# Patient Record
Sex: MALE | Race: WHITE | NOT HISPANIC OR LATINO | Employment: FULL TIME | URBAN - METROPOLITAN AREA
[De-identification: names, ages, dates, MRNs, and addresses within clinical notes are randomized per-mention and may not be internally consistent; named-entity substitution may affect disease eponyms.]

---

## 2018-10-11 ENCOUNTER — HOSPITAL ENCOUNTER (INPATIENT)
Facility: HOSPITAL | Age: 58
LOS: 1 days | Discharge: HOME | DRG: 812 | End: 2018-10-12
Attending: EMERGENCY MEDICINE | Admitting: HOSPITALIST
Payer: COMMERCIAL

## 2018-10-11 DIAGNOSIS — I48.91 ATRIAL FIBRILLATION: ICD-10-CM

## 2018-10-11 DIAGNOSIS — D50.9 MICROCYTIC ANEMIA: ICD-10-CM

## 2018-10-11 DIAGNOSIS — I48.91 A-FIB: ICD-10-CM

## 2018-10-11 DIAGNOSIS — R06.02 SOB (SHORTNESS OF BREATH): ICD-10-CM

## 2018-10-11 DIAGNOSIS — R19.5 OCCULT BLOOD POSITIVE STOOL: ICD-10-CM

## 2018-10-11 DIAGNOSIS — I10 ESSENTIAL HYPERTENSION: ICD-10-CM

## 2018-10-11 DIAGNOSIS — D64.9 SYMPTOMATIC ANEMIA: Primary | ICD-10-CM

## 2018-10-11 PROBLEM — E87.6 HYPOKALEMIA: Status: ACTIVE | Noted: 2018-10-11

## 2018-10-11 PROBLEM — R79.89 ELEVATED LFTS: Status: ACTIVE | Noted: 2018-10-11

## 2018-10-11 LAB
ABO + RH BLD: NORMAL
ALBUMIN SERPL BCP-MCNC: 3.5 G/DL
ALP SERPL-CCNC: 52 U/L
ALT SERPL W/O P-5'-P-CCNC: 57 U/L
ANION GAP SERPL CALC-SCNC: 11 MMOL/L
ANISOCYTOSIS BLD QL SMEAR: SLIGHT
AST SERPL-CCNC: 43 U/L
BASOPHILS # BLD AUTO: 0.04 K/UL
BASOPHILS # BLD AUTO: 0.04 K/UL
BASOPHILS NFR BLD: 0.3 %
BASOPHILS NFR BLD: 0.4 %
BILIRUB SERPL-MCNC: 0.3 MG/DL
BLD GP AB SCN CELLS X3 SERPL QL: NORMAL
BLD PROD TYP BPU: NORMAL
BLD PROD TYP BPU: NORMAL
BLOOD UNIT EXPIRATION DATE: NORMAL
BLOOD UNIT EXPIRATION DATE: NORMAL
BLOOD UNIT TYPE CODE: 8400
BLOOD UNIT TYPE CODE: 8400
BLOOD UNIT TYPE: NORMAL
BLOOD UNIT TYPE: NORMAL
BNP SERPL-MCNC: 110 PG/ML
BUN SERPL-MCNC: 20 MG/DL
CALCIUM SERPL-MCNC: 9 MG/DL
CHLORIDE SERPL-SCNC: 106 MMOL/L
CO2 SERPL-SCNC: 22 MMOL/L
CODING SYSTEM: NORMAL
CODING SYSTEM: NORMAL
CREAT SERPL-MCNC: 0.9 MG/DL
DACRYOCYTES BLD QL SMEAR: ABNORMAL
DIFFERENTIAL METHOD: ABNORMAL
DIFFERENTIAL METHOD: ABNORMAL
DISPENSE STATUS: NORMAL
DISPENSE STATUS: NORMAL
EOSINOPHIL # BLD AUTO: 0.1 K/UL
EOSINOPHIL # BLD AUTO: 0.1 K/UL
EOSINOPHIL NFR BLD: 0.7 %
EOSINOPHIL NFR BLD: 0.8 %
ERYTHROCYTE [DISTWIDTH] IN BLOOD BY AUTOMATED COUNT: 18.4 %
ERYTHROCYTE [DISTWIDTH] IN BLOOD BY AUTOMATED COUNT: 18.6 %
EST. GFR  (AFRICAN AMERICAN): >60 ML/MIN/1.73 M^2
EST. GFR  (NON AFRICAN AMERICAN): >60 ML/MIN/1.73 M^2
FERRITIN SERPL-MCNC: 5 NG/ML
GLUCOSE SERPL-MCNC: 102 MG/DL
HCT VFR BLD AUTO: 16.5 %
HCT VFR BLD AUTO: 17.5 %
HGB BLD-MCNC: 4.7 G/DL
HGB BLD-MCNC: 4.9 G/DL
HYPOCHROMIA BLD QL SMEAR: ABNORMAL
HYPOCHROMIA BLD QL SMEAR: ABNORMAL
IRON SERPL-MCNC: 14 UG/DL
LYMPHOCYTES # BLD AUTO: 1.7 K/UL
LYMPHOCYTES # BLD AUTO: 2.3 K/UL
LYMPHOCYTES NFR BLD: 15.6 %
LYMPHOCYTES NFR BLD: 19.2 %
MCH RBC QN AUTO: 19.4 PG
MCH RBC QN AUTO: 19.7 PG
MCHC RBC AUTO-ENTMCNC: 28 G/DL
MCHC RBC AUTO-ENTMCNC: 28.5 G/DL
MCV RBC AUTO: 69 FL
MCV RBC AUTO: 69 FL
MONOCYTES # BLD AUTO: 1 K/UL
MONOCYTES # BLD AUTO: 1.2 K/UL
MONOCYTES NFR BLD: 9.6 %
MONOCYTES NFR BLD: 9.7 %
NEUTROPHILS # BLD AUTO: 7.9 K/UL
NEUTROPHILS # BLD AUTO: 8.4 K/UL
NEUTROPHILS NFR BLD: 69.9 %
NEUTROPHILS NFR BLD: 73.3 %
OB PNL STL: POSITIVE
OVALOCYTES BLD QL SMEAR: ABNORMAL
OVALOCYTES BLD QL SMEAR: ABNORMAL
PLATELET # BLD AUTO: 294 K/UL
PLATELET # BLD AUTO: 351 K/UL
PLATELET BLD QL SMEAR: ABNORMAL
PLATELET BLD QL SMEAR: ABNORMAL
PMV BLD AUTO: 8.6 FL
PMV BLD AUTO: 9.4 FL
POIKILOCYTOSIS BLD QL SMEAR: SLIGHT
POIKILOCYTOSIS BLD QL SMEAR: SLIGHT
POLYCHROMASIA BLD QL SMEAR: ABNORMAL
POTASSIUM SERPL-SCNC: 3.3 MMOL/L
PROT SERPL-MCNC: 6.5 G/DL
RBC # BLD AUTO: 2.38 M/UL
RBC # BLD AUTO: 2.52 M/UL
SATURATED IRON: 2 %
SODIUM SERPL-SCNC: 139 MMOL/L
STOMATOCYTES BLD QL SMEAR: PRESENT
TOTAL IRON BINDING CAPACITY: 657 UG/DL
TRANS ERYTHROCYTES VOL PATIENT: NORMAL ML
TRANS ERYTHROCYTES VOL PATIENT: NORMAL ML
TRANSFERRIN SERPL-MCNC: 444 MG/DL
TROPONIN I SERPL DL<=0.01 NG/ML-MCNC: <0.006 NG/ML
TROPONIN I SERPL DL<=0.01 NG/ML-MCNC: <0.006 NG/ML
WBC # BLD AUTO: 10.76 K/UL
WBC # BLD AUTO: 12.01 K/UL

## 2018-10-11 PROCEDURE — 93005 ELECTROCARDIOGRAM TRACING: CPT

## 2018-10-11 PROCEDURE — 36430 TRANSFUSION BLD/BLD COMPNT: CPT

## 2018-10-11 PROCEDURE — 83880 ASSAY OF NATRIURETIC PEPTIDE: CPT

## 2018-10-11 PROCEDURE — 99285 EMERGENCY DEPT VISIT HI MDM: CPT

## 2018-10-11 PROCEDURE — P9021 RED BLOOD CELLS UNIT: HCPCS

## 2018-10-11 PROCEDURE — 85025 COMPLETE CBC W/AUTO DIFF WBC: CPT | Mod: 91

## 2018-10-11 PROCEDURE — 99223 1ST HOSP IP/OBS HIGH 75: CPT | Mod: ,,, | Performed by: INTERNAL MEDICINE

## 2018-10-11 PROCEDURE — 93010 ELECTROCARDIOGRAM REPORT: CPT | Mod: 76,,, | Performed by: INTERNAL MEDICINE

## 2018-10-11 PROCEDURE — 80053 COMPREHEN METABOLIC PANEL: CPT

## 2018-10-11 PROCEDURE — 11000001 HC ACUTE MED/SURG PRIVATE ROOM

## 2018-10-11 PROCEDURE — 25000003 PHARM REV CODE 250: Performed by: HOSPITALIST

## 2018-10-11 PROCEDURE — 84484 ASSAY OF TROPONIN QUANT: CPT | Mod: 91

## 2018-10-11 PROCEDURE — 93306 TTE W/DOPPLER COMPLETE: CPT

## 2018-10-11 PROCEDURE — 82272 OCCULT BLD FECES 1-3 TESTS: CPT

## 2018-10-11 PROCEDURE — 25000003 PHARM REV CODE 250: Performed by: PHYSICIAN ASSISTANT

## 2018-10-11 PROCEDURE — 99223 PR INITIAL HOSPITAL CARE,LEVL III: ICD-10-PCS | Mod: ,,, | Performed by: INTERNAL MEDICINE

## 2018-10-11 PROCEDURE — 82728 ASSAY OF FERRITIN: CPT

## 2018-10-11 PROCEDURE — 93010 ELECTROCARDIOGRAM REPORT: CPT | Mod: ,,, | Performed by: INTERNAL MEDICINE

## 2018-10-11 PROCEDURE — 93010 EKG 12-LEAD: ICD-10-PCS | Mod: 76,,, | Performed by: INTERNAL MEDICINE

## 2018-10-11 PROCEDURE — 86850 RBC ANTIBODY SCREEN: CPT

## 2018-10-11 PROCEDURE — 25000003 PHARM REV CODE 250: Performed by: INTERNAL MEDICINE

## 2018-10-11 PROCEDURE — 86920 COMPATIBILITY TEST SPIN: CPT

## 2018-10-11 PROCEDURE — 83540 ASSAY OF IRON: CPT

## 2018-10-11 RX ORDER — AMLODIPINE AND BENAZEPRIL HYDROCHLORIDE 10; 20 MG/1; MG/1
1 CAPSULE ORAL DAILY
Status: ON HOLD | COMMUNITY
End: 2018-10-12 | Stop reason: HOSPADM

## 2018-10-11 RX ORDER — SODIUM CHLORIDE 0.9 % (FLUSH) 0.9 %
5 SYRINGE (ML) INJECTION
Status: DISCONTINUED | OUTPATIENT
Start: 2018-10-11 | End: 2018-10-12 | Stop reason: HOSPADM

## 2018-10-11 RX ORDER — LIDOCAINE 50 MG/G
1 PATCH TOPICAL
Status: DISCONTINUED | OUTPATIENT
Start: 2018-10-11 | End: 2018-10-12 | Stop reason: HOSPADM

## 2018-10-11 RX ORDER — HYDROCODONE BITARTRATE AND ACETAMINOPHEN 500; 5 MG/1; MG/1
TABLET ORAL
Status: DISCONTINUED | OUTPATIENT
Start: 2018-10-11 | End: 2018-10-12 | Stop reason: HOSPADM

## 2018-10-11 RX ORDER — DILTIAZEM HYDROCHLORIDE 5 MG/ML
15 INJECTION INTRAVENOUS
Status: COMPLETED | OUTPATIENT
Start: 2018-10-11 | End: 2018-10-11

## 2018-10-11 RX ORDER — IBUPROFEN 200 MG
16 TABLET ORAL
Status: DISCONTINUED | OUTPATIENT
Start: 2018-10-11 | End: 2018-10-12 | Stop reason: HOSPADM

## 2018-10-11 RX ORDER — HYDROCHLOROTHIAZIDE 25 MG/1
25 TABLET ORAL DAILY
COMMUNITY
End: 2022-08-24

## 2018-10-11 RX ORDER — IBUPROFEN 200 MG
24 TABLET ORAL
Status: DISCONTINUED | OUTPATIENT
Start: 2018-10-11 | End: 2018-10-12 | Stop reason: HOSPADM

## 2018-10-11 RX ORDER — GLUCAGON 1 MG
1 KIT INJECTION
Status: DISCONTINUED | OUTPATIENT
Start: 2018-10-11 | End: 2018-10-12 | Stop reason: HOSPADM

## 2018-10-11 RX ORDER — POLYETHYLENE GLYCOL 3350, SODIUM SULFATE ANHYDROUS, SODIUM BICARBONATE, SODIUM CHLORIDE, POTASSIUM CHLORIDE 236; 22.74; 6.74; 5.86; 2.97 G/4L; G/4L; G/4L; G/4L; G/4L
4000 POWDER, FOR SOLUTION ORAL ONCE
Status: COMPLETED | OUTPATIENT
Start: 2018-10-11 | End: 2018-10-11

## 2018-10-11 RX ORDER — ASPIRIN 325 MG
325 TABLET ORAL
Status: COMPLETED | OUTPATIENT
Start: 2018-10-11 | End: 2018-10-11

## 2018-10-11 RX ORDER — ACETAMINOPHEN 325 MG/1
650 TABLET ORAL EVERY 4 HOURS PRN
Status: DISCONTINUED | OUTPATIENT
Start: 2018-10-11 | End: 2018-10-12 | Stop reason: HOSPADM

## 2018-10-11 RX ORDER — ONDANSETRON 2 MG/ML
4 INJECTION INTRAMUSCULAR; INTRAVENOUS EVERY 8 HOURS PRN
Status: DISCONTINUED | OUTPATIENT
Start: 2018-10-11 | End: 2018-10-12 | Stop reason: HOSPADM

## 2018-10-11 RX ADMIN — DILTIAZEM HYDROCHLORIDE 15 MG: 5 INJECTION INTRAVENOUS at 01:10

## 2018-10-11 RX ADMIN — LIDOCAINE 1 PATCH: 50 PATCH TOPICAL at 09:10

## 2018-10-11 RX ADMIN — ASPIRIN 325 MG ORAL TABLET 325 MG: 325 PILL ORAL at 01:10

## 2018-10-11 RX ADMIN — POLYETHYLENE GLYCOL 3350, SODIUM SULFATE ANHYDROUS, SODIUM BICARBONATE, SODIUM CHLORIDE, POTASSIUM CHLORIDE 4000 ML: 236; 22.74; 6.74; 5.86; 2.97 POWDER, FOR SOLUTION ORAL at 09:10

## 2018-10-11 NOTE — SUBJECTIVE & OBJECTIVE
Past Medical History:   Diagnosis Date    Hypertension        History reviewed. No pertinent surgical history.    Review of patient's allergies indicates:  No Known Allergies    FH  Gastric cancer in father age 50    Tobacco Use    Smoking status: Never Smoker    Smokeless tobacco: Current User     Types: Chew   Substance and Sexual Activity    Alcohol use: Not on file    Drug use: Not on file    Sexual activity: Not on file     Review of Systems   Constitutional: Negative for appetite change and fever.   HENT: Negative.    Eyes: Negative.    Respiratory: Negative for cough and chest tightness.    Cardiovascular: Negative for palpitations and leg swelling.   Gastrointestinal: Negative for abdominal distention, nausea and vomiting.   Endocrine: Negative.    Genitourinary: Negative.    Musculoskeletal: Negative for arthralgias.   Skin: Negative.    Allergic/Immunologic: Negative.    Neurological: Negative.    Hematological: Negative.    Psychiatric/Behavioral: Negative.    All other systems reviewed and are negative.    Objective:     Vital Signs (Most Recent):  Temp: 98.9 °F (37.2 °C) (10/11/18 1304)  Pulse: 98 (10/11/18 1501)  Resp: 19 (10/11/18 1501)  BP: 119/67 (10/11/18 1501)  SpO2: 100 % (10/11/18 1501) Vital Signs (24h Range):  Temp:  [98.9 °F (37.2 °C)] 98.9 °F (37.2 °C)  Pulse:  [] 98  Resp:  [11-19] 19  SpO2:  [100 %] 100 %  BP: (119-123)/(67-77) 119/67     Weight: 89.8 kg (198 lb) (10/11/18 1304)  Body mass index is 28.41 kg/m².    No intake or output data in the 24 hours ending 10/11/18 1522    Lines/Drains/Airways     Peripheral Intravenous Line                 Peripheral IV - Single Lumen 10/11/18 1347 Left Hand less than 1 day                Physical Exam   Constitutional: He is oriented to person, place, and time. He appears well-developed and well-nourished.   HENT:   Head: Normocephalic and atraumatic.   +pale conjunctiva, no erythema   Eyes: EOM are normal. Pupils are equal, round, and  reactive to light.   Cardiovascular: Normal rate and regular rhythm.   Pulmonary/Chest: Effort normal and breath sounds normal.   Abdominal: Soft. Bowel sounds are normal.   Neurological: He is alert and oriented to person, place, and time.   Skin: Skin is warm and dry.   Pallor    Psychiatric: He has a normal mood and affect.   Nursing note and vitals reviewed.      Significant Labs:  CBC:   Recent Labs   Lab  10/11/18   1329  10/11/18   1414   WBC  12.01  10.76   HGB  4.9*  4.7*   HCT  17.5*  16.5*   PLT  351*  294       Significant Imaging:  Imaging results within the past 24 hours have been reviewed.

## 2018-10-11 NOTE — ASSESSMENT & PLAN NOTE
Patient with new onset microcytic anemia with Hb around 4. No known history of anemia in the past. No requirement for blood transfusion in the past. He has never had endoscopy prior. He does report one episode of dark tarry stools and associated weekly NSAID use. He also endorses a family history of gastric cancer. Differential includes angioectasia, underlying malignancy or peptic ulcer disease. Will plan endoscopy accordingly.  PLAN  Clear liquid diet  Golytely split prep starting this evening  Plan for EGD/Colonoscopy tomorrow  NPOafter completion of prep   Further recommendations to follow endoscopy

## 2018-10-11 NOTE — ASSESSMENT & PLAN NOTE
Hgb/Hct was 4.7/16.5 on presentation. Hemoccult positive.   -GI consult, plan for scope in the am 10/12  -Transfuse 2 units PRBC  -Recheck Hgb  -Check coags

## 2018-10-11 NOTE — ASSESSMENT & PLAN NOTE
Only minimally elevated at AST 43 and ALT 57. Denies abdominal pain. Drinks 2-3 beers daily.  -Monitor

## 2018-10-11 NOTE — CONSULTS
Cardiology    Consult Requested By:Rafael  Reason for Consult: atrial fibrillation    SUBJECTIVE:     History of Present Illness:  Patient is a 58 y.o. male presents with history of hypertension, no diabetes; does smoke; no history of any cardiac issues; no angina or failure symptoms in the past. He does have a significant alcohol history.He states that since Tuesday, he has noted weakness, dizziness and shortness of breath with any activity. He never had this prior to this episode. He thought this would resolve but did not. He did not go to work Tuesday, Wednesday or Thursday and since this did not resolve, he came to the ER. In the ER, he was noted to be in atrial fibrillation which he has never had. He denied palpitations but admits to feeling something which he equates to shortness of breath       Review of patient's allergies indicates:  No Known Allergies    Past Medical History:   Diagnosis Date    Hypertension      History reviewed. No pertinent surgical history.  History reviewed. No pertinent family history.  Social History     Tobacco Use    Smoking status: Never Smoker    Smokeless tobacco: Current User     Types: Chew   Substance Use Topics    Alcohol use: Not on file    Drug use: Not on file        Home meds:  No current facility-administered medications on file prior to encounter.      Current Outpatient Medications on File Prior to Encounter   Medication Sig Dispense Refill    amlodipine-benazepril 10-20mg (LOTREL) 10-20 mg per capsule Take 1 capsule by mouth once daily.      hydroCHLOROthiazide (HYDRODIURIL) 25 MG tablet Take 25 mg by mouth once daily.         Current meds:  Scheduled Meds:  Continuous Infusions:  PRN Meds:.sodium chloride      OBJECTIVE:     Vital Signs (Most Recent)  Temp: 98.9 °F (37.2 °C) (10/11/18 1304)  Pulse: 98 (10/11/18 1501)  Resp: 19 (10/11/18 1501)  BP: 119/67 (10/11/18 1501)  SpO2: 100 % (10/11/18 1501)    Vital Signs Range (Last 24H):  Temp:  [98.9 °F (37.2  °C)]   Pulse:  []   Resp:  [11-19]   BP: (119-123)/(67-77)   SpO2:  [100 %]     Physical Exam:  Neck: normal carotids, no bruits  Lungs : clear  Heart: irregular, normal S1,S2, no murmurs  Abd; not examined  Exts; no edema noted; normal PT pulses bilaterally     Laboratory:  LABS  CBC  Recent Labs   Lab  10/11/18   1329  10/11/18   1414   WBC  12.01  10.76   RBC  2.52*  2.38*   HGB  4.9*  4.7*   HCT  17.5*  16.5*   PLT  351*  294   MCV  69*  69*   MCH  19.4*  19.7*   MCHC  28.0*  28.5*     BMP  No results for input(s): NA, K, CO2, CL, BUN, CREATININE, GLU in the last 168 hours.    Invalid input(s): GFR    No results for input(s): CALCIUM, MG, PHOS in the last 168 hours.    LFT  No results for input(s): PROT, ALBUMIN, BILITOT, AST, ALKPHOS, ALT in the last 168 hours.    COAGS  No results for input(s): PT, INR, APTT in the last 168 hours.  CE  Recent Labs   Lab  10/11/18   1329   TROPONINI  <0.006     BNP  Recent Labs   Lab  10/11/18   1329   BNP  110*     Lipid panel:  No results found for: CHOL  No results found for: HDL  No results found for: LDLCALC  No results found for: TRIG  No results found for: CHOLHDL  Diagnostic Results:  EKG: atrial fibrillation  CXR:n/a  Echo: normal EF; full report to follow     Chart review:    none  ASSESSMENT/PLAN:   1. Atrial fibrillation in patient with known hypertension and alcohol history  2. Severe anemia which can explain the weakness and the shortness of breath     Plan: will see what the GI evaluation shows; for now would just watch since his heart rate is not fast.  Anabelle Colon MD

## 2018-10-11 NOTE — CONSULTS
[facility]  Gastroenterology  Consult Note    Patient Name: David Barrios  MRN: [id number]  Admission Date: 10/11/2018  Hospital Length of Stay: 0 days  Code Status: No Order   Attending Provider: Guy J. Lefort, MD   Consulting Provider: Hay Nayak MD  Primary Care Physician: Primary Doctor No  Principal Problem:<principal problem not specified>    Inpatient consult to Gastroenterology  Consult performed by: Hay Nayak MD  Consult ordered by: FRANCES Mistry  Reason for consult: microcytic anemia  Assessment/Recommendations: Plan EGD/Colonoscopy tomorrow        Subjective:     HPI:  59 y/o male with no significant history who presents with complaints of acute onset, severe microcytic anemia associated with dizziness, lightheadedness. Patient reports that the symptoms started on Tuesday and progressively worsened. He does state that he saw one dark tarry stool on Monday, but has not seen any blood since. He denies history of anemia in the past. He denies blood transfusions in the past. He does endorse a family history of colon cancer in his father age 50, but no family history of colon cancer. He denies history of endoscopy in the past and has never had colonoscopy. Denies weight loss or night sweats.     Past Medical History:   Diagnosis Date    Hypertension        History reviewed. No pertinent surgical history.    Review of patient's allergies indicates:  No Known Allergies    FH  Gastric cancer in father age 50    Tobacco Use    Smoking status: Never Smoker    Smokeless tobacco: Current User     Types: Chew   Substance and Sexual Activity    Alcohol use: Not on file    Drug use: Not on file    Sexual activity: Not on file     Review of Systems   Constitutional: Negative for appetite change and fever.   HENT: Negative.    Eyes: Negative.    Respiratory: Negative for cough and chest tightness.    Cardiovascular: Negative for palpitations and leg swelling.    Gastrointestinal: Negative for abdominal distention, nausea and vomiting.   Endocrine: Negative.    Genitourinary: Negative.    Musculoskeletal: Negative for arthralgias.   Skin: Negative.    Allergic/Immunologic: Negative.    Neurological: Negative.    Hematological: Negative.    Psychiatric/Behavioral: Negative.    All other systems reviewed and are negative.    Objective:     Vital Signs (Most Recent):  Temp: 98.9 °F (37.2 °C) (10/11/18 1304)  Pulse: 98 (10/11/18 1501)  Resp: 19 (10/11/18 1501)  BP: 119/67 (10/11/18 1501)  SpO2: 100 % (10/11/18 1501) Vital Signs (24h Range):  Temp:  [98.9 °F (37.2 °C)] 98.9 °F (37.2 °C)  Pulse:  [] 98  Resp:  [11-19] 19  SpO2:  [100 %] 100 %  BP: (119-123)/(67-77) 119/67     Weight: 89.8 kg (198 lb) (10/11/18 1304)  Body mass index is 28.41 kg/m².    No intake or output data in the 24 hours ending 10/11/18 1522    Lines/Drains/Airways     Peripheral Intravenous Line                 Peripheral IV - Single Lumen 10/11/18 1347 Left Hand less than 1 day                Physical Exam   Constitutional: He is oriented to person, place, and time. He appears well-developed and well-nourished.   HENT:   Head: Normocephalic and atraumatic.   +pale conjunctiva, no erythema   Eyes: EOM are normal. Pupils are equal, round, and reactive to light.   Cardiovascular: Normal rate and regular rhythm.   Pulmonary/Chest: Effort normal and breath sounds normal.   Abdominal: Soft. Bowel sounds are normal.   Neurological: He is alert and oriented to person, place, and time.   Skin: Skin is warm and dry.   Pallor    Psychiatric: He has a normal mood and affect.   Nursing note and vitals reviewed.      Significant Labs:  CBC:   Recent Labs   Lab  10/11/18   1329  10/11/18   1414   WBC  12.01  10.76   HGB  4.9*  4.7*   HCT  17.5*  16.5*   PLT  351*  294       Significant Imaging:  Imaging results within the past 24 hours have been reviewed.    Assessment/Plan:     Microcytic anemia    Patient with  new onset microcytic anemia with Hb around 4. No known history of anemia in the past. No requirement for blood transfusion in the past. He has never had endoscopy prior. He does report one episode of dark tarry stools and associated weekly NSAID use. He also endorses a family history of gastric cancer. Differential includes angioectasia, underlying malignancy or peptic ulcer disease. Will plan endoscopy accordingly.  PLAN  Clear liquid diet  Golytely split prep starting this evening  Plan for EGD/Colonoscopy tomorrow  NPOafter completion of prep   Further recommendations to follow endoscopy            Thank you for your consult. I will follow-up with patient. Please contact us if you have any additional questions.    Hay Nayak MD  Gastroenterology  [facility]

## 2018-10-11 NOTE — ASSESSMENT & PLAN NOTE
Initial EKG showing afib with RVR (rate 113). Responded well to IV cardizem with decrease in HR to maintain around 100.   -Echo pending  -Cardiology consult  -Telemetry monitoring

## 2018-10-11 NOTE — HPI
"Mr. Barrios is a 58 year old man with hypertension who presents today with severe anemia. He presented to his PCP with complaint of several days of shortness of breath and palpitations . He reports lightheadedness, SOB and racing heart upon standing. He reports that his stools were dark at the beginning of the week, but denies emerald blood or hematemesis. He was sent to the ED for new afib on his EKG. In the ED, cardiology was initially called for management. However, when CBC resulted Hgb was 4.7. Hemoccult was positive. CXR was negative. Troponin and BNP were negative. 2D echo was performed at the bedside and results are pending. Two units PRBC were transfused. GI and cardiology were consulted. He was admitted to hospital medicine.   Mr. Barrios reports his father  of "stomach" cancer, but is unsure if it was CRC or gastric. He has no significant cardiac history beyond HTN. He uses smokeless tobacco and drinks 2-3 beers per day.   "

## 2018-10-11 NOTE — ED PROVIDER NOTES
Encounter Date: 10/11/2018       History     Chief Complaint   Patient presents with    Shortness of Breath     with dizziness and irregular heartbeat x3 days     Afebrile 58-year-old male with past medical history of hypertension and and a daily to loose tobacco user presents to the ED for evaluation of shortness of breath.  He states since Tuesday he began with episodic shortness of breath. He states that these episodes occur with exertion.  He states they are resolve with rest.  He does report associated lightheadedness and palpitations with these episodes.  He presents from his doctor's office for further evaluation of atrial fibrillation on EKG.  He denies any previous history of atrial fibrillation.  He denies any chest pain, headache presently, nausea, vomiting or diaphoresis.  He has not tried any medications for the symptoms. He does report last week he had some bronchitis like symptoms for which she completed an antibiotic and steroid pack.  He does report on Sunday he had few alcoholic beverages but is only a social drinker.      The history is provided by the patient.     Review of patient's allergies indicates:  No Known Allergies  Past Medical History:   Diagnosis Date    Hypertension      History reviewed. No pertinent surgical history.  History reviewed. No pertinent family history.  Social History     Tobacco Use    Smoking status: Never Smoker    Smokeless tobacco: Current User     Types: Chew   Substance Use Topics    Alcohol use: Not on file    Drug use: Not on file     Review of Systems   Constitutional: Negative for chills, diaphoresis and fever.   HENT: Negative for sore throat.    Eyes: Negative for visual disturbance.   Respiratory: Positive for shortness of breath.    Cardiovascular: Positive for palpitations. Negative for chest pain.   Gastrointestinal: Negative for nausea and vomiting.   Genitourinary: Negative for dysuria and flank pain.   Musculoskeletal: Negative for arthralgias  and back pain.   Skin: Negative for rash.   Neurological: Positive for dizziness and light-headedness. Negative for weakness.   Hematological: Does not bruise/bleed easily.   Psychiatric/Behavioral: Negative for confusion.       Physical Exam     Initial Vitals [10/11/18 1304]   BP Pulse Resp Temp SpO2   123/77 (!) 128 17 98.9 °F (37.2 °C) 100 %      MAP       --         Physical Exam    Nursing note and vitals reviewed.  Constitutional: Vital signs are normal. He appears well-developed and well-nourished. He is cooperative.  Non-toxic appearance. He does not appear ill.   HENT:   Head: Normocephalic and atraumatic.   Eyes: Conjunctivae and lids are normal.   Neck: Trachea normal and normal range of motion. Neck supple. No stridor present. No tracheal deviation present.   Cardiovascular: An irregularly irregular rhythm present.  Tachycardia present.    Pulmonary/Chest: No tachypnea. No respiratory distress. He has no decreased breath sounds. He has no wheezes.   Abdominal: Soft. Normal appearance. There is no tenderness.   Genitourinary: Rectal exam shows guaiac positive stool. Guaiac positive stool. : Acceptable.  Neurological: He is alert and oriented to person, place, and time. GCS eye subscore is 4. GCS verbal subscore is 5. GCS motor subscore is 6.   Skin: Skin is warm, dry and intact. No rash noted.   Psychiatric: He has a normal mood and affect. His speech is normal and behavior is normal. Thought content normal.         ED Course   Procedures  Labs Reviewed   CBC W/ AUTO DIFFERENTIAL - Abnormal; Notable for the following components:       Result Value    RBC 2.52 (*)     Hemoglobin 4.9 (*)     Hematocrit 17.5 (*)     MCV 69 (*)     MCH 19.4 (*)     MCHC 28.0 (*)     RDW 18.6 (*)     Platelets 351 (*)     Gran # (ANC) 8.4 (*)     Mono # 1.2 (*)     All other components within normal limits    Narrative:       H&H critical result(s) called and verbal readback obtained from   LUISANA CONTRERAS  RN, 10/11/2018 13:59   B-TYPE NATRIURETIC PEPTIDE - Abnormal; Notable for the following components:     (*)     All other components within normal limits   OCCULT BLOOD X 1, STOOL - Abnormal; Notable for the following components:    Occult Blood Positive (*)     All other components within normal limits   CBC W/ AUTO DIFFERENTIAL - Abnormal; Notable for the following components:    RBC 2.38 (*)     Hemoglobin 4.7 (*)     Hematocrit 16.5 (*)     MCV 69 (*)     MCH 19.7 (*)     MCHC 28.5 (*)     RDW 18.4 (*)     MPV 8.6 (*)     Gran # (ANC) 7.9 (*)     Gran% 73.3 (*)     Lymph% 15.6 (*)     All other components within normal limits    Narrative:     H&H-critical result(s) called and verbal readback obtained from   Kaitlin James RN. , 10/11/2018 14:42   TROPONIN I   COMPREHENSIVE METABOLIC PANEL   TYPE & SCREEN   PREPARE RBC SOFT          Imaging Results          X-Ray Chest AP Portable (Final result)  Result time 10/11/18 14:32:13    Final result by Nolan Miranda MD (10/11/18 14:32:13)                 Impression:      As above.      Electronically signed by: Nolan Miranda MD  Date:    10/11/2018  Time:    14:32             Narrative:    EXAMINATION:  XR CHEST AP PORTABLE    CLINICAL HISTORY:  a fib;    TECHNIQUE:  Single frontal view of the chest was performed.    COMPARISON:  None    FINDINGS:  No consolidation, pleural effusion or pneumothorax.  Cardiomediastinal silhouette is unremarkable.                                 Medical Decision Making:   Initial Assessment:   58-year-old male presents the ED for evaluation of new onset atrial fibrillation.  He describes his symptoms as paroxysmal and that typically occurs with exertion.  He describes shortness of breath, dizziness and palpitations.  He states that this began on Tuesday.  The physical exam reveals well-appearing male in no obvious distress resting comfortably in the bed.  Lungs CTA; heart with tachycardia and irregularly irregular rhythm.  Abdomen is soft and nontender.  Occult stool is positive however I do not note any gross blood on my exam.  Fair range of motion of all extremities with strength equal bilaterally. Skin free of rash or diaphoresis.  Differential Diagnosis:   Differential Diagnosis includes, but is not limited to:  ACS/MI, new onset atrial fibrillation, pneumothorax, cardiac tamponade, pericarditis/myocarditis, pneumonia, infection/abscess, lung mass, dehydration, electrolyte abnormality, symptomatic anemia, GI loss, hypoglycemia, UTI  Clinical Tests:   Lab Tests: Ordered and Reviewed  Radiological Study: Ordered and Reviewed  Medical Tests: Ordered and Reviewed  ED Management:  EKG revealed atrial fibrillation with RVR; rate is 113 with no STEMI appreciated. Rate was controlled wound is IV Cardizem in the ED.  Labs ordered to assess for this new onset atrial fibrillation and Cardiology consulted.  Cardiology recommended echo and should workup the unremarkable patient can be discharged home however labs are significant for moderate anemia likely the etiology of patient's atrial fibrillation.  This was verified and 2 units of blood were ordered from the ED.  Patient did have echo with no significant abnormalities.  GI was consulted and they will follow the patient.  I believe patient would benefit from admission for blood replacement in further evaluation of the symptomatic anemia and new onset of atrial fibrillation.  Rate remained in good control throughout the rest of the ED course.  Patient remained stable throughout ED course.                      Clinical Impression:   The primary encounter diagnosis was Symptomatic anemia. Diagnoses of SOB (shortness of breath), A-fib, Atrial fibrillation, and Occult blood positive stool were also pertinent to this visit.                             FRANCES Mistry  10/11/18 [id number]

## 2018-10-11 NOTE — HPI
57 y/o male with no significant history who presents with complaints of acute onset, severe microcytic anemia associated with dizziness, lightheadedness. Patient reports that the symptoms started on Tuesday and progressively worsened. He does state that he saw one dark tarry stool on Monday, but has not seen any blood since. He denies history of anemia in the past. He denies blood transfusions in the past. He does endorse a family history of colon cancer in his father age 50, but no family history of colon cancer. He denies history of endoscopy in the past and has never had colonoscopy. Denies weight loss or night sweats.

## 2018-10-12 ENCOUNTER — ANESTHESIA (OUTPATIENT)
Dept: ENDOSCOPY | Facility: HOSPITAL | Age: 58
DRG: 812 | End: 2018-10-12
Payer: COMMERCIAL

## 2018-10-12 ENCOUNTER — ANESTHESIA EVENT (OUTPATIENT)
Dept: ENDOSCOPY | Facility: HOSPITAL | Age: 58
DRG: 812 | End: 2018-10-12
Payer: COMMERCIAL

## 2018-10-12 ENCOUNTER — TELEPHONE (OUTPATIENT)
Dept: ENDOSCOPY | Facility: HOSPITAL | Age: 58
End: 2018-10-12

## 2018-10-12 VITALS
OXYGEN SATURATION: 97 % | BODY MASS INDEX: 27.77 KG/M2 | WEIGHT: 194 LBS | SYSTOLIC BLOOD PRESSURE: 113 MMHG | HEIGHT: 70 IN | TEMPERATURE: 98 F | HEART RATE: 109 BPM | DIASTOLIC BLOOD PRESSURE: 82 MMHG | RESPIRATION RATE: 20 BRPM

## 2018-10-12 DIAGNOSIS — K22.11 ULCER OF ESOPHAGUS WITH BLEEDING: Primary | ICD-10-CM

## 2018-10-12 LAB
ANION GAP SERPL CALC-SCNC: 5 MMOL/L
APTT BLDCRRT: 27.5 SEC
BASOPHILS # BLD AUTO: 0.03 K/UL
BASOPHILS NFR BLD: 0.2 %
BUN SERPL-MCNC: 16 MG/DL
CALCIUM SERPL-MCNC: 8.6 MG/DL
CHLORIDE SERPL-SCNC: 104 MMOL/L
CO2 SERPL-SCNC: 29 MMOL/L
CREAT SERPL-MCNC: 0.9 MG/DL
DIASTOLIC DYSFUNCTION: NO
DIFFERENTIAL METHOD: ABNORMAL
EOSINOPHIL # BLD AUTO: 0.1 K/UL
EOSINOPHIL NFR BLD: 0.9 %
ERYTHROCYTE [DISTWIDTH] IN BLOOD BY AUTOMATED COUNT: 21.9 %
EST. GFR  (AFRICAN AMERICAN): >60 ML/MIN/1.73 M^2
EST. GFR  (NON AFRICAN AMERICAN): >60 ML/MIN/1.73 M^2
ESTIMATED AVG GLUCOSE: 97 MG/DL
ESTIMATED PA SYSTOLIC PRESSURE: 16.65
GLUCOSE SERPL-MCNC: 109 MG/DL
HBA1C MFR BLD HPLC: 5 %
HCT VFR BLD AUTO: 28.8 %
HGB BLD-MCNC: 8.6 G/DL
INR PPP: 1
LYMPHOCYTES # BLD AUTO: 2 K/UL
LYMPHOCYTES NFR BLD: 15.8 %
MCH RBC QN AUTO: 22.5 PG
MCHC RBC AUTO-ENTMCNC: 29.9 G/DL
MCV RBC AUTO: 75 FL
MONOCYTES # BLD AUTO: 1.1 K/UL
MONOCYTES NFR BLD: 8.7 %
NEUTROPHILS # BLD AUTO: 9.4 K/UL
NEUTROPHILS NFR BLD: 74.2 %
PLATELET # BLD AUTO: 324 K/UL
PMV BLD AUTO: 9.4 FL
POTASSIUM SERPL-SCNC: 3.2 MMOL/L
PROTHROMBIN TIME: 10.7 SEC
RBC # BLD AUTO: 3.82 M/UL
RETIRED EF AND QEF - SEE NOTES: 55 (ref 55–65)
SODIUM SERPL-SCNC: 138 MMOL/L
TRICUSPID VALVE REGURGITATION: NORMAL
TROPONIN I SERPL DL<=0.01 NG/ML-MCNC: <0.006 NG/ML
TROPONIN I SERPL DL<=0.01 NG/ML-MCNC: <0.006 NG/ML
WBC # BLD AUTO: 12.7 K/UL

## 2018-10-12 PROCEDURE — 85610 PROTHROMBIN TIME: CPT

## 2018-10-12 PROCEDURE — 63600175 PHARM REV CODE 636 W HCPCS: Performed by: NURSE ANESTHETIST, CERTIFIED REGISTERED

## 2018-10-12 PROCEDURE — 37000008 HC ANESTHESIA 1ST 15 MINUTES: Performed by: INTERNAL MEDICINE

## 2018-10-12 PROCEDURE — 80048 BASIC METABOLIC PNL TOTAL CA: CPT

## 2018-10-12 PROCEDURE — 63600175 PHARM REV CODE 636 W HCPCS: Performed by: HOSPITALIST

## 2018-10-12 PROCEDURE — 88342 TISSUE SPECIMEN TO PATHOLOGY - SURGERY: ICD-10-PCS | Mod: 26,,, | Performed by: PATHOLOGY

## 2018-10-12 PROCEDURE — 25000003 PHARM REV CODE 250: Performed by: HOSPITALIST

## 2018-10-12 PROCEDURE — 90472 IMMUNIZATION ADMIN EACH ADD: CPT | Performed by: HOSPITALIST

## 2018-10-12 PROCEDURE — 84484 ASSAY OF TROPONIN QUANT: CPT

## 2018-10-12 PROCEDURE — 94761 N-INVAS EAR/PLS OXIMETRY MLT: CPT

## 2018-10-12 PROCEDURE — 90686 IIV4 VACC NO PRSV 0.5 ML IM: CPT | Performed by: HOSPITALIST

## 2018-10-12 PROCEDURE — 43239 EGD BIOPSY SINGLE/MULTIPLE: CPT | Performed by: INTERNAL MEDICINE

## 2018-10-12 PROCEDURE — 90471 IMMUNIZATION ADMIN: CPT | Performed by: HOSPITALIST

## 2018-10-12 PROCEDURE — 27201012 HC FORCEPS, HOT/COLD, DISP: Performed by: INTERNAL MEDICINE

## 2018-10-12 PROCEDURE — 83036 HEMOGLOBIN GLYCOSYLATED A1C: CPT

## 2018-10-12 PROCEDURE — 88305 TISSUE EXAM BY PATHOLOGIST: CPT | Performed by: PATHOLOGY

## 2018-10-12 PROCEDURE — 25000003 PHARM REV CODE 250: Performed by: NURSE ANESTHETIST, CERTIFIED REGISTERED

## 2018-10-12 PROCEDURE — 37000009 HC ANESTHESIA EA ADD 15 MINS: Performed by: INTERNAL MEDICINE

## 2018-10-12 PROCEDURE — 88342 IMHCHEM/IMCYTCHM 1ST ANTB: CPT | Mod: 26,,, | Performed by: PATHOLOGY

## 2018-10-12 PROCEDURE — 88305 TISSUE SPECIMEN TO PATHOLOGY - SURGERY: ICD-10-PCS | Mod: 26,,, | Performed by: PATHOLOGY

## 2018-10-12 PROCEDURE — 43239 PR EGD, FLEX, W/BIOPSY, SGL/MULTI: ICD-10-PCS | Mod: ,,, | Performed by: INTERNAL MEDICINE

## 2018-10-12 PROCEDURE — 90670 PCV13 VACCINE IM: CPT | Performed by: HOSPITALIST

## 2018-10-12 PROCEDURE — 43239 EGD BIOPSY SINGLE/MULTIPLE: CPT | Mod: ,,, | Performed by: INTERNAL MEDICINE

## 2018-10-12 PROCEDURE — 36415 COLL VENOUS BLD VENIPUNCTURE: CPT

## 2018-10-12 PROCEDURE — 85025 COMPLETE CBC W/AUTO DIFF WBC: CPT

## 2018-10-12 PROCEDURE — 88305 TISSUE EXAM BY PATHOLOGIST: CPT | Mod: 26,,, | Performed by: PATHOLOGY

## 2018-10-12 PROCEDURE — 85730 THROMBOPLASTIN TIME PARTIAL: CPT

## 2018-10-12 RX ORDER — PANTOPRAZOLE SODIUM 40 MG/1
40 TABLET, DELAYED RELEASE ORAL 2 TIMES DAILY
Qty: 60 TABLET | Refills: 11 | Status: SHIPPED | OUTPATIENT
Start: 2018-10-12 | End: 2019-10-02

## 2018-10-12 RX ORDER — PROPOFOL 10 MG/ML
VIAL (ML) INTRAVENOUS
Status: DISCONTINUED | OUTPATIENT
Start: 2018-10-12 | End: 2018-10-12

## 2018-10-12 RX ORDER — GLYCOPYRROLATE 0.2 MG/ML
INJECTION INTRAMUSCULAR; INTRAVENOUS
Status: DISCONTINUED | OUTPATIENT
Start: 2018-10-12 | End: 2018-10-12

## 2018-10-12 RX ORDER — FERROUS SULFATE 325(65) MG
325 TABLET ORAL
Refills: 0 | COMMUNITY
Start: 2018-10-12 | End: 2023-01-12

## 2018-10-12 RX ORDER — LIDOCAINE HCL/PF 100 MG/5ML
SYRINGE (ML) INTRAVENOUS
Status: DISCONTINUED | OUTPATIENT
Start: 2018-10-12 | End: 2018-10-12

## 2018-10-12 RX ORDER — SODIUM CHLORIDE 9 MG/ML
INJECTION, SOLUTION INTRAVENOUS CONTINUOUS PRN
Status: DISCONTINUED | OUTPATIENT
Start: 2018-10-12 | End: 2018-10-12

## 2018-10-12 RX ORDER — CARVEDILOL 12.5 MG/1
12.5 TABLET ORAL 2 TIMES DAILY WITH MEALS
Qty: 60 TABLET | Refills: 11 | Status: SHIPPED | OUTPATIENT
Start: 2018-10-12 | End: 2022-08-24 | Stop reason: SDUPTHER

## 2018-10-12 RX ADMIN — PROPOFOL 20 MG: 10 INJECTION, EMULSION INTRAVENOUS at 10:10

## 2018-10-12 RX ADMIN — LIDOCAINE HYDROCHLORIDE 75 MG: 20 INJECTION, SOLUTION INTRAVENOUS at 10:10

## 2018-10-12 RX ADMIN — PNEUMOCOCCAL 13-VALENT CONJUGATE VACCINE 0.5 ML: 2.2; 2.2; 2.2; 2.2; 2.2; 4.4; 2.2; 2.2; 2.2; 2.2; 2.2; 2.2; 2.2 INJECTION, SUSPENSION INTRAMUSCULAR at 04:10

## 2018-10-12 RX ADMIN — TOPICAL ANESTHETIC 1 EACH: 200 SPRAY DENTAL; PERIODONTAL at 10:10

## 2018-10-12 RX ADMIN — SODIUM CHLORIDE: 0.9 INJECTION, SOLUTION INTRAVENOUS at 10:10

## 2018-10-12 RX ADMIN — GLYCOPYRROLATE 0.2 MG: 0.2 INJECTION, SOLUTION INTRAMUSCULAR; INTRAVENOUS at 10:10

## 2018-10-12 RX ADMIN — PROPOFOL 30 MG: 10 INJECTION, EMULSION INTRAVENOUS at 11:10

## 2018-10-12 RX ADMIN — INFLUENZA A VIRUS A/MICHIGAN/45/2015 X-275 (H1N1) ANTIGEN (FORMALDEHYDE INACTIVATED), INFLUENZA A VIRUS A/SINGAPORE/INFIMH-16-0019/2016 IVR-186 (H3N2) ANTIGEN (FORMALDEHYDE INACTIVATED), INFLUENZA B VIRUS B/PHUKET/3073/2013 ANTIGEN (FORMALDEHYDE INACTIVATED), AND INFLUENZA B VIRUS B/MARYLAND/15/2016 BX-69A ANTIGEN (FORMALDEHYDE INACTIVATED) 0.5 ML: 15; 15; 15; 15 INJECTION, SUSPENSION INTRAMUSCULAR at 04:10

## 2018-10-12 RX ADMIN — PROPOFOL 70 MG: 10 INJECTION, EMULSION INTRAVENOUS at 10:10

## 2018-10-12 RX ADMIN — IRON SUCROSE 500 MG: 20 INJECTION, SOLUTION INTRAVENOUS at 12:10

## 2018-10-12 RX ADMIN — PROPOFOL 60 MG: 10 INJECTION, EMULSION INTRAVENOUS at 10:10

## 2018-10-12 NOTE — PLAN OF CARE
Problem: Patient Care Overview  Goal: Plan of Care Review  Patient on RA, no respiratory distress noted. Will continue to monitor.

## 2018-10-12 NOTE — PLAN OF CARE
"Patient is a 58 year old man with hypertension who presents today with severe anemia. He presented to his PCP with complaint of several days of shortness of breath and palpitations . He reports lightheadedness, SOB and racing heart upon standing. He reports that his stools were dark at the beginning of the week, but denies emerald blood or hematemesis. He was sent to the ED for new afib on his EKG. In the ED, cardiology was initially called for management. However, when CBC resulted Hgb was 4.7. Hemoccult was positive. CXR was negative. Troponin and BNP were negative. 2D echo was performed at the bedside and results are pending. Two units PRBC were transfused. GI and cardiology were consulted. He was admitted to hospital medicine.   Mr. Barrios reports his father  of "stomach" cancer, but is unsure if it was CRC or gastric. He has no significant cardiac history beyond HTN. He uses smokeless tobacco and drinks 2-3 beers per day    This  put name on white board and explained blue discharge folder to patient. Discharge planning brochure given to patient.  Case management will continue to follow for discharge needs.       10/12/18 1027   Discharge Assessment   Assessment Type Discharge Planning Assessment   Confirmed/corrected address and phone number on facesheet? Yes   Assessment information obtained from? Patient   Prior to hospitilization cognitive status: Alert/Oriented   Prior to hospitalization functional status: Independent   Current cognitive status: Alert/Oriented   Current Functional Status: Independent   Facility Arrived From: (Home)   Lives With significant other  (Arianna Mccabe [phone])   Able to Return to Prior Arrangements yes   Is patient able to care for self after discharge? Yes   Who are your caregiver(s) and their phone number(s)? (Significant other, Arianna Mccabe [phone])   Patient's perception of discharge disposition home or selfcare   Readmission Within The Last 30 Days no " previous admission in last 30 days   Patient currently being followed by outpatient case management? No   Patient currently receives any other outside agency services? No   Equipment Currently Used at Home none   Do you have any problems affording any of your prescribed medications? No   Is the patient taking medications as prescribed? yes   Does the patient have transportation home? Yes   Transportation Available car;family or friend will provide   Does the patient receive services at the [facility]? No   Discharge Plan A Home with family   Discharge Plan B Home with family   Patient/Family In Agreement With Plan yes

## 2018-10-12 NOTE — PLAN OF CARE
Problem: Patient Care Overview  Goal: Plan of Care Review  Outcome: Ongoing (interventions implemented as appropriate)  AAOx3. Admitted from ER with weakness and anemia. Bed remains low, locked and call bell within reach. Patient verbalized understanding to call for any needs or assistance. Bed alarm set.  Patient placed on telemetry.1 unit of blood completed in ER and 1 unit remaining to be administered. Clear liquid diet and will be NPO after midnight for EGD in morning.

## 2018-10-12 NOTE — PLAN OF CARE
Report received from NATE Sanchez  Patient has not completed prep. Completed around 25% with semi formed brown stools. Spoke with Dr Randolph. Cancelled colonoscopy at this time. NPO since 0830. Earliest EGD can be done is 1030. Patent IV access.

## 2018-10-12 NOTE — [employer] PATIENT INSTRUCTIONS
Discharge Summary/Instructions after an Endoscopic Procedure  Patient Name: David Barrios  Patient MRN: [id number]  Patient YOB: 1960 Friday, October 12, 2018  Braden Herring MD  RESTRICTIONS:  During your procedure today, you received medications for sedation.  These   medications may affect your judgment, balance and coordination.  Therefore,   for 24 hours, you have the following restrictions:   - DO NOT drive a car, operate machinery, make legal/financial decisions,   sign important papers or drink alcohol.    ACTIVITY:  Today: no heavy lifting, straining or running due to procedural   sedation/anesthesia.  The following day: return to full activity including work.  DIET:  Eat and drink normally unless instructed otherwise.     TREATMENT FOR COMMON SIDE EFFECTS:  - Mild abdominal pain, nausea, belching, bloating or excessive gas:  rest,   eat lightly and use a heating pad.  - Sore Throat: treat with throat lozenges and/or gargle with warm salt   water.  - Because air was used during the procedure, expelling large amounts of air   from your rectum or belching is normal.  - If a bowel prep was taken, you may not have a bowel movement for 1-3 days.    This is normal.  SYMPTOMS TO WATCH FOR AND REPORT TO YOUR PHYSICIAN:  1. Abdominal pain or bloating, other than gas cramps.  2. Chest pain.  3. Back pain.  4. Signs of infection such as: chills or fever occurring within 24 hours   after the procedure.  5. Rectal bleeding, which would show as bright red, maroon, or black stools.   (A tablespoon of blood from the rectum is not serious, especially if   hemorrhoids are present.)  6. Vomiting.  7. Weakness or dizziness.  GO DIRECTLY TO THE NEAREST EMERGENCY ROOM IF YOU HAVE ANY OF THE FOLLOWING:      Difficulty breathing              Chills and/or fever over 101 F   Persistent vomiting and/or vomiting blood   Severe abdominal pain   Severe chest pain   Black, tarry stools   Bleeding- more than one  tablespoon   Any other symptom or condition that you feel may need urgent attention  Your doctor recommends these additional instructions:  If any biopsies were taken, your doctors clinic will contact you in 1 to 2   weeks with any results.  - Return patient to hospital [facility] for ongoing care.   - Resume previous diet.   - Await pathology results. Start PPI therapy BID  - Repeat upper endoscopy in 4 weeks to evaluate the response to therapy and   biopsy  - Perform a colonoscopy in 4 weeks at the same time as the upper endoscopy  For questions, problems or results please call your physician - Braden Herring MD at Work:  [phone].  EMERGENCY PHONE NUMBER: [phone],  LAB RESULTS: [phone]  IF A COMPLICATION OR EMERGENCY SITUATION ARISES AND YOU ARE UNABLE TO REACH   YOUR PHYSICIAN - GO DIRECTLY TO THE EMERGENCY ROOM.  Braden Herring MD  10/12/2018 11:18:55 AM  This report has been verified and signed electronically.  [employer]

## 2018-10-12 NOTE — TRANSFER OF CARE
"Anesthesia Transfer of Care Note    Patient: David Barrios    Procedure(s) Performed: Procedure(s) (LRB):  EGD (ESOPHAGOGASTRODUODENOSCOPY) (N/A)    Patient location: GI    Anesthesia Type: MAC    Transport from OR: Transported from OR on room air with adequate spontaneous ventilation    Post pain: adequate analgesia    Post assessment: no apparent anesthetic complications and tolerated procedure well    Post vital signs: stable    Level of consciousness: responds to stimulation and sedated    Nausea/Vomiting: no nausea/vomiting    Complications: none    Transfer of care protocol was followed      Last vitals:   Visit Vitals  BP (!) 146/76 (BP Location: Left arm, Patient Position: Lying)   Pulse 98   Temp 36.4 °C (97.6 °F) (Skin)   Resp 18   Ht 5' 10" (1.778 m)   Wt 88 kg (194 lb)   SpO2 96%   BMI 27.84 kg/m²     "

## 2018-10-12 NOTE — PROGRESS NOTES
Remains in atrial fibrillation; left atrium only mildly enlarged on the echo with normal EF; is this from acute illness?? In any case, would rate control at this time; his [id number] is only 1 and thus would hold off on any anticoagulation with the GI bleed naturally. As for cardioversion, would hold off on this due to the need for anticoagulation if pursued. Would change the lotrel to beta blockers later.

## 2018-10-12 NOTE — SUBJECTIVE & OBJECTIVE
Past Medical History:   Diagnosis Date    Hypertension        History reviewed. No pertinent surgical history.    Review of patient's allergies indicates:  No Known Allergies    No current facility-administered medications on file prior to encounter.      Current Outpatient Medications on File Prior to Encounter   Medication Sig    amlodipine-benazepril 10-20mg (LOTREL) 10-20 mg per capsule Take 1 capsule by mouth once daily.    hydroCHLOROthiazide (HYDRODIURIL) 25 MG tablet Take 25 mg by mouth once daily.     Family History     Mother: COPD  Father: cancer        Tobacco Use    Smoking status: Never Smoker    Smokeless tobacco: Current User     Types: Chew   Substance and Sexual Activity    Alcohol use: Not on file    Drug use: Not on file    Sexual activity: Not on file     Review of Systems   Constitutional: Positive for activity change and fatigue. Negative for appetite change.   HENT: Negative for congestion and rhinorrhea.    Eyes: Negative for visual disturbance.   Respiratory: Positive for shortness of breath.    Cardiovascular: Positive for palpitations. Negative for chest pain.   Gastrointestinal: Positive for blood in stool and nausea. Negative for abdominal distention and abdominal pain.   Endocrine: Negative for polydipsia and polyphagia.   Genitourinary: Negative for decreased urine volume and difficulty urinating.   Musculoskeletal: Negative for arthralgias and myalgias.   Skin: Positive for pallor.   Allergic/Immunologic: Negative for immunocompromised state.   Neurological: Positive for weakness and light-headedness.   Hematological: Does not bruise/bleed easily.   Psychiatric/Behavioral: Negative for agitation and confusion.     Objective:     Vital Signs (Most Recent):  Temp: 99.2 °F (37.3 °C) (10/11/18 1845)  Pulse: (!) 117 (10/11/18 1845)  Resp: 18 (10/11/18 1826)  BP: 130/70 (10/11/18 1845)  SpO2: 100 % (10/11/18 1845) Vital Signs (24h Range):  Temp:  [98.9 °F (37.2 °C)-99.6 °F (37.6  °C)] 99.2 °F (37.3 °C)  Pulse:  [] 117  Resp:  [11-25] 18  SpO2:  [100 %] 100 %  BP: (119-145)/(60-77) 130/70     Weight: (!) 194.4 kg (428 lb 9.2 oz)  Body mass index is 61.49 kg/m².    Physical Exam   Constitutional: He is oriented to person, place, and time. He appears well-developed and well-nourished. No distress.   HENT:   Head: Normocephalic and atraumatic.   Mouth/Throat: Mucous membranes are pale.   Eyes: EOM are normal. Pupils are equal, round, and reactive to light.   Cardiovascular: Normal rate. An irregularly irregular rhythm present.   Pulmonary/Chest: Effort normal. He has no wheezes. He has no rales.   Abdominal: Soft. Bowel sounds are normal. He exhibits no distension. There is no tenderness.   Musculoskeletal: He exhibits no edema.   Neurological: He is alert and oriented to person, place, and time.   Skin: He is not diaphoretic. There is pallor.   Psychiatric: He has a normal mood and affect. His behavior is normal.   Vitals reviewed.        CRANIAL NERVES     CN III, IV, VI   Pupils are equal, round, and reactive to light.  Extraocular motions are normal.        Significant Labs:   CBC:   Recent Labs   Lab  10/11/18   1329  10/11/18   1414   WBC  12.01  10.76   HGB  4.9*  4.7*   HCT  17.5*  16.5*   PLT  351*  294       Significant Imaging: I have reviewed all pertinent imaging results/findings within the past 24 hours.   Imaging Results          X-Ray Chest AP Portable (Final result)  Result time 10/11/18 14:32:13    Final result by Nolan Miranda MD (10/11/18 14:32:13)                 Impression:      As above.      Electronically signed by: Nolan Miranda MD  Date:    10/11/2018  Time:    14:32             Narrative:    EXAMINATION:  XR CHEST AP PORTABLE    CLINICAL HISTORY:  a fib;    TECHNIQUE:  Single frontal view of the chest was performed.    COMPARISON:  None    FINDINGS:  No consolidation, pleural effusion or pneumothorax.  Cardiomediastinal silhouette is unremarkable.

## 2018-10-12 NOTE — ANESTHESIA PREPROCEDURE EVALUATION
10/12/2018  David Barrios is a 58 y.o., male having upper / lower endo for eval symptomatic anemia.  PMH - HTN, onset a-fib    Anesthesia Evaluation    I have reviewed the Patient Summary Reports.    I have reviewed the Nursing Notes.   I have reviewed the Medications.     Review of Systems  Anesthesia Hx:  Denies Family Hx of Anesthesia complications.   Denies Personal Hx of Anesthesia complications.   Hematology/Oncology:         -- Anemia:   Cardiovascular:   Exercise tolerance: good Hypertension, well controlled Dysrhythmias atrial fibrillation ECG has been reviewed.    Pulmonary:  Pulmonary Normal    Renal/:  Renal/ Normal     Hepatic/GI:   Suspected GIB   Musculoskeletal:  Musculoskeletal Normal    Neurological:  Neurology Normal    Endocrine:  Endocrine Normal        Physical Exam  General:  Well nourished    Airway/Jaw/Neck:  Airway Findings: Mouth Opening: Normal Tongue: Normal  Mallampati: II  TM Distance: Normal, at least 6 cm  Jaw/Neck Findings:  Neck ROM: Normal ROM      Dental:  Dental Findings: In tact   Chest/Lungs:  Chest/Lungs Findings: Clear to auscultation, Normal Respiratory Rate     Heart/Vascular:  Heart Findings: Rate: Normal  Rhythm: Regular Rhythm  Sounds: Normal      Musculoskeletal:  Musculoskeletal Findings: Normal    Mental Status:  Mental Status Findings:  Cooperative, Alert and Oriented       TTE 10/11/18:   CONCLUSIONS     1 - Mild left atrial enlargement.     2 - Eccentric hypertrophy.     3 - No wall motion abnormalities.     4 - Normal left ventricular systolic function (EF 55-60%).     5 - Normal left ventricular diastolic function.     6 - Normal right ventricular systolic function .     7 - The estimated PA systolic pressure is greater than 17 mmHg.     8 - Trivial tricuspid regurgitation.     Anesthesia Plan  Type of Anesthesia, risks & benefits  discussed:  Anesthesia Type:  MAC, general  Patient's Preference:   Intra-op Monitoring Plan: standard ASA monitors  Intra-op Monitoring Plan Comments:   Post Op Pain Control Plan: multimodal analgesia  Post Op Pain Control Plan Comments:   Induction:   IV  Beta Blocker:  Patient is not currently on a Beta-Blocker (No further documentation required).       Informed Consent: Patient understands risks and agrees with Anesthesia plan.  Questions answered. Anesthesia consent signed with patient.  ASA Score: 3     Day of Surgery Review of History & Physical: I have interviewed and examined the patient. I have reviewed the patient's H&P dated:  There are no significant changes.  H&P update referred to the provider.         Ready For Surgery From Anesthesia Perspective.

## 2018-10-12 NOTE — ANESTHESIA POSTPROCEDURE EVALUATION
"Anesthesia Post Evaluation    Patient: David Barrios    Procedure(s) Performed: Procedure(s) (LRB):  EGD (ESOPHAGOGASTRODUODENOSCOPY) (N/A)    Final Anesthesia Type: MAC  Patient location during evaluation: GI PACU  Patient participation: Yes- Able to Participate  Level of consciousness: awake and alert  Post-procedure vital signs: reviewed and stable  Pain management: adequate  Airway patency: patent  PONV status at discharge: No PONV  Anesthetic complications: no      Cardiovascular status: hemodynamically stable  Respiratory status: unassisted, spontaneous ventilation and room air  Hydration status: euvolemic  Follow-up not needed.        Visit Vitals  BP (!) 146/76 (BP Location: Left arm, Patient Position: Lying)   Pulse 98   Temp 36.4 °C (97.6 °F) (Skin)   Resp 18   Ht 5' 10" (1.778 m)   Wt 88 kg (194 lb)   SpO2 96%   BMI 27.84 kg/m²       Pain/Elza Score: Pain Assessment Performed: Yes (10/12/2018  5:00 AM)  Presence of Pain: denies (10/12/2018  5:00 AM)        "

## 2018-10-12 NOTE — H&P
"[facility] Hospital Medicine  History & Physical    Patient Name: David Barrios  MRN: [id number]  Admission Date: 10/11/2018  Attending Physician: Satish Johansen MD   Primary Care Provider: Primary Doctor No         Patient information was obtained from patient, past medical records and ER records.     Subjective:     Principal Problem:Microcytic anemia    Chief Complaint:   Chief Complaint   Patient presents with    Shortness of Breath     with dizziness and irregular heartbeat x3 days        HPI: Mr. Barrios is a 58 year old man with hypertension who presents today with severe anemia. He presented to his PCP with complaint of several days of shortness of breath and palpitations . He reports lightheadedness, SOB and racing heart upon standing. He reports that his stools were dark at the beginning of the week, but denies emerald blood or hematemesis. He was sent to the ED for new afib on his EKG. In the ED, cardiology was initially called for management. However, when CBC resulted Hgb was 4.7. Hemoccult was positive. CXR was negative. Troponin and BNP were negative. 2D echo was performed at the bedside and results are pending. Two units PRBC were transfused. GI and cardiology were consulted. He was admitted to hospital medicine.   Mr. Barrios reports his father  of "stomach" cancer, but is unsure if it was CRC or gastric. He has no significant cardiac history beyond HTN. He uses smokeless tobacco and drinks 2-3 beers per day.     Past Medical History:   Diagnosis Date    Hypertension        History reviewed. No pertinent surgical history.    Review of patient's allergies indicates:  No Known Allergies    No current facility-administered medications on file prior to encounter.      Current Outpatient Medications on File Prior to Encounter   Medication Sig    amlodipine-benazepril 10-20mg (LOTREL) 10-20 mg per capsule Take 1 capsule by mouth once daily.    hydroCHLOROthiazide (HYDRODIURIL) " 25 MG tablet Take 25 mg by mouth once daily.     Family History     Mother: COPD  Father: cancer        Tobacco Use    Smoking status: Never Smoker    Smokeless tobacco: Current User     Types: Chew   Substance and Sexual Activity    Alcohol use: Not on file    Drug use: Not on file    Sexual activity: Not on file     Review of Systems   Constitutional: Positive for activity change and fatigue. Negative for appetite change.   HENT: Negative for congestion and rhinorrhea.    Eyes: Negative for visual disturbance.   Respiratory: Positive for shortness of breath.    Cardiovascular: Positive for palpitations. Negative for chest pain.   Gastrointestinal: Positive for blood in stool and nausea. Negative for abdominal distention and abdominal pain.   Endocrine: Negative for polydipsia and polyphagia.   Genitourinary: Negative for decreased urine volume and difficulty urinating.   Musculoskeletal: Negative for arthralgias and myalgias.   Skin: Positive for pallor.   Allergic/Immunologic: Negative for immunocompromised state.   Neurological: Positive for weakness and light-headedness.   Hematological: Does not bruise/bleed easily.   Psychiatric/Behavioral: Negative for agitation and confusion.     Objective:     Vital Signs (Most Recent):  Temp: 99.2 °F (37.3 °C) (10/11/18 1845)  Pulse: (!) 117 (10/11/18 1845)  Resp: 18 (10/11/18 1826)  BP: 130/70 (10/11/18 1845)  SpO2: 100 % (10/11/18 1845) Vital Signs (24h Range):  Temp:  [98.9 °F (37.2 °C)-99.6 °F (37.6 °C)] 99.2 °F (37.3 °C)  Pulse:  [] 117  Resp:  [11-25] 18  SpO2:  [100 %] 100 %  BP: (119-145)/(60-77) 130/70     Weight: (!) 194.4 kg (428 lb 9.2 oz)  Body mass index is 61.49 kg/m².    Physical Exam   Constitutional: He is oriented to person, place, and time. He appears well-developed and well-nourished. No distress.   HENT:   Head: Normocephalic and atraumatic.   Mouth/Throat: Mucous membranes are pale.   Eyes: EOM are normal. Pupils are equal, round, and  reactive to light.   Cardiovascular: Normal rate. An irregularly irregular rhythm present.   Pulmonary/Chest: Effort normal. He has no wheezes. He has no rales.   Abdominal: Soft. Bowel sounds are normal. He exhibits no distension. There is no tenderness.   Musculoskeletal: He exhibits no edema.   Neurological: He is alert and oriented to person, place, and time.   Skin: He is not diaphoretic. There is pallor.   Psychiatric: He has a normal mood and affect. His behavior is normal.   Vitals reviewed.        CRANIAL NERVES     CN III, IV, VI   Pupils are equal, round, and reactive to light.  Extraocular motions are normal.        Significant Labs:   CBC:   Recent Labs   Lab  10/11/18   1329  10/11/18   1414   WBC  12.01  10.76   HGB  4.9*  4.7*   HCT  17.5*  16.5*   PLT  351*  294       Significant Imaging: I have reviewed all pertinent imaging results/findings within the past 24 hours.   Imaging Results          X-Ray Chest AP Portable (Final result)  Result time 10/11/18 14:32:13    Final result by Nolan Miranda MD (10/11/18 14:32:13)                 Impression:      As above.      Electronically signed by: Nolan Miranda MD  Date:    10/11/2018  Time:    14:32             Narrative:    EXAMINATION:  XR CHEST AP PORTABLE    CLINICAL HISTORY:  a fib;    TECHNIQUE:  Single frontal view of the chest was performed.    COMPARISON:  None    FINDINGS:  No consolidation, pleural effusion or pneumothorax.  Cardiomediastinal silhouette is unremarkable.                                  Assessment/Plan:     * Microcytic anemia    Hgb/Hct was 4.7/16.5 on presentation. Hemoccult positive.   -GI consult, plan for scope in the am 10/12  -Transfuse 2 units PRBC  -Recheck Hgb  -Check coags        Essential hypertension    -145.   -Continue home [ ]   -Monitor          Elevated LFTs    Only minimally elevated at AST 43 and ALT 57. Denies abdominal pain. Drinks 2-3 beers daily.  -Monitor        Hypokalemia    K 3.3.  -Replace  PO        Atrial fibrillation    Initial EKG showing afib with RVR (rate 113). Responded well to IV cardizem with decrease in HR to maintain around 100.   -Echo pending  -Cardiology consult  -Telemetry monitoring          VTE Risk Mitigation (From admission, onward)    None             Valerie Parks PA-C  Department of Hospital Medicine   [facility]

## 2018-10-13 NOTE — ASSESSMENT & PLAN NOTE
Only minimally elevated at AST 43 and ALT 57. Denies abdominal pain. Drinks 2-3 beers daily.  -Encourage Etoh reduction

## 2018-10-13 NOTE — DISCHARGE SUMMARY
"[facility] Hospital Medicine  Discharge Summary      Patient Name: David Barrios  MRN: [id number]  Admission Date: 10/11/2018  Hospital Length of Stay: 1 days  Discharge Date and Time: 10/12/2018  4:40 PM  Attending Physician: No att. providers found   Discharging Provider: Valerie Parks PA-C  Primary Care Provider: Primary Doctor Mere      HPI:   Mr. Barrios is a 58 year old man with hypertension who presents today with severe anemia. He presented to his PCP with complaint of several days of shortness of breath and palpitations . He reports lightheadedness, SOB and racing heart upon standing. He reports that his stools were dark at the beginning of the week, but denies emerald blood or hematemesis. He was sent to the ED for new afib on his EKG. In the ED, cardiology was initially called for management. However, when CBC resulted Hgb was 4.7. Hemoccult was positive. CXR was negative. Troponin and BNP were negative. 2D echo was performed at the bedside and results are pending. Two units PRBC were transfused. GI and cardiology were consulted. He was admitted to hospital medicine.   Mr. Barrios reports his father  of "stomach" cancer, but is unsure if it was CRC or gastric. He has no significant cardiac history beyond HTN. He uses smokeless tobacco and drinks 2-3 beers per day.     Procedure(s) (LRB):  EGD (ESOPHAGOGASTRODUODENOSCOPY) (N/A)      Hospital Course:   Pt transfused two units. Did not complete bowel prep so did not have colonoscopy. EGD showing smith's esophagus and nonbleeding ulcers. Pt discharged home with PPI BID, iron tabs. Will follow up with cardiology for repeat EGD, colonoscopy in four weeks.      Consults:   Consults (From admission, onward)        Status Ordering Provider     Inpatient consult to Gastroenterology  Once     Provider:  Umair Randolph MD    Completed LUISANA CONTRERAS          * Microcytic anemia    Hgb/Hct was 4.7/16.5 on presentation. Hemoccult " positive. Transfused 2 units with increase to 8.6.  -GI consult: EGD showing Kim's esophagus and non-bleeding ulcers.   -Follow up GI in four weeks        Essential hypertension    -145.   -D/C lotrel for carvedilol  -Continue HCTZ   -Monitor          Elevated LFTs    Only minimally elevated at AST 43 and ALT 57. Denies abdominal pain. Drinks 2-3 beers daily.  -Encourage Etoh reduction        Hypokalemia    -Replace PO        Atrial fibrillation    Initial EKG showing afib with RVR (rate 113). Responded well to IV cardizem with decrease in HR to maintain around 100. D/C with carvedilol 12.5, d/c lotrel.   -Echo: only mild left atrial enlargement  -Cardiology follow up          Final Active Diagnoses:    Diagnosis Date Noted POA    PRINCIPAL PROBLEM:  Microcytic anemia [D50.9] 10/11/2018 Yes    Atrial fibrillation [I48.91] 10/11/2018 Yes    Hypokalemia [E87.6] 10/11/2018 Yes    Elevated LFTs [R94.5] 10/11/2018 Yes    Essential hypertension [I10] 10/11/2018 Yes      Problems Resolved During this Admission:       Discharged Condition: stable    Disposition: Home or Self Care    Follow Up:  Follow-up Information     Breann Tavera MD On 10/16/2018.    Specialty:  Internal Medicine  Why:  Follow-Up @ 2:00 pm  Contact information:  [address]  [address]  [facility] [place] [address]  [phone]             Anabelle Mkceon MD .    Specialty:  Cardiology  Contact information:  [address]  [address]  [facility] [address] [phone]                 Patient Instructions:      Ambulatory referral to Gastroenterology   Referral Priority: Routine Referral Type: Consultation   Referral Reason: Specialty Services Required   Requested Specialty: Gastroenterology   Number of Visits Requested: 1     Ambulatory referral to Cardiology   Referral Priority: Routine Referral Type: Consultation   Referral Reason: Specialty Services Required   Referred to Provider: ANABELLE MCKEON  Requested Specialty: Cardiology   Number of Visits Requested: 1     Diet Cardiac     Notify your health care provider if you experience any of the following:  persistent dizziness, light-headedness, or visual disturbances     Notify your health care provider if you experience any of the following:  increased confusion or weakness     Notify your health care provider if you experience any of the following:  difficulty breathing or increased cough     Activity as tolerated       Significant Diagnostic Studies: Labs:   CBC   Recent Labs   Lab  10/11/18   1329  10/11/18   1414  10/12/18   0425   WBC  12.01  10.76  12.70   HGB  4.9*  4.7*  8.6*   HCT  17.5*  16.5*  28.8*   PLT  351*  294  324       Pending Diagnostic Studies:     None         Medications:  Reconciled Home Medications:      Medication List      START taking these medications    carvedilol 12.5 MG tablet  Commonly known as:  COREG  Take 1 tablet (12.5 mg total) by mouth 2 (two) times daily with meals.     ferrous sulfate 325 mg (65 mg iron) Tab tablet  Commonly known as:  FEOSOL  Take 1 tablet (325 mg total) by mouth daily with breakfast.     pantoprazole 40 MG tablet  Commonly known as:  PROTONIX  Take 1 tablet (40 mg total) by mouth 2 (two) times daily.        CONTINUE taking these medications    hydroCHLOROthiazide 25 MG tablet  Commonly known as:  HYDRODIURIL  Take 25 mg by mouth once daily.        STOP taking these medications    amlodipine-benazepril 10-20mg 10-20 mg per capsule  Commonly known as:  LOTREL            Indwelling Lines/Drains at time of discharge:   Lines/Drains/Airways          None          Time spent on the discharge of patient: 45 minutes  Patient was seen and examined on the date of discharge and determined to be suitable for discharge.         Valerie Parks PA-C  Department of Hospital Medicine  [facility]

## 2018-10-13 NOTE — ASSESSMENT & PLAN NOTE
Hgb/Hct was 4.7/16.5 on presentation. Hemoccult positive. Transfused 2 units with increase to 8.6.  -GI consult: EGD showing Kim's esophagus and non-bleeding ulcers.   -Follow up GI in four weeks

## 2018-10-13 NOTE — ASSESSMENT & PLAN NOTE
Initial EKG showing afib with RVR (rate 113). Responded well to IV cardizem with decrease in HR to maintain around 100. D/C with carvedilol 12.5, d/c lotrel.   -Echo: only mild left atrial enlargement  -Cardiology follow up

## 2018-10-13 NOTE — HOSPITAL COURSE
Pt transfused two units. Did not complete bowel prep so did not have colonoscopy. EGD showing smith's esophagus and nonbleeding ulcers. Pt discharged home with PPI BID, iron tabs. Will follow up with cardiology for repeat EGD, colonoscopy in four weeks.

## 2019-02-04 ENCOUNTER — OFFICE VISIT (OUTPATIENT)
Dept: CARDIOLOGY | Facility: CLINIC | Age: 59
End: 2019-02-04
Payer: COMMERCIAL

## 2019-02-04 VITALS
WEIGHT: 199.69 LBS | HEIGHT: 70 IN | DIASTOLIC BLOOD PRESSURE: 92 MMHG | BODY MASS INDEX: 28.59 KG/M2 | SYSTOLIC BLOOD PRESSURE: 136 MMHG | HEART RATE: 82 BPM | OXYGEN SATURATION: 97 %

## 2019-02-04 DIAGNOSIS — I10 ESSENTIAL HYPERTENSION: ICD-10-CM

## 2019-02-04 DIAGNOSIS — K92.2 GASTROINTESTINAL HEMORRHAGE, UNSPECIFIED GASTROINTESTINAL HEMORRHAGE TYPE: ICD-10-CM

## 2019-02-04 DIAGNOSIS — I48.20 CHRONIC ATRIAL FIBRILLATION: Primary | ICD-10-CM

## 2019-02-04 PROCEDURE — 3008F BODY MASS INDEX DOCD: CPT | Mod: CPTII,S$GLB,, | Performed by: INTERNAL MEDICINE

## 2019-02-04 PROCEDURE — 3075F PR MOST RECENT SYSTOLIC BLOOD PRESS GE 130-139MM HG: ICD-10-PCS | Mod: CPTII,S$GLB,, | Performed by: INTERNAL MEDICINE

## 2019-02-04 PROCEDURE — 3080F DIAST BP >= 90 MM HG: CPT | Mod: CPTII,S$GLB,, | Performed by: INTERNAL MEDICINE

## 2019-02-04 PROCEDURE — 3080F PR MOST RECENT DIASTOLIC BLOOD PRESSURE >= 90 MM HG: ICD-10-PCS | Mod: CPTII,S$GLB,, | Performed by: INTERNAL MEDICINE

## 2019-02-04 PROCEDURE — 99204 OFFICE O/P NEW MOD 45 MIN: CPT | Mod: S$GLB,,, | Performed by: INTERNAL MEDICINE

## 2019-02-04 PROCEDURE — 3075F SYST BP GE 130 - 139MM HG: CPT | Mod: CPTII,S$GLB,, | Performed by: INTERNAL MEDICINE

## 2019-02-04 PROCEDURE — 99204 PR OFFICE/OUTPT VISIT, NEW, LEVL IV, 45-59 MIN: ICD-10-PCS | Mod: S$GLB,,, | Performed by: INTERNAL MEDICINE

## 2019-02-04 PROCEDURE — 99999 PR PBB SHADOW E&M-EST. PATIENT-LVL III: CPT | Mod: PBBFAC,,, | Performed by: INTERNAL MEDICINE

## 2019-02-04 PROCEDURE — 99999 PR PBB SHADOW E&M-EST. PATIENT-LVL III: ICD-10-PCS | Mod: PBBFAC,,, | Performed by: INTERNAL MEDICINE

## 2019-02-04 PROCEDURE — 3008F PR BODY MASS INDEX (BMI) DOCUMENTED: ICD-10-PCS | Mod: CPTII,S$GLB,, | Performed by: INTERNAL MEDICINE

## 2019-02-04 RX ORDER — HYDROCODONE BITARTRATE AND ACETAMINOPHEN 10; 325 MG/1; MG/1
TABLET ORAL
Refills: 0 | Status: ON HOLD | COMMUNITY
Start: 2019-01-23 | End: 2022-09-02 | Stop reason: HOSPADM

## 2019-02-04 RX ORDER — AMLODIPINE AND BENAZEPRIL HYDROCHLORIDE 10; 20 MG/1; MG/1
CAPSULE ORAL
Refills: 3 | COMMUNITY
Start: 2019-01-08 | End: 2022-08-24

## 2019-02-04 NOTE — PROGRESS NOTES
Subjective:   Patient ID:  David Barrios is a 58 y.o. male who presents for evaluation of Establish Care      HPI: 57 y/o male with PMH of HTN and atrial fib present to establish care. He was in the hospital last October for Atrial fib and GI bleed. Hgb was 4s requiring transfusion. EGD showed esophageal ulcer. Colonoscopy was planned as out patient but patient did not have it done. HR and BP is controlled today. He wanted to discuss getting out of atrial fib. No dizziness or syncope. He said he use to use excessive etoh but is no longer doing so. No history of MI or CVA. He is not Diabetic. Ef normal on echo.     Past Medical History:   Diagnosis Date    Atrial fibrillation     Hypertension        Past Surgical History:   Procedure Laterality Date    EGD (ESOPHAGOGASTRODUODENOSCOPY) N/A 10/12/2018    Performed by Braden Herring MD at [facility] ENDO    HERNIA REPAIR         Social History     Tobacco Use    Smoking status: Never Smoker    Smokeless tobacco: Current User     Types: Chew   Substance Use Topics    Alcohol use: Yes     Alcohol/week: 12.0 oz     Types: 20 Cans of beer per week    Drug use: No       Family History   Problem Relation Age of Onset    COPD Mother     Cancer Father        Patient's Medications   New Prescriptions    No medications on file   Previous Medications    AMLODIPINE-BENAZEPRIL 10-20MG (LOTREL) 10-20 MG PER CAPSULE    TK 1 C PO D    CARVEDILOL (COREG) 12.5 MG TABLET    Take 1 tablet (12.5 mg total) by mouth 2 (two) times daily with meals.    FERROUS SULFATE (FEOSOL) 325 MG (65 MG IRON) TAB TABLET    Take 1 tablet (325 mg total) by mouth daily with breakfast.    HYDROCHLOROTHIAZIDE (HYDRODIURIL) 25 MG TABLET    Take 25 mg by mouth once daily.    HYDROCODONE-ACETAMINOPHEN (NORCO)  MG PER TABLET    TK 1 T PO BID P    PANTOPRAZOLE (PROTONIX) 40 MG TABLET    Take 1 tablet (40 mg total) by mouth 2 (two) times daily.   Modified Medications    No medications on file   Discontinued  Medications    No medications on file        Review of patient's allergies indicates:  No Known Allergies    Review of Systems   Constitution: Negative.   HENT: Negative.    Eyes: Negative.    Cardiovascular: Negative.    Respiratory: Negative.    Endocrine: Negative.    Hematologic/Lymphatic: Negative.    Skin: Negative.    Musculoskeletal: Negative.    Gastrointestinal: Negative.    Neurological: Negative.    Psychiatric/Behavioral: Negative.    Allergic/Immunologic: Negative.      Objective:   Physical Exam   Constitutional: He is oriented to person, place, and time. He appears well-developed and well-nourished. No distress.   Examination of the digits showed no clubbing or cyanosis   HENT:   Head: Normocephalic and atraumatic.   Eyes: Conjunctivae are normal. Pupils are equal, round, and reactive to light. Right eye exhibits no discharge.   Neck: Normal range of motion. Neck supple. No JVD present. No thyromegaly present.   No carotid bruits   Cardiovascular: Normal rate, S1 normal, S2 normal, normal heart sounds, intact distal pulses and normal pulses. An irregularly irregular rhythm present. PMI is not displaced. Exam reveals no gallop, no friction rub and no opening snap.   No murmur heard.  Pulmonary/Chest: Effort normal and breath sounds normal. No respiratory distress. He has no wheezes. He has no rales. He exhibits no tenderness.   Abdominal: Soft. Bowel sounds are normal. He exhibits no distension and no mass. There is no tenderness. There is no guarding.   No hepatosplenomegaly   Musculoskeletal: Normal range of motion. He exhibits no edema or tenderness.   Lymphadenopathy:     He has no cervical adenopathy.   Neurological: He is alert and oriented to person, place, and time.   Skin: Skin is warm. No rash noted. He is not diaphoretic. No erythema.   Psychiatric: He has a normal mood and affect.   Nursing note and vitals reviewed.      Lab Results   Component Value Date    WBC 12.70 10/12/2018    HGB 8.6  (L) 10/12/2018    HCT 28.8 (L) 10/12/2018    MCV 75 (L) 10/12/2018     10/12/2018         Chemistry        Component Value Date/Time     10/12/2018 0425    K 3.2 (L) 10/12/2018 0425     10/12/2018 0425    CO2 29 10/12/2018 0425    BUN 16 10/12/2018 0425    CREATININE 0.9 10/12/2018 0425     10/12/2018 0425        Component Value Date/Time    CALCIUM 8.6 (L) 10/12/2018 0425    ALKPHOS 52 (L) 10/11/2018 1329    AST 43 (H) 10/11/2018 1329    ALT 57 (H) 10/11/2018 1329    BILITOT 0.3 10/11/2018 1329    ESTGFRAFRICA >60 10/12/2018 0425    EGFRNONAA >60 10/12/2018 0425              Lab Results   Component Value Date    HGBA1C 5.0 10/12/2018       ECGs reviewed- Atrial fib  LABS reviewed  Imaging including Echoes reviewed- normal ef     Assessment:     1. Chronic atrial fibrillation    2. Essential hypertension        Plan:     Patient is interested in DCCV but unable to be on Anticoagulation secondary to previous history of GI bleed and incomplete GI w/u. Will refer to GI doctor to complete work up and clear him to start blood thinners before attempting any procedure.   Continue current medications  Stay away from excessive etoh  F/u in 3 months.

## 2019-02-14 ENCOUNTER — OFFICE VISIT (OUTPATIENT)
Dept: GASTROENTEROLOGY | Facility: CLINIC | Age: 59
End: 2019-02-14
Payer: COMMERCIAL

## 2019-02-14 VITALS — BODY MASS INDEX: 27.2 KG/M2 | HEIGHT: 70 IN | WEIGHT: 190 LBS

## 2019-02-14 DIAGNOSIS — K29.70 GASTRITIS, PRESENCE OF BLEEDING UNSPECIFIED, UNSPECIFIED CHRONICITY, UNSPECIFIED GASTRITIS TYPE: ICD-10-CM

## 2019-02-14 DIAGNOSIS — Z87.19 HISTORY OF ESOPHAGEAL ULCER: Primary | ICD-10-CM

## 2019-02-14 DIAGNOSIS — Z12.11 COLON CANCER SCREENING: ICD-10-CM

## 2019-02-14 DIAGNOSIS — Z87.19 HISTORY OF GI BLEED: ICD-10-CM

## 2019-02-14 PROCEDURE — 3008F PR BODY MASS INDEX (BMI) DOCUMENTED: ICD-10-PCS | Mod: CPTII,S$GLB,, | Performed by: NURSE PRACTITIONER

## 2019-02-14 PROCEDURE — 99213 PR OFFICE/OUTPT VISIT, EST, LEVL III, 20-29 MIN: ICD-10-PCS | Mod: S$GLB,,, | Performed by: NURSE PRACTITIONER

## 2019-02-14 PROCEDURE — 99213 OFFICE O/P EST LOW 20 MIN: CPT | Mod: S$GLB,,, | Performed by: NURSE PRACTITIONER

## 2019-02-14 PROCEDURE — 3008F BODY MASS INDEX DOCD: CPT | Mod: CPTII,S$GLB,, | Performed by: NURSE PRACTITIONER

## 2019-02-14 PROCEDURE — 99999 PR PBB SHADOW E&M-EST. PATIENT-LVL III: CPT | Mod: PBBFAC,,, | Performed by: NURSE PRACTITIONER

## 2019-02-14 PROCEDURE — 99999 PR PBB SHADOW E&M-EST. PATIENT-LVL III: ICD-10-PCS | Mod: PBBFAC,,, | Performed by: NURSE PRACTITIONER

## 2019-02-14 NOTE — PROGRESS NOTES
Subjective:       Patient ID: David Barrios is a 58 y.o. male.    Chief Complaint: GI Problem    HPI  Admitted to hospital last October after presenting with SOB and workup revealing severe anemia.  Heme positive stool.  He was transfused.   EGD 10/12/2018:  - Non-bleeding esophageal ulcer. Biopsied.                        - [place]-colored mucosa suggestive of Kim's                         esophagus.                        - Acute gastritis. Biopsied.                        - Normal examined duodenum.    Pathology:  FINAL PATHOLOGIC DIAGNOSIS  [id number]  1. Stomach, biopsy:  -Focal intestinal metaplasia  -Background mucosa with focal erosion, mild chronic inflammation and reactive change  -H. pylori immunostain pending; results will be issued in an addendum  -Negative for dysplasia or malignancy  [id number]  2. Esophageal ulcer, biopsy:  -Glandular mucosa with chronic inflammation and detached fragments of ulcer debris  -Negative for intestinal metaplasia, dysplasia or malignancy    Negative for h pylori.      Colonoscopy was recommended ( he has never had one) but has not yet been completed.    He was noted to be in atrial fibrillation during that time.  He is not on anticoagulant.  He was recently seen by Dr. Camarena who has requested that he complete GI workup with clearance to start anticoagulants before proceeding with any additional therapy.  Denies any chest pain, sob, syncope  He denies any black stools, blood with bowel movements, abdominal pain, nausea, vomiting, dysphagia, reflux, diarrhea, constipation.    He is using pantoprazole daily.    Review of Systems   Constitutional: Negative.  Negative for activity change, appetite change, fatigue, fever and unexpected weight change.   HENT: Negative for trouble swallowing.    Respiratory: Negative for shortness of breath.    Cardiovascular: Negative for chest pain.   Gastrointestinal: Negative for abdominal pain, anal bleeding, blood in stool,  constipation, diarrhea, nausea and vomiting.   Skin: Negative.    Neurological: Negative.    Psychiatric/Behavioral: Negative.        Objective:      Physical Exam   Constitutional: He is oriented to person, place, and time. He appears well-developed and well-nourished. No distress.   Eyes: No scleral icterus.   Cardiovascular: Normal rate. An irregular rhythm present.   Pulmonary/Chest: Effort normal. No respiratory distress.   Abdominal: Soft.   Neurological: He is alert and oriented to person, place, and time.   Skin: Skin is warm and dry. He is not diaphoretic. No pallor.   Psychiatric: He has a normal mood and affect. His behavior is normal. Judgment and thought content normal.   Vitals reviewed.      Assessment:       1. History of esophageal ulcer    2. Gastritis, presence of bleeding unspecified, unspecified chronicity, unspecified gastritis type    3. Colon cancer screening    4. History of GI bleed        Plan:         David was seen today for gi problem.    Diagnoses and all orders for this visit:    History of esophageal ulcer    Gastritis, presence of bleeding unspecified, unspecified chronicity, unspecified gastritis type    Colon cancer screening    History of GI bleed    Other orders  -     Case request GI: ESOPHAGOGASTRODUODENOSCOPY (EGD), COLONOSCOPY Golytely  -     polyethylene glycol (GOLYTELY,NULYTELY) 236-22.74-6.74 -5.86 gram suspension; Take 4,000 mLs (4 L total) by mouth As instructed.         I have explained the planned procedures to the patient.The risks, benefits and alternatives of the procedure were also explained in detail. Patient verbalized understanding, all questions were answered. The patient agrees to proceed as planned

## 2019-02-14 NOTE — LETTER
February 15, 2019      Kate Camarena MD  [address]  [address]  [place] [address]           [facility]  [address] [place] [address]  [place] [address]  Phone: [phone]  Fax: [phone]          Patient: David Barrios   MR Number: [id number]   YOB: 1960   Date of Visit: 2/14/2019       Dear Dr. Kate Camarena:    Thank you for referring David Barrios to me for evaluation. Attached you will find relevant portions of my assessment and plan of care.    If you have questions, please do not hesitate to call me. I look forward to following David Barrios along with you.    Sincerely,    Kadi Barron, [facility]    Enclosure  CC:  No Recipients    If you would like to receive this communication electronically, please contact [email or web address] or [phone] to request more information on StatusPage Link access.    For providers and/or their staff who would like to refer a patient to [facility], please contact us through our one-stop-shop provider referral line, [facility], at [phone].    If you feel you have received this communication in error or would no longer like to receive these types of communications, please e-mail [email or web address]

## 2019-02-14 NOTE — LETTER
February 14, 2019      [facility]  [address] [place] [address]  [place] [address]  Phone: [phone]  Fax: [phone]       Patient: David Barrios   YOB: 1960  Date of Visit: 02/14/2019    To Whom It May Concern:    Babs Barrios  was at [facility] on 02/14/2019. He may return to work/school on 2/15/19 with no restrictions. If you have any questions or concerns, or if I can be of further assistance, please do not hesitate to contact me.    Sincerely,            Brandy Garsia MA

## 2019-02-14 NOTE — PATIENT INSTRUCTIONS
GOLYTELY/ COLYTE/ NULYTELY Instructions    You are scheduled for a colonoscopy with Dr. Randolph on 3/29/19 at [facility] located at [address] [place] [address].  Check in at the admit desk, first floor of the hospital (which is the building on the left).     You will receive a call 2-3 days before your colonoscopy to tell you the time to arrive.  If you have not received a call by the day before your procedure, call the Endoscopy Lab at [phone].    To ensure that your test is accurate and complete, you MUST follow these instructions listed below.  If you have any questions, please call our office at [phone].  Plan on being at the hospital for your procedure for 3-4 hours.    1.  Follow a CLEAR LIQUID DIET for the entire day before your scheduled colonoscopy.  This means no solid food the entire day starting when you wake.  You may have as much of the clear liquids as you want throughout the day.   CLEAR LIQUID DIET:   - Avoid Red, Orange, Purple, and/or Blue food coloring   - NO DAIRY   - You can have:  Coffee with sugar (no creamer), tea, water, soda, apple or white grape juice, chicken or beef broth/bouillon (no meat, noodles, or veggies), green/yellow popsicles, green/yellow Jell-O, lemonade.    2.  MIX GOLYTELY/COLYTE/NULYTELY (all names for same product) WITH ONE (1) GALLON OF WATER.  YOU MAY ADD A FLAVOR PACKET OR YELLOW/GREEN POWDER DRINK MIX TO THIS.  PUT IN REFRIGERATOR.  This is easier to drink if this solution is cold, so you can mix the solution one day ahead of time and place in the refrigerator prior to drinking.  You have to drink the solution within 24-36 hours of mixing it.  Do NOT put this solution over ice.  It IS ok to drink with a straw.    3. AT 5 PM THE DAY BEFORE YOUR COLONOSCOPY, DRINK ONE (1) 8 OUNCE GLASS OF MIXTURE EVERY 10 MINUTES UNTIL HALF OF THE GALLON IS CONSUMED.  Keep this mixture cold and in refrigerator as much as you can while drinking it.  Place the  remaining half of mixture in the refrigerator when you finish the first half.    4.  The endoscopy department will call you 2 days before your colonoscopy to tell you the exact time to arrive, AND to tell you the exact time to drink the 2nd portion of your prep (which will be FIVE HOURS BEFORE YOUR ARRIVAL TIME).  At this time given to you, DRINK ONE (1) 8 OUNCE GLASS OF MIXTURE EVERY 10 MINUTES UNTIL THE OTHER HALF IS CONSUMED. Keep the mixture cold while you are drinking it. Once this is complete, you may not have ANYTHING else by mouth!      5.  You must have someone with you to DRIVE YOU HOME since you will be receiving IV sedation for the colonoscopy.    6.  It is ok to take your heart, blood pressure, and seizure medications in the morning of your test with a SIP of water.  Hold other medications until after your procedure.  Do NOT have anything else to eat or drink the morning of your colonoscopy.  It is ok to brush your teeth.    7.  If you are on blood thinners THAT YOU HAVE BEEN INSTRUCTED TO HOLD BY YOUR DOCTOR FOR THIS PROCEDURE, then do NOT take this the morning of your colonoscopy.  Do NOT stop these medications on your own, they must be approved to be held by your doctor.  Your colonoscopy can NOT be done if you are on these medications.  Examples of blood thinners include: Coumadin, Aggrenox, Plavix, Pradaxa, Reapro, Pletal, Xarelto, Ticagrelor, Brilinta, Eliquis, and high dose aspirin (325 mg).  You do not have to stop baby aspirin 81 mg.    8.  IF YOU ARE DIABETIC:  NO INSULIN OR ORAL MEDICATIONS THE MORNING OF THE COLONOSCOPY.  TAKE ONLY HALF THE DOSE OF YOUR INSULIN THE DAY BEFORE THE COLONOSCOPY.  DO NOT TAKE ANY ORAL DIABETIC MEDICATIONS THE DAY BEFORE THE COLONOSCOPY.  IF YOU ARE AN INSULIN DEPENDENT DIABETIC WITH UNSTABLE BLOOD SUGARDS, NOTIFY YOUR PRIMARY CARE PHYSICIAN FOR INSTRUCTIONS.

## 2019-02-15 RX ORDER — POLYETHYLENE GLYCOL 3350, SODIUM SULFATE ANHYDROUS, SODIUM BICARBONATE, SODIUM CHLORIDE, POTASSIUM CHLORIDE 236; 22.74; 6.74; 5.86; 2.97 G/4L; G/4L; G/4L; G/4L; G/4L
4 POWDER, FOR SOLUTION ORAL SEE ADMIN INSTRUCTIONS
Qty: 4000 ML | Refills: 0 | Status: ON HOLD | OUTPATIENT
Start: 2019-02-15 | End: 2019-04-04 | Stop reason: HOSPADM

## 2019-03-04 ENCOUNTER — TELEPHONE (OUTPATIENT)
Dept: CARDIOLOGY | Facility: CLINIC | Age: 59
End: 2019-03-04

## 2019-03-04 NOTE — TELEPHONE ENCOUNTER
I called several times and got the message that the patient isn't accepting calls.  I will call back again

## 2019-03-04 NOTE — TELEPHONE ENCOUNTER
----- Message from Kadi Bazzi sent at 3/4/2019  3:02 PM CST -----  Contact: Self [phone]  Patient is calling to talk to nurse in regards to his follow up appointments.

## 2019-03-22 ENCOUNTER — TELEPHONE (OUTPATIENT)
Dept: GASTROENTEROLOGY | Facility: CLINIC | Age: 59
End: 2019-03-22

## 2019-03-22 NOTE — TELEPHONE ENCOUNTER
----- Message from Toshia Obrien sent at 3/22/2019  8:13 AM CDT -----  Contact: [phone]/self  Patient called in requesting to speak with you. Patient prefers to speak with a nurse. Please advise.

## 2019-04-02 ENCOUNTER — TELEPHONE (OUTPATIENT)
Dept: ENDOSCOPY | Facility: HOSPITAL | Age: 59
End: 2019-04-02

## 2019-04-03 ENCOUNTER — TELEPHONE (OUTPATIENT)
Dept: ENDOSCOPY | Facility: HOSPITAL | Age: 59
End: 2019-04-03

## 2019-04-03 NOTE — TELEPHONE ENCOUNTER
Patient has no voicemail. Left message with emergency contact ([facility]) listed to have patient call us re: procedure.

## 2019-04-03 NOTE — TELEPHONE ENCOUNTER
Spoke with patient about arrival time 0930. Prep instructions reviewed: the day before the procedure, follow a clear liquid diet all day, then start the first 1/2 of prep at 5pm and take 2nd 1/2 of prep @ 0300.  Pt must be completely NPO when prep completed @ 0400.              Medications: Do not take Insulin or oral diabetic medications the day of the procedure.  Take as prescribed: heart, seizure and blood pressure medication in the morning with a sip of water (less than an ounce).  Take any breathing medications and bring inhalers to hospital with you Leave all valuables and jewelry at home.     Wear comfortable clothes to procedure to change into hospital gown You cannot drive for 24 hours after your procedure because you will receive sedation for your procedure to make you comfortable.  A ride must be provided at discharge.      0930

## 2019-04-04 ENCOUNTER — ANESTHESIA (OUTPATIENT)
Dept: ENDOSCOPY | Facility: HOSPITAL | Age: 59
End: 2019-04-04
Payer: COMMERCIAL

## 2019-04-04 ENCOUNTER — ANESTHESIA EVENT (OUTPATIENT)
Dept: ENDOSCOPY | Facility: HOSPITAL | Age: 59
End: 2019-04-04
Payer: COMMERCIAL

## 2019-04-04 ENCOUNTER — HOSPITAL ENCOUNTER (OUTPATIENT)
Facility: HOSPITAL | Age: 59
Discharge: HOME | End: 2019-04-04
Attending: INTERNAL MEDICINE | Admitting: INTERNAL MEDICINE
Payer: COMMERCIAL

## 2019-04-04 VITALS
WEIGHT: 195 LBS | DIASTOLIC BLOOD PRESSURE: 81 MMHG | HEIGHT: 69 IN | HEART RATE: 84 BPM | SYSTOLIC BLOOD PRESSURE: 137 MMHG | TEMPERATURE: 99 F | BODY MASS INDEX: 28.88 KG/M2 | RESPIRATION RATE: 18 BRPM | OXYGEN SATURATION: 94 %

## 2019-04-04 DIAGNOSIS — Z12.11 SCREENING FOR MALIGNANT NEOPLASM OF COLON: ICD-10-CM

## 2019-04-04 PROCEDURE — 45380 COLONOSCOPY AND BIOPSY: CPT | Performed by: INTERNAL MEDICINE

## 2019-04-04 PROCEDURE — 45385 PR COLONOSCOPY,REMV LESN,SNARE: ICD-10-PCS | Mod: 33,,, | Performed by: INTERNAL MEDICINE

## 2019-04-04 PROCEDURE — 88305 TISSUE EXAM BY PATHOLOGIST: CPT | Performed by: PATHOLOGY

## 2019-04-04 PROCEDURE — 45380 COLONOSCOPY AND BIOPSY: CPT | Mod: 59,,, | Performed by: INTERNAL MEDICINE

## 2019-04-04 PROCEDURE — 45385 COLONOSCOPY W/LESION REMOVAL: CPT | Performed by: INTERNAL MEDICINE

## 2019-04-04 PROCEDURE — 45380 PR COLONOSCOPY,BIOPSY: ICD-10-PCS | Mod: 59,,, | Performed by: INTERNAL MEDICINE

## 2019-04-04 PROCEDURE — 37000008 HC ANESTHESIA 1ST 15 MINUTES: Performed by: INTERNAL MEDICINE

## 2019-04-04 PROCEDURE — 27201089 HC SNARE, DISP (ANY): Performed by: INTERNAL MEDICINE

## 2019-04-04 PROCEDURE — 88305 TISSUE SPECIMEN TO PATHOLOGY - SURGERY: ICD-10-PCS | Mod: 26,,, | Performed by: PATHOLOGY

## 2019-04-04 PROCEDURE — 45385 COLONOSCOPY W/LESION REMOVAL: CPT | Mod: 33,,, | Performed by: INTERNAL MEDICINE

## 2019-04-04 PROCEDURE — 88305 TISSUE EXAM BY PATHOLOGIST: CPT | Mod: 26,,, | Performed by: PATHOLOGY

## 2019-04-04 PROCEDURE — 43239 EGD BIOPSY SINGLE/MULTIPLE: CPT | Mod: 51,,, | Performed by: INTERNAL MEDICINE

## 2019-04-04 PROCEDURE — 37000009 HC ANESTHESIA EA ADD 15 MINS: Performed by: INTERNAL MEDICINE

## 2019-04-04 PROCEDURE — 43239 EGD BIOPSY SINGLE/MULTIPLE: CPT | Performed by: INTERNAL MEDICINE

## 2019-04-04 PROCEDURE — 25000003 PHARM REV CODE 250: Performed by: INTERNAL MEDICINE

## 2019-04-04 PROCEDURE — 43239 PR EGD, FLEX, W/BIOPSY, SGL/MULTI: ICD-10-PCS | Mod: 51,,, | Performed by: INTERNAL MEDICINE

## 2019-04-04 PROCEDURE — 63600175 PHARM REV CODE 636 W HCPCS: Performed by: NURSE ANESTHETIST, CERTIFIED REGISTERED

## 2019-04-04 PROCEDURE — 27201012 HC FORCEPS, HOT/COLD, DISP: Performed by: INTERNAL MEDICINE

## 2019-04-04 RX ORDER — DEXTROMETHORPHAN/PSEUDOEPHED 2.5-7.5/.8
DROPS ORAL
Status: COMPLETED | OUTPATIENT
Start: 2019-04-04 | End: 2019-04-04

## 2019-04-04 RX ORDER — LIDOCAINE HCL/PF 100 MG/5ML
SYRINGE (ML) INTRAVENOUS
Status: DISCONTINUED | OUTPATIENT
Start: 2019-04-04 | End: 2019-04-04

## 2019-04-04 RX ORDER — SODIUM CHLORIDE 0.9 % (FLUSH) 0.9 %
10 SYRINGE (ML) INJECTION
Status: DISCONTINUED | OUTPATIENT
Start: 2019-04-04 | End: 2019-04-04 | Stop reason: HOSPADM

## 2019-04-04 RX ORDER — SODIUM CHLORIDE 9 MG/ML
INJECTION, SOLUTION INTRAVENOUS CONTINUOUS
Status: DISCONTINUED | OUTPATIENT
Start: 2019-04-04 | End: 2019-04-04 | Stop reason: HOSPADM

## 2019-04-04 RX ORDER — PROPOFOL 10 MG/ML
VIAL (ML) INTRAVENOUS
Status: DISCONTINUED | OUTPATIENT
Start: 2019-04-04 | End: 2019-04-04

## 2019-04-04 RX ORDER — PROPOFOL 10 MG/ML
VIAL (ML) INTRAVENOUS CONTINUOUS PRN
Status: DISCONTINUED | OUTPATIENT
Start: 2019-04-04 | End: 2019-04-04

## 2019-04-04 RX ADMIN — SIMETHICONE 40 MG: 20 SUSPENSION/ DROPS ORAL at 10:04

## 2019-04-04 RX ADMIN — PROPOFOL 80 MG: 10 INJECTION, EMULSION INTRAVENOUS at 10:04

## 2019-04-04 RX ADMIN — SODIUM CHLORIDE: 0.9 INJECTION, SOLUTION INTRAVENOUS at 09:04

## 2019-04-04 RX ADMIN — PROPOFOL 150 MCG/KG/MIN: 10 INJECTION, EMULSION INTRAVENOUS at 10:04

## 2019-04-04 RX ADMIN — LIDOCAINE HYDROCHLORIDE 100 MG: 20 INJECTION, SOLUTION INTRAVENOUS at 10:04

## 2019-04-04 RX ADMIN — PROPOFOL 20 MG: 10 INJECTION, EMULSION INTRAVENOUS at 10:04

## 2019-04-04 NOTE — PROVATION PATIENT INSTRUCTIONS
Discharge Summary/Instructions after an Endoscopic Procedure  Patient Name: David Barrios  Patient MRN: [id number]  Patient YOB: 1960 Thursday, April 04, 2019  Umair Randolph MD  RESTRICTIONS:  During your procedure today, you received medications for sedation.  These   medications may affect your judgment, balance and coordination.  Therefore,   for 24 hours, you have the following restrictions:   - DO NOT drive a car, operate machinery, make legal/financial decisions,   sign important papers or drink alcohol.    ACTIVITY:  Today: no heavy lifting, straining or running due to procedural   sedation/anesthesia.  The following day: return to full activity including work.  DIET:  Eat and drink normally unless instructed otherwise.     TREATMENT FOR COMMON SIDE EFFECTS:  - Mild abdominal pain, nausea, belching, bloating or excessive gas:  rest,   eat lightly and use a heating pad.  - Sore Throat: treat with throat lozenges and/or gargle with warm salt   water.  - Because air was used during the procedure, expelling large amounts of air   from your rectum or belching is normal.  - If a bowel prep was taken, you may not have a bowel movement for 1-3 days.    This is normal.  SYMPTOMS TO WATCH FOR AND REPORT TO YOUR PHYSICIAN:  1. Abdominal pain or bloating, other than gas cramps.  2. Chest pain.  3. Back pain.  4. Signs of infection such as: chills or fever occurring within 24 hours   after the procedure.  5. Rectal bleeding, which would show as bright red, maroon, or black stools.   (A tablespoon of blood from the rectum is not serious, especially if   hemorrhoids are present.)  6. Vomiting.  7. Weakness or dizziness.  GO DIRECTLY TO THE NEAREST EMERGENCY ROOM IF YOU HAVE ANY OF THE FOLLOWING:      Difficulty breathing              Chills and/or fever over 101 F   Persistent vomiting and/or vomiting blood   Severe abdominal pain   Severe chest pain   Black, tarry stools   Bleeding- more than one  tablespoon   Any other symptom or condition that you feel may need urgent attention  Your doctor recommends these additional instructions:  If any biopsies were taken, your doctors clinic will contact you in 1 to 2   weeks with any results.  - Discharge patient to home (via wheelchair).   - Patient has a contact number available for emergencies.  The signs and   symptoms of potential delayed complications were discussed with the   patient.  Return to normal activities tomorrow.  Written discharge   instructions were provided to the patient.   - Resume previous diet.   - Continue present medications.   - Await pathology results.   - Repeat colonoscopy date to be determined after pending pathology results   are reviewed for surveillance.  For questions, problems or results please call your physician - Umair Randolph MD at Work:  [phone].  EMERGENCY PHONE NUMBER: [phone],  LAB RESULTS: [phone]  IF A COMPLICATION OR EMERGENCY SITUATION ARISES AND YOU ARE UNABLE TO REACH   YOUR PHYSICIAN - GO DIRECTLY TO THE EMERGENCY ROOM.  Umair Randolph MD  4/4/2019 10:50:53 AM  This report has been verified and signed electronically.  [employer]

## 2019-04-04 NOTE — PROVATION PATIENT INSTRUCTIONS
Discharge Summary/Instructions after an Endoscopic Procedure  Patient Name: David Barrios  Patient MRN: [id number]  Patient YOB: 1960 Thursday, April 04, 2019  Umair Randolph MD  RESTRICTIONS:  During your procedure today, you received medications for sedation.  These   medications may affect your judgment, balance and coordination.  Therefore,   for 24 hours, you have the following restrictions:   - DO NOT drive a car, operate machinery, make legal/financial decisions,   sign important papers or drink alcohol.    ACTIVITY:  Today: no heavy lifting, straining or running due to procedural   sedation/anesthesia.  The following day: return to full activity including work.  DIET:  Eat and drink normally unless instructed otherwise.     TREATMENT FOR COMMON SIDE EFFECTS:  - Mild abdominal pain, nausea, belching, bloating or excessive gas:  rest,   eat lightly and use a heating pad.  - Sore Throat: treat with throat lozenges and/or gargle with warm salt   water.  - Because air was used during the procedure, expelling large amounts of air   from your rectum or belching is normal.  - If a bowel prep was taken, you may not have a bowel movement for 1-3 days.    This is normal.  SYMPTOMS TO WATCH FOR AND REPORT TO YOUR PHYSICIAN:  1. Abdominal pain or bloating, other than gas cramps.  2. Chest pain.  3. Back pain.  4. Signs of infection such as: chills or fever occurring within 24 hours   after the procedure.  5. Rectal bleeding, which would show as bright red, maroon, or black stools.   (A tablespoon of blood from the rectum is not serious, especially if   hemorrhoids are present.)  6. Vomiting.  7. Weakness or dizziness.  GO DIRECTLY TO THE NEAREST EMERGENCY ROOM IF YOU HAVE ANY OF THE FOLLOWING:      Difficulty breathing              Chills and/or fever over 101 F   Persistent vomiting and/or vomiting blood   Severe abdominal pain   Severe chest pain   Black, tarry stools   Bleeding- more than one  tablespoon   Any other symptom or condition that you feel may need urgent attention  Your doctor recommends these additional instructions:  If any biopsies were taken, your doctors clinic will contact you in 1 to 2   weeks with any results.  - Discharge patient to home (via wheelchair).   - Patient has a contact number available for emergencies.  The signs and   symptoms of potential delayed complications were discussed with the   patient.  Return to normal activities tomorrow.  Written discharge   instructions were provided to the patient.   - Resume previous diet.   - Continue present medications.   - Await pathology results.  For questions, problems or results please call your physician - Umair Randolph MD at Work:  [phone].  EMERGENCY PHONE NUMBER: [phone],  LAB RESULTS: [phone]  IF A COMPLICATION OR EMERGENCY SITUATION ARISES AND YOU ARE UNABLE TO REACH   YOUR PHYSICIAN - GO DIRECTLY TO THE EMERGENCY ROOM.  Umair Randolph MD  4/4/2019 10:27:42 AM  This report has been verified and signed electronically.  [employer]

## 2019-04-04 NOTE — ANESTHESIA PREPROCEDURE EVALUATION
04/04/2019  David Barrios is a 58 y.o., male for EGD/ Colonoscopy    Review of patient's allergies indicates:  No Known Allergies    Past Medical History:   Diagnosis Date    Atrial fibrillation     Hypertension      Past Surgical History:   Procedure Laterality Date    EGD (ESOPHAGOGASTRODUODENOSCOPY) N/A 10/12/2018    Performed by Braden Herring MD at [facility]    HERNIA REPAIR       Patient Active Problem List   Diagnosis    Microcytic anemia    Atrial fibrillation    Hypokalemia    Elevated LFTs    Essential hypertension     Wt Readings from Last 3 Encounters:   02/14/19 86.2 kg (190 lb)   02/04/19 90.6 kg (199 lb 11.2 oz)   10/11/18 88 kg (194 lb)     Temp Readings from Last 3 Encounters:   10/12/18 36.4 °C (97.6 °F) (Skin)     BP Readings from Last 3 Encounters:   02/04/19 (!) 136/92   10/12/18 113/82     Pulse Readings from Last 3 Encounters:   02/04/19 82   10/12/18 109         Anesthesia Evaluation    I have reviewed the Patient Summary Reports.        Review of Systems      Physical Exam  General:  Well nourished, Obesity    Airway/Jaw/Neck:  Airway Findings: Mouth Opening: Normal Tongue: Normal  General Airway Assessment: Adult  Mallampati: II  Improves to II with phonation.  TM Distance: Normal, at least 6 cm       Chest/Lungs:  Chest/Lungs Findings: Clear to auscultation, Normal Respiratory Rate     Heart/Vascular:  Heart Findings: Rate: Normal  Rhythm: Regular Rhythm  Sounds: Normal        Mental Status:  Mental Status Findings:  Cooperative, Alert and Oriented       Lab Results   Component Value Date    WBC 12.70 10/12/2018    HGB 8.6 (L) 10/12/2018    HCT 28.8 (L) 10/12/2018    MCV 75 (L) 10/12/2018     10/12/2018       Chemistry        Component Value Date/Time     10/12/2018 0425    K 3.2 (L) 10/12/2018 0425     10/12/2018 0425    CO2 29 10/12/2018 0425    BUN 16  10/12/2018 0425    CREATININE 0.9 10/12/2018 0425     10/12/2018 0425        Component Value Date/Time    CALCIUM 8.6 (L) 10/12/2018 0425    ALKPHOS 52 (L) 10/11/2018 1329    AST 43 (H) 10/11/2018 1329    ALT 57 (H) 10/11/2018 1329    BILITOT 0.3 10/11/2018 1329    ESTGFRAFRICA >60 10/12/2018 0425    EGFRNONAA >60 10/12/2018 0425        CONCLUSIONS     1 - Mild left atrial enlargement.     2 - Eccentric hypertrophy.     3 - No wall motion abnormalities.     4 - Normal left ventricular systolic function (EF 55-60%).     5 - Normal left ventricular diastolic function.     6 - Normal right ventricular systolic function .     7 - The estimated PA systolic pressure is greater than 17 mmHg.     8 - Trivial tricuspid regurgitation.   This document has been electronically    SIGNED BY: Anabelle Colon MD On: 10/12/2018 07:27    Anesthesia Plan  Type of Anesthesia, risks & benefits discussed:  Anesthesia Type:  MAC  Patient's Preference:   Intra-op Monitoring Plan:   Intra-op Monitoring Plan Comments:   Post Op Pain Control Plan:   Post Op Pain Control Plan Comments:   Induction:   IV  Beta Blocker:         Informed Consent: Patient understands risks and agrees with Anesthesia plan.  Questions answered. Anesthesia consent signed with patient.  ASA Score: 2     Day of Surgery Review of History & Physical: I have interviewed and examined the patient. I have reviewed the patient's H&P dated:  There are no significant changes.   H&P completed by Anesthesiologist.       Ready For Surgery From Anesthesia Perspective.

## 2019-04-04 NOTE — ANESTHESIA POSTPROCEDURE EVALUATION
Anesthesia Post Evaluation    Patient: David Barrios    Procedure(s) Performed: Procedure(s) (LRB):  EGD (N/A)  COLONOSCOPY Golytely (N/A)    Final Anesthesia Type: MAC  Patient location during evaluation: GI PACU  Patient participation: Yes- Able to Participate  Level of consciousness: awake and alert and oriented  Post-procedure vital signs: reviewed and stable  Pain management: adequate  Airway patency: patent  PONV status at discharge: No PONV  Anesthetic complications: no      Cardiovascular status: blood pressure returned to baseline, hemodynamically stable and stable  Respiratory status: unassisted, spontaneous ventilation and room air  Hydration status: euvolemic  Follow-up not needed.          Vitals Value Taken Time   /72 4/4/2019 10:50 AM   Temp 37.1 °C (98.8 °F) 4/4/2019  9:29 AM   Pulse 98 4/4/2019 10:50 AM   Resp 18 4/4/2019 10:50 AM   SpO2 94 % 4/4/2019 10:50 AM         No case tracking events are documented in the log.      Pain/Elza Score: No data recorded

## 2019-04-04 NOTE — PLAN OF CARE
Past Medical History:   Diagnosis Date    Anemia     Atrial fibrillation     Encounter for blood transfusion     Esophageal ulcer     GI bleed     Hypertension      Admission process complete. Patient ready for procedure. Plan of care reviewed with patient. Preoperative fasting appropriate for procedure and sedation. Call light in reach and bed in lowest position.

## 2019-04-04 NOTE — TRANSFER OF CARE
"Anesthesia Transfer of Care Note    Patient: David Barrios    Procedure(s) Performed: Procedure(s) (LRB):  EGD (N/A)  COLONOSCOPY Golytely (N/A)    Patient location: GI    Anesthesia Type: MAC    Transport from OR: Transported from OR on room air with adequate spontaneous ventilation    Post pain: adequate analgesia    Post assessment: no apparent anesthetic complications and tolerated procedure well    Post vital signs: stable    Level of consciousness: awake, alert and oriented    Nausea/Vomiting: no nausea/vomiting    Complications: none    Transfer of care protocol was followed      Last vitals:   Visit Vitals  /84   Pulse 84   Temp 37.1 °C (98.8 °F) (Temporal)   Resp 20   Ht 5' 9" (1.753 m)   Wt 88.5 kg (195 lb)   SpO2 95%   BMI 28.80 kg/m²     "

## 2019-04-04 NOTE — PLAN OF CARE
Pt seen/re-evaluated. Doing well post-procedurally. Vitals stable, alert and oriented. Discussed procedure results in detail. Will continue to monitor prior to discharge as noted. Clinically back to baseline at this time.

## 2019-04-22 ENCOUNTER — TELEPHONE (OUTPATIENT)
Dept: GASTROENTEROLOGY | Facility: CLINIC | Age: 59
End: 2019-04-22

## 2019-04-22 NOTE — TELEPHONE ENCOUNTER
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 There was evidence of barretts esophagus as well, some biopsies not definitively without dysplasia. I think we should repeat both EGD and colonoscopy in 5-6 months. I think we need to ensure all adenomatous tissue is removed from the IC valve and re-biopsy the esophagus further to evaluate for any dysplasia. Continue PPI daily

## 2019-04-26 ENCOUNTER — TELEPHONE (OUTPATIENT)
Dept: GASTROENTEROLOGY | Facility: CLINIC | Age: 59
End: 2019-04-26

## 2019-04-30 ENCOUNTER — TELEPHONE (OUTPATIENT)
Dept: GASTROENTEROLOGY | Facility: CLINIC | Age: 59
End: 2019-04-30

## 2019-05-07 ENCOUNTER — TELEPHONE (OUTPATIENT)
Dept: GASTROENTEROLOGY | Facility: CLINIC | Age: 59
End: 2019-05-07

## 2019-05-20 ENCOUNTER — OFFICE VISIT (OUTPATIENT)
Dept: CARDIOLOGY | Facility: CLINIC | Age: 59
End: 2019-05-20
Payer: COMMERCIAL

## 2019-05-20 VITALS
HEART RATE: 80 BPM | HEIGHT: 70 IN | BODY MASS INDEX: 29.06 KG/M2 | OXYGEN SATURATION: 98 % | WEIGHT: 203 LBS | DIASTOLIC BLOOD PRESSURE: 89 MMHG | SYSTOLIC BLOOD PRESSURE: 127 MMHG

## 2019-05-20 DIAGNOSIS — I48.19 PERSISTENT ATRIAL FIBRILLATION: Primary | ICD-10-CM

## 2019-05-20 DIAGNOSIS — I10 ESSENTIAL HYPERTENSION: ICD-10-CM

## 2019-05-20 PROCEDURE — 3079F DIAST BP 80-89 MM HG: CPT | Mod: CPTII,S$GLB,, | Performed by: INTERNAL MEDICINE

## 2019-05-20 PROCEDURE — 3079F PR MOST RECENT DIASTOLIC BLOOD PRESSURE 80-89 MM HG: ICD-10-PCS | Mod: CPTII,S$GLB,, | Performed by: INTERNAL MEDICINE

## 2019-05-20 PROCEDURE — 3074F SYST BP LT 130 MM HG: CPT | Mod: CPTII,S$GLB,, | Performed by: INTERNAL MEDICINE

## 2019-05-20 PROCEDURE — 3008F PR BODY MASS INDEX (BMI) DOCUMENTED: ICD-10-PCS | Mod: CPTII,S$GLB,, | Performed by: INTERNAL MEDICINE

## 2019-05-20 PROCEDURE — 99999 PR PBB SHADOW E&M-EST. PATIENT-LVL III: ICD-10-PCS | Mod: PBBFAC,,, | Performed by: INTERNAL MEDICINE

## 2019-05-20 PROCEDURE — 99214 PR OFFICE/OUTPT VISIT, EST, LEVL IV, 30-39 MIN: ICD-10-PCS | Mod: S$GLB,,, | Performed by: INTERNAL MEDICINE

## 2019-05-20 PROCEDURE — 3074F PR MOST RECENT SYSTOLIC BLOOD PRESSURE < 130 MM HG: ICD-10-PCS | Mod: CPTII,S$GLB,, | Performed by: INTERNAL MEDICINE

## 2019-05-20 PROCEDURE — 99999 PR PBB SHADOW E&M-EST. PATIENT-LVL III: CPT | Mod: PBBFAC,,, | Performed by: INTERNAL MEDICINE

## 2019-05-20 PROCEDURE — 99214 OFFICE O/P EST MOD 30 MIN: CPT | Mod: S$GLB,,, | Performed by: INTERNAL MEDICINE

## 2019-05-20 PROCEDURE — 3008F BODY MASS INDEX DOCD: CPT | Mod: CPTII,S$GLB,, | Performed by: INTERNAL MEDICINE

## 2019-05-21 DIAGNOSIS — I48.19 ATRIAL FIBRILLATION, PERSISTENT: Primary | ICD-10-CM

## 2019-05-21 RX ORDER — SODIUM CHLORIDE 0.9 % (FLUSH) 0.9 %
5 SYRINGE (ML) INJECTION
Status: DISCONTINUED | OUTPATIENT
Start: 2019-05-21 | End: 2019-05-23 | Stop reason: HOSPADM

## 2019-05-21 NOTE — PROGRESS NOTES
Received message from Dr. Randolph. Will go ahead and place patient on Xarelto for 2 months so that NORBERT/DCCV can be done to help with patients symptoms.

## 2019-05-23 ENCOUNTER — ANESTHESIA EVENT (OUTPATIENT)
Dept: CARDIOLOGY | Facility: HOSPITAL | Age: 59
End: 2019-05-23
Payer: COMMERCIAL

## 2019-05-23 ENCOUNTER — TELEPHONE (OUTPATIENT)
Dept: CARDIOLOGY | Facility: CLINIC | Age: 59
End: 2019-05-23

## 2019-05-23 ENCOUNTER — HOSPITAL ENCOUNTER (OUTPATIENT)
Dept: CARDIOLOGY | Facility: HOSPITAL | Age: 59
Discharge: HOME | End: 2019-05-23
Attending: INTERNAL MEDICINE | Admitting: INTERNAL MEDICINE
Payer: COMMERCIAL

## 2019-05-23 ENCOUNTER — HOSPITAL ENCOUNTER (OUTPATIENT)
Facility: HOSPITAL | Age: 59
Discharge: HOME | End: 2019-05-23
Attending: INTERNAL MEDICINE | Admitting: INTERNAL MEDICINE
Payer: COMMERCIAL

## 2019-05-23 ENCOUNTER — ANESTHESIA (OUTPATIENT)
Dept: CARDIOLOGY | Facility: HOSPITAL | Age: 59
End: 2019-05-23
Payer: COMMERCIAL

## 2019-05-23 VITALS
HEIGHT: 70 IN | WEIGHT: 200 LBS | TEMPERATURE: 99 F | DIASTOLIC BLOOD PRESSURE: 99 MMHG | BODY MASS INDEX: 28.63 KG/M2 | RESPIRATION RATE: 19 BRPM | OXYGEN SATURATION: 96 % | SYSTOLIC BLOOD PRESSURE: 136 MMHG | HEART RATE: 64 BPM

## 2019-05-23 DIAGNOSIS — I48.19 ATRIAL FIBRILLATION, PERSISTENT: ICD-10-CM

## 2019-05-23 DIAGNOSIS — I48.19 ATRIAL FIBRILLATION, PERSISTENT: Primary | ICD-10-CM

## 2019-05-23 LAB
ANION GAP SERPL CALC-SCNC: 8 MMOL/L (ref 8–16)
BSA FOR ECHO PROCEDURE: 2.12 M2
BUN SERPL-MCNC: 13 MG/DL (ref 6–20)
CALCIUM SERPL-MCNC: 10.2 MG/DL (ref 8.7–10.5)
CHLORIDE SERPL-SCNC: 104 MMOL/L (ref 95–110)
CO2 SERPL-SCNC: 30 MMOL/L (ref 23–29)
CREAT SERPL-MCNC: 1 MG/DL (ref 0.5–1.4)
ERYTHROCYTE [DISTWIDTH] IN BLOOD BY AUTOMATED COUNT: 13.4 % (ref 11.5–14.5)
EST. GFR  (AFRICAN AMERICAN): >60 ML/MIN/1.73 M^2
EST. GFR  (NON AFRICAN AMERICAN): >60 ML/MIN/1.73 M^2
GLUCOSE SERPL-MCNC: 101 MG/DL (ref 70–110)
HCT VFR BLD AUTO: 48.3 % (ref 40–54)
HGB BLD-MCNC: 16.5 G/DL (ref 14–18)
INR PPP: 1.3 (ref 0.8–1.2)
MCH RBC QN AUTO: 32.9 PG (ref 27–31)
MCHC RBC AUTO-ENTMCNC: 34.2 G/DL (ref 32–36)
MCV RBC AUTO: 96 FL (ref 82–98)
PLATELET # BLD AUTO: 266 K/UL (ref 150–350)
PMV BLD AUTO: 11.6 FL (ref 9.2–12.9)
POTASSIUM SERPL-SCNC: 3.7 MMOL/L (ref 3.5–5.1)
PROTHROMBIN TIME: 13 SEC (ref 9–12.5)
RBC # BLD AUTO: 5.02 M/UL (ref 4.6–6.2)
SODIUM SERPL-SCNC: 142 MMOL/L (ref 136–145)
WBC # BLD AUTO: 8.93 K/UL (ref 3.9–12.7)

## 2019-05-23 PROCEDURE — 93325 DOPPLER ECHO COLOR FLOW MAPG: CPT

## 2019-05-23 PROCEDURE — 93320 DOPPLER ECHO COMPLETE: CPT | Mod: 26,,, | Performed by: INTERNAL MEDICINE

## 2019-05-23 PROCEDURE — 80048 BASIC METABOLIC PNL TOTAL CA: CPT

## 2019-05-23 PROCEDURE — 93010 EKG 12-LEAD: ICD-10-PCS | Mod: ,,, | Performed by: INTERNAL MEDICINE

## 2019-05-23 PROCEDURE — 93320 TRANSESOPHAGEAL ECHO (TEE) W/ POSSIBLE CARDIOVERSION: ICD-10-PCS | Mod: 26,,, | Performed by: INTERNAL MEDICINE

## 2019-05-23 PROCEDURE — 93312 TRANSESOPHAGEAL ECHO (TEE) W/ POSSIBLE CARDIOVERSION: ICD-10-PCS | Mod: 26,,, | Performed by: INTERNAL MEDICINE

## 2019-05-23 PROCEDURE — 93325 TRANSESOPHAGEAL ECHO (TEE) W/ POSSIBLE CARDIOVERSION: ICD-10-PCS | Mod: 26,,, | Performed by: INTERNAL MEDICINE

## 2019-05-23 PROCEDURE — 37000008 HC ANESTHESIA 1ST 15 MINUTES: Performed by: INTERNAL MEDICINE

## 2019-05-23 PROCEDURE — 25000003 PHARM REV CODE 250: Performed by: NURSE ANESTHETIST, CERTIFIED REGISTERED

## 2019-05-23 PROCEDURE — 93005 ELECTROCARDIOGRAM TRACING: CPT | Mod: 59

## 2019-05-23 PROCEDURE — 92960 CARDIOVERSION ELECTRIC EXT: CPT | Mod: ,,, | Performed by: INTERNAL MEDICINE

## 2019-05-23 PROCEDURE — 85027 COMPLETE CBC AUTOMATED: CPT

## 2019-05-23 PROCEDURE — 93010 ELECTROCARDIOGRAM REPORT: CPT | Mod: ,,, | Performed by: INTERNAL MEDICINE

## 2019-05-23 PROCEDURE — 85610 PROTHROMBIN TIME: CPT

## 2019-05-23 PROCEDURE — 93325 DOPPLER ECHO COLOR FLOW MAPG: CPT | Mod: 26,,, | Performed by: INTERNAL MEDICINE

## 2019-05-23 PROCEDURE — 93312 ECHO TRANSESOPHAGEAL: CPT | Mod: 26,,, | Performed by: INTERNAL MEDICINE

## 2019-05-23 PROCEDURE — 63600175 PHARM REV CODE 636 W HCPCS: Performed by: NURSE ANESTHETIST, CERTIFIED REGISTERED

## 2019-05-23 PROCEDURE — 92960 PR CARDIOVERSION, ELECTIVE;EXTERN: ICD-10-PCS | Mod: ,,, | Performed by: INTERNAL MEDICINE

## 2019-05-23 PROCEDURE — 36415 COLL VENOUS BLD VENIPUNCTURE: CPT

## 2019-05-23 RX ORDER — LIDOCAINE HCL/PF 100 MG/5ML
SYRINGE (ML) INTRAVENOUS
Status: DISCONTINUED | OUTPATIENT
Start: 2019-05-23 | End: 2019-05-23

## 2019-05-23 RX ORDER — SODIUM CHLORIDE 9 MG/ML
INJECTION, SOLUTION INTRAVENOUS CONTINUOUS PRN
Status: DISCONTINUED | OUTPATIENT
Start: 2019-05-23 | End: 2019-05-23

## 2019-05-23 RX ORDER — PROPOFOL 10 MG/ML
VIAL (ML) INTRAVENOUS
Status: DISCONTINUED | OUTPATIENT
Start: 2019-05-23 | End: 2019-05-23

## 2019-05-23 RX ADMIN — SODIUM CHLORIDE: 9 INJECTION, SOLUTION INTRAVENOUS at 01:05

## 2019-05-23 RX ADMIN — PROPOFOL 50 MG: 10 INJECTION, EMULSION INTRAVENOUS at 01:05

## 2019-05-23 RX ADMIN — LIDOCAINE HYDROCHLORIDE 50 MG: 20 INJECTION, SOLUTION INTRAVENOUS at 01:05

## 2019-05-23 NOTE — TRANSFER OF CARE
"Anesthesia Transfer of Care Note    Patient: David Barrios    Procedure(s) Performed: Procedure(s) (LRB):  TRANSESOPHAGEAL ECHOCARDIOGRAM WITH POSSIBLE CARDIOVERSION (NORBERT W/ POSS CARDIOVERSION) (N/A)    Patient location: Cath Lab    Anesthesia Type: MAC    Transport from OR: Transported from OR on room air with adequate spontaneous ventilation    Post pain: adequate analgesia    Post assessment: no apparent anesthetic complications    Post vital signs: stable    Level of consciousness: awake, alert and oriented    Nausea/Vomiting: no nausea/vomiting    Complications: none    Transfer of care protocol was followed      Last vitals:   Visit Vitals  /84   Pulse 85   Temp 37.3 °C (99.1 °F) (Oral)   Resp 18   Ht 5' 10" (1.778 m)   Wt 90.7 kg (200 lb)   SpO2 96%   BMI 28.70 kg/m²     "

## 2019-05-23 NOTE — ANESTHESIA PREPROCEDURE EVALUATION
05/23/2019  David Barrios is a 58 y.o., male forTEE    Review of patient's allergies indicates:  No Known Allergies    Past Medical History:   Diagnosis Date    Anemia     Atrial fibrillation     Encounter for blood transfusion     Esophageal ulcer     GI bleed     Hypertension      Past Surgical History:   Procedure Laterality Date    COLONOSCOPY Golytely N/A 4/4/2019    Performed by Umair Randolph MD at [facility] [place] [facility]    EGD N/A 4/4/2019    Performed by Umair Randolph MD at [facility] [place] [facility]    EGD (ESOPHAGOGASTRODUODENOSCOPY) N/A 10/12/2018    Performed by Braden Herring MD at [facility] [place] [facility]    HERNIA REPAIR       Patient Active Problem List   Diagnosis    Microcytic anemia    Atrial fibrillation    Hypokalemia    Elevated LFTs    Essential hypertension    Screening for malignant neoplasm of colon    Atrial fibrillation, persistent     Wt Readings from Last 3 Encounters:   05/23/19 90.7 kg (200 lb)   05/20/19 92.1 kg (203 lb)   04/04/19 88.5 kg (195 lb)     Temp Readings from Last 3 Encounters:   05/23/19 37.3 °C (99.1 °F) (Oral)   04/04/19 36.9 °C (98.5 °F)   10/12/18 36.4 °C (97.6 °F) (Skin)     BP Readings from Last 3 Encounters:   05/23/19 127/78   05/20/19 127/89   04/04/19 137/81     Pulse Readings from Last 3 Encounters:   05/23/19 84   05/20/19 80   04/04/19 84         Pre-op Assessment    I have reviewed the Patient Summary Reports.         Review of Systems      Physical Exam  General:  Well nourished, Obesity    Airway/Jaw/Neck:  Airway Findings: Mouth Opening: Normal Tongue: Normal  General Airway Assessment: Adult  Mallampati: II  Improves to II with phonation.  TM Distance: Normal, at least 6 cm       Chest/Lungs:  Chest/Lungs Findings: Clear to auscultation, Normal Respiratory Rate     Heart/Vascular:  Heart Findings: Rate: Normal  Rhythm: Regular Rhythm  Sounds: Normal        Mental  Status:  Mental Status Findings:  Cooperative, Alert and Oriented       Lab Results   Component Value Date    WBC 8.93 05/23/2019    HGB 16.5 05/23/2019    HCT 48.3 05/23/2019    MCV 96 05/23/2019     05/23/2019       Chemistry        Component Value Date/Time     05/23/2019 1152    K 3.7 05/23/2019 1152     05/23/2019 1152    CO2 30 (H) 05/23/2019 1152    BUN 13 05/23/2019 1152    CREATININE 1.0 05/23/2019 1152     05/23/2019 1152        Component Value Date/Time    CALCIUM 10.2 05/23/2019 1152    ALKPHOS 52 (L) 10/11/2018 1329    AST 43 (H) 10/11/2018 1329    ALT 57 (H) 10/11/2018 1329    BILITOT 0.3 10/11/2018 1329    ESTGFRAFRICA >60 05/23/2019 1152    EGFRNONAA >60 05/23/2019 1152        CONCLUSIONS     1 - Mild left atrial enlargement.     2 - Eccentric hypertrophy.     3 - No wall motion abnormalities.     4 - Normal left ventricular systolic function (EF 55-60%).     5 - Normal left ventricular diastolic function.     6 - Normal right ventricular systolic function .     7 - The estimated PA systolic pressure is greater than 17 mmHg.     8 - Trivial tricuspid regurgitation.   This document has been electronically    SIGNED BY: Anabelle Colon MD On: 10/12/2018 07:27    Anesthesia Plan  Type of Anesthesia, risks & benefits discussed:  Anesthesia Type:  MAC  Patient's Preference:   Intra-op Monitoring Plan:   Intra-op Monitoring Plan Comments:   Post Op Pain Control Plan:   Post Op Pain Control Plan Comments:   Induction:   IV  Beta Blocker:         Informed Consent: Patient understands risks and agrees with Anesthesia plan.  Questions answered. Anesthesia consent signed with patient.  ASA Score: 2     Day of Surgery Review of History & Physical: I have interviewed and examined the patient. I have reviewed the patient's H&P dated:  There are no significant changes.   H&P completed by Anesthesiologist.       Ready For Surgery From Anesthesia Perspective.

## 2019-05-23 NOTE — PROGRESS NOTES
Post procedure recovery completed as ordered. Pt aaox4, neuro intact. Denies pain NV, pt tolerated clear liquid diet, pt denies any discomfort. Pt ready for discharge as ordered per Md patterson. Discharge instructions/education provided. Pt verbalized full understanding and denies questions. Paper copy given. F/u appoint date and time given. Pt wheeled to front of building where he left with friend Bella on private vehicle. NAD.

## 2019-05-23 NOTE — PLAN OF CARE
Pt arrived to cath pre procedure, pt aaox4, denies pain or any other discomfort. Pt ambulated to stretcher, connected to monitor. Fall risk precautions initiated, fall risk precautions reviewed w pt at this time. Verbalized full understanding and denies questions. Will cont to monitor.

## 2019-05-23 NOTE — DISCHARGE SUMMARY
DC Summary    Admission Date: 5/23/2019  Discharge Date: 5/23/2019  Attending Physician: Kate Camarena    Condition on Discharge: Stable    Final Diagnosis:   PAF-symptomatic  Procedures:   NORBERT/DCCV  History of Present Illness : Refer to H&P admission note  Laboratory/Data :   Lab Results   Component Value Date     05/23/2019    K 3.7 05/23/2019     05/23/2019    CO2 30 (H) 05/23/2019    BUN 13 05/23/2019    CREATININE 1.0 05/23/2019     05/23/2019    HGBA1C 5.0 10/12/2018    AST 43 (H) 10/11/2018    ALT 57 (H) 10/11/2018    ALBUMIN 3.5 10/11/2018    PROT 6.5 10/11/2018    BILITOT 0.3 10/11/2018    WBC 8.93 05/23/2019    HGB 16.5 05/23/2019    HCT 48.3 05/23/2019    MCV 96 05/23/2019     05/23/2019    INR 1.3 (H) 05/23/2019       No results for input(s): CPK, CPKMB, TROPONINI, MB in the last 24 hours.    No results for input(s): CPK, CPKMB, TROPONINI, MB in the last 168 hours.      No results found for: TSH    Lab Results   Component Value Date    HGBA1C 5.0 10/12/2018         Hospital Course:  Patient is doing well post procedure. Now in NSR with no complication. Neuro exam normal. BP and HR stable. Post procedure ECG reviewed and showed NSR. Stable for discharge    Discharge Medications:    David Barrios   Home Medication Instructions CRISTIAN:[id number]    Printed on:05/23/19 1432   Medication Information                      amlodipine-benazepril 10-20mg (LOTREL) 10-20 mg per capsule  TK 1 C PO D             carvedilol (COREG) 12.5 MG tablet  Take 1 tablet (12.5 mg total) by mouth 2 (two) times daily with meals.             ferrous sulfate (FEOSOL) 325 mg (65 mg iron) Tab tablet  Take 1 tablet (325 mg total) by mouth daily with breakfast.             hydroCHLOROthiazide (HYDRODIURIL) 25 MG tablet  Take 25 mg by mouth once daily.             HYDROcodone-acetaminophen (NORCO)  mg per tablet  TK 1 T PO BID P             pantoprazole (PROTONIX) 40 MG tablet  Take 1 tablet (40 mg total)  by mouth 2 (two) times daily.             rivaroxaban (XARELTO) 20 mg Tab  Take 1 tablet (20 mg total) by mouth daily with dinner or evening meal.               Discharge Instructions: Heart healthy diet, compliance with medications  Follow up Appointments: f/u with Dr. Camarena in 6 weeks    Disposition: discharge HOME          The importance anticoagulation was explained to patient in details. Anticoagulation is to prevent CVA and PE and Sudden death. With anticoagulation there is risk of bleeding but benefits clearly out weight any risk. Patient advice to report any bleeding to cardiologist as soon as possible and do not discontinue medication without consent of cardiologist. All patient questions were answered regarding medication and patient verbalized understanding.

## 2019-05-23 NOTE — ANESTHESIA POSTPROCEDURE EVALUATION
Anesthesia Post Evaluation    Patient: David Barrios    Procedure(s) Performed: Procedure(s) (LRB):  TRANSESOPHAGEAL ECHOCARDIOGRAM WITH POSSIBLE CARDIOVERSION (NORBERT W/ POSS CARDIOVERSION) (N/A)    Final Anesthesia Type: MAC  Patient location during evaluation: Cath Lab  Patient participation: Yes- Able to Participate  Level of consciousness: awake and alert and oriented  Post-procedure vital signs: reviewed and stable  Pain management: adequate  Airway patency: patent  PONV status at discharge: No PONV  Anesthetic complications: no      Cardiovascular status: blood pressure returned to baseline and hemodynamically stable  Respiratory status: unassisted, spontaneous ventilation and room air  Hydration status: euvolemic  Follow-up not needed.          Vitals Value Taken Time   /84 5/23/2019  1:01 PM   Temp 37.3 °C (99.1 °F) 5/23/2019 12:06 PM   Pulse 92 5/23/2019  1:10 PM   Resp 28 5/23/2019  1:10 PM   SpO2 97 % 5/23/2019  1:10 PM   Vitals shown include unvalidated device data.      No case tracking events are documented in the log.      Pain/Elza Score: Elza Score: 10 (5/23/2019  1:23 PM)

## 2019-05-23 NOTE — PROGRESS NOTES
NORBERT w DCCV completed. Pt tolerated well. Pt in recovery cath, aaox4, vss, denies pain or discomfort. Will cont to monitor.

## 2019-05-23 NOTE — H&P
"Admit Diagnosis:    Procedure:  Procedure(s) and Anesthesia Type:     * TRANSESOPHAGEAL ECHOCARDIOGRAM WITH POSSIBLE CARDIOVERSION (NORBERT W/ POSS CARDIOVERSION) - Local MAC    Indication: Persistent Atrial fib      ASA classification:    The ASA score is a subjective assessment of a patients overall health that is based on five classes (I to V).  Patient has mild systemic disease-II      Malampatti Score:  Modified Mallampati classification    Class III: Soft palate, base of uvula visible     H&P reviewed and and patient seen and evaluated and there is no changes from patient's last visit.     Vitals:    05/23/19 1206   BP: 127/78   BP Location: Right arm   Patient Position: Lying   Pulse: 84   Resp: 20   Temp: 99.1 °F (37.3 °C)   TempSrc: Oral   SpO2: 97%   Weight: 90.7 kg (200 lb)   Height: 5' 10" (1.778 m)       Physical Examination:  General Appearance: No acute distress  Mental: Alert to person, time and place  HEENT: Perrl  Chest: No pain with palpitations, no chest deformities  Heart: irregular irregular, no murmurs, no gallops or rubs  Lung: CTAB  ABDOMEN: Soft, nontender, nondistended with good bowel sounds heard. No mass or hepatosplenomegaly  EXTREMITIES: Without cyanosis, clubbing or edema.     Procedural risk and benefits were explained to patient in details. Patient agree to consent to the procedure after verbalizing understanding of risks which include bleeding, infections, damage of certain organs or vessels and possibly death.    Pre-op Diagnosis: Atrial fibrillation, persistent [I48.1]      Procedure(s):  TRANSESOPHAGEAL ECHOCARDIOGRAM WITH POSSIBLE CARDIOVERSION (NORBERT W/ POSS CARDIOVERSION)         The importance anticoagulation was explained to patient in details. Anticoagulation is to prevent CVA and PE and Sudden death. With anticoagulation there is risk of bleeding but benefits clearly out weight any risk. Patient advice to report any bleeding to cardiologist as soon as possible and do not " discontinue medication without consent of cardiologist. All patient questions were answered regarding medication and patient verbalized understanding.

## 2019-05-23 NOTE — BRIEF OP NOTE
POST PROCEDURE NOTE    Patient is doing well post procedure. Now in NSR with no complication. Neuro exam normal. BP and HR stable. Post procedure ECG reviewed and showed NSR. Stable for discharge.

## 2019-05-23 NOTE — TELEPHONE ENCOUNTER
----- Message from Debbi Louie sent at 5/23/2019  8:59 AM CDT -----  Contact: self  needs call back to discuss times of tests he has today..[phone]

## 2019-05-23 NOTE — DISCHARGE INSTRUCTIONS
· Because cardioversion typically requires sedation, you won't be able to drive home. You will need a ride. Wait at least 24 hours before driving a car or operating heavy machinery after receiving sedating medicines.  · Dont be alarmed if the skin on your chest is irritated or feels like it is sunburned. Your healthcare provider may prescribe a soothing lotion to relieve this discomfort. These minor symptoms will go away in a few days.  · Ask your healthcare provider about medicines to keep your heart rhythm steady.  · Learn to take your own pulse. Keep a record of your results. Ask your healthcare provider when you should seek emergency medical attention. He or she will tell you which pulse rate reading is dangerous.   · Keep in mind this procedure may need to be repeated if the abnormal heart rhythm returns. After the procedure, your healthcare provider will tell you if the treatment worked or if you will need further treatments or medication.  · For the next 8 hours, you should be watched by a responsible adult. This person should make sure your condition is not getting worse.  · Don't drink any alcohol for the next 24 hours.  · Don't drive, operate dangerous machinery, or make important business or personal decisions during the next 24 hours.  · Tell your healthcare provider about any pain, or if you cough up or vomit blood, or have trouble swallowing.  · You can eat and drink again when your throat is no longer numb.

## 2019-05-28 ENCOUNTER — TELEPHONE (OUTPATIENT)
Dept: GASTROENTEROLOGY | Facility: CLINIC | Age: 59
End: 2019-05-28

## 2019-06-03 ENCOUNTER — LAB VISIT (OUTPATIENT)
Dept: LAB | Facility: HOSPITAL | Age: 59
End: 2019-06-03
Attending: INTERNAL MEDICINE
Payer: COMMERCIAL

## 2019-06-03 ENCOUNTER — OFFICE VISIT (OUTPATIENT)
Dept: GASTROENTEROLOGY | Facility: CLINIC | Age: 59
End: 2019-06-03
Payer: COMMERCIAL

## 2019-06-03 VITALS
BODY MASS INDEX: 29.42 KG/M2 | DIASTOLIC BLOOD PRESSURE: 86 MMHG | SYSTOLIC BLOOD PRESSURE: 146 MMHG | WEIGHT: 205 LBS | HEART RATE: 67 BPM

## 2019-06-03 DIAGNOSIS — K22.70 BARRETT'S ESOPHAGUS WITHOUT DYSPLASIA: ICD-10-CM

## 2019-06-03 DIAGNOSIS — R76.8 HEPATITIS C ANTIBODY POSITIVE IN BLOOD: ICD-10-CM

## 2019-06-03 DIAGNOSIS — Z86.010 HISTORY OF COLON POLYPS: ICD-10-CM

## 2019-06-03 DIAGNOSIS — R76.8 HEPATITIS C ANTIBODY POSITIVE IN BLOOD: Primary | ICD-10-CM

## 2019-06-03 PROCEDURE — 99214 OFFICE O/P EST MOD 30 MIN: CPT | Mod: S$GLB,,, | Performed by: INTERNAL MEDICINE

## 2019-06-03 PROCEDURE — 87522 HEPATITIS C REVRS TRNSCRPJ: CPT

## 2019-06-03 PROCEDURE — 3008F PR BODY MASS INDEX (BMI) DOCUMENTED: ICD-10-PCS | Mod: CPTII,S$GLB,, | Performed by: INTERNAL MEDICINE

## 2019-06-03 PROCEDURE — 81596 NFCT DS CHRNC HCV 6 ASSAYS: CPT

## 2019-06-03 PROCEDURE — 3077F PR MOST RECENT SYSTOLIC BLOOD PRESSURE >= 140 MM HG: ICD-10-PCS | Mod: CPTII,S$GLB,, | Performed by: INTERNAL MEDICINE

## 2019-06-03 PROCEDURE — 99999 PR PBB SHADOW E&M-EST. PATIENT-LVL III: CPT | Mod: PBBFAC,,, | Performed by: INTERNAL MEDICINE

## 2019-06-03 PROCEDURE — 3077F SYST BP >= 140 MM HG: CPT | Mod: CPTII,S$GLB,, | Performed by: INTERNAL MEDICINE

## 2019-06-03 PROCEDURE — 87902 NFCT AGT GNTYP ALYS HEP C: CPT

## 2019-06-03 PROCEDURE — 3079F DIAST BP 80-89 MM HG: CPT | Mod: CPTII,S$GLB,, | Performed by: INTERNAL MEDICINE

## 2019-06-03 PROCEDURE — 3008F BODY MASS INDEX DOCD: CPT | Mod: CPTII,S$GLB,, | Performed by: INTERNAL MEDICINE

## 2019-06-03 PROCEDURE — 3079F PR MOST RECENT DIASTOLIC BLOOD PRESSURE 80-89 MM HG: ICD-10-PCS | Mod: CPTII,S$GLB,, | Performed by: INTERNAL MEDICINE

## 2019-06-03 PROCEDURE — 99999 PR PBB SHADOW E&M-EST. PATIENT-LVL III: ICD-10-PCS | Mod: PBBFAC,,, | Performed by: INTERNAL MEDICINE

## 2019-06-03 PROCEDURE — 99214 PR OFFICE/OUTPT VISIT, EST, LEVL IV, 30-39 MIN: ICD-10-PCS | Mod: S$GLB,,, | Performed by: INTERNAL MEDICINE

## 2019-06-03 NOTE — PROGRESS NOTES
Subjective:       Patient ID: David Barrios is a 58 y.o. male.    Chief Complaint: Follow-up    This is a 59yo male noted to have hcv ab+. No known exposures. LFTs last year with mild elevation, hepatocellular pattern. Denies transfusions. No abdominal symptoms currently. EGD without s/s of chronic liver disease.     The following portions of the patient's history were reviewed and updated as appropriate: allergies, current medications, past family history, past medical history, past social history, past surgical history and problem list.      HPI  Review of Systems   Constitutional: Negative for appetite change and unexpected weight change.   Respiratory: Negative for apnea and chest tightness.    Cardiovascular: Negative for chest pain and palpitations.   Gastrointestinal: Negative for abdominal distention and abdominal pain.       Objective:      Physical Exam   Constitutional: He is oriented to person, place, and time. He appears well-developed and well-nourished. No distress.   HENT:   Head: Normocephalic.   Eyes: Conjunctivae are normal. No scleral icterus.   Neck: No tracheal deviation present. No thyromegaly present.   Cardiovascular: Normal rate and regular rhythm. Exam reveals no gallop and no friction rub.   Pulmonary/Chest: Effort normal and breath sounds normal. He has no wheezes. He has no rales.   Abdominal: Soft. Bowel sounds are normal. He exhibits no distension. There is no tenderness. There is no rebound and no guarding.   Musculoskeletal: Normal range of motion. He exhibits no edema or tenderness.   Neurological: He is alert and oriented to person, place, and time.   Skin: He is not diaphoretic.   Psychiatric: He has a normal mood and affect. His behavior is normal.   Nursing note and vitals reviewed.      Assessment:       1. Hepatitis C antibody positive in blood    2. Kim's esophagus without dysplasia    3. History of colon polyps        Plan:   1. Check labs  2. Endoscopic procedures as  indicated; discussed surveillance

## 2019-06-05 LAB
A2 MACROGLOB SERPL-MCNC: 304 MG/DL (ref 100–280)
ALT SERPL W P-5'-P-CCNC: 54 U/L (ref 7–55)
APO A-I SERPL-MCNC: 139 MG/DL
BILIRUB SERPL-MCNC: 0.4 MG/DL
BIOPREDICTIVE SERIAL NUMBER: ABNORMAL
FIBROSIS STAGE SERPL QL: ABNORMAL
FIBROTEST INTERPRETATION: ABNORMAL
FIBROTEST-ACTITEST COMMENT: ABNORMAL
GGT SERPL-CCNC: 110 U/L (ref 8–61)
HAPTOGLOB SERPL-MCNC: 85 MG/DL (ref 30–200)
HCV RNA SERPL NAA+PROBE-LOG IU: 6.43 LOG (10) IU/ML
HCV RNA SERPL QL NAA+PROBE: DETECTED IU/ML
HCV RNA SPEC NAA+PROBE-ACNC: ABNORMAL IU/ML
LIVER FIBR SCORE SERPL CALC.FIBROSURE: 0.64
NECROINFLAMMAT INTERP: ABNORMAL
NECROINFLAMMATORY ACT GRADE SERPL QL: ABNORMAL
NECROINFLAMMATORY ACT SCORE SERPL: 0.44

## 2019-06-11 LAB — HCV GENTYP SERPL NAA+PROBE: NORMAL

## 2019-07-15 ENCOUNTER — HOSPITAL ENCOUNTER (OUTPATIENT)
Dept: RADIOLOGY | Facility: HOSPITAL | Age: 59
Discharge: HOME | End: 2019-07-15
Attending: INTERNAL MEDICINE
Payer: COMMERCIAL

## 2019-07-15 ENCOUNTER — OFFICE VISIT (OUTPATIENT)
Dept: CARDIOLOGY | Facility: CLINIC | Age: 59
End: 2019-07-15
Payer: COMMERCIAL

## 2019-07-15 ENCOUNTER — OFFICE VISIT (OUTPATIENT)
Dept: GASTROENTEROLOGY | Facility: CLINIC | Age: 59
End: 2019-07-15
Payer: COMMERCIAL

## 2019-07-15 VITALS
SYSTOLIC BLOOD PRESSURE: 150 MMHG | DIASTOLIC BLOOD PRESSURE: 81 MMHG | HEART RATE: 75 BPM | WEIGHT: 200.19 LBS | BODY MASS INDEX: 28.72 KG/M2

## 2019-07-15 VITALS
BODY MASS INDEX: 30.61 KG/M2 | WEIGHT: 195 LBS | HEART RATE: 78 BPM | DIASTOLIC BLOOD PRESSURE: 80 MMHG | SYSTOLIC BLOOD PRESSURE: 140 MMHG | HEIGHT: 67 IN

## 2019-07-15 DIAGNOSIS — R76.8 HEPATITIS C ANTIBODY POSITIVE IN BLOOD: ICD-10-CM

## 2019-07-15 DIAGNOSIS — I48.0 PAROXYSMAL ATRIAL FIBRILLATION: Primary | ICD-10-CM

## 2019-07-15 DIAGNOSIS — R76.8 HEPATITIS C ANTIBODY POSITIVE IN BLOOD: Primary | ICD-10-CM

## 2019-07-15 DIAGNOSIS — I10 ESSENTIAL HYPERTENSION: ICD-10-CM

## 2019-07-15 PROCEDURE — 3079F DIAST BP 80-89 MM HG: CPT | Mod: CPTII,S$GLB,, | Performed by: INTERNAL MEDICINE

## 2019-07-15 PROCEDURE — 3077F PR MOST RECENT SYSTOLIC BLOOD PRESSURE >= 140 MM HG: ICD-10-PCS | Mod: CPTII,S$GLB,, | Performed by: INTERNAL MEDICINE

## 2019-07-15 PROCEDURE — 91200 US ELASTOGRAPHY LIVER: ICD-10-PCS | Mod: 26,,, | Performed by: RADIOLOGY

## 2019-07-15 PROCEDURE — 99999 PR PBB SHADOW E&M-EST. PATIENT-LVL III: CPT | Mod: PBBFAC,,, | Performed by: INTERNAL MEDICINE

## 2019-07-15 PROCEDURE — 99214 OFFICE O/P EST MOD 30 MIN: CPT | Mod: S$GLB,,, | Performed by: INTERNAL MEDICINE

## 2019-07-15 PROCEDURE — 3008F PR BODY MASS INDEX (BMI) DOCUMENTED: ICD-10-PCS | Mod: CPTII,S$GLB,, | Performed by: INTERNAL MEDICINE

## 2019-07-15 PROCEDURE — 99214 PR OFFICE/OUTPT VISIT, EST, LEVL IV, 30-39 MIN: ICD-10-PCS | Mod: S$GLB,,, | Performed by: INTERNAL MEDICINE

## 2019-07-15 PROCEDURE — 99213 OFFICE O/P EST LOW 20 MIN: CPT | Mod: S$GLB,,, | Performed by: INTERNAL MEDICINE

## 2019-07-15 PROCEDURE — 3008F BODY MASS INDEX DOCD: CPT | Mod: CPTII,S$GLB,, | Performed by: INTERNAL MEDICINE

## 2019-07-15 PROCEDURE — 3079F PR MOST RECENT DIASTOLIC BLOOD PRESSURE 80-89 MM HG: ICD-10-PCS | Mod: CPTII,S$GLB,, | Performed by: INTERNAL MEDICINE

## 2019-07-15 PROCEDURE — 99999 PR PBB SHADOW E&M-EST. PATIENT-LVL III: ICD-10-PCS | Mod: PBBFAC,,, | Performed by: INTERNAL MEDICINE

## 2019-07-15 PROCEDURE — 91200 LIVER ELASTOGRAPHY: CPT | Mod: 26,,, | Performed by: RADIOLOGY

## 2019-07-15 PROCEDURE — 3077F SYST BP >= 140 MM HG: CPT | Mod: CPTII,S$GLB,, | Performed by: INTERNAL MEDICINE

## 2019-07-15 PROCEDURE — 99213 PR OFFICE/OUTPT VISIT, EST, LEVL III, 20-29 MIN: ICD-10-PCS | Mod: S$GLB,,, | Performed by: INTERNAL MEDICINE

## 2019-07-15 PROCEDURE — 91200 LIVER ELASTOGRAPHY: CPT | Mod: TC

## 2019-07-15 NOTE — PROGRESS NOTES
Subjective:   Patient ID:  David Barrios is a 58 y.o. male who presents for evaluation of No chief complaint on file.      HPI: 57 y/o male with PMH of HTN and PAF is here for f/u after NORBERT/DCCV. He is doing well with no complaints. He denies chest pain or dyspnea. No palpitations.         Review of Systems   Constitution: Negative.   HENT: Negative.    Eyes: Negative.    Cardiovascular: Negative.    Respiratory: Negative.    Endocrine: Negative.    Hematologic/Lymphatic: Negative.    Skin: Negative.    Musculoskeletal: Negative.    Gastrointestinal: Negative.    Neurological: Negative.    Psychiatric/Behavioral: Negative.    Allergic/Immunologic: Negative.      Objective:   Physical Exam   Constitutional: He is oriented to person, place, and time. He appears well-developed and well-nourished. No distress.   Examination of the digits showed no clubbing or cyanosis   HENT:   Head: Normocephalic and atraumatic.   Eyes: Pupils are equal, round, and reactive to light. Conjunctivae are normal. Right eye exhibits no discharge.   Neck: Normal range of motion. Neck supple. No JVD present. No thyromegaly present.   No carotid bruits   Cardiovascular: Normal rate, regular rhythm, S1 normal, S2 normal, normal heart sounds, intact distal pulses and normal pulses. PMI is not displaced. Exam reveals no gallop, no friction rub and no opening snap.   No murmur heard.  Pulmonary/Chest: Effort normal and breath sounds normal. No respiratory distress. He has no wheezes. He has no rales. He exhibits no tenderness.   Abdominal: Soft. Bowel sounds are normal. He exhibits no distension and no mass. There is no tenderness. There is no guarding.   No hepatosplenomegaly   Musculoskeletal: Normal range of motion. He exhibits no edema or tenderness.   Lymphadenopathy:     He has no cervical adenopathy.   Neurological: He is alert and oriented to person, place, and time.   Skin: Skin is warm. No rash noted. He is not diaphoretic. No erythema.    Psychiatric: He has a normal mood and affect.   Nursing note and vitals reviewed.          Assessment:     1. Paroxysmal atrial fibrillation    2. Essential hypertension        Plan:     ChadsVAsc=1 (htn)  Stop Xarelto  Continue coreg and Lotrel  Abstain from excessive caffeine and etoh  F/u in 6 months

## 2019-07-15 NOTE — PROGRESS NOTES
Subjective:       Patient ID: David Barrios is a 58 y.o. male.    Chief Complaint: Follow-up    This is a 58-year-old male here for a follow-up visit.  He was noted to be hepatitis C antibody positive.  Hepatitis C PCR confirmed chronic hepatitis C with genotype 1A.  Fibro sure did not suggest cirrhosis.  No nausea, vomiting.  Currently he is feeling well. No other exacerbating leaving factors or other associated symptoms.  LFTs have noted a mild hepatocellular elevation.    The following portions of the patient's history were reviewed and updated as appropriate: allergies, current medications, past family history, past medical history, past social history, past surgical history and problem list.    HPI  Review of Systems   Constitutional: Negative for appetite change and unexpected weight change.   Respiratory: Negative for apnea and chest tightness.    Cardiovascular: Negative for chest pain and palpitations.   Gastrointestinal: Negative for abdominal distention and abdominal pain.       Objective:      Physical Exam   Constitutional: He is oriented to person, place, and time. He appears well-developed and well-nourished. No distress.   HENT:   Head: Normocephalic and atraumatic.   Eyes: Conjunctivae are normal. No scleral icterus.   Neck: No tracheal deviation present. No thyromegaly present.   Pulmonary/Chest: Effort normal. No respiratory distress.   Abdominal: Soft. Bowel sounds are normal. He exhibits no distension. There is no tenderness.   Neurological: He is alert and oriented to person, place, and time.   Psychiatric: He has a normal mood and affect. His behavior is normal.   Nursing note and vitals reviewed.      Assessment:       1. Hepatitis C antibody positive in blood        Plan:   1. Fibroscan  2. Plan HCV treatment

## 2019-08-06 ENCOUNTER — TELEPHONE (OUTPATIENT)
Dept: GASTROENTEROLOGY | Facility: CLINIC | Age: 59
End: 2019-08-06

## 2019-08-06 NOTE — TELEPHONE ENCOUNTER
----- Message from Giselle Kat sent at 8/6/2019  3:59 PM CDT -----  Contact: [phone]/text or email him/ [email or web address]  Patient is requesting a call back regarding his test results.  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 Text his phone or email please.   Thanks

## 2019-08-06 NOTE — TELEPHONE ENCOUNTER
----- Message from Skylar Ceballos sent at 8/6/2019  9:31 AM CDT -----  Contact: [phone]-self  Patient requesting test results sent to [email or web address]. Please advise.

## 2019-09-17 ENCOUNTER — TELEPHONE (OUTPATIENT)
Dept: GASTROENTEROLOGY | Facility: CLINIC | Age: 59
End: 2019-09-17

## 2019-09-17 NOTE — TELEPHONE ENCOUNTER
----- Message from Yamilka Ceballos sent at 9/16/2019  4:52 PM CDT -----  Contact: SELF/[phone]  He would like to be seen asap for fu appt and test results.

## 2019-09-18 ENCOUNTER — TELEPHONE (OUTPATIENT)
Dept: GASTROENTEROLOGY | Facility: HOSPITAL | Age: 59
End: 2019-09-18

## 2019-09-18 ENCOUNTER — TELEPHONE (OUTPATIENT)
Dept: PHARMACY | Facility: CLINIC | Age: 59
End: 2019-09-18

## 2019-09-18 RX ORDER — VELPATASVIR AND SOFOSBUVIR 100; 400 MG/1; MG/1
1 TABLET, FILM COATED ORAL DAILY
Qty: 84 TABLET | Refills: 0 | Status: SHIPPED | OUTPATIENT
Start: 2019-09-18 | End: 2019-10-02 | Stop reason: SDUPTHER

## 2019-09-18 NOTE — TELEPHONE ENCOUNTER
No  set up. Calling to notify the patient we received the prescription for Epclusa, and to see if he has any active prescription insurance. We will continue to follow up.

## 2019-09-20 ENCOUNTER — TELEPHONE (OUTPATIENT)
Dept: PHARMACY | Facility: CLINIC | Age: 59
End: 2019-09-20

## 2019-09-27 NOTE — TELEPHONE ENCOUNTER
Patient called and provided his insurance information. He said he is on COBRA. I let him know it needed prior authorization and we will reach out when we have a decision from his insurance.

## 2019-10-02 ENCOUNTER — TELEPHONE (OUTPATIENT)
Dept: GASTROENTEROLOGY | Facility: HOSPITAL | Age: 59
End: 2019-10-02

## 2019-10-02 RX ORDER — VELPATASVIR AND SOFOSBUVIR 100; 400 MG/1; MG/1
1 TABLET, FILM COATED ORAL DAILY
Qty: 84 TABLET | Refills: 0 | Status: SHIPPED | OUTPATIENT
Start: 2019-10-02 | End: 2019-12-25

## 2019-10-02 NOTE — TELEPHONE ENCOUNTER
----- Message from Junior Bhardwaj sent at 10/2/2019 10:54 AM CDT -----  Regarding: Epclusa - [facility]  CARINA Chavez: Epclusa (brand name [email or web address]) prior authorization has been approved through 12/24/2019 (for 12 weeks). Patients insurance requires the patient to fill through [facility]. Please send prescription to [facility] which has been added to patient EPIC profile. Patient has been notified and provided with the necessary information to call and schedule a delivery.       Thanks,  Junior Bhardwaj  [phone]

## 2019-10-02 NOTE — TELEPHONE ENCOUNTER
DOCUMENTATION ONLY:  Prior authorization for Epclusa approved from 10/2/2019 to 12/24/2019 x 12 weeks of treatment.   Case ID# [id number]    Epclusa co-pay coupon card information:   RxBIN: [id number]                 RxPCN: [id number]                 RxGRP: [id number]                 ISSUER: [id number]  ID#: [id number]    [facility]  [phone] (Phone)  [phone] (Fax)

## 2019-11-29 NOTE — TELEPHONE ENCOUNTER
Epclusa f/u - No answer. LVM for call back.   - Patient locked into [facility].   - After many failed attempts at patient contact, [facility] is still unable to determine if patient received Epclusa from [facility] and if/when patient started the medication. [facility] will close out patient profile at this time due to inability to contact patient. [facility] will notify provider.

## 2020-08-19 ENCOUNTER — TELEPHONE (OUTPATIENT)
Dept: GASTROENTEROLOGY | Facility: CLINIC | Age: 60
End: 2020-08-19

## 2020-08-19 NOTE — TELEPHONE ENCOUNTER
----- Message from Raquel Allen sent at 8/19/2020 11:34 AM CDT -----  Regarding: Please give a call back  Pt currently at [facility] for treatment and requesting orders blood work to be sent to [facility] if his treatment has been cleared.      Pt can be reached at  [phone]      Please give pt a call back to explain the treatment and request for blood work      Thank you!

## 2021-03-16 NOTE — PROGRESS NOTES
Subjective:   Patient ID:  David Barrios is a 58 y.o. male who presents for evaluation of Follow-up      HPI: 59 y/o male with PMH of HTN and persistent atrial fib present as f/u. He is doing well. He has completed GI w/u and would like to be considered for NORBERT/DCCV.   He denies chest pain or dyspnea. No palpitations. BP and HR is controlled    Past Medical History:   Diagnosis Date    Anemia     Atrial fibrillation     Encounter for blood transfusion     Esophageal ulcer     GI bleed     Hypertension            Patient's Medications   New Prescriptions    No medications on file   Previous Medications    AMLODIPINE-BENAZEPRIL 10-20MG (LOTREL) 10-20 MG PER CAPSULE    TK 1 C PO D    CARVEDILOL (COREG) 12.5 MG TABLET    Take 1 tablet (12.5 mg total) by mouth 2 (two) times daily with meals.    FERROUS SULFATE (FEOSOL) 325 MG (65 MG IRON) TAB TABLET    Take 1 tablet (325 mg total) by mouth daily with breakfast.    HYDROCHLOROTHIAZIDE (HYDRODIURIL) 25 MG TABLET    Take 25 mg by mouth once daily.    HYDROCODONE-ACETAMINOPHEN (NORCO)  MG PER TABLET    TK 1 T PO BID P    PANTOPRAZOLE (PROTONIX) 40 MG TABLET    Take 1 tablet (40 mg total) by mouth 2 (two) times daily.   Modified Medications    No medications on file   Discontinued Medications    No medications on file        Review of patient's allergies indicates:  No Known Allergies    Review of Systems   Constitution: Negative.   HENT: Negative.    Eyes: Negative.    Cardiovascular: Negative.    Respiratory: Negative.    Endocrine: Negative.    Hematologic/Lymphatic: Negative.    Skin: Negative.    Musculoskeletal: Negative.    Gastrointestinal: Negative.    Neurological: Negative.    Psychiatric/Behavioral: Negative.    Allergic/Immunologic: Negative.      Objective:   Physical Exam   Constitutional: He is oriented to person, place, and time. He appears well-developed and well-nourished. No distress.   Examination of the digits showed no clubbing or cyanosis    HENT:   Head: Normocephalic and atraumatic.   Eyes: Pupils are equal, round, and reactive to light. Conjunctivae are normal. Right eye exhibits no discharge.   Neck: Normal range of motion. Neck supple. No JVD present. No thyromegaly present.   No carotid bruits   Cardiovascular: Normal rate, S1 normal, S2 normal, normal heart sounds, intact distal pulses and normal pulses. An irregularly irregular rhythm present. PMI is not displaced. Exam reveals no gallop, no friction rub and no opening snap.   No murmur heard.  Pulmonary/Chest: Effort normal and breath sounds normal. No respiratory distress. He has no wheezes. He has no rales. He exhibits no tenderness.   Abdominal: Soft. Bowel sounds are normal. He exhibits no distension and no mass. There is no tenderness. There is no guarding.   No hepatosplenomegaly   Musculoskeletal: Normal range of motion. He exhibits no edema or tenderness.   Lymphadenopathy:     He has no cervical adenopathy.   Neurological: He is alert and oriented to person, place, and time.   Skin: Skin is warm. No rash noted. He is not diaphoretic. No erythema.   Psychiatric: He has a normal mood and affect.   Nursing note and vitals reviewed.      Lab Results   Component Value Date    WBC 12.70 10/12/2018    HGB 8.6 (L) 10/12/2018    HCT 28.8 (L) 10/12/2018    MCV 75 (L) 10/12/2018     10/12/2018         Chemistry        Component Value Date/Time     10/12/2018 0425    K 3.2 (L) 10/12/2018 0425     10/12/2018 0425    CO2 29 10/12/2018 0425    BUN 16 10/12/2018 0425    CREATININE 0.9 10/12/2018 0425     10/12/2018 0425        Component Value Date/Time    CALCIUM 8.6 (L) 10/12/2018 0425    ALKPHOS 52 (L) 10/11/2018 1329    AST 43 (H) 10/11/2018 1329    ALT 57 (H) 10/11/2018 1329    BILITOT 0.3 10/11/2018 1329    ESTGFRAFRICA >60 10/12/2018 0425    EGFRNONAA >60 10/12/2018 0425              Lab Results   Component Value Date    HGBA1C 5.0 10/12/2018       ECGs reviewed-  Atrial fib  LABS reviewed  Imaging including Echoes reviewed- normal ef     Assessment:     1. Persistent atrial fibrillation    2. Essential hypertension        Plan:     Patient is interested in DCCV but unable to be on Anticoagulation secondary to previous history of GI bleed and incomplete GI w/u. Will refer to GI doctor to complete work up and clear him to start blood thinners before attempting any procedure.   Continue current medications  Stay away from excessive etoh  F/u in 3 months.    md

## 2022-08-03 ENCOUNTER — HOSPITAL ENCOUNTER (OUTPATIENT)
Dept: RADIOLOGY | Facility: HOSPITAL | Age: 62
Discharge: HOME | End: 2022-08-03
Attending: NURSE PRACTITIONER
Payer: COMMERCIAL

## 2022-08-03 DIAGNOSIS — R06.02 SHORTNESS OF BREATH: ICD-10-CM

## 2022-08-03 PROCEDURE — 71046 X-RAY EXAM CHEST 2 VIEWS: CPT | Mod: TC,FY,PO

## 2022-08-24 ENCOUNTER — HOSPITAL ENCOUNTER (OUTPATIENT)
Dept: CARDIOLOGY | Facility: HOSPITAL | Age: 62
Discharge: HOME | End: 2022-08-24
Attending: INTERNAL MEDICINE
Payer: COMMERCIAL

## 2022-08-24 ENCOUNTER — OFFICE VISIT (OUTPATIENT)
Dept: CARDIOLOGY | Facility: CLINIC | Age: 62
End: 2022-08-24
Payer: COMMERCIAL

## 2022-08-24 VITALS
WEIGHT: 207.13 LBS | BODY MASS INDEX: 40.66 KG/M2 | HEIGHT: 60 IN | HEART RATE: 98 BPM | DIASTOLIC BLOOD PRESSURE: 78 MMHG | SYSTOLIC BLOOD PRESSURE: 108 MMHG | OXYGEN SATURATION: 98 %

## 2022-08-24 VITALS — HEIGHT: 60 IN | BODY MASS INDEX: 40.64 KG/M2 | WEIGHT: 207 LBS

## 2022-08-24 DIAGNOSIS — R94.31 ABNORMAL ELECTROCARDIOGRAM: ICD-10-CM

## 2022-08-24 DIAGNOSIS — I48.0 PAROXYSMAL ATRIAL FIBRILLATION: Primary | ICD-10-CM

## 2022-08-24 DIAGNOSIS — I48.91 ATRIAL FIBRILLATION WITH RAPID VENTRICULAR RESPONSE: ICD-10-CM

## 2022-08-24 DIAGNOSIS — R06.09 DYSPNEA ON EXERTION: ICD-10-CM

## 2022-08-24 DIAGNOSIS — I48.0 PAROXYSMAL ATRIAL FIBRILLATION: ICD-10-CM

## 2022-08-24 DIAGNOSIS — I10 ESSENTIAL HYPERTENSION: ICD-10-CM

## 2022-08-24 LAB
ASCENDING AORTA: 2.36 CM
AV INDEX (PROSTH): 0.51
AV MEAN GRADIENT: 4 MMHG
AV PEAK GRADIENT: 6 MMHG
AV VALVE AREA: 1.85 CM2
AV VELOCITY RATIO: 0.62
BSA FOR ECHO PROCEDURE: 1.99 M2
CV ECHO LV RWT: 0.38 CM
DOP CALC AO PEAK VEL: 1.25 M/S
DOP CALC AO VTI: 19.8 CM
DOP CALC LVOT AREA: 3.7 CM2
DOP CALC LVOT DIAMETER: 2.16 CM
DOP CALC LVOT PEAK VEL: 0.78 M/S
DOP CALC LVOT STROKE VOLUME: 36.66 CM3
DOP CALCLVOT PEAK VEL VTI: 10.01 CM
ECHO LV POSTERIOR WALL: 1.15 CM (ref 0.6–1.1)
EJECTION FRACTION: 35 %
FRACTIONAL SHORTENING: 22 % (ref 28–44)
INTERVENTRICULAR SEPTUM: 1.28 CM (ref 0.6–1.1)
LA MAJOR: 8.14 CM
LA MINOR: 7.82 CM
LA WIDTH: 6.15 CM
LEFT ATRIUM SIZE: 5.08 CM
LEFT ATRIUM VOLUME INDEX: 112.1 ML/M2
LEFT ATRIUM VOLUME: 211.83 CM3
LEFT INTERNAL DIMENSION IN SYSTOLE: 4.77 CM (ref 2.1–4)
LEFT VENTRICLE DIASTOLIC VOLUME INDEX: 99.01 ML/M2
LEFT VENTRICLE DIASTOLIC VOLUME: 187.13 ML
LEFT VENTRICLE MASS INDEX: 174 G/M2
LEFT VENTRICLE SYSTOLIC VOLUME INDEX: 56 ML/M2
LEFT VENTRICLE SYSTOLIC VOLUME: 105.82 ML
LEFT VENTRICULAR INTERNAL DIMENSION IN DIASTOLE: 6.1 CM (ref 3.5–6)
LEFT VENTRICULAR MASS: 328.13 G
PISA MRMAX VEL: 0.04 M/S
PISA TR MAX VEL: 3.04 M/S
RA MAJOR: 6.55 CM
RA PRESSURE: 8 MMHG
RIGHT VENTRICULAR END-DIASTOLIC DIMENSION: 3.28 CM
STJ: 2.39 CM
TR MAX PG: 37 MMHG
TV REST PULMONARY ARTERY PRESSURE: 45 MMHG

## 2022-08-24 PROCEDURE — 4010F PR ACE/ARB THEARPY RXD/TAKEN: ICD-10-PCS | Mod: CPTII,S$GLB,, | Performed by: INTERNAL MEDICINE

## 2022-08-24 PROCEDURE — 4010F ACE/ARB THERAPY RXD/TAKEN: CPT | Mod: CPTII,S$GLB,, | Performed by: INTERNAL MEDICINE

## 2022-08-24 PROCEDURE — 1160F PR REVIEW ALL MEDS BY PRESCRIBER/CLIN PHARMACIST DOCUMENTED: ICD-10-PCS | Mod: CPTII,S$GLB,, | Performed by: INTERNAL MEDICINE

## 2022-08-24 PROCEDURE — 3078F DIAST BP <80 MM HG: CPT | Mod: CPTII,S$GLB,, | Performed by: INTERNAL MEDICINE

## 2022-08-24 PROCEDURE — 3074F SYST BP LT 130 MM HG: CPT | Mod: CPTII,S$GLB,, | Performed by: INTERNAL MEDICINE

## 2022-08-24 PROCEDURE — 1159F PR MEDICATION LIST DOCUMENTED IN MEDICAL RECORD: ICD-10-PCS | Mod: CPTII,S$GLB,, | Performed by: INTERNAL MEDICINE

## 2022-08-24 PROCEDURE — 93306 TTE W/DOPPLER COMPLETE: CPT | Mod: 26,,, | Performed by: INTERNAL MEDICINE

## 2022-08-24 PROCEDURE — 99205 OFFICE O/P NEW HI 60 MIN: CPT | Mod: 25,S$GLB,, | Performed by: INTERNAL MEDICINE

## 2022-08-24 PROCEDURE — 3008F PR BODY MASS INDEX (BMI) DOCUMENTED: ICD-10-PCS | Mod: CPTII,S$GLB,, | Performed by: INTERNAL MEDICINE

## 2022-08-24 PROCEDURE — 99205 PR OFFICE/OUTPT VISIT, NEW, LEVL V, 60-74 MIN: ICD-10-PCS | Mod: 25,S$GLB,, | Performed by: INTERNAL MEDICINE

## 2022-08-24 PROCEDURE — 1159F MED LIST DOCD IN RCRD: CPT | Mod: CPTII,S$GLB,, | Performed by: INTERNAL MEDICINE

## 2022-08-24 PROCEDURE — 99999 PR PBB SHADOW E&M-EST. PATIENT-LVL III: CPT | Mod: PBBFAC,,, | Performed by: INTERNAL MEDICINE

## 2022-08-24 PROCEDURE — 3078F PR MOST RECENT DIASTOLIC BLOOD PRESSURE < 80 MM HG: ICD-10-PCS | Mod: CPTII,S$GLB,, | Performed by: INTERNAL MEDICINE

## 2022-08-24 PROCEDURE — 93000 EKG 12-LEAD: ICD-10-PCS | Mod: S$GLB,,, | Performed by: INTERNAL MEDICINE

## 2022-08-24 PROCEDURE — 3074F PR MOST RECENT SYSTOLIC BLOOD PRESSURE < 130 MM HG: ICD-10-PCS | Mod: CPTII,S$GLB,, | Performed by: INTERNAL MEDICINE

## 2022-08-24 PROCEDURE — 93306 ECHO (CUPID ONLY): ICD-10-PCS | Mod: 26,,, | Performed by: INTERNAL MEDICINE

## 2022-08-24 PROCEDURE — 99999 PR PBB SHADOW E&M-EST. PATIENT-LVL III: ICD-10-PCS | Mod: PBBFAC,,, | Performed by: INTERNAL MEDICINE

## 2022-08-24 PROCEDURE — 93000 ELECTROCARDIOGRAM COMPLETE: CPT | Mod: S$GLB,,, | Performed by: INTERNAL MEDICINE

## 2022-08-24 PROCEDURE — 3008F BODY MASS INDEX DOCD: CPT | Mod: CPTII,S$GLB,, | Performed by: INTERNAL MEDICINE

## 2022-08-24 PROCEDURE — 1160F RVW MEDS BY RX/DR IN RCRD: CPT | Mod: CPTII,S$GLB,, | Performed by: INTERNAL MEDICINE

## 2022-08-24 PROCEDURE — 93306 TTE W/DOPPLER COMPLETE: CPT | Mod: PO

## 2022-08-24 RX ORDER — CARVEDILOL 12.5 MG/1
12.5 TABLET ORAL 2 TIMES DAILY WITH MEALS
Qty: 180 TABLET | Refills: 11 | Status: ON HOLD
Start: 2022-08-24 | End: 2022-09-02 | Stop reason: SDUPTHER

## 2022-08-24 RX ORDER — FUROSEMIDE 20 MG/1
20 TABLET ORAL DAILY
Qty: 90 TABLET | Refills: 3 | Status: ON HOLD | OUTPATIENT
Start: 2022-08-24 | End: 2022-09-02 | Stop reason: SDUPTHER

## 2022-08-24 NOTE — PROGRESS NOTES
[facility] Cardiology     Subjective:    Patient ID:  David Barrios is a 62 y.o. male who presents for evaluation of Atrial Fibrillation, Hypertension, Shortness of Breath, and Chest Pain    Review of patient's allergies indicates:  No Known Allergies   He is here having made an appointment for shortness of breath for 3 weeks.  He is noted leg swelling for about a year, he does take Lotrel.  He has been working full time but has difficulties walking due to shortness of breath.  He reports orthopnea.  His history includes atrial fibrillation in 2019, he underwent cardioversion.  It was successful.  He originally presented with severe anemia and esophageal ulcer.  He required transfusion.  He subsequently was treated with Xarelto and had his cardioversion.  His last cardiology visit was 1 office return post cardioversion and since then he has not been seen.    He has been on treatment for hypertension.  He was cleared by Dr. Camarena to stop his Xarelto.  Converting off carvedilol occurred post 2019 cardiology visit for unclear reasons.  He is not a smoker.  He works construction driving a heavy machinery vehicle.  He does not have to do a lot of exercise but has trouble walking to the truck because of shortness of breath.  He has not been checking his heart rate and blood pressure at home.    The patient's electrocardiogram reviewed and independently interpreted by myself today confirms AFib heart rate 116 left axis deviation.  Specific ST changes present.      Review of Systems   Constitutional: Positive for malaise/fatigue. Negative for chills, decreased appetite, diaphoresis, fever, night sweats, weight gain and weight loss.   HENT: Negative for congestion, ear discharge, ear pain, hearing loss, hoarse voice, nosebleeds, odynophagia, sore throat, stridor and tinnitus.    Eyes: Negative for blurred vision, discharge, double vision, pain,  photophobia, redness, vision loss in left eye, vision loss in right eye, visual disturbance and visual halos.   Cardiovascular: Positive for dyspnea on exertion, leg swelling and orthopnea. Negative for chest pain, claudication, cyanosis, irregular heartbeat, near-syncope, palpitations, paroxysmal nocturnal dyspnea and syncope.   Respiratory: Positive for shortness of breath. Negative for cough, hemoptysis, sleep disturbances due to breathing, snoring, sputum production and wheezing.    Endocrine: Negative for cold intolerance, heat intolerance, polydipsia, polyphagia and polyuria.   Hematologic/Lymphatic: Negative for adenopathy and bleeding problem. Does not bruise/bleed easily.   Skin: Negative for color change, dry skin, flushing, itching, nail changes, poor wound healing, rash, skin cancer, suspicious lesions and unusual hair distribution.   Musculoskeletal: Negative for arthritis, back pain, falls, gout, joint pain, joint swelling, muscle cramps, muscle weakness, myalgias, neck pain and stiffness.   Gastrointestinal: Negative for bloating, abdominal pain, anorexia, change in bowel habit, bowel incontinence, constipation, diarrhea, dysphagia, excessive appetite, flatus, heartburn, hematemesis, hematochezia, hemorrhoids, jaundice, melena, nausea and vomiting.   Genitourinary: Negative for bladder incontinence, decreased libido, dysuria, flank pain, frequency, genital sores, hematuria, hesitancy, incomplete emptying, nocturia and urgency.   Neurological: Negative for aphonia, brief paralysis, difficulty with concentration, disturbances in coordination, excessive daytime sleepiness, dizziness, focal weakness, headaches, light-headedness, loss of balance, numbness, paresthesias, seizures, sensory change, tremors, vertigo and weakness.   Psychiatric/Behavioral: Negative for altered mental status, depression, hallucinations, memory loss, substance abuse, suicidal ideas and thoughts of violence. The patient does not  have insomnia and is not nervous/anxious.    Allergic/Immunologic: Negative for hives and persistent infections.        Objective:       Vitals:    08/24/22 1127   BP: 108/78   Pulse: 98   SpO2: 98%   Weight: 93.9 kg (207 lb 2 oz)   Height: 5' (1.524 m)    Physical Exam  Constitutional:       General: He is not in acute distress.     Appearance: He is well-developed. He is not diaphoretic.   HENT:      Head: Normocephalic and atraumatic.      Nose: Nose normal.   Eyes:      General: No scleral icterus.        Right eye: No discharge.      Conjunctiva/sclera: Conjunctivae normal.      Pupils: Pupils are equal, round, and reactive to light.   Neck:      Thyroid: No thyromegaly.      Vascular: No JVD.      Trachea: No tracheal deviation.   Cardiovascular:      Rate and Rhythm: Tachycardia present. Rhythm irregularly irregular.      Pulses:           Carotid pulses are 2+ on the right side and 2+ on the left side.       Radial pulses are 2+ on the right side and 2+ on the left side.        Dorsalis pedis pulses are 2+ on the right side and 2+ on the left side.        Posterior tibial pulses are 2+ on the right side and 2+ on the left side.      Heart sounds: Normal heart sounds. No murmur heard.    No friction rub. No gallop.   Pulmonary:      Effort: Pulmonary effort is normal. No respiratory distress.      Breath sounds: Normal breath sounds. No stridor. No wheezing or rales.   Chest:      Chest wall: No tenderness.   Abdominal:      General: Bowel sounds are normal. There is no distension.      Palpations: Abdomen is soft. There is no mass.      Tenderness: There is no abdominal tenderness. There is no guarding or rebound.   Musculoskeletal:         General: No tenderness. Normal range of motion.      Cervical back: Normal range of motion and neck supple.   Lymphadenopathy:      Cervical: No cervical adenopathy.   Skin:     General: Skin is warm and dry.      Coloration: Skin is not pale.      Findings: No erythema  or rash.   Neurological:      Mental Status: He is alert and oriented to person, place, and time.      Cranial Nerves: No cranial nerve deficit.      Coordination: Coordination normal.   Psychiatric:         Behavior: Behavior normal.         Thought Content: Thought content normal.         Judgment: Judgment normal.           Assessment:       1. Paroxysmal atrial fibrillation    2. Essential hypertension    3. Atrial fibrillation with rapid ventricular response    4. Dyspnea on exertion    5. Abnormal electrocardiogram      Results for orders placed or performed in visit on 06/03/19   HCV FIBROSURE   Result Value Ref Range    Fibrosis Score 0.64     Fibrosis Stage F3     FibroTest Interpretation advanced fibrosis     Necroinflammat Activity Score 0.44     Necroinflammat Activity Grade A1-A2     Necroinflammat Interp minimal activity     FibroTest-ActiTest Comment SEE BELOW     BioPredictive Serial Numver [id number]     Apolipoprotein A-1 139 >=120 mg/dL    Alpha 2-Macroglobulins, Qn 304 (H) 100 - 280 mg/dL    Haptoglobin 85 30 - 200 mg/dL    ALT (SGPT) 54 7 - 55 U/L     (H) 8 - 61 U/L    BILIRUBIN, TOTAL 0.4 <=1.2 mg/dL   Hepatitis C Viral RNA Genotype, LIPA   Result Value Ref Range    Hepatitis C Viral RNA Genotype, LIPA GENOTYPE 1a Not detected   HEPATITIS C RNA, QUANTITATIVE, PCR   Result Value Ref Range    HCV Log 6.43 (H) <1.08 Log (10) IU/mL    HCV, Qualitative DETECTED (A) Not detected IU/mL    HCV RNA Quant PCR 2,678,550 (H) <12 IU/mL         Current Outpatient Medications:     carvediloL (COREG) 12.5 MG tablet, Take 1 tablet (12.5 mg total) by mouth 2 (two) times daily with meals., Disp: 180 tablet, Rfl: 11    ferrous sulfate (FEOSOL) 325 mg (65 mg iron) Tab tablet, Take 1 tablet (325 mg total) by mouth daily with breakfast., Disp: , Rfl: 0    furosemide (LASIX) 20 MG tablet, Take 1 tablet (20 mg total) by mouth once daily., Disp: 90 tablet, Rfl: 3    HYDROcodone-acetaminophen (NORCO)  mg  per tablet, TK 1 T PO BID P, Disp: , Rfl: 0    rivaroxaban (XARELTO) 20 mg Tab, Take 1 tablet (20 mg total) by mouth daily with dinner or evening meal., Disp: 90 tablet, Rfl: 3     Lab Results   Component Value Date    WBC 8.93 05/23/2019    RBC 5.02 05/23/2019    HGB 16.5 05/23/2019    HCT 48.3 05/23/2019    MCV 96 05/23/2019    MCH 32.9 (H) 05/23/2019    MCHC 34.2 05/23/2019    RDW 13.4 05/23/2019     05/23/2019    MPV 11.6 05/23/2019    GRAN 9.4 (H) 10/12/2018    GRAN 74.2 (H) 10/12/2018    LYMPH 2.0 10/12/2018    LYMPH 15.8 (L) 10/12/2018    MONO 1.1 (H) 10/12/2018    MONO 8.7 10/12/2018    EOS 0.1 10/12/2018    BASO 0.03 10/12/2018    EOSINOPHIL 0.9 10/12/2018    BASOPHIL 0.2 10/12/2018        CMP  Lab Results   Component Value Date     05/23/2019    K 3.7 05/23/2019     05/23/2019    CO2 30 (H) 05/23/2019     05/23/2019    BUN 13 05/23/2019    CREATININE 1.0 05/23/2019    CALCIUM 10.2 05/23/2019    PROT 6.5 10/11/2018    ALBUMIN 3.5 10/11/2018    BILITOT 0.3 10/11/2018    ALKPHOS 52 (L) 10/11/2018    AST 43 (H) 10/11/2018    ALT 57 (H) 10/11/2018    ANIONGAP 8 05/23/2019    ESTGFRAFRICA >60 05/23/2019    EGFRNONAA >60 05/23/2019        No results found for: LABBLOO, LABURIN, RESPIRATORYC, GSRESP         Results for orders placed or performed in visit on 08/24/22   EKG 12-lead    Collection Time: 08/24/22 10:15 AM    Narrative    Test Reason : I48.0,    Vent. Rate : 116 BPM     Atrial Rate : 156 BPM     P-R Int : 000 ms          QRS Dur : 096 ms      QT Int : 318 ms       P-R-T Axes : 000 -48 -24 degrees     QTc Int : 442 ms    Atrial fibrillation with rapid ventricular response  Left axis deviation  Nonspecific ST abnormality  Abnormal ECG  When compared with ECG of 23-MAY-2019 14:22,  Atrial fibrillation has replaced Sinus rhythm  Vent. rate has increased BY  55 BPM  Septal infarct is now Present  T wave inversion now evident in Lateral leads  Confirmed by Jacob RODRIGUEZ, Julio WHELAN ([id number])  on 8/25/2022 1:00:26 PM    Referred By: ERIKA   SELF           Confirmed By:Julio James MD                  Plan:       Problem List Items Addressed This Visit        Cardiac/Vascular    Essential hypertension     A longstanding condition.  Given control blood pressure readings with Lotrel/HCTZ, I am stopping the above agents and initiating carvedilol 12.5 mg 2 times daily.    Close follow-up needed.           Atrial fibrillation with rapid ventricular response     Duration unclear.  There was an uncomplicated 1st episode of AFib 2019 with successful cardioversion.  He has not had follow-up since then.    Heart rate today 120 range, he has no awareness of palpitations on a regular basis.    I laid out a plan for management including Xarelto and planned cardioversion in the future.  Echocardiogram ordered.           Dyspnea on exertion     I am concerned that he has developed contractility issues related to AFib with tachycardia.  Echocardiogram ordered.  Carvedilol dosing initiated.    He has been on carvedilol in the past.  He states he stopped it months ago.           Abnormal electrocardiogram     He has findings of left axis deviation and ST T wave abnormalities in the lateral precordial leads.    The changes are more pronounced than 2019.           RESOLVED: Atrial fibrillation - Primary    Relevant Medications    furosemide (LASIX) 20 MG tablet    carvediloL (COREG) 12.5 MG tablet    rivaroxaban (XARELTO) 20 mg Tab    Other Relevant Orders    EKG 12-lead (Completed)    Echo (Completed)    Comprehensive Metabolic Panel    CBC Auto Differential    B-TYPE NATRIURETIC PEPTIDE             He had recent labs that will need to be reviewed.  I am drawing chemistries and BNP.  He will resume carvedilol 12.5 mg b.i.d..  Furosemide 20 mg ordered daily.  Hydrochlorothiazide and Lotrel have been stopped. Once his echocardiogram is reviewed I will give him a call and make further recommendations.    Repeat  cardioversion to be required in all likelihood.  The timing of the cardioversion will depend on his response to carvedilol and echo findings.      Thank you for allowing me to participate in your patient's care.        Julio James MD  08/25/2022   12:38 PM

## 2022-08-25 ENCOUNTER — LAB VISIT (OUTPATIENT)
Dept: LAB | Facility: HOSPITAL | Age: 62
End: 2022-08-25
Attending: INTERNAL MEDICINE
Payer: COMMERCIAL

## 2022-08-25 DIAGNOSIS — I48.0 PAROXYSMAL ATRIAL FIBRILLATION: ICD-10-CM

## 2022-08-25 PROBLEM — R94.31 ABNORMAL ELECTROCARDIOGRAM: Status: ACTIVE | Noted: 2022-08-25

## 2022-08-25 PROBLEM — I48.19 ATRIAL FIBRILLATION, PERSISTENT: Status: RESOLVED | Noted: 2019-05-23 | Resolved: 2022-08-25

## 2022-08-25 PROBLEM — R06.09 DYSPNEA ON EXERTION: Status: ACTIVE | Noted: 2022-08-25

## 2022-08-25 PROBLEM — I48.91 ATRIAL FIBRILLATION: Status: RESOLVED | Noted: 2018-10-11 | Resolved: 2022-08-25

## 2022-08-25 PROBLEM — I48.91 ATRIAL FIBRILLATION WITH RAPID VENTRICULAR RESPONSE: Status: ACTIVE | Noted: 2022-08-25

## 2022-08-25 LAB
ALBUMIN SERPL BCP-MCNC: 4.2 G/DL (ref 3.5–5.2)
ALP SERPL-CCNC: 56 U/L (ref 38–126)
ALT SERPL W/O P-5'-P-CCNC: 45 U/L (ref 10–44)
ANION GAP SERPL CALC-SCNC: 8 MMOL/L (ref 8–16)
AST SERPL-CCNC: 36 U/L (ref 15–46)
BASOPHILS # BLD AUTO: 0.05 K/UL (ref 0–0.2)
BASOPHILS NFR BLD: 0.7 % (ref 0–1.9)
BILIRUB SERPL-MCNC: 0.7 MG/DL (ref 0.1–1)
CALCIUM SERPL-MCNC: 9.1 MG/DL (ref 8.7–10.5)
CHLORIDE SERPL-SCNC: 103 MMOL/L (ref 95–110)
CO2 SERPL-SCNC: 30 MMOL/L (ref 23–29)
CREAT SERPL-MCNC: 1.05 MG/DL (ref 0.5–1.4)
DIFFERENTIAL METHOD: ABNORMAL
EOSINOPHIL # BLD AUTO: 0.4 K/UL (ref 0–0.5)
EOSINOPHIL NFR BLD: 6 % (ref 0–8)
ERYTHROCYTE [DISTWIDTH] IN BLOOD BY AUTOMATED COUNT: 13.7 % (ref 11.5–14.5)
EST. GFR  (NO RACE VARIABLE): >60 ML/MIN/1.73 M^2
GLUCOSE SERPL-MCNC: 108 MG/DL (ref 70–110)
HCT VFR BLD AUTO: 43.5 % (ref 40–54)
HGB BLD-MCNC: 14.2 G/DL (ref 14–18)
IMM GRANULOCYTES # BLD AUTO: 0.01 K/UL (ref 0–0.04)
IMM GRANULOCYTES NFR BLD AUTO: 0.1 % (ref 0–0.5)
LYMPHOCYTES # BLD AUTO: 1.7 K/UL (ref 1–4.8)
LYMPHOCYTES NFR BLD: 26.1 % (ref 18–48)
MCH RBC QN AUTO: 31.6 PG (ref 27–31)
MCHC RBC AUTO-ENTMCNC: 32.6 G/DL (ref 32–36)
MCV RBC AUTO: 97 FL (ref 82–98)
MONOCYTES # BLD AUTO: 0.7 K/UL (ref 0.3–1)
MONOCYTES NFR BLD: 11.1 % (ref 4–15)
NEUTROPHILS # BLD AUTO: 3.7 K/UL (ref 1.8–7.7)
NEUTROPHILS NFR BLD: 56 % (ref 38–73)
NRBC BLD-RTO: 0 /100 WBC
NT-PROBNP SERPL-MCNC: 3520 PG/ML (ref 5–900)
PLATELET # BLD AUTO: 213 K/UL (ref 150–450)
PMV BLD AUTO: 12.1 FL (ref 9.2–12.9)
POTASSIUM SERPL-SCNC: 4.7 MMOL/L (ref 3.5–5.1)
PROT SERPL-MCNC: 6.9 G/DL (ref 6–8.4)
RBC # BLD AUTO: 4.49 M/UL (ref 4.6–6.2)
SODIUM SERPL-SCNC: 141 MMOL/L (ref 136–145)
UUN UR-MCNC: 23 MG/DL (ref 2–20)
WBC # BLD AUTO: 6.67 K/UL (ref 3.9–12.7)

## 2022-08-25 PROCEDURE — 36415 COLL VENOUS BLD VENIPUNCTURE: CPT | Mod: PO | Performed by: INTERNAL MEDICINE

## 2022-08-25 PROCEDURE — 85025 COMPLETE CBC W/AUTO DIFF WBC: CPT | Mod: PO | Performed by: INTERNAL MEDICINE

## 2022-08-25 PROCEDURE — 83880 ASSAY OF NATRIURETIC PEPTIDE: CPT | Mod: PO | Performed by: INTERNAL MEDICINE

## 2022-08-25 PROCEDURE — 80053 COMPREHEN METABOLIC PANEL: CPT | Mod: PO | Performed by: INTERNAL MEDICINE

## 2022-08-25 NOTE — ASSESSMENT & PLAN NOTE
He has findings of left axis deviation and ST T wave abnormalities in the lateral precordial leads.    The changes are more pronounced than 2019.

## 2022-08-25 NOTE — ASSESSMENT & PLAN NOTE
A longstanding condition.  Given control blood pressure readings with Lotrel/HCTZ, I am stopping the above agents and initiating carvedilol 12.5 mg 2 times daily.    Close follow-up needed.

## 2022-08-25 NOTE — ASSESSMENT & PLAN NOTE
I am concerned that he has developed contractility issues related to AFib with tachycardia.  Echocardiogram ordered.  Carvedilol dosing initiated.    He has been on carvedilol in the past.  He states he stopped it months ago.

## 2022-08-29 ENCOUNTER — CLINICAL SUPPORT (OUTPATIENT)
Dept: CARDIOLOGY | Facility: CLINIC | Age: 62
End: 2022-08-29
Payer: COMMERCIAL

## 2022-08-29 ENCOUNTER — HOSPITAL ENCOUNTER (INPATIENT)
Facility: HOSPITAL | Age: 62
LOS: 4 days | Discharge: HOME HEALTH | DRG: 189 | End: 2022-09-02
Attending: EMERGENCY MEDICINE | Admitting: STUDENT IN AN ORGANIZED HEALTH CARE EDUCATION/TRAINING PROGRAM
Payer: COMMERCIAL

## 2022-08-29 VITALS — HEART RATE: 103 BPM | SYSTOLIC BLOOD PRESSURE: 90 MMHG | OXYGEN SATURATION: 93 % | DIASTOLIC BLOOD PRESSURE: 50 MMHG

## 2022-08-29 DIAGNOSIS — R06.02 SOB (SHORTNESS OF BREATH): ICD-10-CM

## 2022-08-29 DIAGNOSIS — I50.9 ACUTE CHF: ICD-10-CM

## 2022-08-29 DIAGNOSIS — I48.0 PAROXYSMAL ATRIAL FIBRILLATION: ICD-10-CM

## 2022-08-29 DIAGNOSIS — R07.9 CHEST PAIN: ICD-10-CM

## 2022-08-29 PROBLEM — E66.01 SEVERE OBESITY (BMI >= 40): Status: ACTIVE | Noted: 2022-08-29

## 2022-08-29 PROBLEM — J96.01 ACUTE HYPOXEMIC RESPIRATORY FAILURE: Status: ACTIVE | Noted: 2022-08-29

## 2022-08-29 LAB
ALBUMIN SERPL BCP-MCNC: 3.6 G/DL (ref 3.5–5.2)
ALLENS TEST: ABNORMAL
ALP SERPL-CCNC: 62 U/L (ref 55–135)
ALT SERPL W/O P-5'-P-CCNC: 35 U/L (ref 10–44)
ANION GAP SERPL CALC-SCNC: 11 MMOL/L (ref 8–16)
AST SERPL-CCNC: 27 U/L (ref 10–40)
BASOPHILS # BLD AUTO: 0.04 K/UL (ref 0–0.2)
BASOPHILS NFR BLD: 0.5 % (ref 0–1.9)
BILIRUB SERPL-MCNC: 0.9 MG/DL (ref 0.1–1)
BNP SERPL-MCNC: 836 PG/ML (ref 0–99)
BUN SERPL-MCNC: 23 MG/DL (ref 8–23)
CALCIUM SERPL-MCNC: 9.2 MG/DL (ref 8.7–10.5)
CHLORIDE SERPL-SCNC: 105 MMOL/L (ref 95–110)
CO2 SERPL-SCNC: 24 MMOL/L (ref 23–29)
CREAT SERPL-MCNC: 1 MG/DL (ref 0.5–1.4)
CTP QC/QA: YES
DELSYS: ABNORMAL
DIFFERENTIAL METHOD: ABNORMAL
EOSINOPHIL # BLD AUTO: 0.2 K/UL (ref 0–0.5)
EOSINOPHIL NFR BLD: 1.9 % (ref 0–8)
EP: 5
ERYTHROCYTE [DISTWIDTH] IN BLOOD BY AUTOMATED COUNT: 14.1 % (ref 11.5–14.5)
ERYTHROCYTE [SEDIMENTATION RATE] IN BLOOD BY WESTERGREN METHOD: 12 MM/H
EST. GFR  (NO RACE VARIABLE): >60 ML/MIN/1.73 M^2
FIO2: 28
GLUCOSE SERPL-MCNC: 152 MG/DL (ref 70–110)
HCO3 UR-SCNC: 22.8 MMOL/L (ref 24–28)
HCT VFR BLD AUTO: 40.5 % (ref 40–54)
HCT VFR BLD CALC: 46 %PCV (ref 36–54)
HGB BLD-MCNC: 13.5 G/DL (ref 14–18)
HGB BLD-MCNC: 16 G/DL
IMM GRANULOCYTES # BLD AUTO: 0.02 K/UL (ref 0–0.04)
IMM GRANULOCYTES NFR BLD AUTO: 0.2 % (ref 0–0.5)
IP: 10
LYMPHOCYTES # BLD AUTO: 1.3 K/UL (ref 1–4.8)
LYMPHOCYTES NFR BLD: 16.4 % (ref 18–48)
MAGNESIUM SERPL-MCNC: 2.1 MG/DL (ref 1.6–2.6)
MCH RBC QN AUTO: 32.3 PG (ref 27–31)
MCHC RBC AUTO-ENTMCNC: 33.3 G/DL (ref 32–36)
MCV RBC AUTO: 97 FL (ref 82–98)
MIN VOL: 15.9
MODE: ABNORMAL
MONOCYTES # BLD AUTO: 0.7 K/UL (ref 0.3–1)
MONOCYTES NFR BLD: 9 % (ref 4–15)
NEUTROPHILS # BLD AUTO: 5.8 K/UL (ref 1.8–7.7)
NEUTROPHILS NFR BLD: 72 % (ref 38–73)
NRBC BLD-RTO: 0 /100 WBC
PCO2 BLDA: 35.6 MMHG (ref 35–45)
PH SMN: 7.42 [PH] (ref 7.35–7.45)
PLATELET # BLD AUTO: 206 K/UL (ref 150–450)
PMV BLD AUTO: 12.5 FL (ref 9.2–12.9)
PO2 BLDA: 78 MMHG (ref 80–100)
POC BE: -2 MMOL/L
POC SATURATED O2: 96 % (ref 95–100)
POC TCO2: 24 MMOL/L (ref 23–27)
POTASSIUM BLD-SCNC: 4 MMOL/L (ref 3.5–5.1)
POTASSIUM SERPL-SCNC: 3.7 MMOL/L (ref 3.5–5.1)
PROT SERPL-MCNC: 6.6 G/DL (ref 6–8.4)
RBC # BLD AUTO: 4.18 M/UL (ref 4.6–6.2)
SAMPLE: ABNORMAL
SARS-COV-2 RDRP RESP QL NAA+PROBE: NEGATIVE
SITE: ABNORMAL
SODIUM BLD-SCNC: 142 MMOL/L (ref 136–145)
SODIUM SERPL-SCNC: 140 MMOL/L (ref 136–145)
SP02: 96
SPONT RATE: 24
TROPONIN I SERPL DL<=0.01 NG/ML-MCNC: 0.01 NG/ML (ref 0–0.03)
TROPONIN I SERPL DL<=0.01 NG/ML-MCNC: 0.02 NG/ML (ref 0–0.03)
TSH SERPL DL<=0.005 MIU/L-ACNC: 2.18 UIU/ML (ref 0.4–4)
WBC # BLD AUTO: 8.07 K/UL (ref 3.9–12.7)

## 2022-08-29 PROCEDURE — 84443 ASSAY THYROID STIM HORMONE: CPT | Performed by: EMERGENCY MEDICINE

## 2022-08-29 PROCEDURE — 63600175 PHARM REV CODE 636 W HCPCS

## 2022-08-29 PROCEDURE — 99999 PR PBB SHADOW E&M-EST. PATIENT-LVL II: CPT | Mod: PBBFAC,,, | Performed by: INTERNAL MEDICINE

## 2022-08-29 PROCEDURE — 85025 COMPLETE CBC W/AUTO DIFF WBC: CPT | Performed by: EMERGENCY MEDICINE

## 2022-08-29 PROCEDURE — 25500020 PHARM REV CODE 255: Performed by: EMERGENCY MEDICINE

## 2022-08-29 PROCEDURE — 99499 NO LOS: ICD-10-PCS | Mod: S$GLB,,, | Performed by: INTERNAL MEDICINE

## 2022-08-29 PROCEDURE — 84484 ASSAY OF TROPONIN QUANT: CPT | Mod: 91

## 2022-08-29 PROCEDURE — 82803 BLOOD GASES ANY COMBINATION: CPT

## 2022-08-29 PROCEDURE — 96374 THER/PROPH/DIAG INJ IV PUSH: CPT

## 2022-08-29 PROCEDURE — 99499 UNLISTED E&M SERVICE: CPT | Mod: S$GLB,,, | Performed by: INTERNAL MEDICINE

## 2022-08-29 PROCEDURE — 93005 ELECTROCARDIOGRAM TRACING: CPT

## 2022-08-29 PROCEDURE — 96376 TX/PRO/DX INJ SAME DRUG ADON: CPT

## 2022-08-29 PROCEDURE — 93010 ELECTROCARDIOGRAM REPORT: CPT | Mod: ,,, | Performed by: INTERNAL MEDICINE

## 2022-08-29 PROCEDURE — 94761 N-INVAS EAR/PLS OXIMETRY MLT: CPT

## 2022-08-29 PROCEDURE — 12000002 HC ACUTE/MED SURGE SEMI-PRIVATE ROOM

## 2022-08-29 PROCEDURE — U0002 COVID-19 LAB TEST NON-CDC: HCPCS | Performed by: EMERGENCY MEDICINE

## 2022-08-29 PROCEDURE — 96375 TX/PRO/DX INJ NEW DRUG ADDON: CPT

## 2022-08-29 PROCEDURE — 94660 CPAP INITIATION&MGMT: CPT

## 2022-08-29 PROCEDURE — 99999 PR PBB SHADOW E&M-EST. PATIENT-LVL II: ICD-10-PCS | Mod: PBBFAC,,, | Performed by: INTERNAL MEDICINE

## 2022-08-29 PROCEDURE — 84484 ASSAY OF TROPONIN QUANT: CPT | Performed by: EMERGENCY MEDICINE

## 2022-08-29 PROCEDURE — 80053 COMPREHEN METABOLIC PANEL: CPT | Performed by: EMERGENCY MEDICINE

## 2022-08-29 PROCEDURE — 99900035 HC TECH TIME PER 15 MIN (STAT)

## 2022-08-29 PROCEDURE — 27000190 HC CPAP FULL FACE MASK W/VALVE

## 2022-08-29 PROCEDURE — 93010 EKG 12-LEAD: ICD-10-PCS | Mod: ,,, | Performed by: INTERNAL MEDICINE

## 2022-08-29 PROCEDURE — 25000003 PHARM REV CODE 250: Performed by: EMERGENCY MEDICINE

## 2022-08-29 PROCEDURE — 83735 ASSAY OF MAGNESIUM: CPT | Performed by: EMERGENCY MEDICINE

## 2022-08-29 PROCEDURE — 83880 ASSAY OF NATRIURETIC PEPTIDE: CPT | Performed by: EMERGENCY MEDICINE

## 2022-08-29 PROCEDURE — 63600175 PHARM REV CODE 636 W HCPCS: Performed by: EMERGENCY MEDICINE

## 2022-08-29 PROCEDURE — 36600 WITHDRAWAL OF ARTERIAL BLOOD: CPT

## 2022-08-29 PROCEDURE — 99291 CRITICAL CARE FIRST HOUR: CPT | Mod: 25

## 2022-08-29 RX ORDER — METOPROLOL TARTRATE 1 MG/ML
5 INJECTION, SOLUTION INTRAVENOUS EVERY 5 MIN PRN
Status: COMPLETED | OUTPATIENT
Start: 2022-08-29 | End: 2022-08-29

## 2022-08-29 RX ORDER — GLUCAGON 1 MG
1 KIT INJECTION
Status: DISCONTINUED | OUTPATIENT
Start: 2022-08-29 | End: 2022-09-02 | Stop reason: HOSPADM

## 2022-08-29 RX ORDER — ONDANSETRON 2 MG/ML
4 INJECTION INTRAMUSCULAR; INTRAVENOUS EVERY 8 HOURS PRN
Status: DISCONTINUED | OUTPATIENT
Start: 2022-08-29 | End: 2022-09-02 | Stop reason: HOSPADM

## 2022-08-29 RX ORDER — MAG HYDROX/ALUMINUM HYD/SIMETH 200-200-20
30 SUSPENSION, ORAL (FINAL DOSE FORM) ORAL 4 TIMES DAILY PRN
Status: DISCONTINUED | OUTPATIENT
Start: 2022-08-29 | End: 2022-09-02 | Stop reason: HOSPADM

## 2022-08-29 RX ORDER — FUROSEMIDE 10 MG/ML
20 INJECTION INTRAMUSCULAR; INTRAVENOUS ONCE
Status: COMPLETED | OUTPATIENT
Start: 2022-08-29 | End: 2022-08-29

## 2022-08-29 RX ORDER — IBUPROFEN 200 MG
16 TABLET ORAL
Status: DISCONTINUED | OUTPATIENT
Start: 2022-08-29 | End: 2022-09-02 | Stop reason: HOSPADM

## 2022-08-29 RX ORDER — SIMETHICONE 80 MG
1 TABLET,CHEWABLE ORAL 4 TIMES DAILY PRN
Status: DISCONTINUED | OUTPATIENT
Start: 2022-08-29 | End: 2022-09-02 | Stop reason: HOSPADM

## 2022-08-29 RX ORDER — IPRATROPIUM BROMIDE AND ALBUTEROL SULFATE 2.5; .5 MG/3ML; MG/3ML
3 SOLUTION RESPIRATORY (INHALATION) EVERY 6 HOURS PRN
Status: DISCONTINUED | OUTPATIENT
Start: 2022-08-29 | End: 2022-08-31

## 2022-08-29 RX ORDER — HYDROXYZINE HYDROCHLORIDE 25 MG/1
50 TABLET, FILM COATED ORAL 2 TIMES DAILY PRN
Status: DISCONTINUED | OUTPATIENT
Start: 2022-08-30 | End: 2022-09-02 | Stop reason: HOSPADM

## 2022-08-29 RX ORDER — METOPROLOL TARTRATE 25 MG/1
25 TABLET, FILM COATED ORAL
Status: COMPLETED | OUTPATIENT
Start: 2022-08-29 | End: 2022-08-29

## 2022-08-29 RX ORDER — INSULIN ASPART 100 [IU]/ML
0-5 INJECTION, SOLUTION INTRAVENOUS; SUBCUTANEOUS EVERY 6 HOURS PRN
Status: DISCONTINUED | OUTPATIENT
Start: 2022-08-29 | End: 2022-09-02 | Stop reason: HOSPADM

## 2022-08-29 RX ORDER — ENOXAPARIN SODIUM 100 MG/ML
40 INJECTION SUBCUTANEOUS EVERY 24 HOURS
Status: DISCONTINUED | OUTPATIENT
Start: 2022-08-29 | End: 2022-08-29

## 2022-08-29 RX ORDER — IBUPROFEN 200 MG
24 TABLET ORAL
Status: DISCONTINUED | OUTPATIENT
Start: 2022-08-29 | End: 2022-09-02 | Stop reason: HOSPADM

## 2022-08-29 RX ORDER — FUROSEMIDE 10 MG/ML
20 INJECTION INTRAMUSCULAR; INTRAVENOUS
Status: COMPLETED | OUTPATIENT
Start: 2022-08-29 | End: 2022-08-29

## 2022-08-29 RX ORDER — SODIUM CHLORIDE 0.9 % (FLUSH) 0.9 %
10 SYRINGE (ML) INJECTION EVERY 8 HOURS PRN
Status: DISCONTINUED | OUTPATIENT
Start: 2022-08-29 | End: 2022-09-02 | Stop reason: HOSPADM

## 2022-08-29 RX ORDER — METOPROLOL TARTRATE 1 MG/ML
5 INJECTION, SOLUTION INTRAVENOUS EVERY 5 MIN PRN
Status: DISCONTINUED | OUTPATIENT
Start: 2022-08-30 | End: 2022-09-02 | Stop reason: HOSPADM

## 2022-08-29 RX ORDER — TALC
6 POWDER (GRAM) TOPICAL NIGHTLY PRN
Status: DISCONTINUED | OUTPATIENT
Start: 2022-08-29 | End: 2022-09-02 | Stop reason: HOSPADM

## 2022-08-29 RX ORDER — METOPROLOL TARTRATE 1 MG/ML
5 INJECTION, SOLUTION INTRAVENOUS
Status: COMPLETED | OUTPATIENT
Start: 2022-08-29 | End: 2022-08-29

## 2022-08-29 RX ORDER — CARVEDILOL 12.5 MG/1
12.5 TABLET ORAL 2 TIMES DAILY WITH MEALS
Status: DISCONTINUED | OUTPATIENT
Start: 2022-08-30 | End: 2022-08-30

## 2022-08-29 RX ORDER — FUROSEMIDE 10 MG/ML
40 INJECTION INTRAMUSCULAR; INTRAVENOUS
Status: DISCONTINUED | OUTPATIENT
Start: 2022-08-29 | End: 2022-08-30

## 2022-08-29 RX ORDER — ACETAMINOPHEN 325 MG/1
650 TABLET ORAL EVERY 4 HOURS PRN
Status: DISCONTINUED | OUTPATIENT
Start: 2022-08-29 | End: 2022-09-02 | Stop reason: HOSPADM

## 2022-08-29 RX ADMIN — FUROSEMIDE 20 MG: 10 INJECTION, SOLUTION INTRAMUSCULAR; INTRAVENOUS at 04:08

## 2022-08-29 RX ADMIN — METOROPROLOL TARTRATE 5 MG: 5 INJECTION, SOLUTION INTRAVENOUS at 08:08

## 2022-08-29 RX ADMIN — FUROSEMIDE 20 MG: 10 INJECTION, SOLUTION INTRAMUSCULAR; INTRAVENOUS at 05:08

## 2022-08-29 RX ADMIN — FUROSEMIDE 40 MG: 10 INJECTION, SOLUTION INTRAMUSCULAR; INTRAVENOUS at 11:08

## 2022-08-29 RX ADMIN — METOPROLOL TARTRATE 25 MG: 25 TABLET, FILM COATED ORAL at 09:08

## 2022-08-29 RX ADMIN — ENOXAPARIN SODIUM 40 MG: 100 INJECTION SUBCUTANEOUS at 11:08

## 2022-08-29 RX ADMIN — METOROPROLOL TARTRATE 5 MG: 5 INJECTION, SOLUTION INTRAVENOUS at 04:08

## 2022-08-29 RX ADMIN — METOROPROLOL TARTRATE 5 MG: 5 INJECTION, SOLUTION INTRAVENOUS at 03:08

## 2022-08-29 RX ADMIN — IOHEXOL 100 ML: 350 INJECTION, SOLUTION INTRAVENOUS at 07:08

## 2022-08-29 RX ADMIN — HYDROXYZINE HYDROCHLORIDE 50 MG: 25 TABLET ORAL at 11:08

## 2022-08-29 RX ADMIN — FUROSEMIDE 20 MG: 10 INJECTION, SOLUTION INTRAMUSCULAR; INTRAVENOUS at 03:08

## 2022-08-29 NOTE — PROGRESS NOTES
Pt was seen in clinic today for BP check    Pt CC is SOB  some lightheadedness, no sleep for the past three days    BP in right arm: 90/50   HR : 103 bpm  SpO2 : 93    Stated has not taken medication today     Informed Dr. James of vitals  Pt was advised to go to ED    No further questions at this time    Pt is whiling to seek medical attention    ND

## 2022-08-29 NOTE — ED PROVIDER NOTES
Encounter Date: 8/29/2022       History     Chief Complaint   Patient presents with    Shortness of Breath     Sob that he has been feeling for past few weeks but worsened last night. Hx of afib and not on medicaiton for. EKG performed afib RVR     62-year-old male with history of paroxysmal atrial fibrillation who sees Dr. Limon for Cardiology presents with shortness of breath he states he has had for few weeks however has exacerbated since last night.  He denies any chest pain/pressure/tightness.  He denies any increase in peripheral edema stating that he actually feels like the edema is going down.  He is on carvedilol and Lasix both of which he has not taken today.  He was sent here from cardiologist's office for evaluation.  He states that lying flat does exacerbate the symptoms.  He denies any fever/chills and denies any cough.    Review of patient's allergies indicates:  No Known Allergies  Past Medical History:   Diagnosis Date    Anemia     Atrial fibrillation     Encounter for blood transfusion     Esophageal ulcer     GI bleed     Hypertension      Past Surgical History:   Procedure Laterality Date    COLONOSCOPY N/A 4/4/2019    Procedure: COLONOSCOPY Golytely;  Surgeon: Umair Randolph MD;  Location: [facility];  Service: Endoscopy;  Laterality: N/A;    ESOPHAGOGASTRODUODENOSCOPY N/A 10/12/2018    Procedure: EGD (ESOPHAGOGASTRODUODENOSCOPY);  Surgeon: Braden Herring MD;  Location: [facility];  Service: Endoscopy;  Laterality: N/A;    ESOPHAGOGASTRODUODENOSCOPY N/A 4/4/2019    Procedure: EGD;  Surgeon: Umair Randolph MD;  Location: [facility];  Service: Endoscopy;  Laterality: N/A;    HERNIA REPAIR       Family History   Problem Relation Age of Onset    COPD Mother     Cancer Father      Social History     Tobacco Use    Smoking status: Never    Smokeless tobacco: Current     Types: Chew   Substance Use Topics    Alcohol use: Yes     Alcohol/week: 20.0 standard drinks     Types: 20 Cans of beer per  week    Drug use: No     Review of Systems   Respiratory:  Positive for shortness of breath.    All other systems reviewed and are negative.    Physical Exam     Initial Vitals [08/29/22 1502]   BP Pulse Resp Temp SpO2   125/84 (!) 111 (!) 26 98 °F (36.7 °C) (!) 94 %      MAP       --         Physical Exam    Constitutional: He appears well-developed and well-nourished.   HENT:   Head: Normocephalic and atraumatic.   Eyes: EOM are normal. Pupils are equal, round, and reactive to light.   Cardiovascular:            Tachycardic rate with abnormal rhythm   Pulmonary/Chest:   Tachypnea, no wheezes CTAB   Abdominal: Abdomen is soft. There is no abdominal tenderness.   Musculoskeletal:         General: Normal range of motion.     Neurological: He is alert and oriented to person, place, and time.   Skin: Capillary refill takes less than 2 seconds.   1+ nonpitting edema b/l,  no erythema, calor noted   Psychiatric: He has a normal mood and affect.       ED Course   Critical Care    Date/Time: 8/30/2022 2:41 PM  Performed by: Amadeo Ly DO  Authorized by: Amadeo Ly DO   Direct patient critical care time: 25 minutes  Consulting other physicians critical care time: 10 minutes  Total critical care time (exclusive of procedural time) : 35 minutes  Critical care time was exclusive of separately billable procedures and treating other patients.  Critical care was necessary to treat or prevent imminent or life-threatening deterioration of the following conditions: cardiac failure and respiratory failure.  Critical care was time spent personally by me on the following activities: development of treatment plan with patient or surrogate, discussions with consultants, interpretation of cardiac output measurements, evaluation of patient's response to treatment, examination of patient, obtaining history from patient or surrogate, ordering and performing treatments and interventions, ordering and review of laboratory  studies, pulse oximetry, re-evaluation of patient's condition and review of old charts.      Labs Reviewed   CBC W/ AUTO DIFFERENTIAL - Abnormal; Notable for the following components:       Result Value    RBC 4.18 (*)     Hemoglobin 13.5 (*)     MCH 32.3 (*)     Lymph % 16.4 (*)     All other components within normal limits   COMPREHENSIVE METABOLIC PANEL - Abnormal; Notable for the following components:    Glucose 152 (*)     All other components within normal limits   B-TYPE NATRIURETIC PEPTIDE - Abnormal; Notable for the following components:     (*)     All other components within normal limits   ISTAT PROCEDURE - Abnormal; Notable for the following components:    POC PO2 78 (*)     POC HCO3 22.8 (*)     All other components within normal limits   TROPONIN I   TSH   MAGNESIUM   B-TYPE NATRIURETIC PEPTIDE   TROPONIN I   SARS-COV-2 RDRP GENE   POCT GLUCOSE MONITORING CONTINUOUS     EKG Readings: (Independently Interpreted)   Atrial fibrillation rate of 107, right axis deviation, nonspecific ST T-wave abnormalities interpreted by me   ECG Results              EKG 12-lead (In process)  Result time 08/30/22 10:22:13      In process by Interface, Lab In [facility] (08/30/22 10:22:13)                   Narrative:    Test Reason : R06.02,    Vent. Rate : 106 BPM     Atrial Rate : 107 BPM     P-R Int : 000 ms          QRS Dur : 090 ms      QT Int : 332 ms       P-R-T Axes : 000 040 -43 degrees     QTc Int : 441 ms    Atrial fibrillation with rapid ventricular response  Nonspecific T wave abnormality  Abnormal ECG  When compared with ECG of 24-AUG-2022 10:15,  The axis Shifted right    Referred By: [email or web address]   SELF           Confirmed By:                                      EKG 12-lead (In process)  Result time 08/30/22 10:21:52      In process by Interface, Lab In [facility] (08/30/22 10:21:52)                   Narrative:    Test Reason : R06.02,    Vent. Rate : 107 BPM     Atrial Rate : 187 BPM     P-R Int : 000 ms           QRS Dur : 106 ms      QT Int : 340 ms       P-R-T Axes : 000 026 -08 degrees     QTc Int : 453 ms    Atrial fibrillation with rapid ventricular response  Incomplete left bundle branch block  Nonspecific ST and T wave abnormality , probably digitalis effect  Abnormal ECG  When compared with ECG of 24-AUG-2022 10:15,  Incomplete left bundle branch block is now Present    Referred By: [email or web address]   SELF           Confirmed By:                                   Imaging Results              CTA Chest Non-Coronary - PE Study (Final result)  Result time 08/29/22 20:16:22      Final result by Olivier Kat DO (08/29/22 20:16:22)                   Impression:      1. No pulmonary embolism to the proximal segmental level.  2. Interlobular septal thickening and ground-glass opacities, compatible with pulmonary edema.  An overlying infectious process is not entirely excluded.  3. Moderate right and small left pleural effusions.  4. Nonspecific mediastinal and bilateral hilar lymphadenopathy which may be reactive.  A neoplastic process is not excluded, follow-up is recommended the resolution of the patient's acute symptoms.  5.  Indeterminate 1.3 cm left adrenal nodule which can be further evaluated with MRI or adrenal protocol CT.  6. Moderate hiatal hernia.  7. Global cardiomegaly and findings concerning for right heart failure.      Electronically signed by: Olivier Kat  Date:    08/29/2022  Time:    20:16               Narrative:    EXAMINATION:  CTA CHEST NON CORONARY    CLINICAL HISTORY:  sob, non-complianance with Xarelto;    TECHNIQUE:  Low dose axial images, sagittal and coronal reformations were obtained from the thoracic inlet to the lung bases following the IV administration of 100 mL of Omnipaque 350.  Contrast timing was optimized to evaluate the pulmonary arteries.  Maximum intensity projection images were provided for review.    COMPARISON:  None    FINDINGS:  Pulmonary vasculature: Examination is  mildly limited by contrast bolus timing and motion artifact in the lung bases. There is no evidence of a pulmonary embolism to the proximal segmental level. Distal segmental and subsegmental pulmonary arteries are not fully evaluated.    Aorta: Mild calcified atherosclerosis.  No aneurysm.    Base of Neck: No significant abnormality.    Thoracic soft tissues: Normal.    Heart: The heart is globally enlarged.  There is reflux of contrast in the hepatic veins which can be seen with right heart failure.  No pericardial effusion.    Yessi/Mediastinum: There are multiple enlarged mediastinal lymph nodes.  Prevascular node measures 1.2 cm short axis (series 2, image 153).  Right paratracheal node measures 0.7 cm in short axis (series 2, image 137).  There is a right hilar node measuring 1.4 cm (series 2, image 223).  There is a left hilar node measuring 0.9 cm (series 2, image 224).    Airways: The large airways are patent. No foci of endobronchial filling.    Lungs/Pleura: Motion artifact limits evaluation the lung bases.  There is interlobular septal thickening, and heterogeneous ground-glass opacities, right greater than left with moderate right and small left pleural effusions.  Findings are compatible with pulmonary edema.    Esophagus: Normal.    Upper Abdomen: There is a 1.3 cm indeterminate left adrenal lesion.  There is a moderate hiatal hernia.    Bones: No acute fracture. No suspicious lytic or sclerotic lesions.  There are numerous remote healed left posterior rib fractures                                       X-Ray Chest 1 View (Final result)  Result time 08/29/22 16:25:04      Final result by Golden Méndez MD (08/29/22 16:25:04)                   Impression:      As above.      Electronically signed by: Golden Méndez  Date:    08/29/2022  Time:    16:25               Narrative:    EXAMINATION:  XR CHEST 1 VIEW    CLINICAL HISTORY:  Shortness of breath    TECHNIQUE:  Single frontal view of the chest was  performed.    COMPARISON:  08/03/2022    FINDINGS:  Mediastinal structures are midline.  Stable prominent cardiac silhouette.    Mildly increased diffuse bilateral interstitial attenuation which could represent mild pulmonary edema or interstitial infectious process.  Finding appears slightly more prominent compared to prior exam.  No focal alveolar consolidation.  Slight blunting of the bilateral costophrenic angles which could represent small effusions and/or atelectasis.  No pneumothorax.    Visualized osseous structures appear intact.                                       Medications   sodium chloride 0.9% flush 10 mL (has no administration in time range)   albuterol-ipratropium 2.5 mg-0.5 mg/3 mL nebulizer solution 3 mL (has no administration in time range)   melatonin tablet 6 mg (has no administration in time range)   ondansetron injection 4 mg (has no administration in time range)   simethicone chewable tablet 80 mg (has no administration in time range)   aluminum-magnesium hydroxide-simethicone 200-200-20 mg/5 mL suspension 30 mL (has no administration in time range)   acetaminophen tablet 650 mg (has no administration in time range)   glucose chewable tablet 16 g (has no administration in time range)   glucose chewable tablet 24 g (has no administration in time range)   glucagon (human recombinant) injection 1 mg (has no administration in time range)   furosemide injection 40 mg (40 mg Intravenous Given 8/30/22 1150)   insulin aspart U-100 pen 0-5 Units (has no administration in time range)   hydrOXYzine HCL tablet 50 mg (50 mg Oral Given 8/29/22 2319)   carvediloL tablet 12.5 mg (12.5 mg Oral Given 8/30/22 0829)   rivaroxaban tablet 20 mg (has no administration in time range)   metoprolol injection 5 mg (has no administration in time range)   metoprolol injection 5 mg (5 mg Intravenous Given 8/29/22 1638)   furosemide injection 20 mg (20 mg Intravenous Given 8/29/22 1555)   furosemide injection 20 mg (20  mg Intravenous Given 8/29/22 1638)   furosemide injection 20 mg (20 mg Intravenous Given 8/29/22 1732)   iohexoL (OMNIPAQUE 350) injection 100 mL (100 mLs Intravenous Given 8/29/22 1954)   metoprolol injection 5 mg (5 mg Intravenous Given 8/29/22 2034)   metoprolol tartrate (LOPRESSOR) tablet 25 mg (25 mg Oral Given 8/29/22 2138)     Medical Decision Making:   ED Management:  Patient most likely has an acute decompensation of congestive heart failure secondary to atrial fibrillation with rapid ventricular response.  Respiratory status was much better after patient was placed on BiPAP therapy.  Consulted with Hospital Medicine who will admit the patient to their service for further workup.           ED Course as of 08/30/22 1443   Mon Aug 29, 2022   1542 Informed by nursing that pt states he has also not been taking his Xarelto [CD]      ED Course User Index  [CD] Amadeo Ly DO               Clinical Impression:   Final diagnoses:  [R06.02] SOB (shortness of breath)  [I50.9] Acute CHF        ED Disposition Condition    Admit                 Amadeo Ly DO  08/30/22 8113

## 2022-08-29 NOTE — Clinical Note
Diagnosis: SOB (shortness of breath) [518769]   Admitting Provider:: RICHARD TAPIA [[id number]]   Future Attending Provider: RICHARD TAPIA [[id number]]   Reason for IP Medical Treatment  (Clinical interventions that can only be accomplished in the IP setting? ) :: continuous BIPAP, IV diuresis   Estimated Length of Stay:: 2 midnights   I certify that Inpatient services for greater than or equal to 2 midnights are medically necessary:: Yes   Plans for Post-Acute care--if anticipated (pick the single best option):: A. No post acute care anticipated at this time

## 2022-08-29 NOTE — ED NOTES
Patient states he did not take his blood thinner. Believes that it is causing his SOB.   MD notified.   Patient reports decreased appetite.

## 2022-08-29 NOTE — ED NOTES
Patient hot. Took gown off. SOB. 2 L NC placed on patient for comfort. MD notified. Orders received.

## 2022-08-29 NOTE — ED TRIAGE NOTES
Patient arrived to ED with complaints of increasing SOB. Hx of smoking. Hx of afib. Patient dizzy. Abdominal breathing. Gasping for breathes between phrases. Patient ambulatory with SOB. Steady gait. Awake, alert, oriented. No neurological deficits. Denies CP and pressure. Denies abdominal pain, NVD.

## 2022-08-29 NOTE — ED NOTES
Patient to be placed on bipap. Patient pale, diaphoretic, SOB, increased use of abdominal muscles, anxious.

## 2022-08-30 PROBLEM — I34.0 NONRHEUMATIC MITRAL VALVE REGURGITATION: Status: ACTIVE | Noted: 2022-08-30

## 2022-08-30 PROBLEM — I50.21 ACUTE SYSTOLIC HEART FAILURE: Status: ACTIVE | Noted: 2022-08-30

## 2022-08-30 LAB
ANION GAP SERPL CALC-SCNC: 14 MMOL/L (ref 8–16)
BASOPHILS # BLD AUTO: 0.03 K/UL (ref 0–0.2)
BASOPHILS NFR BLD: 0.3 % (ref 0–1.9)
BUN SERPL-MCNC: 25 MG/DL (ref 8–23)
CALCIUM SERPL-MCNC: 9.1 MG/DL (ref 8.7–10.5)
CHLORIDE SERPL-SCNC: 106 MMOL/L (ref 95–110)
CO2 SERPL-SCNC: 23 MMOL/L (ref 23–29)
CREAT SERPL-MCNC: 1.1 MG/DL (ref 0.5–1.4)
DIFFERENTIAL METHOD: ABNORMAL
EOSINOPHIL # BLD AUTO: 0.2 K/UL (ref 0–0.5)
EOSINOPHIL NFR BLD: 1.6 % (ref 0–8)
ERYTHROCYTE [DISTWIDTH] IN BLOOD BY AUTOMATED COUNT: 14.5 % (ref 11.5–14.5)
EST. GFR  (NO RACE VARIABLE): >60 ML/MIN/1.73 M^2
GLUCOSE SERPL-MCNC: 82 MG/DL (ref 70–110)
HCT VFR BLD AUTO: 43.5 % (ref 40–54)
HGB BLD-MCNC: 14.3 G/DL (ref 14–18)
IMM GRANULOCYTES # BLD AUTO: 0.02 K/UL (ref 0–0.04)
IMM GRANULOCYTES NFR BLD AUTO: 0.2 % (ref 0–0.5)
LYMPHOCYTES # BLD AUTO: 1.9 K/UL (ref 1–4.8)
LYMPHOCYTES NFR BLD: 19 % (ref 18–48)
MAGNESIUM SERPL-MCNC: 2.1 MG/DL (ref 1.6–2.6)
MCH RBC QN AUTO: 31.8 PG (ref 27–31)
MCHC RBC AUTO-ENTMCNC: 32.9 G/DL (ref 32–36)
MCV RBC AUTO: 97 FL (ref 82–98)
MONOCYTES # BLD AUTO: 1.2 K/UL (ref 0.3–1)
MONOCYTES NFR BLD: 11.3 % (ref 4–15)
NEUTROPHILS # BLD AUTO: 6.9 K/UL (ref 1.8–7.7)
NEUTROPHILS NFR BLD: 67.6 % (ref 38–73)
NRBC BLD-RTO: 0 /100 WBC
PLATELET # BLD AUTO: 211 K/UL (ref 150–450)
PMV BLD AUTO: 12.7 FL (ref 9.2–12.9)
POCT GLUCOSE: 102 MG/DL (ref 70–110)
POCT GLUCOSE: 137 MG/DL (ref 70–110)
POCT GLUCOSE: 81 MG/DL (ref 70–110)
POCT GLUCOSE: 98 MG/DL (ref 70–110)
POTASSIUM SERPL-SCNC: 4.1 MMOL/L (ref 3.5–5.1)
RBC # BLD AUTO: 4.5 M/UL (ref 4.6–6.2)
SODIUM SERPL-SCNC: 143 MMOL/L (ref 136–145)
TROPONIN I SERPL DL<=0.01 NG/ML-MCNC: 0.03 NG/ML (ref 0–0.03)
WBC # BLD AUTO: 10.21 K/UL (ref 3.9–12.7)

## 2022-08-30 PROCEDURE — 84484 ASSAY OF TROPONIN QUANT: CPT

## 2022-08-30 PROCEDURE — 11000001 HC ACUTE MED/SURG PRIVATE ROOM

## 2022-08-30 PROCEDURE — 80048 BASIC METABOLIC PNL TOTAL CA: CPT

## 2022-08-30 PROCEDURE — 27000221 HC OXYGEN, UP TO 24 HOURS

## 2022-08-30 PROCEDURE — 25000003 PHARM REV CODE 250

## 2022-08-30 PROCEDURE — 99223 1ST HOSP IP/OBS HIGH 75: CPT | Mod: ,,, | Performed by: INTERNAL MEDICINE

## 2022-08-30 PROCEDURE — A4216 STERILE WATER/SALINE, 10 ML: HCPCS

## 2022-08-30 PROCEDURE — 99223 1ST HOSP IP/OBS HIGH 75: CPT | Mod: ,,, | Performed by: NURSE PRACTITIONER

## 2022-08-30 PROCEDURE — 99223 PR INITIAL HOSPITAL CARE,LEVL III: ICD-10-PCS | Mod: ,,, | Performed by: INTERNAL MEDICINE

## 2022-08-30 PROCEDURE — 99900035 HC TECH TIME PER 15 MIN (STAT)

## 2022-08-30 PROCEDURE — 99223 PR INITIAL HOSPITAL CARE,LEVL III: ICD-10-PCS | Mod: ,,, | Performed by: NURSE PRACTITIONER

## 2022-08-30 PROCEDURE — 36415 COLL VENOUS BLD VENIPUNCTURE: CPT

## 2022-08-30 PROCEDURE — 83735 ASSAY OF MAGNESIUM: CPT

## 2022-08-30 PROCEDURE — 63600175 PHARM REV CODE 636 W HCPCS

## 2022-08-30 PROCEDURE — 85025 COMPLETE CBC W/AUTO DIFF WBC: CPT

## 2022-08-30 PROCEDURE — 94660 CPAP INITIATION&MGMT: CPT

## 2022-08-30 PROCEDURE — 94761 N-INVAS EAR/PLS OXIMETRY MLT: CPT

## 2022-08-30 RX ORDER — FUROSEMIDE 10 MG/ML
20 INJECTION INTRAMUSCULAR; INTRAVENOUS 2 TIMES DAILY
Status: DISCONTINUED | OUTPATIENT
Start: 2022-08-31 | End: 2022-09-01

## 2022-08-30 RX ORDER — ALBUTEROL SULFATE 90 UG/1
AEROSOL, METERED RESPIRATORY (INHALATION)
Status: ON HOLD | COMMUNITY
Start: 2022-08-06 | End: 2022-09-02 | Stop reason: SDUPTHER

## 2022-08-30 RX ORDER — CARVEDILOL 6.25 MG/1
6.25 TABLET ORAL 2 TIMES DAILY WITH MEALS
Status: DISCONTINUED | OUTPATIENT
Start: 2022-08-31 | End: 2022-09-02 | Stop reason: HOSPADM

## 2022-08-30 RX ORDER — FUROSEMIDE 10 MG/ML
20 INJECTION INTRAMUSCULAR; INTRAVENOUS
Status: DISCONTINUED | OUTPATIENT
Start: 2022-08-31 | End: 2022-08-30

## 2022-08-30 RX ADMIN — RIVAROXABAN 20 MG: 20 TABLET, FILM COATED ORAL at 04:08

## 2022-08-30 RX ADMIN — Medication 10 ML: at 10:08

## 2022-08-30 RX ADMIN — CARVEDILOL 12.5 MG: 12.5 TABLET, FILM COATED ORAL at 08:08

## 2022-08-30 RX ADMIN — FUROSEMIDE 40 MG: 10 INJECTION, SOLUTION INTRAMUSCULAR; INTRAVENOUS at 11:08

## 2022-08-30 NOTE — ASSESSMENT & PLAN NOTE
Echocardiogram (8/29/22):    The left ventricle is moderately enlarged with moderately decreased systolic function.   There is moderate left ventricular global hypokinesis.   The estimated ejection fraction is 35%.   A diastolic pattern consistent with atrial fibrillation observed.   Severe mitral regurgitation.   Mild to moderate tricuspid regurgitation.   The estimated PA systolic pressure is 45 mmHg.   Normal right ventricular size with normal right ventricular systolic function.   There is mild to moderate pulmonary hypertension.    Clinically volume overloaded on exam  , troponin mildly elevated  Daily weights, Strict I/Os  Monitor on telemetry  Monitor and trend BMP, Mg, and renal function; keep K >4, Mg >2  Sodium restriction (<2g/d), fluid restriction (<2L)   Cardiac diet  Cardiology consulted

## 2022-08-30 NOTE — SUBJECTIVE & OBJECTIVE
Past Medical History:   Diagnosis Date    Anemia     Atrial fibrillation     Encounter for blood transfusion     Esophageal ulcer     GI bleed     Hypertension        Past Surgical History:   Procedure Laterality Date    COLONOSCOPY N/A 2019    Procedure: COLONOSCOPY Golytely;  Surgeon: Umair Randolph MD;  Location: [facility];  Service: Endoscopy;  Laterality: N/A;    ESOPHAGOGASTRODUODENOSCOPY N/A 10/12/2018    Procedure: EGD (ESOPHAGOGASTRODUODENOSCOPY);  Surgeon: Braden Herring MD;  Location: [facility];  Service: Endoscopy;  Laterality: N/A;    ESOPHAGOGASTRODUODENOSCOPY N/A 2019    Procedure: EGD;  Surgeon: Umair Randolph MD;  Location: [facility];  Service: Endoscopy;  Laterality: N/A;    HERNIA REPAIR         Review of patient's allergies indicates:  No Known Allergies    No current facility-administered medications on file prior to encounter.     Current Outpatient Medications on File Prior to Encounter   Medication Sig    albuterol (PROVENTIL/VENTOLIN HFA) 90 mcg/actuation inhaler SMARTSI-2 Puff(s) By Mouth Every 4 Hours PRN    carvediloL (COREG) 12.5 MG tablet Take 1 tablet (12.5 mg total) by mouth 2 (two) times daily with meals.    ferrous sulfate (FEOSOL) 325 mg (65 mg iron) Tab tablet Take 1 tablet (325 mg total) by mouth daily with breakfast.    furosemide (LASIX) 20 MG tablet Take 1 tablet (20 mg total) by mouth once daily.    HYDROcodone-acetaminophen (NORCO)  mg per tablet TK 1 T PO BID P    rivaroxaban (XARELTO) 20 mg Tab Take 1 tablet (20 mg total) by mouth daily with dinner or evening meal.     Family History       Problem Relation (Age of Onset)    COPD Mother    Cancer Father          Tobacco Use    Smoking status: Never    Smokeless tobacco: Current     Types: Chew   Substance and Sexual Activity    Alcohol use: Yes     Alcohol/week: 20.0 standard drinks     Types: 20 Cans of beer per week    Drug use: No    Sexual activity: Not on file     Review of Systems    Constitutional: Negative for chills, decreased appetite, diaphoresis and fever.   Cardiovascular:  Positive for dyspnea on exertion. Negative for chest pain, claudication, cyanosis, irregular heartbeat, leg swelling, near-syncope, orthopnea, palpitations, paroxysmal nocturnal dyspnea and syncope.   Respiratory:  Negative for cough, hemoptysis, shortness of breath and wheezing.    Gastrointestinal:  Negative for bloating, abdominal pain, constipation, diarrhea, melena, nausea and vomiting.   Neurological:  Negative for dizziness and weakness.   Objective:     Vital Signs (Most Recent):  Temp: 97.4 °F (36.3 °C) (08/30/22 0816)  Pulse: 84 (08/30/22 0816)  Resp: 18 (08/30/22 0816)  BP: 112/82 (08/30/22 0816)  SpO2: 99 % (08/30/22 0816) Vital Signs (24h Range):  Temp:  [96.4 °F (35.8 °C)-98.5 °F (36.9 °C)] 97.4 °F (36.3 °C)  Pulse:  [] 84  Resp:  [18-31] 18  SpO2:  [92 %-99 %] 99 %  BP: ()/() 112/82     Weight: 91.5 kg (201 lb 11.5 oz)  Body mass index is 39.4 kg/m².    SpO2: 99 %  O2 Device (Oxygen Therapy): nasal cannula      Intake/Output Summary (Last 24 hours) at 8/30/2022 0932  Last data filed at 8/30/2022 0659  Gross per 24 hour   Intake 0 ml   Output 1200 ml   Net -1200 ml       Lines/Drains/Airways       Peripheral Intravenous Line  Duration                  Peripheral IV - Single Lumen 08/29/22 1538 20 G Right Antecubital <1 day                    Physical Exam  Constitutional:       General: He is not in acute distress.     Appearance: He is well-developed.   Cardiovascular:      Rate and Rhythm: Tachycardia present. Rhythm irregularly irregular.      Heart sounds: Murmur heard.     No gallop.   Pulmonary:      Effort: Pulmonary effort is normal. No respiratory distress.      Breath sounds: Normal breath sounds. No wheezing.   Abdominal:      General: Bowel sounds are normal. There is no distension.      Palpations: Abdomen is soft.      Tenderness: There is no abdominal tenderness.    Skin:     General: Skin is warm and dry.   Neurological:      Mental Status: He is alert and oriented to person, place, and time.       Significant Labs: BMP:   Recent Labs   Lab 08/29/22  1539 08/30/22  0308   * 82    143   K 3.7 4.1    106   CO2 24 23   BUN 23 25*   CREATININE 1.0 1.1   CALCIUM 9.2 9.1   MG 2.1 2.1   , CBC   Recent Labs   Lab 08/29/22  1539 08/29/22  1707 08/30/22  0309   WBC 8.07  --  10.21   HGB 13.5*  --  14.3   HCT 40.5   < > 43.5     --  211    < > = values in this interval not displayed.   , and Troponin   Recent Labs   Lab 08/29/22  1539 08/29/22  2142 08/30/22  0309   TROPONINI 0.015 0.022 0.034*       Significant Imaging: Echocardiogram: Transthoracic echo (TTE) complete (Cupid Only):   Results for orders placed or performed during the hospital encounter of 08/24/22   Echo   Result Value Ref Range    BSA 1.99 m2    LA WIDTH 6.15 cm    LVIDd 6.10 (A) 3.5 - 6.0 cm    IVS 1.28 (A) 0.6 - 1.1 cm    Posterior Wall 1.15 (A) 0.6 - 1.1 cm    LVIDs 4.77 (A) 2.1 - 4.0 cm    FS 22 28 - 44 %    LA volume 211.83 cm3    STJ 2.39 cm    Ascending aorta 2.36 cm    LV mass 328.13 g    LA size 5.08 cm    RVDD 3.28 cm    Left Ventricle Relative Wall Thickness 0.38 cm    AV mean gradient 4 mmHg    AV valve area 1.85 cm2    AV Velocity Ratio 0.62     AV index (prosthetic) 0.51     LVOT diameter 2.16 cm    LVOT area 3.7 cm2    LVOT peak nick 0.78 m/s    LVOT peak VTI 10.01 cm    Ao peak nick 1.25 m/s    Ao VTI 19.80 cm    Mr max nick 0.04 m/s    LVOT stroke volume 36.66 cm3    AV peak gradient 6 mmHg    TR Max Nick 3.04 m/s    LV Systolic Volume 105.82 mL    LV Systolic Volume Index 56.0 mL/m2    LV Diastolic Volume 187.13 mL    LV Diastolic Volume Index 99.01 mL/m2    LA Volume Index 112.1 mL/m2    LV Mass Index 174 g/m2    RA Major Axis 6.55 cm    Left Atrium Minor Axis 7.82 cm    Left Atrium Major Axis 8.14 cm    Triscuspid Valve Regurgitation Peak Gradient 37 mmHg    Right Atrial  Pressure (from IVC) 8 mmHg    EF 35 %    TV rest pulmonary artery pressure 45 mmHg    Narrative    · The left ventricle is moderately enlarged with moderately decreased   systolic function.  · There is moderate left ventricular global hypokinesis.  · The estimated ejection fraction is 35%.  · A diastolic pattern consistent with atrial fibrillation observed.  · Severe mitral regurgitation.  · Mild to moderate tricuspid regurgitation.  · The estimated PA systolic pressure is 45 mmHg.  · Normal right ventricular size with normal right ventricular systolic   function.  · There is mild to moderate pulmonary hypertension.

## 2022-08-30 NOTE — HOSPITAL COURSE
8/30/2022  per HPI   8/31/2022 Remains on IV Laisx BID with 1.6L out overnight. Negative 2.8L since admission Remains in afib with HR 80s per Epic occasional RVR overnight. BMP yesterday WNL. SOB improving   9/1/2022 HR 70s afib overnight with periods up to 120s BMP WNL. Remains on IV Lasix BID with 900cc out overnight negative 3.7L since admission. BP stable. Ambulated in hallway with no SOB noted and feeling well. Lying flat to sleep although did use BiPap   9/2/2022 BP stable overnight. HR 80s-90s afib. BMP WNL. 1.3L out overnight negative 5.1L since admission. SOB improving

## 2022-08-30 NOTE — ASSESSMENT & PLAN NOTE
- SBP 100s-140s overnight  - continue Coreg  - consider addition of ARB if BP tolerates; will avoid for now to allow for optimal diuresis

## 2022-08-30 NOTE — ASSESSMENT & PLAN NOTE
Chronic, Latest blood pressure and vitals reviewed-   Temp:  [96.4 °F (35.8 °C)-98.5 °F (36.9 °C)]   Pulse:  []   Resp:  [18-31]   BP: ()/()   SpO2:  [92 %-99 %] .     Borderline low BP. Received first dose of coreg 12.5 mg this morning. Evening dose held due to low BP.   Decrease dose to 6.25 mg BID  Continue IV diuresis, may need to decrease dose

## 2022-08-30 NOTE — CONSULTS
[facility] - Telemetry  Cardiology  Consult Note    Patient Name: David Barrios  MRN: [id number]  Admission Date: 8/29/2022  Hospital Length of Stay: 1 days  Code Status: Full Code   Attending Provider: Rosalind Crockett MD   Consulting Provider: HARI Matthew ANP  Primary Care Physician: Breann Tavera MD  Principal Problem:Acute hypoxemic respiratory failure    Patient information was obtained from patient, past medical records and ER records.     Inpatient consult to [facility]  Consult performed by: HARI Muniz, BRENDA  Consult ordered by: Lucy Segal NP  Reason for consult: afib with RVR         Subjective:     Chief Complaint:  SOB      HPI:   63yo male with HTN, afib with RVR, PAF s/p DCCV 2019, acute systolic heart failure, acute hypoxemic respiratory failure and obesity who presented to the ER with complaints of SOB. Mr. Barrios was seen by Dr Limon yesterday with complaints of SOB for the past few weeks with progressive worsening. He was felt to be in ADHF therefore he was sent to the ER for further treatment. At home, he takes Lasix 20mg daily, Coreg BID and Xarelto.  Labs: CBC and BMP WNL. Troponin .[phone]  CTA chest negative for PE, ground glass opacities and moderate right and small left pleural effusion. Started on IV Lasix BID and admitted to [facility] Medicine and Cardiology consulted for afib with RVR. He denies any SOB currently but does appear mildly tachypneic at rest and with talking. Echo from 8/24/2022 with EF 35%, severe MR, mild to moderate TR estimated PA pressure 45mmHg     Hospital Course: 8/30/2022 per HPI     Past Medical History:   Diagnosis Date    Anemia     Atrial fibrillation     Encounter for blood transfusion     Esophageal ulcer     GI bleed     Hypertension        Past Surgical History:   Procedure Laterality Date    COLONOSCOPY N/A 4/4/2019    Procedure: COLONOSCOPY Golytely;  Surgeon: Umair Randolph MD;  Location:  [facility] ENDO;  Service: Endoscopy;  Laterality: N/A;    ESOPHAGOGASTRODUODENOSCOPY N/A 10/12/2018    Procedure: EGD (ESOPHAGOGASTRODUODENOSCOPY);  Surgeon: Braden Herring MD;  Location: [facility];  Service: Endoscopy;  Laterality: N/A;    ESOPHAGOGASTRODUODENOSCOPY N/A 2019    Procedure: EGD;  Surgeon: Umair Randolph MD;  Location: [facility];  Service: Endoscopy;  Laterality: N/A;    HERNIA REPAIR         Review of patient's allergies indicates:  No Known Allergies    No current facility-administered medications on file prior to encounter.     Current Outpatient Medications on File Prior to Encounter   Medication Sig    albuterol (PROVENTIL/VENTOLIN HFA) 90 mcg/actuation inhaler SMARTSI-2 Puff(s) By Mouth Every 4 Hours PRN    carvediloL (COREG) 12.5 MG tablet Take 1 tablet (12.5 mg total) by mouth 2 (two) times daily with meals.    ferrous sulfate (FEOSOL) 325 mg (65 mg iron) Tab tablet Take 1 tablet (325 mg total) by mouth daily with breakfast.    furosemide (LASIX) 20 MG tablet Take 1 tablet (20 mg total) by mouth once daily.    HYDROcodone-acetaminophen (NORCO)  mg per tablet TK 1 T PO BID P    rivaroxaban (XARELTO) 20 mg Tab Take 1 tablet (20 mg total) by mouth daily with dinner or evening meal.     Family History       Problem Relation (Age of Onset)    COPD Mother    Cancer Father          Tobacco Use    Smoking status: Never    Smokeless tobacco: Current     Types: Chew   Substance and Sexual Activity    Alcohol use: Yes     Alcohol/week: 20.0 standard drinks     Types: 20 Cans of beer per week    Drug use: No    Sexual activity: Not on file     Review of Systems   Constitutional: Negative for chills, decreased appetite, diaphoresis and fever.   Cardiovascular:  Positive for dyspnea on exertion. Negative for chest pain, claudication, cyanosis, irregular heartbeat, leg swelling, near-syncope, orthopnea, palpitations, paroxysmal nocturnal dyspnea and syncope.   Respiratory:  Negative  for cough, hemoptysis, shortness of breath and wheezing.    Gastrointestinal:  Negative for bloating, abdominal pain, constipation, diarrhea, melena, nausea and vomiting.   Neurological:  Negative for dizziness and weakness.   Objective:     Vital Signs (Most Recent):  Temp: 97.4 °F (36.3 °C) (08/30/22 0816)  Pulse: 84 (08/30/22 0816)  Resp: 18 (08/30/22 0816)  BP: 112/82 (08/30/22 0816)  SpO2: 99 % (08/30/22 0816) Vital Signs (24h Range):  Temp:  [96.4 °F (35.8 °C)-98.5 °F (36.9 °C)] 97.4 °F (36.3 °C)  Pulse:  [] 84  Resp:  [18-31] 18  SpO2:  [92 %-99 %] 99 %  BP: ()/() 112/82     Weight: 91.5 kg (201 lb 11.5 oz)  Body mass index is 39.4 kg/m².    SpO2: 99 %  O2 Device (Oxygen Therapy): nasal cannula      Intake/Output Summary (Last 24 hours) at 8/30/2022 0932  Last data filed at 8/30/2022 0659  Gross per 24 hour   Intake 0 ml   Output 1200 ml   Net -1200 ml       Lines/Drains/Airways       Peripheral Intravenous Line  Duration                  Peripheral IV - Single Lumen 08/29/22 1538 20 G Right Antecubital <1 day                    Physical Exam  Constitutional:       General: He is not in acute distress.     Appearance: He is well-developed.   Cardiovascular:      Rate and Rhythm: Tachycardia present. Rhythm irregularly irregular.      Heart sounds: Murmur heard.     No gallop.   Pulmonary:      Effort: Pulmonary effort is normal. No respiratory distress.      Breath sounds: Normal breath sounds. No wheezing.   Abdominal:      General: Bowel sounds are normal. There is no distension.      Palpations: Abdomen is soft.      Tenderness: There is no abdominal tenderness.   Skin:     General: Skin is warm and dry.   Neurological:      Mental Status: He is alert and oriented to person, place, and time.       Significant Labs: BMP:   Recent Labs   Lab 08/29/22  1539 08/30/22  0308   * 82    143   K 3.7 4.1    106   CO2 24 23   BUN 23 25*   CREATININE 1.0 1.1   CALCIUM 9.2 9.1   MG  2.1 2.1   , CBC   Recent Labs   Lab 08/29/22  1539 08/29/22  1707 08/30/22  0309   WBC 8.07  --  10.21   HGB 13.5*  --  14.3   HCT 40.5   < > 43.5     --  211    < > = values in this interval not displayed.   , and Troponin   Recent Labs   Lab 08/29/22  1539 08/29/22  2142 08/30/22  0309   TROPONINI 0.015 0.022 0.034*       Significant Imaging: Echocardiogram: Transthoracic echo (TTE) complete ([facility] Only):   Results for orders placed or performed during the hospital encounter of 08/24/22   Echo   Result Value Ref Range    BSA 1.99 m2    LA WIDTH 6.15 cm    LVIDd 6.10 (A) 3.5 - 6.0 cm    IVS 1.28 (A) 0.6 - 1.1 cm    Posterior Wall 1.15 (A) 0.6 - 1.1 cm    LVIDs 4.77 (A) 2.1 - 4.0 cm    FS 22 28 - 44 %    LA volume 211.83 cm3    STJ 2.39 cm    Ascending aorta 2.36 cm    LV mass 328.13 g    LA size 5.08 cm    RVDD 3.28 cm    Left Ventricle Relative Wall Thickness 0.38 cm    AV mean gradient 4 mmHg    AV valve area 1.85 cm2    AV Velocity Ratio 0.62     AV index (prosthetic) 0.51     LVOT diameter 2.16 cm    LVOT area 3.7 cm2    LVOT peak nick 0.78 m/s    LVOT peak VTI 10.01 cm    Ao peak nick 1.25 m/s    Ao VTI 19.80 cm    Mr max nick 0.04 m/s    LVOT stroke volume 36.66 cm3    AV peak gradient 6 mmHg    TR Max Nick 3.04 m/s    LV Systolic Volume 105.82 mL    LV Systolic Volume Index 56.0 mL/m2    LV Diastolic Volume 187.13 mL    LV Diastolic Volume Index 99.01 mL/m2    LA Volume Index 112.1 mL/m2    LV Mass Index 174 g/m2    RA Major Axis 6.55 cm    Left Atrium Minor Axis 7.82 cm    Left Atrium Major Axis 8.14 cm    Triscuspid Valve Regurgitation Peak Gradient 37 mmHg    Right Atrial Pressure (from IVC) 8 mmHg    EF 35 %    TV rest pulmonary artery pressure 45 mmHg    Narrative    · The left ventricle is moderately enlarged with moderately decreased   systolic function.  · There is moderate left ventricular global hypokinesis.  · The estimated ejection fraction is 35%.  · A diastolic pattern consistent with atrial  fibrillation observed.  · Severe mitral regurgitation.  · Mild to moderate tricuspid regurgitation.  · The estimated PA systolic pressure is 45 mmHg.  · Normal right ventricular size with normal right ventricular systolic   function.  · There is mild to moderate pulmonary hypertension.        Assessment and Plan:     * Acute hypoxemic respiratory failure  - related to volume overload in setting of systolic HF and MR  - continue diuresis as detailed under CHF    Nonrheumatic mitral valve regurgitation  - echo with severe MR and EF 35%  - MR contributing to afib with RVR and volume overload  - continue IV diuresis as detailed under CHF; continue with rate control per afib management  - will need further workup for MR and low EF but will likely be deferred to outpatient setting     Acute systolic heart failure  - echo 8/24/2022 with EF 35%, LV global hypokinesis, severe MR and mild to moderate TR  - ; CXR with pulmonary vascular congestion; CTA with no evidence of PE and moderate right and small left pleural effusion  - denies SOB this AM but appears SOB on PE; on IV Lasix 40mg BID with 1.2L out overnight; continue volume removal with IV lasix  - continue Coreg; consider addition of ARB if BP tolerates  - will need workup for low EF and MR but will likely proceed as an outpatient: currently etiology CAD vs arrhythmogenic     Atrial fibrillation with rapid ventricular response  - history of afib with DCCV in 2019  - followed by Dr. James as an outpatient; recurrent afib with RVR with HR up 120s- started on Coreg and Xarelto  - afib with RVR upon presentation likely related to MR and volume overload  - continue Coreg at current dose; diuresis in process; monitor HR and hopeful as volume status improves HR will improve as well  - unlikely to have rhythm conversion given MR; considered IV Amiodarone but hesitant to start given recent starting of AC; may need cardioversion inpatient vs outpatient   - CHADSVAS  score 2  (HTN, CHF) continue DOAC     Essential hypertension  - SBP 100s-140s overnight  - continue Coreg  - consider addition of ARB if BP tolerates; will avoid for now to allow for optimal diuresis         VTE Risk Mitigation (From admission, onward)         Ordered     rivaroxaban tablet 20 mg  With dinner         08/29/22 2309     IP VTE HIGH RISK PATIENT  Once         08/29/22 2102     Place sequential compression device  Until discontinued         08/29/22 2102                Thank you for your consult. I will follow-up with patient. Please contact us if you have any additional questions.    HARI Matthew, ANP  Cardiology   [facility] - Telemetry

## 2022-08-30 NOTE — ASSESSMENT & PLAN NOTE
- echo with severe MR and EF 35%  - MR contributing to afib with RVR and volume overload  - continue IV diuresis as detailed under CHF; continue with rate control per afib management  - will need further workup for MR and low EF but will likely be deferred to outpatient setting

## 2022-08-30 NOTE — ASSESSMENT & PLAN NOTE
- related to volume overload in setting of systolic HF and MR  - continue diuresis as detailed under CHF

## 2022-08-30 NOTE — ASSESSMENT & PLAN NOTE
[id number] score is 2; continue Xarelto   -continue carvedilol at reduced dose 6.25 mg given hypotension; lopressor IV PRN for HR >120  -Supplemental oxygen PRN for SpO2 <90%  -Cardiology consulted

## 2022-08-30 NOTE — ASSESSMENT & PLAN NOTE
Echocardiogram (8/29/22):    The left ventricle is moderately enlarged with moderately decreased systolic function.   There is moderate left ventricular global hypokinesis.   The estimated ejection fraction is 35%.   A diastolic pattern consistent with atrial fibrillation observed.   Severe mitral regurgitation.   Mild to moderate tricuspid regurgitation.   The estimated PA systolic pressure is 45 mmHg.   Normal right ventricular size with normal right ventricular systolic function.   There is mild to moderate pulmonary hypertension.    Clinically volume overloaded on exam  , troponin mildly elevated  Daily weights, Strict I/Os  Monitor on telemetry  Monitor and trend BMP, Mg, and renal function; keep K >4, Mg >2  Sodium restriction (<2g/d), fluid restriction (<2L)   Cardiac diet  Cardiology consulted           1 pair

## 2022-08-30 NOTE — ASSESSMENT & PLAN NOTE
- echo 8/24/2022 with EF 35%, LV global hypokinesis, severe MR and mild to moderate TR  - ; CXR with pulmonary vascular congestion; CTA with no evidence of PE and moderate right and small left pleural effusion  - denies SOB this AM but appears SOB on PE; on IV Lasix 40mg BID with 1.2L out overnight; continue volume removal with IV lasix  - continue Coreg; consider addition of ARB if BP tolerates  - will need workup for low EF and MR but will likely proceed as an outpatient: currently etiology CAD vs arrhythmogenic

## 2022-08-30 NOTE — ASSESSMENT & PLAN NOTE
-Patient with Hypoxic respiratory failure which is Acute   -he is not on home oxygen.   -Supplemental oxygen was provided and noted- NIPPV- BiPAP  10/5 28% FiO2  -Signs/symptoms of respiratory failure include- tachypnea, increased work of breathing and use of accessory muscles.   -Contributing diagnoses includes - CHF and Pleural effusion  -Labs and images were reviewed. Patient Has recent ABG, which has been reviewed.  -Continue supplemental oxygen; titrate and wean to maintain SpO2 >90%  -Duo-nebs PRN for SOB/Wheezing  -Continue IV diuresis and BIPAP  -ABG PRN

## 2022-08-30 NOTE — ASSESSMENT & PLAN NOTE
-Patient with Hypoxic respiratory failure which is Acute   -he is not on home oxygen.   Initially on Bipap, now transitioned to 4L NC  -Signs/symptoms of respiratory failure include- tachypnea, increased work of breathing and use of accessory muscles.   -Contributing diagnoses includes - CHF and Pleural effusion  -Continue supplemental oxygen; titrate and wean to maintain SpO2 >90%  -Duo-nebs PRN for SOB/Wheezing  -Continue IV diuresis, dose reduced due to hypotension   -CTA chest non coronary-PE study shows interlobular septal thickening and ground glass opacities likely pulmonary edema but underlying infectious etiology not ruled out. Will get procalcitonin in AM. 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

## 2022-08-30 NOTE — SUBJECTIVE & OBJECTIVE
Interval History: Now on 4 L oxygen NC. SOB improved. Has intermittent dry cough. Sleeps with 2 pillows at home, almost at baseline while in the hospital. Denies h/o active or passive smoking. He is a long time .     Review of Systems   HENT:  Negative for congestion.    Respiratory:  Positive for cough and shortness of breath. Negative for wheezing.    Cardiovascular:  Negative for chest pain.   Gastrointestinal:  Negative for abdominal pain and diarrhea.   Neurological:  Negative for headaches.   Objective:     Vital Signs (Most Recent):  Temp: 96.7 °F (35.9 °C) (08/30/22 1640)  Pulse: 92 (08/30/22 1640)  Resp: 18 (08/30/22 1640)  BP: 92/69 (08/30/22 1640)  SpO2: 99 % (08/30/22 1640) Vital Signs (24h Range):  Temp:  [96.4 °F (35.8 °C)-98.5 °F (36.9 °C)] 96.7 °F (35.9 °C)  Pulse:  [] 92  Resp:  [18-31] 18  SpO2:  [92 %-99 %] 99 %  BP: ()/() 92/69     Weight: 91.5 kg (201 lb 11.5 oz)  Body mass index is 39.4 kg/m².    Intake/Output Summary (Last 24 hours) at 8/30/2022 1718  Last data filed at 8/30/2022 1200  Gross per 24 hour   Intake 0 ml   Output 2250 ml   Net -2250 ml      Physical Exam  Constitutional:       General: He is not in acute distress.  HENT:      Mouth/Throat:      Mouth: Mucous membranes are moist.   Neck:      Comments: JVP elevated  Cardiovascular:      Rate and Rhythm: Normal rate. Rhythm irregular.      Pulses: Normal pulses.   Pulmonary:      Breath sounds: Wheezing (occasional faint wheezing) present.   Abdominal:      Palpations: Abdomen is soft.      Tenderness: There is no abdominal tenderness.   Musculoskeletal:      Right lower leg: Edema present.      Left lower leg: Edema present.   Skin:     General: Skin is warm and dry.   Neurological:      General: No focal deficit present.      Mental Status: He is alert and oriented to person, place, and time.   Psychiatric:         Mood and Affect: Mood normal.       Significant Labs: All pertinent labs within  the past 24 hours have been reviewed.    Significant Imaging: I have reviewed all pertinent imaging results/findings within the past 24 hours.

## 2022-08-30 NOTE — H&P
[facility] Emergency Dept  [facility] Medicine  History & Physical    Patient Name: David Barrios  MRN: [id number]  Patient Class: IP- Inpatient  Admission Date: 8/29/2022  Attending Physician: Eulalia Blackwood*   Primary Care Provider: Breann Tavera MD         Patient information was obtained from patient, past medical records and ER records.     Subjective:     Principal Problem:Acute hypoxemic respiratory failure    Chief Complaint:   Chief Complaint   Patient presents with    Shortness of Breath     Sob that he has been feeling for past few weeks but worsened last night. Hx of afib and not on medicaiton for. EKG performed afib RVR        HPI: David Barrios is a 61 y/o male with PMHx of pAF (s/p cardioversion 2019), HTN, and anemia presents to [facility] ED with complaints of SOB. He states he has SOB over the past few weeks and has progressive worsened. He was recently seen in clinic by his cardiologist (Dr. Limon) on 8/4/22 for dyspnea and was noted to be in afib with RVR, rate 120's. He was switched to coreg BID and restarted back on xarelto. He reports cough, orthopnea and bilateral leg swelling. He also noticed weight gain but is unsure how many lbs. He denies chest pain/pressure.     In the ED: 12 lead ekg revealed afib with . . Troponin 0.015--0.022. ABG [id number]%. CTA chest negative for PE. He was placed on BIPAP 10/5 @ 28%. He received 60mg IV lasix in ED. Will admit to hospital medicine for further evaluation and treatment.       Past Medical History:   Diagnosis Date    Anemia     Atrial fibrillation     Encounter for blood transfusion     Esophageal ulcer     GI bleed     Hypertension        Past Surgical History:   Procedure Laterality Date    COLONOSCOPY N/A 4/4/2019    Procedure: COLONOSCOPY Golytely;  Surgeon: Umair Randolph MD;  Location: [facility];  Service: Endoscopy;  Laterality: N/A;    ESOPHAGOGASTRODUODENOSCOPY N/A 10/12/2018    Procedure: EGD  (ESOPHAGOGASTRODUODENOSCOPY);  Surgeon: Braden Herring MD;  Location: [facility] ENDO;  Service: Endoscopy;  Laterality: N/A;    ESOPHAGOGASTRODUODENOSCOPY N/A 4/4/2019    Procedure: EGD;  Surgeon: Umair Randolph MD;  Location: [facility] ENDO;  Service: Endoscopy;  Laterality: N/A;    HERNIA REPAIR         Review of patient's allergies indicates:  No Known Allergies    No current facility-administered medications on file prior to encounter.     Current Outpatient Medications on File Prior to Encounter   Medication Sig    carvediloL (COREG) 12.5 MG tablet Take 1 tablet (12.5 mg total) by mouth 2 (two) times daily with meals.    ferrous sulfate (FEOSOL) 325 mg (65 mg iron) Tab tablet Take 1 tablet (325 mg total) by mouth daily with breakfast.    furosemide (LASIX) 20 MG tablet Take 1 tablet (20 mg total) by mouth once daily.    HYDROcodone-acetaminophen (NORCO)  mg per tablet TK 1 T PO BID P    rivaroxaban (XARELTO) 20 mg Tab Take 1 tablet (20 mg total) by mouth daily with dinner or evening meal.     Family History       Problem Relation (Age of Onset)    COPD Mother    Cancer Father          Tobacco Use    Smoking status: Never    Smokeless tobacco: Current     Types: Chew   Substance and Sexual Activity    Alcohol use: Yes     Alcohol/week: 20.0 standard drinks     Types: 20 Cans of beer per week    Drug use: No    Sexual activity: Not on file     Review of Systems   Constitutional:  Positive for unexpected weight change. Negative for chills, diaphoresis and fever.   HENT:  Negative for hearing loss and sore throat.    Eyes:  Negative for visual disturbance.   Respiratory:  Positive for cough. Negative for shortness of breath.    Cardiovascular:  Positive for leg swelling. Negative for chest pain and palpitations.   Gastrointestinal:  Negative for abdominal pain, diarrhea, nausea and vomiting.   Genitourinary:  Negative for difficulty urinating and flank pain.   Musculoskeletal:  Negative for back pain.    Skin:  Negative for rash and wound.   Neurological:  Negative for dizziness, weakness and headaches.   Psychiatric/Behavioral:  Negative for agitation and confusion.    Objective:     Vital Signs (Most Recent):  Temp: 98 °F (36.7 °C) (08/29/22 1502)  Pulse: 106 (08/29/22 2152)  Resp: (!) 25 (08/29/22 2152)  BP: (!) 142/92 (08/29/22 2151)  SpO2: 95 % (08/29/22 2152)   Vital Signs (24h Range):  Temp:  [98 °F (36.7 °C)] 98 °F (36.7 °C)  Pulse:  [] 106  Resp:  [22-31] 25  SpO2:  [92 %-98 %] 95 %  BP: ()/(50-96) 142/92     Weight: 93.9 kg (207 lb)  Body mass index is 40.43 kg/m².    Physical Exam  Vitals and nursing note reviewed.   Constitutional:       General: He is not in acute distress.     Appearance: Normal appearance. He is not ill-appearing.      Comments: BIPAP   HENT:      Head: Normocephalic and atraumatic.      Mouth/Throat:      Mouth: Mucous membranes are moist.   Eyes:      Extraocular Movements: Extraocular movements intact.      Pupils: Pupils are equal, round, and reactive to light.   Cardiovascular:      Rate and Rhythm: Tachycardia present. Rhythm irregularly irregular.      Pulses: Normal pulses.      Heart sounds: Normal heart sounds. No murmur heard.    No friction rub. No gallop.   Pulmonary:      Effort: Tachypnea and accessory muscle usage present. No respiratory distress.      Breath sounds: Examination of the right-middle field reveals rales. Examination of the left-middle field reveals rales. Examination of the right-lower field reveals decreased breath sounds. Examination of the left-lower field reveals decreased breath sounds. Decreased breath sounds and rales present. No wheezing or rhonchi.   Abdominal:      General: Bowel sounds are normal. There is no distension.      Palpations: Abdomen is soft.      Tenderness: There is no abdominal tenderness. There is no guarding.   Musculoskeletal:         General: No swelling.      Cervical back: Normal range of motion and neck  supple.      Right lower le+ Edema present.      Left lower le+ Edema present.   Skin:     General: Skin is warm and dry.      Capillary Refill: Capillary refill takes less than 2 seconds.      Findings: No bruising, erythema or rash.   Neurological:      General: No focal deficit present.      Mental Status: He is alert and oriented to person, place, and time.      GCS: GCS eye subscore is 4. GCS verbal subscore is 5. GCS motor subscore is 6.   Psychiatric:         Mood and Affect: Mood normal.         Speech: Speech normal.         Behavior: Behavior normal. Behavior is cooperative.         CRANIAL NERVES     CN III, IV, VI   Pupils are equal, round, and reactive to light.     Significant Labs: All pertinent labs within the past 24 hours have been reviewed.  Recent Results (from the past 24 hour(s))   CBC auto differential    Collection Time: 22  3:39 PM   Result Value Ref Range    WBC 8.07 3.90 - 12.70 K/uL    RBC 4.18 (L) 4.60 - 6.20 M/uL    Hemoglobin 13.5 (L) 14.0 - 18.0 g/dL    Hematocrit 40.5 40.0 - 54.0 %    MCV 97 82 - 98 fL    MCH 32.3 (H) 27.0 - 31.0 pg    MCHC 33.3 32.0 - 36.0 g/dL    RDW 14.1 11.5 - 14.5 %    Platelets 206 150 - 450 K/uL    MPV 12.5 9.2 - 12.9 fL    Immature Granulocytes 0.2 0.0 - 0.5 %    Gran # (ANC) 5.8 1.8 - 7.7 K/uL    Immature Grans (Abs) 0.02 0.00 - 0.04 K/uL    Lymph # 1.3 1.0 - 4.8 K/uL    Mono # 0.7 0.3 - 1.0 K/uL    Eos # 0.2 0.0 - 0.5 K/uL    Baso # 0.04 0.00 - 0.20 K/uL    nRBC 0 0 /100 WBC    Gran % 72.0 38.0 - 73.0 %    Lymph % 16.4 (L) 18.0 - 48.0 %    Mono % 9.0 4.0 - 15.0 %    Eosinophil % 1.9 0.0 - 8.0 %    Basophil % 0.5 0.0 - 1.9 %    Differential Method Automated    Comprehensive metabolic panel    Collection Time: 22  3:39 PM   Result Value Ref Range    Sodium 140 136 - 145 mmol/L    Potassium 3.7 3.5 - 5.1 mmol/L    Chloride 105 95 - 110 mmol/L    CO2 24 23 - 29 mmol/L    Glucose 152 (H) 70 - 110 mg/dL    BUN 23 8 - 23 mg/dL    Creatinine 1.0  0.5 - 1.4 mg/dL    Calcium 9.2 8.7 - 10.5 mg/dL    Total Protein 6.6 6.0 - 8.4 g/dL    Albumin 3.6 3.5 - 5.2 g/dL    Total Bilirubin 0.9 0.1 - 1.0 mg/dL    Alkaline Phosphatase 62 55 - 135 U/L    AST 27 10 - 40 U/L    ALT 35 10 - 44 U/L    Anion Gap 11 8 - 16 mmol/L    eGFR >60 >60 mL/min/1.73 m^2   Brain natriuretic peptide    Collection Time: 08/29/22  3:39 PM   Result Value Ref Range     (H) 0 - 99 pg/mL   Troponin I    Collection Time: 08/29/22  3:39 PM   Result Value Ref Range    Troponin I 0.015 0.000 - 0.026 ng/mL   TSH    Collection Time: 08/29/22  3:39 PM   Result Value Ref Range    TSH 2.183 0.400 - 4.000 uIU/mL   Magnesium    Collection Time: 08/29/22  3:39 PM   Result Value Ref Range    Magnesium 2.1 1.6 - 2.6 mg/dL   ISTAT PROCEDURE    Collection Time: 08/29/22  5:07 PM   Result Value Ref Range    POC PH 7.415 7.35 - 7.45    POC PCO2 35.6 35 - 45 mmHg    POC PO2 78 (L) 80 - 100 mmHg    POC HCO3 22.8 (L) 24 - 28 mmol/L    POC BE -2 -2 to 2 mmol/L    POC SATURATED O2 96 95 - 100 %    POC Sodium 142 136 - 145 mmol/L    POC Potassium 4.0 3.5 - 5.1 mmol/L    POC TCO2 24 23 - 27 mmol/L    POC Hematocrit 46 36 - 54 %PCV    POC HEMOGLOBIN 16 g/dL    Rate 12     Sample ARTERIAL     Site LR     Allens Test Pass     DelSys CPAP/BiPAP     Mode BiPAP     FiO2 28     Spont Rate 24     Min Vol 15.9     Sp02 96     IP 10     EP 5    POCT COVID-19 Rapid Screening    Collection Time: 08/29/22  7:07 PM   Result Value Ref Range    POC Rapid COVID Negative Negative     Acceptable Yes    Troponin I    Collection Time: 08/29/22  9:42 PM   Result Value Ref Range    Troponin I 0.022 0.000 - 0.026 ng/mL         Significant Imaging: I have reviewed all pertinent imaging results/findings within the past 24 hours.    Assessment/Plan:     * Acute hypoxemic respiratory failure  -Patient with Hypoxic respiratory failure which is Acute   -he is not on home oxygen.   -Supplemental oxygen was provided and noted-  NIPPV- BiPAP  10/5 28% FiO2  -Signs/symptoms of respiratory failure include- tachypnea, increased work of breathing and use of accessory muscles.   -Contributing diagnoses includes - CHF and Pleural effusion  -Labs and images were reviewed. Patient Has recent ABG, which has been reviewed.  -Continue supplemental oxygen; titrate and wean to maintain SpO2 >90%  -Duo-nebs PRN for SOB/Wheezing  -Continue IV diuresis and BIPAP  -ABG PRN         CHF (congestive heart failure)  Echocardiogram (8/29/22):    The left ventricle is moderately enlarged with moderately decreased systolic function.   There is moderate left ventricular global hypokinesis.   The estimated ejection fraction is 35%.   A diastolic pattern consistent with atrial fibrillation observed.   Severe mitral regurgitation.   Mild to moderate tricuspid regurgitation.   The estimated PA systolic pressure is 45 mmHg.   Normal right ventricular size with normal right ventricular systolic function.   There is mild to moderate pulmonary hypertension.    -patient presents with shortness of breath and BAI. Bibasilar rales and peripheral edema on exam  - consistent clinical presentation;   -Troponin 0.015--0.022; will continue to monitor and trend  -CXR revealed pulmonary edema and bilateral blunting suspecting pleural effusions  -CTA revealed Interlobular septal thickening and ground-glass opacities compatible with pulmonary edema, moderate right and small left pleural effusions  -Placed on BIPAP 10/5 @ 28%, weaned to 4LNC, then placed back on BIPAP due to increase WOB/tachypnea  - initiated lasix 60mg IV in ED, will continue Lasix IV at 40mg IV q12hr  - continue home BB  - Daily weights  -Strict I/Os  - Monitor on telemetry  - Monitor and trend BMP, Mg, and renal function; keep K >4, Mg >2  -Sodium restriction (<2g/d), fluid restriction (<2L)   -Cardiac diet  -Cardiology consulted          Atrial fibrillation with rapid ventricular response  -Patient  presents with SOB; etiology likely acute CHF  -12 lead EKG revealed Afib with RVR rate 106  -[id number] score is 2; was started on Xarelto on 8/24/22--resume  -Lopressor 5mg x4  -resume home BB; will provide lopressor IV PRN for HR >120  -EKG PRN for chest pain  -Supplemental oxygen PRN for SpO2 <90%  -Cardiology consulted          Severe obesity (BMI >= 40)  Body mass index is 40.43 kg/m². Morbid obesity complicates all aspects of disease management from diagnostic modalities to treatment.   Weight loss encouraged and health benefits explained to patient.         Essential hypertension  Chronic, Latest blood pressure and vitals reviewed-   Temp:  [98 °F (36.7 °C)]   Pulse:  []   Resp:  [22-31]   BP: ()/(50-96)   SpO2:  [92 %-98 %] .   Home meds for hypertension were reviewed and noted below.   Hypertension Medications             carvediloL (COREG) 12.5 MG tablet Take 1 tablet (12.5 mg total) by mouth 2 (two) times daily with meals.            -Resume home BP medications  -Monitor and trend vital signs q4hr  -Will utilize p.r.n. blood pressure medication only if patient's blood pressure greater than  180/110 and he develops symptoms such as worsening chest pain or shortness of breath.          VTE Risk Mitigation (From admission, onward)         Ordered     rivaroxaban tablet 20 mg  With dinner         08/29/22 2309     IP VTE HIGH RISK PATIENT  Once         08/29/22 2102     Place sequential compression device  Until discontinued         08/29/22 2102                 Lucy Segal DNP, ACNPC-AG, CCRN  Hospitalist  Department of Hospital Medicine  [facility]   Pager: [phone]

## 2022-08-30 NOTE — ASSESSMENT & PLAN NOTE
Echocardiogram (8/29/22):    The left ventricle is moderately enlarged with moderately decreased systolic function.   There is moderate left ventricular global hypokinesis.   The estimated ejection fraction is 35%.   A diastolic pattern consistent with atrial fibrillation observed.   Severe mitral regurgitation.   Mild to moderate tricuspid regurgitation.   The estimated PA systolic pressure is 45 mmHg.   Normal right ventricular size with normal right ventricular systolic function.   There is mild to moderate pulmonary hypertension.    -patient presents with shortness of breath and BAI. Bibasilar rales and peripheral edema on exam  - consistent clinical presentation;   -Troponin 0.015--0.022; will continue to monitor and trend  -CXR revealed pulmonary edema and bilateral blunting suspecting pleural effusions  -CTA revealed Interlobular septal thickening and ground-glass opacities compatible with pulmonary edema, moderate right and small left pleural effusions  -Placed on BIPAP 10/5 @ 28%, weaned to 4LNC, then placed back on BIPAP due to increase WOB/tachypnea  - initiated lasix 60mg IV in ED, will continue Lasix IV at 40mg IV q12hr  - continue home BB  - Daily weights  -Strict I/Os  - Monitor on telemetry  - Monitor and trend BMP, Mg, and renal function; keep K >4, Mg >2  -Sodium restriction (<2g/d), fluid restriction (<2L)   -Cardiac diet  -Cardiology consulted

## 2022-08-30 NOTE — ED NOTES
Pt reports breathing a little easier w/ cannula, no acute distress noted at this time, O2 98% at 4L.

## 2022-08-30 NOTE — ASSESSMENT & PLAN NOTE
-Patient presents with SOB; etiology likely acute CHF  -12 lead EKG revealed Afib with RVR rate 106  -[id number] score is 2; was started on Xarelto on 8/24/22--resume  -Lopressor 5mg x4  -resume home BB; will provide lopressor IV PRN for HR >120  -EKG PRN for chest pain  -Supplemental oxygen PRN for SpO2 <90%  -Cardiology consulted

## 2022-08-30 NOTE — PLAN OF CARE
[facility] - Telemetry  Initial Discharge Assessment       Primary Care Provider: Breann Tavera MD    Admission Diagnosis: SOB (shortness of breath) [R06.02]  Acute CHF [I50.9]  Chest pain [R07.9]    Admission Date: 8/29/2022  Expected Discharge Date: 8/31/2022    Consult: az    Payor: BLUE CROSS BLUE SHIELD / Plan: BCBS ALL OUT OF STATE / Product Type: PPO /     Extended Emergency Contact Information  Primary Emergency Contact: Arianna Mccabe  Mobile Phone: [phone]  Relation: Friend   needed? No    Discharge Plan A: (P) Home  Discharge Plan B: (P) Home Health      [id number] - [place] - [address] AT [facility] [address]  [address]  [place] [address]  Phone: [phone] Fax: [phone]    [facility] - [place], [place] - [address] [address] [facility] [address]  [address] [facility] [address]  Phone: [phone] Fax: [phone]      Initial Assessment (most recent)       Adult Discharge Assessment - 08/30/22 0920          Discharge Assessment    Assessment Type Discharge Planning Assessment (P)      Confirmed/corrected address, phone number and insurance Yes (P)      Confirmed Demographics Correct on Facesheet (P)      Source of Information patient (P)      Communicated AMARILYS with patient/caregiver Date not available/Unable to determine (P)      Lives With alone (P)      Do you expect to return to your current living situation? Yes (P)      Do you have help at home or someone to help you manage your care at home? No (P)      Prior to hospitilization cognitive status: Alert/Oriented (P)      Current cognitive status: Alert/Oriented (P)      Walking or Climbing Stairs Difficulty none (P)      Dressing/Bathing Difficulty none (P)      Equipment Currently Used at Home other (see comments) (P)    BP machine    Readmission within 30 days? No (P)      Patient currently being followed by outpatient case management? No (P)      Do you currently  have service(s) that help you manage your care at home? No (P)      Do you take prescription medications? Yes (P)      Do you have prescription coverage? Yes (P)      Do you have any problems affording any of your prescribed medications? No (P)      Is the patient taking medications as prescribed? yes (P)      How do you get to doctors appointments? family or friend will provide;car, drives self (P)      Are you on dialysis? No (P)      Do you take coumadin? No (P)      Discharge Plan A Home (P)      Discharge Plan B Home Health (P)      DME Needed Upon Discharge  none (P)      Discharge Plan discussed with: Patient (P)                    0920  Patient resting quietly in bed when CM rounded. No family present. Patient was admitted with acute hypoxemic respiratory failure & is being followed by [facility]. Pox 93% on 4L this AM. Pt does not use home oxygen.     Patient lives alone, is independent of all ADLs, & denied the need for assistance with transportation at time of discharge.     CM updated patient's whiteboard with CM name & contact information.     0950  Message sent to the schedulers requesting a hospital follow up appointment with Dr Mccormick & Dr Julio James (cards). Awaiting response.    1250  CM was informed by delores Velazquez of an IB message sent to Dr. Julio James's  requesting a cardiology hospital follow up appointment. Awaiting response.       Will continue to follow.

## 2022-08-30 NOTE — PLAN OF CARE
Plan of care reviewed, pt verbalizes understanding.  Pt is alert and oriented.  Due medications given via pharmacist. Blood Glucose monitored, no insulin needed per sliding scale.  No complaints of pain or SOB noted throughout shift.  Will continue to monitor.

## 2022-08-30 NOTE — HPI
David Barrios is a 63 y/o male with PMHx of pAF (s/p cardioversion 2019), HTN, and anemia presents to [facility] ED with complaints of SOB. He states he has SOB over the past few weeks and has progressive worsened. He was recently seen in clinic by his cardiologist (Dr. Limon) on 8/4/22 for dyspnea and was noted to be in afib with RVR, rate 120's. He was switched to coreg BID and restarted back on xarelto. He reports cough, orthopnea and bilateral leg swelling. He also noticed weight gain but is unsure how many lbs. He denies chest pain/pressure.     In the ED: 12 lead ekg revealed afib with . . Troponin 0.015--0.022. ABG [id number]%. CTA chest negative for PE. He was placed on BIPAP 10/5 @ 28%. He received 60mg IV lasix in ED. Will admit to hospital medicine for further evaluation and treatment.

## 2022-08-30 NOTE — ASSESSMENT & PLAN NOTE
Body mass index is 40.43 kg/m². Morbid obesity complicates all aspects of disease management from diagnostic modalities to treatment.   Weight loss encouraged and health benefits explained to patient.

## 2022-08-30 NOTE — ASSESSMENT & PLAN NOTE
- history of afib with DCCV in 2019  - followed by Dr. James as an outpatient; recurrent afib with RVR with HR up 120s- started on Coreg and Xarelto  - afib with RVR upon presentation likely related to MR and volume overload  - continue Coreg at current dose; diuresis in process; monitor HR and hopeful as volume status improves HR will improve as well  - unlikely to have rhythm conversion given MR; considered IV Amiodarone but hesitant to start given recent starting of AC; may need cardioversion inpatient vs outpatient   - CHADSVAS score 2  (HTN, CHF) continue DOAC

## 2022-08-30 NOTE — HPI
63yo male with HTN, afib with RVR, PAF s/p DCCV 2019, acute systolic heart failure, acute hypoxemic respiratory failure and obesity who presented to the ER with complaints of SOB. Mr. Barrios was seen by Dr Limon yesterday with complaints of SOB for the past few weeks with progressive worsening. He was felt to be in ADHF therefore he was sent to the ER for further treatment. At home, he takes Lasix 20mg daily, Coreg BID and Xarelto.  Labs: CBC and BMP WNL. Troponin .[phone]  CTA chest negative for PE, ground glass opacities and moderate right and small left pleural effusion. Started on IV Lasix BID and admitted to [facility] Medicine and Cardiology consulted for afib with RVR. He denies any SOB currently but does appear mildly tachypneic at rest and with talking. Echo from 8/24/2022 with EF 35%, severe MR, mild to moderate TR estimated PA pressure 45mmHg

## 2022-08-30 NOTE — ED NOTES
Pt returned from CT, RT at bedside, pt tolerating BIPAP well at this time. Will trial nasal cannula at 4L and reassess.

## 2022-08-30 NOTE — ASSESSMENT & PLAN NOTE
Body mass index is 39.4 kg/m². Morbid obesity complicates all aspects of disease management from diagnostic modalities to treatment.   Weight loss encouraged and health benefits explained to patient.

## 2022-08-30 NOTE — PROGRESS NOTES
[facility] Medicine  Progress Note    Patient Name: David Barrios  MRN: [id number]  Patient Class: IP- Inpatient   Admission Date: 8/29/2022  Length of Stay: 1 days  Attending Physician: Rosalind Crockett MD  Primary Care Provider: Breann Tavera MD        Subjective:     Principal Problem:Acute hypoxemic respiratory failure    HPI:  David Barrios is a 61 y/o male with PMHx of pAF (s/p cardioversion 2019), HTN, and anemia presents to [facility] ED with complaints of SOB. He states he has SOB over the past few weeks and has progressive worsened. He was recently seen in clinic by his cardiologist (Dr. Limon) on 8/4/22 for dyspnea and was noted to be in afib with RVR, rate 120's. He was switched to coreg BID and restarted back on xarelto. He reports cough, orthopnea and bilateral leg swelling. He also noticed weight gain but is unsure how many lbs. He denies chest pain/pressure.     In the ED: 12 lead ekg revealed afib with . . Troponin 0.015--0.022. ABG [phone]%. CTA chest negative for PE. He was placed on BIPAP 10/5 @ 28%. He received 60mg IV lasix in ED. Will admit to hospital medicine for further evaluation and treatment.       Overview/Hospital Course:  No notes on file    Interval History: Now on 4 L oxygen NC. SOB improved. Has intermittent dry cough. Sleeps with 2 pillows at home, almost at baseline while in the hospital. Denies h/o active or passive smoking. He is a long time .     Review of Systems   HENT:  Negative for congestion.    Respiratory:  Positive for cough and shortness of breath. Negative for wheezing.    Cardiovascular:  Negative for chest pain.   Gastrointestinal:  Negative for abdominal pain and diarrhea.   Neurological:  Negative for headaches.   Objective:     Vital Signs (Most Recent):  Temp: 96.7 °F (35.9 °C) (08/30/22 1640)  Pulse: 92 (08/30/22 1640)  Resp: 18 (08/30/22 1640)  BP: 92/69 (08/30/22 1640)  SpO2: 99 % (08/30/22 1640)  Vital Signs (24h Range):  Temp:  [96.4 °F (35.8 °C)-98.5 °F (36.9 °C)] 96.7 °F (35.9 °C)  Pulse:  [] 92  Resp:  [18-31] 18  SpO2:  [92 %-99 %] 99 %  BP: ()/() 92/69     Weight: 91.5 kg (201 lb 11.5 oz)  Body mass index is 39.4 kg/m².    Intake/Output Summary (Last 24 hours) at 8/30/2022 1718  Last data filed at 8/30/2022 1200  Gross per 24 hour   Intake 0 ml   Output 2250 ml   Net -2250 ml      Physical Exam  Constitutional:       General: He is not in acute distress.  HENT:      Mouth/Throat:      Mouth: Mucous membranes are moist.   Neck:      Comments: JVP elevated  Cardiovascular:      Rate and Rhythm: Normal rate. Rhythm irregular.      Pulses: Normal pulses.   Pulmonary:      Breath sounds: Wheezing (occasional faint wheezing) present.   Abdominal:      Palpations: Abdomen is soft.      Tenderness: There is no abdominal tenderness.   Musculoskeletal:      Right lower leg: Edema present.      Left lower leg: Edema present.   Skin:     General: Skin is warm and dry.   Neurological:      General: No focal deficit present.      Mental Status: He is alert and oriented to person, place, and time.   Psychiatric:         Mood and Affect: Mood normal.       Significant Labs: All pertinent labs within the past 24 hours have been reviewed.    Significant Imaging: I have reviewed all pertinent imaging results/findings within the past 24 hours.      Assessment/Plan:      * Acute hypoxemic respiratory failure  -Patient with Hypoxic respiratory failure which is Acute   -he is not on home oxygen.   Initially on Bipap, now transitioned to 4L NC  -Signs/symptoms of respiratory failure include- tachypnea, increased work of breathing and use of accessory muscles.   -Contributing diagnoses includes - CHF and Pleural effusion  -Continue supplemental oxygen; titrate and wean to maintain SpO2 >90%  -Duo-nebs PRN for SOB/Wheezing  -Continue IV diuresis, dose reduced due to hypotension   -CTA chest non coronary-PE  study shows interlobular septal thickening and ground glass opacities likely pulmonary edema but underlying infectious etiology not ruled out. Will get procalcitonin in AM. 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           Nonrheumatic mitral valve regurgitation        Acute on chronic systolic heart failure  Echocardiogram (8/29/22):    The left ventricle is moderately enlarged with moderately decreased systolic function.   There is moderate left ventricular global hypokinesis.   The estimated ejection fraction is 35%.   A diastolic pattern consistent with atrial fibrillation observed.   Severe mitral regurgitation.   Mild to moderate tricuspid regurgitation.   The estimated PA systolic pressure is 45 mmHg.   Normal right ventricular size with normal right ventricular systolic function.   There is mild to moderate pulmonary hypertension.    Clinically volume overloaded on exam  , troponin mildly elevated  Daily weights, Strict I/Os  Monitor on telemetry  Monitor and trend BMP, Mg, and renal function; keep K >4, Mg >2  Sodium restriction (<2g/d), fluid restriction (<2L)   Cardiac diet  Cardiology consulted            Severe obesity (BMI >= 40)  Body mass index is 39.4 kg/m². Morbid obesity complicates all aspects of disease management from diagnostic modalities to treatment.   Weight loss encouraged and health benefits explained to patient.         CHF (congestive heart failure)  Echocardiogram (8/29/22):    The left ventricle is moderately enlarged with moderately decreased systolic function.   There is moderate left ventricular global hypokinesis.   The estimated ejection fraction is 35%.   A diastolic pattern consistent with atrial fibrillation observed.   Severe mitral regurgitation.   Mild to moderate tricuspid regurgitation.   The estimated PA systolic pressure is 45 mmHg.   Normal right ventricular size with normal right ventricular systolic  function.   There is mild to moderate pulmonary hypertension.    Clinically volume overloaded on exam  , troponin mildly elevated  Daily weights, Strict I/Os  Monitor on telemetry  Monitor and trend BMP, Mg, and renal function; keep K >4, Mg >2  Sodium restriction (<2g/d), fluid restriction (<2L)   Cardiac diet  Cardiology consulted          Atrial fibrillation with rapid ventricular response  [id number] score is 2; continue Xarelto   -continue carvedilol at reduced dose 6.25 mg given hypotension; lopressor IV PRN for HR >120  -Supplemental oxygen PRN for SpO2 <90%  -Cardiology consulted          Essential hypertension  Chronic, Latest blood pressure and vitals reviewed-   Temp:  [96.4 °F (35.8 °C)-98.5 °F (36.9 °C)]   Pulse:  []   Resp:  [18-31]   BP: ()/()   SpO2:  [92 %-99 %] .     Borderline low BP. Received first dose of coreg 12.5 mg this morning. Evening dose held due to low BP.   Decrease dose to 6.25 mg BID  Continue IV diuresis, may need to decrease dose           VTE Risk Mitigation (From admission, onward)         Ordered     rivaroxaban tablet 20 mg  With dinner         08/29/22 2309     IP VTE HIGH RISK PATIENT  Once         08/29/22 2102     Place sequential compression device  Until discontinued         08/29/22 2102                Discharge Planning   AMARILYS: 8/31/2022     Code Status: Full Code   Is the patient medically ready for discharge?:     Reason for patient still in hospital (select all that apply): new oxygen requirement, volume overload, hypotension  Discharge Plan A: Home            Rosalind Crockett MD  Department of Hospital Medicine   [place] [facility]

## 2022-08-30 NOTE — SUBJECTIVE & OBJECTIVE
Past Medical History:   Diagnosis Date    Anemia     Atrial fibrillation     Encounter for blood transfusion     Esophageal ulcer     GI bleed     Hypertension        Past Surgical History:   Procedure Laterality Date    COLONOSCOPY N/A 4/4/2019    Procedure: COLONOSCOPY Golytely;  Surgeon: Umair Randolph MD;  Location: [facility];  Service: Endoscopy;  Laterality: N/A;    ESOPHAGOGASTRODUODENOSCOPY N/A 10/12/2018    Procedure: EGD (ESOPHAGOGASTRODUODENOSCOPY);  Surgeon: Braden Herring MD;  Location: [facility] ENDO;  Service: Endoscopy;  Laterality: N/A;    ESOPHAGOGASTRODUODENOSCOPY N/A 4/4/2019    Procedure: EGD;  Surgeon: Umair Randolph MD;  Location: [facility] ENDO;  Service: Endoscopy;  Laterality: N/A;    HERNIA REPAIR         Review of patient's allergies indicates:  No Known Allergies    No current facility-administered medications on file prior to encounter.     Current Outpatient Medications on File Prior to Encounter   Medication Sig    carvediloL (COREG) 12.5 MG tablet Take 1 tablet (12.5 mg total) by mouth 2 (two) times daily with meals.    ferrous sulfate (FEOSOL) 325 mg (65 mg iron) Tab tablet Take 1 tablet (325 mg total) by mouth daily with breakfast.    furosemide (LASIX) 20 MG tablet Take 1 tablet (20 mg total) by mouth once daily.    HYDROcodone-acetaminophen (NORCO)  mg per tablet TK 1 T PO BID P    rivaroxaban (XARELTO) 20 mg Tab Take 1 tablet (20 mg total) by mouth daily with dinner or evening meal.     Family History       Problem Relation (Age of Onset)    COPD Mother    Cancer Father          Tobacco Use    Smoking status: Never    Smokeless tobacco: Current     Types: Chew   Substance and Sexual Activity    Alcohol use: Yes     Alcohol/week: 20.0 standard drinks     Types: 20 Cans of beer per week    Drug use: No    Sexual activity: Not on file     Review of Systems   Constitutional:  Positive for unexpected weight change. Negative for chills, diaphoresis and fever.   HENT:  Negative for  hearing loss and sore throat.    Eyes:  Negative for visual disturbance.   Respiratory:  Positive for cough. Negative for shortness of breath.    Cardiovascular:  Positive for leg swelling. Negative for chest pain and palpitations.   Gastrointestinal:  Negative for abdominal pain, diarrhea, nausea and vomiting.   Genitourinary:  Negative for difficulty urinating and flank pain.   Musculoskeletal:  Negative for back pain.   Skin:  Negative for rash and wound.   Neurological:  Negative for dizziness, weakness and headaches.   Psychiatric/Behavioral:  Negative for agitation and confusion.    Objective:     Vital Signs (Most Recent):  Temp: 98 °F (36.7 °C) (08/29/22 1502)  Pulse: 106 (08/29/22 2152)  Resp: (!) 25 (08/29/22 2152)  BP: (!) 142/92 (08/29/22 2151)  SpO2: 95 % (08/29/22 2152)   Vital Signs (24h Range):  Temp:  [98 °F (36.7 °C)] 98 °F (36.7 °C)  Pulse:  [] 106  Resp:  [22-31] 25  SpO2:  [92 %-98 %] 95 %  BP: ()/(50-96) 142/92     Weight: 93.9 kg (207 lb)  Body mass index is 40.43 kg/m².    Physical Exam  Vitals and nursing note reviewed.   Constitutional:       General: He is not in acute distress.     Appearance: Normal appearance. He is not ill-appearing.      Comments: BIPAP   HENT:      Head: Normocephalic and atraumatic.      Mouth/Throat:      Mouth: Mucous membranes are moist.   Eyes:      Extraocular Movements: Extraocular movements intact.      Pupils: Pupils are equal, round, and reactive to light.   Cardiovascular:      Rate and Rhythm: Tachycardia present. Rhythm irregularly irregular.      Pulses: Normal pulses.      Heart sounds: Normal heart sounds. No murmur heard.    No friction rub. No gallop.   Pulmonary:      Effort: Tachypnea and accessory muscle usage present. No respiratory distress.      Breath sounds: Examination of the right-middle field reveals rales. Examination of the left-middle field reveals rales. Examination of the right-lower field reveals decreased breath sounds.  Examination of the left-lower field reveals decreased breath sounds. Decreased breath sounds and rales present. No wheezing or rhonchi.   Abdominal:      General: Bowel sounds are normal. There is no distension.      Palpations: Abdomen is soft.      Tenderness: There is no abdominal tenderness. There is no guarding.   Musculoskeletal:         General: No swelling.      Cervical back: Normal range of motion and neck supple.      Right lower le+ Edema present.      Left lower le+ Edema present.   Skin:     General: Skin is warm and dry.      Capillary Refill: Capillary refill takes less than 2 seconds.      Findings: No bruising, erythema or rash.   Neurological:      General: No focal deficit present.      Mental Status: He is alert and oriented to person, place, and time.      GCS: GCS eye subscore is 4. GCS verbal subscore is 5. GCS motor subscore is 6.   Psychiatric:         Mood and Affect: Mood normal.         Speech: Speech normal.         Behavior: Behavior normal. Behavior is cooperative.         CRANIAL NERVES     CN III, IV, VI   Pupils are equal, round, and reactive to light.     Significant Labs: All pertinent labs within the past 24 hours have been reviewed.  Recent Results (from the past 24 hour(s))   CBC auto differential    Collection Time: 22  3:39 PM   Result Value Ref Range    WBC 8.07 3.90 - 12.70 K/uL    RBC 4.18 (L) 4.60 - 6.20 M/uL    Hemoglobin 13.5 (L) 14.0 - 18.0 g/dL    Hematocrit 40.5 40.0 - 54.0 %    MCV 97 82 - 98 fL    MCH 32.3 (H) 27.0 - 31.0 pg    MCHC 33.3 32.0 - 36.0 g/dL    RDW 14.1 11.5 - 14.5 %    Platelets 206 150 - 450 K/uL    MPV 12.5 9.2 - 12.9 fL    Immature Granulocytes 0.2 0.0 - 0.5 %    Gran # (ANC) 5.8 1.8 - 7.7 K/uL    Immature Grans (Abs) 0.02 0.00 - 0.04 K/uL    Lymph # 1.3 1.0 - 4.8 K/uL    Mono # 0.7 0.3 - 1.0 K/uL    Eos # 0.2 0.0 - 0.5 K/uL    Baso # 0.04 0.00 - 0.20 K/uL    nRBC 0 0 /100 WBC    Gran % 72.0 38.0 - 73.0 %    Lymph % 16.4 (L) 18.0 -  48.0 %    Mono % 9.0 4.0 - 15.0 %    Eosinophil % 1.9 0.0 - 8.0 %    Basophil % 0.5 0.0 - 1.9 %    Differential Method Automated    Comprehensive metabolic panel    Collection Time: 08/29/22  3:39 PM   Result Value Ref Range    Sodium 140 136 - 145 mmol/L    Potassium 3.7 3.5 - 5.1 mmol/L    Chloride 105 95 - 110 mmol/L    CO2 24 23 - 29 mmol/L    Glucose 152 (H) 70 - 110 mg/dL    BUN 23 8 - 23 mg/dL    Creatinine 1.0 0.5 - 1.4 mg/dL    Calcium 9.2 8.7 - 10.5 mg/dL    Total Protein 6.6 6.0 - 8.4 g/dL    Albumin 3.6 3.5 - 5.2 g/dL    Total Bilirubin 0.9 0.1 - 1.0 mg/dL    Alkaline Phosphatase 62 55 - 135 U/L    AST 27 10 - 40 U/L    ALT 35 10 - 44 U/L    Anion Gap 11 8 - 16 mmol/L    eGFR >60 >60 mL/min/1.73 m^2   Brain natriuretic peptide    Collection Time: 08/29/22  3:39 PM   Result Value Ref Range     (H) 0 - 99 pg/mL   Troponin I    Collection Time: 08/29/22  3:39 PM   Result Value Ref Range    Troponin I 0.015 0.000 - 0.026 ng/mL   TSH    Collection Time: 08/29/22  3:39 PM   Result Value Ref Range    TSH 2.183 0.400 - 4.000 uIU/mL   Magnesium    Collection Time: 08/29/22  3:39 PM   Result Value Ref Range    Magnesium 2.1 1.6 - 2.6 mg/dL   ISTAT PROCEDURE    Collection Time: 08/29/22  5:07 PM   Result Value Ref Range    POC PH 7.415 7.35 - 7.45    POC PCO2 35.6 35 - 45 mmHg    POC PO2 78 (L) 80 - 100 mmHg    POC HCO3 22.8 (L) 24 - 28 mmol/L    POC BE -2 -2 to 2 mmol/L    POC SATURATED O2 96 95 - 100 %    POC Sodium 142 136 - 145 mmol/L    POC Potassium 4.0 3.5 - 5.1 mmol/L    POC TCO2 24 23 - 27 mmol/L    POC Hematocrit 46 36 - 54 %PCV    POC HEMOGLOBIN 16 g/dL    Rate 12     Sample ARTERIAL     Site LR     Allens Test Pass     DelSys CPAP/BiPAP     Mode BiPAP     FiO2 28     Spont Rate 24     Min Vol 15.9     Sp02 96     IP 10     EP 5    POCT COVID-19 Rapid Screening    Collection Time: 08/29/22  7:07 PM   Result Value Ref Range    POC Rapid COVID Negative Negative     Acceptable Yes     Troponin I    Collection Time: 08/29/22  9:42 PM   Result Value Ref Range    Troponin I 0.022 0.000 - 0.026 ng/mL         Significant Imaging: I have reviewed all pertinent imaging results/findings within the past 24 hours.

## 2022-08-30 NOTE — ASSESSMENT & PLAN NOTE
Chronic, Latest blood pressure and vitals reviewed-   Temp:  [98 °F (36.7 °C)]   Pulse:  []   Resp:  [22-31]   BP: ()/(50-96)   SpO2:  [92 %-98 %] .   Home meds for hypertension were reviewed and noted below.   Hypertension Medications             carvediloL (COREG) 12.5 MG tablet Take 1 tablet (12.5 mg total) by mouth 2 (two) times daily with meals.            -Resume home BP medications  -Monitor and trend vital signs q4hr  -Will utilize p.r.n. blood pressure medication only if patient's blood pressure greater than  180/110 and he develops symptoms such as worsening chest pain or shortness of breath.

## 2022-08-31 PROBLEM — I50.9 CHF (CONGESTIVE HEART FAILURE): Status: RESOLVED | Noted: 2022-08-29 | Resolved: 2022-08-31

## 2022-08-31 LAB
ANION GAP SERPL CALC-SCNC: 10 MMOL/L (ref 8–16)
BUN SERPL-MCNC: 24 MG/DL (ref 8–23)
CALCIUM SERPL-MCNC: 9.2 MG/DL (ref 8.7–10.5)
CHLORIDE SERPL-SCNC: 103 MMOL/L (ref 95–110)
CO2 SERPL-SCNC: 29 MMOL/L (ref 23–29)
CREAT SERPL-MCNC: 1.1 MG/DL (ref 0.5–1.4)
EST. GFR  (NO RACE VARIABLE): >60 ML/MIN/1.73 M^2
GLUCOSE SERPL-MCNC: 90 MG/DL (ref 70–110)
MAGNESIUM SERPL-MCNC: 2.2 MG/DL (ref 1.6–2.6)
POCT GLUCOSE: 83 MG/DL (ref 70–110)
POCT GLUCOSE: 86 MG/DL (ref 70–110)
POCT GLUCOSE: 87 MG/DL (ref 70–110)
POTASSIUM SERPL-SCNC: 3.8 MMOL/L (ref 3.5–5.1)
PROCALCITONIN SERPL IA-MCNC: 0.02 NG/ML
SODIUM SERPL-SCNC: 142 MMOL/L (ref 136–145)

## 2022-08-31 PROCEDURE — 27000221 HC OXYGEN, UP TO 24 HOURS

## 2022-08-31 PROCEDURE — 25000242 PHARM REV CODE 250 ALT 637 W/ HCPCS: Performed by: STUDENT IN AN ORGANIZED HEALTH CARE EDUCATION/TRAINING PROGRAM

## 2022-08-31 PROCEDURE — 36415 COLL VENOUS BLD VENIPUNCTURE: CPT | Performed by: STUDENT IN AN ORGANIZED HEALTH CARE EDUCATION/TRAINING PROGRAM

## 2022-08-31 PROCEDURE — 99233 SBSQ HOSP IP/OBS HIGH 50: CPT | Mod: ,,, | Performed by: NURSE PRACTITIONER

## 2022-08-31 PROCEDURE — 94640 AIRWAY INHALATION TREATMENT: CPT

## 2022-08-31 PROCEDURE — 25000003 PHARM REV CODE 250: Performed by: NURSE PRACTITIONER

## 2022-08-31 PROCEDURE — 94760 N-INVAS EAR/PLS OXIMETRY 1: CPT

## 2022-08-31 PROCEDURE — 83735 ASSAY OF MAGNESIUM: CPT | Performed by: STUDENT IN AN ORGANIZED HEALTH CARE EDUCATION/TRAINING PROGRAM

## 2022-08-31 PROCEDURE — 25000003 PHARM REV CODE 250: Performed by: STUDENT IN AN ORGANIZED HEALTH CARE EDUCATION/TRAINING PROGRAM

## 2022-08-31 PROCEDURE — 97161 PT EVAL LOW COMPLEX 20 MIN: CPT

## 2022-08-31 PROCEDURE — 84145 PROCALCITONIN (PCT): CPT | Performed by: STUDENT IN AN ORGANIZED HEALTH CARE EDUCATION/TRAINING PROGRAM

## 2022-08-31 PROCEDURE — 97530 THERAPEUTIC ACTIVITIES: CPT

## 2022-08-31 PROCEDURE — 63600175 PHARM REV CODE 636 W HCPCS: Performed by: STUDENT IN AN ORGANIZED HEALTH CARE EDUCATION/TRAINING PROGRAM

## 2022-08-31 PROCEDURE — 80048 BASIC METABOLIC PNL TOTAL CA: CPT | Performed by: STUDENT IN AN ORGANIZED HEALTH CARE EDUCATION/TRAINING PROGRAM

## 2022-08-31 PROCEDURE — 94799 UNLISTED PULMONARY SVC/PX: CPT

## 2022-08-31 PROCEDURE — 99233 PR SUBSEQUENT HOSPITAL CARE,LEVL III: ICD-10-PCS | Mod: ,,, | Performed by: NURSE PRACTITIONER

## 2022-08-31 PROCEDURE — 99900035 HC TECH TIME PER 15 MIN (STAT)

## 2022-08-31 PROCEDURE — 11000001 HC ACUTE MED/SURG PRIVATE ROOM

## 2022-08-31 PROCEDURE — 25000003 PHARM REV CODE 250

## 2022-08-31 PROCEDURE — 94660 CPAP INITIATION&MGMT: CPT

## 2022-08-31 PROCEDURE — 94761 N-INVAS EAR/PLS OXIMETRY MLT: CPT

## 2022-08-31 RX ORDER — IPRATROPIUM BROMIDE AND ALBUTEROL SULFATE 2.5; .5 MG/3ML; MG/3ML
3 SOLUTION RESPIRATORY (INHALATION) EVERY 6 HOURS
Status: COMPLETED | OUTPATIENT
Start: 2022-08-31 | End: 2022-09-01

## 2022-08-31 RX ORDER — AMIODARONE HYDROCHLORIDE 200 MG/1
200 TABLET ORAL 2 TIMES DAILY
Status: DISCONTINUED | OUTPATIENT
Start: 2022-08-31 | End: 2022-09-02 | Stop reason: HOSPADM

## 2022-08-31 RX ORDER — LANOLIN ALCOHOL/MO/W.PET/CERES
1 CREAM (GRAM) TOPICAL DAILY
Refills: 0 | Status: DISCONTINUED | OUTPATIENT
Start: 2022-08-31 | End: 2022-09-02 | Stop reason: HOSPADM

## 2022-08-31 RX ADMIN — AMIODARONE HYDROCHLORIDE 200 MG: 200 TABLET ORAL at 03:08

## 2022-08-31 RX ADMIN — CARVEDILOL 6.25 MG: 6.25 TABLET, FILM COATED ORAL at 08:08

## 2022-08-31 RX ADMIN — IPRATROPIUM BROMIDE AND ALBUTEROL SULFATE 3 ML: .5; 3 SOLUTION RESPIRATORY (INHALATION) at 12:08

## 2022-08-31 RX ADMIN — IPRATROPIUM BROMIDE AND ALBUTEROL SULFATE 3 ML: .5; 3 SOLUTION RESPIRATORY (INHALATION) at 07:08

## 2022-08-31 RX ADMIN — CARVEDILOL 6.25 MG: 6.25 TABLET, FILM COATED ORAL at 06:08

## 2022-08-31 RX ADMIN — FERROUS SULFATE TAB 325 MG (65 MG ELEMENTAL FE) 1 EACH: 325 (65 FE) TAB at 03:08

## 2022-08-31 RX ADMIN — RIVAROXABAN 20 MG: 20 TABLET, FILM COATED ORAL at 06:08

## 2022-08-31 RX ADMIN — FUROSEMIDE 20 MG: 10 INJECTION, SOLUTION INTRAMUSCULAR; INTRAVENOUS at 09:08

## 2022-08-31 RX ADMIN — IPRATROPIUM BROMIDE AND ALBUTEROL SULFATE 3 ML: .5; 3 SOLUTION RESPIRATORY (INHALATION) at 11:08

## 2022-08-31 NOTE — SUBJECTIVE & OBJECTIVE
Interval History:  Urine output of 900 cc in 24 hrs. Weaned to 1L oxygen NC at rest. Reports SOB has improving but he does get dyspnea on minimal exertion. He wears a Bipap at night while in the hospital but does not have one at home. Lives in a trailer home and does not have adequate space for equipment.     Review of Systems   HENT:  Negative for congestion.    Respiratory:  Positive for shortness of breath (on minimal exertion, while laying back to bed from sitting position). Negative for cough and wheezing.    Cardiovascular:  Positive for leg swelling (improving). Negative for chest pain.   Gastrointestinal:  Negative for abdominal pain and diarrhea.   Neurological:  Negative for headaches.   Objective:     Vital Signs (Most Recent):  Temp: 96.6 °F (35.9 °C) (08/31/22 1653)  Pulse: (!) 127 (08/31/22 1653)  Resp: 18 (08/31/22 1653)  BP: 129/89 (08/31/22 1653)  SpO2: 99 % (08/31/22 1653)   Vital Signs (24h Range):  Temp:  [96 °F (35.6 °C)-97 °F (36.1 °C)] 96.6 °F (35.9 °C)  Pulse:  [] 127  Resp:  [18-21] 18  SpO2:  [95 %-100 %] 99 %  BP: (110-146)/(86-96) 129/89     Weight: 91.5 kg (201 lb 11.5 oz)  Body mass index is 39.4 kg/m².    Intake/Output Summary (Last 24 hours) at 8/31/2022 1819  Last data filed at 8/31/2022 0900  Gross per 24 hour   Intake --   Output 800 ml   Net -800 ml        Physical Exam  Constitutional:       General: He is not in acute distress.  HENT:      Mouth/Throat:      Mouth: Mucous membranes are moist.   Cardiovascular:      Rate and Rhythm: Normal rate. Rhythm irregular.      Pulses: Normal pulses.      Heart sounds: Murmur heard.   Pulmonary:      Breath sounds: Rales (occasional bibasilar rales on posterior auscultation) present. No wheezing.   Abdominal:      Palpations: Abdomen is soft.      Tenderness: There is no abdominal tenderness.   Musculoskeletal:      Right lower leg: Edema (improving) present.      Left lower leg: Edema present.   Skin:     General: Skin is warm and  dry.   Neurological:      General: No focal deficit present.      Mental Status: He is alert and oriented to person, place, and time.   Psychiatric:         Mood and Affect: Mood normal.       Significant Labs: All pertinent labs within the past 24 hours have been reviewed.    Significant Imaging: I have reviewed all pertinent imaging results/findings within the past 24 hours.

## 2022-08-31 NOTE — SUBJECTIVE & OBJECTIVE
Review of Systems   Constitutional: Negative for chills, decreased appetite, diaphoresis and fever.   Cardiovascular:  Positive for dyspnea on exertion (improving). Negative for chest pain, claudication, cyanosis, irregular heartbeat, leg swelling, near-syncope, orthopnea, palpitations, paroxysmal nocturnal dyspnea and syncope.   Respiratory:  Negative for cough, hemoptysis, shortness of breath and wheezing.    Gastrointestinal:  Negative for bloating, abdominal pain, constipation, diarrhea, melena, nausea and vomiting.   Neurological:  Negative for dizziness and weakness.   Objective:     Vital Signs (Most Recent):  Temp: 96.3 °F (35.7 °C) (08/31/22 1148)  Pulse: 85 (08/31/22 1257)  Resp: 18 (08/31/22 1257)  BP: (!) 143/92 (08/31/22 1148)  SpO2: 100 % (08/31/22 1257)   Vital Signs (24h Range):  Temp:  [96 °F (35.6 °C)-97 °F (36.1 °C)] 96.3 °F (35.7 °C)  Pulse:  [] 85  Resp:  [18-21] 18  SpO2:  [95 %-100 %] 100 %  BP: ()/(69-96) 143/92     Weight: 91.5 kg (201 lb 11.5 oz)  Body mass index is 39.4 kg/m².     SpO2: 100 %  O2 Device (Oxygen Therapy): nasal cannula w/ humidification      Intake/Output Summary (Last 24 hours) at 8/31/2022 1451  Last data filed at 8/31/2022 0900  Gross per 24 hour   Intake --   Output 800 ml   Net -800 ml       Lines/Drains/Airways       Peripheral Intravenous Line  Duration                  Peripheral IV - Single Lumen 08/29/22 1538 20 G Right Antecubital 1 day                    Physical Exam  Constitutional:       General: He is not in acute distress.     Appearance: He is well-developed.   Cardiovascular:      Rate and Rhythm: Normal rate. Rhythm irregularly irregular.      Heart sounds: Murmur heard.     No gallop.   Pulmonary:      Effort: Pulmonary effort is normal. No respiratory distress.      Breath sounds: Normal breath sounds. No wheezing.   Abdominal:      General: Bowel sounds are normal. There is no distension.      Palpations: Abdomen is soft.       Tenderness: There is no abdominal tenderness.   Skin:     General: Skin is warm and dry.   Neurological:      Mental Status: He is alert and oriented to person, place, and time.       Significant Labs: BMP:   Recent Labs   Lab 08/29/22  1539 08/30/22  0308   * 82    143   K 3.7 4.1    106   CO2 24 23   BUN 23 25*   CREATININE 1.0 1.1   CALCIUM 9.2 9.1   MG 2.1 2.1    and CBC   Recent Labs   Lab 08/29/22  1539 08/29/22  1707 08/30/22  0309   WBC 8.07  --  10.21   HGB 13.5*  --  14.3   HCT 40.5   < > 43.5     --  211    < > = values in this interval not displayed.       Significant Imaging: Echocardiogram: Transthoracic echo (TTE) complete (Cupid Only):   Results for orders placed or performed during the hospital encounter of 08/24/22   Echo   Result Value Ref Range    BSA 1.99 m2    LA WIDTH 6.15 cm    LVIDd 6.10 (A) 3.5 - 6.0 cm    IVS 1.28 (A) 0.6 - 1.1 cm    Posterior Wall 1.15 (A) 0.6 - 1.1 cm    LVIDs 4.77 (A) 2.1 - 4.0 cm    FS 22 28 - 44 %    LA volume 211.83 cm3    STJ 2.39 cm    Ascending aorta 2.36 cm    LV mass 328.13 g    LA size 5.08 cm    RVDD 3.28 cm    Left Ventricle Relative Wall Thickness 0.38 cm    AV mean gradient 4 mmHg    AV valve area 1.85 cm2    AV Velocity Ratio 0.62     AV index (prosthetic) 0.51     LVOT diameter 2.16 cm    LVOT area 3.7 cm2    LVOT peak nick 0.78 m/s    LVOT peak VTI 10.01 cm    Ao peak nick 1.25 m/s    Ao VTI 19.80 cm    Mr max nick 0.04 m/s    LVOT stroke volume 36.66 cm3    AV peak gradient 6 mmHg    TR Max Nick 3.04 m/s    LV Systolic Volume 105.82 mL    LV Systolic Volume Index 56.0 mL/m2    LV Diastolic Volume 187.13 mL    LV Diastolic Volume Index 99.01 mL/m2    LA Volume Index 112.1 mL/m2    LV Mass Index 174 g/m2    RA Major Axis 6.55 cm    Left Atrium Minor Axis 7.82 cm    Left Atrium Major Axis 8.14 cm    Triscuspid Valve Regurgitation Peak Gradient 37 mmHg    Right Atrial Pressure (from IVC) 8 mmHg    EF 35 %    TV rest pulmonary artery  pressure 45 mmHg    Narrative    · The left ventricle is moderately enlarged with moderately decreased   systolic function.  · There is moderate left ventricular global hypokinesis.  · The estimated ejection fraction is 35%.  · A diastolic pattern consistent with atrial fibrillation observed.  · Severe mitral regurgitation.  · Mild to moderate tricuspid regurgitation.  · The estimated PA systolic pressure is 45 mmHg.  · Normal right ventricular size with normal right ventricular systolic   function.  · There is mild to moderate pulmonary hypertension.

## 2022-08-31 NOTE — CARE UPDATE
08/30/22 2000   Patient Assessment/Suction   Level of Consciousness (AVPU) alert   Respiratory Effort Unlabored   Expansion/Accessory Muscles/Retractions expansion symmetric;no use of accessory muscles;no retractions   Rhythm/Pattern, Respiratory no shortness of breath reported;depth regular;pattern regular;unlabored   Cough Frequency no cough   Skin Integrity   $ Wound Care Tech Time 15 min   Area Observed Behind ear;Cheek   Skin Appearance without discoloration   PRE-TX-O2   O2 Device (Oxygen Therapy) nasal cannula w/ humidification   $ Is the patient on Low Flow Oxygen? Yes   Flow (L/min) 4   SpO2 98 %   Pulse Oximetry Type Intermittent   $ Pulse Oximetry - Multiple Charge Pulse Oximetry - Multiple   Pulse 94   Resp 18   Aerosol Therapy   $ Aerosol Therapy Charges PRN treatment not required   Preset CPAP/BiPAP Settings   Mode Of Delivery BiPAP;Standby

## 2022-08-31 NOTE — ASSESSMENT & PLAN NOTE
[id number] score is 2; continue Xarelto   -continue carvedilol at reduced dose 6.25 mg given hypotension; lopressor IV PRN for HR >120  -Supplemental oxygen PRN for SpO2 <90%  -Cardiology consulted

## 2022-08-31 NOTE — PROGRESS NOTES
[facility] Medicine  Progress Note    Patient Name: David Barrios  MRN: [id number]  Patient Class: IP- Inpatient   Admission Date: 8/29/2022  Length of Stay: 2 days  Attending Physician: Rosalind Crockett MD  Primary Care Provider: Breann Tavera MD      Subjective:     Principal Problem:Acute hypoxemic respiratory failure        HPI:  David Barrios is a 63 y/o male with PMHx of pAF (s/p cardioversion 2019), HTN, and anemia presents to [facility] ED with complaints of SOB. He states he has SOB over the past few weeks and has progressive worsened. He was recently seen in clinic by his cardiologist (Dr. Limon) on 8/4/22 for dyspnea and was noted to be in afib with RVR, rate 120's. He was switched to coreg BID and restarted back on xarelto. He reports cough, orthopnea and bilateral leg swelling. He also noticed weight gain but is unsure how many lbs. He denies chest pain/pressure.     In the ED: 12 lead ekg revealed afib with . . Troponin 0.015--0.022. ABG [id number]%. CTA chest negative for PE. He was placed on BIPAP 10/5 @ 28%. He received 60mg IV lasix in ED. Will admit to hospital medicine for further evaluation and treatment.       Overview/Hospital Course:  No notes on file    Interval History: Evening carvedilol and lasix doses were held due to hypotension. Doses of lasix and coreg therefore decreased to half. Urine output of 1650 cc in 24 hrs. Weaned to 2L oxygen NC. Reports SOB has improved. Sitting in the chair today.    Review of Systems   HENT:  Negative for congestion.    Respiratory:  Positive for shortness of breath (improving) and wheezing. Negative for cough.    Cardiovascular:  Negative for chest pain.   Gastrointestinal:  Negative for abdominal pain and diarrhea.   Neurological:  Negative for headaches.   Objective:     Vital Signs (Most Recent):  Temp: 96.3 °F (35.7 °C) (08/31/22 1148)  Pulse: 85 (08/31/22 1257)  Resp: 18 (08/31/22 1257)  BP: (!) 143/92 (08/31/22  1148)  SpO2: 100 % (08/31/22 1257)   Vital Signs (24h Range):  Temp:  [96 °F (35.6 °C)-97 °F (36.1 °C)] 96.3 °F (35.7 °C)  Pulse:  [] 85  Resp:  [18-21] 18  SpO2:  [95 %-100 %] 100 %  BP: ()/(69-96) 143/92     Weight: 91.5 kg (201 lb 11.5 oz)  Body mass index is 39.4 kg/m².    Intake/Output Summary (Last 24 hours) at 8/31/2022 1434  Last data filed at 8/31/2022 0900  Gross per 24 hour   Intake --   Output 800 ml   Net -800 ml      Physical Exam  Constitutional:       General: He is not in acute distress.  HENT:      Mouth/Throat:      Mouth: Mucous membranes are moist.   Neck:      Comments: JVP elevated  Cardiovascular:      Rate and Rhythm: Normal rate. Rhythm irregular.      Pulses: Normal pulses.   Pulmonary:      Breath sounds: Wheezing (diffuse inspiratory & expiratory wheezing present) present.   Abdominal:      Palpations: Abdomen is soft.      Tenderness: There is no abdominal tenderness.   Musculoskeletal:      Right lower leg: Edema (improving) present.      Left lower leg: Edema present.   Skin:     General: Skin is warm and dry.   Neurological:      General: No focal deficit present.      Mental Status: He is alert and oriented to person, place, and time.   Psychiatric:         Mood and Affect: Mood normal.       Significant Labs: All pertinent labs within the past 24 hours have been reviewed.    Significant Imaging: I have reviewed all pertinent imaging results/findings within the past 24 hours.      Assessment/Plan:      * Acute hypoxemic respiratory failure  Improving, weaned to 2 L oxygen via NC. On room air at baseline  Most likely in setting of volume overload. ?obstructive airway disease given extensive wheezing  - continue IV diuresis. Monitor I/O, renal function, electrolytes  - trial of duonebs scheduled today  - incentive spirometer  - procalcitonin negative- therefore less likely underlying bacterial infection              Nonrheumatic mitral valve regurgitation        Acute on  chronic systolic heart failure  Echocardiogram (8/29/22):    The left ventricle is moderately enlarged with moderately decreased systolic function.   There is moderate left ventricular global hypokinesis.   The estimated ejection fraction is 35%.   Severe mitral regurgitation.   Mild to moderate tricuspid regurgitation.   The estimated PA systolic pressure is 45 mmHg.   Normal RV   There is mild to moderate pulmonary hypertension.    Continue lasix 20 mg IV BID  Daily weights, Strict I/Os  Monitor on telemetry  Monitor and trend BMP, Mg, and renal function; keep K >4, Mg >2  Sodium restriction (<2g/d), fluid restriction (<2L)   Cardiac diet  Cardiology consulted      Severe obesity (BMI >= 40)  Body mass index is 39.4 kg/m². Morbid obesity complicates all aspects of disease management from diagnostic modalities to treatment.   Weight loss encouraged and health benefits explained to patient.         Atrial fibrillation with rapid ventricular response  [id number] score is 2; continue Xarelto   -continue carvedilol at reduced dose 6.25 mg given hypotension; lopressor IV PRN for HR >120  -Supplemental oxygen PRN for SpO2 <90%  -Cardiology consulted          Essential hypertension  Chronic, Latest blood pressure and vitals reviewed-   Temp:  [96 °F (35.6 °C)-97 °F (36.1 °C)]   Pulse:  []   Resp:  [18-21]   BP: ()/(69-96)   SpO2:  [95 %-100 %] .     BP normal today after dose of coreg and lasix decreased to half.   Continue coreg 6.25 BID           VTE Risk Mitigation (From admission, onward)         Ordered     rivaroxaban tablet 20 mg  With dinner         08/29/22 2309     IP VTE HIGH RISK PATIENT  Once         08/29/22 2102     Place sequential compression device  Until discontinued         08/29/22 2102                Discharge Planning   AMARILYS: 9/2/2022     Code Status: Full Code   Is the patient medically ready for discharge?:     Reason for patient still in hospital (select all that apply):  Patient trending condition  Discharge Plan A: Home          Rosalind Crockett MD  Department of Hospital Medicine   [facility]

## 2022-08-31 NOTE — ASSESSMENT & PLAN NOTE
- SBP 90s-110s overnight  - continue Coreg  - consider addition of ARB if BP tolerates; will avoid for now to allow for optimal diuresis

## 2022-08-31 NOTE — PLAN OF CARE
Problem: Adult Inpatient Plan of Care  Goal: Plan of Care Review  Outcome: Ongoing, Progressing   Chart review complete.

## 2022-08-31 NOTE — PHARMACY MED REC
"[facility]           Pharmacy  Admission Medication Reconciliation     The home medication history was taken by Mason Young PharmD.      Medication history obtained from Medications listed below were obtained from: Patient/family    Based on information gathered for medication list, you may go to "Admission" then "Reconcile Home Medications" tabs to review and/or act upon those items.     The home medication list has been updated by the Pharmacy department.   Please read ALL comments highlighted in yellow.   Please address this information as you see fit.    Feel free to contact us if you have any questions or require assistance.    The current inpatient medication list has been compared to the home medication list and the following discrepancies were noted:    Patient reports he/she IS TAKING the following which was not ordered upon admit  Ferrous sulfate 325mg daily      No current facility-administered medications on file prior to encounter.     Current Outpatient Medications on File Prior to Encounter   Medication Sig Dispense Refill    albuterol (PROVENTIL/VENTOLIN HFA) 90 mcg/actuation inhaler SMARTSI-2 Puff(s) By Mouth Every 4 Hours PRN      carvediloL (COREG) 12.5 MG tablet Take 1 tablet (12.5 mg total) by mouth 2 (two) times daily with meals. 180 tablet 11    ferrous sulfate (FEOSOL) 325 mg (65 mg iron) Tab tablet Take 1 tablet (325 mg total) by mouth daily with breakfast.  0    furosemide (LASIX) 20 MG tablet Take 1 tablet (20 mg total) by mouth once daily. 90 tablet 3    HYDROcodone-acetaminophen (NORCO)  mg per tablet TK 1 T PO BID P  0    rivaroxaban (XARELTO) 20 mg Tab Take 1 tablet (20 mg total) by mouth daily with dinner or evening meal. 90 tablet 3       Please address this information as you see fit.  Feel free to contact us if you have any questions or require assistance.    Mason Young, [place]  [phone]                  .        "

## 2022-08-31 NOTE — PLAN OF CARE
Problem: Physical Therapy  Goal: Physical Therapy Goal  Description: Goals to be met by: 2022    Patient will increase functional independence with mobility by performin) Pt to ambulate over 1,000 feet with independence and no c/o SOB  2) Pt to perform BLE X10 mod I with handout.   3) Pt will ascend 1 flight of stairs independently to demonstrate improved higher level endurance in work related activities.        Outcome: Ongoing, Progressing

## 2022-08-31 NOTE — ASSESSMENT & PLAN NOTE
- history of afib with DCCV in 2019  - followed by Dr. James as an outpatient; recurrent afib with RVR with HR up 120s- started on Coreg and Xarelto  - afib with RVR with better HR control; will continue BB and will start Amiodarone 200mg po BID   - anticipate need for cardioversion but will defer to outpatient setting   - unlikely to have rhythm conversion given MR  - CHADSVAS score 2  (HTN, CHF) continue DOAC

## 2022-08-31 NOTE — PLAN OF CARE
0900  CM was informed by  Caroline of a hospfu appt with Dr. Lenora Mccormick on 9/16/2022 at 1100. Awaiting response from IB message sent to Dr. Julio James's (cards) . Information added to the patient's discharge paperwork.     Pt's pox 98% on 3L O2 via NC this AM.    1450  Patient resting quietly in bed when CM rounded. No family present. Diuresis & weaning O2 continues. Patient verbalized understanding regarding hospfu appt with Dr. Mccormick & pending appt with Dr Julio James.      Will continue to follow.

## 2022-08-31 NOTE — ASSESSMENT & PLAN NOTE
Chronic, Latest blood pressure and vitals reviewed-   Temp:  [96 °F (35.6 °C)-97 °F (36.1 °C)]   Pulse:  []   Resp:  [18-21]   BP: ()/(69-96)   SpO2:  [95 %-100 %] .     BP normal today after dose of coreg and lasix decreased to half.   Continue coreg 6.25 BID

## 2022-08-31 NOTE — NURSING
Pt remained AAOx4 with VSS and in no acute respiratory stress. Oxygen therapy maintained and tolerated well by patient. He denied any pain or acute SOB. Safety precautions maintained, call light within reach, bed locked in lowest position with side rail raised x2. He is educated on POC and verbalized understanding. Will continue to monitor for any changes.

## 2022-08-31 NOTE — ASSESSMENT & PLAN NOTE
Improving, weaned to 2 L oxygen via NC. On room air at baseline  Most likely in setting of volume overload. ?obstructive airway disease given extensive wheezing  - continue IV diuresis. Monitor I/O, renal function, electrolytes  - trial of duonebs scheduled today  - incentive spirometer  - procalcitonin negative- therefore less likely underlying bacterial infection

## 2022-08-31 NOTE — ASSESSMENT & PLAN NOTE
- echo 8/24/2022 with EF 35%, LV global hypokinesis, severe MR and mild to moderate TR  - ; CXR with pulmonary vascular congestion; CTA with no evidence of PE and moderate right and small left pleural effusion  - remains  on IV Lasix  BID with 1.6L out overnight; negative 2.8L since admission; feel approaching euvolemia; anticipate transition to oral Lasix 40mg daily likely tomorrow  - continue Coreg; consider addition of ARB if BP tolerates  - will need workup for low EF and MR but will defer to  Outpatient setting: currently etiology CAD vs arrhythmogenic

## 2022-08-31 NOTE — SUBJECTIVE & OBJECTIVE
Interval History: Evening carvedilol and lasix doses were held due to hypotension. Doses of lasix and coreg therefore decreased to half. Urine output of 1650 cc in 24 hrs. Weaned to 2L oxygen NC. Reports SOB has improved. Sitting in the chair today.    Review of Systems   HENT:  Negative for congestion.    Respiratory:  Positive for shortness of breath (improving) and wheezing. Negative for cough.    Cardiovascular:  Negative for chest pain.   Gastrointestinal:  Negative for abdominal pain and diarrhea.   Neurological:  Negative for headaches.   Objective:     Vital Signs (Most Recent):  Temp: 96.3 °F (35.7 °C) (08/31/22 1148)  Pulse: 85 (08/31/22 1257)  Resp: 18 (08/31/22 1257)  BP: (!) 143/92 (08/31/22 1148)  SpO2: 100 % (08/31/22 1257)   Vital Signs (24h Range):  Temp:  [96 °F (35.6 °C)-97 °F (36.1 °C)] 96.3 °F (35.7 °C)  Pulse:  [] 85  Resp:  [18-21] 18  SpO2:  [95 %-100 %] 100 %  BP: ()/(69-96) 143/92     Weight: 91.5 kg (201 lb 11.5 oz)  Body mass index is 39.4 kg/m².    Intake/Output Summary (Last 24 hours) at 8/31/2022 1434  Last data filed at 8/31/2022 0900  Gross per 24 hour   Intake --   Output 800 ml   Net -800 ml      Physical Exam  Constitutional:       General: He is not in acute distress.  HENT:      Mouth/Throat:      Mouth: Mucous membranes are moist.   Neck:      Comments: JVP elevated  Cardiovascular:      Rate and Rhythm: Normal rate. Rhythm irregular.      Pulses: Normal pulses.   Pulmonary:      Breath sounds: Wheezing (diffuse inspiratory & expiratory wheezing present) present.   Abdominal:      Palpations: Abdomen is soft.      Tenderness: There is no abdominal tenderness.   Musculoskeletal:      Right lower leg: Edema (improving) present.      Left lower leg: Edema present.   Skin:     General: Skin is warm and dry.   Neurological:      General: No focal deficit present.      Mental Status: He is alert and oriented to person, place, and time.   Psychiatric:         Mood and  Affect: Mood normal.       Significant Labs: All pertinent labs within the past 24 hours have been reviewed.    Significant Imaging: I have reviewed all pertinent imaging results/findings within the past 24 hours.

## 2022-08-31 NOTE — ASSESSMENT & PLAN NOTE
- echo with severe MR and EF 35%  - MR contributing to afib with RVR and volume overload  - diuresis as detailed under CHF; continue with rate control per afib management  - will need further workup for MR and low EF but will likely be deferred to outpatient setting

## 2022-08-31 NOTE — PROGRESS NOTES
[facility] - Telemetry  Cardiology  Progress Note    Patient Name: David Barrios  MRN: [id number]  Admission Date: 8/29/2022  Hospital Length of Stay: 2 days  Code Status: Full Code   Attending Physician: Rosalind Crockett MD   Primary Care Physician: Breann Tavera MD  Expected Discharge Date: 9/2/2022  Principal Problem:Acute hypoxemic respiratory failure    Subjective:     Hospital Course:   8/30/2022  per HPI   8/31/2022 Remains on IV Laisx BID with 1.6L out overnight. Negative 2.8L since admission Remains in afib with HR 80s per Epic occasional RVR overnight. BMP yesterday WNL. SOB improving           Review of Systems   Constitutional: Negative for chills, decreased appetite, diaphoresis and fever.   Cardiovascular:  Positive for dyspnea on exertion (improving). Negative for chest pain, claudication, cyanosis, irregular heartbeat, leg swelling, near-syncope, orthopnea, palpitations, paroxysmal nocturnal dyspnea and syncope.   Respiratory:  Negative for cough, hemoptysis, shortness of breath and wheezing.    Gastrointestinal:  Negative for bloating, abdominal pain, constipation, diarrhea, melena, nausea and vomiting.   Neurological:  Negative for dizziness and weakness.   Objective:     Vital Signs (Most Recent):  Temp: 96.3 °F (35.7 °C) (08/31/22 1148)  Pulse: 85 (08/31/22 1257)  Resp: 18 (08/31/22 1257)  BP: (!) 143/92 (08/31/22 1148)  SpO2: 100 % (08/31/22 1257)   Vital Signs (24h Range):  Temp:  [96 °F (35.6 °C)-97 °F (36.1 °C)] 96.3 °F (35.7 °C)  Pulse:  [] 85  Resp:  [18-21] 18  SpO2:  [95 %-100 %] 100 %  BP: ()/(69-96) 143/92     Weight: 91.5 kg (201 lb 11.5 oz)  Body mass index is 39.4 kg/m².     SpO2: 100 %  O2 Device (Oxygen Therapy): nasal cannula w/ humidification      Intake/Output Summary (Last 24 hours) at 8/31/2022 1451  Last data filed at 8/31/2022 0900  Gross per 24 hour   Intake --   Output 800 ml   Net -800 ml       Lines/Drains/Airways       Peripheral Intravenous Line  Duration                   Peripheral IV - Single Lumen 08/29/22 1538 20 G Right Antecubital 1 day                    Physical Exam  Constitutional:       General: He is not in acute distress.     Appearance: He is well-developed.   Cardiovascular:      Rate and Rhythm: Normal rate. Rhythm irregularly irregular.      Heart sounds: Murmur heard.     No gallop.   Pulmonary:      Effort: Pulmonary effort is normal. No respiratory distress.      Breath sounds: Normal breath sounds. No wheezing.   Abdominal:      General: Bowel sounds are normal. There is no distension.      Palpations: Abdomen is soft.      Tenderness: There is no abdominal tenderness.   Skin:     General: Skin is warm and dry.   Neurological:      Mental Status: He is alert and oriented to person, place, and time.       Significant Labs: BMP:   Recent Labs   Lab 08/29/22  1539 08/30/22  0308   * 82    143   K 3.7 4.1    106   CO2 24 23   BUN 23 25*   CREATININE 1.0 1.1   CALCIUM 9.2 9.1   MG 2.1 2.1    and CBC   Recent Labs   Lab 08/29/22  1539 08/29/22  1707 08/30/22  0309   WBC 8.07  --  10.21   HGB 13.5*  --  14.3   HCT 40.5   < > 43.5     --  211    < > = values in this interval not displayed.       Significant Imaging: Echocardiogram: Transthoracic echo (TTE) complete (Cupid Only):   Results for orders placed or performed during the hospital encounter of 08/24/22   Echo   Result Value Ref Range    BSA 1.99 m2    LA WIDTH 6.15 cm    LVIDd 6.10 (A) 3.5 - 6.0 cm    IVS 1.28 (A) 0.6 - 1.1 cm    Posterior Wall 1.15 (A) 0.6 - 1.1 cm    LVIDs 4.77 (A) 2.1 - 4.0 cm    FS 22 28 - 44 %    LA volume 211.83 cm3    STJ 2.39 cm    Ascending aorta 2.36 cm    LV mass 328.13 g    LA size 5.08 cm    RVDD 3.28 cm    Left Ventricle Relative Wall Thickness 0.38 cm    AV mean gradient 4 mmHg    AV valve area 1.85 cm2    AV Velocity Ratio 0.62     AV index (prosthetic) 0.51     LVOT diameter 2.16 cm    LVOT area 3.7 cm2    LVOT peak eliezer 0.78 m/s    LVOT peak  VTI 10.01 cm    Ao peak nick 1.25 m/s    Ao VTI 19.80 cm    Mr max nick 0.04 m/s    LVOT stroke volume 36.66 cm3    AV peak gradient 6 mmHg    TR Max Nick 3.04 m/s    LV Systolic Volume 105.82 mL    LV Systolic Volume Index 56.0 mL/m2    LV Diastolic Volume 187.13 mL    LV Diastolic Volume Index 99.01 mL/m2    LA Volume Index 112.1 mL/m2    LV Mass Index 174 g/m2    RA Major Axis 6.55 cm    Left Atrium Minor Axis 7.82 cm    Left Atrium Major Axis 8.14 cm    Triscuspid Valve Regurgitation Peak Gradient 37 mmHg    Right Atrial Pressure (from IVC) 8 mmHg    EF 35 %    TV rest pulmonary artery pressure 45 mmHg    Narrative    · The left ventricle is moderately enlarged with moderately decreased   systolic function.  · There is moderate left ventricular global hypokinesis.  · The estimated ejection fraction is 35%.  · A diastolic pattern consistent with atrial fibrillation observed.  · Severe mitral regurgitation.  · Mild to moderate tricuspid regurgitation.  · The estimated PA systolic pressure is 45 mmHg.  · Normal right ventricular size with normal right ventricular systolic   function.  · There is mild to moderate pulmonary hypertension.        Assessment and Plan:     Brief HPI: Seen this afternoon on NP rounds while resting in bed. Denies SOB and appears better than yesterday. Discussed POC as detailed below-verbalized understanding and agrees with POC     * Acute hypoxemic respiratory failure  - related to volume overload in setting of systolic HF and MR  - continue diuresis as detailed under CHF    Nonrheumatic mitral valve regurgitation  - echo with severe MR and EF 35%  - MR contributing to afib with RVR and volume overload  - diuresis as detailed under CHF; continue with rate control per afib management  - will need further workup for MR and low EF but will likely be deferred to outpatient setting     Acute on chronic systolic heart failure  - echo 8/24/2022 with EF 35%, LV global hypokinesis, severe MR and mild  to moderate TR  - ; CXR with pulmonary vascular congestion; CTA with no evidence of PE and moderate right and small left pleural effusion  - remains  on IV Lasix  BID with 1.6L out overnight; negative 2.8L since admission; feel approaching euvolemia; anticipate transition to oral Lasix 40mg daily likely tomorrow  - continue Coreg; consider addition of ARB if BP tolerates  - will need workup for low EF and MR but will defer to  Outpatient setting: currently etiology CAD vs arrhythmogenic     Atrial fibrillation with rapid ventricular response  - history of afib with DCCV in 2019  - followed by Dr. James as an outpatient; recurrent afib with RVR with HR up 120s- started on Coreg and Xarelto  - afib with RVR with better HR control; will continue BB and will start Amiodarone 200mg po BID   - anticipate need for cardioversion but will defer to outpatient setting   - unlikely to have rhythm conversion given MR  - CHADSVAS score 2  (HTN, CHF) continue DOAC     Essential hypertension  - SBP 90s-110s overnight  - continue Coreg  - consider addition of ARB if BP tolerates; will avoid for now to allow for optimal diuresis         VTE Risk Mitigation (From admission, onward)         Ordered     rivaroxaban tablet 20 mg  With dinner         08/29/22 2309     IP VTE HIGH RISK PATIENT  Once         08/29/22 2102     Place sequential compression device  Until discontinued         08/29/22 2102                HARI Matthew, ANP  Cardiology  [place] - Telemetry

## 2022-08-31 NOTE — ASSESSMENT & PLAN NOTE
Echocardiogram (8/29/22):    The left ventricle is moderately enlarged with moderately decreased systolic function.   There is moderate left ventricular global hypokinesis.   The estimated ejection fraction is 35%.   Severe mitral regurgitation.   Mild to moderate tricuspid regurgitation.   The estimated PA systolic pressure is 45 mmHg.   Normal RV   There is mild to moderate pulmonary hypertension.    Continue lasix 20 mg IV BID  Daily weights, Strict I/Os  Monitor on telemetry  Monitor and trend BMP, Mg, and renal function; keep K >4, Mg >2  Sodium restriction (<2g/d), fluid restriction (<2L)   Cardiac diet  Cardiology consulted

## 2022-09-01 LAB
ANION GAP SERPL CALC-SCNC: 12 MMOL/L (ref 8–16)
BUN SERPL-MCNC: 27 MG/DL (ref 8–23)
CALCIUM SERPL-MCNC: 9.2 MG/DL (ref 8.7–10.5)
CHLORIDE SERPL-SCNC: 102 MMOL/L (ref 95–110)
CO2 SERPL-SCNC: 27 MMOL/L (ref 23–29)
CREAT SERPL-MCNC: 1.1 MG/DL (ref 0.5–1.4)
EST. GFR  (NO RACE VARIABLE): >60 ML/MIN/1.73 M^2
GLUCOSE SERPL-MCNC: 93 MG/DL (ref 70–110)
MAGNESIUM SERPL-MCNC: 2.1 MG/DL (ref 1.6–2.6)
POCT GLUCOSE: 92 MG/DL (ref 70–110)
POCT GLUCOSE: 94 MG/DL (ref 70–110)
POCT GLUCOSE: 98 MG/DL (ref 70–110)
POTASSIUM SERPL-SCNC: 3.7 MMOL/L (ref 3.5–5.1)
SODIUM SERPL-SCNC: 141 MMOL/L (ref 136–145)

## 2022-09-01 PROCEDURE — 94660 CPAP INITIATION&MGMT: CPT

## 2022-09-01 PROCEDURE — 94640 AIRWAY INHALATION TREATMENT: CPT

## 2022-09-01 PROCEDURE — 11000001 HC ACUTE MED/SURG PRIVATE ROOM

## 2022-09-01 PROCEDURE — 25000242 PHARM REV CODE 250 ALT 637 W/ HCPCS: Performed by: STUDENT IN AN ORGANIZED HEALTH CARE EDUCATION/TRAINING PROGRAM

## 2022-09-01 PROCEDURE — 94761 N-INVAS EAR/PLS OXIMETRY MLT: CPT

## 2022-09-01 PROCEDURE — 99233 PR SUBSEQUENT HOSPITAL CARE,LEVL III: ICD-10-PCS | Mod: ,,, | Performed by: NURSE PRACTITIONER

## 2022-09-01 PROCEDURE — 63600175 PHARM REV CODE 636 W HCPCS: Performed by: NURSE PRACTITIONER

## 2022-09-01 PROCEDURE — 27000221 HC OXYGEN, UP TO 24 HOURS

## 2022-09-01 PROCEDURE — 99900035 HC TECH TIME PER 15 MIN (STAT)

## 2022-09-01 PROCEDURE — 83735 ASSAY OF MAGNESIUM: CPT | Performed by: STUDENT IN AN ORGANIZED HEALTH CARE EDUCATION/TRAINING PROGRAM

## 2022-09-01 PROCEDURE — 94799 UNLISTED PULMONARY SVC/PX: CPT

## 2022-09-01 PROCEDURE — 25000003 PHARM REV CODE 250: Performed by: STUDENT IN AN ORGANIZED HEALTH CARE EDUCATION/TRAINING PROGRAM

## 2022-09-01 PROCEDURE — 36415 COLL VENOUS BLD VENIPUNCTURE: CPT | Performed by: STUDENT IN AN ORGANIZED HEALTH CARE EDUCATION/TRAINING PROGRAM

## 2022-09-01 PROCEDURE — 25000003 PHARM REV CODE 250: Performed by: NURSE PRACTITIONER

## 2022-09-01 PROCEDURE — 63600175 PHARM REV CODE 636 W HCPCS: Performed by: STUDENT IN AN ORGANIZED HEALTH CARE EDUCATION/TRAINING PROGRAM

## 2022-09-01 PROCEDURE — 25000003 PHARM REV CODE 250

## 2022-09-01 PROCEDURE — 99233 SBSQ HOSP IP/OBS HIGH 50: CPT | Mod: ,,, | Performed by: NURSE PRACTITIONER

## 2022-09-01 PROCEDURE — 80048 BASIC METABOLIC PNL TOTAL CA: CPT | Performed by: STUDENT IN AN ORGANIZED HEALTH CARE EDUCATION/TRAINING PROGRAM

## 2022-09-01 PROCEDURE — 94760 N-INVAS EAR/PLS OXIMETRY 1: CPT

## 2022-09-01 RX ORDER — IPRATROPIUM BROMIDE AND ALBUTEROL SULFATE 2.5; .5 MG/3ML; MG/3ML
3 SOLUTION RESPIRATORY (INHALATION) EVERY 6 HOURS PRN
Status: DISCONTINUED | OUTPATIENT
Start: 2022-09-01 | End: 2022-09-02 | Stop reason: HOSPADM

## 2022-09-01 RX ORDER — FUROSEMIDE 10 MG/ML
40 INJECTION INTRAMUSCULAR; INTRAVENOUS ONCE
Status: COMPLETED | OUTPATIENT
Start: 2022-09-01 | End: 2022-09-01

## 2022-09-01 RX ORDER — GUAIFENESIN 600 MG/1
600 TABLET, EXTENDED RELEASE ORAL 2 TIMES DAILY
Status: DISCONTINUED | OUTPATIENT
Start: 2022-09-01 | End: 2022-09-02 | Stop reason: HOSPADM

## 2022-09-01 RX ADMIN — CARVEDILOL 6.25 MG: 6.25 TABLET, FILM COATED ORAL at 05:09

## 2022-09-01 RX ADMIN — RIVAROXABAN 20 MG: 20 TABLET, FILM COATED ORAL at 05:09

## 2022-09-01 RX ADMIN — FUROSEMIDE 40 MG: 10 INJECTION, SOLUTION INTRAMUSCULAR; INTRAVENOUS at 03:09

## 2022-09-01 RX ADMIN — IPRATROPIUM BROMIDE AND ALBUTEROL SULFATE 3 ML: .5; 3 SOLUTION RESPIRATORY (INHALATION) at 07:09

## 2022-09-01 RX ADMIN — FUROSEMIDE 20 MG: 10 INJECTION, SOLUTION INTRAMUSCULAR; INTRAVENOUS at 08:09

## 2022-09-01 RX ADMIN — CARVEDILOL 6.25 MG: 6.25 TABLET, FILM COATED ORAL at 08:09

## 2022-09-01 RX ADMIN — FERROUS SULFATE TAB 325 MG (65 MG ELEMENTAL FE) 1 EACH: 325 (65 FE) TAB at 08:09

## 2022-09-01 RX ADMIN — AMIODARONE HYDROCHLORIDE 200 MG: 200 TABLET ORAL at 08:09

## 2022-09-01 RX ADMIN — GUAIFENESIN 600 MG: 600 TABLET, EXTENDED RELEASE ORAL at 02:09

## 2022-09-01 RX ADMIN — AMIODARONE HYDROCHLORIDE 200 MG: 200 TABLET ORAL at 12:09

## 2022-09-01 NOTE — PROGRESS NOTES
[facility] - Telemetry  Cardiology  Progress Note    Patient Name: David Barrios  MRN: [id number]  Admission Date: 8/29/2022  Hospital Length of Stay: 3 days  Code Status: Full Code   Attending Physician: Rosalind Crockett MD   Primary Care Physician: Breann Tavera MD  Expected Discharge Date: 9/2/2022  Principal Problem:Acute hypoxemic respiratory failure    Subjective:     Hospital Course:   8/30/2022  per HPI   8/31/2022 Remains on IV Laisx BID with 1.6L out overnight. Negative 2.8L since admission Remains in afib with HR 80s per Epic occasional RVR overnight. BMP yesterday WNL. SOB improving   9/1/2022 HR 70s afib overnight with periods up to 120s BMP WNL. Remains on IV Lasix BID with 900cc out overnight negative 3.7L since admission. BP stable. Ambulated in hallway with no SOB noted and feeling well. Lying flat to sleep although did use BiPap           Review of Systems   Constitutional: Negative for chills, decreased appetite, diaphoresis and fever.   Cardiovascular:  Positive for dyspnea on exertion (improving) and orthopnea (improving). Negative for chest pain, claudication, cyanosis, irregular heartbeat, leg swelling, near-syncope, palpitations, paroxysmal nocturnal dyspnea and syncope.   Respiratory:  Negative for cough, hemoptysis, shortness of breath and wheezing.    Gastrointestinal:  Negative for bloating, abdominal pain, constipation, diarrhea, melena, nausea and vomiting.   Neurological:  Negative for dizziness and weakness.   Objective:     Vital Signs (Most Recent):  Temp: 97.2 °F (36.2 °C) (09/01/22 0717)  Pulse: 98 (09/01/22 0950)  Resp: 18 (09/01/22 0726)  BP: 126/81 (09/01/22 0717)  SpO2: 97 % (09/01/22 0950) Vital Signs (24h Range):  Temp:  [96 °F (35.6 °C)-97.2 °F (36.2 °C)] 97.2 °F (36.2 °C)  Pulse:  [] 98  Resp:  [16-18] 18  SpO2:  [96 %-100 %] 97 %  BP: (105-143)/(68-92) 126/81     Weight: 91.7 kg (202 lb 2.6 oz)  Body mass index is 39.48 kg/m².     SpO2: 97 %  O2 Device (Oxygen Therapy):  room air      Intake/Output Summary (Last 24 hours) at 9/1/2022 1013  Last data filed at 9/1/2022 1004  Gross per 24 hour   Intake --   Output 1425 ml   Net -1425 ml       Lines/Drains/Airways       Peripheral Intravenous Line  Duration                  Peripheral IV - Single Lumen 08/29/22 1538 20 G Right Antecubital 2 days                    Physical Exam  Constitutional:       General: He is not in acute distress.     Appearance: He is well-developed.   Cardiovascular:      Rate and Rhythm: Normal rate and regular rhythm.      Heart sounds: No murmur heard.    No gallop.   Pulmonary:      Effort: Pulmonary effort is normal. No respiratory distress.      Breath sounds: Normal breath sounds. No wheezing.   Abdominal:      General: Bowel sounds are normal. There is no distension.      Palpations: Abdomen is soft.      Tenderness: There is no abdominal tenderness.   Skin:     General: Skin is warm and dry.   Neurological:      Mental Status: He is alert and oriented to person, place, and time.       Significant Labs: BMP:   Recent Labs   Lab 08/31/22  1454 09/01/22  0321   GLU 90 93    141   K 3.8 3.7    102   CO2 29 27   BUN 24* 27*   CREATININE 1.1 1.1   CALCIUM 9.2 9.2   MG 2.2 2.1    and CBC No results for input(s): WBC, HGB, HCT, PLT in the last 48 hours.    Significant Imaging: Echocardiogram: Transthoracic echo (TTE) complete (Cupid Only):   Results for orders placed or performed during the hospital encounter of 08/24/22   Echo   Result Value Ref Range    BSA 1.99 m2    LA WIDTH 6.15 cm    LVIDd 6.10 (A) 3.5 - 6.0 cm    IVS 1.28 (A) 0.6 - 1.1 cm    Posterior Wall 1.15 (A) 0.6 - 1.1 cm    LVIDs 4.77 (A) 2.1 - 4.0 cm    FS 22 28 - 44 %    LA volume 211.83 cm3    STJ 2.39 cm    Ascending aorta 2.36 cm    LV mass 328.13 g    LA size 5.08 cm    RVDD 3.28 cm    Left Ventricle Relative Wall Thickness 0.38 cm    AV mean gradient 4 mmHg    AV valve area 1.85 cm2    AV Velocity Ratio 0.62     AV index  (prosthetic) 0.51     LVOT diameter 2.16 cm    LVOT area 3.7 cm2    LVOT peak nick 0.78 m/s    LVOT peak VTI 10.01 cm    Ao peak nick 1.25 m/s    Ao VTI 19.80 cm    Mr max nick 0.04 m/s    LVOT stroke volume 36.66 cm3    AV peak gradient 6 mmHg    TR Max Nick 3.04 m/s    LV Systolic Volume 105.82 mL    LV Systolic Volume Index 56.0 mL/m2    LV Diastolic Volume 187.13 mL    LV Diastolic Volume Index 99.01 mL/m2    LA Volume Index 112.1 mL/m2    LV Mass Index 174 g/m2    RA Major Axis 6.55 cm    Left Atrium Minor Axis 7.82 cm    Left Atrium Major Axis 8.14 cm    Triscuspid Valve Regurgitation Peak Gradient 37 mmHg    Right Atrial Pressure (from IVC) 8 mmHg    EF 35 %    TV rest pulmonary artery pressure 45 mmHg    Narrative    · The left ventricle is moderately enlarged with moderately decreased   systolic function.  · There is moderate left ventricular global hypokinesis.  · The estimated ejection fraction is 35%.  · A diastolic pattern consistent with atrial fibrillation observed.  · Severe mitral regurgitation.  · Mild to moderate tricuspid regurgitation.  · The estimated PA systolic pressure is 45 mmHg.  · Normal right ventricular size with normal right ventricular systolic   function.  · There is mild to moderate pulmonary hypertension.        Assessment and Plan:     Brief HPI: Seen on AM NP rounds while sitting on edge of bed. Reports feeling better but did use CPAP/BiPAP last night to sleep. Ambulated in hallway with no O2 and no complaints of SOB. Discussed POC as detailed below-verbalized understanding and agrees with POC     * Acute hypoxemic respiratory failure  - related to volume overload in setting of systolic HF and MR  -  diuresis in process as detailed under CHF    Nonrheumatic mitral valve regurgitation  - echo with severe MR and EF 35%  - MR contributing to afib with RVR and volume overload  - diuresis as detailed under CHF; continue with rate control per afib management  - will need further workup for  MR and low EF but will likely be deferred to outpatient setting     Acute on chronic systolic heart failure  - echo 8/24/2022 with EF 35%, LV global hypokinesis, severe MR and mild to moderate TR  - ; CXR with pulmonary vascular congestion; CTA with no evidence of PE and moderate right and small left pleural effusion  - on IV Lasix  BID with 900cc out overnight (suspect more); negative 3.7L since admission; O2 weaned down to 1lpm from 3lpm; ambulated in hallway today with no O2 and no complaints of BAI; will re evalaute again this AM and hopeful to transition to oral Lasix 40mg daily likely later today vs  tomorrow  - continue Coreg; consider addition of ARB if BP tolerates  - will need workup for low EF and MR but will defer to  Outpatient setting: currently etiology CAD vs arrhythmogenic     Atrial fibrillation with rapid ventricular response  - history of afib with DCCV in 2019  - followed by Dr. James as an outpatient; recurrent afib with RVR with HR up 120s- started on Coreg and Xarelto  - afib with RVR with better HR control; will continue BB and  started Amiodarone 200mg po BID yesterday   - anticipate need for cardioversion but will defer to outpatient setting   - unlikely to have rhythm conversion given MR  - CHADSVAS score 2  (HTN, CHF) continue DOAC     Essential hypertension  - SBP 120s-140s overnight  - continue Coreg  - consider addition of ARB if BP tolerates        VTE Risk Mitigation (From admission, onward)         Ordered     rivaroxaban tablet 20 mg  With dinner         08/29/22 2309     IP VTE HIGH RISK PATIENT  Once         08/29/22 2102     Place sequential compression device  Until discontinued         08/29/22 2102                HARI Matthew, ANP  Cardiology  [place] - Telemetry

## 2022-09-01 NOTE — SUBJECTIVE & OBJECTIVE
Review of Systems   Constitutional: Negative for chills, decreased appetite, diaphoresis and fever.   Cardiovascular:  Positive for dyspnea on exertion (improving) and orthopnea (improving). Negative for chest pain, claudication, cyanosis, irregular heartbeat, leg swelling, near-syncope, palpitations, paroxysmal nocturnal dyspnea and syncope.   Respiratory:  Negative for cough, hemoptysis, shortness of breath and wheezing.    Gastrointestinal:  Negative for bloating, abdominal pain, constipation, diarrhea, melena, nausea and vomiting.   Neurological:  Negative for dizziness and weakness.   Objective:     Vital Signs (Most Recent):  Temp: 97.2 °F (36.2 °C) (09/01/22 0717)  Pulse: 98 (09/01/22 0950)  Resp: 18 (09/01/22 0726)  BP: 126/81 (09/01/22 0717)  SpO2: 97 % (09/01/22 0950) Vital Signs (24h Range):  Temp:  [96 °F (35.6 °C)-97.2 °F (36.2 °C)] 97.2 °F (36.2 °C)  Pulse:  [] 98  Resp:  [16-18] 18  SpO2:  [96 %-100 %] 97 %  BP: (105-143)/(68-92) 126/81     Weight: 91.7 kg (202 lb 2.6 oz)  Body mass index is 39.48 kg/m².     SpO2: 97 %  O2 Device (Oxygen Therapy): room air      Intake/Output Summary (Last 24 hours) at 9/1/2022 1013  Last data filed at 9/1/2022 1004  Gross per 24 hour   Intake --   Output 1425 ml   Net -1425 ml       Lines/Drains/Airways       Peripheral Intravenous Line  Duration                  Peripheral IV - Single Lumen 08/29/22 1538 20 G Right Antecubital 2 days                    Physical Exam  Constitutional:       General: He is not in acute distress.     Appearance: He is well-developed.   Cardiovascular:      Rate and Rhythm: Normal rate and regular rhythm.      Heart sounds: No murmur heard.    No gallop.   Pulmonary:      Effort: Pulmonary effort is normal. No respiratory distress.      Breath sounds: Normal breath sounds. No wheezing.   Abdominal:      General: Bowel sounds are normal. There is no distension.      Palpations: Abdomen is soft.      Tenderness: There is no  abdominal tenderness.   Skin:     General: Skin is warm and dry.   Neurological:      Mental Status: He is alert and oriented to person, place, and time.       Significant Labs: BMP:   Recent Labs   Lab 08/31/22  1454 09/01/22  0321   GLU 90 93    141   K 3.8 3.7    102   CO2 29 27   BUN 24* 27*   CREATININE 1.1 1.1   CALCIUM 9.2 9.2   MG 2.2 2.1    and CBC No results for input(s): WBC, HGB, HCT, PLT in the last 48 hours.    Significant Imaging: Echocardiogram: Transthoracic echo (TTE) complete (Cupid Only):   Results for orders placed or performed during the hospital encounter of 08/24/22   Echo   Result Value Ref Range    BSA 1.99 m2    LA WIDTH 6.15 cm    LVIDd 6.10 (A) 3.5 - 6.0 cm    IVS 1.28 (A) 0.6 - 1.1 cm    Posterior Wall 1.15 (A) 0.6 - 1.1 cm    LVIDs 4.77 (A) 2.1 - 4.0 cm    FS 22 28 - 44 %    LA volume 211.83 cm3    STJ 2.39 cm    Ascending aorta 2.36 cm    LV mass 328.13 g    LA size 5.08 cm    RVDD 3.28 cm    Left Ventricle Relative Wall Thickness 0.38 cm    AV mean gradient 4 mmHg    AV valve area 1.85 cm2    AV Velocity Ratio 0.62     AV index (prosthetic) 0.51     LVOT diameter 2.16 cm    LVOT area 3.7 cm2    LVOT peak nick 0.78 m/s    LVOT peak VTI 10.01 cm    Ao peak nick 1.25 m/s    Ao VTI 19.80 cm    Mr max nick 0.04 m/s    LVOT stroke volume 36.66 cm3    AV peak gradient 6 mmHg    TR Max Nick 3.04 m/s    LV Systolic Volume 105.82 mL    LV Systolic Volume Index 56.0 mL/m2    LV Diastolic Volume 187.13 mL    LV Diastolic Volume Index 99.01 mL/m2    LA Volume Index 112.1 mL/m2    LV Mass Index 174 g/m2    RA Major Axis 6.55 cm    Left Atrium Minor Axis 7.82 cm    Left Atrium Major Axis 8.14 cm    Triscuspid Valve Regurgitation Peak Gradient 37 mmHg    Right Atrial Pressure (from IVC) 8 mmHg    EF 35 %    TV rest pulmonary artery pressure 45 mmHg    Narrative    · The left ventricle is moderately enlarged with moderately decreased   systolic function.  · There is moderate left ventricular  global hypokinesis.  · The estimated ejection fraction is 35%.  · A diastolic pattern consistent with atrial fibrillation observed.  · Severe mitral regurgitation.  · Mild to moderate tricuspid regurgitation.  · The estimated PA systolic pressure is 45 mmHg.  · Normal right ventricular size with normal right ventricular systolic   function.  · There is mild to moderate pulmonary hypertension.

## 2022-09-01 NOTE — ASSESSMENT & PLAN NOTE
- echo 8/24/2022 with EF 35%, LV global hypokinesis, severe MR and mild to moderate TR  - ; CXR with pulmonary vascular congestion; CTA with no evidence of PE and moderate right and small left pleural effusion  - on IV Lasix  BID with 900cc out overnight (suspect more); negative 3.7L since admission; O2 weaned down to 1lpm from 3lpm; ambulated in hallway today with no O2 and no complaints of BAI; will re evalaute again this AM and hopeful to transition to oral Lasix 40mg daily likely later today vs  tomorrow  - continue Coreg; consider addition of ARB if BP tolerates  - will need workup for low EF and MR but will defer to  Outpatient setting: currently etiology CAD vs arrhythmogenic

## 2022-09-01 NOTE — ASSESSMENT & PLAN NOTE
- history of afib with DCCV in 2019  - followed by Dr. James as an outpatient; recurrent afib with RVR with HR up 120s- started on Coreg and Xarelto  - afib with RVR with better HR control; will continue BB and  started Amiodarone 200mg po BID yesterday   - anticipate need for cardioversion but will defer to outpatient setting   - unlikely to have rhythm conversion given MR  - CHADSVAS score 2  (HTN, CHF) continue DOAC

## 2022-09-01 NOTE — ASSESSMENT & PLAN NOTE
[id number] score is 2; continue Xarelto   -continue carvedilol at reduced dose 6.25 mg; lopressor IV PRN for HR >120  -Supplemental oxygen PRN for SpO2 <90%  -Cardiology consult  - started on amiodarone 200 mg BID PO in anticipation for outpatient intervention

## 2022-09-01 NOTE — PT/OT/SLP EVAL
Physical Therapy Evaluation/Treatment Session.     Patient Name:  David Barrios   MRN:  [id number]    Recommendations:     Discharge Recommendations:   (Pt may benefit from cardio/pulmonary rehab 2* to SOB with mild activity and to ensure return to baseline and ability to perform job demands)   Discharge Equipment Recommendations:  (Pt may require home O2, defer to RT)   Barriers to discharge: None    Assessment:     David Barrios is a 62 y.o. male admitted with a medical diagnosis of Acute hypoxemic respiratory failure.  He presents with the following impairments/functional limitations:  impaired endurance. Pt with good participation in PT evaluation and asking appropriate questions regarding care and disposition. At this time pt does not demonstrate any balance impairments; however, pt with a decline in endurance with SOB noted after ambulating ~150 feet. Pt would benefit from continued skilled acute care PT services at this time.     Rehab Prognosis: Good; patient would benefit from acute skilled PT services to address these deficits and reach maximum level of function.    Recent Surgery: * No surgery found *      Plan:     During this hospitalization, patient to be seen 2 x/week to address the identified rehab impairments via therapeutic activities, gait training, therapeutic exercises and progress toward the following goals:    Plan of Care Expires:  09/30/22    Subjective     Chief Complaint: SOB with mobility.   Patient/Family Comments/goals: To return to baseline, to be able to go back to work and not require home O2.   Pain/Comfort:  Pain Rating 1: 0/10  Pain Rating Post-Intervention 1: 0/10    Patients cultural, spiritual, Latter-day conflicts given the current situation: no    Living Environment:  Pt lives alone in a travel trailer. 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    Prior to admission, patients level of function was Independent with all ADLs and iADLs. .   Equipment used at home: none.  DME owned (not currently used): none.  Upon discharge, patient will have assistance from unknown at this time.    Objective:     Communicated with NATE Flores prior to session.  Patient found HOB elevated with oxygen  upon PT entry to room.    General Precautions: Standard, respiratory   Orthopedic Precautions:N/A   Braces: N/A  Respiratory Status: Nasal cannula, flow 2 L/min    Exams:  Cognitive Exam:  Patient is oriented to Person, Place, Time, and Situation  Fine Motor Coordination:    -       Intact  Gross Motor Coordination:  WFL  Sensation:  Pt denied any numbness or tingling.   RLE ROM: WFL  RLE Strength: WFL  LLE ROM: WFL  LLE Strength: WFL    Functional Mobility:  Bed Mobility:     Rolling Left:  independence  Rolling Right: independence  Scooting: independence  Supine to Sit: independence  Transfers:     Sit to Stand:  independence with no AD  Bed to Chair: independence with  no AD  using  ambulation  Gait: ~200 feet at decreased speed and increased SOB stated.   Balance: Good    Therapeutic Activities and Exercises:   Bed mobility and transfers as listed above.   Pt found and remained on 2L O2 during evaluation/treatment session.   Pt ambulated as stated above, pt with reports of SOB and fatigue after ambulating ~200 feet and deferred stair negation until next session.   Pt was educated on role of PT and POC.     Patient left seated in bedside chair with all lines intact and call button in reach.     AM-PAC 6 CLICK MOBILITY  Total Score:23     GOALS:   Multidisciplinary Problems       Physical Therapy Goals          Problem: Physical Therapy    Goal Priority Disciplines Outcome Goal Variances Interventions   Physical Therapy Goal     PT, PT/OT Ongoing, Progressing     Description: Goals to be met by: 2022    Patient will increase functional independence with mobility by performin) Pt to ambulate over 1,000 feet with independence and no c/o SOB  2) Pt to perform  BLE X10 mod I with handout.   3) Pt will ascend 1 flight of stairs independently to demonstrate improved higher level endurance in work related activities.                             History:     Past Medical History:   Diagnosis Date    Anemia     Atrial fibrillation     Encounter for blood transfusion     Esophageal ulcer     GI bleed     Hypertension        Past Surgical History:   Procedure Laterality Date    COLONOSCOPY N/A 4/4/2019    Procedure: COLONOSCOPY Golytely;  Surgeon: Umair Randolph MD;  Location: [facility];  Service: Endoscopy;  Laterality: N/A;    ESOPHAGOGASTRODUODENOSCOPY N/A 10/12/2018    Procedure: EGD (ESOPHAGOGASTRODUODENOSCOPY);  Surgeon: Braden Herring MD;  Location: [facility];  Service: Endoscopy;  Laterality: N/A;    ESOPHAGOGASTRODUODENOSCOPY N/A 4/4/2019    Procedure: EGD;  Surgeon: Umair Randolph MD;  Location: [facility];  Service: Endoscopy;  Laterality: N/A;    HERNIA REPAIR         Time Tracking:     PT Received On: 08/31/22  PT Start Time: 1751     PT Stop Time: 1816  PT Total Time (min): 25 min     Billable Minutes: Evaluation 15 and Therapeutic Activity 10      08/31/2022

## 2022-09-01 NOTE — ASSESSMENT & PLAN NOTE
Improving, weaned to 1 L oxygen via NC. On room air at baseline  Most likely in setting of volume overload. ?obstructive airway disease given extensive wheezing  - continue pulse ox monitoring  - continue IV diuresis. Monitor I/O, renal function, electrolytes  - PRN duonebs  - incentive spirometer  - discontinue Bipap qhs

## 2022-09-01 NOTE — PLAN OF CARE
Problem: Cardiac Output Decreased (Heart Failure)  Goal: Optimal Cardiac Output  Outcome: Ongoing, Progressing     Problem: Dysrhythmia (Heart Failure)  Goal: Stable Heart Rate and Rhythm  Outcome: Ongoing, Progressing     Problem: Fluid Imbalance (Heart Failure)  Goal: Fluid Balance  Outcome: Ongoing, Progressing     Problem: Respiratory Compromise (Heart Failure)  Goal: Effective Oxygenation and Ventilation  Outcome: Ongoing, Progressing

## 2022-09-01 NOTE — ASSESSMENT & PLAN NOTE
Echo showed LVEF 35%, severe MR, mild to moderate pulmonary hypertension  Diuresing well with IV diuretics. On 1 L oxygen via NC  Continue lasix 20 mg IV BID for today with tentative plan to transition to PO tomorrow  Daily weights, Strict I/Os  Monitor on telemetry  Monitor and trend BMP, Mg, and renal function; keep K >4, Mg >2  Sodium restriction (<2g/d), fluid restriction (<2L)   Cardiac diet  Cardiology consulted

## 2022-09-01 NOTE — NURSING
Pt remained AAOx4 with VSS and no acute distress noted. Purposeful rounding completed throughout shift. Pt educated on disease process multiple times throughout day. He denies any pain. Safety precautions maintained, oxygen therapy weaned down and patient is now using as needed when laying down. Will continue to monitor for any changes.

## 2022-09-01 NOTE — PLAN OF CARE
0815  CM was informed by  Caroline of a hospfu appt scheduled for the patient with Dr Julio James (az) on 9/21/2022 at 1420. Information added to the patient's discharge paperwork.       Will continue to follow.

## 2022-09-01 NOTE — PROGRESS NOTES
[facility] Medicine  Progress Note    Patient Name: David Barrios  MRN: [id number]  Patient Class: IP- Inpatient   Admission Date: 8/29/2022  Length of Stay: 3 days  Attending Physician: Rosalind Crockett MD  Primary Care Provider: Breann Tavera MD        Subjective:     Principal Problem:Acute hypoxemic respiratory failure      HPI:  David Barrios is a 61 y/o male with PMHx of pAF (s/p cardioversion 2019), HTN, and anemia presents to [facility] ED with complaints of SOB. He states he has SOB over the past few weeks and has progressive worsened. He was recently seen in clinic by his cardiologist (Dr. Limon) on 8/4/22 for dyspnea and was noted to be in afib with RVR, rate 120's. He was switched to coreg BID and restarted back on xarelto. He reports cough, orthopnea and bilateral leg swelling. He also noticed weight gain but is unsure how many lbs. He denies chest pain/pressure.     In the ED: 12 lead ekg revealed afib with . . Troponin 0.015--0.022. ABG [id number]%. CTA chest negative for PE. He was placed on BIPAP 10/5 @ 28%. He received 60mg IV lasix in ED. Will admit to hospital medicine for further evaluation and treatment.       Overview/Hospital Course:  No notes on file    Interval History:  Urine output of 900 cc in 24 hrs. Weaned to 1L oxygen NC at rest. Reports SOB has improving but he does get dyspnea on minimal exertion. He wears a Bipap at night while in the hospital but does not have one at home. Lives in a trailer home and does not have adequate space for equipment.     Review of Systems   HENT:  Negative for congestion.    Respiratory:  Positive for shortness of breath (on minimal exertion, while laying back to bed from sitting position). Negative for cough and wheezing.    Cardiovascular:  Positive for leg swelling (improving). Negative for chest pain.   Gastrointestinal:  Negative for abdominal pain and diarrhea.   Neurological:  Negative for headaches.    Objective:     Vital Signs (Most Recent):  Temp: 96.6 °F (35.9 °C) (08/31/22 1653)  Pulse: (!) 127 (08/31/22 1653)  Resp: 18 (08/31/22 1653)  BP: 129/89 (08/31/22 1653)  SpO2: 99 % (08/31/22 1653)   Vital Signs (24h Range):  Temp:  [96 °F (35.6 °C)-97 °F (36.1 °C)] 96.6 °F (35.9 °C)  Pulse:  [] 127  Resp:  [18-21] 18  SpO2:  [95 %-100 %] 99 %  BP: (110-146)/(86-96) 129/89     Weight: 91.5 kg (201 lb 11.5 oz)  Body mass index is 39.4 kg/m².    Intake/Output Summary (Last 24 hours) at 8/31/2022 1819  Last data filed at 8/31/2022 0900  Gross per 24 hour   Intake --   Output 800 ml   Net -800 ml        Physical Exam  Constitutional:       General: He is not in acute distress.  HENT:      Mouth/Throat:      Mouth: Mucous membranes are moist.   Cardiovascular:      Rate and Rhythm: Normal rate. Rhythm irregular.      Pulses: Normal pulses.      Heart sounds: Murmur heard.   Pulmonary:      Breath sounds: Rales (occasional bibasilar rales on posterior auscultation) present. No wheezing.   Abdominal:      Palpations: Abdomen is soft.      Tenderness: There is no abdominal tenderness.   Musculoskeletal:      Right lower leg: Edema (improving) present.      Left lower leg: Edema present.   Skin:     General: Skin is warm and dry.   Neurological:      General: No focal deficit present.      Mental Status: He is alert and oriented to person, place, and time.   Psychiatric:         Mood and Affect: Mood normal.       Significant Labs: All pertinent labs within the past 24 hours have been reviewed.    Significant Imaging: I have reviewed all pertinent imaging results/findings within the past 24 hours.      Assessment/Plan:      * Acute hypoxemic respiratory failure  Improving, weaned to 1 L oxygen via NC. On room air at baseline  Most likely in setting of volume overload. ?obstructive airway disease given extensive wheezing  - continue pulse ox monitoring  - continue IV diuresis. Monitor I/O, renal function,  electrolytes  - PRN duonebs  - incentive spirometer  - discontinue Bipap qhs                Acute on chronic systolic heart failure  Echo showed LVEF 35%, severe MR, mild to moderate pulmonary hypertension  Diuresing well with IV diuretics. On 1 L oxygen via NC  Continue lasix 20 mg IV BID for today with tentative plan to transition to PO tomorrow  Daily weights, Strict I/Os  Monitor on telemetry  Monitor and trend BMP, Mg, and renal function; keep K >4, Mg >2  Sodium restriction (<2g/d), fluid restriction (<2L)   Cardiac diet  Cardiology consulted      Atrial fibrillation with rapid ventricular response  [id number] score is 2; continue Xarelto   -continue carvedilol at reduced dose 6.25 mg; lopressor IV PRN for HR >120  -Supplemental oxygen PRN for SpO2 <90%  -Cardiology consult  - started on amiodarone 200 mg BID PO in anticipation for outpatient intervention          Nonrheumatic mitral valve regurgitation        Severe obesity (BMI >= 40)  Body mass index is 39.48 kg/m². Morbid obesity complicates all aspects of disease management from diagnostic modalities to treatment.   Weight loss encouraged and health benefits explained to patient.         Essential hypertension  Chronic, Latest blood pressure and vitals reviewed-   Temp:  [96 °F (35.6 °C)-97.2 °F (36.2 °C)]   Pulse:  []   Resp:  [16-18]   BP: (105-143)/(68-92)   SpO2:  [96 %-100 %] .     Controlled   Continue coreg          VTE Risk Mitigation (From admission, onward)         Ordered     rivaroxaban tablet 20 mg  With dinner         08/29/22 2309     IP VTE HIGH RISK PATIENT  Once         08/29/22 2102     Place sequential compression device  Until discontinued         08/29/22 2102                Discharge Planning   AMARILYS: 9/2/2022     Code Status: Full Code   Is the patient medically ready for discharge?:     Reason for patient still in hospital (select all that apply): Other (specify) Needs IV diuresis, weaning oxygen  Discharge Plan A: Home           Rosalind Crockett MD  Department of [facility] Medicine   [facility]

## 2022-09-01 NOTE — ASSESSMENT & PLAN NOTE
Body mass index is 39.48 kg/m². Morbid obesity complicates all aspects of disease management from diagnostic modalities to treatment.   Weight loss encouraged and health benefits explained to patient.

## 2022-09-01 NOTE — ASSESSMENT & PLAN NOTE
Chronic, Latest blood pressure and vitals reviewed-   Temp:  [96 °F (35.6 °C)-97.2 °F (36.2 °C)]   Pulse:  []   Resp:  [16-18]   BP: (105-143)/(68-92)   SpO2:  [96 %-100 %] .     Controlled   Continue coreg

## 2022-09-01 NOTE — ASSESSMENT & PLAN NOTE
- related to volume overload in setting of systolic HF and MR  -  diuresis in process as detailed under CHF

## 2022-09-02 VITALS
WEIGHT: 207.69 LBS | RESPIRATION RATE: 18 BRPM | DIASTOLIC BLOOD PRESSURE: 75 MMHG | HEART RATE: 83 BPM | HEIGHT: 60 IN | SYSTOLIC BLOOD PRESSURE: 107 MMHG | OXYGEN SATURATION: 97 % | TEMPERATURE: 97 F | BODY MASS INDEX: 40.78 KG/M2

## 2022-09-02 PROBLEM — I48.91 ATRIAL FIBRILLATION WITH RAPID VENTRICULAR RESPONSE: Status: RESOLVED | Noted: 2022-08-25 | Resolved: 2022-09-02

## 2022-09-02 PROBLEM — I50.21 ACUTE SYSTOLIC HEART FAILURE: Status: RESOLVED | Noted: 2022-08-30 | Resolved: 2022-09-02

## 2022-09-02 PROBLEM — J96.01 ACUTE HYPOXEMIC RESPIRATORY FAILURE: Status: RESOLVED | Noted: 2022-08-29 | Resolved: 2022-09-02

## 2022-09-02 LAB
ANION GAP SERPL CALC-SCNC: 15 MMOL/L (ref 8–16)
BUN SERPL-MCNC: 31 MG/DL (ref 8–23)
CALCIUM SERPL-MCNC: 8.9 MG/DL (ref 8.7–10.5)
CHLORIDE SERPL-SCNC: 104 MMOL/L (ref 95–110)
CO2 SERPL-SCNC: 19 MMOL/L (ref 23–29)
CREAT SERPL-MCNC: 1.2 MG/DL (ref 0.5–1.4)
EST. GFR  (NO RACE VARIABLE): >60 ML/MIN/1.73 M^2
ESTIMATED AVG GLUCOSE: 111 MG/DL (ref 68–131)
GLUCOSE SERPL-MCNC: 87 MG/DL (ref 70–110)
HBA1C MFR BLD: 5.5 % (ref 4–5.6)
HGB BLD-MCNC: 14.3 G/DL (ref 14–18)
MAGNESIUM SERPL-MCNC: 2.1 MG/DL (ref 1.6–2.6)
POCT GLUCOSE: 137 MG/DL (ref 70–110)
POCT GLUCOSE: 92 MG/DL (ref 70–110)
POTASSIUM SERPL-SCNC: 4.6 MMOL/L (ref 3.5–5.1)
SODIUM SERPL-SCNC: 138 MMOL/L (ref 136–145)

## 2022-09-02 PROCEDURE — 83036 HEMOGLOBIN GLYCOSYLATED A1C: CPT | Performed by: FAMILY MEDICINE

## 2022-09-02 PROCEDURE — 99900035 HC TECH TIME PER 15 MIN (STAT)

## 2022-09-02 PROCEDURE — 36415 COLL VENOUS BLD VENIPUNCTURE: CPT | Performed by: FAMILY MEDICINE

## 2022-09-02 PROCEDURE — 27000221 HC OXYGEN, UP TO 24 HOURS

## 2022-09-02 PROCEDURE — 80048 BASIC METABOLIC PNL TOTAL CA: CPT | Performed by: STUDENT IN AN ORGANIZED HEALTH CARE EDUCATION/TRAINING PROGRAM

## 2022-09-02 PROCEDURE — 99233 PR SUBSEQUENT HOSPITAL CARE,LEVL III: ICD-10-PCS | Mod: ,,, | Performed by: NURSE PRACTITIONER

## 2022-09-02 PROCEDURE — 97116 GAIT TRAINING THERAPY: CPT | Mod: CQ

## 2022-09-02 PROCEDURE — 94799 UNLISTED PULMONARY SVC/PX: CPT

## 2022-09-02 PROCEDURE — 85018 HEMOGLOBIN: CPT | Performed by: STUDENT IN AN ORGANIZED HEALTH CARE EDUCATION/TRAINING PROGRAM

## 2022-09-02 PROCEDURE — 97530 THERAPEUTIC ACTIVITIES: CPT | Mod: CQ

## 2022-09-02 PROCEDURE — 99233 SBSQ HOSP IP/OBS HIGH 50: CPT | Mod: ,,, | Performed by: NURSE PRACTITIONER

## 2022-09-02 PROCEDURE — 63600175 PHARM REV CODE 636 W HCPCS: Performed by: NURSE PRACTITIONER

## 2022-09-02 PROCEDURE — 25000003 PHARM REV CODE 250: Performed by: NURSE PRACTITIONER

## 2022-09-02 PROCEDURE — 25000003 PHARM REV CODE 250: Performed by: STUDENT IN AN ORGANIZED HEALTH CARE EDUCATION/TRAINING PROGRAM

## 2022-09-02 PROCEDURE — 94761 N-INVAS EAR/PLS OXIMETRY MLT: CPT

## 2022-09-02 PROCEDURE — 36415 COLL VENOUS BLD VENIPUNCTURE: CPT | Performed by: STUDENT IN AN ORGANIZED HEALTH CARE EDUCATION/TRAINING PROGRAM

## 2022-09-02 PROCEDURE — 83735 ASSAY OF MAGNESIUM: CPT | Performed by: STUDENT IN AN ORGANIZED HEALTH CARE EDUCATION/TRAINING PROGRAM

## 2022-09-02 RX ORDER — ALBUTEROL SULFATE 90 UG/1
AEROSOL, METERED RESPIRATORY (INHALATION)
Qty: 8.5 G | Refills: 0 | Status: ON HOLD | OUTPATIENT
Start: 2022-09-02 | End: 2023-01-13

## 2022-09-02 RX ORDER — CARVEDILOL 12.5 MG/1
6.25 TABLET ORAL 2 TIMES DAILY WITH MEALS
Qty: 180 TABLET | Refills: 2 | Status: SHIPPED | OUTPATIENT
Start: 2022-09-02 | End: 2022-09-07

## 2022-09-02 RX ORDER — FUROSEMIDE 10 MG/ML
40 INJECTION INTRAMUSCULAR; INTRAVENOUS ONCE
Status: COMPLETED | OUTPATIENT
Start: 2022-09-02 | End: 2022-09-02

## 2022-09-02 RX ORDER — FUROSEMIDE 40 MG/1
40 TABLET ORAL DAILY
Qty: 60 TABLET | Refills: 2 | Status: SHIPPED | OUTPATIENT
Start: 2022-09-03 | End: 2022-09-19 | Stop reason: SDUPTHER

## 2022-09-02 RX ORDER — AMIODARONE HYDROCHLORIDE 200 MG/1
200 TABLET ORAL 2 TIMES DAILY
Qty: 60 TABLET | Refills: 2 | Status: SHIPPED | OUTPATIENT
Start: 2022-09-02 | End: 2023-01-30 | Stop reason: SDUPTHER

## 2022-09-02 RX ORDER — FUROSEMIDE 40 MG/1
40 TABLET ORAL DAILY
Status: DISCONTINUED | OUTPATIENT
Start: 2022-09-02 | End: 2022-09-02 | Stop reason: HOSPADM

## 2022-09-02 RX ADMIN — CARVEDILOL 6.25 MG: 6.25 TABLET, FILM COATED ORAL at 08:09

## 2022-09-02 RX ADMIN — AMIODARONE HYDROCHLORIDE 200 MG: 200 TABLET ORAL at 09:09

## 2022-09-02 RX ADMIN — FERROUS SULFATE TAB 325 MG (65 MG ELEMENTAL FE) 1 EACH: 325 (65 FE) TAB at 09:09

## 2022-09-02 RX ADMIN — FUROSEMIDE 40 MG: 10 INJECTION, SOLUTION INTRAMUSCULAR; INTRAVENOUS at 11:09

## 2022-09-02 RX ADMIN — FUROSEMIDE 40 MG: 40 TABLET ORAL at 09:09

## 2022-09-02 NOTE — NURSING
Home Oxygen Evaluation    Date Performed: 2022    1) Patient's Home O2 Sat on room air, while at rest: 98%        If O2 sats on room air at rest are 88% or below, patient qualifies. No additional testing needed. Document N/A in steps 2 and 3. If 89% or above, complete steps 2.      2) Patient's O2 Sat on room air while exercisin%        If O2 sats on room air while exercising remain 89% or above patient does not qualify, no further testing needed Document N/A in step 3. If O2 sats on room air while exercising are 88% or below, continue to step 3.      3) Patient's O2 Sat while exercising on O2: N/A at N/A LPM         (Must show improvement from #2 for patients to qualify)    If O2 sats improve on oxygen, patient qualifies for portable oxygen. If not, the patient does not qualify.

## 2022-09-02 NOTE — PLAN OF CARE
[facility] - Telemetry      HOME HEALTH ORDERS  FACE TO FACE ENCOUNTER    Patient Name: David Barrios  YOB: 1960    PCP: Breann Tavera MD   PCP Address: [address] [facility] / [place] [address]  PCP Phone Number: [phone]  PCP Fax: [phone]    Encounter Date: 8/29/22    Admit to Home Health    Diagnoses:  Active Hospital Problems    Diagnosis  POA    Nonrheumatic mitral valve regurgitation [I34.0]  Yes    Severe obesity (BMI >= 40) [E66.01]  Yes    Essential hypertension [I10]  Yes      Resolved Hospital Problems    Diagnosis Date Resolved POA    *Acute hypoxemic respiratory failure [J96.01] 09/02/2022 Yes     Priority: 1 - High    Acute on chronic systolic heart failure [I50.21] 09/02/2022 Yes     Priority: 2     Atrial fibrillation with rapid ventricular response [I48.91] 09/02/2022 Yes     Priority: 3     CHF (congestive heart failure) [I50.9] 08/31/2022 Yes       Follow Up Appointments:  Future Appointments   Date Time Provider Department Center   9/7/2022  3:20 PM Julio James MD [facility] CARDIO [facility]   9/16/2022 11:00 AM Lenora Mccormick MD [facility] IMPRI [facility]       Allergies:Review of patient's allergies indicates:  No Known Allergies    Medications: Review discharge medications with patient and family and provide education.    Current Facility-Administered Medications   Medication Dose Route Frequency Provider Last Rate Last Admin    acetaminophen tablet 650 mg  650 mg Oral Q4H PRN Lucy Segal NP        albuterol-ipratropium 2.5 mg-0.5 mg/3 mL nebulizer solution 3 mL  3 mL Nebulization Q6H PRN Rosalind Crockett MD        aluminum-magnesium hydroxide-simethicone 200-200-20 mg/5 mL suspension 30 mL  30 mL Oral QID PRN Lucy Segal NP        amiodarone tablet 200 mg  200 mg Oral BID HARI Muniz, ANP   200 mg at 09/02/22 0929    carvediloL tablet 6.25 mg  6.25 mg Oral BID WM Rosalind Crockett MD   6.25 mg at 09/02/22 0845    ferrous sulfate tablet 1 each   1 tablet Oral Daily Rosalind Crockett MD   1 each at 09/02/22 0929    furosemide tablet 40 mg  40 mg Oral Daily HARI Muniz, ANP   40 mg at 09/02/22 0929    glucagon (human recombinant) injection 1 mg  1 mg Intramuscular PRN Lucy KIMBERLEE Segal, NP        glucose chewable tablet 16 g  16 g Oral PRN Lucy TBud Segal, NP        glucose chewable tablet 24 g  24 g Oral PRN Lucy TBud Segal, HAO        guaiFENesin 12 hr tablet 600 mg  600 mg Oral BID Rosalind Crockett MD   600 mg at 09/01/22 1430    hydrOXYzine HCL tablet 50 mg  50 mg Oral BID PRN Lucy TBud Segal NP   50 mg at 08/29/22 2319    insulin aspart U-100 pen 0-5 Units  0-5 Units Subcutaneous Q6H PRN Lucy TBud Segal NP        melatonin tablet 6 mg  6 mg Oral Nightly PRN Lucy KIMBERLEE Segal NP        metoprolol injection 5 mg  5 mg Intravenous Q5 Min PRN Lucy TBud Segal NP        ondansetron injection 4 mg  4 mg Intravenous Q8H PRN Lucy KIMBERLEE Segal NP        rivaroxaban tablet 20 mg  20 mg Oral Daily with dinner Lucy KIMBERLEE Segal NP   20 mg at 09/01/22 1736    simethicone chewable tablet 80 mg  1 tablet Oral QID PRN Lucy KIMBERLEE Segal NP        sodium chloride 0.9% flush 10 mL  10 mL Intravenous Q8H PRN Lucy KIMBERLEE Segal NP   10 mL at 08/30/22 2250     Current Discharge Medication List        START taking these medications    Details   amiodarone (PACERONE) 200 MG Tab Take 1 tablet (200 mg total) by mouth 2 (two) times daily.  Qty: 60 tablet, Refills: 2           CONTINUE these medications which have CHANGED    Details   albuterol (PROVENTIL/VENTOLIN HFA) 90 mcg/actuation inhaler inhale 1-2 Puff(s) By Mouth Every 4 Hours as needed  Qty: 18 g, Refills: 0      carvediloL (COREG) 12.5 MG tablet Take 0.5 tablets (6.25 mg total) by mouth 2 (two) times daily with meals.  Qty: 180 tablet, Refills: 2    Comments: .  Associated Diagnoses: Paroxysmal atrial fibrillation      furosemide (LASIX) 40 MG tablet Take 1 tablet  (40 mg total) by mouth once daily.  Qty: 60 tablet, Refills: 2    Associated Diagnoses: Paroxysmal atrial fibrillation           CONTINUE these medications which have NOT CHANGED    Details   ferrous sulfate (FEOSOL) 325 mg (65 mg iron) Tab tablet Take 1 tablet (325 mg total) by mouth daily with breakfast.  Refills: 0      rivaroxaban (XARELTO) 20 mg Tab Take 1 tablet (20 mg total) by mouth daily with dinner or evening meal.  Qty: 90 tablet, Refills: 3    Associated Diagnoses: Paroxysmal atrial fibrillation           STOP taking these medications       HYDROcodone-acetaminophen (NORCO)  mg per tablet Comments:   Reason for Stopping:                 I have seen and examined this patient within the last 30 days. My clinical findings that support the need for the home health skilled services and home bound status are the following:no   Patient with medication mismanagement issues requiring home bound status as evidenced by  severe MR, chronic systolic heart failure affecting mobility.     Diet:   cardiac diet and 2 gram sodium diet    Labs:  Report Lab results to PCP.    Referrals/ Consults  Aide to provide assistance with personal care, ADLs, and vital signs.    Activities:   activity as tolerated    Nursing:   Agency to admit patient within 24 hours of hospital discharge unless specified on physician order or at patient request    SN to complete comprehensive assessment including routine vital signs. Instruct on disease process and s/s of complications to report to MD. Review/verify medication list sent home with the patient at time of discharge  and instruct patient/caregiver as needed. Frequency may be adjusted depending on start of care date.     Skilled nurse to perform up to 3 visits PRN for symptoms related to diagnosis    Notify MD if SBP > 160 or < 90; DBP > 90 or < 50; HR > 120 or < 50; Temp > 101; O2 < 88%; Other:   Volume overloaded with significant weight gain    Ok to schedule additional visits  based on staff availability and patient request on consecutive days within the home health episode.      For all post-discharge communication and subsequent orders please contact patient's primary care physician. If unable to reach primary care physician or do not receive response within 30 minutes, please contact Dr. Limon office with cardiology for clinical staff order clarification    Miscellaneous   Heart Failure:      SN to instruct on the following:    Instruct on the definition of CHF.   Instruct on the signs/sympoms of CHF to be reported.   Instruct on and monitor daily weights.   Instruct on factors that cause exacerbation.   Instruct on action, dose, schedule, and side effects of medications.   Instruct on diet as prescribed.   Instruct on activity allowed.   Instruct on life-style modifications for life long management of CHF   SN to assess compliance with daily weights, diet, medications, fluid retention,    safety precautions, activities permitted and life-style modifications.   Additional 1-2 SN visits per week as needed for signs and symptoms     of CHF exacerbation.      For Weight Gain > 2-3 lbs in 1 day or 4-6 lbs over 1 week notify PCP:  CHF DIURETIC SLIDING SCALE     Skilled nurse visits daily to instruct and monitor medication adherence until target weight. Then resume prior order frequency.     If weight gain exceeds 5 lbs over target weight, call MD  If weight gain of 3-4 lbs over target weight, then:     Take 1 extra pill of lasix 40 mg PO.      In case of inadequate response, please call MD     Potassium supplementation   Scr > 1.5 mg/dl  Scr  1.5 mg/dl    K < 3.0 - NOTIFY MD and 40 mEq bid  40 mEq tid   K- 3.1-3.3  20 mEq bid  20 mEq tid    K 3.4-3.7  10 mEq bid  10 mEq tid      If target weight not reached after 5 days of increased oral diuretic, proceed to IV diuretic with daily patient contact to include face-to-face visit and telephone encounters.       Home Health Aide:  Nursing  Twice weekly    Wound Care Orders  no    I certify that this patient is confined to his home and needs intermittent skilled nursing care.

## 2022-09-02 NOTE — SUBJECTIVE & OBJECTIVE
Review of Systems   Constitutional: Negative for chills, decreased appetite, diaphoresis and fever.   Cardiovascular:  Positive for dyspnea on exertion. Negative for chest pain, claudication, cyanosis, irregular heartbeat, leg swelling, near-syncope, orthopnea, palpitations, paroxysmal nocturnal dyspnea and syncope.   Respiratory:  Negative for cough, hemoptysis, shortness of breath and wheezing.    Gastrointestinal:  Negative for bloating, abdominal pain, constipation, diarrhea, melena, nausea and vomiting.   Neurological:  Negative for dizziness and weakness.   Objective:     Vital Signs (Most Recent):  Temp: 97.3 °F (36.3 °C) (09/02/22 0745)  Pulse: 86 (09/02/22 0745)  Resp: 18 (09/02/22 0745)  BP: 107/86 (09/02/22 0745)  SpO2: 95 % (09/02/22 0900) Vital Signs (24h Range):  Temp:  [96 °F (35.6 °C)-97.7 °F (36.5 °C)] 97.3 °F (36.3 °C)  Pulse:  [] 86  Resp:  [17-20] 18  SpO2:  [95 %-99 %] 95 %  BP: (102-126)/(68-86) 107/86     Weight: 94.2 kg (207 lb 10.8 oz)  Body mass index is 40.56 kg/m².     SpO2: 95 %  O2 Device (Oxygen Therapy): nasal cannula w/ humidification      Intake/Output Summary (Last 24 hours) at 9/2/2022 1101  Last data filed at 9/1/2022 1935  Gross per 24 hour   Intake --   Output 625 ml   Net -625 ml       Lines/Drains/Airways       Peripheral Intravenous Line  Duration                  Peripheral IV - Single Lumen 08/29/22 1538 20 G Right Antecubital 3 days                    Physical Exam  Constitutional:       General: He is not in acute distress.     Appearance: He is well-developed.   Cardiovascular:      Rate and Rhythm: Normal rate and regular rhythm.      Heart sounds: No murmur heard.    No gallop.   Pulmonary:      Effort: Pulmonary effort is normal. No respiratory distress.      Breath sounds: Normal breath sounds. No wheezing.   Abdominal:      General: Bowel sounds are normal. There is no distension.      Palpations: Abdomen is soft.      Tenderness: There is no abdominal  tenderness.   Skin:     General: Skin is warm and dry.   Neurological:      Mental Status: He is alert and oriented to person, place, and time.       Significant Labs: BMP:   Recent Labs   Lab 08/31/22  1454 09/01/22  0321 09/02/22  0308   GLU 90 93 87    141 138   K 3.8 3.7 4.6    102 104   CO2 29 27 19*   BUN 24* 27* 31*   CREATININE 1.1 1.1 1.2   CALCIUM 9.2 9.2 8.9   MG 2.2 2.1 2.1   , CBC   Recent Labs   Lab 09/02/22  0308   HGB 14.3   , and Troponin No results for input(s): TROPONINI in the last 48 hours.    Significant Imaging: Echocardiogram: Transthoracic echo (TTE) complete ([facility] Only):   Results for orders placed or performed during the hospital encounter of 08/24/22   Echo   Result Value Ref Range    BSA 1.99 m2    LA WIDTH 6.15 cm    LVIDd 6.10 (A) 3.5 - 6.0 cm    IVS 1.28 (A) 0.6 - 1.1 cm    Posterior Wall 1.15 (A) 0.6 - 1.1 cm    LVIDs 4.77 (A) 2.1 - 4.0 cm    FS 22 28 - 44 %    LA volume 211.83 cm3    STJ 2.39 cm    Ascending aorta 2.36 cm    LV mass 328.13 g    LA size 5.08 cm    RVDD 3.28 cm    Left Ventricle Relative Wall Thickness 0.38 cm    AV mean gradient 4 mmHg    AV valve area 1.85 cm2    AV Velocity Ratio 0.62     AV index (prosthetic) 0.51     LVOT diameter 2.16 cm    LVOT area 3.7 cm2    LVOT peak nick 0.78 m/s    LVOT peak VTI 10.01 cm    Ao peak nick 1.25 m/s    Ao VTI 19.80 cm    Mr max nick 0.04 m/s    LVOT stroke volume 36.66 cm3    AV peak gradient 6 mmHg    TR Max Nick 3.04 m/s    LV Systolic Volume 105.82 mL    LV Systolic Volume Index 56.0 mL/m2    LV Diastolic Volume 187.13 mL    LV Diastolic Volume Index 99.01 mL/m2    LA Volume Index 112.1 mL/m2    LV Mass Index 174 g/m2    RA Major Axis 6.55 cm    Left Atrium Minor Axis 7.82 cm    Left Atrium Major Axis 8.14 cm    Triscuspid Valve Regurgitation Peak Gradient 37 mmHg    Right Atrial Pressure (from IVC) 8 mmHg    EF 35 %    TV rest pulmonary artery pressure 45 mmHg    Narrative    · The left ventricle is moderately  enlarged with moderately decreased   systolic function.  · There is moderate left ventricular global hypokinesis.  · The estimated ejection fraction is 35%.  · A diastolic pattern consistent with atrial fibrillation observed.  · Severe mitral regurgitation.  · Mild to moderate tricuspid regurgitation.  · The estimated PA systolic pressure is 45 mmHg.  · Normal right ventricular size with normal right ventricular systolic   function.  · There is mild to moderate pulmonary hypertension.

## 2022-09-02 NOTE — CONSULTS
Food & Nutrition  Education    Diet Education:  low salt diet education; new onset Systolic heart failure and severe MR  Time Spent: 30 minutes  Learners: Patient      Nutrition Education provided with handouts: Heart Healthy Consistent CHO Nutrition Therapy, Heart Failure Nutrition Therapy, Sodium Free Seasonings      Comments: Pt receiving diabetic cardiac diet with % intake of meals. Denies issues with BMs, reports mild nausea after taking medications. States he tries to watch his sodium intake. 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 Discussed a heart healthy consistent CHO diet consisting of lean proteins, heart healthy fats, whole grain, non-starchy vegetables, and limited sodium and refined CHO intake. Reviewed sugar sweetened beverages and alternatives. Provided examples of sodium free seasonings, and heart healthy low Na snacks/convenience food items. Reviewed sodium free seasonings and pt's typical meal intake to help make low Na choices. Enforced the importance of weight monitoring, diet and medication compliance. Answered pt's questions thoroughly. Pt expressed understanding of all topics discussed.       All questions and concerns answered. Dietitian's contact information provided.       Follow-Up: 1 x/week     Please Re-consult as needed        Thanks!

## 2022-09-02 NOTE — DISCHARGE SUMMARY
[facility] Medicine  Discharge Summary      Patient Name: David Barrios  MRN: [id number]  Patient Class: IP- Inpatient  Admission Date: 8/29/2022  Hospital Length of Stay: 4 days  Discharge Date and Time:  09/02/2022 4:32 PM  Attending Physician: Rosalind Crockett MD   Discharging Provider: Rosalind Crockett MD  Primary Care Provider: Breann Tavera MD      HPI:   David Barrios is a 63 y/o male with PMHx of pAF (s/p cardioversion 2019), HTN, and anemia presents to [facility] ED with complaints of SOB. He states he has SOB over the past few weeks and has progressive worsened. He was recently seen in clinic by his cardiologist (Dr. Limon) on 8/4/22 for dyspnea and was noted to be in afib with RVR, rate 120's. He was switched to coreg BID and restarted back on xarelto. He reports cough, orthopnea and bilateral leg swelling. He also noticed weight gain but is unsure how many lbs. He denies chest pain/pressure.     In the ED: 12 lead ekg revealed afib with . . Troponin 0.015--0.022. ABG [id number]%. CTA chest negative for PE. He was placed on BIPAP 10/5 @ 28%. He received 60mg IV lasix in ED. Will admit to hospital medicine for further evaluation and treatment.       * No surgery found *      Hospital Course:   63 y/o M with PMH of HTN, severe MR, chronic systolic heart failure, pAfib (s/p cardioversion in 2019 with recurrence noted in early 8/2022 when he was restarted on anticoagulation) presented with SOB X progressively worsening over weeks accompanied by weight gain, bilateral LE edema, orthopnea. CTA chest ruled out PE. Clinical presentation was consistent with volume overload secondary to decompensated acute on chronic systolic heart failure. Initially placed on Bipap, subsequently transitioned to 4 L NC and weaned to room air. Underwent aggressive diuresis with IV lasix with good urine output. Scheduled duonebs improved his wheezing. He was weaned off oxygen at rest and did not  "require oxygen on ambulation, therefore did not qualify for home oxygen via insurance. He requested to get home oxygen by paying out of pocket since he felt like he may need it PRN but was unable to afford it.   Cardiology was consulted while inpatient. Repeat echo showed severe MR, EF 35%. Medical treatment recommended with outpatient cardiology follow up for further intervention.     Medication changes:   - Lasix dose increased to 40 mg from 20 mg PO QD  - Amiodarone 200 mg PO BID added  - Coreg dose decreased to 6.25 mg from 12.5 mg BID due to hypotension  - xarelto continued    Home health nursing for CHF and follow up with Dr. Marquez, his primary cardiologist was arranged.     Recommendations for follow up:     - CTA chest showed - "Nonspecific mediastinal and bilateral hilar lymphadenopathy which may be reactive.  A neoplastic process is not excluded, follow-up is recommended the resolution of the patient's acute symptoms."    - Ensure cardiology follow up and medication compliance. Patient is also worried about his current employment.     - Patient does not have family in [place]. His siblings live in [place]. Recommend advance directives discussion.        Goals of Care Treatment Preferences:  Code Status: Full Code      Consults:   Consults (From admission, onward)        Status Ordering Provider     Inpatient consult to Registered Dietitian/Nutritionist  Once        Provider:  (Not yet assigned)    Completed ROSE LORA     Inpatient consult to Cardiology-[facility]  Once        Provider:  (Not yet assigned)    Completed CARLOS PACHECO          Final Active Diagnoses:    Diagnosis Date Noted POA    Nonrheumatic mitral valve regurgitation [I34.0] 08/30/2022 Yes    Severe obesity (BMI >= 40) [E66.01] 08/29/2022 Yes    Essential hypertension [I10] 10/11/2018 Yes      Problems Resolved During this Admission:    Diagnosis Date Noted Date Resolved POA    PRINCIPAL PROBLEM:  Acute hypoxemic " respiratory failure [J96.01] 08/29/2022 09/02/2022 Yes    Acute on chronic systolic heart failure [I50.21] 08/30/2022 09/02/2022 Yes    Atrial fibrillation with rapid ventricular response [I48.91] 08/25/2022 09/02/2022 Yes    CHF (congestive heart failure) [I50.9] 08/29/2022 08/31/2022 Yes       Discharged Condition: fair    Disposition: home with home health nursing     Follow Up:   Follow-up Information     Lenora Mccormick MD Follow up on 9/16/2022.    Specialty: Internal Medicine  Why: at 11:00 AM; hospital follow up appointment in the [facility]  Contact information:  [address]  [address]  [facility] [address]  [phone]             Julio James MD Follow up on 9/7/2022.    Specialties: Cardiology, Interventional Cardiology  Why: at 10:00 AM; cardiology hospital follow up appointment  Contact information:  [address]  [address]  [facility] [address]  [phone]             [facility] Follow up on 9/6/2022.    Why: will provide home health services  Contact information:  [address] [facility] [address] [phone]                     Patient Instructions:   No discharge procedures on file.    Significant Diagnostic Studies: Labs:   BMP:   Recent Labs   Lab 09/01/22  0321 09/02/22  0308   GLU 93 87    138   K 3.7 4.6    104   CO2 27 19*   BUN 27* 31*   CREATININE 1.1 1.2   CALCIUM 9.2 8.9   MG 2.1 2.1    and CMP   Recent Labs   Lab 09/01/22  0321 09/02/22  0308    138   K 3.7 4.6    104   CO2 27 19*   GLU 93 87   BUN 27* 31*   CREATININE 1.1 1.2   CALCIUM 9.2 8.9   ANIONGAP 12 15       Pending Diagnostic Studies:     None         Medications:  Reconciled Home Medications:      Medication List      START taking these medications    amiodarone 200 MG Tab  Commonly known as: PACERONE  Take 1 tablet (200 mg total) by mouth 2 (two) times daily.        CHANGE how you take these medications    albuterol 90  mcg/actuation inhaler  Commonly known as: PROVENTIL/VENTOLIN HFA  inhale 1-2 Puff(s) By Mouth Every 4 Hours as needed  What changed: See the new instructions.     carvediloL 12.5 MG tablet  Commonly known as: COREG  Take 0.5 tablets (6.25 mg total) by mouth 2 (two) times daily with meals.  What changed: how much to take     furosemide 40 MG tablet  Commonly known as: LASIX  Take 1 tablet (40 mg total) by mouth once daily.  Start taking on: September 3, 2022  What changed:   · medication strength  · how much to take        CONTINUE taking these medications    ferrous sulfate 325 mg (65 mg iron) Tab tablet  Commonly known as: FEOSOL  Take 1 tablet (325 mg total) by mouth daily with breakfast.     rivaroxaban 20 mg Tab  Commonly known as: XARELTO  Take 1 tablet (20 mg total) by mouth daily with dinner or evening meal.        STOP taking these medications    HYDROcodone-acetaminophen  mg per tablet  Commonly known as: NORCO            Indwelling Lines/Drains at time of discharge:   Lines/Drains/Airways     None                 Time spent on the discharge of patient: 35 minutes      Rosalind Crockett MD  Department of Hospital Medicine  [facility]

## 2022-09-02 NOTE — PT/OT/SLP PROGRESS
Physical Therapy Treatment    Patient Name:  David Barrios   MRN:  [id number]    Recommendations:     Discharge Recommendations:   (Pt may benefit from cardio/pulmonary rehab 2* to SOB with mild activity and to ensure return to baseline and ability to perform job demands)   Discharge Equipment Recommendations:  (Pt may require home O2, defer to RT)   Barriers to discharge:  none    Assessment:     David Barrios is a 62 y.o. male admitted with a medical diagnosis of Acute hypoxemic respiratory failure.  He presents with the following impairments/functional limitations:  impaired endurance, gait instability, impaired balance, impaired cardiopulmonary response to activity.Pt would continue to benefit from P.T. To address impairments listed above.  .    Rehab Prognosis: Fair; patient would benefit from acute skilled PT services to address these deficits and reach maximum level of function.    Recent Surgery: * No surgery found *      Plan:     During this hospitalization, patient to be seen 2 x/week to address the identified rehab impairments via therapeutic activities, gait training, therapeutic exercises and progress toward the following goals:    Plan of Care Expires:  09/30/22    Subjective       Patient/Family Comments/goals: Pt agreed to tx.  Pain/Comfort:  Pain Rating 1: 0/10  Pain Rating Post-Intervention 1: 0/10      Objective:     Communicated with RN prior to session.  Patient found  sitting EOB  with oxygen upon PT entry to room.     General Precautions: Standard, respiratory   Orthopedic Precautions:N/A   Braces:    Respiratory Status: Nasal cannula, flow 2 L/min     Functional Mobility:  Transfers:     Sit to Stand:  independence with no AD  Gait: 150ft and 300ft with I and 2 standing rest breaks secondary to decreased endurance and mild SOB.  Pt was instructed in PLB which helped.  .  Pt had one bout of instability that was self corrected.    Balance: sitting good+, standing good+, gait good      AM-PAC 6  CLICK MOBILITY  Turning over in bed (including adjusting bedclothes, sheets and blankets)?: 4  Sitting down on and standing up from a chair with arms (e.g., wheelchair, bedside commode, etc.): 4  Moving from lying on back to sitting on the side of the bed?: 4  Moving to and from a bed to a chair (including a wheelchair)?: 4  Need to walk in hospital room?: 3  Climbing 3-5 steps with a railing?: 3  Basic Mobility Total Score: 22       Therapeutic Activities and Exercises:   Pt was sitting EOB upon entering the room on wall unit Ow @ 2lpm.  VN came on screen to speak with pt about discharge.  Pt expressed concerns about not qualifying for home O2 secondary to he feels he is SOB sometimes in the evenings.  RN informed the pt that RN did an ambulatory test earlier for home O2 and pt's O2 sats were 93% not qualifying him for O2.  RN asked pt if he would want to ambulate with PTA and could be retested.  Pt agreed.  O2 doffed and pt ambulated as above with continuous pulse ox donned.  O2 sats throughout gait 95%-97%.  PTA instructed pt on energy conservation techniques as well as how to perform bouts of PLB when feeling SOB.  See gait as above.  Pt left sitting EOB with O2 doffed.  RN notified of above and agreed to leave O2 doffed and would check on pt.  Pt scheduled to be discharged this afternoon.    Patient left  sitting EOB  with all lines intact, call button in reach, and RN notified..    GOALS:   Multidisciplinary Problems       Physical Therapy Goals          Problem: Physical Therapy    Goal Priority Disciplines Outcome Goal Variances Interventions   Physical Therapy Goal     PT, PT/OT Ongoing, Progressing     Description: Goals to be met by: 2022    Patient will increase functional independence with mobility by performin) Pt to ambulate over 1,000 feet with independence and no c/o SOB  2) Pt to perform BLE X10 mod I with handout.   3) Pt will ascend 1 flight of stairs independently to demonstrate  improved higher level endurance in work related activities.                             Time Tracking:     PT Received On: 09/02/22  PT Start Time: 1511     PT Stop Time: 1537  PT Total Time (min): 26 min     Billable Minutes: Gait Training 15 and Therapeutic Activity 11    Treatment Type: Treatment  PT/PTA: PTA     PTA Visit Number: 1     09/02/2022

## 2022-09-02 NOTE — PLAN OF CARE
VN note: VN completed AVS and attachments and notified bedside nurse, Sandra. Will cont to be available and intervene prn.

## 2022-09-02 NOTE — PLAN OF CARE
"1055  DAGOBERTO was informed by Dr. Crockett that the patient will possibly discharge home today but might need home O2. Order for "walk test" noted. Message sent to nurse aSndra informing of above & requesting that she complete the "walk test" & document results. Awaiting response.     1130  Patient resting quietly in bed when CM rounded. No family present. Patient in agreement with plan to discharge home today, denied the need for assistance with transportation at time of discharge, & verbalized understanding regarding the hospital follow up appointments. Awaiting "walk test" results.    1300  "Walk test" result noted. Patient does not need/qualify for home oxygen. HH orders noted. CM informed Sue ([phone]) w/[facility] of referral sent to via [facility]. Awaiting response.     1315  DAGOBERTO was informed by Sue that the patient has been accepted & that HH services will start on Tuesday 9/6/2022. Information added to the patient's discharge paperwork.     1350  Message sent to nurse Vandana, virtual  nurse Mony, & pharmacist Silas informing that the patient is cleared to discharge.      Will continue to follow.   "

## 2022-09-02 NOTE — PROGRESS NOTES
[facility] - Telemetry  Cardiology  Progress Note    Patient Name: David Barrios  MRN: [id number]  Admission Date: 8/29/2022  Hospital Length of Stay: 4 days  Code Status: Full Code   Attending Physician: Rosalind Crockett MD   Primary Care Physician: Breann Tavera MD  Expected Discharge Date: 9/2/2022  Principal Problem:Acute hypoxemic respiratory failure    Subjective:     Hospital Course:   8/30/2022  per HPI   8/31/2022 Remains on IV Laisx BID with 1.6L out overnight. Negative 2.8L since admission Remains in afib with HR 80s per Epic occasional RVR overnight. BMP yesterday WNL. SOB improving   9/1/2022 HR 70s afib overnight with periods up to 120s BMP WNL. Remains on IV Lasix BID with 900cc out overnight negative 3.7L since admission. BP stable. Ambulated in hallway with no SOB noted and feeling well. Lying flat to sleep although did use BiPap   9/2/2022 BP stable overnight. HR 80s-90s afib. BMP WNL. 1.3L out overnight negative 5.1L since admission. SOB improving           Review of Systems   Constitutional: Negative for chills, decreased appetite, diaphoresis and fever.   Cardiovascular:  Positive for dyspnea on exertion. Negative for chest pain, claudication, cyanosis, irregular heartbeat, leg swelling, near-syncope, orthopnea, palpitations, paroxysmal nocturnal dyspnea and syncope.   Respiratory:  Negative for cough, hemoptysis, shortness of breath and wheezing.    Gastrointestinal:  Negative for bloating, abdominal pain, constipation, diarrhea, melena, nausea and vomiting.   Neurological:  Negative for dizziness and weakness.   Objective:     Vital Signs (Most Recent):  Temp: 97.3 °F (36.3 °C) (09/02/22 0745)  Pulse: 86 (09/02/22 0745)  Resp: 18 (09/02/22 0745)  BP: 107/86 (09/02/22 0745)  SpO2: 95 % (09/02/22 0900) Vital Signs (24h Range):  Temp:  [96 °F (35.6 °C)-97.7 °F (36.5 °C)] 97.3 °F (36.3 °C)  Pulse:  [] 86  Resp:  [17-20] 18  SpO2:  [95 %-99 %] 95 %  BP: (102-126)/(68-86) 107/86     Weight: 94.2  kg (207 lb 10.8 oz)  Body mass index is 40.56 kg/m².     SpO2: 95 %  O2 Device (Oxygen Therapy): nasal cannula w/ humidification      Intake/Output Summary (Last 24 hours) at 9/2/2022 1101  Last data filed at 9/1/2022 1935  Gross per 24 hour   Intake --   Output 625 ml   Net -625 ml       Lines/Drains/Airways       Peripheral Intravenous Line  Duration                  Peripheral IV - Single Lumen 08/29/22 1538 20 G Right Antecubital 3 days                    Physical Exam  Constitutional:       General: He is not in acute distress.     Appearance: He is well-developed.   Cardiovascular:      Rate and Rhythm: Normal rate and regular rhythm.      Heart sounds: No murmur heard.    No gallop.   Pulmonary:      Effort: Pulmonary effort is normal. No respiratory distress.      Breath sounds: Normal breath sounds. No wheezing.   Abdominal:      General: Bowel sounds are normal. There is no distension.      Palpations: Abdomen is soft.      Tenderness: There is no abdominal tenderness.   Skin:     General: Skin is warm and dry.   Neurological:      Mental Status: He is alert and oriented to person, place, and time.       Significant Labs: BMP:   Recent Labs   Lab 08/31/22  1454 09/01/22  0321 09/02/22  0308   GLU 90 93 87    141 138   K 3.8 3.7 4.6    102 104   CO2 29 27 19*   BUN 24* 27* 31*   CREATININE 1.1 1.1 1.2   CALCIUM 9.2 9.2 8.9   MG 2.2 2.1 2.1   , CBC   Recent Labs   Lab 09/02/22  0308   HGB 14.3   , and Troponin No results for input(s): TROPONINI in the last 48 hours.    Significant Imaging: Echocardiogram: Transthoracic echo (TTE) complete (Cupid Only):   Results for orders placed or performed during the hospital encounter of 08/24/22   Echo   Result Value Ref Range    BSA 1.99 m2    LA WIDTH 6.15 cm    LVIDd 6.10 (A) 3.5 - 6.0 cm    IVS 1.28 (A) 0.6 - 1.1 cm    Posterior Wall 1.15 (A) 0.6 - 1.1 cm    LVIDs 4.77 (A) 2.1 - 4.0 cm    FS 22 28 - 44 %    LA volume 211.83 cm3    STJ 2.39 cm     Ascending aorta 2.36 cm    LV mass 328.13 g    LA size 5.08 cm    RVDD 3.28 cm    Left Ventricle Relative Wall Thickness 0.38 cm    AV mean gradient 4 mmHg    AV valve area 1.85 cm2    AV Velocity Ratio 0.62     AV index (prosthetic) 0.51     LVOT diameter 2.16 cm    LVOT area 3.7 cm2    LVOT peak nick 0.78 m/s    LVOT peak VTI 10.01 cm    Ao peak nick 1.25 m/s    Ao VTI 19.80 cm    Mr max nick 0.04 m/s    LVOT stroke volume 36.66 cm3    AV peak gradient 6 mmHg    TR Max Nick 3.04 m/s    LV Systolic Volume 105.82 mL    LV Systolic Volume Index 56.0 mL/m2    LV Diastolic Volume 187.13 mL    LV Diastolic Volume Index 99.01 mL/m2    LA Volume Index 112.1 mL/m2    LV Mass Index 174 g/m2    RA Major Axis 6.55 cm    Left Atrium Minor Axis 7.82 cm    Left Atrium Major Axis 8.14 cm    Triscuspid Valve Regurgitation Peak Gradient 37 mmHg    Right Atrial Pressure (from IVC) 8 mmHg    EF 35 %    TV rest pulmonary artery pressure 45 mmHg    Narrative    · The left ventricle is moderately enlarged with moderately decreased   systolic function.  · There is moderate left ventricular global hypokinesis.  · The estimated ejection fraction is 35%.  · A diastolic pattern consistent with atrial fibrillation observed.  · Severe mitral regurgitation.  · Mild to moderate tricuspid regurgitation.  · The estimated PA systolic pressure is 45 mmHg.  · Normal right ventricular size with normal right ventricular systolic   function.  · There is mild to moderate pulmonary hypertension.        Assessment and Plan:     Brief HPI: Seen this morning on AM NP rounds while sitting on edge of bed. Reports SOB improved and was able to lie flat to sleep last night. Discussed POC as detailed below-verbalized understanding and agrees with POC     * Acute hypoxemic respiratory failure  - related to volume overload in setting of systolic HF and MR  -  diuresis in process as detailed under CHF    Nonrheumatic mitral valve regurgitation  - echo with severe MR and  EF 35%  - MR contributing to afib with RVR and volume overload  - aggressive diuresis as detailed under CHF; continue with rate control per afib management  - will need further workup for MR and low EF but will likely be deferred to outpatient setting     Acute on chronic systolic heart failure  - echo 8/24/2022 with EF 35%, LV global hypokinesis, severe MR and mild to moderate TR  - ; CXR with pulmonary vascular congestion; CTA with no evidence of PE and moderate right and small left pleural effusion  - diuresed with IV Lasix with good response ; 1.3L out overnight negative 5.1L since admission; dose of IV Lasix given this AM; will transition to oral Lasix this afternoon; off O2 this AM, ambulating and lying flat with no SOB  - continue Coreg; no  ARB due to previously stated reasons   - will need workup for low EF and MR but will defer to  Outpatient setting: currently etiology CAD vs arrhythmogenic   - long discussion of potential etiology of CHF throughout hospitalization; focused paid to s/s of CHF as well as importance of dietary restrictions and medication compliance; instructed on daily weights with instructions to take extra dose of Lasix for weight gain of 2-3lb in 24 hours or 5lbs in one week     Atrial fibrillation with rapid ventricular response  - history of afib with DCCV in 2019  - followed by Dr. James as an outpatient; recurrent afib with RVR with HR up 120s- started on Coreg and Xarelto  - HR better controlled; continue BB, Xarelto  andAmiodarone   - anticipate need for cardioversion but will defer to outpatient setting   - unlikely to have rhythm conversion given MR  - CHADSVAS score 2  (HTN, CHF) continue DOAC     Essential hypertension  - SBP 100s-110s overnight  - continue Coreg  - will hold ARB given marginal BP         VTE Risk Mitigation (From admission, onward)         Ordered     rivaroxaban tablet 20 mg  With dinner         08/29/22 9509     IP VTE HIGH RISK PATIENT  Once          08/29/22 2102     Place sequential compression device  Until discontinued         08/29/22 2102                HARI Matthew, ANP  Cardiology  [facility] - Telemetry

## 2022-09-02 NOTE — PLAN OF CARE
Problem: Physical Therapy  Goal: Physical Therapy Goal  Description: Goals to be met by: 2022    Patient will increase functional independence with mobility by performin) Pt to ambulate over 1,000 feet with independence and no c/o SOB  2) Pt to perform BLE X10 mod I with handout.   3) Pt will ascend 1 flight of stairs independently to demonstrate improved higher level endurance in work related activities.        2022 by Janis Clements PTA  Outcome: Ongoing, Progressing  2022 by Janis Clements PTA  Reactivated   Continue working toward goals.

## 2022-09-02 NOTE — ASSESSMENT & PLAN NOTE
- echo 8/24/2022 with EF 35%, LV global hypokinesis, severe MR and mild to moderate TR  - ; CXR with pulmonary vascular congestion; CTA with no evidence of PE and moderate right and small left pleural effusion  - diuresed with IV Lasix with good response ; 1.3L out overnight negative 5.1L since admission; dose of IV Lasix given this AM; will transition to oral Lasix this afternoon; off O2 this AM, ambulating and lying flat with no SOB  - continue Coreg; no  ARB due to previously stated reasons   - will need workup for low EF and MR but will defer to  Outpatient setting: currently etiology CAD vs arrhythmogenic   - long discussion of potential etiology of CHF throughout hospitalization; focused paid to s/s of CHF as well as importance of dietary restrictions and medication compliance; instructed on daily weights with instructions to take extra dose of Lasix for weight gain of 2-3lb in 24 hours or 5lbs in one week

## 2022-09-02 NOTE — PLAN OF CARE
AVS and educational attachments shared with patient via [employer]. Discussed thoroughly. Patient verbalized clear understanding using teach back method. Notified bedside nurse of completion of education. At present no distress noted. Patient to be discharged via w/c with escort service and family with all of patient's belonings. Will cont to be available to patient and family and intervene prn.

## 2022-09-02 NOTE — ASSESSMENT & PLAN NOTE
- history of afib with DCCV in 2019  - followed by Dr. James as an outpatient; recurrent afib with RVR with HR up 120s- started on Coreg and Xarelto  - HR better controlled; continue BB, Xarelto  andAmiodarone   - anticipate need for cardioversion but will defer to outpatient setting   - unlikely to have rhythm conversion given MR  - CHADSVAS score 2  (HTN, CHF) continue DOAC

## 2022-09-02 NOTE — HOSPITAL COURSE
"63 y/o M with PMH of HTN, severe MR, chronic systolic heart failure, pAfib (s/p cardioversion in 2019 with recurrence noted in early 8/2022 when he was restarted on anticoagulation) presented with SOB X progressively worsening over weeks accompanied by weight gain, bilateral LE edema, orthopnea. CTA chest ruled out PE. Clinical presentation was consistent with volume overload secondary to decompensated acute on chronic systolic heart failure. Initially placed on Bipap, subsequently transitioned to 4 L NC and weaned to room air. Underwent aggressive diuresis with IV lasix with good urine output. Scheduled duonebs improved his wheezing. He was weaned off oxygen at rest and did not require oxygen on ambulation, therefore did not qualify for home oxygen via insurance. He requested to get home oxygen by paying out of pocket since he felt like he may need it PRN but was unable to afford it.   Cardiology was consulted while inpatient. Repeat echo showed severe MR, EF 35%. Medical treatment recommended with outpatient cardiology follow up for further intervention.     Medication changes:   - Lasix dose increased to 40 mg from 20 mg PO QD  - Amiodarone 200 mg PO BID added  - Coreg dose decreased to 6.25 mg from 12.5 mg BID due to hypotension  - xarelto continued    Home health nursing for CHF and follow up with Dr. Marquez, his primary cardiologist was arranged.     Recommendations for follow up:     - CTA chest showed - "Nonspecific mediastinal and bilateral hilar lymphadenopathy which may be reactive.  A neoplastic process is not excluded, follow-up is recommended the resolution of the patient's acute symptoms."    - Ensure cardiology follow up and medication compliance. Patient is also worried about his current employment.     - Patient does not have family in [place]. His siblings live in [place]. Recommend advance directives discussion.   "

## 2022-09-03 NOTE — PLAN OF CARE
[facility] - Telemetry  Discharge Final Note    Primary Care Provider: Breann Tavera MD    Expected Discharge Date: 9/2/2022    Final Discharge Note (most recent)       Final Note - 09/03/22 0744          Final Note    Assessment Type Final Discharge Note (P)      Anticipated Discharge Disposition Home-Health Care Svc (P)    [facility] Resources/Appts/Education Provided Appointments scheduled and added to AVS (P)         Post-Acute Status    Post-Acute Authorization Home Health (P)      Home Health Status Set-up Complete/Auth obtained (P)                      Important Message from Medicare             Contact Info       Lenora Mccormick MD   Specialty: Internal Medicine    [address]  [address]  [facility] [address]   Phone: [phone]       Next Steps: Follow up on 9/16/2022    Instructions: at 11:00 AM; hospital follow up appointment in the [facility]    Julio James MD   Specialty: Cardiology, Interventional Cardiology    [address]  [address]  [facility] [address]   Phone: [phone]       Next Steps: Follow up on 9/7/2022    Instructions: at 10:00 AM; cardiology hospital follow up appointment    [facility]    [address] [place] [address]   Phone: [phone]       Next Steps: Follow up on 9/6/2022    Instructions: will provide home health services          Discharge summary faxed to Dr Breann Tavera (f [phone]).

## 2022-09-06 ENCOUNTER — PATIENT OUTREACH (OUTPATIENT)
Dept: ADMINISTRATIVE | Facility: CLINIC | Age: 62
End: 2022-09-06
Payer: COMMERCIAL

## 2022-09-06 NOTE — PROGRESS NOTES
C3 nurse attempted to contact David Barrios for a TCC post hospital discharge follow up call. The patient is unable to conduct the call @ this time. The patient requested a callback.    The patient does not have a scheduled HOSFU appointment within 5-7 days post hospital discharge date 9/2/22. Message sent to Physician staff to assist with HOSFU appointment scheduling.

## 2022-09-06 NOTE — PROGRESS NOTES
C3 nurse spoke with David Barrios for a TCC post hospital discharge follow up call. The patient has a scheduled HOSFU appointment with simone rushing on 9/16/22 @ 1100.

## 2022-09-07 ENCOUNTER — OFFICE VISIT (OUTPATIENT)
Dept: CARDIOLOGY | Facility: CLINIC | Age: 62
End: 2022-09-07
Payer: COMMERCIAL

## 2022-09-07 VITALS
DIASTOLIC BLOOD PRESSURE: 77 MMHG | BODY MASS INDEX: 29.69 KG/M2 | OXYGEN SATURATION: 96 % | HEART RATE: 64 BPM | SYSTOLIC BLOOD PRESSURE: 112 MMHG | WEIGHT: 200.44 LBS | HEIGHT: 69 IN

## 2022-09-07 DIAGNOSIS — R06.02 SOB (SHORTNESS OF BREATH): Primary | ICD-10-CM

## 2022-09-07 DIAGNOSIS — R06.09 DYSPNEA ON EXERTION: ICD-10-CM

## 2022-09-07 DIAGNOSIS — I51.9 LEFT VENTRICULAR SYSTOLIC DYSFUNCTION: ICD-10-CM

## 2022-09-07 DIAGNOSIS — I48.0 PAROXYSMAL ATRIAL FIBRILLATION: ICD-10-CM

## 2022-09-07 DIAGNOSIS — I48.19 PERSISTENT ATRIAL FIBRILLATION: ICD-10-CM

## 2022-09-07 DIAGNOSIS — I10 ESSENTIAL HYPERTENSION: ICD-10-CM

## 2022-09-07 DIAGNOSIS — I34.0 NONRHEUMATIC MITRAL VALVE REGURGITATION: ICD-10-CM

## 2022-09-07 PROCEDURE — 1160F PR REVIEW ALL MEDS BY PRESCRIBER/CLIN PHARMACIST DOCUMENTED: ICD-10-PCS | Mod: CPTII,S$GLB,, | Performed by: INTERNAL MEDICINE

## 2022-09-07 PROCEDURE — 3078F PR MOST RECENT DIASTOLIC BLOOD PRESSURE < 80 MM HG: ICD-10-PCS | Mod: CPTII,S$GLB,, | Performed by: INTERNAL MEDICINE

## 2022-09-07 PROCEDURE — 99999 PR PBB SHADOW E&M-EST. PATIENT-LVL III: ICD-10-PCS | Mod: PBBFAC,,, | Performed by: INTERNAL MEDICINE

## 2022-09-07 PROCEDURE — 4010F PR ACE/ARB THEARPY RXD/TAKEN: ICD-10-PCS | Mod: CPTII,S$GLB,, | Performed by: INTERNAL MEDICINE

## 2022-09-07 PROCEDURE — 1159F MED LIST DOCD IN RCRD: CPT | Mod: CPTII,S$GLB,, | Performed by: INTERNAL MEDICINE

## 2022-09-07 PROCEDURE — 99214 OFFICE O/P EST MOD 30 MIN: CPT | Mod: S$GLB,,, | Performed by: INTERNAL MEDICINE

## 2022-09-07 PROCEDURE — 1159F PR MEDICATION LIST DOCUMENTED IN MEDICAL RECORD: ICD-10-PCS | Mod: CPTII,S$GLB,, | Performed by: INTERNAL MEDICINE

## 2022-09-07 PROCEDURE — 3074F PR MOST RECENT SYSTOLIC BLOOD PRESSURE < 130 MM HG: ICD-10-PCS | Mod: CPTII,S$GLB,, | Performed by: INTERNAL MEDICINE

## 2022-09-07 PROCEDURE — 4010F ACE/ARB THERAPY RXD/TAKEN: CPT | Mod: CPTII,S$GLB,, | Performed by: INTERNAL MEDICINE

## 2022-09-07 PROCEDURE — 1111F PR DISCHARGE MEDS RECONCILED W/ CURRENT OUTPATIENT MED LIST: ICD-10-PCS | Mod: CPTII,S$GLB,, | Performed by: INTERNAL MEDICINE

## 2022-09-07 PROCEDURE — 3008F BODY MASS INDEX DOCD: CPT | Mod: CPTII,S$GLB,, | Performed by: INTERNAL MEDICINE

## 2022-09-07 PROCEDURE — 1160F RVW MEDS BY RX/DR IN RCRD: CPT | Mod: CPTII,S$GLB,, | Performed by: INTERNAL MEDICINE

## 2022-09-07 PROCEDURE — 3074F SYST BP LT 130 MM HG: CPT | Mod: CPTII,S$GLB,, | Performed by: INTERNAL MEDICINE

## 2022-09-07 PROCEDURE — 3078F DIAST BP <80 MM HG: CPT | Mod: CPTII,S$GLB,, | Performed by: INTERNAL MEDICINE

## 2022-09-07 PROCEDURE — 99999 PR PBB SHADOW E&M-EST. PATIENT-LVL III: CPT | Mod: PBBFAC,,, | Performed by: INTERNAL MEDICINE

## 2022-09-07 PROCEDURE — 1111F DSCHRG MED/CURRENT MED MERGE: CPT | Mod: CPTII,S$GLB,, | Performed by: INTERNAL MEDICINE

## 2022-09-07 PROCEDURE — 3044F PR MOST RECENT HEMOGLOBIN A1C LEVEL <7.0%: ICD-10-PCS | Mod: CPTII,S$GLB,, | Performed by: INTERNAL MEDICINE

## 2022-09-07 PROCEDURE — 3044F HG A1C LEVEL LT 7.0%: CPT | Mod: CPTII,S$GLB,, | Performed by: INTERNAL MEDICINE

## 2022-09-07 PROCEDURE — 99214 PR OFFICE/OUTPT VISIT, EST, LEVL IV, 30-39 MIN: ICD-10-PCS | Mod: S$GLB,,, | Performed by: INTERNAL MEDICINE

## 2022-09-07 PROCEDURE — 3008F PR BODY MASS INDEX (BMI) DOCUMENTED: ICD-10-PCS | Mod: CPTII,S$GLB,, | Performed by: INTERNAL MEDICINE

## 2022-09-07 RX ORDER — SODIUM CHLORIDE 9 MG/ML
INJECTION, SOLUTION INTRAVENOUS CONTINUOUS
Status: CANCELLED | OUTPATIENT
Start: 2022-09-07 | End: 2022-09-07

## 2022-09-07 RX ORDER — CARVEDILOL 12.5 MG/1
12.5 TABLET ORAL 2 TIMES DAILY WITH MEALS
Qty: 180 TABLET | Refills: 3 | Status: ON HOLD | OUTPATIENT
Start: 2022-09-07 | End: 2022-11-30 | Stop reason: HOSPADM

## 2022-09-07 NOTE — H&P (VIEW-ONLY)
[facility] Cardiology     Subjective:    Patient ID:  David Barrios is a 62 y.o. male who presents for follow-up of Valvular Heart Disease, Atrial Fibrillation, Congestive Heart Failure, and Hypertension    Review of patient's allergies indicates:  No Known Allergies   He is still reporting difficulties with shortness of breath.  He is on furosemide 20 mg daily.  He was admitted for heart failure and diuresed well.  He was discharged with increasing doses of Coreg as well as amiodarone 200 mg 2 times daily.  He is on anticoagulation.  His heart rates are better on exam today but he is still in AFib.  He has had problems with nausea.  If he raises the pillows at night to help him sleep he gets more nausea which is why he has been laying prone.  He does not feel palpitations on a regular basis.      Review of Systems   Constitutional: Negative for chills, decreased appetite, diaphoresis, fever, malaise/fatigue, night sweats, weight gain and weight loss.   HENT:  Negative for congestion, ear discharge, ear pain, hearing loss, hoarse voice, nosebleeds, odynophagia, sore throat, stridor and tinnitus.    Eyes:  Negative for blurred vision, discharge, double vision, pain, photophobia, redness, vision loss in left eye, vision loss in right eye, visual disturbance and visual halos.   Cardiovascular:  Positive for dyspnea on exertion and orthopnea. Negative for chest pain, claudication, cyanosis, irregular heartbeat, leg swelling, near-syncope, palpitations, paroxysmal nocturnal dyspnea and syncope.   Respiratory:  Positive for shortness of breath and sleep disturbances due to breathing. Negative for cough, hemoptysis, snoring, sputum production and wheezing.    Endocrine: Negative for cold intolerance, heat intolerance, polydipsia, polyphagia and polyuria.   Hematologic/Lymphatic: Negative for adenopathy and bleeding problem. Does not bruise/bleed  "easily.   Skin:  Negative for color change, dry skin, flushing, itching, nail changes, poor wound healing, rash, skin cancer, suspicious lesions and unusual hair distribution.   Musculoskeletal:  Negative for arthritis, back pain, falls, gout, joint pain, joint swelling, muscle cramps, muscle weakness, myalgias, neck pain and stiffness.   Gastrointestinal:  Positive for nausea. Negative for bloating, abdominal pain, anorexia, change in bowel habit, bowel incontinence, constipation, diarrhea, dysphagia, excessive appetite, flatus, heartburn, hematemesis, hematochezia, hemorrhoids, jaundice, melena and vomiting.   Genitourinary:  Negative for bladder incontinence, decreased libido, dysuria, flank pain, frequency, genital sores, hematuria, hesitancy, incomplete emptying, nocturia and urgency.   Neurological:  Negative for aphonia, brief paralysis, difficulty with concentration, disturbances in coordination, excessive daytime sleepiness, dizziness, focal weakness, headaches, light-headedness, loss of balance, numbness, paresthesias, seizures, sensory change, tremors, vertigo and weakness.   Psychiatric/Behavioral:  Negative for altered mental status, depression, hallucinations, memory loss, substance abuse, suicidal ideas and thoughts of violence. The patient does not have insomnia and is not nervous/anxious.    Allergic/Immunologic: Negative for hives and persistent infections.      Objective:       Vitals:    09/07/22 1150   BP: 112/77   Pulse: 64   SpO2: 96%   Weight: 90.9 kg (200 lb 7.4 oz)   Height: 5' 9" (1.753 m)    Physical Exam  Constitutional:       General: He is not in acute distress.     Appearance: He is well-developed. He is not diaphoretic.   HENT:      Head: Normocephalic and atraumatic.      Nose: Nose normal.   Eyes:      General: No scleral icterus.        Right eye: No discharge.      Conjunctiva/sclera: Conjunctivae normal.      Pupils: Pupils are equal, round, and reactive to light.   Neck:      " Thyroid: No thyromegaly.      Vascular: No JVD.      Trachea: No tracheal deviation.   Cardiovascular:      Rate and Rhythm: Tachycardia present. Rhythm irregularly irregular.      Pulses:           Carotid pulses are 2+ on the right side and 2+ on the left side.       Radial pulses are 2+ on the right side and 2+ on the left side.        Dorsalis pedis pulses are 2+ on the right side and 2+ on the left side.        Posterior tibial pulses are 2+ on the right side and 2+ on the left side.      Heart sounds: Murmur heard.   High-pitched blowing holosystolic murmur is present with a grade of 2/6 at the apex.     No friction rub. No gallop.   Pulmonary:      Effort: Pulmonary effort is normal. No respiratory distress.      Breath sounds: Normal breath sounds. No stridor. No wheezing or rales.   Chest:      Chest wall: No tenderness.   Abdominal:      General: Bowel sounds are normal. There is no distension.      Palpations: Abdomen is soft. There is no mass.      Tenderness: There is no abdominal tenderness. There is no guarding or rebound.   Musculoskeletal:         General: No tenderness. Normal range of motion.      Cervical back: Normal range of motion and neck supple.   Lymphadenopathy:      Cervical: No cervical adenopathy.   Skin:     General: Skin is warm and dry.      Coloration: Skin is not pale.      Findings: No erythema or rash.   Neurological:      Mental Status: He is alert and oriented to person, place, and time.      Cranial Nerves: No cranial nerve deficit.      Coordination: Coordination normal.   Psychiatric:         Behavior: Behavior normal.         Thought Content: Thought content normal.         Judgment: Judgment normal.         Assessment:       1. SOB (shortness of breath)    2. Paroxysmal atrial fibrillation    3. Nonrheumatic mitral valve regurgitation    4. Essential hypertension    5. Persistent atrial fibrillation    6. Dyspnea on exertion    7. Left ventricular systolic dysfunction       Results for orders placed or performed during the hospital encounter of 08/29/22   CBC auto differential   Result Value Ref Range    WBC 8.07 3.90 - 12.70 K/uL    RBC 4.18 (L) 4.60 - 6.20 M/uL    Hemoglobin 13.5 (L) 14.0 - 18.0 g/dL    Hematocrit 40.5 40.0 - 54.0 %    MCV 97 82 - 98 fL    MCH 32.3 (H) 27.0 - 31.0 pg    MCHC 33.3 32.0 - 36.0 g/dL    RDW 14.1 11.5 - 14.5 %    Platelets 206 150 - 450 K/uL    MPV 12.5 9.2 - 12.9 fL    Immature Granulocytes 0.2 0.0 - 0.5 %    Gran # (ANC) 5.8 1.8 - 7.7 K/uL    Immature Grans (Abs) 0.02 0.00 - 0.04 K/uL    Lymph # 1.3 1.0 - 4.8 K/uL    Mono # 0.7 0.3 - 1.0 K/uL    Eos # 0.2 0.0 - 0.5 K/uL    Baso # 0.04 0.00 - 0.20 K/uL    nRBC 0 0 /100 WBC    Gran % 72.0 38.0 - 73.0 %    Lymph % 16.4 (L) 18.0 - 48.0 %    Mono % 9.0 4.0 - 15.0 %    Eosinophil % 1.9 0.0 - 8.0 %    Basophil % 0.5 0.0 - 1.9 %    Differential Method Automated    Comprehensive metabolic panel   Result Value Ref Range    Sodium 140 136 - 145 mmol/L    Potassium 3.7 3.5 - 5.1 mmol/L    Chloride 105 95 - 110 mmol/L    CO2 24 23 - 29 mmol/L    Glucose 152 (H) 70 - 110 mg/dL    BUN 23 8 - 23 mg/dL    Creatinine 1.0 0.5 - 1.4 mg/dL    Calcium 9.2 8.7 - 10.5 mg/dL    Total Protein 6.6 6.0 - 8.4 g/dL    Albumin 3.6 3.5 - 5.2 g/dL    Total Bilirubin 0.9 0.1 - 1.0 mg/dL    Alkaline Phosphatase 62 55 - 135 U/L    AST 27 10 - 40 U/L    ALT 35 10 - 44 U/L    Anion Gap 11 8 - 16 mmol/L    eGFR >60 >60 mL/min/1.73 m^2   Brain natriuretic peptide   Result Value Ref Range     (H) 0 - 99 pg/mL   Troponin I   Result Value Ref Range    Troponin I 0.015 0.000 - 0.026 ng/mL   TSH   Result Value Ref Range    TSH 2.183 0.400 - 4.000 uIU/mL   Magnesium   Result Value Ref Range    Magnesium 2.1 1.6 - 2.6 mg/dL   Troponin I   Result Value Ref Range    Troponin I 0.022 0.000 - 0.026 ng/mL   Basic Metabolic Panel (BMP)   Result Value Ref Range    Sodium 143 136 - 145 mmol/L    Potassium 4.1 3.5 - 5.1 mmol/L    Chloride 106 95 -  110 mmol/L    CO2 23 23 - 29 mmol/L    Glucose 82 70 - 110 mg/dL    BUN 25 (H) 8 - 23 mg/dL    Creatinine 1.1 0.5 - 1.4 mg/dL    Calcium 9.1 8.7 - 10.5 mg/dL    Anion Gap 14 8 - 16 mmol/L    eGFR >60 >60 mL/min/1.73 m^2   Magnesium   Result Value Ref Range    Magnesium 2.1 1.6 - 2.6 mg/dL   Troponin I   Result Value Ref Range    Troponin I 0.034 (H) 0.000 - 0.026 ng/mL   CBC with Automated Differential   Result Value Ref Range    WBC 10.21 3.90 - 12.70 K/uL    RBC 4.50 (L) 4.60 - 6.20 M/uL    Hemoglobin 14.3 14.0 - 18.0 g/dL    Hematocrit 43.5 40.0 - 54.0 %    MCV 97 82 - 98 fL    MCH 31.8 (H) 27.0 - 31.0 pg    MCHC 32.9 32.0 - 36.0 g/dL    RDW 14.5 11.5 - 14.5 %    Platelets 211 150 - 450 K/uL    MPV 12.7 9.2 - 12.9 fL    Immature Granulocytes 0.2 0.0 - 0.5 %    Gran # (ANC) 6.9 1.8 - 7.7 K/uL    Immature Grans (Abs) 0.02 0.00 - 0.04 K/uL    Lymph # 1.9 1.0 - 4.8 K/uL    Mono # 1.2 (H) 0.3 - 1.0 K/uL    Eos # 0.2 0.0 - 0.5 K/uL    Baso # 0.03 0.00 - 0.20 K/uL    nRBC 0 0 /100 WBC    Gran % 67.6 38.0 - 73.0 %    Lymph % 19.0 18.0 - 48.0 %    Mono % 11.3 4.0 - 15.0 %    Eosinophil % 1.6 0.0 - 8.0 %    Basophil % 0.3 0.0 - 1.9 %    Differential Method Automated    Procalcitonin   Result Value Ref Range    Procalcitonin 0.02 <0.25 ng/mL   Basic metabolic panel   Result Value Ref Range    Sodium 142 136 - 145 mmol/L    Potassium 3.8 3.5 - 5.1 mmol/L    Chloride 103 95 - 110 mmol/L    CO2 29 23 - 29 mmol/L    Glucose 90 70 - 110 mg/dL    BUN 24 (H) 8 - 23 mg/dL    Creatinine 1.1 0.5 - 1.4 mg/dL    Calcium 9.2 8.7 - 10.5 mg/dL    Anion Gap 10 8 - 16 mmol/L    eGFR >60 >60 mL/min/1.73 m^2   Magnesium   Result Value Ref Range    Magnesium 2.2 1.6 - 2.6 mg/dL   Basic Metabolic Panel   Result Value Ref Range    Sodium 141 136 - 145 mmol/L    Potassium 3.7 3.5 - 5.1 mmol/L    Chloride 102 95 - 110 mmol/L    CO2 27 23 - 29 mmol/L    Glucose 93 70 - 110 mg/dL    BUN 27 (H) 8 - 23 mg/dL    Creatinine 1.1 0.5 - 1.4 mg/dL     Calcium 9.2 8.7 - 10.5 mg/dL    Anion Gap 12 8 - 16 mmol/L    eGFR >60 >60 mL/min/1.73 m^2   Magnesium   Result Value Ref Range    Magnesium 2.1 1.6 - 2.6 mg/dL   Hemoglobin   Result Value Ref Range    Hemoglobin 14.3 14.0 - 18.0 g/dL   Basic metabolic panel   Result Value Ref Range    Sodium 138 136 - 145 mmol/L    Potassium 4.6 3.5 - 5.1 mmol/L    Chloride 104 95 - 110 mmol/L    CO2 19 (L) 23 - 29 mmol/L    Glucose 87 70 - 110 mg/dL    BUN 31 (H) 8 - 23 mg/dL    Creatinine 1.2 0.5 - 1.4 mg/dL    Calcium 8.9 8.7 - 10.5 mg/dL    Anion Gap 15 8 - 16 mmol/L    eGFR >60 >60 mL/min/1.73 m^2   Magnesium   Result Value Ref Range    Magnesium 2.1 1.6 - 2.6 mg/dL   Hemoglobin A1c   Result Value Ref Range    Hemoglobin A1C 5.5 4.0 - 5.6 %    Estimated Avg Glucose 111 68 - 131 mg/dL   POCT COVID-19 Rapid Screening   Result Value Ref Range    POC Rapid COVID Negative Negative     Acceptable Yes    ISTAT PROCEDURE   Result Value Ref Range    POC PH 7.415 7.35 - 7.45    POC PCO2 35.6 35 - 45 mmHg    POC PO2 78 (L) 80 - 100 mmHg    POC HCO3 22.8 (L) 24 - 28 mmol/L    POC BE -2 -2 to 2 mmol/L    POC SATURATED O2 96 95 - 100 %    POC Sodium 142 136 - 145 mmol/L    POC Potassium 4.0 3.5 - 5.1 mmol/L    POC TCO2 24 23 - 27 mmol/L    POC Hematocrit 46 36 - 54 %PCV    POC HEMOGLOBIN 16 g/dL    Rate 12     Sample ARTERIAL     Site LR     Allens Test Pass     DelSys CPAP/BiPAP     Mode BiPAP     FiO2 28     Spont Rate 24     Min Vol 15.9     Sp02 96     IP 10     EP 5    POCT glucose   Result Value Ref Range    POCT Glucose 81 70 - 110 mg/dL   POCT glucose   Result Value Ref Range    POCT Glucose 137 (H) 70 - 110 mg/dL   POCT glucose   Result Value Ref Range    POCT Glucose 102 70 - 110 mg/dL   POCT glucose   Result Value Ref Range    POCT Glucose 98 70 - 110 mg/dL   POCT glucose   Result Value Ref Range    POCT Glucose 86 70 - 110 mg/dL   POCT glucose   Result Value Ref Range    POCT Glucose 83 70 - 110 mg/dL   POCT  glucose   Result Value Ref Range    POCT Glucose 87 70 - 110 mg/dL   POCT glucose   Result Value Ref Range    POCT Glucose 98 70 - 110 mg/dL   POCT glucose   Result Value Ref Range    POCT Glucose 94 70 - 110 mg/dL   POCT glucose   Result Value Ref Range    POCT Glucose 92 70 - 110 mg/dL   POCT glucose   Result Value Ref Range    POCT Glucose 92 70 - 110 mg/dL   POCT glucose   Result Value Ref Range    POCT Glucose 137 (H) 70 - 110 mg/dL         Current Outpatient Medications:     albuterol (PROVENTIL/VENTOLIN HFA) 90 mcg/actuation inhaler, inhale 1-2 Puff(s) By Mouth Every 4 Hours as needed, Disp: 8.5 g, Rfl: 0    amiodarone (PACERONE) 200 MG Tab, Take 1 tablet (200 mg total) by mouth 2 (two) times daily., Disp: 60 tablet, Rfl: 2    carvediloL (COREG) 12.5 MG tablet, Take 1 tablet (12.5 mg total) by mouth 2 (two) times daily with meals., Disp: 180 tablet, Rfl: 3    ferrous sulfate (FEOSOL) 325 mg (65 mg iron) Tab tablet, Take 1 tablet (325 mg total) by mouth daily with breakfast., Disp: , Rfl: 0    furosemide (LASIX) 40 MG tablet, Take 1 tablet (40 mg total) by mouth once daily., Disp: 60 tablet, Rfl: 2    rivaroxaban (XARELTO) 20 mg Tab, Take 1 tablet (20 mg total) by mouth daily with dinner or evening meal., Disp: 90 tablet, Rfl: 3     Lab Results   Component Value Date    WBC 10.21 08/30/2022    RBC 4.50 (L) 08/30/2022    HGB 14.3 09/02/2022    HCT 43.5 08/30/2022    MCV 97 08/30/2022    MCH 31.8 (H) 08/30/2022    MCHC 32.9 08/30/2022    RDW 14.5 08/30/2022     08/30/2022    MPV 12.7 08/30/2022    GRAN 6.9 08/30/2022    GRAN 67.6 08/30/2022    LYMPH 1.9 08/30/2022    LYMPH 19.0 08/30/2022    MONO 1.2 (H) 08/30/2022    MONO 11.3 08/30/2022    EOS 0.2 08/30/2022    BASO 0.03 08/30/2022    EOSINOPHIL 1.6 08/30/2022    BASOPHIL 0.3 08/30/2022    MG 2.1 09/02/2022        CMP  Lab Results   Component Value Date     09/02/2022    K 4.6 09/02/2022     09/02/2022    CO2 19 (L) 09/02/2022    GLU 87  09/02/2022    BUN 31 (H) 09/02/2022    CREATININE 1.2 09/02/2022    CALCIUM 8.9 09/02/2022    PROT 6.6 08/29/2022    ALBUMIN 3.6 08/29/2022    BILITOT 0.9 08/29/2022    ALKPHOS 62 08/29/2022    AST 27 08/29/2022    ALT 35 08/29/2022    ANIONGAP 15 09/02/2022    ESTGFRAFRICA >60 05/23/2019    EGFRNONAA >60 05/23/2019        No results found for: LABBLOO, LABURIN, RESPIRATORYC, GSRESP         Results for orders placed or performed during the hospital encounter of 08/29/22   EKG 12-lead    Collection Time: 08/29/22  4:52 PM    Narrative    Test Reason : R06.02,    Vent. Rate : 106 BPM     Atrial Rate : 107 BPM     P-R Int : 000 ms          QRS Dur : 090 ms      QT Int : 332 ms       P-R-T Axes : 000 040 -43 degrees     QTc Int : 441 ms    Atrial fibrillation with rapid ventricular response  Nonspecific T wave abnormality  Voltage criteria for left ventricular hypertrophy  Abnormal ECG  When compared with ECG of 24-AUG-2022 10:15,  The axis Shifted right  Confirmed by Anabelle Bradley MD ([id number]) on 9/6/2022 11:16:52 AM    Referred By: ERIKA   SELF           Confirmed By:Anabelle Bradley MD                  Plan:       Problem List Items Addressed This Visit          Cardiac/Vascular    Essential hypertension     Blood pressure today, I will maintain his current medications.         Persistent atrial fibrillation     He remains in atrial fibrillation, amiodarone initiated on hospital discharge.  He is on Xarelto.  Cardioversion planned.  It will need to be NORBERT guided.  He remains symptomatic related to AFib.         Dyspnea on exertion     He is [employer] functional capacity 4 by symptoms.  His ejection fraction is 35% by echo.  There is LV enlargement.  He will continue furosemide.    Ace inhibitors not ordered today because of normal blood pressure and my goal is to control his heart rates from AFib at this time.         Nonrheumatic mitral valve regurgitation     His murmur is less audible  today than previous.  Transesophageal echocardiogram planned at time of cardioversion next week to confirm mitral valve anatomy and mitral regurgitation severity.         Left ventricular systolic dysfunction     The etiology could be AFib or mitral regurgitation or both in combination.  Current EF 35%.  Once back in sinus rhythm if AFib related due to tachycardia, it could take weeks for the EF to improve.    We have discussed mitral valve disease and possible mitral valve surgery.  Upcoming NORBERT planned.          Other Visit Diagnoses       SOB (shortness of breath)    -  Primary    Relevant Orders    Case Request-Cath Lab: Cardioversion (Completed)    Paroxysmal atrial fibrillation        Relevant Medications    carvediloL (COREG) 12.5 MG tablet    Other Relevant Orders    Case Request-Cath Lab: Cardioversion (Completed)                 A NORBERT/cardioversion is scheduled for next week.  Carvedilol dosing increased to 12.5 mg b.i.d..  He has had cardioversion in the past.  I explained the reasons why his shortness of breath is not improving.    His mitral regurgitation murmur is not as prominent today on exam.  It was severe MR on his most recent transthoracic echo performed August 24, 2022.  He is only been on Xarelto for about 2 weeks.  If there is thrombus no cardioversion will be possible.  His persistent heart failure symptoms warrant NORBERT next week.         Julio James MD  09/09/2022   12:34 PM

## 2022-09-07 NOTE — H&P (VIEW-ONLY)
[facility] Cardiology     Subjective:    Patient ID:  David Barrios is a 62 y.o. male who presents for follow-up of Valvular Heart Disease, Atrial Fibrillation, Congestive Heart Failure, and Hypertension    Review of patient's allergies indicates:  No Known Allergies   He is still reporting difficulties with shortness of breath.  He is on furosemide 20 mg daily.  He was admitted for heart failure and diuresed well.  He was discharged with increasing doses of Coreg as well as amiodarone 200 mg 2 times daily.  He is on anticoagulation.  His heart rates are better on exam today but he is still in AFib.  He has had problems with nausea.  If he raises the pillows at night to help him sleep he gets more nausea which is why he has been laying prone.  He does not feel palpitations on a regular basis.      Review of Systems   Constitutional: Negative for chills, decreased appetite, diaphoresis, fever, malaise/fatigue, night sweats, weight gain and weight loss.   HENT:  Negative for congestion, ear discharge, ear pain, hearing loss, hoarse voice, nosebleeds, odynophagia, sore throat, stridor and tinnitus.    Eyes:  Negative for blurred vision, discharge, double vision, pain, photophobia, redness, vision loss in left eye, vision loss in right eye, visual disturbance and visual halos.   Cardiovascular:  Positive for dyspnea on exertion and orthopnea. Negative for chest pain, claudication, cyanosis, irregular heartbeat, leg swelling, near-syncope, palpitations, paroxysmal nocturnal dyspnea and syncope.   Respiratory:  Positive for shortness of breath and sleep disturbances due to breathing. Negative for cough, hemoptysis, snoring, sputum production and wheezing.    Endocrine: Negative for cold intolerance, heat intolerance, polydipsia, polyphagia and polyuria.   Hematologic/Lymphatic: Negative for adenopathy and bleeding problem. Does not bruise/bleed  "easily.   Skin:  Negative for color change, dry skin, flushing, itching, nail changes, poor wound healing, rash, skin cancer, suspicious lesions and unusual hair distribution.   Musculoskeletal:  Negative for arthritis, back pain, falls, gout, joint pain, joint swelling, muscle cramps, muscle weakness, myalgias, neck pain and stiffness.   Gastrointestinal:  Positive for nausea. Negative for bloating, abdominal pain, anorexia, change in bowel habit, bowel incontinence, constipation, diarrhea, dysphagia, excessive appetite, flatus, heartburn, hematemesis, hematochezia, hemorrhoids, jaundice, melena and vomiting.   Genitourinary:  Negative for bladder incontinence, decreased libido, dysuria, flank pain, frequency, genital sores, hematuria, hesitancy, incomplete emptying, nocturia and urgency.   Neurological:  Negative for aphonia, brief paralysis, difficulty with concentration, disturbances in coordination, excessive daytime sleepiness, dizziness, focal weakness, headaches, light-headedness, loss of balance, numbness, paresthesias, seizures, sensory change, tremors, vertigo and weakness.   Psychiatric/Behavioral:  Negative for altered mental status, depression, hallucinations, memory loss, substance abuse, suicidal ideas and thoughts of violence. The patient does not have insomnia and is not nervous/anxious.    Allergic/Immunologic: Negative for hives and persistent infections.      Objective:       Vitals:    09/07/22 1150   BP: 112/77   Pulse: 64   SpO2: 96%   Weight: 90.9 kg (200 lb 7.4 oz)   Height: 5' 9" (1.753 m)    Physical Exam  Constitutional:       General: He is not in acute distress.     Appearance: He is well-developed. He is not diaphoretic.   HENT:      Head: Normocephalic and atraumatic.      Nose: Nose normal.   Eyes:      General: No scleral icterus.        Right eye: No discharge.      Conjunctiva/sclera: Conjunctivae normal.      Pupils: Pupils are equal, round, and reactive to light.   Neck:      " Thyroid: No thyromegaly.      Vascular: No JVD.      Trachea: No tracheal deviation.   Cardiovascular:      Rate and Rhythm: Tachycardia present. Rhythm irregularly irregular.      Pulses:           Carotid pulses are 2+ on the right side and 2+ on the left side.       Radial pulses are 2+ on the right side and 2+ on the left side.        Dorsalis pedis pulses are 2+ on the right side and 2+ on the left side.        Posterior tibial pulses are 2+ on the right side and 2+ on the left side.      Heart sounds: Murmur heard.   High-pitched blowing holosystolic murmur is present with a grade of 2/6 at the apex.     No friction rub. No gallop.   Pulmonary:      Effort: Pulmonary effort is normal. No respiratory distress.      Breath sounds: Normal breath sounds. No stridor. No wheezing or rales.   Chest:      Chest wall: No tenderness.   Abdominal:      General: Bowel sounds are normal. There is no distension.      Palpations: Abdomen is soft. There is no mass.      Tenderness: There is no abdominal tenderness. There is no guarding or rebound.   Musculoskeletal:         General: No tenderness. Normal range of motion.      Cervical back: Normal range of motion and neck supple.   Lymphadenopathy:      Cervical: No cervical adenopathy.   Skin:     General: Skin is warm and dry.      Coloration: Skin is not pale.      Findings: No erythema or rash.   Neurological:      Mental Status: He is alert and oriented to person, place, and time.      Cranial Nerves: No cranial nerve deficit.      Coordination: Coordination normal.   Psychiatric:         Behavior: Behavior normal.         Thought Content: Thought content normal.         Judgment: Judgment normal.         Assessment:       1. SOB (shortness of breath)    2. Paroxysmal atrial fibrillation    3. Nonrheumatic mitral valve regurgitation    4. Essential hypertension    5. Persistent atrial fibrillation    6. Dyspnea on exertion    7. Left ventricular systolic dysfunction       Results for orders placed or performed during the hospital encounter of 08/29/22   CBC auto differential   Result Value Ref Range    WBC 8.07 3.90 - 12.70 K/uL    RBC 4.18 (L) 4.60 - 6.20 M/uL    Hemoglobin 13.5 (L) 14.0 - 18.0 g/dL    Hematocrit 40.5 40.0 - 54.0 %    MCV 97 82 - 98 fL    MCH 32.3 (H) 27.0 - 31.0 pg    MCHC 33.3 32.0 - 36.0 g/dL    RDW 14.1 11.5 - 14.5 %    Platelets 206 150 - 450 K/uL    MPV 12.5 9.2 - 12.9 fL    Immature Granulocytes 0.2 0.0 - 0.5 %    Gran # (ANC) 5.8 1.8 - 7.7 K/uL    Immature Grans (Abs) 0.02 0.00 - 0.04 K/uL    Lymph # 1.3 1.0 - 4.8 K/uL    Mono # 0.7 0.3 - 1.0 K/uL    Eos # 0.2 0.0 - 0.5 K/uL    Baso # 0.04 0.00 - 0.20 K/uL    nRBC 0 0 /100 WBC    Gran % 72.0 38.0 - 73.0 %    Lymph % 16.4 (L) 18.0 - 48.0 %    Mono % 9.0 4.0 - 15.0 %    Eosinophil % 1.9 0.0 - 8.0 %    Basophil % 0.5 0.0 - 1.9 %    Differential Method Automated    Comprehensive metabolic panel   Result Value Ref Range    Sodium 140 136 - 145 mmol/L    Potassium 3.7 3.5 - 5.1 mmol/L    Chloride 105 95 - 110 mmol/L    CO2 24 23 - 29 mmol/L    Glucose 152 (H) 70 - 110 mg/dL    BUN 23 8 - 23 mg/dL    Creatinine 1.0 0.5 - 1.4 mg/dL    Calcium 9.2 8.7 - 10.5 mg/dL    Total Protein 6.6 6.0 - 8.4 g/dL    Albumin 3.6 3.5 - 5.2 g/dL    Total Bilirubin 0.9 0.1 - 1.0 mg/dL    Alkaline Phosphatase 62 55 - 135 U/L    AST 27 10 - 40 U/L    ALT 35 10 - 44 U/L    Anion Gap 11 8 - 16 mmol/L    eGFR >60 >60 mL/min/1.73 m^2   Brain natriuretic peptide   Result Value Ref Range     (H) 0 - 99 pg/mL   Troponin I   Result Value Ref Range    Troponin I 0.015 0.000 - 0.026 ng/mL   TSH   Result Value Ref Range    TSH 2.183 0.400 - 4.000 uIU/mL   Magnesium   Result Value Ref Range    Magnesium 2.1 1.6 - 2.6 mg/dL   Troponin I   Result Value Ref Range    Troponin I 0.022 0.000 - 0.026 ng/mL   Basic Metabolic Panel (BMP)   Result Value Ref Range    Sodium 143 136 - 145 mmol/L    Potassium 4.1 3.5 - 5.1 mmol/L    Chloride 106 95 -  110 mmol/L    CO2 23 23 - 29 mmol/L    Glucose 82 70 - 110 mg/dL    BUN 25 (H) 8 - 23 mg/dL    Creatinine 1.1 0.5 - 1.4 mg/dL    Calcium 9.1 8.7 - 10.5 mg/dL    Anion Gap 14 8 - 16 mmol/L    eGFR >60 >60 mL/min/1.73 m^2   Magnesium   Result Value Ref Range    Magnesium 2.1 1.6 - 2.6 mg/dL   Troponin I   Result Value Ref Range    Troponin I 0.034 (H) 0.000 - 0.026 ng/mL   CBC with Automated Differential   Result Value Ref Range    WBC 10.21 3.90 - 12.70 K/uL    RBC 4.50 (L) 4.60 - 6.20 M/uL    Hemoglobin 14.3 14.0 - 18.0 g/dL    Hematocrit 43.5 40.0 - 54.0 %    MCV 97 82 - 98 fL    MCH 31.8 (H) 27.0 - 31.0 pg    MCHC 32.9 32.0 - 36.0 g/dL    RDW 14.5 11.5 - 14.5 %    Platelets 211 150 - 450 K/uL    MPV 12.7 9.2 - 12.9 fL    Immature Granulocytes 0.2 0.0 - 0.5 %    Gran # (ANC) 6.9 1.8 - 7.7 K/uL    Immature Grans (Abs) 0.02 0.00 - 0.04 K/uL    Lymph # 1.9 1.0 - 4.8 K/uL    Mono # 1.2 (H) 0.3 - 1.0 K/uL    Eos # 0.2 0.0 - 0.5 K/uL    Baso # 0.03 0.00 - 0.20 K/uL    nRBC 0 0 /100 WBC    Gran % 67.6 38.0 - 73.0 %    Lymph % 19.0 18.0 - 48.0 %    Mono % 11.3 4.0 - 15.0 %    Eosinophil % 1.6 0.0 - 8.0 %    Basophil % 0.3 0.0 - 1.9 %    Differential Method Automated    Procalcitonin   Result Value Ref Range    Procalcitonin 0.02 <0.25 ng/mL   Basic metabolic panel   Result Value Ref Range    Sodium 142 136 - 145 mmol/L    Potassium 3.8 3.5 - 5.1 mmol/L    Chloride 103 95 - 110 mmol/L    CO2 29 23 - 29 mmol/L    Glucose 90 70 - 110 mg/dL    BUN 24 (H) 8 - 23 mg/dL    Creatinine 1.1 0.5 - 1.4 mg/dL    Calcium 9.2 8.7 - 10.5 mg/dL    Anion Gap 10 8 - 16 mmol/L    eGFR >60 >60 mL/min/1.73 m^2   Magnesium   Result Value Ref Range    Magnesium 2.2 1.6 - 2.6 mg/dL   Basic Metabolic Panel   Result Value Ref Range    Sodium 141 136 - 145 mmol/L    Potassium 3.7 3.5 - 5.1 mmol/L    Chloride 102 95 - 110 mmol/L    CO2 27 23 - 29 mmol/L    Glucose 93 70 - 110 mg/dL    BUN 27 (H) 8 - 23 mg/dL    Creatinine 1.1 0.5 - 1.4 mg/dL     Calcium 9.2 8.7 - 10.5 mg/dL    Anion Gap 12 8 - 16 mmol/L    eGFR >60 >60 mL/min/1.73 m^2   Magnesium   Result Value Ref Range    Magnesium 2.1 1.6 - 2.6 mg/dL   Hemoglobin   Result Value Ref Range    Hemoglobin 14.3 14.0 - 18.0 g/dL   Basic metabolic panel   Result Value Ref Range    Sodium 138 136 - 145 mmol/L    Potassium 4.6 3.5 - 5.1 mmol/L    Chloride 104 95 - 110 mmol/L    CO2 19 (L) 23 - 29 mmol/L    Glucose 87 70 - 110 mg/dL    BUN 31 (H) 8 - 23 mg/dL    Creatinine 1.2 0.5 - 1.4 mg/dL    Calcium 8.9 8.7 - 10.5 mg/dL    Anion Gap 15 8 - 16 mmol/L    eGFR >60 >60 mL/min/1.73 m^2   Magnesium   Result Value Ref Range    Magnesium 2.1 1.6 - 2.6 mg/dL   Hemoglobin A1c   Result Value Ref Range    Hemoglobin A1C 5.5 4.0 - 5.6 %    Estimated Avg Glucose 111 68 - 131 mg/dL   POCT COVID-19 Rapid Screening   Result Value Ref Range    POC Rapid COVID Negative Negative     Acceptable Yes    ISTAT PROCEDURE   Result Value Ref Range    POC PH 7.415 7.35 - 7.45    POC PCO2 35.6 35 - 45 mmHg    POC PO2 78 (L) 80 - 100 mmHg    POC HCO3 22.8 (L) 24 - 28 mmol/L    POC BE -2 -2 to 2 mmol/L    POC SATURATED O2 96 95 - 100 %    POC Sodium 142 136 - 145 mmol/L    POC Potassium 4.0 3.5 - 5.1 mmol/L    POC TCO2 24 23 - 27 mmol/L    POC Hematocrit 46 36 - 54 %PCV    POC HEMOGLOBIN 16 g/dL    Rate 12     Sample ARTERIAL     Site LR     Allens Test Pass     DelSys CPAP/BiPAP     Mode BiPAP     FiO2 28     Spont Rate 24     Min Vol 15.9     Sp02 96     IP 10     EP 5    POCT glucose   Result Value Ref Range    POCT Glucose 81 70 - 110 mg/dL   POCT glucose   Result Value Ref Range    POCT Glucose 137 (H) 70 - 110 mg/dL   POCT glucose   Result Value Ref Range    POCT Glucose 102 70 - 110 mg/dL   POCT glucose   Result Value Ref Range    POCT Glucose 98 70 - 110 mg/dL   POCT glucose   Result Value Ref Range    POCT Glucose 86 70 - 110 mg/dL   POCT glucose   Result Value Ref Range    POCT Glucose 83 70 - 110 mg/dL   POCT  glucose   Result Value Ref Range    POCT Glucose 87 70 - 110 mg/dL   POCT glucose   Result Value Ref Range    POCT Glucose 98 70 - 110 mg/dL   POCT glucose   Result Value Ref Range    POCT Glucose 94 70 - 110 mg/dL   POCT glucose   Result Value Ref Range    POCT Glucose 92 70 - 110 mg/dL   POCT glucose   Result Value Ref Range    POCT Glucose 92 70 - 110 mg/dL   POCT glucose   Result Value Ref Range    POCT Glucose 137 (H) 70 - 110 mg/dL         Current Outpatient Medications:     albuterol (PROVENTIL/VENTOLIN HFA) 90 mcg/actuation inhaler, inhale 1-2 Puff(s) By Mouth Every 4 Hours as needed, Disp: 8.5 g, Rfl: 0    amiodarone (PACERONE) 200 MG Tab, Take 1 tablet (200 mg total) by mouth 2 (two) times daily., Disp: 60 tablet, Rfl: 2    carvediloL (COREG) 12.5 MG tablet, Take 1 tablet (12.5 mg total) by mouth 2 (two) times daily with meals., Disp: 180 tablet, Rfl: 3    ferrous sulfate (FEOSOL) 325 mg (65 mg iron) Tab tablet, Take 1 tablet (325 mg total) by mouth daily with breakfast., Disp: , Rfl: 0    furosemide (LASIX) 40 MG tablet, Take 1 tablet (40 mg total) by mouth once daily., Disp: 60 tablet, Rfl: 2    rivaroxaban (XARELTO) 20 mg Tab, Take 1 tablet (20 mg total) by mouth daily with dinner or evening meal., Disp: 90 tablet, Rfl: 3     Lab Results   Component Value Date    WBC 10.21 08/30/2022    RBC 4.50 (L) 08/30/2022    HGB 14.3 09/02/2022    HCT 43.5 08/30/2022    MCV 97 08/30/2022    MCH 31.8 (H) 08/30/2022    MCHC 32.9 08/30/2022    RDW 14.5 08/30/2022     08/30/2022    MPV 12.7 08/30/2022    GRAN 6.9 08/30/2022    GRAN 67.6 08/30/2022    LYMPH 1.9 08/30/2022    LYMPH 19.0 08/30/2022    MONO 1.2 (H) 08/30/2022    MONO 11.3 08/30/2022    EOS 0.2 08/30/2022    BASO 0.03 08/30/2022    EOSINOPHIL 1.6 08/30/2022    BASOPHIL 0.3 08/30/2022    MG 2.1 09/02/2022        CMP  Lab Results   Component Value Date     09/02/2022    K 4.6 09/02/2022     09/02/2022    CO2 19 (L) 09/02/2022    GLU 87  09/02/2022    BUN 31 (H) 09/02/2022    CREATININE 1.2 09/02/2022    CALCIUM 8.9 09/02/2022    PROT 6.6 08/29/2022    ALBUMIN 3.6 08/29/2022    BILITOT 0.9 08/29/2022    ALKPHOS 62 08/29/2022    AST 27 08/29/2022    ALT 35 08/29/2022    ANIONGAP 15 09/02/2022    ESTGFRAFRICA >60 05/23/2019    EGFRNONAA >60 05/23/2019        No results found for: LABBLOO, LABURIN, RESPIRATORYC, GSRESP         Results for orders placed or performed during the hospital encounter of 08/29/22   EKG 12-lead    Collection Time: 08/29/22  4:52 PM    Narrative    Test Reason : R06.02,    Vent. Rate : 106 BPM     Atrial Rate : 107 BPM     P-R Int : 000 ms          QRS Dur : 090 ms      QT Int : 332 ms       P-R-T Axes : 000 040 -43 degrees     QTc Int : 441 ms    Atrial fibrillation with rapid ventricular response  Nonspecific T wave abnormality  Voltage criteria for left ventricular hypertrophy  Abnormal ECG  When compared with ECG of 24-AUG-2022 10:15,  The axis Shifted right  Confirmed by Anabelle Bradley MD ([id number]) on 9/6/2022 11:16:52 AM    Referred By: ERIKA   SELF           Confirmed By:Anabelle Bradley MD                  Plan:       Problem List Items Addressed This Visit          Cardiac/Vascular    Essential hypertension     Blood pressure today, I will maintain his current medications.         Persistent atrial fibrillation     He remains in atrial fibrillation, amiodarone initiated on hospital discharge.  He is on Xarelto.  Cardioversion planned.  It will need to be NORBERT guided.  He remains symptomatic related to AFib.         Dyspnea on exertion     He is [employer] [place] [employer] functional capacity 4 by symptoms.  His ejection fraction is 35% by echo.  There is LV enlargement.  He will continue furosemide.    Ace inhibitors not ordered today because of normal blood pressure and my goal is to control his heart rates from AFib at this time.         Nonrheumatic mitral valve regurgitation     His murmur is less audible  today than previous.  Transesophageal echocardiogram planned at time of cardioversion next week to confirm mitral valve anatomy and mitral regurgitation severity.         Left ventricular systolic dysfunction     The etiology could be AFib or mitral regurgitation or both in combination.  Current EF 35%.  Once back in sinus rhythm if AFib related due to tachycardia, it could take weeks for the EF to improve.    We have discussed mitral valve disease and possible mitral valve surgery.  Upcoming NORBERT planned.          Other Visit Diagnoses       SOB (shortness of breath)    -  Primary    Relevant Orders    Case Request-Cath Lab: Cardioversion (Completed)    Paroxysmal atrial fibrillation        Relevant Medications    carvediloL (COREG) 12.5 MG tablet    Other Relevant Orders    Case Request-Cath Lab: Cardioversion (Completed)                 A NORBERT/cardioversion is scheduled for next week.  Carvedilol dosing increased to 12.5 mg b.i.d..  He has had cardioversion in the past.  I explained the reasons why his shortness of breath is not improving.    His mitral regurgitation murmur is not as prominent today on exam.  It was severe MR on his most recent transthoracic echo performed August 24, 2022.  He is only been on Xarelto for about 2 weeks.  If there is thrombus no cardioversion will be possible.  His persistent heart failure symptoms warrant NORBERT next week.         Julio James MD  09/09/2022   12:34 PM

## 2022-09-07 NOTE — PROGRESS NOTES
[facility] Cardiology     Subjective:    Patient ID:  David Barrios is a 62 y.o. male who presents for follow-up of Valvular Heart Disease, Atrial Fibrillation, Congestive Heart Failure, and Hypertension    Review of patient's allergies indicates:  No Known Allergies   He is still reporting difficulties with shortness of breath.  He is on furosemide 20 mg daily.  He was admitted for heart failure and diuresed well.  He was discharged with increasing doses of Coreg as well as amiodarone 200 mg 2 times daily.  He is on anticoagulation.  His heart rates are better on exam today but he is still in AFib.  He has had problems with nausea.  If he raises the pillows at night to help him sleep he gets more nausea which is why he has been laying prone.  He does not feel palpitations on a regular basis.      Review of Systems   Constitutional: Negative for chills, decreased appetite, diaphoresis, fever, malaise/fatigue, night sweats, weight gain and weight loss.   HENT:  Negative for congestion, ear discharge, ear pain, hearing loss, hoarse voice, nosebleeds, odynophagia, sore throat, stridor and tinnitus.    Eyes:  Negative for blurred vision, discharge, double vision, pain, photophobia, redness, vision loss in left eye, vision loss in right eye, visual disturbance and visual halos.   Cardiovascular:  Positive for dyspnea on exertion and orthopnea. Negative for chest pain, claudication, cyanosis, irregular heartbeat, leg swelling, near-syncope, palpitations, paroxysmal nocturnal dyspnea and syncope.   Respiratory:  Positive for shortness of breath and sleep disturbances due to breathing. Negative for cough, hemoptysis, snoring, sputum production and wheezing.    Endocrine: Negative for cold intolerance, heat intolerance, polydipsia, polyphagia and polyuria.   Hematologic/Lymphatic: Negative for adenopathy and bleeding problem. Does not bruise/bleed  "easily.   Skin:  Negative for color change, dry skin, flushing, itching, nail changes, poor wound healing, rash, skin cancer, suspicious lesions and unusual hair distribution.   Musculoskeletal:  Negative for arthritis, back pain, falls, gout, joint pain, joint swelling, muscle cramps, muscle weakness, myalgias, neck pain and stiffness.   Gastrointestinal:  Positive for nausea. Negative for bloating, abdominal pain, anorexia, change in bowel habit, bowel incontinence, constipation, diarrhea, dysphagia, excessive appetite, flatus, heartburn, hematemesis, hematochezia, hemorrhoids, jaundice, melena and vomiting.   Genitourinary:  Negative for bladder incontinence, decreased libido, dysuria, flank pain, frequency, genital sores, hematuria, hesitancy, incomplete emptying, nocturia and urgency.   Neurological:  Negative for aphonia, brief paralysis, difficulty with concentration, disturbances in coordination, excessive daytime sleepiness, dizziness, focal weakness, headaches, light-headedness, loss of balance, numbness, paresthesias, seizures, sensory change, tremors, vertigo and weakness.   Psychiatric/Behavioral:  Negative for altered mental status, depression, hallucinations, memory loss, substance abuse, suicidal ideas and thoughts of violence. The patient does not have insomnia and is not nervous/anxious.    Allergic/Immunologic: Negative for hives and persistent infections.      Objective:       Vitals:    09/07/22 1150   BP: 112/77   Pulse: 64   SpO2: 96%   Weight: 90.9 kg (200 lb 7.4 oz)   Height: 5' 9" (1.753 m)    Physical Exam  Constitutional:       General: He is not in acute distress.     Appearance: He is well-developed. He is not diaphoretic.   HENT:      Head: Normocephalic and atraumatic.      Nose: Nose normal.   Eyes:      General: No scleral icterus.        Right eye: No discharge.      Conjunctiva/sclera: Conjunctivae normal.      Pupils: Pupils are equal, round, and reactive to light.   Neck:      " Thyroid: No thyromegaly.      Vascular: No JVD.      Trachea: No tracheal deviation.   Cardiovascular:      Rate and Rhythm: Tachycardia present. Rhythm irregularly irregular.      Pulses:           Carotid pulses are 2+ on the right side and 2+ on the left side.       Radial pulses are 2+ on the right side and 2+ on the left side.        Dorsalis pedis pulses are 2+ on the right side and 2+ on the left side.        Posterior tibial pulses are 2+ on the right side and 2+ on the left side.      Heart sounds: Murmur heard.   High-pitched blowing holosystolic murmur is present with a grade of 2/6 at the apex.     No friction rub. No gallop.   Pulmonary:      Effort: Pulmonary effort is normal. No respiratory distress.      Breath sounds: Normal breath sounds. No stridor. No wheezing or rales.   Chest:      Chest wall: No tenderness.   Abdominal:      General: Bowel sounds are normal. There is no distension.      Palpations: Abdomen is soft. There is no mass.      Tenderness: There is no abdominal tenderness. There is no guarding or rebound.   Musculoskeletal:         General: No tenderness. Normal range of motion.      Cervical back: Normal range of motion and neck supple.   Lymphadenopathy:      Cervical: No cervical adenopathy.   Skin:     General: Skin is warm and dry.      Coloration: Skin is not pale.      Findings: No erythema or rash.   Neurological:      Mental Status: He is alert and oriented to person, place, and time.      Cranial Nerves: No cranial nerve deficit.      Coordination: Coordination normal.   Psychiatric:         Behavior: Behavior normal.         Thought Content: Thought content normal.         Judgment: Judgment normal.         Assessment:       1. SOB (shortness of breath)    2. Paroxysmal atrial fibrillation    3. Nonrheumatic mitral valve regurgitation    4. Essential hypertension    5. Persistent atrial fibrillation    6. Dyspnea on exertion    7. Left ventricular systolic dysfunction       Results for orders placed or performed during the hospital encounter of 08/29/22   CBC auto differential   Result Value Ref Range    WBC 8.07 3.90 - 12.70 K/uL    RBC 4.18 (L) 4.60 - 6.20 M/uL    Hemoglobin 13.5 (L) 14.0 - 18.0 g/dL    Hematocrit 40.5 40.0 - 54.0 %    MCV 97 82 - 98 fL    MCH 32.3 (H) 27.0 - 31.0 pg    MCHC 33.3 32.0 - 36.0 g/dL    RDW 14.1 11.5 - 14.5 %    Platelets 206 150 - 450 K/uL    MPV 12.5 9.2 - 12.9 fL    Immature Granulocytes 0.2 0.0 - 0.5 %    Gran # (ANC) 5.8 1.8 - 7.7 K/uL    Immature Grans (Abs) 0.02 0.00 - 0.04 K/uL    Lymph # 1.3 1.0 - 4.8 K/uL    Mono # 0.7 0.3 - 1.0 K/uL    Eos # 0.2 0.0 - 0.5 K/uL    Baso # 0.04 0.00 - 0.20 K/uL    nRBC 0 0 /100 WBC    Gran % 72.0 38.0 - 73.0 %    Lymph % 16.4 (L) 18.0 - 48.0 %    Mono % 9.0 4.0 - 15.0 %    Eosinophil % 1.9 0.0 - 8.0 %    Basophil % 0.5 0.0 - 1.9 %    Differential Method Automated    Comprehensive metabolic panel   Result Value Ref Range    Sodium 140 136 - 145 mmol/L    Potassium 3.7 3.5 - 5.1 mmol/L    Chloride 105 95 - 110 mmol/L    CO2 24 23 - 29 mmol/L    Glucose 152 (H) 70 - 110 mg/dL    BUN 23 8 - 23 mg/dL    Creatinine 1.0 0.5 - 1.4 mg/dL    Calcium 9.2 8.7 - 10.5 mg/dL    Total Protein 6.6 6.0 - 8.4 g/dL    Albumin 3.6 3.5 - 5.2 g/dL    Total Bilirubin 0.9 0.1 - 1.0 mg/dL    Alkaline Phosphatase 62 55 - 135 U/L    AST 27 10 - 40 U/L    ALT 35 10 - 44 U/L    Anion Gap 11 8 - 16 mmol/L    eGFR >60 >60 mL/min/1.73 m^2   Brain natriuretic peptide   Result Value Ref Range     (H) 0 - 99 pg/mL   Troponin I   Result Value Ref Range    Troponin I 0.015 0.000 - 0.026 ng/mL   TSH   Result Value Ref Range    TSH 2.183 0.400 - 4.000 uIU/mL   Magnesium   Result Value Ref Range    Magnesium 2.1 1.6 - 2.6 mg/dL   Troponin I   Result Value Ref Range    Troponin I 0.022 0.000 - 0.026 ng/mL   Basic Metabolic Panel (BMP)   Result Value Ref Range    Sodium 143 136 - 145 mmol/L    Potassium 4.1 3.5 - 5.1 mmol/L    Chloride 106 95 -  110 mmol/L    CO2 23 23 - 29 mmol/L    Glucose 82 70 - 110 mg/dL    BUN 25 (H) 8 - 23 mg/dL    Creatinine 1.1 0.5 - 1.4 mg/dL    Calcium 9.1 8.7 - 10.5 mg/dL    Anion Gap 14 8 - 16 mmol/L    eGFR >60 >60 mL/min/1.73 m^2   Magnesium   Result Value Ref Range    Magnesium 2.1 1.6 - 2.6 mg/dL   Troponin I   Result Value Ref Range    Troponin I 0.034 (H) 0.000 - 0.026 ng/mL   CBC with Automated Differential   Result Value Ref Range    WBC 10.21 3.90 - 12.70 K/uL    RBC 4.50 (L) 4.60 - 6.20 M/uL    Hemoglobin 14.3 14.0 - 18.0 g/dL    Hematocrit 43.5 40.0 - 54.0 %    MCV 97 82 - 98 fL    MCH 31.8 (H) 27.0 - 31.0 pg    MCHC 32.9 32.0 - 36.0 g/dL    RDW 14.5 11.5 - 14.5 %    Platelets 211 150 - 450 K/uL    MPV 12.7 9.2 - 12.9 fL    Immature Granulocytes 0.2 0.0 - 0.5 %    Gran # (ANC) 6.9 1.8 - 7.7 K/uL    Immature Grans (Abs) 0.02 0.00 - 0.04 K/uL    Lymph # 1.9 1.0 - 4.8 K/uL    Mono # 1.2 (H) 0.3 - 1.0 K/uL    Eos # 0.2 0.0 - 0.5 K/uL    Baso # 0.03 0.00 - 0.20 K/uL    nRBC 0 0 /100 WBC    Gran % 67.6 38.0 - 73.0 %    Lymph % 19.0 18.0 - 48.0 %    Mono % 11.3 4.0 - 15.0 %    Eosinophil % 1.6 0.0 - 8.0 %    Basophil % 0.3 0.0 - 1.9 %    Differential Method Automated    Procalcitonin   Result Value Ref Range    Procalcitonin 0.02 <0.25 ng/mL   Basic metabolic panel   Result Value Ref Range    Sodium 142 136 - 145 mmol/L    Potassium 3.8 3.5 - 5.1 mmol/L    Chloride 103 95 - 110 mmol/L    CO2 29 23 - 29 mmol/L    Glucose 90 70 - 110 mg/dL    BUN 24 (H) 8 - 23 mg/dL    Creatinine 1.1 0.5 - 1.4 mg/dL    Calcium 9.2 8.7 - 10.5 mg/dL    Anion Gap 10 8 - 16 mmol/L    eGFR >60 >60 mL/min/1.73 m^2   Magnesium   Result Value Ref Range    Magnesium 2.2 1.6 - 2.6 mg/dL   Basic Metabolic Panel   Result Value Ref Range    Sodium 141 136 - 145 mmol/L    Potassium 3.7 3.5 - 5.1 mmol/L    Chloride 102 95 - 110 mmol/L    CO2 27 23 - 29 mmol/L    Glucose 93 70 - 110 mg/dL    BUN 27 (H) 8 - 23 mg/dL    Creatinine 1.1 0.5 - 1.4 mg/dL     Calcium 9.2 8.7 - 10.5 mg/dL    Anion Gap 12 8 - 16 mmol/L    eGFR >60 >60 mL/min/1.73 m^2   Magnesium   Result Value Ref Range    Magnesium 2.1 1.6 - 2.6 mg/dL   Hemoglobin   Result Value Ref Range    Hemoglobin 14.3 14.0 - 18.0 g/dL   Basic metabolic panel   Result Value Ref Range    Sodium 138 136 - 145 mmol/L    Potassium 4.6 3.5 - 5.1 mmol/L    Chloride 104 95 - 110 mmol/L    CO2 19 (L) 23 - 29 mmol/L    Glucose 87 70 - 110 mg/dL    BUN 31 (H) 8 - 23 mg/dL    Creatinine 1.2 0.5 - 1.4 mg/dL    Calcium 8.9 8.7 - 10.5 mg/dL    Anion Gap 15 8 - 16 mmol/L    eGFR >60 >60 mL/min/1.73 m^2   Magnesium   Result Value Ref Range    Magnesium 2.1 1.6 - 2.6 mg/dL   Hemoglobin A1c   Result Value Ref Range    Hemoglobin A1C 5.5 4.0 - 5.6 %    Estimated Avg Glucose 111 68 - 131 mg/dL   POCT COVID-19 Rapid Screening   Result Value Ref Range    POC Rapid COVID Negative Negative     Acceptable Yes    ISTAT PROCEDURE   Result Value Ref Range    POC PH 7.415 7.35 - 7.45    POC PCO2 35.6 35 - 45 mmHg    POC PO2 78 (L) 80 - 100 mmHg    POC HCO3 22.8 (L) 24 - 28 mmol/L    POC BE -2 -2 to 2 mmol/L    POC SATURATED O2 96 95 - 100 %    POC Sodium 142 136 - 145 mmol/L    POC Potassium 4.0 3.5 - 5.1 mmol/L    POC TCO2 24 23 - 27 mmol/L    POC Hematocrit 46 36 - 54 %PCV    POC HEMOGLOBIN 16 g/dL    Rate 12     Sample ARTERIAL     Site LR     Allens Test Pass     DelSys CPAP/BiPAP     Mode BiPAP     FiO2 28     Spont Rate 24     Min Vol 15.9     Sp02 96     IP 10     EP 5    POCT glucose   Result Value Ref Range    POCT Glucose 81 70 - 110 mg/dL   POCT glucose   Result Value Ref Range    POCT Glucose 137 (H) 70 - 110 mg/dL   POCT glucose   Result Value Ref Range    POCT Glucose 102 70 - 110 mg/dL   POCT glucose   Result Value Ref Range    POCT Glucose 98 70 - 110 mg/dL   POCT glucose   Result Value Ref Range    POCT Glucose 86 70 - 110 mg/dL   POCT glucose   Result Value Ref Range    POCT Glucose 83 70 - 110 mg/dL   POCT  glucose   Result Value Ref Range    POCT Glucose 87 70 - 110 mg/dL   POCT glucose   Result Value Ref Range    POCT Glucose 98 70 - 110 mg/dL   POCT glucose   Result Value Ref Range    POCT Glucose 94 70 - 110 mg/dL   POCT glucose   Result Value Ref Range    POCT Glucose 92 70 - 110 mg/dL   POCT glucose   Result Value Ref Range    POCT Glucose 92 70 - 110 mg/dL   POCT glucose   Result Value Ref Range    POCT Glucose 137 (H) 70 - 110 mg/dL         Current Outpatient Medications:     albuterol (PROVENTIL/VENTOLIN HFA) 90 mcg/actuation inhaler, inhale 1-2 Puff(s) By Mouth Every 4 Hours as needed, Disp: 8.5 g, Rfl: 0    amiodarone (PACERONE) 200 MG Tab, Take 1 tablet (200 mg total) by mouth 2 (two) times daily., Disp: 60 tablet, Rfl: 2    carvediloL (COREG) 12.5 MG tablet, Take 1 tablet (12.5 mg total) by mouth 2 (two) times daily with meals., Disp: 180 tablet, Rfl: 3    ferrous sulfate (FEOSOL) 325 mg (65 mg iron) Tab tablet, Take 1 tablet (325 mg total) by mouth daily with breakfast., Disp: , Rfl: 0    furosemide (LASIX) 40 MG tablet, Take 1 tablet (40 mg total) by mouth once daily., Disp: 60 tablet, Rfl: 2    rivaroxaban (XARELTO) 20 mg Tab, Take 1 tablet (20 mg total) by mouth daily with dinner or evening meal., Disp: 90 tablet, Rfl: 3     Lab Results   Component Value Date    WBC 10.21 08/30/2022    RBC 4.50 (L) 08/30/2022    HGB 14.3 09/02/2022    HCT 43.5 08/30/2022    MCV 97 08/30/2022    MCH 31.8 (H) 08/30/2022    MCHC 32.9 08/30/2022    RDW 14.5 08/30/2022     08/30/2022    MPV 12.7 08/30/2022    GRAN 6.9 08/30/2022    GRAN 67.6 08/30/2022    LYMPH 1.9 08/30/2022    LYMPH 19.0 08/30/2022    MONO 1.2 (H) 08/30/2022    MONO 11.3 08/30/2022    EOS 0.2 08/30/2022    BASO 0.03 08/30/2022    EOSINOPHIL 1.6 08/30/2022    BASOPHIL 0.3 08/30/2022    MG 2.1 09/02/2022        CMP  Lab Results   Component Value Date     09/02/2022    K 4.6 09/02/2022     09/02/2022    CO2 19 (L) 09/02/2022    GLU 87  09/02/2022    BUN 31 (H) 09/02/2022    CREATININE 1.2 09/02/2022    CALCIUM 8.9 09/02/2022    PROT 6.6 08/29/2022    ALBUMIN 3.6 08/29/2022    BILITOT 0.9 08/29/2022    ALKPHOS 62 08/29/2022    AST 27 08/29/2022    ALT 35 08/29/2022    ANIONGAP 15 09/02/2022    ESTGFRAFRICA >60 05/23/2019    EGFRNONAA >60 05/23/2019        No results found for: LABBLOO, LABURIN, RESPIRATORYC, GSRESP         Results for orders placed or performed during the hospital encounter of 08/29/22   EKG 12-lead    Collection Time: 08/29/22  4:52 PM    Narrative    Test Reason : R06.02,    Vent. Rate : 106 BPM     Atrial Rate : 107 BPM     P-R Int : 000 ms          QRS Dur : 090 ms      QT Int : 332 ms       P-R-T Axes : 000 040 -43 degrees     QTc Int : 441 ms    Atrial fibrillation with rapid ventricular response  Nonspecific T wave abnormality  Voltage criteria for left ventricular hypertrophy  Abnormal ECG  When compared with ECG of 24-AUG-2022 10:15,  The axis Shifted right  Confirmed by Anabelle Bradley MD ([id number]) on 9/6/2022 11:16:52 AM    Referred By: ERIKA   SELF           Confirmed By:Anabelle Bradley MD                  Plan:       Problem List Items Addressed This Visit          Cardiac/Vascular    Essential hypertension     Blood pressure today, I will maintain his current medications.         Persistent atrial fibrillation     He remains in atrial fibrillation, amiodarone initiated on hospital discharge.  He is on Xarelto.  Cardioversion planned.  It will need to be NORBERT guided.  He remains symptomatic related to AFib.         Dyspnea on exertion     He is [employer] functional capacity 4 by symptoms.  His ejection fraction is 35% by echo.  There is LV enlargement.  He will continue furosemide.    Ace inhibitors not ordered today because of normal blood pressure and my goal is to control his heart rates from AFib at this time.         Nonrheumatic mitral valve regurgitation     His murmur is less audible  today than previous.  Transesophageal echocardiogram planned at time of cardioversion next week to confirm mitral valve anatomy and mitral regurgitation severity.         Left ventricular systolic dysfunction     The etiology could be AFib or mitral regurgitation or both in combination.  Current EF 35%.  Once back in sinus rhythm if AFib related due to tachycardia, it could take weeks for the EF to improve.    We have discussed mitral valve disease and possible mitral valve surgery.  Upcoming NORBERT planned.          Other Visit Diagnoses       SOB (shortness of breath)    -  Primary    Relevant Orders    Case Request-Cath Lab: Cardioversion (Completed)    Paroxysmal atrial fibrillation        Relevant Medications    carvediloL (COREG) 12.5 MG tablet    Other Relevant Orders    Case Request-Cath Lab: Cardioversion (Completed)                 A NORBERT/cardioversion is scheduled for next week.  Carvedilol dosing increased to 12.5 mg b.i.d..  He has had cardioversion in the past.  I explained the reasons why his shortness of breath is not improving.    His mitral regurgitation murmur is not as prominent today on exam.  It was severe MR on his most recent transthoracic echo performed August 24, 2022.  He is only been on Xarelto for about 2 weeks.  If there is thrombus no cardioversion will be possible.  His persistent heart failure symptoms warrant NORBERT next week.         Julio James MD  09/09/2022   12:34 PM

## 2022-09-08 DIAGNOSIS — I48.19 PERSISTENT ATRIAL FIBRILLATION: Primary | ICD-10-CM

## 2022-09-08 RX ORDER — SODIUM CHLORIDE 9 MG/ML
INJECTION, SOLUTION INTRAVENOUS CONTINUOUS
Status: CANCELLED | OUTPATIENT
Start: 2022-09-08 | End: 2022-09-08

## 2022-09-09 PROBLEM — I51.9 LEFT VENTRICULAR SYSTOLIC DYSFUNCTION: Status: ACTIVE | Noted: 2022-09-09

## 2022-09-09 PROBLEM — I51.89 LEFT VENTRICULAR SYSTOLIC DYSFUNCTION: Status: ACTIVE | Noted: 2022-09-09

## 2022-09-09 PROBLEM — E66.01 SEVERE OBESITY (BMI >= 40): Status: RESOLVED | Noted: 2022-08-29 | Resolved: 2022-09-09

## 2022-09-09 NOTE — ASSESSMENT & PLAN NOTE
He remains in atrial fibrillation, amiodarone initiated on hospital discharge.  He is on Xarelto.  Cardioversion planned.  It will need to be NORBERT guided.  He remains symptomatic related to AFib.

## 2022-09-09 NOTE — ASSESSMENT & PLAN NOTE
He is [employer] functional capacity 4 by symptoms.  His ejection fraction is 35% by echo.  There is LV enlargement.  He will continue furosemide.    Ace inhibitors not ordered today because of normal blood pressure and my goal is to control his heart rates from AFib at this time.

## 2022-09-09 NOTE — ASSESSMENT & PLAN NOTE
The etiology could be AFib or mitral regurgitation or both in combination.  Current EF 35%.  Once back in sinus rhythm if AFib related due to tachycardia, it could take weeks for the EF to improve.    We have discussed mitral valve disease and possible mitral valve surgery.  Upcoming ONRBERT planned.

## 2022-09-13 ENCOUNTER — LAB VISIT (OUTPATIENT)
Dept: LAB | Facility: HOSPITAL | Age: 62
End: 2022-09-13
Attending: INTERNAL MEDICINE
Payer: COMMERCIAL

## 2022-09-13 ENCOUNTER — LAB VISIT (OUTPATIENT)
Dept: FAMILY MEDICINE | Facility: CLINIC | Age: 62
End: 2022-09-13
Payer: COMMERCIAL

## 2022-09-13 DIAGNOSIS — Z01.810 PRE-OPERATIVE CARDIOVASCULAR EXAMINATION: ICD-10-CM

## 2022-09-13 LAB
ANION GAP SERPL CALC-SCNC: 13 MMOL/L (ref 8–16)
BASOPHILS # BLD AUTO: 0.06 K/UL (ref 0–0.2)
BASOPHILS NFR BLD: 0.8 % (ref 0–1.9)
BUN SERPL-MCNC: 29 MG/DL (ref 8–23)
CALCIUM SERPL-MCNC: 9.5 MG/DL (ref 8.7–10.5)
CHLORIDE SERPL-SCNC: 102 MMOL/L (ref 95–110)
CO2 SERPL-SCNC: 27 MMOL/L (ref 23–29)
CREAT SERPL-MCNC: 1.5 MG/DL (ref 0.5–1.4)
DIFFERENTIAL METHOD: ABNORMAL
EOSINOPHIL # BLD AUTO: 0.2 K/UL (ref 0–0.5)
EOSINOPHIL NFR BLD: 2.8 % (ref 0–8)
ERYTHROCYTE [DISTWIDTH] IN BLOOD BY AUTOMATED COUNT: 15 % (ref 11.5–14.5)
EST. GFR  (NO RACE VARIABLE): 52 ML/MIN/1.73 M^2
GLUCOSE SERPL-MCNC: 80 MG/DL (ref 70–110)
HCT VFR BLD AUTO: 43.9 % (ref 40–54)
HGB BLD-MCNC: 14.2 G/DL (ref 14–18)
IMM GRANULOCYTES # BLD AUTO: 0.01 K/UL (ref 0–0.04)
IMM GRANULOCYTES NFR BLD AUTO: 0.1 % (ref 0–0.5)
LYMPHOCYTES # BLD AUTO: 1.2 K/UL (ref 1–4.8)
LYMPHOCYTES NFR BLD: 17.3 % (ref 18–48)
MCH RBC QN AUTO: 31.6 PG (ref 27–31)
MCHC RBC AUTO-ENTMCNC: 32.3 G/DL (ref 32–36)
MCV RBC AUTO: 98 FL (ref 82–98)
MONOCYTES # BLD AUTO: 1 K/UL (ref 0.3–1)
MONOCYTES NFR BLD: 13.9 % (ref 4–15)
NEUTROPHILS # BLD AUTO: 4.7 K/UL (ref 1.8–7.7)
NEUTROPHILS NFR BLD: 65.1 % (ref 38–73)
NRBC BLD-RTO: 0 /100 WBC
PLATELET # BLD AUTO: 191 K/UL (ref 150–450)
PMV BLD AUTO: 12.9 FL (ref 9.2–12.9)
POTASSIUM SERPL-SCNC: 3.7 MMOL/L (ref 3.5–5.1)
RBC # BLD AUTO: 4.49 M/UL (ref 4.6–6.2)
SARS-COV-2 RNA RESP QL NAA+PROBE: NOT DETECTED
SARS-COV-2- CYCLE NUMBER: NORMAL
SODIUM SERPL-SCNC: 142 MMOL/L (ref 136–145)
WBC # BLD AUTO: 7.18 K/UL (ref 3.9–12.7)

## 2022-09-13 PROCEDURE — 85025 COMPLETE CBC W/AUTO DIFF WBC: CPT | Performed by: INTERNAL MEDICINE

## 2022-09-13 PROCEDURE — 80048 BASIC METABOLIC PNL TOTAL CA: CPT | Performed by: INTERNAL MEDICINE

## 2022-09-13 PROCEDURE — 36415 COLL VENOUS BLD VENIPUNCTURE: CPT | Performed by: INTERNAL MEDICINE

## 2022-09-13 PROCEDURE — U0005 INFEC AGEN DETEC AMPLI PROBE: HCPCS | Performed by: INTERNAL MEDICINE

## 2022-09-13 PROCEDURE — U0003 INFECTIOUS AGENT DETECTION BY NUCLEIC ACID (DNA OR RNA); SEVERE ACUTE RESPIRATORY SYNDROME CORONAVIRUS 2 (SARS-COV-2) (CORONAVIRUS DISEASE [COVID-19]), AMPLIFIED PROBE TECHNIQUE, MAKING USE OF HIGH THROUGHPUT TECHNOLOGIES AS DESCRIBED BY CMS-2020-01-R: HCPCS | Performed by: INTERNAL MEDICINE

## 2022-09-14 ENCOUNTER — EXTERNAL HOME HEALTH (OUTPATIENT)
Dept: HOME HEALTH SERVICES | Facility: HOSPITAL | Age: 62
End: 2022-09-14
Payer: COMMERCIAL

## 2022-09-15 ENCOUNTER — ANESTHESIA EVENT (OUTPATIENT)
Dept: CARDIOLOGY | Facility: HOSPITAL | Age: 62
End: 2022-09-15
Payer: COMMERCIAL

## 2022-09-15 ENCOUNTER — HOSPITAL ENCOUNTER (OUTPATIENT)
Facility: HOSPITAL | Age: 62
Discharge: HOME | End: 2022-09-15
Attending: INTERNAL MEDICINE | Admitting: INTERNAL MEDICINE
Payer: COMMERCIAL

## 2022-09-15 ENCOUNTER — ANESTHESIA (OUTPATIENT)
Dept: CARDIOLOGY | Facility: HOSPITAL | Age: 62
End: 2022-09-15
Payer: COMMERCIAL

## 2022-09-15 VITALS
OXYGEN SATURATION: 99 % | SYSTOLIC BLOOD PRESSURE: 124 MMHG | TEMPERATURE: 97 F | WEIGHT: 200 LBS | BODY MASS INDEX: 29.62 KG/M2 | HEIGHT: 69 IN | RESPIRATION RATE: 22 BRPM | DIASTOLIC BLOOD PRESSURE: 90 MMHG | HEART RATE: 67 BPM

## 2022-09-15 DIAGNOSIS — I48.91 ATRIAL FIBRILLATION STATUS POST CARDIOVERSION: ICD-10-CM

## 2022-09-15 DIAGNOSIS — I34.0 NONRHEUMATIC MITRAL VALVE REGURGITATION: ICD-10-CM

## 2022-09-15 DIAGNOSIS — R06.02 SOB (SHORTNESS OF BREATH): ICD-10-CM

## 2022-09-15 DIAGNOSIS — I42.9 CARDIOMYOPATHY, UNSPECIFIED TYPE: ICD-10-CM

## 2022-09-15 DIAGNOSIS — I48.0 PAROXYSMAL ATRIAL FIBRILLATION: ICD-10-CM

## 2022-09-15 DIAGNOSIS — Z01.810 PRE-OPERATIVE CARDIOVASCULAR EXAMINATION: Primary | ICD-10-CM

## 2022-09-15 PROCEDURE — 25000003 PHARM REV CODE 250: Performed by: NURSE ANESTHETIST, CERTIFIED REGISTERED

## 2022-09-15 PROCEDURE — 92960 CARDIOVERSION ELECTRIC EXT: CPT | Performed by: INTERNAL MEDICINE

## 2022-09-15 PROCEDURE — 92960 CARDIOVERSION ELECTRIC EXT: CPT | Mod: ,,, | Performed by: INTERNAL MEDICINE

## 2022-09-15 PROCEDURE — 93005 ELECTROCARDIOGRAM TRACING: CPT

## 2022-09-15 PROCEDURE — 37000009 HC ANESTHESIA EA ADD 15 MINS: Performed by: INTERNAL MEDICINE

## 2022-09-15 PROCEDURE — 93010 ELECTROCARDIOGRAM REPORT: CPT | Mod: 76,,, | Performed by: INTERNAL MEDICINE

## 2022-09-15 PROCEDURE — 92960 PR CARDIOVERSION, ELECTIVE;EXTERN: ICD-10-PCS | Mod: ,,, | Performed by: INTERNAL MEDICINE

## 2022-09-15 PROCEDURE — 37000008 HC ANESTHESIA 1ST 15 MINUTES: Performed by: INTERNAL MEDICINE

## 2022-09-15 PROCEDURE — 93010 EKG 12-LEAD: ICD-10-PCS | Mod: 76,,, | Performed by: INTERNAL MEDICINE

## 2022-09-15 PROCEDURE — 63600175 PHARM REV CODE 636 W HCPCS: Performed by: NURSE ANESTHETIST, CERTIFIED REGISTERED

## 2022-09-15 RX ORDER — PROPOFOL 10 MG/ML
VIAL (ML) INTRAVENOUS
Status: DISCONTINUED | OUTPATIENT
Start: 2022-09-15 | End: 2022-09-15

## 2022-09-15 RX ORDER — LIDOCAINE HYDROCHLORIDE 20 MG/ML
INJECTION INTRAVENOUS
Status: DISCONTINUED | OUTPATIENT
Start: 2022-09-15 | End: 2022-09-15

## 2022-09-15 RX ORDER — PHENYLEPHRINE HYDROCHLORIDE 10 MG/ML
INJECTION INTRAVENOUS
Status: DISCONTINUED | OUTPATIENT
Start: 2022-09-15 | End: 2022-09-15

## 2022-09-15 RX ORDER — ETOMIDATE 2 MG/ML
INJECTION INTRAVENOUS
Status: DISCONTINUED | OUTPATIENT
Start: 2022-09-15 | End: 2022-09-15

## 2022-09-15 RX ORDER — SODIUM CHLORIDE 9 MG/ML
INJECTION, SOLUTION INTRAVENOUS CONTINUOUS
Status: CANCELLED | OUTPATIENT
Start: 2022-09-15 | End: 2022-09-15

## 2022-09-15 RX ORDER — SODIUM CHLORIDE 9 MG/ML
INJECTION, SOLUTION INTRAVENOUS CONTINUOUS
Status: ACTIVE | OUTPATIENT
Start: 2022-09-15 | End: 2022-09-15

## 2022-09-15 RX ADMIN — PROPOFOL 20 MG: 10 INJECTION, EMULSION INTRAVENOUS at 09:09

## 2022-09-15 RX ADMIN — LIDOCAINE HYDROCHLORIDE 40 MG: 20 INJECTION, SOLUTION INTRAVENOUS at 09:09

## 2022-09-15 RX ADMIN — SODIUM CHLORIDE: 0.9 INJECTION, SOLUTION INTRAVENOUS at 09:09

## 2022-09-15 RX ADMIN — PHENYLEPHRINE HYDROCHLORIDE 100 MCG: 10 INJECTION INTRAVENOUS at 09:09

## 2022-09-15 RX ADMIN — PROPOFOL 50 MG: 10 INJECTION, EMULSION INTRAVENOUS at 09:09

## 2022-09-15 RX ADMIN — ETOMIDATE 10 MG: 2 INJECTION, SOLUTION INTRAVENOUS at 09:09

## 2022-09-15 NOTE — DISCHARGE INSTRUCTIONS
Discharge Instructions:      Wear surgical mask.    You may have a sore throat for next couple of days. Use chloraseptic spray as directed on bottle or ice chips.   You may have bruising to chest or back.  Take tylenol as needed for soreness.  Any chest pain, shortness of breath call 911 go to ER.      Do not drive a car, operate heavy equipment, care for a young child, etc for the next 12-24 hours.   Avoid drinking alcohol for 24 hours.  Do not make any important decisions for 24 hours.    Drink fluids to keep hydrated. Resume your usual diet as tolerated.     Rest for today then activity as tolerated.   Do not lift anything over 5 pounds for the first 3 days after procedure.        Call MD for any unrelieved pain, excessive nausea or vomiting, redness around site, bleeding, or pus or foul smelling drainage, or any other questions or concerns.  Go to the ER for any difficulty breathing or chest pain.      Follow any additional instructions given to you by MD.   Call MD to schedule a follow up appointment, or follow up as instructed.

## 2022-09-15 NOTE — BRIEF OP NOTE
Successful Cardioversion, single shock 200J  NORBERT: Severe MR, EF 30-35%, global hypokinesis  Plan: Cath next week

## 2022-09-15 NOTE — PLAN OF CARE
Pt arrived independently from home, ambulates with/out assistance . Patient lying in bed with no complaints of pain or distress noted. Monitors applied. VSS, AAOx4.  Brown  non-skid socks placed on patient. Fall risk reviewed with patient. Procedure and recovery process explained to patient and all questions answered.  Patient verbalized understanding, denies questions. Will continue to monitor for safety and needs.

## 2022-09-15 NOTE — TRANSFER OF CARE
"Anesthesia Transfer of Care Note    Patient: David Barrios    Procedure(s) Performed: Procedure(s) (LRB):  Cardioversion (Left)    Patient location: Cath Lab    Anesthesia Type: MAC    Transport from OR: Transported from OR on 6-10 L/min O2 by face mask with adequate spontaneous ventilation    Post pain: adequate analgesia    Post assessment: no apparent anesthetic complications    Post vital signs: stable    Level of consciousness: awake    Nausea/Vomiting: no nausea/vomiting    Complications: none    Transfer of care protocol was followed      Last vitals:   Visit Vitals  BP (!) 141/102   Pulse 97   Temp 36.1 °C (97 °F) (Tympanic)   Resp 18   Ht 5' 9" (1.753 m)   Wt 90.7 kg (200 lb)   SpO2 96%   BMI 29.53 kg/m²     "

## 2022-09-15 NOTE — ANESTHESIA PREPROCEDURE EVALUATION
09/15/2022  David Barrios is a 62 y.o., male for kristen/CARDIOVERSION FOR A FIB. Ef 35%, severe MR    Review of patient's allergies indicates:  No Known Allergies    Past Medical History:   Diagnosis Date    Anemia     Atrial fibrillation     Encounter for blood transfusion     Esophageal ulcer     GI bleed     Hypertension      Past Surgical History:   Procedure Laterality Date    COLONOSCOPY N/A 4/4/2019    Procedure: COLONOSCOPY Golytely;  Surgeon: Umair Randolph MD;  Location: [facility];  Service: Endoscopy;  Laterality: N/A;    ESOPHAGOGASTRODUODENOSCOPY N/A 10/12/2018    Procedure: EGD (ESOPHAGOGASTRODUODENOSCOPY);  Surgeon: Braden Herring MD;  Location: [facility] ENDO;  Service: Endoscopy;  Laterality: N/A;    ESOPHAGOGASTRODUODENOSCOPY N/A 4/4/2019    Procedure: EGD;  Surgeon: Umair Randolph MD;  Location: [facility];  Service: Endoscopy;  Laterality: N/A;    HERNIA REPAIR       Patient Active Problem List   Diagnosis    Microcytic anemia    Hypokalemia    Elevated LFTs    Essential hypertension    Screening for malignant neoplasm of colon    Persistent atrial fibrillation    Dyspnea on exertion    Abnormal electrocardiogram    Nonrheumatic mitral valve regurgitation    Left ventricular systolic dysfunction     Wt Readings from Last 3 Encounters:   09/15/22 90.7 kg (200 lb)   09/07/22 90.9 kg (200 lb 7.4 oz)   09/02/22 94.2 kg (207 lb 10.8 oz)     Temp Readings from Last 3 Encounters:   09/15/22 36.1 °C (97 °F) (Tympanic)   09/02/22 36.1 °C (97 °F) (Oral)   05/23/19 37.3 °C (99.1 °F) (Oral)     BP Readings from Last 3 Encounters:   09/15/22 (!) 141/102   09/07/22 112/77   09/02/22 107/75     Pulse Readings from Last 3 Encounters:   09/15/22 97   09/07/22 64   09/02/22 83         Pre-op Assessment    I have reviewed the Patient Summary Reports.    I have reviewed the Nursing Notes. I  have reviewed the NPO Status.      Review of Systems  Cardiovascular:   Hypertension Valvular problems/Murmurs, MR    Pulmonary:   Shortness of breath    Hepatic/GI:   PUD,        Physical Exam  General:  Well nourished and Obesity      Airway/Jaw/Neck:  Airway Findings: Mouth Opening: Normal   Tongue: Normal   General Airway Assessment: Adult Mallampati: II  Improves to II with phonation.  TM Distance: Normal, at least 6 cm        Chest/Lungs:  Chest/Lungs Findings: Clear to auscultation, Normal Respiratory Rate      Heart/Vascular:  Heart Findings: Rate: Normal  Rhythm: Regular Rhythm  Sounds: Normal        Mental Status:  Mental Status Findings:  Cooperative, Alert and Oriented       Lab Results   Component Value Date    WBC 7.18 09/13/2022    HGB 14.2 09/13/2022    HCT 43.9 09/13/2022    MCV 98 09/13/2022     09/13/2022       Chemistry        Component Value Date/Time     09/13/2022 0950    K 3.7 09/13/2022 0950     09/13/2022 0950    CO2 27 09/13/2022 0950    BUN 29 (H) 09/13/2022 0950    CREATININE 1.5 (H) 09/13/2022 0950    GLU 80 09/13/2022 0950        Component Value Date/Time    CALCIUM 9.5 09/13/2022 0950    ALKPHOS 62 08/29/2022 1539    AST 27 08/29/2022 1539    ALT 35 08/29/2022 1539    BILITOT 0.9 08/29/2022 1539    ESTGFRAFRICA >60 05/23/2019 1152    EGFRNONAA >60 05/23/2019 1152        CONCLUSIONS     1 - Mild left atrial enlargement.     2 - Eccentric hypertrophy.     3 - No wall motion abnormalities.     4 - Normal left ventricular systolic function (EF 55-60%).     5 - Normal left ventricular diastolic function.     6 - Normal right ventricular systolic function .     7 - The estimated PA systolic pressure is greater than 17 mmHg.     8 - Trivial tricuspid regurgitation.   This document has been electronically    SIGNED BY: Anabelle Colon MD On: 10/12/2018 07:27    Anesthesia Plan  Type of Anesthesia, risks & benefits discussed:  Anesthesia Type:  MAC    Patient's  Preference:   Plan Factors:          Intra-op Monitoring Plan: Standard ASA Monitors  Intra-op Monitoring Plan Comments:   Post Op Pain Control Plan: multimodal analgesia  Post Op Pain Control Plan Comments:     Induction:   IV  Beta Blocker:         Informed Consent: Informed consent signed with the Patient and all parties understand the risks and agree with anesthesia plan.  All questions answered.  Anesthesia consent signed with patient.  ASA Score: 4     Day of Surgery Review of History & Physical:  There are no significant changes.            Ready For Surgery From Anesthesia Perspective.           Physical Exam  General: Well nourished and Obesity    Airway:  Mallampati: II / II  Mouth Opening: Normal  TM Distance: Normal, at least 6 cm  Tongue: Normal    Chest/Lungs:  Clear to auscultation, Normal Respiratory Rate    Heart:  Rate: Normal  Rhythm: Regular Rhythm  Sounds: Normal          Anesthesia Plan  Type of Anesthesia, risks & benefits discussed:    Anesthesia Type: MAC  Intra-op Monitoring Plan: Standard ASA Monitors  Post Op Pain Control Plan: multimodal analgesia  Induction:  IV  Informed Consent: Informed consent signed with the Patient and all parties understand the risks and agree with anesthesia plan.  All questions answered.   ASA Score: 4    Ready For Surgery From Anesthesia Perspective.       .

## 2022-09-15 NOTE — PLAN OF CARE
Patient remains cath lab bay #1 on stretcher with side rails up x2. Pt drowsy but / and able to follow commands. Pt is stable when connecting to cardiac monitors. VSS.   Skin normal in color and warm to touch, <3 sec cap refill.  Fall risk precautions given and patient acknowledges.  AIDET completed to pt. Will continue to monitor patient.

## 2022-09-15 NOTE — DISCHARGE SUMMARY
[facility] - Lab (Hospital)  Discharge Note  Short Stay    Procedure(s) (LRB):  Cardioversion (Left)    OUTCOME: Patient tolerated treatment/procedure well without complication and is now ready for discharge.    DISPOSITION: Home or Self Care    FINAL DIAGNOSIS:  <principal problem not specified>    FOLLOWUP: In clinic    DISCHARGE INSTRUCTIONS:    Discharge Procedure Orders   COVID-19 Routine Screening   Standing Status: Future Number of Occurrences: 1 Standing Exp. Date: 11/06/23     Order Specific Question Answer Comments   Is the patient symptomatic? No    Is this needed for pre-procedure or pre-op testing? Yes    Diagnosis: Pre-operative cardiovascular examination [V72.81.ICD-9-CM]      BASIC METABOLIC PANEL   Standing Status: Future Number of Occurrences: 1 Standing Exp. Date: 11/06/23     CBC W/ AUTO DIFFERENTIAL   Standing Status: Future Number of Occurrences: 1 Standing Exp. Date: 11/06/23         Clinical Reference Documents Added to Patient Instructions         Document    CARDIOVERSION (ENGLISH)    TRANSESOPHAGEAL ECHOCARDIOGRAM (ENGLISH)            TIME SPENT ON DISCHARGE: 30 minutes

## 2022-09-16 ENCOUNTER — TELEPHONE (OUTPATIENT)
Dept: CARDIOLOGY | Facility: CLINIC | Age: 62
End: 2022-09-16
Payer: COMMERCIAL

## 2022-09-16 DIAGNOSIS — I34.0 MITRAL REGURGITATION: ICD-10-CM

## 2022-09-16 DIAGNOSIS — I34.0 NONRHEUMATIC MITRAL VALVE REGURGITATION: Primary | ICD-10-CM

## 2022-09-16 DIAGNOSIS — I42.8 OTHER CARDIOMYOPATHY: ICD-10-CM

## 2022-09-16 DIAGNOSIS — I48.0 PAROXYSMAL ATRIAL FIBRILLATION: ICD-10-CM

## 2022-09-16 RX ORDER — SODIUM CHLORIDE 9 MG/ML
INJECTION, SOLUTION INTRAVENOUS CONTINUOUS
Status: CANCELLED | OUTPATIENT
Start: 2022-09-16 | End: 2022-09-16

## 2022-09-16 NOTE — TELEPHONE ENCOUNTER
Home health nurse luis called office back and states pt was instructed to take Lasix 2x times daily and pt only has enough for 6 days left     Luis states she needs Dr. James to send in new order over to [facility] for Lasix 2x times daily    Advised her I will send message over to Dr. James for further assist and will call back if needed          Verbalized understanding  JA

## 2022-09-16 NOTE — TELEPHONE ENCOUNTER
Pt states he wants his f/u appt laurap w Dr. James so pt was scheduled for 9/21/22 at 10:40am at [facility]       Verbalized understanding  JA

## 2022-09-16 NOTE — ANESTHESIA POSTPROCEDURE EVALUATION
Anesthesia Post Evaluation    Patient: David Barrios    Procedure(s) Performed: Procedure(s) (LRB):  Cardioversion (Left)    Final Anesthesia Type: MAC      Patient location during evaluation: PACU  Patient participation: Yes- Able to Participate  Level of consciousness: awake and alert  Post-procedure vital signs: reviewed and stable  Pain management: adequate  Airway patency: patent    PONV status at discharge: No PONV  Anesthetic complications: no      Cardiovascular status: blood pressure returned to baseline  Respiratory status: unassisted  Hydration status: euvolemic  Follow-up not needed.          Vitals Value Taken Time   /90 09/15/22 1047   Temp  09/16/22 0957   Pulse 69 09/15/22 1052   Resp 39 09/15/22 1051   SpO2 99 % 09/15/22 1052   Vitals shown include unvalidated device data.      Event Time   Out of Recovery 11:55:00         Pain/Elza Score: Elza Score: 10 (9/15/2022 10:45 AM)

## 2022-09-16 NOTE — TELEPHONE ENCOUNTER
----- Message from Perry Church RN sent at 9/16/2022 10:01 AM CDT -----  Regarding: f/u appointment  Good morning,    Please contact this patient with proper follow up appointment with  after his procedure.     Thank you    Perry

## 2022-09-16 NOTE — TELEPHONE ENCOUNTER
I spoke with patient. He is being followed by dr Limon. He is not sure if he wants a second opinion or not. Apparently overwhelmed by recent results of cardiac testing. Having a heart cath on 9/22 (quickly explained what it is). He stated that he will call back after the heart cath if still wants a second opinion.

## 2022-09-16 NOTE — TELEPHONE ENCOUNTER
----- Message from Celeste Griffin sent at 9/16/2022 12:55 PM CDT -----  Contact: Porsche  Type:  Needs Medical Advice    Who Called: [facility]  Symptoms (please be specific): they are requesting to have a new script sent to pharmacy with the dosage stating that he needs to take this 2 times daily for  furosemide (LASIX) 40 MG tablet  How long has patient had these symptoms:    Pharmacy name and phone #:  [facility] [id number] [place] [address] [facility]   Phone: [phone]  Fax:  [phone]        Would the patient rather a call back or a response via [facility]? call  Best Call Back Number: [phone]  Additional Information:

## 2022-09-16 NOTE — TELEPHONE ENCOUNTER
Youngm for Porsche to call office to confirm the pt's Rx refill order     Pt is currently taking Lasix 1 time daily not 2 needed to confirm         Provided call back #          JA

## 2022-09-16 NOTE — TELEPHONE ENCOUNTER
----- Message from Yusra Bowman sent at 9/16/2022 11:23 AM CDT -----  Regarding: Advice  Contact: [phone]  Patient is calling to speak with someone about who he needs to see due to he wants a second opinion on his heart problems. Please contact pt

## 2022-09-19 LAB
BSA FOR ECHO PROCEDURE: 2.1 M2
EJECTION FRACTION: 30 %

## 2022-09-19 RX ORDER — FUROSEMIDE 40 MG/1
40 TABLET ORAL 2 TIMES DAILY
Qty: 60 TABLET | Refills: 0 | Status: SHIPPED | OUTPATIENT
Start: 2022-09-19 | End: 2022-09-19

## 2022-09-20 ENCOUNTER — LAB VISIT (OUTPATIENT)
Dept: LAB | Facility: HOSPITAL | Age: 62
End: 2022-09-20
Attending: INTERNAL MEDICINE
Payer: COMMERCIAL

## 2022-09-20 DIAGNOSIS — Z01.810 PREOP CARDIOVASCULAR EXAM: ICD-10-CM

## 2022-09-20 LAB
ANION GAP SERPL CALC-SCNC: 9 MMOL/L (ref 8–16)
BASOPHILS # BLD AUTO: 0.05 K/UL (ref 0–0.2)
BASOPHILS NFR BLD: 0.7 % (ref 0–1.9)
CALCIUM SERPL-MCNC: 8.9 MG/DL (ref 8.7–10.5)
CHLORIDE SERPL-SCNC: 99 MMOL/L (ref 95–110)
CO2 SERPL-SCNC: 33 MMOL/L (ref 23–29)
CREAT SERPL-MCNC: 1.32 MG/DL (ref 0.5–1.4)
DIFFERENTIAL METHOD: ABNORMAL
EOSINOPHIL # BLD AUTO: 0.2 K/UL (ref 0–0.5)
EOSINOPHIL NFR BLD: 2.5 % (ref 0–8)
ERYTHROCYTE [DISTWIDTH] IN BLOOD BY AUTOMATED COUNT: 14.6 % (ref 11.5–14.5)
EST. GFR  (NO RACE VARIABLE): >60 ML/MIN/1.73 M^2
GLUCOSE SERPL-MCNC: 115 MG/DL (ref 70–110)
HCT VFR BLD AUTO: 41.6 % (ref 40–54)
HGB BLD-MCNC: 13.4 G/DL (ref 14–18)
IMM GRANULOCYTES # BLD AUTO: 0.01 K/UL (ref 0–0.04)
IMM GRANULOCYTES NFR BLD AUTO: 0.1 % (ref 0–0.5)
LYMPHOCYTES # BLD AUTO: 1.1 K/UL (ref 1–4.8)
LYMPHOCYTES NFR BLD: 14.2 % (ref 18–48)
MCH RBC QN AUTO: 31.6 PG (ref 27–31)
MCHC RBC AUTO-ENTMCNC: 32.2 G/DL (ref 32–36)
MCV RBC AUTO: 98 FL (ref 82–98)
MONOCYTES # BLD AUTO: 0.9 K/UL (ref 0.3–1)
MONOCYTES NFR BLD: 11.3 % (ref 4–15)
NEUTROPHILS # BLD AUTO: 5.4 K/UL (ref 1.8–7.7)
NEUTROPHILS NFR BLD: 71.2 % (ref 38–73)
NRBC BLD-RTO: 0 /100 WBC
PLATELET # BLD AUTO: 161 K/UL (ref 150–450)
PMV BLD AUTO: 13 FL (ref 9.2–12.9)
POTASSIUM SERPL-SCNC: 3.5 MMOL/L (ref 3.5–5.1)
RBC # BLD AUTO: 4.24 M/UL (ref 4.6–6.2)
SODIUM SERPL-SCNC: 141 MMOL/L (ref 136–145)
UUN UR-MCNC: 24 MG/DL (ref 2–20)
WBC # BLD AUTO: 7.61 K/UL (ref 3.9–12.7)

## 2022-09-20 PROCEDURE — 85025 COMPLETE CBC W/AUTO DIFF WBC: CPT | Mod: PO | Performed by: INTERNAL MEDICINE

## 2022-09-20 PROCEDURE — 36415 COLL VENOUS BLD VENIPUNCTURE: CPT | Mod: PO | Performed by: INTERNAL MEDICINE

## 2022-09-20 PROCEDURE — 80048 BASIC METABOLIC PNL TOTAL CA: CPT | Mod: PO | Performed by: INTERNAL MEDICINE

## 2022-09-22 ENCOUNTER — HOSPITAL ENCOUNTER (OUTPATIENT)
Facility: HOSPITAL | Age: 62
Discharge: HOME | End: 2022-09-22
Attending: INTERNAL MEDICINE | Admitting: INTERNAL MEDICINE
Payer: COMMERCIAL

## 2022-09-22 VITALS
OXYGEN SATURATION: 95 % | RESPIRATION RATE: 16 BRPM | TEMPERATURE: 98 F | HEIGHT: 69 IN | SYSTOLIC BLOOD PRESSURE: 145 MMHG | WEIGHT: 195 LBS | HEART RATE: 53 BPM | BODY MASS INDEX: 28.88 KG/M2 | DIASTOLIC BLOOD PRESSURE: 91 MMHG

## 2022-09-22 DIAGNOSIS — I42.8 OTHER CARDIOMYOPATHY: ICD-10-CM

## 2022-09-22 DIAGNOSIS — Z01.810 PREOP CARDIOVASCULAR EXAM: Primary | ICD-10-CM

## 2022-09-22 DIAGNOSIS — I34.0 NONRHEUMATIC MITRAL VALVE REGURGITATION: ICD-10-CM

## 2022-09-22 DIAGNOSIS — I34.0 MITRAL REGURGITATION: ICD-10-CM

## 2022-09-22 DIAGNOSIS — Z01.810 PRE-OPERATIVE CARDIOVASCULAR EXAMINATION: ICD-10-CM

## 2022-09-22 LAB
ALLENS TEST: ABNORMAL
CTP QC/QA: YES
DELSYS: ABNORMAL
HCO3 UR-SCNC: 29.6 MMOL/L (ref 24–28)
PCO2 BLDA: 50.4 MMHG (ref 35–45)
PH SMN: 7.38 [PH] (ref 7.35–7.45)
PO2 BLDA: 79 MMHG (ref 80–100)
POC BE: 4 MMOL/L
POC SATURATED O2: 95 % (ref 95–100)
POC TCO2: 31 MMOL/L (ref 23–27)
SAMPLE: ABNORMAL
SARS-COV-2 AG RESP QL IA.RAPID: NEGATIVE
SITE: ABNORMAL

## 2022-09-22 PROCEDURE — C1751 CATH, INF, PER/CENT/MIDLINE: HCPCS | Performed by: INTERNAL MEDICINE

## 2022-09-22 PROCEDURE — 93460 R&L HRT ART/VENTRICLE ANGIO: CPT | Mod: 26,,, | Performed by: INTERNAL MEDICINE

## 2022-09-22 PROCEDURE — 99152 PR MOD CONSCIOUS SEDATION, SAME PHYS, 5+ YRS, FIRST 15 MIN: ICD-10-PCS | Mod: ,,, | Performed by: INTERNAL MEDICINE

## 2022-09-22 PROCEDURE — 93460 R&L HRT ART/VENTRICLE ANGIO: CPT | Performed by: INTERNAL MEDICINE

## 2022-09-22 PROCEDURE — 63600175 PHARM REV CODE 636 W HCPCS: Performed by: INTERNAL MEDICINE

## 2022-09-22 PROCEDURE — 99900035 HC TECH TIME PER 15 MIN (STAT)

## 2022-09-22 PROCEDURE — C1769 GUIDE WIRE: HCPCS | Performed by: INTERNAL MEDICINE

## 2022-09-22 PROCEDURE — 82803 BLOOD GASES ANY COMBINATION: CPT

## 2022-09-22 PROCEDURE — 93010 EKG 12-LEAD: ICD-10-PCS | Mod: ,,, | Performed by: INTERNAL MEDICINE

## 2022-09-22 PROCEDURE — 93005 ELECTROCARDIOGRAM TRACING: CPT

## 2022-09-22 PROCEDURE — 25500020 PHARM REV CODE 255: Performed by: INTERNAL MEDICINE

## 2022-09-22 PROCEDURE — 99152 MOD SED SAME PHYS/QHP 5/>YRS: CPT | Mod: ,,, | Performed by: INTERNAL MEDICINE

## 2022-09-22 PROCEDURE — 99153 MOD SED SAME PHYS/QHP EA: CPT | Performed by: INTERNAL MEDICINE

## 2022-09-22 PROCEDURE — C1894 INTRO/SHEATH, NON-LASER: HCPCS | Performed by: INTERNAL MEDICINE

## 2022-09-22 PROCEDURE — 93460 PR CATH PLACE/CORON ANGIO, IMG SUPER/INTERP,R&L HRT CATH, L HRT VENTRIC: ICD-10-PCS | Mod: 26,,, | Performed by: INTERNAL MEDICINE

## 2022-09-22 PROCEDURE — 25000003 PHARM REV CODE 250: Performed by: INTERNAL MEDICINE

## 2022-09-22 PROCEDURE — 93010 ELECTROCARDIOGRAM REPORT: CPT | Mod: ,,, | Performed by: INTERNAL MEDICINE

## 2022-09-22 PROCEDURE — C1887 CATHETER, GUIDING: HCPCS | Performed by: INTERNAL MEDICINE

## 2022-09-22 PROCEDURE — 99152 MOD SED SAME PHYS/QHP 5/>YRS: CPT | Performed by: INTERNAL MEDICINE

## 2022-09-22 RX ORDER — FENTANYL CITRATE 50 UG/ML
INJECTION, SOLUTION INTRAMUSCULAR; INTRAVENOUS
Status: DISCONTINUED | OUTPATIENT
Start: 2022-09-22 | End: 2022-09-23 | Stop reason: HOSPADM

## 2022-09-22 RX ORDER — IODIXANOL 320 MG/ML
INJECTION, SOLUTION INTRAVASCULAR
Status: DISCONTINUED | OUTPATIENT
Start: 2022-09-22 | End: 2022-09-23 | Stop reason: HOSPADM

## 2022-09-22 RX ORDER — ONDANSETRON 4 MG/1
8 TABLET, ORALLY DISINTEGRATING ORAL EVERY 8 HOURS PRN
Status: DISCONTINUED | OUTPATIENT
Start: 2022-09-22 | End: 2022-09-23 | Stop reason: HOSPADM

## 2022-09-22 RX ORDER — HEPARIN SODIUM 200 [USP'U]/100ML
INJECTION, SOLUTION INTRAVENOUS
Status: DISCONTINUED | OUTPATIENT
Start: 2022-09-22 | End: 2022-09-23 | Stop reason: HOSPADM

## 2022-09-22 RX ORDER — SODIUM CHLORIDE 9 MG/ML
INJECTION, SOLUTION INTRAVENOUS CONTINUOUS
Status: ACTIVE | OUTPATIENT
Start: 2022-09-22 | End: 2022-09-22

## 2022-09-22 RX ORDER — VERAPAMIL HYDROCHLORIDE 2.5 MG/ML
INJECTION, SOLUTION INTRAVENOUS
Status: DISCONTINUED | OUTPATIENT
Start: 2022-09-22 | End: 2022-09-23 | Stop reason: HOSPADM

## 2022-09-22 RX ORDER — LIDOCAINE HYDROCHLORIDE 10 MG/ML
INJECTION, SOLUTION EPIDURAL; INFILTRATION; INTRACAUDAL; PERINEURAL
Status: DISCONTINUED | OUTPATIENT
Start: 2022-09-22 | End: 2022-09-23 | Stop reason: HOSPADM

## 2022-09-22 RX ORDER — HEPARIN SODIUM 1000 [USP'U]/ML
INJECTION, SOLUTION INTRAVENOUS; SUBCUTANEOUS
Status: DISCONTINUED | OUTPATIENT
Start: 2022-09-22 | End: 2022-09-23 | Stop reason: HOSPADM

## 2022-09-22 RX ORDER — ACETAMINOPHEN 325 MG/1
650 TABLET ORAL EVERY 4 HOURS PRN
Status: DISCONTINUED | OUTPATIENT
Start: 2022-09-22 | End: 2022-09-23 | Stop reason: HOSPADM

## 2022-09-22 RX ORDER — MIDAZOLAM HYDROCHLORIDE 1 MG/ML
INJECTION, SOLUTION INTRAMUSCULAR; INTRAVENOUS
Status: DISCONTINUED | OUTPATIENT
Start: 2022-09-22 | End: 2022-09-23 | Stop reason: HOSPADM

## 2022-09-22 RX ADMIN — SODIUM CHLORIDE: 0.9 INJECTION, SOLUTION INTRAVENOUS at 07:09

## 2022-09-22 NOTE — DISCHARGE INSTRUCTIONS
Discharge Instructions:    Do not drive a car, operate heavy equipment, care for a young child, etc for the next 12-24 hours.   Avoid drinking alcohol for 24 hours.  Do not make any important decisions for 24 hours.  Drink fluids to keep hydrated. Resume your usual diet as tolerated.   Rest for today then activity as tolerated.   Do not lift anything over 5 pounds for the first 3 days after procedure.    Remove dressings x2 tomorrow Friday 9/23/2022 at 11:30AM then may shower with warm soapy water.  Do not scrub site. Pat dry. May apply bandaid for 2 days. No tub baths.  Do not submerge wound in water for 3 days.     Call MD for any unrelieved pain, excessive nausea or vomiting, redness around site, bleeding, or pus or foul smelling drainage, or any other questions or concerns. [phone]. CALL 911 and Go to the ER for any difficulty breathing or chest pain.    If site swells or bleeds, hold direct pressure to area for 10 full minutes.   If site continues to bleed, continue to hold pressure to site and have someone bring you to the ER.      Follow any additional instructions given to you by MD. Call MD to schedule a follow up appointment, or follow up as instructed.    Understanding Transradial Cardiac Catheterization      Cardiac catheterization (cardiac cath) is a common, non-surgical procedure. During the procedure, your doctor will insert a long, thin tube (catheter) into an artery and move it up into your heart. Transradial means the catheter is inserted into an artery in the wrist (the radial artery). This procedure can be used to diagnose and treat certain heart problems.  Why do I need a transradial cardiac cath?  You may need a cardiac cath if signs indicate a problem with your heart. These may include:  Symptoms of chest pain, tightness, or heaviness (known as angina). This is a common symptom of blocked heart arteries, known as coronary artery disease.  Symptoms of weakness, dizziness, trouble  breathing, or swollen legs or feet. These may be symptoms of a problem with a heart valve or the heart muscle.  Other test results show heart problems. Tests may include stress tests, heart scans, and echocardiography.  During a cardiac cath, your doctor can see the condition of the coronary arteries and heart valves. He or she can also check how well the heart pumps and the flow of blood through the heart. Your doctor can also measure pressures and take blood samples. And, if needed, he or she can open blocked arteries. This can help reduce symptoms of angina.  Cardiac cath is often done using a catheter inserted into an artery in the groin. During transradial cardiac cath, the catheter is inserted into an artery in the wrist. This can mean less bleeding and a faster recovery. Some people may have blockages in the groin arteries as well as in the heart arteries, making it difficult to reach the heart. The transradial approach can be used to get around this problem.   What happens during a transradial cardiac cath?  The procedure is done in the hospital or a surgery center. First, an IV line is put in your arm or hand to deliver fluids and medicines. You will likely be given medicine to relax you and make you drowsy. When the procedure begins:  You lie on an X-ray table.  The skin over the insertion site in your wrist is numbed.  The doctor makes a tiny puncture or incision into the artery in the wrist. He or she then inserts a catheter and threads it through the blood vessel into your heart.    The doctor may inject a contrast fluid through the catheter into the arteries. This fluid makes the arteries show up better on X-rays.  Tests may be done to check the condition of your heart and arteries. If needed, the doctor can clear blockages in the arteries or do other repairs.  When the doctor is finished, he or she will remove the catheter and put direct pressure on the site to prevent bleeding.  You will stay for a  time to recover, and then go home.  What are the risks of transradial cardiac cath?  These include:  Bleeding, bruising, infection, or blood clots  Damage to the radial artery that may cause injury to the hand  Allergic reaction to the contrast fluid  Abnormal heartbeat (arrhythmia)  Damage to blood vessels or tissues  Kidney damage or failure  The need for emergency heart surgery  Heart attack, stroke, or death  Date Last Reviewed: 5/1/2016 © 2000-2017 [employer]. [address] [place] [address]. All rights reserved. This information is not intended as a substitute for professional medical care. Always follow your healthcare professional's instructions.    Bleeding or Hematoma After Cardiac Catheterization  You recently had cardiac catheterization. A catheter was put into your body through a puncture site in your groin or arm. You now have bleeding from this site. When bleeding occurs, it may drip or spurt from the site. Or it may collect in a lump (hematoma) under the skin. In the emergency department, pressure will be put on the site to stop bleeding. You will be sent home when the bleeding stops. To prevent repeat bleeding, take some precautions at home. If the bleeding starts again, follow the advice below.      Home care  For the next 48 hours:  Don't do any strenuous activity.  Don't climb stairs.  Don't lift anything heavy.  If the puncture site is in your arm or wrist, don't lie on that arm.  If your puncture site is in the groin, don't strain at bowel movements.  Don't scrub the site when bathing. It is fine to get it wet after your healthcare provider says it is OK, but don't scrub it or massage it.  If bleeding happens again, call 911. Take the following steps to stop the bleeding until help arrives:  Lie on your back.  Place a clean cloth or gauze pad over the puncture site. Then hold firm pressure right on the site. Or have someone else apply firm pressure using the gauze pad  "or washcloth.  Keep your arm straight and raised above the level of your heart if the site is in your arm or wrist. If the site is in your groin, have someone else hold pressure on the site. If you dont have someone to do this, put a 5-pound bag of rice or flour on the site and apply pressure with your hand over the bag.  Don't press too hard! If you press too hard, your leg and foot (or arm and hand) will not get blood flow and the skin under your toenails or fingernails may turn white. The skin should look pink, like the toenails on your other foot. When you (or someone) presses on the toenail it will turn white, but when you stop pressing on the nail it will turn pink again. This is called "capillary refill." If there is someone with you, he or she can check it.  If your toes, foot, or leg start feeling numb, tingly, or cold, ease up on the pressure, because you are probably pressing too hard.  As soon as emergency care arrives, they will take over your care.  Follow-up care  Follow up with your healthcare provider, or as advised. Ask your healthcare provider for a contact number to call.  Call 911  Call 911 if any of these occur:  Chest pain or pressure  Any bleeding from the site  Feeling weak or faint  Trouble breathing  Lump (hematoma) is quickly getting larger  Coolness, numbness, tingling, or skin color changes in the leg or arm with the puncture site  When to seek medical advice  Call your healthcare provider right away if any of these occur:  Increased pain, redness, swelling, or drainage from the puncture site  Nausea or vomiting  Date Last Reviewed: 1/11/2016  © 5557-6433 [employer]. [address] [place] [address], [place] [address]. All rights reserved. This information is not intended as a substitute for professional medical care. Always follow your healthcare professional's instructions.    Discharge Instructions for Cardiac Catheterization  Cardiac catheterization is a procedure to look for " blocked areas in the blood vessels that send blood to the heart. A thin, flexible tube (catheter) is put in a blood vessel in your groin or arm. The healthcare provider injects contrast fluid into your blood, which then flows to your heart. X-rays pictures are taken of your heart. Your provider will review the results with you. Be sure to ask any questions you have before you leave. This sheet will help you take care of yourself at home.  Home care  Only do light and easy activities for the next 2 to 3 days. Ask for help with chores and errands while you recover. Have someone drive you to your appointments.  Don't lift anything heavy for a while. Your healthcare team will tell you when it's safe to lift again.  Ask your healthcare team when you can expect to return to work. Unless your job involves lifting, you may be able to return to your normal activities within a couple of days.  Take your medicines as directed. Don't skip doses.  Drink 6 to 8 glasses of water a day. This is to help flush the contrast dye out of your body. Call your healthcare team if your urine has any change in color.  Take your temperature each day for 7 days. If you feel cold and clammy or start sweating, take your temperature right away and call your healthcare team.  Check your incisions every day for signs of infection. These include redness, swelling, and drainage. It is normal to have a small bruise or bump where the catheter was inserted. A bruise that is getting larger is not normal and should be reported to your healthcare team. If you see blood forming in the incision, call your healthcare team. Go to the emergency department if you have uncontrolled bleeding from the artery site. This is especially true if you take medicines that make it difficult for your blood to clot. Examples are aspirin, clopidogrel, and warfarin.  Eat a healthy diet. Make sure it is low in fat, salt, and cholesterol. Ask your healthcare team for diet  information.  Stop smoking. Enroll in a stop-smoking program or ask your healthcare team for help. Stop-smoking programs can be life saving.  Exercise as your healthcare team tells you to. Your healthcare team may recommend you start a cardiac rehabilitation program. Cardiac rehab is an exercise program in which trained healthcare staff watch your progress and stress on your heart while you exercise. Ask your team how to enroll.  Don't swim or take baths until your healthcare team says its OK. You can shower the day after the procedure. Keep the site clean and dry. This keeps the incision from getting wet and infected until the skin and artery can heal.  Follow-up care  Make a follow-up appointment as advised by our staff. It's common to have a follow-up appointment 2 to 4 weeks after an angioplasty or coronary stent procedure.  Make a yearly appointment, too. This is to make sure you are still doing well and not having any new symptoms.  Don't wait for a follow-up appointment if your medicines aren't working or you are having heart-related symptoms.  When to seek medical care  Call your healthcare provider right away if you have any of the following:  Chest pain  Constant or increasing pain or numbness in your leg  Fever of 100.4°F (38.0°C) or higher, or as directed by your healthcare provider  Symptoms of infection. These include redness, swelling, drainage, or warmth at the incision site.  Shortness of breath  A leg that feels cold or appears blue  Bleeding, bruising, or a lot of swelling where the catheter was inserted  Blood in your urine  Black or tarry stools  Any unusual bleeding   Date Last Reviewed: 10/1/2016  © 5245-1334 [employer]. [address] [place] [address], [place] [address]. All rights reserved. This information is not intended as a substitute for professional medical care. Always follow your healthcare professional's instructions.

## 2022-09-22 NOTE — Clinical Note
The catheter was repositioned into the ostium   right coronary artery. An angiography was performed of the right coronary arteries. Multiple views were taken. The angiography was performed via hand injection with .

## 2022-09-22 NOTE — Clinical Note
The catheter was repositioned into the ostium   left main. An angiography was performed of the left coronary arteries. Multiple views were taken. The angiography was performed via hand injection with .

## 2022-09-22 NOTE — PLAN OF CARE
2 cc of air removed from R radial vasc band. No hematoma or bleeding noted. +2 evon radial pulses palpated. Skin is normal in color, warm to touch, < 3 sec cap refill. VSS. Will continue to monitor pt for safety and needs.

## 2022-09-22 NOTE — Clinical Note
The site was marked. The groin and right radial was prepped. The site was prepped with ChloraPrep. The site was clipped. The patient was draped. The patient was positioned supine.

## 2022-09-22 NOTE — Clinical Note
A percutaneous stick to the right brachial vein was performed. Ultrasound guidance was used to obtain access.

## 2022-09-22 NOTE — Clinical Note
The PA catheter was repositioned to the main pulmonary artery. Hemodynamics were performed. O2 saturation was measured.

## 2022-09-22 NOTE — DISCHARGE SUMMARY
Discharge Summary  Interventional Cardiology      Admit Date: 9/22/2022    Discharge Date:  9/22/2022    Attending Physician: Julio James MD    Discharge Physician: Jeanette Su MD    Principal Diagnoses: <principal problem not specified>  Indication for Admission: CATHETERIZATION, HEART, BOTH LEFT AND RIGHT (N/A), ANGIOGRAM, CORONARY ARTERY (N/A)    Discharged Condition: Good    Hospital Course:   Patient presented for outpatient coronary angiogram which went without complication. Coronary angiogram was nonobstructive. Had RHC and LV gram for MR evaluation. See full cath report in Epic for details. Hemostasis of patient's R Radial access site was achieved with VascBand. Patient was monitored per post-cath protocol, and his R radial access site was c/d/i with no hematoma. Patient was able to ambulate without difficulty. He was feeling well and anticipating discharge home today.     Outpatient Plan:  - There were no medication changes    Diet: Cardiac diet    Activity: Ad jakob, wound care instructions provided    Disposition: Home or Self Care    Discharge Medications:      Medication List        CONTINUE taking these medications      albuterol 90 mcg/actuation inhaler  Commonly known as: PROVENTIL/VENTOLIN HFA  inhale 1-2 Puff(s) By Mouth Every 4 Hours as needed     amiodarone 200 MG Tab  Commonly known as: PACERONE  Take 1 tablet (200 mg total) by mouth 2 (two) times daily.     carvediloL 12.5 MG tablet  Commonly known as: COREG  Take 1 tablet (12.5 mg total) by mouth 2 (two) times daily with meals.     ferrous sulfate 325 mg (65 mg iron) Tab tablet  Commonly known as: FEOSOL  Take 1 tablet (325 mg total) by mouth daily with breakfast.     furosemide 40 MG tablet  Commonly known as: LASIX  TAKE 1 TABLET(40 MG) BY MOUTH TWICE DAILY     rivaroxaban 20 mg Tab  Commonly known as: XARELTO  Take 1 tablet (20 mg total) by mouth daily with dinner or evening meal.            Follow Up: Dr. James and LENNOX.

## 2022-09-22 NOTE — Clinical Note
The catheter was inserted into the ostium   right coronary artery. An angiography was performed of the right coronary arteries. Multiple views were taken. The angiography was performed via hand injection with .

## 2022-09-22 NOTE — PLAN OF CARE
Pt arrived independently but with a friend from home, ambulates without assistance. Patient lying in bed with no complaints of pain or distress noted. Monitors applied. VSS, AAOx4. Yellow non-skid socks placed on patient. Fall risk reviewed with patient.     Dr. Su at bedside for consent. This RN to witness. Procedure and recovery process explained to patient and all questions answered.  Patient verbalized understanding, denies questions. Will continue to monitor for safety and needs.

## 2022-09-22 NOTE — PLAN OF CARE
Patient discharged to home as ordered after 2 hour recovery per Dr. James. Pt is AAOx4, VSS, denies any pain. All discharge instructions and printed materials reviewed and given to patient.   Patient instructed on follow-up appointment as ordered. Patient verbalized understanding and agreement with all discharge instructions given.     R radial gauze and tegaderm dressing c.d.i. No bleeding or hematoma noted around site. Site and surrounding area is soft.    Palpated +2 evon radial pulses. Dopplered evon pedal pulses.     Pt voided without difficulty via urinal. Pt ate ice chips. No n/v.   Pt got dressed and moving around without difficulty and no distress noted. Pt in w/c. Left and Right AC 20 gauge ivs dc'd as ordered with tips intact. Gauze and koban dressing applied c.d.i.  Pt discharged home via wheelchair to private vehicle by pt's friend.

## 2022-09-22 NOTE — BRIEF OP NOTE
Brief Operative Note:    : Julio James MD     Referring Physician: Julio James     All Operators: Surgeon(s):  MD Jeanette Chicas MD     Preoperative Diagnosis: Nonrheumatic mitral valve regurgitation [I34.0]  Other cardiomyopathy [I42.8]     Postop Diagnosis: Nonrheumatic mitral valve regurgitation [I34.0]  Other cardiomyopathy [I42.8]    Treatments/Procedures: Procedure(s) (LRB):  CATHETERIZATION, HEART, BOTH LEFT AND RIGHT (N/A)  ANGIOGRAM, CORONARY ARTERY (N/A)    Access: Rt radial.    Findings:  No coronary disease is present. LV gram performed for MR.   RHC performed.   PA sat 59%.  Ao sat 95%.  RA- 11.  RV: 65/6 (12).  PA : 72/23 (39).  PW- 28.    See catheterization report for full details.    Intervention:     See catheterization report for full details.    Closure device:        Plan:  - Post cath protocol   - Continue aspirin 81 mg daily indefinitely  - OP CTS referral for MR evaluation.  - Follow up with outpatient cardiologist    Estimated Blood loss: 20 cc    Specimens removed: Mere Su MD

## 2022-09-22 NOTE — PLAN OF CARE
Patient transfered to cath lab bay 2 via stretcher with side rails up x2. Pt AAOx4 and able to follow commands. Pt is stable when connecting to cardiac monitors. VSS. NADN.     Right radial vasc band in place with 12cc of air in band. Site is CDI. No redness, bruising, or hematoma noted around site. +2 evon radial pulses palpated. Skin normal in color and warm to touch, <3 sec cap refill. Fall risk precautions given and patient acknowledges. AIDET completed to pt. Updated pt's friend on return to recovery. Will continue to monitor patient until discharge.   unknown

## 2022-09-22 NOTE — Clinical Note
The left ventricle was injected. The injected rate was 12 mL/sec. The total injected volume was 36 mL.

## 2022-09-22 NOTE — PLAN OF CARE
Remaining air removed from R radial vasc band. Gauze and tegaderm dressing applied. Site is CDI. No bleeding or hematoma noted around site. Site and surrounding areas soft. +2 evon radial pulses palpated. Skin normal in color, warm to touch, < 3 sec cap refill. Will continue to monitor pt.

## 2022-09-23 ENCOUNTER — OFFICE VISIT (OUTPATIENT)
Dept: CARDIOTHORACIC SURGERY | Facility: CLINIC | Age: 62
End: 2022-09-23
Payer: COMMERCIAL

## 2022-09-23 VITALS
OXYGEN SATURATION: 97 % | TEMPERATURE: 98 F | DIASTOLIC BLOOD PRESSURE: 86 MMHG | WEIGHT: 195.19 LBS | HEART RATE: 63 BPM | SYSTOLIC BLOOD PRESSURE: 132 MMHG | BODY MASS INDEX: 28.83 KG/M2 | RESPIRATION RATE: 16 BRPM

## 2022-09-23 DIAGNOSIS — I48.19 PERSISTENT ATRIAL FIBRILLATION: ICD-10-CM

## 2022-09-23 DIAGNOSIS — I34.0 NONRHEUMATIC MITRAL VALVE REGURGITATION: Primary | ICD-10-CM

## 2022-09-23 DIAGNOSIS — I51.9 LEFT VENTRICULAR SYSTOLIC DYSFUNCTION: ICD-10-CM

## 2022-09-23 LAB
ALLENS TEST: ABNORMAL
CATH EF QUANTITATIVE: 30 %
HCO3 UR-SCNC: 30 MMOL/L (ref 24–28)
PCO2 BLDA: 46 MMHG (ref 35–45)
PH SMN: 7.42 [PH] (ref 7.35–7.45)
PO2 BLDA: 31 MMHG (ref 80–100)
POC BE: 6 MMOL/L
POC SATURATED O2: 59 % (ref 95–100)
POC TCO2: 31 MMOL/L (ref 23–27)
SAMPLE: ABNORMAL
SITE: ABNORMAL

## 2022-09-23 PROCEDURE — 3008F BODY MASS INDEX DOCD: CPT | Mod: CPTII,S$GLB,, | Performed by: THORACIC SURGERY (CARDIOTHORACIC VASCULAR SURGERY)

## 2022-09-23 PROCEDURE — 99205 OFFICE O/P NEW HI 60 MIN: CPT | Mod: S$GLB,,, | Performed by: THORACIC SURGERY (CARDIOTHORACIC VASCULAR SURGERY)

## 2022-09-23 PROCEDURE — 1111F PR DISCHARGE MEDS RECONCILED W/ CURRENT OUTPATIENT MED LIST: ICD-10-PCS | Mod: CPTII,S$GLB,, | Performed by: THORACIC SURGERY (CARDIOTHORACIC VASCULAR SURGERY)

## 2022-09-23 PROCEDURE — 1159F PR MEDICATION LIST DOCUMENTED IN MEDICAL RECORD: ICD-10-PCS | Mod: CPTII,S$GLB,, | Performed by: THORACIC SURGERY (CARDIOTHORACIC VASCULAR SURGERY)

## 2022-09-23 PROCEDURE — 99205 PR OFFICE/OUTPT VISIT, NEW, LEVL V, 60-74 MIN: ICD-10-PCS | Mod: S$GLB,,, | Performed by: THORACIC SURGERY (CARDIOTHORACIC VASCULAR SURGERY)

## 2022-09-23 PROCEDURE — 1159F MED LIST DOCD IN RCRD: CPT | Mod: CPTII,S$GLB,, | Performed by: THORACIC SURGERY (CARDIOTHORACIC VASCULAR SURGERY)

## 2022-09-23 PROCEDURE — 99999 PR PBB SHADOW E&M-EST. PATIENT-LVL III: ICD-10-PCS | Mod: PBBFAC,,, | Performed by: THORACIC SURGERY (CARDIOTHORACIC VASCULAR SURGERY)

## 2022-09-23 PROCEDURE — 4010F PR ACE/ARB THEARPY RXD/TAKEN: ICD-10-PCS | Mod: CPTII,S$GLB,, | Performed by: THORACIC SURGERY (CARDIOTHORACIC VASCULAR SURGERY)

## 2022-09-23 PROCEDURE — 3044F HG A1C LEVEL LT 7.0%: CPT | Mod: CPTII,S$GLB,, | Performed by: THORACIC SURGERY (CARDIOTHORACIC VASCULAR SURGERY)

## 2022-09-23 PROCEDURE — 4010F ACE/ARB THERAPY RXD/TAKEN: CPT | Mod: CPTII,S$GLB,, | Performed by: THORACIC SURGERY (CARDIOTHORACIC VASCULAR SURGERY)

## 2022-09-23 PROCEDURE — 99999 PR PBB SHADOW E&M-EST. PATIENT-LVL III: CPT | Mod: PBBFAC,,, | Performed by: THORACIC SURGERY (CARDIOTHORACIC VASCULAR SURGERY)

## 2022-09-23 PROCEDURE — 3079F PR MOST RECENT DIASTOLIC BLOOD PRESSURE 80-89 MM HG: ICD-10-PCS | Mod: CPTII,S$GLB,, | Performed by: THORACIC SURGERY (CARDIOTHORACIC VASCULAR SURGERY)

## 2022-09-23 PROCEDURE — 3079F DIAST BP 80-89 MM HG: CPT | Mod: CPTII,S$GLB,, | Performed by: THORACIC SURGERY (CARDIOTHORACIC VASCULAR SURGERY)

## 2022-09-23 PROCEDURE — 3044F PR MOST RECENT HEMOGLOBIN A1C LEVEL <7.0%: ICD-10-PCS | Mod: CPTII,S$GLB,, | Performed by: THORACIC SURGERY (CARDIOTHORACIC VASCULAR SURGERY)

## 2022-09-23 PROCEDURE — 3075F PR MOST RECENT SYSTOLIC BLOOD PRESS GE 130-139MM HG: ICD-10-PCS | Mod: CPTII,S$GLB,, | Performed by: THORACIC SURGERY (CARDIOTHORACIC VASCULAR SURGERY)

## 2022-09-23 PROCEDURE — 3008F PR BODY MASS INDEX (BMI) DOCUMENTED: ICD-10-PCS | Mod: CPTII,S$GLB,, | Performed by: THORACIC SURGERY (CARDIOTHORACIC VASCULAR SURGERY)

## 2022-09-23 PROCEDURE — 3075F SYST BP GE 130 - 139MM HG: CPT | Mod: CPTII,S$GLB,, | Performed by: THORACIC SURGERY (CARDIOTHORACIC VASCULAR SURGERY)

## 2022-09-23 PROCEDURE — 1111F DSCHRG MED/CURRENT MED MERGE: CPT | Mod: CPTII,S$GLB,, | Performed by: THORACIC SURGERY (CARDIOTHORACIC VASCULAR SURGERY)

## 2022-09-23 NOTE — LETTER
September 23, 2022        Julio James MD  [address]  [address]  [facility] [address]             [facility] 2nd Fl  [address]  [facility] [address]  Phone: [phone]   Patient: David Barrios   MR Number: [id number]   YOB: 1960   Date of Visit: 9/23/2022     Dear Dr. James,     It has been a privilege for us to see your patient, Mr. Barrios, at our [facility].  We had a chance to meet with him and went over the history, echocardiographic, as well as angiographic findings in detail.  As you know, he is a 62 year-old gentleman with a history of heart failure reduced ejection fraction (35% ejection fraction) secondary to valvular cardiomyopathy and atrial fibrillation, pulmonary hypertension, hypertension, and BMI of 29.  Recently, he has been symptomatic with dyspnea on exertion symptoms interfering with his quality of life.  His most recent echocardiogram showed 35% ejection fraction with LVEDD of 6.1cm, severe mitral regurgitation with central jet, mild to moderate tricuspid regurgitation, and estimated SPAP of 45mmHg.  Left and right heart catheterization showed nonsignificant coronary disease and PAP of 70/30.    CTA chest (PE study) shows minimal ascending aortic and moderate aortic arch calcifications.    We had an extensive discussion with him regarding the ACC/AHA guidelines and we agreed that he has class I indications for surgery involving mitral valve repair vs replacement (bioprosthesis), tricuspid valve repair, Benz Maze procedure, left atrial appendage resection, possible impella placement.  We went through the risks and benefits as well as the perioperative expectations and we agreed he is an appropriate surgical candidate.  We have tentatively scheduled his surgery for Friday, October 7, 2022, and we will admit him on Sunday, October 2, 2022, for preoperative optimization with milrinone and IV Lasix.  I will be  delighted to contact you around the time of his surgery.    Thank you for referring Mr. Barrios and for your trust and confidence in our [facility].    Sincerely,     Mike Hedrick MD  Cardiothoracic Surgery  [facility]

## 2022-09-23 NOTE — PROGRESS NOTES
Subjective:      Patient ID: David Barrios is a 62 y.o. male.    Chief Complaint: No chief complaint on file.      HPI:  David Barrios is a 62 y.o. male who presents as an initial consult for surgical evaluation secondary to severe mitral regurgitation.  He has a medical history significant for atrial fibrillation, hypertension, HFrEF (EF 35%), and severe mitral regurgitation.  He states he has SOB over the past few weeks and has progressive worsened. He was recently seen in clinic by his cardiologist (Dr. Limon) on 8/4/22 for dyspnea and was noted to be in afib with RVR, rate 120's. He was switched to coreg BID and restarted back on xarelto. He reports cough, orthopnea and bilateral leg swelling. He also noticed weight gain but is unsure how many lbs. He denies chest pain/pressure.       Family and social history reviewed    Review of patient's allergies indicates:  No Known Allergies  Past Medical History:   Diagnosis Date    Anemia     Atrial fibrillation     Encounter for blood transfusion     Esophageal ulcer     GI bleed     Hypertension      Past Surgical History:   Procedure Laterality Date    CARDIOVERSION Left 9/15/2022    Procedure: Cardioversion;  Surgeon: Julio James MD;  Location: [facility] CATH LAB/EP;  Service: Cardiology;  Laterality: Left;  With NORBERT    COLONOSCOPY N/A 4/4/2019    Procedure: COLONOSCOPY Golytely;  Surgeon: Umair Randolph MD;  Location: [facility];  Service: Endoscopy;  Laterality: N/A;    ESOPHAGOGASTRODUODENOSCOPY N/A 10/12/2018    Procedure: EGD (ESOPHAGOGASTRODUODENOSCOPY);  Surgeon: Braden Herring MD;  Location: [facility];  Service: Endoscopy;  Laterality: N/A;    ESOPHAGOGASTRODUODENOSCOPY N/A 4/4/2019    Procedure: EGD;  Surgeon: Umair Randolph MD;  Location: [facility];  Service: Endoscopy;  Laterality: N/A;    HERNIA REPAIR       Family History       Problem Relation (Age of Onset)    COPD Mother    Cancer Father          Social History     Socioeconomic History     Marital status: Single   Tobacco Use    Smoking status: Never    Smokeless tobacco: Current     Types: Chew   Substance and Sexual Activity    Alcohol use: Yes     Alcohol/week: 20.0 standard drinks     Types: 20 Cans of beer per week    Drug use: No     No current facility-administered medications for this visit.  No current outpatient medications on file.    Facility-Administered Medications Ordered in Other Visits:     acetaminophen tablet 650 mg, 650 mg, Oral, Q4H PRN, Jeanette Su MD    fentaNYL 50 mcg/mL injection, , , PRN, Julio James MD, 25 mcg at 09/22/22 0945    heparin (porcine) injection, , , PRN, Julio James MD, 3,000 Units at 09/22/22 0943    heparin infusion 1,000 units/500 ml in 0.9% NaCl (pressure line flush), , , Continuous PRN, Julio James MD, Last Rate: 750 mL/hr at 09/22/22 0915, 1,500 Units/hr at 09/22/22 0915    iodixanoL (VISIPAQUE 320) injection, , , PRN, Julio James MD, 100 mL at 09/22/22 1005    LIDOcaine (PF) 10 mg/ml (1%) injection, , , PRN, Julio James MD, 10 mL at 09/22/22 0915    midazolam injection, , , PRN, Julio James MD, 1 mg at 09/22/22 0932    nitroglycerin 200 mcg/mL syringe, , , PRN, Julio James MD, 6.25 mL at 09/22/22 0916    ondansetron disintegrating tablet 8 mg, 8 mg, Oral, Q8H PRN, Jeanette Su MD    verapamiL injection, , , PRN, Julio James MD, 1.25 mg at 09/22/22 0915  Current medications Reviewed    Review of Systems   Constitutional:  Positive for activity change. Negative for appetite change, fatigue and fever.   HENT:  Negative for nosebleeds.    Respiratory:  Positive for shortness of breath. Negative for cough.    Cardiovascular:  Positive for leg swelling. Negative for chest pain and palpitations.   Gastrointestinal:  Negative for abdominal distention, abdominal pain and nausea.   Genitourinary:  Negative for frequency.   Musculoskeletal:  Negative for arthralgias and myalgias.   Skin:  Negative for rash.    Neurological:  Negative for dizziness and numbness.   Hematological:  Does not bruise/bleed easily.   Objective:   Physical Exam  HENT:      Head: Normocephalic and atraumatic.   Eyes:      Extraocular Movements: Extraocular movements intact.   Cardiovascular:      Rate and Rhythm: Normal rate and regular rhythm.   Pulmonary:      Effort: Pulmonary effort is normal.   Abdominal:      General: Abdomen is flat.      Palpations: Abdomen is soft.   Musculoskeletal:         General: Normal range of motion.      Cervical back: Normal range of motion.   Skin:     General: Skin is warm and dry.      Capillary Refill: Capillary refill takes less than 2 seconds.   Neurological:      General: No focal deficit present.       Diagnostic Results: Reviewed   ECHO:  The left ventricle is moderately enlarged with moderately decreased systolic function.  There is moderate left ventricular global hypokinesis.  The estimated ejection fraction is 35%.  A diastolic pattern consistent with atrial fibrillation observed.  Severe mitral regurgitation.  Mild to moderate tricuspid regurgitation.  The estimated PA systolic pressure is 45 mmHg.  Normal right ventricular size with normal right ventricular systolic function.  There is mild to moderate pulmonary hypertension.    CATH:    The ejection fraction was calculated to be 30%.    There was moderate to severe left ventricular systolic dysfunction.    There was moderate (3+) mitral regurgitation.    PA 70/30 mmHG    No significant CAD confirmed.    The pre-procedure left ventricular end diastolic pressure was 30.    The estimated blood loss was <50 mL.    CT SCAN:  Impression:  1. No pulmonary embolism to the proximal segmental level.  2. Interlobular septal thickening and ground-glass opacities, compatible with pulmonary edema.  An overlying infectious process is not entirely excluded.  3. Moderate right and small left pleural effusions.  4. Nonspecific mediastinal and bilateral hilar  lymphadenopathy which may be reactive.  A neoplastic process is not excluded, follow-up is recommended the resolution of the patient's acute symptoms.  5.  Indeterminate 1.3 cm left adrenal nodule which can be further evaluated with MRI or adrenal protocol CT.  6. Moderate hiatal hernia.  7. Global cardiomegaly and findings concerning for right heart failure.  Assessment:   Severe Mitral Regurgitation  Plan:     I have seen the patient and reviewed the nurse practitioner's note above. I have personally interviewed and examined the patient at bedside and agree with the findings.     Mr. Barrios is a 62 year-old gentleman with a history of heart failure reduced ejection fraction (35% ejection fraction) secondary to valvular cardiomyopathy and paroxysmal atrial fibrillation, pulmonary hypertension, hypertension, and BMI of 29.  Recently, he has been symptomatic with dyspnea on exertion symptoms interfering with his quality of life.  His most recent echocardiogram showed 35% ejection fraction with LVEDD of 6.1cm, severe mitral regurgitation with central jet, mild to moderate tricuspid regurgitation, and estimated SPAP of 45mmHg.  Left and right heart catheterization showed nonsignificant coronary disease and PAP of 70/30.    CTA chest (PE study) shows minimal ascending aortic and moderate aortic arch calcifications.    We had an extensive discussion with him regarding the ACC/AHA guidelines and we agreed that he has class I indications for surgery involving mitral valve repair vs replacement (bioprosthesis), tricuspid valve repair, Benz Maze procedure, left atrial appendage resection, possible impella placement.  We went through the risks and benefits as well as the perioperative expectations and we agreed he is an appropriate surgical candidate.  We have tentatively scheduled his surgery for Friday, October 7, 2022, and we will admit him on Sunday, October 2, 2022, for preoperative optimization with milrinone and IV  Lasix, as well as therapeutic lovenox due to atrial fibrillation.      Mike Hedrick MD  Cardiothoracic Surgery  [facility]

## 2022-09-26 ENCOUNTER — TELEPHONE (OUTPATIENT)
Dept: CARDIOLOGY | Facility: CLINIC | Age: 62
End: 2022-09-26
Payer: COMMERCIAL

## 2022-09-26 ENCOUNTER — EXTERNAL HOME HEALTH (OUTPATIENT)
Dept: HOME HEALTH SERVICES | Facility: HOSPITAL | Age: 62
End: 2022-09-26
Payer: COMMERCIAL

## 2022-09-26 NOTE — TELEPHONE ENCOUNTER
----- Message from Reece Phipps sent at 9/26/2022  3:47 PM CDT -----  Contact: pt.  .Type:  Needs Medical Advice    Who Called: pt    Would the patient rather a call back or a response via [facility]? Call back  Best Call Back Number: [phone]  Additional Information: Pt. Is requesting a call back from the nurse regarding some dates of recent visits.

## 2022-10-02 ENCOUNTER — NURSE TRIAGE (OUTPATIENT)
Dept: ADMINISTRATIVE | Facility: CLINIC | Age: 62
End: 2022-10-02
Payer: COMMERCIAL

## 2022-10-02 ENCOUNTER — ANESTHESIA EVENT (OUTPATIENT)
Dept: SURGERY | Facility: HOSPITAL | Age: 62
DRG: 215 | End: 2022-10-02
Payer: COMMERCIAL

## 2022-10-02 ENCOUNTER — HOSPITAL ENCOUNTER (INPATIENT)
Facility: HOSPITAL | Age: 62
LOS: 23 days | Discharge: NURSING, REHAB OR LONG-TERM CARE | DRG: 215 | End: 2022-10-25
Attending: THORACIC SURGERY (CARDIOTHORACIC VASCULAR SURGERY) | Admitting: THORACIC SURGERY (CARDIOTHORACIC VASCULAR SURGERY)
Payer: COMMERCIAL

## 2022-10-02 DIAGNOSIS — I47.20 VENTRICULAR TACHYCARDIA: ICD-10-CM

## 2022-10-02 DIAGNOSIS — J90 PLEURAL EFFUSION: ICD-10-CM

## 2022-10-02 DIAGNOSIS — Z98.890 STATUS POST MITRAL VALVE REPAIR: ICD-10-CM

## 2022-10-02 DIAGNOSIS — R00.1 BRADYCARDIA: ICD-10-CM

## 2022-10-02 DIAGNOSIS — I97.618 POSTOPERATIVE HEMORRHAGE INVOLVING CIRCULATORY SYSTEM FOLLOWING OTHER CIRCULATORY SYSTEM PROCEDURE: ICD-10-CM

## 2022-10-02 DIAGNOSIS — I49.9 ARRHYTHMIA: ICD-10-CM

## 2022-10-02 DIAGNOSIS — R57.9 SHOCK, UNSPECIFIED: ICD-10-CM

## 2022-10-02 DIAGNOSIS — I34.0 NONRHEUMATIC MITRAL VALVE REGURGITATION: Primary | ICD-10-CM

## 2022-10-02 DIAGNOSIS — I44.0 PROLONGED P-R INTERVAL: ICD-10-CM

## 2022-10-02 DIAGNOSIS — I50.9 ACUTE ON CHRONIC HEART FAILURE: ICD-10-CM

## 2022-10-02 DIAGNOSIS — I34.0 MITRAL REGURGITATION: ICD-10-CM

## 2022-10-02 LAB
ANION GAP SERPL CALC-SCNC: 10 MMOL/L (ref 8–16)
BASOPHILS # BLD AUTO: 0.03 K/UL (ref 0–0.2)
BASOPHILS NFR BLD: 0.4 % (ref 0–1.9)
BUN SERPL-MCNC: 18 MG/DL (ref 8–23)
CALCIUM SERPL-MCNC: 9.2 MG/DL (ref 8.7–10.5)
CHLORIDE SERPL-SCNC: 102 MMOL/L (ref 95–110)
CO2 SERPL-SCNC: 30 MMOL/L (ref 23–29)
CREAT SERPL-MCNC: 1.3 MG/DL (ref 0.5–1.4)
DIFFERENTIAL METHOD: ABNORMAL
EOSINOPHIL # BLD AUTO: 0.2 K/UL (ref 0–0.5)
EOSINOPHIL NFR BLD: 2.3 % (ref 0–8)
ERYTHROCYTE [DISTWIDTH] IN BLOOD BY AUTOMATED COUNT: 14.6 % (ref 11.5–14.5)
EST. GFR  (NO RACE VARIABLE): >60 ML/MIN/1.73 M^2
GLUCOSE SERPL-MCNC: 100 MG/DL (ref 70–110)
HCT VFR BLD AUTO: 41.8 % (ref 40–54)
HGB BLD-MCNC: 13.7 G/DL (ref 14–18)
IMM GRANULOCYTES # BLD AUTO: 0.02 K/UL (ref 0–0.04)
IMM GRANULOCYTES NFR BLD AUTO: 0.2 % (ref 0–0.5)
LYMPHOCYTES # BLD AUTO: 1.6 K/UL (ref 1–4.8)
LYMPHOCYTES NFR BLD: 19.9 % (ref 18–48)
MCH RBC QN AUTO: 31.9 PG (ref 27–31)
MCHC RBC AUTO-ENTMCNC: 32.8 G/DL (ref 32–36)
MCV RBC AUTO: 97 FL (ref 82–98)
MONOCYTES # BLD AUTO: 1 K/UL (ref 0.3–1)
MONOCYTES NFR BLD: 12.2 % (ref 4–15)
NEUTROPHILS # BLD AUTO: 5.3 K/UL (ref 1.8–7.7)
NEUTROPHILS NFR BLD: 65 % (ref 38–73)
NRBC BLD-RTO: 0 /100 WBC
PLATELET # BLD AUTO: 180 K/UL (ref 150–450)
PMV BLD AUTO: 12.3 FL (ref 9.2–12.9)
POTASSIUM SERPL-SCNC: 3.3 MMOL/L (ref 3.5–5.1)
RBC # BLD AUTO: 4.29 M/UL (ref 4.6–6.2)
SODIUM SERPL-SCNC: 142 MMOL/L (ref 136–145)
WBC # BLD AUTO: 8.13 K/UL (ref 3.9–12.7)

## 2022-10-02 PROCEDURE — 25000003 PHARM REV CODE 250: Performed by: STUDENT IN AN ORGANIZED HEALTH CARE EDUCATION/TRAINING PROGRAM

## 2022-10-02 PROCEDURE — 85025 COMPLETE CBC W/AUTO DIFF WBC: CPT | Performed by: STUDENT IN AN ORGANIZED HEALTH CARE EDUCATION/TRAINING PROGRAM

## 2022-10-02 PROCEDURE — 36415 COLL VENOUS BLD VENIPUNCTURE: CPT | Performed by: STUDENT IN AN ORGANIZED HEALTH CARE EDUCATION/TRAINING PROGRAM

## 2022-10-02 PROCEDURE — 63600175 PHARM REV CODE 636 W HCPCS: Performed by: STUDENT IN AN ORGANIZED HEALTH CARE EDUCATION/TRAINING PROGRAM

## 2022-10-02 PROCEDURE — 20600001 HC STEP DOWN PRIVATE ROOM

## 2022-10-02 PROCEDURE — 80048 BASIC METABOLIC PNL TOTAL CA: CPT | Performed by: STUDENT IN AN ORGANIZED HEALTH CARE EDUCATION/TRAINING PROGRAM

## 2022-10-02 RX ORDER — SODIUM CHLORIDE 0.9 % (FLUSH) 0.9 %
10 SYRINGE (ML) INJECTION
Status: DISCONTINUED | OUTPATIENT
Start: 2022-10-02 | End: 2022-10-07

## 2022-10-02 RX ORDER — FUROSEMIDE 10 MG/ML
40 INJECTION INTRAMUSCULAR; INTRAVENOUS
Status: DISCONTINUED | OUTPATIENT
Start: 2022-10-02 | End: 2022-10-05

## 2022-10-02 RX ORDER — ENOXAPARIN SODIUM 100 MG/ML
40 INJECTION SUBCUTANEOUS EVERY 24 HOURS
Status: DISCONTINUED | OUTPATIENT
Start: 2022-10-02 | End: 2022-10-03

## 2022-10-02 RX ORDER — AMIODARONE HYDROCHLORIDE 200 MG/1
200 TABLET ORAL 2 TIMES DAILY
Status: DISCONTINUED | OUTPATIENT
Start: 2022-10-02 | End: 2022-10-07

## 2022-10-02 RX ORDER — CARVEDILOL 12.5 MG/1
12.5 TABLET ORAL 2 TIMES DAILY WITH MEALS
Status: DISCONTINUED | OUTPATIENT
Start: 2022-10-02 | End: 2022-10-03

## 2022-10-02 RX ORDER — MILRINONE LACTATE 0.2 MG/ML
0.38 INJECTION, SOLUTION INTRAVENOUS CONTINUOUS
Status: DISCONTINUED | OUTPATIENT
Start: 2022-10-02 | End: 2022-10-07

## 2022-10-02 RX ADMIN — FUROSEMIDE 40 MG: 10 INJECTION, SOLUTION INTRAMUSCULAR; INTRAVENOUS at 01:10

## 2022-10-02 RX ADMIN — MILRINONE LACTATE IN DEXTROSE 0.25 MCG/KG/MIN: 200 INJECTION, SOLUTION INTRAVENOUS at 02:10

## 2022-10-02 RX ADMIN — CARVEDILOL 12.5 MG: 12.5 TABLET, FILM COATED ORAL at 04:10

## 2022-10-02 RX ADMIN — ENOXAPARIN SODIUM 40 MG: 100 INJECTION SUBCUTANEOUS at 04:10

## 2022-10-02 RX ADMIN — AMIODARONE HYDROCHLORIDE 200 MG: 200 TABLET ORAL at 09:10

## 2022-10-02 RX ADMIN — FUROSEMIDE 40 MG: 10 INJECTION, SOLUTION INTRAMUSCULAR; INTRAVENOUS at 09:10

## 2022-10-02 NOTE — PLAN OF CARE
New admit.  Lasix 40mg IVP given and Milrinone initiated.  Pt tolerated well  Diuresed 1200 cc this shift

## 2022-10-02 NOTE — TELEPHONE ENCOUNTER
Reason for Disposition   Notification of hospital admission     Patient called to verify need for admission today.  Said needs to know from MD.  Called Dr Hedrick, CT surgery to confirm need for admission today.    Protocols used: PCP Call - No Triage-A-SUMMER      David called from his home in [place], [place].  Calling to ask if he is still to have surgery today, 10/02/2022 with Dr Hedrick for valve repair.  He said he has not heard anything, but has paperwork for admission today.  He called the hospital yesterday and no one could give him any information.  I have called Dr Hedrick this morning, who confirms that he wants patient admitted this morning.  He instructs him to go [place] [facility], and then report to CC[facility] Stepdown unit on 3rd floor [facility].  I explained this to David, who states he will get his belongings together and will go to [facility] this [place].  All questions answered. No triage call.  Message to Dr Hedrick, CT Surgery, and Dr Julio James, cardiology. Please contact caller directly with any additional care advice.

## 2022-10-02 NOTE — H&P
[facility]  [facility]  History & Physical    Patient Name: David Barrios  MRN: [id number]  Admission Date: 10/2/2022  Attending Physician: Mike Hedrick MD   Primary Care Provider: Breann Tavera MD      Subjective:     History of Present Illness:  David Barrios is a 62 y.o. male who presents as an initial consult for surgical evaluation secondary to severe mitral regurgitation.  He has a medical history significant for atrial fibrillation, hypertension, HFrEF (EF 35%), and severe mitral regurgitation.  He states he has SOB over the past few weeks and has progressive worsened. He was recently seen in clinic by his cardiologist (Dr. Limon) on 8/4/22 for dyspnea and was noted to be in afib with RVR, rate 120's. He was switched to coreg BID and restarted back on xarelto. He reports cough, orthopnea and bilateral leg swelling. He also noticed weight gain but is unsure how many lbs. He denies chest pain/pressure.     No current facility-administered medications on file prior to encounter.     Current Outpatient Medications on File Prior to Encounter   Medication Sig    albuterol (PROVENTIL/VENTOLIN HFA) 90 mcg/actuation inhaler inhale 1-2 Puff(s) By Mouth Every 4 Hours as needed    amiodarone (PACERONE) 200 MG Tab Take 1 tablet (200 mg total) by mouth 2 (two) times daily.    carvediloL (COREG) 12.5 MG tablet Take 1 tablet (12.5 mg total) by mouth 2 (two) times daily with meals.    ferrous sulfate (FEOSOL) 325 mg (65 mg iron) Tab tablet Take 1 tablet (325 mg total) by mouth daily with breakfast.    furosemide (LASIX) 40 MG tablet TAKE 1 TABLET(40 MG) BY MOUTH TWICE DAILY    rivaroxaban (XARELTO) 20 mg Tab Take 1 tablet (20 mg total) by mouth daily with dinner or evening meal.       Review of patient's allergies indicates:  No Known Allergies    Past Medical History:   Diagnosis Date    Anemia     Atrial fibrillation     Encounter for blood transfusion     Esophageal ulcer     GI bleed      Hypertension      Past Surgical History:   Procedure Laterality Date    CARDIOVERSION Left 9/15/2022    Procedure: Cardioversion;  Surgeon: Julio James MD;  Location: [facility] CATH LAB/EP;  Service: Cardiology;  Laterality: Left;  With NORBERT    CATHETERIZATION OF BOTH LEFT AND RIGHT HEART N/A 9/22/2022    Procedure: CATHETERIZATION, HEART, BOTH LEFT AND RIGHT;  Surgeon: Julio James MD;  Location: [facility] CATH LAB/EP;  Service: Cardiology;  Laterality: N/A;    COLONOSCOPY N/A 4/4/2019    Procedure: COLONOSCOPY Golytely;  Surgeon: Umair Randolph MD;  Location: [facility] ENDO;  Service: Endoscopy;  Laterality: N/A;    CORONARY ANGIOGRAPHY N/A 9/22/2022    Procedure: ANGIOGRAM, CORONARY ARTERY;  Surgeon: Julio James MD;  Location: [facility] CATH LAB/EP;  Service: Cardiology;  Laterality: N/A;    ESOPHAGOGASTRODUODENOSCOPY N/A 10/12/2018    Procedure: EGD (ESOPHAGOGASTRODUODENOSCOPY);  Surgeon: Braden Herring MD;  Location: [facility];  Service: Endoscopy;  Laterality: N/A;    ESOPHAGOGASTRODUODENOSCOPY N/A 4/4/2019    Procedure: EGD;  Surgeon: Umair Randolph MD;  Location: [facility];  Service: Endoscopy;  Laterality: N/A;    HERNIA REPAIR       Family History       Problem Relation (Age of Onset)    COPD Mother    Cancer Father          Tobacco Use    Smoking status: Never    Smokeless tobacco: Current     Types: Chew   Substance and Sexual Activity    Alcohol use: Yes     Alcohol/week: 20.0 standard drinks     Types: 20 Cans of beer per week    Drug use: No    Sexual activity: Not on file     Review of Systems   Constitutional:  Negative for activity change, chills and fever.   Respiratory:  Negative for cough and shortness of breath.    Cardiovascular:  Negative for chest pain and palpitations.   Gastrointestinal:  Negative for abdominal distention and abdominal pain.   Musculoskeletal:  Negative for arthralgias and myalgias.   Neurological:  Negative for dizziness and headaches.   Psychiatric/Behavioral:  Negative  for agitation and confusion.    Objective:     Vital Signs (Most Recent):  Temp: 97.7 °F (36.5 °C) (10/02/22 1300)  Pulse: 60 (10/02/22 1300)  Resp: 16 (10/02/22 1300)  BP: (!) 161/103 (10/02/22 1300)  SpO2: 95 % (10/02/22 1300)   Vital Signs (24h Range):  Temp:  [97.7 °F (36.5 °C)] 97.7 °F (36.5 °C)  Pulse:  [60] 60  Resp:  [16] 16  SpO2:  [95 %] 95 %  BP: (161)/(103) 161/103     Weight: 83.2 kg (183 lb 6.8 oz)  Body mass index is 27.09 kg/m².    Physical Exam  Constitutional:       General: He is not in acute distress.     Appearance: He is well-developed.   Cardiovascular:      Rate and Rhythm: Normal rate and regular rhythm.   Pulmonary:      Effort: Pulmonary effort is normal. No respiratory distress.   Abdominal:      General: There is no distension.      Palpations: Abdomen is soft.   Skin:     General: Skin is warm and dry.   Neurological:      Mental Status: He is alert and oriented to person, place, and time.   Psychiatric:         Behavior: Behavior normal.       Assessment/Plan:     Pre op optimization prior to MV repair    Milrinone 0.25  Lasix 40 BID  Home coreg, amiodarone  Cardiac diet  Daily labs  Holding xarelto, currently in NSR    Gin Saul MD, PGY-7  Cardiothoracic Surgery   [phone]    I have seen the patient and reviewed the fellow's note above. I have personally interviewed and examined the patient at bedside and agree with the findings.     Mr. Barrios is a 62 year-old gentleman with a history of heart failure reduced ejection fraction (35% ejection fraction) secondary to valvular cardiomyopathy and paroxysmal atrial fibrillation, pulmonary hypertension, hypertension, and BMI of 29.  Recently, he has been symptomatic with dyspnea on exertion symptoms interfering with his quality of life.  His most recent echocardiogram showed 35% ejection fraction with LVEDD of 6.1cm, severe mitral regurgitation with central jet, mild to moderate tricuspid regurgitation, and estimated SPAP of 45mmHg.   Left and right heart catheterization showed nonsignificant coronary disease and PAP of 70/30.     CTA chest (PE study) shows minimal ascending aortic and moderate aortic arch calcifications.     We had an extensive discussion with him regarding the ACC/AHA guidelines and we agreed that he has class I indications for surgery involving mitral valve repair vs replacement (bioprosthesis), tricuspid valve repair, Benz Maze procedure, left atrial appendage resection, possible impella placement.  We went through the risks and benefits as well as the perioperative expectations and we agreed he is an appropriate surgical candidate.  We have tentatively scheduled his surgery for Friday, October 7, 2022.      Mike Hedrick MD  Cardiothoracic Surgery  [facility]

## 2022-10-03 LAB
ANION GAP SERPL CALC-SCNC: 10 MMOL/L (ref 8–16)
BASOPHILS # BLD AUTO: 0.04 K/UL (ref 0–0.2)
BASOPHILS NFR BLD: 0.6 % (ref 0–1.9)
BUN SERPL-MCNC: 18 MG/DL (ref 8–23)
CALCIUM SERPL-MCNC: 8.8 MG/DL (ref 8.7–10.5)
CHLORIDE SERPL-SCNC: 102 MMOL/L (ref 95–110)
CO2 SERPL-SCNC: 28 MMOL/L (ref 23–29)
CREAT SERPL-MCNC: 1.2 MG/DL (ref 0.5–1.4)
DIFFERENTIAL METHOD: ABNORMAL
EOSINOPHIL # BLD AUTO: 0.2 K/UL (ref 0–0.5)
EOSINOPHIL NFR BLD: 3.5 % (ref 0–8)
ERYTHROCYTE [DISTWIDTH] IN BLOOD BY AUTOMATED COUNT: 14.5 % (ref 11.5–14.5)
EST. GFR  (NO RACE VARIABLE): >60 ML/MIN/1.73 M^2
GLUCOSE SERPL-MCNC: 87 MG/DL (ref 70–110)
HCT VFR BLD AUTO: 41 % (ref 40–54)
HGB BLD-MCNC: 13.5 G/DL (ref 14–18)
IMM GRANULOCYTES # BLD AUTO: 0 K/UL (ref 0–0.04)
IMM GRANULOCYTES NFR BLD AUTO: 0 % (ref 0–0.5)
LYMPHOCYTES # BLD AUTO: 1.6 K/UL (ref 1–4.8)
LYMPHOCYTES NFR BLD: 25.6 % (ref 18–48)
MCH RBC QN AUTO: 31.8 PG (ref 27–31)
MCHC RBC AUTO-ENTMCNC: 32.9 G/DL (ref 32–36)
MCV RBC AUTO: 97 FL (ref 82–98)
MONOCYTES # BLD AUTO: 0.9 K/UL (ref 0.3–1)
MONOCYTES NFR BLD: 14.7 % (ref 4–15)
NEUTROPHILS # BLD AUTO: 3.5 K/UL (ref 1.8–7.7)
NEUTROPHILS NFR BLD: 55.6 % (ref 38–73)
NRBC BLD-RTO: 0 /100 WBC
PLATELET # BLD AUTO: 174 K/UL (ref 150–450)
PMV BLD AUTO: 11.9 FL (ref 9.2–12.9)
POTASSIUM SERPL-SCNC: 2.8 MMOL/L (ref 3.5–5.1)
RBC # BLD AUTO: 4.25 M/UL (ref 4.6–6.2)
SODIUM SERPL-SCNC: 140 MMOL/L (ref 136–145)
WBC # BLD AUTO: 6.34 K/UL (ref 3.9–12.7)

## 2022-10-03 PROCEDURE — 63600175 PHARM REV CODE 636 W HCPCS: Performed by: NURSE PRACTITIONER

## 2022-10-03 PROCEDURE — 85025 COMPLETE CBC W/AUTO DIFF WBC: CPT | Performed by: STUDENT IN AN ORGANIZED HEALTH CARE EDUCATION/TRAINING PROGRAM

## 2022-10-03 PROCEDURE — 36415 COLL VENOUS BLD VENIPUNCTURE: CPT | Performed by: STUDENT IN AN ORGANIZED HEALTH CARE EDUCATION/TRAINING PROGRAM

## 2022-10-03 PROCEDURE — 63600175 PHARM REV CODE 636 W HCPCS: Performed by: STUDENT IN AN ORGANIZED HEALTH CARE EDUCATION/TRAINING PROGRAM

## 2022-10-03 PROCEDURE — 20600001 HC STEP DOWN PRIVATE ROOM

## 2022-10-03 PROCEDURE — 80048 BASIC METABOLIC PNL TOTAL CA: CPT | Performed by: STUDENT IN AN ORGANIZED HEALTH CARE EDUCATION/TRAINING PROGRAM

## 2022-10-03 PROCEDURE — 25000003 PHARM REV CODE 250: Performed by: STUDENT IN AN ORGANIZED HEALTH CARE EDUCATION/TRAINING PROGRAM

## 2022-10-03 PROCEDURE — 25000003 PHARM REV CODE 250: Performed by: NURSE PRACTITIONER

## 2022-10-03 PROCEDURE — 25000003 PHARM REV CODE 250: Performed by: THORACIC SURGERY (CARDIOTHORACIC VASCULAR SURGERY)

## 2022-10-03 RX ORDER — ACETAMINOPHEN 325 MG/1
650 TABLET ORAL EVERY 6 HOURS PRN
Status: DISCONTINUED | OUTPATIENT
Start: 2022-10-03 | End: 2022-10-07

## 2022-10-03 RX ORDER — POTASSIUM CHLORIDE 20 MEQ/1
20 TABLET, EXTENDED RELEASE ORAL 2 TIMES DAILY
Status: DISCONTINUED | OUTPATIENT
Start: 2022-10-03 | End: 2022-10-04

## 2022-10-03 RX ORDER — FAMOTIDINE 20 MG/1
20 TABLET, FILM COATED ORAL 2 TIMES DAILY
Status: DISCONTINUED | OUTPATIENT
Start: 2022-10-03 | End: 2022-10-07

## 2022-10-03 RX ORDER — ACETAMINOPHEN 325 MG/1
650 TABLET ORAL EVERY 6 HOURS PRN
Status: DISCONTINUED | OUTPATIENT
Start: 2022-10-03 | End: 2022-10-03

## 2022-10-03 RX ORDER — POLYETHYLENE GLYCOL 3350 17 G/17G
17 POWDER, FOR SOLUTION ORAL DAILY
Status: DISCONTINUED | OUTPATIENT
Start: 2022-10-03 | End: 2022-10-07

## 2022-10-03 RX ORDER — POTASSIUM CHLORIDE 20 MEQ/1
40 TABLET, EXTENDED RELEASE ORAL ONCE
Status: COMPLETED | OUTPATIENT
Start: 2022-10-03 | End: 2022-10-03

## 2022-10-03 RX ORDER — ENOXAPARIN SODIUM 100 MG/ML
1 INJECTION SUBCUTANEOUS
Status: DISCONTINUED | OUTPATIENT
Start: 2022-10-03 | End: 2022-10-06

## 2022-10-03 RX ADMIN — AMIODARONE HYDROCHLORIDE 200 MG: 200 TABLET ORAL at 08:10

## 2022-10-03 RX ADMIN — ENOXAPARIN SODIUM 80 MG: 100 INJECTION SUBCUTANEOUS at 08:10

## 2022-10-03 RX ADMIN — MILRINONE LACTATE IN DEXTROSE 0.25 MCG/KG/MIN: 200 INJECTION, SOLUTION INTRAVENOUS at 04:10

## 2022-10-03 RX ADMIN — MILRINONE LACTATE IN DEXTROSE 0.25 MCG/KG/MIN: 200 INJECTION, SOLUTION INTRAVENOUS at 08:10

## 2022-10-03 RX ADMIN — FAMOTIDINE 20 MG: 20 TABLET ORAL at 08:10

## 2022-10-03 RX ADMIN — POTASSIUM CHLORIDE 20 MEQ: 1500 TABLET, EXTENDED RELEASE ORAL at 08:10

## 2022-10-03 RX ADMIN — FUROSEMIDE 40 MG: 10 INJECTION, SOLUTION INTRAMUSCULAR; INTRAVENOUS at 11:10

## 2022-10-03 RX ADMIN — POTASSIUM CHLORIDE 40 MEQ: 1500 TABLET, EXTENDED RELEASE ORAL at 06:10

## 2022-10-03 RX ADMIN — ACETAMINOPHEN 650 MG: 325 TABLET ORAL at 09:10

## 2022-10-03 RX ADMIN — ACETAMINOPHEN 650 MG: 325 TABLET ORAL at 08:10

## 2022-10-03 NOTE — PROGRESS NOTES
[facility]  Cardiothoracic Surgery  Progress Note    Patient Name: David Barrios  MRN: [id number]  Admission Date: 10/2/2022  Hospital Length of Stay: 1 days  Code Status: Full Code   Attending Physician: Mike Hedrick MD   Referring Provider: Mike Hedrick MD  Principal Problem:Nonrheumatic mitral valve regurgitation      Subjective:     Post-Op Info:  * No surgery found *         Interval History: Medical optimization prior to planned surgical intervention. No beta blockers, remains on milrinone    Review of Systems   Constitutional: Negative for malaise/fatigue.   Cardiovascular:  Negative for chest pain, claudication, dyspnea on exertion, irregular heartbeat, leg swelling and palpitations.   Respiratory:  Negative for cough and shortness of breath.    Hematologic/Lymphatic: Negative for bleeding problem.   Gastrointestinal:  Negative for abdominal pain.   Genitourinary:  Negative for dysuria.   Neurological:  Negative for headaches and weakness.   Medications:  Continuous Infusions:   milrinone 20mg/100ml D5W (200mcg/ml) 0.25 mcg/kg/min (10/03/22 0438)     Scheduled Meds:   amiodarone  200 mg Oral BID    docusate sodium  50 mg Oral Daily    enoxaparin  1 mg/kg Subcutaneous Q12H    famotidine  20 mg Oral BID    furosemide (LASIX) injection  40 mg Intravenous Q12H    polyethylene glycol  17 g Oral Daily    potassium chloride  20 mEq Oral BID     PRN Meds:acetaminophen, sodium chloride 0.9%     Objective:     Vital Signs (Most Recent):  Temp: 97 °F (36.1 °C) (10/03/22 1054)  Pulse: (!) 54 (10/03/22 1207)  Resp: 17 (10/03/22 1054)  BP: (!) 144/86 (10/03/22 1054)  SpO2: (!) 94 % (10/03/22 1054)   Vital Signs (24h Range):  Temp:  [97 °F (36.1 °C)-98.3 °F (36.8 °C)] 97 °F (36.1 °C)  Pulse:  [33-60] 54  Resp:  [16-20] 17  SpO2:  [92 %-95 %] 94 %  BP: (126-169)/() 144/86     Weight: 83.2 kg (183 lb 6.8 oz)  Body mass index is 27.09 kg/m².    SpO2: (!) 94 %       Intake/Output  - Last 3 Shifts         10/01 0700  10/02 0659 10/02 0700  10/03 0659 10/03 0700  10/04 0659    P.O.  200 600    Total Intake(mL/kg)  200 (2.4) 600 (7.2)    Urine (mL/kg/hr)  3550 400 (0.9)    Total Output  3550 400    Net  -3350 +200                   Lines/Drains/Airways       None                   Physical Exam  HENT:      Head: Normocephalic and atraumatic.   Eyes:      Extraocular Movements: Extraocular movements intact.   Cardiovascular:      Rate and Rhythm: Normal rate and regular rhythm.   Pulmonary:      Effort: Pulmonary effort is normal.   Abdominal:      General: Abdomen is flat.      Palpations: Abdomen is soft.   Musculoskeletal:         General: Normal range of motion.      Cervical back: Normal range of motion.   Skin:     General: Skin is warm and dry.      Capillary Refill: Capillary refill takes less than 2 seconds.   Neurological:      General: No focal deficit present.       Significant Labs:  BMP:   Recent Labs   Lab 10/03/22  0254   GLU 87      K 2.8*      CO2 28   BUN 18   CREATININE 1.2   CALCIUM 8.8     CBC:   Recent Labs   Lab 10/03/22  0254   WBC 6.34   RBC 4.25*   HGB 13.5*   HCT 41.0      MCV 97   MCH 31.8*   MCHC 32.9     CMP:   Recent Labs   Lab 10/03/22  0254   GLU 87   CALCIUM 8.8      K 2.8*   CO2 28      BUN 18   CREATININE 1.2     Coagulation: No results for input(s): PT, INR, APTT in the last 48 hours.    Significant Diagnostics:  I have reviewed and interpreted all pertinent imaging results/findings within the past 24 hours.    Assessment/Plan:     * Nonrheumatic mitral valve regurgitation  - Maintain sternal precautions   - Lasix  with scheduled potassium ordered   - Encourage ambulation  - Cardiac diet with 1500 cc fluid restriction   - Bowel regimen in place   - Famotidine for ulcer prevention   - OOBTO   - Encourage IS use  - Milrinone      Persistent atrial fibrillation  - Amiodarone   - Tele monitor    Microcytic anemia  - CBC  daily        Rita Garcia NP  Cardiothoracic Surgery  [facility]

## 2022-10-03 NOTE — SUBJECTIVE & OBJECTIVE
Interval History: Medical optimization prior to planned surgical intervention. No beta blockers, remains on milrinone    Review of Systems   Constitutional: Negative for malaise/fatigue.   Cardiovascular:  Negative for chest pain, claudication, dyspnea on exertion, irregular heartbeat, leg swelling and palpitations.   Respiratory:  Negative for cough and shortness of breath.    Hematologic/Lymphatic: Negative for bleeding problem.   Gastrointestinal:  Negative for abdominal pain.   Genitourinary:  Negative for dysuria.   Neurological:  Negative for headaches and weakness.   Medications:  Continuous Infusions:   milrinone 20mg/100ml D5W (200mcg/ml) 0.25 mcg/kg/min (10/03/22 0438)     Scheduled Meds:   amiodarone  200 mg Oral BID    docusate sodium  50 mg Oral Daily    enoxaparin  1 mg/kg Subcutaneous Q12H    famotidine  20 mg Oral BID    furosemide (LASIX) injection  40 mg Intravenous Q12H    polyethylene glycol  17 g Oral Daily    potassium chloride  20 mEq Oral BID     PRN Meds:acetaminophen, sodium chloride 0.9%     Objective:     Vital Signs (Most Recent):  Temp: 97 °F (36.1 °C) (10/03/22 1054)  Pulse: (!) 54 (10/03/22 1207)  Resp: 17 (10/03/22 1054)  BP: (!) 144/86 (10/03/22 1054)  SpO2: (!) 94 % (10/03/22 1054)   Vital Signs (24h Range):  Temp:  [97 °F (36.1 °C)-98.3 °F (36.8 °C)] 97 °F (36.1 °C)  Pulse:  [33-60] 54  Resp:  [16-20] 17  SpO2:  [92 %-95 %] 94 %  BP: (126-169)/() 144/86     Weight: 83.2 kg (183 lb 6.8 oz)  Body mass index is 27.09 kg/m².    SpO2: (!) 94 %       Intake/Output - Last 3 Shifts         10/01 0700  10/02 0659 10/02 0700  10/03 0659 10/03 0700  10/04 0659    P.O.  200 600    Total Intake(mL/kg)  200 (2.4) 600 (7.2)    Urine (mL/kg/hr)  3550 400 (0.9)    Total Output  3550 400    Net  -3350 +200                   Lines/Drains/Airways       None                   Physical Exam  HENT:      Head: Normocephalic and atraumatic.   Eyes:      Extraocular Movements: Extraocular movements  intact.   Cardiovascular:      Rate and Rhythm: Normal rate and regular rhythm.   Pulmonary:      Effort: Pulmonary effort is normal.   Abdominal:      General: Abdomen is flat.      Palpations: Abdomen is soft.   Musculoskeletal:         General: Normal range of motion.      Cervical back: Normal range of motion.   Skin:     General: Skin is warm and dry.      Capillary Refill: Capillary refill takes less than 2 seconds.   Neurological:      General: No focal deficit present.       Significant Labs:  BMP:   Recent Labs   Lab 10/03/22  0254   GLU 87      K 2.8*      CO2 28   BUN 18   CREATININE 1.2   CALCIUM 8.8     CBC:   Recent Labs   Lab 10/03/22  0254   WBC 6.34   RBC 4.25*   HGB 13.5*   HCT 41.0      MCV 97   MCH 31.8*   MCHC 32.9     CMP:   Recent Labs   Lab 10/03/22  0254   GLU 87   CALCIUM 8.8      K 2.8*   CO2 28      BUN 18   CREATININE 1.2     Coagulation: No results for input(s): PT, INR, APTT in the last 48 hours.    Significant Diagnostics:  I have reviewed and interpreted all pertinent imaging results/findings within the past 24 hours.

## 2022-10-03 NOTE — PLAN OF CARE
Problem: Adult Inpatient Plan of Care  Goal: Plan of Care Review  Outcome: Ongoing, Progressing   Patient Aox4. Primacor running continuous. BP stable, HR philip 40-50s. MD contacted for HR of 37. Patient asymptomatic, /65, says he feels fine. No new orders at this time will continue to monitor.

## 2022-10-03 NOTE — NURSING
Patient HR dropped to 33. /79. MD notified. Nurse told to call if it happens again, patient remains asymptomatic, will continue to monitor.

## 2022-10-03 NOTE — NURSING
Patient HR dropped to 31. /60. Asymptomatic. MD notified. 0745 Coreg to be held until rounds are made for further evaluation. Will notify day shift nurse. Charge nurse updated. Will continue to monitor.

## 2022-10-03 NOTE — PLAN OF CARE
[facility]  Initial Discharge Assessment       Primary Care Provider: Breann Tavera MD    Admission Diagnosis: Mitral regurgitation [I34.0]    Admission Date: 10/2/2022  Expected Discharge Date:     Discharge Barriers Identified: None    Payor: [employer] [facility] / Plan: BCBS ALL OUT OF STATE / Product Type: PPO /     Extended Emergency Contact Information  Primary Emergency Contact: Arianna Mccabe  Mobile Phone: [phone]  Relation: Friend   needed? No    Discharge Plan A: Home Health  Discharge Plan B: Home      [id number] - [place] - [address] AT [facility] [address]  [address]  [place] [address]  Phone: [phone] Fax: [phone]    [facility] - [place], [place] - [address]  [address] [facility] [address]  Phone: [phone] Fax: [phone]      Initial Assessment (most recent)       Adult Discharge Assessment - 10/03/22 0951          Discharge Assessment    Assessment Type Discharge Planning Assessment     Confirmed/corrected address, phone number and insurance Yes     Confirmed Demographics Correct on Facesheet     Source of Information patient     Communicated AMARILYS with patient/caregiver Yes     Lives With alone     Do you expect to return to your current living situation? Yes     Do you have help at home or someone to help you manage your care at home? Yes     Prior to hospitilization cognitive status: Alert/Oriented     Current cognitive status: Alert/Oriented     Walking or Climbing Stairs Difficulty none     Dressing/Bathing Difficulty none     Equipment Currently Used at Home none     Readmission within 30 days? No     Patient currently being followed by outpatient case management? No     Do you currently have service(s) that help you manage your care at home? No     Do you take prescription medications? Yes     Do you have prescription coverage? Yes     Coverage BCBS      Do you have any problems affording any of your prescribed medications? No     Is the patient taking medications as prescribed? yes     Who is going to help you get home at discharge? Friend Bella     How do you get to doctors appointments? family or friend will provide     Are you on dialysis? No     Do you take coumadin? No     Discharge Plan A Home Health     Discharge Plan B Home     DME Needed Upon Discharge  none     Discharge Plan discussed with: Patient     Discharge Barriers Identified None                      Pt admitted 10/2/2022 12:59 PM    For Mitral regurgitation [I34.0]       DPEA completed with pt who lives alone at the address listed on the facesheet. Pts friend Bella will assist in caring for pt after discharged and she will also drive pt home. Pt is current with [facility] and he would like to continue services with them post dc. Pts brother Ge Barrios [phone] is pts emergency contact.    Discharge packet provided to pt, all questions answered prior to leaving room and contact number provided to reach .      Pt is followed by cardiologist Dr Julio James.     PCP is Breann Tavera MD  [phone] (p)  [phone] (f)    No future appointments.      MORIS KaurN, RN   Case Management [phone]

## 2022-10-03 NOTE — ASSESSMENT & PLAN NOTE
- Maintain sternal precautions   - Lasix  with scheduled potassium ordered   - Encourage ambulation  - Cardiac diet with 1500 cc fluid restriction   - Bowel regimen in place   - Famotidine for ulcer prevention   - OOBTO   - Encourage IS use  - Milrinone

## 2022-10-03 NOTE — NURSING
Patient HR continuing to drop to 37-39. MD Angel Luis Segovia (CTS) contacted again. Per MD, Further evaluation needed if HR drops below 35, patient becomes symptomatic or theres a drop in BP. Charge nurse notified, will continue to monitor.

## 2022-10-04 LAB
ALBUMIN SERPL BCP-MCNC: 3.4 G/DL (ref 3.5–5.2)
ALP SERPL-CCNC: 88 U/L (ref 55–135)
ALT SERPL W/O P-5'-P-CCNC: 28 U/L (ref 10–44)
ANION GAP SERPL CALC-SCNC: 11 MMOL/L (ref 8–16)
AST SERPL-CCNC: 17 U/L (ref 10–40)
BASOPHILS # BLD AUTO: 0.05 K/UL (ref 0–0.2)
BASOPHILS NFR BLD: 0.9 % (ref 0–1.9)
BILIRUB SERPL-MCNC: 0.7 MG/DL (ref 0.1–1)
BUN SERPL-MCNC: 15 MG/DL (ref 8–23)
CALCIUM SERPL-MCNC: 8.8 MG/DL (ref 8.7–10.5)
CHLORIDE SERPL-SCNC: 103 MMOL/L (ref 95–110)
CO2 SERPL-SCNC: 26 MMOL/L (ref 23–29)
CREAT SERPL-MCNC: 1 MG/DL (ref 0.5–1.4)
DIFFERENTIAL METHOD: ABNORMAL
EOSINOPHIL # BLD AUTO: 0.2 K/UL (ref 0–0.5)
EOSINOPHIL NFR BLD: 3.9 % (ref 0–8)
ERYTHROCYTE [DISTWIDTH] IN BLOOD BY AUTOMATED COUNT: 14.3 % (ref 11.5–14.5)
EST. GFR  (NO RACE VARIABLE): >60 ML/MIN/1.73 M^2
GLUCOSE SERPL-MCNC: 91 MG/DL (ref 70–110)
HCT VFR BLD AUTO: 43.1 % (ref 40–54)
HGB BLD-MCNC: 14.3 G/DL (ref 14–18)
IMM GRANULOCYTES # BLD AUTO: 0.01 K/UL (ref 0–0.04)
IMM GRANULOCYTES NFR BLD AUTO: 0.2 % (ref 0–0.5)
LYMPHOCYTES # BLD AUTO: 1.4 K/UL (ref 1–4.8)
LYMPHOCYTES NFR BLD: 24.2 % (ref 18–48)
MCH RBC QN AUTO: 31.8 PG (ref 27–31)
MCHC RBC AUTO-ENTMCNC: 33.2 G/DL (ref 32–36)
MCV RBC AUTO: 96 FL (ref 82–98)
MONOCYTES # BLD AUTO: 0.7 K/UL (ref 0.3–1)
MONOCYTES NFR BLD: 13.3 % (ref 4–15)
NEUTROPHILS # BLD AUTO: 3.2 K/UL (ref 1.8–7.7)
NEUTROPHILS NFR BLD: 57.5 % (ref 38–73)
NRBC BLD-RTO: 0 /100 WBC
PLATELET # BLD AUTO: 212 K/UL (ref 150–450)
PMV BLD AUTO: 12.5 FL (ref 9.2–12.9)
POTASSIUM SERPL-SCNC: 3.1 MMOL/L (ref 3.5–5.1)
PROT SERPL-MCNC: 6.4 G/DL (ref 6–8.4)
RBC # BLD AUTO: 4.49 M/UL (ref 4.6–6.2)
SODIUM SERPL-SCNC: 140 MMOL/L (ref 136–145)
WBC # BLD AUTO: 5.58 K/UL (ref 3.9–12.7)

## 2022-10-04 PROCEDURE — 63600175 PHARM REV CODE 636 W HCPCS: Performed by: NURSE PRACTITIONER

## 2022-10-04 PROCEDURE — 25000003 PHARM REV CODE 250: Performed by: STUDENT IN AN ORGANIZED HEALTH CARE EDUCATION/TRAINING PROGRAM

## 2022-10-04 PROCEDURE — 94761 N-INVAS EAR/PLS OXIMETRY MLT: CPT

## 2022-10-04 PROCEDURE — 85025 COMPLETE CBC W/AUTO DIFF WBC: CPT | Performed by: STUDENT IN AN ORGANIZED HEALTH CARE EDUCATION/TRAINING PROGRAM

## 2022-10-04 PROCEDURE — 25000003 PHARM REV CODE 250: Performed by: THORACIC SURGERY (CARDIOTHORACIC VASCULAR SURGERY)

## 2022-10-04 PROCEDURE — 63600175 PHARM REV CODE 636 W HCPCS: Performed by: STUDENT IN AN ORGANIZED HEALTH CARE EDUCATION/TRAINING PROGRAM

## 2022-10-04 PROCEDURE — 20600001 HC STEP DOWN PRIVATE ROOM

## 2022-10-04 PROCEDURE — 80053 COMPREHEN METABOLIC PANEL: CPT | Performed by: THORACIC SURGERY (CARDIOTHORACIC VASCULAR SURGERY)

## 2022-10-04 PROCEDURE — 25000003 PHARM REV CODE 250: Performed by: NURSE PRACTITIONER

## 2022-10-04 PROCEDURE — 36415 COLL VENOUS BLD VENIPUNCTURE: CPT | Performed by: THORACIC SURGERY (CARDIOTHORACIC VASCULAR SURGERY)

## 2022-10-04 RX ORDER — POTASSIUM CHLORIDE 20 MEQ/1
40 TABLET, EXTENDED RELEASE ORAL 2 TIMES DAILY
Status: CANCELLED | OUTPATIENT
Start: 2022-10-04

## 2022-10-04 RX ORDER — MILRINONE LACTATE 0.2 MG/ML
0.38 INJECTION, SOLUTION INTRAVENOUS CONTINUOUS
Status: CANCELLED | OUTPATIENT
Start: 2022-10-04

## 2022-10-04 RX ORDER — POTASSIUM CHLORIDE 20 MEQ/1
40 TABLET, EXTENDED RELEASE ORAL 2 TIMES DAILY
Status: DISCONTINUED | OUTPATIENT
Start: 2022-10-04 | End: 2022-10-07

## 2022-10-04 RX ADMIN — POTASSIUM CHLORIDE 40 MEQ: 1500 TABLET, EXTENDED RELEASE ORAL at 09:10

## 2022-10-04 RX ADMIN — ACETAMINOPHEN 650 MG: 325 TABLET ORAL at 05:10

## 2022-10-04 RX ADMIN — FAMOTIDINE 20 MG: 20 TABLET ORAL at 08:10

## 2022-10-04 RX ADMIN — MILRINONE LACTATE IN DEXTROSE 0.38 MCG/KG/MIN: 200 INJECTION, SOLUTION INTRAVENOUS at 09:10

## 2022-10-04 RX ADMIN — FUROSEMIDE 40 MG: 10 INJECTION, SOLUTION INTRAMUSCULAR; INTRAVENOUS at 10:10

## 2022-10-04 RX ADMIN — MILRINONE LACTATE IN DEXTROSE 0.38 MCG/KG/MIN: 200 INJECTION, SOLUTION INTRAVENOUS at 11:10

## 2022-10-04 RX ADMIN — POTASSIUM CHLORIDE 40 MEQ: 1500 TABLET, EXTENDED RELEASE ORAL at 08:10

## 2022-10-04 RX ADMIN — FAMOTIDINE 20 MG: 20 TABLET ORAL at 09:10

## 2022-10-04 RX ADMIN — ENOXAPARIN SODIUM 80 MG: 100 INJECTION SUBCUTANEOUS at 09:10

## 2022-10-04 RX ADMIN — ENOXAPARIN SODIUM 80 MG: 100 INJECTION SUBCUTANEOUS at 08:10

## 2022-10-04 RX ADMIN — DOCUSATE SODIUM 50 MG: 50 CAPSULE, LIQUID FILLED ORAL at 08:10

## 2022-10-04 RX ADMIN — AMIODARONE HYDROCHLORIDE 200 MG: 200 TABLET ORAL at 08:10

## 2022-10-04 RX ADMIN — FUROSEMIDE 40 MG: 10 INJECTION, SOLUTION INTRAMUSCULAR; INTRAVENOUS at 09:10

## 2022-10-04 RX ADMIN — ACETAMINOPHEN 650 MG: 325 TABLET ORAL at 11:10

## 2022-10-04 RX ADMIN — AMIODARONE HYDROCHLORIDE 200 MG: 200 TABLET ORAL at 09:10

## 2022-10-04 NOTE — PROGRESS NOTES
10/04/22 0002 10/04/22 0005   Vital Signs   Pulse (!) 36 60     Pt philip down in the 30s. Checked on pt at bedside. Denied chest pain, SOB or dizziness. HR later came back to 60s. Bedside nurse Meeta contacted and she said pt baseline bradycardic. Will continue to monitor.

## 2022-10-04 NOTE — PROGRESS NOTES
Pt HR dropped to 30s. Pt is asymptomatic.Denies SOB or chest pain. Notified MD Elias Reid and was informed to notify provider if pt HR sustains in the 30s and blood pressure is affected. Pt HR currently 48 and MAP 89. Will continue to monitor.

## 2022-10-04 NOTE — PROGRESS NOTES
[facility]  Cardiothoracic Surgery  Progress Note    Patient Name: David Barrios  MRN: [id number]  Admission Date: 10/2/2022  Hospital Length of Stay: 2 days  Code Status: Full Code   Attending Physician: Mike Hedrick MD   Referring Provider: Mike Hedrick MD  Principal Problem:Nonrheumatic mitral valve regurgitation    Subjective:     Post-Op Info:  * No surgery found *         Interval History: Remains on Milrinone    Review of Systems   Constitutional: Negative for malaise/fatigue.   Cardiovascular:  Negative for chest pain, claudication, dyspnea on exertion, irregular heartbeat, leg swelling and palpitations.   Respiratory:  Negative for cough and shortness of breath.    Hematologic/Lymphatic: Negative for bleeding problem.   Gastrointestinal:  Negative for abdominal pain.   Genitourinary:  Negative for dysuria.   Neurological:  Negative for headaches and weakness.   Medications:  Continuous Infusions:   milrinone 20mg/100ml D5W (200mcg/ml) 0.375 mcg/kg/min (10/04/22 0720)     Scheduled Meds:   amiodarone  200 mg Oral BID    docusate sodium  50 mg Oral Daily    enoxaparin  1 mg/kg Subcutaneous Q12H    famotidine  20 mg Oral BID    furosemide (LASIX) injection  40 mg Intravenous Q12H    polyethylene glycol  17 g Oral Daily    potassium chloride  40 mEq Oral BID     PRN Meds:acetaminophen, sodium chloride 0.9%     Objective:     Vital Signs (Most Recent):  Temp: 97.5 °F (36.4 °C) (10/04/22 1046)  Pulse: (!) 56 (10/04/22 1046)  Resp: 18 (10/04/22 1046)  BP: (!) 158/88 (10/04/22 1046)  SpO2: (!) 93 % (10/04/22 1046)   Vital Signs (24h Range):  Temp:  [97 °F (36.1 °C)-98 °F (36.7 °C)] 97.5 °F (36.4 °C)  Pulse:  [36-60] 56  Resp:  [17-19] 18  SpO2:  [91 %-97 %] 93 %  BP: (133-169)/(63-98) 158/88     Weight: 83.2 kg (183 lb 6.8 oz)  Body mass index is 27.09 kg/m².    SpO2: (!) 93 %  O2 Device (Oxygen Therapy): room air    Intake/Output - Last 3 Shifts         10/02 0700  10/03  0659 10/03 0700  10/04 0659 10/04 0700  10/05 0659    P.O. 200 1044     Total Intake(mL/kg) 200 (2.4) 1044 (12.5)     Urine (mL/kg/hr) 3550 3025 (1.5)     Total Output 3550 3025     Net -3350 -1981                    Lines/Drains/Airways       Peripheral Intravenous Line  Duration                  Peripheral IV - Single Lumen Anterior;Proximal;Right Forearm -- days                    Physical Exam  HENT:      Head: Normocephalic and atraumatic.   Eyes:      Extraocular Movements: Extraocular movements intact.   Cardiovascular:      Rate and Rhythm: Normal rate and regular rhythm.   Pulmonary:      Effort: Pulmonary effort is normal.   Abdominal:      General: Abdomen is flat.      Palpations: Abdomen is soft.   Musculoskeletal:         General: Normal range of motion.      Cervical back: Normal range of motion.   Skin:     General: Skin is warm and dry.      Capillary Refill: Capillary refill takes less than 2 seconds.   Neurological:      General: No focal deficit present.       Significant Labs:  BMP:   Recent Labs   Lab 10/04/22  0413   GLU 91      K 3.1*      CO2 26   BUN 15   CREATININE 1.0   CALCIUM 8.8     CBC:   Recent Labs   Lab 10/04/22  0413   WBC 5.58   RBC 4.49*   HGB 14.3   HCT 43.1      MCV 96   MCH 31.8*   MCHC 33.2     CMP:   Recent Labs   Lab 10/04/22  0413   GLU 91   CALCIUM 8.8   ALBUMIN 3.4*   PROT 6.4      K 3.1*   CO2 26      BUN 15   CREATININE 1.0   ALKPHOS 88   ALT 28   AST 17   BILITOT 0.7     Coagulation: No results for input(s): PT, INR, APTT in the last 48 hours.    Significant Diagnostics:  I have reviewed and interpreted all pertinent imaging results/findings within the past 24 hours.    Assessment/Plan:     * Nonrheumatic mitral valve regurgitation  - Maintain sternal precautions   - Lasix  with scheduled potassium ordered   - Encourage ambulation  - Cardiac diet with 1500 cc fluid restriction   - Bowel regimen in place   - Famotidine for ulcer  prevention   - OOBTO   - Encourage IS use  - Milrinone increased       Persistent atrial fibrillation  - Amiodarone   - Tele monitor    Hypokalemia  - Expected with lasix administration   - Scheduled potassium  - Potassium lab daily to monitor trends     Microcytic anemia  - CBC daily        Rita Garcia NP  Cardiothoracic Surgery  [facility]

## 2022-10-04 NOTE — SUBJECTIVE & OBJECTIVE
Interval History: Remains on Milrinone    Review of Systems   Constitutional: Negative for malaise/fatigue.   Cardiovascular:  Negative for chest pain, claudication, dyspnea on exertion, irregular heartbeat, leg swelling and palpitations.   Respiratory:  Negative for cough and shortness of breath.    Hematologic/Lymphatic: Negative for bleeding problem.   Gastrointestinal:  Negative for abdominal pain.   Genitourinary:  Negative for dysuria.   Neurological:  Negative for headaches and weakness.   Medications:  Continuous Infusions:   milrinone 20mg/100ml D5W (200mcg/ml) 0.375 mcg/kg/min (10/04/22 0720)     Scheduled Meds:   amiodarone  200 mg Oral BID    docusate sodium  50 mg Oral Daily    enoxaparin  1 mg/kg Subcutaneous Q12H    famotidine  20 mg Oral BID    furosemide (LASIX) injection  40 mg Intravenous Q12H    polyethylene glycol  17 g Oral Daily    potassium chloride  40 mEq Oral BID     PRN Meds:acetaminophen, sodium chloride 0.9%     Objective:     Vital Signs (Most Recent):  Temp: 97.5 °F (36.4 °C) (10/04/22 1046)  Pulse: (!) 56 (10/04/22 1046)  Resp: 18 (10/04/22 1046)  BP: (!) 158/88 (10/04/22 1046)  SpO2: (!) 93 % (10/04/22 1046)   Vital Signs (24h Range):  Temp:  [97 °F (36.1 °C)-98 °F (36.7 °C)] 97.5 °F (36.4 °C)  Pulse:  [36-60] 56  Resp:  [17-19] 18  SpO2:  [91 %-97 %] 93 %  BP: (133-169)/(63-98) 158/88     Weight: 83.2 kg (183 lb 6.8 oz)  Body mass index is 27.09 kg/m².    SpO2: (!) 93 %  O2 Device (Oxygen Therapy): room air    Intake/Output - Last 3 Shifts         10/02 0700  10/03 0659 10/03 0700  10/04 0659 10/04 0700  10/05 0659    P.O. 200 1044     Total Intake(mL/kg) 200 (2.4) 1044 (12.5)     Urine (mL/kg/hr) 3550 3025 (1.5)     Total Output 3550 3025     Net -3350 -1981                    Lines/Drains/Airways       Peripheral Intravenous Line  Duration                  Peripheral IV - Single Lumen Anterior;Proximal;Right Forearm -- days                    Physical Exam  HENT:      Head:  Normocephalic and atraumatic.   Eyes:      Extraocular Movements: Extraocular movements intact.   Cardiovascular:      Rate and Rhythm: Normal rate and regular rhythm.   Pulmonary:      Effort: Pulmonary effort is normal.   Abdominal:      General: Abdomen is flat.      Palpations: Abdomen is soft.   Musculoskeletal:         General: Normal range of motion.      Cervical back: Normal range of motion.   Skin:     General: Skin is warm and dry.      Capillary Refill: Capillary refill takes less than 2 seconds.   Neurological:      General: No focal deficit present.       Significant Labs:  BMP:   Recent Labs   Lab 10/04/22  0413   GLU 91      K 3.1*      CO2 26   BUN 15   CREATININE 1.0   CALCIUM 8.8     CBC:   Recent Labs   Lab 10/04/22  0413   WBC 5.58   RBC 4.49*   HGB 14.3   HCT 43.1      MCV 96   MCH 31.8*   MCHC 33.2     CMP:   Recent Labs   Lab 10/04/22  0413   GLU 91   CALCIUM 8.8   ALBUMIN 3.4*   PROT 6.4      K 3.1*   CO2 26      BUN 15   CREATININE 1.0   ALKPHOS 88   ALT 28   AST 17   BILITOT 0.7     Coagulation: No results for input(s): PT, INR, APTT in the last 48 hours.    Significant Diagnostics:  I have reviewed and interpreted all pertinent imaging results/findings within the past 24 hours.

## 2022-10-04 NOTE — ASSESSMENT & PLAN NOTE
- Maintain sternal precautions   - Lasix  with scheduled potassium ordered   - Encourage ambulation  - Cardiac diet with 1500 cc fluid restriction   - Bowel regimen in place   - Famotidine for ulcer prevention   - OOBTO   - Encourage IS use  - Milrinone increased

## 2022-10-04 NOTE — ASSESSMENT & PLAN NOTE
- Expected with lasix administration   - Scheduled potassium  - Potassium lab daily to monitor trends

## 2022-10-05 LAB
ALBUMIN SERPL BCP-MCNC: 3.7 G/DL (ref 3.5–5.2)
ALP SERPL-CCNC: 93 U/L (ref 55–135)
ALT SERPL W/O P-5'-P-CCNC: 31 U/L (ref 10–44)
ANION GAP SERPL CALC-SCNC: 11 MMOL/L (ref 8–16)
AST SERPL-CCNC: 20 U/L (ref 10–40)
BASOPHILS # BLD AUTO: 0.07 K/UL (ref 0–0.2)
BASOPHILS NFR BLD: 1.2 % (ref 0–1.9)
BILIRUB SERPL-MCNC: 0.7 MG/DL (ref 0.1–1)
BUN SERPL-MCNC: 12 MG/DL (ref 8–23)
CALCIUM SERPL-MCNC: 9.8 MG/DL (ref 8.7–10.5)
CHLORIDE SERPL-SCNC: 103 MMOL/L (ref 95–110)
CO2 SERPL-SCNC: 27 MMOL/L (ref 23–29)
CREAT SERPL-MCNC: 1.1 MG/DL (ref 0.5–1.4)
DIFFERENTIAL METHOD: ABNORMAL
EOSINOPHIL # BLD AUTO: 0.3 K/UL (ref 0–0.5)
EOSINOPHIL NFR BLD: 4.5 % (ref 0–8)
ERYTHROCYTE [DISTWIDTH] IN BLOOD BY AUTOMATED COUNT: 14.3 % (ref 11.5–14.5)
EST. GFR  (NO RACE VARIABLE): >60 ML/MIN/1.73 M^2
GLUCOSE SERPL-MCNC: 96 MG/DL (ref 70–110)
HCT VFR BLD AUTO: 49.5 % (ref 40–54)
HGB BLD-MCNC: 16.4 G/DL (ref 14–18)
IMM GRANULOCYTES # BLD AUTO: 0.01 K/UL (ref 0–0.04)
IMM GRANULOCYTES NFR BLD AUTO: 0.2 % (ref 0–0.5)
LYMPHOCYTES # BLD AUTO: 1.6 K/UL (ref 1–4.8)
LYMPHOCYTES NFR BLD: 26.7 % (ref 18–48)
MCH RBC QN AUTO: 31.8 PG (ref 27–31)
MCHC RBC AUTO-ENTMCNC: 33.1 G/DL (ref 32–36)
MCV RBC AUTO: 96 FL (ref 82–98)
MONOCYTES # BLD AUTO: 0.7 K/UL (ref 0.3–1)
MONOCYTES NFR BLD: 11.6 % (ref 4–15)
NEUTROPHILS # BLD AUTO: 3.4 K/UL (ref 1.8–7.7)
NEUTROPHILS NFR BLD: 55.8 % (ref 38–73)
NRBC BLD-RTO: 0 /100 WBC
PLATELET # BLD AUTO: 255 K/UL (ref 150–450)
PMV BLD AUTO: 12.1 FL (ref 9.2–12.9)
POTASSIUM SERPL-SCNC: 3.5 MMOL/L (ref 3.5–5.1)
PROT SERPL-MCNC: 7.3 G/DL (ref 6–8.4)
RBC # BLD AUTO: 5.16 M/UL (ref 4.6–6.2)
SODIUM SERPL-SCNC: 141 MMOL/L (ref 136–145)
WBC # BLD AUTO: 6.04 K/UL (ref 3.9–12.7)

## 2022-10-05 PROCEDURE — 80053 COMPREHEN METABOLIC PANEL: CPT | Performed by: THORACIC SURGERY (CARDIOTHORACIC VASCULAR SURGERY)

## 2022-10-05 PROCEDURE — 25000003 PHARM REV CODE 250: Performed by: NURSE PRACTITIONER

## 2022-10-05 PROCEDURE — 63600175 PHARM REV CODE 636 W HCPCS: Performed by: THORACIC SURGERY (CARDIOTHORACIC VASCULAR SURGERY)

## 2022-10-05 PROCEDURE — 25000003 PHARM REV CODE 250: Performed by: THORACIC SURGERY (CARDIOTHORACIC VASCULAR SURGERY)

## 2022-10-05 PROCEDURE — 36415 COLL VENOUS BLD VENIPUNCTURE: CPT | Performed by: THORACIC SURGERY (CARDIOTHORACIC VASCULAR SURGERY)

## 2022-10-05 PROCEDURE — 20600001 HC STEP DOWN PRIVATE ROOM

## 2022-10-05 PROCEDURE — 85025 COMPLETE CBC W/AUTO DIFF WBC: CPT | Performed by: STUDENT IN AN ORGANIZED HEALTH CARE EDUCATION/TRAINING PROGRAM

## 2022-10-05 PROCEDURE — 94761 N-INVAS EAR/PLS OXIMETRY MLT: CPT

## 2022-10-05 PROCEDURE — 25000003 PHARM REV CODE 250: Performed by: STUDENT IN AN ORGANIZED HEALTH CARE EDUCATION/TRAINING PROGRAM

## 2022-10-05 PROCEDURE — 63600175 PHARM REV CODE 636 W HCPCS: Performed by: NURSE PRACTITIONER

## 2022-10-05 RX ORDER — TRAMADOL HYDROCHLORIDE 50 MG/1
50 TABLET ORAL EVERY 6 HOURS PRN
Status: DISCONTINUED | OUTPATIENT
Start: 2022-10-05 | End: 2022-10-07

## 2022-10-05 RX ORDER — FUROSEMIDE 10 MG/ML
40 INJECTION INTRAMUSCULAR; INTRAVENOUS 2 TIMES DAILY
Status: DISCONTINUED | OUTPATIENT
Start: 2022-10-05 | End: 2022-10-06

## 2022-10-05 RX ADMIN — ENOXAPARIN SODIUM 80 MG: 100 INJECTION SUBCUTANEOUS at 08:10

## 2022-10-05 RX ADMIN — DOCUSATE SODIUM 50 MG: 50 CAPSULE, LIQUID FILLED ORAL at 08:10

## 2022-10-05 RX ADMIN — AMIODARONE HYDROCHLORIDE 200 MG: 200 TABLET ORAL at 08:10

## 2022-10-05 RX ADMIN — FAMOTIDINE 20 MG: 20 TABLET ORAL at 08:10

## 2022-10-05 RX ADMIN — POTASSIUM CHLORIDE 40 MEQ: 1500 TABLET, EXTENDED RELEASE ORAL at 08:10

## 2022-10-05 RX ADMIN — FUROSEMIDE 40 MG: 10 INJECTION, SOLUTION INTRAMUSCULAR; INTRAVENOUS at 06:10

## 2022-10-05 RX ADMIN — FUROSEMIDE 40 MG: 10 INJECTION, SOLUTION INTRAMUSCULAR; INTRAVENOUS at 05:10

## 2022-10-05 RX ADMIN — TRAMADOL HYDROCHLORIDE 50 MG: 50 TABLET, COATED ORAL at 04:10

## 2022-10-05 RX ADMIN — MILRINONE LACTATE IN DEXTROSE 0.38 MCG/KG/MIN: 200 INJECTION, SOLUTION INTRAVENOUS at 08:10

## 2022-10-05 RX ADMIN — MILRINONE LACTATE IN DEXTROSE 0.38 MCG/KG/MIN: 200 INJECTION, SOLUTION INTRAVENOUS at 07:10

## 2022-10-05 NOTE — PROGRESS NOTES
[facility] - Cardiology Stepdown  Cardiothoracic Surgery  Progress Note    Patient Name: David Barrios  MRN: [id number]  Admission Date: 10/2/2022  Hospital Length of Stay: 3 days  Code Status: Full Code   Attending Physician: Mike Hedrick MD   Referring Provider: Mike Hedrick MD  Principal Problem:Nonrheumatic mitral valve regurgitation  Subjective:     Post-Op Info:  * No surgery found *         Interval History: NAEON.  Patient continues to medical optimize    Review of Systems   Constitutional: Negative for malaise/fatigue.   Cardiovascular:  Negative for chest pain, claudication, dyspnea on exertion, irregular heartbeat, leg swelling and palpitations.   Respiratory:  Negative for cough and shortness of breath.    Hematologic/Lymphatic: Negative for bleeding problem.   Gastrointestinal:  Negative for abdominal pain.   Genitourinary:  Negative for dysuria.   Neurological:  Negative for headaches and weakness.   Medications:  Continuous Infusions:   milrinone 20mg/100ml D5W (200mcg/ml) 0.375 mcg/kg/min (10/05/22 0858)     Scheduled Meds:   amiodarone  200 mg Oral BID    docusate sodium  50 mg Oral Daily    enoxaparin  1 mg/kg Subcutaneous Q12H    famotidine  20 mg Oral BID    furosemide (LASIX) injection  40 mg Intravenous BID    polyethylene glycol  17 g Oral Daily    potassium chloride  40 mEq Oral BID     PRN Meds:acetaminophen, sodium chloride 0.9%, traMADoL     Objective:     Vital Signs (Most Recent):  Temp: 97.4 °F (36.3 °C) (10/05/22 1151)  Pulse: 62 (10/05/22 1151)  Resp: 18 (10/05/22 1151)  BP: (!) 157/94 (10/05/22 1151)  SpO2: (!) 94 % (10/05/22 1151)   Vital Signs (24h Range):  Temp:  [97.1 °F (36.2 °C)-98.2 °F (36.8 °C)] 97.4 °F (36.3 °C)  Pulse:  [46-88] 62  Resp:  [17-20] 18  SpO2:  [93 %-96 %] 94 %  BP: (132-166)/(80-94) 157/94     Weight: 83.2 kg (183 lb 6.8 oz)  Body mass index is 27.09 kg/m².    SpO2: (!) 94 %  O2 Device (Oxygen Therapy): room air    Intake/Output - Last 3  Shifts         10/03 0700  10/04 0659 10/04 0700  10/05 0659 10/05 0700  10/06 0659    P.O. 1044 804     Total Intake(mL/kg) 1044 (12.5) 804 (9.7)     Urine (mL/kg/hr) 3025 (1.5) 2400 (1.2) 600 (1.4)    Total Output 3025 2400 600    Net -1981 -1596 -600                   Lines/Drains/Airways       Peripheral Intravenous Line  Duration                  Peripheral IV - Single Lumen Anterior;Proximal;Right Forearm -- days                    Physical Exam  Constitutional:       Appearance: Normal appearance.   HENT:      Head: Normocephalic.   Eyes:      Pupils: Pupils are equal, round, and reactive to light.   Cardiovascular:      Rate and Rhythm: Normal rate and regular rhythm.      Pulses: Normal pulses.   Pulmonary:      Effort: Pulmonary effort is normal.   Abdominal:      General: Abdomen is flat. Bowel sounds are normal.      Palpations: Abdomen is soft.   Musculoskeletal:         General: Normal range of motion.   Skin:     General: Skin is warm and dry.   Neurological:      General: No focal deficit present.      Mental Status: He is alert.       Significant Labs:  BMP:   Recent Labs   Lab 10/05/22  0500   GLU 96      K 3.5      CO2 27   BUN 12   CREATININE 1.1   CALCIUM 9.8     CBC:   Recent Labs   Lab 10/05/22  0500   WBC 6.04   RBC 5.16   HGB 16.4   HCT 49.5      MCV 96   MCH 31.8*   MCHC 33.1     CMP:   Recent Labs   Lab 10/05/22  0500   GLU 96   CALCIUM 9.8   ALBUMIN 3.7   PROT 7.3      K 3.5   CO2 27      BUN 12   CREATININE 1.1   ALKPHOS 93   ALT 31   AST 20   BILITOT 0.7     Coagulation: No results for input(s): PT, INR, APTT in the last 48 hours.    Significant Diagnostics:  I have reviewed and interpreted all pertinent imaging results/findings within the past 24 hours.    Assessment/Plan:     * Nonrheumatic mitral valve regurgitation  - Maintain sternal precautions   - Lasix  with scheduled potassium ordered   - Encourage ambulation  - Cardiac diet with 1500 cc fluid  restriction   - Bowel regimen in place   - Famotidine for ulcer prevention   - OOBTO   - Encourage IS use  - Milrinone       Persistent atrial fibrillation  - Amiodarone   - Tele monitor    Hypokalemia  - Expected with lasix administration   - Scheduled potassium  - Potassium lab daily to monitor trends   - Resolved    Microcytic anemia  - CBC daily        Rita Garcia, HAO  Cardiothoracic Surgery  [facility] - Cardiology Stepdown

## 2022-10-05 NOTE — SUBJECTIVE & OBJECTIVE
Interval History: NAEON.  Patient continues to medical optimize    Review of Systems   Constitutional: Negative for malaise/fatigue.   Cardiovascular:  Negative for chest pain, claudication, dyspnea on exertion, irregular heartbeat, leg swelling and palpitations.   Respiratory:  Negative for cough and shortness of breath.    Hematologic/Lymphatic: Negative for bleeding problem.   Gastrointestinal:  Negative for abdominal pain.   Genitourinary:  Negative for dysuria.   Neurological:  Negative for headaches and weakness.   Medications:  Continuous Infusions:   milrinone 20mg/100ml D5W (200mcg/ml) 0.375 mcg/kg/min (10/05/22 0858)     Scheduled Meds:   amiodarone  200 mg Oral BID    docusate sodium  50 mg Oral Daily    enoxaparin  1 mg/kg Subcutaneous Q12H    famotidine  20 mg Oral BID    furosemide (LASIX) injection  40 mg Intravenous BID    polyethylene glycol  17 g Oral Daily    potassium chloride  40 mEq Oral BID     PRN Meds:acetaminophen, sodium chloride 0.9%, traMADoL     Objective:     Vital Signs (Most Recent):  Temp: 97.4 °F (36.3 °C) (10/05/22 1151)  Pulse: 62 (10/05/22 1151)  Resp: 18 (10/05/22 1151)  BP: (!) 157/94 (10/05/22 1151)  SpO2: (!) 94 % (10/05/22 1151)   Vital Signs (24h Range):  Temp:  [97.1 °F (36.2 °C)-98.2 °F (36.8 °C)] 97.4 °F (36.3 °C)  Pulse:  [46-88] 62  Resp:  [17-20] 18  SpO2:  [93 %-96 %] 94 %  BP: (132-166)/(80-94) 157/94     Weight: 83.2 kg (183 lb 6.8 oz)  Body mass index is 27.09 kg/m².    SpO2: (!) 94 %  O2 Device (Oxygen Therapy): room air    Intake/Output - Last 3 Shifts         10/03 0700  10/04 0659 10/04 0700  10/05 0659 10/05 0700  10/06 0659    P.O. 1044 804     Total Intake(mL/kg) 1044 (12.5) 804 (9.7)     Urine (mL/kg/hr) 3025 (1.5) 2400 (1.2) 600 (1.4)    Total Output 3025 2400 600    Net -1981 -1596 -600                   Lines/Drains/Airways       Peripheral Intravenous Line  Duration                  Peripheral IV - Single Lumen Anterior;Proximal;Right Forearm -- days                     Physical Exam  Constitutional:       Appearance: Normal appearance.   HENT:      Head: Normocephalic.   Eyes:      Pupils: Pupils are equal, round, and reactive to light.   Cardiovascular:      Rate and Rhythm: Normal rate and regular rhythm.      Pulses: Normal pulses.   Pulmonary:      Effort: Pulmonary effort is normal.   Abdominal:      General: Abdomen is flat. Bowel sounds are normal.      Palpations: Abdomen is soft.   Musculoskeletal:         General: Normal range of motion.   Skin:     General: Skin is warm and dry.   Neurological:      General: No focal deficit present.      Mental Status: He is alert.       Significant Labs:  BMP:   Recent Labs   Lab 10/05/22  0500   GLU 96      K 3.5      CO2 27   BUN 12   CREATININE 1.1   CALCIUM 9.8     CBC:   Recent Labs   Lab 10/05/22  0500   WBC 6.04   RBC 5.16   HGB 16.4   HCT 49.5      MCV 96   MCH 31.8*   MCHC 33.1     CMP:   Recent Labs   Lab 10/05/22  0500   GLU 96   CALCIUM 9.8   ALBUMIN 3.7   PROT 7.3      K 3.5   CO2 27      BUN 12   CREATININE 1.1   ALKPHOS 93   ALT 31   AST 20   BILITOT 0.7     Coagulation: No results for input(s): PT, INR, APTT in the last 48 hours.    Significant Diagnostics:  I have reviewed and interpreted all pertinent imaging results/findings within the past 24 hours.

## 2022-10-05 NOTE — PLAN OF CARE
Problem: Adult Inpatient Plan of Care  Goal: Plan of Care Review  Outcome: Ongoing, Progressing  Goal: Patient-Specific Goal (Individualized)  Outcome: Ongoing, Progressing  Goal: Absence of Hospital-Acquired Illness or Injury  Outcome: Ongoing, Progressing  Goal: Optimal Comfort and Wellbeing  Outcome: Ongoing, Progressing  Goal: Readiness for Transition of Care  Outcome: Ongoing, Progressing     Problem: Adjustment to Illness (Heart Failure)  Goal: Optimal Coping  Outcome: Ongoing, Progressing     Problem: Cardiac Output Decreased (Heart Failure)  Goal: Optimal Cardiac Output  Outcome: Ongoing, Progressing     Problem: Dysrhythmia (Heart Failure)  Goal: Stable Heart Rate and Rhythm  Outcome: Ongoing, Progressing     Problem: Fluid Imbalance (Heart Failure)  Goal: Fluid Balance  Outcome: Ongoing, Progressing     Problem: Functional Ability Impaired (Heart Failure)  Goal: Optimal Functional Ability  Outcome: Ongoing, Progressing     Problem: Oral Intake Inadequate (Heart Failure)  Goal: Optimal Nutrition Intake  Outcome: Ongoing, Progressing     Problem: Respiratory Compromise (Heart Failure)  Goal: Effective Oxygenation and Ventilation  Outcome: Ongoing, Progressing     Problem: Sleep Disordered Breathing (Heart Failure)  Goal: Effective Breathing Pattern During Sleep  Outcome: Ongoing, Progressing

## 2022-10-05 NOTE — ASSESSMENT & PLAN NOTE
- Expected with lasix administration   - Scheduled potassium  - Potassium lab daily to monitor trends   - Resolved

## 2022-10-05 NOTE — PLAN OF CARE
Problem: Adult Inpatient Plan of Care  Goal: Plan of Care Review  Outcome: Ongoing, Progressing   Patient Aox4, vital signs stable. On milrinone continuous. Eliot on tele. Complaints of a constant headache unrelieved by tylenol. Free of falls and injury. Call light within reach, will continue to monitor.

## 2022-10-06 ENCOUNTER — TELEPHONE (OUTPATIENT)
Dept: CARDIOTHORACIC SURGERY | Facility: CLINIC | Age: 62
End: 2022-10-06
Payer: COMMERCIAL

## 2022-10-06 LAB
ABO + RH BLD: NORMAL
ALBUMIN SERPL BCP-MCNC: 3.4 G/DL (ref 3.5–5.2)
ALP SERPL-CCNC: 83 U/L (ref 55–135)
ALT SERPL W/O P-5'-P-CCNC: 30 U/L (ref 10–44)
ANION GAP SERPL CALC-SCNC: 9 MMOL/L (ref 8–16)
AST SERPL-CCNC: 24 U/L (ref 10–40)
BASOPHILS # BLD AUTO: 0.08 K/UL (ref 0–0.2)
BASOPHILS NFR BLD: 1.1 % (ref 0–1.9)
BILIRUB SERPL-MCNC: 0.6 MG/DL (ref 0.1–1)
BLD GP AB SCN CELLS X3 SERPL QL: NORMAL
BUN SERPL-MCNC: 13 MG/DL (ref 8–23)
CALCIUM SERPL-MCNC: 9.5 MG/DL (ref 8.7–10.5)
CHLORIDE SERPL-SCNC: 105 MMOL/L (ref 95–110)
CO2 SERPL-SCNC: 28 MMOL/L (ref 23–29)
CREAT SERPL-MCNC: 1 MG/DL (ref 0.5–1.4)
DIFFERENTIAL METHOD: ABNORMAL
EOSINOPHIL # BLD AUTO: 0.3 K/UL (ref 0–0.5)
EOSINOPHIL NFR BLD: 3.9 % (ref 0–8)
ERYTHROCYTE [DISTWIDTH] IN BLOOD BY AUTOMATED COUNT: 14.5 % (ref 11.5–14.5)
EST. GFR  (NO RACE VARIABLE): >60 ML/MIN/1.73 M^2
GLUCOSE SERPL-MCNC: 99 MG/DL (ref 70–110)
HCT VFR BLD AUTO: 47.7 % (ref 40–54)
HGB BLD-MCNC: 15.6 G/DL (ref 14–18)
IMM GRANULOCYTES # BLD AUTO: 0.01 K/UL (ref 0–0.04)
IMM GRANULOCYTES NFR BLD AUTO: 0.1 % (ref 0–0.5)
LYMPHOCYTES # BLD AUTO: 1.8 K/UL (ref 1–4.8)
LYMPHOCYTES NFR BLD: 25.9 % (ref 18–48)
MAGNESIUM SERPL-MCNC: 2 MG/DL (ref 1.6–2.6)
MCH RBC QN AUTO: 31.8 PG (ref 27–31)
MCHC RBC AUTO-ENTMCNC: 32.7 G/DL (ref 32–36)
MCV RBC AUTO: 97 FL (ref 82–98)
MONOCYTES # BLD AUTO: 0.9 K/UL (ref 0.3–1)
MONOCYTES NFR BLD: 12.1 % (ref 4–15)
NEUTROPHILS # BLD AUTO: 4 K/UL (ref 1.8–7.7)
NEUTROPHILS NFR BLD: 56.9 % (ref 38–73)
NRBC BLD-RTO: 0 /100 WBC
PHOSPHATE SERPL-MCNC: 3.5 MG/DL (ref 2.7–4.5)
PLATELET # BLD AUTO: 265 K/UL (ref 150–450)
PMV BLD AUTO: 11.9 FL (ref 9.2–12.9)
POTASSIUM SERPL-SCNC: 3.8 MMOL/L (ref 3.5–5.1)
PROT SERPL-MCNC: 6.7 G/DL (ref 6–8.4)
RBC # BLD AUTO: 4.9 M/UL (ref 4.6–6.2)
SARS-COV-2 RNA RESP QL NAA+PROBE: NOT DETECTED
SODIUM SERPL-SCNC: 142 MMOL/L (ref 136–145)
WBC # BLD AUTO: 7 K/UL (ref 3.9–12.7)

## 2022-10-06 PROCEDURE — 25000003 PHARM REV CODE 250: Performed by: STUDENT IN AN ORGANIZED HEALTH CARE EDUCATION/TRAINING PROGRAM

## 2022-10-06 PROCEDURE — 93010 EKG 12-LEAD: ICD-10-PCS | Mod: ,,, | Performed by: INTERNAL MEDICINE

## 2022-10-06 PROCEDURE — 83735 ASSAY OF MAGNESIUM: CPT | Performed by: THORACIC SURGERY (CARDIOTHORACIC VASCULAR SURGERY)

## 2022-10-06 PROCEDURE — 86901 BLOOD TYPING SEROLOGIC RH(D): CPT | Performed by: NURSE PRACTITIONER

## 2022-10-06 PROCEDURE — U0003 INFECTIOUS AGENT DETECTION BY NUCLEIC ACID (DNA OR RNA); SEVERE ACUTE RESPIRATORY SYNDROME CORONAVIRUS 2 (SARS-COV-2) (CORONAVIRUS DISEASE [COVID-19]), AMPLIFIED PROBE TECHNIQUE, MAKING USE OF HIGH THROUGHPUT TECHNOLOGIES AS DESCRIBED BY CMS-2020-01-R: HCPCS | Performed by: NURSE PRACTITIONER

## 2022-10-06 PROCEDURE — 25000003 PHARM REV CODE 250: Performed by: NURSE PRACTITIONER

## 2022-10-06 PROCEDURE — 63600175 PHARM REV CODE 636 W HCPCS: Performed by: NURSE PRACTITIONER

## 2022-10-06 PROCEDURE — 86920 COMPATIBILITY TEST SPIN: CPT | Performed by: STUDENT IN AN ORGANIZED HEALTH CARE EDUCATION/TRAINING PROGRAM

## 2022-10-06 PROCEDURE — 20600001 HC STEP DOWN PRIVATE ROOM

## 2022-10-06 PROCEDURE — 63600175 PHARM REV CODE 636 W HCPCS: Performed by: THORACIC SURGERY (CARDIOTHORACIC VASCULAR SURGERY)

## 2022-10-06 PROCEDURE — 93010 ELECTROCARDIOGRAM REPORT: CPT | Mod: ,,, | Performed by: INTERNAL MEDICINE

## 2022-10-06 PROCEDURE — 85025 COMPLETE CBC W/AUTO DIFF WBC: CPT | Performed by: STUDENT IN AN ORGANIZED HEALTH CARE EDUCATION/TRAINING PROGRAM

## 2022-10-06 PROCEDURE — 84100 ASSAY OF PHOSPHORUS: CPT | Performed by: THORACIC SURGERY (CARDIOTHORACIC VASCULAR SURGERY)

## 2022-10-06 PROCEDURE — 80053 COMPREHEN METABOLIC PANEL: CPT | Performed by: THORACIC SURGERY (CARDIOTHORACIC VASCULAR SURGERY)

## 2022-10-06 PROCEDURE — 25000003 PHARM REV CODE 250: Performed by: THORACIC SURGERY (CARDIOTHORACIC VASCULAR SURGERY)

## 2022-10-06 PROCEDURE — U0005 INFEC AGEN DETEC AMPLI PROBE: HCPCS | Performed by: NURSE PRACTITIONER

## 2022-10-06 PROCEDURE — 86920 COMPATIBILITY TEST SPIN: CPT | Performed by: NURSE PRACTITIONER

## 2022-10-06 PROCEDURE — 36415 COLL VENOUS BLD VENIPUNCTURE: CPT | Performed by: THORACIC SURGERY (CARDIOTHORACIC VASCULAR SURGERY)

## 2022-10-06 PROCEDURE — 86920 COMPATIBILITY TEST SPIN: CPT

## 2022-10-06 PROCEDURE — 93005 ELECTROCARDIOGRAM TRACING: CPT

## 2022-10-06 RX ORDER — FUROSEMIDE 10 MG/ML
80 INJECTION INTRAMUSCULAR; INTRAVENOUS 2 TIMES DAILY
Status: DISCONTINUED | OUTPATIENT
Start: 2022-10-06 | End: 2022-10-07

## 2022-10-06 RX ADMIN — FUROSEMIDE 80 MG: 10 INJECTION, SOLUTION INTRAMUSCULAR; INTRAVENOUS at 06:10

## 2022-10-06 RX ADMIN — FAMOTIDINE 20 MG: 20 TABLET ORAL at 09:10

## 2022-10-06 RX ADMIN — AMIODARONE HYDROCHLORIDE 200 MG: 200 TABLET ORAL at 09:10

## 2022-10-06 RX ADMIN — FUROSEMIDE 80 MG: 10 INJECTION, SOLUTION INTRAMUSCULAR; INTRAVENOUS at 09:10

## 2022-10-06 RX ADMIN — MILRINONE LACTATE IN DEXTROSE 0.38 MCG/KG/MIN: 200 INJECTION, SOLUTION INTRAVENOUS at 04:10

## 2022-10-06 RX ADMIN — POTASSIUM CHLORIDE 40 MEQ: 1500 TABLET, EXTENDED RELEASE ORAL at 09:10

## 2022-10-06 RX ADMIN — ENOXAPARIN SODIUM 80 MG: 100 INJECTION SUBCUTANEOUS at 09:10

## 2022-10-06 RX ADMIN — POLYETHYLENE GLYCOL 3350 17 G: 17 POWDER, FOR SOLUTION ORAL at 09:10

## 2022-10-06 RX ADMIN — AMIODARONE HYDROCHLORIDE 200 MG: 200 TABLET ORAL at 08:10

## 2022-10-06 RX ADMIN — FAMOTIDINE 20 MG: 20 TABLET ORAL at 08:10

## 2022-10-06 RX ADMIN — ACETAMINOPHEN 650 MG: 325 TABLET ORAL at 07:10

## 2022-10-06 RX ADMIN — DOCUSATE SODIUM 50 MG: 50 CAPSULE, LIQUID FILLED ORAL at 09:10

## 2022-10-06 RX ADMIN — POTASSIUM CHLORIDE 40 MEQ: 1500 TABLET, EXTENDED RELEASE ORAL at 08:10

## 2022-10-06 NOTE — ASSESSMENT & PLAN NOTE
- Maintain sternal precautions   - Lasix  with scheduled potassium ordered   - Encourage ambulation  - Cardiac diet with 1500 cc fluid restriction   - Bowel regimen in place   - Famotidine for ulcer prevention   - OOBTO   - Encourage IS use  - Milrinone   - NPO at midnight  - Last lovenox dose this morning   - Consents  - Pre-op

## 2022-10-06 NOTE — PHYSICIAN QUERY
PT Name: David Barrios  MR #: [id number]     DOCUMENTATION CLARIFICATION     CDS/: Venus Joaquin  RN, CCDS             Contact information: [email or web address]  This form is a permanent document in the medical record.     Query Date: October 6, 2022    By submitting this query, we are merely seeking further clarification of documentation.  Please utilize your independent clinical judgment when addressing the question(s) below.    The Medical Record contains the following   Indicators Supporting Clinical Findings Location in Medical Record   X Heart Failure documented heart failure reduced ejection fraction (35% ejection fraction) secondary to valvular cardiomyopathy  10/2 h/p    BNP     X EF/Echo HFrEF (EF 35%), 10/2 h/p    Radiology findings     X Subjective/Objective Respiratory Conditions  He states he has SOB over the past few weeks and has progressive worsened. 10/2 h/p    Recent/Current MI      Heart Transplant, LVAD      Edema, JVD . He reports cough, orthopnea and bilateral leg swelling.  He also noticed weight gain but is unsure how many lbs 10/2 h/p    Ascites     X Diuretics/Meds Pre op optimization prior to MV repair  Milrinone 0.25  Lasix 40 BID  Home coreg, amiodarone    Lasix 40 mg IV bid  Lasix 80 mg IV  q 12 hrs     Lasix 40mg IVP given and Milrinone initiated  Diuresed 1200 cc this shift 10/2 h/p          Mar 10/2-10/5  Mar start 106    10/2 nurse poc    Other Treatment      Other       Heart failure is a clinical diagnosis which includes symptomatic fluid retention, elevated intracardiac pressures, and/or the inability of the heart to deliver adequate blood flow.    Heart Failure with reduced Ejection Fraction (HFrEF) or Systolic Heart Failure (loses ability to contract normally, EF is <40%)    Heart Failure with preserved Ejection Fraction (HFpEF) or Diastolic Heart Failure (stiff ventricles, does not relax properly, EF is >50%)     Heart Failure with Combined Systolic and Diastolic  Failure (stiff ventricles, does not relax properly and EF is <50%)    Mid-range or mildly reduced ejection fraction (HFmrEF) is classified as systolic heart failure.  Congestive heart failure with a recovered EF is classified as Diastolic Heart Failure.  Common clues to acute exacerbation:  Rapidly progressive symptoms (w/in 2 weeks of presentation), using IV diuretics, using supplemental O2, pulmonary edema on Xray, new or worsening pleural effusion, +JVD or other signs of volume overload, MI w/in 4 weeks, and/or BNP >500  The clinical guidelines noted are only system guidelines, and do not replace the providers clinical judgment.    Provider, please specify the acuity for HFrEF.    [  x ]  Acute on Chronic Systolic Heart Failure (HFrEF or HFmrEF) - worsening of CHF signs/symptoms in preexisting CHF   [   ]  Chronic Systolic Heart Failure (HFrEF or HFmrEF) - preexisting and stable   [   ]  Other (please specify): ___________________________________   [  ]  Clinically Undetermined     Please document in your progress notes daily for the duration of treatment until resolved and include in your discharge summary.    References:  American Heart Association editorial staff. (2017, May). Ejection Fraction Heart Failure Measurement. American Heart Association. https://www.heart.org/en/health-topics/heart-failure/diagnosing-heart-failure/ejection-fraction-heart-failure-measurement#:~:text=Ejection%20fraction%20(EF)%20is%20a,pushed%20out%20with%20each%20heartbeat  CHARLIE Adame (2020, December 15). Heart failure with preserved ejection fraction: Clinical manifestations and diagnosis. [employer]utumTo[employer]. https://www.happn.com/contents/heart-failure-with-preserved-ejection-fraction-clinical-manifestations-and-diagnosis.  ICD-10-CM/PCS Coding Clinic Third Quarter ICD-10, Effective with discharges: September 8, 2020 [facility] [employer] § Heart failure with mid-range or mildly reduced ejection fraction (2020).  ICD-10-CM/PCS  Coding Clinic Third Quarter ICD-10, Effective with discharges: September 8, 2020 [facility] § Heart failure with recovered ejection fraction (2020).  Form No. 57875

## 2022-10-06 NOTE — CARE UPDATE
"RAPID RESPONSE NURSE CHART REVIEW        Chart Reviewed: 10/06/2022, 12:47 AM    MRN: [id number]  Bed: 355/355 A    Dx: Nonrheumatic mitral valve regurgitation    David Barrios has a past medical history of Anemia, Atrial fibrillation, Encounter for blood transfusion, Esophageal ulcer, GI bleed, and Hypertension.    Last VS: BP (!) 141/73 (BP Location: Right arm, Patient Position: Lying)   Pulse 62   Temp 98.1 °F (36.7 °C) (Oral)   Resp 18   Ht 5' 9" (1.753 m)   Wt 83.2 kg (183 lb 6.8 oz)   SpO2 (!) 94%   BMI 27.09 kg/m²     24H Vital Sign Range:  Temp:  [97.4 °F (36.3 °C)-98.1 °F (36.7 °C)]   Pulse:  [57-80]   Resp:  [18]   BP: (132-175)/(73-94)   SpO2:  [93 %-96 %]     Level of Consciousness (AVPU): alert    Recent Labs     10/03/22  0254 10/04/22  0413 10/05/22  0500   WBC 6.34 5.58 6.04   HGB 13.5* 14.3 16.4   HCT 41.0 43.1 49.5    212 255       Recent Labs     10/03/22  0254 10/04/22  0413 10/05/22  0500    140 141   K 2.8* 3.1* 3.5    103 103   CO2 28 26 27   CREATININE 1.2 1.0 1.1   GLU 87 91 96        No results for input(s): PH, PCO2, PO2, HCO3, POCSATURATED, BE in the last 72 hours.     OXYGEN:        O2 Device (Oxygen Therapy): room air    MEWS score: 1    Charge Marii SULLIVAN  contacted for asymptomatic bradycardia. Bradycardic in 30-40s while asleep, 50-60s when awake. EKG ordered, morning labs pending, verified pt is on con't telemetry, and plans for valve repair on 10/7. Primary team aware. No additional concerns verbalized at this time. Instructed to call [phone] for further concerns or assistance.    Dorcas Antony RN       "

## 2022-10-06 NOTE — ANESTHESIA PREPROCEDURE EVALUATION
[facility]  Anesthesia Pre-Operative Evaluation         Patient Name: David Barrios  YOB: 1960  MRN: [id number]    SUBJECTIVE:     Pre-operative evaluation for Procedure(s) (LRB):  Repair, Cardiac Valve (N/A)  REPLACEMENT, MITRAL VALVE (N/A)  REPAIR, TRICUSPID VALVE (N/A)  INSERTION, IMPELLA (N/A)  MEYER MAZE PROCEDURE (N/A)     10/06/2022    David Barrios is a 62 y.o. male w/ a significant PMHx of atrial fibrillation, hypertension, HFrEF (EF 35%), and severe mitral regurgitation that presented with increasing SOB, dyspnea, cough, and bl leg swelling.     Patient now presents for the above procedure(s).    Left and Right Heart Cath: 9/22/22    The ejection fraction was calculated to be 30%.    There was moderate to severe left ventricular systolic dysfunction.    There was moderate (3+) mitral regurgitation.    PA 70/30 mmHG    No significant CAD confirmed.    The pre-procedure left ventricular end diastolic pressure was 30.    The estimated blood loss was <50 mL.      TTE: 9/15/22   The left ventricle is normal in size with moderately decreased systolic function.   There is moderate left ventricular global hypokinesis.   The estimated ejection fraction is 30%.   Severe mitral regurgitation.   Mild tricuspid regurgitation.   Normal right ventricular size with normal right ventricular systolic function.   There is no pulmonary hypertension.   A diastolic pattern consistent with atrial fibrillation observed.   Normal appearing left atrial appendage. No thrombus is present in the appendage.   A 200 J synchronized cardioversion was successfully performed with restoration of normal sinus rhythm.      LDA:        Peripheral IV - Single Lumen Anterior;Proximal;Right Forearm (Active)   Site Assessment Clean;Dry;Intact;No redness;No swelling;No drainage;No warmth 10/06/22 1200   Extremity Assessment Distal to IV No warmth;No swelling;No redness 10/04/22 2000   Line Status  Infusing 10/06/22 1200   Dressing Status Clean;Dry;Intact 10/06/22 1200   Dressing Intervention Integrity maintained 10/06/22 1200   Number of days:        Prev airway: None documented.    Drips: pt currently on milrinone infusion         Patient Active Problem List   Diagnosis    Microcytic anemia    Hypokalemia    Elevated LFTs    Essential hypertension    Screening for malignant neoplasm of colon    Persistent atrial fibrillation    Dyspnea on exertion    Abnormal electrocardiogram    Nonrheumatic mitral valve regurgitation    Left ventricular systolic dysfunction       Review of patient's allergies indicates:  No Known Allergies    Current Outpatient Medications:  No current facility-administered medications for this encounter.  No current outpatient medications on file.    Facility-Administered Medications Ordered in Other Encounters:     acetaminophen tablet 650 mg, 650 mg, Oral, Q6H PRN, Rita Garcia NP, 650 mg at 10/04/22 1729    amiodarone tablet 200 mg, 200 mg, Oral, BID, Gin Saul MD, 200 mg at 10/06/22 0925    docusate sodium capsule 50 mg, 50 mg, Oral, Daily, Rita Garcia NP, 50 mg at 10/06/22 0925    famotidine tablet 20 mg, 20 mg, Oral, BID, Mike Hedrick MD, 20 mg at 10/06/22 0925    furosemide injection 80 mg, 80 mg, Intravenous, BID, Mike Hedrick MD, 80 mg at 10/06/22 0925    milrinone 20mg in D5W 100 mL infusion, 0.375 mcg/kg/min, Intravenous, Continuous, Rita Garcia NP, Last Rate: 9.4 mL/hr at 10/06/22 0444, 0.375 mcg/kg/min at 10/06/22 0444    polyethylene glycol packet 17 g, 17 g, Oral, Daily, Rita Garcia NP, 17 g at 10/06/22 0925    potassium chloride SA CR tablet 40 mEq, 40 mEq, Oral, BID, Rita Garcia NP, 40 mEq at 10/06/22 0925    sodium chloride 0.9% flush 10 mL, 10 mL, Intravenous, PRN, Gin Saul MD    traMADoL tablet 50 mg, 50 mg, Oral, Q6H PRN, Rita Garcia NP, 50 mg at 10/05/22 1614    Past  Surgical History:   Procedure Laterality Date    CARDIOVERSION Left 9/15/2022    Procedure: Cardioversion;  Surgeon: Julio James MD;  Location: [facility] CATH LAB/EP;  Service: Cardiology;  Laterality: Left;  With NORBERT    CATHETERIZATION OF BOTH LEFT AND RIGHT HEART N/A 9/22/2022    Procedure: CATHETERIZATION, HEART, BOTH LEFT AND RIGHT;  Surgeon: Julio James MD;  Location: [facility] CATH LAB/EP;  Service: Cardiology;  Laterality: N/A;    COLONOSCOPY N/A 4/4/2019    Procedure: COLONOSCOPY Golytely;  Surgeon: Umair Randolph MD;  Location: [facility] ENDO;  Service: Endoscopy;  Laterality: N/A;    CORONARY ANGIOGRAPHY N/A 9/22/2022    Procedure: ANGIOGRAM, CORONARY ARTERY;  Surgeon: Julio James MD;  Location: [facility] CATH LAB/EP;  Service: Cardiology;  Laterality: N/A;    ESOPHAGOGASTRODUODENOSCOPY N/A 10/12/2018    Procedure: EGD (ESOPHAGOGASTRODUODENOSCOPY);  Surgeon: Braden Herring MD;  Location: [facility] ENDO;  Service: Endoscopy;  Laterality: N/A;    ESOPHAGOGASTRODUODENOSCOPY N/A 4/4/2019    Procedure: EGD;  Surgeon: Umair Randolph MD;  Location: [facility];  Service: Endoscopy;  Laterality: N/A;    HERNIA REPAIR         Social History     Socioeconomic History    Marital status: Single   Tobacco Use    Smoking status: Never    Smokeless tobacco: Current     Types: Chew   Substance and Sexual Activity    Alcohol use: Yes     Alcohol/week: 20.0 standard drinks     Types: 20 Cans of beer per week    Drug use: No       OBJECTIVE:     Vital Signs Range (Last 24H):  Temp:  [36.5 °C (97.7 °F)-36.7 °C (98.1 °F)]   Pulse:  [48-86]   Resp:  [18]   BP: (141-177)/(73-99)   SpO2:  [93 %-98 %]       Significant Labs:  Lab Results   Component Value Date    WBC 7.00 10/06/2022    HGB 15.6 10/06/2022    HCT 47.7 10/06/2022     10/06/2022    ALT 30 10/06/2022    AST 24 10/06/2022     10/06/2022    K 3.8 10/06/2022     10/06/2022    CREATININE 1.0 10/06/2022    BUN 13 10/06/2022    CO2 28 10/06/2022     TSH 2.183 08/29/2022    INR 1.3 (H) 05/23/2019    HGBA1C 5.5 09/02/2022       Diagnostic Studies: No relevant studies.    EKG:   Results for orders placed or performed during the hospital encounter of 10/02/22   EKG 12-lead    Collection Time: 10/06/22  1:02 AM    Narrative    Test Reason : I49.9,    Vent. Rate : 063 BPM     Atrial Rate : 063 BPM     P-R Int : 180 ms          QRS Dur : 108 ms      QT Int : 538 ms       P-R-T Axes : 037 -48 -52 degrees     QTc Int : 550 ms    Normal sinus rhythm  Possible Left atrial enlargement  Left axis deviation  Incomplete left bundle branch block  LVH ( R in aVL , Rell product )  ST and T wave abnormality, consider inferior ischemia  ST and T wave abnormality, consider anterolateral ischemia  Prolonged QT  Abnormal ECG  When compared with ECG of 22-SEP-2022 08:24,  No significant change was found  Confirmed by Guy WILLETT MD ([id number]) on 10/6/2022 9:40:02 AM    Referred By: LISE GUZMÁN           Confirmed By:Guy WILLETT MD       2D ECHO:  TTE:  Results for orders placed or performed during the hospital encounter of 08/24/22   Echo   Result Value Ref Range    BSA 1.99 m2    LA WIDTH 6.15 cm    LVIDd 6.10 (A) 3.5 - 6.0 cm    IVS 1.28 (A) 0.6 - 1.1 cm    Posterior Wall 1.15 (A) 0.6 - 1.1 cm    LVIDs 4.77 (A) 2.1 - 4.0 cm    FS 22 28 - 44 %    LA volume 211.83 cm3    STJ 2.39 cm    Ascending aorta 2.36 cm    LV mass 328.13 g    LA size 5.08 cm    RVDD 3.28 cm    Left Ventricle Relative Wall Thickness 0.38 cm    AV mean gradient 4 mmHg    AV valve area 1.85 cm2    AV Velocity Ratio 0.62     AV index (prosthetic) 0.51     LVOT diameter 2.16 cm    LVOT area 3.7 cm2    LVOT peak nick 0.78 m/s    LVOT peak VTI 10.01 cm    Ao peak nick 1.25 m/s    Ao VTI 19.80 cm    Mr max nick 0.04 m/s    LVOT stroke volume 36.66 cm3    AV peak gradient 6 mmHg    TR Max Nick 3.04 m/s    LV Systolic Volume 105.82 mL    LV Systolic Volume Index 56.0 mL/m2    LV Diastolic Volume 187.13 mL    LV Diastolic  Volume Index 99.01 mL/m2    LA Volume Index 112.1 mL/m2    LV Mass Index 174 g/m2    RA Major Axis 6.55 cm    Left Atrium Minor Axis 7.82 cm    Left Atrium Major Axis 8.14 cm    Triscuspid Valve Regurgitation Peak Gradient 37 mmHg    Right Atrial Pressure (from IVC) 8 mmHg    EF 35 %    TV rest pulmonary artery pressure 45 mmHg    Narrative    · The left ventricle is moderately enlarged with moderately decreased   systolic function.  · There is moderate left ventricular global hypokinesis.  · The estimated ejection fraction is 35%.  · A diastolic pattern consistent with atrial fibrillation observed.  · Severe mitral regurgitation.  · Mild to moderate tricuspid regurgitation.  · The estimated PA systolic pressure is 45 mmHg.  · Normal right ventricular size with normal right ventricular systolic   function.  · There is mild to moderate pulmonary hypertension.          NORBERT:  No results found for this or any previous visit.    ASSESSMENT/PLAN:                                                                                                                  10/06/2022  David Barrios is a 62 y.o., male.      Pre-op Assessment    I have reviewed the Patient Summary Reports.     I have reviewed the Nursing Notes. I have reviewed the NPO Status.   I have reviewed the Medications.     Review of Systems  Anesthesia Hx:  History of prior surgery of interest to airway management or planning: Denies Family Hx of Anesthesia complications.   Denies Personal Hx of Anesthesia complications.   Cardiovascular:   Hypertension Valvular problems/Murmurs, MR CHF hyperlipidemia BAI    Pulmonary:   Shortness of breath    Hepatic/GI:   PUD,    Neurological:  Neurology Normal    Endocrine:  Endocrine Normal    Psych:  Psychiatric Normal           Physical Exam  General: Well nourished, Cooperative, Alert and Oriented    Airway:  Mallampati: II / II  Mouth Opening: Normal  TM Distance: Normal  Tongue: Normal  Neck ROM: Normal  ROM    Dental:  Intact        Anesthesia Plan  Type of Anesthesia, risks & benefits discussed:    Anesthesia Type: Gen ETT  Intra-op Monitoring Plan: Standard ASA Monitors, Art Line, NORBERT and Central Line  Post Op Pain Control Plan: multimodal analgesia and IV/PO Opioids PRN  Induction:  IV  Airway Plan: Direct, Post-Induction  Informed Consent: Informed consent signed with the Patient and all parties understand the risks and agree with anesthesia plan.  All questions answered. Patient consented to blood products? Yes  ASA Score: 4  Day of Surgery Review of History & Physical: H&P Update referred to the surgeon/provider.    Ready For Surgery From Anesthesia Perspective.     .

## 2022-10-06 NOTE — NURSING
PT has had stable VS this shift. No complaints of pain at this time. PT a&ox 4, ambulatory and on RA. PT has urinals at the bedside. PT ambulated in hallway. Will continue to monitor and assess as needed.

## 2022-10-06 NOTE — CARE UPDATE
"RAPID RESPONSE NURSE CHART REVIEW       Chart Reviewed: 10/06/2022, 12:13 PM    MRN: [id number]  Bed: 355/355 A    Dx: Nonrheumatic mitral valve regurgitation    David Barrios has a past medical history of Anemia, Atrial fibrillation, Encounter for blood transfusion, Esophageal ulcer, GI bleed, and Hypertension.    Last VS: BP (!) 177/92 (Patient Position: Lying)   Pulse 61   Temp 98.1 °F (36.7 °C) (Oral)   Resp 18   Ht 5' 9" (1.753 m)   Wt 83.2 kg (183 lb 6.8 oz)   SpO2 98%   BMI 27.09 kg/m²     24H Vital Sign Range:  Temp:  [97.7 °F (36.5 °C)-98.1 °F (36.7 °C)]   Pulse:  [48-86]   Resp:  [18]   BP: (141-177)/(73-99)   SpO2:  [93 %-98 %]     Level of Consciousness (AVPU): alert    Recent Labs     10/04/22  0413 10/05/22  0500 10/06/22  0541   WBC 5.58 6.04 7.00   HGB 14.3 16.4 15.6   HCT 43.1 49.5 47.7    255 265       Recent Labs     10/04/22  0413 10/05/22  0500 10/06/22  0541    141 142   K 3.1* 3.5 3.8    103 105   CO2 26 27 28   CREATININE 1.0 1.1 1.0   GLU 91 96 99   PHOS  --   --  3.5   MG  --   --  2.0        No results for input(s): PH, PCO2, PO2, HCO3, POCSATURATED, BE in the last 72 hours.     OXYGEN:        O2 Device (Oxygen Therapy): room air    MEWS score: 1    charge Chun SULLIVAN  contacted. No concerns verbalized at this time. Instructed to call [phone] for further concerns or assistance.    David Rodríguez RN        "

## 2022-10-06 NOTE — PROGRESS NOTES
[facility] - Cardiology Stepdown  Cardiothoracic Surgery  Progress Note    Patient Name: David Barrios  MRN: [id number]  Admission Date: 10/2/2022  Hospital Length of Stay: 4 days  Code Status: Full Code   Attending Physician: Mike Hedrick MD   Referring Provider: Mike Hedrick MD  Principal Problem:Nonrheumatic mitral valve regurgitation    Subjective:     Post-Op Info:  * No surgery found *         Interval History: Pre-op for OR in the AM    Review of Systems   Constitutional: Negative for malaise/fatigue.   Cardiovascular:  Negative for chest pain, claudication, dyspnea on exertion, irregular heartbeat, leg swelling and palpitations.   Respiratory:  Negative for cough and shortness of breath.    Hematologic/Lymphatic: Negative for bleeding problem.   Gastrointestinal:  Negative for abdominal pain.   Genitourinary:  Negative for dysuria.   Neurological:  Negative for headaches and weakness.   Medications:  Continuous Infusions:   milrinone 20mg/100ml D5W (200mcg/ml) 0.375 mcg/kg/min (10/06/22 0444)     Scheduled Meds:   amiodarone  200 mg Oral BID    docusate sodium  50 mg Oral Daily    famotidine  20 mg Oral BID    furosemide (LASIX) injection  80 mg Intravenous BID    polyethylene glycol  17 g Oral Daily    potassium chloride  40 mEq Oral BID     PRN Meds:acetaminophen, sodium chloride 0.9%, traMADoL     Objective:     Vital Signs (Most Recent):  Temp: 98.1 °F (36.7 °C) (10/06/22 1130)  Pulse: 61 (10/06/22 1157)  Resp: 18 (10/06/22 1130)  BP: (!) 177/92 (10/06/22 1130)  SpO2: 98 % (10/06/22 1130)   Vital Signs (24h Range):  Temp:  [97.7 °F (36.5 °C)-98.1 °F (36.7 °C)] 98.1 °F (36.7 °C)  Pulse:  [48-86] 61  Resp:  [18] 18  SpO2:  [93 %-98 %] 98 %  BP: (141-177)/(73-99) 177/92     Weight: 83.2 kg (183 lb 6.8 oz)  Body mass index is 27.09 kg/m².    SpO2: 98 %  O2 Device (Oxygen Therapy): room air    Intake/Output - Last 3 Shifts         10/04 0700  10/05 0659 10/05 0700  10/06 0659 10/06  0700  10/07 0659    P.O. 804 290 237    I.V. (mL/kg)   10 (0.1)    Total Intake(mL/kg) 804 (9.7) 290 (3.5) 247 (3)    Urine (mL/kg/hr) 2400 (1.2) 1800 (0.9) 600 (1.2)    Total Output 2400 1800 600    Net -1596 -1510 -353                   Lines/Drains/Airways       Peripheral Intravenous Line  Duration                  Peripheral IV - Single Lumen Anterior;Proximal;Right Forearm -- days                    Physical Exam  Constitutional:       Appearance: Normal appearance.   HENT:      Head: Normocephalic.   Eyes:      Pupils: Pupils are equal, round, and reactive to light.   Cardiovascular:      Rate and Rhythm: Normal rate and regular rhythm.      Pulses: Normal pulses.   Pulmonary:      Effort: Pulmonary effort is normal.   Abdominal:      General: Abdomen is flat. Bowel sounds are normal.      Palpations: Abdomen is soft.   Musculoskeletal:         General: Normal range of motion.   Skin:     General: Skin is warm and dry.   Neurological:      General: No focal deficit present.      Mental Status: He is alert.       Significant Labs:  BMP:   Recent Labs   Lab 10/06/22  0541   GLU 99      K 3.8      CO2 28   BUN 13   CREATININE 1.0   CALCIUM 9.5   MG 2.0     CBC:   Recent Labs   Lab 10/06/22  0541   WBC 7.00   RBC 4.90   HGB 15.6   HCT 47.7      MCV 97   MCH 31.8*   MCHC 32.7     CMP:   Recent Labs   Lab 10/06/22  0541   GLU 99   CALCIUM 9.5   ALBUMIN 3.4*   PROT 6.7      K 3.8   CO2 28      BUN 13   CREATININE 1.0   ALKPHOS 83   ALT 30   AST 24   BILITOT 0.6     Coagulation: No results for input(s): PT, INR, APTT in the last 48 hours.    Significant Diagnostics:  I have reviewed and interpreted all pertinent imaging results/findings within the past 24 hours.    Assessment/Plan:     * Nonrheumatic mitral valve regurgitation  - Maintain sternal precautions   - Lasix  with scheduled potassium ordered   - Encourage ambulation  - Cardiac diet with 1500 cc fluid restriction   - Bowel  regimen in place   - Famotidine for ulcer prevention   - OOBTO   - Encourage IS use  - Milrinone   - NPO at midnight  - Last lovenox dose this morning   - Consents  - Pre-op       Persistent atrial fibrillation  - Amiodarone   - Tele monitor    Hypokalemia  - Expected with lasix administration   - Scheduled potassium  - Potassium lab daily to monitor trends   - Resolved    Microcytic anemia  - CBC daily        Rita Garcia, NP  Cardiothoracic Surgery  [facility] - Cardiology Stepdown

## 2022-10-06 NOTE — NURSING
Patient HR 37-50. Spoke with on call CTS. Provider said to monitor and inform CTS if patient stays bradycardic in the 30s. This writer asked provider to adjust orders to reflect this.

## 2022-10-06 NOTE — SUBJECTIVE & OBJECTIVE
Interval History: Pre-op for OR in the AM    Review of Systems   Constitutional: Negative for malaise/fatigue.   Cardiovascular:  Negative for chest pain, claudication, dyspnea on exertion, irregular heartbeat, leg swelling and palpitations.   Respiratory:  Negative for cough and shortness of breath.    Hematologic/Lymphatic: Negative for bleeding problem.   Gastrointestinal:  Negative for abdominal pain.   Genitourinary:  Negative for dysuria.   Neurological:  Negative for headaches and weakness.   Medications:  Continuous Infusions:   milrinone 20mg/100ml D5W (200mcg/ml) 0.375 mcg/kg/min (10/06/22 0444)     Scheduled Meds:   amiodarone  200 mg Oral BID    docusate sodium  50 mg Oral Daily    famotidine  20 mg Oral BID    furosemide (LASIX) injection  80 mg Intravenous BID    polyethylene glycol  17 g Oral Daily    potassium chloride  40 mEq Oral BID     PRN Meds:acetaminophen, sodium chloride 0.9%, traMADoL     Objective:     Vital Signs (Most Recent):  Temp: 98.1 °F (36.7 °C) (10/06/22 1130)  Pulse: 61 (10/06/22 1157)  Resp: 18 (10/06/22 1130)  BP: (!) 177/92 (10/06/22 1130)  SpO2: 98 % (10/06/22 1130)   Vital Signs (24h Range):  Temp:  [97.7 °F (36.5 °C)-98.1 °F (36.7 °C)] 98.1 °F (36.7 °C)  Pulse:  [48-86] 61  Resp:  [18] 18  SpO2:  [93 %-98 %] 98 %  BP: (141-177)/(73-99) 177/92     Weight: 83.2 kg (183 lb 6.8 oz)  Body mass index is 27.09 kg/m².    SpO2: 98 %  O2 Device (Oxygen Therapy): room air    Intake/Output - Last 3 Shifts         10/04 0700  10/05 0659 10/05 0700  10/06 0659 10/06 0700  10/07 0659    P.O. 804 290 237    I.V. (mL/kg)   10 (0.1)    Total Intake(mL/kg) 804 (9.7) 290 (3.5) 247 (3)    Urine (mL/kg/hr) 2400 (1.2) 1800 (0.9) 600 (1.2)    Total Output 2400 1800 600    Net -1106 -1391 -353                   Lines/Drains/Airways       Peripheral Intravenous Line  Duration                  Peripheral IV - Single Lumen Anterior;Proximal;Right Forearm -- days                    Physical  Exam  Constitutional:       Appearance: Normal appearance.   HENT:      Head: Normocephalic.   Eyes:      Pupils: Pupils are equal, round, and reactive to light.   Cardiovascular:      Rate and Rhythm: Normal rate and regular rhythm.      Pulses: Normal pulses.   Pulmonary:      Effort: Pulmonary effort is normal.   Abdominal:      General: Abdomen is flat. Bowel sounds are normal.      Palpations: Abdomen is soft.   Musculoskeletal:         General: Normal range of motion.   Skin:     General: Skin is warm and dry.   Neurological:      General: No focal deficit present.      Mental Status: He is alert.       Significant Labs:  BMP:   Recent Labs   Lab 10/06/22  0541   GLU 99      K 3.8      CO2 28   BUN 13   CREATININE 1.0   CALCIUM 9.5   MG 2.0     CBC:   Recent Labs   Lab 10/06/22  0541   WBC 7.00   RBC 4.90   HGB 15.6   HCT 47.7      MCV 97   MCH 31.8*   MCHC 32.7     CMP:   Recent Labs   Lab 10/06/22  0541   GLU 99   CALCIUM 9.5   ALBUMIN 3.4*   PROT 6.7      K 3.8   CO2 28      BUN 13   CREATININE 1.0   ALKPHOS 83   ALT 30   AST 24   BILITOT 0.6     Coagulation: No results for input(s): PT, INR, APTT in the last 48 hours.    Significant Diagnostics:  I have reviewed and interpreted all pertinent imaging results/findings within the past 24 hours.

## 2022-10-07 ENCOUNTER — TELEPHONE (OUTPATIENT)
Dept: CARDIOLOGY | Facility: CLINIC | Age: 62
End: 2022-10-07
Payer: COMMERCIAL

## 2022-10-07 ENCOUNTER — TELEPHONE (OUTPATIENT)
Dept: CARDIOTHORACIC SURGERY | Facility: CLINIC | Age: 62
End: 2022-10-07
Payer: COMMERCIAL

## 2022-10-07 ENCOUNTER — ANESTHESIA (OUTPATIENT)
Dept: SURGERY | Facility: HOSPITAL | Age: 62
DRG: 215 | End: 2022-10-07
Payer: COMMERCIAL

## 2022-10-07 PROBLEM — I47.29 NSVT (NONSUSTAINED VENTRICULAR TACHYCARDIA): Status: ACTIVE | Noted: 2022-10-07

## 2022-10-07 LAB
ALBUMIN SERPL BCP-MCNC: 2.7 G/DL (ref 3.5–5.2)
ALBUMIN SERPL BCP-MCNC: 3.3 G/DL (ref 3.5–5.2)
ALBUMIN SERPL BCP-MCNC: 3.6 G/DL (ref 3.5–5.2)
ALLENS TEST: ABNORMAL
ALP SERPL-CCNC: 32 U/L (ref 55–135)
ALP SERPL-CCNC: 32 U/L (ref 55–135)
ALP SERPL-CCNC: 81 U/L (ref 55–135)
ALT SERPL W/O P-5'-P-CCNC: 20 U/L (ref 10–44)
ALT SERPL W/O P-5'-P-CCNC: 23 U/L (ref 10–44)
ALT SERPL W/O P-5'-P-CCNC: 39 U/L (ref 10–44)
ANION GAP SERPL CALC-SCNC: 11 MMOL/L (ref 8–16)
ANION GAP SERPL CALC-SCNC: 16 MMOL/L (ref 8–16)
ANION GAP SERPL CALC-SCNC: 16 MMOL/L (ref 8–16)
ANION GAP SERPL CALC-SCNC: 18 MMOL/L (ref 8–16)
ANION GAP SERPL CALC-SCNC: 20 MMOL/L (ref 8–16)
ANION GAP SERPL CALC-SCNC: 23 MMOL/L (ref 8–16)
ANISOCYTOSIS BLD QL SMEAR: SLIGHT
APTT BLDCRRT: 31.2 SEC (ref 21–32)
APTT BLDCRRT: 31.2 SEC (ref 21–32)
APTT BLDCRRT: 43.5 SEC (ref 21–32)
APTT BLDCRRT: 44.5 SEC (ref 21–32)
APTT BLDCRRT: 50.6 SEC (ref 21–32)
AST SERPL-CCNC: 35 U/L (ref 10–40)
AST SERPL-CCNC: 66 U/L (ref 10–40)
AST SERPL-CCNC: 78 U/L (ref 10–40)
BASOPHILS # BLD AUTO: 0.01 K/UL (ref 0–0.2)
BASOPHILS # BLD AUTO: 0.03 K/UL (ref 0–0.2)
BASOPHILS # BLD AUTO: 0.03 K/UL (ref 0–0.2)
BASOPHILS # BLD AUTO: 0.05 K/UL (ref 0–0.2)
BASOPHILS NFR BLD: 0.1 % (ref 0–1.9)
BASOPHILS NFR BLD: 0.1 % (ref 0–1.9)
BASOPHILS NFR BLD: 0.2 % (ref 0–1.9)
BASOPHILS NFR BLD: 0.7 % (ref 0–1.9)
BILIRUB SERPL-MCNC: 0.5 MG/DL (ref 0.1–1)
BILIRUB SERPL-MCNC: 1.5 MG/DL (ref 0.1–1)
BILIRUB SERPL-MCNC: 1.8 MG/DL (ref 0.1–1)
BLD PROD TYP BPU: NORMAL
BLOOD UNIT EXPIRATION DATE: NORMAL
BLOOD UNIT TYPE CODE: 1700
BLOOD UNIT TYPE CODE: 6200
BLOOD UNIT TYPE CODE: 8400
BLOOD UNIT TYPE: NORMAL
BUN SERPL-MCNC: 14 MG/DL (ref 8–23)
BUN SERPL-MCNC: 15 MG/DL (ref 8–23)
BUN SERPL-MCNC: 16 MG/DL (ref 8–23)
BUN SERPL-MCNC: 16 MG/DL (ref 8–23)
CALCIUM SERPL-MCNC: 7.7 MG/DL (ref 8.7–10.5)
CALCIUM SERPL-MCNC: 7.7 MG/DL (ref 8.7–10.5)
CALCIUM SERPL-MCNC: 7.9 MG/DL (ref 8.7–10.5)
CALCIUM SERPL-MCNC: 8.3 MG/DL (ref 8.7–10.5)
CALCIUM SERPL-MCNC: 8.4 MG/DL (ref 8.7–10.5)
CALCIUM SERPL-MCNC: 9.6 MG/DL (ref 8.7–10.5)
CHLORIDE SERPL-SCNC: 103 MMOL/L (ref 95–110)
CHLORIDE SERPL-SCNC: 106 MMOL/L (ref 95–110)
CHLORIDE SERPL-SCNC: 107 MMOL/L (ref 95–110)
CHLORIDE SERPL-SCNC: 108 MMOL/L (ref 95–110)
CHLORIDE SERPL-SCNC: 108 MMOL/L (ref 95–110)
CHLORIDE SERPL-SCNC: 109 MMOL/L (ref 95–110)
CO2 SERPL-SCNC: 14 MMOL/L (ref 23–29)
CO2 SERPL-SCNC: 14 MMOL/L (ref 23–29)
CO2 SERPL-SCNC: 18 MMOL/L (ref 23–29)
CO2 SERPL-SCNC: 25 MMOL/L (ref 23–29)
CODING SYSTEM: NORMAL
CREAT SERPL-MCNC: 1 MG/DL (ref 0.5–1.4)
CREAT SERPL-MCNC: 1.3 MG/DL (ref 0.5–1.4)
CREAT SERPL-MCNC: 1.6 MG/DL (ref 0.5–1.4)
CREAT SERPL-MCNC: 1.7 MG/DL (ref 0.5–1.4)
DELSYS: ABNORMAL
DIFFERENTIAL METHOD: ABNORMAL
DISPENSE STATUS: NORMAL
DOHLE BOD BLD QL SMEAR: ABNORMAL
DOHLE BOD BLD QL SMEAR: ABNORMAL
DOHLE BOD BLD QL SMEAR: PRESENT
DOHLE BOD BLD QL SMEAR: PRESENT
EOSINOPHIL # BLD AUTO: 0 K/UL (ref 0–0.5)
EOSINOPHIL # BLD AUTO: 0.3 K/UL (ref 0–0.5)
EOSINOPHIL NFR BLD: 0 % (ref 0–8)
EOSINOPHIL NFR BLD: 3.9 % (ref 0–8)
ERYTHROCYTE [DISTWIDTH] IN BLOOD BY AUTOMATED COUNT: 14.3 % (ref 11.5–14.5)
ERYTHROCYTE [DISTWIDTH] IN BLOOD BY AUTOMATED COUNT: 14.5 % (ref 11.5–14.5)
ERYTHROCYTE [DISTWIDTH] IN BLOOD BY AUTOMATED COUNT: 14.6 % (ref 11.5–14.5)
ERYTHROCYTE [SEDIMENTATION RATE] IN BLOOD BY WESTERGREN METHOD: 22 MM/H
ERYTHROCYTE [SEDIMENTATION RATE] IN BLOOD BY WESTERGREN METHOD: 24 MM/H
ERYTHROCYTE [SEDIMENTATION RATE] IN BLOOD BY WESTERGREN METHOD: 24 MM/H
EST. GFR  (NO RACE VARIABLE): 44.7 ML/MIN/1.73 M^2
EST. GFR  (NO RACE VARIABLE): 48.1 ML/MIN/1.73 M^2
EST. GFR  (NO RACE VARIABLE): >60 ML/MIN/1.73 M^2
FIBRINOGEN PPP-MCNC: 139 MG/DL (ref 182–400)
FIBRINOGEN PPP-MCNC: 160 MG/DL (ref 182–400)
FIBRINOGEN PPP-MCNC: 167 MG/DL (ref 182–400)
FIBRINOGEN PPP-MCNC: 199 MG/DL (ref 182–400)
FIO2: 10
FIO2: 100
FIO2: 50
GIANT PLATELETS BLD QL SMEAR: PRESENT
GLUCOSE SERPL-MCNC: 149 MG/DL (ref 70–110)
GLUCOSE SERPL-MCNC: 167 MG/DL (ref 70–110)
GLUCOSE SERPL-MCNC: 178 MG/DL (ref 70–110)
GLUCOSE SERPL-MCNC: 180 MG/DL (ref 70–110)
GLUCOSE SERPL-MCNC: 186 MG/DL (ref 70–110)
GLUCOSE SERPL-MCNC: 189 MG/DL (ref 70–110)
GLUCOSE SERPL-MCNC: 209 MG/DL (ref 70–110)
GLUCOSE SERPL-MCNC: 216 MG/DL (ref 70–110)
GLUCOSE SERPL-MCNC: 288 MG/DL (ref 70–110)
GLUCOSE SERPL-MCNC: 349 MG/DL (ref 70–110)
GLUCOSE SERPL-MCNC: 420 MG/DL (ref 70–110)
GLUCOSE SERPL-MCNC: 97 MG/DL (ref 70–110)
HCO3 UR-SCNC: 14.1 MMOL/L (ref 24–28)
HCO3 UR-SCNC: 14.2 MMOL/L (ref 24–28)
HCO3 UR-SCNC: 15.8 MMOL/L (ref 24–28)
HCO3 UR-SCNC: 15.9 MMOL/L (ref 24–28)
HCO3 UR-SCNC: 16.9 MMOL/L (ref 24–28)
HCO3 UR-SCNC: 17.5 MMOL/L (ref 24–28)
HCO3 UR-SCNC: 18 MMOL/L (ref 24–28)
HCO3 UR-SCNC: 19.9 MMOL/L (ref 24–28)
HCO3 UR-SCNC: 22.2 MMOL/L (ref 24–28)
HCO3 UR-SCNC: 22.8 MMOL/L (ref 24–28)
HCO3 UR-SCNC: 23.2 MMOL/L (ref 24–28)
HCO3 UR-SCNC: 23.4 MMOL/L (ref 24–28)
HCO3 UR-SCNC: 24.1 MMOL/L (ref 24–28)
HCO3 UR-SCNC: 25.9 MMOL/L (ref 24–28)
HCO3 UR-SCNC: 26 MMOL/L (ref 24–28)
HCO3 UR-SCNC: 26.2 MMOL/L (ref 24–28)
HCO3 UR-SCNC: 26.2 MMOL/L (ref 24–28)
HCO3 UR-SCNC: 27.5 MMOL/L (ref 24–28)
HCO3 UR-SCNC: 30 MMOL/L (ref 24–28)
HCT VFR BLD AUTO: 19.5 % (ref 40–54)
HCT VFR BLD AUTO: 24.5 % (ref 40–54)
HCT VFR BLD AUTO: 26.8 % (ref 40–54)
HCT VFR BLD AUTO: 28.6 % (ref 40–54)
HCT VFR BLD AUTO: 28.6 % (ref 40–54)
HCT VFR BLD AUTO: 47.3 % (ref 40–54)
HCT VFR BLD CALC: 15 %PCV (ref 36–54)
HCT VFR BLD CALC: 16 %PCV (ref 36–54)
HCT VFR BLD CALC: 20 %PCV (ref 36–54)
HCT VFR BLD CALC: 24 %PCV (ref 36–54)
HCT VFR BLD CALC: 25 %PCV (ref 36–54)
HCT VFR BLD CALC: 27 %PCV (ref 36–54)
HCT VFR BLD CALC: 28 %PCV (ref 36–54)
HCT VFR BLD CALC: 28 %PCV (ref 36–54)
HCT VFR BLD CALC: 30 %PCV (ref 36–54)
HCT VFR BLD CALC: 33 %PCV (ref 36–54)
HCT VFR BLD CALC: 36 %PCV (ref 36–54)
HCT VFR BLD CALC: 37 %PCV (ref 36–54)
HCT VFR BLD CALC: 37 %PCV (ref 36–54)
HCT VFR BLD CALC: 47 %PCV (ref 36–54)
HGB BLD-MCNC: 14.9 G/DL (ref 14–18)
HGB BLD-MCNC: 6.4 G/DL (ref 14–18)
HGB BLD-MCNC: 8 G/DL (ref 14–18)
HGB BLD-MCNC: 8.8 G/DL (ref 14–18)
HGB BLD-MCNC: 9.6 G/DL (ref 14–18)
HGB BLD-MCNC: 9.6 G/DL (ref 14–18)
HYPOCHROMIA BLD QL SMEAR: ABNORMAL
IMM GRANULOCYTES # BLD AUTO: 0.01 K/UL (ref 0–0.04)
IMM GRANULOCYTES # BLD AUTO: 0.07 K/UL (ref 0–0.04)
IMM GRANULOCYTES # BLD AUTO: 0.08 K/UL (ref 0–0.04)
IMM GRANULOCYTES # BLD AUTO: 0.14 K/UL (ref 0–0.04)
IMM GRANULOCYTES # BLD AUTO: 0.15 K/UL (ref 0–0.04)
IMM GRANULOCYTES # BLD AUTO: 0.15 K/UL (ref 0–0.04)
IMM GRANULOCYTES NFR BLD AUTO: 0.1 % (ref 0–0.5)
IMM GRANULOCYTES NFR BLD AUTO: 0.4 % (ref 0–0.5)
IMM GRANULOCYTES NFR BLD AUTO: 0.6 % (ref 0–0.5)
INR PPP: 1.3 (ref 0.8–1.2)
INR PPP: 1.4 (ref 0.8–1.2)
INR PPP: 1.4 (ref 0.8–1.2)
INR PPP: 1.5 (ref 0.8–1.2)
INR PPP: 1.5 (ref 0.8–1.2)
LDH SERPL L TO P-CCNC: 1.18 MMOL/L (ref 0.5–2.2)
LDH SERPL L TO P-CCNC: 1.91 MMOL/L (ref 0.36–1.25)
LDH SERPL L TO P-CCNC: 11.41 MMOL/L (ref 0.36–1.25)
LDH SERPL L TO P-CCNC: 12.06 MMOL/L (ref 0.5–2.2)
LDH SERPL L TO P-CCNC: 12.09 MMOL/L (ref 0.5–2.2)
LDH SERPL L TO P-CCNC: 12.31 MMOL/L (ref 0.36–1.25)
LDH SERPL L TO P-CCNC: 3.48 MMOL/L (ref 0.36–1.25)
LDH SERPL L TO P-CCNC: 5.52 MMOL/L (ref 0.36–1.25)
LDH SERPL L TO P-CCNC: 7.6 MMOL/L (ref 0.36–1.25)
LDH SERPL L TO P-CCNC: 9.19 MMOL/L (ref 0.36–1.25)
LDH SERPL L TO P-CCNC: 9.33 MMOL/L (ref 0.36–1.25)
LYMPHOCYTES # BLD AUTO: 0.5 K/UL (ref 1–4.8)
LYMPHOCYTES # BLD AUTO: 0.6 K/UL (ref 1–4.8)
LYMPHOCYTES # BLD AUTO: 0.6 K/UL (ref 1–4.8)
LYMPHOCYTES # BLD AUTO: 1.1 K/UL (ref 1–4.8)
LYMPHOCYTES # BLD AUTO: 1.1 K/UL (ref 1–4.8)
LYMPHOCYTES # BLD AUTO: 2 K/UL (ref 1–4.8)
LYMPHOCYTES NFR BLD: 2.3 % (ref 18–48)
LYMPHOCYTES NFR BLD: 26.7 % (ref 18–48)
LYMPHOCYTES NFR BLD: 3.5 % (ref 18–48)
LYMPHOCYTES NFR BLD: 4.1 % (ref 18–48)
LYMPHOCYTES NFR BLD: 4.5 % (ref 18–48)
LYMPHOCYTES NFR BLD: 4.5 % (ref 18–48)
MAGNESIUM SERPL-MCNC: 2.3 MG/DL (ref 1.6–2.6)
MAGNESIUM SERPL-MCNC: 2.3 MG/DL (ref 1.6–2.6)
MAGNESIUM SERPL-MCNC: 2.4 MG/DL (ref 1.6–2.6)
MAGNESIUM SERPL-MCNC: 2.8 MG/DL (ref 1.6–2.6)
MAGNESIUM SERPL-MCNC: 2.9 MG/DL (ref 1.6–2.6)
MCH RBC QN AUTO: 30.9 PG (ref 27–31)
MCH RBC QN AUTO: 31.2 PG (ref 27–31)
MCH RBC QN AUTO: 32.1 PG (ref 27–31)
MCH RBC QN AUTO: 32.7 PG (ref 27–31)
MCH RBC QN AUTO: 33 PG (ref 27–31)
MCH RBC QN AUTO: 33 PG (ref 27–31)
MCHC RBC AUTO-ENTMCNC: 31.5 G/DL (ref 32–36)
MCHC RBC AUTO-ENTMCNC: 32.7 G/DL (ref 32–36)
MCHC RBC AUTO-ENTMCNC: 32.8 G/DL (ref 32–36)
MCHC RBC AUTO-ENTMCNC: 32.8 G/DL (ref 32–36)
MCHC RBC AUTO-ENTMCNC: 33.6 G/DL (ref 32–36)
MCHC RBC AUTO-ENTMCNC: 33.6 G/DL (ref 32–36)
MCV RBC AUTO: 100 FL (ref 82–98)
MCV RBC AUTO: 95 FL (ref 82–98)
MCV RBC AUTO: 98 FL (ref 82–98)
MCV RBC AUTO: 99 FL (ref 82–98)
MIN VOL: 10
MIN VOL: 10.9
MIN VOL: 10.9
MODE: ABNORMAL
MONOCYTES # BLD AUTO: 0.9 K/UL (ref 0.3–1)
MONOCYTES # BLD AUTO: 1.2 K/UL (ref 0.3–1)
MONOCYTES # BLD AUTO: 1.5 K/UL (ref 0.3–1)
MONOCYTES # BLD AUTO: 1.8 K/UL (ref 0.3–1)
MONOCYTES # BLD AUTO: 2.9 K/UL (ref 0.3–1)
MONOCYTES # BLD AUTO: 2.9 K/UL (ref 0.3–1)
MONOCYTES NFR BLD: 10.5 % (ref 4–15)
MONOCYTES NFR BLD: 11.3 % (ref 4–15)
MONOCYTES NFR BLD: 11.3 % (ref 4–15)
MONOCYTES NFR BLD: 12.7 % (ref 4–15)
MONOCYTES NFR BLD: 6.8 % (ref 4–15)
MONOCYTES NFR BLD: 7.7 % (ref 4–15)
NEUTROPHILS # BLD AUTO: 12.3 K/UL (ref 1.8–7.7)
NEUTROPHILS # BLD AUTO: 16.1 K/UL (ref 1.8–7.7)
NEUTROPHILS # BLD AUTO: 20.7 K/UL (ref 1.8–7.7)
NEUTROPHILS # BLD AUTO: 21.1 K/UL (ref 1.8–7.7)
NEUTROPHILS # BLD AUTO: 21.1 K/UL (ref 1.8–7.7)
NEUTROPHILS # BLD AUTO: 4.1 K/UL (ref 1.8–7.7)
NEUTROPHILS NFR BLD: 55.9 % (ref 38–73)
NEUTROPHILS NFR BLD: 83.4 % (ref 38–73)
NEUTROPHILS NFR BLD: 83.4 % (ref 38–73)
NEUTROPHILS NFR BLD: 84.6 % (ref 38–73)
NEUTROPHILS NFR BLD: 89.2 % (ref 38–73)
NEUTROPHILS NFR BLD: 89.3 % (ref 38–73)
NRBC BLD-RTO: 0 /100 WBC
NUM UNITS TRANS FFP: NORMAL
NUM UNITS TRANS FFP: NORMAL
NUM UNITS TRANS PACKED RBC: NORMAL
NUM UNITS TRANS PACKED RBC: NORMAL
OVALOCYTES BLD QL SMEAR: ABNORMAL
PCO2 BLDA: 29.4 MMHG (ref 35–45)
PCO2 BLDA: 32.9 MMHG (ref 35–45)
PCO2 BLDA: 34.7 MMHG (ref 35–45)
PCO2 BLDA: 35 MMHG (ref 35–45)
PCO2 BLDA: 36.5 MMHG (ref 35–45)
PCO2 BLDA: 36.5 MMHG (ref 35–45)
PCO2 BLDA: 37.1 MMHG (ref 35–45)
PCO2 BLDA: 43 MMHG (ref 35–45)
PCO2 BLDA: 43.2 MMHG (ref 35–45)
PCO2 BLDA: 43.5 MMHG (ref 35–45)
PCO2 BLDA: 44.1 MMHG (ref 35–45)
PCO2 BLDA: 44.1 MMHG (ref 35–45)
PCO2 BLDA: 44.3 MMHG (ref 35–45)
PCO2 BLDA: 45.1 MMHG (ref 35–45)
PCO2 BLDA: 45.4 MMHG (ref 35–45)
PCO2 BLDA: 46.1 MMHG (ref 35–45)
PCO2 BLDA: 46.6 MMHG (ref 35–45)
PCO2 BLDA: 51.3 MMHG (ref 35–45)
PCO2 BLDA: 52.3 MMHG (ref 35–45)
PEEP: 5
PH SMN: 7.15 [PH] (ref 7.35–7.45)
PH SMN: 7.23 [PH] (ref 7.35–7.45)
PH SMN: 7.24 [PH] (ref 7.35–7.45)
PH SMN: 7.25 [PH] (ref 7.35–7.45)
PH SMN: 7.26 [PH] (ref 7.35–7.45)
PH SMN: 7.27 [PH] (ref 7.35–7.45)
PH SMN: 7.29 [PH] (ref 7.35–7.45)
PH SMN: 7.29 [PH] (ref 7.35–7.45)
PH SMN: 7.31 [PH] (ref 7.35–7.45)
PH SMN: 7.32 [PH] (ref 7.35–7.45)
PH SMN: 7.33 [PH] (ref 7.35–7.45)
PH SMN: 7.34 [PH] (ref 7.35–7.45)
PH SMN: 7.34 [PH] (ref 7.35–7.45)
PH SMN: 7.38 [PH] (ref 7.35–7.45)
PH SMN: 7.39 [PH] (ref 7.35–7.45)
PH SMN: 7.49 [PH] (ref 7.35–7.45)
PHOSPHATE SERPL-MCNC: 1.9 MG/DL (ref 2.7–4.5)
PHOSPHATE SERPL-MCNC: 2.1 MG/DL (ref 2.7–4.5)
PHOSPHATE SERPL-MCNC: 3.2 MG/DL (ref 2.7–4.5)
PHOSPHATE SERPL-MCNC: 3.2 MG/DL (ref 2.7–4.5)
PHOSPHATE SERPL-MCNC: 4.1 MG/DL (ref 2.7–4.5)
PIP: 23
PIP: 24
PIP: 24
PLATELET # BLD AUTO: 109 K/UL (ref 150–450)
PLATELET # BLD AUTO: 149 K/UL (ref 150–450)
PLATELET # BLD AUTO: 149 K/UL (ref 150–450)
PLATELET # BLD AUTO: 156 K/UL (ref 150–450)
PLATELET # BLD AUTO: 253 K/UL (ref 150–450)
PLATELET # BLD AUTO: 84 K/UL (ref 150–450)
PLATELET BLD QL SMEAR: ABNORMAL
PMV BLD AUTO: 11.6 FL (ref 9.2–12.9)
PMV BLD AUTO: 11.6 FL (ref 9.2–12.9)
PMV BLD AUTO: 11.9 FL (ref 9.2–12.9)
PMV BLD AUTO: 11.9 FL (ref 9.2–12.9)
PMV BLD AUTO: 12.3 FL (ref 9.2–12.9)
PMV BLD AUTO: 12.5 FL (ref 9.2–12.9)
PO2 BLDA: 131 MMHG (ref 80–100)
PO2 BLDA: 140 MMHG (ref 80–100)
PO2 BLDA: 152 MMHG (ref 80–100)
PO2 BLDA: 152 MMHG (ref 80–100)
PO2 BLDA: 162 MMHG (ref 40–60)
PO2 BLDA: 23 MMHG (ref 40–60)
PO2 BLDA: 235 MMHG (ref 80–100)
PO2 BLDA: 238 MMHG (ref 40–60)
PO2 BLDA: 268 MMHG (ref 80–100)
PO2 BLDA: 282 MMHG (ref 80–100)
PO2 BLDA: 293 MMHG (ref 80–100)
PO2 BLDA: 296 MMHG (ref 80–100)
PO2 BLDA: 39 MMHG (ref 40–60)
PO2 BLDA: 444 MMHG (ref 80–100)
PO2 BLDA: 51 MMHG (ref 40–60)
PO2 BLDA: 51 MMHG (ref 40–60)
PO2 BLDA: 523 MMHG (ref 80–100)
PO2 BLDA: 91 MMHG (ref 80–100)
PO2 BLDA: 92 MMHG (ref 80–100)
POC BE: -10 MMOL/L
POC BE: -10 MMOL/L
POC BE: -11 MMOL/L
POC BE: -12 MMOL/L
POC BE: -13 MMOL/L
POC BE: -13 MMOL/L
POC BE: -2 MMOL/L
POC BE: -2 MMOL/L
POC BE: -3 MMOL/L
POC BE: -4 MMOL/L
POC BE: -4 MMOL/L
POC BE: -6 MMOL/L
POC BE: -9 MMOL/L
POC BE: 1 MMOL/L
POC BE: 4 MMOL/L
POC BE: 5 MMOL/L
POC IONIZED CALCIUM: 0.97 MMOL/L (ref 1.06–1.42)
POC IONIZED CALCIUM: 1 MMOL/L (ref 1.06–1.42)
POC IONIZED CALCIUM: 1.05 MMOL/L (ref 1.06–1.42)
POC IONIZED CALCIUM: 1.05 MMOL/L (ref 1.06–1.42)
POC IONIZED CALCIUM: 1.07 MMOL/L (ref 1.06–1.42)
POC IONIZED CALCIUM: 1.09 MMOL/L (ref 1.06–1.42)
POC IONIZED CALCIUM: 1.09 MMOL/L (ref 1.06–1.42)
POC IONIZED CALCIUM: 1.1 MMOL/L (ref 1.06–1.42)
POC IONIZED CALCIUM: 1.1 MMOL/L (ref 1.06–1.42)
POC IONIZED CALCIUM: 1.11 MMOL/L (ref 1.06–1.42)
POC IONIZED CALCIUM: 1.12 MMOL/L (ref 1.06–1.42)
POC IONIZED CALCIUM: 1.14 MMOL/L (ref 1.06–1.42)
POC IONIZED CALCIUM: 1.18 MMOL/L (ref 1.06–1.42)
POC IONIZED CALCIUM: 1.2 MMOL/L (ref 1.06–1.42)
POC IONIZED CALCIUM: 1.2 MMOL/L (ref 1.06–1.42)
POC IONIZED CALCIUM: 1.51 MMOL/L (ref 1.06–1.42)
POC SATURATED O2: 100 % (ref 95–100)
POC SATURATED O2: 26 % (ref 95–100)
POC SATURATED O2: 64 % (ref 95–100)
POC SATURATED O2: 85 % (ref 95–100)
POC SATURATED O2: 85 % (ref 95–100)
POC SATURATED O2: 95 % (ref 95–100)
POC SATURATED O2: 96 % (ref 95–100)
POC SATURATED O2: 98 % (ref 95–100)
POC SATURATED O2: 99 % (ref 95–100)
POC TCO2: 15 MMOL/L (ref 23–27)
POC TCO2: 15 MMOL/L (ref 23–27)
POC TCO2: 17 MMOL/L (ref 23–27)
POC TCO2: 17 MMOL/L (ref 24–29)
POC TCO2: 18 MMOL/L (ref 23–27)
POC TCO2: 19 MMOL/L (ref 23–27)
POC TCO2: 19 MMOL/L (ref 23–27)
POC TCO2: 21 MMOL/L (ref 24–29)
POC TCO2: 24 MMOL/L (ref 23–27)
POC TCO2: 24 MMOL/L (ref 24–29)
POC TCO2: 25 MMOL/L (ref 23–27)
POC TCO2: 25 MMOL/L (ref 23–27)
POC TCO2: 26 MMOL/L (ref 23–27)
POC TCO2: 27 MMOL/L (ref 23–27)
POC TCO2: 27 MMOL/L (ref 24–29)
POC TCO2: 27 MMOL/L (ref 24–29)
POC TCO2: 28 MMOL/L (ref 24–29)
POC TCO2: 29 MMOL/L (ref 23–27)
POC TCO2: 32 MMOL/L (ref 23–27)
POCT GLUCOSE: 195 MG/DL (ref 70–110)
POCT GLUCOSE: 211 MG/DL (ref 70–110)
POCT GLUCOSE: 213 MG/DL (ref 70–110)
POCT GLUCOSE: 281 MG/DL (ref 70–110)
POCT GLUCOSE: 292 MG/DL (ref 70–110)
POCT GLUCOSE: 299 MG/DL (ref 70–110)
POCT GLUCOSE: 369 MG/DL (ref 70–110)
POCT GLUCOSE: 378 MG/DL (ref 70–110)
POCT GLUCOSE: 380 MG/DL (ref 70–110)
POCT GLUCOSE: 413 MG/DL (ref 70–110)
POTASSIUM BLD-SCNC: 3 MMOL/L (ref 3.5–5.1)
POTASSIUM BLD-SCNC: 3.3 MMOL/L (ref 3.5–5.1)
POTASSIUM BLD-SCNC: 3.4 MMOL/L (ref 3.5–5.1)
POTASSIUM BLD-SCNC: 4 MMOL/L (ref 3.5–5.1)
POTASSIUM BLD-SCNC: 4.2 MMOL/L (ref 3.5–5.1)
POTASSIUM BLD-SCNC: 4.5 MMOL/L (ref 3.5–5.1)
POTASSIUM BLD-SCNC: 4.9 MMOL/L (ref 3.5–5.1)
POTASSIUM BLD-SCNC: 5.1 MMOL/L (ref 3.5–5.1)
POTASSIUM SERPL-SCNC: 3.4 MMOL/L (ref 3.5–5.1)
POTASSIUM SERPL-SCNC: 3.5 MMOL/L (ref 3.5–5.1)
POTASSIUM SERPL-SCNC: 3.6 MMOL/L (ref 3.5–5.1)
POTASSIUM SERPL-SCNC: 3.7 MMOL/L (ref 3.5–5.1)
POTASSIUM SERPL-SCNC: 4 MMOL/L (ref 3.5–5.1)
PROT SERPL-MCNC: 4 G/DL (ref 6–8.4)
PROT SERPL-MCNC: 4.2 G/DL (ref 6–8.4)
PROT SERPL-MCNC: 6.7 G/DL (ref 6–8.4)
PROTHROMBIN TIME: 13.7 SEC (ref 9–12.5)
PROTHROMBIN TIME: 14 SEC (ref 9–12.5)
PROTHROMBIN TIME: 14.2 SEC (ref 9–12.5)
PROTHROMBIN TIME: 15.1 SEC (ref 9–12.5)
PROTHROMBIN TIME: 15.1 SEC (ref 9–12.5)
RBC # BLD AUTO: 1.96 M/UL (ref 4.6–6.2)
RBC # BLD AUTO: 2.59 M/UL (ref 4.6–6.2)
RBC # BLD AUTO: 2.74 M/UL (ref 4.6–6.2)
RBC # BLD AUTO: 2.91 M/UL (ref 4.6–6.2)
RBC # BLD AUTO: 2.91 M/UL (ref 4.6–6.2)
RBC # BLD AUTO: 4.78 M/UL (ref 4.6–6.2)
SAMPLE: ABNORMAL
SAMPLE: NORMAL
SITE: ABNORMAL
SODIUM BLD-SCNC: 138 MMOL/L (ref 136–145)
SODIUM BLD-SCNC: 139 MMOL/L (ref 136–145)
SODIUM BLD-SCNC: 140 MMOL/L (ref 136–145)
SODIUM BLD-SCNC: 141 MMOL/L (ref 136–145)
SODIUM BLD-SCNC: 142 MMOL/L (ref 136–145)
SODIUM BLD-SCNC: 143 MMOL/L (ref 136–145)
SODIUM BLD-SCNC: 144 MMOL/L (ref 136–145)
SODIUM BLD-SCNC: 144 MMOL/L (ref 136–145)
SODIUM SERPL-SCNC: 139 MMOL/L (ref 136–145)
SODIUM SERPL-SCNC: 142 MMOL/L (ref 136–145)
SODIUM SERPL-SCNC: 142 MMOL/L (ref 136–145)
SODIUM SERPL-SCNC: 143 MMOL/L (ref 136–145)
SP02: 97
SP02: 97
SP02: 98
SP02: 99
SP02: 99
TOXIC GRANULES BLD QL SMEAR: PRESENT
UNIT NUMBER: NORMAL
VT: 430
WBC # BLD AUTO: 14.52 K/UL (ref 3.9–12.7)
WBC # BLD AUTO: 18.04 K/UL (ref 3.9–12.7)
WBC # BLD AUTO: 23.19 K/UL (ref 3.9–12.7)
WBC # BLD AUTO: 25.29 K/UL (ref 3.9–12.7)
WBC # BLD AUTO: 25.29 K/UL (ref 3.9–12.7)
WBC # BLD AUTO: 7.41 K/UL (ref 3.9–12.7)

## 2022-10-07 PROCEDURE — 27200667 HC PACEMAKER, TEMPORARY MONITORING, PER SHIFT

## 2022-10-07 PROCEDURE — D9220A PRA ANESTHESIA: ICD-10-PCS | Mod: ,,, | Performed by: ANESTHESIOLOGY

## 2022-10-07 PROCEDURE — 27801859 OPTIME CATHETER, IMPELLA: Performed by: THORACIC SURGERY (CARDIOTHORACIC VASCULAR SURGERY)

## 2022-10-07 PROCEDURE — 80048 BASIC METABOLIC PNL TOTAL CA: CPT | Mod: XB

## 2022-10-07 PROCEDURE — 33427 REPAIR OF MITRAL VALVE: CPT | Mod: ,,, | Performed by: THORACIC SURGERY (CARDIOTHORACIC VASCULAR SURGERY)

## 2022-10-07 PROCEDURE — 36000712 HC OR TIME LEV V 1ST 15 MIN: Performed by: THORACIC SURGERY (CARDIOTHORACIC VASCULAR SURGERY)

## 2022-10-07 PROCEDURE — 88305 TISSUE EXAM BY PATHOLOGIST: ICD-10-PCS | Mod: 26,,, | Performed by: PATHOLOGY

## 2022-10-07 PROCEDURE — 82803 BLOOD GASES ANY COMBINATION: CPT

## 2022-10-07 PROCEDURE — 85610 PROTHROMBIN TIME: CPT

## 2022-10-07 PROCEDURE — 27800595 HC HEART VALVES

## 2022-10-07 PROCEDURE — 85730 THROMBOPLASTIN TIME PARTIAL: CPT | Mod: 91 | Performed by: THORACIC SURGERY (CARDIOTHORACIC VASCULAR SURGERY)

## 2022-10-07 PROCEDURE — 84100 ASSAY OF PHOSPHORUS: CPT

## 2022-10-07 PROCEDURE — 33979 INSERT INTRACORPOREAL DEVICE: CPT | Mod: 82,51,, | Performed by: STUDENT IN AN ORGANIZED HEALTH CARE EDUCATION/TRAINING PROGRAM

## 2022-10-07 PROCEDURE — 93320 PR DOPPLER ECHO HEART,COMPLETE: ICD-10-PCS | Mod: 26,,, | Performed by: ANESTHESIOLOGY

## 2022-10-07 PROCEDURE — 36415 COLL VENOUS BLD VENIPUNCTURE: CPT | Performed by: NURSE PRACTITIONER

## 2022-10-07 PROCEDURE — 63600175 PHARM REV CODE 636 W HCPCS: Performed by: STUDENT IN AN ORGANIZED HEALTH CARE EDUCATION/TRAINING PROGRAM

## 2022-10-07 PROCEDURE — 33464 PR VALVULOPLAS TRICUS W RING INSERT: ICD-10-PCS | Mod: 82,51,, | Performed by: STUDENT IN AN ORGANIZED HEALTH CARE EDUCATION/TRAINING PROGRAM

## 2022-10-07 PROCEDURE — 99292 PR CRITICAL CARE, ADDL 30 MIN: ICD-10-PCS | Mod: ,,, | Performed by: ANESTHESIOLOGY

## 2022-10-07 PROCEDURE — 76942 ECHO GUIDE FOR BIOPSY: CPT | Performed by: STUDENT IN AN ORGANIZED HEALTH CARE EDUCATION/TRAINING PROGRAM

## 2022-10-07 PROCEDURE — 33464 VALVULOPLASTY TRICUSPID: CPT | Mod: 51,,, | Performed by: THORACIC SURGERY (CARDIOTHORACIC VASCULAR SURGERY)

## 2022-10-07 PROCEDURE — P9016 RBC LEUKOCYTES REDUCED: HCPCS | Performed by: NURSE PRACTITIONER

## 2022-10-07 PROCEDURE — 93325 DOPPLER ECHO COLOR FLOW MAPG: CPT | Mod: 26,,, | Performed by: ANESTHESIOLOGY

## 2022-10-07 PROCEDURE — 88305 TISSUE EXAM BY PATHOLOGIST: CPT | Performed by: PATHOLOGY

## 2022-10-07 PROCEDURE — 33259 ABLATE ATRIA W/BYPASS ADD-ON: CPT | Mod: 82,,, | Performed by: STUDENT IN AN ORGANIZED HEALTH CARE EDUCATION/TRAINING PROGRAM

## 2022-10-07 PROCEDURE — 93312 PR ECHO HEART,TRANSESOPHAGEAL: ICD-10-PCS | Mod: 26,59,, | Performed by: ANESTHESIOLOGY

## 2022-10-07 PROCEDURE — 27000191 HC C-V MONITORING

## 2022-10-07 PROCEDURE — 25000003 PHARM REV CODE 250

## 2022-10-07 PROCEDURE — P9016 RBC LEUKOCYTES REDUCED: HCPCS

## 2022-10-07 PROCEDURE — 94761 N-INVAS EAR/PLS OXIMETRY MLT: CPT

## 2022-10-07 PROCEDURE — 80048 BASIC METABOLIC PNL TOTAL CA: CPT | Mod: 91,XB | Performed by: THORACIC SURGERY (CARDIOTHORACIC VASCULAR SURGERY)

## 2022-10-07 PROCEDURE — 27000426 HC IMPELLA SET UP

## 2022-10-07 PROCEDURE — 36592 COLLECT BLOOD FROM PICC: CPT

## 2022-10-07 PROCEDURE — 35820 EXPLORE CHEST VESSELS: CPT | Mod: 78,XE,, | Performed by: THORACIC SURGERY (CARDIOTHORACIC VASCULAR SURGERY)

## 2022-10-07 PROCEDURE — 93312 ECHO TRANSESOPHAGEAL: CPT | Mod: 26,59,, | Performed by: ANESTHESIOLOGY

## 2022-10-07 PROCEDURE — 63600175 PHARM REV CODE 636 W HCPCS

## 2022-10-07 PROCEDURE — 36556 PR INSERT NON-TUNNEL CV CATH 5+ YRS OLD: ICD-10-PCS | Mod: 59,,, | Performed by: ANESTHESIOLOGY

## 2022-10-07 PROCEDURE — 85025 COMPLETE CBC W/AUTO DIFF WBC: CPT | Performed by: STUDENT IN AN ORGANIZED HEALTH CARE EDUCATION/TRAINING PROGRAM

## 2022-10-07 PROCEDURE — 33427 PR MITRALPLASTY W RADICAL RECONSTR: ICD-10-PCS | Mod: 82,,, | Performed by: STUDENT IN AN ORGANIZED HEALTH CARE EDUCATION/TRAINING PROGRAM

## 2022-10-07 PROCEDURE — P9035 PLATELET PHERES LEUKOREDUCED: HCPCS | Performed by: NURSE PRACTITIONER

## 2022-10-07 PROCEDURE — P9045 ALBUMIN (HUMAN), 5%, 250 ML: HCPCS | Mod: JG

## 2022-10-07 PROCEDURE — 37799 UNLISTED PX VASCULAR SURGERY: CPT

## 2022-10-07 PROCEDURE — 85730 THROMBOPLASTIN TIME PARTIAL: CPT

## 2022-10-07 PROCEDURE — C2618 PROBE/NEEDLE, CRYO: HCPCS | Performed by: THORACIC SURGERY (CARDIOTHORACIC VASCULAR SURGERY)

## 2022-10-07 PROCEDURE — 64999 UNLISTED PX NERVOUS SYSTEM: CPT | Mod: ,,, | Performed by: ANESTHESIOLOGY

## 2022-10-07 PROCEDURE — 33464 VALVULOPLASTY TRICUSPID: CPT | Mod: 82,51,, | Performed by: STUDENT IN AN ORGANIZED HEALTH CARE EDUCATION/TRAINING PROGRAM

## 2022-10-07 PROCEDURE — 88305 TISSUE EXAM BY PATHOLOGIST: CPT | Mod: 26,,, | Performed by: PATHOLOGY

## 2022-10-07 PROCEDURE — 93325 PR DOPPLER COLOR FLOW VELOCITY MAP: ICD-10-PCS | Mod: 26,,, | Performed by: ANESTHESIOLOGY

## 2022-10-07 PROCEDURE — 36000713 HC OR TIME LEV V EA ADD 15 MIN: Performed by: THORACIC SURGERY (CARDIOTHORACIC VASCULAR SURGERY)

## 2022-10-07 PROCEDURE — 83605 ASSAY OF LACTIC ACID: CPT

## 2022-10-07 PROCEDURE — 99223 1ST HOSP IP/OBS HIGH 75: CPT | Mod: ,,, | Performed by: INTERNAL MEDICINE

## 2022-10-07 PROCEDURE — 82330 ASSAY OF CALCIUM: CPT

## 2022-10-07 PROCEDURE — 82565 ASSAY OF CREATININE: CPT

## 2022-10-07 PROCEDURE — P9017 PLASMA 1 DONOR FRZ W/IN 8 HR: HCPCS | Performed by: NURSE PRACTITIONER

## 2022-10-07 PROCEDURE — 33979 INSERT INTRACORPOREAL DEVICE: CPT | Mod: 51,,, | Performed by: THORACIC SURGERY (CARDIOTHORACIC VASCULAR SURGERY)

## 2022-10-07 PROCEDURE — 84100 ASSAY OF PHOSPHORUS: CPT | Mod: 91 | Performed by: THORACIC SURGERY (CARDIOTHORACIC VASCULAR SURGERY)

## 2022-10-07 PROCEDURE — 37000009 HC ANESTHESIA EA ADD 15 MINS: Performed by: THORACIC SURGERY (CARDIOTHORACIC VASCULAR SURGERY)

## 2022-10-07 PROCEDURE — 99291 PR CRITICAL CARE, E/M 30-74 MINUTES: ICD-10-PCS | Mod: ,,, | Performed by: ANESTHESIOLOGY

## 2022-10-07 PROCEDURE — 27000221 HC OXYGEN, UP TO 24 HOURS

## 2022-10-07 PROCEDURE — 25000003 PHARM REV CODE 250: Performed by: STUDENT IN AN ORGANIZED HEALTH CARE EDUCATION/TRAINING PROGRAM

## 2022-10-07 PROCEDURE — 25000003 PHARM REV CODE 250: Performed by: THORACIC SURGERY (CARDIOTHORACIC VASCULAR SURGERY)

## 2022-10-07 PROCEDURE — P9012 CRYOPRECIPITATE EACH UNIT: HCPCS

## 2022-10-07 PROCEDURE — 33464 PR VALVULOPLAS TRICUS W RING INSERT: ICD-10-PCS | Mod: 51,,, | Performed by: THORACIC SURGERY (CARDIOTHORACIC VASCULAR SURGERY)

## 2022-10-07 PROCEDURE — 27000445 HC TEMPORARY PACEMAKER LEADS

## 2022-10-07 PROCEDURE — 64999 PR BLOCK, ERECTOR SPINAE PLANE, SINGLE SHOT: ICD-10-PCS | Mod: ,,, | Performed by: ANESTHESIOLOGY

## 2022-10-07 PROCEDURE — 99291 CRITICAL CARE FIRST HOUR: CPT | Mod: ,,, | Performed by: ANESTHESIOLOGY

## 2022-10-07 PROCEDURE — 36620 INSERTION CATHETER ARTERY: CPT | Mod: 59,,, | Performed by: ANESTHESIOLOGY

## 2022-10-07 PROCEDURE — 33259 PR ABLATE/ RECONSTUCT ATRIA, W OTHER PROCED EXTENS W/ BYPASS: ICD-10-PCS | Mod: ,,, | Performed by: THORACIC SURGERY (CARDIOTHORACIC VASCULAR SURGERY)

## 2022-10-07 PROCEDURE — 83735 ASSAY OF MAGNESIUM: CPT

## 2022-10-07 PROCEDURE — 99223 PR INITIAL HOSPITAL CARE,LEVL III: ICD-10-PCS | Mod: ,,, | Performed by: INTERNAL MEDICINE

## 2022-10-07 PROCEDURE — 85520 HEPARIN ASSAY: CPT

## 2022-10-07 PROCEDURE — 86965 POOLING BLOOD PLATELETS: CPT

## 2022-10-07 PROCEDURE — 83735 ASSAY OF MAGNESIUM: CPT | Mod: 91 | Performed by: THORACIC SURGERY (CARDIOTHORACIC VASCULAR SURGERY)

## 2022-10-07 PROCEDURE — 63600367 HC NITRIC OXIDE PER HOUR

## 2022-10-07 PROCEDURE — 25000003 PHARM REV CODE 250: Performed by: ANESTHESIOLOGY

## 2022-10-07 PROCEDURE — 85384 FIBRINOGEN ACTIVITY: CPT

## 2022-10-07 PROCEDURE — 85025 COMPLETE CBC W/AUTO DIFF WBC: CPT | Mod: 91 | Performed by: THORACIC SURGERY (CARDIOTHORACIC VASCULAR SURGERY)

## 2022-10-07 PROCEDURE — 35820 PR EXPLOR POSTOP BLEED,INFEC,CLOT-CHST: ICD-10-PCS | Mod: 78,XE,, | Performed by: THORACIC SURGERY (CARDIOTHORACIC VASCULAR SURGERY)

## 2022-10-07 PROCEDURE — 85014 HEMATOCRIT: CPT

## 2022-10-07 PROCEDURE — 63600175 PHARM REV CODE 636 W HCPCS: Performed by: NURSE PRACTITIONER

## 2022-10-07 PROCEDURE — 84295 ASSAY OF SERUM SODIUM: CPT

## 2022-10-07 PROCEDURE — L8670 VASCULAR GRAFT, SYNTHETIC: HCPCS | Performed by: THORACIC SURGERY (CARDIOTHORACIC VASCULAR SURGERY)

## 2022-10-07 PROCEDURE — 27202094 HC TUBING, IMPELLA

## 2022-10-07 PROCEDURE — 33427 REPAIR OF MITRAL VALVE: CPT | Mod: 82,,, | Performed by: STUDENT IN AN ORGANIZED HEALTH CARE EDUCATION/TRAINING PROGRAM

## 2022-10-07 PROCEDURE — 25000003 PHARM REV CODE 250: Performed by: NURSE PRACTITIONER

## 2022-10-07 PROCEDURE — 63600175 PHARM REV CODE 636 W HCPCS: Performed by: THORACIC SURGERY (CARDIOTHORACIC VASCULAR SURGERY)

## 2022-10-07 PROCEDURE — 99900035 HC TECH TIME PER 15 MIN (STAT)

## 2022-10-07 PROCEDURE — 36620 PR INSERT CATH,ART,PERCUT,SHORTTERM: ICD-10-PCS | Mod: 59,,, | Performed by: ANESTHESIOLOGY

## 2022-10-07 PROCEDURE — 27100088 HC CELL SAVER

## 2022-10-07 PROCEDURE — 94002 VENT MGMT INPAT INIT DAY: CPT

## 2022-10-07 PROCEDURE — 63600175 PHARM REV CODE 636 W HCPCS: Performed by: ANESTHESIOLOGY

## 2022-10-07 PROCEDURE — 20000000 HC ICU ROOM

## 2022-10-07 PROCEDURE — 33259 ABLATE ATRIA W/BYPASS ADD-ON: CPT | Mod: ,,, | Performed by: THORACIC SURGERY (CARDIOTHORACIC VASCULAR SURGERY)

## 2022-10-07 PROCEDURE — 33427 PR MITRALPLASTY W RADICAL RECONSTR: ICD-10-PCS | Mod: ,,, | Performed by: THORACIC SURGERY (CARDIOTHORACIC VASCULAR SURGERY)

## 2022-10-07 PROCEDURE — C1729 CATH, DRAINAGE: HCPCS | Performed by: THORACIC SURGERY (CARDIOTHORACIC VASCULAR SURGERY)

## 2022-10-07 PROCEDURE — 27000175 HC ADULT BYPASS PUMP

## 2022-10-07 PROCEDURE — 33259 PR ABLATE/ RECONSTUCT ATRIA, W OTHER PROCED EXTENS W/ BYPASS: ICD-10-PCS | Mod: 82,,, | Performed by: STUDENT IN AN ORGANIZED HEALTH CARE EDUCATION/TRAINING PROGRAM

## 2022-10-07 PROCEDURE — 84132 ASSAY OF SERUM POTASSIUM: CPT

## 2022-10-07 PROCEDURE — 27800011 HC ABIOMED IMPELLA CATHETER

## 2022-10-07 PROCEDURE — 93320 DOPPLER ECHO COMPLETE: CPT | Mod: 26,,, | Performed by: ANESTHESIOLOGY

## 2022-10-07 PROCEDURE — 33979 PR INSERT VENT ASST DEV,IMPLANT,SINGLE VENT: ICD-10-PCS | Mod: 82,51,, | Performed by: STUDENT IN AN ORGANIZED HEALTH CARE EDUCATION/TRAINING PROGRAM

## 2022-10-07 PROCEDURE — 63600175 PHARM REV CODE 636 W HCPCS: Mod: JG

## 2022-10-07 PROCEDURE — 87070 CULTURE OTHR SPECIMN AEROBIC: CPT | Performed by: NURSE PRACTITIONER

## 2022-10-07 PROCEDURE — D9220A PRA ANESTHESIA: Mod: ,,, | Performed by: ANESTHESIOLOGY

## 2022-10-07 PROCEDURE — 27200953 HC CARDIOPLEGIA SYSTEM

## 2022-10-07 PROCEDURE — 99900026 HC AIRWAY MAINTENANCE (STAT)

## 2022-10-07 PROCEDURE — 37000008 HC ANESTHESIA 1ST 15 MINUTES: Performed by: THORACIC SURGERY (CARDIOTHORACIC VASCULAR SURGERY)

## 2022-10-07 PROCEDURE — 85610 PROTHROMBIN TIME: CPT | Mod: 91 | Performed by: THORACIC SURGERY (CARDIOTHORACIC VASCULAR SURGERY)

## 2022-10-07 PROCEDURE — 27201037 HC PRESSURE MONITORING SET UP

## 2022-10-07 PROCEDURE — 85025 COMPLETE CBC W/AUTO DIFF WBC: CPT | Mod: 91

## 2022-10-07 PROCEDURE — 27202608 HC CANNULA, MISC

## 2022-10-07 PROCEDURE — 27100026 HC SHUNT SENSOR, TERUMO

## 2022-10-07 PROCEDURE — 99292 CRITICAL CARE ADDL 30 MIN: CPT | Mod: ,,, | Performed by: ANESTHESIOLOGY

## 2022-10-07 PROCEDURE — 80053 COMPREHEN METABOLIC PANEL: CPT | Performed by: THORACIC SURGERY (CARDIOTHORACIC VASCULAR SURGERY)

## 2022-10-07 PROCEDURE — 36556 INSERT NON-TUNNEL CV CATH: CPT | Mod: 59,,, | Performed by: ANESTHESIOLOGY

## 2022-10-07 PROCEDURE — 85384 FIBRINOGEN ACTIVITY: CPT | Mod: 91 | Performed by: THORACIC SURGERY (CARDIOTHORACIC VASCULAR SURGERY)

## 2022-10-07 PROCEDURE — 27201423 OPTIME MED/SURG SUP & DEVICES STERILE SUPPLY: Performed by: THORACIC SURGERY (CARDIOTHORACIC VASCULAR SURGERY)

## 2022-10-07 PROCEDURE — 33979 PR INSERT VENT ASST DEV,IMPLANT,SINGLE VENT: ICD-10-PCS | Mod: 51,,, | Performed by: THORACIC SURGERY (CARDIOTHORACIC VASCULAR SURGERY)

## 2022-10-07 PROCEDURE — 36415 COLL VENOUS BLD VENIPUNCTURE: CPT | Performed by: THORACIC SURGERY (CARDIOTHORACIC VASCULAR SURGERY)

## 2022-10-07 DEVICE — IMPLANTABLE DEVICE: Type: IMPLANTABLE DEVICE | Site: HEART | Status: FUNCTIONAL

## 2022-10-07 DEVICE — GRAFT GELWEAVE WOVEN 10MM 30CM: Type: IMPLANTABLE DEVICE | Site: HEART | Status: FUNCTIONAL

## 2022-10-07 RX ORDER — PROPOFOL 10 MG/ML
0-50 INJECTION, EMULSION INTRAVENOUS CONTINUOUS
Status: DISCONTINUED | OUTPATIENT
Start: 2022-10-07 | End: 2022-10-07

## 2022-10-07 RX ORDER — HYDROMORPHONE HYDROCHLORIDE 1 MG/ML
1 INJECTION, SOLUTION INTRAMUSCULAR; INTRAVENOUS; SUBCUTANEOUS
Status: CANCELLED | OUTPATIENT
Start: 2022-10-07

## 2022-10-07 RX ORDER — HYDROCODONE BITARTRATE AND ACETAMINOPHEN 500; 5 MG/1; MG/1
TABLET ORAL
Status: DISCONTINUED | OUTPATIENT
Start: 2022-10-07 | End: 2022-10-11

## 2022-10-07 RX ORDER — MUPIROCIN 20 MG/G
OINTMENT TOPICAL 2 TIMES DAILY
Status: COMPLETED | OUTPATIENT
Start: 2022-10-07 | End: 2022-10-12

## 2022-10-07 RX ORDER — POTASSIUM CHLORIDE 29.8 MG/ML
40 INJECTION INTRAVENOUS
Status: DISCONTINUED | OUTPATIENT
Start: 2022-10-07 | End: 2022-10-24

## 2022-10-07 RX ORDER — FENTANYL CITRATE 50 UG/ML
100 INJECTION, SOLUTION INTRAMUSCULAR; INTRAVENOUS ONCE
Status: COMPLETED | OUTPATIENT
Start: 2022-10-07 | End: 2022-10-07

## 2022-10-07 RX ORDER — ONDANSETRON 2 MG/ML
INJECTION INTRAMUSCULAR; INTRAVENOUS
Status: DISCONTINUED | OUTPATIENT
Start: 2022-10-07 | End: 2022-10-07

## 2022-10-07 RX ORDER — INDOMETHACIN 25 MG/1
CAPSULE ORAL
Status: COMPLETED
Start: 2022-10-07 | End: 2022-10-07

## 2022-10-07 RX ORDER — NOREPINEPHRINE BITARTRATE/D5W 4MG/250ML
0-3 PLASTIC BAG, INJECTION (ML) INTRAVENOUS CONTINUOUS
Status: CANCELLED | OUTPATIENT
Start: 2022-10-07

## 2022-10-07 RX ORDER — PROPOFOL 10 MG/ML
0-50 INJECTION, EMULSION INTRAVENOUS CONTINUOUS
Status: DISCONTINUED | OUTPATIENT
Start: 2022-10-07 | End: 2022-10-10

## 2022-10-07 RX ORDER — MAGNESIUM SULFATE HEPTAHYDRATE 40 MG/ML
2 INJECTION, SOLUTION INTRAVENOUS
Status: DISCONTINUED | OUTPATIENT
Start: 2022-10-07 | End: 2022-10-24

## 2022-10-07 RX ORDER — CALCIUM CHLORIDE INJECTION 100 MG/ML
1 INJECTION, SOLUTION INTRAVENOUS ONCE
Status: COMPLETED | OUTPATIENT
Start: 2022-10-07 | End: 2022-10-07

## 2022-10-07 RX ORDER — LIDOCAINE HYDROCHLORIDE 20 MG/ML
INJECTION, SOLUTION EPIDURAL; INFILTRATION; INTRACAUDAL; PERINEURAL
Status: DISCONTINUED | OUTPATIENT
Start: 2022-10-07 | End: 2022-10-07

## 2022-10-07 RX ORDER — ACETAMINOPHEN 500 MG
1000 TABLET ORAL EVERY 8 HOURS
Status: DISCONTINUED | OUTPATIENT
Start: 2022-10-07 | End: 2022-10-10

## 2022-10-07 RX ORDER — INDOMETHACIN 25 MG/1
50 CAPSULE ORAL ONCE
Status: COMPLETED | OUTPATIENT
Start: 2022-10-07 | End: 2022-10-07

## 2022-10-07 RX ORDER — CALCIUM GLUCONATE 20 MG/ML
1 INJECTION, SOLUTION INTRAVENOUS
Status: DISCONTINUED | OUTPATIENT
Start: 2022-10-07 | End: 2022-10-24

## 2022-10-07 RX ORDER — ACETAMINOPHEN 500 MG
1000 TABLET ORAL
Status: COMPLETED | OUTPATIENT
Start: 2022-10-07 | End: 2022-10-07

## 2022-10-07 RX ORDER — CEFAZOLIN SODIUM/D5W 2 G/100 ML
2 PLASTIC BAG, INJECTION (ML) INTRAVENOUS
Status: COMPLETED | OUTPATIENT
Start: 2022-10-07 | End: 2022-10-08

## 2022-10-07 RX ORDER — ONDANSETRON 2 MG/ML
4 INJECTION INTRAMUSCULAR; INTRAVENOUS EVERY 12 HOURS PRN
Status: DISCONTINUED | OUTPATIENT
Start: 2022-10-07 | End: 2022-10-25 | Stop reason: HOSPADM

## 2022-10-07 RX ORDER — FENTANYL CITRATE 50 UG/ML
INJECTION, SOLUTION INTRAMUSCULAR; INTRAVENOUS
Status: DISPENSED
Start: 2022-10-07 | End: 2022-10-08

## 2022-10-07 RX ORDER — ALBUMIN HUMAN 50 G/1000ML
SOLUTION INTRAVENOUS CONTINUOUS PRN
Status: DISCONTINUED | OUTPATIENT
Start: 2022-10-07 | End: 2022-10-07

## 2022-10-07 RX ORDER — DOCUSATE SODIUM 100 MG/1
100 CAPSULE, LIQUID FILLED ORAL 2 TIMES DAILY
Status: DISCONTINUED | OUTPATIENT
Start: 2022-10-07 | End: 2022-10-10

## 2022-10-07 RX ORDER — NITROGLYCERIN 5 MG/ML
INJECTION, SOLUTION INTRAVENOUS
Status: DISCONTINUED | OUTPATIENT
Start: 2022-10-07 | End: 2022-10-07

## 2022-10-07 RX ORDER — OXYCODONE HYDROCHLORIDE 10 MG/1
10 TABLET ORAL EVERY 4 HOURS PRN
Status: CANCELLED | OUTPATIENT
Start: 2022-10-07

## 2022-10-07 RX ORDER — PROTAMINE SULFATE 10 MG/ML
50 INJECTION, SOLUTION INTRAVENOUS ONCE
Status: DISCONTINUED | OUTPATIENT
Start: 2022-10-07 | End: 2022-10-07

## 2022-10-07 RX ORDER — CALCIUM GLUCONATE 20 MG/ML
2 INJECTION, SOLUTION INTRAVENOUS
Status: DISCONTINUED | OUTPATIENT
Start: 2022-10-07 | End: 2022-10-24

## 2022-10-07 RX ORDER — SODIUM CHLORIDE 9 MG/ML
INJECTION, SOLUTION INTRAVENOUS CONTINUOUS PRN
Status: DISCONTINUED | OUTPATIENT
Start: 2022-10-07 | End: 2022-10-07

## 2022-10-07 RX ORDER — LIDOCAINE HYDROCHLORIDE ANHYDROUS AND DEXTROSE MONOHYDRATE .8; 5 G/100ML; G/100ML
1 INJECTION, SOLUTION INTRAVENOUS CONTINUOUS
Status: DISCONTINUED | OUTPATIENT
Start: 2022-10-07 | End: 2022-10-08

## 2022-10-07 RX ORDER — POTASSIUM CHLORIDE 14.9 MG/ML
20 INJECTION INTRAVENOUS
Status: DISCONTINUED | OUTPATIENT
Start: 2022-10-07 | End: 2022-10-24

## 2022-10-07 RX ORDER — CEFAZOLIN SODIUM 1 G/3ML
INJECTION, POWDER, FOR SOLUTION INTRAMUSCULAR; INTRAVENOUS
Status: DISCONTINUED | OUTPATIENT
Start: 2022-10-07 | End: 2022-10-07

## 2022-10-07 RX ORDER — MIDAZOLAM HYDROCHLORIDE 1 MG/ML
INJECTION INTRAMUSCULAR; INTRAVENOUS
Status: DISCONTINUED | OUTPATIENT
Start: 2022-10-07 | End: 2022-10-07

## 2022-10-07 RX ORDER — TRANEXAMIC ACID 100 MG/ML
INJECTION, SOLUTION INTRAVENOUS
Status: DISCONTINUED | OUTPATIENT
Start: 2022-10-07 | End: 2022-10-07

## 2022-10-07 RX ORDER — OXYCODONE HYDROCHLORIDE 5 MG/1
5 TABLET ORAL EVERY 4 HOURS PRN
Status: DISCONTINUED | OUTPATIENT
Start: 2022-10-07 | End: 2022-10-11

## 2022-10-07 RX ORDER — KETAMINE HCL IN 0.9 % NACL 50 MG/5 ML
SYRINGE (ML) INTRAVENOUS
Status: DISCONTINUED | OUTPATIENT
Start: 2022-10-07 | End: 2022-10-07

## 2022-10-07 RX ORDER — TRANEXAMIC ACID 100 MG/ML
INJECTION, SOLUTION INTRAVENOUS CONTINUOUS PRN
Status: DISCONTINUED | OUTPATIENT
Start: 2022-10-07 | End: 2022-10-07

## 2022-10-07 RX ORDER — HEPARIN SOD,PORCINE/0.9 % NACL 1000/500ML
INTRAVENOUS SOLUTION INTRAVENOUS
Status: DISCONTINUED | OUTPATIENT
Start: 2022-10-07 | End: 2022-10-07 | Stop reason: HOSPADM

## 2022-10-07 RX ORDER — NOREPINEPHRINE BITARTRATE 1 MG/ML
INJECTION, SOLUTION INTRAVENOUS
Status: DISCONTINUED | OUTPATIENT
Start: 2022-10-07 | End: 2022-10-07

## 2022-10-07 RX ORDER — CALCIUM GLUCONATE 20 MG/ML
3 INJECTION, SOLUTION INTRAVENOUS
Status: DISCONTINUED | OUTPATIENT
Start: 2022-10-07 | End: 2022-10-24

## 2022-10-07 RX ORDER — ALBUMIN HUMAN 50 G/1000ML
25 SOLUTION INTRAVENOUS ONCE
Status: COMPLETED | OUTPATIENT
Start: 2022-10-07 | End: 2022-10-07

## 2022-10-07 RX ORDER — FENTANYL CITRATE 50 UG/ML
50 INJECTION, SOLUTION INTRAMUSCULAR; INTRAVENOUS
Status: COMPLETED | OUTPATIENT
Start: 2022-10-07 | End: 2022-10-08

## 2022-10-07 RX ORDER — FENTANYL CITRATE 50 UG/ML
50 INJECTION, SOLUTION INTRAMUSCULAR; INTRAVENOUS ONCE
Status: COMPLETED | OUTPATIENT
Start: 2022-10-07 | End: 2022-10-07

## 2022-10-07 RX ORDER — POLYETHYLENE GLYCOL 3350 17 G/17G
17 POWDER, FOR SOLUTION ORAL DAILY
Status: DISCONTINUED | OUTPATIENT
Start: 2022-10-07 | End: 2022-10-10

## 2022-10-07 RX ORDER — PROTAMINE SULFATE 10 MG/ML
INJECTION, SOLUTION INTRAVENOUS
Status: DISCONTINUED | OUTPATIENT
Start: 2022-10-07 | End: 2022-10-07

## 2022-10-07 RX ORDER — MAGNESIUM SULFATE HEPTAHYDRATE 500 MG/ML
2 INJECTION, SOLUTION INTRAMUSCULAR; INTRAVENOUS ONCE
Status: COMPLETED | OUTPATIENT
Start: 2022-10-07 | End: 2022-10-07

## 2022-10-07 RX ORDER — HEPARIN SODIUM 1000 [USP'U]/ML
INJECTION, SOLUTION INTRAVENOUS; SUBCUTANEOUS
Status: DISCONTINUED | OUTPATIENT
Start: 2022-10-07 | End: 2022-10-07

## 2022-10-07 RX ORDER — BUPIVACAINE HYDROCHLORIDE 5 MG/ML
INJECTION, SOLUTION EPIDURAL; INTRACAUDAL
Status: COMPLETED | OUTPATIENT
Start: 2022-10-07 | End: 2022-10-07

## 2022-10-07 RX ORDER — FENTANYL CITRATE 50 UG/ML
INJECTION, SOLUTION INTRAMUSCULAR; INTRAVENOUS
Status: DISCONTINUED | OUTPATIENT
Start: 2022-10-07 | End: 2022-10-07

## 2022-10-07 RX ORDER — POTASSIUM CHLORIDE 14.9 MG/ML
60 INJECTION INTRAVENOUS
Status: DISCONTINUED | OUTPATIENT
Start: 2022-10-07 | End: 2022-10-24

## 2022-10-07 RX ORDER — CEFAZOLIN SODIUM/WATER 2 G/20 ML
2 SYRINGE (ML) INTRAVENOUS
Status: DISCONTINUED | OUTPATIENT
Start: 2022-10-07 | End: 2022-10-07 | Stop reason: HOSPADM

## 2022-10-07 RX ORDER — FAMOTIDINE 10 MG/ML
20 INJECTION INTRAVENOUS 2 TIMES DAILY
Status: DISCONTINUED | OUTPATIENT
Start: 2022-10-07 | End: 2022-10-08

## 2022-10-07 RX ORDER — AMIODARONE HYDROCHLORIDE 150 MG/3ML
150 INJECTION, SOLUTION INTRAVENOUS ONCE
Status: COMPLETED | OUTPATIENT
Start: 2022-10-07 | End: 2022-10-07

## 2022-10-07 RX ORDER — MILRINONE LACTATE 0.2 MG/ML
0.25 INJECTION, SOLUTION INTRAVENOUS CONTINUOUS
Status: DISCONTINUED | OUTPATIENT
Start: 2022-10-07 | End: 2022-10-08

## 2022-10-07 RX ORDER — MAGNESIUM SULFATE HEPTAHYDRATE 40 MG/ML
4 INJECTION, SOLUTION INTRAVENOUS
Status: DISCONTINUED | OUTPATIENT
Start: 2022-10-07 | End: 2022-10-24

## 2022-10-07 RX ORDER — DEXMEDETOMIDINE HYDROCHLORIDE 4 UG/ML
0-1.4 INJECTION, SOLUTION INTRAVENOUS CONTINUOUS
Status: DISCONTINUED | OUTPATIENT
Start: 2022-10-07 | End: 2022-10-10

## 2022-10-07 RX ORDER — MUPIROCIN 20 MG/G
OINTMENT TOPICAL
Status: DISCONTINUED | OUTPATIENT
Start: 2022-10-07 | End: 2022-10-07

## 2022-10-07 RX ORDER — CEFAZOLIN SODIUM 1 G/50ML
1 SOLUTION INTRAVENOUS
Status: DISCONTINUED | OUTPATIENT
Start: 2022-10-07 | End: 2022-10-07

## 2022-10-07 RX ORDER — EPINEPHRINE 0.1 MG/ML
INJECTION INTRAVENOUS
Status: DISCONTINUED | OUTPATIENT
Start: 2022-10-07 | End: 2022-10-07

## 2022-10-07 RX ORDER — NOREPINEPHRINE BITARTRATE/D5W 4MG/250ML
0-3 PLASTIC BAG, INJECTION (ML) INTRAVENOUS CONTINUOUS
Status: DISCONTINUED | OUTPATIENT
Start: 2022-10-07 | End: 2022-10-08

## 2022-10-07 RX ORDER — ALBUMIN HUMAN 50 G/1000ML
SOLUTION INTRAVENOUS
Status: COMPLETED
Start: 2022-10-07 | End: 2022-10-07

## 2022-10-07 RX ORDER — ROCURONIUM BROMIDE 10 MG/ML
INJECTION, SOLUTION INTRAVENOUS
Status: DISCONTINUED | OUTPATIENT
Start: 2022-10-07 | End: 2022-10-07

## 2022-10-07 RX ORDER — PROPOFOL 10 MG/ML
VIAL (ML) INTRAVENOUS
Status: DISCONTINUED | OUTPATIENT
Start: 2022-10-07 | End: 2022-10-07

## 2022-10-07 RX ADMIN — AMIODARONE HYDROCHLORIDE 1 MG/MIN: 1.8 INJECTION, SOLUTION INTRAVENOUS at 08:10

## 2022-10-07 RX ADMIN — FENTANYL CITRATE 50 MCG: 0.05 INJECTION, SOLUTION INTRAMUSCULAR; INTRAVENOUS at 08:10

## 2022-10-07 RX ADMIN — ROCURONIUM BROMIDE 20 MG: 10 INJECTION INTRAVENOUS at 09:10

## 2022-10-07 RX ADMIN — FENTANYL CITRATE 50 MCG: 50 INJECTION INTRAMUSCULAR; INTRAVENOUS at 09:10

## 2022-10-07 RX ADMIN — ROCURONIUM BROMIDE 30 MG: 10 INJECTION INTRAVENOUS at 08:10

## 2022-10-07 RX ADMIN — SODIUM BICARBONATE 50 MEQ: 84 INJECTION, SOLUTION INTRAVENOUS at 05:10

## 2022-10-07 RX ADMIN — SODIUM PHOSPHATE, MONOBASIC, MONOHYDRATE 15 MMOL: 276; 142 INJECTION, SOLUTION INTRAVENOUS at 06:10

## 2022-10-07 RX ADMIN — CEFAZOLIN 2 G: 330 INJECTION, POWDER, FOR SOLUTION INTRAMUSCULAR; INTRAVENOUS at 07:10

## 2022-10-07 RX ADMIN — NITROGLYCERIN 50 MCG: 5 INJECTION, SOLUTION INTRAVENOUS at 08:10

## 2022-10-07 RX ADMIN — PROPOFOL 30 MG: 10 INJECTION, EMULSION INTRAVENOUS at 07:10

## 2022-10-07 RX ADMIN — Medication 0.16 MCG/KG/MIN: at 11:10

## 2022-10-07 RX ADMIN — PROPOFOL 50 MG: 10 INJECTION, EMULSION INTRAVENOUS at 07:10

## 2022-10-07 RX ADMIN — TRANEXAMIC ACID 1 MG/KG/HR: 100 INJECTION, SOLUTION INTRAVENOUS at 07:10

## 2022-10-07 RX ADMIN — ALBUMIN HUMAN 25 G: 50 SOLUTION INTRAVENOUS at 03:10

## 2022-10-07 RX ADMIN — VASOPRESSIN 0.04 UNITS/MIN: 20 INJECTION INTRAVENOUS at 05:10

## 2022-10-07 RX ADMIN — NOREPINEPHRINE BITARTRATE 0.02 MCG/KG/MIN: 1 INJECTION, SOLUTION INTRAVENOUS at 08:10

## 2022-10-07 RX ADMIN — POTASSIUM CHLORIDE 40 MEQ: 29.8 INJECTION, SOLUTION INTRAVENOUS at 11:10

## 2022-10-07 RX ADMIN — AMIODARONE HYDROCHLORIDE 150 MG: 50 INJECTION, SOLUTION INTRAVENOUS at 01:10

## 2022-10-07 RX ADMIN — NOREPINEPHRINE BITARTRATE 5 MCG: 1 INJECTION, SOLUTION INTRAVENOUS at 07:10

## 2022-10-07 RX ADMIN — PROTAMINE SULFATE 335 MG: 10 INJECTION, SOLUTION INTRAVENOUS at 12:10

## 2022-10-07 RX ADMIN — MIDAZOLAM HYDROCHLORIDE 2 MG: 1 INJECTION, SOLUTION INTRAMUSCULAR; INTRAVENOUS at 07:10

## 2022-10-07 RX ADMIN — SODIUM BICARBONATE 50 MEQ: 84 INJECTION, SOLUTION INTRAVENOUS at 08:10

## 2022-10-07 RX ADMIN — ANGIOTENSIN II 20 NG/KG/MIN: 2.5 INJECTION INTRAVENOUS at 05:10

## 2022-10-07 RX ADMIN — FENTANYL CITRATE 200 MCG: 50 INJECTION, SOLUTION INTRAMUSCULAR; INTRAVENOUS at 07:10

## 2022-10-07 RX ADMIN — CEFAZOLIN 2 G: 330 INJECTION, POWDER, FOR SOLUTION INTRAMUSCULAR; INTRAVENOUS at 11:10

## 2022-10-07 RX ADMIN — INDOMETHACIN 50 MEQ: 25 CAPSULE ORAL at 08:10

## 2022-10-07 RX ADMIN — Medication 2 G: at 10:10

## 2022-10-07 RX ADMIN — FENTANYL CITRATE 100 MCG: 50 INJECTION INTRAMUSCULAR; INTRAVENOUS at 03:10

## 2022-10-07 RX ADMIN — AMIODARONE HYDROCHLORIDE 150 MG: 50 INJECTION, SOLUTION INTRAVENOUS at 07:10

## 2022-10-07 RX ADMIN — Medication 10 MG: at 08:10

## 2022-10-07 RX ADMIN — MILRINONE LACTATE IN DEXTROSE 0.38 MCG/KG/MIN: 200 INJECTION, SOLUTION INTRAVENOUS at 03:10

## 2022-10-07 RX ADMIN — Medication 0.2 MCG/KG/MIN: at 05:10

## 2022-10-07 RX ADMIN — LIDOCAINE HYDROCHLORIDE 100 MG: 20 INJECTION, SOLUTION EPIDURAL; INFILTRATION; INTRACAUDAL at 01:10

## 2022-10-07 RX ADMIN — PROPOFOL 50 MCG/KG/MIN: 10 INJECTION, EMULSION INTRAVENOUS at 10:10

## 2022-10-07 RX ADMIN — INSULIN HUMAN 1.4 UNITS/HR: 1 INJECTION, SOLUTION INTRAVENOUS at 03:10

## 2022-10-07 RX ADMIN — BUPIVACAINE HYDROCHLORIDE 30 ML: 5 INJECTION, SOLUTION EPIDURAL; INTRACAUDAL; PERINEURAL at 07:10

## 2022-10-07 RX ADMIN — VASOPRESSIN 2 UNITS: 20 INJECTION INTRAVENOUS at 12:10

## 2022-10-07 RX ADMIN — HEPARIN SODIUM 38000 UNITS: 1000 INJECTION, SOLUTION INTRAVENOUS; SUBCUTANEOUS at 08:10

## 2022-10-07 RX ADMIN — ROCURONIUM BROMIDE 50 MG: 10 INJECTION INTRAVENOUS at 11:10

## 2022-10-07 RX ADMIN — NOREPINEPHRINE BITARTRATE 0.02 MCG/KG/MIN: 1 INJECTION, SOLUTION, CONCENTRATE INTRAVENOUS at 07:10

## 2022-10-07 RX ADMIN — FAMOTIDINE 20 MG: 10 INJECTION, SOLUTION INTRAVENOUS at 10:10

## 2022-10-07 RX ADMIN — EPINEPHRINE 0.05 MG: 0.1 INJECTION INTRACARDIAC; INTRAVENOUS at 07:10

## 2022-10-07 RX ADMIN — ROCURONIUM BROMIDE 30 MG: 10 INJECTION INTRAVENOUS at 10:10

## 2022-10-07 RX ADMIN — AMIODARONE HYDROCHLORIDE 150 MG: 50 INJECTION, SOLUTION INTRAVENOUS at 03:10

## 2022-10-07 RX ADMIN — ALBUMIN HUMAN 25 G: 50 SOLUTION INTRAVENOUS at 05:10

## 2022-10-07 RX ADMIN — LIDOCAINE HYDROCHLORIDE 1 MG/MIN: 8 INJECTION, SOLUTION INTRAVENOUS at 04:10

## 2022-10-07 RX ADMIN — LIDOCAINE HYDROCHLORIDE 100 MG: 20 INJECTION, SOLUTION EPIDURAL; INFILTRATION; INTRACAUDAL at 07:10

## 2022-10-07 RX ADMIN — ROCURONIUM BROMIDE 70 MG: 10 INJECTION INTRAVENOUS at 07:10

## 2022-10-07 RX ADMIN — NOREPINEPHRINE BITARTRATE 0.16 MCG/KG/MIN: 4 INJECTION, SOLUTION INTRAVENOUS at 09:10

## 2022-10-07 RX ADMIN — PROPOFOL 75 MCG/KG/MIN: 10 INJECTION, EMULSION INTRAVENOUS at 06:10

## 2022-10-07 RX ADMIN — SODIUM CHLORIDE: 9 INJECTION, SOLUTION INTRAVENOUS at 06:10

## 2022-10-07 RX ADMIN — CALCIUM CHLORIDE INJECTION 1 G: 100 INJECTION, SOLUTION INTRAVENOUS at 03:10

## 2022-10-07 RX ADMIN — NOREPINEPHRINE BITARTRATE 32 MCG: 1 INJECTION, SOLUTION INTRAVENOUS at 12:10

## 2022-10-07 RX ADMIN — TRANEXAMIC ACID 8 ML: 100 INJECTION, SOLUTION INTRAVENOUS at 07:10

## 2022-10-07 RX ADMIN — POTASSIUM CHLORIDE 20 MEQ: 200 INJECTION, SOLUTION INTRAVENOUS at 05:10

## 2022-10-07 RX ADMIN — ACETAMINOPHEN 1000 MG: 500 TABLET ORAL at 06:10

## 2022-10-07 RX ADMIN — Medication 20 MG: at 08:10

## 2022-10-07 RX ADMIN — MUPIROCIN: 20 OINTMENT TOPICAL at 06:10

## 2022-10-07 RX ADMIN — ALBUMIN HUMAN: 50 SOLUTION INTRAVENOUS at 12:10

## 2022-10-07 RX ADMIN — POTASSIUM CHLORIDE 20 MEQ: 200 INJECTION, SOLUTION INTRAVENOUS at 03:10

## 2022-10-07 RX ADMIN — PROPOFOL 100 MCG/KG/MIN: 10 INJECTION, EMULSION INTRAVENOUS at 01:10

## 2022-10-07 RX ADMIN — CALCIUM CHLORIDE 1 MG: 100 INJECTION, SOLUTION INTRAVENOUS at 01:10

## 2022-10-07 RX ADMIN — AMIODARONE HYDROCHLORIDE 1 MG/MIN: 1.8 INJECTION, SOLUTION INTRAVENOUS at 06:10

## 2022-10-07 RX ADMIN — VASOPRESSIN 0.04 UNITS/KG/HR: 20 INJECTION INTRAVENOUS at 12:10

## 2022-10-07 RX ADMIN — Medication 20 MG: at 07:10

## 2022-10-07 RX ADMIN — SODIUM BICARBONATE: 84 INJECTION, SOLUTION INTRAVENOUS at 03:10

## 2022-10-07 RX ADMIN — FENTANYL CITRATE 100 MCG: 50 INJECTION INTRAMUSCULAR; INTRAVENOUS at 09:10

## 2022-10-07 RX ADMIN — EPINEPHRINE 0.04 MCG/KG/MIN: 1 INJECTION INTRAMUSCULAR; INTRAVENOUS; SUBCUTANEOUS at 07:10

## 2022-10-07 RX ADMIN — ALBUMIN (HUMAN) 25 G: 12.5 SOLUTION INTRAVENOUS at 05:10

## 2022-10-07 RX ADMIN — ACETAMINOPHEN 1000 MG: 500 TABLET ORAL at 11:10

## 2022-10-07 RX ADMIN — ALBUMIN (HUMAN) 25 G: 12.5 SOLUTION INTRAVENOUS at 03:10

## 2022-10-07 RX ADMIN — MUPIROCIN: 20 OINTMENT TOPICAL at 11:10

## 2022-10-07 RX ADMIN — LIDOCAINE HYDROCHLORIDE 20 MG: 20 INJECTION, SOLUTION EPIDURAL; INFILTRATION; INTRACAUDAL at 08:10

## 2022-10-07 RX ADMIN — MAGNESIUM SULFATE HEPTAHYDRATE 2 G: 500 INJECTION, SOLUTION INTRAMUSCULAR; INTRAVENOUS at 03:10

## 2022-10-07 RX ADMIN — SODIUM CHLORIDE, SODIUM GLUCONATE, SODIUM ACETATE, POTASSIUM CHLORIDE, MAGNESIUM CHLORIDE, SODIUM PHOSPHATE, DIBASIC, AND POTASSIUM PHOSPHATE: .53; .5; .37; .037; .03; .012; .00082 INJECTION, SOLUTION INTRAVENOUS at 07:10

## 2022-10-07 NOTE — ASSESSMENT & PLAN NOTE
Pt is a 62 y.o male with h/o severe mitarl regurgitation, atrial fibrillation, htn, HFrEF post operative day 0 from MV repair, TV repair, Maze and L atrial appendage resection. Post op pt required signficant amt of hemodynamic support including Impella 5.5, Levo, vaso, epi, primacor. Intubated and mechanically ventillated. Preop qtc 550. Per CTS staff, post operatively and as patient was transitioning to the room he had multiple episodes of VT for which he was cardioverted ( no ecg strips or 12 leads available for review of those events).  Review of tele with multiple episodes of NSVT in addition to ventricularly paced rhythm.    Pt with multiple reasons for NSVT post op: high inotropic support, placement of Impella, in addition to electrolyte abnormalities, and high qtc prior to the surgery in light of adminsteration of qtc prolonging agents intraoperatively.    Recommendations:  -- s/p amiodarone bolus, continue with amiodarone gtt  -- aggressive electrolyte repletion with goal K>4<5 and mag>2,<3  -- Wean down inotropic support as tolerated  -- pt with no indication for device, however reasonable to use life vest upon discharge  -- Follow up with EP 6-8 weeks post discharge

## 2022-10-07 NOTE — HPI
Patient is a 62 y.o. male for him EP has been consulted for NSVT. He has a history of severe mitral regurgitation,  atrial fibrillation, htn, HFrEF (EF 35%).   He underwent Complex mitral valve repair, Tricuspid valve repair with 30mm Medtronic Tri-Ad Sullivan annuloplasty band, Maze, Left atrial appendage resection and chest insertion of Impella 5.5 via aortic graft (10mm Vascutek Gelweave) into the left ventricle ,which took place on 10/07/2022  immediate post oeprative course has been complicated by VT ( no strips or EKGs at the time) which per surgical team, required multiple cardioversions. Multiple pressors/inotropes, including Levo, vaso, epi, primacor prior, which has been weaned down significantly.  EP was consulted for VT on 10/07/2022 and was started on amiodarone. Patient was noted to have rare NSVT over the next few days. Unfortunately, patient had an episode of VT this morning requiring 2 shocks. EP was re-consulted for assessment and management recommendations.

## 2022-10-07 NOTE — SUBJECTIVE & OBJECTIVE
Follow-up For: Procedure(s) (LRB):  VALVULOPLASTY,MITRAL VALVE (N/A)  REPAIR, TRICUSPID VALVE (N/A)  INSERTION, IMPELLA (N/A)  MEYER MAZE PROCEDURE (N/A)  EXCLUSION, LEFT ATRIAL APPENDAGE (N/A)    Post-Operative Day: Day of Surgery     Past Medical History:   Diagnosis Date    Anemia     Atrial fibrillation     Encounter for blood transfusion     Esophageal ulcer     GI bleed     Hypertension        Past Surgical History:   Procedure Laterality Date    CARDIOVERSION Left 9/15/2022    Procedure: Cardioversion;  Surgeon: Julio James MD;  Location: [facility] CATH LAB/EP;  Service: Cardiology;  Laterality: Left;  With NORBERT    CATHETERIZATION OF BOTH LEFT AND RIGHT HEART N/A 9/22/2022    Procedure: CATHETERIZATION, HEART, BOTH LEFT AND RIGHT;  Surgeon: Julio James MD;  Location: [facility] CATH LAB/EP;  Service: Cardiology;  Laterality: N/A;    COLONOSCOPY N/A 4/4/2019    Procedure: COLONOSCOPY Golytely;  Surgeon: Umair Randolph MD;  Location: [facility];  Service: Endoscopy;  Laterality: N/A;    CORONARY ANGIOGRAPHY N/A 9/22/2022    Procedure: ANGIOGRAM, CORONARY ARTERY;  Surgeon: Julio James MD;  Location: [facility] CATH LAB/EP;  Service: Cardiology;  Laterality: N/A;    ESOPHAGOGASTRODUODENOSCOPY N/A 10/12/2018    Procedure: EGD (ESOPHAGOGASTRODUODENOSCOPY);  Surgeon: Braden Herring MD;  Location: [facility];  Service: Endoscopy;  Laterality: N/A;    ESOPHAGOGASTRODUODENOSCOPY N/A 4/4/2019    Procedure: EGD;  Surgeon: Umair Randolph MD;  Location: [facility];  Service: Endoscopy;  Laterality: N/A;    HERNIA REPAIR         Review of patient's allergies indicates:  No Known Allergies    Family History       Problem Relation (Age of Onset)    COPD Mother    Cancer Father          Tobacco Use    Smoking status: Never    Smokeless tobacco: Current     Types: Chew   Substance and Sexual Activity    Alcohol use: Yes     Alcohol/week: 20.0 standard drinks     Types: 20 Cans of beer per week    Drug use: No    Sexual activity:  Not on file      Review of Systems   Unable to perform ROS: Intubated   Objective:     Vital Signs (Most Recent):  Temp: 98.1 °F (36.7 °C) (10/07/22 0315)  Pulse: 78 (10/07/22 1530)  Resp: (!) 22 (10/07/22 1538)  BP: (!) 109/57 (10/07/22 1500)  SpO2: 97 % (10/07/22 1530)   Vital Signs (24h Range):  Temp:  [97.7 °F (36.5 °C)-98.2 °F (36.8 °C)] 98.1 °F (36.7 °C)  Pulse:  [] 78  Resp:  [4-25] 22  SpO2:  [93 %-99 %] 97 %  BP: (109-162)/() 109/57     Weight: 83.2 kg (183 lb 6.8 oz)  Body mass index is 27.09 kg/m².      Intake/Output Summary (Last 24 hours) at 10/7/2022 1559  Last data filed at 10/7/2022 1500  Gross per 24 hour   Intake 2716.7 ml   Output 3115 ml   Net -398.3 ml       Physical Exam  Vitals and nursing note reviewed.   Constitutional:       Appearance: He is ill-appearing.      Comments: Intubated and sedated   HENT:      Head: Normocephalic and atraumatic.   Neck:      Comments: RIJ CVC and [place]  Cardiovascular:      Rate and Rhythm: Normal rate and regular rhythm.      Comments: Impella in place  V wires in place  Midline incision cdi  Intrinsic rhythm in 80s NS with intermittent Vtach  Pulmonary:      Comments: Mechanically ventillated  Abdominal:      General: Abdomen is flat.      Palpations: Abdomen is soft.   Skin:     General: Skin is warm and dry.      Comments: Left radial a line   Neurological:      Comments: sedated       Vents:  Vent Mode: A/C (10/07/22 1402)  Ventilator Initiated: Yes (10/07/22 1402)  Set Rate: 18 BPM (10/07/22 1402)  Vt Set: 430 mL (10/07/22 1402)  PEEP/CPAP: 5 cmH20 (10/07/22 1402)  Oxygen Concentration (%): 50 (10/07/22 1530)  Peak Airway Pressure: 31 cmH2O (10/07/22 1402)  Plateau Pressure: 0 cmH20 (10/07/22 1402)  Total Ve: 8.33 mL (10/07/22 1402)  Negative Inspiratory Force (cm H2O): 0 (10/07/22 1402)    Lines/Drains/Airways       Central Venous Catheter Line  Duration              Introducer with Double Lumen 10/07/22 0906 <1 day    Introducer 10/07/22 0906  <1 day              Drain  Duration                  Chest Tube 10/07/22 1227 3 Left Pleural 19 Fr. <1 day         Urethral Catheter 10/07/22 0728 Non-latex;Straight-tip;Temperature probe 14 Fr. <1 day         Y Chest Tube 1 and 2 10/07/22 1225 1 Anterior Mediastinal 19 Fr. 2 Anterior Mediastinal 19 Fr. <1 day              Airway  Duration                  Airway - Non-Surgical 10/07/22 0718 <1 day              Arterial Line  Duration             Arterial Line 10/07/22 0706 Left Radial <1 day              Line  Duration                  Pacer Wires 10/07/22 1050 <1 day         VAD 10/07/22 1129 Impella <1 day              Peripheral Intravenous Line  Duration                  Peripheral IV - Single Lumen Anterior;Proximal;Right Forearm -- days         Peripheral IV - Single Lumen 10/07/22 0644 20 G Left Hand <1 day                    Significant Labs:    CBC/Anemia Profile:  Recent Labs   Lab 10/06/22  0541 10/07/22  0222 10/07/22  0911 10/07/22  1414 10/07/22  1418 10/07/22  1509   WBC 7.00 7.41  --   --  25.29*  25.29*  --    HGB 15.6 14.9  --   --  9.6*  9.6*  --    HCT 47.7 47.3   < > 28* 28.6*  28.6* 27*    253  --   --  149*  149*  --    MCV 97 99*  --   --  98  98  --    RDW 14.5 14.3  --   --  14.5  14.5  --     < > = values in this interval not displayed.        Chemistries:  Recent Labs   Lab 10/06/22  0541 10/07/22  0222 10/07/22  1418    139 142  142   K 3.8 4.0 3.5  3.5  3.5    103 108  108   CO2 28 25 18*  18*   BUN 13 14 14  14   CREATININE 1.0 1.0 1.0  1.0   CALCIUM 9.5 9.6 7.7*  7.7*   ALBUMIN 3.4* 3.6  --    PROT 6.7 6.7  --    BILITOT 0.6 0.5  --    ALKPHOS 83 81  --    ALT 30 39  --    AST 24 35  --    MG 2.0  --  2.3  2.3   PHOS 3.5  --  3.2  3.2       All pertinent labs within the past 24 hours have been reviewed.    Significant Imaging: I have reviewed all pertinent imaging results/findings within the past 24 hours.

## 2022-10-07 NOTE — TRANSFER OF CARE
"Anesthesia Transfer of Care Note    Patient: David Barrios    Procedure(s) Performed: Procedure(s) (LRB):  VALVULOPLASTY,MITRAL VALVE (N/A)  REPAIR, TRICUSPID VALVE (N/A)  INSERTION, IMPELLA (N/A)  MEYER MAZE PROCEDURE (N/A)  EXCLUSION, LEFT ATRIAL APPENDAGE (N/A)    Patient location: ICU    Anesthesia Type: general    Transport from OR: Transported from OR intubated on 100% O2 by AMBU with adequate controlled ventilation    Post pain: adequate analgesia    Post assessment: no apparent anesthetic complications and tolerated procedure well    Post vital signs: stable    Level of consciousness: sedated    Nausea/Vomiting: no nausea/vomiting    Complications: none, cardiovascular complications, V tach requiring defibrillator during transport. See q note.    Transfer of care protocol was followed      Last vitals:   Visit Vitals  BP (!) 140/101   Pulse 100   Temp 36.7 °C (98.1 °F) (Oral)   Resp 16   Ht 5' 9" (1.753 m)   Wt 83.2 kg (183 lb 6.8 oz)   SpO2 97%   BMI 27.09 kg/m²     "

## 2022-10-07 NOTE — PROGRESS NOTES
Surgery checklist complete; MRSA culture done and mupirocin placed after swab sent to lab. Family updated. Milrinone infusing. 20 G IV placed to left hand. Surgery at the bedside to take pt to OR.

## 2022-10-07 NOTE — TELEPHONE ENCOUNTER
Patient and sister notified that the form was sent to the doctor and Im waiting to get it back and fax it.  They expressed understanding.

## 2022-10-07 NOTE — CONSULTS
[facility] - Surgical Intensive Care  Cardiac Electrophysiology  Consult Note    Admission Date: 10/2/2022  Code Status: Prior   Attending Provider: Mike Hedrick MD  Consulting Provider: Jacob Gonzalez MD  Principal Problem:Nonrheumatic mitral valve regurgitation    Inpatient consult to Electrophysiology  Consult performed by: Jacob Gonzalez MD  Consult ordered by: Brian Ricardo MD        Subjective:     Chief Complaint:    HPI:   62 y.o. male for him EP has been consulted for NSVT. He has a history of severe mitral regurgitation,  atrial fibrillation, htn, HFrEF (EF 35%).   He presented to [facility] this AM for elective mitral valve repair, TV repair, MAZE proedure, in addition to L atrial appendage resection. s/p Impella 5.5 placement during procedure for hemodynamic support.   immediate post oeprative course has been complicated by VT ( no strips or EKGs at the time) which per surgical team, required multiple cardioversion. At the time of my examinatin pt was inubated and sedated. on low vent setting. Muyltiple pressors/inotropes, including Levo, vaso, epi, primacor.         Past Medical History:   Diagnosis Date    Anemia     Atrial fibrillation     Encounter for blood transfusion     Esophageal ulcer     GI bleed     Hypertension        Past Surgical History:   Procedure Laterality Date    CARDIOVERSION Left 9/15/2022    Procedure: Cardioversion;  Surgeon: Julio James MD;  Location: [facility] CATH LAB/EP;  Service: Cardiology;  Laterality: Left;  With NORBERT    CATHETERIZATION OF BOTH LEFT AND RIGHT HEART N/A 9/22/2022    Procedure: CATHETERIZATION, HEART, BOTH LEFT AND RIGHT;  Surgeon: Julio James MD;  Location: [facility] CATH LAB/EP;  Service: Cardiology;  Laterality: N/A;    COLONOSCOPY N/A 4/4/2019    Procedure: COLONOSCOPY Golytely;  Surgeon: Umair Randolph MD;  Location: [facility] ENDO;  Service: Endoscopy;  Laterality: N/A;    CORONARY ANGIOGRAPHY N/A 9/22/2022    Procedure: ANGIOGRAM,  CORONARY ARTERY;  Surgeon: Julio James MD;  Location: [facility] CATH LAB/EP;  Service: Cardiology;  Laterality: N/A;    ESOPHAGOGASTRODUODENOSCOPY N/A 10/12/2018    Procedure: EGD (ESOPHAGOGASTRODUODENOSCOPY);  Surgeon: Braden Herring MD;  Location: [facility] ENDO;  Service: Endoscopy;  Laterality: N/A;    ESOPHAGOGASTRODUODENOSCOPY N/A 4/4/2019    Procedure: EGD;  Surgeon: Umair Randolph MD;  Location: [facility] ENDO;  Service: Endoscopy;  Laterality: N/A;    HERNIA REPAIR         Review of patient's allergies indicates:  No Known Allergies    No current facility-administered medications on file prior to encounter.     Current Outpatient Medications on File Prior to Encounter   Medication Sig    albuterol (PROVENTIL/VENTOLIN HFA) 90 mcg/actuation inhaler inhale 1-2 Puff(s) By Mouth Every 4 Hours as needed    amiodarone (PACERONE) 200 MG Tab Take 1 tablet (200 mg total) by mouth 2 (two) times daily.    carvediloL (COREG) 12.5 MG tablet Take 1 tablet (12.5 mg total) by mouth 2 (two) times daily with meals.    ferrous sulfate (FEOSOL) 325 mg (65 mg iron) Tab tablet Take 1 tablet (325 mg total) by mouth daily with breakfast.    furosemide (LASIX) 40 MG tablet TAKE 1 TABLET(40 MG) BY MOUTH TWICE DAILY    rivaroxaban (XARELTO) 20 mg Tab Take 1 tablet (20 mg total) by mouth daily with dinner or evening meal.     Family History       Problem Relation (Age of Onset)    COPD Mother    Cancer Father          Tobacco Use    Smoking status: Never    Smokeless tobacco: Current     Types: Chew   Substance and Sexual Activity    Alcohol use: Yes     Alcohol/week: 20.0 standard drinks     Types: 20 Cans of beer per week    Drug use: No    Sexual activity: Not on file     Review of Systems   Unable to perform ROS: Intubated   Objective:     Vital Signs (Most Recent):  Temp: 98.1 °F (36.7 °C) (10/07/22 0315)  Pulse: 81 (10/07/22 1600)  Resp: (!) 24 (10/07/22 1600)  BP: 110/67 (10/07/22 1600)  SpO2: 96 % (10/07/22 1600)   Vital  Signs (24h Range):  Temp:  [97.7 °F (36.5 °C)-98.2 °F (36.8 °C)] 98.1 °F (36.7 °C)  Pulse:  [] 81  Resp:  [4-25] 24  SpO2:  [93 %-99 %] 96 %  BP: (109-162)/() 110/67  Arterial Line BP: (82-85)/(63-64) 82/64       Weight: 83.2 kg (183 lb 6.8 oz)  Body mass index is 27.09 kg/m².    SpO2: 96 %  O2 Device (Oxygen Therapy): ventilator    Physical Exam  Vitals reviewed.   Cardiovascular:      Rate and Rhythm: Tachycardia present.      Comments: Epicardial leads  Sternotomy incision covered with clean dressing  Impella 5.5 in place  Pulmonary:      Comments: Intubated and mechanically ventilated  Musculoskeletal:         General: No swelling.   Skin:     General: Skin is warm.       Significant Labs: All pertinent lab results from the last 24 hours have been reviewed.    Significant Imaging: Echocardiogram: Transthoracic echo (TTE) complete (Cupid Only):   Results for orders placed or performed during the hospital encounter of 08/24/22   Echo   Result Value Ref Range    BSA 1.99 m2    LA WIDTH 6.15 cm    LVIDd 6.10 (A) 3.5 - 6.0 cm    IVS 1.28 (A) 0.6 - 1.1 cm    Posterior Wall 1.15 (A) 0.6 - 1.1 cm    LVIDs 4.77 (A) 2.1 - 4.0 cm    FS 22 28 - 44 %    LA volume 211.83 cm3    STJ 2.39 cm    Ascending aorta 2.36 cm    LV mass 328.13 g    LA size 5.08 cm    RVDD 3.28 cm    Left Ventricle Relative Wall Thickness 0.38 cm    AV mean gradient 4 mmHg    AV valve area 1.85 cm2    AV Velocity Ratio 0.62     AV index (prosthetic) 0.51     LVOT diameter 2.16 cm    LVOT area 3.7 cm2    LVOT peak nick 0.78 m/s    LVOT peak VTI 10.01 cm    Ao peak nick 1.25 m/s    Ao VTI 19.80 cm    Mr max nick 0.04 m/s    LVOT stroke volume 36.66 cm3    AV peak gradient 6 mmHg    TR Max Nick 3.04 m/s    LV Systolic Volume 105.82 mL    LV Systolic Volume Index 56.0 mL/m2    LV Diastolic Volume 187.13 mL    LV Diastolic Volume Index 99.01 mL/m2    LA Volume Index 112.1 mL/m2    LV Mass Index 174 g/m2    RA Major Axis 6.55 cm    Left Atrium Minor  Axis 7.82 cm    Left Atrium Major Axis 8.14 cm    Triscuspid Valve Regurgitation Peak Gradient 37 mmHg    Right Atrial Pressure (from IVC) 8 mmHg    EF 35 %    TV rest pulmonary artery pressure 45 mmHg    Narrative    · The left ventricle is moderately enlarged with moderately decreased   systolic function.  · There is moderate left ventricular global hypokinesis.  · The estimated ejection fraction is 35%.  · A diastolic pattern consistent with atrial fibrillation observed.  · Severe mitral regurgitation.  · Mild to moderate tricuspid regurgitation.  · The estimated PA systolic pressure is 45 mmHg.  · Normal right ventricular size with normal right ventricular systolic   function.  · There is mild to moderate pulmonary hypertension.                  Assessment and Plan:     NSVT (nonsustained ventricular tachycardia)  Pt is a 62 y.o male with h/o severe mitarl regurgitation, atrial fibrillation, htn, HFrEF post operative day 0 from MV repair, TV repair, Maze and L atrial appendage resection. Post op pt required signficant amt of hemodynamic support including Impella 5.5, Levo, vaso, epi, primacor. Intubated and mechanically ventillated. Preop qtc 550. Per CTS staff, post operatively and as patient was transitioning to the room he had multiple episodes of VT for which he was cardioverted ( no ecg strips or 12 leads available for review of those events).  Review of tele with multiple episodes of NSVT in addition to ventricularly paced rhythm.    Pt with multiple reasons for NSVT post op: high inotropic support, placement of Impella, in addition to electrolyte abnormalities, and high qtc prior to the surgery in light of adminsteration of qtc prolonging agents intraoperatively.    Recommendations:  -- s/p amiodarone bolus, continue with amiodarone gtt  -- aggressive electrolyte repletion with goal K>4<5 and mag>2,<3  -- Wean down inotropic support as tolerated  -- pt with no indication for device, however reasonable to  use life vest upon discharge  -- Follow up with EP 6-8 weeks post discharge        Thank you for your consult. I will sign off. Please contact us if you have any additional questions.    Jacob Gonzalez MD  Cardiac Electrophysiology  [facility] - Surgical Intensive Care

## 2022-10-07 NOTE — OPERATIVE NOTE ADDENDUM
Certification of Assistant at Surgery       Surgery Date: 10/7/2022     Participating Surgeons:  Surgeon(s) and Role:     * Mike Hedrick MD - Primary     * Sudhir Hammonds MD - Assisting     * Otoniel Maxwell MD - Resident - Assisting    Procedures:  Procedure(s) (LRB):  VALVULOPLASTY,MITRAL VALVE (N/A)  REPAIR, TRICUSPID VALVE (N/A)  INSERTION, IMPELLA (N/A)  MEYER MAZE PROCEDURE (N/A)  EXCLUSION, LEFT ATRIAL APPENDAGE (N/A)    Assistant Surgeon's Certification of Necessity:  I understand that section 1842 (b) (6) (d) of the Social Security Act generally prohibits Medicare Part B reasonable charge payment for the services of assistants at surgery in teaching hospitals when qualified residents are available to furnish such services. I certify that the services for which payment is claimed were medically necessary, and that no qualified resident was available to perform the services. I further understand that these services are subject to post-payment review by the Medicare carrier.      Sudhir Hammonds MD    10/07/2022  1:23 PM

## 2022-10-07 NOTE — ANESTHESIA PROCEDURE NOTES
Bilateral MARYJANE Single Shot    Patient location during procedure: pre-op   Block not for primary anesthetic.  Reason for block: at surgeon's request and post-op pain management   Post-op Pain Location: Chest pain   Start time: 10/7/2022 7:31 AM  Timeout: 10/7/2022 7:30 AM   End time: 10/7/2022 7:34 AM    Staffing  Authorizing Provider: Amadeo Levy MD  Performing Provider: Eda Keller MD    Preanesthetic Checklist  Completed: patient identified, IV checked, site marked, risks and benefits discussed, surgical consent, monitors and equipment checked, pre-op evaluation and timeout performed  Peripheral Block  Patient position: sitting  Prep: ChloraPrep  Patient monitoring: heart rate, cardiac monitor, continuous pulse ox, continuous capnometry and frequent blood pressure checks  Block type: erector spinae plane  Laterality: bilateral  Injection technique: single shot  Interspace: T4-5    Needle  Needle type: Echogenic   Needle gauge: 20 G  Needle length: 4 in  Needle localization: anatomical landmarks and ultrasound guidance   -ultrasound image captured on disc.  Assessment  Injection assessment: negative aspiration, negative parasthesia and local visualized surrounding nerve  Paresthesia pain: none  Heart rate change: no  Slow fractionated injection: yes  Pain Tolerance: comfortable throughout block and no complaints  Medications:    Medications: bupivacaine (pf) (MARCAINE) injection 0.5% - Perineural   30 mL - 10/7/2022 7:03:00 AM    Additional Notes  Procedure done in OR. 15cc 0.5% Bupivacaine injected at each site bilaterally.

## 2022-10-07 NOTE — INTERVAL H&P NOTE
The patient has been examined and the H&P has been reviewed:    I concur with the findings and no changes have occurred since H&P was written.    Surgery risks, benefits and alternative options discussed and understood by patient/family.          Active Hospital Problems    Diagnosis  POA    *Nonrheumatic mitral valve regurgitation [I34.0]  Yes    Persistent atrial fibrillation [I48.19]  Yes    Microcytic anemia [D50.9]  Yes    Hypokalemia [E87.6]  Yes      Resolved Hospital Problems   No resolved problems to display.

## 2022-10-07 NOTE — ASSESSMENT & PLAN NOTE
  Neuro/Psych:   -- Sedation: Propofol and precedex. Remain extubated  -- Pain: PRN Oxy + Dilaudid, Fentanyl pushes prn  -- Scheduled Tylenol             Cards:   -- S/P TV replacement, MV replacement, MAZE, Lt Atrial Appendage ligation and Impella placement on 10/7/22  -- Impella: P5, average flow 2.5  -- HDS on 0.18 epi, 0.125 milrinone, vaso 0.08, levo 0.18, wean as tolerated  -- Ventricular pacing wires. back up at 40 BPM  -- MAP > 65, Syst < 140  -- Aspirin 325mg tonight pending postoperative coags and chest tube output  -- Amiodarone gtt at 1      Pulm:   -- Goal O2 sat > 90%  -- ABG Q1hr x6 then Q3hrs   -- Wean vent settings as tolerated  -- Likely remain intubated for now  -- Mediastinal chest tubes x2 and pleural chest tube x1 to suction      Renal:  -- Keep lambert for strict I/O  -- Trend BUN/Cr      Recent Labs     10/06/22  0541 10/07/22  0222 10/07/22  1418   BUN 13 14 14  14   CREATININE 1.0 1.0 1.0  1.0        FEN / GI:   -- Replace lytes as needed  -- Nutrition: NPO  -- GI ppx: famotidine  -- Bowel reg: miralax  -- 1500mL 5% Albumin in first 12 hours post-op      ID:   -- Tm: afebrile; WBC stable  -- Viry-op ancef    Recent Labs     10/06/22  0541 10/07/22  0222 10/07/22  1418   WBC 7.00 7.41 25.29*  25.29*        Heme/Onc:   -- H/H stable   -- Daily CBC  -- 1000 mL Cell saver given back  -- Post-op coags pending  -- Aspirin 325mg QD  -- Impella: Systemic anticoagulation tomorrow    Recent Labs     10/07/22  1418 10/07/22  1509   HGB 9.6*  9.6*  --    HCT 28.6*  28.6* 27*   APTT 31.2  31.2  --    INR 1.5*  1.5*  --         Endo:   -- BG goal 140-180  -- Insulin gtt  -- Hgb A1c 5.5      PPx:   Feeding: NPO  Analgesia/Sedation: Propofol / PRN Oxy + Dilaudid  Thromboembolic prevention: SCDs  HOB >30: Yes  Stress Ulcer ppx: famotidine  Glucose control: Critical care goal 140-180 g/dl, ISS     Lines/Drains/Airway: CVC RIJ, [place], Chest tubes x 3, ventricular pacing wires, L radial A-line,  DANIEL Fitzpatrick      Dispo/Code Status/Palliative:   -- SICU / Full Code.

## 2022-10-07 NOTE — ANESTHESIA PROCEDURE NOTES
NORBERT    Diagnosis: mr  Patient location during procedure: OR  Exam type: Baseline    Staffing  Performed: fellow and anesthesiologist     Anesthesiologist: Amadeo Levy MD        Anesthesiologist Present  Yes      Setup & Induction  Patient preparation: bite block inserted  Probe Insertion: easy  Exam: completeDoppler Echo: 2D, 3D, color flow mapping, pulse wave Doppler and continuous wave Doppler.  Exam     Left Heart  Left Atruim: dilated and severly enlarged (men >5.2; women >4.7)  Left appendage velocity:70    Left Ventricle: cm, mildy abnormal (men 6.0-6.3; women 5.3-5.6)  LV Wall Thickness (posterior wall):cm, normal (men 0.6-1.0 cm; women 0.6-0.9 cm)    LVAD:no  Estimated Ejection Fraction: 35-44% moderate  Regional Wall Abnormalities: RWMA present    Regional Wall Abnormalities    Four Chamber ME   Basal inferoseptal: 2   Mid inferoseptal: 2  Two Chamber ME   Longitudinal ME  TG Transversal MP  TG Transversal Basal  Inferoseptal: 1        Right Heart  Right Ventricle: normal  Right Ventricle Function: normal  Right Atria:  normal    Intra Atrial Septum  PFO: no shunt by color flow doppler  no IAS aneurysm  no lipomatous hypertrophy  no Atrial Septal Defect (Asd)    Right Ventricle  Size: normal, Free Wall Thickness: normal    Aortic Valve:  Stenosis: none.  Morphology: trileaflet    Regurgitation: no aortic valve regurgitation      Mitral Valve:   Morphology:normal  Jet Description: mild    Tricuspid Valve:  Morphology: normal  Regurgitation: none  Pulmonary Artery Acceleration time: 83 ms    Pulmonic Valve:  Morphology:normal  Regurgitation(color flow): mild    Great Vessels  Ascending Aorta Atherosclerosis: 1=nl-min dz  Aortic Arch Atherosclerosis: 2=mild dz (<3mm)  IABP: no  Descending Aorta Atherosclerosis: 2=mild dz (<3mm)  Aorta    Descending aorta IABP: no    Effusions none    SummaryFindings discussed with surgeon.    Other Findings   NORBERT intraop for  MV repair TV repair  LVEF approx  35%. Normal RV function. Severe LAE. LVEDD 5.8cm.   Infero and inferoseptal RWMA at LV base.   Av trileafelt, no AI or AS. Aorta with mild atherosclerotic disases of desc thoracic aorta. Minimal disease of ascening and arch.   MV structurally normal. Mild central MR. No MS.   TV structurally normal. Annulus 3cm. Trace TR central.   Mild PI. Structurally normal PV.   Normal diastolic function.   No PFO, plerual or pericardial effusions, no aortic dissections visualized.       Post bypass:    LVEF 30%  Mild RV systolic dysfunction  RV normal size  Annulus band and Scotty stitch now present on mitral valve  No residual MR  MV mean gradient 2 mmHg, No evidence of KALIE  Annulus band now present on tricuspid valve  No residual TR  Impella 5.5 appropriately positioned across the aortic valve and centered in LV associated with expected minimal AI.

## 2022-10-07 NOTE — TELEPHONE ENCOUNTER
----- Message from Hill Mcnally sent at 10/7/2022  1:37 PM CDT -----  Contact: @[phone]  Caller sister Lulu is calling in to see if they received the paperwork in regards to his disability form. Please call to discuss further.

## 2022-10-07 NOTE — ANESTHESIA PROCEDURE NOTES
Central Line    Diagnosis: Mitral Regurgitation  Patient location during procedure: done in OR  Timeout: 10/7/2022 7:35 AM  Procedure end time: 10/7/2022 7:46 AM    Staffing  Authorizing Provider: Amadeo Levy MD  Performing Provider: Eda Keller MD    Anesthesiologist was present at the time of the procedure.  Preanesthetic Checklist  Completed: patient identified, IV checked, site marked, risks and benefits discussed, surgical consent, monitors and equipment checked, pre-op evaluation, timeout performed and anesthesia consent given  Indication   Indication: hemodynamic monitoring, vascular access, med administration     Anesthesia   general anesthesia    Central Line   Skin Prep: skin prepped with ChloraPrep, skin prep agent completely dried prior to procedure  Sterile Barriers Followed: Yes    All five maximal barriers used- gloves, gown, cap, mask, and large sterile sheet    hand hygiene performed prior to central venous catheter insertion  Location: right internal jugular.   Catheter type: introducer  Catheter Size: 9 Fr  Ultrasound: vascular probe with ultrasound   Vessel Caliber: large, patent, compressibility normal  Needle advanced into vessel with real time Ultrasound guidance.  sterile gel and probe cover used in ultrasound-guided central venous catheter insertion   Manometry: Venous cannualation confirmed by visual estimation of blood vessel pressure using manometry.  Insertion Attempts: 1   Securement:line sutured    Post-Procedure    Adverse Events:none      Guidewire Guidewire removed intact.

## 2022-10-07 NOTE — TELEPHONE ENCOUNTER
Returned call to pt's sister. Provided pt's sister with phone number for [place]/Disability Department to check on status of disability paperwork. Pt's sister verbalized understanding of contact information.       ----- Message from Hill Mcnally sent at 10/7/2022  1:37 PM CDT -----  Contact: @[phone]  Caller sister Lulu is calling in to see if they received the paperwork in regards to his disability form. Please call to discuss further.

## 2022-10-07 NOTE — ANESTHESIA PROCEDURE NOTES
Arterial    Diagnosis: Mitral Regurgitation    Patient location during procedure: done in OR  Procedure start time: 10/7/2022 7:06 AM  Timeout: 10/7/2022 7:05 AM  Procedure end time: 10/7/2022 7:10 AM    Staffing  Authorizing Provider: Amadeo Levy MD  Performing Provider: Eda Keller MD    Anesthesiologist was present at the time of the procedure.    Preanesthetic Checklist  Completed: patient identified, IV checked, site marked, risks and benefits discussed, surgical consent, monitors and equipment checked, pre-op evaluation, timeout performed and anesthesia consent givenArterial  Skin Prep: chlorhexidine gluconate  Local Infiltration: lidocaine  Orientation: left  Location: radial    Catheter Size: 20 G  Catheter placement by Anatomical landmarks. Heme positive aspiration all ports. Insertion Attempts: 1  Assessment  Dressing: secured with tape and tegaderm  Patient: Tolerated well

## 2022-10-07 NOTE — HPI
David Barrios is a 62 y.o. male who presented as an initial consult for surgical evaluation secondary to severe mitral regurgitation.  He has a medical history significant for atrial fibrillation, hypertension, HFrEF (EF 35%), and severe mitral regurgitation.  He states he has SOB over the past few weeks and has progressive worsened. He was recently seen in clinic by his cardiologist (Dr. Limon) on 8/4/22 for dyspnea and was noted to be in afib with RVR, rate 120's. He was switched to coreg BID and restarted back on xarelto. He reports cough, orthopnea and bilateral leg swelling. He also noticed weight gain but is unsure how many lbs. He denies chest pain/pressure.     He presents to the SICU now s/p MV repair, TV repair, MAZE procedure, Left Atrial Appendage Resection and insertion of Impella 5.5 via aortic graft. On admission he is intubated, sedated, and in unstable condition. He required electrical cardioversion multiple times during transfer from the OR to the ICU for unstable Vtach. Did not require cardioversion after arrival to the ICU. He was given 2mg Magnesium, 1mg Calcium and a bolus of 150mg of Amiodarone (received additional 150mg in OR along with 100 mg Lidocaine). Pressor requirements upon admission are 0.1 of Epi,  0.04 of Vaso, milrinone 0.375, Sharee 20. Blood pressures are liable with MAP goal greater than 65. He has a RIJ CVC, PIVx2, and Chest tubes x3 with appropriate output.    Intraoperatively he received 2 L of crystalloid, 1 units of  autologous RBC, 1L of Cell Saver. Urine output intraoperatively recorded as 300 mL. His pre-operative echocardiogram showed The left ventricle is normal in size with moderately decreased systolic function, moderate left ventricular global hypokinesis, an estimated ejection fraction is 30%, Severe mitral regurgitation, Mild tricuspid regurgitation and normal right ventricular size with normal right ventricular systolic function. Post-operative echo was  relatively the same except for improved mitral funciton, tricuspid function with an estimated EF around 30%.    Immediate post-operative plans include hemodynamic stabilization and weaning of cardiac and respiratory support. Plans to wean vasopressors as tolerated, wean vent support with goals of extubation, and closely monitor fluid status with strict I's+O's and continued fluid resuscitation as needed.

## 2022-10-07 NOTE — OP NOTE
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), pulmonary hypertension, ventricular tachycardia    Postoperative Diagnosis:  Same    Date of Operation:  10/07/2022    Operation:  Complex mitral valve repair with A2-P2 [id number] repair (Scotty) and 30mm Medtronic Simulus annuloplasty band  Tricuspid valve repair with 30mm Medtronic Tri-Ad Sullivan annuloplasty band  Left atrial Benz Maze cryoablation  Left atrial appendage resection  Direct open chest insertion of Impella 5.5 via aortic graft (10mm Vascutek Gelweave) into the left ventricle    Surgeon:  Mike Hedrick MD    Assistant Surgeon:  Sudhir Hammonds MD    Fellow:  Otoniel Maxwell MD    Anesthesiologist:  Claudy Rcih MD      A second surgeon was involved in the case due to no qualified resident being available.  ---------------------------------------------------------------------------------------------------------------------      Indications for surgery:  Mr. Barrios is a 62 year-old gentleman with a history of heart failure reduced ejection fraction (35% ejection fraction) secondary to valvular cardiomyopathy and paroxysmal atrial fibrillation, pulmonary hypertension, hypertension, and BMI of 29.  Recently, he has been symptomatic with dyspnea on exertion symptoms interfering with his quality of life.  His most recent echocardiogram showed 35% ejection fraction with LVEDD of 6.1cm, severe mitral regurgitation with central jet, mild to moderate tricuspid regurgitation, and estimated SPAP of 45mmHg.  Left and right heart catheterization showed nonsignificant coronary disease and PAP of 70/30.     CTA chest (PE study) shows minimal ascending aortic and moderate aortic arch  calcifications.     We had an extensive discussion with him regarding the ACC/AHA guidelines and we agreed that he has class I indications for surgery involving mitral valve repair vs replacement (bioprosthesis), tricuspid valve repair, Benz Maze procedure, left atrial appendage resection, possible impella placement. The risks and benefits were explained and informed consent was obtained.     Gross findings: No pericardial adhesions. The mitral valve was noted to have a very dilated annulus as well as increased height and width of P2.  Superior portion of pericardium closed at finish.  Severe left ventricular dysfunction pre and post bypass requiring impella support.      Procedure:  The patient was brought to the holding area and the indications for surgery were reviewed.  Afterwards, the patient was placed supine on the operating room table, induced, and intubated.  A little time after intubation, he went into ventricular tachycardia requiring two shocks.  He was then prepped and draped in the usual sterile fashion. A surgical time out was performed.    A midline incision was made and deepened with electrocautery to expose the sternum.  The sternum was divided and hemostasis was obtained. A chest retractor was placed and the pericardium was divided.  ACT guided heparinization was administered and the ascending aorta, superior vena cava, and inferior vena cava were cannulated.  Cardiopulmonary bypass was commenced.  The ascending aorta was cannulated for administration of cardioplegia and a retrograde cardioplegia catheter was placed in the coronary sinus.      While on cardiopulmonary bypass, the left atrial appendage was identified.  An Susan Moore endoscopic stapling device grasped the base of the appendage, staples were fired, and the appendage was removed.  Using a 4-0 SH prolene suture, the base of the left atrial appendage under the staple line was reinforced with a running horizontal mattress followed by a  baseball stitch.  No bleeding was seen afterwards.    Now, the cross-clamp was applied and 1000cc of antegrade cold blood cardioplegia was administered followed by 500cc retrograde.  Subsequent doses of 500cc of retrograde cardioplegia were administered every 20 minutes.    Attention was turned to the Benz Maze procedure.  The left atrium was entered via Sondergaard's groove.  The AtriCure cryo probe was placed on the following lesions in a step wise manner: right sided pulmonary veins, left sided pulmonary veins, left atrial roof, left atrial floor, left atrial appendage lesion, and coronary sinus lesion.  Each time, the cryoablation probe was activated, reached the target temperature, and was performed for three minutes.  All lesions sets were performed satisfactorily without any gaps between lesions.    Attention was turned to the mitral valve. The retractor was placed in the left atrium and the mitral valve was exposed.  2-0 braided non-absorbable annuloplasty sutures were placed along the posterior annulus from trigone to trigone. The sutures were retracted to allow excellent exposure of the valve. The valve was tested and the leak was identified. The mitral valve was noted to have a very dilated annulus as well as increased height and width of P2.  A complex mitral valve repair was undertaken via A2-P2 [id number] repair (Scotty). The annulus was sized to 34mm and to decrease the dilated annulus it was downsized to a 30mm Medtronic Simulus Annuloplasty Ring. The annuloplasty sutures were placed through the ring which was then tied in place. Saline testing revealed perfect coaptation of the valve. The left atrium was closed with continuous 3-0 prolene suture.    Attention was now turned to the tricuspid valve. The right atrium was opened and retraction sutures allowed for excellent exposure of the valve.  The valve leaflets were of normal anatomy yet the tricuspid annulus was severely dilated.  A Uber  "Tri-Ad Sullivan Tricuspid ring sizer was placed over the anterior and septal leaflets to determine the best prosthesis size.  2-0 braided non-absorbable annuloplasty sutures were placed, sparing the septal leaflet. A 30 mm Medtronic Tri-Ad Sullivan Annuloplasty Ring was selected and the sutures were passed through the ring, which was then tied in place.  The valve was tested with saline and showed excellent coaptation without any residual regurgitation.  The right atrium was closed with two layers of continuous 4-0 prolene suture.     A dose of warm "hot shot" cardioplegia was given retrograde.  Air was evacuated and the cross-clamp was removed.     Attention was turned to direct open chest insertion of Impella 5.5 via a 10mm Vascutek Gelweave graft on the aorta.  The aorta was measured with a ruler starting from the base (near the annulus) of the noncoronary sinus.  At 7cm, the aorta was marked.  A partial occluding clamp was placed on the anterior surface of the aorta.  A 1cm aortotomy was created using an 11 blade scalpel followed by Ying scissors.  The 10mm Vascutek Gelweave graft was opened and slightly bevelled.  The graft was set up so the black line correlated with the inferior aorta which would help prevent twisting later.  The graft was sewn end-to-side of the aorta with continuous 4-0 RB prolene suture.  A clamp was placed on the graft and the partial occluding clamp slowly removed at the same time as deairing the graft.  A 1cm counter incision was now made supraclavicularly about 2cm superior to the right sternoclavicular joint.  The graft was now tunneled through this counter incision.  The Impella insertion device with diaphragm was inserted into the graft.      Next, a multipurpose catheter with wire (altered to have a pigtail curl at the end) was inserted into the diaphragm then into the graft.  The base of the aorta was manipulated to allow for incompetence of the aortic valve and easier passage of the " catheter and wire into the left ventricle.  The wire was advanced and the catheter removed.  The location was analyzed using transesophageal guidance.  Next, a clamp was placed on the graft and the impella was advanced through the impella insertion device's diaphragm.  The impella was then advanced through the graft, into the aorta, but it was noted the wire was kinked.  The decision was made to remove the wire. The impella was then advanced through the graft and into the aorta.  The root of the aorta was manipulated to allow incompetence of the aortic valve and the impella easily passed into the left ventricle under transesophageal echocardiogram guidance. The optimal position of the impella was confirmed with the echocardiogram intraoperatively.  The impella was placed on P-5 and noted to function well.    Later on, just prior to chest closure, the impella exit site was addressed.  The graft was clamped proximally (inside the chest), the insertion device with diaphragm was peeled back and removed, and the graft was cut about 3cm distal to the skin exit site.  The impella tubing was now secured and sutured to the patient.    All anastomoses were checked for hemostasis and the patient was weaned from bypass on a moderate to high amount of inotropic support.  The total cardiopulmonary bypass time was 184 minutes and cross clamp time was 107 minutes.  The cannulae were removed and heparin was reversed.  Temporary ventricular pacing wires were placed on the heart.  Drainage tubes were placed behind the sternum and in the right pleural space. The chest was closed with steel wires and the soft tissue was closed with absorbable suture.  A sterile dressing was applied and the patient was brought to the ICU in stable condition.      I was present in the operating room for the entire operation and immediately available thereafter.

## 2022-10-07 NOTE — H&P
[facility]  Critical Care - Surgery  History & Physical    Patient Name: David Barrios  MRN: [id number]  Admission Date: 10/2/2022  Code Status: Prior  Attending Physician: Mike Hedrick MD   Primary Care Provider: Breann Tavera MD   Principal Problem: Nonrheumatic mitral valve regurgitation    Subjective:     HPI:  David Barrios is a 62 y.o. male who presented as an initial consult for surgical evaluation secondary to severe mitral regurgitation.  He has a medical history significant for atrial fibrillation, hypertension, HFrEF (EF 35%), and severe mitral regurgitation.  He states he has SOB over the past few weeks and has progressive worsened. He was recently seen in clinic by his cardiologist (Dr. Limon) on 8/4/22 for dyspnea and was noted to be in afib with RVR, rate 120's. He was switched to coreg BID and restarted back on xarelto. He reports cough, orthopnea and bilateral leg swelling. He also noticed weight gain but is unsure how many lbs. He denies chest pain/pressure.     He presents to the SICU now s/p MV repair, TV repair, MAZE procedure, Left Atrial Appendage Resection and insertion of Impella 5.5 via aortic graft. On admission he is intubated, sedated, and in unstable condition. He required electrical cardioversion multiple times during transfer from the OR to the ICU for unstable Vtach. Did not require cardioversion after arrival to the ICU. He was given 2mg Magnesium, 1mg Calcium and a bolus of 150mg of Amiodarone (received additional 150mg in OR along with 100 mg Lidocaine). Pressor requirements upon admission are 0.1 of Epi,  0.04 of Vaso, milrinone 0.375, Sharee 20. Blood pressures are liable with MAP goal greater than 65. He has a RIJ CVC, PIVx2, and Chest tubes x3 with appropriate output.    Intraoperatively he received 2 L of crystalloid, 1 units of  autologous RBC, 1L of Cell Saver. Urine output intraoperatively recorded as 300 mL. His pre-operative  echocardiogram showed The left ventricle is normal in size with moderately decreased systolic function, moderate left ventricular global hypokinesis, an estimated ejection fraction is 30%, Severe mitral regurgitation, Mild tricuspid regurgitation and normal right ventricular size with normal right ventricular systolic function. Post-operative echo was relatively the same except for improved mitral funciton, tricuspid function with an estimated EF around 30%.    Immediate post-operative plans include hemodynamic stabilization and weaning of cardiac and respiratory support. Plans to wean vasopressors as tolerated, wean vent support with goals of extubation, and closely monitor fluid status with strict I's+O's and continued fluid resuscitation as needed.        Hospital/ICU Course:  No notes on file    Follow-up For: Procedure(s) (LRB):  VALVULOPLASTY,MITRAL VALVE (N/A)  REPAIR, TRICUSPID VALVE (N/A)  INSERTION, IMPELLA (N/A)  MEYER MAZE PROCEDURE (N/A)  EXCLUSION, LEFT ATRIAL APPENDAGE (N/A)    Post-Operative Day: Day of Surgery     Past Medical History:   Diagnosis Date    Anemia     Atrial fibrillation     Encounter for blood transfusion     Esophageal ulcer     GI bleed     Hypertension        Past Surgical History:   Procedure Laterality Date    CARDIOVERSION Left 9/15/2022    Procedure: Cardioversion;  Surgeon: Julio James MD;  Location: [facility] CATH LAB/EP;  Service: Cardiology;  Laterality: Left;  With NORBERT    CATHETERIZATION OF BOTH LEFT AND RIGHT HEART N/A 9/22/2022    Procedure: CATHETERIZATION, HEART, BOTH LEFT AND RIGHT;  Surgeon: Julio James MD;  Location: [facility] CATH LAB/EP;  Service: Cardiology;  Laterality: N/A;    COLONOSCOPY N/A 4/4/2019    Procedure: COLONOSCOPY Golytely;  Surgeon: Umair Randolph MD;  Location: [facility] ENDO;  Service: Endoscopy;  Laterality: N/A;    CORONARY ANGIOGRAPHY N/A 9/22/2022    Procedure: ANGIOGRAM, CORONARY ARTERY;  Surgeon: Julio James MD;  Location: [facility]  CATH LAB/EP;  Service: Cardiology;  Laterality: N/A;    ESOPHAGOGASTRODUODENOSCOPY N/A 10/12/2018    Procedure: EGD (ESOPHAGOGASTRODUODENOSCOPY);  Surgeon: Braden Herring MD;  Location: [facility];  Service: Endoscopy;  Laterality: N/A;    ESOPHAGOGASTRODUODENOSCOPY N/A 4/4/2019    Procedure: EGD;  Surgeon: Umair Randolph MD;  Location: [facility] ENDO;  Service: Endoscopy;  Laterality: N/A;    HERNIA REPAIR         Review of patient's allergies indicates:  No Known Allergies    Family History       Problem Relation (Age of Onset)    COPD Mother    Cancer Father          Tobacco Use    Smoking status: Never    Smokeless tobacco: Current     Types: Chew   Substance and Sexual Activity    Alcohol use: Yes     Alcohol/week: 20.0 standard drinks     Types: 20 Cans of beer per week    Drug use: No    Sexual activity: Not on file      Review of Systems   Unable to perform ROS: Intubated   Objective:     Vital Signs (Most Recent):  Temp: 98.1 °F (36.7 °C) (10/07/22 0315)  Pulse: 78 (10/07/22 1530)  Resp: (!) 22 (10/07/22 1538)  BP: (!) 109/57 (10/07/22 1500)  SpO2: 97 % (10/07/22 1530)   Vital Signs (24h Range):  Temp:  [97.7 °F (36.5 °C)-98.2 °F (36.8 °C)] 98.1 °F (36.7 °C)  Pulse:  [] 78  Resp:  [4-25] 22  SpO2:  [93 %-99 %] 97 %  BP: (109-162)/() 109/57     Weight: 83.2 kg (183 lb 6.8 oz)  Body mass index is 27.09 kg/m².      Intake/Output Summary (Last 24 hours) at 10/7/2022 1559  Last data filed at 10/7/2022 1500  Gross per 24 hour   Intake 2716.7 ml   Output 3115 ml   Net -398.3 ml       Physical Exam  Vitals and nursing note reviewed.   Constitutional:       Appearance: He is ill-appearing.      Comments: Intubated and sedated   HENT:      Head: Normocephalic and atraumatic.   Neck:      Comments: CHUCHO CVC and Narendra  Cardiovascular:      Rate and Rhythm: Normal rate and regular rhythm.      Comments: Impella in place  V wires in place  Midline incision cdi  Intrinsic rhythm in 80s NS with intermittent  Vtach  Pulmonary:      Comments: Mechanically ventillated  Abdominal:      General: Abdomen is flat.      Palpations: Abdomen is soft.   Skin:     General: Skin is warm and dry.      Comments: Left radial a line   Neurological:      Comments: sedated       Vents:  Vent Mode: A/C (10/07/22 1402)  Ventilator Initiated: Yes (10/07/22 1402)  Set Rate: 18 BPM (10/07/22 1402)  Vt Set: 430 mL (10/07/22 1402)  PEEP/CPAP: 5 cmH20 (10/07/22 1402)  Oxygen Concentration (%): 50 (10/07/22 1530)  Peak Airway Pressure: 31 cmH2O (10/07/22 1402)  Plateau Pressure: 0 cmH20 (10/07/22 1402)  Total Ve: 8.33 mL (10/07/22 1402)  Negative Inspiratory Force (cm H2O): 0 (10/07/22 1402)    Lines/Drains/Airways       Central Venous Catheter Line  Duration              Introducer with Double Lumen 10/07/22 0906 <1 day    Introducer 10/07/22 0906 <1 day              Drain  Duration                  Chest Tube 10/07/22 1227 3 Left Pleural 19 Fr. <1 day         Urethral Catheter 10/07/22 0728 Non-latex;Straight-tip;Temperature probe 14 Fr. <1 day         Y Chest Tube 1 and 2 10/07/22 1225 1 Anterior Mediastinal 19 Fr. 2 Anterior Mediastinal 19 Fr. <1 day              Airway  Duration                  Airway - Non-Surgical 10/07/22 0718 <1 day              Arterial Line  Duration             Arterial Line 10/07/22 0706 Left Radial <1 day              Line  Duration                  Pacer Wires 10/07/22 1050 <1 day         VAD 10/07/22 1129 Impella <1 day              Peripheral Intravenous Line  Duration                  Peripheral IV - Single Lumen Anterior;Proximal;Right Forearm -- days         Peripheral IV - Single Lumen 10/07/22 0644 20 G Left Hand <1 day                    Significant Labs:    CBC/Anemia Profile:  Recent Labs   Lab 10/06/22  0541 10/07/22  0222 10/07/22  0911 10/07/22  1414 10/07/22  1418 10/07/22  1509   WBC 7.00 7.41  --   --  25.29*  25.29*  --    HGB 15.6 14.9  --   --  9.6*  9.6*  --    HCT 47.7 47.3   < > 28* 28.6*   28.6* 27*    253  --   --  149*  149*  --    MCV 97 99*  --   --  98  98  --    RDW 14.5 14.3  --   --  14.5  14.5  --     < > = values in this interval not displayed.        Chemistries:  Recent Labs   Lab 10/06/22  0541 10/07/22  0222 10/07/22  1418    139 142  142   K 3.8 4.0 3.5  3.5  3.5    103 108  108   CO2 28 25 18*  18*   BUN 13 14 14  14   CREATININE 1.0 1.0 1.0  1.0   CALCIUM 9.5 9.6 7.7*  7.7*   ALBUMIN 3.4* 3.6  --    PROT 6.7 6.7  --    BILITOT 0.6 0.5  --    ALKPHOS 83 81  --    ALT 30 39  --    AST 24 35  --    MG 2.0  --  2.3  2.3   PHOS 3.5  --  3.2  3.2       All pertinent labs within the past 24 hours have been reviewed.    Significant Imaging: I have reviewed all pertinent imaging results/findings within the past 24 hours.    Assessment/Plan:     * Nonrheumatic mitral valve regurgitation    Neuro/Psych:   -- Sedation: Propofol and precedex. Remain extubated  -- Pain: PRN Oxy + Dilaudid, Fentanyl pushes prn  -- Scheduled Tylenol             Cards:   -- S/P TV replacement, MV replacement, MAZE, Lt Atrial Appendage ligation and Impella placement on 10/7/22  -- Impella: P5, average flow 2.5  -- HDS on 0.18 epi, 0.125 milrinone, vaso 0.08, levo 0.18, wean as tolerated  -- Ventricular pacing wires. back up at 40 BPM  -- MAP > 65, Syst < 140  -- Aspirin 325mg tonight pending postoperative coags and chest tube output  -- Amiodarone gtt at 1      Pulm:   -- Goal O2 sat > 90%  -- ABG Q1hr x6 then Q3hrs   -- Wean vent settings as tolerated  -- Likely remain intubated for now  -- Mediastinal chest tubes x2 and pleural chest tube x1 to suction      Renal:  -- Keep lambert for strict I/O  -- Trend BUN/Cr      Recent Labs     10/06/22  0541 10/07/22  0222 10/07/22  1418   BUN 13 14 14  14   CREATININE 1.0 1.0 1.0  1.0        FEN / GI:   -- Replace lytes as needed  -- Nutrition: NPO  -- GI ppx: famotidine  -- Bowel reg: miralax  -- 1500mL 5% Albumin in first 12 hours  post-op      ID:   -- Tm: afebrile; WBC stable  -- Viry-op ancef    Recent Labs     10/06/22  0541 10/07/22  0222 10/07/22  1418   WBC 7.00 7.41 25.29*  25.29*        Heme/Onc:   -- H/H stable   -- Daily CBC  -- 1000 mL Cell saver given back  -- Post-op coags pending  -- Aspirin 325mg QD  -- Impella: Systemic anticoagulation tomorrow    Recent Labs     10/07/22  1418 10/07/22  1509   HGB 9.6*  9.6*  --    HCT 28.6*  28.6* 27*   APTT 31.2  31.2  --    INR 1.5*  1.5*  --         Endo:   -- BG goal 140-180  -- Insulin gtt  -- Hgb A1c 5.5      PPx:   Feeding: NPO  Analgesia/Sedation: Propofol / PRN Oxy + Dilaudid  Thromboembolic prevention: SCDs  HOB >30: Yes  Stress Ulcer ppx: famotidine  Glucose control: Critical care goal 140-180 g/dl, ISS     Lines/Drains/Airway: CVC RIJ, Fierro, Chest tubes x 3, ventricular pacing wires, L radial A-line, Impella, ETT      Dispo/Code Status/Palliative:   -- SICU / Full Code.             Brian Ricardo MD  Critical Care - Surgery  [facility]

## 2022-10-07 NOTE — ANESTHESIA PROCEDURE NOTES
Intubation    Date/Time: 10/7/2022 7:18 AM  Performed by: Martin Gimenez MD  Authorized by: Amadeo Levy MD     Intubation:     Induction:  Intravenous    Intubated:  Postinduction    Mask Ventilation:  Easy mask    Attempts:  1    Attempted By:  Resident anesthesiologist    Method of Intubation:  Direct    Blade:  Олег 4    Laryngeal View Grade: Grade I - full view of cords      Difficult Airway Encountered?: No      Complications:  None    Airway Device:  Oral endotracheal tube    Airway Device Size:  7.5    Style/Cuff Inflation:  Cuffed    Inflation Amount (mL):  5    Tube secured:  22    Secured at:  The teeth    Placement Verified By:  Capnometry    Complicating Factors:  None    Findings Post-Intubation:  BS equal bilateral and atraumatic/condition of teeth unchanged

## 2022-10-07 NOTE — SUBJECTIVE & OBJECTIVE
Past Medical History:   Diagnosis Date    Anemia     Atrial fibrillation     Encounter for blood transfusion     Esophageal ulcer     GI bleed     Hypertension        Past Surgical History:   Procedure Laterality Date    CARDIOVERSION Left 9/15/2022    Procedure: Cardioversion;  Surgeon: Julio James MD;  Location: [facility] CATH LAB/EP;  Service: Cardiology;  Laterality: Left;  With NORBERT    CATHETERIZATION OF BOTH LEFT AND RIGHT HEART N/A 9/22/2022    Procedure: CATHETERIZATION, HEART, BOTH LEFT AND RIGHT;  Surgeon: Julio James MD;  Location: [facility] CATH LAB/EP;  Service: Cardiology;  Laterality: N/A;    COLONOSCOPY N/A 4/4/2019    Procedure: COLONOSCOPY Golytely;  Surgeon: Umair Randolph MD;  Location: [facility] ENDO;  Service: Endoscopy;  Laterality: N/A;    CORONARY ANGIOGRAPHY N/A 9/22/2022    Procedure: ANGIOGRAM, CORONARY ARTERY;  Surgeon: Julio James MD;  Location: [facility] CATH LAB/EP;  Service: Cardiology;  Laterality: N/A;    ESOPHAGOGASTRODUODENOSCOPY N/A 10/12/2018    Procedure: EGD (ESOPHAGOGASTRODUODENOSCOPY);  Surgeon: Braden Herring MD;  Location: [facility] ENDO;  Service: Endoscopy;  Laterality: N/A;    ESOPHAGOGASTRODUODENOSCOPY N/A 4/4/2019    Procedure: EGD;  Surgeon: Umair Randolph MD;  Location: [facility];  Service: Endoscopy;  Laterality: N/A;    HERNIA REPAIR         Review of patient's allergies indicates:  No Known Allergies    No current facility-administered medications on file prior to encounter.     Current Outpatient Medications on File Prior to Encounter   Medication Sig    albuterol (PROVENTIL/VENTOLIN HFA) 90 mcg/actuation inhaler inhale 1-2 Puff(s) By Mouth Every 4 Hours as needed    amiodarone (PACERONE) 200 MG Tab Take 1 tablet (200 mg total) by mouth 2 (two) times daily.    carvediloL (COREG) 12.5 MG tablet Take 1 tablet (12.5 mg total) by mouth 2 (two) times daily with meals.    ferrous sulfate (FEOSOL) 325 mg (65 mg iron) Tab tablet Take 1 tablet (325 mg total) by mouth daily  with breakfast.    furosemide (LASIX) 40 MG tablet TAKE 1 TABLET(40 MG) BY MOUTH TWICE DAILY    rivaroxaban (XARELTO) 20 mg Tab Take 1 tablet (20 mg total) by mouth daily with dinner or evening meal.     Family History       Problem Relation (Age of Onset)    COPD Mother    Cancer Father          Tobacco Use    Smoking status: Never    Smokeless tobacco: Current     Types: Chew   Substance and Sexual Activity    Alcohol use: Yes     Alcohol/week: 20.0 standard drinks     Types: 20 Cans of beer per week    Drug use: No    Sexual activity: Not on file     Review of Systems   Unable to perform ROS: Intubated   Objective:     Vital Signs (Most Recent):  Temp: 98.1 °F (36.7 °C) (10/07/22 0315)  Pulse: 81 (10/07/22 1600)  Resp: (!) 24 (10/07/22 1600)  BP: 110/67 (10/07/22 1600)  SpO2: 96 % (10/07/22 1600)   Vital Signs (24h Range):  Temp:  [97.7 °F (36.5 °C)-98.2 °F (36.8 °C)] 98.1 °F (36.7 °C)  Pulse:  [] 81  Resp:  [4-25] 24  SpO2:  [93 %-99 %] 96 %  BP: (109-162)/() 110/67  Arterial Line BP: (82-85)/(63-64) 82/64       Weight: 83.2 kg (183 lb 6.8 oz)  Body mass index is 27.09 kg/m².    SpO2: 96 %  O2 Device (Oxygen Therapy): ventilator    Physical Exam  Vitals reviewed.   Cardiovascular:      Rate and Rhythm: Tachycardia present.      Comments: Epicardial leads  Sternotomy incision covered with clean dressing  Impella 5.5 in place  Pulmonary:      Comments: Intubated and mechanically ventilated  Musculoskeletal:         General: No swelling.   Skin:     General: Skin is warm.       Significant Labs: All pertinent lab results from the last 24 hours have been reviewed.    Significant Imaging: Echocardiogram: Transthoracic echo (TTE) complete (Cupid Only):   Results for orders placed or performed during the hospital encounter of 08/24/22   Echo   Result Value Ref Range    BSA 1.99 m2    LA WIDTH 6.15 cm    LVIDd 6.10 (A) 3.5 - 6.0 cm    IVS 1.28 (A) 0.6 - 1.1 cm    Posterior Wall 1.15 (A) 0.6 - 1.1 cm    LVIDs  4.77 (A) 2.1 - 4.0 cm    FS 22 28 - 44 %    LA volume 211.83 cm3    STJ 2.39 cm    Ascending aorta 2.36 cm    LV mass 328.13 g    LA size 5.08 cm    RVDD 3.28 cm    Left Ventricle Relative Wall Thickness 0.38 cm    AV mean gradient 4 mmHg    AV valve area 1.85 cm2    AV Velocity Ratio 0.62     AV index (prosthetic) 0.51     LVOT diameter 2.16 cm    LVOT area 3.7 cm2    LVOT peak nick 0.78 m/s    LVOT peak VTI 10.01 cm    Ao peak nick 1.25 m/s    Ao VTI 19.80 cm    Mr max nick 0.04 m/s    LVOT stroke volume 36.66 cm3    AV peak gradient 6 mmHg    TR Max Nick 3.04 m/s    LV Systolic Volume 105.82 mL    LV Systolic Volume Index 56.0 mL/m2    LV Diastolic Volume 187.13 mL    LV Diastolic Volume Index 99.01 mL/m2    LA Volume Index 112.1 mL/m2    LV Mass Index 174 g/m2    RA Major Axis 6.55 cm    Left Atrium Minor Axis 7.82 cm    Left Atrium Major Axis 8.14 cm    Triscuspid Valve Regurgitation Peak Gradient 37 mmHg    Right Atrial Pressure (from IVC) 8 mmHg    EF 35 %    TV rest pulmonary artery pressure 45 mmHg    Narrative    · The left ventricle is moderately enlarged with moderately decreased   systolic function.  · There is moderate left ventricular global hypokinesis.  · The estimated ejection fraction is 35%.  · A diastolic pattern consistent with atrial fibrillation observed.  · Severe mitral regurgitation.  · Mild to moderate tricuspid regurgitation.  · The estimated PA systolic pressure is 45 mmHg.  · Normal right ventricular size with normal right ventricular systolic   function.  · There is mild to moderate pulmonary hypertension.

## 2022-10-08 ENCOUNTER — NURSE TRIAGE (OUTPATIENT)
Dept: ADMINISTRATIVE | Facility: CLINIC | Age: 62
End: 2022-10-08
Payer: COMMERCIAL

## 2022-10-08 PROBLEM — T14.8XXA SURGICAL WOUND PRESENT: Status: ACTIVE | Noted: 2022-10-08

## 2022-10-08 PROBLEM — R73.9 TRANSIENT HYPERGLYCEMIA POST PROCEDURE: Status: ACTIVE | Noted: 2022-10-08

## 2022-10-08 LAB
ALBUMIN SERPL BCP-MCNC: 2.5 G/DL (ref 3.5–5.2)
ALBUMIN SERPL BCP-MCNC: 2.7 G/DL (ref 3.5–5.2)
ALBUMIN SERPL BCP-MCNC: 2.9 G/DL (ref 3.5–5.2)
ALLENS TEST: ABNORMAL
ALP SERPL-CCNC: 33 U/L (ref 55–135)
ALP SERPL-CCNC: 34 U/L (ref 55–135)
ALP SERPL-CCNC: 35 U/L (ref 55–135)
ALT SERPL W/O P-5'-P-CCNC: 21 U/L (ref 10–44)
ALT SERPL W/O P-5'-P-CCNC: 32 U/L (ref 10–44)
ALT SERPL W/O P-5'-P-CCNC: 39 U/L (ref 10–44)
ANION GAP SERPL CALC-SCNC: 10 MMOL/L (ref 8–16)
ANION GAP SERPL CALC-SCNC: 11 MMOL/L (ref 8–16)
ANION GAP SERPL CALC-SCNC: 15 MMOL/L (ref 8–16)
ANISOCYTOSIS BLD QL SMEAR: SLIGHT
APTT BLDCRRT: 31 SEC (ref 21–32)
APTT BLDCRRT: 34 SEC (ref 21–32)
APTT BLDCRRT: 38.8 SEC (ref 21–32)
AST SERPL-CCNC: 70 U/L (ref 10–40)
AST SERPL-CCNC: 84 U/L (ref 10–40)
AST SERPL-CCNC: 86 U/L (ref 10–40)
BASO STIPL BLD QL SMEAR: ABNORMAL
BASOPHILS # BLD AUTO: 0.01 K/UL (ref 0–0.2)
BASOPHILS # BLD AUTO: 0.01 K/UL (ref 0–0.2)
BASOPHILS # BLD AUTO: 0.02 K/UL (ref 0–0.2)
BASOPHILS # BLD AUTO: 0.03 K/UL (ref 0–0.2)
BASOPHILS # BLD AUTO: ABNORMAL K/UL (ref 0–0.2)
BASOPHILS NFR BLD: 0 % (ref 0–1.9)
BASOPHILS NFR BLD: 0.1 % (ref 0–1.9)
BASOPHILS NFR BLD: 0.2 % (ref 0–1.9)
BASOPHILS NFR BLD: 1 % (ref 0–1.9)
BILIRUB SERPL-MCNC: 1 MG/DL (ref 0.1–1)
BILIRUB SERPL-MCNC: 1.2 MG/DL (ref 0.1–1)
BILIRUB SERPL-MCNC: 1.6 MG/DL (ref 0.1–1)
BLD PROD TYP BPU: NORMAL
BLOOD UNIT EXPIRATION DATE: NORMAL
BLOOD UNIT TYPE CODE: 6200
BLOOD UNIT TYPE CODE: 8400
BLOOD UNIT TYPE CODE: 8400
BLOOD UNIT TYPE: NORMAL
BUN SERPL-MCNC: 20 MG/DL (ref 8–23)
BUN SERPL-MCNC: 21 MG/DL (ref 8–23)
BUN SERPL-MCNC: 23 MG/DL (ref 8–23)
CALCIUM SERPL-MCNC: 8.6 MG/DL (ref 8.7–10.5)
CALCIUM SERPL-MCNC: 8.7 MG/DL (ref 8.7–10.5)
CALCIUM SERPL-MCNC: 9.1 MG/DL (ref 8.7–10.5)
CHLORIDE SERPL-SCNC: 107 MMOL/L (ref 95–110)
CHLORIDE SERPL-SCNC: 107 MMOL/L (ref 95–110)
CHLORIDE SERPL-SCNC: 108 MMOL/L (ref 95–110)
CO2 SERPL-SCNC: 18 MMOL/L (ref 23–29)
CO2 SERPL-SCNC: 18 MMOL/L (ref 23–29)
CO2 SERPL-SCNC: 20 MMOL/L (ref 23–29)
CODING SYSTEM: NORMAL
CREAT SERPL-MCNC: 1.7 MG/DL (ref 0.5–1.4)
CREAT SERPL-MCNC: 1.8 MG/DL (ref 0.5–1.4)
CREAT SERPL-MCNC: 1.9 MG/DL (ref 0.5–1.4)
DELSYS: ABNORMAL
DIFFERENTIAL METHOD: ABNORMAL
DISPENSE STATUS: NORMAL
EOSINOPHIL # BLD AUTO: 0 K/UL (ref 0–0.5)
EOSINOPHIL # BLD AUTO: ABNORMAL K/UL (ref 0–0.5)
EOSINOPHIL NFR BLD: 0 % (ref 0–8)
EOSINOPHIL NFR BLD: 11 % (ref 0–8)
ERYTHROCYTE [DISTWIDTH] IN BLOOD BY AUTOMATED COUNT: 14.9 % (ref 11.5–14.5)
ERYTHROCYTE [DISTWIDTH] IN BLOOD BY AUTOMATED COUNT: 14.9 % (ref 11.5–14.5)
ERYTHROCYTE [DISTWIDTH] IN BLOOD BY AUTOMATED COUNT: 15.2 % (ref 11.5–14.5)
ERYTHROCYTE [DISTWIDTH] IN BLOOD BY AUTOMATED COUNT: 15.2 % (ref 11.5–14.5)
ERYTHROCYTE [DISTWIDTH] IN BLOOD BY AUTOMATED COUNT: 15.4 % (ref 11.5–14.5)
ERYTHROCYTE [DISTWIDTH] IN BLOOD BY AUTOMATED COUNT: 16.8 % (ref 11.5–14.5)
ERYTHROCYTE [SEDIMENTATION RATE] IN BLOOD BY WESTERGREN METHOD: 18 MM/H
ERYTHROCYTE [SEDIMENTATION RATE] IN BLOOD BY WESTERGREN METHOD: 22 MM/H
EST. GFR  (NO RACE VARIABLE): 39.1 ML/MIN/1.73 M^2
EST. GFR  (NO RACE VARIABLE): 41.8 ML/MIN/1.73 M^2
EST. GFR  (NO RACE VARIABLE): 44.7 ML/MIN/1.73 M^2
FIBRINOGEN PPP-MCNC: 199 MG/DL (ref 182–400)
FIO2: 40
GIANT PLATELETS BLD QL SMEAR: PRESENT
GLUCOSE SERPL-MCNC: 159 MG/DL (ref 70–110)
GLUCOSE SERPL-MCNC: 277 MG/DL (ref 70–110)
GLUCOSE SERPL-MCNC: 81 MG/DL (ref 70–110)
HCO3 UR-SCNC: 15.8 MMOL/L (ref 24–28)
HCO3 UR-SCNC: 17.7 MMOL/L (ref 24–28)
HCO3 UR-SCNC: 18.7 MMOL/L (ref 24–28)
HCO3 UR-SCNC: 20.6 MMOL/L (ref 24–28)
HCO3 UR-SCNC: 20.7 MMOL/L (ref 24–28)
HCO3 UR-SCNC: 21 MMOL/L (ref 24–28)
HCO3 UR-SCNC: 22.1 MMOL/L (ref 24–28)
HCO3 UR-SCNC: 23.7 MMOL/L (ref 24–28)
HCO3 UR-SCNC: 23.8 MMOL/L (ref 24–28)
HCO3 UR-SCNC: 23.9 MMOL/L (ref 24–28)
HCO3 UR-SCNC: 25.3 MMOL/L (ref 24–28)
HCT VFR BLD AUTO: 19.3 % (ref 40–54)
HCT VFR BLD AUTO: 23.4 % (ref 40–54)
HCT VFR BLD AUTO: 24.5 % (ref 40–54)
HCT VFR BLD AUTO: 25.1 % (ref 40–54)
HCT VFR BLD AUTO: 25.7 % (ref 40–54)
HCT VFR BLD AUTO: 27.8 % (ref 40–54)
HCT VFR BLD CALC: 17 %PCV (ref 36–54)
HCT VFR BLD CALC: 20 %PCV (ref 36–54)
HCT VFR BLD CALC: 20 %PCV (ref 36–54)
HCT VFR BLD CALC: 21 %PCV (ref 36–54)
HCT VFR BLD CALC: 23 %PCV (ref 36–54)
HCT VFR BLD CALC: 23 %PCV (ref 36–54)
HCT VFR BLD CALC: 24 %PCV (ref 36–54)
HCT VFR BLD CALC: 24 %PCV (ref 36–54)
HCT VFR BLD CALC: 26 %PCV (ref 36–54)
HGB BLD-MCNC: 6.8 G/DL (ref 14–18)
HGB BLD-MCNC: 8.3 G/DL (ref 14–18)
HGB BLD-MCNC: 8.4 G/DL (ref 14–18)
HGB BLD-MCNC: 8.6 G/DL (ref 14–18)
HGB BLD-MCNC: 8.9 G/DL (ref 14–18)
HGB BLD-MCNC: 9.9 G/DL (ref 14–18)
HYPOCHROMIA BLD QL SMEAR: ABNORMAL
IMM GRANULOCYTES # BLD AUTO: 0.04 K/UL (ref 0–0.04)
IMM GRANULOCYTES # BLD AUTO: 0.06 K/UL (ref 0–0.04)
IMM GRANULOCYTES # BLD AUTO: 0.07 K/UL (ref 0–0.04)
IMM GRANULOCYTES # BLD AUTO: 0.09 K/UL (ref 0–0.04)
IMM GRANULOCYTES # BLD AUTO: ABNORMAL K/UL (ref 0–0.04)
IMM GRANULOCYTES # BLD AUTO: ABNORMAL K/UL (ref 0–0.04)
IMM GRANULOCYTES NFR BLD AUTO: 0.3 % (ref 0–0.5)
IMM GRANULOCYTES NFR BLD AUTO: 0.4 % (ref 0–0.5)
IMM GRANULOCYTES NFR BLD AUTO: 0.5 % (ref 0–0.5)
IMM GRANULOCYTES NFR BLD AUTO: 0.5 % (ref 0–0.5)
IMM GRANULOCYTES NFR BLD AUTO: ABNORMAL % (ref 0–0.5)
IMM GRANULOCYTES NFR BLD AUTO: ABNORMAL % (ref 0–0.5)
INR PPP: 1.3 (ref 0.8–1.2)
LDH SERPL L TO P-CCNC: 1.96 MMOL/L (ref 0.36–1.25)
LDH SERPL L TO P-CCNC: 2.07 MMOL/L (ref 0.36–1.25)
LDH SERPL L TO P-CCNC: 2.36 MMOL/L (ref 0.36–1.25)
LDH SERPL L TO P-CCNC: 5.01 MMOL/L (ref 0.36–1.25)
LDH SERPL L TO P-CCNC: 5.27 MMOL/L (ref 0.36–1.25)
LDH SERPL L TO P-CCNC: 6.08 MMOL/L (ref 0.36–1.25)
LDH SERPL L TO P-CCNC: 7.02 MMOL/L (ref 0.36–1.25)
LDH SERPL L TO P-CCNC: 8.18 MMOL/L (ref 0.36–1.25)
LDH SERPL L TO P-CCNC: 8.8 MMOL/L (ref 0.36–1.25)
LYMPHOCYTES # BLD AUTO: 1 K/UL (ref 1–4.8)
LYMPHOCYTES # BLD AUTO: 1.1 K/UL (ref 1–4.8)
LYMPHOCYTES # BLD AUTO: 1.8 K/UL (ref 1–4.8)
LYMPHOCYTES # BLD AUTO: 2.1 K/UL (ref 1–4.8)
LYMPHOCYTES # BLD AUTO: ABNORMAL K/UL (ref 1–4.8)
LYMPHOCYTES NFR BLD: 1 % (ref 18–48)
LYMPHOCYTES NFR BLD: 10.6 % (ref 18–48)
LYMPHOCYTES NFR BLD: 13 % (ref 18–48)
LYMPHOCYTES NFR BLD: 8.4 % (ref 18–48)
LYMPHOCYTES NFR BLD: 9.5 % (ref 18–48)
LYMPHOCYTES NFR BLD: 9.7 % (ref 18–48)
MAGNESIUM SERPL-MCNC: 2.1 MG/DL (ref 1.6–2.6)
MAGNESIUM SERPL-MCNC: 2.2 MG/DL (ref 1.6–2.6)
MAGNESIUM SERPL-MCNC: 2.2 MG/DL (ref 1.6–2.6)
MCH RBC QN AUTO: 30.1 PG (ref 27–31)
MCH RBC QN AUTO: 30.3 PG (ref 27–31)
MCH RBC QN AUTO: 30.8 PG (ref 27–31)
MCH RBC QN AUTO: 30.8 PG (ref 27–31)
MCH RBC QN AUTO: 30.9 PG (ref 27–31)
MCH RBC QN AUTO: 31 PG (ref 27–31)
MCHC RBC AUTO-ENTMCNC: 33.5 G/DL (ref 32–36)
MCHC RBC AUTO-ENTMCNC: 34.6 G/DL (ref 32–36)
MCHC RBC AUTO-ENTMCNC: 35.1 G/DL (ref 32–36)
MCHC RBC AUTO-ENTMCNC: 35.2 G/DL (ref 32–36)
MCHC RBC AUTO-ENTMCNC: 35.5 G/DL (ref 32–36)
MCHC RBC AUTO-ENTMCNC: 35.6 G/DL (ref 32–36)
MCV RBC AUTO: 85 FL (ref 82–98)
MCV RBC AUTO: 87 FL (ref 82–98)
MCV RBC AUTO: 87 FL (ref 82–98)
MCV RBC AUTO: 88 FL (ref 82–98)
MCV RBC AUTO: 90 FL (ref 82–98)
MCV RBC AUTO: 91 FL (ref 82–98)
METHEMOGLOBIN: 1 % (ref 0–3)
MIN VOL: 10
MIN VOL: 9
MIN VOL: 9.2
MIN VOL: 9.2
MODE: ABNORMAL
MONOCYTES # BLD AUTO: 0.9 K/UL (ref 0.3–1)
MONOCYTES # BLD AUTO: 1 K/UL (ref 0.3–1)
MONOCYTES # BLD AUTO: 2.8 K/UL (ref 0.3–1)
MONOCYTES # BLD AUTO: 3.1 K/UL (ref 0.3–1)
MONOCYTES # BLD AUTO: ABNORMAL K/UL (ref 0.3–1)
MONOCYTES NFR BLD: 0 % (ref 4–15)
MONOCYTES NFR BLD: 12 % (ref 4–15)
MONOCYTES NFR BLD: 13.9 % (ref 4–15)
MONOCYTES NFR BLD: 16.1 % (ref 4–15)
MONOCYTES NFR BLD: 7.9 % (ref 4–15)
MONOCYTES NFR BLD: 8.7 % (ref 4–15)
NEUTROPHILS # BLD AUTO: 14.2 K/UL (ref 1.8–7.7)
NEUTROPHILS # BLD AUTO: 14.9 K/UL (ref 1.8–7.7)
NEUTROPHILS # BLD AUTO: 9.5 K/UL (ref 1.8–7.7)
NEUTROPHILS # BLD AUTO: 9.6 K/UL (ref 1.8–7.7)
NEUTROPHILS NFR BLD: 24 % (ref 38–73)
NEUTROPHILS NFR BLD: 73.7 % (ref 38–73)
NEUTROPHILS NFR BLD: 74 % (ref 38–73)
NEUTROPHILS NFR BLD: 75 % (ref 38–73)
NEUTROPHILS NFR BLD: 81.8 % (ref 38–73)
NEUTROPHILS NFR BLD: 82.5 % (ref 38–73)
NEUTS BAND NFR BLD MANUAL: 64 %
NRBC BLD-RTO: 0 /100 WBC
NUM UNITS TRANS PACKED RBC: NORMAL
OVALOCYTES BLD QL SMEAR: ABNORMAL
PCO2 BLDA: 28.6 MMHG (ref 35–45)
PCO2 BLDA: 34.7 MMHG (ref 35–45)
PCO2 BLDA: 35.1 MMHG (ref 35–45)
PCO2 BLDA: 35.3 MMHG (ref 35–45)
PCO2 BLDA: 36.3 MMHG (ref 35–45)
PCO2 BLDA: 36.7 MMHG (ref 35–45)
PCO2 BLDA: 38.4 MMHG (ref 35–45)
PCO2 BLDA: 41.3 MMHG (ref 35–45)
PCO2 BLDA: 43.7 MMHG (ref 35–45)
PCO2 BLDA: 43.9 MMHG (ref 35–45)
PCO2 BLDA: 43.9 MMHG (ref 35–45)
PEEP: 5
PH SMN: 7.32 [PH] (ref 7.35–7.45)
PH SMN: 7.32 [PH] (ref 7.35–7.45)
PH SMN: 7.34 [PH] (ref 7.35–7.45)
PH SMN: 7.35 [PH] (ref 7.35–7.45)
PH SMN: 7.36 [PH] (ref 7.35–7.45)
PH SMN: 7.37 [PH] (ref 7.35–7.45)
PH SMN: 7.41 [PH] (ref 7.35–7.45)
PHOSPHATE SERPL-MCNC: 3.1 MG/DL (ref 2.7–4.5)
PHOSPHATE SERPL-MCNC: 3.3 MG/DL (ref 2.7–4.5)
PHOSPHATE SERPL-MCNC: 5.2 MG/DL (ref 2.7–4.5)
PIP: 14
PIP: 15
PIP: 15
PIP: 16
PIP: 16
PIP: 17
PIP: 17
PIP: 22
PIP: 23
PIP: 9
PIP: 9
PLATELET # BLD AUTO: 102 K/UL (ref 150–450)
PLATELET # BLD AUTO: 106 K/UL (ref 150–450)
PLATELET # BLD AUTO: 135 K/UL (ref 150–450)
PLATELET # BLD AUTO: 140 K/UL (ref 150–450)
PLATELET # BLD AUTO: 70 K/UL (ref 150–450)
PLATELET # BLD AUTO: 75 K/UL (ref 150–450)
PLATELET BLD QL SMEAR: ABNORMAL
PMV BLD AUTO: 11.4 FL (ref 9.2–12.9)
PMV BLD AUTO: 11.6 FL (ref 9.2–12.9)
PMV BLD AUTO: 12.3 FL (ref 9.2–12.9)
PMV BLD AUTO: 12.7 FL (ref 9.2–12.9)
PMV BLD AUTO: 12.8 FL (ref 9.2–12.9)
PMV BLD AUTO: 13 FL (ref 9.2–12.9)
PO2 BLDA: 101 MMHG (ref 80–100)
PO2 BLDA: 101 MMHG (ref 80–100)
PO2 BLDA: 103 MMHG (ref 80–100)
PO2 BLDA: 104 MMHG (ref 80–100)
PO2 BLDA: 111 MMHG (ref 80–100)
PO2 BLDA: 113 MMHG (ref 80–100)
PO2 BLDA: 19 MMHG (ref 40–60)
PO2 BLDA: 25 MMHG (ref 40–60)
PO2 BLDA: 90 MMHG (ref 80–100)
PO2 BLDA: 94 MMHG (ref 80–100)
PO2 BLDA: 94 MMHG (ref 80–100)
POC BE: -1 MMOL/L
POC BE: -10 MMOL/L
POC BE: -2 MMOL/L
POC BE: -2 MMOL/L
POC BE: -3 MMOL/L
POC BE: -5 MMOL/L
POC BE: -7 MMOL/L
POC BE: -8 MMOL/L
POC BE: 0 MMOL/L
POC IONIZED CALCIUM: 1.09 MMOL/L (ref 1.06–1.42)
POC IONIZED CALCIUM: 1.13 MMOL/L (ref 1.06–1.42)
POC IONIZED CALCIUM: 1.25 MMOL/L (ref 1.06–1.42)
POC IONIZED CALCIUM: 1.26 MMOL/L (ref 1.06–1.42)
POC IONIZED CALCIUM: 1.27 MMOL/L (ref 1.06–1.42)
POC IONIZED CALCIUM: 1.29 MMOL/L (ref 1.06–1.42)
POC IONIZED CALCIUM: 1.31 MMOL/L (ref 1.06–1.42)
POC IONIZED CALCIUM: 2 MMOL/L (ref 1.06–1.42)
POC IONIZED CALCIUM: 2.08 MMOL/L (ref 1.06–1.42)
POC SATURATED O2: 25 % (ref 95–100)
POC SATURATED O2: 44 % (ref 95–100)
POC SATURATED O2: 97 % (ref 95–100)
POC SATURATED O2: 98 % (ref 95–100)
POC TCO2: 17 MMOL/L (ref 23–27)
POC TCO2: 19 MMOL/L (ref 23–27)
POC TCO2: 20 MMOL/L (ref 23–27)
POC TCO2: 22 MMOL/L (ref 23–27)
POC TCO2: 23 MMOL/L (ref 23–27)
POC TCO2: 25 MMOL/L (ref 23–27)
POC TCO2: 25 MMOL/L (ref 23–27)
POC TCO2: 25 MMOL/L (ref 24–29)
POC TCO2: 27 MMOL/L (ref 24–29)
POCT GLUCOSE: 107 MG/DL (ref 70–110)
POCT GLUCOSE: 108 MG/DL (ref 70–110)
POCT GLUCOSE: 114 MG/DL (ref 70–110)
POCT GLUCOSE: 122 MG/DL (ref 70–110)
POCT GLUCOSE: 131 MG/DL (ref 70–110)
POCT GLUCOSE: 132 MG/DL (ref 70–110)
POCT GLUCOSE: 134 MG/DL (ref 70–110)
POCT GLUCOSE: 134 MG/DL (ref 70–110)
POCT GLUCOSE: 135 MG/DL (ref 70–110)
POCT GLUCOSE: 144 MG/DL (ref 70–110)
POCT GLUCOSE: 146 MG/DL (ref 70–110)
POCT GLUCOSE: 156 MG/DL (ref 70–110)
POCT GLUCOSE: 159 MG/DL (ref 70–110)
POCT GLUCOSE: 169 MG/DL (ref 70–110)
POCT GLUCOSE: 172 MG/DL (ref 70–110)
POCT GLUCOSE: 236 MG/DL (ref 70–110)
POCT GLUCOSE: 290 MG/DL (ref 70–110)
POCT GLUCOSE: 329 MG/DL (ref 70–110)
POCT GLUCOSE: 356 MG/DL (ref 70–110)
POCT GLUCOSE: 378 MG/DL (ref 70–110)
POCT GLUCOSE: 65 MG/DL (ref 70–110)
POCT GLUCOSE: 68 MG/DL (ref 70–110)
POCT GLUCOSE: 78 MG/DL (ref 70–110)
POIKILOCYTOSIS BLD QL SMEAR: SLIGHT
POLYCHROMASIA BLD QL SMEAR: ABNORMAL
POTASSIUM BLD-SCNC: 3.7 MMOL/L (ref 3.5–5.1)
POTASSIUM BLD-SCNC: 3.9 MMOL/L (ref 3.5–5.1)
POTASSIUM BLD-SCNC: 3.9 MMOL/L (ref 3.5–5.1)
POTASSIUM BLD-SCNC: 4 MMOL/L (ref 3.5–5.1)
POTASSIUM BLD-SCNC: 4.1 MMOL/L (ref 3.5–5.1)
POTASSIUM BLD-SCNC: 4.1 MMOL/L (ref 3.5–5.1)
POTASSIUM BLD-SCNC: 4.7 MMOL/L (ref 3.5–5.1)
POTASSIUM SERPL-SCNC: 3.7 MMOL/L (ref 3.5–5.1)
POTASSIUM SERPL-SCNC: 4.1 MMOL/L (ref 3.5–5.1)
POTASSIUM SERPL-SCNC: 4.8 MMOL/L (ref 3.5–5.1)
POTASSIUM SERPL-SCNC: 4.9 MMOL/L (ref 3.5–5.1)
PROT SERPL-MCNC: 3.9 G/DL (ref 6–8.4)
PROT SERPL-MCNC: 4.1 G/DL (ref 6–8.4)
PROT SERPL-MCNC: 4.4 G/DL (ref 6–8.4)
PROTHROMBIN TIME: 13 SEC (ref 9–12.5)
RBC # BLD AUTO: 2.21 M/UL (ref 4.6–6.2)
RBC # BLD AUTO: 2.69 M/UL (ref 4.6–6.2)
RBC # BLD AUTO: 2.77 M/UL (ref 4.6–6.2)
RBC # BLD AUTO: 2.79 M/UL (ref 4.6–6.2)
RBC # BLD AUTO: 2.87 M/UL (ref 4.6–6.2)
RBC # BLD AUTO: 3.29 M/UL (ref 4.6–6.2)
SAMPLE: ABNORMAL
SITE: ABNORMAL
SODIUM BLD-SCNC: 138 MMOL/L (ref 136–145)
SODIUM BLD-SCNC: 139 MMOL/L (ref 136–145)
SODIUM BLD-SCNC: 140 MMOL/L (ref 136–145)
SODIUM BLD-SCNC: 141 MMOL/L (ref 136–145)
SODIUM SERPL-SCNC: 137 MMOL/L (ref 136–145)
SODIUM SERPL-SCNC: 137 MMOL/L (ref 136–145)
SODIUM SERPL-SCNC: 140 MMOL/L (ref 136–145)
SP02: 100
SP02: 100
SP02: 95
SP02: 97
SP02: 97
SP02: 98
SPHEROCYTES BLD QL SMEAR: ABNORMAL
TRANS ERYTHROCYTES VOL PATIENT: NORMAL ML
UNIT NUMBER: NORMAL
VT: 430
WBC # BLD AUTO: 11.49 K/UL (ref 3.9–12.7)
WBC # BLD AUTO: 11.73 K/UL (ref 3.9–12.7)
WBC # BLD AUTO: 15.49 K/UL (ref 3.9–12.7)
WBC # BLD AUTO: 19.24 K/UL (ref 3.9–12.7)
WBC # BLD AUTO: 19.9 K/UL (ref 3.9–12.7)
WBC # BLD AUTO: 20.24 K/UL (ref 3.9–12.7)
WBC TOXIC VACUOLES BLD QL SMEAR: PRESENT

## 2022-10-08 PROCEDURE — 27000221 HC OXYGEN, UP TO 24 HOURS

## 2022-10-08 PROCEDURE — 84132 ASSAY OF SERUM POTASSIUM: CPT

## 2022-10-08 PROCEDURE — 84295 ASSAY OF SERUM SODIUM: CPT

## 2022-10-08 PROCEDURE — 63600175 PHARM REV CODE 636 W HCPCS

## 2022-10-08 PROCEDURE — 80053 COMPREHEN METABOLIC PANEL: CPT | Mod: 91 | Performed by: THORACIC SURGERY (CARDIOTHORACIC VASCULAR SURGERY)

## 2022-10-08 PROCEDURE — 25000003 PHARM REV CODE 250

## 2022-10-08 PROCEDURE — 99900035 HC TECH TIME PER 15 MIN (STAT)

## 2022-10-08 PROCEDURE — P9016 RBC LEUKOCYTES REDUCED: HCPCS | Performed by: STUDENT IN AN ORGANIZED HEALTH CARE EDUCATION/TRAINING PROGRAM

## 2022-10-08 PROCEDURE — 27200966 HC CLOSED SUCTION SYSTEM

## 2022-10-08 PROCEDURE — 85007 BL SMEAR W/DIFF WBC COUNT: CPT

## 2022-10-08 PROCEDURE — 85730 THROMBOPLASTIN TIME PARTIAL: CPT | Mod: 91 | Performed by: THORACIC SURGERY (CARDIOTHORACIC VASCULAR SURGERY)

## 2022-10-08 PROCEDURE — 80053 COMPREHEN METABOLIC PANEL: CPT

## 2022-10-08 PROCEDURE — P9021 RED BLOOD CELLS UNIT: HCPCS | Performed by: NURSE PRACTITIONER

## 2022-10-08 PROCEDURE — 83735 ASSAY OF MAGNESIUM: CPT

## 2022-10-08 PROCEDURE — 37799 UNLISTED PX VASCULAR SURGERY: CPT

## 2022-10-08 PROCEDURE — 84100 ASSAY OF PHOSPHORUS: CPT | Mod: 91 | Performed by: THORACIC SURGERY (CARDIOTHORACIC VASCULAR SURGERY)

## 2022-10-08 PROCEDURE — P9016 RBC LEUKOCYTES REDUCED: HCPCS

## 2022-10-08 PROCEDURE — 25000003 PHARM REV CODE 250: Performed by: STUDENT IN AN ORGANIZED HEALTH CARE EDUCATION/TRAINING PROGRAM

## 2022-10-08 PROCEDURE — 99291 CRITICAL CARE FIRST HOUR: CPT | Mod: ,,, | Performed by: ANESTHESIOLOGY

## 2022-10-08 PROCEDURE — 27000203 HC IMPELLA ADD'L DAY (CL)

## 2022-10-08 PROCEDURE — 63600175 PHARM REV CODE 636 W HCPCS: Performed by: STUDENT IN AN ORGANIZED HEALTH CARE EDUCATION/TRAINING PROGRAM

## 2022-10-08 PROCEDURE — 99900026 HC AIRWAY MAINTENANCE (STAT)

## 2022-10-08 PROCEDURE — 20000000 HC ICU ROOM

## 2022-10-08 PROCEDURE — 63600175 PHARM REV CODE 636 W HCPCS: Performed by: THORACIC SURGERY (CARDIOTHORACIC VASCULAR SURGERY)

## 2022-10-08 PROCEDURE — 85384 FIBRINOGEN ACTIVITY: CPT | Performed by: THORACIC SURGERY (CARDIOTHORACIC VASCULAR SURGERY)

## 2022-10-08 PROCEDURE — 85014 HEMATOCRIT: CPT

## 2022-10-08 PROCEDURE — 85027 COMPLETE CBC AUTOMATED: CPT

## 2022-10-08 PROCEDURE — 82803 BLOOD GASES ANY COMBINATION: CPT

## 2022-10-08 PROCEDURE — 94761 N-INVAS EAR/PLS OXIMETRY MLT: CPT

## 2022-10-08 PROCEDURE — 85610 PROTHROMBIN TIME: CPT

## 2022-10-08 PROCEDURE — 83050 HGB METHEMOGLOBIN QUAN: CPT

## 2022-10-08 PROCEDURE — 82800 BLOOD PH: CPT

## 2022-10-08 PROCEDURE — 83605 ASSAY OF LACTIC ACID: CPT

## 2022-10-08 PROCEDURE — 84100 ASSAY OF PHOSPHORUS: CPT

## 2022-10-08 PROCEDURE — 99223 PR INITIAL HOSPITAL CARE,LEVL III: ICD-10-PCS | Mod: ,,, | Performed by: NURSE PRACTITIONER

## 2022-10-08 PROCEDURE — 36430 TRANSFUSION BLD/BLD COMPNT: CPT

## 2022-10-08 PROCEDURE — 25000003 PHARM REV CODE 250: Performed by: THORACIC SURGERY (CARDIOTHORACIC VASCULAR SURGERY)

## 2022-10-08 PROCEDURE — 82330 ASSAY OF CALCIUM: CPT

## 2022-10-08 PROCEDURE — P9035 PLATELET PHERES LEUKOREDUCED: HCPCS

## 2022-10-08 PROCEDURE — 85730 THROMBOPLASTIN TIME PARTIAL: CPT | Mod: 91 | Performed by: STUDENT IN AN ORGANIZED HEALTH CARE EDUCATION/TRAINING PROGRAM

## 2022-10-08 PROCEDURE — 94003 VENT MGMT INPAT SUBQ DAY: CPT

## 2022-10-08 PROCEDURE — 63600367 HC NITRIC OXIDE PER HOUR

## 2022-10-08 PROCEDURE — 99223 1ST HOSP IP/OBS HIGH 75: CPT | Mod: ,,, | Performed by: NURSE PRACTITIONER

## 2022-10-08 PROCEDURE — 63600175 PHARM REV CODE 636 W HCPCS: Performed by: ANESTHESIOLOGY

## 2022-10-08 PROCEDURE — 83735 ASSAY OF MAGNESIUM: CPT | Mod: 91 | Performed by: THORACIC SURGERY (CARDIOTHORACIC VASCULAR SURGERY)

## 2022-10-08 PROCEDURE — 85730 THROMBOPLASTIN TIME PARTIAL: CPT

## 2022-10-08 PROCEDURE — P9045 ALBUMIN (HUMAN), 5%, 250 ML: HCPCS | Mod: JG | Performed by: THORACIC SURGERY (CARDIOTHORACIC VASCULAR SURGERY)

## 2022-10-08 PROCEDURE — 85025 COMPLETE CBC W/AUTO DIFF WBC: CPT | Mod: 91 | Performed by: THORACIC SURGERY (CARDIOTHORACIC VASCULAR SURGERY)

## 2022-10-08 PROCEDURE — 99291 PR CRITICAL CARE, E/M 30-74 MINUTES: ICD-10-PCS | Mod: ,,, | Performed by: ANESTHESIOLOGY

## 2022-10-08 RX ORDER — ALBUMIN HUMAN 50 G/1000ML
25 SOLUTION INTRAVENOUS ONCE
Status: COMPLETED | OUTPATIENT
Start: 2022-10-08 | End: 2022-10-08

## 2022-10-08 RX ORDER — EPINEPHRINE HCL IN 0.9 % NACL 5 MG/250ML
0-2 PLASTIC BAG, INJECTION (ML) INTRAVENOUS CONTINUOUS
Status: DISCONTINUED | OUTPATIENT
Start: 2022-10-08 | End: 2022-10-08

## 2022-10-08 RX ORDER — CALCIUM CHLORIDE INJECTION 100 MG/ML
1 INJECTION, SOLUTION INTRAVENOUS ONCE
Status: COMPLETED | OUTPATIENT
Start: 2022-10-08 | End: 2022-10-08

## 2022-10-08 RX ORDER — OXYCODONE HYDROCHLORIDE 10 MG/1
10 TABLET ORAL EVERY 4 HOURS PRN
Status: DISCONTINUED | OUTPATIENT
Start: 2022-10-08 | End: 2022-10-11

## 2022-10-08 RX ORDER — HYDROMORPHONE HYDROCHLORIDE 1 MG/ML
0.5 INJECTION, SOLUTION INTRAMUSCULAR; INTRAVENOUS; SUBCUTANEOUS
Status: DISCONTINUED | OUTPATIENT
Start: 2022-10-08 | End: 2022-10-13

## 2022-10-08 RX ORDER — HEPARIN SODIUM 10000 [USP'U]/100ML
400 INJECTION, SOLUTION INTRAVENOUS CONTINUOUS
Status: DISCONTINUED | OUTPATIENT
Start: 2022-10-08 | End: 2022-10-11

## 2022-10-08 RX ORDER — OXYCODONE HYDROCHLORIDE 5 MG/1
5 TABLET ORAL EVERY 4 HOURS PRN
Status: DISCONTINUED | OUTPATIENT
Start: 2022-10-08 | End: 2022-10-08

## 2022-10-08 RX ORDER — FAMOTIDINE 10 MG/ML
20 INJECTION INTRAVENOUS DAILY
Status: DISCONTINUED | OUTPATIENT
Start: 2022-10-09 | End: 2022-10-10

## 2022-10-08 RX ADMIN — ACETAMINOPHEN 1000 MG: 500 TABLET ORAL at 10:10

## 2022-10-08 RX ADMIN — PROPOFOL 50 MCG/KG/MIN: 10 INJECTION, EMULSION INTRAVENOUS at 08:10

## 2022-10-08 RX ADMIN — PROPOFOL 35 MCG/KG/MIN: 10 INJECTION, EMULSION INTRAVENOUS at 09:10

## 2022-10-08 RX ADMIN — PROPOFOL 50 MCG/KG/MIN: 10 INJECTION, EMULSION INTRAVENOUS at 12:10

## 2022-10-08 RX ADMIN — NOREPINEPHRINE BITARTRATE 0.1 MCG/KG/MIN: 4 INJECTION, SOLUTION INTRAVENOUS at 09:10

## 2022-10-08 RX ADMIN — PROPOFOL 25 MCG/KG/MIN: 10 INJECTION, EMULSION INTRAVENOUS at 03:10

## 2022-10-08 RX ADMIN — AMIODARONE HYDROCHLORIDE 1 MG/MIN: 1.8 INJECTION, SOLUTION INTRAVENOUS at 08:10

## 2022-10-08 RX ADMIN — AMIODARONE HYDROCHLORIDE 1 MG/MIN: 1.8 INJECTION, SOLUTION INTRAVENOUS at 02:10

## 2022-10-08 RX ADMIN — FENTANYL CITRATE 50 MCG: 0.05 INJECTION, SOLUTION INTRAMUSCULAR; INTRAVENOUS at 04:10

## 2022-10-08 RX ADMIN — VASOPRESSIN 0.02 UNITS/MIN: 20 INJECTION INTRAVENOUS at 09:10

## 2022-10-08 RX ADMIN — ACETAMINOPHEN 1000 MG: 500 TABLET ORAL at 05:10

## 2022-10-08 RX ADMIN — DEXMEDETOMIDINE HYDROCHLORIDE 0.2 MCG/KG/HR: 4 INJECTION INTRAVENOUS at 04:10

## 2022-10-08 RX ADMIN — POTASSIUM CHLORIDE 20 MEQ: 200 INJECTION, SOLUTION INTRAVENOUS at 09:10

## 2022-10-08 RX ADMIN — POTASSIUM CHLORIDE 40 MEQ: 29.8 INJECTION, SOLUTION INTRAVENOUS at 01:10

## 2022-10-08 RX ADMIN — HYDROMORPHONE HYDROCHLORIDE 0.5 MG: 1 INJECTION, SOLUTION INTRAMUSCULAR; INTRAVENOUS; SUBCUTANEOUS at 03:10

## 2022-10-08 RX ADMIN — FAMOTIDINE 20 MG: 10 INJECTION, SOLUTION INTRAVENOUS at 10:10

## 2022-10-08 RX ADMIN — CALCIUM CHLORIDE INJECTION 1 G: 100 INJECTION, SOLUTION INTRAVENOUS at 05:10

## 2022-10-08 RX ADMIN — ALBUMIN (HUMAN) 25 G: 12.5 SOLUTION INTRAVENOUS at 08:10

## 2022-10-08 RX ADMIN — INSULIN HUMAN 36.4 UNITS/HR: 1 INJECTION, SOLUTION INTRAVENOUS at 03:10

## 2022-10-08 RX ADMIN — HEPARIN SODIUM: 5000 INJECTION INTRAVENOUS; SUBCUTANEOUS at 10:10

## 2022-10-08 RX ADMIN — FENTANYL CITRATE 50 MCG: 0.05 INJECTION, SOLUTION INTRAMUSCULAR; INTRAVENOUS at 06:10

## 2022-10-08 RX ADMIN — VASOPRESSIN 0.04 UNITS/MIN: 20 INJECTION INTRAVENOUS at 01:10

## 2022-10-08 RX ADMIN — AMIODARONE HYDROCHLORIDE 1 MG/MIN: 1.8 INJECTION, SOLUTION INTRAVENOUS at 01:10

## 2022-10-08 RX ADMIN — MUPIROCIN: 20 OINTMENT TOPICAL at 10:10

## 2022-10-08 RX ADMIN — MUPIROCIN: 20 OINTMENT TOPICAL at 09:10

## 2022-10-08 RX ADMIN — ACETAMINOPHEN 1000 MG: 500 TABLET ORAL at 02:10

## 2022-10-08 RX ADMIN — Medication 2 G: at 05:10

## 2022-10-08 RX ADMIN — VASOPRESSIN 0.04 UNITS/MIN: 20 INJECTION INTRAVENOUS at 11:10

## 2022-10-08 RX ADMIN — HEPARIN SODIUM 400 UNITS/HR: 10000 INJECTION, SOLUTION INTRAVENOUS at 10:10

## 2022-10-08 RX ADMIN — FENTANYL CITRATE 50 MCG: 0.05 INJECTION, SOLUTION INTRAMUSCULAR; INTRAVENOUS at 01:10

## 2022-10-08 RX ADMIN — Medication 0.16 MCG/KG/MIN: at 06:10

## 2022-10-08 RX ADMIN — NOREPINEPHRINE BITARTRATE 0.16 MCG/KG/MIN: 4 INJECTION, SOLUTION INTRAVENOUS at 03:10

## 2022-10-08 RX ADMIN — Medication 2 G: at 12:10

## 2022-10-08 RX ADMIN — CALCIUM CHLORIDE INJECTION 1 G: 100 INJECTION, SOLUTION INTRAVENOUS at 01:10

## 2022-10-08 RX ADMIN — ANGIOTENSIN II 20 NG/KG/MIN: 2.5 INJECTION INTRAVENOUS at 04:10

## 2022-10-08 RX ADMIN — DEXTROSE 125 ML: 10 SOLUTION INTRAVENOUS at 08:10

## 2022-10-08 RX ADMIN — Medication 0.08 MCG/KG/MIN: at 03:10

## 2022-10-08 RX ADMIN — PROPOFOL 50 MCG/KG/MIN: 10 INJECTION, EMULSION INTRAVENOUS at 05:10

## 2022-10-08 RX ADMIN — HYDROMORPHONE HYDROCHLORIDE 0.5 MG: 1 INJECTION, SOLUTION INTRAMUSCULAR; INTRAVENOUS; SUBCUTANEOUS at 09:10

## 2022-10-08 NOTE — SUBJECTIVE & OBJECTIVE
Interval History/Significant Events:   Patient arrived to the surgical ICU in unstable condition, required multiple shocks in transit from the operating room to the ICU.  This was for unstable V-tach.  Patient with multiple episodes of nonsustained ventricular tachycardia.  Electrophysiology was consulted, noted to have a prolonged QTC on EKG.  Patient was started on amiodarone drip at 1.   Patient remained stable until Around 8:00 p.m. patient had overtly high output from chest tube.  Noted to have substantial increase in pressor requirements.  patient was emergently opened at bedside and found to have bleeding from the left atriotomy site and the IVC.  This was repaired and patient was closed in the ICU.    In total patient received 5 units of packed red blood cells, 2 of FFP, 2 platelets, 2 of cryoprecipitate.  After closure, vasopressors started to wean off.  Lactate trended down.    Follow-up For: Procedure(s) (LRB):  VALVULOPLASTY,MITRAL VALVE (N/A)  REPAIR, TRICUSPID VALVE (N/A)  INSERTION, IMPELLA (N/A)  MEYER MAZE PROCEDURE (N/A)  EXCLUSION, LEFT ATRIAL APPENDAGE (N/A)    Post-Operative Day: 1 Day Post-Op    Objective:     Vital Signs (Most Recent):  Temp: (!) 101.1 °F (38.4 °C) (10/08/22 0415)  Pulse: 67 (10/08/22 0515)  Resp: (!) 22 (10/08/22 0500)  BP: 100/65 (10/08/22 0500)  SpO2: (!) 94 % (10/08/22 0515)   Vital Signs (24h Range):  Temp:  [97.6 °F (36.4 °C)-101.1 °F (38.4 °C)] 101.1 °F (38.4 °C)  Pulse:  [50-94] 67  Resp:  [0-42] 22  SpO2:  [93 %-100 %] 94 %  BP: ()/(47-76) 100/65  Arterial Line BP: ()/(52-79) 114/71     Weight: 94.5 kg (208 lb 5.4 oz)  Body mass index is 30.77 kg/m².      Intake/Output Summary (Last 24 hours) at 10/8/2022 0610  Last data filed at 10/8/2022 0609  Gross per 24 hour   Intake 8092 ml   Output 7155 ml   Net 937 ml       Physical Exam  Vitals and nursing note reviewed.   Constitutional:       Appearance: He is ill-appearing.      Comments: Intubated and sedated    HENT:      Head: Normocephalic and atraumatic.   Neck:      Comments: RIJ CVC and [place]  Cardiovascular:      Rate and Rhythm: Normal rate and regular rhythm.      Comments: Impella in place  V wires in place  Midline incision cdi  Intrinsic rhythm in 80s NS with intermittent Vtach  Pulmonary:      Comments: Mechanically ventillated  Abdominal:      General: Abdomen is flat.      Palpations: Abdomen is soft.   Skin:     General: Skin is warm and dry.      Comments: Left radial a line   Neurological:      Comments: sedated       Vents:  Vent Mode: A/C (10/08/22 0320)  Ventilator Initiated: Yes (10/07/22 1402)  Set Rate: 18 BPM (10/08/22 0320)  Vt Set: 430 mL (10/08/22 0320)  PEEP/CPAP: 5 cmH20 (10/08/22 0320)  Oxygen Concentration (%): 40 (10/08/22 0515)  Peak Airway Pressure: 15 cmH2O (10/08/22 0320)  Plateau Pressure: 0 cmH20 (10/08/22 0320)  Total Ve: 9.73 mL (10/08/22 0320)  Negative Inspiratory Force (cm H2O): 0 (10/08/22 0320)  F/VT Ratio<105 (RSBI): (!) 0 (10/07/22 1909)    Lines/Drains/Airways       Central Venous Catheter Line  Duration              Introducer with Double Lumen 10/07/22 0906 <1 day    Pulmonary Artery Catheter Assessment  10/07/22 1500 right internal jugular <1 day              Drain  Duration                  NG/OG Tube 10/07/22 1600 Right nostril <1 day         Urethral Catheter 10/07/22 0728 Non-latex;Straight-tip;Temperature probe 14 Fr. <1 day         Y Chest Tube 1 and 2 10/07/22 1225 1 Anterior Mediastinal 19 Fr. 2 Anterior Mediastinal 19 Fr. <1 day              Airway  Duration                  Airway - Non-Surgical 10/07/22 0718 <1 day              Arterial Line  Duration             Arterial Line 10/07/22 0706 Left Radial <1 day              Line  Duration                  Pacer Wires 10/07/22 1050 <1 day         VAD 10/07/22 1129 Impella <1 day              Peripheral Intravenous Line  Duration                  Peripheral IV - Single Lumen 10/07/22 0644 20 G Left Hand <1 day          Peripheral IV - Single Lumen 10/07/22 1100 20 G Anterior;Proximal;Right Forearm <1 day         Peripheral IV - Single Lumen 10/07/22 1600 18 G Right Antecubital <1 day                    Significant Labs:    CBC/Anemia Profile:  Recent Labs   Lab 10/08/22  0015 10/08/22  0019 10/08/22  0126 10/08/22  0215 10/08/22  0409 10/08/22  0418 10/08/22  0510   WBC 11.49  --  11.73  --   --  15.49*  --    HGB 8.9*  --  8.4*  --   --  8.6*  --    HCT 25.7*   < > 25.1*   < > 23* 24.5* 24*   PLT 75*  --  70*  --   --  140*  --    MCV 90  --  91  --   --  88  --    RDW 14.9*  --  15.2*  --   --  14.9*  --     < > = values in this interval not displayed.        Chemistries:  Recent Labs   Lab 10/07/22  1921 10/07/22  2130 10/08/22  0324    143 140   K 3.7 3.6 4.1    106 107   CO2 14* 14* 18*   BUN 16 16 20   CREATININE 1.6* 1.7* 1.9*   CALCIUM 8.4* 7.9* 8.6*   ALBUMIN 3.3* 2.7* 2.7*   PROT 4.2* 4.0* 4.1*   BILITOT 1.8* 1.5* 1.6*   ALKPHOS 32* 32* 35*   ALT 20 23 39   AST 78* 66* 86*   MG 2.8* 2.4 2.2   PHOS 2.1* 4.1 3.3       All pertinent labs within the past 24 hours have been reviewed.    Significant Imaging:  I have reviewed all pertinent imaging results/findings within the past 24 hours.

## 2022-10-08 NOTE — PROGRESS NOTES
Chest opened at the bedside and then closed.  5 PRBC, 2 FFP, 1 Plt, 1 Cryo administered. Bicarb, Calcium, and Potassium replaced as needed.   Giapreza weaned to 10, Levo 0.14, Epi 0.16, Vaso 0.04, Amio at 1, Propofol, Precedex, and Insulin.   Mechanically ventilated, FiO2 40%, Peep 5, Rate 18. Nitric at 20 ppm.   HR: NSR. Backup paced at 60 bpm.   Pupils equal and reactive. Patient moved all 4 four extremities spontaneously this AM.   SVO2 26-25% this shift.  Impella P5, no issues/alarms overnight. Site CDI.     See flowsheets and documentation for details.     Skin: No skin breakdown noted. Back and sacrum assessed. Sacral foam, heel boots, and pillows in use.     Family, Carlos A and Lulu updated on patient's status overnight. All questions answered.

## 2022-10-08 NOTE — PROGRESS NOTES
[facility] - [facility] Intensive Care  Critical Care - Surgery  Progress Note    Patient Name: David Barrios  MRN: [id number]  Admission Date: 10/2/2022  Hospital Length of Stay: 6 days  Code Status: Prior  Attending Provider: Mike Hedrick MD  Primary Care Provider: Breann Tavera MD   Principal Problem: Nonrheumatic mitral valve regurgitation    Subjective:     Hospital/ICU Course:  No notes on file    Interval History/Significant Events:   Patient arrived to the surgical ICU in unstable condition, required multiple shocks in transit from the operating room to the ICU.  This was for unstable V-tach.  Patient with multiple episodes of nonsustained ventricular tachycardia.  Electrophysiology was consulted, noted to have a prolonged QTC on EKG.  Patient was started on amiodarone drip at 1.   Patient remained stable until Around 8:00 p.m. patient had overtly high output from chest tube.  Noted to have substantial increase in pressor requirements.  patient was emergently opened at bedside and found to have bleeding from the left atriotomy site and the IVC.  This was repaired and patient was closed in the ICU.    In total patient received 5 units of packed red blood cells, 2 of FFP, 2 platelets, 2 of cryoprecipitate.  After closure, vasopressors started to wean off.  Lactate trended down.    Follow-up For: Procedure(s) (LRB):  VALVULOPLASTY,MITRAL VALVE (N/A)  REPAIR, TRICUSPID VALVE (N/A)  INSERTION, IMPELLA (N/A)  MEYER MAZE PROCEDURE (N/A)  EXCLUSION, LEFT ATRIAL APPENDAGE (N/A)    Post-Operative Day: 1 Day Post-Op    Objective:     Vital Signs (Most Recent):  Temp: (!) 101.1 °F (38.4 °C) (10/08/22 0415)  Pulse: 67 (10/08/22 0515)  Resp: (!) 22 (10/08/22 0500)  BP: 100/65 (10/08/22 0500)  SpO2: (!) 94 % (10/08/22 0515)   Vital Signs (24h Range):  Temp:  [97.6 °F (36.4 °C)-101.1 °F (38.4 °C)] 101.1 °F (38.4 °C)  Pulse:  [50-94] 67  Resp:  [0-42] 22  SpO2:  [93 %-100 %] 94 %  BP: ()/(47-76) 100/65  Arterial Line  BP: ()/(52-79) 114/71     Weight: 94.5 kg (208 lb 5.4 oz)  Body mass index is 30.77 kg/m².      Intake/Output Summary (Last 24 hours) at 10/8/2022 0610  Last data filed at 10/8/2022 0609  Gross per 24 hour   Intake 8092 ml   Output 7155 ml   Net 937 ml       Physical Exam  Vitals and nursing note reviewed.   Constitutional:       Appearance: He is ill-appearing.      Comments: Intubated and sedated   HENT:      Head: Normocephalic and atraumatic.   Neck:      Comments: RIJ CVC and Ryan  Cardiovascular:      Rate and Rhythm: Normal rate and regular rhythm.      Comments: Impella in place  V wires in place  Midline incision cdi  Intrinsic rhythm in 80s NS with intermittent Vtach  Pulmonary:      Comments: Mechanically ventillated  Abdominal:      General: Abdomen is flat.      Palpations: Abdomen is soft.   Skin:     General: Skin is warm and dry.      Comments: Left radial a line   Neurological:      Comments: sedated       Vents:  Vent Mode: A/C (10/08/22 0320)  Ventilator Initiated: Yes (10/07/22 1402)  Set Rate: 18 BPM (10/08/22 0320)  Vt Set: 430 mL (10/08/22 0320)  PEEP/CPAP: 5 cmH20 (10/08/22 0320)  Oxygen Concentration (%): 40 (10/08/22 0515)  Peak Airway Pressure: 15 cmH2O (10/08/22 0320)  Plateau Pressure: 0 cmH20 (10/08/22 0320)  Total Ve: 9.73 mL (10/08/22 0320)  Negative Inspiratory Force (cm H2O): 0 (10/08/22 0320)  F/VT Ratio<105 (RSBI): (!) 0 (10/07/22 1909)    Lines/Drains/Airways       Central Venous Catheter Line  Duration              Introducer with Double Lumen 10/07/22 0906 <1 day    Pulmonary Artery Catheter Assessment  10/07/22 1500 right internal jugular <1 day              Drain  Duration                  NG/OG Tube 10/07/22 1600 Right nostril <1 day         Urethral Catheter 10/07/22 0728 Non-latex;Straight-tip;Temperature probe 14 Fr. <1 day         Y Chest Tube 1 and 2 10/07/22 1225 1 Anterior Mediastinal 19 Fr. 2 Anterior Mediastinal 19 Fr. <1 day              Airway  Duration                   Airway - Non-Surgical 10/07/22 0718 <1 day              Arterial Line  Duration             Arterial Line 10/07/22 0706 Left Radial <1 day              Line  Duration                  Pacer Wires 10/07/22 1050 <1 day         VAD 10/07/22 1129 Impella <1 day              Peripheral Intravenous Line  Duration                  Peripheral IV - Single Lumen 10/07/22 0644 20 G Left Hand <1 day         Peripheral IV - Single Lumen 10/07/22 1100 20 G Anterior;Proximal;Right Forearm <1 day         Peripheral IV - Single Lumen 10/07/22 1600 18 G Right Antecubital <1 day                    Significant Labs:    CBC/Anemia Profile:  Recent Labs   Lab 10/08/22  0015 10/08/22  0019 10/08/22  0126 10/08/22  0215 10/08/22  0409 10/08/22  0418 10/08/22  0510   WBC 11.49  --  11.73  --   --  15.49*  --    HGB 8.9*  --  8.4*  --   --  8.6*  --    HCT 25.7*   < > 25.1*   < > 23* 24.5* 24*   PLT 75*  --  70*  --   --  140*  --    MCV 90  --  91  --   --  88  --    RDW 14.9*  --  15.2*  --   --  14.9*  --     < > = values in this interval not displayed.        Chemistries:  Recent Labs   Lab 10/07/22  1921 10/07/22  2130 10/08/22  0324    143 140   K 3.7 3.6 4.1    106 107   CO2 14* 14* 18*   BUN 16 16 20   CREATININE 1.6* 1.7* 1.9*   CALCIUM 8.4* 7.9* 8.6*   ALBUMIN 3.3* 2.7* 2.7*   PROT 4.2* 4.0* 4.1*   BILITOT 1.8* 1.5* 1.6*   ALKPHOS 32* 32* 35*   ALT 20 23 39   AST 78* 66* 86*   MG 2.8* 2.4 2.2   PHOS 2.1* 4.1 3.3       All pertinent labs within the past 24 hours have been reviewed.    Significant Imaging:  I have reviewed all pertinent imaging results/findings within the past 24 hours.    Assessment/Plan:     * Nonrheumatic mitral valve regurgitation    Neuro/Psych:   -- Sedation: Propofol and precedex. Remain intubated  -- Pain: PRN Oxy, Fentanyl pushes prn  -- Scheduled Tylenol             Cards:   -- S/P TV replacement, MV replacement, MAZE, Lt Atrial Appendage ligation and Impella placement on  10/7/22  -- Impella: P5, average flow 2.5  -- Reopened POD#0 for high chest tube output - found to have slow persistent bleeding from the IVC and left atriotomy sites  -- HDS on 0.16 epi, 20 giapreza, vaso 0.04, levo 0.14, wean off ana and levo preferentially  -- Impella at P5  -- Ventricular pacing wires. back up at 40 BPM  -- MAP > 65, Syst < 140  -- Aspirin 325mg holding for now  -- Amiodarone gtt at 1      Pulm:   -- Goal O2 sat > 90%  -- ABG PRN  -- Wean vent settings as tolerated  -- Remain intubated for now  -- Mediastinal chest tubes x2 and pleural chest tube x1 to suction (total 2.1 L in 24 hours), decreased in output since reopened chest      Renal:  -- Keep lambert for strict I/O  -- Trend BUN/Cr   -- UOP 1.135 L ; +739cc in last 24 hours     Recent Labs     10/07/22  1921 10/07/22  2130 10/08/22  0324   BUN 16 16 20   CREATININE 1.6* 1.7* 1.9*        FEN / GI:   -- Replace lytes as needed  -- Nutrition: NPO  -- GI ppx: famotidine  -- Bowel reg: miralax      ID:   -- Tm: afebrile; WBC stable  -- Viry-op ancef    Recent Labs     10/08/22  0015 10/08/22  0126 10/08/22  0418   WBC 11.49 11.73 15.49*        Heme/Onc:   -- H/H stable   -- Daily CBC  -- 1000 mL Cell saver given back  -- Aspirin 325mg QD; hold for now until okayed by staff  -- Impella: Systemic anticoagulation tomorrow  -- Blood Products: 5 units pRBC, 2 FFP, 2 Platelets, 2 Cryoprecipitate (10/8)    Recent Labs     10/07/22  1921 10/07/22  2022 10/07/22  2130 10/07/22  2304 10/08/22  0324 10/08/22  0409 10/08/22  0418 10/08/22  0510   HGB 6.4*  --  8.0*   < >  --   --  8.6*  --    HCT 19.5*   < > 24.5*   < >  --    < > 24.5* 24*   APTT 50.6*  --  43.5*  --  31.0  --   --   --    INR 1.4*  --  1.3*  --  1.3*  --   --   --     < > = values in this interval not displayed.        Endo:   -- BG goal 140-180  -- Insulin gtt  -- Hgb A1c 5.5  -- Endocrinology consult      PPx:   Feeding: NPO  Analgesia/Sedation: Propofol, precedex, fentanyl / PRN  Oxy + Dilaudid  Thromboembolic prevention: SCDs  HOB >30: Yes  Stress Ulcer ppx: famotidine  Glucose control: Critical care goal 140-180 g/dl, ISS     Lines/Drains/Airway: CVC RIJ, Fierro, Chest tubes x 3, ventricular pacing wires, L radial A-line, Impella, ETT      Dispo/Code Status/Palliative:   -- SICU / Full Code.          Brian Ricardo MD  Critical Care - Surgery  [facility] - Surgical Intensive Care

## 2022-10-08 NOTE — PROGRESS NOTES
10/08/2022  Janis Carballo    Current provider:  Mike Hedrick MD    Device interrogation:  TXP LVAD INTERROGATIONS 10/8/2022 10/8/2022 10/8/2022 10/8/2022 10/8/2022 10/8/2022 10/8/2022   Type Impella Impella Impella Impella Impella Impella Impella   Pulsatility - Intermittent pulse Intermittent pulse Intermittent pulse Intermittent pulse Intermittent pulse Intermittent pulse          Rounded on David Barrios to ensure all mechanical assist device settings (IABP or VAD) were appropriate and all parameters were within limits.  I was able to ensure all back up equipment was present, the staff had no issues, and the Perfusion Department daily rounding was complete.      For implantable VADs: Interrogation of Ventricular assist device was performed with analysis of device parameters and review of device function. I have personally reviewed the interrogation findings and agree with findings as stated.     In emergency, the nursing units have been notified to contact the perfusion department either by:  Calling [phone] from 630am to 4pm Mon thru Fri, utilizing the On-Call Finder functionality of Epic and searching for Perfusion, or by contacting the hospital  from 4pm to 630am and on weekends and asking to speak with the perfusionist on call.    7:53 AM

## 2022-10-08 NOTE — ASSESSMENT & PLAN NOTE
Bg goal 110-140    Continue IV insulin infusion protocol  Requires intensive BG monitoring while on protocol

## 2022-10-08 NOTE — CONSULTS
[facility] - [facility] Int[facility] Care  Adult Nutrition  Consult Note    SUMMARY     Recommendations    1. Rec'd TF regimen of Peptamen VHP @ goal rate of 55 ml/hr to provide 1320 kcals, 121 g pro, and 1109 ml free water + 660 kcals from propofol- this meets 102% kcals needs and 85% protein needs.     2. If propofol discontinued, rec'd TF regimen of Impact Peptide 1.5 @ goal rate of 55 ml/hr to provide 1980 kcals, 124 kcals, and 1016 ml free water- this meets 102% kcal needs and 87% protein needs.     3. If able to tolerate PO intake, ADAT per SLP.    4 .RD following.    Goals: Will meet % EEN/EPN by next RD f/u.  Nutrition Goal Status: new  Communication of RD Recs:  (POC)    Assessment and Plan    Nutrition Problem  Inadequate oral intake    Related to (etiology):   Inability to consume sufficient needs    Signs and Symptoms (as evidenced by):   NPO status    Interventions/Recommendations (treatment strategy):  Collaboration of nutrition care with other providers  EN    Nutrition Diagnosis Status:   New     Reason for Assessment    Reason For Assessment: consult  Diagnosis:  (Nonrheumatic mitral valve regurgitation)  Relevant Medical History: Afib, NSVT, HTN, HFrEF  Interdisciplinary Rounds: did not attend  General Information Comments: RD consulted for s/p cardiac sx. 10/7/22- s/p MV repair, TV repair, MAZE procedure, Left Atrial Appendage Resection and insertion of Impella 5.5 via aortic graft. Unable to speak with pt or assess nutrition status PTA 2/2 being intubated/on vent/sedated. UBW fluctuates per chart review. NFPE not warranted d/t appearing well-nourished. Not appropriate for cardiac diet education at this time. LBM 10/4.  Nutrition Discharge Planning: Pending hospital course    Nutrition Risk Screen    Nutrition Risk Screen: other (see comments) (vent/intubated/sedated)    Nutrition/Diet History    Spiritual, Cultural Beliefs, Hindu Practices, Values that Affect Care: no  Food Allergies:  "NKFA  Factors Affecting Nutritional Intake: NPO, on mechanical ventilation, difficulty/impaired swallowing    Anthropometrics    Temp: 99.2 °F (37.3 °C)  Height: 5' 9" (175.3 cm)  Height (inches): 69 in  Weight Method: Bed Scale  Weight: 94.5 kg (208 lb 5.4 oz)  Weight (lb): 208.34 lb  Ideal Body Weight (IBW), Male: 160 lb  % Ideal Body Weight, Male (lb): 130.21 %  BMI (Calculated): 30.8  BMI Grade: 30 - 34.9- obesity - grade I     Lab/Procedures/Meds    Pertinent Labs Reviewed: reviewed  Pertinent Labs Comments: Glucose 277, Albumin 2.7, Ca 8.6, AST 86, Creatinine 1.9, GFR 39.1, Al Phos 35, T Bili 1.6  Pertinent Medications Reviewed: reviewed  Pertinent Medications Comments: famotidine, propofol, vassopressin, norepinephrine, insulin, heparin    Estimated/Assessed Needs    Weight Used For Calorie Calculations: 94.5 kg (208 lb 5.4 oz)  Energy Calorie Requirements (kcal): 1950 kcals  Energy Need Method: [place] (modified)  Protein Requirements: 142-189 g (1.5-2.0 g/kg IBW)  Weight Used For Protein Calculations: 94.5 kg (208 lb 5.4 oz)  Fluid Requirements (mL): 1 ml or fluid per MD  Estimated Fluid Requirement Method: RDA Method  RDA Method (mL): 1950     Nutrition Prescription Ordered    Current Diet Order: NPO    Evaluation of Received Nutrient/Fluid Intake    Other Calories (kcal): 660 (propofol @ 25 ml/hr)  % Kcal Needs: 34%  I/O: -8.2 L since admit  Energy Calories Required: not meeting needs  % Intake of Estimated Energy Needs: 34%  % Meal Intake: NPO    Nutrition Risk    Level of Risk/Frequency of Follow-up:  (1 time/week)     Monitor and Evaluation    Food and Nutrient Intake: enteral nutrition intake  Food and Nutrient Adminstration: enteral and parenteral nutrition administration  Knowledge/Beliefs/Attitudes: food and nutrition knowledge/skill, beliefs and attitudes  Physical Activity and Function: nutrition-related ADLs and IADLs  Anthropometric Measurements: height/length, weight, weight change, body " mass index  Biochemical Data, Medical Tests and Procedures: electrolyte and renal panel, glucose/endocrine profile, gastrointestinal profile, inflammatory profile, lipid profile  Nutrition-Focused Physical Findings: overall appearance     Nutrition Follow-Up    RD Follow-up?: Yes    Rhonda SOTO

## 2022-10-08 NOTE — HPI
Reason for Consult: Management of Hyperglycemia     Surgical Procedure and Date:  10/7/22 MV repair, TV repair, MAZE procedure, Left Atrial Appendage Resection and insertion of Impella 5.5 via aortic graft    HPI:   Patient is a 62 y.o. male with a diagnosis of atrial fibrillation, hypertension, HFrEF (EF 35%), and severe mitral regurgitation. Patient admitted for surgery and is now  s/p MV repair, TV repair, MAZE procedure, Left Atrial Appendage Resection and insertion of Impella 5.5 via aortic graft. No personal history of DM. Endocrinology consulted for BG management.     Lab Results   Component Value Date    HGBA1C 5.5 09/02/2022

## 2022-10-08 NOTE — PROGRESS NOTES
Pharmacist Renal Dose Adjustment Note    David Barrios is a 62 y.o. male being treated with the medication famotidine    Patient Data:    Vital Signs (Most Recent):  Temp: 99.2 °F (37.3 °C) (10/08/22 1102)  Pulse: (!) 56 (10/08/22 1102)  Resp: 18 (10/08/22 0615)  BP: 132/76 (10/08/22 1102)  SpO2: 96 % (10/08/22 1102)   Vital Signs (72h Range):  Temp:  [97.4 °F (36.3 °C)-101.5 °F (38.6 °C)]   Pulse:  []   Resp:  [0-42]   BP: ()/()   SpO2:  [92 %-100 %]   Arterial Line BP: ()/(45-79)      Recent Labs   Lab 10/07/22  2130 10/08/22  0324 10/08/22  0806   CREATININE 1.7* 1.9* 1.8*     Serum creatinine: 1.8 mg/dL (H) 10/08/22 0806  Estimated creatinine clearance: 47.6 mL/min (A)    Famotidine 20 mg IV IV 12h will be changed to Famotidine 20 mg IV q24h (for CrCl < 50 mL/min)    Pharmacist's Name: Elsa Casas  Pharmacist's Extension: [phone]

## 2022-10-08 NOTE — SUBJECTIVE & OBJECTIVE
Interval HPI:   Overnight events: Received in ICU. Remains intubated and sedated. Impella. Pressor support. BG labile; high insulin requirements overnight. Off insulin this AM.   Eating:  Diet NPO Except for: Medication  Nausea: No  Hypoglycemia and intervention: Yes d10   Fever: No  TPN and/or TF: No    PMH, PSH, FH, SH updated and reviewed     Review of Systems  Unable to obtain due to: Sedation,Intubation,Altered mental status,Critical illness,Reviewed ROS from note dated 10/7/22 per Dr. Rolle    Current Medications and/or Treatments Impacting Glycemic Control  Immunotherapy:    Immunosuppressants       None          Steroids:   Hormones (From admission, onward)      Start     Stop Route Frequency Ordered    10/07/22 1830  angiotensin II (GIAPREZA) 2,500,000 ng in sodium chloride 0.9% 250 mL infusion        Question Answer Comment   Begin at (in ng/kg/min) 20    Titrate by: (in ng/kg/min) 10 ng/kg/min    Titrate interval: (in minutes) 5 minutes    Titrate to maintain: (MAP or SBP) MAP    Greater than: (in mmHg) 65    Maximum dose: (in ng/kg/min) 80    Name of Attending Provider giving approval: Mike Hedrick MD    Is the patient currently receiving norepinephrine >0.2 mcg/kg/min (or equivalent) AND vasopressin 0.04 units/min? Yes    Angiotensin II Infusion Adjustment (DO NOT MODIFY ANSWER) \\[email or web address]\Angiotensin II Titration [email or web address]        -- IV Continuous 10/07/22 1725    10/07/22 1515  vasopressin (PITRESSIN) 0.2 Units/mL in dextrose 5 % 100 mL infusion         -- IV Continuous 10/07/22 1442          Pressors:    Autonomic Drugs (From admission, onward)      Start     Stop Route Frequency Ordered    10/08/22 0315  EPINEPHrine 5 mg in dextrose 5% 250 mL infusion (premix)        Question Answer Comment   Begin at (in mcg/kg/min): 0.01    Titrate by: (in mcg/kg/min) 0.01    Titrate interval: (in minutes) 5    Titrate to maintain: (SBP or MAP or Cardiac Index) MAP    Greater than: (in mmHg)  65    Maximum dose: (in mcg/kg/min) 2        -- IV Continuous 10/08/22 0208    10/07/22 1545  NORepinephrine 4 mg in dextrose 5% 250 mL infusion (premix) (titrating)        Question Answer Comment   Begin at (in mcg/kg/min): 0.01    Titrate by: (in mcg/kg/min) 0.01    Titrate interval: (in minutes) 5    Titrate to maintain: (MAP or SBP) MAP    Greater than: (in mmHg) 65    Maximum dose: (in mcg/kg/min) 2        -- IV Continuous 10/07/22 1442          Hyperglycemia/Diabetes Medications:   Antihyperglycemics (From admission, onward)      Start     Stop Route Frequency Ordered    10/07/22 1500  insulin regular in 0.9 % NaCl 100 unit/100 mL (1 unit/mL) infusion        Question Answer Comment   Insulin rate changes (DO NOT MODIFY ANSWER) \\[email or web address]\epic\Images\Pharmacy\InsulinInfusions\CTS INSULIN [id number].pdf    Enter initial dose (Units/hr): 1        -- IV Continuous 10/07/22 1353             PHYSICAL EXAMINATION:  Vitals:    10/08/22 0900   BP: 121/75   Pulse: 62   Resp:    Temp:      Body mass index is 30.77 kg/m².    Physical Exam  Constitutional: Well developed, well nourished.  ENT: External ears no masses with nose patent.  Neck: Supple; trachea midline.  Cardiovascular: Normal heart sounds, no LE edema. DP +2 bilaterally.  Lungs: Intubated on ventilator; lungs anterior bilaterally clear to auscultation.  Abdomen: Soft, no masses, no hernias. Hypoactive bowel sounds.   MS: No clubbing or cyanosis of nails noted; unable to assess gait.  Skin: No rashes, lesions, or ulcers; no nodules. Mid-sternal incision with dressing; chest tubes.   Psychiatric: ELIZABETH  Neurological: ELIZABETH  Foot: nails in good condition, no amputations noted.

## 2022-10-08 NOTE — OP NOTE
[facility]  Cardiothoracic Surgery Operative Report    Patient Name:  David Barrios; [id number]    Preoperative Diagnosis: High chest tube output s/p cardiac surgery    Postoperative Diagnosis:  Same    Date of Operation:  10/07/2022     Operation:  Bedside mediastinal re-exploration with evacuation of right hemothorax    Surgeon:  Mike Hedrick MD    Fellow:  Javier Rolle MD; Brian Ricardo MD      ---------------------------------------------------------------------------------------------------------------------    Indications for surgery: Mr. Barrios is a 62 year old male who underwent mitral valve repair, tricuspid repair, maze, left atrial appendage resection, and impella placement earlier today.  Postoperatively, chest tube output was noted to be initially low to moderate (60-120cc/hr) then at 8pm 300cc dumped in 30 minutes and hemodynamics had acutely worsend.  The decision was made to perform an emergent bedside re-exploration of the chest. The risks and benefits were explained and informed consent was obtained.     Gross findings: Bleeding was noted at the IVC cannulation site and the left atrial closure site.  About 1.5L of blood was evacuated from the right chest      Procedure:  The patient was emergently prepped and draped in the usual sterile fashion in the ICU due to the emergent situation. A surgical time out was performed.      The chest was re-entered from the previous midline incision.  The sternal wires were carefully removed.  The wire sites were inspected and found good hemostasis.  The chest retractor was placed.  The pericardial well was noted to have a small amount of clotted blood.  The right pleural space was noted to have a large (1.5L) amount of clotted blood.  This clot was carefully removed.  The mediastinal fat was inspected and no bleeding noted.   Attention was turned back to the cardiac structures.  The right and left atriotomy sites, the aortic and superior and  inferior vena cavae cannulation sites, and the epicardial pacing wire sites were all inspected and found that there was slow persistent bleeding from the IVC and left atriotomy sites.  These were repaired with prolene sutures.  The chest was copiously irrigated with warm saline and no bleeding was seen.  The pleural space was now irrigated with warm saline and no bleeding was seen.  The chest tubes were placed again in their original locations. The superior portion of the pericardium was closed with interruptted sutures.     The chest was closed with steel wires and the soft tissue was closed with absorbable suture.  A sterile dressing was applied.       I was present for the entire operation and immediately available thereafter.

## 2022-10-08 NOTE — PLAN OF CARE
Recommendations     1. Rec'd TF regimen of Peptamen VHP @ goal rate of 55 ml/hr to provide 1320 kcals, 121 g pro, and 1109 ml free water + 660 kcals from propofol- this meets 102% kcals needs and 85% protein needs.      2. If propofol discontinued, rec'd TF regimen of Impact Peptide 1.5 @ goal rate of 55 ml/hr to provide 1980 kcals, 124 kcals, and 1016 ml free water- this meets 102% kcal needs and 87% protein needs.      3. If able to tolerate PO intake, ADAT per SLP.     4 .RD following.     Goals: Will meet % EEN/EPN by next RD f/u.  Nutrition Goal Status: new  Communication of RD Recs:  (POC)    Rhonda Page PL-LDN

## 2022-10-08 NOTE — PLAN OF CARE
Pt vss, afebrile, sats >94% on AC 40/5, 10ppm NO. Epi/Vaso/Amio/Prop gtts infusing. MAP >65. SBP <140. Impella, P5, no acute events throughout shift. Labs monitored, electrolytes replaced. 2u PRBC given,  H/H now 9.9/27.8. No skin breakdown noted to sacrum, buttock, and heels, turn q2, protective skin measures in place. POC reviewed with pt sister Lulu, no questions/concerns at this time.  Will continue to monitor.

## 2022-10-08 NOTE — TELEPHONE ENCOUNTER
Pt HH RN called to check on pt. Pt is currently hospitalized. RN advised to call  and ask for pt's nurse's  station. RN verbalized understanding.  Reason for Disposition   [1] Caller is not with the adult (patient) AND [2] probable NON-URGENT symptoms    Additional Information   Negative: [1] Caller is not with the adult (patient) AND [2] reporting urgent symptoms   Negative: Lab result questions   Negative: Medication questions   Negative: Caller can't be reached by phone   Negative: Caller has already spoken to PCP or another triager   Negative: RN needs further essential information from caller in order to complete triage   Negative: Requesting regular office appointment   Negative: [1] Caller requesting NON-URGENT health information AND [2] PCP's office is the best resource   Negative: Health Information question, no triage required and triager able to answer question   Negative: General information question, no triage required and triager able to answer question   Negative: Question about upcoming scheduled test, no triage required and triager able to answer question    Protocols used: Information Only Call-A-

## 2022-10-08 NOTE — PROGRESS NOTES
Dr. Ricardo at bedside, kept updated on patient's VSS, lab values, pressor requirements, and infusion requirements.     Since start of shift:  Linda increased from 20 to 60  Levo increased from 0.1 to 0.4  1 amp of bicarb   2 PRBCs, 2 FFP, 1 Cryo ordered and released.    20:30 CT output ~300 cc  Dr. Hedrick notified. Per MD orders patient will be opened at the bedside.

## 2022-10-08 NOTE — CONSULTS
[facility]  Endocrinology  Diabetes Consult Note    Consult Requested by: Mike Hedrick MD   Reason for admit: Nonrheumatic mitral valve regurgitation    HISTORY OF PRESENT ILLNESS:  Reason for Consult: Management of Hyperglycemia     Surgical Procedure and Date:  10/7/22 MV repair, TV repair, MAZE procedure, Left Atrial Appendage Resection and insertion of Impella 5.5 via aortic graft    HPI:   Patient is a 62 y.o. male with a diagnosis of atrial fibrillation, hypertension, HFrEF (EF 35%), and severe mitral regurgitation. Patient admitted for surgery and is now  s/p MV repair, TV repair, MAZE procedure, Left Atrial Appendage Resection and insertion of Impella 5.5 via aortic graft. No personal history of DM. Endocrinology consulted for BG management.     Lab Results   Component Value Date    HGBA1C 5.5 09/02/2022           Interval HPI:   Overnight events: Received in ICU. Remains intubated and sedated. Impella. Pressor support. BG labile; high insulin requirements overnight. Off insulin this AM.   Eating:  Diet NPO Except for: Medication  Nausea: No  Hypoglycemia and intervention: Yes d10   Fever: No  TPN and/or TF: No    PMH, PSH, FH, SH updated and reviewed     Review of Systems  Unable to obtain due to: Sedation,Intubation,Altered mental status,Critical illness,Reviewed ROS from note dated 10/7/22 per Dr. Rolle    Current Medications and/or Treatments Impacting Glycemic Control  Immunotherapy:    Immunosuppressants       None          Steroids:   Hormones (From admission, onward)      Start     Stop Route Frequency Ordered    10/07/22 1830  angiotensin II (GIAPREZA) 2,500,000 ng in sodium chloride 0.9% 250 mL infusion        Question Answer Comment   Begin at (in ng/kg/min) 20    Titrate by: (in ng/kg/min) 10 ng/kg/min    Titrate interval: (in minutes) 5 minutes    Titrate to maintain: (MAP or SBP) MAP    Greater than: (in mmHg) 65    Maximum dose: (in ng/kg/min) 80    Name of  Attending Provider giving approval: Mike Hedrick MD    Is the patient currently receiving norepinephrine >0.2 mcg/kg/min (or equivalent) AND vasopressin 0.04 units/min? Yes    Angiotensin II Infusion Adjustment (DO NOT MODIFY ANSWER) \\[email or web address]\epic\Images\Pharmacy\Angiotensin II Titration [email or web address].pdf        -- IV Continuous 10/07/22 1725    10/07/22 1515  vasopressin (PITRESSIN) 0.2 Units/mL in dextrose 5 % 100 mL infusion         -- IV Continuous 10/07/22 1442          Pressors:    Autonomic Drugs (From admission, onward)      Start     Stop Route Frequency Ordered    10/08/22 0315  EPINEPHrine 5 mg in dextrose 5% 250 mL infusion (premix)        Question Answer Comment   Begin at (in mcg/kg/min): 0.01    Titrate by: (in mcg/kg/min) 0.01    Titrate interval: (in minutes) 5    Titrate to maintain: (SBP or MAP or Cardiac Index) MAP    Greater than: (in mmHg) 65    Maximum dose: (in mcg/kg/min) 2        -- IV Continuous 10/08/22 0208    10/07/22 1545  NORepinephrine 4 mg in dextrose 5% 250 mL infusion (premix) (titrating)        Question Answer Comment   Begin at (in mcg/kg/min): 0.01    Titrate by: (in mcg/kg/min) 0.01    Titrate interval: (in minutes) 5    Titrate to maintain: (MAP or SBP) MAP    Greater than: (in mmHg) 65    Maximum dose: (in mcg/kg/min) 2        -- IV Continuous 10/07/22 1442          Hyperglycemia/Diabetes Medications:   Antihyperglycemics (From admission, onward)      Start     Stop Route Frequency Ordered    10/07/22 1500  insulin regular in 0.9 % NaCl 100 unit/100 mL (1 unit/mL) infusion        Question Answer Comment   Insulin rate changes (DO NOT MODIFY ANSWER) \\[email or web address]\[employer]\Images\Pharmacy\InsulinInfusions\CTS INSULIN [id number].pdf    Enter initial dose (Units/hr): 1        -- IV Continuous 10/07/22 1353             PHYSICAL EXAMINATION:  Vitals:    10/08/22 0900   BP: 121/75   Pulse: 62   Resp:    Temp:      Body mass index is 30.77 kg/m².    Physical Exam  Constitutional: Well  developed, well nourished.  ENT: External ears no masses with nose patent.  Neck: Supple; trachea midline.  Cardiovascular: Normal heart sounds, no LE edema. DP +2 bilaterally.  Lungs: Intubated on ventilator; lungs anterior bilaterally clear to auscultation.  Abdomen: Soft, no masses, no hernias. Hypoactive bowel sounds.   MS: No clubbing or cyanosis of nails noted; unable to assess gait.  Skin: No rashes, lesions, or ulcers; no nodules. Mid-sternal incision with dressing; chest tubes.   Psychiatric: ELIZABETH  Neurological: ELIZABETH  Foot: nails in good condition, no amputations noted.        Labs Reviewed and Include   Recent Labs   Lab 10/08/22  0806   GLU 81   CALCIUM 9.1   ALBUMIN 2.5*   PROT 3.9*      K 3.7   CO2 20*      BUN 21   CREATININE 1.8*   ALKPHOS 34*   ALT 32   AST 84*   BILITOT 1.0     Lab Results   Component Value Date    WBC 19.24 (H) 10/08/2022    HGB 8.3 (L) 10/08/2022    HCT 20 (LL) 10/08/2022    MCV 87 10/08/2022     (L) 10/08/2022     No results for input(s): TSH, FREET4 in the last 168 hours.  Lab Results   Component Value Date    HGBA1C 5.5 09/02/2022       Nutritional status:   Body mass index is 30.77 kg/m².  Lab Results   Component Value Date    ALBUMIN 2.5 (L) 10/08/2022    ALBUMIN 2.7 (L) 10/08/2022    ALBUMIN 2.7 (L) 10/07/2022     No results found for: PREALBUMIN    Estimated Creatinine Clearance: 47.6 mL/min (A) (based on SCr of 1.8 mg/dL (H)).    Accu-Checks  Recent Labs     10/08/22  0318 10/08/22  0406 10/08/22  0501 10/08/22  0607 10/08/22  0712 10/08/22  0806 10/08/22  0822 10/08/22  0834 10/08/22  0908 10/08/22  1009   POCTGLUCOSE 290* 236* 169* 134* 114* 78 68* 65* 108 107        ASSESSMENT and PLAN    * Nonrheumatic mitral valve regurgitation  Managed per primary.       Transient hyperglycemia post procedure  Bg goal 110-140    Continue IV insulin infusion protocol  Requires intensive BG monitoring while on protocol         Surgical wound present  Optimize BG for  surgical wound healing.         Persistent atrial fibrillation  May increase insulin resistance if uncontrolled.           Plan discussed with patient, family, and RN at bedside.     Esther Rodriguez NP  Endocrinology  [facility]

## 2022-10-08 NOTE — ASSESSMENT & PLAN NOTE
  Neuro/Psych:   -- Sedation: Propofol and precedex. Remain intubated  -- Pain: PRN Oxy, Fentanyl pushes prn  -- Scheduled Tylenol             Cards:   -- S/P TV replacement, MV replacement, MAZE, Lt Atrial Appendage ligation and Impella placement on 10/7/22  -- Impella: P5, average flow 2.5  -- Reopened POD#0 for high chest tube output - found to have slow persistent bleeding from the IVC and left atriotomy sites  -- HDS on 0.16 epi, 20 giapreza, vaso 0.04, levo 0.14, wean off ana and levo preferentially  -- Impella at P5  -- Ventricular pacing wires. back up at 40 BPM  -- MAP > 65, Syst < 140  -- Aspirin 325mg holding for now  -- Amiodarone gtt at 1      Pulm:   -- Goal O2 sat > 90%  -- ABG PRN  -- Wean vent settings as tolerated  -- Remain intubated for now  -- Mediastinal chest tubes x2 and pleural chest tube x1 to suction (total 2.1 L in 24 hours), decreased in output since reopened chest      Renal:  -- Keep lambert for strict I/O  -- Trend BUN/Cr   -- UOP 1.135 L ; +739cc in last 24 hours     Recent Labs     10/07/22  1921 10/07/22  2130 10/08/22  0324   BUN 16 16 20   CREATININE 1.6* 1.7* 1.9*        FEN / GI:   -- Replace lytes as needed  -- Nutrition: NPO  -- GI ppx: famotidine  -- Bowel reg: miralax      ID:   -- Tm: afebrile; WBC stable  -- Viry-op ancef    Recent Labs     10/08/22  0015 10/08/22  0126 10/08/22  0418   WBC 11.49 11.73 15.49*        Heme/Onc:   -- H/H stable   -- Daily CBC  -- 1000 mL Cell saver given back  -- Aspirin 325mg QD; hold for now until okayed by staff  -- Impella: Systemic anticoagulation tomorrow  -- Blood Products: 5 units pRBC, 2 FFP, 2 Platelets, 2 Cryoprecipitate (10/8)    Recent Labs     10/07/22  1921 10/07/22  2022 10/07/22  2130 10/07/22  2304 10/08/22  0324 10/08/22  0409 10/08/22  0418 10/08/22  0510   HGB 6.4*  --  8.0*   < >  --   --  8.6*  --    HCT 19.5*   < > 24.5*   < >  --    < > 24.5* 24*   APTT 50.6*  --  43.5*  --  31.0  --   --   --    INR 1.4*  --   1.3*  --  1.3*  --   --   --     < > = values in this interval not displayed.        Endo:   -- BG goal 140-180  -- Insulin gtt  -- Hgb A1c 5.5  -- Endocrinology consult      PPx:   Feeding: NPO  Analgesia/Sedation: Propofol, precedex, fentanyl / PRN Oxy + Dilaudid  Thromboembolic prevention: SCDs  HOB >30: Yes  Stress Ulcer ppx: famotidine  Glucose control: Critical care goal 140-180 g/dl, ISS     Lines/Drains/Airway: CVC RIJ, Fierro, Chest tubes x 3, ventricular pacing wires, L radial A-line, Impella, ETT      Dispo/Code Status/Palliative:   -- SICU / Full Code.

## 2022-10-09 LAB
ALBUMIN SERPL BCP-MCNC: 2.8 G/DL (ref 3.5–5.2)
ALBUMIN SERPL BCP-MCNC: 2.9 G/DL (ref 3.5–5.2)
ALLENS TEST: ABNORMAL
ALP SERPL-CCNC: 46 U/L (ref 55–135)
ALP SERPL-CCNC: 52 U/L (ref 55–135)
ALT SERPL W/O P-5'-P-CCNC: 11 U/L (ref 10–44)
ALT SERPL W/O P-5'-P-CCNC: 13 U/L (ref 10–44)
ANION GAP SERPL CALC-SCNC: 10 MMOL/L (ref 8–16)
ANION GAP SERPL CALC-SCNC: 11 MMOL/L (ref 8–16)
ANISOCYTOSIS BLD QL SMEAR: SLIGHT
ANISOCYTOSIS BLD QL SMEAR: SLIGHT
APTT BLDCRRT: 45.5 SEC (ref 21–32)
APTT BLDCRRT: 45.7 SEC (ref 21–32)
AST SERPL-CCNC: 38 U/L (ref 10–40)
AST SERPL-CCNC: 51 U/L (ref 10–40)
BASO STIPL BLD QL SMEAR: ABNORMAL
BASOPHILS # BLD AUTO: 0.04 K/UL (ref 0–0.2)
BASOPHILS # BLD AUTO: ABNORMAL K/UL (ref 0–0.2)
BASOPHILS NFR BLD: 0 % (ref 0–1.9)
BASOPHILS NFR BLD: 0.2 % (ref 0–1.9)
BILIRUB SERPL-MCNC: 0.7 MG/DL (ref 0.1–1)
BILIRUB SERPL-MCNC: 0.9 MG/DL (ref 0.1–1)
BUN SERPL-MCNC: 28 MG/DL (ref 8–23)
BUN SERPL-MCNC: 31 MG/DL (ref 8–23)
CALCIUM SERPL-MCNC: 8.5 MG/DL (ref 8.7–10.5)
CALCIUM SERPL-MCNC: 8.5 MG/DL (ref 8.7–10.5)
CHLORIDE SERPL-SCNC: 103 MMOL/L (ref 95–110)
CHLORIDE SERPL-SCNC: 105 MMOL/L (ref 95–110)
CO2 SERPL-SCNC: 19 MMOL/L (ref 23–29)
CO2 SERPL-SCNC: 20 MMOL/L (ref 23–29)
CREAT SERPL-MCNC: 1.5 MG/DL (ref 0.5–1.4)
CREAT SERPL-MCNC: 1.6 MG/DL (ref 0.5–1.4)
DELSYS: ABNORMAL
DIFFERENTIAL METHOD: ABNORMAL
DIFFERENTIAL METHOD: ABNORMAL
EOSINOPHIL # BLD AUTO: 0.1 K/UL (ref 0–0.5)
EOSINOPHIL # BLD AUTO: ABNORMAL K/UL (ref 0–0.5)
EOSINOPHIL NFR BLD: 0 % (ref 0–8)
EOSINOPHIL NFR BLD: 0.2 % (ref 0–8)
ERYTHROCYTE [DISTWIDTH] IN BLOOD BY AUTOMATED COUNT: 17.7 % (ref 11.5–14.5)
ERYTHROCYTE [DISTWIDTH] IN BLOOD BY AUTOMATED COUNT: 17.8 % (ref 11.5–14.5)
EST. GFR  (NO RACE VARIABLE): 48.1 ML/MIN/1.73 M^2
EST. GFR  (NO RACE VARIABLE): 52 ML/MIN/1.73 M^2
FIO2: 40
FIO2: 40
GLUCOSE SERPL-MCNC: 136 MG/DL (ref 70–110)
GLUCOSE SERPL-MCNC: 136 MG/DL (ref 70–110)
HCO3 UR-SCNC: 22.2 MMOL/L (ref 24–28)
HCO3 UR-SCNC: 22.5 MMOL/L (ref 24–28)
HCO3 UR-SCNC: 24.8 MMOL/L (ref 24–28)
HCO3 UR-SCNC: 25.7 MMOL/L (ref 24–28)
HCT VFR BLD AUTO: 27.1 % (ref 40–54)
HCT VFR BLD AUTO: 27.8 % (ref 40–54)
HCT VFR BLD CALC: 25 %PCV (ref 36–54)
HCT VFR BLD CALC: 26 %PCV (ref 36–54)
HGB BLD-MCNC: 9.4 G/DL (ref 14–18)
HGB BLD-MCNC: 9.5 G/DL (ref 14–18)
HYPOCHROMIA BLD QL SMEAR: ABNORMAL
HYPOCHROMIA BLD QL SMEAR: ABNORMAL
IMM GRANULOCYTES # BLD AUTO: 0.15 K/UL (ref 0–0.04)
IMM GRANULOCYTES # BLD AUTO: ABNORMAL K/UL (ref 0–0.04)
IMM GRANULOCYTES NFR BLD AUTO: 0.6 % (ref 0–0.5)
IMM GRANULOCYTES NFR BLD AUTO: ABNORMAL % (ref 0–0.5)
LACTATE SERPL-SCNC: 2.2 MMOL/L (ref 0.5–2.2)
LDH SERPL L TO P-CCNC: 2.53 MMOL/L (ref 0.36–1.25)
LYMPHOCYTES # BLD AUTO: 1.5 K/UL (ref 1–4.8)
LYMPHOCYTES # BLD AUTO: ABNORMAL K/UL (ref 1–4.8)
LYMPHOCYTES NFR BLD: 6 % (ref 18–48)
LYMPHOCYTES NFR BLD: 6.3 % (ref 18–48)
MAGNESIUM SERPL-MCNC: 2.1 MG/DL (ref 1.6–2.6)
MAGNESIUM SERPL-MCNC: 2.1 MG/DL (ref 1.6–2.6)
MCH RBC QN AUTO: 29.3 PG (ref 27–31)
MCH RBC QN AUTO: 29.7 PG (ref 27–31)
MCHC RBC AUTO-ENTMCNC: 34.2 G/DL (ref 32–36)
MCHC RBC AUTO-ENTMCNC: 34.7 G/DL (ref 32–36)
MCV RBC AUTO: 86 FL (ref 82–98)
MCV RBC AUTO: 86 FL (ref 82–98)
MODE: ABNORMAL
MONOCYTES # BLD AUTO: 2.5 K/UL (ref 0.3–1)
MONOCYTES # BLD AUTO: ABNORMAL K/UL (ref 0.3–1)
MONOCYTES NFR BLD: 0 % (ref 4–15)
MONOCYTES NFR BLD: 10.5 % (ref 4–15)
MYELOCYTES NFR BLD MANUAL: 1 %
NEUTROPHILS # BLD AUTO: 19.9 K/UL (ref 1.8–7.7)
NEUTROPHILS NFR BLD: 81 % (ref 38–73)
NEUTROPHILS NFR BLD: 82.2 % (ref 38–73)
NEUTS BAND NFR BLD MANUAL: 12 %
NRBC BLD-RTO: 0 /100 WBC
NRBC BLD-RTO: 0 /100 WBC
OVALOCYTES BLD QL SMEAR: ABNORMAL
OVALOCYTES BLD QL SMEAR: ABNORMAL
PCO2 BLDA: 30 MMHG (ref 35–45)
PCO2 BLDA: 33.5 MMHG (ref 35–45)
PCO2 BLDA: 34 MMHG (ref 35–45)
PCO2 BLDA: 36.4 MMHG (ref 35–45)
PEEP: 5
PH SMN: 7.42 [PH] (ref 7.35–7.45)
PH SMN: 7.44 [PH] (ref 7.35–7.45)
PH SMN: 7.48 [PH] (ref 7.35–7.45)
PH SMN: 7.49 [PH] (ref 7.35–7.45)
PHOSPHATE SERPL-MCNC: 4.4 MG/DL (ref 2.7–4.5)
PHOSPHATE SERPL-MCNC: 4.6 MG/DL (ref 2.7–4.5)
PLATELET # BLD AUTO: 74 K/UL (ref 150–450)
PLATELET # BLD AUTO: 95 K/UL (ref 150–450)
PMV BLD AUTO: 12.6 FL (ref 9.2–12.9)
PMV BLD AUTO: 12.8 FL (ref 9.2–12.9)
PO2 BLDA: 27 MMHG (ref 40–60)
PO2 BLDA: 63 MMHG (ref 80–100)
PO2 BLDA: 75 MMHG (ref 80–100)
PO2 BLDA: 91 MMHG (ref 80–100)
POC BE: -1 MMOL/L
POC BE: -2 MMOL/L
POC BE: 1 MMOL/L
POC BE: 2 MMOL/L
POC IONIZED CALCIUM: 1.18 MMOL/L (ref 1.06–1.42)
POC IONIZED CALCIUM: 1.25 MMOL/L (ref 1.06–1.42)
POC SATURATED O2: 53 % (ref 95–100)
POC SATURATED O2: 94 % (ref 95–100)
POC SATURATED O2: 96 % (ref 95–100)
POC SATURATED O2: 97 % (ref 95–100)
POC SVO2: 53 %
POC TCO2: 23 MMOL/L (ref 23–27)
POC TCO2: 23 MMOL/L (ref 23–27)
POC TCO2: 26 MMOL/L (ref 24–29)
POC TCO2: 27 MMOL/L (ref 23–27)
POCT GLUCOSE: 121 MG/DL (ref 70–110)
POCT GLUCOSE: 129 MG/DL (ref 70–110)
POCT GLUCOSE: 133 MG/DL (ref 70–110)
POCT GLUCOSE: 134 MG/DL (ref 70–110)
POCT GLUCOSE: 135 MG/DL (ref 70–110)
POCT GLUCOSE: 137 MG/DL (ref 70–110)
POIKILOCYTOSIS BLD QL SMEAR: SLIGHT
POIKILOCYTOSIS BLD QL SMEAR: SLIGHT
POLYCHROMASIA BLD QL SMEAR: ABNORMAL
POLYCHROMASIA BLD QL SMEAR: ABNORMAL
POTASSIUM BLD-SCNC: 4.7 MMOL/L (ref 3.5–5.1)
POTASSIUM BLD-SCNC: 5.1 MMOL/L (ref 3.5–5.1)
POTASSIUM SERPL-SCNC: 4.8 MMOL/L (ref 3.5–5.1)
POTASSIUM SERPL-SCNC: 5.2 MMOL/L (ref 3.5–5.1)
PROT SERPL-MCNC: 4.4 G/DL (ref 6–8.4)
PROT SERPL-MCNC: 5 G/DL (ref 6–8.4)
PS: 10
RBC # BLD AUTO: 3.16 M/UL (ref 4.6–6.2)
RBC # BLD AUTO: 3.24 M/UL (ref 4.6–6.2)
SAMPLE: ABNORMAL
SITE: ABNORMAL
SODIUM BLD-SCNC: 136 MMOL/L (ref 136–145)
SODIUM BLD-SCNC: 138 MMOL/L (ref 136–145)
SODIUM SERPL-SCNC: 132 MMOL/L (ref 136–145)
SODIUM SERPL-SCNC: 136 MMOL/L (ref 136–145)
SPHEROCYTES BLD QL SMEAR: ABNORMAL
WBC # BLD AUTO: 20.89 K/UL (ref 3.9–12.7)
WBC # BLD AUTO: 24.26 K/UL (ref 3.9–12.7)

## 2022-10-09 PROCEDURE — 85007 BL SMEAR W/DIFF WBC COUNT: CPT | Performed by: THORACIC SURGERY (CARDIOTHORACIC VASCULAR SURGERY)

## 2022-10-09 PROCEDURE — 82803 BLOOD GASES ANY COMBINATION: CPT

## 2022-10-09 PROCEDURE — 85025 COMPLETE CBC W/AUTO DIFF WBC: CPT | Performed by: THORACIC SURGERY (CARDIOTHORACIC VASCULAR SURGERY)

## 2022-10-09 PROCEDURE — 84100 ASSAY OF PHOSPHORUS: CPT | Performed by: STUDENT IN AN ORGANIZED HEALTH CARE EDUCATION/TRAINING PROGRAM

## 2022-10-09 PROCEDURE — 99291 PR CRITICAL CARE, E/M 30-74 MINUTES: ICD-10-PCS | Mod: ,,, | Performed by: ANESTHESIOLOGY

## 2022-10-09 PROCEDURE — 99900035 HC TECH TIME PER 15 MIN (STAT)

## 2022-10-09 PROCEDURE — 99900026 HC AIRWAY MAINTENANCE (STAT)

## 2022-10-09 PROCEDURE — 83605 ASSAY OF LACTIC ACID: CPT

## 2022-10-09 PROCEDURE — 25000003 PHARM REV CODE 250: Performed by: STUDENT IN AN ORGANIZED HEALTH CARE EDUCATION/TRAINING PROGRAM

## 2022-10-09 PROCEDURE — 63600175 PHARM REV CODE 636 W HCPCS: Performed by: STUDENT IN AN ORGANIZED HEALTH CARE EDUCATION/TRAINING PROGRAM

## 2022-10-09 PROCEDURE — 85027 COMPLETE CBC AUTOMATED: CPT | Performed by: THORACIC SURGERY (CARDIOTHORACIC VASCULAR SURGERY)

## 2022-10-09 PROCEDURE — 63600175 PHARM REV CODE 636 W HCPCS

## 2022-10-09 PROCEDURE — 27200966 HC CLOSED SUCTION SYSTEM

## 2022-10-09 PROCEDURE — 83735 ASSAY OF MAGNESIUM: CPT | Performed by: STUDENT IN AN ORGANIZED HEALTH CARE EDUCATION/TRAINING PROGRAM

## 2022-10-09 PROCEDURE — 82330 ASSAY OF CALCIUM: CPT

## 2022-10-09 PROCEDURE — 94003 VENT MGMT INPAT SUBQ DAY: CPT

## 2022-10-09 PROCEDURE — 99291 CRITICAL CARE FIRST HOUR: CPT | Mod: ,,, | Performed by: ANESTHESIOLOGY

## 2022-10-09 PROCEDURE — 84100 ASSAY OF PHOSPHORUS: CPT | Mod: 91 | Performed by: THORACIC SURGERY (CARDIOTHORACIC VASCULAR SURGERY)

## 2022-10-09 PROCEDURE — 80053 COMPREHEN METABOLIC PANEL: CPT | Mod: 91 | Performed by: THORACIC SURGERY (CARDIOTHORACIC VASCULAR SURGERY)

## 2022-10-09 PROCEDURE — 63600367 HC NITRIC OXIDE PER HOUR

## 2022-10-09 PROCEDURE — 27000203 HC IMPELLA ADD'L DAY (CL)

## 2022-10-09 PROCEDURE — 85730 THROMBOPLASTIN TIME PARTIAL: CPT | Performed by: STUDENT IN AN ORGANIZED HEALTH CARE EDUCATION/TRAINING PROGRAM

## 2022-10-09 PROCEDURE — 25000003 PHARM REV CODE 250

## 2022-10-09 PROCEDURE — 83605 ASSAY OF LACTIC ACID: CPT | Performed by: THORACIC SURGERY (CARDIOTHORACIC VASCULAR SURGERY)

## 2022-10-09 PROCEDURE — 80053 COMPREHEN METABOLIC PANEL: CPT

## 2022-10-09 PROCEDURE — 94761 N-INVAS EAR/PLS OXIMETRY MLT: CPT

## 2022-10-09 PROCEDURE — 20000000 HC ICU ROOM

## 2022-10-09 PROCEDURE — 25000003 PHARM REV CODE 250: Performed by: THORACIC SURGERY (CARDIOTHORACIC VASCULAR SURGERY)

## 2022-10-09 PROCEDURE — 37799 UNLISTED PX VASCULAR SURGERY: CPT

## 2022-10-09 PROCEDURE — 84132 ASSAY OF SERUM POTASSIUM: CPT

## 2022-10-09 PROCEDURE — 83735 ASSAY OF MAGNESIUM: CPT | Mod: 91 | Performed by: THORACIC SURGERY (CARDIOTHORACIC VASCULAR SURGERY)

## 2022-10-09 PROCEDURE — 84295 ASSAY OF SERUM SODIUM: CPT

## 2022-10-09 PROCEDURE — 85730 THROMBOPLASTIN TIME PARTIAL: CPT | Mod: 91 | Performed by: THORACIC SURGERY (CARDIOTHORACIC VASCULAR SURGERY)

## 2022-10-09 PROCEDURE — 27000221 HC OXYGEN, UP TO 24 HOURS

## 2022-10-09 PROCEDURE — 85014 HEMATOCRIT: CPT

## 2022-10-09 RX ORDER — INSULIN ASPART 100 [IU]/ML
0-5 INJECTION, SOLUTION INTRAVENOUS; SUBCUTANEOUS EVERY 6 HOURS PRN
Status: DISCONTINUED | OUTPATIENT
Start: 2022-10-09 | End: 2022-10-10

## 2022-10-09 RX ORDER — GLUCAGON 1 MG
1 KIT INJECTION
Status: DISCONTINUED | OUTPATIENT
Start: 2022-10-09 | End: 2022-10-10

## 2022-10-09 RX ADMIN — MUPIROCIN: 20 OINTMENT TOPICAL at 08:10

## 2022-10-09 RX ADMIN — OXYCODONE HYDROCHLORIDE 10 MG: 10 TABLET ORAL at 03:10

## 2022-10-09 RX ADMIN — DEXMEDETOMIDINE HYDROCHLORIDE 1.4 MCG/KG/HR: 4 INJECTION INTRAVENOUS at 02:10

## 2022-10-09 RX ADMIN — PROPOFOL 10 MCG/KG/MIN: 10 INJECTION, EMULSION INTRAVENOUS at 04:10

## 2022-10-09 RX ADMIN — HYDROMORPHONE HYDROCHLORIDE 0.5 MG: 1 INJECTION, SOLUTION INTRAMUSCULAR; INTRAVENOUS; SUBCUTANEOUS at 11:10

## 2022-10-09 RX ADMIN — ACETAMINOPHEN 1000 MG: 500 TABLET ORAL at 03:10

## 2022-10-09 RX ADMIN — HYDROMORPHONE HYDROCHLORIDE 0.5 MG: 1 INJECTION, SOLUTION INTRAMUSCULAR; INTRAVENOUS; SUBCUTANEOUS at 10:10

## 2022-10-09 RX ADMIN — HYDROMORPHONE HYDROCHLORIDE 0.5 MG: 1 INJECTION, SOLUTION INTRAMUSCULAR; INTRAVENOUS; SUBCUTANEOUS at 05:10

## 2022-10-09 RX ADMIN — FAMOTIDINE 20 MG: 10 INJECTION, SOLUTION INTRAVENOUS at 09:10

## 2022-10-09 RX ADMIN — HYDROMORPHONE HYDROCHLORIDE 0.5 MG: 1 INJECTION, SOLUTION INTRAMUSCULAR; INTRAVENOUS; SUBCUTANEOUS at 02:10

## 2022-10-09 RX ADMIN — AMIODARONE HYDROCHLORIDE 0.5 MG/MIN: 1.8 INJECTION, SOLUTION INTRAVENOUS at 07:10

## 2022-10-09 RX ADMIN — ACETAMINOPHEN 1000 MG: 500 TABLET ORAL at 05:10

## 2022-10-09 RX ADMIN — AMIODARONE HYDROCHLORIDE 0.5 MG/MIN: 1.8 INJECTION, SOLUTION INTRAVENOUS at 09:10

## 2022-10-09 RX ADMIN — ACETAMINOPHEN 1000 MG: 500 TABLET ORAL at 10:10

## 2022-10-09 RX ADMIN — HYDROMORPHONE HYDROCHLORIDE 0.5 MG: 1 INJECTION, SOLUTION INTRAMUSCULAR; INTRAVENOUS; SUBCUTANEOUS at 07:10

## 2022-10-09 RX ADMIN — DEXMEDETOMIDINE HYDROCHLORIDE 1.4 MCG/KG/HR: 4 INJECTION INTRAVENOUS at 06:10

## 2022-10-09 RX ADMIN — HYDROMORPHONE HYDROCHLORIDE 0.5 MG: 1 INJECTION, SOLUTION INTRAMUSCULAR; INTRAVENOUS; SUBCUTANEOUS at 08:10

## 2022-10-09 RX ADMIN — DEXMEDETOMIDINE HYDROCHLORIDE 1.4 MCG/KG/HR: 4 INJECTION INTRAVENOUS at 04:10

## 2022-10-09 RX ADMIN — MUPIROCIN: 20 OINTMENT TOPICAL at 09:10

## 2022-10-09 RX ADMIN — DEXMEDETOMIDINE HYDROCHLORIDE 1.4 MCG/KG/HR: 4 INJECTION INTRAVENOUS at 07:10

## 2022-10-09 RX ADMIN — HYDROMORPHONE HYDROCHLORIDE 0.5 MG: 1 INJECTION, SOLUTION INTRAMUSCULAR; INTRAVENOUS; SUBCUTANEOUS at 12:10

## 2022-10-09 RX ADMIN — Medication 0.08 MCG/KG/MIN: at 04:10

## 2022-10-09 RX ADMIN — DEXMEDETOMIDINE HYDROCHLORIDE 0.2 MCG/KG/HR: 4 INJECTION INTRAVENOUS at 02:10

## 2022-10-09 RX ADMIN — FUROSEMIDE 10 MG/HR: 10 INJECTION, SOLUTION INTRAMUSCULAR; INTRAVENOUS at 09:10

## 2022-10-09 RX ADMIN — DEXMEDETOMIDINE HYDROCHLORIDE 1.4 MCG/KG/HR: 4 INJECTION INTRAVENOUS at 11:10

## 2022-10-09 RX ADMIN — DEXMEDETOMIDINE HYDROCHLORIDE 1.4 MCG/KG/HR: 4 INJECTION INTRAVENOUS at 09:10

## 2022-10-09 NOTE — PROGRESS NOTES
Propofol and nitric weaned off this AM per MD orders and patient placed on spontaneous, 40%, 8/5.   Patient O2 sat 89%, PA pressures 75/15, CV{ 18. Patient agitated, but not focused to voice despite Precedex at 1.4 and PRN pain medication.   Dr. Griffin notified, per MD orders Nitric placed on 1 ppm and low dose Propofol restarted.

## 2022-10-09 NOTE — ASSESSMENT & PLAN NOTE
  Neuro/Psych:   -- Sedation: Propofol and precedex. Remain intubated  -- Pain: PRN Oxy, Fentanyl pushes prn  -- Scheduled Tylenol             Cards:   -- S/P TV replacement, MV replacement, MAZE, Lt Atrial Appendage ligation and Impella placement on 10/7/22  -- Impella: P5  -- Reopened POD#0 for high chest tube output - found to have slow persistent bleeding from the IVC and left atriotomy sites  -- HDS on 0.08 epi, vaso 0.02  -- Impella at P5  -- Ventricular pacing wires. back up at 45 BPM  -- MAP > 65, Syst < 140  -- Aspirin 325mg holding for now, will discuss with staff for ASA   -- Amiodarone gtt at 1      Pulm:   -- Goal O2 sat > 90%  -- ABG PRN  -- Sharee - wean as tolerated, currently at 0.7, monitor CVP for response  -- Wean vent settings as tolerated  -- Remain intubated for now, over to Spontaneous this morning  -- Mediastinal chest tubes x2 and pleural chest tube x1 to suction (total 530 cc in 24 hours), decreased in output since reopened chest      Renal:  -- Keep lambert for strict I/O  -- Trend BUN/Cr   -- UOP 825cc ; +2.2 L in last 24 hours     Recent Labs     10/08/22  0806 10/08/22  1601 10/09/22  0348   BUN 21 23 28*   CREATININE 1.8* 1.7* 1.6*        FEN / GI:   -- Replace lytes as needed  -- Nutrition: NPO  -- GI ppx: famotidine  -- Bowel reg: miralax      ID:   -- Tm: febrile to 101.6; WBC uptrending (15.49 yesterday morning)  -- Viry-op ancef completed, discuss with staff on restarting antibiotics    Recent Labs     10/08/22  1215 10/08/22  1601 10/09/22  0348   WBC 19.90* 20.24* 24.26*        Heme/Onc:   -- H/H stable   -- Daily CBC  -- 1000 mL Cell saver given back  -- Aspirin 325mg QD; hold for now until okayed by staff  -- Impella: Systemic Heparin gtt with goal aPTT 40 - 60  -- Blood Products: 5 units pRBC, 2 FFP, 2 Platelets, 2 Cryoprecipitate (10/8)    Recent Labs     10/07/22  1921 10/07/22  2022 10/07/22  2130 10/07/22  2304 10/08/22  0324 10/08/22  0409 10/09/22  4678  10/09/22  0543   HGB 6.4*  --  8.0*   < >  --    < > 9.5*  --    HCT 19.5*   < > 24.5*   < >  --    < > 27.8* 26*   APTT 50.6*  --  43.5*  --  31.0   < > 45.7*  --    INR 1.4*  --  1.3*  --  1.3*  --   --   --     < > = values in this interval not displayed.        Endo:   -- BG goal 140-180  -- Insulin gtt  -- Hgb A1c 5.5  -- Endocrinology managing      PPx:   Feeding: NPO  Analgesia/Sedation: Propofol, precedex / PRN Oxy + Dilaudid  Thromboembolic prevention: SCDs, Heparin gtt  HOB >30: Yes  Stress Ulcer ppx: famotidine  Glucose control: Critical care goal 140-180 g/dl, ISS     Lines/Drains/Airway: CVC RIJ, Fierro, Chest tubes x 3, ventricular pacing wires, L radial A-line, Impella, ETT      Dispo/Code Status/Palliative:   -- SICU / Full Code.

## 2022-10-09 NOTE — PROGRESS NOTES
[facility]  Critical Care - Surgery  Progress Note    Patient Name: David Barrios  MRN: [id number]  Admission Date: 10/2/2022  Hospital Length of Stay: 7 days  Code Status: Prior  Attending Provider: Mike Hedrick MD  Primary Care Provider: Breann Tavera MD   Principal Problem: Nonrheumatic mitral valve regurgitation    Subjective:     Hospital/ICU Course:  No notes on file    Interval History/Significant Events:   Weaned pressors overnight. Levo off, epi to 0.08 with goal of 0.04, vaso 0.02, ana off, Sharee to 0.7.  As nitric weaned to off, CVP climbing to 18, turned back on  Given 500 albumin and 2 units pRBCs yesterday with excellent response    Follow-up For: Procedure(s) (LRB):  VALVULOPLASTY,MITRAL VALVE (N/A)  REPAIR, TRICUSPID VALVE (N/A)  INSERTION, IMPELLA (N/A)  MEYER MAZE PROCEDURE (N/A)  EXCLUSION, LEFT ATRIAL APPENDAGE (N/A)    Post-Operative Day: 2 Days Post-Op    Objective:     Vital Signs (Most Recent):  Temp: 100 °F (37.8 °C) (10/09/22 0500)  Pulse: (!) 56 (10/09/22 0515)  Resp: (!) 27 (10/09/22 0338)  BP: (!) 112/59 (10/09/22 0500)  SpO2: 96 % (10/09/22 0515)   Vital Signs (24h Range):  Temp:  [98.3 °F (36.8 °C)-101.6 °F (38.7 °C)] 100 °F (37.8 °C)  Pulse:  [51-74] 56  Resp:  [18-29] 27  SpO2:  [85 %-100 %] 96 %  BP: ()/(59-84) 112/59  Arterial Line BP: ()/(45-75) 112/60     Weight: 97.5 kg (214 lb 15.2 oz)  Body mass index is 31.74 kg/m².      Intake/Output Summary (Last 24 hours) at 10/9/2022 0558  Last data filed at 10/9/2022 0500  Gross per 24 hour   Intake 4256.51 ml   Output 1720 ml   Net 2536.51 ml       Physical Exam  Vitals and nursing note reviewed.   Constitutional:       Appearance: He is ill-appearing.      Comments: Intubated and sedated   HENT:      Head: Normocephalic and atraumatic.   Neck:      Comments: RIJ CVC and [place]  Cardiovascular:      Rate and Rhythm: Normal rate and regular rhythm.      Comments: Impella in place  V wires in  place  Midline incision cdi  Intrinsic rhythm in 80s NS with intermittent Vtach  Pulmonary:      Comments: Mechanically ventillated  Abdominal:      General: Abdomen is flat.      Palpations: Abdomen is soft.   Skin:     General: Skin is warm and dry.      Comments: Left radial a line   Neurological:      Comments: sedated       Vents:  Vent Mode: Spont (10/09/22 0425)  Ventilator Initiated: Yes (10/07/22 1402)  Set Rate: 18 BPM (10/08/22 2305)  Vt Set: 430 mL (10/08/22 2305)  Pressure Support: 8 cmH20 (10/09/22 0425)  PEEP/CPAP: 5 cmH20 (10/09/22 0425)  Oxygen Concentration (%): 40 (10/09/22 0515)  Peak Airway Pressure: 14 cmH2O (10/09/22 0425)  Plateau Pressure: 0 cmH20 (10/09/22 0425)  Total Ve: 10.7 mL (10/09/22 0425)  Negative Inspiratory Force (cm H2O): 0 (10/09/22 0425)  F/VT Ratio<105 (RSBI): (!) 0 (10/07/22 1909)    Lines/Drains/Airways       Central Venous Catheter Line  Duration              Introducer with Double Lumen 10/07/22 0906 1 day    Pulmonary Artery Catheter Assessment  10/07/22 1500 right internal jugular 1 day              Drain  Duration                  Chest Tube 10/07/22  Left Pleural 2 days         NG/OG Tube 10/07/22 1600 Right nostril 1 day         Urethral Catheter 10/07/22 0728 Non-latex;Straight-tip;Temperature probe 14 Fr. 1 day         Y Chest Tube 1 and 2 10/07/22 1225 1 Anterior Mediastinal 19 Fr. 2 Anterior Mediastinal 19 Fr. 1 day              Airway  Duration                  Airway - Non-Surgical 10/07/22 0718 1 day              Arterial Line  Duration             Arterial Line 10/07/22 0706 Left Radial 1 day              Line  Duration                  Pacer Wires 10/07/22 1050 1 day         VAD 10/07/22 1129 Impella 1 day              Peripheral Intravenous Line  Duration                  Peripheral IV - Single Lumen 10/07/22 1100 20 G Anterior;Proximal;Right Forearm 1 day         Peripheral IV - Single Lumen 10/07/22 1600 18 G Right Antecubital 1 day                     Significant Labs:    CBC/Anemia Profile:  Recent Labs   Lab 10/08/22  1215 10/08/22  1516 10/08/22  1601 10/09/22  0348 10/09/22  0543   WBC 19.90*  --  20.24* 24.26*  --    HGB 6.8*  --  9.9* 9.5*  --    HCT 19.3*   < > 27.8* 27.8* 26*   *  --  106* 95*  --    MCV 87  --  85 86  --    RDW 15.4*  --  16.8* 17.7*  --     < > = values in this interval not displayed.        Chemistries:  Recent Labs   Lab 10/08/22  0806 10/08/22  1601 10/08/22  2202 10/09/22  0348    137  --  132*   K 3.7 4.9 4.8 5.2*    108  --  103   CO2 20* 18*  --  19*   BUN 21 23  --  28*   CREATININE 1.8* 1.7*  --  1.6*   CALCIUM 9.1 8.7  --  8.5*   ALBUMIN 2.5* 2.9*  --  2.9*   PROT 3.9* 4.4*  --  4.4*   BILITOT 1.0 1.2*  --  0.9   ALKPHOS 34* 33*  --  46*   ALT 32 21  --  13   AST 84* 70*  --  51*   MG 2.2 2.1  --  2.1   PHOS 3.1 5.2*  --  4.6*       All pertinent labs within the past 24 hours have been reviewed.    Significant Imaging:  I have reviewed all pertinent imaging results/findings within the past 24 hours.    Assessment/Plan:     * Nonrheumatic mitral valve regurgitation    Neuro/Psych:   -- Sedation: Propofol and precedex. Remain intubated  -- Pain: PRN Oxy, Fentanyl pushes prn  -- Scheduled Tylenol             Cards:   -- S/P TV replacement, MV replacement, MAZE, Lt Atrial Appendage ligation and Impella placement on 10/7/22  -- Impella: P5  -- Reopened POD#0 for high chest tube output - found to have slow persistent bleeding from the IVC and left atriotomy sites  -- HDS on 0.08 epi, vaso 0.02  -- Impella at P5  -- Ventricular pacing wires. back up at 45 BPM  -- MAP > 65, Syst < 140  -- Aspirin 325mg holding for now, will discuss with staff for ASA   -- Amiodarone gtt at 1      Pulm:   -- Goal O2 sat > 90%  -- ABG PRN  -- Sharee - wean as tolerated, currently at 0.7, monitor CVP for response  -- Wean vent settings as tolerated  -- Remain intubated for now, over to Spontaneous this morning  -- Mediastinal  chest tubes x2 and pleural chest tube x1 to suction (total 530 cc in 24 hours), decreased in output since reopened chest      Renal:  -- Keep lambert for strict I/O  -- Trend BUN/Cr   -- UOP 825cc ; +2.2 L in last 24 hours     Recent Labs     10/08/22  0806 10/08/22  1601 10/09/22  0348   BUN 21 23 28*   CREATININE 1.8* 1.7* 1.6*        FEN / GI:   -- Replace lytes as needed  -- Nutrition: NPO  -- GI ppx: famotidine  -- Bowel reg: miralax      ID:   -- Tm: febrile to 101.6; WBC uptrending (15.49 yesterday morning)  -- Viry-op ancef completed, discuss with staff on restarting antibiotics    Recent Labs     10/08/22  1215 10/08/22  1601 10/09/22  0348   WBC 19.90* 20.24* 24.26*        Heme/Onc:   -- H/H stable   -- Daily CBC  -- 1000 mL Cell saver given back  -- Aspirin 325mg QD; hold for now until okayed by staff  -- Impella: Systemic Heparin gtt with goal aPTT 40 - 60  -- Blood Products: 5 units pRBC, 2 FFP, 2 Platelets, 2 Cryoprecipitate (10/8)    Recent Labs     10/07/22  1921 10/07/22  2022 10/07/22  2130 10/07/22  2304 10/08/22  0324 10/08/22  0409 10/09/22  0348 10/09/22  0543   HGB 6.4*  --  8.0*   < >  --    < > 9.5*  --    HCT 19.5*   < > 24.5*   < >  --    < > 27.8* 26*   APTT 50.6*  --  43.5*  --  31.0   < > 45.7*  --    INR 1.4*  --  1.3*  --  1.3*  --   --   --     < > = values in this interval not displayed.        Endo:   -- BG goal 140-180  -- Insulin gtt  -- Hgb A1c 5.5  -- Endocrinology managing      PPx:   Feeding: NPO  Analgesia/Sedation: Propofol, precedex / PRN Oxy + Dilaudid  Thromboembolic prevention: SCDs, Heparin gtt  HOB >30: Yes  Stress Ulcer ppx: famotidine  Glucose control: Critical care goal 140-180 g/dl, ISS     Lines/Drains/Airway: CVC RIJ, Lambert, Chest tubes x 3, ventricular pacing wires, L radial A-line, Impella, ETT      Dispo/Code Status/Palliative:   -- SICU / Full Code.            Brian Ricardo MD  Critical Care - Surgery  [facility]

## 2022-10-09 NOTE — PT/OT/SLP PROGRESS
Physical Therapy      Patient Name:  David Barrios   MRN:  [id number]    Patient not seen today secondary to  (pt not seen due to intubated and sedated.. pt failed SBT.). Will follow-up at a later date.    10/9/2022  .

## 2022-10-09 NOTE — PROGRESS NOTES
Significant events: Heparin started at 400 u/hr. Sedation weaned for SBT. Patient did not tolerate having Nitric off, see previous note. Patient on spontaneous for 2 hrs, switched to rate for Vt 190-200.   Vitals/Respiratory: Mechanically ventilated, FiO2 40%, Peep 5, Rate 18.  O2: >89%.    HR: 50's, sinus bradycardia.   MAP: >65.  SBP: <140.  CVP: 13-14-18.  Temperature max:  101.6 F.   Gtts:  Epi at 0.06, Vaso at 0.02, Amio at 0.5, Heparin, Propofol, and Precedex.   UOP: 35-45 ml/hr.        Last Bowel Movement: 10/6/22.  Chest Tubes: 150 cc/shift, serosanguinous.   Impella 5.5: P5. No alarms/issues overnight.   Neuro: Pupils equal and reactive. Moves all extremities spontaneously, withdraws from pain. Not focused to voice with propofol off, restarted for RASS >2.      Diet:  NPO.   Labs/Accuchecks: Daily labs. Accucheck Q1 hr.         Plan:  Continue ICU care. Wean pressors and ventilator support as tolerated.      Skin:  Turned Q2 hrs. No new skin breakdown noted.  Heel boots and sacral foam in use.

## 2022-10-09 NOTE — CARE UPDATE
BG goal 110-140     -A1C   Lab Results   Component Value Date    HGBA1C 5.5 09/02/2022         -HOME REGIMEN: none    -INPATIENT REGIMEN: off insulin infusion overnight    -GLUCOSE TREND FOR THE PAST 24HRS: 120-130s    -NO HYPOGYCEMIAS NOTED     -TOLERATING % OF PO DIET     -Diet: Diet NPO Except for: Medication    Remains in ICU, intubated and sedated. NAEON. BG reasonably well controlled without insulin. 2 Days Post-Op        Plan:  BG monitoring every 6 hours and low dose correction scale 150/50.     Discharge planning:tbd     Endocrine to continue to follow    ** Please call Endocrine for any BG related issues **

## 2022-10-09 NOTE — SUBJECTIVE & OBJECTIVE
Interval History/Significant Events:   Weaned pressors overnight. Levo off, epi to 0.08 with goal of 0.04, vaso 0.02, ana off, Sharee to 0.7.  As nitric weaned to off, CVP climbing to 18, turned back on  Given 500 albumin and 2 units pRBCs yesterday with excellent response    Follow-up For: Procedure(s) (LRB):  VALVULOPLASTY,MITRAL VALVE (N/A)  REPAIR, TRICUSPID VALVE (N/A)  INSERTION, IMPELLA (N/A)  MEYER MAZE PROCEDURE (N/A)  EXCLUSION, LEFT ATRIAL APPENDAGE (N/A)    Post-Operative Day: 2 Days Post-Op    Objective:     Vital Signs (Most Recent):  Temp: 100 °F (37.8 °C) (10/09/22 0500)  Pulse: (!) 56 (10/09/22 0515)  Resp: (!) 27 (10/09/22 0338)  BP: (!) 112/59 (10/09/22 0500)  SpO2: 96 % (10/09/22 0515)   Vital Signs (24h Range):  Temp:  [98.3 °F (36.8 °C)-101.6 °F (38.7 °C)] 100 °F (37.8 °C)  Pulse:  [51-74] 56  Resp:  [18-29] 27  SpO2:  [85 %-100 %] 96 %  BP: ()/(59-84) 112/59  Arterial Line BP: ()/(45-75) 112/60     Weight: 97.5 kg (214 lb 15.2 oz)  Body mass index is 31.74 kg/m².      Intake/Output Summary (Last 24 hours) at 10/9/2022 0558  Last data filed at 10/9/2022 0500  Gross per 24 hour   Intake 4256.51 ml   Output 1720 ml   Net 2536.51 ml       Physical Exam  Vitals and nursing note reviewed.   Constitutional:       Appearance: He is ill-appearing.      Comments: Intubated and sedated   HENT:      Head: Normocephalic and atraumatic.   Neck:      Comments: RIJ CVC and [place]  Cardiovascular:      Rate and Rhythm: Normal rate and regular rhythm.      Comments: Impella in place  V wires in place  Midline incision cdi  Intrinsic rhythm in 80s NS with intermittent Vtach  Pulmonary:      Comments: Mechanically ventillated  Abdominal:      General: Abdomen is flat.      Palpations: Abdomen is soft.   Skin:     General: Skin is warm and dry.      Comments: Left radial a line   Neurological:      Comments: sedated       Vents:  Vent Mode: Spont (10/09/22 8965)  Ventilator Initiated: Yes (10/07/22 6222)  Set  Rate: 18 BPM (10/08/22 2305)  Vt Set: 430 mL (10/08/22 2305)  Pressure Support: 8 cmH20 (10/09/22 0425)  PEEP/CPAP: 5 cmH20 (10/09/22 0425)  Oxygen Concentration (%): 40 (10/09/22 0515)  Peak Airway Pressure: 14 cmH2O (10/09/22 0425)  Plateau Pressure: 0 cmH20 (10/09/22 0425)  Total Ve: 10.7 mL (10/09/22 0425)  Negative Inspiratory Force (cm H2O): 0 (10/09/22 0425)  F/VT Ratio<105 (RSBI): (!) 0 (10/07/22 1909)    Lines/Drains/Airways       Central Venous Catheter Line  Duration              Introducer with Double Lumen 10/07/22 0906 1 day    Pulmonary Artery Catheter Assessment  10/07/22 1500 right internal jugular 1 day              Drain  Duration                  Chest Tube 10/07/22  Left Pleural 2 days         NG/OG Tube 10/07/22 1600 Right nostril 1 day         Urethral Catheter 10/07/22 0728 Non-latex;Straight-tip;Temperature probe 14 Fr. 1 day         Y Chest Tube 1 and 2 10/07/22 1225 1 Anterior Mediastinal 19 Fr. 2 Anterior Mediastinal 19 Fr. 1 day              Airway  Duration                  Airway - Non-Surgical 10/07/22 0718 1 day              Arterial Line  Duration             Arterial Line 10/07/22 0706 Left Radial 1 day              Line  Duration                  Pacer Wires 10/07/22 1050 1 day         VAD 10/07/22 1129 Impella 1 day              Peripheral Intravenous Line  Duration                  Peripheral IV - Single Lumen 10/07/22 1100 20 G Anterior;Proximal;Right Forearm 1 day         Peripheral IV - Single Lumen 10/07/22 1600 18 G Right Antecubital 1 day                    Significant Labs:    CBC/Anemia Profile:  Recent Labs   Lab 10/08/22  1215 10/08/22  1516 10/08/22  1601 10/09/22  0348 10/09/22  0543   WBC 19.90*  --  20.24* 24.26*  --    HGB 6.8*  --  9.9* 9.5*  --    HCT 19.3*   < > 27.8* 27.8* 26*   *  --  106* 95*  --    MCV 87  --  85 86  --    RDW 15.4*  --  16.8* 17.7*  --     < > = values in this interval not displayed.        Chemistries:  Recent Labs   Lab  10/08/22  0806 10/08/22  1601 10/08/22  2202 10/09/22  0348    137  --  132*   K 3.7 4.9 4.8 5.2*    108  --  103   CO2 20* 18*  --  19*   BUN 21 23  --  28*   CREATININE 1.8* 1.7*  --  1.6*   CALCIUM 9.1 8.7  --  8.5*   ALBUMIN 2.5* 2.9*  --  2.9*   PROT 3.9* 4.4*  --  4.4*   BILITOT 1.0 1.2*  --  0.9   ALKPHOS 34* 33*  --  46*   ALT 32 21  --  13   AST 84* 70*  --  51*   MG 2.2 2.1  --  2.1   PHOS 3.1 5.2*  --  4.6*       All pertinent labs within the past 24 hours have been reviewed.    Significant Imaging:  I have reviewed all pertinent imaging results/findings within the past 24 hours.

## 2022-10-09 NOTE — ANESTHESIA POSTPROCEDURE EVALUATION
Anesthesia Post Evaluation    Patient: David Barrios    Procedure(s) Performed: Procedure(s) (LRB):  VALVULOPLASTY,MITRAL VALVE (N/A)  REPAIR, TRICUSPID VALVE (N/A)  INSERTION, IMPELLA (N/A)  MEYER MAZE PROCEDURE (N/A)  EXCLUSION, LEFT ATRIAL APPENDAGE (N/A)    Final Anesthesia Type: general      Patient location during evaluation: ICU  Patient participation: No - Unable to Participate, Intubation  Level of consciousness: sedated  Post-procedure vital signs: reviewed and stable  Pain management: adequate  Airway patency: patent  SALAS mitigation strategies: Intraoperative administration of CPAP, nasopharyngeal airway, or oral appliance during sedation and Multimodal analgesia  PONV status at discharge: No PONV  Anesthetic complications: no      Cardiovascular status: hemodynamically unstable (requiring inotropic and mechanical support as well as ongoing cardioversions for VTach )  Respiratory status: ETT and ventilator  Hydration status: euvolemic  Follow-up not needed.          Vitals Value Taken Time   /67 10/08/22 1901   Temp 36.9 °C (98.4 °F) 10/08/22 1502   Pulse 51 10/08/22 1902   Resp 20 10/08/22 1549   SpO2 93 % 10/08/22 1902   Vitals shown include unvalidated device data.      No case tracking events are documented in the log.      Pain/Elza Score: Pain Rating Prior to Med Admin: -- (kiara, intubated/sedated) (10/8/2022  3:49 PM)  Pain Rating Post Med Admin: 0 (10/8/2022  5:05 AM)

## 2022-10-09 NOTE — PROGRESS NOTES
10/09/2022  Janis Carballo    Current provider:  Mike Hedrick MD    Device interrogation:  TXP LVAD INTERROGATIONS 10/9/2022 10/9/2022 10/9/2022 10/9/2022 10/9/2022 10/9/2022 10/9/2022   Type Impella Impella Impella Impella Impella Impella Impella   Pulsatility - - - - Pulse - -          Rounded on David Barrios to ensure all mechanical assist device settings (IABP or VAD) were appropriate and all parameters were within limits.  I was able to ensure all back up equipment was present, the staff had no issues, and the Perfusion Department daily rounding was complete.      For implantable VADs: Interrogation of Ventricular assist device was performed with analysis of device parameters and review of device function. I have personally reviewed the interrogation findings and agree with findings as stated.     In emergency, the nursing units have been notified to contact the perfusion department either by:  Calling [phone] from 630am to 4pm Mon thru Fri, utilizing the On-Call Finder functionality of Epic and searching for Perfusion, or by contacting the hospital  from 4pm to 630am and on weekends and asking to speak with the perfusionist on call.    8:07 AM

## 2022-10-10 PROBLEM — I47.20 VT (VENTRICULAR TACHYCARDIA): Status: ACTIVE | Noted: 2022-10-07

## 2022-10-10 LAB
ALBUMIN SERPL BCP-MCNC: 2.5 G/DL (ref 3.5–5.2)
ALBUMIN SERPL BCP-MCNC: 2.5 G/DL (ref 3.5–5.2)
ALBUMIN SERPL BCP-MCNC: 2.8 G/DL (ref 3.5–5.2)
ALLENS TEST: ABNORMAL
ALLENS TEST: NORMAL
ALP SERPL-CCNC: 51 U/L (ref 55–135)
ALP SERPL-CCNC: 59 U/L (ref 55–135)
ALP SERPL-CCNC: 94 U/L (ref 55–135)
ALT SERPL W/O P-5'-P-CCNC: 12 U/L (ref 10–44)
ALT SERPL W/O P-5'-P-CCNC: 16 U/L (ref 10–44)
ALT SERPL W/O P-5'-P-CCNC: 19 U/L (ref 10–44)
ANION GAP SERPL CALC-SCNC: 10 MMOL/L (ref 8–16)
ANION GAP SERPL CALC-SCNC: 7 MMOL/L (ref 8–16)
ANION GAP SERPL CALC-SCNC: 9 MMOL/L (ref 8–16)
ANISOCYTOSIS BLD QL SMEAR: SLIGHT
ANISOCYTOSIS BLD QL SMEAR: SLIGHT
APTT BLDCRRT: 46.6 SEC (ref 21–32)
AST SERPL-CCNC: 33 U/L (ref 10–40)
AST SERPL-CCNC: 37 U/L (ref 10–40)
AST SERPL-CCNC: 44 U/L (ref 10–40)
BACTERIA NPH AEROBE CULT: NORMAL
BASO STIPL BLD QL SMEAR: ABNORMAL
BASOPHILS # BLD AUTO: 0.03 K/UL (ref 0–0.2)
BASOPHILS # BLD AUTO: 0.04 K/UL (ref 0–0.2)
BASOPHILS # BLD AUTO: ABNORMAL K/UL (ref 0–0.2)
BASOPHILS NFR BLD: 0 % (ref 0–1.9)
BASOPHILS NFR BLD: 0.2 % (ref 0–1.9)
BASOPHILS NFR BLD: 0.2 % (ref 0–1.9)
BILIRUB SERPL-MCNC: 0.7 MG/DL (ref 0.1–1)
BILIRUB SERPL-MCNC: 0.9 MG/DL (ref 0.1–1)
BILIRUB SERPL-MCNC: 1.1 MG/DL (ref 0.1–1)
BLD PROD TYP BPU: NORMAL
BLD PROD TYP BPU: NORMAL
BLOOD UNIT EXPIRATION DATE: NORMAL
BLOOD UNIT EXPIRATION DATE: NORMAL
BLOOD UNIT TYPE CODE: 600
BLOOD UNIT TYPE CODE: 600
BLOOD UNIT TYPE: NORMAL
BLOOD UNIT TYPE: NORMAL
BSA FOR ECHO PROCEDURE: 2.16 M2
BUN SERPL-MCNC: 32 MG/DL (ref 8–23)
BUN SERPL-MCNC: 33 MG/DL (ref 8–23)
BUN SERPL-MCNC: 37 MG/DL (ref 8–23)
BURR CELLS BLD QL SMEAR: ABNORMAL
BURR CELLS BLD QL SMEAR: ABNORMAL
CA-I BLDV-SCNC: 1.03 MMOL/L (ref 1.06–1.42)
CA-I BLDV-SCNC: 1.22 MMOL/L (ref 1.06–1.42)
CALCIUM SERPL-MCNC: 8.4 MG/DL (ref 8.7–10.5)
CALCIUM SERPL-MCNC: 9 MG/DL (ref 8.7–10.5)
CALCIUM SERPL-MCNC: 9.7 MG/DL (ref 8.7–10.5)
CHLORIDE SERPL-SCNC: 106 MMOL/L (ref 95–110)
CHLORIDE SERPL-SCNC: 106 MMOL/L (ref 95–110)
CHLORIDE SERPL-SCNC: 107 MMOL/L (ref 95–110)
CO2 SERPL-SCNC: 23 MMOL/L (ref 23–29)
CO2 SERPL-SCNC: 23 MMOL/L (ref 23–29)
CO2 SERPL-SCNC: 25 MMOL/L (ref 23–29)
CODING SYSTEM: NORMAL
CODING SYSTEM: NORMAL
CREAT SERPL-MCNC: 1.2 MG/DL (ref 0.5–1.4)
DELSYS: ABNORMAL
DELSYS: NORMAL
DIFFERENTIAL METHOD: ABNORMAL
DISPENSE STATUS: NORMAL
DISPENSE STATUS: NORMAL
EOSINOPHIL # BLD AUTO: 0 K/UL (ref 0–0.5)
EOSINOPHIL # BLD AUTO: 0.1 K/UL (ref 0–0.5)
EOSINOPHIL # BLD AUTO: ABNORMAL K/UL (ref 0–0.5)
EOSINOPHIL NFR BLD: 0 % (ref 0–8)
EOSINOPHIL NFR BLD: 0.2 % (ref 0–8)
EOSINOPHIL NFR BLD: 0.3 % (ref 0–8)
ERYTHROCYTE [DISTWIDTH] IN BLOOD BY AUTOMATED COUNT: 18 % (ref 11.5–14.5)
ERYTHROCYTE [DISTWIDTH] IN BLOOD BY AUTOMATED COUNT: 18.2 % (ref 11.5–14.5)
ERYTHROCYTE [DISTWIDTH] IN BLOOD BY AUTOMATED COUNT: 18.2 % (ref 11.5–14.5)
ERYTHROCYTE [SEDIMENTATION RATE] IN BLOOD BY WESTERGREN METHOD: 18 MM/H
EST. GFR  (NO RACE VARIABLE): >60 ML/MIN/1.73 M^2
FIO2: 36
FIO2: 40
FIO2: 40
FIO2: 50
FLOW: 13
FLOW: 4
GIANT PLATELETS BLD QL SMEAR: PRESENT
GLUCOSE SERPL-MCNC: 122 MG/DL (ref 70–110)
GLUCOSE SERPL-MCNC: 125 MG/DL (ref 70–110)
GLUCOSE SERPL-MCNC: 128 MG/DL (ref 70–110)
HCO3 UR-SCNC: 26.2 MMOL/L (ref 24–28)
HCO3 UR-SCNC: 27.4 MMOL/L (ref 24–28)
HCO3 UR-SCNC: 27.6 MMOL/L (ref 24–28)
HCO3 UR-SCNC: 27.9 MMOL/L (ref 24–28)
HCT VFR BLD AUTO: 26.5 % (ref 40–54)
HCT VFR BLD AUTO: 26.8 % (ref 40–54)
HCT VFR BLD AUTO: 29.7 % (ref 40–54)
HCT VFR BLD CALC: 27 %PCV (ref 36–54)
HGB BLD-MCNC: 10 G/DL (ref 14–18)
HGB BLD-MCNC: 8.8 G/DL (ref 14–18)
HGB BLD-MCNC: 9.1 G/DL (ref 14–18)
HYPOCHROMIA BLD QL SMEAR: ABNORMAL
IMM GRANULOCYTES # BLD AUTO: 0.1 K/UL (ref 0–0.04)
IMM GRANULOCYTES # BLD AUTO: 0.18 K/UL (ref 0–0.04)
IMM GRANULOCYTES # BLD AUTO: ABNORMAL K/UL (ref 0–0.04)
IMM GRANULOCYTES NFR BLD AUTO: 0.5 % (ref 0–0.5)
IMM GRANULOCYTES NFR BLD AUTO: 1 % (ref 0–0.5)
IMM GRANULOCYTES NFR BLD AUTO: ABNORMAL % (ref 0–0.5)
LDH SERPL L TO P-CCNC: 1.07 MMOL/L (ref 0.36–1.25)
LYMPHOCYTES # BLD AUTO: 0.6 K/UL (ref 1–4.8)
LYMPHOCYTES # BLD AUTO: 1 K/UL (ref 1–4.8)
LYMPHOCYTES # BLD AUTO: ABNORMAL K/UL (ref 1–4.8)
LYMPHOCYTES NFR BLD: 3.1 % (ref 18–48)
LYMPHOCYTES NFR BLD: 4.6 % (ref 18–48)
LYMPHOCYTES NFR BLD: 5.8 % (ref 18–48)
MAGNESIUM SERPL-MCNC: 2.1 MG/DL (ref 1.6–2.6)
MAGNESIUM SERPL-MCNC: 2.1 MG/DL (ref 1.6–2.6)
MAGNESIUM SERPL-MCNC: 2.2 MG/DL (ref 1.6–2.6)
MCH RBC QN AUTO: 29 PG (ref 27–31)
MCH RBC QN AUTO: 29.9 PG (ref 27–31)
MCH RBC QN AUTO: 30.3 PG (ref 27–31)
MCHC RBC AUTO-ENTMCNC: 32.8 G/DL (ref 32–36)
MCHC RBC AUTO-ENTMCNC: 33.7 G/DL (ref 32–36)
MCHC RBC AUTO-ENTMCNC: 34.3 G/DL (ref 32–36)
MCV RBC AUTO: 88 FL (ref 82–98)
MCV RBC AUTO: 88 FL (ref 82–98)
MCV RBC AUTO: 89 FL (ref 82–98)
METAMYELOCYTES NFR BLD MANUAL: 0.7 %
MODE: ABNORMAL
MODE: NORMAL
MONOCYTES # BLD AUTO: 1 K/UL (ref 0.3–1)
MONOCYTES # BLD AUTO: 1.3 K/UL (ref 0.3–1)
MONOCYTES # BLD AUTO: ABNORMAL K/UL (ref 0.3–1)
MONOCYTES NFR BLD: 5.8 % (ref 4–15)
MONOCYTES NFR BLD: 6 % (ref 4–15)
MONOCYTES NFR BLD: 6.8 % (ref 4–15)
MYELOCYTES NFR BLD MANUAL: 0.7 %
NEUTROPHILS # BLD AUTO: 15.1 K/UL (ref 1.8–7.7)
NEUTROPHILS # BLD AUTO: 17.1 K/UL (ref 1.8–7.7)
NEUTROPHILS NFR BLD: 84 % (ref 38–73)
NEUTROPHILS NFR BLD: 87 % (ref 38–73)
NEUTROPHILS NFR BLD: 89.1 % (ref 38–73)
NEUTS BAND NFR BLD MANUAL: 4 %
NRBC BLD-RTO: 0 /100 WBC
NRBC BLD-RTO: 0 /100 WBC
NRBC BLD-RTO: 1 /100 WBC
NUM UNITS TRANS PACKED RBC: NORMAL
NUM UNITS TRANS PACKED RBC: NORMAL
OVALOCYTES BLD QL SMEAR: ABNORMAL
PCO2 BLDA: 36.2 MMHG (ref 35–45)
PCO2 BLDA: 40.5 MMHG (ref 35–45)
PCO2 BLDA: 42.1 MMHG (ref 35–45)
PCO2 BLDA: 44.3 MMHG (ref 35–45)
PEEP: 5
PH SMN: 7.4 [PH] (ref 7.35–7.45)
PH SMN: 7.4 [PH] (ref 7.35–7.45)
PH SMN: 7.44 [PH] (ref 7.35–7.45)
PH SMN: 7.5 [PH] (ref 7.35–7.45)
PHOSPHATE SERPL-MCNC: 4.1 MG/DL (ref 2.7–4.5)
PHOSPHATE SERPL-MCNC: 4.3 MG/DL (ref 2.7–4.5)
PLATELET # BLD AUTO: 103 K/UL (ref 150–450)
PLATELET # BLD AUTO: 65 K/UL (ref 150–450)
PLATELET # BLD AUTO: 71 K/UL (ref 150–450)
PLATELET BLD QL SMEAR: ABNORMAL
PMV BLD AUTO: 12.3 FL (ref 9.2–12.9)
PMV BLD AUTO: 12.5 FL (ref 9.2–12.9)
PMV BLD AUTO: 13.2 FL (ref 9.2–12.9)
PO2 BLDA: 26 MMHG (ref 40–60)
PO2 BLDA: 52 MMHG (ref 80–100)
PO2 BLDA: 89 MMHG (ref 80–100)
PO2 BLDA: 90 MMHG (ref 80–100)
POC BE: 1 MMOL/L
POC BE: 3 MMOL/L
POC BE: 3 MMOL/L
POC BE: 5 MMOL/L
POC IONIZED CALCIUM: 1.23 MMOL/L (ref 1.06–1.42)
POC SATURATED O2: 51 % (ref 95–100)
POC SATURATED O2: 86 % (ref 95–100)
POC SATURATED O2: 97 % (ref 95–100)
POC SATURATED O2: 98 % (ref 95–100)
POC TCO2: 27 MMOL/L (ref 23–27)
POC TCO2: 29 MMOL/L (ref 23–27)
POC TCO2: 29 MMOL/L (ref 23–27)
POC TCO2: 29 MMOL/L (ref 24–29)
POCT GLUCOSE: 122 MG/DL (ref 70–110)
POCT GLUCOSE: 124 MG/DL (ref 70–110)
POCT GLUCOSE: 135 MG/DL (ref 70–110)
POIKILOCYTOSIS BLD QL SMEAR: ABNORMAL
POIKILOCYTOSIS BLD QL SMEAR: SLIGHT
POLYCHROMASIA BLD QL SMEAR: ABNORMAL
POLYCHROMASIA BLD QL SMEAR: ABNORMAL
POTASSIUM BLD-SCNC: 4.1 MMOL/L (ref 3.5–5.1)
POTASSIUM SERPL-SCNC: 3.4 MMOL/L (ref 3.5–5.1)
POTASSIUM SERPL-SCNC: 4 MMOL/L (ref 3.5–5.1)
POTASSIUM SERPL-SCNC: 4.1 MMOL/L (ref 3.5–5.1)
POTASSIUM SERPL-SCNC: 4.3 MMOL/L (ref 3.5–5.1)
PROT SERPL-MCNC: 4.8 G/DL (ref 6–8.4)
PROT SERPL-MCNC: 5.2 G/DL (ref 6–8.4)
PROT SERPL-MCNC: 5.8 G/DL (ref 6–8.4)
RBC # BLD AUTO: 3 M/UL (ref 4.6–6.2)
RBC # BLD AUTO: 3.03 M/UL (ref 4.6–6.2)
RBC # BLD AUTO: 3.34 M/UL (ref 4.6–6.2)
SAMPLE: ABNORMAL
SAMPLE: NORMAL
SITE: ABNORMAL
SITE: NORMAL
SODIUM BLD-SCNC: 139 MMOL/L (ref 136–145)
SODIUM SERPL-SCNC: 138 MMOL/L (ref 136–145)
SODIUM SERPL-SCNC: 138 MMOL/L (ref 136–145)
SODIUM SERPL-SCNC: 140 MMOL/L (ref 136–145)
SPHEROCYTES BLD QL SMEAR: ABNORMAL
VT: 430
WBC # BLD AUTO: 17.36 K/UL (ref 3.9–12.7)
WBC # BLD AUTO: 18.31 K/UL (ref 3.9–12.7)
WBC # BLD AUTO: 19.23 K/UL (ref 3.9–12.7)

## 2022-10-10 PROCEDURE — 99291 PR CRITICAL CARE, E/M 30-74 MINUTES: ICD-10-PCS | Mod: ,,, | Performed by: ANESTHESIOLOGY

## 2022-10-10 PROCEDURE — 63600175 PHARM REV CODE 636 W HCPCS: Performed by: STUDENT IN AN ORGANIZED HEALTH CARE EDUCATION/TRAINING PROGRAM

## 2022-10-10 PROCEDURE — 99900035 HC TECH TIME PER 15 MIN (STAT)

## 2022-10-10 PROCEDURE — 25000003 PHARM REV CODE 250

## 2022-10-10 PROCEDURE — 85730 THROMBOPLASTIN TIME PARTIAL: CPT | Performed by: STUDENT IN AN ORGANIZED HEALTH CARE EDUCATION/TRAINING PROGRAM

## 2022-10-10 PROCEDURE — 94761 N-INVAS EAR/PLS OXIMETRY MLT: CPT

## 2022-10-10 PROCEDURE — 99291 CRITICAL CARE FIRST HOUR: CPT | Mod: ,,, | Performed by: ANESTHESIOLOGY

## 2022-10-10 PROCEDURE — 63600175 PHARM REV CODE 636 W HCPCS: Mod: JG

## 2022-10-10 PROCEDURE — 84295 ASSAY OF SERUM SODIUM: CPT

## 2022-10-10 PROCEDURE — 80053 COMPREHEN METABOLIC PANEL: CPT

## 2022-10-10 PROCEDURE — 82330 ASSAY OF CALCIUM: CPT

## 2022-10-10 PROCEDURE — 94003 VENT MGMT INPAT SUBQ DAY: CPT

## 2022-10-10 PROCEDURE — 85007 BL SMEAR W/DIFF WBC COUNT: CPT | Performed by: STUDENT IN AN ORGANIZED HEALTH CARE EDUCATION/TRAINING PROGRAM

## 2022-10-10 PROCEDURE — 84100 ASSAY OF PHOSPHORUS: CPT | Mod: 91 | Performed by: THORACIC SURGERY (CARDIOTHORACIC VASCULAR SURGERY)

## 2022-10-10 PROCEDURE — 83735 ASSAY OF MAGNESIUM: CPT | Mod: 91 | Performed by: THORACIC SURGERY (CARDIOTHORACIC VASCULAR SURGERY)

## 2022-10-10 PROCEDURE — 94010 BREATHING CAPACITY TEST: CPT

## 2022-10-10 PROCEDURE — 99233 PR SUBSEQUENT HOSPITAL CARE,LEVL III: ICD-10-PCS | Mod: ,,, | Performed by: INTERNAL MEDICINE

## 2022-10-10 PROCEDURE — 80053 COMPREHEN METABOLIC PANEL: CPT | Mod: 91 | Performed by: THORACIC SURGERY (CARDIOTHORACIC VASCULAR SURGERY)

## 2022-10-10 PROCEDURE — 99900017 HC EXTUBATION W/PARAMETERS (STAT)

## 2022-10-10 PROCEDURE — 84132 ASSAY OF SERUM POTASSIUM: CPT | Mod: 91 | Performed by: THORACIC SURGERY (CARDIOTHORACIC VASCULAR SURGERY)

## 2022-10-10 PROCEDURE — 99233 SBSQ HOSP IP/OBS HIGH 50: CPT | Mod: ,,, | Performed by: INTERNAL MEDICINE

## 2022-10-10 PROCEDURE — 25000003 PHARM REV CODE 250: Performed by: THORACIC SURGERY (CARDIOTHORACIC VASCULAR SURGERY)

## 2022-10-10 PROCEDURE — 83605 ASSAY OF LACTIC ACID: CPT

## 2022-10-10 PROCEDURE — 63600175 PHARM REV CODE 636 W HCPCS

## 2022-10-10 PROCEDURE — 85025 COMPLETE CBC W/AUTO DIFF WBC: CPT | Performed by: THORACIC SURGERY (CARDIOTHORACIC VASCULAR SURGERY)

## 2022-10-10 PROCEDURE — 25000003 PHARM REV CODE 250: Performed by: STUDENT IN AN ORGANIZED HEALTH CARE EDUCATION/TRAINING PROGRAM

## 2022-10-10 PROCEDURE — 27000203 HC IMPELLA ADD'L DAY (CL)

## 2022-10-10 PROCEDURE — 84132 ASSAY OF SERUM POTASSIUM: CPT

## 2022-10-10 PROCEDURE — 99900026 HC AIRWAY MAINTENANCE (STAT)

## 2022-10-10 PROCEDURE — 85027 COMPLETE CBC AUTOMATED: CPT | Performed by: STUDENT IN AN ORGANIZED HEALTH CARE EDUCATION/TRAINING PROGRAM

## 2022-10-10 PROCEDURE — 82330 ASSAY OF CALCIUM: CPT | Mod: 91 | Performed by: THORACIC SURGERY (CARDIOTHORACIC VASCULAR SURGERY)

## 2022-10-10 PROCEDURE — 20000000 HC ICU ROOM

## 2022-10-10 PROCEDURE — 27200667 HC PACEMAKER, TEMPORARY MONITORING, PER SHIFT

## 2022-10-10 PROCEDURE — C9248 INJ, CLEVIDIPINE BUTYRATE: HCPCS | Mod: JG

## 2022-10-10 PROCEDURE — 83735 ASSAY OF MAGNESIUM: CPT | Performed by: STUDENT IN AN ORGANIZED HEALTH CARE EDUCATION/TRAINING PROGRAM

## 2022-10-10 PROCEDURE — 94150 VITAL CAPACITY TEST: CPT

## 2022-10-10 PROCEDURE — 85014 HEMATOCRIT: CPT

## 2022-10-10 PROCEDURE — 37799 UNLISTED PX VASCULAR SURGERY: CPT

## 2022-10-10 PROCEDURE — 27000221 HC OXYGEN, UP TO 24 HOURS

## 2022-10-10 PROCEDURE — 84100 ASSAY OF PHOSPHORUS: CPT | Performed by: STUDENT IN AN ORGANIZED HEALTH CARE EDUCATION/TRAINING PROGRAM

## 2022-10-10 PROCEDURE — 82803 BLOOD GASES ANY COMBINATION: CPT

## 2022-10-10 RX ORDER — AMIODARONE HYDROCHLORIDE 200 MG/1
400 TABLET ORAL DAILY
Status: DISCONTINUED | OUTPATIENT
Start: 2022-10-17 | End: 2022-10-11

## 2022-10-10 RX ORDER — AMIODARONE HYDROCHLORIDE 200 MG/1
400 TABLET ORAL DAILY
Status: DISCONTINUED | OUTPATIENT
Start: 2022-10-17 | End: 2022-10-10

## 2022-10-10 RX ORDER — POLYETHYLENE GLYCOL 3350 17 G/17G
17 POWDER, FOR SOLUTION ORAL DAILY
Status: DISCONTINUED | OUTPATIENT
Start: 2022-10-11 | End: 2022-10-11

## 2022-10-10 RX ORDER — FAMOTIDINE 20 MG/1
20 TABLET, FILM COATED ORAL 2 TIMES DAILY
Status: DISCONTINUED | OUTPATIENT
Start: 2022-10-10 | End: 2022-10-11

## 2022-10-10 RX ORDER — ACETAMINOPHEN 500 MG
1000 TABLET ORAL EVERY 8 HOURS
Status: DISCONTINUED | OUTPATIENT
Start: 2022-10-10 | End: 2022-10-10

## 2022-10-10 RX ORDER — AMOXICILLIN 250 MG
1 CAPSULE ORAL 2 TIMES DAILY
Status: DISCONTINUED | OUTPATIENT
Start: 2022-10-10 | End: 2022-10-10

## 2022-10-10 RX ORDER — QUETIAPINE FUMARATE 25 MG/1
25 TABLET, FILM COATED ORAL NIGHTLY
Status: DISCONTINUED | OUTPATIENT
Start: 2022-10-10 | End: 2022-10-11

## 2022-10-10 RX ORDER — FAMOTIDINE 20 MG/1
20 TABLET, FILM COATED ORAL 2 TIMES DAILY
Status: DISCONTINUED | OUTPATIENT
Start: 2022-10-10 | End: 2022-10-10

## 2022-10-10 RX ORDER — AMIODARONE HYDROCHLORIDE 200 MG/1
400 TABLET ORAL 2 TIMES DAILY
Status: DISCONTINUED | OUTPATIENT
Start: 2022-10-10 | End: 2022-10-10

## 2022-10-10 RX ORDER — LIDOCAINE HYDROCHLORIDE 20 MG/ML
100 INJECTION INTRAVENOUS ONCE
Status: COMPLETED | OUTPATIENT
Start: 2022-10-10 | End: 2022-10-10

## 2022-10-10 RX ORDER — AMOXICILLIN 250 MG
1 CAPSULE ORAL 2 TIMES DAILY
Status: DISCONTINUED | OUTPATIENT
Start: 2022-10-10 | End: 2022-10-11

## 2022-10-10 RX ORDER — ACETAMINOPHEN 500 MG
1000 TABLET ORAL EVERY 8 HOURS
Status: DISCONTINUED | OUTPATIENT
Start: 2022-10-10 | End: 2022-10-11

## 2022-10-10 RX ORDER — AMIODARONE HYDROCHLORIDE 200 MG/1
400 TABLET ORAL 2 TIMES DAILY
Status: DISCONTINUED | OUTPATIENT
Start: 2022-10-10 | End: 2022-10-11

## 2022-10-10 RX ORDER — OXYCODONE HYDROCHLORIDE 5 MG/1
5 TABLET ORAL ONCE
Status: COMPLETED | OUTPATIENT
Start: 2022-10-10 | End: 2022-10-10

## 2022-10-10 RX ORDER — POLYETHYLENE GLYCOL 3350 17 G/17G
17 POWDER, FOR SOLUTION ORAL DAILY
Status: DISCONTINUED | OUTPATIENT
Start: 2022-10-11 | End: 2022-10-10

## 2022-10-10 RX ADMIN — HYDROMORPHONE HYDROCHLORIDE 0.5 MG: 1 INJECTION, SOLUTION INTRAMUSCULAR; INTRAVENOUS; SUBCUTANEOUS at 05:10

## 2022-10-10 RX ADMIN — HEPARIN SODIUM: 5000 INJECTION INTRAVENOUS; SUBCUTANEOUS at 05:10

## 2022-10-10 RX ADMIN — LIDOCAINE HYDROCHLORIDE 100 MG: 20 INJECTION INTRAVENOUS at 11:10

## 2022-10-10 RX ADMIN — QUETIAPINE FUMARATE 25 MG: 25 TABLET ORAL at 10:10

## 2022-10-10 RX ADMIN — ACETAMINOPHEN 1000 MG: 500 TABLET ORAL at 05:10

## 2022-10-10 RX ADMIN — SENNOSIDES AND DOCUSATE SODIUM 1 TABLET: 50; 8.6 TABLET ORAL at 10:10

## 2022-10-10 RX ADMIN — ACETAMINOPHEN 1000 MG: 500 TABLET ORAL at 01:10

## 2022-10-10 RX ADMIN — MUPIROCIN: 20 OINTMENT TOPICAL at 10:10

## 2022-10-10 RX ADMIN — HYDROMORPHONE HYDROCHLORIDE 0.5 MG: 1 INJECTION, SOLUTION INTRAMUSCULAR; INTRAVENOUS; SUBCUTANEOUS at 03:10

## 2022-10-10 RX ADMIN — CLEVIPIDINE 2 MG/HR: 0.5 EMULSION INTRAVENOUS at 12:10

## 2022-10-10 RX ADMIN — AMIODARONE HYDROCHLORIDE 400 MG: 200 TABLET ORAL at 11:10

## 2022-10-10 RX ADMIN — HYDROMORPHONE HYDROCHLORIDE 0.5 MG: 1 INJECTION, SOLUTION INTRAMUSCULAR; INTRAVENOUS; SUBCUTANEOUS at 01:10

## 2022-10-10 RX ADMIN — HYDROMORPHONE HYDROCHLORIDE 0.5 MG: 1 INJECTION, SOLUTION INTRAMUSCULAR; INTRAVENOUS; SUBCUTANEOUS at 10:10

## 2022-10-10 RX ADMIN — FAMOTIDINE 20 MG: 20 TABLET ORAL at 08:10

## 2022-10-10 RX ADMIN — HYDROMORPHONE HYDROCHLORIDE 0.5 MG: 1 INJECTION, SOLUTION INTRAMUSCULAR; INTRAVENOUS; SUBCUTANEOUS at 06:10

## 2022-10-10 RX ADMIN — MAGNESIUM SULFATE 2 G: 2 INJECTION INTRAVENOUS at 08:10

## 2022-10-10 RX ADMIN — AMIODARONE HYDROCHLORIDE 1 MG/MIN: 1.8 INJECTION, SOLUTION INTRAVENOUS at 11:10

## 2022-10-10 RX ADMIN — FUROSEMIDE 30 MG/HR: 10 INJECTION, SOLUTION INTRAMUSCULAR; INTRAVENOUS at 11:10

## 2022-10-10 RX ADMIN — OXYCODONE HYDROCHLORIDE 10 MG: 10 TABLET ORAL at 08:10

## 2022-10-10 RX ADMIN — AMIODARONE HYDROCHLORIDE 400 MG: 200 TABLET ORAL at 08:10

## 2022-10-10 RX ADMIN — DEXMEDETOMIDINE HYDROCHLORIDE 1.4 MCG/KG/HR: 4 INJECTION INTRAVENOUS at 04:10

## 2022-10-10 RX ADMIN — OXYCODONE HYDROCHLORIDE 10 MG: 10 TABLET ORAL at 04:10

## 2022-10-10 RX ADMIN — FUROSEMIDE 10 MG/HR: 10 INJECTION, SOLUTION INTRAMUSCULAR; INTRAVENOUS at 06:10

## 2022-10-10 RX ADMIN — CALCIUM GLUCONATE 1 G: 20 INJECTION, SOLUTION INTRAVENOUS at 12:10

## 2022-10-10 RX ADMIN — DEXMEDETOMIDINE HYDROCHLORIDE 1.4 MCG/KG/HR: 4 INJECTION INTRAVENOUS at 01:10

## 2022-10-10 RX ADMIN — OXYCODONE 5 MG: 5 TABLET ORAL at 01:10

## 2022-10-10 RX ADMIN — MUPIROCIN: 20 OINTMENT TOPICAL at 08:10

## 2022-10-10 RX ADMIN — OXYCODONE 5 MG: 5 TABLET ORAL at 06:10

## 2022-10-10 RX ADMIN — AMIODARONE HYDROCHLORIDE 1 MG/MIN: 1.8 INJECTION, SOLUTION INTRAVENOUS at 03:10

## 2022-10-10 RX ADMIN — SENNOSIDES AND DOCUSATE SODIUM 1 TABLET: 50; 8.6 TABLET ORAL at 08:10

## 2022-10-10 RX ADMIN — POTASSIUM CHLORIDE 40 MEQ: 29.8 INJECTION, SOLUTION INTRAVENOUS at 12:10

## 2022-10-10 RX ADMIN — OXYCODONE 5 MG: 5 TABLET ORAL at 04:10

## 2022-10-10 RX ADMIN — HYDROMORPHONE HYDROCHLORIDE 0.5 MG: 1 INJECTION, SOLUTION INTRAMUSCULAR; INTRAVENOUS; SUBCUTANEOUS at 09:10

## 2022-10-10 RX ADMIN — AMIODARONE HYDROCHLORIDE 0.5 MG/MIN: 1.8 INJECTION, SOLUTION INTRAVENOUS at 07:10

## 2022-10-10 NOTE — SUBJECTIVE & OBJECTIVE
Interval History/Significant Events: NAEON. Remains on 0.02 of epi, unable to wean off at this time. Patient spontaneous on the vent for the majority of the night. No arrhythmias overnight.    Follow-up For: Procedure(s) (LRB):  VALVULOPLASTY,MITRAL VALVE (N/A)  REPAIR, TRICUSPID VALVE (N/A)  INSERTION, IMPELLA (N/A)  MEYER MAZE PROCEDURE (N/A)  EXCLUSION, LEFT ATRIAL APPENDAGE (N/A)    Post-Operative Day: 3 Days Post-Op    Objective:     Vital Signs (Most Recent):  Temp: (!) 101.7 °F (38.7 °C) (10/10/22 0600)  Pulse: (!) 53 (10/10/22 0630)  Resp: 18 (10/10/22 0508)  BP: 94/62 (10/10/22 0600)  SpO2: 100 % (10/10/22 0630)   Vital Signs (24h Range):  Temp:  [99.9 °F (37.7 °C)-101.8 °F (38.8 °C)] 101.7 °F (38.7 °C)  Pulse:  [49-62] 53  Resp:  [18-37] 18  SpO2:  [95 %-100 %] 100 %  BP: ()/(57-67) 94/62  Arterial Line BP: ()/(55-65) 97/58     Weight: 96.5 kg (212 lb 11.9 oz)  Body mass index is 31.42 kg/m².      Intake/Output Summary (Last 24 hours) at 10/10/2022 0643  Last data filed at 10/10/2022 0600  Gross per 24 hour   Intake 1777.9 ml   Output 3325 ml   Net -1547.1 ml       Physical Exam  Vitals and nursing note reviewed.   Constitutional:       Appearance: He is ill-appearing.      Comments: Intubated and sedated   HENT:      Head: Normocephalic and atraumatic.   Neck:      Comments: RIJ CVC and [place]  Cardiovascular:      Rate and Rhythm: Normal rate and regular rhythm.      Comments: Impella in place  V wires in place; not paced  Midline incision cdi  Pulmonary:      Comments: Mechanically ventillated  Abdominal:      General: Abdomen is flat.      Palpations: Abdomen is soft.   Skin:     General: Skin is warm and dry.      Comments: Left radial a line   Neurological:      Comments: sedated       Vents:  Vent Mode: Spont (Placed on Spont) (10/10/22 0336)  Ventilator Initiated: Yes (10/07/22 1402)  Set Rate: 18 BPM (10/10/22 0336)  Vt Set: 430 mL (10/10/22 0336)  Pressure Support: 10 cmH20 (10/10/22  0336)  PEEP/CPAP: 5 cmH20 (10/10/22 0336)  Oxygen Concentration (%): 40 (10/10/22 0630)  Peak Airway Pressure: 16 cmH2O (10/10/22 0336)  Plateau Pressure: 16 cmH20 (10/10/22 0336)  Total Ve: 8.05 mL (10/10/22 0336)  Negative Inspiratory Force (cm H2O): 0 (10/10/22 0336)  F/VT Ratio<105 (RSBI): (!) 0 (10/07/22 1909)    Lines/Drains/Airways       Central Venous Catheter Line  Duration              Introducer with Double Lumen 10/07/22 0906 2 days    Pulmonary Artery Catheter Assessment  10/07/22 1500 right internal jugular 2 days              Drain  Duration                  Chest Tube 10/07/22  Left Pleural 3 days         NG/OG Tube 10/07/22 1600 Right nostril 2 days         Urethral Catheter 10/07/22 0728 Non-latex;Straight-tip;Temperature probe 14 Fr. 2 days         Y Chest Tube 1 and 2 10/07/22 1225 1 Anterior Mediastinal 19 Fr. 2 Anterior Mediastinal 19 Fr. 2 days              Airway  Duration                  Airway - Non-Surgical 10/07/22 0718 2 days              Arterial Line  Duration             Arterial Line 10/07/22 0706 Left Radial 2 days              Line  Duration                  Pacer Wires 10/07/22 1050 2 days         VAD 10/07/22 1129 Impella 2 days              Peripheral Intravenous Line  Duration                  Peripheral IV - Single Lumen 10/07/22 1100 20 G Anterior;Proximal;Right Forearm 2 days         Peripheral IV - Single Lumen 10/07/22 1600 18 G Right Antecubital 2 days                    Significant Labs:    CBC/Anemia Profile:  Recent Labs   Lab 10/09/22  1342 10/10/22  0004 10/10/22  0321 10/10/22  0323   WBC 20.89* 18.31*  --  17.36*   HGB 9.4* 9.1*  --  8.8*   HCT 27.1* 26.5* 27* 26.8*   PLT 74* 65*  --  71*   MCV 86 88  --  88   RDW 17.8* 18.2*  --  18.0*        Chemistries:  Recent Labs   Lab 10/09/22  0348 10/09/22  1342 10/10/22  0004 10/10/22  0323   * 136  --  138   K 5.2* 4.8 4.3 4.1    105  --  106   CO2 19* 20*  --  23   BUN 28* 31*  --  32*   CREATININE 1.6*  1.5*  --  1.2   CALCIUM 8.5* 8.5*  --  8.4*   ALBUMIN 2.9* 2.8*  --  2.5*   PROT 4.4* 5.0*  --  4.8*   BILITOT 0.9 0.7  --  0.7   ALKPHOS 46* 52*  --  51*   ALT 13 11  --  12   AST 51* 38  --  33   MG 2.1 2.1  --  2.1   PHOS 4.6* 4.4  --  4.3       All pertinent labs within the past 24 hours have been reviewed.    Significant Imaging:  I have reviewed and interpreted all pertinent imaging results/findings within the past 24 hours.

## 2022-10-10 NOTE — PHYSICIAN QUERY
PT Name: David Barrios  MR #: [id number]    DOCUMENTATION CLARIFICATION      CDS/: Venus Joaquin  RN, CCDS             Contact information: [email or web address]    This form is a permanent document in the medical record.      Query Date: October 10, 2022    By submitting this query, we are merely seeking further clarification of documentation. Please utilize your independent clinical judgment when addressing the question(s) below.    The Medical Record contains the following:   Indicators  Supporting Clinical Findings Location in Medical Record    Anemia documented     X H&H 14.9/47.3-->6.4/19.5-->8.3/23.4-->6.8/19.3 10/7-10/8 lab    BP                    HR      GI bleeding documented     X Acute bleeding (Non GI site)  The right pleural space was noted to have a large (1.5L) amount of clotted blood. 10/7 op note   X Transfusion(s) Intraoperatively he received 2 L of crystalloid, 1 units of  autologous RBC, 1L of Cell Saver     2u PRBC given,     Chest opened at the bedside and then closed.  5 PRBC, 2 FFP, 1 Plt, 1 Cryo administered   10/7 cc h/p      10/8 nurse poc    10/8 nurse note   X Acute/Chronic illness Complex mitral valve repair with A2-P2 [id number] repair , Tricuspid valve repair,   Left atrial Benz Maze cryoablation  Left atrial appendage resection  Direct open chest insertion of Impella 5.5 via aortic graft     Bedside mediastinal re-exploration with evacuation of right hemothorax 10/7 op note              10/7 op note    Treatments      Other       Provider, please specify diagnosis or diagnoses associated with above clinical findings.   [   ] Acute blood loss anemia    [  x ] Acute blood loss anemia expected post-operatively    [   ] Other Hematological Diagnosis (please specify): _________________   [   ] Clinically Undetermined           Please document in your progress notes daily for the duration of treatment, until resolved, and include in your discharge summary.    Form No. [id number]

## 2022-10-10 NOTE — PLAN OF CARE
Interventional cardiology consulted for impella placement review in the setting of NSVT. TTE reviewed with interventional cardiology staff. Impella placement appears adequate and we don't suspect this to be the culprit of his arrhythmias.  If desired, can consider pulling impella back by 1cm.     Discussed with primary team.    Marilu Flores MD  Cardiovascular Disease PGY4  [facility]

## 2022-10-10 NOTE — PROGRESS NOTES
Shortly after discontinuing amiodarone infusion patient went into monomorphic then polymorphic V-tach on continuous monitor. Pt's BP dropped to SBP <50 per arterial line. Pt never lost pulse. Pt received 200J synchronized cardioversion and returned to Sinus Eliot in the 50's. Dr. Reid and Luca at bedside. Orders received to give 100mg of lidocaine, turn amiodarone infusion back to 1mg/min, and draw stat CMP, Mg, Phos, and Ionized Calcium. Will continue to monitor closely.

## 2022-10-10 NOTE — PLAN OF CARE
Problem: Adult Inpatient Plan of Care  Goal: Plan of Care Review  Outcome: Ongoing, Progressing  Pt extubated this shift to nasal cannula with adequate sats. See previous noted for VT event. Gtts: Epi weaned off, Amiodarone at 0.5mg/min, Lasix at 5mg/hr to achieve urine output goal 100-200cc/hr, and heparin at 400u/hr for aPTT goal of 40-60. Impella at P5 with flows 2.4-2.9. Transthoracic Echo performed today. Pacemaker back up at 45. See flowhsheets for assessments intake and out. Urine output excellent throughout shift. Chest tube output moderate and serosanguinous in color. Plan to work with PT/OT tomorrow and possible wean impella. Plan of care reviewed with patient per primary team. All questions and concerns addressed.

## 2022-10-10 NOTE — PROGRESS NOTES
Pt extubated to 2L nasal cannula per MD order via RRT. Pt tolerated extubation well. Will continue to monitor patients respiratory status.

## 2022-10-10 NOTE — ASSESSMENT & PLAN NOTE
  Neuro/Psych:   -- Sedation: Propofol and precedex. Remain intubated  -- Pain: PRN Oxy + Dilaudid, Fentanyl pushes prn  -- Scheduled Tylenol             Cards:   -- S/P TV replacement, MV replacement, MAZE, Lt Atrial Appendage ligation and Impella placement on 10/7/22  -- Impella: P5  -- Hemodynamically unstable post-op requiring high support and antiarrhythmics for frequent VTach runs, multiple cardioversions in transport from OR  -- Reopened POD#0 for high chest tube output - found to have slow persistent bleeding from the IVC and left atriotomy sites  -- HDS on 0.02 epi  -- Impella at P5  -- Ventricular pacing wires. Back up at 45 BPM.  -- MAP > 65, Syst < 140  -- Aspirin 325mg holding for now, will continue to hold in the setting of down-trending platelets  -- Amiodarone gtt at 0.5 mg/hr, transition to PO      Pulm:   -- Goal O2 sat > 90%  -- ABG PRN  -- Sharee - wean as tolerated, currently at 0.7, monitor CVP for response  -- Wean vent settings as tolerated  -- Extubate as mental status improves  -- Mediastinal chest tubes x2 and pleural chest tube x1 to suction (total 490 cc in 24 hours), decreased in output since reopened chest      Renal:  -- Keep lambert for strict I/O  -- Trend BUN/Cr   -- UOP ~3L; -1.5 L in last 24 hours     Recent Labs     10/09/22  0348 10/09/22  1342 10/10/22  0323   BUN 28* 31* 32*   CREATININE 1.6* 1.5* 1.2        FEN / GI:   -- Replace lytes as needed  -- Nutrition: NPO  -- GI ppx: famotidine  -- Bowel reg: miralax      ID:   -- Tm: remains febrile at 101.7; WBC stabilizing  -- Viry-op ancef completed, discuss with staff on restarting antibiotics    Recent Labs     10/09/22  1342 10/10/22  0004 10/10/22  0323   WBC 20.89* 18.31* 17.36*        Heme/Onc:   -- H/H stable  -- Daily CBC  -- 1000 mL Cell saver given back  -- Aspirin 325mg QD; hold for now in the setting of down-trending platelets  -- Impella: Systemic Heparin gtt with goal aPTT 40 - 60  -- Blood Products: 5 units  pRBC, 2 FFP, 2 Platelets, 2 Cryoprecipitate (10/8)    Recent Labs     10/07/22  1921 10/07/22  2022 10/07/22  2130 10/07/22  2304 10/08/22  0324 10/08/22  0409 10/10/22  0323   HGB 6.4*  --  8.0*   < >  --    < > 8.8*   HCT 19.5*   < > 24.5*   < >  --    < > 26.8*   APTT 50.6*  --  43.5*  --  31.0   < > 46.6*   INR 1.4*  --  1.3*  --  1.3*  --   --     < > = values in this interval not displayed.        Endo:   -- BG goal 140-180  -- Insulin gtt  -- Hgb A1c 5.5  -- Endocrinology managing      PPx:   Feeding: NPO  Analgesia/Sedation: Propofol, precedex / PRN Oxy + Dilaudid  Thromboembolic prevention: SCDs, Heparin gtt  HOB >30: Yes  Stress Ulcer ppx: famotidine  Glucose control: Critical care goal 140-180 g/dl, ISS     Lines/Drains/Airway: CVC RIJ, [place], Chest tubes x 3, ventricular pacing wires, L radial A-line, Impella, ETT      Dispo/Code Status/Palliative:   -- SICU / Full Code.

## 2022-10-10 NOTE — PROGRESS NOTES
10/10/2022  Trey Dickerson Jr    Current provider:  Mike Hedrick MD    Device interrogation:  TXP LVAD INTERROGATIONS 10/10/2022 10/10/2022 10/10/2022 10/10/2022 10/10/2022 10/10/2022 10/10/2022   Type Impella Impella Impella Impella Impella Impella Impella   Pulsatility Pulse Pulse Pulse Pulse Pulse Pulse Pulse          Rounded on David Barrios to ensure all mechanical assist device settings (IABP or VAD) were appropriate and all parameters were within limits.  I was able to ensure all back up equipment was present, the staff had no issues, and the Perfusion Department daily rounding was complete.      For implantable VADs: Interrogation of Ventricular assist device was performed with analysis of device parameters and review of device function. I have personally reviewed the interrogation findings and agree with findings as stated.     In emergency, the nursing units have been notified to contact the perfusion department either by:  Calling [phone] from 630am to 4pm Mon thru Fri, utilizing the On-Call Finder functionality of Epic and searching for Perfusion, or by contacting the hospital  from 4pm to 630am and on weekends and asking to speak with the perfusionist on call.    9:25 AM

## 2022-10-10 NOTE — CARE UPDATE
SIGN OFF  Diet NPO Except for: Medication  3 Days Post-Op    Discontinue BG monitoring. Will sign off. Please re-consult Endo as needed.    Patient has no Dx of DM,     Thank you for the consult.     ** Please call Endocrine for any BG related issues **

## 2022-10-10 NOTE — RESPIRATORY THERAPY
Extubated patient to 2L NC per MD order. RT and RN at bedside. NARN. Ambu and mask at bedside. Will continue to monitor.

## 2022-10-10 NOTE — PROGRESS NOTES
[facility]  Critical Care - Surgery  Progress Note    Patient Name: David Barrios  MRN: [id number]  Admission Date: 10/2/2022  Hospital Length of Stay: 8 days  Code Status: Full Code  Attending Provider: Mike Hedrick MD  Primary Care Provider: Breann Tavera MD   Principal Problem: Nonrheumatic mitral valve regurgitation    Subjective:     Hospital/ICU Course:  No notes on file    Interval History/Significant Events: NAEON. Remains on 0.02 of epi, unable to wean off at this time. Patient spontaneous on the vent for the majority of the night. No arrhythmias overnight.    Follow-up For: Procedure(s) (LRB):  VALVULOPLASTY,MITRAL VALVE (N/A)  REPAIR, TRICUSPID VALVE (N/A)  INSERTION, IMPELLA (N/A)  MEYER MAZE PROCEDURE (N/A)  EXCLUSION, LEFT ATRIAL APPENDAGE (N/A)    Post-Operative Day: 3 Days Post-Op    Objective:     Vital Signs (Most Recent):  Temp: (!) 101.7 °F (38.7 °C) (10/10/22 0600)  Pulse: (!) 53 (10/10/22 0630)  Resp: 18 (10/10/22 0508)  BP: 94/62 (10/10/22 0600)  SpO2: 100 % (10/10/22 0630)   Vital Signs (24h Range):  Temp:  [99.9 °F (37.7 °C)-101.8 °F (38.8 °C)] 101.7 °F (38.7 °C)  Pulse:  [49-62] 53  Resp:  [18-37] 18  SpO2:  [95 %-100 %] 100 %  BP: ()/(57-67) 94/62  Arterial Line BP: ()/(55-65) 97/58     Weight: 96.5 kg (212 lb 11.9 oz)  Body mass index is 31.42 kg/m².      Intake/Output Summary (Last 24 hours) at 10/10/2022 0643  Last data filed at 10/10/2022 0600  Gross per 24 hour   Intake 1777.9 ml   Output 3325 ml   Net -1547.1 ml       Physical Exam  Vitals and nursing note reviewed.   Constitutional:       Appearance: He is ill-appearing.      Comments: Intubated and sedated   HENT:      Head: Normocephalic and atraumatic.   Neck:      Comments: RIJ CVC and [place]  Cardiovascular:      Rate and Rhythm: Normal rate and regular rhythm.      Comments: Impella in place  V wires in place; not paced  Midline incision cdi  Pulmonary:      Comments: Mechanically  ventillated  Abdominal:      General: Abdomen is flat.      Palpations: Abdomen is soft.   Skin:     General: Skin is warm and dry.      Comments: Left radial a line   Neurological:      Comments: sedated       Vents:  Vent Mode: Spont (Placed on Spont) (10/10/22 0336)  Ventilator Initiated: Yes (10/07/22 1402)  Set Rate: 18 BPM (10/10/22 0336)  Vt Set: 430 mL (10/10/22 0336)  Pressure Support: 10 cmH20 (10/10/22 0336)  PEEP/CPAP: 5 cmH20 (10/10/22 0336)  Oxygen Concentration (%): 40 (10/10/22 0630)  Peak Airway Pressure: 16 cmH2O (10/10/22 0336)  Plateau Pressure: 16 cmH20 (10/10/22 0336)  Total Ve: 8.05 mL (10/10/22 0336)  Negative Inspiratory Force (cm H2O): 0 (10/10/22 0336)  F/VT Ratio<105 (RSBI): (!) 0 (10/07/22 1909)    Lines/Drains/Airways       Central Venous Catheter Line  Duration              Introducer with Double Lumen 10/07/22 0906 2 days    Pulmonary Artery Catheter Assessment  10/07/22 1500 right internal jugular 2 days              Drain  Duration                  Chest Tube 10/07/22  Left Pleural 3 days         NG/OG Tube 10/07/22 1600 Right nostril 2 days         Urethral Catheter 10/07/22 0728 Non-latex;Straight-tip;Temperature probe 14 Fr. 2 days         Y Chest Tube 1 and 2 10/07/22 1225 1 Anterior Mediastinal 19 Fr. 2 Anterior Mediastinal 19 Fr. 2 days              Airway  Duration                  Airway - Non-Surgical 10/07/22 0718 2 days              Arterial Line  Duration             Arterial Line 10/07/22 0706 Left Radial 2 days              Line  Duration                  Pacer Wires 10/07/22 1050 2 days         VAD 10/07/22 1129 Impella 2 days              Peripheral Intravenous Line  Duration                  Peripheral IV - Single Lumen 10/07/22 1100 20 G Anterior;Proximal;Right Forearm 2 days         Peripheral IV - Single Lumen 10/07/22 1600 18 G Right Antecubital 2 days                    Significant Labs:    CBC/Anemia Profile:  Recent Labs   Lab 10/09/22  1342 10/10/22  0004  10/10/22  0321 10/10/22  0323   WBC 20.89* 18.31*  --  17.36*   HGB 9.4* 9.1*  --  8.8*   HCT 27.1* 26.5* 27* 26.8*   PLT 74* 65*  --  71*   MCV 86 88  --  88   RDW 17.8* 18.2*  --  18.0*        Chemistries:  Recent Labs   Lab 10/09/22  0348 10/09/22  1342 10/10/22  0004 10/10/22  0323   * 136  --  138   K 5.2* 4.8 4.3 4.1    105  --  106   CO2 19* 20*  --  23   BUN 28* 31*  --  32*   CREATININE 1.6* 1.5*  --  1.2   CALCIUM 8.5* 8.5*  --  8.4*   ALBUMIN 2.9* 2.8*  --  2.5*   PROT 4.4* 5.0*  --  4.8*   BILITOT 0.9 0.7  --  0.7   ALKPHOS 46* 52*  --  51*   ALT 13 11  --  12   AST 51* 38  --  33   MG 2.1 2.1  --  2.1   PHOS 4.6* 4.4  --  4.3       All pertinent labs within the past 24 hours have been reviewed.    Significant Imaging:  I have reviewed and interpreted all pertinent imaging results/findings within the past 24 hours.  Assessment/Plan:     * Nonrheumatic mitral valve regurgitation    Neuro/Psych:   -- Sedation: Propofol and precedex. Remain intubated  -- Pain: PRN Oxy + Dilaudid, Fentanyl pushes prn  -- Scheduled Tylenol             Cards:   -- S/P TV replacement, MV replacement, MAZE, Lt Atrial Appendage ligation and Impella placement on 10/7/22  -- Impella: P5  -- Hemodynamically unstable post-op requiring high support and antiarrhythmics for frequent VTach runs, multiple cardioversions in transport from OR  -- Reopened POD#0 for high chest tube output - found to have slow persistent bleeding from the IVC and left atriotomy sites  -- HDS on 0.02 epi  -- Impella at P5  -- Ventricular pacing wires. Back up at 45 BPM.  -- MAP > 65, Syst < 140  -- Aspirin 325mg holding for now, will continue to hold in the setting of down-trending platelets  -- Amiodarone gtt at 0.5 mg/hr, transition to PO      Pulm:   -- Goal O2 sat > 90%  -- ABG PRN  -- Sharee - wean as tolerated, currently at 0.7, monitor CVP for response  -- Wean vent settings as tolerated  -- Extubate as mental status improves  -- Mediastinal  chest tubes x2 and pleural chest tube x1 to suction (total 490 cc in 24 hours), decreased in output since reopened chest      Renal:  -- Keep lambert for strict I/O  -- Trend BUN/Cr   -- UOP ~3L; -1.5 L in last 24 hours     Recent Labs     10/09/22  0348 10/09/22  1342 10/10/22  0323   BUN 28* 31* 32*   CREATININE 1.6* 1.5* 1.2        FEN / GI:   -- Replace lytes as needed  -- Nutrition: NPO  -- GI ppx: famotidine  -- Bowel reg: miralax      ID:   -- Tm: remains febrile at 101.7; WBC stabilizing  -- Viry-op ancef completed, discuss with staff on restarting antibiotics    Recent Labs     10/09/22  1342 10/10/22  0004 10/10/22  0323   WBC 20.89* 18.31* 17.36*        Heme/Onc:   -- H/H stable  -- Daily CBC  -- 1000 mL Cell saver given back  -- Aspirin 325mg QD; hold for now in the setting of down-trending platelets  -- Impella: Systemic Heparin gtt with goal aPTT 40 - 60  -- Blood Products: 5 units pRBC, 2 FFP, 2 Platelets, 2 Cryoprecipitate (10/8)    Recent Labs     10/07/22  1921 10/07/22  2022 10/07/22  2130 10/07/22  2304 10/08/22  0324 10/08/22  0409 10/10/22  0323   HGB 6.4*  --  8.0*   < >  --    < > 8.8*   HCT 19.5*   < > 24.5*   < >  --    < > 26.8*   APTT 50.6*  --  43.5*  --  31.0   < > 46.6*   INR 1.4*  --  1.3*  --  1.3*  --   --     < > = values in this interval not displayed.        Endo:   -- BG goal 140-180  -- Insulin gtt  -- Hgb A1c 5.5  -- Endocrinology managing      PPx:   Feeding: NPO  Analgesia/Sedation: Propofol, precedex / PRN Oxy + Dilaudid  Thromboembolic prevention: SCDs, Heparin gtt  HOB >30: Yes  Stress Ulcer ppx: famotidine  Glucose control: Critical care goal 140-180 g/dl, ISS     Lines/Drains/Airway: CVC RIJ, Lambert, Chest tubes x 3, ventricular pacing wires, L radial A-line, Impella, ETT      Dispo/Code Status/Palliative:   -- SICU / Full Code.         Critical secondary to Patient has a condition that poses threat to life and bodily function.     Critical care was time spent personally  by me on the following activities: development of treatment plan with patient or surrogate and bedside caregivers, discussions with consultants, evaluation of patient's response to treatment, examination of patient, ordering and performing treatments and interventions, ordering and review of laboratory studies, ordering and review of radiographic studies, pulse oximetry, re-evaluation of patient's condition.  This critical care time did not overlap with that of any other provider or involve time for any procedures.     Angel Luis Segovia MD  Critical Care - Surgery  [facility]

## 2022-10-10 NOTE — PT/OT/SLP PROGRESS
Physical Therapy  Not Seen  Patient Name:  David Barrios   MRN:  [id number]  Admitting Diagnosis:  Nonrheumatic mitral valve regurgitation   Recent Surgery: Procedure(s) (LRB):  VALVULOPLASTY,MITRAL VALVE (N/A)  REPAIR, TRICUSPID VALVE (N/A)  INSERTION, IMPELLA (N/A)  MEYER MAZE PROCEDURE (N/A)  EXCLUSION, LEFT ATRIAL APPENDAGE (N/A) 3 Days Post-Op  Admit Date: 10/2/2022  Length of Stay: 8 days    Patient not seen on this date for PT evaluation - patient extubated this morning. He then had a run of v-tach requiring cardioversion, as well as hypotension. PT will check back tomorrow to complete evaluation as he is able to safely participate.    Azalia Monge, PT, DPT  10/10/2022

## 2022-10-11 ENCOUNTER — ANESTHESIA (OUTPATIENT)
Dept: INTENSIVE CARE | Facility: HOSPITAL | Age: 62
DRG: 215 | End: 2022-10-11
Payer: COMMERCIAL

## 2022-10-11 ENCOUNTER — ANESTHESIA EVENT (OUTPATIENT)
Dept: INTENSIVE CARE | Facility: HOSPITAL | Age: 62
DRG: 215 | End: 2022-10-11
Payer: COMMERCIAL

## 2022-10-11 PROBLEM — R57.9 SHOCK: Status: ACTIVE | Noted: 2022-10-11

## 2022-10-11 LAB
ABO + RH BLD: NORMAL
ALBUMIN SERPL BCP-MCNC: 2.1 G/DL (ref 3.5–5.2)
ALBUMIN SERPL BCP-MCNC: 2.3 G/DL (ref 3.5–5.2)
ALBUMIN SERPL BCP-MCNC: 2.6 G/DL (ref 3.5–5.2)
ALBUMIN SERPL BCP-MCNC: 2.6 G/DL (ref 3.5–5.2)
ALLENS TEST: ABNORMAL
ALP SERPL-CCNC: 139 U/L (ref 55–135)
ALP SERPL-CCNC: 204 U/L (ref 55–135)
ALP SERPL-CCNC: 80 U/L (ref 55–135)
ALP SERPL-CCNC: 87 U/L (ref 55–135)
ALT SERPL W/O P-5'-P-CCNC: 17 U/L (ref 10–44)
ALT SERPL W/O P-5'-P-CCNC: 18 U/L (ref 10–44)
ALT SERPL W/O P-5'-P-CCNC: 18 U/L (ref 10–44)
ALT SERPL W/O P-5'-P-CCNC: 20 U/L (ref 10–44)
AMORPH CRY UR QL COMP ASSIST: ABNORMAL
ANION GAP SERPL CALC-SCNC: 11 MMOL/L (ref 8–16)
ANION GAP SERPL CALC-SCNC: 12 MMOL/L (ref 8–16)
ANION GAP SERPL CALC-SCNC: 12 MMOL/L (ref 8–16)
ANION GAP SERPL CALC-SCNC: 9 MMOL/L (ref 8–16)
ANISOCYTOSIS BLD QL SMEAR: SLIGHT
APTT BLDCRRT: 37.3 SEC (ref 21–32)
APTT BLDCRRT: 37.6 SEC (ref 21–32)
APTT BLDCRRT: 45.8 SEC (ref 21–32)
AST SERPL-CCNC: 35 U/L (ref 10–40)
AST SERPL-CCNC: 36 U/L (ref 10–40)
AST SERPL-CCNC: 40 U/L (ref 10–40)
AST SERPL-CCNC: 42 U/L (ref 10–40)
BACTERIA #/AREA URNS AUTO: ABNORMAL /HPF
BASOPHILS # BLD AUTO: 0.07 K/UL (ref 0–0.2)
BASOPHILS NFR BLD: 0 % (ref 0–1.9)
BASOPHILS NFR BLD: 0 % (ref 0–1.9)
BASOPHILS NFR BLD: 0.5 % (ref 0–1.9)
BILIRUB SERPL-MCNC: 1.1 MG/DL (ref 0.1–1)
BILIRUB SERPL-MCNC: 1.2 MG/DL (ref 0.1–1)
BILIRUB SERPL-MCNC: 1.7 MG/DL (ref 0.1–1)
BILIRUB SERPL-MCNC: 1.9 MG/DL (ref 0.1–1)
BILIRUB UR QL STRIP: NEGATIVE
BLD GP AB SCN CELLS X3 SERPL QL: NORMAL
BUN SERPL-MCNC: 46 MG/DL (ref 8–23)
BUN SERPL-MCNC: 46 MG/DL (ref 8–23)
BUN SERPL-MCNC: 48 MG/DL (ref 8–23)
BUN SERPL-MCNC: 49 MG/DL (ref 8–23)
CA-I BLDV-SCNC: 1.07 MMOL/L (ref 1.06–1.42)
CA-I BLDV-SCNC: 1.16 MMOL/L (ref 1.06–1.42)
CALCIUM SERPL-MCNC: 8.2 MG/DL (ref 8.7–10.5)
CALCIUM SERPL-MCNC: 8.5 MG/DL (ref 8.7–10.5)
CALCIUM SERPL-MCNC: 8.7 MG/DL (ref 8.7–10.5)
CALCIUM SERPL-MCNC: 9 MG/DL (ref 8.7–10.5)
CHLORIDE SERPL-SCNC: 102 MMOL/L (ref 95–110)
CHLORIDE SERPL-SCNC: 104 MMOL/L (ref 95–110)
CLARITY UR REFRACT.AUTO: ABNORMAL
CO2 SERPL-SCNC: 21 MMOL/L (ref 23–29)
CO2 SERPL-SCNC: 21 MMOL/L (ref 23–29)
CO2 SERPL-SCNC: 23 MMOL/L (ref 23–29)
CO2 SERPL-SCNC: 23 MMOL/L (ref 23–29)
COLOR UR AUTO: YELLOW
CREAT SERPL-MCNC: 1.4 MG/DL (ref 0.5–1.4)
CREAT SERPL-MCNC: 1.6 MG/DL (ref 0.5–1.4)
CREAT SERPL-MCNC: 1.7 MG/DL (ref 0.5–1.4)
CREAT SERPL-MCNC: 1.8 MG/DL (ref 0.5–1.4)
DELSYS: ABNORMAL
DIFFERENTIAL METHOD: ABNORMAL
DOHLE BOD BLD QL SMEAR: PRESENT
EOSINOPHIL # BLD AUTO: 0 K/UL (ref 0–0.5)
EOSINOPHIL NFR BLD: 0 % (ref 0–8)
ERYTHROCYTE [DISTWIDTH] IN BLOOD BY AUTOMATED COUNT: 18.1 % (ref 11.5–14.5)
ERYTHROCYTE [DISTWIDTH] IN BLOOD BY AUTOMATED COUNT: 18.3 % (ref 11.5–14.5)
ERYTHROCYTE [DISTWIDTH] IN BLOOD BY AUTOMATED COUNT: 18.4 % (ref 11.5–14.5)
ERYTHROCYTE [SEDIMENTATION RATE] IN BLOOD BY WESTERGREN METHOD: 24 MM/H
EST. GFR  (NO RACE VARIABLE): 41.8 ML/MIN/1.73 M^2
EST. GFR  (NO RACE VARIABLE): 44.7 ML/MIN/1.73 M^2
EST. GFR  (NO RACE VARIABLE): 48.1 ML/MIN/1.73 M^2
EST. GFR  (NO RACE VARIABLE): 56.5 ML/MIN/1.73 M^2
FIO2: 100
FIO2: 100
FIO2: 36
FIO2: 40
FIO2: 50
FLOW: 4
FLOW: 5
FLOW: 5
GLUCOSE SERPL-MCNC: 108 MG/DL (ref 70–110)
GLUCOSE SERPL-MCNC: 123 MG/DL (ref 70–110)
GLUCOSE SERPL-MCNC: 148 MG/DL (ref 70–110)
GLUCOSE SERPL-MCNC: 150 MG/DL (ref 70–110)
GLUCOSE UR QL STRIP: NEGATIVE
HCO3 UR-SCNC: 21.6 MMOL/L (ref 24–28)
HCO3 UR-SCNC: 21.7 MMOL/L (ref 24–28)
HCO3 UR-SCNC: 22.2 MMOL/L (ref 24–28)
HCO3 UR-SCNC: 23.1 MMOL/L (ref 24–28)
HCO3 UR-SCNC: 23.4 MMOL/L (ref 24–28)
HCO3 UR-SCNC: 26.6 MMOL/L (ref 24–28)
HCO3 UR-SCNC: 27 MMOL/L (ref 24–28)
HCT VFR BLD AUTO: 21.2 % (ref 40–54)
HCT VFR BLD AUTO: 23.6 % (ref 40–54)
HCT VFR BLD AUTO: 29 % (ref 40–54)
HCT VFR BLD CALC: 24 %PCV (ref 36–54)
HCT VFR BLD CALC: 25 %PCV (ref 36–54)
HCT VFR BLD CALC: 26 %PCV (ref 36–54)
HCT VFR BLD CALC: 26 %PCV (ref 36–54)
HGB BLD-MCNC: 7.3 G/DL (ref 14–18)
HGB BLD-MCNC: 7.9 G/DL (ref 14–18)
HGB BLD-MCNC: 9.4 G/DL (ref 14–18)
HGB UR QL STRIP: ABNORMAL
HYALINE CASTS UR QL AUTO: 15 /LPF
HYPOCHROMIA BLD QL SMEAR: ABNORMAL
HYPOCHROMIA BLD QL SMEAR: ABNORMAL
IMM GRANULOCYTES # BLD AUTO: 0.16 K/UL (ref 0–0.04)
IMM GRANULOCYTES # BLD AUTO: ABNORMAL K/UL (ref 0–0.04)
IMM GRANULOCYTES # BLD AUTO: ABNORMAL K/UL (ref 0–0.04)
IMM GRANULOCYTES NFR BLD AUTO: 1.1 % (ref 0–0.5)
IMM GRANULOCYTES NFR BLD AUTO: ABNORMAL % (ref 0–0.5)
IMM GRANULOCYTES NFR BLD AUTO: ABNORMAL % (ref 0–0.5)
KETONES UR QL STRIP: NEGATIVE
LDH SERPL L TO P-CCNC: 1.63 MMOL/L (ref 0.36–1.25)
LDH SERPL L TO P-CCNC: 2.1 MMOL/L (ref 0.36–1.25)
LDH SERPL L TO P-CCNC: 2.75 MMOL/L (ref 0.36–1.25)
LDH SERPL L TO P-CCNC: 3.19 MMOL/L (ref 0.36–1.25)
LDH SERPL L TO P-CCNC: 3.25 MMOL/L (ref 0.36–1.25)
LDH SERPL L TO P-CCNC: 3.54 MMOL/L (ref 0.36–1.25)
LEUKOCYTE ESTERASE UR QL STRIP: ABNORMAL
LYMPHOCYTES # BLD AUTO: 0.3 K/UL (ref 1–4.8)
LYMPHOCYTES NFR BLD: 1.8 % (ref 18–48)
LYMPHOCYTES NFR BLD: 2 % (ref 18–48)
LYMPHOCYTES NFR BLD: 4 % (ref 18–48)
MAGNESIUM SERPL-MCNC: 2.2 MG/DL (ref 1.6–2.6)
MAGNESIUM SERPL-MCNC: 2.3 MG/DL (ref 1.6–2.6)
MAGNESIUM SERPL-MCNC: 2.5 MG/DL (ref 1.6–2.6)
MAGNESIUM SERPL-MCNC: 2.5 MG/DL (ref 1.6–2.6)
MCH RBC QN AUTO: 29.3 PG (ref 27–31)
MCH RBC QN AUTO: 29.5 PG (ref 27–31)
MCH RBC QN AUTO: 29.8 PG (ref 27–31)
MCHC RBC AUTO-ENTMCNC: 32.4 G/DL (ref 32–36)
MCHC RBC AUTO-ENTMCNC: 33.5 G/DL (ref 32–36)
MCHC RBC AUTO-ENTMCNC: 34.4 G/DL (ref 32–36)
MCV RBC AUTO: 87 FL (ref 82–98)
MCV RBC AUTO: 88 FL (ref 82–98)
MCV RBC AUTO: 90 FL (ref 82–98)
MICROSCOPIC COMMENT: ABNORMAL
MODE: ABNORMAL
MONOCYTES # BLD AUTO: 0.7 K/UL (ref 0.3–1)
MONOCYTES NFR BLD: 3 % (ref 4–15)
MONOCYTES NFR BLD: 4.6 % (ref 4–15)
MONOCYTES NFR BLD: 5 % (ref 4–15)
MYELOCYTES NFR BLD MANUAL: 1 %
NEUTROPHILS # BLD AUTO: 13.9 K/UL (ref 1.8–7.7)
NEUTROPHILS NFR BLD: 85 % (ref 38–73)
NEUTROPHILS NFR BLD: 88 % (ref 38–73)
NEUTROPHILS NFR BLD: 92 % (ref 38–73)
NEUTS BAND NFR BLD MANUAL: 10 %
NEUTS BAND NFR BLD MANUAL: 2 %
NITRITE UR QL STRIP: NEGATIVE
NRBC BLD-RTO: 1 /100 WBC
NRBC BLD-RTO: 1 /100 WBC
NRBC BLD-RTO: 2 /100 WBC
OVALOCYTES BLD QL SMEAR: ABNORMAL
PCO2 BLDA: 30.9 MMHG (ref 35–45)
PCO2 BLDA: 32.6 MMHG (ref 35–45)
PCO2 BLDA: 32.9 MMHG (ref 35–45)
PCO2 BLDA: 33.1 MMHG (ref 35–45)
PCO2 BLDA: 34.9 MMHG (ref 35–45)
PCO2 BLDA: 40.9 MMHG (ref 35–45)
PCO2 BLDA: 46.4 MMHG (ref 35–45)
PEEP: 5
PH SMN: 7.37 [PH] (ref 7.35–7.45)
PH SMN: 7.42 [PH] (ref 7.35–7.45)
PH SMN: 7.43 [PH] (ref 7.35–7.45)
PH SMN: 7.43 [PH] (ref 7.35–7.45)
PH SMN: 7.44 [PH] (ref 7.35–7.45)
PH SMN: 7.45 [PH] (ref 7.35–7.45)
PH SMN: 7.45 [PH] (ref 7.35–7.45)
PH UR STRIP: 5 [PH] (ref 5–8)
PHOSPHATE SERPL-MCNC: 3.7 MG/DL (ref 2.7–4.5)
PHOSPHATE SERPL-MCNC: 4.2 MG/DL (ref 2.7–4.5)
PHOSPHATE SERPL-MCNC: 4.3 MG/DL (ref 2.7–4.5)
PHOSPHATE SERPL-MCNC: 5.5 MG/DL (ref 2.7–4.5)
PLATELET # BLD AUTO: 75 K/UL (ref 150–450)
PLATELET # BLD AUTO: 88 K/UL (ref 150–450)
PLATELET # BLD AUTO: 89 K/UL (ref 150–450)
PLATELET BLD QL SMEAR: ABNORMAL
PLATELET BLD QL SMEAR: ABNORMAL
PMV BLD AUTO: 12.8 FL (ref 9.2–12.9)
PMV BLD AUTO: 13.3 FL (ref 9.2–12.9)
PMV BLD AUTO: ABNORMAL FL (ref 9.2–12.9)
PO2 BLDA: 105 MMHG (ref 80–100)
PO2 BLDA: 121 MMHG (ref 80–100)
PO2 BLDA: 167 MMHG (ref 80–100)
PO2 BLDA: 258 MMHG (ref 80–100)
PO2 BLDA: 30 MMHG (ref 40–60)
PO2 BLDA: 50 MMHG (ref 80–100)
PO2 BLDA: 65 MMHG (ref 80–100)
POC BE: -1 MMOL/L
POC BE: -1 MMOL/L
POC BE: -2 MMOL/L
POC BE: -2 MMOL/L
POC BE: -3 MMOL/L
POC BE: 2 MMOL/L
POC BE: 2 MMOL/L
POC IONIZED CALCIUM: 1.1 MMOL/L (ref 1.06–1.42)
POC IONIZED CALCIUM: 1.13 MMOL/L (ref 1.06–1.42)
POC IONIZED CALCIUM: 1.16 MMOL/L (ref 1.06–1.42)
POC IONIZED CALCIUM: 1.22 MMOL/L (ref 1.06–1.42)
POC SATURATED O2: 100 % (ref 95–100)
POC SATURATED O2: 100 % (ref 95–100)
POC SATURATED O2: 54 % (ref 95–100)
POC SATURATED O2: 88 % (ref 95–100)
POC SATURATED O2: 93 % (ref 95–100)
POC SATURATED O2: 98 % (ref 95–100)
POC SATURATED O2: 99 % (ref 95–100)
POC SVO2: 54 %
POC TCO2: 22 MMOL/L (ref 23–27)
POC TCO2: 23 MMOL/L (ref 23–27)
POC TCO2: 23 MMOL/L (ref 23–27)
POC TCO2: 24 MMOL/L (ref 23–27)
POC TCO2: 24 MMOL/L (ref 23–27)
POC TCO2: 28 MMOL/L (ref 23–27)
POC TCO2: 28 MMOL/L (ref 24–29)
POIKILOCYTOSIS BLD QL SMEAR: SLIGHT
POIKILOCYTOSIS BLD QL SMEAR: SLIGHT
POLYCHROMASIA BLD QL SMEAR: ABNORMAL
POLYCHROMASIA BLD QL SMEAR: ABNORMAL
POTASSIUM BLD-SCNC: 3.1 MMOL/L (ref 3.5–5.1)
POTASSIUM BLD-SCNC: 3.2 MMOL/L (ref 3.5–5.1)
POTASSIUM BLD-SCNC: 3.3 MMOL/L (ref 3.5–5.1)
POTASSIUM BLD-SCNC: 3.7 MMOL/L (ref 3.5–5.1)
POTASSIUM SERPL-SCNC: 3.2 MMOL/L (ref 3.5–5.1)
POTASSIUM SERPL-SCNC: 3.2 MMOL/L (ref 3.5–5.1)
POTASSIUM SERPL-SCNC: 3.4 MMOL/L (ref 3.5–5.1)
POTASSIUM SERPL-SCNC: 4.1 MMOL/L (ref 3.5–5.1)
POTASSIUM SERPL-SCNC: 4.4 MMOL/L (ref 3.5–5.1)
PROCALCITONIN SERPL IA-MCNC: 5.37 NG/ML
PROT SERPL-MCNC: 4.7 G/DL (ref 6–8.4)
PROT SERPL-MCNC: 4.7 G/DL (ref 6–8.4)
PROT SERPL-MCNC: 5.1 G/DL (ref 6–8.4)
PROT SERPL-MCNC: 5.6 G/DL (ref 6–8.4)
PROT UR QL STRIP: ABNORMAL
RBC # BLD AUTO: 2.45 M/UL (ref 4.6–6.2)
RBC # BLD AUTO: 2.68 M/UL (ref 4.6–6.2)
RBC # BLD AUTO: 3.21 M/UL (ref 4.6–6.2)
RBC #/AREA URNS AUTO: >100 /HPF (ref 0–4)
SAMPLE: ABNORMAL
SCHISTOCYTES BLD QL SMEAR: ABNORMAL
SITE: ABNORMAL
SMUDGE CELLS BLD QL SMEAR: PRESENT
SODIUM BLD-SCNC: 137 MMOL/L (ref 136–145)
SODIUM BLD-SCNC: 138 MMOL/L (ref 136–145)
SODIUM BLD-SCNC: 138 MMOL/L (ref 136–145)
SODIUM BLD-SCNC: 140 MMOL/L (ref 136–145)
SODIUM SERPL-SCNC: 134 MMOL/L (ref 136–145)
SODIUM SERPL-SCNC: 136 MMOL/L (ref 136–145)
SODIUM SERPL-SCNC: 137 MMOL/L (ref 136–145)
SODIUM SERPL-SCNC: 139 MMOL/L (ref 136–145)
SP GR UR STRIP: 1.01 (ref 1–1.03)
SPHEROCYTES BLD QL SMEAR: ABNORMAL
TOXIC GRANULES BLD QL SMEAR: PRESENT
URN SPEC COLLECT METH UR: ABNORMAL
VT: 470
WBC # BLD AUTO: 15.14 K/UL (ref 3.9–12.7)
WBC # BLD AUTO: 18.48 K/UL (ref 3.9–12.7)
WBC # BLD AUTO: 20.14 K/UL (ref 3.9–12.7)
WBC #/AREA URNS AUTO: 6 /HPF (ref 0–5)

## 2022-10-11 PROCEDURE — 83735 ASSAY OF MAGNESIUM: CPT | Mod: 91 | Performed by: THORACIC SURGERY (CARDIOTHORACIC VASCULAR SURGERY)

## 2022-10-11 PROCEDURE — 25000003 PHARM REV CODE 250: Performed by: THORACIC SURGERY (CARDIOTHORACIC VASCULAR SURGERY)

## 2022-10-11 PROCEDURE — 87205 SMEAR GRAM STAIN: CPT

## 2022-10-11 PROCEDURE — 93010 ELECTROCARDIOGRAM REPORT: CPT | Mod: ,,, | Performed by: INTERNAL MEDICINE

## 2022-10-11 PROCEDURE — 85025 COMPLETE CBC W/AUTO DIFF WBC: CPT | Performed by: THORACIC SURGERY (CARDIOTHORACIC VASCULAR SURGERY)

## 2022-10-11 PROCEDURE — 99233 SBSQ HOSP IP/OBS HIGH 50: CPT | Mod: ,,, | Performed by: INTERNAL MEDICINE

## 2022-10-11 PROCEDURE — 63600175 PHARM REV CODE 636 W HCPCS: Performed by: STUDENT IN AN ORGANIZED HEALTH CARE EDUCATION/TRAINING PROGRAM

## 2022-10-11 PROCEDURE — 80053 COMPREHEN METABOLIC PANEL: CPT | Mod: 91

## 2022-10-11 PROCEDURE — 25000003 PHARM REV CODE 250

## 2022-10-11 PROCEDURE — 87071 CULTURE AEROBIC QUANT OTHER: CPT

## 2022-10-11 PROCEDURE — 84132 ASSAY OF SERUM POTASSIUM: CPT

## 2022-10-11 PROCEDURE — 84145 PROCALCITONIN (PCT): CPT

## 2022-10-11 PROCEDURE — 99900025 HC BRONCHOSCOPY-ASST (STAT)

## 2022-10-11 PROCEDURE — 37799 UNLISTED PX VASCULAR SURGERY: CPT

## 2022-10-11 PROCEDURE — 87077 CULTURE AEROBIC IDENTIFY: CPT | Performed by: STUDENT IN AN ORGANIZED HEALTH CARE EDUCATION/TRAINING PROGRAM

## 2022-10-11 PROCEDURE — 85027 COMPLETE CBC AUTOMATED: CPT | Mod: 91

## 2022-10-11 PROCEDURE — 85014 HEMATOCRIT: CPT

## 2022-10-11 PROCEDURE — 85007 BL SMEAR W/DIFF WBC COUNT: CPT | Mod: 91

## 2022-10-11 PROCEDURE — 82803 BLOOD GASES ANY COMBINATION: CPT

## 2022-10-11 PROCEDURE — 82330 ASSAY OF CALCIUM: CPT | Performed by: THORACIC SURGERY (CARDIOTHORACIC VASCULAR SURGERY)

## 2022-10-11 PROCEDURE — 86901 BLOOD TYPING SEROLOGIC RH(D): CPT

## 2022-10-11 PROCEDURE — 92960 CARDIOVERSION ELECTRIC EXT: CPT | Mod: 59,,, | Performed by: ANESTHESIOLOGY

## 2022-10-11 PROCEDURE — 84295 ASSAY OF SERUM SODIUM: CPT

## 2022-10-11 PROCEDURE — 85730 THROMBOPLASTIN TIME PARTIAL: CPT | Performed by: STUDENT IN AN ORGANIZED HEALTH CARE EDUCATION/TRAINING PROGRAM

## 2022-10-11 PROCEDURE — 27000203 HC IMPELLA ADD'L DAY (CL)

## 2022-10-11 PROCEDURE — 94761 N-INVAS EAR/PLS OXIMETRY MLT: CPT

## 2022-10-11 PROCEDURE — 83735 ASSAY OF MAGNESIUM: CPT | Performed by: STUDENT IN AN ORGANIZED HEALTH CARE EDUCATION/TRAINING PROGRAM

## 2022-10-11 PROCEDURE — 99291 CRITICAL CARE FIRST HOUR: CPT | Mod: 25,,, | Performed by: ANESTHESIOLOGY

## 2022-10-11 PROCEDURE — 82330 ASSAY OF CALCIUM: CPT

## 2022-10-11 PROCEDURE — 81001 URINALYSIS AUTO W/SCOPE: CPT | Performed by: THORACIC SURGERY (CARDIOTHORACIC VASCULAR SURGERY)

## 2022-10-11 PROCEDURE — 63600175 PHARM REV CODE 636 W HCPCS

## 2022-10-11 PROCEDURE — 99292 CRITICAL CARE ADDL 30 MIN: CPT | Mod: 25,,, | Performed by: ANESTHESIOLOGY

## 2022-10-11 PROCEDURE — 99291 PR CRITICAL CARE, E/M 30-74 MINUTES: ICD-10-PCS | Mod: 25,,, | Performed by: ANESTHESIOLOGY

## 2022-10-11 PROCEDURE — 93005 ELECTROCARDIOGRAM TRACING: CPT

## 2022-10-11 PROCEDURE — 99292 PR CRITICAL CARE, ADDL 30 MIN: ICD-10-PCS | Mod: 25,,, | Performed by: ANESTHESIOLOGY

## 2022-10-11 PROCEDURE — 85007 BL SMEAR W/DIFF WBC COUNT: CPT

## 2022-10-11 PROCEDURE — 99900035 HC TECH TIME PER 15 MIN (STAT)

## 2022-10-11 PROCEDURE — 31500 INTUBATION: ICD-10-PCS | Mod: 59,,, | Performed by: ANESTHESIOLOGY

## 2022-10-11 PROCEDURE — 25000003 PHARM REV CODE 250: Performed by: STUDENT IN AN ORGANIZED HEALTH CARE EDUCATION/TRAINING PROGRAM

## 2022-10-11 PROCEDURE — 99233 PR SUBSEQUENT HOSPITAL CARE,LEVL III: ICD-10-PCS | Mod: ,,, | Performed by: INTERNAL MEDICINE

## 2022-10-11 PROCEDURE — 83605 ASSAY OF LACTIC ACID: CPT

## 2022-10-11 PROCEDURE — 87186 SC STD MICRODIL/AGAR DIL: CPT | Performed by: STUDENT IN AN ORGANIZED HEALTH CARE EDUCATION/TRAINING PROGRAM

## 2022-10-11 PROCEDURE — 63600175 PHARM REV CODE 636 W HCPCS: Performed by: THORACIC SURGERY (CARDIOTHORACIC VASCULAR SURGERY)

## 2022-10-11 PROCEDURE — 84100 ASSAY OF PHOSPHORUS: CPT | Mod: 91 | Performed by: THORACIC SURGERY (CARDIOTHORACIC VASCULAR SURGERY)

## 2022-10-11 PROCEDURE — 82800 BLOOD PH: CPT

## 2022-10-11 PROCEDURE — 27202094 HC TUBING, IMPELLA

## 2022-10-11 PROCEDURE — 20000000 HC ICU ROOM

## 2022-10-11 PROCEDURE — 84100 ASSAY OF PHOSPHORUS: CPT | Mod: 91

## 2022-10-11 PROCEDURE — 94002 VENT MGMT INPAT INIT DAY: CPT

## 2022-10-11 PROCEDURE — 86920 COMPATIBILITY TEST SPIN: CPT

## 2022-10-11 PROCEDURE — 31500 INSERT EMERGENCY AIRWAY: CPT | Mod: 59,,, | Performed by: ANESTHESIOLOGY

## 2022-10-11 PROCEDURE — 27202055 HC BRONCHOSCOPE, DISP

## 2022-10-11 PROCEDURE — 31622 DX BRONCHOSCOPE/WASH: CPT

## 2022-10-11 PROCEDURE — P9021 RED BLOOD CELLS UNIT: HCPCS

## 2022-10-11 PROCEDURE — 87040 BLOOD CULTURE FOR BACTERIA: CPT | Mod: 59 | Performed by: STUDENT IN AN ORGANIZED HEALTH CARE EDUCATION/TRAINING PROGRAM

## 2022-10-11 PROCEDURE — 92960 ELECTRICAL CARDIOVERSION: ICD-10-PCS | Mod: 59,,, | Performed by: ANESTHESIOLOGY

## 2022-10-11 PROCEDURE — 27000221 HC OXYGEN, UP TO 24 HOURS

## 2022-10-11 PROCEDURE — 63600175 PHARM REV CODE 636 W HCPCS: Performed by: ANESTHESIOLOGY

## 2022-10-11 PROCEDURE — P9045 ALBUMIN (HUMAN), 5%, 250 ML: HCPCS | Mod: JG

## 2022-10-11 PROCEDURE — 84100 ASSAY OF PHOSPHORUS: CPT | Performed by: STUDENT IN AN ORGANIZED HEALTH CARE EDUCATION/TRAINING PROGRAM

## 2022-10-11 PROCEDURE — 86920 COMPATIBILITY TEST SPIN: CPT | Performed by: STUDENT IN AN ORGANIZED HEALTH CARE EDUCATION/TRAINING PROGRAM

## 2022-10-11 PROCEDURE — 27200966 HC CLOSED SUCTION SYSTEM

## 2022-10-11 PROCEDURE — 80053 COMPREHEN METABOLIC PANEL: CPT

## 2022-10-11 PROCEDURE — 85027 COMPLETE CBC AUTOMATED: CPT

## 2022-10-11 PROCEDURE — 99900026 HC AIRWAY MAINTENANCE (STAT)

## 2022-10-11 PROCEDURE — 93010 EKG 12-LEAD: ICD-10-PCS | Mod: ,,, | Performed by: INTERNAL MEDICINE

## 2022-10-11 PROCEDURE — 85730 THROMBOPLASTIN TIME PARTIAL: CPT | Mod: 91 | Performed by: THORACIC SURGERY (CARDIOTHORACIC VASCULAR SURGERY)

## 2022-10-11 PROCEDURE — 83735 ASSAY OF MAGNESIUM: CPT | Mod: 91

## 2022-10-11 RX ORDER — HEPARIN SODIUM 10000 [USP'U]/100ML
500 INJECTION, SOLUTION INTRAVENOUS CONTINUOUS
Status: DISCONTINUED | OUTPATIENT
Start: 2022-10-11 | End: 2022-10-11

## 2022-10-11 RX ORDER — FENTANYL CITRATE 50 UG/ML
INJECTION, SOLUTION INTRAMUSCULAR; INTRAVENOUS
Status: COMPLETED
Start: 2022-10-11 | End: 2022-10-11

## 2022-10-11 RX ORDER — ETOMIDATE 2 MG/ML
INJECTION INTRAVENOUS
Status: COMPLETED
Start: 2022-10-11 | End: 2022-10-11

## 2022-10-11 RX ORDER — AMOXICILLIN 250 MG
1 CAPSULE ORAL 2 TIMES DAILY
Status: DISCONTINUED | OUTPATIENT
Start: 2022-10-11 | End: 2022-10-12

## 2022-10-11 RX ORDER — FLUDROCORTISONE ACETATE 0.1 MG/1
100 TABLET ORAL DAILY
Status: DISCONTINUED | OUTPATIENT
Start: 2022-10-11 | End: 2022-10-11

## 2022-10-11 RX ORDER — ETOMIDATE 2 MG/ML
10 INJECTION INTRAVENOUS ONCE
Status: COMPLETED | OUTPATIENT
Start: 2022-10-11 | End: 2022-10-11

## 2022-10-11 RX ORDER — MIDAZOLAM HYDROCHLORIDE 1 MG/ML
2 INJECTION INTRAMUSCULAR; INTRAVENOUS ONCE
Status: COMPLETED | OUTPATIENT
Start: 2022-10-11 | End: 2022-10-11

## 2022-10-11 RX ORDER — MIDAZOLAM HYDROCHLORIDE 1 MG/ML
INJECTION INTRAMUSCULAR; INTRAVENOUS
Status: COMPLETED
Start: 2022-10-11 | End: 2022-10-11

## 2022-10-11 RX ORDER — ROCURONIUM BROMIDE 10 MG/ML
50 INJECTION, SOLUTION INTRAVENOUS ONCE
Status: COMPLETED | OUTPATIENT
Start: 2022-10-11 | End: 2022-10-11

## 2022-10-11 RX ORDER — PROPOFOL 10 MG/ML
0-50 INJECTION, EMULSION INTRAVENOUS CONTINUOUS
Status: DISCONTINUED | OUTPATIENT
Start: 2022-10-11 | End: 2022-10-12

## 2022-10-11 RX ORDER — HEPARIN SODIUM 10000 [USP'U]/100ML
600 INJECTION, SOLUTION INTRAVENOUS CONTINUOUS
Status: DISCONTINUED | OUTPATIENT
Start: 2022-10-11 | End: 2022-10-13

## 2022-10-11 RX ORDER — ALBUMIN HUMAN 50 G/1000ML
SOLUTION INTRAVENOUS
Status: COMPLETED
Start: 2022-10-11 | End: 2022-10-11

## 2022-10-11 RX ORDER — NOREPINEPHRINE BITARTRATE/D5W 4MG/250ML
0-3 PLASTIC BAG, INJECTION (ML) INTRAVENOUS CONTINUOUS
Status: DISCONTINUED | OUTPATIENT
Start: 2022-10-11 | End: 2022-10-12

## 2022-10-11 RX ORDER — FAMOTIDINE 20 MG/1
20 TABLET, FILM COATED ORAL 2 TIMES DAILY
Status: DISCONTINUED | OUTPATIENT
Start: 2022-10-11 | End: 2022-10-12

## 2022-10-11 RX ORDER — SUCCINYLCHOLINE CHLORIDE 20 MG/ML
INJECTION INTRAMUSCULAR; INTRAVENOUS
Status: COMPLETED
Start: 2022-10-11 | End: 2022-10-11

## 2022-10-11 RX ORDER — ROCURONIUM BROMIDE 10 MG/ML
100 INJECTION, SOLUTION INTRAVENOUS ONCE
Status: COMPLETED | OUTPATIENT
Start: 2022-10-11 | End: 2022-10-11

## 2022-10-11 RX ORDER — ROCURONIUM BROMIDE 10 MG/ML
INJECTION, SOLUTION INTRAVENOUS
Status: COMPLETED
Start: 2022-10-11 | End: 2022-10-11

## 2022-10-11 RX ORDER — PROPOFOL 10 MG/ML
INJECTION, EMULSION INTRAVENOUS
Status: COMPLETED
Start: 2022-10-11 | End: 2022-10-11

## 2022-10-11 RX ORDER — FENTANYL CITRATE 50 UG/ML
50 INJECTION, SOLUTION INTRAMUSCULAR; INTRAVENOUS ONCE
Status: COMPLETED | OUTPATIENT
Start: 2022-10-11 | End: 2022-10-11

## 2022-10-11 RX ORDER — PHENYLEPHRINE HCL IN 0.9% NACL 1 MG/10 ML
SYRINGE (ML) INTRAVENOUS
Status: COMPLETED
Start: 2022-10-11 | End: 2022-10-11

## 2022-10-11 RX ORDER — POLYETHYLENE GLYCOL 3350 17 G/17G
17 POWDER, FOR SOLUTION ORAL DAILY
Status: DISCONTINUED | OUTPATIENT
Start: 2022-10-12 | End: 2022-10-25 | Stop reason: HOSPADM

## 2022-10-11 RX ORDER — HYDROCODONE BITARTRATE AND ACETAMINOPHEN 500; 5 MG/1; MG/1
TABLET ORAL
Status: DISCONTINUED | OUTPATIENT
Start: 2022-10-11 | End: 2022-10-15

## 2022-10-11 RX ORDER — VASOPRESSIN 20 [USP'U]/ML
INJECTION, SOLUTION INTRAMUSCULAR; SUBCUTANEOUS
Status: COMPLETED
Start: 2022-10-11 | End: 2022-10-11

## 2022-10-11 RX ORDER — NOREPINEPHRINE BITARTRATE/D5W 4MG/250ML
PLASTIC BAG, INJECTION (ML) INTRAVENOUS
Status: COMPLETED
Start: 2022-10-11 | End: 2022-10-11

## 2022-10-11 RX ORDER — PROPOFOL 10 MG/ML
VIAL (ML) INTRAVENOUS
Status: COMPLETED
Start: 2022-10-11 | End: 2022-10-11

## 2022-10-11 RX ORDER — ACETAMINOPHEN 500 MG
1000 TABLET ORAL EVERY 8 HOURS
Status: DISCONTINUED | OUTPATIENT
Start: 2022-10-11 | End: 2022-10-12

## 2022-10-11 RX ADMIN — MIDAZOLAM HYDROCHLORIDE 2 MG: 1 INJECTION INTRAMUSCULAR; INTRAVENOUS at 02:10

## 2022-10-11 RX ADMIN — PIPERACILLIN SODIUM AND TAZOBACTAM SODIUM 4.5 G: 4; .5 INJECTION, POWDER, LYOPHILIZED, FOR SOLUTION INTRAVENOUS at 08:10

## 2022-10-11 RX ADMIN — PROPOFOL 50 MCG/KG/MIN: 10 INJECTION, EMULSION INTRAVENOUS at 03:10

## 2022-10-11 RX ADMIN — OXYCODONE HYDROCHLORIDE 10 MG: 10 TABLET ORAL at 12:10

## 2022-10-11 RX ADMIN — ROCURONIUM BROMIDE 100 MG: 10 INJECTION, SOLUTION INTRAVENOUS at 08:10

## 2022-10-11 RX ADMIN — FUROSEMIDE 30 MG/HR: 10 INJECTION, SOLUTION INTRAMUSCULAR; INTRAVENOUS at 07:10

## 2022-10-11 RX ADMIN — MUPIROCIN: 20 OINTMENT TOPICAL at 09:10

## 2022-10-11 RX ADMIN — FAMOTIDINE 20 MG: 20 TABLET ORAL at 09:10

## 2022-10-11 RX ADMIN — Medication 0.14 MCG/KG/MIN: at 11:10

## 2022-10-11 RX ADMIN — ROCURONIUM BROMIDE 50 MG: 10 INJECTION, SOLUTION INTRAVENOUS at 02:10

## 2022-10-11 RX ADMIN — POTASSIUM CHLORIDE 40 MEQ: 29.8 INJECTION, SOLUTION INTRAVENOUS at 07:10

## 2022-10-11 RX ADMIN — POLYETHYLENE GLYCOL 3350 17 G: 17 POWDER, FOR SOLUTION ORAL at 09:10

## 2022-10-11 RX ADMIN — SENNOSIDES AND DOCUSATE SODIUM 1 TABLET: 50; 8.6 TABLET ORAL at 09:10

## 2022-10-11 RX ADMIN — POTASSIUM CHLORIDE 20 MEQ: 200 INJECTION, SOLUTION INTRAVENOUS at 12:10

## 2022-10-11 RX ADMIN — HYDROCORTISONE SODIUM SUCCINATE 100 MG: 100 INJECTION, POWDER, FOR SOLUTION INTRAMUSCULAR; INTRAVENOUS at 02:10

## 2022-10-11 RX ADMIN — AMIODARONE HYDROCHLORIDE 0.5 MG/MIN: 1.8 INJECTION, SOLUTION INTRAVENOUS at 09:10

## 2022-10-11 RX ADMIN — ETOMIDATE 10 MG: 2 INJECTION INTRAVENOUS at 08:10

## 2022-10-11 RX ADMIN — Medication 0.02 MCG/KG/MIN: at 05:10

## 2022-10-11 RX ADMIN — VANCOMYCIN HYDROCHLORIDE 1750 MG: 500 INJECTION, POWDER, LYOPHILIZED, FOR SOLUTION INTRAVENOUS at 04:10

## 2022-10-11 RX ADMIN — FENTANYL CITRATE 50 MCG: 50 INJECTION INTRAMUSCULAR; INTRAVENOUS at 02:10

## 2022-10-11 RX ADMIN — POTASSIUM CHLORIDE 20 MEQ: 200 INJECTION, SOLUTION INTRAVENOUS at 03:10

## 2022-10-11 RX ADMIN — FENTANYL CITRATE 50 MCG: 50 INJECTION, SOLUTION INTRAMUSCULAR; INTRAVENOUS at 02:10

## 2022-10-11 RX ADMIN — MIDAZOLAM HYDROCHLORIDE 2 MG: 1 INJECTION INTRAMUSCULAR; INTRAVENOUS at 08:10

## 2022-10-11 RX ADMIN — PIPERACILLIN SODIUM AND TAZOBACTAM SODIUM 4.5 G: 4; .5 INJECTION, POWDER, LYOPHILIZED, FOR SOLUTION INTRAVENOUS at 11:10

## 2022-10-11 RX ADMIN — HEPARIN SODIUM: 5000 INJECTION INTRAVENOUS; SUBCUTANEOUS at 05:10

## 2022-10-11 RX ADMIN — Medication 0.06 MCG/KG/MIN: at 10:10

## 2022-10-11 RX ADMIN — ACETAMINOPHEN 1000 MG: 500 TABLET ORAL at 09:10

## 2022-10-11 RX ADMIN — ROCURONIUM BROMIDE 50 MG: 50 INJECTION INTRAVENOUS at 02:10

## 2022-10-11 RX ADMIN — MIDAZOLAM 2 MG: 1 INJECTION INTRAMUSCULAR; INTRAVENOUS at 08:10

## 2022-10-11 RX ADMIN — AMIODARONE HYDROCHLORIDE 150 MG: 1.5 INJECTION, SOLUTION INTRAVENOUS at 08:10

## 2022-10-11 RX ADMIN — VASOPRESSIN 20 UNITS: 20 INJECTION INTRAVENOUS at 08:10

## 2022-10-11 RX ADMIN — PROPOFOL 50 MCG/KG/MIN: 10 INJECTION, EMULSION INTRAVENOUS at 09:10

## 2022-10-11 RX ADMIN — MIDAZOLAM 2 MG: 1 INJECTION INTRAMUSCULAR; INTRAVENOUS at 02:10

## 2022-10-11 RX ADMIN — HYDROCORTISONE SODIUM SUCCINATE 100 MG: 100 INJECTION, POWDER, FOR SOLUTION INTRAMUSCULAR; INTRAVENOUS at 09:10

## 2022-10-11 RX ADMIN — HEPARIN SODIUM 500 UNITS/HR: 10000 INJECTION, SOLUTION INTRAVENOUS at 09:10

## 2022-10-11 RX ADMIN — HYDROMORPHONE HYDROCHLORIDE 0.5 MG: 1 INJECTION, SOLUTION INTRAMUSCULAR; INTRAVENOUS; SUBCUTANEOUS at 12:10

## 2022-10-11 RX ADMIN — NOREPINEPHRINE BITARTRATE 0.14 MCG/KG/MIN: 4 INJECTION, SOLUTION INTRAVENOUS at 11:10

## 2022-10-11 RX ADMIN — ACETAMINOPHEN 1000 MG: 500 TABLET ORAL at 08:10

## 2022-10-11 RX ADMIN — PIPERACILLIN SODIUM AND TAZOBACTAM SODIUM 4.5 G: 4; .5 INJECTION, POWDER, LYOPHILIZED, FOR SOLUTION INTRAVENOUS at 06:10

## 2022-10-11 RX ADMIN — AMIODARONE HYDROCHLORIDE 0.5 MG/MIN: 1.8 INJECTION, SOLUTION INTRAVENOUS at 03:10

## 2022-10-11 RX ADMIN — ALBUMIN (HUMAN) 12.5 G: 12.5 SOLUTION INTRAVENOUS at 11:10

## 2022-10-11 RX ADMIN — ROCURONIUM BROMIDE 100 MG: 50 INJECTION INTRAVENOUS at 08:10

## 2022-10-11 RX ADMIN — VASOPRESSIN 0.04 UNITS/MIN: 20 INJECTION INTRAVENOUS at 08:10

## 2022-10-11 NOTE — SUBJECTIVE & OBJECTIVE
Past Medical History:   Diagnosis Date    Anemia     Atrial fibrillation     Encounter for blood transfusion     Esophageal ulcer     GI bleed     Hypertension        Past Surgical History:   Procedure Laterality Date    CARDIOVERSION Left 9/15/2022    Procedure: Cardioversion;  Surgeon: Julio James MD;  Location: [facility] CATH LAB/EP;  Service: Cardiology;  Laterality: Left;  With NORBERT    CATHETERIZATION OF BOTH LEFT AND RIGHT HEART N/A 9/22/2022    Procedure: CATHETERIZATION, HEART, BOTH LEFT AND RIGHT;  Surgeon: Julio James MD;  Location: [facility] CATH LAB/EP;  Service: Cardiology;  Laterality: N/A;    COLONOSCOPY N/A 4/4/2019    Procedure: COLONOSCOPY [place];  Surgeon: Umair Randolph MD;  Location: [facility] ENDO;  Service: Endoscopy;  Laterality: N/A;    CORONARY ANGIOGRAPHY N/A 9/22/2022    Procedure: ANGIOGRAM, CORONARY ARTERY;  Surgeon: Julio James MD;  Location: [facility] CATH LAB/EP;  Service: Cardiology;  Laterality: N/A;    MEYER MAZE PROCEDURE N/A 10/7/2022    Procedure: MEYER MAZE PROCEDURE;  Surgeon: Mike Hedrick MD;  Location: [address];  Service: Cardiovascular;  Laterality: N/A;    ESOPHAGOGASTRODUODENOSCOPY N/A 10/12/2018    Procedure: EGD (ESOPHAGOGASTRODUODENOSCOPY);  Surgeon: Braden Herring MD;  Location: [facility];  Service: Endoscopy;  Laterality: N/A;    ESOPHAGOGASTRODUODENOSCOPY N/A 4/4/2019    Procedure: EGD;  Surgeon: Umair Randolph MD;  Location: [facility];  Service: Endoscopy;  Laterality: N/A;    EXCLUSION OF LEFT ATRIAL APPENDAGE N/A 10/7/2022    Procedure: EXCLUSION, LEFT ATRIAL APPENDAGE;  Surgeon: Mike Hedrick MD;  Location: [facility] OR [address] [place] [address];  Service: Cardiovascular;  Laterality: N/A;    HERNIA REPAIR      INSERTION OF INTRAVASCULAR MICROAXIAL BLOOD PUMP N/A 10/7/2022    Procedure: INSERTION, IMPELLA;  Surgeon: Mike Hedrick MD;  [facility];  Service: Cardiovascular;  Laterality: N/A;  direct aortic insertion    IRRIGATION OF MEDIASTINUM N/A  10/7/2022    Procedure: IRRIGATION, MEDIASTINUM;  Surgeon: Mike Hedrick MD;  [facility];  Service: Cardiovascular;  Laterality: N/A;    TRICUSPID VALVULOPLASTY N/A 10/7/2022    Procedure: REPAIR, TRICUSPID VALVE;  Surgeon: Mike Hedrick MD;  [facility];  Service: Cardiovascular;  Laterality: N/A;       Review of patient's allergies indicates:  No Known Allergies    No current facility-administered medications on file prior to encounter.     Current Outpatient Medications on File Prior to Encounter   Medication Sig    albuterol (PROVENTIL/VENTOLIN HFA) 90 mcg/actuation inhaler inhale 1-2 Puff(s) By Mouth Every 4 Hours as needed    amiodarone (PACERONE) 200 MG Tab Take 1 tablet (200 mg total) by mouth 2 (two) times daily.    carvediloL (COREG) 12.5 MG tablet Take 1 tablet (12.5 mg total) by mouth 2 (two) times daily with meals.    ferrous sulfate (FEOSOL) 325 mg (65 mg iron) Tab tablet Take 1 tablet (325 mg total) by mouth daily with breakfast.    furosemide (LASIX) 40 MG tablet TAKE 1 TABLET(40 MG) BY MOUTH TWICE DAILY    rivaroxaban (XARELTO) 20 mg Tab Take 1 tablet (20 mg total) by mouth daily with dinner or evening meal.     Family History       Problem Relation (Age of Onset)    COPD Mother    Cancer Father          Tobacco Use    Smoking status: Never    Smokeless tobacco: Current     Types: Chew   Substance and Sexual Activity    Alcohol use: Yes     Alcohol/week: 20.0 standard drinks     Types: 20 Cans of beer per week    Drug use: No    Sexual activity: Not on file     Review of Systems   Constitutional: Negative for chills and fever.   Cardiovascular:  Negative for chest pain, orthopnea and palpitations.   Respiratory:  Negative for cough and shortness of breath.    Gastrointestinal:  Negative for abdominal pain.   Neurological:  Negative for dizziness, headaches and light-headedness.   Psychiatric/Behavioral:  Negative for altered mental status.    Objective:      Vital Signs (Most Recent):  Temp: (!) 100.7 °F (38.2 °C) (10/10/22 1800)  Pulse: 61 (10/10/22 1830)  Resp: (!) 21 (10/10/22 1848)  BP: (!) 149/72 (10/10/22 1800)  SpO2: 97 % (10/10/22 1830)   Vital Signs (24h Range):  Temp:  [100.2 °F (37.9 °C)-102.2 °F (39 °C)] 100.7 °F (38.2 °C)  Pulse:  [49-66] 61  Resp:  [18-21] 21  SpO2:  [93 %-100 %] 97 %  BP: ()/(61-76) 149/72  Arterial Line BP: ()/(51-71) 150/63       Weight: 96.2 kg (212 lb)  Body mass index is 31.31 kg/m².    SpO2: 97 %  O2 Device (Oxygen Therapy): nasal cannula    Physical Exam  Vitals reviewed.   Constitutional:       General: He is not in acute distress.     Appearance: Normal appearance. He is obese. He is ill-appearing. He is not toxic-appearing.   HENT:      Head: Normocephalic and atraumatic.      Mouth/Throat:      Mouth: Mucous membranes are moist.   Cardiovascular:      Rate and Rhythm: Normal rate and regular rhythm.      Heart sounds: No murmur heard.    No friction rub. No gallop.      Comments: Impella chest insertion  Chest tube  Epicardial pacing wires  Pulmonary:      Effort: Pulmonary effort is normal. No respiratory distress.      Breath sounds: Normal breath sounds.   Abdominal:      Palpations: Abdomen is soft.   Genitourinary:     Comments: [place]  Musculoskeletal:      Right lower leg: No edema.      Left lower leg: No edema.   Skin:     General: Skin is warm.   Neurological:      Mental Status: He is alert. Mental status is at baseline. He is disoriented.       Significant Labs: EP:   Recent Labs   Lab 10/09/22  1342 10/10/22  0004 10/10/22  0321 10/10/22  0323 10/10/22  1118     --   --  138 138   K 4.8 4.3  --  4.1 3.4*     --   --  106 106   CO2 20*  --   --  23 25   *  --   --  128* 125*   BUN 31*  --   --  32* 33*   CREATININE 1.5*  --   --  1.2 1.2   CALCIUM 8.5*  --   --  8.4* 9.0   PROT 5.0*  --   --  4.8* 5.2*   ALBUMIN 2.8*  --   --  2.5* 2.5*   BILITOT 0.7  --   --  0.7 0.9   ALKPHOS 52*   --   --  51* 59   AST 38  --   --  33 37   ALT 11  --   --  12 16   ANIONGAP 11  --   --  9 7*   WBC 20.89* 18.31*  --  17.36*  --    HGB 9.4* 9.1*  --  8.8*  --    HCT 27.1* 26.5*   < > 26.8*  --    PLT 74* 65*  --  71*  --     < > = values in this interval not displayed.       Significant Imaging: Ejection fraction: No results for input(s): EF in the last 168 hours.

## 2022-10-11 NOTE — NURSING
Osbaldo RODRIGUEZ aware of MAPs 50's while pt in cardiac chair. Pt moved back to bed and epi started @ 0.02 mcg/kg/min per MD order. Will continue to monitor.

## 2022-10-11 NOTE — PROGRESS NOTES
Unable to  O2 sat on monitor. Pts SvO2 reading in the 20s. Orders received for ABG and CBC. See results. Pt placed on high flow nasal cannula per RRT.

## 2022-10-11 NOTE — PROGRESS NOTES
Pt acutely hypotensive SBP 50's despite being on vasopressin at 0.12 u/min and epinephrine at 0.06 mcg/kg/min. Orders received to to start norepi. Infusion started and titrated up to 0.14mcg/kg/min. Dr. Segovia, Dr. Reid, and Rosanne NP at bedside. Orders received for stat ABG with lytes and lactate and stat labs.

## 2022-10-11 NOTE — PROGRESS NOTES
10/11/2022  Trey Dickerson Jr    Current provider:  Mike Hedrick MD    Device interrogation:  TXP LVAD INTERROGATIONS 10/11/2022 10/11/2022 10/11/2022 10/11/2022 10/11/2022 10/11/2022 10/11/2022   Type Impella Impella Impella Impella Impella Impella Impella   Pulsatility Pulse Pulse Pulse Pulse Pulse Pulse Pulse          Rounded on David Barrios to ensure all mechanical assist device settings (IABP or VAD) were appropriate and all parameters were within limits.  I was able to ensure all back up equipment was present, the staff had no issues, and the Perfusion Department daily rounding was complete.  Pt re-intubated this a.m.  Will continue to follow.    For implantable VADs: Interrogation of Ventricular assist device was performed with analysis of device parameters and review of device function. I have personally reviewed the interrogation findings and agree with findings as stated.     In emergency, the nursing units have been notified to contact the perfusion department either by:  Calling [phone] from 630am to 4pm Mon thru Fri, utilizing the On-Call Finder functionality of Epic and searching for Perfusion, or by contacting the hospital  from 4pm to 630am and on weekends and asking to speak with the perfusionist on call.    9:56 AM

## 2022-10-11 NOTE — SUBJECTIVE & OBJECTIVE
Interval History/Significant Events: febrile overnight, infectious workup and broad spectrum abx initiated. Required increase in vasopressor support. Converted to afib RVR.    This AM - mental status deteriorated with increased work of breathing and patient hemodynamics unstable. Discussion with Dr Hedrick - decision was made to intubate for airway protection and DCCV.    Follow-up For: Procedure(s) (LRB):  VALVULOPLASTY,MITRAL VALVE (N/A)  REPAIR, TRICUSPID VALVE (N/A)  INSERTION, IMPELLA (N/A)  MEYER MAZE PROCEDURE (N/A)  EXCLUSION, LEFT ATRIAL APPENDAGE (N/A)    Post-Operative Day: 4 Days Post-Op    Objective:     Vital Signs (Most Recent):  Temp: (!) 104.2 °F (40.1 °C) (10/11/22 0845)  Pulse: 71 (10/11/22 0900)  Resp: (!) 24 (10/11/22 0600)  BP: 118/62 (10/11/22 0600)  SpO2: 100 % (10/11/22 0900)   Vital Signs (24h Range):  Temp:  [98.6 °F (37 °C)-104.2 °F (40.1 °C)] 104.2 °F (40.1 °C)  Pulse:  [] 71  Resp:  [17-25] 24  SpO2:  [86 %-100 %] 100 %  BP: (110-151)/(62-77) 118/62  Arterial Line BP: ()/(49-82) 100/56     Weight: 96.2 kg (212 lb)  Body mass index is 31.31 kg/m².      Intake/Output Summary (Last 24 hours) at 10/11/2022 0955  Last data filed at 10/11/2022 0701  Gross per 24 hour   Intake 1762.43 ml   Output 1880 ml   Net -117.57 ml       Physical Exam  Vitals and nursing note reviewed.   Constitutional:       General: He is not in acute distress.     Appearance: He is ill-appearing.   Eyes:      Extraocular Movements: Extraocular movements intact.      Pupils: Pupils are equal, round, and reactive to light.   Cardiovascular:      Rate and Rhythm: Tachycardia present. Rhythm irregular.      Pulses: Normal pulses.      Heart sounds: Normal heart sounds.      Comments: Axillary impella 5.5 at P5  Pulmonary:      Effort: Accessory muscle usage and respiratory distress present.      Breath sounds: Decreased breath sounds and wheezing present.   Abdominal:      General: Abdomen is flat. Bowel sounds  are normal. There is no distension.      Palpations: Abdomen is soft.      Tenderness: There is no abdominal tenderness.   Musculoskeletal:      Right lower leg: No edema.      Left lower leg: No edema.   Skin:     General: Skin is warm and dry.      Capillary Refill: Capillary refill takes less than 2 seconds.      Findings: Bruising present. No lesion or rash.   Neurological:      General: No focal deficit present.      Mental Status: He is lethargic.   Psychiatric:         Behavior: Behavior is cooperative.       Vents:  Vent Mode: A/C (10/11/22 0900)  Ventilator Initiated: Yes (10/11/22 0830)  Set Rate: 24 BPM (10/11/22 0900)  Vt Set: 470 mL (10/11/22 0900)  Pressure Support: 10 cmH20 (10/10/22 0839)  PEEP/CPAP: 5 cmH20 (10/11/22 0900)  Oxygen Concentration (%): 100 (10/11/22 0900)  Peak Airway Pressure: 28 cmH2O (10/11/22 0900)  Plateau Pressure: 0 cmH20 (10/11/22 0900)  Total Ve: 4.38 mL (10/11/22 0900)  Negative Inspiratory Force (cm H2O): 0 (10/11/22 0900)  F/VT Ratio<105 (RSBI): (!) 0 (10/07/22 1909)    Lines/Drains/Airways       Central Venous Catheter Line  Duration              Introducer with Double Lumen 10/07/22 0906 4 days    Pulmonary Artery Catheter Assessment  10/07/22 1500 right internal jugular 3 days              Drain  Duration                  Chest Tube 10/07/22  Left Pleural 4 days         Urethral Catheter 10/07/22 0728 Non-latex;Straight-tip;Temperature probe 14 Fr. 4 days         Y Chest Tube 1 and 2 10/07/22 1225 1 Anterior Mediastinal 19 Fr. 2 Anterior Mediastinal 19 Fr. 3 days         NG/OG Tube 10/11/22 0918 Right nostril <1 day              Airway  Duration                  Airway - Non-Surgical 10/11/22 0829 Endotracheal Tube <1 day              Arterial Line  Duration             Arterial Line 10/07/22 0706 Left Radial 4 days              Line  Duration                  Pacer Wires 10/07/22 1050 3 days         VAD 10/07/22 1129 Impella 3 days              Peripheral Intravenous  Line  Duration                  Peripheral IV - Single Lumen 10/07/22 1600 18 G Right Antecubital 3 days                    Significant Labs:    CBC/Anemia Profile:  Recent Labs   Lab 10/10/22  0323 10/10/22  1924 10/11/22  0304 10/11/22  0549   WBC 17.36* 19.23* 15.14*  --    HGB 8.8* 10.0* 9.4*  --    HCT 26.8* 29.7* 29.0* 26*   PLT 71* 103* 89*  --    MCV 88 89 90  --    RDW 18.0* 18.2* 18.3*  --         Chemistries:  Recent Labs   Lab 10/10/22  1118 10/10/22  1826 10/10/22  2353 10/11/22  0304    140  --  139   K 3.4* 4.0 4.1 4.4    107  --  104   CO2 25 23  --  23   BUN 33* 37*  --  46*   CREATININE 1.2 1.2  --  1.4   CALCIUM 9.0 9.7  --  9.0   ALBUMIN 2.5* 2.8*  --  2.6*   PROT 5.2* 5.8*  --  5.6*   BILITOT 0.9 1.1*  --  1.1*   ALKPHOS 59 94  --  80   ALT 16 19  --  20   AST 37 44*  --  42*   MG 2.1 2.2 2.5 2.5   PHOS 4.1  --  4.3 5.5*       All pertinent labs within the past 24 hours have been reviewed.    Significant Imaging:  I have reviewed all pertinent imaging results/findings within the past 24 hours.

## 2022-10-11 NOTE — PROGRESS NOTES
Pharmacokinetic Initial Assessment: IV Vancomycin    Assessment/Plan:    Patient renal function improving, Scr 1.7 > 1.5 >1.2 > 1.4 (baseline ~1.0). Will schedule regimen for now.     Initiate intravenous vancomycin with loading dose of 1750 mg once followed by a maintenance dose of vancomycin 1250mg IV every 24 hours  Desired empiric serum trough concentration is 15 to 20 mcg/mL  Draw vancomycin trough level 60 min prior to third dose on 10/13 at approximately 0400  Pharmacy will continue to follow and monitor vancomycin.      Please contact pharmacy at extension [phone] with any questions regarding this assessment.     Thank you for the consult,   Shey Cooper       Patient brief summary:  David Barrios is a 62 y.o. male initiated on antimicrobial therapy with IV Vancomycin for treatment of suspected lower respiratory infection    Drug Allergies:   Review of patient's allergies indicates:  No Known Allergies    Actual Body Weight:   96.2 kg    Renal Function:   Estimated Creatinine Clearance: 61.8 mL/min (based on SCr of 1.4 mg/dL).,     Dialysis Method (if applicable):  N/A    CBC (last 72 hours):  Recent Labs   Lab Result Units 10/08/22  0418 10/08/22  0806 10/08/22  1215 10/08/22  1601 10/09/22  0348 10/09/22  1342 10/10/22  0004 10/10/22  0323 10/10/22  1924 10/11/22  0304   WBC K/uL 15.49* 19.24* 19.90* 20.24* 24.26* 20.89* 18.31* 17.36* 19.23* 15.14*   Hemoglobin g/dL 8.6* 8.3* 6.8* 9.9* 9.5* 9.4* 9.1* 8.8* 10.0* 9.4*   Hematocrit % 24.5* 23.4* 19.3* 27.8* 27.8* 27.1* 26.5* 26.8* 29.7* 29.0*   Platelets K/uL 140* 135* 102* 106* 95* 74* 65* 71* 103* 89*   Gran % % 24.0* 73.7* 75.0* 74.0* 82.2* 81.0* 84.0* 87.0* 89.1*  --    Lymph % % 1.0* 9.5* 10.6* 13.0* 6.3* 6.0* 4.6* 5.8* 3.1*  --    Mono % % 0.0* 16.1* 13.9 12.0 10.5 0.0* 6.0 5.8 6.8  --    Eosinophil % % 11.0* 0.0 0.0 0.0 0.2 0.0 0.0 0.2 0.3  --    Basophil % % 0.0 0.2 0.1 1.0 0.2 0.0 0.0 0.2 0.2  --    Differential Method  Manual Automated Automated  Automated Automated Manual Manual Automated Automated  --        Metabolic Panel (last 72 hours):  Recent Labs   Lab Result Units 10/08/22  0806 10/08/22  1601 10/08/22  2202 10/09/22  0348 10/09/22  1342 10/10/22  0004 10/10/22  0323 10/10/22  1118 10/10/22  1826 10/10/22  2353 10/11/22  0304   Sodium mmol/L 137 137  --  132* 136  --  138 138 140  --  139   Potassium mmol/L 3.7 4.9 4.8 5.2* 4.8 4.3 4.1 3.4* 4.0 4.1 4.4   Chloride mmol/L 107 108  --  103 105  --  106 106 107  --  104   CO2 mmol/L 20* 18*  --  19* 20*  --  23 25 23  --  23   Glucose mg/dL 81 159*  --  136* 136*  --  128* 125* 122*  --  108   BUN mg/dL 21 23  --  28* 31*  --  32* 33* 37*  --  46*   Creatinine mg/dL 1.8* 1.7*  --  1.6* 1.5*  --  1.2 1.2 1.2  --  1.4   Albumin g/dL 2.5* 2.9*  --  2.9* 2.8*  --  2.5* 2.5* 2.8*  --  2.6*   Total Bilirubin mg/dL 1.0 1.2*  --  0.9 0.7  --  0.7 0.9 1.1*  --  1.1*   Alkaline Phosphatase U/L 34* 33*  --  46* 52*  --  51* 59 94  --  80   AST U/L 84* 70*  --  51* 38  --  33 37 44*  --  42*   ALT U/L 32 21  --  13 11  --  12 16 19  --  20   Magnesium mg/dL 2.2 2.1  --  2.1 2.1  --  2.1 2.1 2.2 2.5 2.5   Phosphorus mg/dL 3.1 5.2*  --  4.6* 4.4  --  4.3 4.1  --  4.3 5.5*       Drug levels (last 3 results):  No results for input(s): VANCOMYCINRA, VANCORANDOM, VANCOMYCINPE, VANCOPEAK, VANCOMYCINTR, VANCOTROUGH in the last 72 hours.    Microbiologic Results:  Microbiology Results (last 7 days)       Procedure Component Value Units Date/Time    Blood culture [[id number]] Collected: 10/11/22 0406    Order Status: Sent Specimen: Blood from Peripheral, Wrist, Right Updated: 10/11/22 0407    Blood culture [[id number]] Collected: 10/11/22 0346    Order Status: Sent Specimen: Blood from Peripheral, Antecubital, Left Updated: 10/11/22 0347    Nasal culture [[id number]] Collected: 10/07/22 0647    Order Status: Completed Specimen: Nasopharyngeal from Nose Updated: 10/10/22 0922     RESPIRATORY CULTURE - NASAL Normal respiratory  janna    Narrative:      MRSA

## 2022-10-11 NOTE — ANESTHESIA PROCEDURE NOTES
Monitor NORBERT    Diagnosis: ICM  I25. 5  Patient location during procedure: ICU  Procedure start time: 10/11/2022 2:00 PM  Procedure end time: 10/11/2022 2:15 PM  Surgery related to: impella placement  Exam type: Monitor Only    Staffing  Authorizing Provider: Amadeo Levy MD  Performing Provider: Amadeo Levy MD    Staffing  Anesthesiologist Present  Yes  Setup & Induction  Patient preparation: bite block inserted  Probe Insertion: easy      Findings    Moderately depressed LV systolic function (LVEF 35%)  Moderately depressed RV systolic function   Impella 5.5 in proper position across aortic valve (inflow port 4.6 cm deep)  No residual MR  Small pericardial effusion, no tamponade   Overall unchanged from intraoperative exam on 10/7.      Impression    Other Findings    Probe Removal     NORBERT probe removed without event.No blood on removal of probe.

## 2022-10-11 NOTE — ASSESSMENT & PLAN NOTE
Patient is a62 y.o male with h/o severe mitral regurgitation, atrial fibrillation, htn, HFrEF that is POD4 from MV repair, TV repair, Maze and L atrial appendage resection. Post op pt required signficant amt of hemodynamic support including Impella 5.5, Levo, vaso, epi, primacor were weaned. Was intubated previously but extubated.   Review of tele with multiple episodes of NSVT in addition to ventricularly paced rhythm.  On 10/07/2022, telemetry showed R on T phenomenon leading to episode of polymorphic VT with cycle length ~250 ms. Patient was shocked back into normal sinus rhythm. Patient has prolonged OK before and after. Likely an undersensing of the R which led to inappropriate pacing.   - Continue amiodarone gtt until all drips weaned off  - keep sensitivity at 0.8 mV with back up rate of 45 bpm and output of 10 mA~ not requiring epicardial pacing  - Goal K 4-5 and Mg 2-3 mEq/L  - Wean down inotropic support as tolerated  - Re-discuss device placement prior to dc considering reduced LVEF  - Follow up with EP 6-8 weeks post discharge  - EP will cont to follow from a distant

## 2022-10-11 NOTE — PT/OT/SLP PROGRESS
Physical Therapy      Patient Name:  David Barrios   MRN:  [id number]    Patient not seen today secondary to patient reintubated this AM, not appropriate for mobility. Will follow-up as appropriate.

## 2022-10-11 NOTE — PROCEDURES
"David Barrios is a 62 y.o. male patient.    Temp: (!) 104.2 °F (40.1 °C) (10/11/22 0845)  Pulse: 71 (10/11/22 0900)  Resp: (!) 24 (10/11/22 0600)  BP: 118/62 (10/11/22 0600)  SpO2: 100 % (10/11/22 0900)  Weight: 96.2 kg (212 lb) (10/10/22 1518)  Height: 5' 9" (175.3 cm) (10/11/22 0830)       Intubation    Date/Time: 10/11/2022 8:30 AM  Location procedure was performed: [facility] CRITICAL CARE SURGERY  Performed by: Angel Luis Segovia MD  Authorized by: Jeremy Shook MD   Pre-operative diagnosis: Shock  Post-operative diagnosis: Shock  Consent Done: Emergent Situation  Indications: airway protection  Intubation method: video-assisted  Patient status: paralyzed (RSI)  Preoxygenation: BVM  Pretreatment medications: midazolam  Sedatives: etomidate  Paralytic: rocuronium  Laryngoscope size: Glide 3  Tube size: 8.0 mm  Tube type: cuffed  Number of attempts: 1  Cricoid pressure: no  Cords visualized: yes  Post-procedure assessment: chest rise and ETCO2 monitor  Breath sounds: clear and equal and absent over the epigastrium  Cuff inflated: yes  ETT to lip: 24 cm  Tube secured with: ETT butler  Chest x-ray interpreted by me and radiologist.  Chest x-ray findings: endotracheal tube in appropriate position  Patient tolerance: Patient tolerated the procedure well with no immediate complications  Complications: No        10/11/2022    "

## 2022-10-11 NOTE — PLAN OF CARE
[facility]  Discharge Reassessment    Primary Care Provider: Breann Tavera MD    Expected Discharge Date: 10/17/2022    Reassessment (most recent)       Discharge Reassessment - 10/11/22 1246          Discharge Reassessment    Assessment Type Discharge Planning Reassessment     Did the patient's condition or plan change since previous assessment? Yes     Discharge Plan discussed with: Patient;Spouse/sig other     Discharge Barriers Identified None     Why the patient remains in the hospital Requires continued medical care        Post-Acute Status    Discharge Delays None known at this time                 Patient requires continued medical care

## 2022-10-11 NOTE — PROGRESS NOTES
[facility]  Critical Care - Surgery  Progress Note    Patient Name: David Barrios  MRN: [id number]  Admission Date: 10/2/2022  Hospital Length of Stay: 9 days  Code Status: Full Code  Attending Provider: Mike Hedrick MD  Primary Care Provider: Breann Tavera MD   Principal Problem: Nonrheumatic mitral valve regurgitation    Subjective:     Hospital/ICU Course:  No notes on file    Interval History/Significant Events: febrile overnight, infectious workup and broad spectrum abx initiated. Required increase in vasopressor support. Converted to afib RVR.    This AM - mental status deteriorated with increased work of breathing and patient hemodynamics unstable. Discussion with Dr Hedrick - decision was made to intubate for airway protection and DCCV.    Follow-up For: Procedure(s) (LRB):  VALVULOPLASTY,MITRAL VALVE (N/A)  REPAIR, TRICUSPID VALVE (N/A)  INSERTION, IMPELLA (N/A)  MEYER MAZE PROCEDURE (N/A)  EXCLUSION, LEFT ATRIAL APPENDAGE (N/A)    Post-Operative Day: 4 Days Post-Op    Objective:     Vital Signs (Most Recent):  Temp: (!) 104.2 °F (40.1 °C) (10/11/22 0845)  Pulse: 71 (10/11/22 0900)  Resp: (!) 24 (10/11/22 0600)  BP: 118/62 (10/11/22 0600)  SpO2: 100 % (10/11/22 0900)   Vital Signs (24h Range):  Temp:  [98.6 °F (37 °C)-104.2 °F (40.1 °C)] 104.2 °F (40.1 °C)  Pulse:  [] 71  Resp:  [17-25] 24  SpO2:  [86 %-100 %] 100 %  BP: (110-151)/(62-77) 118/62  Arterial Line BP: ()/(49-82) 100/56     Weight: 96.2 kg (212 lb)  Body mass index is 31.31 kg/m².      Intake/Output Summary (Last 24 hours) at 10/11/2022 0955  Last data filed at 10/11/2022 0701  Gross per 24 hour   Intake 1762.43 ml   Output 1880 ml   Net -117.57 ml       Physical Exam  Vitals and nursing note reviewed.   Constitutional:       General: He is not in acute distress.     Appearance: He is ill-appearing.   Eyes:      Extraocular Movements: Extraocular movements intact.      Pupils: Pupils are equal, round,  and reactive to light.   Cardiovascular:      Rate and Rhythm: Tachycardia present. Rhythm irregular.      Pulses: Normal pulses.      Heart sounds: Normal heart sounds.      Comments: Axillary impella 5.5 at P5  Pulmonary:      Effort: Accessory muscle usage and respiratory distress present.      Breath sounds: Decreased breath sounds and wheezing present.   Abdominal:      General: Abdomen is flat. Bowel sounds are normal. There is no distension.      Palpations: Abdomen is soft.      Tenderness: There is no abdominal tenderness.   Musculoskeletal:      Right lower leg: No edema.      Left lower leg: No edema.   Skin:     General: Skin is warm and dry.      Capillary Refill: Capillary refill takes less than 2 seconds.      Findings: Bruising present. No lesion or rash.   Neurological:      General: No focal deficit present.      Mental Status: He is lethargic.   Psychiatric:         Behavior: Behavior is cooperative.       Vents:  Vent Mode: A/C (10/11/22 0900)  Ventilator Initiated: Yes (10/11/22 0830)  Set Rate: 24 BPM (10/11/22 0900)  Vt Set: 470 mL (10/11/22 0900)  Pressure Support: 10 cmH20 (10/10/22 0839)  PEEP/CPAP: 5 cmH20 (10/11/22 0900)  Oxygen Concentration (%): 100 (10/11/22 0900)  Peak Airway Pressure: 28 cmH2O (10/11/22 0900)  Plateau Pressure: 0 cmH20 (10/11/22 0900)  Total Ve: 4.38 mL (10/11/22 0900)  Negative Inspiratory Force (cm H2O): 0 (10/11/22 0900)  F/VT Ratio<105 (RSBI): (!) 0 (10/07/22 1909)    Lines/Drains/Airways       Central Venous Catheter Line  Duration              Introducer with Double Lumen 10/07/22 0906 4 days    Pulmonary Artery Catheter Assessment  10/07/22 1500 right internal jugular 3 days              Drain  Duration                  Chest Tube 10/07/22  Left Pleural 4 days         Urethral Catheter 10/07/22 0728 Non-latex;Straight-tip;Temperature probe 14 Fr. 4 days         Y Chest Tube 1 and 2 10/07/22 1225 1 Anterior Mediastinal 19 Fr. 2 Anterior Mediastinal 19 Fr. 3  days         NG/OG Tube 10/11/22 0918 Right nostril <1 day              Airway  Duration                  Airway - Non-Surgical 10/11/22 0829 Endotracheal Tube <1 day              Arterial Line  Duration             Arterial Line 10/07/22 0706 Left Radial 4 days              Line  Duration                  Pacer Wires 10/07/22 1050 3 days         VAD 10/07/22 1129 Impella 3 days              Peripheral Intravenous Line  Duration                  Peripheral IV - Single Lumen 10/07/22 1600 18 G Right Antecubital 3 days                    Significant Labs:    CBC/Anemia Profile:  Recent Labs   Lab 10/10/22  0323 10/10/22  1924 10/11/22  0304 10/11/22  0549   WBC 17.36* 19.23* 15.14*  --    HGB 8.8* 10.0* 9.4*  --    HCT 26.8* 29.7* 29.0* 26*   PLT 71* 103* 89*  --    MCV 88 89 90  --    RDW 18.0* 18.2* 18.3*  --         Chemistries:  Recent Labs   Lab 10/10/22  1118 10/10/22  1826 10/10/22  2353 10/11/22  0304    140  --  139   K 3.4* 4.0 4.1 4.4    107  --  104   CO2 25 23  --  23   BUN 33* 37*  --  46*   CREATININE 1.2 1.2  --  1.4   CALCIUM 9.0 9.7  --  9.0   ALBUMIN 2.5* 2.8*  --  2.6*   PROT 5.2* 5.8*  --  5.6*   BILITOT 0.9 1.1*  --  1.1*   ALKPHOS 59 94  --  80   ALT 16 19  --  20   AST 37 44*  --  42*   MG 2.1 2.2 2.5 2.5   PHOS 4.1  --  4.3 5.5*       All pertinent labs within the past 24 hours have been reviewed.    Significant Imaging:  I have reviewed all pertinent imaging results/findings within the past 24 hours.    Assessment/Plan:     * Nonrheumatic mitral valve regurgitation    Neuro/Psych:   -- Sedation: Propofol while intubated  -- Pain: PRN Oxy + Dilaudid, Fentanyl pushes prn  -- Scheduled Tylenol             Cards:   -- S/P TV replacement, MV replacement, MAZE, Lt Atrial Appendage ligation and Impella placement on 10/7/22  -- Impella: P5  -- Hemodynamically unstable post-op requiring high support and antiarrhythmics for frequent VTach runs, multiple cardioversions in transport from  OR  -- Reopened POD#0 for high chest tube output - found to have slow persistent bleeding from the IVC and left atriotomy sites  -- HDS on 0.02 epi  -- Impella at P5  -- Ventricular pacing wires. Back up at 40 BPM.  -- vasopressors to maintain MAP > 65, Syst < 140  -- Aspirin 325mg holding for now, will continue to hold in the setting of down-trending platelets  -- Amiodarone gtt at 0.5 mg/hr - converted back into afib RVR today requiring cardioversion, now sinus rhythm.   --mixed shock picture, sepsis vs cardiogenic. Stress dose steroids initiated with ongoing cardiac support      Pulm:   -- Goal O2 sat > 90%  -- ABG PRN  -- intubated for airway protection  -- CXR without consolidation or infiltrates  -- bedside bronch for BAL   -- Wean vent settings as tolerated, plan to extubate when overall clinical picture improves  -- Mediastinal chest tubes x2 and pleural chest tube x1 to suction (total 625 cc in 24 hours), decreased in output since reopened chest      Renal:  -- Diuresis with lasix gtt  -- Keep lambert for strict I/O  -- Trend BUN/Cr   -- UOP ~1.7L; -738mL in last 24 hours     Recent Labs     10/10/22  1118 10/10/22  1826 10/11/22  0304   BUN 33* 37* 46*   CREATININE 1.2 1.2 1.4        FEN / GI:   -- Replace lytes as needed  -- Nutrition: NPO, will initiate trickleTF when appropriate   -- GI ppx: famotidine  -- Bowel reg: miralax      ID:   -- Tm: remains febrile at 102; WBC stabilizing; spiked fever overnight, infectious workup sent.  -- Viry-op ancef completed, restarting broad spectrum antibiotics    Recent Labs     10/10/22  0323 10/10/22  1924 10/11/22  0304   WBC 17.36* 19.23* 15.14*        Heme/Onc:   -- H/H stable  -- Daily CBC  -- 1000 mL Cell saver given back  -- Aspirin 325mg QD; hold for now in the setting of down-trending platelets  -- Impella: Systemic Heparin gtt with goal aPTT 40 - 60  -- Blood Products: 5 units pRBC, 2 FFP, 2 Platelets, 2 Cryoprecipitate (10/8)    Recent Labs      10/11/22  0304 10/11/22  0549 10/11/22  1000   HGB 9.4*  --   --    HCT 29.0*   < > 25*   APTT 37.6*  --   --     < > = values in this interval not displayed.        Endo:   -- BG goal 140-180  -- Insulin gtt  -- Hgb A1c 5.5  -- Endocrinology managing      PPx:   Feeding: NPO  Analgesia/Sedation: Propofol/ PRN Oxy + Dilaudid  Thromboembolic prevention: SCDs, Heparin gtt  HOB >30: Yes  Stress Ulcer ppx: famotidine  Glucose control: Critical care goal 140-180 g/dl, ISS     Lines/Drains/Airway: CVC RIJ, Fierro, Chest tubes x 3, ventricular pacing wires, L radial A-line, Impella, ETT      Dispo/Code Status/Palliative:   -- SICU / Full Code.              Leah Lay NP  Critical Care - Surgery  [facility]

## 2022-10-11 NOTE — RESPIRATORY THERAPY
Patient reintubated at bedside. Placed on ventilator with documented settings. Will continue to monitor.

## 2022-10-11 NOTE — ASSESSMENT & PLAN NOTE
Patient is a62 y.o male with h/o severe mitral regurgitation, atrial fibrillation, htn, HFrEF that is POD4 from MV repair, TV repair, Maze and L atrial appendage resection. Post op pt required signficant amt of hemodynamic support including Impella 5.5, Levo, vaso, epi, primacor were weaned. Was intubated previously but extubated.   Review of tele with multiple episodes of NSVT in addition to ventricularly paced rhythm.  On 10/07/2022, telemetry showed R on T phenomenon leading to episode of polymorphic VT with cycle length ~250 ms. Patient was shocked back into normal sinus rhythm. Patient has prolonged GA before and after. Likely an undersensing of the R which led to inappropriate pacing.   - Continue amiodarone gtt until all drips weaned off  - Decreased sensitivity to 0.8 mV and changed to VVI mode with back up of 45 bpm and output of 10 mA, not requiring epicardial pacing  - Goal K 4-5 and Mg 2-3 mEq/L  - Wean down inotropic support as tolerated  - pt with no indication for device, however reasonable to use life vest upon discharge  - Follow up with EP 6-8 weeks post discharge

## 2022-10-11 NOTE — PROCEDURES
Template for Bronchoscopy with Basic Procedures    DATE OF PROCEDURE: 10/7/2022     PREOPERATIVE DIAGNOSES:   Left ventricular systolic dysfunction [I51.9]  Persistent atrial fibrillation [I48.19]    POSTOPERATIVE DIAGNOSES:   Left ventricular systolic dysfunction [I51.9]  Persistent atrial fibrillation [I48.19]    PROCEDURES PERFORMED:   Fiberoptic Bronchoscopy    Surgeon(s) and Role:     * Angel Luis Segovia MD - Resident - Primary     * Jeremy Shook MD - Supervising/Assisting    Informed consent:  The nature of the surgery and possible benefits explained to and appreciated by patient. The multiple complications and adverse outcomes including:surgical infection, pneumonia, air leak / fluid drainage from the lung requiring prolonged chest tube drainage, intra-thoracic abscess, bleeding, lung tumor or infection/ bleeding recurrence, cardiac arrhythmias, myocardial infarction, renal failure, prolonged gastrointestinal infection or dysfunction, respiratory failure, need for tracheostomy, pulmonary embolus, thrombophlebitis, multiple organ failure, stroke, and prolonged hospital / extended care facility stay, acute and chronic pain, the need for new chronic medical care, along with other unsuspected problems,also understood and appreciated by patient who voluntarily and in an informed state of mind agreed to this surgical intervention.    Operation Detail:  After surgical time out and general understanding of the nature and laterality of the operative side was established, the patient underwent adequate anesthesia utilizing intravenous sedation and endotracheal intubation.     Fiberoptic Bronchoscopy was the performed introducing the bronchoscopy through the endotracheal tube. The airway was assessed with bronchioalveolar lavage. The tissue samples were obtained from the right upper lobe.      INTRAOPERATIVE COMPLICATIONS: none    ESTIMATED BLOOD LOSS: 0 mL    Specimens:   Bronchioalveolar lavage right upper lobe

## 2022-10-11 NOTE — PROGRESS NOTES
Dr. Hedrikc and Dr. Shook at bedside. MD's updated on current mental status, VS, gtts, and labs. Decision was made to intubate patient and cardiovert due to patients afib and hemodynamic instability.      08:30 Once intubated, patient was cardioverted at 200j in to NSR and then went into a junctional tachycardia with subsequent drop in BP. 150 mg Amiodarone bolus given, vasopressin and epi pushed given per MD to maintain adequate BP.     0845: Bronchoscopy performed, BAL sent for culture and gram stain.

## 2022-10-11 NOTE — SUBJECTIVE & OBJECTIVE
Interval History: overnight patient with an episode of worsening neuro function. Was without pressor requirements however early AM was back on 0.02 Levophed.  Review of tele reveals afib      Review of Systems   Constitutional: Negative for chills and fever.   Cardiovascular:  Negative for chest pain, orthopnea and palpitations.   Respiratory:  Negative for cough and shortness of breath.    Gastrointestinal:  Negative for abdominal pain.   Neurological:  Negative for dizziness, headaches and light-headedness.   Psychiatric/Behavioral:  Negative for altered mental status.    Objective:     Vital Signs (Most Recent):  Temp: (!) 104.2 °F (40.1 °C) (10/11/22 0845)  Pulse: 71 (10/11/22 0900)  Resp: (!) 24 (10/11/22 0600)  BP: 118/62 (10/11/22 0600)  SpO2: 100 % (10/11/22 0900)   Vital Signs (24h Range):  Temp:  [98.6 °F (37 °C)-104.2 °F (40.1 °C)] 104.2 °F (40.1 °C)  Pulse:  [] 71  Resp:  [17-25] 24  SpO2:  [86 %-100 %] 100 %  BP: (110-151)/(62-77) 118/62  Arterial Line BP: ()/(49-82) 100/56       Weight: 96.2 kg (212 lb)  Body mass index is 31.31 kg/m².    SpO2: 100 %  O2 Device (Oxygen Therapy): ventilator    Physical Exam  Vitals reviewed.   Constitutional:       General: He is not in acute distress.     Appearance: Normal appearance. He is obese. He is ill-appearing. He is not toxic-appearing.   HENT:      Head: Normocephalic and atraumatic.      Mouth/Throat:      Mouth: Mucous membranes are moist.   Cardiovascular:      Rate and Rhythm: Normal rate and regular rhythm.      Heart sounds: No murmur heard.    No friction rub. No gallop.      Comments: Impella chest insertion  Chest tube  Epicardial pacing wires  Pulmonary:      Effort: Pulmonary effort is normal. No respiratory distress.      Breath sounds: Normal breath sounds.   Abdominal:      Palpations: Abdomen is soft.   Genitourinary:     Comments: Sri  Musculoskeletal:      Right lower leg: No edema.      Left lower leg: No edema.   Skin:      General: Skin is warm.   Neurological:      Mental Status: He is alert. Mental status is at baseline. He is disoriented.       Significant Labs: EP:   Recent Labs   Lab 10/10/22  0323 10/10/22  1118 10/10/22  1826 10/10/22  1924 10/10/22  2353 10/11/22  0304 10/11/22  0549 10/11/22  1000    138 140  --   --  139  --   --    K 4.1 3.4* 4.0  --  4.1 4.4  --   --     106 107  --   --  104  --   --    CO2 23 25 23  --   --  23  --   --    * 125* 122*  --   --  108  --   --    BUN 32* 33* 37*  --   --  46*  --   --    CREATININE 1.2 1.2 1.2  --   --  1.4  --   --    CALCIUM 8.4* 9.0 9.7  --   --  9.0  --   --    PROT 4.8* 5.2* 5.8*  --   --  5.6*  --   --    ALBUMIN 2.5* 2.5* 2.8*  --   --  2.6*  --   --    BILITOT 0.7 0.9 1.1*  --   --  1.1*  --   --    ALKPHOS 51* 59 94  --   --  80  --   --    AST 33 37 44*  --   --  42*  --   --    ALT 12 16 19  --   --  20  --   --    ANIONGAP 9 7* 10  --   --  12  --   --    WBC 17.36*  --   --  19.23*  --  15.14*  --   --    HGB 8.8*  --   --  10.0*  --  9.4*  --   --    HCT 26.8*  --   --  29.7*  --  29.0*   < > 25*   PLT 71*  --   --  103*  --  89*  --   --     < > = values in this interval not displayed.         Significant Imaging: Ejection fraction: No results for input(s): EF in the last 168 hours.

## 2022-10-11 NOTE — ASSESSMENT & PLAN NOTE
Patient with HFrEF 35%, likely will need a ICD prior to discharge as MR likely falsely improved LVEF

## 2022-10-11 NOTE — PROGRESS NOTES
[facility]  Cardiac Electrophysiology  Progress Note    Admission Date: 10/2/2022  Code Status: Full Code   Attending Physician: Mike Hedrick MD   Expected Discharge Date: 10/17/2022  Principal Problem:Nonrheumatic mitral valve regurgitation    Subjective:     Interval History: overnight patient with an episode of worsening neuro function. Was without pressor requirements however early AM was back on 0.02 Levophed.  Review of tele reveals afib      Review of Systems   Constitutional: Negative for chills and fever.   Cardiovascular:  Negative for chest pain, orthopnea and palpitations.   Respiratory:  Negative for cough and shortness of breath.    Gastrointestinal:  Negative for abdominal pain.   Neurological:  Negative for dizziness, headaches and light-headedness.   Psychiatric/Behavioral:  Negative for altered mental status.    Objective:     Vital Signs (Most Recent):  Temp: (!) 104.2 °F (40.1 °C) (10/11/22 0845)  Pulse: 71 (10/11/22 0900)  Resp: (!) 24 (10/11/22 0600)  BP: 118/62 (10/11/22 0600)  SpO2: 100 % (10/11/22 0900)   Vital Signs (24h Range):  Temp:  [98.6 °F (37 °C)-104.2 °F (40.1 °C)] 104.2 °F (40.1 °C)  Pulse:  [] 71  Resp:  [17-25] 24  SpO2:  [86 %-100 %] 100 %  BP: (110-151)/(62-77) 118/62  Arterial Line BP: ()/(49-82) 100/56       Weight: 96.2 kg (212 lb)  Body mass index is 31.31 kg/m².    SpO2: 100 %  O2 Device (Oxygen Therapy): ventilator    Physical Exam  Vitals reviewed.   Constitutional:       General: He is not in acute distress.     Appearance: Normal appearance. He is obese. He is ill-appearing. He is not toxic-appearing.   HENT:      Head: Normocephalic and atraumatic.      Mouth/Throat:      Mouth: Mucous membranes are moist.   Cardiovascular:      Rate and Rhythm: Normal rate and regular rhythm.      Heart sounds: No murmur heard.    No friction rub. No gallop.      Comments: Impella chest insertion  Chest tube  Epicardial pacing  wires  Pulmonary:      Effort: Pulmonary effort is normal. No respiratory distress.      Breath sounds: Normal breath sounds.   Abdominal:      Palpations: Abdomen is soft.   Genitourinary:     Comments: Sri  Musculoskeletal:      Right lower leg: No edema.      Left lower leg: No edema.   Skin:     General: Skin is warm.   Neurological:      Mental Status: He is alert. Mental status is at baseline. He is disoriented.       Significant Labs: EP:   Recent Labs   Lab 10/10/22  0323 10/10/22  1118 10/10/22  1826 10/10/22  1924 10/10/22  2353 10/11/22  0304 10/11/22  0549 10/11/22  1000    138 140  --   --  139  --   --    K 4.1 3.4* 4.0  --  4.1 4.4  --   --     106 107  --   --  104  --   --    CO2 23 25 23  --   --  23  --   --    * 125* 122*  --   --  108  --   --    BUN 32* 33* 37*  --   --  46*  --   --    CREATININE 1.2 1.2 1.2  --   --  1.4  --   --    CALCIUM 8.4* 9.0 9.7  --   --  9.0  --   --    PROT 4.8* 5.2* 5.8*  --   --  5.6*  --   --    ALBUMIN 2.5* 2.5* 2.8*  --   --  2.6*  --   --    BILITOT 0.7 0.9 1.1*  --   --  1.1*  --   --    ALKPHOS 51* 59 94  --   --  80  --   --    AST 33 37 44*  --   --  42*  --   --    ALT 12 16 19  --   --  20  --   --    ANIONGAP 9 7* 10  --   --  12  --   --    WBC 17.36*  --   --  19.23*  --  15.14*  --   --    HGB 8.8*  --   --  10.0*  --  9.4*  --   --    HCT 26.8*  --   --  29.7*  --  29.0*   < > 25*   PLT 71*  --   --  103*  --  89*  --   --     < > = values in this interval not displayed.         Significant Imaging: Ejection fraction: No results for input(s): EF in the last 168 hours.    Assessment and Plan:     VT (ventricular tachycardia)  Patient is a62 y.o male with h/o severe mitral regurgitation, atrial fibrillation, htn, HFrEF that is POD4 from MV repair, TV repair, Maze and L atrial appendage resection. Post op pt required signficant amt of hemodynamic support including Impella 5.5, Levo, vaso, epi, primacor were weaned. Was intubated  previously but extubated.   Review of tele with multiple episodes of NSVT in addition to ventricularly paced rhythm.  On 10/07/2022, telemetry showed R on T phenomenon leading to episode of polymorphic VT with cycle length ~250 ms. Patient was shocked back into normal sinus rhythm. Patient has prolonged ND before and after. Likely an undersensing of the R which led to inappropriate pacing.   - Continue amiodarone gtt until all drips weaned off  - keep sensitivity at 0.8 mV with back up rate of 45 bpm and output of 10 mA~ not requiring epicardial pacing  - Goal K 4-5 and Mg 2-3 mEq/L  - Wean down inotropic support as tolerated  - Re-discuss device placement prior to dc considering reduced LVEF  - Follow up with EP 6-8 weeks post discharge  - EP will cont to follow from a distant    Left ventricular systolic dysfunction  Patient with HFrEF 35%, likely will need a ICD prior to discharge as MR likely falsely improved LVEF    please do not hesitate to call with any questions or concerns    Jacob Gonzalez MD  Cardiac Electrophysiology  [facility]

## 2022-10-11 NOTE — PROCEDURES
"David Barrios is a 62 y.o. male patient.    Temp: (!) 104.2 °F (40.1 °C) (10/11/22 0845)  Pulse: 71 (10/11/22 0900)  Resp: (!) 24 (10/11/22 0600)  BP: 118/62 (10/11/22 0600)  SpO2: 100 % (10/11/22 0900)  Weight: 96.2 kg (212 lb) (10/10/22 1518)  Height: 5' 9" (175.3 cm) (10/11/22 0830)       Electrical Cardioversion    Date/Time: 10/11/2022 8:32 AM  Location procedure was performed: [facility] CRITICAL CARE SURGERY  Performed by: Jeremy Shook MD  Authorized by: Jeremy Shook MD   Pre-operative diagnosis: Atrial Fibrillation  Post-operative diagnosis: Atrial Fibrillation  Consent Done: Emergent Situation    Patient sedated: yes  Sedation type: deep sedation    Sedatives: see MAR for details  Analgesia: see MAR for details  Vitals: Vital signs were monitored during sedation.  Cardioversion basis: elective  Indications: hemodynamic instability  Pre-procedure rhythm: atrial fibrillation  Patient position: patient was placed in a supine position  Chest area: chest area exposed  Electrodes: pads  Electrodes placed: anterior-posterior  Number of attempts: 1  Attempt 1 mode: synchronous  Attempt 1 shock (in Joules): 200  Attempt 1 outcome: conversion to normal sinus rhythm  Post-procedure rhythm: sinus tachycardia  Complications: no complications  Patient tolerance: Patient tolerated the procedure well with no immediate complications  Complications: No        10/11/2022    "

## 2022-10-11 NOTE — CONSULTS
[facility]  Cardiac Electrophysiology  Consult Note    Admission Date: 10/2/2022  Code Status: Full Code   Attending Provider: Mike Hedrick MD  Consulting Provider: Adam Fox MD  Principal Problem:Nonrheumatic mitral valve regurgitation    Inpatient consult to Electrophysiology  Consult performed by: Adam Fox MD  Consult ordered by: Angel Luis Segovia MD  Reason for consult: VT  Assessment/Recommendations: Please see A/P        Subjective:     Reason for consult:  VT    HPI:   Patient is a 62 y.o. male for him EP has been consulted for NSVT. He has a history of severe mitral regurgitation,  atrial fibrillation, htn, HFrEF (EF 35%).   He underwent Complex mitral valve repair, Tricuspid valve repair with 30mm Medtronic Tri-Ad Sullivan annuloplasty band, Maze, Left atrial appendage resection and chest insertion of Impella 5.5 via aortic graft (10mm Vascutek Gelweave) into the left ventricle ,which took place on 10/07/2022  immediate post oeprative course has been complicated by VT ( no strips or EKGs at the time) which per surgical team, required multiple cardioversions. Multiple pressors/inotropes, including Levo, vaso, epi, primacor prior, which has been weaned down significantly.  EP was consulted for VT on 10/07/2022 and was started on amiodarone. Patient was noted to have rare NSVT over the next few days. Unfortunately, patient had an episode of VT this morning requiring 2 shocks. EP was re-consulted for assessment and management recommendations.       Past Medical History:   Diagnosis Date    Anemia     Atrial fibrillation     Encounter for blood transfusion     Esophageal ulcer     GI bleed     Hypertension        Past Surgical History:   Procedure Laterality Date    CARDIOVERSION Left 9/15/2022    Procedure: Cardioversion;  Surgeon: Julio James MD;  Location: [facility] CATH LAB/EP;  Service: Cardiology;  Laterality: Left;  With NORBERT    CATHETERIZATION OF BOTH LEFT AND RIGHT HEART  N/A 9/22/2022    Procedure: CATHETERIZATION, HEART, BOTH LEFT AND RIGHT;  Surgeon: Julio James MD;  Location: [facility] CATH LAB/EP;  Service: Cardiology;  Laterality: N/A;    COLONOSCOPY N/A 4/4/2019    Procedure: COLONOSCOPY Golytely;  Surgeon: Umair Randolph MD;  Location: [facility] ENDO;  Service: Endoscopy;  Laterality: N/A;    CORONARY ANGIOGRAPHY N/A 9/22/2022    Procedure: ANGIOGRAM, CORONARY ARTERY;  Surgeon: Julio James MD;  Location: [facility] CATH LAB/EP;  Service: Cardiology;  Laterality: N/A;    MEYER MAZE PROCEDURE N/A 10/7/2022    Procedure: MEYER MAZE PROCEDURE;  Surgeon: Mike Hedrick MD;  Location: [facility] OR [address] [place] [address];  Service: Cardiovascular;  Laterality: N/A;    ESOPHAGOGASTRODUODENOSCOPY N/A 10/12/2018    Procedure: EGD (ESOPHAGOGASTRODUODENOSCOPY);  Surgeon: Braden Herring MD;  Location: [facility];  Service: Endoscopy;  Laterality: N/A;    ESOPHAGOGASTRODUODENOSCOPY N/A 4/4/2019    Procedure: EGD;  Surgeon: Umair Randolph MD;  Location: [facility] ENDO;  Service: Endoscopy;  Laterality: N/A;    EXCLUSION OF LEFT ATRIAL APPENDAGE N/A 10/7/2022    Procedure: EXCLUSION, LEFT ATRIAL APPENDAGE;  Surgeon: Mike Hedrick MD;  Location: [facility] OR [address] [place] [address];  Service: Cardiovascular;  Laterality: N/A;    HERNIA REPAIR      INSERTION OF INTRAVASCULAR MICROAXIAL BLOOD PUMP N/A 10/7/2022    Procedure: INSERTION, IMPELLA;  Surgeon: Mike Hedrick MD;  [facility];  Service: Cardiovascular;  Laterality: N/A;  direct aortic insertion    IRRIGATION OF MEDIASTINUM N/A 10/7/2022    Procedure: IRRIGATION, MEDIASTINUM;  Surgeon: Mike Hedrick MD;  [facility];  Service: Cardiovascular;  Laterality: N/A;    TRICUSPID VALVULOPLASTY N/A 10/7/2022    Procedure: REPAIR, TRICUSPID VALVE;  Surgeon: Mike Hedrick MD;  [facility];  Service: Cardiovascular;  Laterality: N/A;       Review of patient's allergies indicates:  No Known Allergies    No current  facility-administered medications on file prior to encounter.     Current Outpatient Medications on File Prior to Encounter   Medication Sig    albuterol (PROVENTIL/VENTOLIN HFA) 90 mcg/actuation inhaler inhale 1-2 Puff(s) By Mouth Every 4 Hours as needed    amiodarone (PACERONE) 200 MG Tab Take 1 tablet (200 mg total) by mouth 2 (two) times daily.    carvediloL (COREG) 12.5 MG tablet Take 1 tablet (12.5 mg total) by mouth 2 (two) times daily with meals.    ferrous sulfate (FEOSOL) 325 mg (65 mg iron) Tab tablet Take 1 tablet (325 mg total) by mouth daily with breakfast.    furosemide (LASIX) 40 MG tablet TAKE 1 TABLET(40 MG) BY MOUTH TWICE DAILY    rivaroxaban (XARELTO) 20 mg Tab Take 1 tablet (20 mg total) by mouth daily with dinner or evening meal.     Family History       Problem Relation (Age of Onset)    COPD Mother    Cancer Father          Tobacco Use    Smoking status: Never    Smokeless tobacco: Current     Types: Chew   Substance and Sexual Activity    Alcohol use: Yes     Alcohol/week: 20.0 standard drinks     Types: 20 Cans of beer per week    Drug use: No    Sexual activity: Not on file     Review of Systems   Constitutional: Negative for chills and fever.   Cardiovascular:  Negative for chest pain, orthopnea and palpitations.   Respiratory:  Negative for cough and shortness of breath.    Gastrointestinal:  Negative for abdominal pain.   Neurological:  Negative for dizziness, headaches and light-headedness.   Psychiatric/Behavioral:  Negative for altered mental status.    Objective:     Vital Signs (Most Recent):  Temp: (!) 100.7 °F (38.2 °C) (10/10/22 1800)  Pulse: 61 (10/10/22 1830)  Resp: (!) 21 (10/10/22 1848)  BP: (!) 149/72 (10/10/22 1800)  SpO2: 97 % (10/10/22 1830)   Vital Signs (24h Range):  Temp:  [100.2 °F (37.9 °C)-102.2 °F (39 °C)] 100.7 °F (38.2 °C)  Pulse:  [49-66] 61  Resp:  [18-21] 21  SpO2:  [93 %-100 %] 97 %  BP: ()/(61-76) 149/72  Arterial Line BP: ()/(51-71) 150/63        Weight: 96.2 kg (212 lb)  Body mass index is 31.31 kg/m².    SpO2: 97 %  O2 Device (Oxygen Therapy): nasal cannula    Physical Exam  Vitals reviewed.   Constitutional:       General: He is not in acute distress.     Appearance: Normal appearance. He is obese. He is ill-appearing. He is not toxic-appearing.   HENT:      Head: Normocephalic and atraumatic.      Mouth/Throat:      Mouth: Mucous membranes are moist.   Cardiovascular:      Rate and Rhythm: Normal rate and regular rhythm.      Heart sounds: No murmur heard.    No friction rub. No gallop.      Comments: Impella chest insertion  Chest tube  Epicardial pacing wires  Pulmonary:      Effort: Pulmonary effort is normal. No respiratory distress.      Breath sounds: Normal breath sounds.   Abdominal:      Palpations: Abdomen is soft.   Genitourinary:     Comments: Sri  Musculoskeletal:      Right lower leg: No edema.      Left lower leg: No edema.   Skin:     General: Skin is warm.   Neurological:      Mental Status: He is alert. Mental status is at baseline. He is disoriented.       Significant Labs: EP:   Recent Labs   Lab 10/09/22  1342 10/10/22  0004 10/10/22  0321 10/10/22  0323 10/10/22  1118     --   --  138 138   K 4.8 4.3  --  4.1 3.4*     --   --  106 106   CO2 20*  --   --  23 25   *  --   --  128* 125*   BUN 31*  --   --  32* 33*   CREATININE 1.5*  --   --  1.2 1.2   CALCIUM 8.5*  --   --  8.4* 9.0   PROT 5.0*  --   --  4.8* 5.2*   ALBUMIN 2.8*  --   --  2.5* 2.5*   BILITOT 0.7  --   --  0.7 0.9   ALKPHOS 52*  --   --  51* 59   AST 38  --   --  33 37   ALT 11  --   --  12 16   ANIONGAP 11  --   --  9 7*   WBC 20.89* 18.31*  --  17.36*  --    HGB 9.4* 9.1*  --  8.8*  --    HCT 27.1* 26.5*   < > 26.8*  --    PLT 74* 65*  --  71*  --     < > = values in this interval not displayed.       Significant Imaging: Ejection fraction: No results for input(s): EF in the last 168 hours.              Assessment and Plan:     VT  (ventricular tachycardia)  Patient is a62 y.o male with h/o severe mitral regurgitation, atrial fibrillation, htn, HFrEF that is POD4 from MV repair, TV repair, Maze and L atrial appendage resection. Post op pt required signficant amt of hemodynamic support including Impella 5.5, Levo, vaso, epi, primacor were weaned. Was intubated previously but extubated.   Review of tele with multiple episodes of NSVT in addition to ventricularly paced rhythm.  On 10/07/2022, telemetry showed R on T phenomenon leading to episode of polymorphic VT with cycle length ~250 ms. Patient was shocked back into normal sinus rhythm. Patient has prolonged VT before and after. Likely an undersensing of the R which led to inappropriate pacing.   - Continue amiodarone gtt until all drips weaned off  - Decreased sensitivity to 0.8 mV and changed to VVI mode with back up of 45 bpm and output of 10 mA, not requiring epicardial pacing  - Goal K 4-5 and Mg 2-3 mEq/L  - Wean down inotropic support as tolerated  - Would need to re-discuss device placement prior to discharge considering reduced LVEF    Left ventricular systolic dysfunction  Patient with HFrEF 35%, likely will need a ICD prior to discharge as MR likely falsely improved LVEF    Discussed with staff, Dr Garrison.    Thank you for your consult. We will follow-up with patient. Please contact us if you have any additional questions.    Adam Fox MD  Cardiac Electrophysiology PGY5  [facility]

## 2022-10-11 NOTE — EICU
Rounding (Video Assessment):  Yes    Intervention Initiated From:  Bedside    Kristen Communicated with Bedside Nurse regarding:  Time-Out    eLerted for urgent re-intubation and cardioversion. Time-out done. 8.0ETT placed by Dr Luca Hein (Anesthesia resident) supervised/assisted by Dr Jeremy Shook.Secured at 24cm (lip). NSVT, BP low-urgently cardioverted with 200j - now NSR with improved BP. OGT placed. CXR ordered. LDAs added.    0845-Bronchoscopy. Time-out done.  Pt tolerated well.  0900-Scope out

## 2022-10-11 NOTE — NURSING
Pt not following commands at 3am assessment. Upon assessment, pt unable to hold conversation and staring off into distance (change from prior assessments). Krishna RODRIGUEZ called to bedside and aware of situation. Will continue to monitor.

## 2022-10-12 LAB
ALBUMIN SERPL BCP-MCNC: 2.6 G/DL (ref 3.5–5.2)
ALBUMIN SERPL BCP-MCNC: 2.6 G/DL (ref 3.5–5.2)
ALLENS TEST: ABNORMAL
ALLENS TEST: NORMAL
ALP SERPL-CCNC: 67 U/L (ref 55–135)
ALP SERPL-CCNC: 68 U/L (ref 55–135)
ALT SERPL W/O P-5'-P-CCNC: 17 U/L (ref 10–44)
ALT SERPL W/O P-5'-P-CCNC: 17 U/L (ref 10–44)
ANION GAP SERPL CALC-SCNC: 12 MMOL/L (ref 8–16)
ANION GAP SERPL CALC-SCNC: 9 MMOL/L (ref 8–16)
ANISOCYTOSIS BLD QL SMEAR: SLIGHT
APTT BLDCRRT: 47.4 SEC (ref 21–32)
ASCENDING AORTA: 3.59 CM
AST SERPL-CCNC: 33 U/L (ref 10–40)
AST SERPL-CCNC: 35 U/L (ref 10–40)
BASO STIPL BLD QL SMEAR: ABNORMAL
BASOPHILS NFR BLD: 0 % (ref 0–1.9)
BILIRUB SERPL-MCNC: 1.8 MG/DL (ref 0.1–1)
BILIRUB SERPL-MCNC: 2 MG/DL (ref 0.1–1)
BLD PROD TYP BPU: NORMAL
BLD PROD TYP BPU: NORMAL
BLOOD UNIT EXPIRATION DATE: NORMAL
BLOOD UNIT EXPIRATION DATE: NORMAL
BLOOD UNIT TYPE CODE: 8400
BLOOD UNIT TYPE CODE: 8400
BLOOD UNIT TYPE: NORMAL
BLOOD UNIT TYPE: NORMAL
BSA FOR ECHO PROCEDURE: 2.16 M2
BUN SERPL-MCNC: 47 MG/DL (ref 8–23)
BUN SERPL-MCNC: 50 MG/DL (ref 8–23)
BURR CELLS BLD QL SMEAR: ABNORMAL
CALCIUM SERPL-MCNC: 8.6 MG/DL (ref 8.7–10.5)
CALCIUM SERPL-MCNC: 9 MG/DL (ref 8.7–10.5)
CHLORIDE SERPL-SCNC: 105 MMOL/L (ref 95–110)
CHLORIDE SERPL-SCNC: 107 MMOL/L (ref 95–110)
CO2 SERPL-SCNC: 22 MMOL/L (ref 23–29)
CO2 SERPL-SCNC: 22 MMOL/L (ref 23–29)
CODING SYSTEM: NORMAL
CODING SYSTEM: NORMAL
CREAT SERPL-MCNC: 1.7 MG/DL (ref 0.5–1.4)
CREAT SERPL-MCNC: 1.7 MG/DL (ref 0.5–1.4)
CV ECHO LV RWT: 0.28 CM
DELSYS: ABNORMAL
DELSYS: NORMAL
DIFFERENTIAL METHOD: ABNORMAL
DISPENSE STATUS: NORMAL
DISPENSE STATUS: NORMAL
DOHLE BOD BLD QL SMEAR: PRESENT
DOHLE BOD BLD QL SMEAR: PRESENT
DOP CALC LVOT AREA: 6.2 CM2
DOP CALC LVOT DIAMETER: 2.82 CM
ECHO LV POSTERIOR WALL: 0.74 CM (ref 0.6–1.1)
EJECTION FRACTION: 15 %
EOSINOPHIL NFR BLD: 0 % (ref 0–8)
ERYTHROCYTE [DISTWIDTH] IN BLOOD BY AUTOMATED COUNT: 17.2 % (ref 11.5–14.5)
ERYTHROCYTE [DISTWIDTH] IN BLOOD BY AUTOMATED COUNT: 17.2 % (ref 11.5–14.5)
ERYTHROCYTE [DISTWIDTH] IN BLOOD BY AUTOMATED COUNT: 17.7 % (ref 11.5–14.5)
ERYTHROCYTE [SEDIMENTATION RATE] IN BLOOD BY WESTERGREN METHOD: 24 MM/H
ERYTHROCYTE [SEDIMENTATION RATE] IN BLOOD BY WESTERGREN METHOD: 24 MM/H
EST. GFR  (NO RACE VARIABLE): 44.7 ML/MIN/1.73 M^2
EST. GFR  (NO RACE VARIABLE): 44.7 ML/MIN/1.73 M^2
FIO2: 60
FIO2: 60
FRACTIONAL SHORTENING: 8 % (ref 28–44)
GIANT PLATELETS BLD QL SMEAR: PRESENT
GIANT PLATELETS BLD QL SMEAR: PRESENT
GLUCOSE SERPL-MCNC: 140 MG/DL (ref 70–110)
GLUCOSE SERPL-MCNC: 146 MG/DL (ref 70–110)
HCO3 UR-SCNC: 24.2 MMOL/L (ref 24–28)
HCO3 UR-SCNC: 25.7 MMOL/L (ref 24–28)
HCO3 UR-SCNC: 25.8 MMOL/L (ref 24–28)
HCT VFR BLD AUTO: 19.2 % (ref 40–54)
HCT VFR BLD AUTO: 24.6 % (ref 40–54)
HCT VFR BLD AUTO: 25.3 % (ref 40–54)
HCT VFR BLD CALC: 25 %PCV (ref 36–54)
HCT VFR BLD CALC: 27 %PCV (ref 36–54)
HGB BLD-MCNC: 6.6 G/DL (ref 14–18)
HGB BLD-MCNC: 8.5 G/DL (ref 14–18)
HGB BLD-MCNC: 8.7 G/DL (ref 14–18)
HYPOCHROMIA BLD QL SMEAR: ABNORMAL
IMM GRANULOCYTES # BLD AUTO: ABNORMAL K/UL (ref 0–0.04)
IMM GRANULOCYTES NFR BLD AUTO: ABNORMAL % (ref 0–0.5)
INTERVENTRICULAR SEPTUM: 0.72 CM (ref 0.6–1.1)
LA MAJOR: 6.17 CM
LA MINOR: 6.31 CM
LA WIDTH: 4.17 CM
LDH SERPL L TO P-CCNC: 1.05 MMOL/L (ref 0.36–1.25)
LEFT ATRIUM SIZE: 4.34 CM
LEFT ATRIUM VOLUME INDEX: 45.3 ML/M2
LEFT ATRIUM VOLUME: 95.98 CM3
LEFT INTERNAL DIMENSION IN SYSTOLE: 4.92 CM (ref 2.1–4)
LEFT VENTRICLE DIASTOLIC VOLUME INDEX: 65.39 ML/M2
LEFT VENTRICLE DIASTOLIC VOLUME: 138.62 ML
LEFT VENTRICLE MASS INDEX: 64 G/M2
LEFT VENTRICLE SYSTOLIC VOLUME INDEX: 53.8 ML/M2
LEFT VENTRICLE SYSTOLIC VOLUME: 114.15 ML
LEFT VENTRICULAR INTERNAL DIMENSION IN DIASTOLE: 5.36 CM (ref 3.5–6)
LEFT VENTRICULAR MASS: 136.4 G
LYMPHOCYTES NFR BLD: 2 % (ref 18–48)
LYMPHOCYTES NFR BLD: 3 % (ref 18–48)
LYMPHOCYTES NFR BLD: 3 % (ref 18–48)
MAGNESIUM SERPL-MCNC: 2.3 MG/DL (ref 1.6–2.6)
MAGNESIUM SERPL-MCNC: 2.3 MG/DL (ref 1.6–2.6)
MCH RBC QN AUTO: 29.7 PG (ref 27–31)
MCH RBC QN AUTO: 29.9 PG (ref 27–31)
MCH RBC QN AUTO: 30 PG (ref 27–31)
MCHC RBC AUTO-ENTMCNC: 34.4 G/DL (ref 32–36)
MCHC RBC AUTO-ENTMCNC: 34.4 G/DL (ref 32–36)
MCHC RBC AUTO-ENTMCNC: 34.6 G/DL (ref 32–36)
MCV RBC AUTO: 86 FL (ref 82–98)
MCV RBC AUTO: 87 FL (ref 82–98)
MCV RBC AUTO: 87 FL (ref 82–98)
METAMYELOCYTES NFR BLD MANUAL: 0.5 %
METAMYELOCYTES NFR BLD MANUAL: 2 %
MODE: ABNORMAL
MODE: ABNORMAL
MONOCYTES NFR BLD: 13 % (ref 4–15)
MONOCYTES NFR BLD: 5 % (ref 4–15)
MONOCYTES NFR BLD: 9.5 % (ref 4–15)
MYELOCYTES NFR BLD MANUAL: 2 %
NEUTROPHILS NFR BLD: 82 % (ref 38–73)
NEUTROPHILS NFR BLD: 86 % (ref 38–73)
NEUTROPHILS NFR BLD: 86 % (ref 38–73)
NEUTS BAND NFR BLD MANUAL: 2 %
NRBC BLD-RTO: 1 /100 WBC
OVALOCYTES BLD QL SMEAR: ABNORMAL
OVALOCYTES BLD QL SMEAR: ABNORMAL
PCO2 BLDA: 33 MMHG (ref 35–45)
PCO2 BLDA: 33.5 MMHG (ref 35–45)
PCO2 BLDA: 37.3 MMHG (ref 35–45)
PEEP: 5
PEEP: 5
PH SMN: 7.45 [PH] (ref 7.35–7.45)
PH SMN: 7.47 [PH] (ref 7.35–7.45)
PH SMN: 7.49 [PH] (ref 7.35–7.45)
PHOSPHATE SERPL-MCNC: 3.1 MG/DL (ref 2.7–4.5)
PHOSPHATE SERPL-MCNC: 3.3 MG/DL (ref 2.7–4.5)
PLATELET # BLD AUTO: 77 K/UL (ref 150–450)
PLATELET # BLD AUTO: 86 K/UL (ref 150–450)
PLATELET # BLD AUTO: 91 K/UL (ref 150–450)
PLATELET BLD QL SMEAR: ABNORMAL
PMV BLD AUTO: 12.5 FL (ref 9.2–12.9)
PMV BLD AUTO: 13.5 FL (ref 9.2–12.9)
PMV BLD AUTO: 13.5 FL (ref 9.2–12.9)
PO2 BLDA: 231 MMHG (ref 80–100)
PO2 BLDA: 31 MMHG (ref 40–60)
PO2 BLDA: 66 MMHG (ref 80–100)
POC BE: 1 MMOL/L
POC BE: 2 MMOL/L
POC BE: 2 MMOL/L
POC IONIZED CALCIUM: 1.18 MMOL/L (ref 1.06–1.42)
POC IONIZED CALCIUM: 1.19 MMOL/L (ref 1.06–1.42)
POC SATURATED O2: 100 % (ref 95–100)
POC SATURATED O2: 62 % (ref 95–100)
POC SATURATED O2: 94 % (ref 95–100)
POC SVO2: 62 %
POC TCO2: 25 MMOL/L (ref 23–27)
POC TCO2: 27 MMOL/L (ref 23–27)
POC TCO2: 27 MMOL/L (ref 24–29)
POIKILOCYTOSIS BLD QL SMEAR: ABNORMAL
POIKILOCYTOSIS BLD QL SMEAR: SLIGHT
POIKILOCYTOSIS BLD QL SMEAR: SLIGHT
POLYCHROMASIA BLD QL SMEAR: ABNORMAL
POTASSIUM BLD-SCNC: 3.5 MMOL/L (ref 3.5–5.1)
POTASSIUM BLD-SCNC: 3.6 MMOL/L (ref 3.5–5.1)
POTASSIUM SERPL-SCNC: 3.4 MMOL/L (ref 3.5–5.1)
POTASSIUM SERPL-SCNC: 3.6 MMOL/L (ref 3.5–5.1)
PROT SERPL-MCNC: 4.9 G/DL (ref 6–8.4)
PROT SERPL-MCNC: 5 G/DL (ref 6–8.4)
RA MAJOR: 5.06 CM
RA PRESSURE: 15 MMHG
RA WIDTH: 3.69 CM
RBC # BLD AUTO: 2.21 M/UL (ref 4.6–6.2)
RBC # BLD AUTO: 2.83 M/UL (ref 4.6–6.2)
RBC # BLD AUTO: 2.93 M/UL (ref 4.6–6.2)
RIGHT VENTRICULAR END-DIASTOLIC DIMENSION: 4 CM
SAMPLE: ABNORMAL
SAMPLE: NORMAL
SINUS: 3.59 CM
SITE: ABNORMAL
SITE: NORMAL
SMUDGE CELLS BLD QL SMEAR: PRESENT
SMUDGE CELLS BLD QL SMEAR: PRESENT
SODIUM BLD-SCNC: 140 MMOL/L (ref 136–145)
SODIUM BLD-SCNC: 143 MMOL/L (ref 136–145)
SODIUM SERPL-SCNC: 138 MMOL/L (ref 136–145)
SODIUM SERPL-SCNC: 139 MMOL/L (ref 136–145)
SPHEROCYTES BLD QL SMEAR: ABNORMAL
STJ: 3.52 CM
TDI LATERAL: 0.08 M/S
TDI SEPTAL: 0.05 M/S
TDI: 0.07 M/S
TOXIC GRANULES BLD QL SMEAR: PRESENT
TRANS ERYTHROCYTES VOL PATIENT: NORMAL ML
TRANS ERYTHROCYTES VOL PATIENT: NORMAL ML
VANCOMYCIN SERPL-MCNC: 11.5 UG/ML
VT: 470
VT: 470
WBC # BLD AUTO: 17.07 K/UL (ref 3.9–12.7)
WBC # BLD AUTO: 17.3 K/UL (ref 3.9–12.7)
WBC # BLD AUTO: 17.44 K/UL (ref 3.9–12.7)
WBC TOXIC VACUOLES BLD QL SMEAR: PRESENT

## 2022-10-12 PROCEDURE — 37799 UNLISTED PX VASCULAR SURGERY: CPT

## 2022-10-12 PROCEDURE — 63600175 PHARM REV CODE 636 W HCPCS: Performed by: STUDENT IN AN ORGANIZED HEALTH CARE EDUCATION/TRAINING PROGRAM

## 2022-10-12 PROCEDURE — 84100 ASSAY OF PHOSPHORUS: CPT | Mod: 91 | Performed by: STUDENT IN AN ORGANIZED HEALTH CARE EDUCATION/TRAINING PROGRAM

## 2022-10-12 PROCEDURE — 84132 ASSAY OF SERUM POTASSIUM: CPT

## 2022-10-12 PROCEDURE — 85007 BL SMEAR W/DIFF WBC COUNT: CPT | Mod: 91 | Performed by: THORACIC SURGERY (CARDIOTHORACIC VASCULAR SURGERY)

## 2022-10-12 PROCEDURE — 25000003 PHARM REV CODE 250: Performed by: STUDENT IN AN ORGANIZED HEALTH CARE EDUCATION/TRAINING PROGRAM

## 2022-10-12 PROCEDURE — 25000003 PHARM REV CODE 250

## 2022-10-12 PROCEDURE — C9248 INJ, CLEVIDIPINE BUTYRATE: HCPCS | Mod: JG

## 2022-10-12 PROCEDURE — 63600175 PHARM REV CODE 636 W HCPCS

## 2022-10-12 PROCEDURE — 84100 ASSAY OF PHOSPHORUS: CPT | Performed by: THORACIC SURGERY (CARDIOTHORACIC VASCULAR SURGERY)

## 2022-10-12 PROCEDURE — 80202 ASSAY OF VANCOMYCIN: CPT | Performed by: THORACIC SURGERY (CARDIOTHORACIC VASCULAR SURGERY)

## 2022-10-12 PROCEDURE — 83735 ASSAY OF MAGNESIUM: CPT | Mod: 91 | Performed by: STUDENT IN AN ORGANIZED HEALTH CARE EDUCATION/TRAINING PROGRAM

## 2022-10-12 PROCEDURE — 99291 PR CRITICAL CARE, E/M 30-74 MINUTES: ICD-10-PCS | Mod: ,,, | Performed by: ANESTHESIOLOGY

## 2022-10-12 PROCEDURE — 83605 ASSAY OF LACTIC ACID: CPT

## 2022-10-12 PROCEDURE — 63600175 PHARM REV CODE 636 W HCPCS: Performed by: ANESTHESIOLOGY

## 2022-10-12 PROCEDURE — 94761 N-INVAS EAR/PLS OXIMETRY MLT: CPT

## 2022-10-12 PROCEDURE — 99900017 HC EXTUBATION W/PARAMETERS (STAT)

## 2022-10-12 PROCEDURE — 87077 CULTURE AEROBIC IDENTIFY: CPT | Mod: 59

## 2022-10-12 PROCEDURE — 20000000 HC ICU ROOM

## 2022-10-12 PROCEDURE — 85730 THROMBOPLASTIN TIME PARTIAL: CPT | Performed by: THORACIC SURGERY (CARDIOTHORACIC VASCULAR SURGERY)

## 2022-10-12 PROCEDURE — 99900035 HC TECH TIME PER 15 MIN (STAT)

## 2022-10-12 PROCEDURE — 25000003 PHARM REV CODE 250: Performed by: THORACIC SURGERY (CARDIOTHORACIC VASCULAR SURGERY)

## 2022-10-12 PROCEDURE — 94003 VENT MGMT INPAT SUBQ DAY: CPT

## 2022-10-12 PROCEDURE — 99291 CRITICAL CARE FIRST HOUR: CPT | Mod: ,,, | Performed by: ANESTHESIOLOGY

## 2022-10-12 PROCEDURE — 94150 VITAL CAPACITY TEST: CPT

## 2022-10-12 PROCEDURE — 85027 COMPLETE CBC AUTOMATED: CPT | Mod: 91 | Performed by: THORACIC SURGERY (CARDIOTHORACIC VASCULAR SURGERY)

## 2022-10-12 PROCEDURE — 80053 COMPREHEN METABOLIC PANEL: CPT | Mod: 91

## 2022-10-12 PROCEDURE — 85014 HEMATOCRIT: CPT

## 2022-10-12 PROCEDURE — 80053 COMPREHEN METABOLIC PANEL: CPT | Performed by: THORACIC SURGERY (CARDIOTHORACIC VASCULAR SURGERY)

## 2022-10-12 PROCEDURE — 87040 BLOOD CULTURE FOR BACTERIA: CPT | Mod: 59

## 2022-10-12 PROCEDURE — P9021 RED BLOOD CELLS UNIT: HCPCS

## 2022-10-12 PROCEDURE — 94010 BREATHING CAPACITY TEST: CPT

## 2022-10-12 PROCEDURE — 99900026 HC AIRWAY MAINTENANCE (STAT)

## 2022-10-12 PROCEDURE — 27000221 HC OXYGEN, UP TO 24 HOURS

## 2022-10-12 PROCEDURE — 83735 ASSAY OF MAGNESIUM: CPT | Performed by: THORACIC SURGERY (CARDIOTHORACIC VASCULAR SURGERY)

## 2022-10-12 PROCEDURE — 27000203 HC IMPELLA ADD'L DAY (CL)

## 2022-10-12 PROCEDURE — 84295 ASSAY OF SERUM SODIUM: CPT

## 2022-10-12 PROCEDURE — 82803 BLOOD GASES ANY COMBINATION: CPT

## 2022-10-12 PROCEDURE — 63600175 PHARM REV CODE 636 W HCPCS: Performed by: THORACIC SURGERY (CARDIOTHORACIC VASCULAR SURGERY)

## 2022-10-12 PROCEDURE — 82330 ASSAY OF CALCIUM: CPT

## 2022-10-12 RX ORDER — DEXMEDETOMIDINE HYDROCHLORIDE 4 UG/ML
INJECTION, SOLUTION INTRAVENOUS
Status: DISPENSED
Start: 2022-10-12 | End: 2022-10-13

## 2022-10-12 RX ORDER — FAMOTIDINE 20 MG/1
20 TABLET, FILM COATED ORAL 2 TIMES DAILY
Status: DISCONTINUED | OUTPATIENT
Start: 2022-10-12 | End: 2022-10-17

## 2022-10-12 RX ORDER — FENTANYL CITRATE 50 UG/ML
50 INJECTION, SOLUTION INTRAMUSCULAR; INTRAVENOUS ONCE
Status: COMPLETED | OUTPATIENT
Start: 2022-10-12 | End: 2022-10-12

## 2022-10-12 RX ORDER — FENTANYL CITRATE 50 UG/ML
INJECTION, SOLUTION INTRAMUSCULAR; INTRAVENOUS
Status: COMPLETED
Start: 2022-10-12 | End: 2022-10-12

## 2022-10-12 RX ORDER — AMOXICILLIN 250 MG
1 CAPSULE ORAL 2 TIMES DAILY
Status: DISCONTINUED | OUTPATIENT
Start: 2022-10-12 | End: 2022-10-25 | Stop reason: HOSPADM

## 2022-10-12 RX ORDER — DEXMEDETOMIDINE HYDROCHLORIDE 4 UG/ML
0-1.4 INJECTION, SOLUTION INTRAVENOUS CONTINUOUS
Status: DISCONTINUED | OUTPATIENT
Start: 2022-10-13 | End: 2022-10-14

## 2022-10-12 RX ORDER — HYDROMORPHONE HYDROCHLORIDE 1 MG/ML
0.5 INJECTION, SOLUTION INTRAMUSCULAR; INTRAVENOUS; SUBCUTANEOUS ONCE
Status: DISCONTINUED | OUTPATIENT
Start: 2022-10-12 | End: 2022-10-13

## 2022-10-12 RX ORDER — MILRINONE LACTATE 0.2 MG/ML
0.38 INJECTION, SOLUTION INTRAVENOUS CONTINUOUS
Status: DISCONTINUED | OUTPATIENT
Start: 2022-10-13 | End: 2022-10-15

## 2022-10-12 RX ORDER — ACETAMINOPHEN 500 MG
1000 TABLET ORAL EVERY 8 HOURS
Status: DISCONTINUED | OUTPATIENT
Start: 2022-10-12 | End: 2022-10-21

## 2022-10-12 RX ADMIN — FUROSEMIDE 5 MG/HR: 10 INJECTION, SOLUTION INTRAMUSCULAR; INTRAVENOUS at 04:10

## 2022-10-12 RX ADMIN — ACETAMINOPHEN 1000 MG: 500 TABLET ORAL at 05:10

## 2022-10-12 RX ADMIN — FAMOTIDINE 20 MG: 20 TABLET ORAL at 08:10

## 2022-10-12 RX ADMIN — MUPIROCIN: 20 OINTMENT TOPICAL at 08:10

## 2022-10-12 RX ADMIN — FAMOTIDINE 20 MG: 20 TABLET ORAL at 09:10

## 2022-10-12 RX ADMIN — AMIODARONE HYDROCHLORIDE 0.5 MG/MIN: 1.8 INJECTION, SOLUTION INTRAVENOUS at 09:10

## 2022-10-12 RX ADMIN — PIPERACILLIN SODIUM AND TAZOBACTAM SODIUM 4.5 G: 4; .5 INJECTION, POWDER, LYOPHILIZED, FOR SOLUTION INTRAVENOUS at 05:10

## 2022-10-12 RX ADMIN — Medication 0.05 MCG/KG/MIN: at 11:10

## 2022-10-12 RX ADMIN — HEPARIN SODIUM: 5000 INJECTION INTRAVENOUS; SUBCUTANEOUS at 03:10

## 2022-10-12 RX ADMIN — ACETAMINOPHEN 1000 MG: 500 TABLET ORAL at 02:10

## 2022-10-12 RX ADMIN — ACETAMINOPHEN 1000 MG: 500 TABLET ORAL at 09:10

## 2022-10-12 RX ADMIN — FENTANYL CITRATE 50 MCG: 0.05 INJECTION, SOLUTION INTRAMUSCULAR; INTRAVENOUS at 09:10

## 2022-10-12 RX ADMIN — AMIODARONE HYDROCHLORIDE 0.5 MG/MIN: 1.8 INJECTION, SOLUTION INTRAVENOUS at 11:10

## 2022-10-12 RX ADMIN — HYDROMORPHONE HYDROCHLORIDE 0.5 MG: 1 INJECTION, SOLUTION INTRAMUSCULAR; INTRAVENOUS; SUBCUTANEOUS at 10:10

## 2022-10-12 RX ADMIN — POLYETHYLENE GLYCOL 3350 17 G: 17 POWDER, FOR SOLUTION ORAL at 08:10

## 2022-10-12 RX ADMIN — CLEVIPIDINE 4 MG/HR: 0.5 EMULSION INTRAVENOUS at 11:10

## 2022-10-12 RX ADMIN — PROPOFOL 45 MCG/KG/MIN: 10 INJECTION, EMULSION INTRAVENOUS at 02:10

## 2022-10-12 RX ADMIN — AMIODARONE HYDROCHLORIDE 0.5 MG/MIN: 1.8 INJECTION, SOLUTION INTRAVENOUS at 05:10

## 2022-10-12 RX ADMIN — SENNOSIDES AND DOCUSATE SODIUM 1 TABLET: 50; 8.6 TABLET ORAL at 08:10

## 2022-10-12 RX ADMIN — VASOPRESSIN 0.02 UNITS/MIN: 20 INJECTION INTRAVENOUS at 11:10

## 2022-10-12 RX ADMIN — FENTANYL CITRATE 50 MCG: 50 INJECTION, SOLUTION INTRAMUSCULAR; INTRAVENOUS at 09:10

## 2022-10-12 RX ADMIN — VANCOMYCIN HYDROCHLORIDE 1250 MG: 1.25 INJECTION, POWDER, LYOPHILIZED, FOR SOLUTION INTRAVENOUS at 08:10

## 2022-10-12 RX ADMIN — POTASSIUM CHLORIDE 40 MEQ: 29.8 INJECTION, SOLUTION INTRAVENOUS at 01:10

## 2022-10-12 RX ADMIN — POTASSIUM CHLORIDE 40 MEQ: 29.8 INJECTION, SOLUTION INTRAVENOUS at 11:10

## 2022-10-12 RX ADMIN — HYDROCORTISONE SODIUM SUCCINATE 100 MG: 100 INJECTION, POWDER, FOR SOLUTION INTRAMUSCULAR; INTRAVENOUS at 05:10

## 2022-10-12 RX ADMIN — HYDROCORTISONE SODIUM SUCCINATE 100 MG: 100 INJECTION, POWDER, FOR SOLUTION INTRAMUSCULAR; INTRAVENOUS at 09:10

## 2022-10-12 RX ADMIN — PIPERACILLIN SODIUM AND TAZOBACTAM SODIUM 4.5 G: 4; .5 INJECTION, POWDER, LYOPHILIZED, FOR SOLUTION INTRAVENOUS at 12:10

## 2022-10-12 RX ADMIN — HYDROCORTISONE SODIUM SUCCINATE 100 MG: 100 INJECTION, POWDER, FOR SOLUTION INTRAMUSCULAR; INTRAVENOUS at 02:10

## 2022-10-12 RX ADMIN — PIPERACILLIN SODIUM AND TAZOBACTAM SODIUM 4.5 G: 4; .5 INJECTION, POWDER, LYOPHILIZED, FOR SOLUTION INTRAVENOUS at 07:10

## 2022-10-12 RX ADMIN — HYDROMORPHONE HYDROCHLORIDE 0.5 MG: 1 INJECTION, SOLUTION INTRAMUSCULAR; INTRAVENOUS; SUBCUTANEOUS at 04:10

## 2022-10-12 RX ADMIN — SENNOSIDES AND DOCUSATE SODIUM 1 TABLET: 50; 8.6 TABLET ORAL at 09:10

## 2022-10-12 NOTE — ASSESSMENT & PLAN NOTE
  Neuro/Psych:   -- Sedation: precedex prn for agitation   -- Pain: PRN Oxy + Dilaudid, Fentanyl pushes prn  -- Scheduled Tylenol             Cards:   -- S/P TV replacement, MV replacement, MAZE, Lt Atrial Appendage ligation and Impella placement on 10/7/22  -- Impella: P5  -- Hemodynamically unstable post-op requiring high support and antiarrhythmics for frequent VTach runs, multiple cardioversions in transport from OR  -- Reopened POD#0 for high chest tube output - found to have slow persistent bleeding from the IVC and left atriotomy sites  -- HDS on 0.02 epi - plan to wean off today  -- Impella at P5 - plan to wean support with goal of removal tomorrow   -- Ventricular pacing wires. Back up at 40 BPM.  -- MAP > 65, Syst < 140  -- Aspirin 325mg holding for now, will continue to hold in the setting of down-trending platelets  -- Amiodarone gtt at 0.5 mg/hr - converted back into afib RVR today requiring cardioversion, now sinus rhythm.   -- likely previously in septic shock with GNR in blood, resolved. Now off vasopressors.  -- SDS x3 days  -- In rate-controlled Afib as of 10/12/22, plan to convert to PO amio  -- Due to multiple runs VT in setting of low EF, will need lifevest at discharge.       Pulm:   -- Goal O2 sat > 90%  -- ABG PRN  -- extubated to NC 10/12  -- CXR with improving R middle lobe atelectasis infiltrate  -- bedside bronch for BAL - NGTD  -- Mediastinal chest tubes x2 and pleural chest tube x1 to suction (total 625 cc in 24 hours), decreased in output since reopened chest        Renal:  -- Diuresis with lasix gtt (goal 100-150ml/hr)  -- Keep lambert for strict I/O  -- Trend BUN/Cr      Recent Labs     10/11/22  1907 10/12/22  0001 10/12/22  0428   BUN 46* 50* 47*   CREATININE 1.6* 1.7* 1.7*        FEN / GI:   -- Replace lytes as needed  -- Nutrition: NPO - SLP to see patient today  -- GI ppx: famotidine  -- Bowel reg: miralax      ID:   -- Afebrile  -- WBC trending down  -- Viry-op ancef  completed, restarting broad spectrum antibiotics  -- serratia macescens bacteremia 10/12  - awaiting sensitivities   -- Replaced central line  -- Repeat blood cultures currently NDGT  -- will evaluate for impella removal, concern for seeding     Recent Labs     10/12/22  0001 10/12/22  0153 10/12/22  0428   WBC 17.44* 17.07* 17.30*        Heme/Onc:   -- s/p 2 units PRBC yesterday (hgb 7.5 today)  -- Daily CBC  -- 1000 mL Cell saver given back  -- Aspirin 325mg QD; hold for now in the setting of down-trending platelets  -- Impella: Systemic Heparin gtt with goal aPTT 40 - 60  -- Blood Products: 5 units pRBC, 2 FFP, 2 Platelets, 2 Cryoprecipitate (10/8)    Recent Labs     10/12/22  0428   HGB 8.7*   HCT 25.3*   APTT 47.4*        Endo:   -- BG goal 140-180  -- Insulin gtt  -- Hgb A1c 5.5  -- Endocrinology managing      PPx:   Feeding: NPO  Analgesia/Sedation: precedex/PRN Oxy + Dilaudid  Thromboembolic prevention: SCDs, Heparin gtt  HOB >30: Yes  Stress Ulcer ppx: famotidine  Glucose control: Critical care goal 140-180 g/dl, ISS     Lines/Drains/Airway: CVC left subbclavian, Fierro, Chest tubes x 3, ventricular pacing wires, L radial A-line, Impella      Dispo/Code Status/Palliative:   -- SICU / Full Code.

## 2022-10-12 NOTE — PLAN OF CARE
SICU PLAN OF CARE NOTE    Dx: Nonrheumatic mitral valve regurgitation    Shift Events: Pt with oliguria after impella and epi wean this AM, therefore impella remains at P-5 and epi at 0.05 mcg/kg/min. R IJ introducer and swan discontinued, new L subclavian CVC placed, and repeat blood cultures sent. Pt extubated to 4 L NC, tolerating well. Lasix gtt started with UOP goal of 100 mL/hr.    Gtts:     Amio   Epi  Lasix  Heparin    Neuro: AAO x4, Follows Commands, and Moves All Extremities    Cardiac: Rate controlled a-fib, HR 110s. MAP maintained > 65. CVP 10, 7, 7.    Respiratory: 4 L NC    GI: NPO, SLP consult placed    : Urinary Catheter 25-85 cc/hr    Drains: Chest tubes, 260 mL/shift, serosanguinous     Labs/Accuchecks: Daily labs reviewed. Accuchecks Q6h.     Skin: No skin breakdown noted. Midsternal incision with island/boarder CDI. Chest tube site dressing CDI. Bed plugged in with mattress inflated. Heels elevated off of bed with green boots. Sacral foam in place. Weight shift assistance provided throughout shift.

## 2022-10-12 NOTE — PLAN OF CARE
Problem: Adult Inpatient Plan of Care  Goal: Plan of Care Review  Outcome: Unable to Meet, Plan Revised  Pt reintubated in the beginning of shift. Pt awakens and follows commands and is able to move all extremities equally. Gtts: epi at 0.05mcg/kg/min propofol at 50mcg/kg/min, heparin at 600u/hr, and amiodarone at 0.5mg/min. Impella P5 with adequate flows. See flwosheets for assessments and intake and output. Total urine ouput for shift 815cc. Urine cx obtained. Sputum cx obtained through. Blood cultures with gram negative rods. Vancomycin and pip/tazo for abx regimen currently. Tmax 104.5 today, was placed on cooling blanket and packed in ice with subsequent returned to normothermia. Labs and ABG trended. Potassium and calcium repleted. Plans to possibly wean impella tomorrow. Per MD if BP goals not being met, restart vasopressin infusion. Plan of care reviewed with patients sister per team. All questions and concerns addressed.

## 2022-10-12 NOTE — PT/OT/SLP PROGRESS
Occupational Therapy      Patient Name:  David Barrios   MRN:  [id number]    Patient not seen today secondary to still intubated. Will follow-up on 10/13.    10/12/2022

## 2022-10-12 NOTE — PROCEDURES
"David Barrios is a 62 y.o. male patient.    Temp: 100 °F (37.8 °C) (10/12/22 1100)  Pulse: 103 (10/12/22 1215)  Resp: (!) 24 (10/12/22 0938)  BP: 120/80 (10/12/22 1200)  SpO2: 99 % (10/12/22 1215)  Weight: 96.2 kg (212 lb) (10/10/22 1518)  Height: 5' 9" (175.3 cm) (10/12/22 1131)       Central Line    Date/Time: 10/12/2022 12:52 PM  Performed by: Angel Luis Segovia MD  Authorized by: Jeremy Shook MD     Location procedure was performed:  [facility] CRITICAL C[facility] SURGERY  Assisting Provider:  Jeremy Shook MD  Pre-operative diagnosis:  Septic Shock  Post-operative diagnosis:  Septic Shock  Consent Done ?:  Yes  Time out complete?: Verified correct patient, procedure, equipment, staff, and site/side    Indications:  Med administration and vascular access  Anesthesia:  See MAR for details  Preparation:  Skin prepped with ChloraPrep  Skin prep agent dried: Skin prep agent completely dried prior to procedure    Sterile barriers: All five maximal sterile barriers used - gloves, gown, cap, mask and large sterile sheet    Hand hygiene: Hand hygiene performed immediately prior to central venous catheter insertion    Location:  Left subclavian  Catheter type:  Triple lumen  Catheter size:  7 Fr  Inserted Catheter Length (cm):  20  Ultrasound guidance: Yes    Vessel Caliber:  Medium   patent  Comprressibility:  Normal  Needle advanced into vessel with real time ultrasound guidance.    Guidewire confirmed in vessel.    Steril sheath on probe.    Sterile gel used.  Manometry: Yes    Number of attempts:  2  Securement:  Line sutured, chlorhexidine patch, sterile dressing applied and blood return through all ports  Complications: No    Estimated blood loss (mL):  2  Specimens: No    Implants: No    XRay:  Placement verified by x-ray, no pneumothorax on x-ray, successful placement and tip termination  Adverse Events:  NoneTermination Site: superior vena cava    10/12/2022    "

## 2022-10-12 NOTE — SUBJECTIVE & OBJECTIVE
Interval History/Significant Events: NSVT overnight with HD instability requiring increased pressors. Subsequent wean back to 0.05 of epi. Remained afebrile.    Follow-up For: Procedure(s) (LRB):  VALVULOPLASTY,MITRAL VALVE (N/A)  REPAIR, TRICUSPID VALVE (N/A)  INSERTION, IMPELLA (N/A)  MEYER MAZE PROCEDURE (N/A)  EXCLUSION, LEFT ATRIAL APPENDAGE (N/A)    Post-Operative Day: 5 Days Post-Op    Objective:     Vital Signs (Most Recent):  Temp: 100 °F (37.8 °C) (10/12/22 1100)  Pulse: 95 (10/12/22 1330)  Resp: (!) 24 (10/12/22 0938)  BP: (!) 147/76 (10/12/22 1300)  SpO2: 99 % (10/12/22 1330)   Vital Signs (24h Range):  Temp:  [96.2 °F (35.7 °C)-100 °F (37.8 °C)] 100 °F (37.8 °C)  Pulse:  [] 95  Resp:  [21-24] 24  SpO2:  [80 %-100 %] 99 %  BP: (107-165)/() 147/76  Arterial Line BP: ()/(50-75) 130/73     Weight: 96.2 kg (212 lb)  Body mass index is 31.31 kg/m².      Intake/Output Summary (Last 24 hours) at 10/12/2022 1332  Last data filed at 10/12/2022 1300  Gross per 24 hour   Intake 2722.81 ml   Output 3250 ml   Net -527.19 ml       Physical Exam  Vitals and nursing note reviewed.   Constitutional:       General: He is not in acute distress.  Eyes:      Extraocular Movements: Extraocular movements intact.      Pupils: Pupils are equal, round, and reactive to light.   Cardiovascular:      Rate and Rhythm: Normal rate. Rhythm irregular.      Pulses: Normal pulses.      Heart sounds: Normal heart sounds.      Comments: Axillary impella 5.5 at P5  Left subclavian CVC  Right radial arterial line  Pulmonary:      Effort: Accessory muscle usage present.      Breath sounds: Decreased breath sounds present.   Abdominal:      General: Abdomen is flat. Bowel sounds are normal. There is no distension.      Palpations: Abdomen is soft.      Tenderness: There is no abdominal tenderness.   Musculoskeletal:      Right lower leg: No edema.      Left lower leg: No edema.   Skin:     General: Skin is warm and dry.       Capillary Refill: Capillary refill takes less than 2 seconds.      Findings: Bruising present. No lesion or rash.   Neurological:      General: No focal deficit present.      Mental Status: He is alert.   Psychiatric:         Behavior: Behavior is cooperative.       Vents:  Vent Mode: Spont (10/12/22 1252)  Ventilator Initiated: Yes (10/11/22 0830)  Set Rate: 24 BPM (10/12/22 0916)  Vt Set: 470 mL (10/12/22 0916)  Pressure Support: 5 cmH20 (10/12/22 1252)  PEEP/CPAP: 5 cmH20 (10/12/22 1252)  Oxygen Concentration (%): 40 (10/12/22 1300)  Peak Airway Pressure: 11 cmH2O (10/12/22 1252)  Plateau Pressure: 20 cmH20 (10/12/22 1252)  Total Ve: 9.34 mL (10/12/22 1252)  Negative Inspiratory Force (cm H2O): -34 (10/12/22 1310)  F/VT Ratio<105 (RSBI): (!) 49.59 (10/12/22 0916)    Lines/Drains/Airways       Drain  Duration                  Chest Tube 10/07/22  Left Pleural 5 days         Y Chest Tube 1 and 2 10/07/22 1225 1 Anterior Mediastinal 19 Fr. 2 Anterior Mediastinal 19 Fr. 5 days         NG/OG Tube 10/11/22 0918 Left nostril 1 day         Urethral Catheter 10/11/22 1600 Silicone <1 day              Arterial Line  Duration             Arterial Line 10/11/22 2300 Right Radial <1 day              Line  Duration                  Pacer Wires 10/07/22 1050 5 days         VAD 10/07/22 1129 Impella 5 days              Peripheral Intravenous Line  Duration                  Peripheral IV - Single Lumen 10/11/22 1205 18 G Anterior;Left Upper Arm 1 day         Peripheral IV - Single Lumen 10/11/22 2245 20 G Left Hand <1 day                    Significant Labs:    CBC/Anemia Profile:  Recent Labs   Lab 10/12/22  0001 10/12/22  0153 10/12/22  0322 10/12/22  0428   WBC 17.44* 17.07*  --  17.30*   HGB 6.6* 8.5*  --  8.7*   HCT 19.2* 24.6* 25* 25.3*   PLT 91* 86*  --  77*   MCV 87 87  --  86   RDW 17.7* 17.2*  --  17.2*        Chemistries:  Recent Labs   Lab 10/11/22  1907 10/12/22  0001 10/12/22  0428   * 139 138   K 3.4* 3.4*  3.6    105 107   CO2 23 22* 22*   BUN 46* 50* 47*   CREATININE 1.6* 1.7* 1.7*   CALCIUM 8.2* 8.6* 9.0   ALBUMIN 2.3* 2.6* 2.6*   PROT 4.7* 4.9* 5.0*   BILITOT 1.7* 2.0* 1.8*   ALKPHOS 87 68 67   ALT 18 17 17   AST 40 35 33   MG 2.2 2.3 2.3   PHOS 3.7 3.3 3.1       All pertinent labs within the past 24 hours have been reviewed.    Significant Imaging:  I have reviewed and interpreted all pertinent imaging results/findings within the past 24 hours.

## 2022-10-12 NOTE — PT/OT/SLP PROGRESS
Physical Therapy      Patient Name:  David Barrios   MRN:  [id number]    Patient not seen today secondary to  (patient remained intubated in AM, unable to attempt in PM). Will follow-up as appropriate.

## 2022-10-12 NOTE — PROGRESS NOTES
Pharmacokinetic Assessment Follow Up: IV Vancomycin    Vancomycin serum concentration assessment(s):  - Given 1750 mg load yesterday, 24 hour random level resulted at 11.5 mcg/mL  - Patient in KIMANI, with Cr up to 1.7 this morning (baseline ~1.1-1.2)  - Will continue to pulse dose with KIMANI  - Goal range of 10 to 20 mcg/mL.    Vancomycin Regimen Plan:  - Give 1250 mg IV once this AM  - Re-dose when the random level is less than 20 mcg/mL, next level to be drawn at 0800 on 10/13    Drug levels (last 3 results):  Recent Labs   Lab Result Units 10/12/22  0428   Vancomycin, Random ug/mL 11.5       Pharmacy will continue to follow and monitor vancomycin.    Please contact pharmacy at extension [phone] for questions regarding this assessment.    Thank you for the consult,   Meagan Boyd       Patient brief summary:  David Barrios is a 62 y.o. male initiated on antimicrobial therapy with IV Vancomycin for treatment of bacteremia    The patient's current regimen is pulse dose based on level    Drug Allergies:   Review of patient's allergies indicates:  No Known Allergies    Actual Body Weight:   96.2 kg    Renal Function:   Estimated Creatinine Clearance: 50.9 mL/min (A) (based on SCr of 1.7 mg/dL (H)).,     Dialysis Method (if applicable):  N/A    CBC (last 72 hours):  Recent Labs   Lab Result Units 10/09/22  1342 10/10/22  0004 10/10/22  0323 10/10/22  1924 10/11/22  0304 10/11/22  1405 10/11/22  1907 10/12/22  0001 10/12/22  0153 10/12/22  0428   WBC K/uL 20.89* 18.31* 17.36* 19.23* 15.14* 18.48* 20.14* 17.44* 17.07* 17.30*   Hemoglobin g/dL 9.4* 9.1* 8.8* 10.0* 9.4* 7.9* 7.3* 6.6* 8.5* 8.7*   Hematocrit % 27.1* 26.5* 26.8* 29.7* 29.0* 23.6* 21.2* 19.2* 24.6* 25.3*   Platelets K/uL 74* 65* 71* 103* 89* 75* 88* 91* 86* 77*   Gran % % 81.0* 84.0* 87.0* 89.1* 92.0* 88.0* 85.0* 86.0* 82.0* 86.0*   Lymph % % 6.0* 4.6* 5.8* 3.1* 1.8* 4.0* 2.0* 2.0* 3.0* 3.0*   Mono % % 0.0* 6.0 5.8 6.8 4.6 5.0 3.0* 9.5 13.0 5.0   Eosinophil % % 0.0  0.0 0.2 0.3 0.0 0.0 0.0 0.0 0.0 0.0   Basophil % % 0.0 0.0 0.2 0.2 0.5 0.0 0.0 0.0 0.0 0.0   Differential Method  Manual Manual Automated Automated Automated Manual Manual Manual Manual Manual       Metabolic Panel (last 72 hours):  Recent Labs   Lab Result Units 10/09/22  1342 10/10/22  0004 10/10/22  0323 10/10/22  1118 10/10/22  1826 10/10/22  2353 10/11/22  0304 10/11/22  1053 10/11/22  1405 10/11/22  1803 10/11/22  1907 10/12/22  0001 10/12/22  0428   Sodium mmol/L 136  --  138 138 140  --  139 136 137  --  134* 139 138   Potassium mmol/L 4.8 4.3 4.1 3.4* 4.0 4.1 4.4 3.2* 3.2*  --  3.4* 3.4* 3.6   Chloride mmol/L 105  --  106 106 107  --  104 104 104  --  102 105 107   CO2 mmol/L 20*  --  23 25 23  --  23 21* 21*  --  23 22* 22*   Glucose mg/dL 136*  --  128* 125* 122*  --  108 123* 148*  --  150* 146* 140*   Glucose, UA   --   --   --   --   --   --   --   --   --  Negative  --   --   --    BUN mg/dL 31*  --  32* 33* 37*  --  46* 48* 49*  --  46* 50* 47*   Creatinine mg/dL 1.5*  --  1.2 1.2 1.2  --  1.4 1.7* 1.8*  --  1.6* 1.7* 1.7*   Albumin g/dL 2.8*  --  2.5* 2.5* 2.8*  --  2.6* 2.1* 2.6*  --  2.3* 2.6* 2.6*   Total Bilirubin mg/dL 0.7  --  0.7 0.9 1.1*  --  1.1* 1.2* 1.9*  --  1.7* 2.0* 1.8*   Alkaline Phosphatase U/L 52*  --  51* 59 94  --  80 204* 139*  --  87 68 67   AST U/L 38  --  33 37 44*  --  42* 35 36  --  40 35 33   ALT U/L 11  --  12 16 19  --  20 18 17  --  18 17 17   Magnesium mg/dL 2.1  --  2.1 2.1 2.2 2.5 2.5  --  2.3  --  2.2 2.3 2.3   Phosphorus mg/dL 4.4  --  4.3 4.1  --  4.3 5.5*  --  4.2  --  3.7 3.3 3.1       Vancomycin Administrations:  vancomycin given in the last 96 hours                     vancomycin 1.25 g in dextrose 5% 250 mL IVPB (ready to mix) (mg) 1,250 mg New Bag 10/12/22 0855    vancomycin 1.75 g in 5 % dextrose 500 mL IVPB (mg) 1,750 mg New Bag 10/11/22 0450                    Microbiologic Results:  Microbiology Results (last 7 days)       Procedure Component Value Units  Date/Time    Blood culture [[id number]]  (Abnormal) Collected: 10/11/22 0406    Order Status: Completed Specimen: Blood from Peripheral, Wrist, Right Updated: 10/12/22 0948     Blood Culture, Routine Gram stain aer bottle: Gram negative rods      Results called to and read back by:Corie Jensen RN 10/11/2022  17:16      GRAM NEGATIVE YARON  Identification pending  For susceptibility see order #[id number]      Blood culture [[id number]]  (Abnormal) Collected: 10/11/22 0346    Order Status: Completed Specimen: Blood from Peripheral, Antecubital, Left Updated: 10/12/22 0944     Blood Culture, Routine Gram stain aer bottle: Gram negative rods      Gram stain estrada bottle: Gram negative rods      Results called to and read back by:Corie Jensen RN 10/11/2022  17:15      GRAM NEGATIVE YARON  Identification and susceptibility pending      Culture, VAP (BAL) [[id number]] Collected: 10/11/22 0919    Order Status: Completed Specimen: Bronchial Alveolar Lavage from BAL, L Updated: 10/12/22 0914     VAP BAL CULTURE Normal respiratory janna     Gram Stain (Respiratory) No WBC's     Gram Stain (Respiratory) No organisms seen    Blood culture [[id number]]     Order Status: Sent Specimen: Blood     Blood culture [[id number]]     Order Status: Sent Specimen: Blood     Culture, Respiratory with Gram Stain [[id number]] Collected: 10/11/22 0919    Order Status: Canceled Specimen: Respiratory from BAL, L     Nasal culture [[id number]] Collected: 10/07/22 0647    Order Status: Completed Specimen: Nasopharyngeal from Nose Updated: 10/10/22 0922     RESPIRATORY CULTURE - NASAL Normal respiratory janna    Narrative:      MRSA

## 2022-10-12 NOTE — PROGRESS NOTES
10/12/2022  Bouchra Jaramillo    Current provider:  Mike Hedrick MD    Device interrogation:  TXP LVAD INTERROGATIONS 10/11/2022 10/11/2022 10/11/2022 10/11/2022 10/11/2022 10/11/2022 10/11/2022   Type Impella Impella Impella Impella Impella Impella Impella   Pulsatility Pulse Pulse Pulse Pulse Pulse Pulse Pulse          Rounded on David Barrios to ensure all mechanical assist device settings (IABP or VAD) were appropriate and all parameters were within limits.  I was able to ensure all back up equipment was present, the staff had no issues, and the Perfusion Department daily rounding was complete.      For implantable VADs: Interrogation of Ventricular assist device was performed with analysis of device parameters and review of device function. I have personally reviewed the interrogation findings and agree with findings as stated.     In emergency, the nursing units have been notified to contact the perfusion department either by:  Calling [phone] from 630am to 4pm Mon thru Fri, utilizing the On-Call Finder functionality of Epic and searching for Perfusion, or by contacting the hospital  from 4pm to 630am and on weekends and asking to speak with the perfusionist on call.    9:10 AM

## 2022-10-12 NOTE — ASSESSMENT & PLAN NOTE
  Neuro/Psych:   -- Sedation: Propofol while intubated  -- Pain: PRN Oxy + Dilaudid, Fentanyl pushes prn  -- Scheduled Tylenol             Cards:   -- S/P TV replacement, MV replacement, MAZE, Lt Atrial Appendage ligation and Impella placement on 10/7/22  -- Impella: P5  -- Hemodynamically unstable post-op requiring high support and antiarrhythmics for frequent VTach runs, multiple cardioversions in transport from OR  -- Reopened POD#0 for high chest tube output - found to have slow persistent bleeding from the IVC and left atriotomy sites  -- HDS on 0.02 epi  -- Impella at P5  -- Ventricular pacing wires. Back up at 40 BPM.  -- vasopressors to maintain MAP > 65, Syst < 140  -- Aspirin 325mg holding for now, will continue to hold in the setting of down-trending platelets  -- Amiodarone gtt at 0.5 mg/hr - converted back into afib RVR today requiring cardioversion, now sinus rhythm.   -- Mixed shock picture, sepsis vs cardiogenic  -- SDS x3 days  -- Cardiac output has remained most stable on roughly 0.05 of epi  -- In rate-controlled Afib as of 10/12/22      Pulm:   -- Goal O2 sat > 90%  -- ABG PRN  -- intubated for airway protection  -- CXR without consolidation or infiltrates  -- bedside bronch for BAL   -- Wean vent settings as tolerated, plan to extubate when overall clinical picture improves  -- Mediastinal chest tubes x2 and pleural chest tube x1 to suction (total 625 cc in 24 hours), decreased in output since reopened chest  -- Extubated today 10/12/22      Renal:  -- Diuresis with lasix gtt  -- Keep lambert for strict I/O  -- Trend BUN/Cr      Recent Labs     10/11/22  1907 10/12/22  0001 10/12/22  0428   BUN 46* 50* 47*   CREATININE 1.6* 1.7* 1.7*        FEN / GI:   -- Replace lytes as needed  -- Nutrition: NPO while intubated  -- GI ppx: famotidine  -- Bowel reg: miralax      ID:   -- Afebrile  -- WBC trending down  -- Viry-op ancef completed, restarting broad spectrum antibiotics  -- GNR bacteremia  10/11/22, awaiting speciation  -- Replaced central line  -- Repeat blood cultures until clear    Recent Labs     10/12/22  0001 10/12/22  0153 10/12/22  0428   WBC 17.44* 17.07* 17.30*        Heme/Onc:   -- H/H stable  -- Daily CBC  -- 1000 mL Cell saver given back  -- Aspirin 325mg QD; hold for now in the setting of down-trending platelets  -- Impella: Systemic Heparin gtt with goal aPTT 40 - 60  -- Blood Products: 5 units pRBC, 2 FFP, 2 Platelets, 2 Cryoprecipitate (10/8)    Recent Labs     10/12/22  0428   HGB 8.7*   HCT 25.3*   APTT 47.4*        Endo:   -- BG goal 140-180  -- Insulin gtt  -- Hgb A1c 5.5  -- Endocrinology managing      PPx:   Feeding: NPO  Analgesia/Sedation: Propofol/ PRN Oxy + Dilaudid  Thromboembolic prevention: SCDs, Heparin gtt  HOB >30: Yes  Stress Ulcer ppx: famotidine  Glucose control: Critical care goal 140-180 g/dl, ISS     Lines/Drains/Airway: CVC RIJ, Fierro, Chest tubes x 3, ventricular pacing wires, L radial A-line, Impella, ETT      Dispo/Code Status/Palliative:   -- SICU / Full Code.

## 2022-10-12 NOTE — PROGRESS NOTES
[facility]  Critical Care - Surgery  Progress Note    Patient Name: David Barrios  MRN: [id number]  Admission Date: 10/2/2022  Hospital Length of Stay: 10 days  Code Status: Full Code  Attending Provider: Mike Hedrick MD  Primary Care Provider: Breann Tavera MD   Principal Problem: Nonrheumatic mitral valve regurgitation    Subjective:     Hospital/ICU Course:  No notes on file    Interval History/Significant Events: NSVT overnight with HD instability requiring increased pressors. Subsequent wean back to 0.05 of epi. Remained afebrile.    Follow-up For: Procedure(s) (LRB):  VALVULOPLASTY,MITRAL VALVE (N/A)  REPAIR, TRICUSPID VALVE (N/A)  INSERTION, IMPELLA (N/A)  MEYRE MAZE PROCEDURE (N/A)  EXCLUSION, LEFT ATRIAL APPENDAGE (N/A)    Post-Operative Day: 5 Days Post-Op    Objective:     Vital Signs (Most Recent):  Temp: 100 °F (37.8 °C) (10/12/22 1100)  Pulse: 95 (10/12/22 1330)  Resp: (!) 24 (10/12/22 0938)  BP: (!) 147/76 (10/12/22 1300)  SpO2: 99 % (10/12/22 1330)   Vital Signs (24h Range):  Temp:  [96.2 °F (35.7 °C)-100 °F (37.8 °C)] 100 °F (37.8 °C)  Pulse:  [] 95  Resp:  [21-24] 24  SpO2:  [80 %-100 %] 99 %  BP: (107-165)/() 147/76  Arterial Line BP: ()/(50-75) 130/73     Weight: 96.2 kg (212 lb)  Body mass index is 31.31 kg/m².      Intake/Output Summary (Last 24 hours) at 10/12/2022 1332  Last data filed at 10/12/2022 1300  Gross per 24 hour   Intake 2722.81 ml   Output 3250 ml   Net -527.19 ml       Physical Exam  Vitals and nursing note reviewed.   Constitutional:       General: He is not in acute distress.  Eyes:      Extraocular Movements: Extraocular movements intact.      Pupils: Pupils are equal, round, and reactive to light.   Cardiovascular:      Rate and Rhythm: Normal rate. Rhythm irregular.      Pulses: Normal pulses.      Heart sounds: Normal heart sounds.      Comments: Axillary impella 5.5 at P5  Left subclavian CVC  Right radial arterial  line  Pulmonary:      Effort: Accessory muscle usage present.      Breath sounds: Decreased breath sounds present.   Abdominal:      General: Abdomen is flat. Bowel sounds are normal. There is no distension.      Palpations: Abdomen is soft.      Tenderness: There is no abdominal tenderness.   Musculoskeletal:      Right lower leg: No edema.      Left lower leg: No edema.   Skin:     General: Skin is warm and dry.      Capillary Refill: Capillary refill takes less than 2 seconds.      Findings: Bruising present. No lesion or rash.   Neurological:      General: No focal deficit present.      Mental Status: He is alert.   Psychiatric:         Behavior: Behavior is cooperative.       Vents:  Vent Mode: Spont (10/12/22 1252)  Ventilator Initiated: Yes (10/11/22 0830)  Set Rate: 24 BPM (10/12/22 0916)  Vt Set: 470 mL (10/12/22 0916)  Pressure Support: 5 cmH20 (10/12/22 1252)  PEEP/CPAP: 5 cmH20 (10/12/22 1252)  Oxygen Concentration (%): 40 (10/12/22 1300)  Peak Airway Pressure: 11 cmH2O (10/12/22 1252)  Plateau Pressure: 20 cmH20 (10/12/22 1252)  Total Ve: 9.34 mL (10/12/22 1252)  Negative Inspiratory Force (cm H2O): -34 (10/12/22 1310)  F/VT Ratio<105 (RSBI): (!) 49.59 (10/12/22 0916)    Lines/Drains/Airways       Drain  Duration                  Chest Tube 10/07/22  Left Pleural 5 days         Y Chest Tube 1 and 2 10/07/22 1225 1 Anterior Mediastinal 19 Fr. 2 Anterior Mediastinal 19 Fr. 5 days         NG/OG Tube 10/11/22 0918 Left nostril 1 day         Urethral Catheter 10/11/22 1600 Silicone <1 day              Arterial Line  Duration             Arterial Line 10/11/22 2300 Right Radial <1 day              Line  Duration                  Pacer Wires 10/07/22 1050 5 days         VAD 10/07/22 1129 Impella 5 days              Peripheral Intravenous Line  Duration                  Peripheral IV - Single Lumen 10/11/22 1205 18 G Anterior;Left Upper Arm 1 day         Peripheral IV - Single Lumen 10/11/22 2245 20 G Left Hand  <1 day                    Significant Labs:    CBC/Anemia Profile:  Recent Labs   Lab 10/12/22  0001 10/12/22  0153 10/12/22  0322 10/12/22  0428   WBC 17.44* 17.07*  --  17.30*   HGB 6.6* 8.5*  --  8.7*   HCT 19.2* 24.6* 25* 25.3*   PLT 91* 86*  --  77*   MCV 87 87  --  86   RDW 17.7* 17.2*  --  17.2*        Chemistries:  Recent Labs   Lab 10/11/22  1907 10/12/22  0001 10/12/22  0428   * 139 138   K 3.4* 3.4* 3.6    105 107   CO2 23 22* 22*   BUN 46* 50* 47*   CREATININE 1.6* 1.7* 1.7*   CALCIUM 8.2* 8.6* 9.0   ALBUMIN 2.3* 2.6* 2.6*   PROT 4.7* 4.9* 5.0*   BILITOT 1.7* 2.0* 1.8*   ALKPHOS 87 68 67   ALT 18 17 17   AST 40 35 33   MG 2.2 2.3 2.3   PHOS 3.7 3.3 3.1       All pertinent labs within the past 24 hours have been reviewed.    Significant Imaging:  I have reviewed and interpreted all pertinent imaging results/findings within the past 24 hours.    Assessment/Plan:     * Nonrheumatic mitral valve regurgitation    Neuro/Psych:   -- Sedation: Propofol while intubated  -- Pain: PRN Oxy + Dilaudid, Fentanyl pushes prn  -- Scheduled Tylenol             Cards:   -- S/P TV replacement, MV replacement, MAZE, Lt Atrial Appendage ligation and Impella placement on 10/7/22  -- Impella: P5  -- Hemodynamically unstable post-op requiring high support and antiarrhythmics for frequent VTach runs, multiple cardioversions in transport from OR  -- Reopened POD#0 for high chest tube output - found to have slow persistent bleeding from the IVC and left atriotomy sites  -- HDS on 0.02 epi  -- Impella at P5  -- Ventricular pacing wires. Back up at 40 BPM.  -- vasopressors to maintain MAP > 65, Syst < 140  -- Aspirin 325mg holding for now, will continue to hold in the setting of down-trending platelets  -- Amiodarone gtt at 0.5 mg/hr - converted back into afib RVR today requiring cardioversion, now sinus rhythm.   -- Mixed shock picture, sepsis vs cardiogenic  -- SDS x3 days  -- Cardiac output has remained most stable  on roughly 0.05 of epi  -- In rate-controlled Afib as of 10/12/22      Pulm:   -- Goal O2 sat > 90%  -- ABG PRN  -- intubated for airway protection  -- CXR without consolidation or infiltrates  -- bedside bronch for BAL   -- Wean vent settings as tolerated, plan to extubate when overall clinical picture improves  -- Mediastinal chest tubes x2 and pleural chest tube x1 to suction (total 625 cc in 24 hours), decreased in output since reopened chest  -- Extubated today 10/12/22      Renal:  -- Diuresis with lasix gtt  -- Keep lambert for strict I/O  -- Trend BUN/Cr      Recent Labs     10/11/22  1907 10/12/22  0001 10/12/22  0428   BUN 46* 50* 47*   CREATININE 1.6* 1.7* 1.7*        FEN / GI:   -- Replace lytes as needed  -- Nutrition: NPO while intubated  -- GI ppx: famotidine  -- Bowel reg: miralax      ID:   -- Afebrile  -- WBC trending down  -- Viry-op ancef completed, restarting broad spectrum antibiotics  -- GNR bacteremia 10/11/22, awaiting speciation  -- Replaced central line  -- Repeat blood cultures until clear    Recent Labs     10/12/22  0001 10/12/22  0153 10/12/22  0428   WBC 17.44* 17.07* 17.30*        Heme/Onc:   -- H/H stable  -- Daily CBC  -- 1000 mL Cell saver given back  -- Aspirin 325mg QD; hold for now in the setting of down-trending platelets  -- Impella: Systemic Heparin gtt with goal aPTT 40 - 60  -- Blood Products: 5 units pRBC, 2 FFP, 2 Platelets, 2 Cryoprecipitate (10/8)    Recent Labs     10/12/22  0428   HGB 8.7*   HCT 25.3*   APTT 47.4*        Endo:   -- BG goal 140-180  -- Insulin gtt  -- Hgb A1c 5.5  -- Endocrinology managing      PPx:   Feeding: NPO  Analgesia/Sedation: Propofol/ PRN Oxy + Dilaudid  Thromboembolic prevention: SCDs, Heparin gtt  HOB >30: Yes  Stress Ulcer ppx: famotidine  Glucose control: Critical care goal 140-180 g/dl, ISS     Lines/Drains/Airway: CVC RIJ, Lambert, Chest tubes x 3, ventricular pacing wires, L radial A-line, Impella, ETT      Dispo/Code  Status/Palliative:   -- SICU / Full Code.       Critical secondary to Patient has a condition that poses threat to life and bodily function: Severe Respiratory Distress     Critical care was time spent personally by me on the following activities: development of treatment plan with patient or surrogate and bedside caregivers, discussions with consultants, evaluation of patient's response to treatment, examination of patient, ordering and performing treatments and interventions, ordering and review of laboratory studies, ordering and review of radiographic studies, pulse oximetry, re-evaluation of patient's condition.  This critical care time did not overlap with that of any other provider or involve time for any procedures.     Angel Luis Segovia MD  Critical Care - Surgery  [facility]

## 2022-10-12 NOTE — NURSING
Pt hypotensive with MAPs in 40s following a <10 beat run of Vtach. Dr. Storey at the bedside; verbal orders to restart levo and vaso. CMP and CBC sent. ABG with lytes and lactic obtained.  notified of acute event by . Following CBC result; verbal orders by telephone from  to give 2 uPRBC. Levo and vaso now off. All orders received and implemented.

## 2022-10-12 NOTE — PROCEDURES
"David Barrios is a 62 y.o. male patient.    Temp: 97.5 °F (36.4 °C) (10/11/22 2212)  Pulse: 62 (10/11/22 2300)  Resp: (!) 24 (10/11/22 1909)  BP: 118/62 (10/11/22 0600)  SpO2: 100 % (10/11/22 2300)  Weight: 96.2 kg (212 lb) (10/10/22 1518)  Height: 5' 9" (175.3 cm) (10/11/22 0830)       Arterial Line    Date/Time: 10/11/2022 11:22 PM  Location procedure was performed: [facility] SURGICAL ICU (SICU)  Performed by: Rigoberto Storey MD  Authorized by: Rigoberto Storey MD   Consent Done: Yes  Consent: Verbal consent obtained. Written consent obtained.  Risks and benefits: risks, benefits and alternatives were discussed  Consent given by: sibling  Patient identity confirmed: name and   Time out: Immediately prior to procedure a "time out" was called to verify the correct patient, procedure, equipment, support staff and site/side marked as required.  Preparation: Patient was prepped and draped in the usual sterile fashion.  Indications: multiple ABGs and hemodynamic monitoring  Location: right radial    Patient sedated: yes  Needle gauge: 20  Seldinger technique: Seldinger technique used  Number of attempts: 1  Complications: No  Estimated blood loss (mL): 3  Post-procedure: line sutured and dressing applied  Patient tolerance: Patient tolerated the procedure well with no immediate complications        10/11/2022    "

## 2022-10-13 LAB
ALBUMIN SERPL BCP-MCNC: 2.4 G/DL (ref 3.5–5.2)
ALLENS TEST: ABNORMAL
ALP SERPL-CCNC: 73 U/L (ref 55–135)
ALT SERPL W/O P-5'-P-CCNC: 17 U/L (ref 10–44)
ANION GAP SERPL CALC-SCNC: 8 MMOL/L (ref 8–16)
ANISOCYTOSIS BLD QL SMEAR: SLIGHT
APTT BLDCRRT: 33.9 SEC (ref 21–32)
APTT BLDCRRT: 34.9 SEC (ref 21–32)
APTT BLDCRRT: 36.7 SEC (ref 21–32)
AST SERPL-CCNC: 23 U/L (ref 10–40)
BACTERIA BAL AEROBE CULT: NO GROWTH
BACTERIA BLD CULT: ABNORMAL
BASO STIPL BLD QL SMEAR: ABNORMAL
BASOPHILS # BLD AUTO: ABNORMAL K/UL (ref 0–0.2)
BASOPHILS NFR BLD: 0 % (ref 0–1.9)
BILIRUB SERPL-MCNC: 1.1 MG/DL (ref 0.1–1)
BUN SERPL-MCNC: 51 MG/DL (ref 8–23)
BURR CELLS BLD QL SMEAR: ABNORMAL
CALCIUM SERPL-MCNC: 8.4 MG/DL (ref 8.7–10.5)
CHLORIDE SERPL-SCNC: 111 MMOL/L (ref 95–110)
CO2 SERPL-SCNC: 25 MMOL/L (ref 23–29)
CREAT SERPL-MCNC: 1.4 MG/DL (ref 0.5–1.4)
DELSYS: ABNORMAL
DIFFERENTIAL METHOD: ABNORMAL
DOHLE BOD BLD QL SMEAR: PRESENT
EOSINOPHIL # BLD AUTO: ABNORMAL K/UL (ref 0–0.5)
EOSINOPHIL NFR BLD: 0 % (ref 0–8)
ERYTHROCYTE [DISTWIDTH] IN BLOOD BY AUTOMATED COUNT: 17.8 % (ref 11.5–14.5)
EST. GFR  (NO RACE VARIABLE): 56.5 ML/MIN/1.73 M^2
FIO2: 36
GIANT PLATELETS BLD QL SMEAR: PRESENT
GLUCOSE SERPL-MCNC: 145 MG/DL (ref 70–110)
GRAM STN SPEC: NORMAL
GRAM STN SPEC: NORMAL
HCO3 UR-SCNC: 26.3 MMOL/L (ref 24–28)
HCO3 UR-SCNC: 29.2 MMOL/L (ref 24–28)
HCO3 UR-SCNC: 29.3 MMOL/L (ref 24–28)
HCT VFR BLD AUTO: 22.8 % (ref 40–54)
HGB BLD-MCNC: 7.5 G/DL (ref 14–18)
HYPOCHROMIA BLD QL SMEAR: ABNORMAL
IMM GRANULOCYTES # BLD AUTO: ABNORMAL K/UL (ref 0–0.04)
IMM GRANULOCYTES NFR BLD AUTO: ABNORMAL % (ref 0–0.5)
LYMPHOCYTES # BLD AUTO: ABNORMAL K/UL (ref 1–4.8)
LYMPHOCYTES NFR BLD: 2 % (ref 18–48)
MAGNESIUM SERPL-MCNC: 2.4 MG/DL (ref 1.6–2.6)
MCH RBC QN AUTO: 29.2 PG (ref 27–31)
MCHC RBC AUTO-ENTMCNC: 32.9 G/DL (ref 32–36)
MCV RBC AUTO: 89 FL (ref 82–98)
METAMYELOCYTES NFR BLD MANUAL: 2 %
MODE: ABNORMAL
MODE: ABNORMAL
MONOCYTES # BLD AUTO: ABNORMAL K/UL (ref 0.3–1)
MONOCYTES NFR BLD: 7 % (ref 4–15)
NEUTROPHILS NFR BLD: 85 % (ref 38–73)
NEUTS BAND NFR BLD MANUAL: 4 %
NRBC BLD-RTO: 1 /100 WBC
OVALOCYTES BLD QL SMEAR: ABNORMAL
PCO2 BLDA: 44 MMHG (ref 35–45)
PCO2 BLDA: 46.1 MMHG (ref 35–45)
PCO2 BLDA: 46.8 MMHG (ref 35–45)
PH SMN: 7.38 [PH] (ref 7.35–7.45)
PH SMN: 7.4 [PH] (ref 7.35–7.45)
PH SMN: 7.41 [PH] (ref 7.35–7.45)
PHOSPHATE SERPL-MCNC: 3.5 MG/DL (ref 2.7–4.5)
PLATELET # BLD AUTO: 110 K/UL (ref 150–450)
PLATELET BLD QL SMEAR: ABNORMAL
PMV BLD AUTO: 13 FL (ref 9.2–12.9)
PO2 BLDA: 32 MMHG (ref 40–60)
PO2 BLDA: 34 MMHG (ref 40–60)
PO2 BLDA: 35 MMHG (ref 40–60)
POC BE: 1 MMOL/L
POC BE: 4 MMOL/L
POC BE: 5 MMOL/L
POC SATURATED O2: 61 % (ref 95–100)
POC SATURATED O2: 65 % (ref 95–100)
POC SATURATED O2: 67 % (ref 95–100)
POC TCO2: 28 MMOL/L (ref 24–29)
POC TCO2: 31 MMOL/L (ref 24–29)
POC TCO2: 31 MMOL/L (ref 24–29)
POIKILOCYTOSIS BLD QL SMEAR: SLIGHT
POLYCHROMASIA BLD QL SMEAR: ABNORMAL
POTASSIUM SERPL-SCNC: 4 MMOL/L (ref 3.5–5.1)
PROT SERPL-MCNC: 4.9 G/DL (ref 6–8.4)
RBC # BLD AUTO: 2.57 M/UL (ref 4.6–6.2)
SAMPLE: ABNORMAL
SCHISTOCYTES BLD QL SMEAR: ABNORMAL
SCHISTOCYTES BLD QL SMEAR: PRESENT
SITE: ABNORMAL
SODIUM SERPL-SCNC: 144 MMOL/L (ref 136–145)
SP02: 99
SPHEROCYTES BLD QL SMEAR: ABNORMAL
TARGETS BLD QL SMEAR: ABNORMAL
TOXIC GRANULES BLD QL SMEAR: PRESENT
WBC # BLD AUTO: 14.33 K/UL (ref 3.9–12.7)

## 2022-10-13 PROCEDURE — 63600175 PHARM REV CODE 636 W HCPCS

## 2022-10-13 PROCEDURE — 25000003 PHARM REV CODE 250

## 2022-10-13 PROCEDURE — 97112 NEUROMUSCULAR REEDUCATION: CPT

## 2022-10-13 PROCEDURE — 25000003 PHARM REV CODE 250: Performed by: STUDENT IN AN ORGANIZED HEALTH CARE EDUCATION/TRAINING PROGRAM

## 2022-10-13 PROCEDURE — 63600175 PHARM REV CODE 636 W HCPCS: Performed by: STUDENT IN AN ORGANIZED HEALTH CARE EDUCATION/TRAINING PROGRAM

## 2022-10-13 PROCEDURE — 97530 THERAPEUTIC ACTIVITIES: CPT

## 2022-10-13 PROCEDURE — 85027 COMPLETE CBC AUTOMATED: CPT | Performed by: THORACIC SURGERY (CARDIOTHORACIC VASCULAR SURGERY)

## 2022-10-13 PROCEDURE — 27000203 HC IMPELLA ADD'L DAY (CL)

## 2022-10-13 PROCEDURE — 82803 BLOOD GASES ANY COMBINATION: CPT

## 2022-10-13 PROCEDURE — 99291 PR CRITICAL CARE, E/M 30-74 MINUTES: ICD-10-PCS | Mod: ,,, | Performed by: ANESTHESIOLOGY

## 2022-10-13 PROCEDURE — 27000221 HC OXYGEN, UP TO 24 HOURS

## 2022-10-13 PROCEDURE — 20000000 HC ICU ROOM

## 2022-10-13 PROCEDURE — 85730 THROMBOPLASTIN TIME PARTIAL: CPT | Performed by: THORACIC SURGERY (CARDIOTHORACIC VASCULAR SURGERY)

## 2022-10-13 PROCEDURE — 99291 CRITICAL CARE FIRST HOUR: CPT | Mod: ,,, | Performed by: ANESTHESIOLOGY

## 2022-10-13 PROCEDURE — 99900035 HC TECH TIME PER 15 MIN (STAT)

## 2022-10-13 PROCEDURE — 97535 SELF CARE MNGMENT TRAINING: CPT

## 2022-10-13 PROCEDURE — 85730 THROMBOPLASTIN TIME PARTIAL: CPT | Mod: 91 | Performed by: STUDENT IN AN ORGANIZED HEALTH CARE EDUCATION/TRAINING PROGRAM

## 2022-10-13 PROCEDURE — 85007 BL SMEAR W/DIFF WBC COUNT: CPT | Performed by: THORACIC SURGERY (CARDIOTHORACIC VASCULAR SURGERY)

## 2022-10-13 PROCEDURE — 94761 N-INVAS EAR/PLS OXIMETRY MLT: CPT

## 2022-10-13 PROCEDURE — 25000003 PHARM REV CODE 250: Performed by: THORACIC SURGERY (CARDIOTHORACIC VASCULAR SURGERY)

## 2022-10-13 PROCEDURE — 83735 ASSAY OF MAGNESIUM: CPT | Performed by: STUDENT IN AN ORGANIZED HEALTH CARE EDUCATION/TRAINING PROGRAM

## 2022-10-13 PROCEDURE — 97165 OT EVAL LOW COMPLEX 30 MIN: CPT

## 2022-10-13 PROCEDURE — 92610 EVALUATE SWALLOWING FUNCTION: CPT

## 2022-10-13 PROCEDURE — 97162 PT EVAL MOD COMPLEX 30 MIN: CPT

## 2022-10-13 PROCEDURE — 63600175 PHARM REV CODE 636 W HCPCS: Performed by: ANESTHESIOLOGY

## 2022-10-13 PROCEDURE — 84100 ASSAY OF PHOSPHORUS: CPT | Performed by: STUDENT IN AN ORGANIZED HEALTH CARE EDUCATION/TRAINING PROGRAM

## 2022-10-13 PROCEDURE — 85730 THROMBOPLASTIN TIME PARTIAL: CPT | Mod: 91

## 2022-10-13 PROCEDURE — 80053 COMPREHEN METABOLIC PANEL: CPT

## 2022-10-13 RX ORDER — OXYCODONE HYDROCHLORIDE 10 MG/1
10 TABLET ORAL EVERY 6 HOURS PRN
Status: DISCONTINUED | OUTPATIENT
Start: 2022-10-13 | End: 2022-10-25 | Stop reason: HOSPADM

## 2022-10-13 RX ORDER — ASPIRIN 325 MG
325 TABLET ORAL DAILY
Status: DISCONTINUED | OUTPATIENT
Start: 2022-10-13 | End: 2022-10-25 | Stop reason: HOSPADM

## 2022-10-13 RX ORDER — HEPARIN SODIUM 10000 [USP'U]/100ML
800 INJECTION, SOLUTION INTRAVENOUS CONTINUOUS
Status: DISCONTINUED | OUTPATIENT
Start: 2022-10-13 | End: 2022-10-13

## 2022-10-13 RX ORDER — OXYCODONE HYDROCHLORIDE 10 MG/1
10 TABLET ORAL EVERY 6 HOURS PRN
Status: DISCONTINUED | OUTPATIENT
Start: 2022-10-13 | End: 2022-10-13

## 2022-10-13 RX ORDER — ASPIRIN 325 MG
325 TABLET ORAL DAILY
Status: DISCONTINUED | OUTPATIENT
Start: 2022-10-13 | End: 2022-10-13

## 2022-10-13 RX ORDER — OXYCODONE HYDROCHLORIDE 5 MG/1
5 TABLET ORAL EVERY 6 HOURS PRN
Status: DISCONTINUED | OUTPATIENT
Start: 2022-10-13 | End: 2022-10-25 | Stop reason: HOSPADM

## 2022-10-13 RX ORDER — HYDROMORPHONE HYDROCHLORIDE 1 MG/ML
0.5 INJECTION, SOLUTION INTRAMUSCULAR; INTRAVENOUS; SUBCUTANEOUS EVERY 6 HOURS PRN
Status: DISCONTINUED | OUTPATIENT
Start: 2022-10-13 | End: 2022-10-21

## 2022-10-13 RX ORDER — HEPARIN SODIUM 10000 [USP'U]/100ML
1000 INJECTION, SOLUTION INTRAVENOUS CONTINUOUS
Status: DISCONTINUED | OUTPATIENT
Start: 2022-10-13 | End: 2022-10-13

## 2022-10-13 RX ORDER — HEPARIN SODIUM 10000 [USP'U]/100ML
1200 INJECTION, SOLUTION INTRAVENOUS CONTINUOUS
Status: DISCONTINUED | OUTPATIENT
Start: 2022-10-13 | End: 2022-10-14

## 2022-10-13 RX ADMIN — MILRINONE LACTATE IN DEXTROSE 0.25 MCG/KG/MIN: 200 INJECTION, SOLUTION INTRAVENOUS at 11:10

## 2022-10-13 RX ADMIN — ACETAMINOPHEN 1000 MG: 500 TABLET ORAL at 10:10

## 2022-10-13 RX ADMIN — HYDROCORTISONE SODIUM SUCCINATE 50 MG: 100 INJECTION, POWDER, FOR SOLUTION INTRAMUSCULAR; INTRAVENOUS at 01:10

## 2022-10-13 RX ADMIN — PIPERACILLIN SODIUM AND TAZOBACTAM SODIUM 4.5 G: 4; .5 INJECTION, POWDER, LYOPHILIZED, FOR SOLUTION INTRAVENOUS at 12:10

## 2022-10-13 RX ADMIN — PIPERACILLIN SODIUM AND TAZOBACTAM SODIUM 4.5 G: 4; .5 INJECTION, POWDER, LYOPHILIZED, FOR SOLUTION INTRAVENOUS at 05:10

## 2022-10-13 RX ADMIN — HYDROCORTISONE SODIUM SUCCINATE 50 MG: 100 INJECTION, POWDER, FOR SOLUTION INTRAMUSCULAR; INTRAVENOUS at 10:10

## 2022-10-13 RX ADMIN — FUROSEMIDE 15 MG/HR: 10 INJECTION, SOLUTION INTRAMUSCULAR; INTRAVENOUS at 04:10

## 2022-10-13 RX ADMIN — SENNOSIDES AND DOCUSATE SODIUM 1 TABLET: 50; 8.6 TABLET ORAL at 09:10

## 2022-10-13 RX ADMIN — OXYCODONE HYDROCHLORIDE 10 MG: 10 TABLET ORAL at 10:10

## 2022-10-13 RX ADMIN — AMIODARONE HYDROCHLORIDE 0.5 MG/MIN: 1.8 INJECTION, SOLUTION INTRAVENOUS at 08:10

## 2022-10-13 RX ADMIN — POLYETHYLENE GLYCOL 3350 17 G: 17 POWDER, FOR SOLUTION ORAL at 08:10

## 2022-10-13 RX ADMIN — CEFTRIAXONE 2 G: 2 INJECTION, SOLUTION INTRAVENOUS at 07:10

## 2022-10-13 RX ADMIN — SENNOSIDES AND DOCUSATE SODIUM 1 TABLET: 50; 8.6 TABLET ORAL at 08:10

## 2022-10-13 RX ADMIN — ASPIRIN 325 MG ORAL TABLET 325 MG: 325 PILL ORAL at 09:10

## 2022-10-13 RX ADMIN — FAMOTIDINE 20 MG: 20 TABLET ORAL at 08:10

## 2022-10-13 RX ADMIN — MILRINONE LACTATE IN DEXTROSE 0.25 MCG/KG/MIN: 200 INJECTION, SOLUTION INTRAVENOUS at 02:10

## 2022-10-13 RX ADMIN — HEPARIN SODIUM 1000 UNITS/HR: 10000 INJECTION, SOLUTION INTRAVENOUS at 02:10

## 2022-10-13 RX ADMIN — ACETAMINOPHEN 1000 MG: 500 TABLET ORAL at 05:10

## 2022-10-13 RX ADMIN — OXYCODONE HYDROCHLORIDE 10 MG: 10 TABLET ORAL at 04:10

## 2022-10-13 RX ADMIN — HYDROCORTISONE SODIUM SUCCINATE 100 MG: 100 INJECTION, POWDER, FOR SOLUTION INTRAMUSCULAR; INTRAVENOUS at 05:10

## 2022-10-13 RX ADMIN — HEPARIN SODIUM: 5000 INJECTION INTRAVENOUS; SUBCUTANEOUS at 05:10

## 2022-10-13 RX ADMIN — FUROSEMIDE 10 MG/HR: 10 INJECTION, SOLUTION INTRAMUSCULAR; INTRAVENOUS at 08:10

## 2022-10-13 RX ADMIN — FAMOTIDINE 20 MG: 20 TABLET ORAL at 09:10

## 2022-10-13 NOTE — PROGRESS NOTES
10/13/2022  Bouchra Jaramillo    Current provider:  Mike Hedrick MD    Device interrogation:  TXP LVAD INTERROGATIONS 10/11/2022 10/11/2022 10/11/2022 10/11/2022 10/11/2022 10/11/2022 10/11/2022   Type Impella Impella Impella Impella Impella Impella Impella   Pulsatility Pulse Pulse Pulse Pulse Pulse Pulse Pulse          Rounded on David Barrios to ensure all mechanical assist device settings (IABP or VAD) were appropriate and all parameters were within limits.  I was able to ensure all back up equipment was present, the staff had no issues, and the Perfusion Department daily rounding was complete.      For implantable VADs: Interrogation of Ventricular assist device was performed with analysis of device parameters and review of device function. I have personally reviewed the interrogation findings and agree with findings as stated.     In emergency, the nursing units have been notified to contact the perfusion department either by:  Calling [phone] from 630am to 4pm Mon thru Fri, utilizing the On-Call Finder functionality of Epic and searching for Perfusion, or by contacting the hospital  from 4pm to 630am and on weekends and asking to speak with the perfusionist on call.    1:26 PM

## 2022-10-13 NOTE — SUBJECTIVE & OBJECTIVE
Interval History/Significant Events: Hypertensive overnight briefly requiring cleviprex, milrinone initiated. Conversion to afib. Epi weaned to 0.02, impella at P5. Some overnight agitation, improved with precedex.   This morning, alert and oriented x4. No complaints.     Follow-up For: Procedure(s) (LRB):  VALVULOPLASTY,MITRAL VALVE (N/A)  REPAIR, TRICUSPID VALVE (N/A)  INSERTION, IMPELLA (N/A)  MEYER MAZE PROCEDURE (N/A)  EXCLUSION, LEFT ATRIAL APPENDAGE (N/A)    Post-Operative Day: 6 Days Post-Op    Objective:     Vital Signs (Most Recent):  Temp: 98.4 °F (36.9 °C) (10/13/22 0300)  Pulse: 108 (10/13/22 0600)  Resp: 20 (10/12/22 2209)  BP: (!) 139/94 (10/13/22 0600)  SpO2: 100 % (10/13/22 0600)   Vital Signs (24h Range):  Temp:  [98.2 °F (36.8 °C)-100 °F (37.8 °C)] 98.4 °F (36.9 °C)  Pulse:  [] 108  Resp:  [20-24] 20  SpO2:  [94 %-100 %] 100 %  BP: ()/() 139/94  Arterial Line BP: ()/(56-92) 146/82     Weight: 96.2 kg (212 lb)  Body mass index is 31.31 kg/m².      Intake/Output Summary (Last 24 hours) at 10/13/2022 0645  Last data filed at 10/13/2022 0600  Gross per 24 hour   Intake 1751.12 ml   Output 2190 ml   Net -438.88 ml       Physical Exam  Vitals and nursing note reviewed.   Constitutional:       General: He is not in acute distress.     Appearance: He is ill-appearing.   Eyes:      Extraocular Movements: Extraocular movements intact.      Pupils: Pupils are equal, round, and reactive to light.   Cardiovascular:      Rate and Rhythm: Tachycardia present. Rhythm irregular.      Pulses: Normal pulses.      Comments: Axillary impella 5.5 at P5  Pulmonary:      Effort: Pulmonary effort is normal. No respiratory distress.      Breath sounds: Decreased breath sounds present. No wheezing.   Chest:      Comments: Chest tubes in place, clean ,dry and intact  Abdominal:      General: Abdomen is flat. Bowel sounds are normal. There is no distension.      Palpations: Abdomen is soft.       Tenderness: There is no abdominal tenderness.   Genitourinary:     Comments: Fierro in place  Musculoskeletal:      Right lower leg: No edema.      Left lower leg: No edema.   Skin:     General: Skin is warm and dry.      Capillary Refill: Capillary refill takes less than 2 seconds.      Findings: Bruising present. No lesion or rash.   Neurological:      General: No focal deficit present.      Mental Status: He is lethargic.   Psychiatric:         Behavior: Behavior is cooperative.       Vents:  Vent Mode: Spont (10/12/22 1252)  Ventilator Initiated: Yes (10/11/22 0830)  Set Rate: 24 BPM (10/12/22 0916)  Vt Set: 470 mL (10/12/22 0916)  Pressure Support: 5 cmH20 (10/12/22 1252)  PEEP/CPAP: 5 cmH20 (10/12/22 1252)  Oxygen Concentration (%): 40 (10/12/22 1300)  Peak Airway Pressure: 11 cmH2O (10/12/22 1252)  Plateau Pressure: 20 cmH20 (10/12/22 1252)  Total Ve: 9.34 mL (10/12/22 1252)  Negative Inspiratory Force (cm H2O): -34 (10/12/22 1310)  F/VT Ratio<105 (RSBI): (!) 49.59 (10/12/22 0916)    Lines/Drains/Airways       Drain  Duration                  Chest Tube 10/07/22  Left Pleural 6 days         Y Chest Tube 1 and 2 10/07/22 1225 1 Anterior Mediastinal 19 Fr. 2 Anterior Mediastinal 19 Fr. 5 days         NG/OG Tube 10/11/22 0918 Left nostril 1 day         Urethral Catheter 10/11/22 1600 Silicone 1 day              Arterial Line  Duration             Arterial Line 10/11/22 2300 Right Radial 1 day              Line  Duration                  Pacer Wires 10/07/22 1050 5 days         VAD 10/07/22 1129 Impella 5 days              Peripheral Intravenous Line  Duration                  Peripheral IV - Single Lumen 10/11/22 1205 18 G Anterior;Left Upper Arm 1 day         Peripheral IV - Single Lumen 10/11/22 2245 20 G Left Hand 1 day                    Significant Labs:    CBC/Anemia Profile:  Recent Labs   Lab 10/12/22  0153 10/12/22  0322 10/12/22  0428 10/12/22  2348 10/13/22  0323   WBC 17.07*  --  17.30*  --  14.33*    HGB 8.5*  --  8.7*  --  7.5*   HCT 24.6*   < > 25.3* 27* 22.8*   PLT 86*  --  77*  --  110*   MCV 87  --  86  --  89   RDW 17.2*  --  17.2*  --  17.8*    < > = values in this interval not displayed.        Chemistries:  Recent Labs   Lab 10/12/22  0001 10/12/22  0428 10/13/22  0323    138 144   K 3.4* 3.6 4.0    107 111*   CO2 22* 22* 25   BUN 50* 47* 51*   CREATININE 1.7* 1.7* 1.4   CALCIUM 8.6* 9.0 8.4*   ALBUMIN 2.6* 2.6* 2.4*   PROT 4.9* 5.0* 4.9*   BILITOT 2.0* 1.8* 1.1*   ALKPHOS 68 67 73   ALT 17 17 17   AST 35 33 23   MG 2.3 2.3 2.4   PHOS 3.3 3.1 3.5       All pertinent labs within the past 24 hours have been reviewed.    Significant Imaging:  I have reviewed all pertinent imaging results/findings within the past 24 hours.

## 2022-10-13 NOTE — PLAN OF CARE
2:27 PM    SW met with Pt at bedside. Discussed therapy recs for rehab and provided list. Addressed questions and reported need to send referrals to obtain accepting options. The patient agreeable and will review the list for preferences asap.     The SW will continue to follow.    Saturnino Reyes LMSW  Case Management [facility]  Ext: [phone]

## 2022-10-13 NOTE — PLAN OF CARE
SICU PLAN OF CARE NOTE    Dx: Nonrheumatic mitral valve regurgitation    Shift Events: Impella weaned to P3, epi weaned to off. Plan to remove impella tomorrow. Cleared by SLP, diet advanced to clear liquids, however pt to be NPO at midnight for procedure. Lasix gtt titrated to maintain UOP goal > 150 cc/hr. OOBTC.    Gtts:     Amio  Milrinone  Heparin  Lasix    Neuro: AAOx4 with intermittent episodes of confusion, Q4h CIWA    Cardiac: a-fib, HR 90-110s. CVP 8,10.    Respiratory: 2 L NC    GI: Clear Liquid, 1500 mL FR    : Urinary Catheter 1700 cc/shift    Drains: Chest tubes, 130 mL/shift, serosanguinous     Labs/Accuchecks: Daily labs reviewed. Q6h aPTT.     Skin: No skin breakdown noted. Midsternal incision with island/boarder CDI. Chest tube site dressing CDI. Placed on immerse mattress, bed plugged in with mattress appropriately inflated. Heels elevated off of bed with green boots. Sacral foam in place. Weight shift assistance provided throughout shift.

## 2022-10-13 NOTE — PROGRESS NOTES
VANCOMYCIN DOSING BY PHARMACY DISCONTINUATION NOTE    David Barrios is a 62 y.o. male who had been consulted for vancomycin dosing.    The pharmacy consult for vancomycin dosing has been discontinued.     Vancomycin Dosing by Pharmacy Consult will sign-off. Please reconsult if necessary. Thank you for allowing us to participate in this patient's care.     Thank you for the consult,   Haris Rivera, PharmD.  Inpatient Pharmacist  EXT [phone]

## 2022-10-13 NOTE — PT/OT/SLP EVAL
"Physical Therapy Co-Evaluation and Co-Treatment    Patient Name:  David Barrios   MRN:  [id number]    Recommendations:     Discharge Recommendations:  rehabilitation facility   Discharge Equipment Recommendations:  (TBD)   Barriers to discharge: Increased level of assist, Inaccessible home, and Decreased caregiver support    Assessment:     David Barrios is a 62 y.o. male admitted with a medical diagnosis of Nonrheumatic mitral valve regurgitation.  He presents with the following impairments/functional limitations:  weakness, impaired endurance, impaired self care skills, impaired functional mobility, impaired cognition, decreased safety awareness, pain, decreased coordination, decreased upper extremity function, decreased lower extremity function, impaired coordination, impaired cardiopulmonary response to activity, impaired fine motor, gait instability, impaired balance. Patient tolerated session well. Demonstrates good motivation and participation. Patient demonstrates gait instability that make them a high fall risk and unsafe to d/c home at this time.    Rehab Prognosis: Good; patient would benefit from acute skilled PT services 5 x/week to address these deficits and reach maximum level of function.  Recent Surgery: Procedure(s) (LRB):  VALVULOPLASTY,MITRAL VALVE (N/A)  REPAIR, TRICUSPID VALVE (N/A)  INSERTION, IMPELLA (N/A)  MEYER MAZE PROCEDURE (N/A)  EXCLUSION, LEFT ATRIAL APPENDAGE (N/A) 6 Days Post-Op    Plan:     During this hospitalization, patient to be seen 5 x/week to address the identified rehab impairments via gait training, therapeutic activities, therapeutic exercises, neuromuscular re-education and progress toward the following goals:    Plan of Care Expires:  11/13/22    Subjective     Chief Complaint: Nausea and abdominal pain  Patient/Family Comments/Goals: "Can we take this pinky thing off?" Referring to pulse ox  Pain/Comfort:  Pain Rating 1: 6/10  Location - Orientation 1: " generalized  Location 1: abdomen  Pain Addressed 1: Distraction, Reposition  Pain Rating Post-Intervention 1:  (not rated)    Patients cultural, spiritual, Adventist conflicts given the current situation: no    Living Environment:  Patient lives alone in a travel trailer, number of outside stairs: 3 with L HR, walk-in shower.  Prior to admission, patient was independent with ADLs, driving, working as a . However also reports he was recently receiving therapy HH services, unclear if PT or OT. Patient uses DME as follows: none. DME owned (not currently used): none. Upon discharge, patient will have assistance from family and friend. Reports his sister is in town for 1-2 weeks but lives in [place].     Objective:     Communicated with nursing prior to session.  Patient found HOB elevated with Impella, chest tube, NG tube, arterial line, central line, peripheral IV, telemetry, pulse ox (continuous), blood pressure cuff, lambert catheter upon PT entry to room.    General Precautions: Standard, fall, sternal   Orthopedic Precautions:N/A   Braces: N/A    Exams:  Cognitive Exam:  Patient is oriented to Person, Place, Time, Situation, follows commands 100% of the time  RLE ROM: WFL  RLE Strength: WFL  LLE ROM: WFL  LLE Strength: WFL  Sensation: Intact light touch to BLEs    Functional Mobility:  Bed Mobility:     Rolling Right: minimum assistance  Scooting: minimum assistance  Supine to Sit: minimum assistance  Transfers:     Sit to Stand: moderate assistance of 2 persons with no AD with cues for hand placement  Bed to Chair: maximal assistance of 2 persons with no AD with cues for hand placement using Stand Pivot  Gait: N/A, t/f via stand pivot  Balance:   Static Sitting: Fair, able to maintain for 8 minute(s) with stand by -  minimum assistance, demonstrates intermittent LOB requiring cuing to correct  Dynamic Sitting: Fair: Patient accepts minimal challenge, contact guard -  minimum assistance  Static  Standing: Poor, able to maintain for 8 seconds with moderate assistance of 2 persons  Dynamic Standing: Poor: Patient unable to accept challenge or move without loss of balance, maximal assistance of 2 persons    Therapeutic Activities and Exercises:  Patient educated on role of acute care PT and PT POC, safety while in hospital including calling nurse for mobility, and call light usage  Patient is clear to stand pivot transfer with RN/PCT, assist x2  Patient educated on Post-op sternal precautions, including no lifting > 5 lbs, pulling or pushing with BUEs  Educated about importance of OOB mobility and remaining UIC most of the day    AM-PAC 6 CLICK MOBILITY  Total Score:12     Patient left up in chair with all lines intact, call button in reach, RN notified, and family present.    GOALS:   Multidisciplinary Problems       Physical Therapy Goals          Problem: Physical Therapy    Goal Priority Disciplines Outcome Goal Variances Interventions   Physical Therapy Goal     PT, PT/OT Ongoing, Progressing     Description: Goals to be met by: 10/27/2022     Patient will increase functional independence with mobility by performin. Supine to sit with independence  2. Sit to supine with independence  3. Sit to stand transfer with stand by assistance  4. Bed to chair transfer with contact guard assistance using PRW as needed  5. Gait  x 100 feet with minimum assistance using PRW as needed  6. Ascend/descend 3 stair with no handrails minimum assistance using LRAD as needed  7. Lower extremity exercise program x10 reps per handout, with independence   8. Recall 3/3 sternal precautions                       History:     Past Medical History:   Diagnosis Date    Anemia     Atrial fibrillation     Encounter for blood transfusion     Esophageal ulcer     GI bleed     Hypertension        Past Surgical History:   Procedure Laterality Date    CARDIOVERSION Left 9/15/2022    Procedure: Cardioversion;  Surgeon: Julio WHELAN  MD Jacob;  Location: [facility] CATH LAB/EP;  Service: Cardiology;  Laterality: Left;  With NORBERT    CATHETERIZATION OF BOTH LEFT AND RIGHT HEART N/A 9/22/2022    Procedure: CATHETERIZATION, HEART, BOTH LEFT AND RIGHT;  Surgeon: Julio James MD;  Location: [facility] CATH LAB/EP;  Service: Cardiology;  Laterality: N/A;    COLONOSCOPY N/A 4/4/2019    Procedure: COLONOSCOPY Golytely;  Surgeon: Umair Randolph MD;  Location: [facility] ENDO;  Service: Endoscopy;  Laterality: N/A;    CORONARY ANGIOGRAPHY N/A 9/22/2022    Procedure: ANGIOGRAM, CORONARY ARTERY;  Surgeon: Julio James MD;  Location: [facility] CATH LAB/EP;  Service: Cardiology;  Laterality: N/A;    MEYER MAZE PROCEDURE N/A 10/7/2022    Procedure: MEYER MAZE PROCEDURE;  Surgeon: Mike Hedrick MD;  [facility];  Service: Cardiovascular;  Laterality: N/A;    ESOPHAGOGASTRODUODENOSCOPY N/A 10/12/2018    Procedure: EGD (ESOPHAGOGASTRODUODENOSCOPY);  Surgeon: Braden Herring MD;  Location: [facility];  Service: Endoscopy;  Laterality: N/A;    ESOPHAGOGASTRODUODENOSCOPY N/A 4/4/2019    Procedure: EGD;  Surgeon: Umair Randolph MD;  Location: [facility];  Service: Endoscopy;  Laterality: N/A;    EXCLUSION OF LEFT ATRIAL APPENDAGE N/A 10/7/2022    Procedure: EXCLUSION, LEFT ATRIAL APPENDAGE;  Surgeon: Mike Hedrick MD;  [facility];  Service: Cardiovascular;  Laterality: N/A;    HERNIA REPAIR      INSERTION OF INTRAVASCULAR MICROAXIAL BLOOD PUMP N/A 10/7/2022    Procedure: INSERTION, IMPELLA;  Surgeon: Mike Hedrick MD;  Location: [facility] OR 2ND FLR;  Service: Cardiovascular;  Laterality: N/A;  direct aortic insertion    IRRIGATION OF MEDIASTINUM N/A 10/7/2022    Procedure: IRRIGATION, MEDIASTINUM;  Surgeon: Mike Hedrick MD;  Location: [facility] OR 2ND FLR;  Service: Cardiovascular;  Laterality: N/A;    TRICUSPID VALVULOPLASTY N/A 10/7/2022    Procedure: REPAIR, TRICUSPID VALVE;  Surgeon: Mike Hedrick MD;  Location: [facility] OR [address] [place] [address];   Service: Cardiovascular;  Laterality: N/A;       Time Tracking:     PT Received On: 10/13/22  PT Start Time: 0925     PT Stop Time: 1003  PT Total Time (min): 38 min     Billable Minutes: Evaluation 10 min Therapeutic Activity 20 min and Neuromuscular Re-education 8 min      10/13/2022    Co-evaluation and co-treatment performed for this visit due to suspected patient need for two skilled therapists to ensure patient and staff safety and to accommodate for patient activity tolerance/pain management

## 2022-10-13 NOTE — PT/OT/SLP EVAL
Occupational Therapy   Evaluation    Name: David Barrios  MRN: [id number]  Admitting Diagnosis:  Nonrheumatic mitral valve regurgitation  Recent Surgery: Procedure(s) (LRB):  VALVULOPLASTY,MITRAL VALVE (N/A)  REPAIR, TRICUSPID VALVE (N/A)  INSERTION, IMPELLA (N/A)  MEYER MAZE PROCEDURE (N/A)  EXCLUSION, LEFT ATRIAL APPENDAGE (N/A) 6 Days Post-Op    Recommendations:     Discharge Recommendations: rehabilitation facility  Discharge Equipment Recommendations:   (TBD)  Barriers to discharge:  Inaccessible home environment, Decreased caregiver support    Assessment:     David Barrios is a 62 y.o. male with a medical diagnosis of Nonrheumatic mitral valve regurgitation.  Today pt. Went from bed to chair with max a x 2, however pt. Tolerated therapy well. Performance deficits affecting function: weakness, impaired endurance, impaired self care skills, impaired functional mobility, impaired cognition, decreased safety awareness, pain, decreased coordination, decreased upper extremity function, decreased lower extremity function, impaired coordination, impaired cardiopulmonary response to activity, impaired fine motor, gait instability, impaired balance.      Rehab Prognosis: Good; patient would benefit from acute skilled OT services to address these deficits and reach maximum level of function.       Plan:     Patient to be seen 5 x/week to address the above listed problems via self-care/home management, neuromuscular re-education, therapeutic activities, therapeutic exercises  Plan of Care Expires: 11/12/22  Plan of Care Reviewed with: sibling, patient    Subjective     Chief Complaint: Pt. Reports some nausea and stomach pain  Patient/Family Comments/goals: Get better     Occupational Profile:  Living Environment: Lives alone in a travel trailer, 3 MARIVEL and a handrail on L side. Pt reports having a walk-in shower with a threshold step to enter   Previous level of function: I in adls and drives self   Roles and Routines:  Bother, works as    Equipment Used at Home:  No DME owned   Assistance upon Discharge: sister(sister lives in [place]), a friend     Pain/Comfort:  Pain Rating 1: 6/10  Location 1: abdomen    Patients cultural, spiritual, Episcopal conflicts given the current situation: no    Objective:     Communicated with: RN prior to session.  Patient found HOB elevated with Impella, chest tube, NG tube, arterial line, central line, peripheral IV, lambert catheter, telemetry, pulse ox (continuous), oxygen, blood pressure cuff upon OT entry to room.    General Precautions: Standard, sternal, fall Impella NWB L UE, < 90 degrees shoulder flexion    Orthopedic Precautions:    Braces:    Respiratory Status: Nasal cannula, flow 4 L/min    Occupational Performance:    Bed Mobility:    Patient completed Rolling/Turning to Right with minimum assistance  Patient completed Scooting/Bridging with minimum assistance  Patient completed Supine to Sit with minimum assistance    Functional Mobility/Transfers:  Patient completed Sit <> Stand Transfer with moderate assistance and of 2 persons  with  no assistive device   Patient completed Bed <> Chair Transfer using Stand Pivot technique with maximal assistance and of 2 persons with no assistive device    Activities of Daily Living:  Grooming: minimum assistance seated upright in chair to complete oral hygiene     Cognitive/Visual Perceptual:  Cognitive/Psychosocial Skills:     -       Oriented to: Person, Place, Time, and Situation   -       Follows Commands/attention:Follows two-step commands  -       Communication: clear/fluent  -       Memory: Impaired STM  -       Safety awareness/insight to disability: impaired   -       Mood/Affect/Coping skills/emotional control: Appropriate to situation    Physical Exam:  Balance:    -       Static sitting : SBA   -       Dynamic sitting : NT   -       Static Standing : Mod A   -       Dynamic Standing : Mod A    Sensation:    -        Intact  Dominant hand:    -       Right   Upper Extremity Range of Motion:     -       Right Upper Extremity: WFL following impella precautions  -       Left Upper Extremity:  WFL  following impella precautions  Upper Extremity Strength:    -       Right Upper Extremity: NT  following impella precautions   -       Left Upper Extremity: WFL   Strength:    -       Right Upper Extremity: WFL 5/5  -       Left Upper Extremity: WFL 5/5    AMPAC 6 Click ADL:  AMPAC Total Score: 9    Treatment & Education:  Pt. Educated on the role of OT and OT goals   Pt. Educated on the importance of OOB    POC reviewed with pt and sister     Patient left up in chair with all lines intact, call button in reach, RN notified, and sister present    GOALS:   Multidisciplinary Problems       Occupational Therapy Goals          Problem: Occupational Therapy    Goal Priority Disciplines Outcome Interventions   Occupational Therapy Goal     OT, PT/OT     Description: Goals to be met by: 10/20/22     Patient will increase functional independence with ADLs by performing:    Grooming while seated with Supervision.  Toileting from bedside commode with Moderate Assistance for hygiene and clothing management.   Supine to sit with Supervision.  Toilet transfer to bedside commode with Moderate Assistance.                         History:     Past Medical History:   Diagnosis Date    Anemia     Atrial fibrillation     Encounter for blood transfusion     Esophageal ulcer     GI bleed     Hypertension          Past Surgical History:   Procedure Laterality Date    CARDIOVERSION Left 9/15/2022    Procedure: Cardioversion;  Surgeon: Julio James MD;  Location: [facility] CATH LAB/EP;  Service: Cardiology;  Laterality: Left;  With NORBERT    CATHETERIZATION OF BOTH LEFT AND RIGHT HEART N/A 9/22/2022    Procedure: CATHETERIZATION, HEART, BOTH LEFT AND RIGHT;  Surgeon: Julio James MD;  Location: [facility] CATH LAB/EP;  Service: Cardiology;  Laterality: N/A;     COLONOSCOPY N/A 4/4/2019    Procedure: COLONOSCOPY Golytely;  Surgeon: Umair Randolph MD;  Location: [facility] ENDO;  Service: Endoscopy;  Laterality: N/A;    CORONARY ANGIOGRAPHY N/A 9/22/2022    Procedure: ANGIOGRAM, CORONARY ARTERY;  Surgeon: Julio James MD;  Location: [facility] CATH LAB/EP;  Service: Cardiology;  Laterality: N/A;    MEYER MAZE PROCEDURE N/A 10/7/2022    Procedure: MEYER MAZE PROCEDURE;  Surgeon: Mike Hedrick MD;  [facility];  Service: Cardiovascular;  Laterality: N/A;    ESOPHAGOGASTRODUODENOSCOPY N/A 10/12/2018    Procedure: EGD (ESOPHAGOGASTRODUODENOSCOPY);  Surgeon: Braden Herring MD;  Location: [facility] ENDO;  Service: Endoscopy;  Laterality: N/A;    ESOPHAGOGASTRODUODENOSCOPY N/A 4/4/2019    Procedure: EGD;  Surgeon: Umair Randolph MD;  Location: [facility] ENDO;  Service: Endoscopy;  Laterality: N/A;    EXCLUSION OF LEFT ATRIAL APPENDAGE N/A 10/7/2022    Procedure: EXCLUSION, LEFT ATRIAL APPENDAGE;  Surgeon: Mike Hedrick MD;  [facility];  Service: Cardiovascular;  Laterality: N/A;    HERNIA REPAIR      INSERTION OF INTRAVASCULAR MICROAXIAL BLOOD PUMP N/A 10/7/2022    Procedure: INSERTION, IMPELLA;  Surgeon: Mike Hedrick MD;  [facility];  Service: Cardiovascular;  Laterality: N/A;  direct aortic insertion    IRRIGATION OF MEDIASTINUM N/A 10/7/2022    Procedure: IRRIGATION, MEDIASTINUM;  Surgeon: Mike Hedrick MD;  [facility];  Service: Cardiovascular;  Laterality: N/A;    TRICUSPID VALVULOPLASTY N/A 10/7/2022    Procedure: REPAIR, TRICUSPID VALVE;  Surgeon: Mike Hedrick MD;  [facility];  Service: Cardiovascular;  Laterality: N/A;       Time Tracking:     OT Date of Treatment: 10/13/22  OT Start Time: 0925  OT Stop Time: 1005  OT Total Time (min): 40 min    Billable Minutes:Evaluation 12  Self Care/Home Management 18  Therapeutic Activity 10    10/13/2022

## 2022-10-13 NOTE — PROGRESS NOTES
[facility] - [facility] Intensive Care  Critical Care - Surgery  Progress Note    Patient Name: David Barrios  MRN: [id number]  Admission Date: 10/2/2022  Hospital Length of Stay: 11 days  Code Status: Full Code  Attending Provider: Mike Hedrick MD  Primary Care Provider: Breann Tavera MD   Principal Problem: Nonrheumatic mitral valve regurgitation    Subjective:     Hospital/ICU Course:  No notes on file    Interval History/Significant Events: Hypertensive overnight briefly requiring cleviprex, milrinone initiated. Conversion to afib. Epi weaned to 0.02, impella at P5. Some overnight agitation, improved with precedex.   This morning, alert and oriented x4. No complaints.     Follow-up For: Procedure(s) (LRB):  VALVULOPLASTY,MITRAL VALVE (N/A)  REPAIR, TRICUSPID VALVE (N/A)  INSERTION, IMPELLA (N/A)  MEYER MAZE PROCEDURE (N/A)  EXCLUSION, LEFT ATRIAL APPENDAGE (N/A)    Post-Operative Day: 6 Days Post-Op    Objective:     Vital Signs (Most Recent):  Temp: 98.4 °F (36.9 °C) (10/13/22 0300)  Pulse: 108 (10/13/22 0600)  Resp: 20 (10/12/22 2209)  BP: (!) 139/94 (10/13/22 0600)  SpO2: 100 % (10/13/22 0600)   Vital Signs (24h Range):  Temp:  [98.2 °F (36.8 °C)-100 °F (37.8 °C)] 98.4 °F (36.9 °C)  Pulse:  [] 108  Resp:  [20-24] 20  SpO2:  [94 %-100 %] 100 %  BP: ()/() 139/94  Arterial Line BP: ()/(56-92) 146/82     Weight: 96.2 kg (212 lb)  Body mass index is 31.31 kg/m².      Intake/Output Summary (Last 24 hours) at 10/13/2022 0645  Last data filed at 10/13/2022 0600  Gross per 24 hour   Intake 1751.12 ml   Output 2190 ml   Net -438.88 ml       Physical Exam  Vitals and nursing note reviewed.   Constitutional:       General: He is not in acute distress.     Appearance: He is ill-appearing.   Eyes:      Extraocular Movements: Extraocular movements intact.      Pupils: Pupils are equal, round, and reactive to light.   Cardiovascular:      Rate and Rhythm: Tachycardia present. Rhythm irregular.       Pulses: Normal pulses.      Comments: Axillary impella 5.5 at P5  Pulmonary:      Effort: Pulmonary effort is normal. No respiratory distress.      Breath sounds: Decreased breath sounds present. No wheezing.   Chest:      Comments: Chest tubes in place, clean ,dry and intact  Abdominal:      General: Abdomen is flat. Bowel sounds are normal. There is no distension.      Palpations: Abdomen is soft.      Tenderness: There is no abdominal tenderness.   Genitourinary:     Comments: Fierro in place  Musculoskeletal:      Right lower leg: No edema.      Left lower leg: No edema.   Skin:     General: Skin is warm and dry.      Capillary Refill: Capillary refill takes less than 2 seconds.      Findings: Bruising present. No lesion or rash.   Neurological:      General: No focal deficit present.      Mental Status: He is lethargic.   Psychiatric:         Behavior: Behavior is cooperative.       Vents:  Vent Mode: Spont (10/12/22 1252)  Ventilator Initiated: Yes (10/11/22 0830)  Set Rate: 24 BPM (10/12/22 0916)  Vt Set: 470 mL (10/12/22 0916)  Pressure Support: 5 cmH20 (10/12/22 1252)  PEEP/CPAP: 5 cmH20 (10/12/22 1252)  Oxygen Concentration (%): 40 (10/12/22 1300)  Peak Airway Pressure: 11 cmH2O (10/12/22 1252)  Plateau Pressure: 20 cmH20 (10/12/22 1252)  Total Ve: 9.34 mL (10/12/22 1252)  Negative Inspiratory Force (cm H2O): -34 (10/12/22 1310)  F/VT Ratio<105 (RSBI): (!) 49.59 (10/12/22 0916)    Lines/Drains/Airways       Drain  Duration                  Chest Tube 10/07/22  Left Pleural 6 days         Y Chest Tube 1 and 2 10/07/22 1225 1 Anterior Mediastinal 19 Fr. 2 Anterior Mediastinal 19 Fr. 5 days         NG/OG Tube 10/11/22 0918 Left nostril 1 day         Urethral Catheter 10/11/22 1600 Silicone 1 day              Arterial Line  Duration             Arterial Line 10/11/22 2300 Right Radial 1 day              Line  Duration                  Pacer Wires 10/07/22 1050 5 days         VAD 10/07/22 1129 Impella 5 days               Peripheral Intravenous Line  Duration                  Peripheral IV - Single Lumen 10/11/22 1205 18 G Anterior;Left Upper Arm 1 day         Peripheral IV - Single Lumen 10/11/22 2245 20 G Left Hand 1 day                    Significant Labs:    CBC/Anemia Profile:  Recent Labs   Lab 10/12/22  0153 10/12/22  0322 10/12/22  0428 10/12/22  2348 10/13/22  0323   WBC 17.07*  --  17.30*  --  14.33*   HGB 8.5*  --  8.7*  --  7.5*   HCT 24.6*   < > 25.3* 27* 22.8*   PLT 86*  --  77*  --  110*   MCV 87  --  86  --  89   RDW 17.2*  --  17.2*  --  17.8*    < > = values in this interval not displayed.        Chemistries:  Recent Labs   Lab 10/12/22  0001 10/12/22  0428 10/13/22  0323    138 144   K 3.4* 3.6 4.0    107 111*   CO2 22* 22* 25   BUN 50* 47* 51*   CREATININE 1.7* 1.7* 1.4   CALCIUM 8.6* 9.0 8.4*   ALBUMIN 2.6* 2.6* 2.4*   PROT 4.9* 5.0* 4.9*   BILITOT 2.0* 1.8* 1.1*   ALKPHOS 68 67 73   ALT 17 17 17   AST 35 33 23   MG 2.3 2.3 2.4   PHOS 3.3 3.1 3.5       All pertinent labs within the past 24 hours have been reviewed.    Significant Imaging:  I have reviewed all pertinent imaging results/findings within the past 24 hours.    Assessment/Plan:     * Nonrheumatic mitral valve regurgitation    Neuro/Psych:   -- Sedation: precedex prn for agitation   -- Pain: PRN Oxy + Dilaudid, Fentanyl pushes prn  -- Scheduled Tylenol             Cards:   -- S/P TV replacement, MV replacement, MAZE, Lt Atrial Appendage ligation and Impella placement on 10/7/22  -- Impella: P5  -- Hemodynamically unstable post-op requiring high support and antiarrhythmics for frequent VTach runs, multiple cardioversions in transport from OR  -- Reopened POD#0 for high chest tube output - found to have slow persistent bleeding from the IVC and left atriotomy sites  -- HDS on 0.02 epi - plan to wean off today  -- Impella at P5 - plan to wean support with goal of removal tomorrow   -- Ventricular pacing wires. Back up at 40  BPM.  -- MAP > 65, Syst < 140  -- Aspirin 325mg holding for now, will continue to hold in the setting of down-trending platelets  -- Amiodarone gtt at 0.5 mg/hr - converted back into afib RVR today requiring cardioversion, now sinus rhythm.   -- likely previously in septic shock with GNR in blood, resolved. Now off vasopressors.  -- SDS x3 days  -- In rate-controlled Afib as of 10/12/22, plan to convert to PO amio  -- Due to multiple runs VT in setting of low EF, will need lifevest at discharge.       Pulm:   -- Goal O2 sat > 90%  -- ABG PRN  -- extubated to NC 10/12  -- CXR with improving R middle lobe atelectasis infiltrate  -- bedside bronch for BAL - NGTD  -- Mediastinal chest tubes x2 and pleural chest tube x1 to suction (total 625 cc in 24 hours), decreased in output since reopened chest        Renal:  -- Diuresis with lasix gtt (goal 100-150ml/hr)  -- Keep lambert for strict I/O  -- Trend BUN/Cr      Recent Labs     10/11/22  1907 10/12/22  0001 10/12/22  0428   BUN 46* 50* 47*   CREATININE 1.6* 1.7* 1.7*        FEN / GI:   -- Replace lytes as needed  -- Nutrition: NPO - SLP to see patient today  -- GI ppx: famotidine  -- Bowel reg: miralax      ID:   -- Afebrile  -- WBC trending down  -- Viry-op ancef completed, restarting broad spectrum antibiotics  -- serratia macescens bacteremia 10/12  - awaiting sensitivities   -- Replaced central line  -- Repeat blood cultures currently NDGT  -- will evaluate for impella removal, concern for seeding     Recent Labs     10/12/22  0001 10/12/22  0153 10/12/22  0428   WBC 17.44* 17.07* 17.30*        Heme/Onc:   -- s/p 2 units PRBC yesterday (hgb 7.5 today)  -- Daily CBC  -- 1000 mL Cell saver given back  -- Aspirin 325mg QD; hold for now in the setting of down-trending platelets  -- Impella: Systemic Heparin gtt with goal aPTT 40 - 60  -- Blood Products: 5 units pRBC, 2 FFP, 2 Platelets, 2 Cryoprecipitate (10/8)    Recent Labs     10/12/22  0428   HGB 8.7*   HCT 25.3*    APTT 47.4*        Endo:   -- BG goal 140-180  -- Insulin gtt  -- Hgb A1c 5.5  -- Endocrinology managing      PPx:   Feeding: NPO  Analgesia/Sedation: precedex/PRN Oxy + Dilaudid  Thromboembolic prevention: SCDs, Heparin gtt  HOB >30: Yes  Stress Ulcer ppx: famotidine  Glucose control: Critical care goal 140-180 g/dl, ISS     Lines/Drains/Airway: CVC left subbclavian, Fierro, Chest tubes x 3, ventricular pacing wires, L radial A-line, Impella      Dispo/Code Status/Palliative:   -- SICU / Full Code.       Critical Care - Surgery  [facility]

## 2022-10-13 NOTE — NURSING
0000: Pt hypertensive with SBP in 170s and HR in 130s in afib. Impella retrograde flow alarm noted. Dr. العلي and  at bedside;  on telephone.  decreased impella to P4. Verbal orders from Floridalma RODRIGUEZ to start cleviprex and Milrinone at 0.25 mcg/kg/min and to wean Epi to 0.02 mcg/kg/min as tolerated. Pt becoming increasingly aggressive and disoriented; multiple Ivs pulled out by pt. Verbal orders from  to start precedex gtt.     0100:  Remains in afib in 110s. BP WDL. Epi at 0.02 mcg/kg/min, primacor at 0.25 mcg/kg/min, precedex at 0.4mcg/kg/min. Impella back at P-5 per . Pt now resting quietly. Will continue to monitor

## 2022-10-13 NOTE — PHYSICIAN QUERY
PT Name: David Barrios  MR #: [id number]     DOCUMENTATION CLARIFICATION     CDS/: Venus Joaquin  RN, CCDS             Contact information: [email or web address]  This form is a permanent document in the medical record.     Query Date: October 13, 2022    By submitting this query, we are merely seeking further clarification of documentation.  Please utilize your independent clinical judgment when addressing the question(s) below.  The Medical Record contains the following:  Indicators Supporting Clinical Findings Location in Medical Record   X HR         RR          BP        Temp T- 104.2, hr-71, rr-24, b/p 118/62    Pt acutely hypotensive SBP 50's despite being on vasopressin    10/11 cc prog note    10/11 nurse note   X Lactic Acid          Procalcitonin Lactate 2.2,  Procal-5.37   10/9, 10/11 lab   X WBC           Bands          CRP Wbc-19--> 20-->17-->14-->   10/10-10/13 lab   X Culture(s) Blood cultures- Serratia Marcescens  BAL, resp culture- NG   10/11 lab   X AMS, Confusion, LOC, etc. Pt not following commands at 3am assessment. 10/11 nurse note    Organ Dysfunction/Failure     X Bacteremia or Sepsis / Septic Mixed shock picture, sepsis vs cardiogenic  He is also quite febrile, with a modestly elevated lactate, elevated WBC and elevated procalcitonin suggesting sepsis as a contributing factor    Septic Shock    GNR in blood provides an explanation for acute decompensation yesterday morning    serratia macescens bacteremia 10/12 10/11 cc note          10/12  procedure note    10/12 cc note      10/13 cc note    Known or Suspected Source of Infection documented      (Failed) Outpatient Treatment     X Medication treating with broad spectrum antibiotics   . Orders received to to start norepi    GNR resulting as serratia species; D/C vancomycin and continue zosyn 10/11 cc note  10/11 nurse note    10/13 cc prog note   X Treatment . Tmax 104.5 today, was placed on cooling blanket and packed in ice with  subsequent returned to normothermia 10/11 nurse poc    Other          Provider, please further specify Sepsis/Septic Shock.  [  x ] Sepsis with Septic Shock 2/2 Serratia Marcescens Bacteremia   [   ] Other Infectious Disease (please specify): __________   [  ] Clinically Undetermined         Please document in your progress notes daily for the duration of treatment until resolved and include in your discharge summary.

## 2022-10-13 NOTE — PT/OT/SLP EVAL
Speech Language Pathology Evaluation  Bedside Swallow  Discharge summary    Patient Name:  David Barrios   MRN:  [id number]  Admitting Diagnosis: Nonrheumatic mitral valve regurgitation    Recommendations:                 General Recommendations:  Follow-up not indicated  Diet recommendations:   ,     Aspiration Precautions: Standard aspiration precautions   General Precautions: Standard, fall, sternal  Communication strategies:  none    History:     Past Medical History:   Diagnosis Date    Anemia     Atrial fibrillation     Encounter for blood transfusion     Esophageal ulcer     GI bleed     Hypertension        Past Surgical History:   Procedure Laterality Date    CARDIOVERSION Left 9/15/2022    Procedure: Cardioversion;  Surgeon: Julio James MD;  Location: [facility] CATH LAB/EP;  Service: Cardiology;  Laterality: Left;  With NORBERT    CATHETERIZATION OF BOTH LEFT AND RIGHT HEART N/A 9/22/2022    Procedure: CATHETERIZATION, HEART, BOTH LEFT AND RIGHT;  Surgeon: Julio James MD;  Location: [facility] CATH LAB/EP;  Service: Cardiology;  Laterality: N/A;    COLONOSCOPY N/A 4/4/2019    Procedure: COLONOSCOPY Golytely;  Surgeon: Umair Randolph MD;  Location: [facility];  Service: Endoscopy;  Laterality: N/A;    CORONARY ANGIOGRAPHY N/A 9/22/2022    Procedure: ANGIOGRAM, CORONARY ARTERY;  Surgeon: Julio James MD;  Location: [facility] CATH LAB/EP;  Service: Cardiology;  Laterality: N/A;    MEYER MAZE PROCEDURE N/A 10/7/2022    Procedure: MEYER MAZE PROCEDURE;  Surgeon: Mike Hedrick MD;  Location: [facility] OR [address] [place] [address];  Service: Cardiovascular;  Laterality: N/A;    ESOPHAGOGASTRODUODENOSCOPY N/A 10/12/2018    Procedure: EGD (ESOPHAGOGASTRODUODENOSCOPY);  Surgeon: Braden Herring MD;  Location: [facility];  Service: Endoscopy;  Laterality: N/A;    ESOPHAGOGASTRODUODENOSCOPY N/A 4/4/2019    Procedure: EGD;  Surgeon: Umair Randolph MD;  Location: [facility];  Service: Endoscopy;  Laterality: N/A;    EXCLUSION OF LEFT ATRIAL  APPENDAGE N/A 10/7/2022    Procedure: EXCLUSION, LEFT ATRIAL APPENDAGE;  Surgeon: Mike Hedrick MD;  Location: [facility] OR [facility] FLR;  Service: Cardiovascular;  Laterality: N/A;    HERNIA REPAIR      INSERTION OF INTRAVASCULAR MICROAXIAL BLOOD PUMP N/A 10/7/2022    Procedure: INSERTION, IMPELLA;  Surgeon: Mike Hedrick MD;  Location: [facility] OR 2ND FLR;  Service: Cardiovascular;  Laterality: N/A;  direct aortic insertion    IRRIGATION OF MEDIASTINUM N/A 10/7/2022    Procedure: IRRIGATION, MEDIASTINUM;  Surgeon: Mike Hedrick MD;  Location: [facility] OR [facility] FLR;  Service: Cardiovascular;  Laterality: N/A;    TRICUSPID VALVULOPLASTY N/A 10/7/2022    Procedure: REPAIR, TRICUSPID VALVE;  Surgeon: Mike Hedrick MD;  Location: [facility] OR [facility] FLR;  Service: Cardiovascular;  Laterality: N/A;       Prior Intubation HX:  intubated 10/7 - 10/10: re intubated 10/11    Subjective     Patient awake and alert. Ox4.     Pain/Comfort:   No c/o pain    Respiratory Status: Room air    Objective:     Oral Musculature Evaluation       Bedside Swallow Eval:   Consistencies Assessed:  Thin liquids via tsp cup and straw over 4oz  Puree x2  Solids x2      Oral Phase:   WFL    Pharyngeal Phase:   no overt clinical signs/symptoms of aspiration  no overt clinical signs/symptoms of pharyngeal dysphagia    Compensatory Strategies  None    Treatment: Skilled education was provided to patient and family members re: diet recs, standard aspiration precautions of which to follow, and discharge  ST plan of care.      Assessment:     David Barrios is a 62 y.o. male with an SLP diagnosis of  no oropharyngeal dysphagia .  He presents with no further acute speech therapy needs.      Plan:     Patient to be seen:      Plan of Care expires:     Plan of Care reviewed with:  patient   SLP Follow-Up:  No       Discharge recommendations:  rehabilitation facility   Barriers to Discharge:  None    Time Tracking:     SLP Treatment Date:   10/13/22  Speech  Start Time:  0900  Speech Stop Time:  0913     Speech Total Time (min):  13 min    Billable Minutes: Eval 5  and Self Care/Home Management Training 8    10/13/2022

## 2022-10-13 NOTE — PLAN OF CARE
PT eval complete, plan of care established    10/13/2022    Problem: Physical Therapy  Goal: Physical Therapy Goal  Description: Goals to be met by: 10/27/2022     Patient will increase functional independence with mobility by performin. Supine to sit with independence  2. Sit to supine with independence  3. Sit to stand transfer with stand by assistance  4. Bed to chair transfer with contact guard assistance using PRW as needed  5. Gait  x 100 feet with minimum assistance using PRW as needed  6. Ascend/descend 3 stair with no handrails minimum assistance using LRAD as needed  7. Lower extremity exercise program x10 reps per handout, with independence   8. Recall 3/3 sternal precautions  Outcome: Ongoing, Progressing

## 2022-10-13 NOTE — PLAN OF CARE
10/13/22 1427   Post-Acute Status   Post-Acute Authorization Placement   Post-Acute Placement Status Patient List Provided       Saturnino Reyes LMSW  Case Management [facility]  Ext: [phone]

## 2022-10-13 NOTE — NURSING
"      SICU PLAN OF CARE NOTE    Dx: Nonrheumatic mitral valve regurgitation    Vital Signs: BP (!) 139/94   Pulse 108   Temp 98.4 °F (36.9 °C) (Oral)   Resp 20   Ht 5' 9" (1.753 m)   Wt 96.2 kg (212 lb)   SpO2 100%   BMI 31.31 kg/m²     SHIFT EVENTS:  See previous notes for shift events.     Neuro: AAO x4, Follows Commands, and Moves All Extremities -- intermittently confused     Respiratory: Nasal Cannula 4L     Cardiac: Afib    Diet: NPO    Gtts: Epinephrine, Lasix, Milrinone, Heparin, and Clevidipine     Urine Output: Urinary Catheter 1751 ml/shift    Drains:     Chest Tube, total output 30 cc /  shift     Labs/Accuchecks: q6h accucheck; daily labs.    SKIN NOTE:  Skin: No breakdown noted    "

## 2022-10-14 LAB
ALBUMIN SERPL BCP-MCNC: 2.5 G/DL (ref 3.5–5.2)
ALLENS TEST: ABNORMAL
ALLENS TEST: NORMAL
ALP SERPL-CCNC: 80 U/L (ref 55–135)
ALT SERPL W/O P-5'-P-CCNC: 23 U/L (ref 10–44)
ANION GAP SERPL CALC-SCNC: 12 MMOL/L (ref 8–16)
ANISOCYTOSIS BLD QL SMEAR: SLIGHT
APTT BLDCRRT: 35.3 SEC (ref 21–32)
APTT BLDCRRT: 35.5 SEC (ref 21–32)
ASCENDING AORTA: 3.22 CM
AST SERPL-CCNC: 31 U/L (ref 10–40)
AV INDEX (PROSTH): 0.68
AV MEAN GRADIENT: 3 MMHG
AV PEAK GRADIENT: 5 MMHG
AV VALVE AREA: 2.84 CM2
AV VELOCITY RATIO: 0.79
BASO STIPL BLD QL SMEAR: ABNORMAL
BASOPHILS # BLD AUTO: ABNORMAL K/UL (ref 0–0.2)
BASOPHILS NFR BLD: 1 % (ref 0–1.9)
BILIRUB SERPL-MCNC: 0.8 MG/DL (ref 0.1–1)
BLD PROD TYP BPU: NORMAL
BLOOD UNIT EXPIRATION DATE: NORMAL
BLOOD UNIT TYPE CODE: 2800
BLOOD UNIT TYPE: NORMAL
BSA FOR ECHO PROCEDURE: 2.05 M2
BSA FOR ECHO PROCEDURE: 2.05 M2
BUN SERPL-MCNC: 44 MG/DL (ref 8–23)
BURR CELLS BLD QL SMEAR: ABNORMAL
CALCIUM SERPL-MCNC: 8.3 MG/DL (ref 8.7–10.5)
CHLORIDE SERPL-SCNC: 106 MMOL/L (ref 95–110)
CO2 SERPL-SCNC: 27 MMOL/L (ref 23–29)
CODING SYSTEM: NORMAL
CREAT SERPL-MCNC: 1.3 MG/DL (ref 0.5–1.4)
CV ECHO LV RWT: 0.32 CM
CV ECHO LV RWT: 0.53 CM
DELSYS: ABNORMAL
DELSYS: NORMAL
DIFFERENTIAL METHOD: ABNORMAL
DISPENSE STATUS: NORMAL
DOP CALC AO PEAK VEL: 1.09 M/S
DOP CALC AO VTI: 14.96 CM
DOP CALC LVOT AREA: 3.2 CM2
DOP CALC LVOT AREA: 4.2 CM2
DOP CALC LVOT DIAMETER: 2.03 CM
DOP CALC LVOT DIAMETER: 2.3 CM
DOP CALC LVOT PEAK VEL: 0.86 M/S
DOP CALC LVOT PEAK VEL: 1.13 M/S
DOP CALC LVOT STROKE VOLUME: 42.52 CM3
DOP CALC LVOT STROKE VOLUME: 56.06 CM3
DOP CALCLVOT PEAK VEL VTI: 10.24 CM
DOP CALCLVOT PEAK VEL VTI: 17.33 CM
E WAVE DECELERATION TIME: 165.34 MSEC
E/A RATIO: 2.18
E/E' RATIO: 18.77 M/S
ECHO LV POSTERIOR WALL: 0.92 CM (ref 0.6–1.1)
ECHO LV POSTERIOR WALL: 1.26 CM (ref 0.6–1.1)
EJECTION FRACTION: 23 %
EJECTION FRACTION: 30 %
EOSINOPHIL # BLD AUTO: ABNORMAL K/UL (ref 0–0.5)
EOSINOPHIL NFR BLD: 1 % (ref 0–8)
ERYTHROCYTE [DISTWIDTH] IN BLOOD BY AUTOMATED COUNT: 17.9 % (ref 11.5–14.5)
EST. GFR  (NO RACE VARIABLE): >60 ML/MIN/1.73 M^2
FACT X PPP CHRO-ACNC: 0.28 IU/ML (ref 0.3–0.7)
FRACTIONAL SHORTENING: 8 % (ref 28–44)
FRACTIONAL SHORTENING: 9 % (ref 28–44)
GIANT PLATELETS BLD QL SMEAR: PRESENT
GLUCOSE SERPL-MCNC: 122 MG/DL (ref 70–110)
HCO3 UR-SCNC: 31.6 MMOL/L (ref 24–28)
HCO3 UR-SCNC: 31.8 MMOL/L (ref 24–28)
HCO3 UR-SCNC: 32.1 MMOL/L (ref 24–28)
HCO3 UR-SCNC: 33.7 MMOL/L (ref 24–28)
HCT VFR BLD AUTO: 23.3 % (ref 40–54)
HCT VFR BLD CALC: 22 %PCV (ref 36–54)
HGB BLD-MCNC: 7.7 G/DL (ref 14–18)
HYPOCHROMIA BLD QL SMEAR: ABNORMAL
IMM GRANULOCYTES # BLD AUTO: ABNORMAL K/UL (ref 0–0.04)
IMM GRANULOCYTES NFR BLD AUTO: ABNORMAL % (ref 0–0.5)
INTERVENTRICULAR SEPTUM: 0.85 CM (ref 0.6–1.1)
INTERVENTRICULAR SEPTUM: 1.05 CM (ref 0.6–1.1)
LA MAJOR: 6.67 CM
LA MAJOR: 7.23 CM
LA MINOR: 7.3 CM
LA MINOR: 7.52 CM
LA WIDTH: 4.21 CM
LA WIDTH: 4.87 CM
LDH SERPL L TO P-CCNC: 1.11 MMOL/L (ref 0.5–2.2)
LEFT ATRIUM SIZE: 4.47 CM
LEFT ATRIUM SIZE: 4.64 CM
LEFT ATRIUM VOLUME INDEX MOD: 43.4 ML/M2
LEFT ATRIUM VOLUME INDEX: 57.2 ML/M2
LEFT ATRIUM VOLUME INDEX: 66.9 ML/M2
LEFT ATRIUM VOLUME MOD: 88.12 CM3
LEFT ATRIUM VOLUME: 116.21 CM3
LEFT ATRIUM VOLUME: 135.79 CM3
LEFT INTERNAL DIMENSION IN SYSTOLE: 4.31 CM (ref 2.1–4)
LEFT INTERNAL DIMENSION IN SYSTOLE: 5.3 CM (ref 2.1–4)
LEFT VENTRICLE DIASTOLIC VOLUME INDEX: 51.28 ML/M2
LEFT VENTRICLE DIASTOLIC VOLUME INDEX: 79.66 ML/M2
LEFT VENTRICLE DIASTOLIC VOLUME: 104.09 ML
LEFT VENTRICLE DIASTOLIC VOLUME: 161.7 ML
LEFT VENTRICLE MASS INDEX: 100 G/M2
LEFT VENTRICLE MASS INDEX: 96 G/M2
LEFT VENTRICLE SYSTOLIC VOLUME INDEX: 41.2 ML/M2
LEFT VENTRICLE SYSTOLIC VOLUME INDEX: 66.7 ML/M2
LEFT VENTRICLE SYSTOLIC VOLUME: 135.37 ML
LEFT VENTRICLE SYSTOLIC VOLUME: 83.54 ML
LEFT VENTRICULAR INTERNAL DIMENSION IN DIASTOLE: 4.73 CM (ref 3.5–6)
LEFT VENTRICULAR INTERNAL DIMENSION IN DIASTOLE: 5.73 CM (ref 3.5–6)
LEFT VENTRICULAR MASS: 195.07 G
LEFT VENTRICULAR MASS: 202.85 G
LV LATERAL E/E' RATIO: 17.43 M/S
LV SEPTAL E/E' RATIO: 20.33 M/S
LYMPHOCYTES # BLD AUTO: ABNORMAL K/UL (ref 1–4.8)
LYMPHOCYTES NFR BLD: 5 % (ref 18–48)
MAGNESIUM SERPL-MCNC: 2.2 MG/DL (ref 1.6–2.6)
MCH RBC QN AUTO: 29.8 PG (ref 27–31)
MCHC RBC AUTO-ENTMCNC: 33 G/DL (ref 32–36)
MCV RBC AUTO: 90 FL (ref 82–98)
METAMYELOCYTES NFR BLD MANUAL: 1 %
MODE: ABNORMAL
MODE: NORMAL
MONOCYTES # BLD AUTO: ABNORMAL K/UL (ref 0.3–1)
MONOCYTES NFR BLD: 6 % (ref 4–15)
MV PEAK A VEL: 0.56 M/S
MV PEAK E VEL: 1.22 M/S
MV STENOSIS PRESSURE HALF TIME: 47.95 MS
MV VALVE AREA P 1/2 METHOD: 4.59 CM2
MYELOCYTES NFR BLD MANUAL: 1 %
NEUTROPHILS NFR BLD: 82 % (ref 38–73)
NEUTS BAND NFR BLD MANUAL: 3 %
NRBC BLD-RTO: 1 /100 WBC
OVALOCYTES BLD QL SMEAR: ABNORMAL
PCO2 BLDA: 46.5 MMHG (ref 35–45)
PCO2 BLDA: 51.8 MMHG (ref 35–45)
PCO2 BLDA: 54.7 MMHG (ref 35–45)
PCO2 BLDA: 56.9 MMHG (ref 35–45)
PH SMN: 7.38 [PH] (ref 7.35–7.45)
PH SMN: 7.38 [PH] (ref 7.35–7.45)
PH SMN: 7.39 [PH] (ref 7.35–7.45)
PH SMN: 7.44 [PH] (ref 7.35–7.45)
PHOSPHATE SERPL-MCNC: 2.9 MG/DL (ref 2.7–4.5)
PISA TR MAX VEL: 3.26 M/S
PLATELET # BLD AUTO: 170 K/UL (ref 150–450)
PLATELET BLD QL SMEAR: ABNORMAL
PMV BLD AUTO: 12.8 FL (ref 9.2–12.9)
PO2 BLDA: 26 MMHG (ref 40–60)
PO2 BLDA: 28 MMHG (ref 40–60)
PO2 BLDA: 29 MMHG (ref 40–60)
PO2 BLDA: 65 MMHG (ref 80–100)
POC BE: 7 MMOL/L
POC BE: 7 MMOL/L
POC BE: 8 MMOL/L
POC BE: 9 MMOL/L
POC IONIZED CALCIUM: 1.16 MMOL/L (ref 1.06–1.42)
POC SATURATED O2: 44 % (ref 95–100)
POC SATURATED O2: 49 % (ref 95–100)
POC SATURATED O2: 52 % (ref 95–100)
POC SATURATED O2: 93 % (ref 95–100)
POC TCO2: 33 MMOL/L (ref 23–27)
POC TCO2: 33 MMOL/L (ref 24–29)
POC TCO2: 34 MMOL/L (ref 24–29)
POC TCO2: 35 MMOL/L (ref 24–29)
POIKILOCYTOSIS BLD QL SMEAR: SLIGHT
POLYCHROMASIA BLD QL SMEAR: ABNORMAL
POTASSIUM BLD-SCNC: 2.7 MMOL/L (ref 3.5–5.1)
POTASSIUM SERPL-SCNC: 3.1 MMOL/L (ref 3.5–5.1)
PROT SERPL-MCNC: 5.3 G/DL (ref 6–8.4)
RA MAJOR: 5.42 CM
RA MAJOR: 5.54 CM
RA WIDTH: 4.39 CM
RA WIDTH: 4.48 CM
RBC # BLD AUTO: 2.58 M/UL (ref 4.6–6.2)
RIGHT VENTRICULAR END-DIASTOLIC DIMENSION: 3.96 CM
RIGHT VENTRICULAR END-DIASTOLIC DIMENSION: 4.73 CM
SAMPLE: ABNORMAL
SAMPLE: NORMAL
SCHISTOCYTES BLD QL SMEAR: ABNORMAL
SINUS: 2.86 CM
SINUS: 3.59 CM
SITE: ABNORMAL
SITE: NORMAL
SODIUM BLD-SCNC: 145 MMOL/L (ref 136–145)
SODIUM SERPL-SCNC: 145 MMOL/L (ref 136–145)
STJ: 3.33 CM
TARGETS BLD QL SMEAR: ABNORMAL
TDI LATERAL: 0.07 M/S
TDI LATERAL: 0.08 M/S
TDI SEPTAL: 0.06 M/S
TDI SEPTAL: 0.09 M/S
TDI: 0.07 M/S
TDI: 0.09 M/S
TOXIC GRANULES BLD QL SMEAR: PRESENT
TR MAX PG: 43 MMHG
TRANS ERYTHROCYTES VOL PATIENT: NORMAL ML
TRICUSPID ANNULAR PLANE SYSTOLIC EXCURSION: 1.37 CM
WBC # BLD AUTO: 16.5 K/UL (ref 3.9–12.7)

## 2022-10-14 PROCEDURE — 87077 CULTURE AEROBIC IDENTIFY: CPT

## 2022-10-14 PROCEDURE — 87186 SC STD MICRODIL/AGAR DIL: CPT

## 2022-10-14 PROCEDURE — 25000003 PHARM REV CODE 250

## 2022-10-14 PROCEDURE — 63600175 PHARM REV CODE 636 W HCPCS: Performed by: STUDENT IN AN ORGANIZED HEALTH CARE EDUCATION/TRAINING PROGRAM

## 2022-10-14 PROCEDURE — 83735 ASSAY OF MAGNESIUM: CPT | Performed by: STUDENT IN AN ORGANIZED HEALTH CARE EDUCATION/TRAINING PROGRAM

## 2022-10-14 PROCEDURE — 63600175 PHARM REV CODE 636 W HCPCS

## 2022-10-14 PROCEDURE — 99291 PR CRITICAL CARE, E/M 30-74 MINUTES: ICD-10-PCS | Mod: ,,, | Performed by: ANESTHESIOLOGY

## 2022-10-14 PROCEDURE — 37799 UNLISTED PX VASCULAR SURGERY: CPT

## 2022-10-14 PROCEDURE — 84100 ASSAY OF PHOSPHORUS: CPT | Performed by: STUDENT IN AN ORGANIZED HEALTH CARE EDUCATION/TRAINING PROGRAM

## 2022-10-14 PROCEDURE — 20000000 HC ICU ROOM

## 2022-10-14 PROCEDURE — 85014 HEMATOCRIT: CPT

## 2022-10-14 PROCEDURE — 80053 COMPREHEN METABOLIC PANEL: CPT

## 2022-10-14 PROCEDURE — 94761 N-INVAS EAR/PLS OXIMETRY MLT: CPT

## 2022-10-14 PROCEDURE — 97116 GAIT TRAINING THERAPY: CPT

## 2022-10-14 PROCEDURE — 99900035 HC TECH TIME PER 15 MIN (STAT)

## 2022-10-14 PROCEDURE — P9021 RED BLOOD CELLS UNIT: HCPCS | Performed by: STUDENT IN AN ORGANIZED HEALTH CARE EDUCATION/TRAINING PROGRAM

## 2022-10-14 PROCEDURE — 27000203 HC IMPELLA ADD'L DAY (CL)

## 2022-10-14 PROCEDURE — 99292 PR CRITICAL CARE, ADDL 30 MIN: ICD-10-PCS | Mod: ,,, | Performed by: ANESTHESIOLOGY

## 2022-10-14 PROCEDURE — 84132 ASSAY OF SERUM POTASSIUM: CPT

## 2022-10-14 PROCEDURE — 85520 HEPARIN ASSAY: CPT

## 2022-10-14 PROCEDURE — 85027 COMPLETE CBC AUTOMATED: CPT | Performed by: THORACIC SURGERY (CARDIOTHORACIC VASCULAR SURGERY)

## 2022-10-14 PROCEDURE — 85007 BL SMEAR W/DIFF WBC COUNT: CPT | Performed by: THORACIC SURGERY (CARDIOTHORACIC VASCULAR SURGERY)

## 2022-10-14 PROCEDURE — 99291 CRITICAL CARE FIRST HOUR: CPT | Mod: ,,, | Performed by: ANESTHESIOLOGY

## 2022-10-14 PROCEDURE — 97530 THERAPEUTIC ACTIVITIES: CPT

## 2022-10-14 PROCEDURE — 25000003 PHARM REV CODE 250: Performed by: THORACIC SURGERY (CARDIOTHORACIC VASCULAR SURGERY)

## 2022-10-14 PROCEDURE — 83605 ASSAY OF LACTIC ACID: CPT

## 2022-10-14 PROCEDURE — 25500020 PHARM REV CODE 255: Performed by: INTERNAL MEDICINE

## 2022-10-14 PROCEDURE — 85730 THROMBOPLASTIN TIME PARTIAL: CPT | Performed by: THORACIC SURGERY (CARDIOTHORACIC VASCULAR SURGERY)

## 2022-10-14 PROCEDURE — 27000221 HC OXYGEN, UP TO 24 HOURS

## 2022-10-14 PROCEDURE — 63600175 PHARM REV CODE 636 W HCPCS: Performed by: ANESTHESIOLOGY

## 2022-10-14 PROCEDURE — 82330 ASSAY OF CALCIUM: CPT

## 2022-10-14 PROCEDURE — 84295 ASSAY OF SERUM SODIUM: CPT

## 2022-10-14 PROCEDURE — 25000003 PHARM REV CODE 250: Performed by: STUDENT IN AN ORGANIZED HEALTH CARE EDUCATION/TRAINING PROGRAM

## 2022-10-14 PROCEDURE — 87070 CULTURE OTHR SPECIMN AEROBIC: CPT

## 2022-10-14 PROCEDURE — 97110 THERAPEUTIC EXERCISES: CPT

## 2022-10-14 PROCEDURE — 82803 BLOOD GASES ANY COMBINATION: CPT

## 2022-10-14 PROCEDURE — 85730 THROMBOPLASTIN TIME PARTIAL: CPT | Mod: 91

## 2022-10-14 PROCEDURE — 99292 CRITICAL CARE ADDL 30 MIN: CPT | Mod: ,,, | Performed by: ANESTHESIOLOGY

## 2022-10-14 PROCEDURE — 87040 BLOOD CULTURE FOR BACTERIA: CPT | Mod: 59

## 2022-10-14 PROCEDURE — 25500020 PHARM REV CODE 255: Performed by: THORACIC SURGERY (CARDIOTHORACIC VASCULAR SURGERY)

## 2022-10-14 RX ORDER — SODIUM CHLORIDE 9 MG/ML
INJECTION, SOLUTION INTRAVENOUS
Status: DISCONTINUED | OUTPATIENT
Start: 2022-10-14 | End: 2022-10-25 | Stop reason: HOSPADM

## 2022-10-14 RX ORDER — FENTANYL CITRATE 50 UG/ML
INJECTION, SOLUTION INTRAMUSCULAR; INTRAVENOUS
Status: DISCONTINUED
Start: 2022-10-14 | End: 2022-10-14 | Stop reason: WASHOUT

## 2022-10-14 RX ORDER — HEPARIN SODIUM 10000 [USP'U]/100ML
1400 INJECTION, SOLUTION INTRAVENOUS CONTINUOUS
Status: DISCONTINUED | OUTPATIENT
Start: 2022-10-14 | End: 2022-10-14

## 2022-10-14 RX ORDER — HEPARIN SODIUM 10000 [USP'U]/100ML
1600 INJECTION, SOLUTION INTRAVENOUS CONTINUOUS
Status: DISCONTINUED | OUTPATIENT
Start: 2022-10-14 | End: 2022-10-15

## 2022-10-14 RX ORDER — FENTANYL CITRATE 50 UG/ML
200 INJECTION, SOLUTION INTRAMUSCULAR; INTRAVENOUS ONCE
Status: CANCELLED | OUTPATIENT
Start: 2022-10-14

## 2022-10-14 RX ORDER — LIDOCAINE HYDROCHLORIDE 10 MG/ML
10 INJECTION, SOLUTION EPIDURAL; INFILTRATION; INTRACAUDAL; PERINEURAL ONCE
Status: COMPLETED | OUTPATIENT
Start: 2022-10-14 | End: 2022-10-14

## 2022-10-14 RX ORDER — HYDROCODONE BITARTRATE AND ACETAMINOPHEN 500; 5 MG/1; MG/1
TABLET ORAL
Status: DISCONTINUED | OUTPATIENT
Start: 2022-10-14 | End: 2022-10-15

## 2022-10-14 RX ADMIN — FUROSEMIDE 15 MG/HR: 10 INJECTION, SOLUTION INTRAMUSCULAR; INTRAVENOUS at 06:10

## 2022-10-14 RX ADMIN — HUMAN ALBUMIN MICROSPHERES AND PERFLUTREN 0.66 MG: 10; .22 INJECTION, SOLUTION INTRAVENOUS at 02:10

## 2022-10-14 RX ADMIN — SODIUM CHLORIDE: 0.9 INJECTION, SOLUTION INTRAVENOUS at 08:10

## 2022-10-14 RX ADMIN — LIDOCAINE HYDROCHLORIDE 100 MG: 10 INJECTION, SOLUTION EPIDURAL; INFILTRATION; INTRACAUDAL at 01:10

## 2022-10-14 RX ADMIN — FAMOTIDINE 20 MG: 20 TABLET ORAL at 09:10

## 2022-10-14 RX ADMIN — LIDOCAINE HYDROCHLORIDE 50 MG: 10 INJECTION, SOLUTION EPIDURAL; INFILTRATION; INTRACAUDAL at 11:10

## 2022-10-14 RX ADMIN — HEPARIN SODIUM 1600 UNITS/HR: 10000 INJECTION, SOLUTION INTRAVENOUS at 09:10

## 2022-10-14 RX ADMIN — CEFTRIAXONE 2 G: 2 INJECTION, SOLUTION INTRAVENOUS at 08:10

## 2022-10-14 RX ADMIN — HYDROCORTISONE SODIUM SUCCINATE 50 MG: 100 INJECTION, POWDER, FOR SOLUTION INTRAMUSCULAR; INTRAVENOUS at 05:10

## 2022-10-14 RX ADMIN — HUMAN ALBUMIN MICROSPHERES AND PERFLUTREN 0.66 MG: 10; .22 INJECTION, SOLUTION INTRAVENOUS at 09:10

## 2022-10-14 RX ADMIN — POTASSIUM CHLORIDE 40 MEQ: 29.8 INJECTION, SOLUTION INTRAVENOUS at 03:10

## 2022-10-14 RX ADMIN — ACETAMINOPHEN 1000 MG: 500 TABLET ORAL at 09:10

## 2022-10-14 RX ADMIN — ACETAMINOPHEN 1000 MG: 500 TABLET ORAL at 01:10

## 2022-10-14 RX ADMIN — HYDROCORTISONE SODIUM SUCCINATE 50 MG: 100 INJECTION, POWDER, FOR SOLUTION INTRAMUSCULAR; INTRAVENOUS at 01:10

## 2022-10-14 RX ADMIN — AMIODARONE HYDROCHLORIDE 0.5 MG/MIN: 1.8 INJECTION, SOLUTION INTRAVENOUS at 08:10

## 2022-10-14 RX ADMIN — HYDROCORTISONE SODIUM SUCCINATE 50 MG: 100 INJECTION, POWDER, FOR SOLUTION INTRAMUSCULAR; INTRAVENOUS at 09:10

## 2022-10-14 RX ADMIN — OXYCODONE HYDROCHLORIDE 10 MG: 10 TABLET ORAL at 01:10

## 2022-10-14 RX ADMIN — FAMOTIDINE 20 MG: 20 TABLET ORAL at 08:10

## 2022-10-14 RX ADMIN — ASPIRIN 325 MG ORAL TABLET 325 MG: 325 PILL ORAL at 08:10

## 2022-10-14 RX ADMIN — AMIODARONE HYDROCHLORIDE 0.5 MG/MIN: 1.8 INJECTION, SOLUTION INTRAVENOUS at 07:10

## 2022-10-14 RX ADMIN — HYDROMORPHONE HYDROCHLORIDE 0.5 MG: 1 INJECTION, SOLUTION INTRAMUSCULAR; INTRAVENOUS; SUBCUTANEOUS at 01:10

## 2022-10-14 RX ADMIN — MILRINONE LACTATE IN DEXTROSE 0.25 MCG/KG/MIN: 200 INJECTION, SOLUTION INTRAVENOUS at 03:10

## 2022-10-14 NOTE — PLAN OF CARE
Recommendations    1. Continue Cardiac diet    2. If TF regimen needed: Impact Peptide 1.5 @ goal rate of 55 ml/hr to provide 1980 kcals, 124 kcals, and 1016 ml free water- this meets 102% kcal needs and 87% protein needs.     3.RD following.    Goals: Will meet % EEN/EPN by next RD f/u.  Nutrition Goal Status: progressing towards goal  Communication of RD Recs:  (POC)

## 2022-10-14 NOTE — PT/OT/SLP PROGRESS
"Physical Therapy Co-Treatment    Patient Name:  David Barrios   MRN:  [id number]    Recommendations:     Discharge Recommendations:  rehabilitation facility   Discharge Equipment Recommendations:  (TBD)   Barriers to discharge: Increased level of assist, Inaccessible home, and Decreased caregiver support    Assessment:     David Barrios is a 62 y.o. male admitted with a medical diagnosis of Nonrheumatic mitral valve regurgitation. Patient requires increased level of assist to perform bed mobility this date but improved with ability to perform sit to stand and gait. Demonstrates impaired insight into deficits.      He presents with the following impairments/functional limitations: weakness, impaired endurance, impaired self care skills, impaired functional mobility, impaired cognition, decreased safety awareness, pain, decreased coordination, decreased upper extremity function, decreased lower extremity function, impaired coordination, impaired cardiopulmonary response to activity, impaired fine motor, gait instability, impaired balance. Once medically stable, recommending pt discharge to rehabilitation facility.    Rehab Prognosis: Good; patient continues to benefit from acute skilled PT services to address these deficits and reach maximum level of function.  Recent Surgery: Procedure(s) (LRB):  VALVULOPLASTY,MITRAL VALVE (N/A)  REPAIR, TRICUSPID VALVE (N/A)  INSERTION, IMPELLA (N/A)  MEYER MAZE PROCEDURE (N/A)  EXCLUSION, LEFT ATRIAL APPENDAGE (N/A) 7 Days Post-Op    Plan:     During this hospitalization, patient to be seen 5 x/week to address the identified rehab impairments via gait training, therapeutic activities, therapeutic exercises, neuromuscular re-education and progress toward the following goals:    Plan of Care Expires:  11/13/22    Subjective     Chief Complaint: None verbalized   Patient/Family Comments/Goals: "I want to walk to the bathroom"  Pain/Comfort:  Pain Rating 1: 0/10    Objective: "     Communicated with RN prior to session. Patient found HOB elevated with telemetry, blood pressure cuff, pulse ox (continuous), peripheral IV, chest tube, lambert catheter, wound vac, arterial line, oxygen upon PT entry to room.     General Precautions: Standard, fall, sternal   Orthopedic Precautions:N/A   Braces: N/A    Functional Mobility:  Bed Mobility:     Supine to Sit: moderate assistance  Sit to Supine: moderate assistance  Transfers:     Sit to Stand: minimum assistance with no AD with cues for hand placement  Gait: Patient ambulated 3 steps to the left with moderate assist of 2 persons, after visual demonstration ambulate 4 steps to the right with minimum assistance of 2 persons with hand-held assist. Patient demonstrates unsteady gait, decreased step length, narrow base of support, decreased weight shift, decreased foot clearance, and decreased goldy. Cuing for increased step size and sequencing. All lines remained intact throughout ambulation trial.  Balance:   Static Sitting: Good, able to maintain for 4 minute(s) with stand by assistance  Dynamic Sitting: Fair: Patient accepts minimal challenge, stand by assistance  Static Standing: Poor, able to maintain for 20 seconds with moderate assistance, demonstrates posterior lean requiring cuing to correct  Dynamic Standing: Poor: Patient unable to accept challenge or move without loss of balance, minimum -  moderate assistance of 2 persons    AM-PAC 6 CLICK MOBILITY  Turning over in bed (including adjusting bedclothes, sheets and blankets)?: 2  Sitting down on and standing up from a chair with arms (e.g., wheelchair, bedside commode, etc.): 3  Moving from lying on back to sitting on the side of the bed?: 2  Moving to and from a bed to a chair (including a wheelchair)?: 2  Need to walk in hospital room?: 2  Climbing 3-5 steps with a railing?: 1  Basic Mobility Total Score: 12     Therapeutic Activities and Exercises:  Patient educated on role of acute care  PT and PT POC, safety while in hospital including calling nurse for mobility, and call light usage  Patient is clear to stand pivot transfer with RN/PCT, assist x1  Educated about safety with mobility and current level of activity  Patient educated on Post-op sternal precautions, including no lifting > 5 lbs, pulling or pushing with BUEs. Patient able to voice 2/3 precautions without assistance.    Patient left HOB elevated with all lines intact, call button in reach, RN notified, and friend present.    GOALS:   Multidisciplinary Problems       Physical Therapy Goals          Problem: Physical Therapy    Goal Priority Disciplines Outcome Goal Variances Interventions   Physical Therapy Goal     PT, PT/OT Ongoing, Progressing     Description: Goals to be met by: 10/27/2022     Patient will increase functional independence with mobility by performin. Supine to sit with independence  2. Sit to supine with independence  3. Sit to stand transfer with stand by assistance  4. Bed to chair transfer with contact guard assistance using PRW as needed  5. Gait  x 100 feet with minimum assistance using PRW as needed  6. Ascend/descend 3 stair with no handrails minimum assistance using LRAD as needed  7. Lower extremity exercise program x10 reps per handout, with independence   8. Recall 3/3 sternal precautions                       Time Tracking:     PT Received On: 10/14/22  PT Start Time: 1340     PT Stop Time: 1404  PT Total Time (min): 24 min     Billable Minutes: Gait Training 15 min and Therapeutic Activity 8 min         PT/PTA: PT     PTA Visit Number: 0     10/14/2022    Co-treatment performed for this visit due to patient need for two skilled therapists to ensure patient and staff safety and to accommodate for patient activity tolerance/pain management

## 2022-10-14 NOTE — PROGRESS NOTES
[facility]  Critical Care - Surgery  Progress Note    Patient Name: David Barrios  MRN: [id number]  Admission Date: 10/2/2022  Hospital Length of Stay: 12 days  Code Status: Full Code  Attending Provider: Mike Hedrick MD  Primary Care Provider: Breann Tavera MD   Principal Problem: Nonrheumatic mitral valve regurgitation    Subjective:     Hospital/ICU Course:  No notes on file    Interval History/Significant Events: weaned off epi and to P2 on impella. Plans to pull impella today. SVO2 dropped overnight, will monitor for need of more support.   Plan to pull chest tubes and wires today - minimal output, no pacing needs.     Follow-up For: Procedure(s) (LRB):  VALVULOPLASTY,MITRAL VALVE (N/A)  REPAIR, TRICUSPID VALVE (N/A)  INSERTION, IMPELLA (N/A)  MEYER MAZE PROCEDURE (N/A)  EXCLUSION, LEFT ATRIAL APPENDAGE (N/A)    Post-Operative Day: 6 Days Post-Op    Objective:     Vital Signs (Most Recent):  Temp: 98 °F (36.7 °C) (10/14/22 0300)  Pulse: 96 (10/14/22 0500)  Resp: 20 (10/14/22 0141)  BP: 125/76 (10/14/22 0500)  SpO2: (!) 94 % (10/14/22 0500)   Vital Signs (24h Range):  Temp:  [97.8 °F (36.6 °C)-98 °F (36.7 °C)] 98 °F (36.7 °C)  Pulse:  [] 96  Resp:  [20-26] 20  SpO2:  [91 %-100 %] 94 %  BP: (112-151)/(65-97) 125/76  Arterial Line BP: ()/(59-95) 116/83     Weight: 87 kg (191 lb 12.8 oz)  Body mass index is 28.32 kg/m².      Intake/Output Summary (Last 24 hours) at 10/14/2022 0700  Last data filed at 10/14/2022 0600  Gross per 24 hour   Intake 1869.94 ml   Output 3995 ml   Net -2125.06 ml       Physical Exam  Vitals and nursing note reviewed.   Constitutional:       General: He is not in acute distress.     Appearance: He is ill-appearing.   Eyes:      Extraocular Movements: Extraocular movements intact.      Pupils: Pupils are equal, round, and reactive to light.   Cardiovascular:      Rate and Rhythm: Tachycardia present. Rhythm irregular.      Pulses: Normal pulses.       Comments: Axillary impella 5.5 at P5  Pulmonary:      Effort: Pulmonary effort is normal. No respiratory distress.      Breath sounds: Decreased breath sounds present. No wheezing.   Chest:      Comments: Chest tubes in place, clean ,dry and intact  Abdominal:      General: Abdomen is flat. Bowel sounds are normal. There is no distension.      Palpations: Abdomen is soft.      Tenderness: There is no abdominal tenderness.   Genitourinary:     Comments: Fierro in place  Musculoskeletal:      Right lower leg: No edema.      Left lower leg: No edema.   Skin:     General: Skin is warm and dry.      Capillary Refill: Capillary refill takes less than 2 seconds.      Findings: Bruising present. No lesion or rash.   Neurological:      General: No focal deficit present.      Mental Status: He is lethargic.   Psychiatric:         Behavior: Behavior is cooperative.       Vents:  Vent Mode: Spont (10/12/22 1252)  Ventilator Initiated: Yes (10/11/22 0830)  Set Rate: 24 BPM (10/12/22 0916)  Vt Set: 470 mL (10/12/22 0916)  Pressure Support: 5 cmH20 (10/12/22 1252)  PEEP/CPAP: 5 cmH20 (10/12/22 1252)  Oxygen Concentration (%): 40 (10/12/22 1300)  Peak Airway Pressure: 11 cmH2O (10/12/22 1252)  Plateau Pressure: 20 cmH20 (10/12/22 1252)  Total Ve: 9.34 mL (10/12/22 1252)  Negative Inspiratory Force (cm H2O): -34 (10/12/22 1310)  F/VT Ratio<105 (RSBI): (!) 49.59 (10/12/22 0916)    Lines/Drains/Airways       Drain  Duration                  Chest Tube 10/07/22  Left Pleural 7 days         Y Chest Tube 1 and 2 10/07/22 1225 1 Anterior Mediastinal 19 Fr. 2 Anterior Mediastinal 19 Fr. 6 days         Urethral Catheter 10/11/22 1600 Silicone 2 days              Arterial Line  Duration             Arterial Line 10/11/22 2300 Right Radial 2 days              Line  Duration                  Pacer Wires 10/07/22 1050 6 days         VAD 10/07/22 1129 Impella 6 days              Peripheral Intravenous Line  Duration                  Peripheral IV  - Single Lumen 10/13/22 0000 20 G Left Forearm 1 day                    Significant Labs:    CBC/Anemia Profile:  Recent Labs   Lab 10/13/22  0323 10/14/22  0211 10/14/22  0352   WBC 14.33* 16.50*  --    HGB 7.5* 7.7*  --    HCT 22.8* 23.3* 22*   * 170  --    MCV 89 90  --    RDW 17.8* 17.9*  --         Chemistries:  Recent Labs   Lab 10/13/22  0323 10/14/22  0211    145   K 4.0 3.1*   * 106   CO2 25 27   BUN 51* 44*   CREATININE 1.4 1.3   CALCIUM 8.4* 8.3*   ALBUMIN 2.4* 2.5*   PROT 4.9* 5.3*   BILITOT 1.1* 0.8   ALKPHOS 73 80   ALT 17 23   AST 23 31   MG 2.4 2.2   PHOS 3.5 2.9       All pertinent labs within the past 24 hours have been reviewed.    Significant Imaging:  I have reviewed all pertinent imaging results/findings within the past 24 hours.    Assessment/Plan:     * Nonrheumatic mitral valve regurgitation    Neuro/Psych:   -- Sedation: precedex prn for agitation   -- Pain: PRN Oxy + Dilaudid, Fentanyl pushes prn  -- Scheduled Tylenol             Cards:   -- S/P TV replacement, MV replacement, MAZE, Lt Atrial Appendage ligation and Impella placement on 10/7/22  -- Impella: P2 - plan  -- Hemodynamically unstable post-op requiring high support and antiarrhythmics for frequent VTach runs, multiple cardioversions in transport from OR  -- Reopened POD#0 for high chest tube output - found to have slow persistent bleeding from the IVC and left atriotomy sites  -- weaned off epi and impella at P2 - plan to remove today.   -- Ventricular pacing wires. Back up at 40 BPM. - no pacing needs, will remove today  -- MAP > 65, Syst < 140  -- Aspirin 325mg holding for now, will continue to hold in the setting of down-trending platelets  -- Amiodarone gtt at 0.5 mg/hr - converted back into afib RVR today requiring cardioversion, now sinus rhythm.   -- likely previously in septic shock with GNR in blood, resolved. Now off vasopressors.  -- SDS x3 days  -- In rate-controlled Afib as of 10/12/22, plan to  convert to PO amio  -- Due to multiple runs VT in setting of low EF, will need lifevest at discharge.       Pulm:   -- Goal O2 sat > 90%  -- ABG PRN  -- extubated to NC 10/12  -- CXR with improving R middle lobe atelectasis infiltrate  -- bedside bronch for BAL - NGTD  -- plan to remove chest tubes today         Renal:  -- Diuresis with lasix gtt (goal 100ml/hr)  -- Keep lambert for strict I/O  -- Trend BUN/Cr      Recent Labs     10/12/22  0428 10/13/22  0323 10/14/22  0211   BUN 47* 51* 44*   CREATININE 1.7* 1.4 1.3        FEN / GI:   -- Replace lytes as needed  -- Nutrition: NPO - SLP to see patient today  -- GI ppx: famotidine  -- Bowel reg: miralax      ID:   -- Afebrile  -- WBC trending down  -- Viry-op ancef completed  -- serratia macescens bacteremia 10/12  - pan sensitive. Narrowed abx to ceftriaxone  -- Replaced central line  -- Repeat blood cultures positive - likely impella seeded. Removing today     Recent Labs     10/12/22  0428 10/13/22  0323 10/14/22  0211   WBC 17.30* 14.33* 16.50*        Heme/Onc:   -- s/p 2 units PRBC yesterday (hgb 7.5 today)  -- Daily CBC  -- 1000 mL Cell saver given back  -- Aspirin 325mg QD; hold for now in the setting of down-trending platelets  -- Impella: Systemic Heparin gtt with goal aPTT 40 - 60  -- Blood Products: 5 units pRBC, 2 FFP, 2 Platelets, 2 Cryoprecipitate (10/8)    Recent Labs     10/14/22  0211 10/14/22  0352 10/14/22  0553   HGB 7.7*  --   --    HCT 23.3* 22*  --    APTT 35.5*  --  35.3*        Endo:   -- BG goal 140-180  -- Insulin gtt  -- Hgb A1c 5.5  -- Endocrinology managing      PPx:   Feeding: NPO  Analgesia/Sedation: precedex/PRN Oxy + Dilaudid  Thromboembolic prevention: SCDs, Heparin gtt  HOB >30: Yes  Stress Ulcer ppx: famotidine  Glucose control: Critical care goal 140-180 g/dl, ISS     Lines/Drains/Airway: CVC left subbclavian, Lambert, Chest tubes x 3, ventricular pacing wires, L radial A-line, Impella      Dispo/Code Status/Palliative:   --  SICU / Full Code.              Leah Lay NP  Critical Care - Surgery  [facility]

## 2022-10-14 NOTE — PROGRESS NOTES
"[facility]  Adult Nutrition  Progress Note    SUMMARY       Recommendations    1. Continue Cardiac diet    2. If TF regimen needed: Impact Peptide 1.5 @ goal rate of 55 ml/hr to provide 1980 kcals, 124 kcals, and 1016 ml free water- this meets 102% kcal needs and 87% protein needs.     3.RD following.    Goals: Will meet % EEN/EPN by next RD f/u.  Nutrition Goal Status: progressing towards goal  Communication of RD Recs:  (POC)    Assessment and Plan    Nutrition Problem  Inadequate oral intake     Related to (etiology):   Inability to consume sufficient needs     Signs and Symptoms (as evidenced by):   NPO status     Interventions/Recommendations (treatment strategy):  Collaboration of nutrition care with other providers  EN     Nutrition Diagnosis Status:   Continues       Reason for Assessment    Reason For Assessment: RD follow-up  Diagnosis:  (Nonrheumatic mitral valve regurgitation)  Relevant Medical History: Afib, NSVT, HTN, HFrEF  Interdisciplinary Rounds: did not attend  General Information Comments: Pt seen for f/u. NPO for procedure during assessment. Diet advanced to cardiac. No N/V/D/C noted. NFPE not warranted d/t appearing well-nourished. Not appropriate for cardiac diet education at this time. RD following  Nutrition Discharge Planning: Pending hospital course    Nutrition Risk Screen    Nutrition Risk Screen: other (see comments) (vent/intubated/sedated)    Nutrition/Diet History    Spiritual, Cultural Beliefs, Islam Practices, Values that Affect Care: no  Food Allergies: NKFA  Factors Affecting Nutritional Intake: NPO, on mechanical ventilation, difficulty/impaired swallowing    Anthropometrics    Temp: 98.2 °F (36.8 °C)  Height: 5' 9" (175.3 cm)  Height (inches): 69 in  Weight Method: Bed Scale  Weight: 86.6 kg (191 lb)  Weight (lb): 191 lb  Ideal Body Weight (IBW), Male: 160 lb  % Ideal Body Weight, Male (lb): 119.38 %  BMI (Calculated): 28.2  BMI Grade: 30 - " 34.9- obesity - grade I       Lab/Procedures/Meds    Pertinent Labs Reviewed: reviewed  Pertinent Labs Comments: H/H 7.7/23.3, Potassium 3.1, BUN:44, Glucose:122, julien:8.3  Pertinent Medications Reviewed: reviewed  Pertinent Medications Comments: rocephin, famotidine, senna-docusate, lasix, epinephrine, heparin      Estimated/Assessed Needs    Weight Used For Calorie Calculations: 94.5 kg (208 lb 5.4 oz)  Energy Calorie Requirements (kcal): 1950 kcals  Energy Need Method: [place] (modified)  Protein Requirements: 142-189 g (1.5-2.0 g/kg IBW)  Weight Used For Protein Calculations: 94.5 kg (208 lb 5.4 oz)  Fluid Requirements (mL): 1 ml or fluid per MD  Estimated Fluid Requirement Method: RDA Method  RDA Method (mL): 1950         Nutrition Prescription Ordered    Current Diet Order: cardiac    Evaluation of Received Nutrient/Fluid Intake    Other Calories (kcal): 660 (propofol @ 25 ml/hr)  % Kcal Needs: 34%  I/O: - 472.8  Energy Calories Required: not meeting needs  Comments: LBM 10/6  % Intake of Estimated Energy Needs: 0 - 25 %  % Meal Intake: 0 - 25 %    Nutrition Risk    Level of Risk/Frequency of Follow-up:  (1 time/week)     Monitor and Evaluation    Food and Nutrient Intake: enteral nutrition intake  Food and Nutrient Adminstration: enteral and parenteral nutrition administration  Knowledge/Beliefs/Attitudes: food and nutrition knowledge/skill, beliefs and attitudes  Physical Activity and Function: nutrition-related ADLs and IADLs  Anthropometric Measurements: height/length, weight, weight change, body mass index  Biochemical Data, Medical Tests and Procedures: electrolyte and renal panel, glucose/endocrine profile, gastrointestinal profile, inflammatory profile, lipid profile  Nutrition-Focused Physical Findings: overall appearance     Nutrition Follow-Up    RD Follow-up?: Yes

## 2022-10-14 NOTE — PLAN OF CARE
SICU PLAN OF CARE NOTE    Dx: Nonrheumatic mitral valve regurgitation    Shift Events: Impella removed. Decrease in UOP post removal, CVP 6. 1 PRBC, milrinone increased, lasix gtt off. ECHO. Repeat blood cx sent. Wound vac placed to midsternal incision, wound cx sent.     Gtts:     Amio  Milrinone  Heparin    Neuro: AAO x4, Follows Commands, and Moves All Extremities    Cardiac: A-fib, HR 90-110s. CVP 5, 6.    Respiratory: 4 L NC    GI: Cardiac Diet, 1500 mL FR    : Urinary Catheter 965 cc/shift     Labs/Accuchecks: Daily labs reviewed.     Skin: No skin breakdown noted. Midsternal incision with wound vac CDI. Chest tube site dressing CDI. Placed on immerse mattress, bed plugged in with mattress appropriately inflated. Heels elevated off of bed with green boots. Sacral foam in place. Weight shift assistance provided throughout shift.

## 2022-10-14 NOTE — ASSESSMENT & PLAN NOTE
  Neuro/Psych:   -- Sedation: precedex prn for agitation   -- Pain: PRN Oxy + Dilaudid, Fentanyl pushes prn  -- Scheduled Tylenol             Cards:   -- S/P TV replacement, MV replacement, MAZE, Lt Atrial Appendage ligation and Impella placement on 10/7/22  -- Impella: P2 - plan  -- Hemodynamically unstable post-op requiring high support and antiarrhythmics for frequent VTach runs, multiple cardioversions in transport from OR  -- Reopened POD#0 for high chest tube output - found to have slow persistent bleeding from the IVC and left atriotomy sites  -- weaned off epi and impella at P2 - plan to remove today.   -- Ventricular pacing wires. Back up at 40 BPM. - no pacing needs, will remove today  -- MAP > 65, Syst < 140  -- Aspirin 325mg holding for now, will continue to hold in the setting of down-trending platelets  -- Amiodarone gtt at 0.5 mg/hr - converted back into afib RVR today requiring cardioversion, now sinus rhythm.   -- likely previously in septic shock with GNR in blood, resolved. Now off vasopressors.  -- SDS x3 days  -- In rate-controlled Afib as of 10/12/22, plan to convert to PO amio  -- Due to multiple runs VT in setting of low EF, will need lifevest at discharge.       Pulm:   -- Goal O2 sat > 90%  -- ABG PRN  -- extubated to NC 10/12  -- CXR with improving R middle lobe atelectasis infiltrate  -- bedside bronch for BAL - NGTD  -- plan to remove chest tubes today         Renal:  -- Diuresis with lasix gtt (goal 100ml/hr)  -- Keep lambert for strict I/O  -- Trend BUN/Cr      Recent Labs     10/12/22  0428 10/13/22  0323 10/14/22  0211   BUN 47* 51* 44*   CREATININE 1.7* 1.4 1.3        FEN / GI:   -- Replace lytes as needed  -- Nutrition: NPO - SLP to see patient today  -- GI ppx: famotidine  -- Bowel reg: miralax      ID:   -- Afebrile  -- WBC trending down  -- Viry-op ancef completed  -- serratia macescens bacteremia 10/12  - pan sensitive. Narrowed abx to ceftriaxone  -- Replaced central  line  -- Repeat blood cultures positive - likely impella seeded. Removing today     Recent Labs     10/12/22  0428 10/13/22  0323 10/14/22  0211   WBC 17.30* 14.33* 16.50*        Heme/Onc:   -- s/p 2 units PRBC yesterday (hgb 7.5 today)  -- Daily CBC  -- 1000 mL Cell saver given back  -- Aspirin 325mg QD; hold for now in the setting of down-trending platelets  -- Impella: Systemic Heparin gtt with goal aPTT 40 - 60  -- Blood Products: 5 units pRBC, 2 FFP, 2 Platelets, 2 Cryoprecipitate (10/8)    Recent Labs     10/14/22  0211 10/14/22  0352 10/14/22  0553   HGB 7.7*  --   --    HCT 23.3* 22*  --    APTT 35.5*  --  35.3*        Endo:   -- BG goal 140-180  -- Insulin gtt  -- Hgb A1c 5.5  -- Endocrinology managing      PPx:   Feeding: NPO  Analgesia/Sedation: precedex/PRN Oxy + Dilaudid  Thromboembolic prevention: SCDs, Heparin gtt  HOB >30: Yes  Stress Ulcer ppx: famotidine  Glucose control: Critical care goal 140-180 g/dl, ISS     Lines/Drains/Airway: CVC left subbclavian, Fierro, Chest tubes x 3, ventricular pacing wires, L radial A-line, Impella      Dispo/Code Status/Palliative:   -- SICU / Full Code.

## 2022-10-14 NOTE — SUBJECTIVE & OBJECTIVE
Interval History/Significant Events: weaned off epi and to P2 on impella. Plans to pull impella today. SVO2 dropped overnight, might need more inotropic support.   Plan to pull chest tubes and wires today - minimal output, no pacing needs.     Follow-up For: Procedure(s) (LRB):  VALVULOPLASTY,MITRAL VALVE (N/A)  REPAIR, TRICUSPID VALVE (N/A)  INSERTION, IMPELLA (N/A)  MEYER MAZE PROCEDURE (N/A)  EXCLUSION, LEFT ATRIAL APPENDAGE (N/A)    Post-Operative Day: 6 Days Post-Op    Objective:     Vital Signs (Most Recent):  Temp: 98 °F (36.7 °C) (10/14/22 0300)  Pulse: 96 (10/14/22 0500)  Resp: 20 (10/14/22 0141)  BP: 125/76 (10/14/22 0500)  SpO2: (!) 94 % (10/14/22 0500)   Vital Signs (24h Range):  Temp:  [97.8 °F (36.6 °C)-98 °F (36.7 °C)] 98 °F (36.7 °C)  Pulse:  [] 96  Resp:  [20-26] 20  SpO2:  [91 %-100 %] 94 %  BP: (112-151)/(65-97) 125/76  Arterial Line BP: ()/(59-95) 116/83     Weight: 87 kg (191 lb 12.8 oz)  Body mass index is 28.32 kg/m².      Intake/Output Summary (Last 24 hours) at 10/14/2022 0700  Last data filed at 10/14/2022 0600  Gross per 24 hour   Intake 1869.94 ml   Output 3995 ml   Net -2125.06 ml       Physical Exam  Vitals and nursing note reviewed.   Constitutional:       General: He is not in acute distress.     Appearance: He is ill-appearing.   Eyes:      Extraocular Movements: Extraocular movements intact.      Pupils: Pupils are equal, round, and reactive to light.   Cardiovascular:      Rate and Rhythm: Tachycardia present. Rhythm irregular.      Pulses: Normal pulses.      Comments: Axillary impella 5.5 at P5  Pulmonary:      Effort: Pulmonary effort is normal. No respiratory distress.      Breath sounds: Decreased breath sounds present. No wheezing.   Chest:      Comments: Chest tubes in place, clean ,dry and intact  Abdominal:      General: Abdomen is flat. Bowel sounds are normal. There is no distension.      Palpations: Abdomen is soft.      Tenderness: There is no abdominal  tenderness.   Genitourinary:     Comments: Fierro in place  Musculoskeletal:      Right lower leg: No edema.      Left lower leg: No edema.   Skin:     General: Skin is warm and dry.      Capillary Refill: Capillary refill takes less than 2 seconds.      Findings: Bruising present. No lesion or rash.   Neurological:      General: No focal deficit present.      Mental Status: He is lethargic.   Psychiatric:         Behavior: Behavior is cooperative.       Vents:  Vent Mode: Spont (10/12/22 1252)  Ventilator Initiated: Yes (10/11/22 0830)  Set Rate: 24 BPM (10/12/22 0916)  Vt Set: 470 mL (10/12/22 0916)  Pressure Support: 5 cmH20 (10/12/22 1252)  PEEP/CPAP: 5 cmH20 (10/12/22 1252)  Oxygen Concentration (%): 40 (10/12/22 1300)  Peak Airway Pressure: 11 cmH2O (10/12/22 1252)  Plateau Pressure: 20 cmH20 (10/12/22 1252)  Total Ve: 9.34 mL (10/12/22 1252)  Negative Inspiratory Force (cm H2O): -34 (10/12/22 1310)  F/VT Ratio<105 (RSBI): (!) 49.59 (10/12/22 0916)    Lines/Drains/Airways       Drain  Duration                  Chest Tube 10/07/22  Left Pleural 7 days         Y Chest Tube 1 and 2 10/07/22 1225 1 Anterior Mediastinal 19 Fr. 2 Anterior Mediastinal 19 Fr. 6 days         Urethral Catheter 10/11/22 1600 Silicone 2 days              Arterial Line  Duration             Arterial Line 10/11/22 2300 Right Radial 2 days              Line  Duration                  Pacer Wires 10/07/22 1050 6 days         VAD 10/07/22 1129 Impella 6 days              Peripheral Intravenous Line  Duration                  Peripheral IV - Single Lumen 10/13/22 0000 20 G Left Forearm 1 day                    Significant Labs:    CBC/Anemia Profile:  Recent Labs   Lab 10/13/22  0323 10/14/22  0211 10/14/22  0352   WBC 14.33* 16.50*  --    HGB 7.5* 7.7*  --    HCT 22.8* 23.3* 22*   * 170  --    MCV 89 90  --    RDW 17.8* 17.9*  --         Chemistries:  Recent Labs   Lab 10/13/22  0323 10/14/22  0211    145   K 4.0 3.1*   * 106    CO2 25 27   BUN 51* 44*   CREATININE 1.4 1.3   CALCIUM 8.4* 8.3*   ALBUMIN 2.4* 2.5*   PROT 4.9* 5.3*   BILITOT 1.1* 0.8   ALKPHOS 73 80   ALT 17 23   AST 23 31   MG 2.4 2.2   PHOS 3.5 2.9       All pertinent labs within the past 24 hours have been reviewed.    Significant Imaging:  I have reviewed all pertinent imaging results/findings within the past 24 hours.

## 2022-10-14 NOTE — PT/OT/SLP PROGRESS
Occupational Therapy   Treatment    Name: David Barrios  MRN: [id number]  Admitting Diagnosis:  Nonrheumatic mitral valve regurgitation  7 Days Post-Op    Recommendations:     Discharge Recommendations: rehabilitation facility  Discharge Equipment Recommendations:   (TBD)  Barriers to discharge:       Assessment:     David Barrios is a 62 y.o. male with a medical diagnosis of Nonrheumatic mitral valve regurgitation. Pt sat EOB with Mod A and took uncoordinated sidesteps with Min x 2 - Mod x 2. Performance deficits affecting function are weakness, impaired endurance, impaired self care skills, impaired functional mobility, gait instability, impaired balance, decreased coordination, decreased safety awareness.     Rehab Prognosis:  Good; patient would benefit from acute skilled OT services to address these deficits and reach maximum level of function.       Plan:     Patient to be seen 5 x/week to address the above listed problems via self-care/home management, therapeutic activities, therapeutic exercises, neuromuscular re-education  Plan of Care Expires: 11/12/22  Plan of Care Reviewed with: patient, caregiver    Subjective     Pain/Comfort:  Pain Rating 1: 0/10    Objective:     Communicated with: rn prior to session.  Patient found supine with chest tube, wound vac, arterial line, pulse ox (continuous), peripheral IV, telemetry upon OT entry to room.    General Precautions: Standard, fall, sternal   Orthopedic Precautions:    Braces:    Respiratory Status: Room air     Occupational Performance:     Bed Mobility:    Patient completed Supine to Sit with moderate assistance  Patient completed Sit to Supine with moderate assistance     Functional Mobility/Transfers:  Patient completed Sit <> Stand Transfer with minimum assistance  with  hand-held assist   Functional Mobility: Took multiple sidesteps with assist ranging from Min x 2 - Mod x 2.    Activities of Daily Living:  Lower Body Dressing: total  assistance    [facility] 6 Click ADL: 16    Treatment & Education:  Reviewed sternal precautions  Encouraged activity / OOB with staff over the weekend.  Demonstrated UE/LE ROM to do with return-demonstration given.  Discussed OT POC and progress.    Patient left supine with all lines intact and call button in reach    GOALS:   Multidisciplinary Problems       Occupational Therapy Goals          Problem: Occupational Therapy    Goal Priority Disciplines Outcome Interventions   Occupational Therapy Goal     OT, PT/OT     Description: Goals to be met by: 10/20/22     Patient will increase functional independence with ADLs by performing:    Grooming while seated with Supervision.  Toileting from bedside commode with Moderate Assistance for hygiene and clothing management.   Supine to sit with Supervision.  Toilet transfer to bedside commode with Moderate Assistance.                         Time Tracking:     OT Date of Treatment: 10/14/22  OT Start Time: 1340  OT Stop Time: 1403  OT Total Time (min): 23 min    Billable Minutes:Therapeutic Activity 13  Therapeutic Exercise 10    OT/DUONG: OT          10/14/2022

## 2022-10-15 LAB
ALBUMIN SERPL BCP-MCNC: 2.7 G/DL (ref 3.5–5.2)
ALLENS TEST: ABNORMAL
ALP SERPL-CCNC: 92 U/L (ref 55–135)
ALT SERPL W/O P-5'-P-CCNC: 23 U/L (ref 10–44)
ANION GAP SERPL CALC-SCNC: 6 MMOL/L (ref 8–16)
ANISOCYTOSIS BLD QL SMEAR: SLIGHT
APTT BLDCRRT: 37.6 SEC (ref 21–32)
APTT BLDCRRT: 47.8 SEC (ref 21–32)
APTT BLDCRRT: 90.8 SEC (ref 21–32)
AST SERPL-CCNC: 21 U/L (ref 10–40)
BACTERIA BLD CULT: ABNORMAL
BASOPHILS # BLD AUTO: 0.03 K/UL (ref 0–0.2)
BASOPHILS NFR BLD: 0.1 % (ref 0–1.9)
BILIRUB SERPL-MCNC: 0.8 MG/DL (ref 0.1–1)
BUN SERPL-MCNC: 41 MG/DL (ref 8–23)
CALCIUM SERPL-MCNC: 8.8 MG/DL (ref 8.7–10.5)
CHLORIDE SERPL-SCNC: 103 MMOL/L (ref 95–110)
CO2 SERPL-SCNC: 31 MMOL/L (ref 23–29)
CREAT SERPL-MCNC: 1.1 MG/DL (ref 0.5–1.4)
DELSYS: ABNORMAL
DIFFERENTIAL METHOD: ABNORMAL
EOSINOPHIL # BLD AUTO: 0 K/UL (ref 0–0.5)
EOSINOPHIL NFR BLD: 0 % (ref 0–8)
ERYTHROCYTE [DISTWIDTH] IN BLOOD BY AUTOMATED COUNT: 17.4 % (ref 11.5–14.5)
EST. GFR  (NO RACE VARIABLE): >60 ML/MIN/1.73 M^2
GLUCOSE SERPL-MCNC: 144 MG/DL (ref 70–110)
GRAM STN SPEC: NORMAL
HCO3 UR-SCNC: 33.1 MMOL/L (ref 24–28)
HCT VFR BLD AUTO: 25.9 % (ref 40–54)
HGB BLD-MCNC: 8.4 G/DL (ref 14–18)
HYPOCHROMIA BLD QL SMEAR: ABNORMAL
IMM GRANULOCYTES # BLD AUTO: 0.72 K/UL (ref 0–0.04)
IMM GRANULOCYTES NFR BLD AUTO: 3.1 % (ref 0–0.5)
LYMPHOCYTES # BLD AUTO: 1 K/UL (ref 1–4.8)
LYMPHOCYTES NFR BLD: 4.2 % (ref 18–48)
MAGNESIUM SERPL-MCNC: 2.2 MG/DL (ref 1.6–2.6)
MCH RBC QN AUTO: 29.1 PG (ref 27–31)
MCHC RBC AUTO-ENTMCNC: 32.4 G/DL (ref 32–36)
MCV RBC AUTO: 90 FL (ref 82–98)
MODE: ABNORMAL
MONOCYTES # BLD AUTO: 0.8 K/UL (ref 0.3–1)
MONOCYTES NFR BLD: 3.4 % (ref 4–15)
NEUTROPHILS # BLD AUTO: 20.9 K/UL (ref 1.8–7.7)
NEUTROPHILS NFR BLD: 89.2 % (ref 38–73)
NRBC BLD-RTO: 2 /100 WBC
PCO2 BLDA: 54.6 MMHG (ref 35–45)
PH SMN: 7.39 [PH] (ref 7.35–7.45)
PHOSPHATE SERPL-MCNC: 3.2 MG/DL (ref 2.7–4.5)
PLATELET # BLD AUTO: 249 K/UL (ref 150–450)
PLATELET BLD QL SMEAR: ABNORMAL
PMV BLD AUTO: 11.8 FL (ref 9.2–12.9)
PO2 BLDA: 31 MMHG (ref 40–60)
POC BE: 8 MMOL/L
POC SATURATED O2: 58 % (ref 95–100)
POC TCO2: 35 MMOL/L (ref 24–29)
POLYCHROMASIA BLD QL SMEAR: ABNORMAL
POTASSIUM SERPL-SCNC: 3.1 MMOL/L (ref 3.5–5.1)
POTASSIUM SERPL-SCNC: 3.4 MMOL/L (ref 3.5–5.1)
PROT SERPL-MCNC: 5.6 G/DL (ref 6–8.4)
RBC # BLD AUTO: 2.89 M/UL (ref 4.6–6.2)
SAMPLE: ABNORMAL
SITE: ABNORMAL
SODIUM SERPL-SCNC: 140 MMOL/L (ref 136–145)
TARGETS BLD QL SMEAR: ABNORMAL
WBC # BLD AUTO: 23.48 K/UL (ref 3.9–12.7)

## 2022-10-15 PROCEDURE — 63600175 PHARM REV CODE 636 W HCPCS

## 2022-10-15 PROCEDURE — 25000003 PHARM REV CODE 250: Performed by: THORACIC SURGERY (CARDIOTHORACIC VASCULAR SURGERY)

## 2022-10-15 PROCEDURE — 20000000 HC ICU ROOM

## 2022-10-15 PROCEDURE — 87075 CULTR BACTERIA EXCEPT BLOOD: CPT | Mod: 59 | Performed by: STUDENT IN AN ORGANIZED HEALTH CARE EDUCATION/TRAINING PROGRAM

## 2022-10-15 PROCEDURE — 80053 COMPREHEN METABOLIC PANEL: CPT

## 2022-10-15 PROCEDURE — 84100 ASSAY OF PHOSPHORUS: CPT | Performed by: STUDENT IN AN ORGANIZED HEALTH CARE EDUCATION/TRAINING PROGRAM

## 2022-10-15 PROCEDURE — 63600175 PHARM REV CODE 636 W HCPCS: Performed by: ANESTHESIOLOGY

## 2022-10-15 PROCEDURE — 27000221 HC OXYGEN, UP TO 24 HOURS

## 2022-10-15 PROCEDURE — 87070 CULTURE OTHR SPECIMN AEROBIC: CPT | Performed by: STUDENT IN AN ORGANIZED HEALTH CARE EDUCATION/TRAINING PROGRAM

## 2022-10-15 PROCEDURE — 94799 UNLISTED PULMONARY SVC/PX: CPT

## 2022-10-15 PROCEDURE — 87077 CULTURE AEROBIC IDENTIFY: CPT | Mod: 59 | Performed by: STUDENT IN AN ORGANIZED HEALTH CARE EDUCATION/TRAINING PROGRAM

## 2022-10-15 PROCEDURE — 84132 ASSAY OF SERUM POTASSIUM: CPT

## 2022-10-15 PROCEDURE — 63600175 PHARM REV CODE 636 W HCPCS: Performed by: THORACIC SURGERY (CARDIOTHORACIC VASCULAR SURGERY)

## 2022-10-15 PROCEDURE — 85025 COMPLETE CBC W/AUTO DIFF WBC: CPT | Performed by: THORACIC SURGERY (CARDIOTHORACIC VASCULAR SURGERY)

## 2022-10-15 PROCEDURE — 83735 ASSAY OF MAGNESIUM: CPT | Performed by: STUDENT IN AN ORGANIZED HEALTH CARE EDUCATION/TRAINING PROGRAM

## 2022-10-15 PROCEDURE — 87205 SMEAR GRAM STAIN: CPT | Performed by: STUDENT IN AN ORGANIZED HEALTH CARE EDUCATION/TRAINING PROGRAM

## 2022-10-15 PROCEDURE — 85730 THROMBOPLASTIN TIME PARTIAL: CPT | Mod: 91 | Performed by: THORACIC SURGERY (CARDIOTHORACIC VASCULAR SURGERY)

## 2022-10-15 PROCEDURE — 94664 DEMO&/EVAL PT USE INHALER: CPT

## 2022-10-15 PROCEDURE — 27000646 HC AEROBIKA DEVICE

## 2022-10-15 PROCEDURE — 85730 THROMBOPLASTIN TIME PARTIAL: CPT

## 2022-10-15 PROCEDURE — 63600175 PHARM REV CODE 636 W HCPCS: Performed by: STUDENT IN AN ORGANIZED HEALTH CARE EDUCATION/TRAINING PROGRAM

## 2022-10-15 PROCEDURE — 25000003 PHARM REV CODE 250: Performed by: STUDENT IN AN ORGANIZED HEALTH CARE EDUCATION/TRAINING PROGRAM

## 2022-10-15 PROCEDURE — 99900035 HC TECH TIME PER 15 MIN (STAT)

## 2022-10-15 PROCEDURE — 94761 N-INVAS EAR/PLS OXIMETRY MLT: CPT

## 2022-10-15 PROCEDURE — 87186 SC STD MICRODIL/AGAR DIL: CPT | Performed by: STUDENT IN AN ORGANIZED HEALTH CARE EDUCATION/TRAINING PROGRAM

## 2022-10-15 PROCEDURE — 85730 THROMBOPLASTIN TIME PARTIAL: CPT | Mod: 91

## 2022-10-15 PROCEDURE — 99291 PR CRITICAL CARE, E/M 30-74 MINUTES: ICD-10-PCS | Mod: ,,, | Performed by: ANESTHESIOLOGY

## 2022-10-15 PROCEDURE — 99291 CRITICAL CARE FIRST HOUR: CPT | Mod: ,,, | Performed by: ANESTHESIOLOGY

## 2022-10-15 PROCEDURE — 82803 BLOOD GASES ANY COMBINATION: CPT

## 2022-10-15 RX ORDER — HEPARIN SODIUM 10000 [USP'U]/100ML
2400 INJECTION, SOLUTION INTRAVENOUS CONTINUOUS
Status: DISCONTINUED | OUTPATIENT
Start: 2022-10-15 | End: 2022-10-15

## 2022-10-15 RX ORDER — AMIODARONE HYDROCHLORIDE 200 MG/1
200 TABLET ORAL 2 TIMES DAILY
Status: COMPLETED | OUTPATIENT
Start: 2022-10-15 | End: 2022-10-21

## 2022-10-15 RX ORDER — HEPARIN SODIUM 10000 [USP'U]/100ML
1800 INJECTION, SOLUTION INTRAVENOUS CONTINUOUS
Status: DISCONTINUED | OUTPATIENT
Start: 2022-10-15 | End: 2022-10-15

## 2022-10-15 RX ORDER — HEPARIN SODIUM 10000 [USP'U]/100ML
2200 INJECTION, SOLUTION INTRAVENOUS CONTINUOUS
Status: DISCONTINUED | OUTPATIENT
Start: 2022-10-15 | End: 2022-10-16

## 2022-10-15 RX ORDER — MILRINONE LACTATE 0.2 MG/ML
0.25 INJECTION, SOLUTION INTRAVENOUS CONTINUOUS
Status: DISCONTINUED | OUTPATIENT
Start: 2022-10-15 | End: 2022-10-16

## 2022-10-15 RX ORDER — LIDOCAINE HYDROCHLORIDE 10 MG/ML
10 INJECTION INFILTRATION; PERINEURAL ONCE
Status: DISCONTINUED | OUTPATIENT
Start: 2022-10-15 | End: 2022-10-19

## 2022-10-15 RX ADMIN — AMIODARONE HYDROCHLORIDE 0.5 MG/MIN: 1.8 INJECTION, SOLUTION INTRAVENOUS at 06:10

## 2022-10-15 RX ADMIN — HYDROCORTISONE SODIUM SUCCINATE 50 MG: 100 INJECTION, POWDER, FOR SOLUTION INTRAMUSCULAR; INTRAVENOUS at 05:10

## 2022-10-15 RX ADMIN — MILRINONE LACTATE IN DEXTROSE 0.38 MCG/KG/MIN: 200 INJECTION, SOLUTION INTRAVENOUS at 02:10

## 2022-10-15 RX ADMIN — AMIODARONE HYDROCHLORIDE 200 MG: 200 TABLET ORAL at 09:10

## 2022-10-15 RX ADMIN — MILRINONE LACTATE IN DEXTROSE 0.25 MCG/KG/MIN: 200 INJECTION, SOLUTION INTRAVENOUS at 06:10

## 2022-10-15 RX ADMIN — ACETAMINOPHEN 1000 MG: 500 TABLET ORAL at 05:10

## 2022-10-15 RX ADMIN — SENNOSIDES AND DOCUSATE SODIUM 1 TABLET: 50; 8.6 TABLET ORAL at 08:10

## 2022-10-15 RX ADMIN — ACETAMINOPHEN 1000 MG: 500 TABLET ORAL at 09:10

## 2022-10-15 RX ADMIN — ASPIRIN 325 MG ORAL TABLET 325 MG: 325 PILL ORAL at 08:10

## 2022-10-15 RX ADMIN — HYDROMORPHONE HYDROCHLORIDE 0.5 MG: 1 INJECTION, SOLUTION INTRAMUSCULAR; INTRAVENOUS; SUBCUTANEOUS at 12:10

## 2022-10-15 RX ADMIN — HEPARIN SODIUM 2400 UNITS/HR: 10000 INJECTION, SOLUTION INTRAVENOUS at 06:10

## 2022-10-15 RX ADMIN — POTASSIUM CHLORIDE 40 MEQ: 29.8 INJECTION, SOLUTION INTRAVENOUS at 06:10

## 2022-10-15 RX ADMIN — AMIODARONE HYDROCHLORIDE 200 MG: 200 TABLET ORAL at 08:10

## 2022-10-15 RX ADMIN — OXYCODONE HYDROCHLORIDE 10 MG: 10 TABLET ORAL at 08:10

## 2022-10-15 RX ADMIN — FAMOTIDINE 20 MG: 20 TABLET ORAL at 08:10

## 2022-10-15 RX ADMIN — ACETAMINOPHEN 1000 MG: 500 TABLET ORAL at 03:10

## 2022-10-15 RX ADMIN — POTASSIUM CHLORIDE 40 MEQ: 29.8 INJECTION, SOLUTION INTRAVENOUS at 01:10

## 2022-10-15 RX ADMIN — HEPARIN SODIUM 1800 UNITS/HR: 10000 INJECTION, SOLUTION INTRAVENOUS at 05:10

## 2022-10-15 RX ADMIN — FAMOTIDINE 20 MG: 20 TABLET ORAL at 09:10

## 2022-10-15 RX ADMIN — CEFTRIAXONE 2 G: 2 INJECTION, SOLUTION INTRAVENOUS at 08:10

## 2022-10-15 NOTE — PLAN OF CARE
"SICU PLAN OF CARE NOTE    Dx: s/p MVr, TVr, MAZE, atrial appendage resection, and Impella    Goals of Care:  MAP >65, SBP <130    Vital Signs:  BP (!) 143/59   Pulse 92   Temp 97.5 °F (36.4 °C) (Oral)   Resp 17   Ht 5' 9" (1.753 m)   Wt 86.6 kg (191 lb)   SpO2 99%   BMI 28.21 kg/m²     Cardiac:  NSR w/ infrequent intermittent rate controlled afib in the 90-100s    Resp:  SpO2 98% on 1L nasal cannula; no SOB reported, but patient using accessory muscles    Neuro:  AAO x4, Follows Commands, and Moves All Extremities    Gtts:  Milrinone .25 mcg/kg/min and Heparin 2400 u/hr    Urine Output:  Urinary Catheter 575 cc/shift    Drains:  Midsternal Wound Vac, total output 50 cc /  shift    Diet:  Cardiac Diet w/ 1500 FR     Labs/Accuchecks:  hypokalemic - replaced    Skin:  All skin remains free from injury.  Patient moves independently for weight shifting in chair, specialty ICU bed working correctly.    Shift Events:  Weaned Milrinone, transitioned Amio to PO, and increased Heparin d/t subtherapeutic PTT.  Wound Vac changed See flowsheet for further assessment/details.  Sister and patient updated on current condition/plan of care, questions answered, and emotional support provided.  MD updated on current condition, vitals, labs, and gtts.  No new orders received, will continue to monitor.     "

## 2022-10-15 NOTE — PROGRESS NOTES
[facility] - Surgical Intensive Care  Critical Care - Surgery  Progress Note    Patient Name: David Barrios  MRN: [id number]  Admission Date: 10/2/2022  Hospital Length of Stay: 13 days  Code Status: Full Code  Attending Provider: Mike Hedrick MD  Primary Care Provider: Breann Tavera MD   Principal Problem: Nonrheumatic mitral valve regurgitation    Subjective:     Hospital/ICU Course:  No notes on file    Interval History/Significant Events: NAEON. Impella, PW, and chest tubes removed. Wound vac applied to sternal wound.    Follow-up For: Procedure(s) (LRB):  VALVULOPLASTY,MITRAL VALVE (N/A)  REPAIR, TRICUSPID VALVE (N/A)  INSERTION, IMPELLA (N/A)  MEYER MAZE PROCEDURE (N/A)  EXCLUSION, LEFT ATRIAL APPENDAGE (N/A)    Post-Operative Day: 8 Days Post-Op    Objective:     Vital Signs (Most Recent):  Temp: 97.7 °F (36.5 °C) (10/15/22 0700)  Pulse: 94 (10/15/22 0825)  Resp: (!) 30 (10/15/22 0830)  BP: 130/60 (10/15/22 0700)  SpO2: (!) 94 % (10/15/22 0825)   Vital Signs (24h Range):  Temp:  [97.7 °F (36.5 °C)-98.2 °F (36.8 °C)] 97.7 °F (36.5 °C)  Pulse:  [] 94  Resp:  [15-40] 30  SpO2:  [91 %-100 %] 94 %  BP: (115-145)/(57-95) 130/60  Arterial Line BP: ()/() 113/72     Weight: 86.6 kg (191 lb)  Body mass index is 28.21 kg/m².      Intake/Output Summary (Last 24 hours) at 10/15/2022 0848  Last data filed at 10/15/2022 0800  Gross per 24 hour   Intake 2076.09 ml   Output 1665 ml   Net 411.09 ml       Physical Exam  Vitals and nursing note reviewed.   Constitutional:       General: He is not in acute distress.     Appearance: He is ill-appearing.   Eyes:      Extraocular Movements: Extraocular movements intact.      Pupils: Pupils are equal, round, and reactive to light.   Cardiovascular:      Rate and Rhythm: Tachycardia present. Rhythm irregular.      Pulses: Normal pulses.   Pulmonary:      Effort: Pulmonary effort is normal. No respiratory distress.      Breath sounds: Decreased breath sounds  present. No wheezing.   Abdominal:      General: Abdomen is flat. Bowel sounds are normal. There is no distension.      Palpations: Abdomen is soft.      Tenderness: There is no abdominal tenderness.   Genitourinary:     Comments: Fieror in place  Musculoskeletal:      Right lower leg: No edema.      Left lower leg: No edema.   Skin:     General: Skin is warm and dry.      Capillary Refill: Capillary refill takes less than 2 seconds.      Findings: Bruising present. No lesion or rash.   Neurological:      General: No focal deficit present.      Mental Status: He is lethargic.   Psychiatric:         Behavior: Behavior is cooperative.       Vents:  Vent Mode: Spont (10/12/22 1252)  Ventilator Initiated: Yes (10/11/22 0830)  Set Rate: 24 BPM (10/12/22 0916)  Vt Set: 470 mL (10/12/22 0916)  Pressure Support: 5 cmH20 (10/12/22 1252)  PEEP/CPAP: 5 cmH20 (10/12/22 1252)  Oxygen Concentration (%): 40 (10/12/22 1300)  Peak Airway Pressure: 11 cmH2O (10/12/22 1252)  Plateau Pressure: 20 cmH20 (10/12/22 1252)  Total Ve: 9.34 mL (10/12/22 1252)  Negative Inspiratory Force (cm H2O): -34 (10/12/22 1310)  F/VT Ratio<105 (RSBI): (!) 49.59 (10/12/22 0916)    Lines/Drains/Airways       Central Venous Catheter Line  Duration             Percutaneous Central Line Insertion/Assessment - Triple Lumen  10/12/22 1252 left subclavian 2 days              Drain  Duration                  Urethral Catheter 10/11/22 1600 Silicone 3 days              Arterial Line  Duration             Arterial Line 10/11/22 2300 Right Radial 3 days              Peripheral Intravenous Line  Duration                  Peripheral IV - Single Lumen 10/13/22 0000 20 G Left Forearm 2 days                    Significant Labs:    CBC/Anemia Profile:  Recent Labs   Lab 10/14/22  0211 10/14/22  0352 10/15/22  0411   WBC 16.50*  --  23.48*   HGB 7.7*  --  8.4*   HCT 23.3* 22* 25.9*     --  249   MCV 90  --  90   RDW 17.9*  --  17.4*        Chemistries:  Recent Labs    Lab 10/14/22  0211 10/15/22  0411    140   K 3.1* 3.1*    103   CO2 27 31*   BUN 44* 41*   CREATININE 1.3 1.1   CALCIUM 8.3* 8.8   ALBUMIN 2.5* 2.7*   PROT 5.3* 5.6*   BILITOT 0.8 0.8   ALKPHOS 80 92   ALT 23 23   AST 31 21   MG 2.2 2.2   PHOS 2.9 3.2       All pertinent labs within the past 24 hours have been reviewed.    Significant Imaging:  I have reviewed and interpreted all pertinent imaging results/findings within the past 24 hours.    Assessment/Plan:     * Nonrheumatic mitral valve regurgitation    Neuro/Psych:   -- Sedation: precedex prn for agitation   -- Pain: PRN Oxy + Dilaudid, Fentanyl pushes prn  -- Scheduled Tylenol             Cards:   -- S/P TV replacement, MV replacement, MAZE, Lt Atrial Appendage ligation and Impella placement on 10/7/22  -- Impella: Removed 10/14  -- Hemodynamically unstable post-op requiring high support and antiarrhythmics for frequent VTach runs, multiple cardioversions in transport from OR  -- Reopened POD#0 for high chest tube output - found to have slow persistent bleeding from the IVC and left atriotomy sites  -- weaned off epi and impella at P2 - plan to remove today.   -- Ventricular pacing wires. Back up at 40 BPM. - no pacing needs, will remove today  -- MAP > 65, Syst < 140  -- Aspirin 325mg holding for now, will continue to hold in the setting of down-trending platelets  -- Amiodarone gtt at 0.5 mg/hr - converted back into afib RVR today requiring cardioversion, now sinus rhythm.   -- likely previously in septic shock with GNR in blood, resolved. Now off vasopressors.  -- SDS x3 days  -- In rate-controlled Afib as of 10/12/22, plan to convert to PO amio  -- Due to multiple runs VT in setting of low EF, will need lifevest at discharge.       Pulm:   -- Goal O2 sat > 90%  -- ABG PRN  -- extubated to NC 10/12  -- CXR with improving R middle lobe atelectasis infiltrate  -- bedside bronch for BAL - NGTD  -- Chest tubes removed 10/15        Renal:  --  Paused lasix due to good UOP  -- Keep lambert for strict I/O  -- Trend BUN/Cr      Recent Labs     10/13/22  0323 10/14/22  0211 10/15/22  0411   BUN 51* 44* 41*   CREATININE 1.4 1.3 1.1        FEN / GI:   -- Replace lytes as needed  -- Nutrition: NPO - SLP to see patient today  -- GI ppx: famotidine  -- Bowel reg: miralax      ID:   -- Afebrile  -- WBC trended up - will continue to monitor, may be attributed to SDS  -- Viry-op ancef completed  -- serratia macescens bacteremia 10/12  - pan sensitive. Narrowed abx to ceftriaxone  -- Replaced central line  -- Trend repeat blood cultures    Recent Labs     10/13/22  0323 10/14/22  0211 10/15/22  0411   WBC 14.33* 16.50* 23.48*        Heme/Onc:   -- s/p 2 units PRBC yesterday (hgb 7.5 today), s/o 1 unit PRBC 10/14  -- Daily CBC  -- 1000 mL Cell saver given back  -- Aspirin 325mg QD; hold for now in the setting of down-trending platelets  -- Impella: Systemic Heparin gtt with goal aPTT 40 - 60  -- Blood Products: 5 units pRBC, 2 FFP, 2 Platelets, 2 Cryoprecipitate (10/8)    Recent Labs     10/15/22  0411   HGB 8.4*   HCT 25.9*   APTT 37.6*        Endo:   -- BG goal 140-180  -- Insulin gtt  -- Hgb A1c 5.5      PPx:   Feeding: NPO  Analgesia/Sedation: precedex/PRN Oxy + Dilaudid  Thromboembolic prevention: SCDs, Heparin gtt  HOB >30: Yes  Stress Ulcer ppx: famotidine  Glucose control: Critical care goal 140-180 g/dl, ISS     Lines/Drains/Airway: CVC left subbclavian, Lambert, Chest tubes x 3, ventricular pacing wires, L radial A-line, Impella      Dispo/Code Status/Palliative:   -- SICU / Full Code.           Critical secondary to Patient has a condition that poses threat to life and bodily function.     Critical care was time spent personally by me on the following activities: development of treatment plan with patient or surrogate and bedside caregivers, discussions with consultants, evaluation of patient's response to treatment, examination of patient, ordering and  performing treatments and interventions, ordering and review of laboratory studies, ordering and review of radiographic studies, pulse oximetry, re-evaluation of patient's condition.  This critical care time did not overlap with that of any other provider or involve time for any procedures.     Angel Luis Segovia MD  Critical Care - Surgery  [facility] - [facility]

## 2022-10-15 NOTE — ASSESSMENT & PLAN NOTE
  Neuro/Psych:   -- Sedation: precedex prn for agitation   -- Pain: PRN Oxy + Dilaudid, Fentanyl pushes prn  -- Scheduled Tylenol             Cards:   -- S/P TV replacement, MV replacement, MAZE, Lt Atrial Appendage ligation and Impella placement on 10/7/22  -- Impella: Removed 10/14  -- Hemodynamically unstable post-op requiring high support and antiarrhythmics for frequent VTach runs, multiple cardioversions in transport from OR  -- Reopened POD#0 for high chest tube output - found to have slow persistent bleeding from the IVC and left atriotomy sites  -- weaned off epi and impella at P2 - plan to remove today.   -- Ventricular pacing wires. Back up at 40 BPM. - no pacing needs, will remove today  -- MAP > 65, Syst < 140  -- Aspirin 325mg holding for now, will continue to hold in the setting of down-trending platelets  -- Amiodarone gtt at 0.5 mg/hr - converted back into afib RVR today requiring cardioversion, now sinus rhythm.   -- likely previously in septic shock with GNR in blood, resolved. Now off vasopressors.  -- SDS x3 days  -- In rate-controlled Afib as of 10/12/22, plan to convert to PO amio  -- Due to multiple runs VT in setting of low EF, will need lifevest at discharge.       Pulm:   -- Goal O2 sat > 90%  -- ABG PRN  -- extubated to NC 10/12  -- CXR with improving R middle lobe atelectasis infiltrate  -- bedside bronch for BAL - NGTD  -- Chest tubes removed 10/15        Renal:  -- Paused lasix due to good UOP  -- Keep lambert for strict I/O  -- Trend BUN/Cr      Recent Labs     10/13/22  0323 10/14/22  0211 10/15/22  0411   BUN 51* 44* 41*   CREATININE 1.4 1.3 1.1        FEN / GI:   -- Replace lytes as needed  -- Nutrition: NPO - SLP to see patient today  -- GI ppx: famotidine  -- Bowel reg: miralax      ID:   -- Afebrile  -- WBC trended up - will continue to monitor, may be attributed to SDS  -- Viry-op ancef completed  -- serratia macescens bacteremia 10/12  - pan sensitive. Narrowed abx to  ceftriaxone  -- Replaced central line  -- Trend repeat blood cultures    Recent Labs     10/13/22  0323 10/14/22  0211 10/15/22  0411   WBC 14.33* 16.50* 23.48*        Heme/Onc:   -- s/p 2 units PRBC yesterday (hgb 7.5 today), s/o 1 unit PRBC 10/14  -- Daily CBC  -- 1000 mL Cell saver given back  -- Aspirin 325mg QD; hold for now in the setting of down-trending platelets  -- Impella: Systemic Heparin gtt with goal aPTT 40 - 60  -- Blood Products: 5 units pRBC, 2 FFP, 2 Platelets, 2 Cryoprecipitate (10/8)    Recent Labs     10/15/22  0411   HGB 8.4*   HCT 25.9*   APTT 37.6*        Endo:   -- BG goal 140-180  -- Insulin gtt  -- Hgb A1c 5.5      PPx:   Feeding: NPO  Analgesia/Sedation: precedex/PRN Oxy + Dilaudid  Thromboembolic prevention: SCDs, Heparin gtt  HOB >30: Yes  Stress Ulcer ppx: famotidine  Glucose control: Critical care goal 140-180 g/dl, ISS     Lines/Drains/Airway: CVC left subbclavian, Fierro, Chest tubes x 3, ventricular pacing wires, L radial A-line, Impella      Dispo/Code Status/Palliative:   -- SICU / Full Code.

## 2022-10-15 NOTE — PLAN OF CARE
OOBTC this AM. Patient tolerated well.     VSS. NSR this morning 90s. Maintaining MAP > 65 and SBP < 140. Sats 100% on 2L NC. SvO2 58% this AM.   CVP 7, 10, 6.     Neuro: PERRLA, oriented x 4, follows commands, moves all extremities.    Gtts:   Amiodarone 0.5 mg/min   Milrinone 0.375 mcg/kg/min   Heparin 1800 units/hr     Fierro in place with adequate UOP. See flowsheets.     Midsternal incision with continuous wound vac in place. Minimal output.   Old chest tube sites dressings changed.     Skin: complete CHG bath given. Linen/gown/electrodes changed. Foams in place. No new breakdown noted.     All labs reviewed.

## 2022-10-15 NOTE — SUBJECTIVE & OBJECTIVE
Interval History/Significant Events: NAEON. Impella, PW, and chest tubes removed. Wound vac applied to sternal wound.    Follow-up For: Procedure(s) (LRB):  VALVULOPLASTY,MITRAL VALVE (N/A)  REPAIR, TRICUSPID VALVE (N/A)  INSERTION, IMPELLA (N/A)  MEYER MAZE PROCEDURE (N/A)  EXCLUSION, LEFT ATRIAL APPENDAGE (N/A)    Post-Operative Day: 8 Days Post-Op    Objective:     Vital Signs (Most Recent):  Temp: 97.7 °F (36.5 °C) (10/15/22 0700)  Pulse: 94 (10/15/22 0825)  Resp: (!) 30 (10/15/22 0830)  BP: 130/60 (10/15/22 0700)  SpO2: (!) 94 % (10/15/22 0825)   Vital Signs (24h Range):  Temp:  [97.7 °F (36.5 °C)-98.2 °F (36.8 °C)] 97.7 °F (36.5 °C)  Pulse:  [] 94  Resp:  [15-40] 30  SpO2:  [91 %-100 %] 94 %  BP: (115-145)/(57-95) 130/60  Arterial Line BP: ()/() 113/72     Weight: 86.6 kg (191 lb)  Body mass index is 28.21 kg/m².      Intake/Output Summary (Last 24 hours) at 10/15/2022 0848  Last data filed at 10/15/2022 0800  Gross per 24 hour   Intake 2076.09 ml   Output 1665 ml   Net 411.09 ml       Physical Exam  Vitals and nursing note reviewed.   Constitutional:       General: He is not in acute distress.     Appearance: He is ill-appearing.   Eyes:      Extraocular Movements: Extraocular movements intact.      Pupils: Pupils are equal, round, and reactive to light.   Cardiovascular:      Rate and Rhythm: Tachycardia present. Rhythm irregular.      Pulses: Normal pulses.   Pulmonary:      Effort: Pulmonary effort is normal. No respiratory distress.      Breath sounds: Decreased breath sounds present. No wheezing.   Abdominal:      General: Abdomen is flat. Bowel sounds are normal. There is no distension.      Palpations: Abdomen is soft.      Tenderness: There is no abdominal tenderness.   Genitourinary:     Comments: Fierro in place  Musculoskeletal:      Right lower leg: No edema.      Left lower leg: No edema.   Skin:     General: Skin is warm and dry.      Capillary Refill: Capillary refill takes less  than 2 seconds.      Findings: Bruising present. No lesion or rash.   Neurological:      General: No focal deficit present.      Mental Status: He is lethargic.   Psychiatric:         Behavior: Behavior is cooperative.       Vents:  Vent Mode: Spont (10/12/22 1252)  Ventilator Initiated: Yes (10/11/22 0830)  Set Rate: 24 BPM (10/12/22 0916)  Vt Set: 470 mL (10/12/22 0916)  Pressure Support: 5 cmH20 (10/12/22 1252)  PEEP/CPAP: 5 cmH20 (10/12/22 1252)  Oxygen Concentration (%): 40 (10/12/22 1300)  Peak Airway Pressure: 11 cmH2O (10/12/22 1252)  Plateau Pressure: 20 cmH20 (10/12/22 1252)  Total Ve: 9.34 mL (10/12/22 1252)  Negative Inspiratory Force (cm H2O): -34 (10/12/22 1310)  F/VT Ratio<105 (RSBI): (!) 49.59 (10/12/22 0916)    Lines/Drains/Airways       Central Venous Catheter Line  Duration             Percutaneous Central Line Insertion/Assessment - Triple Lumen  10/12/22 1252 left subclavian 2 days              Drain  Duration                  Urethral Catheter 10/11/22 1600 Silicone 3 days              Arterial Line  Duration             Arterial Line 10/11/22 2300 Right Radial 3 days              Peripheral Intravenous Line  Duration                  Peripheral IV - Single Lumen 10/13/22 0000 20 G Left Forearm 2 days                    Significant Labs:    CBC/Anemia Profile:  Recent Labs   Lab 10/14/22  0211 10/14/22  0352 10/15/22  0411   WBC 16.50*  --  23.48*   HGB 7.7*  --  8.4*   HCT 23.3* 22* 25.9*     --  249   MCV 90  --  90   RDW 17.9*  --  17.4*        Chemistries:  Recent Labs   Lab 10/14/22  0211 10/15/22  0411    140   K 3.1* 3.1*    103   CO2 27 31*   BUN 44* 41*   CREATININE 1.3 1.1   CALCIUM 8.3* 8.8   ALBUMIN 2.5* 2.7*   PROT 5.3* 5.6*   BILITOT 0.8 0.8   ALKPHOS 80 92   ALT 23 23   AST 31 21   MG 2.2 2.2   PHOS 2.9 3.2       All pertinent labs within the past 24 hours have been reviewed.    Significant Imaging:  I have reviewed and interpreted all pertinent imaging  results/findings within the past 24 hours.

## 2022-10-15 NOTE — PLAN OF CARE
Plan of Care Note  Cardiothoracic Surgery    8 Days Post-Op s/p MVr, TVr, MAZE, impella    Specific issues: Controlled afib, anticoagulation for possible LV thrombus, blood cultures, wound cultures    Plan of care for patient was discussed with ICU staff including nurses, residents, and faculty and appropriate consulting services.    Will continue to monitor patient's hemodynamics, functionality, neuro status, fluid status and renal function, and labs and will adjust medications and fluids as necessary while monitoring appropriateness for de-escalation of support and monitoring and transport to stepdown unit.    Gin Saul MD, PGY-7  Cardiothoracic Surgery   [phone]

## 2022-10-16 LAB
ALBUMIN SERPL BCP-MCNC: 2.6 G/DL (ref 3.5–5.2)
ALLENS TEST: ABNORMAL
ALP SERPL-CCNC: 90 U/L (ref 55–135)
ALT SERPL W/O P-5'-P-CCNC: 21 U/L (ref 10–44)
ANION GAP SERPL CALC-SCNC: 7 MMOL/L (ref 8–16)
ANISOCYTOSIS BLD QL SMEAR: SLIGHT
APTT BLDCRRT: 101.9 SEC (ref 21–32)
APTT BLDCRRT: 49 SEC (ref 21–32)
APTT BLDCRRT: 63.6 SEC (ref 21–32)
AST SERPL-CCNC: 22 U/L (ref 10–40)
BASOPHILS # BLD AUTO: 0.04 K/UL (ref 0–0.2)
BASOPHILS NFR BLD: 0.2 % (ref 0–1.9)
BILIRUB SERPL-MCNC: 0.7 MG/DL (ref 0.1–1)
BUN SERPL-MCNC: 33 MG/DL (ref 8–23)
CALCIUM SERPL-MCNC: 8.8 MG/DL (ref 8.7–10.5)
CHLORIDE SERPL-SCNC: 101 MMOL/L (ref 95–110)
CO2 SERPL-SCNC: 29 MMOL/L (ref 23–29)
CREAT SERPL-MCNC: 0.8 MG/DL (ref 0.5–1.4)
DIFFERENTIAL METHOD: ABNORMAL
EOSINOPHIL # BLD AUTO: 0.1 K/UL (ref 0–0.5)
EOSINOPHIL NFR BLD: 0.5 % (ref 0–8)
ERYTHROCYTE [DISTWIDTH] IN BLOOD BY AUTOMATED COUNT: 17.6 % (ref 11.5–14.5)
EST. GFR  (NO RACE VARIABLE): >60 ML/MIN/1.73 M^2
GLUCOSE SERPL-MCNC: 109 MG/DL (ref 70–110)
HCO3 UR-SCNC: 30.4 MMOL/L (ref 24–28)
HCT VFR BLD AUTO: 26.3 % (ref 40–54)
HGB BLD-MCNC: 8.8 G/DL (ref 14–18)
HYPOCHROMIA BLD QL SMEAR: ABNORMAL
IMM GRANULOCYTES # BLD AUTO: 1.15 K/UL (ref 0–0.04)
IMM GRANULOCYTES NFR BLD AUTO: 4.3 % (ref 0–0.5)
LYMPHOCYTES # BLD AUTO: 1.5 K/UL (ref 1–4.8)
LYMPHOCYTES NFR BLD: 5.4 % (ref 18–48)
MAGNESIUM SERPL-MCNC: 2.2 MG/DL (ref 1.6–2.6)
MCH RBC QN AUTO: 29.7 PG (ref 27–31)
MCHC RBC AUTO-ENTMCNC: 33.5 G/DL (ref 32–36)
MCV RBC AUTO: 89 FL (ref 82–98)
MONOCYTES # BLD AUTO: 1.1 K/UL (ref 0.3–1)
MONOCYTES NFR BLD: 4 % (ref 4–15)
NEUTROPHILS # BLD AUTO: 22.8 K/UL (ref 1.8–7.7)
NEUTROPHILS NFR BLD: 85.6 % (ref 38–73)
NRBC BLD-RTO: 1 /100 WBC
PCO2 BLDA: 45.7 MMHG (ref 35–45)
PH SMN: 7.43 [PH] (ref 7.35–7.45)
PHOSPHATE SERPL-MCNC: 2.3 MG/DL (ref 2.7–4.5)
PLATELET # BLD AUTO: 380 K/UL (ref 150–450)
PLATELET BLD QL SMEAR: ABNORMAL
PMV BLD AUTO: 11.2 FL (ref 9.2–12.9)
PO2 BLDA: 27 MMHG (ref 40–60)
POC BE: 6 MMOL/L
POC SATURATED O2: 51 % (ref 95–100)
POC TCO2: 32 MMOL/L (ref 24–29)
POLYCHROMASIA BLD QL SMEAR: ABNORMAL
POTASSIUM SERPL-SCNC: 3 MMOL/L (ref 3.5–5.1)
POTASSIUM SERPL-SCNC: 4 MMOL/L (ref 3.5–5.1)
POTASSIUM SERPL-SCNC: 4.2 MMOL/L (ref 3.5–5.1)
PROT SERPL-MCNC: 5.6 G/DL (ref 6–8.4)
RBC # BLD AUTO: 2.96 M/UL (ref 4.6–6.2)
SAMPLE: ABNORMAL
SITE: ABNORMAL
SODIUM SERPL-SCNC: 137 MMOL/L (ref 136–145)
WBC # BLD AUTO: 26.65 K/UL (ref 3.9–12.7)

## 2022-10-16 PROCEDURE — 85730 THROMBOPLASTIN TIME PARTIAL: CPT | Mod: 91 | Performed by: THORACIC SURGERY (CARDIOTHORACIC VASCULAR SURGERY)

## 2022-10-16 PROCEDURE — 84100 ASSAY OF PHOSPHORUS: CPT | Performed by: STUDENT IN AN ORGANIZED HEALTH CARE EDUCATION/TRAINING PROGRAM

## 2022-10-16 PROCEDURE — 85025 COMPLETE CBC W/AUTO DIFF WBC: CPT | Performed by: THORACIC SURGERY (CARDIOTHORACIC VASCULAR SURGERY)

## 2022-10-16 PROCEDURE — 80053 COMPREHEN METABOLIC PANEL: CPT

## 2022-10-16 PROCEDURE — 27000221 HC OXYGEN, UP TO 24 HOURS

## 2022-10-16 PROCEDURE — 99291 PR CRITICAL CARE, E/M 30-74 MINUTES: ICD-10-PCS | Mod: ,,, | Performed by: ANESTHESIOLOGY

## 2022-10-16 PROCEDURE — 87040 BLOOD CULTURE FOR BACTERIA: CPT | Mod: 59 | Performed by: STUDENT IN AN ORGANIZED HEALTH CARE EDUCATION/TRAINING PROGRAM

## 2022-10-16 PROCEDURE — 63600175 PHARM REV CODE 636 W HCPCS

## 2022-10-16 PROCEDURE — 63600175 PHARM REV CODE 636 W HCPCS: Performed by: STUDENT IN AN ORGANIZED HEALTH CARE EDUCATION/TRAINING PROGRAM

## 2022-10-16 PROCEDURE — 27000646 HC AEROBIKA DEVICE

## 2022-10-16 PROCEDURE — 25000003 PHARM REV CODE 250: Performed by: STUDENT IN AN ORGANIZED HEALTH CARE EDUCATION/TRAINING PROGRAM

## 2022-10-16 PROCEDURE — 94664 DEMO&/EVAL PT USE INHALER: CPT

## 2022-10-16 PROCEDURE — 94761 N-INVAS EAR/PLS OXIMETRY MLT: CPT

## 2022-10-16 PROCEDURE — 99900035 HC TECH TIME PER 15 MIN (STAT)

## 2022-10-16 PROCEDURE — 99291 CRITICAL CARE FIRST HOUR: CPT | Mod: ,,, | Performed by: ANESTHESIOLOGY

## 2022-10-16 PROCEDURE — 25000003 PHARM REV CODE 250

## 2022-10-16 PROCEDURE — 63600175 PHARM REV CODE 636 W HCPCS: Performed by: THORACIC SURGERY (CARDIOTHORACIC VASCULAR SURGERY)

## 2022-10-16 PROCEDURE — 83735 ASSAY OF MAGNESIUM: CPT | Performed by: STUDENT IN AN ORGANIZED HEALTH CARE EDUCATION/TRAINING PROGRAM

## 2022-10-16 PROCEDURE — 84132 ASSAY OF SERUM POTASSIUM: CPT | Mod: 91

## 2022-10-16 PROCEDURE — 82803 BLOOD GASES ANY COMBINATION: CPT

## 2022-10-16 PROCEDURE — 20000000 HC ICU ROOM

## 2022-10-16 PROCEDURE — 25000003 PHARM REV CODE 250: Performed by: THORACIC SURGERY (CARDIOTHORACIC VASCULAR SURGERY)

## 2022-10-16 RX ORDER — HEPARIN SODIUM 10000 [USP'U]/100ML
2000 INJECTION, SOLUTION INTRAVENOUS CONTINUOUS
Status: DISCONTINUED | OUTPATIENT
Start: 2022-10-16 | End: 2022-10-17

## 2022-10-16 RX ORDER — MILRINONE LACTATE 0.2 MG/ML
0.12 INJECTION, SOLUTION INTRAVENOUS CONTINUOUS
Status: DISCONTINUED | OUTPATIENT
Start: 2022-10-16 | End: 2022-10-17

## 2022-10-16 RX ORDER — HEPARIN SODIUM 10000 [USP'U]/100ML
1800 INJECTION, SOLUTION INTRAVENOUS CONTINUOUS
Status: DISCONTINUED | OUTPATIENT
Start: 2022-10-16 | End: 2022-10-16

## 2022-10-16 RX ORDER — POTASSIUM CHLORIDE 20 MEQ/1
20 TABLET, EXTENDED RELEASE ORAL DAILY
Status: DISCONTINUED | OUTPATIENT
Start: 2022-10-16 | End: 2022-10-20

## 2022-10-16 RX ORDER — SPIRONOLACTONE 25 MG/1
25 TABLET ORAL DAILY
Status: DISCONTINUED | OUTPATIENT
Start: 2022-10-16 | End: 2022-10-19

## 2022-10-16 RX ORDER — TAMSULOSIN HYDROCHLORIDE 0.4 MG/1
0.4 CAPSULE ORAL DAILY
Status: DISCONTINUED | OUTPATIENT
Start: 2022-10-16 | End: 2022-10-25 | Stop reason: HOSPADM

## 2022-10-16 RX ADMIN — TAMSULOSIN HYDROCHLORIDE 0.4 MG: 0.4 CAPSULE ORAL at 08:10

## 2022-10-16 RX ADMIN — ONDANSETRON 4 MG: 2 INJECTION INTRAMUSCULAR; INTRAVENOUS at 06:10

## 2022-10-16 RX ADMIN — ACETAMINOPHEN 1000 MG: 500 TABLET ORAL at 09:10

## 2022-10-16 RX ADMIN — HEPARIN SODIUM 1800 UNITS/HR: 10000 INJECTION, SOLUTION INTRAVENOUS at 05:10

## 2022-10-16 RX ADMIN — CEFTRIAXONE 2 G: 2 INJECTION, SOLUTION INTRAVENOUS at 08:10

## 2022-10-16 RX ADMIN — FAMOTIDINE 20 MG: 20 TABLET ORAL at 09:10

## 2022-10-16 RX ADMIN — AMIODARONE HYDROCHLORIDE 200 MG: 200 TABLET ORAL at 08:10

## 2022-10-16 RX ADMIN — HYDROMORPHONE HYDROCHLORIDE 0.5 MG: 1 INJECTION, SOLUTION INTRAMUSCULAR; INTRAVENOUS; SUBCUTANEOUS at 09:10

## 2022-10-16 RX ADMIN — HEPARIN SODIUM 2000 UNITS/HR: 10000 INJECTION, SOLUTION INTRAVENOUS at 07:10

## 2022-10-16 RX ADMIN — OXYCODONE 5 MG: 5 TABLET ORAL at 05:10

## 2022-10-16 RX ADMIN — AMIODARONE HYDROCHLORIDE 200 MG: 200 TABLET ORAL at 09:10

## 2022-10-16 RX ADMIN — POTASSIUM CHLORIDE 20 MEQ: 200 INJECTION, SOLUTION INTRAVENOUS at 09:10

## 2022-10-16 RX ADMIN — MILRINONE LACTATE IN DEXTROSE 0.12 MCG/KG/MIN: 200 INJECTION, SOLUTION INTRAVENOUS at 03:10

## 2022-10-16 RX ADMIN — METOPROLOL SUCCINATE 12.5 MG: 25 TABLET, EXTENDED RELEASE ORAL at 08:10

## 2022-10-16 RX ADMIN — ASPIRIN 325 MG ORAL TABLET 325 MG: 325 PILL ORAL at 08:10

## 2022-10-16 RX ADMIN — POTASSIUM CHLORIDE 40 MEQ: 29.8 INJECTION, SOLUTION INTRAVENOUS at 04:10

## 2022-10-16 RX ADMIN — ACETAMINOPHEN 1000 MG: 500 TABLET ORAL at 05:10

## 2022-10-16 RX ADMIN — SENNOSIDES AND DOCUSATE SODIUM 1 TABLET: 50; 8.6 TABLET ORAL at 08:10

## 2022-10-16 RX ADMIN — SPIRONOLACTONE 25 MG: 25 TABLET, FILM COATED ORAL at 08:10

## 2022-10-16 RX ADMIN — FAMOTIDINE 20 MG: 20 TABLET ORAL at 08:10

## 2022-10-16 RX ADMIN — POTASSIUM CHLORIDE 20 MEQ: 20 TABLET, EXTENDED RELEASE ORAL at 08:10

## 2022-10-16 NOTE — PLAN OF CARE
OOBTC this AM. Patient tolerated well.      VSS. NSR this morning 90s. Maintaining MAP > 65 and SBP < 140. Sats 100% on 2L NC. SvO2 51% this AM.   CVP 8.     Neuro: PERRLA, oriented x 4, follows commands, moves all extremities.     Gtts:   Milrinone 0.25 mcg/kg/min   Heparin 1800 units/hr      Fierro in place with adequate UOP. See flowsheets.     BM x 2 using bedside commode.     Midsternal incision with continuous wound vac in place. 100 cc serosanguineous output this shift.      Skin:  Linen/gown/ changed. No new breakdown noted. Old impella site staples intact--dressing changed with gauze. Old chest tube sites dressing CDI.      All labs reviewed. Electrolytes replaced prn.

## 2022-10-16 NOTE — PLAN OF CARE
"SICU PLAN OF CARE NOTE    Dx: s/p MVr, TVr, MAZE, atrial appendage resection, and Impella     Goals of Care:  MAP >65, SBP <130    Vital Signs:  BP (!) 126/59   Pulse 91   Temp 98 °F (36.7 °C) (Oral)   Resp (!) 30   Ht 5' 9" (1.753 m)   Wt 85 kg (187 lb 6.3 oz)   SpO2 100%   BMI 27.67 kg/m²      Cardiac:  NSR     Resp:  SpO2 98% on 1L nasal cannula; no SOB reported, but patient using accessory muscles     Neuro:  AAO x4, Follows Commands, and Moves All Extremities     Gtts:  Milrinone .125 mcg/kg/min and Heparin 2000 u/hr     Urine Output:  Urinary Catheter 580 cc/shift     Drains:  Midsternal Wound Vac, total output 50 cc/shift     Diet:  Cardiac Diet w/ 1500 FR     Labs/Accuchecks:  hypokalemic - replaced     Skin:  All skin remains free from injury.  Patient moves independently for weight shifting in chair, ICU bed working correctly, waffle mattress inflated.     Shift Events:  Weaned Milrinone, removed a line, obtained blood cultures, and increased Heparin d/t subtherapeutic PTT.  See flowsheet for further assessment/details.  Sister and patient updated on current condition/plan of care, questions answered, and emotional support provided. MD updated on current condition, vitals, labs, and gtts.  No new orders received, will continue to monitor.   "

## 2022-10-16 NOTE — SUBJECTIVE & OBJECTIVE
Interval History/Significant Events: NAEON. UOP appropriate and picked back up. Remains on milrinone 0.25 this AM. Heparin paused briefly due to supra therapeutic level. Sternal culture with GNR growth.    Follow-up For: Procedure(s) (LRB):  VALVULOPLASTY,MITRAL VALVE (N/A)  REPAIR, TRICUSPID VALVE (N/A)  INSERTION, IMPELLA (N/A)  MEYER MAZE PROCEDURE (N/A)  EXCLUSION, LEFT ATRIAL APPENDAGE (N/A)    Post-Operative Day: 9 Days Post-Op    Objective:     Vital Signs (Most Recent):  Temp: 97.9 °F (36.6 °C) (10/16/22 0700)  Pulse: 99 (10/16/22 0600)  Resp: (!) 24 (10/16/22 0600)  BP: 119/83 (10/16/22 0600)  SpO2: 99 % (10/16/22 0600)   Vital Signs (24h Range):  Temp:  [97.5 °F (36.4 °C)-98 °F (36.7 °C)] 97.9 °F (36.6 °C)  Pulse:  [] 99  Resp:  [14-41] 24  SpO2:  [87 %-100 %] 99 %  BP: (113-159)/(57-83) 119/83  Arterial Line BP: ()/() 116/62     Weight: 80.7 kg (178 lb)  Body mass index is 26.29 kg/m².      Intake/Output Summary (Last 24 hours) at 10/16/2022 0800  Last data filed at 10/16/2022 0705  Gross per 24 hour   Intake 1892.86 ml   Output 1520 ml   Net 372.86 ml       Physical Exam  Vitals and nursing note reviewed.   Constitutional:       General: He is not in acute distress.     Appearance: He is ill-appearing.   Eyes:      Extraocular Movements: Extraocular movements intact.      Pupils: Pupils are equal, round, and reactive to light.   Cardiovascular:      Rate and Rhythm: Tachycardia present. Rhythm irregular.      Pulses: Normal pulses.   Pulmonary:      Effort: Pulmonary effort is normal. No respiratory distress.      Breath sounds: Decreased breath sounds present. No wheezing.   Abdominal:      General: Abdomen is flat. Bowel sounds are normal. There is no distension.      Palpations: Abdomen is soft.      Tenderness: There is no abdominal tenderness.   Genitourinary:     Comments: Fierro in place  Musculoskeletal:      Right lower leg: No edema.      Left lower leg: No edema.   Skin:      General: Skin is warm and dry.      Capillary Refill: Capillary refill takes less than 2 seconds.      Findings: Bruising present. No lesion or rash.   Neurological:      General: No focal deficit present.      Mental Status: He is lethargic.   Psychiatric:         Behavior: Behavior is cooperative.       Vents:  Vent Mode: Spont (10/12/22 1252)  Ventilator Initiated: Yes (10/11/22 0830)  Set Rate: 24 BPM (10/12/22 0916)  Vt Set: 470 mL (10/12/22 0916)  Pressure Support: 5 cmH20 (10/12/22 1252)  PEEP/CPAP: 5 cmH20 (10/12/22 1252)  Oxygen Concentration (%): 40 (10/12/22 1300)  Peak Airway Pressure: 11 cmH2O (10/12/22 1252)  Plateau Pressure: 20 cmH20 (10/12/22 1252)  Total Ve: 9.34 mL (10/12/22 1252)  Negative Inspiratory Force (cm H2O): -34 (10/12/22 1310)  F/VT Ratio<105 (RSBI): (!) 49.59 (10/12/22 0916)    Lines/Drains/Airways       Central Venous Catheter Line  Duration             Percutaneous Central Line Insertion/Assessment - Triple Lumen  10/12/22 1252 left subclavian 3 days              Drain  Duration                  Urethral Catheter 10/11/22 1600 Silicone 4 days              Arterial Line  Duration             Arterial Line 10/11/22 2300 Right Radial 4 days              Peripheral Intravenous Line  Duration                  Peripheral IV - Single Lumen 10/13/22 0000 20 G Left Forearm 3 days                    Significant Labs:    CBC/Anemia Profile:  Recent Labs   Lab 10/15/22  0411 10/16/22  0225   WBC 23.48* 26.65*   HGB 8.4* 8.8*   HCT 25.9* 26.3*    380   MCV 90 89   RDW 17.4* 17.6*        Chemistries:  Recent Labs   Lab 10/15/22  0411 10/15/22  1110 10/16/22  0225     --  137   K 3.1* 3.4* 3.0*     --  101   CO2 31*  --  29   BUN 41*  --  33*   CREATININE 1.1  --  0.8   CALCIUM 8.8  --  8.8   ALBUMIN 2.7*  --  2.6*   PROT 5.6*  --  5.6*   BILITOT 0.8  --  0.7   ALKPHOS 92  --  90   ALT 23  --  21   AST 21  --  22   MG 2.2  --  2.2   PHOS 3.2  --  2.3*       All pertinent labs  within the past 24 hours have been reviewed.    Significant Imaging:  I have reviewed and interpreted all pertinent imaging results/findings within the past 24 hours.

## 2022-10-16 NOTE — PLAN OF CARE
Plan of Care Note  Cardiothoracic Surgery    9 Days Post-Op s/p MVr, TVr, MAZE, impella    Specific issues: anticoagulation for possible LV thrombus, antibiotics, follow up blood cultures and wound cultures    Plan of care for patient was discussed with ICU staff including nurses, residents, and faculty and appropriate consulting services.    Will continue to monitor patient's hemodynamics, functionality, neuro status, fluid status and renal function, and labs and will adjust medications and fluids as necessary while monitoring appropriateness for de-escalation of support and monitoring and transport to stepdown unit.    Gin Saul MD, PGY-7  Cardiothoracic Surgery   [phone]

## 2022-10-16 NOTE — PROGRESS NOTES
[facility] - Surgical Intensive Care  Critical Care - Surgery  Progress Note    Patient Name: David Barrios  MRN: [id number]  Admission Date: 10/2/2022  Hospital Length of Stay: 14 days  Code Status: Full Code  Attending Provider: Mike Hedrick MD  Primary Care Provider: Breann Tavera MD   Principal Problem: Nonrheumatic mitral valve regurgitation    Subjective:     Hospital/ICU Course:  No notes on file    Interval History/Significant Events: NAEON. UOP appropriate and picked back up. Remains on milrinone 0.25 this AM. Heparin paused briefly due to supra therapeutic level. Sternal culture with GNR growth.    Follow-up For: Procedure(s) (LRB):  VALVULOPLASTY,MITRAL VALVE (N/A)  REPAIR, TRICUSPID VALVE (N/A)  INSERTION, IMPELLA (N/A)  MEYER MAZE PROCEDURE (N/A)  EXCLUSION, LEFT ATRIAL APPENDAGE (N/A)    Post-Operative Day: 9 Days Post-Op    Objective:     Vital Signs (Most Recent):  Temp: 97.9 °F (36.6 °C) (10/16/22 0700)  Pulse: 99 (10/16/22 0600)  Resp: (!) 24 (10/16/22 0600)  BP: 119/83 (10/16/22 0600)  SpO2: 99 % (10/16/22 0600)   Vital Signs (24h Range):  Temp:  [97.5 °F (36.4 °C)-98 °F (36.7 °C)] 97.9 °F (36.6 °C)  Pulse:  [] 99  Resp:  [14-41] 24  SpO2:  [87 %-100 %] 99 %  BP: (113-159)/(57-83) 119/83  Arterial Line BP: ()/() 116/62     Weight: 80.7 kg (178 lb)  Body mass index is 26.29 kg/m².      Intake/Output Summary (Last 24 hours) at 10/16/2022 0800  Last data filed at 10/16/2022 0705  Gross per 24 hour   Intake 1892.86 ml   Output 1520 ml   Net 372.86 ml       Physical Exam  Vitals and nursing note reviewed.   Constitutional:       General: He is not in acute distress.     Appearance: He is ill-appearing.   Eyes:      Extraocular Movements: Extraocular movements intact.      Pupils: Pupils are equal, round, and reactive to light.   Cardiovascular:      Rate and Rhythm: Tachycardia present. Rhythm irregular.      Pulses: Normal pulses.   Pulmonary:      Effort: Pulmonary effort is  normal. No respiratory distress.      Breath sounds: Decreased breath sounds present. No wheezing.   Abdominal:      General: Abdomen is flat. Bowel sounds are normal. There is no distension.      Palpations: Abdomen is soft.      Tenderness: There is no abdominal tenderness.   Genitourinary:     Comments: Fierro in place  Musculoskeletal:      Right lower leg: No edema.      Left lower leg: No edema.   Skin:     General: Skin is warm and dry.      Capillary Refill: Capillary refill takes less than 2 seconds.      Findings: Bruising present. No lesion or rash.   Neurological:      General: No focal deficit present.      Mental Status: He is lethargic.   Psychiatric:         Behavior: Behavior is cooperative.       Vents:  Vent Mode: Spont (10/12/22 1252)  Ventilator Initiated: Yes (10/11/22 0830)  Set Rate: 24 BPM (10/12/22 0916)  Vt Set: 470 mL (10/12/22 0916)  Pressure Support: 5 cmH20 (10/12/22 1252)  PEEP/CPAP: 5 cmH20 (10/12/22 1252)  Oxygen Concentration (%): 40 (10/12/22 1300)  Peak Airway Pressure: 11 cmH2O (10/12/22 1252)  Plateau Pressure: 20 cmH20 (10/12/22 1252)  Total Ve: 9.34 mL (10/12/22 1252)  Negative Inspiratory Force (cm H2O): -34 (10/12/22 1310)  F/VT Ratio<105 (RSBI): (!) 49.59 (10/12/22 0916)    Lines/Drains/Airways       Central Venous Catheter Line  Duration             Percutaneous Central Line Insertion/Assessment - Triple Lumen  10/12/22 1252 left subclavian 3 days              Drain  Duration                  Urethral Catheter 10/11/22 1600 Silicone 4 days              Arterial Line  Duration             Arterial Line 10/11/22 2300 Right Radial 4 days              Peripheral Intravenous Line  Duration                  Peripheral IV - Single Lumen 10/13/22 0000 20 G Left Forearm 3 days                    Significant Labs:    CBC/Anemia Profile:  Recent Labs   Lab 10/15/22  0411 10/16/22  0225   WBC 23.48* 26.65*   HGB 8.4* 8.8*   HCT 25.9* 26.3*    380   MCV 90 89   RDW 17.4* 17.6*         Chemistries:  Recent Labs   Lab 10/15/22  0411 10/15/22  1110 10/16/22  0225     --  137   K 3.1* 3.4* 3.0*     --  101   CO2 31*  --  29   BUN 41*  --  33*   CREATININE 1.1  --  0.8   CALCIUM 8.8  --  8.8   ALBUMIN 2.7*  --  2.6*   PROT 5.6*  --  5.6*   BILITOT 0.8  --  0.7   ALKPHOS 92  --  90   ALT 23  --  21   AST 21  --  22   MG 2.2  --  2.2   PHOS 3.2  --  2.3*       All pertinent labs within the past 24 hours have been reviewed.    Significant Imaging:  I have reviewed and interpreted all pertinent imaging results/findings within the past 24 hours.    Assessment/Plan:     * Nonrheumatic mitral valve regurgitation    Neuro/Psych:   -- Sedation: precedex prn for agitation   -- Pain: PRN Oxy + Dilaudid, Fentanyl pushes prn  -- Scheduled Tylenol             Cards:   -- S/P TV replacement, MV replacement, MAZE, Lt Atrial Appendage ligation and Impella placement on 10/7/22  -- Impella: Removed 10/14  -- Hemodynamically unstable post-op requiring high support and antiarrhythmics for frequent VTach runs, multiple cardioversions in transport from OR  -- Reopened POD#0 for high chest tube output - found to have slow persistent bleeding from the IVC and left atriotomy sites  -- weaned off epi and impella at P2 - plan to remove today.   -- Ventricular pacing wires. Back up at 40 BPM. - no pacing needs, will remove today  -- MAP > 65, Syst < 140  -- Aspirin 325mg  -- likely previously in septic shock with GNR in blood, resolved. Now off vasopressors.  -- SDS x3 days  -- In rate-controlled Afib intermittently, on PO amio  -- Due to multiple runs VT in setting of low EF, will need lifevest at discharge.  -- Weaning milrinone  -- Metoprolol succinate 12.5 QD  -- Spironolactone 25 mg QD     Pulm:   -- Goal O2 sat > 90%  -- ABG PRN  -- extubated to NC 10/12  -- Chest tubes removed 10/15      Renal:  -- Paused lasix due to good UOP  -- Keep lambert for strict I/O  -- Trend BUN/Cr      Recent Labs      10/14/22  0211 10/15/22  0411 10/16/22  0225   BUN 44* 41* 33*   CREATININE 1.3 1.1 0.8        FEN / GI:   -- Replace lytes as needed  -- Nutrition: NPO - SLP to see patient today  -- GI ppx: famotidine  -- Bowel reg: miralax  -- Scheduled potassium      ID:   -- Afebrile  -- WBC trended up - will continue to monitor, may be attributed to SDS  -- Viry-op ancef completed  -- serratia macescens bacteremia 10/12  - pan sensitive. Narrowed abx to ceftriaxone  -- Replaced central line  -- Trend repeat blood cultures  -- Sternal culture with GNR growth    Recent Labs     10/14/22  0211 10/15/22  0411 10/16/22  0225   WBC 16.50* 23.48* 26.65*        Heme/Onc:   -- s/p 2 units PRBC, s/p 1 unit PRBC 10/14  -- Daily CBC  -- 1000 mL Cell saver given back  -- Aspirin 325mg QD; hold for now in the setting of down-trending platelets  -- Impella: Systemic Heparin gtt with goal aPTT 40 - 60  -- Blood Products: 5 units pRBC, 2 FFP, 2 Platelets, 2 Cryoprecipitate (10/8)  -- Anticoagulation with heparin due to intracardiac thrombus, PTT goal 60-80    Recent Labs     10/16/22  0225   HGB 8.8*   HCT 26.3*   APTT 101.9*        Endo:   -- BG goal 140-180  -- Insulin gtt  -- Hgb A1c 5.5      PPx:   Feeding: Cardiac diet  Analgesia/Sedation: precedex/PRN Oxy + Dilaudid  Thromboembolic prevention: SCDs, Heparin gtt  HOB >30: Yes  Stress Ulcer ppx: famotidine  Glucose control: Critical care goal 140-180 g/dl, ISS     Lines/Drains/Airway: CVC left subbclavian, Fierro, L radial A-line (d/c today)      Dispo/Code Status/Palliative:   -- SICU / Full Code.           Critical secondary to Patient has a condition that poses threat to life and bodily function.     Critical care was time spent personally by me on the following activities: development of treatment plan with patient or surrogate and bedside caregivers, discussions with consultants, evaluation of patient's response to treatment, examination of patient, ordering and performing  treatments and interventions, ordering and review of laboratory studies, ordering and review of radiographic studies, pulse oximetry, re-evaluation of patient's condition.  This critical care time did not overlap with that of any other provider or involve time for any procedures.     Angel Luis Segovia MD  Critical Care - Surgery  [facility] - [facility] Int[facility]

## 2022-10-16 NOTE — ASSESSMENT & PLAN NOTE
  Neuro/Psych:   -- Sedation: precedex prn for agitation   -- Pain: PRN Oxy + Dilaudid, Fentanyl pushes prn  -- Scheduled Tylenol             Cards:   -- S/P TV replacement, MV replacement, MAZE, Lt Atrial Appendage ligation and Impella placement on 10/7/22  -- Impella: Removed 10/14  -- Hemodynamically unstable post-op requiring high support and antiarrhythmics for frequent VTach runs, multiple cardioversions in transport from OR  -- Reopened POD#0 for high chest tube output - found to have slow persistent bleeding from the IVC and left atriotomy sites  -- weaned off epi and impella at P2 - plan to remove today.   -- Ventricular pacing wires. Back up at 40 BPM. - no pacing needs, will remove today  -- MAP > 65, Syst < 140  -- Aspirin 325mg  -- likely previously in septic shock with GNR in blood, resolved. Now off vasopressors.  -- SDS x3 days  -- In rate-controlled Afib intermittently, on PO amio  -- Due to multiple runs VT in setting of low EF, will need lifevest at discharge.  -- Weaning milrinone  -- Metoprolol succinate 12.5 QD  -- Spironolactone 25 mg QD     Pulm:   -- Goal O2 sat > 90%  -- ABG PRN  -- extubated to NC 10/12  -- Chest tubes removed 10/15      Renal:  -- Paused lasix due to good UOP  -- Keep lambert for strict I/O  -- Trend BUN/Cr      Recent Labs     10/14/22  0211 10/15/22  0411 10/16/22  0225   BUN 44* 41* 33*   CREATININE 1.3 1.1 0.8        FEN / GI:   -- Replace lytes as needed  -- Nutrition: NPO - SLP to see patient today  -- GI ppx: famotidine  -- Bowel reg: miralax  -- Scheduled potassium      ID:   -- Afebrile  -- WBC trended up - will continue to monitor, may be attributed to SDS  -- Viry-op ancef completed  -- serratia macescens bacteremia 10/12  - pan sensitive. Narrowed abx to ceftriaxone  -- Replaced central line  -- Trend repeat blood cultures  -- Sternal culture with GNR growth    Recent Labs     10/14/22  0211 10/15/22  0411 10/16/22  0225   WBC 16.50* 23.48* 26.65*         Heme/Onc:   -- s/p 2 units PRBC, s/p 1 unit PRBC 10/14  -- Daily CBC  -- 1000 mL Cell saver given back  -- Aspirin 325mg QD; hold for now in the setting of down-trending platelets  -- Impella: Systemic Heparin gtt with goal aPTT 40 - 60  -- Blood Products: 5 units pRBC, 2 FFP, 2 Platelets, 2 Cryoprecipitate (10/8)  -- Anticoagulation with heparin due to intracardiac thrombus, PTT goal 60-80    Recent Labs     10/16/22  0225   HGB 8.8*   HCT 26.3*   APTT 101.9*        Endo:   -- BG goal 140-180  -- Insulin gtt  -- Hgb A1c 5.5      PPx:   Feeding: Cardiac diet  Analgesia/Sedation: precedex/PRN Oxy + Dilaudid  Thromboembolic prevention: SCDs, Heparin gtt  HOB >30: Yes  Stress Ulcer ppx: famotidine  Glucose control: Critical care goal 140-180 g/dl, ISS     Lines/Drains/Airway: CVC left subbclavian, Fierro, L radial A-line (d/c today)      Dispo/Code Status/Palliative:   -- SICU / Full Code.

## 2022-10-16 NOTE — NURSING
aPTT 101.9. Heparin gtt infusing at 2200 units/hr. MD notified. Orders received to pause heparin gtt for one hour and decrease heparin gtt to 1800 units/hr when restarting.

## 2022-10-17 LAB
ALBUMIN SERPL BCP-MCNC: 2.5 G/DL (ref 3.5–5.2)
ALP SERPL-CCNC: 91 U/L (ref 55–135)
ALT SERPL W/O P-5'-P-CCNC: 19 U/L (ref 10–44)
ANION GAP SERPL CALC-SCNC: 8 MMOL/L (ref 8–16)
ANISOCYTOSIS BLD QL SMEAR: SLIGHT
APTT BLDCRRT: 54.4 SEC (ref 21–32)
APTT BLDCRRT: 59.8 SEC (ref 21–32)
APTT BLDCRRT: 79.7 SEC (ref 21–32)
AST SERPL-CCNC: 21 U/L (ref 10–40)
BACTERIA SPEC AEROBE CULT: ABNORMAL
BASOPHILS # BLD AUTO: 0.04 K/UL (ref 0–0.2)
BASOPHILS NFR BLD: 0.1 % (ref 0–1.9)
BILIRUB SERPL-MCNC: 0.8 MG/DL (ref 0.1–1)
BUN SERPL-MCNC: 25 MG/DL (ref 8–23)
BURR CELLS BLD QL SMEAR: ABNORMAL
CALCIUM SERPL-MCNC: 8.6 MG/DL (ref 8.7–10.5)
CHLORIDE SERPL-SCNC: 99 MMOL/L (ref 95–110)
CO2 SERPL-SCNC: 28 MMOL/L (ref 23–29)
CREAT SERPL-MCNC: 0.7 MG/DL (ref 0.5–1.4)
DIFFERENTIAL METHOD: ABNORMAL
EOSINOPHIL # BLD AUTO: 0.1 K/UL (ref 0–0.5)
EOSINOPHIL NFR BLD: 0.4 % (ref 0–8)
ERYTHROCYTE [DISTWIDTH] IN BLOOD BY AUTOMATED COUNT: 17.7 % (ref 11.5–14.5)
EST. GFR  (NO RACE VARIABLE): >60 ML/MIN/1.73 M^2
FINAL PATHOLOGIC DIAGNOSIS: NORMAL
GLUCOSE SERPL-MCNC: 141 MG/DL (ref 70–110)
GROSS: NORMAL
HCT VFR BLD AUTO: 25.5 % (ref 40–54)
HGB BLD-MCNC: 8.2 G/DL (ref 14–18)
HYPOCHROMIA BLD QL SMEAR: ABNORMAL
IMM GRANULOCYTES # BLD AUTO: 0.99 K/UL (ref 0–0.04)
IMM GRANULOCYTES NFR BLD AUTO: 3.4 % (ref 0–0.5)
LACTATE SERPL-SCNC: 1.4 MMOL/L (ref 0.5–2.2)
LYMPHOCYTES # BLD AUTO: 1.2 K/UL (ref 1–4.8)
LYMPHOCYTES NFR BLD: 4.1 % (ref 18–48)
Lab: NORMAL
MAGNESIUM SERPL-MCNC: 2.2 MG/DL (ref 1.6–2.6)
MCH RBC QN AUTO: 29.4 PG (ref 27–31)
MCHC RBC AUTO-ENTMCNC: 32.2 G/DL (ref 32–36)
MCV RBC AUTO: 91 FL (ref 82–98)
MONOCYTES # BLD AUTO: 1.1 K/UL (ref 0.3–1)
MONOCYTES NFR BLD: 3.8 % (ref 4–15)
NEUTROPHILS # BLD AUTO: 25.3 K/UL (ref 1.8–7.7)
NEUTROPHILS NFR BLD: 88.2 % (ref 38–73)
NRBC BLD-RTO: 0 /100 WBC
OVALOCYTES BLD QL SMEAR: ABNORMAL
PHOSPHATE SERPL-MCNC: 2.8 MG/DL (ref 2.7–4.5)
PLATELET # BLD AUTO: 427 K/UL (ref 150–450)
PLATELET BLD QL SMEAR: ABNORMAL
PMV BLD AUTO: 10.8 FL (ref 9.2–12.9)
POIKILOCYTOSIS BLD QL SMEAR: SLIGHT
POLYCHROMASIA BLD QL SMEAR: ABNORMAL
POTASSIUM SERPL-SCNC: 3.6 MMOL/L (ref 3.5–5.1)
POTASSIUM SERPL-SCNC: 4.1 MMOL/L (ref 3.5–5.1)
PROT SERPL-MCNC: 5.6 G/DL (ref 6–8.4)
RBC # BLD AUTO: 2.79 M/UL (ref 4.6–6.2)
SODIUM SERPL-SCNC: 135 MMOL/L (ref 136–145)
SPHEROCYTES BLD QL SMEAR: ABNORMAL
WBC # BLD AUTO: 28.74 K/UL (ref 3.9–12.7)

## 2022-10-17 PROCEDURE — 63600175 PHARM REV CODE 636 W HCPCS

## 2022-10-17 PROCEDURE — 83735 ASSAY OF MAGNESIUM: CPT | Performed by: STUDENT IN AN ORGANIZED HEALTH CARE EDUCATION/TRAINING PROGRAM

## 2022-10-17 PROCEDURE — 99900035 HC TECH TIME PER 15 MIN (STAT)

## 2022-10-17 PROCEDURE — 27000646 HC AEROBIKA DEVICE

## 2022-10-17 PROCEDURE — 20000000 HC ICU ROOM

## 2022-10-17 PROCEDURE — 85025 COMPLETE CBC W/AUTO DIFF WBC: CPT | Performed by: THORACIC SURGERY (CARDIOTHORACIC VASCULAR SURGERY)

## 2022-10-17 PROCEDURE — 27000221 HC OXYGEN, UP TO 24 HOURS

## 2022-10-17 PROCEDURE — 97530 THERAPEUTIC ACTIVITIES: CPT

## 2022-10-17 PROCEDURE — 97116 GAIT TRAINING THERAPY: CPT

## 2022-10-17 PROCEDURE — 76937 US GUIDE VASCULAR ACCESS: CPT

## 2022-10-17 PROCEDURE — 99291 CRITICAL CARE FIRST HOUR: CPT | Mod: ,,, | Performed by: ANESTHESIOLOGY

## 2022-10-17 PROCEDURE — 97535 SELF CARE MNGMENT TRAINING: CPT

## 2022-10-17 PROCEDURE — 94799 UNLISTED PULMONARY SVC/PX: CPT

## 2022-10-17 PROCEDURE — 84100 ASSAY OF PHOSPHORUS: CPT | Performed by: STUDENT IN AN ORGANIZED HEALTH CARE EDUCATION/TRAINING PROGRAM

## 2022-10-17 PROCEDURE — 25000003 PHARM REV CODE 250

## 2022-10-17 PROCEDURE — 36573 INSJ PICC RS&I 5 YR+: CPT

## 2022-10-17 PROCEDURE — 80053 COMPREHEN METABOLIC PANEL: CPT

## 2022-10-17 PROCEDURE — 84132 ASSAY OF SERUM POTASSIUM: CPT

## 2022-10-17 PROCEDURE — 25000003 PHARM REV CODE 250: Performed by: THORACIC SURGERY (CARDIOTHORACIC VASCULAR SURGERY)

## 2022-10-17 PROCEDURE — C1751 CATH, INF, PER/CENT/MIDLINE: HCPCS

## 2022-10-17 PROCEDURE — 63600175 PHARM REV CODE 636 W HCPCS: Performed by: STUDENT IN AN ORGANIZED HEALTH CARE EDUCATION/TRAINING PROGRAM

## 2022-10-17 PROCEDURE — 99291 PR CRITICAL CARE, E/M 30-74 MINUTES: ICD-10-PCS | Mod: ,,, | Performed by: ANESTHESIOLOGY

## 2022-10-17 PROCEDURE — 94664 DEMO&/EVAL PT USE INHALER: CPT

## 2022-10-17 PROCEDURE — 94761 N-INVAS EAR/PLS OXIMETRY MLT: CPT

## 2022-10-17 PROCEDURE — 25000003 PHARM REV CODE 250: Performed by: STUDENT IN AN ORGANIZED HEALTH CARE EDUCATION/TRAINING PROGRAM

## 2022-10-17 PROCEDURE — 85730 THROMBOPLASTIN TIME PARTIAL: CPT | Mod: 91 | Performed by: THORACIC SURGERY (CARDIOTHORACIC VASCULAR SURGERY)

## 2022-10-17 PROCEDURE — 83605 ASSAY OF LACTIC ACID: CPT

## 2022-10-17 RX ORDER — FUROSEMIDE 40 MG/1
80 TABLET ORAL 2 TIMES DAILY
Status: DISCONTINUED | OUTPATIENT
Start: 2022-10-17 | End: 2022-10-18

## 2022-10-17 RX ORDER — HEPARIN SODIUM 10000 [USP'U]/100ML
2000 INJECTION, SOLUTION INTRAVENOUS CONTINUOUS
Status: DISCONTINUED | OUTPATIENT
Start: 2022-10-17 | End: 2022-10-18

## 2022-10-17 RX ORDER — FUROSEMIDE 40 MG/1
40 TABLET ORAL DAILY
Status: DISCONTINUED | OUTPATIENT
Start: 2022-10-17 | End: 2022-10-17

## 2022-10-17 RX ORDER — SODIUM CHLORIDE 0.9 % (FLUSH) 0.9 %
10 SYRINGE (ML) INJECTION EVERY 6 HOURS
Status: DISCONTINUED | OUTPATIENT
Start: 2022-10-17 | End: 2022-10-25 | Stop reason: HOSPADM

## 2022-10-17 RX ORDER — FUROSEMIDE 40 MG/1
40 TABLET ORAL 2 TIMES DAILY
Status: DISCONTINUED | OUTPATIENT
Start: 2022-10-17 | End: 2022-10-17

## 2022-10-17 RX ORDER — SODIUM CHLORIDE 0.9 % (FLUSH) 0.9 %
10 SYRINGE (ML) INJECTION
Status: DISCONTINUED | OUTPATIENT
Start: 2022-10-17 | End: 2022-10-25 | Stop reason: HOSPADM

## 2022-10-17 RX ORDER — HEPARIN SODIUM 10000 [USP'U]/100ML
1900 INJECTION, SOLUTION INTRAVENOUS CONTINUOUS
Status: DISCONTINUED | OUTPATIENT
Start: 2022-10-17 | End: 2022-10-17

## 2022-10-17 RX ORDER — FUROSEMIDE 10 MG/ML
80 INJECTION INTRAMUSCULAR; INTRAVENOUS ONCE
Status: COMPLETED | OUTPATIENT
Start: 2022-10-18 | End: 2022-10-17

## 2022-10-17 RX ORDER — HEPARIN SODIUM 10000 [USP'U]/100ML
1800 INJECTION, SOLUTION INTRAVENOUS CONTINUOUS
Status: DISCONTINUED | OUTPATIENT
Start: 2022-10-17 | End: 2022-10-17

## 2022-10-17 RX ORDER — FAMOTIDINE 20 MG/1
20 TABLET, FILM COATED ORAL 2 TIMES DAILY
Status: DISCONTINUED | OUTPATIENT
Start: 2022-10-17 | End: 2022-10-18

## 2022-10-17 RX ORDER — METOPROLOL SUCCINATE 25 MG/1
25 TABLET, EXTENDED RELEASE ORAL DAILY
Status: DISCONTINUED | OUTPATIENT
Start: 2022-10-17 | End: 2022-10-18

## 2022-10-17 RX ADMIN — OXYCODONE HYDROCHLORIDE 10 MG: 10 TABLET ORAL at 05:10

## 2022-10-17 RX ADMIN — ACETAMINOPHEN 1000 MG: 500 TABLET ORAL at 10:10

## 2022-10-17 RX ADMIN — OXYCODONE HYDROCHLORIDE 10 MG: 10 TABLET ORAL at 01:10

## 2022-10-17 RX ADMIN — SENNOSIDES AND DOCUSATE SODIUM 1 TABLET: 50; 8.6 TABLET ORAL at 09:10

## 2022-10-17 RX ADMIN — POTASSIUM CHLORIDE 20 MEQ: 20 TABLET, EXTENDED RELEASE ORAL at 09:10

## 2022-10-17 RX ADMIN — ASPIRIN 325 MG ORAL TABLET 325 MG: 325 PILL ORAL at 09:10

## 2022-10-17 RX ADMIN — CEFTRIAXONE 2 G: 2 INJECTION, SOLUTION INTRAVENOUS at 08:10

## 2022-10-17 RX ADMIN — FUROSEMIDE 40 MG: 40 TABLET ORAL at 10:10

## 2022-10-17 RX ADMIN — METOPROLOL SUCCINATE 25 MG: 25 TABLET, EXTENDED RELEASE ORAL at 09:10

## 2022-10-17 RX ADMIN — SPIRONOLACTONE 25 MG: 25 TABLET, FILM COATED ORAL at 09:10

## 2022-10-17 RX ADMIN — ACETAMINOPHEN 1000 MG: 500 TABLET ORAL at 01:10

## 2022-10-17 RX ADMIN — POTASSIUM CHLORIDE 20 MEQ: 200 INJECTION, SOLUTION INTRAVENOUS at 04:10

## 2022-10-17 RX ADMIN — FAMOTIDINE 20 MG: 20 TABLET ORAL at 08:10

## 2022-10-17 RX ADMIN — AMIODARONE HYDROCHLORIDE 200 MG: 200 TABLET ORAL at 09:10

## 2022-10-17 RX ADMIN — HYDROMORPHONE HYDROCHLORIDE 0.5 MG: 1 INJECTION, SOLUTION INTRAMUSCULAR; INTRAVENOUS; SUBCUTANEOUS at 08:10

## 2022-10-17 RX ADMIN — TAMSULOSIN HYDROCHLORIDE 0.4 MG: 0.4 CAPSULE ORAL at 09:10

## 2022-10-17 RX ADMIN — FUROSEMIDE 80 MG: 40 TABLET ORAL at 04:10

## 2022-10-17 RX ADMIN — AMIODARONE HYDROCHLORIDE 200 MG: 200 TABLET ORAL at 08:10

## 2022-10-17 RX ADMIN — FUROSEMIDE 80 MG: 10 INJECTION, SOLUTION INTRAMUSCULAR; INTRAVENOUS at 11:10

## 2022-10-17 RX ADMIN — HEPARIN SODIUM 1900 UNITS/HR: 10000 INJECTION, SOLUTION INTRAVENOUS at 10:10

## 2022-10-17 RX ADMIN — FAMOTIDINE 20 MG: 20 TABLET ORAL at 09:10

## 2022-10-17 RX ADMIN — OXYCODONE 5 MG: 5 TABLET ORAL at 08:10

## 2022-10-17 NOTE — PLAN OF CARE
"      SICU PLAN OF CARE NOTE    Dx: Nonrheumatic mitral valve regurgitation    Shift Events: low UOP, started lasix, PICC line placed    Goals of Care: increase UOP with lasix, up out of bed to chair, ambulate    Neuro: Follows Commands and Moves All Extremities    Vital Signs: /68   Pulse 97   Temp 98.3 °F (36.8 °C) (Oral)   Resp (!) 29   Ht 5' 9" (1.753 m)   Wt 85 kg (187 lb 6.3 oz)   SpO2 100%   BMI 27.67 kg/m²     Respiratory: Room Air    Diet: Cardiac Diet    Gtts: Heparin    Urine Output: Urinary Catheter 300 cc/shift    Drains:     Wound Vac midsternal at 125mm suction               Skin: substernal incision            Midsternal incision            Abd bruising      "

## 2022-10-17 NOTE — SUBJECTIVE & OBJECTIVE
Interval History/Significant Events: no acute events, pain managed will PO medication. Leukocytosis persists, afebrile. Hemodynamics stable off of milrinone.     Follow-up For: Procedure(s) (LRB):  VALVULOPLASTY,MITRAL VALVE (N/A)  REPAIR, TRICUSPID VALVE (N/A)  INSERTION, IMPELLA (N/A)  MEYER MAZE PROCEDURE (N/A)  EXCLUSION, LEFT ATRIAL APPENDAGE (N/A)    Post-Operative Day: 10 Days Post-Op    Objective:     Vital Signs (Most Recent):  Temp: 98.2 °F (36.8 °C) (10/17/22 0400)  Pulse: 97 (10/17/22 0600)  Resp: (!) 27 (10/17/22 0600)  BP: (!) 143/68 (10/17/22 0600)  SpO2: 99 % (10/17/22 0600)   Vital Signs (24h Range):  Temp:  [97.9 °F (36.6 °C)-98.7 °F (37.1 °C)] 98.2 °F (36.8 °C)  Pulse:  [] 97  Resp:  [17-48] 27  SpO2:  [92 %-100 %] 99 %  BP: (104-148)/(54-83) 143/68  Arterial Line BP: ()/(52-67) 95/54     Weight: 85 kg (187 lb 6.3 oz)  Body mass index is 27.67 kg/m².      Intake/Output Summary (Last 24 hours) at 10/17/2022 0631  Last data filed at 10/17/2022 0600  Gross per 24 hour   Intake 1639.2 ml   Output 1135 ml   Net 504.2 ml       Physical Exam  Vitals and nursing note reviewed.   Constitutional:       General: He is not in acute distress.  Eyes:      General: No scleral icterus.     Extraocular Movements: Extraocular movements intact.      Pupils: Pupils are equal, round, and reactive to light.   Cardiovascular:      Rate and Rhythm: Normal rate and regular rhythm.      Pulses: Normal pulses.      Heart sounds: No murmur heard.  Pulmonary:      Effort: Pulmonary effort is normal. No respiratory distress.      Breath sounds: Decreased breath sounds present.   Abdominal:      General: Abdomen is flat. Bowel sounds are normal. There is no distension.      Palpations: Abdomen is soft.      Tenderness: There is no abdominal tenderness.   Genitourinary:     Comments: Fierro in place  Musculoskeletal:      Right lower leg: Edema present.      Left lower leg: Edema present.   Skin:     General: Skin is  warm and dry.      Capillary Refill: Capillary refill takes less than 2 seconds.      Findings: Bruising present. No lesion.      Comments: MSI with wound vac- sanguinous drainage    Neurological:      General: No focal deficit present.      Mental Status: He is alert. Mental status is at baseline.       Vents:  Vent Mode: Spont (10/12/22 1252)  Ventilator Initiated: Yes (10/11/22 0830)  Set Rate: 24 BPM (10/12/22 0916)  Vt Set: 470 mL (10/12/22 0916)  Pressure Support: 5 cmH20 (10/12/22 1252)  PEEP/CPAP: 5 cmH20 (10/12/22 1252)  Oxygen Concentration (%): 40 (10/12/22 1300)  Peak Airway Pressure: 11 cmH2O (10/12/22 1252)  Plateau Pressure: 20 cmH20 (10/12/22 1252)  Total Ve: 9.34 mL (10/12/22 1252)  Negative Inspiratory Force (cm H2O): -34 (10/12/22 1310)  F/VT Ratio<105 (RSBI): (!) 49.59 (10/12/22 0916)    Lines/Drains/Airways       Central Venous Catheter Line  Duration             Percutaneous Central Line Insertion/Assessment - Triple Lumen  10/12/22 1252 left subclavian 4 days              Drain  Duration                  Urethral Catheter 10/11/22 1600 Silicone 5 days                    Significant Labs:    CBC/Anemia Profile:  Recent Labs   Lab 10/16/22  0225 10/17/22  0218   WBC 26.65* 28.74*   HGB 8.8* 8.2*   HCT 26.3* 25.5*    427   MCV 89 91   RDW 17.6* 17.7*        Chemistries:  Recent Labs   Lab 10/16/22  0225 10/16/22  1123 10/16/22  2017 10/17/22  0218     --   --  135*   K 3.0* 4.2 4.0 3.6     --   --  99   CO2 29  --   --  28   BUN 33*  --   --  25*   CREATININE 0.8  --   --  0.7   CALCIUM 8.8  --   --  8.6*   ALBUMIN 2.6*  --   --  2.5*   PROT 5.6*  --   --  5.6*   BILITOT 0.7  --   --  0.8   ALKPHOS 90  --   --  91   ALT 21  --   --  19   AST 22  --   --  21   MG 2.2  --   --  2.2   PHOS 2.3*  --   --  2.8       All pertinent labs within the past 24 hours have been reviewed.    Significant Imaging:  I have reviewed all pertinent imaging results/findings within the past 24  hours.

## 2022-10-17 NOTE — PT/OT/SLP PROGRESS
Occupational Therapy   Treatment    Name: David Barrios  MRN: [id number]  Admitting Diagnosis:  Nonrheumatic mitral valve regurgitation  10 Days Post-Op    Recommendations:     Discharge Recommendations: rehabilitation facility  Discharge Equipment Recommendations:   (TBD)  Barriers to discharge:  Decreased caregiver support, Inaccessible home environment    Assessment:     David Barrios is a 62 y.o. male with a medical diagnosis of Nonrheumatic mitral valve regurgitation.  He presents with improvement in activity tolerance. Pt limited this date by SOB and decreased endurance. He is progressing towards OT goals. Pt continues to present with Performance deficits affecting function are weakness, impaired endurance, impaired self care skills, impaired functional mobility, gait instability, impaired balance, decreased coordination, decreased safety awareness. Pt would benefit from skilled OT services in order to maximize independence with ADLs and facilitate safe discharge. Because of patient's significant decline from PLOF, patient would benefit from Inpatient Rehab to maximize return to PLOF. Pt is an excellent candidate for inpatient rehabilitation, as he has a qualifying diagnosis, displays good activity tolerance, has experienced decline from PLOF and is motivated to participate in therapy.       Rehab Prognosis:  Good; patient would benefit from acute skilled OT services to address these deficits and reach maximum level of function.       Plan:     Patient to be seen 5 x/week to address the above listed problems via self-care/home management, therapeutic activities, therapeutic exercises, neuromuscular re-education  Plan of Care Expires: 11/12/22  Plan of Care Reviewed with: patient    Subjective     Pain/Comfort:  Pain Rating 1: 6/10  Location - Orientation 1: generalized  Location 1: back  Pain Addressed 1: Reposition, Nurse notified  Pain Rating Post-Intervention 1: 6/10    Objective:     Communicated with: RN  and PT prior to session.  Patient found up in chair with telemetry, blood pressure cuff, pulse ox (continuous), peripheral IV, central line, lambert catheter, wound vac upon OT entry to room.    General Precautions: Standard, fall, sternal   Orthopedic Precautions:N/A   Braces: N/A  Respiratory Status: Room air     Occupational Performance:     Functional Mobility/Transfers:  Patient completed Sit <> Stand Transfer with minimum assistance  with  hand-held assist   Patient completed BSC Toilet Transfer Step Transfer technique with contact guard assistance with  hand-held assist  Functional Mobility: Pt ambulated bed<>bathroom with min A x2 and B HHA in order to maximize functional activity tolerance and standing balance required for engagement in occupations of choice.    Extended seated rest break on [facility] following standing grooming tasks    Activities of Daily Living:  Grooming: contact guard assistance to perform oral care standing at sink      [facility] 6 Click ADL: 17    Treatment & Education:  -Therapist provided facilitation and instruction of proper body mechanics, energy conservation, and fall prevention strategies during tasks listed above.  -Pt educated on role of OT, POC and goals for therapy  -Pt verbalized 0/3 sternal precautions initially, 2/3 at conclusion of session   -Pt educated on importance of OOB activities with staff member assistance and sitting OOB majority of the day.   -Pt verbalized understanding. Pt expressed no further concerns/questions  -Whiteboard updated   -Co-tx with PT performed due to need for education and assistance from two skilled therapy disciplines at pt's current functional level in the ICU    Patient left up in chair with all lines intact, call button in reach, and RN notified    GOALS:   Multidisciplinary Problems       Occupational Therapy Goals          Problem: Occupational Therapy    Goal Priority Disciplines Outcome Interventions   Occupational Therapy Goal     OT, PT/OT      Description: Goals to be met by: 10/20/22     Patient will increase functional independence with ADLs by performing:    Grooming while seated with Supervision.  Toileting from bedside commode with Moderate Assistance for hygiene and clothing management.   Supine to sit with Supervision.  Toilet transfer to bedside commode with Moderate Assistance.                         Time Tracking:     OT Date of Treatment: 10/17/22  OT Start Time: 1118  OT Stop Time: 1144  OT Total Time (min): 26 min    Billable Minutes:Self Care/Home Management 26    OT/DUONG: OT          10/17/2022

## 2022-10-17 NOTE — PLAN OF CARE
OOBTC this AM. Patient tolerated well.      VSS. NSR this morning 90s. Maintaining MAP > 65 and SBP < 140. Sats 100% on room air.     CVP 10.     Neuro: PERRLA, oriented x 4, follows commands, moves all extremities.     Gtts:   Milrinone 0.125 mcg/kg/min   Heparin 1800 units/hr      Fierro in place with adequate UOP. See flowsheets.      Midsternal incision with continuous wound vac in place. Serosanguineous output.     Skin:  Linen changed. No new breakdown noted. Old impella site with gauze dressing CDI. Old chest tube sites dressing CDI.      All labs reviewed. Electrolytes replaced prn.

## 2022-10-17 NOTE — PLAN OF CARE
10/17/22 1322   Post-Acute Status   Post-Acute Authorization Placement   Post-Acute Placement Status Referrals Sent     The patient's MD informed the SW and CM that the patient is in agreement with d/c placement. The SW faxed a referral for LTACH placement since the patient has a wound vac and IV abx to [facility] for review.      The SW will continue to follow.       Saturnino Reyes LMSW  Case Management [facility]  Ext: [phone]

## 2022-10-17 NOTE — PROGRESS NOTES
[facility] - [facility]  Critical Care - Surgery  Progress Note    Patient Name: David Barrios  MRN: [id number]  Admission Date: 10/2/2022  Hospital Length of Stay: 15 days  Code Status: Full Code  Attending Provider: Mike Hedrick MD  Primary Care Provider: Breann Tavera MD   Principal Problem: Nonrheumatic mitral valve regurgitation    Subjective:     Hospital/ICU Course:  No notes on file    Interval History/Significant Events: no acute events, pain managed will PO medication. Leukocytosis persists, afebrile. Hemodynamics stable off of milrinone.     Follow-up For: Procedure(s) (LRB):  VALVULOPLASTY,MITRAL VALVE (N/A)  REPAIR, TRICUSPID VALVE (N/A)  INSERTION, IMPELLA (N/A)  MEYER MAZE PROCEDURE (N/A)  EXCLUSION, LEFT ATRIAL APPENDAGE (N/A)    Post-Operative Day: 10 Days Post-Op    Objective:     Vital Signs (Most Recent):  Temp: 98.2 °F (36.8 °C) (10/17/22 0400)  Pulse: 97 (10/17/22 0600)  Resp: (!) 27 (10/17/22 0600)  BP: (!) 143/68 (10/17/22 0600)  SpO2: 99 % (10/17/22 0600)   Vital Signs (24h Range):  Temp:  [97.9 °F (36.6 °C)-98.7 °F (37.1 °C)] 98.2 °F (36.8 °C)  Pulse:  [] 97  Resp:  [17-48] 27  SpO2:  [92 %-100 %] 99 %  BP: (104-148)/(54-83) 143/68  Arterial Line BP: ()/(52-67) 95/54     Weight: 85 kg (187 lb 6.3 oz)  Body mass index is 27.67 kg/m².      Intake/Output Summary (Last 24 hours) at 10/17/2022 0631  Last data filed at 10/17/2022 0600  Gross per 24 hour   Intake 1639.2 ml   Output 1135 ml   Net 504.2 ml       Physical Exam  Vitals and nursing note reviewed.   Constitutional:       General: He is not in acute distress.  Eyes:      General: No scleral icterus.     Extraocular Movements: Extraocular movements intact.      Pupils: Pupils are equal, round, and reactive to light.   Cardiovascular:      Rate and Rhythm: Normal rate and regular rhythm.      Pulses: Normal pulses.      Heart sounds: No murmur heard.  Pulmonary:      Effort: Pulmonary effort is normal. No  respiratory distress.      Breath sounds: Decreased breath sounds present.   Abdominal:      General: Abdomen is flat. Bowel sounds are normal. There is no distension.      Palpations: Abdomen is soft.      Tenderness: There is no abdominal tenderness.   Genitourinary:     Comments: Fierro in place  Musculoskeletal:      Right lower leg: Edema present.      Left lower leg: Edema present.   Skin:     General: Skin is warm and dry.      Capillary Refill: Capillary refill takes less than 2 seconds.      Findings: Bruising present. No lesion.      Comments: MSI with wound vac- sanguinous drainage    Neurological:      General: No focal deficit present.      Mental Status: He is alert. Mental status is at baseline.       Vents:  Vent Mode: Spont (10/12/22 1252)  Ventilator Initiated: Yes (10/11/22 0830)  Set Rate: 24 BPM (10/12/22 0916)  Vt Set: 470 mL (10/12/22 0916)  Pressure Support: 5 cmH20 (10/12/22 1252)  PEEP/CPAP: 5 cmH20 (10/12/22 1252)  Oxygen Concentration (%): 40 (10/12/22 1300)  Peak Airway Pressure: 11 cmH2O (10/12/22 1252)  Plateau Pressure: 20 cmH20 (10/12/22 1252)  Total Ve: 9.34 mL (10/12/22 1252)  Negative Inspiratory Force (cm H2O): -34 (10/12/22 1310)  F/VT Ratio<105 (RSBI): (!) 49.59 (10/12/22 0916)    Lines/Drains/Airways       Central Venous Catheter Line  Duration             Percutaneous Central Line Insertion/Assessment - Triple Lumen  10/12/22 1252 left subclavian 4 days              Drain  Duration                  Urethral Catheter 10/11/22 1600 Silicone 5 days                    Significant Labs:    CBC/Anemia Profile:  Recent Labs   Lab 10/16/22  0225 10/17/22  0218   WBC 26.65* 28.74*   HGB 8.8* 8.2*   HCT 26.3* 25.5*    427   MCV 89 91   RDW 17.6* 17.7*        Chemistries:  Recent Labs   Lab 10/16/22  0225 10/16/22  1123 10/16/22  2017 10/17/22  0218     --   --  135*   K 3.0* 4.2 4.0 3.6     --   --  99   CO2 29  --   --  28   BUN 33*  --   --  25*   CREATININE 0.8  --    --  0.7   CALCIUM 8.8  --   --  8.6*   ALBUMIN 2.6*  --   --  2.5*   PROT 5.6*  --   --  5.6*   BILITOT 0.7  --   --  0.8   ALKPHOS 90  --   --  91   ALT 21  --   --  19   AST 22  --   --  21   MG 2.2  --   --  2.2   PHOS 2.3*  --   --  2.8       All pertinent labs within the past 24 hours have been reviewed.    Significant Imaging:  I have reviewed all pertinent imaging results/findings within the past 24 hours.    Assessment/Plan:     * Nonrheumatic mitral valve regurgitation    Neuro/Psych:   -- Sedation: none  -- Pain: PRN Oxy + Dilaudid  -- Scheduled Tylenol             Cards:   -- S/P TV replacement, MV replacement, MAZE, Lt Atrial Appendage ligation and Impella placement on 10/7/22  -- Impella: Removed 10/14  -- Hemodynamically unstable post-op requiring high support and antiarrhythmics for frequent VTach runs, multiple cardioversions in transport from OR  -- Reopened POD#0 for high chest tube output - found to have slow persistent bleeding from the IVC and left atriotomy sites  -- MAP > 65, Syst < 140  -- Aspirin 325mg  -- In rate-controlled Afib intermittently, on PO amio and Toprol XL  -- Due to multiple runs VT in setting of low EF, will need lifevest at discharge.  -- milrinone off   -- TTE 10/14: EF 30% with possible thrombi.   -- Heparin gtt   -- GDMT:  Metoprolol succinate 12.5 QD, Spironolactone 25 mg QD     Pulm:   -- Goal O2 sat > 90%  -- extubated to NC 10/12  -- Chest tubes removed 10/15  -- PEP therapy ordered      Renal:  -- transitioned to PO lasix 40mg QD  -- Keep lambert for strict I/O  -- Trend BUN/Cr      Recent Labs     10/15/22  0411 10/16/22  0225 10/17/22  0218   BUN 41* 33* 25*   CREATININE 1.1 0.8 0.7        FEN / GI:   -- Replace lytes as needed  -- Nutrition: cardiac diet   -- GI ppx: not indicated   -- Bowel reg: miralax  -- Scheduled potassium      ID:   -- Afebrile  -- persistent leukocytosis, likely non infectious. Blood cx cleared since 10/14 on appropriate abx.  Steroidal vs leukemoid reaction   -- Viry-op ancef completed  -- serratia macescens bacteremia 10/12  - pan sensitive. Narrowed abx to ceftriaxone  -- Replaced central line  -- Trend repeat blood cultures  -- Sternal culture with GNR growth    Recent Labs     10/15/22  0411 10/16/22  0225 10/17/22  0218   WBC 23.48* 26.65* 28.74*        Heme/Onc:   -- s/p 2 units PRBC, s/p 1 unit PRBC 10/14  -- Daily CBC  -- 1000 mL Cell saver given back  -- Aspirin 325mg QD;   -- Blood Products: 5 units pRBC, 2 FFP, 2 Platelets, 2 Cryoprecipitate (10/8)  -- Anticoagulation with heparin due to intracardiac thrombus, PTT goal 60-80    Recent Labs     10/17/22  0218   HGB 8.2*   HCT 25.5*   APTT 79.7*        Endo:   -- BG goal 140-180  -- Insulin gtt  -- Hgb A1c 5.5      PPx:   Feeding: Cardiac diet  Analgesia/Sedation: precedex/PRN Oxy + Dilaudid  Thromboembolic prevention: SCDs, Heparin gtt  HOB >30: Yes  Stress Ulcer ppx: not indicated   Glucose control: Critical care goal 140-180 g/dl, ISS     Lines/Drains/Airway: PICC       Dispo/Code Status/Palliative:   -- SICU / Full Code.                Leah Lay NP  Critical Care - Surgery  [facility]

## 2022-10-17 NOTE — PROCEDURES
"David Barrios is a 62 y.o. male patient.    Temp: 98.3 °F (36.8 °C) (10/17/22 1600)  Pulse: 91 (10/17/22 1615)  Resp: (!) 27 (10/17/22 1615)  BP: 109/61 (10/17/22 1615)  SpO2: 98 % (10/17/22 1615)  Weight: 85 kg (187 lb 6.3 oz) (10/16/22 1700)  Height: 5' 9" (175.3 cm) (10/14/22 1415)    PICC  Date/Time: 10/17/2022 5:04 PM  Performed by: Efren Al RN  Assisting provider: Vishal Jerry RN  Post-operative diagnosis: PICC  Consent Done: Yes  Time out: Immediately prior to procedure a time out was called to verify the correct patient, procedure, equipment, support staff and site/side marked as required  Indications: med administration and vascular access  Anesthesia: local infiltration  Local anesthetic: lidocaine 1% without epinephrine  Anesthetic Total (mL): 3  Description of findings: PICC  Preparation: skin prepped with ChloraPrep  Skin prep agent dried: skin prep agent completely dried prior to procedure  Sterile barriers: all five maximum sterile barriers used - cap, mask, sterile gown, sterile gloves, and large sterile sheet  Hand hygiene: hand hygiene performed prior to central venous catheter insertion  Location details: right brachial  Catheter type: double lumen  Catheter size: 5 Fr  Catheter Length: 39cm    Ultrasound guidance: yes  Vessel Caliber: medium and patent, compressibility normal  Needle advanced into vessel with real time Ultrasound guidance.  Guidewire confirmed in vessel.  Image recorded and saved.  Sterile sheath used.  Number of attempts: 1  Post-procedure: blood return through all ports and sterile dressing applied  Technical procedures used: 3CG  Specimens: No  Implants: No  Assessment: placement verified by x-ray  Complications: none        Name   10/17/2022    "

## 2022-10-17 NOTE — PLAN OF CARE
DAGOBERTO met with the patient and his sister to discuss discharge placement patient has a wound vac and IV antibiotics CM explained that he is a candidate for LTAC and at this time because he lives alone and has no family support it is safer if he discharges to LTAC Patient and his sister are amenable to LTAC placement and the patients sister is going to tour [facility]   CM and SW answered all questions and the patient and his sister stated that they understand   CM will follow

## 2022-10-17 NOTE — PT/OT/SLP PROGRESS
Physical Therapy Co-Treatment    Patient Name:  David Barrios   MRN:  [id number]    Co-treatment performed for this visit due to patient need for two skilled therapists to ensure patient and staff safety and to accommodate for patient activity tolerance/pain management   Recommendations:     Discharge Recommendations:  rehabilitation facility   Discharge Equipment Recommendations:  (TBD)   Barriers to discharge: Increased level of assist, Inaccessible home, and Decreased caregiver support    Assessment:     David Barrios is a 62 y.o. male admitted with a medical diagnosis of Nonrheumatic mitral valve regurgitation. Patient demonstrates improved activity tolerance as demonstrated by  increased gait distance however limited by shortness of breath requiring extended seated rest break between bouts of activity. Patient demonstrates gait instability that make them a high fall risk and unsafe to d/c home at this time.      He presents with the following impairments/functional limitations: weakness, impaired endurance, impaired self care skills, impaired functional mobility, impaired cognition, decreased safety awareness, pain, decreased coordination, decreased upper extremity function, decreased lower extremity function, impaired coordination, impaired cardiopulmonary response to activity, impaired fine motor, gait instability, impaired balance. Once medically stable, recommending pt discharge to rehabilitation facility.    Rehab Prognosis: Good; patient continues to benefit from acute skilled PT services to address these deficits and reach maximum level of function.  Recent Surgery: Procedure(s) (LRB):  VALVULOPLASTY,MITRAL VALVE (N/A)  REPAIR, TRICUSPID VALVE (N/A)  INSERTION, IMPELLA (N/A)  MEYER MAZE PROCEDURE (N/A)  EXCLUSION, LEFT ATRIAL APPENDAGE (N/A) 10 Days Post-Op    Plan:     During this hospitalization, patient to be seen 5 x/week to address the identified rehab impairments via gait training, therapeutic  "activities, therapeutic exercises, neuromuscular re-education and progress toward the following goals:    Plan of Care Expires:  11/13/22    Subjective     Chief Complaint: Back pain  Patient/Family Comments/Goals: "I haven't brushed my teeth today"  Pain/Comfort:  Pain Rating 1: 6/10  Location - Orientation 1: generalized  Location 1: back  Pain Addressed 1: Reposition, Nurse notified  Pain Rating Post-Intervention 1: 6/10    Objective:     Communicated with RN prior to session. Patient found up in chair with telemetry, blood pressure cuff, pulse ox (continuous), peripheral IV, central line, lambert catheter, wound vac upon PT entry to room.     General Precautions: Standard, fall, sternal   Orthopedic Precautions:N/A   Braces: N/A    Functional Mobility:  Bed Mobility:     Seated in chair at start of session and returned to chair  Transfers:     Sit to Stand: contact guard assistance from bedside commode, minimum assistance from bedside chair with no AD, cues for hand placement  Gait: Patient ambulated 2x10 ft with hand-held assist and minimum assistance of 2 persons. Patient demonstrates unsteady gait, decreased step length, narrow base of support, and decreased goldy. Cuing for increased step size and increased RADHA.   Balance:   Static Sitting: Good, able to maintain for 6 minute(s) with stand by assistance  Dynamic Sitting: not assessed this visit  Static Standing: Fair, able to maintain for 2 minute(s) with contact guard assistance  Dynamic Standing: Fair: Patient accepts minimal challenge, minimum assistance of 2 persons    AM-PAC 6 CLICK MOBILITY  Turning over in bed (including adjusting bedclothes, sheets and blankets)?: 2  Sitting down on and standing up from a chair with arms (e.g., wheelchair, bedside commode, etc.): 3  Moving from lying on back to sitting on the side of the bed?: 3  Moving to and from a bed to a chair (including a wheelchair)?: 3  Need to walk in hospital room?: 3  Climbing 3-5 steps " with a railing?: 1  Basic Mobility Total Score: 15     Therapeutic Activities and Exercises:  Patient educated on role of acute care PT and PT POC, safety while in hospital including calling nurse for mobility, and call light usage  Patient is clear to stand pivot transfer with RN/PCT, assist x1  Educated about pursed lip breathing technique and cued for use with mobility  Patient educated on Post-op sternal precautions, including no lifting > 5 lbs, pulling or pushing with BUEs. Patient able to voice 2/3 precautions after education.    Patient left up in chair with all lines intact, call button in reach, and RN notified.    GOALS:   Multidisciplinary Problems       Physical Therapy Goals          Problem: Physical Therapy    Goal Priority Disciplines Outcome Goal Variances Interventions   Physical Therapy Goal     PT, PT/OT Ongoing, Progressing     Description: Goals to be met by: 10/27/2022     Patient will increase functional independence with mobility by performin. Supine to sit with independence  2. Sit to supine with independence  3. Sit to stand transfer with stand by assistance  4. Bed to chair transfer with contact guard assistance using PRW as needed  5. Gait  x 100 feet with minimum assistance using PRW as needed  6. Ascend/descend 3 stair with no handrails minimum assistance using LRAD as needed  7. Lower extremity exercise program x10 reps per handout, with independence   8. Recall 3/3 sternal precautions                       Time Tracking:     PT Received On: 10/17/22  PT Start Time: 1119     PT Stop Time: 1142  PT Total Time (min): 23 min     Billable Minutes: Gait Training 15 min and Therapeutic Activity 8 min         PT/PTA: PT     PTA Visit Number: 0     10/17/2022    Co-treatment performed for this visit due to patient need for two skilled therapists to ensure patient and staff safety and to accommodate for patient activity tolerance/pain management

## 2022-10-17 NOTE — PLAN OF CARE
10/17/22 0948   Post-Acute Status   Post-Acute Authorization Placement   Post-Acute Placement Status Patient declined/refused     The SW met with the patient at bedside to follow-up regarding his d/c placement. The patient informed the SW that he did not want to go into a rehab facility. The SW discussed with the patient rehab placement in regards to continuity of care. The patient informed the SW that he was going to have friends come and check on him following his hospitalization. The SW placed a secure chat to the patient's NP regarding the patient's refusal for rehab placement.       The SW will continue to follow.     Saturnino Reyes LMSW  Case Management [facility]  Ext: [phone]

## 2022-10-17 NOTE — CONSULTS
Inpatient consult to Physical Medicine Rehab  Consult performed by: Jessica Corona NP  Consult ordered by: Leah Lay NP  Reason for consult: Assess rehab needs    Reviewed patient history and current admission.  Rehab team following.  Full consult to follow.    HARI Mckenzie, FNP-C  Physical Medicine & Rehabilitation   10/17/2022

## 2022-10-17 NOTE — CONSULTS
Double lumen PICC to RIGHT BRACHIAL vein.  39 cm in length, 34 cm arm circumference and 0 cm exposed.   Lot # [id number].

## 2022-10-17 NOTE — ASSESSMENT & PLAN NOTE
  Neuro/Psych:   -- Sedation: none  -- Pain: PRN Oxy + Dilaudid  -- Scheduled Tylenol             Cards:   -- S/P TV replacement, MV replacement, MAZE, Lt Atrial Appendage ligation and Impella placement on 10/7/22  -- Impella: Removed 10/14  -- Hemodynamically unstable post-op requiring high support and antiarrhythmics for frequent VTach runs, multiple cardioversions in transport from OR  -- Reopened POD#0 for high chest tube output - found to have slow persistent bleeding from the IVC and left atriotomy sites  -- MAP > 65, Syst < 140  -- Aspirin 325mg  -- In rate-controlled Afib intermittently, on PO amio and Toprol XL  -- Due to multiple runs VT in setting of low EF, will need lifevest at discharge.  -- milrinone off   -- TTE 10/14: EF 30% with possible thrombi.   -- Heparin gtt   -- GDMT:  Metoprolol succinate 12.5 QD, Spironolactone 25 mg QD     Pulm:   -- Goal O2 sat > 90%  -- extubated to NC 10/12  -- Chest tubes removed 10/15  -- PEP therapy ordered      Renal:  -- transitioned to PO lasix 40mg QD  -- Keep lambert for strict I/O  -- Trend BUN/Cr      Recent Labs     10/15/22  0411 10/16/22  0225 10/17/22  0218   BUN 41* 33* 25*   CREATININE 1.1 0.8 0.7        FEN / GI:   -- Replace lytes as needed  -- Nutrition: cardiac diet   -- GI ppx: not indicated   -- Bowel reg: miralax  -- Scheduled potassium      ID:   -- Afebrile  -- persistent leukocytosis, likely non infectious. Blood cx cleared since 10/14 on appropriate abx. Steroidal vs leukemoid reaction   -- Viry-op ancef completed  -- serratia macescens bacteremia 10/12  - pan sensitive. Narrowed abx to ceftriaxone  -- Replaced central line  -- Trend repeat blood cultures  -- Sternal culture with GNR growth    Recent Labs     10/15/22  0411 10/16/22  0225 10/17/22  0218   WBC 23.48* 26.65* 28.74*        Heme/Onc:   -- s/p 2 units PRBC, s/p 1 unit PRBC 10/14  -- Daily CBC  -- 1000 mL Cell saver given back  -- Aspirin 325mg QD;   -- Blood Products: 5 units  pRBC, 2 FFP, 2 Platelets, 2 Cryoprecipitate (10/8)  -- Anticoagulation with heparin due to intracardiac thrombus, PTT goal 60-80    Recent Labs     10/17/22  0218   HGB 8.2*   HCT 25.5*   APTT 79.7*        Endo:   -- BG goal 140-180  -- Insulin gtt  -- Hgb A1c 5.5      PPx:   Feeding: Cardiac diet  Analgesia/Sedation: precedex/PRN Oxy + Dilaudid  Thromboembolic prevention: SCDs, Heparin gtt  HOB >30: Yes  Stress Ulcer ppx: not indicated   Glucose control: Critical care goal 140-180 g/dl, ISS     Lines/Drains/Airway: PICC       Dispo/Code Status/Palliative:   -- SICU / Full Code.

## 2022-10-18 LAB
ALBUMIN SERPL BCP-MCNC: 2.6 G/DL (ref 3.5–5.2)
ALLENS TEST: ABNORMAL
ALP SERPL-CCNC: 108 U/L (ref 55–135)
ALT SERPL W/O P-5'-P-CCNC: 16 U/L (ref 10–44)
ANION GAP SERPL CALC-SCNC: 12 MMOL/L (ref 8–16)
ANION GAP SERPL CALC-SCNC: 9 MMOL/L (ref 8–16)
ANISOCYTOSIS BLD QL SMEAR: SLIGHT
APTT BLDCRRT: 52.1 SEC (ref 21–32)
APTT BLDCRRT: 72.9 SEC (ref 21–32)
AST SERPL-CCNC: 16 U/L (ref 10–40)
BASOPHILS # BLD AUTO: 0.06 K/UL (ref 0–0.2)
BASOPHILS NFR BLD: 0.2 % (ref 0–1.9)
BILIRUB SERPL-MCNC: 1 MG/DL (ref 0.1–1)
BUN SERPL-MCNC: 27 MG/DL (ref 8–23)
BUN SERPL-MCNC: 28 MG/DL (ref 8–23)
CALCIUM SERPL-MCNC: 8.8 MG/DL (ref 8.7–10.5)
CALCIUM SERPL-MCNC: 9 MG/DL (ref 8.7–10.5)
CHLORIDE SERPL-SCNC: 97 MMOL/L (ref 95–110)
CHLORIDE SERPL-SCNC: 99 MMOL/L (ref 95–110)
CO2 SERPL-SCNC: 24 MMOL/L (ref 23–29)
CO2 SERPL-SCNC: 25 MMOL/L (ref 23–29)
CREAT SERPL-MCNC: 0.9 MG/DL (ref 0.5–1.4)
CREAT SERPL-MCNC: 0.9 MG/DL (ref 0.5–1.4)
DELSYS: ABNORMAL
DIFFERENTIAL METHOD: ABNORMAL
EOSINOPHIL # BLD AUTO: 0.1 K/UL (ref 0–0.5)
EOSINOPHIL NFR BLD: 0.3 % (ref 0–8)
ERYTHROCYTE [DISTWIDTH] IN BLOOD BY AUTOMATED COUNT: 17.9 % (ref 11.5–14.5)
EST. GFR  (NO RACE VARIABLE): >60 ML/MIN/1.73 M^2
EST. GFR  (NO RACE VARIABLE): >60 ML/MIN/1.73 M^2
FLOW: 2
GLUCOSE SERPL-MCNC: 116 MG/DL (ref 70–110)
GLUCOSE SERPL-MCNC: 125 MG/DL (ref 70–110)
HCO3 UR-SCNC: 28.2 MMOL/L (ref 24–28)
HCT VFR BLD AUTO: 25.5 % (ref 40–54)
HCT VFR BLD CALC: 24 %PCV (ref 36–54)
HGB BLD-MCNC: 8.2 G/DL (ref 14–18)
HYPOCHROMIA BLD QL SMEAR: ABNORMAL
IMM GRANULOCYTES # BLD AUTO: 1.09 K/UL (ref 0–0.04)
IMM GRANULOCYTES NFR BLD AUTO: 3.5 % (ref 0–0.5)
LACTATE SERPL-SCNC: 1.2 MMOL/L (ref 0.5–2.2)
LYMPHOCYTES # BLD AUTO: 0.9 K/UL (ref 1–4.8)
LYMPHOCYTES NFR BLD: 3 % (ref 18–48)
MAGNESIUM SERPL-MCNC: 2.2 MG/DL (ref 1.6–2.6)
MCH RBC QN AUTO: 29.4 PG (ref 27–31)
MCHC RBC AUTO-ENTMCNC: 32.2 G/DL (ref 32–36)
MCV RBC AUTO: 91 FL (ref 82–98)
MODE: ABNORMAL
MONOCYTES # BLD AUTO: 1.7 K/UL (ref 0.3–1)
MONOCYTES NFR BLD: 5.4 % (ref 4–15)
NEUTROPHILS # BLD AUTO: 27.5 K/UL (ref 1.8–7.7)
NEUTROPHILS NFR BLD: 87.6 % (ref 38–73)
NRBC BLD-RTO: 1 /100 WBC
PCO2 BLDA: 45 MMHG (ref 35–45)
PH SMN: 7.41 [PH] (ref 7.35–7.45)
PHOSPHATE SERPL-MCNC: 3.4 MG/DL (ref 2.7–4.5)
PLATELET # BLD AUTO: 518 K/UL (ref 150–450)
PLATELET BLD QL SMEAR: ABNORMAL
PMV BLD AUTO: 10.6 FL (ref 9.2–12.9)
PO2 BLDA: 38 MMHG (ref 40–60)
POC BE: 4 MMOL/L
POC IONIZED CALCIUM: 1.18 MMOL/L (ref 1.06–1.42)
POC SATURATED O2: 72 % (ref 95–100)
POC TCO2: 30 MMOL/L (ref 24–29)
POIKILOCYTOSIS BLD QL SMEAR: SLIGHT
POLYCHROMASIA BLD QL SMEAR: ABNORMAL
POTASSIUM BLD-SCNC: 4.2 MMOL/L (ref 3.5–5.1)
POTASSIUM SERPL-SCNC: 3.9 MMOL/L (ref 3.5–5.1)
POTASSIUM SERPL-SCNC: 4.3 MMOL/L (ref 3.5–5.1)
PROT SERPL-MCNC: 6.2 G/DL (ref 6–8.4)
RBC # BLD AUTO: 2.79 M/UL (ref 4.6–6.2)
SAMPLE: ABNORMAL
SITE: ABNORMAL
SODIUM BLD-SCNC: 131 MMOL/L (ref 136–145)
SODIUM SERPL-SCNC: 133 MMOL/L (ref 136–145)
SODIUM SERPL-SCNC: 133 MMOL/L (ref 136–145)
SP02: 100
WBC # BLD AUTO: 31.39 K/UL (ref 3.9–12.7)

## 2022-10-18 PROCEDURE — 99291 PR CRITICAL CARE, E/M 30-74 MINUTES: ICD-10-PCS | Mod: ,,, | Performed by: ANESTHESIOLOGY

## 2022-10-18 PROCEDURE — 25000003 PHARM REV CODE 250: Performed by: ANESTHESIOLOGY

## 2022-10-18 PROCEDURE — 20000000 HC ICU ROOM

## 2022-10-18 PROCEDURE — 87040 BLOOD CULTURE FOR BACTERIA: CPT

## 2022-10-18 PROCEDURE — 25000003 PHARM REV CODE 250: Performed by: THORACIC SURGERY (CARDIOTHORACIC VASCULAR SURGERY)

## 2022-10-18 PROCEDURE — 63600175 PHARM REV CODE 636 W HCPCS: Performed by: STUDENT IN AN ORGANIZED HEALTH CARE EDUCATION/TRAINING PROGRAM

## 2022-10-18 PROCEDURE — A4216 STERILE WATER/SALINE, 10 ML: HCPCS | Performed by: THORACIC SURGERY (CARDIOTHORACIC VASCULAR SURGERY)

## 2022-10-18 PROCEDURE — 94761 N-INVAS EAR/PLS OXIMETRY MLT: CPT

## 2022-10-18 PROCEDURE — 80053 COMPREHEN METABOLIC PANEL: CPT

## 2022-10-18 PROCEDURE — 63600175 PHARM REV CODE 636 W HCPCS

## 2022-10-18 PROCEDURE — 83605 ASSAY OF LACTIC ACID: CPT

## 2022-10-18 PROCEDURE — 99900035 HC TECH TIME PER 15 MIN (STAT)

## 2022-10-18 PROCEDURE — 25000003 PHARM REV CODE 250: Performed by: STUDENT IN AN ORGANIZED HEALTH CARE EDUCATION/TRAINING PROGRAM

## 2022-10-18 PROCEDURE — 82803 BLOOD GASES ANY COMBINATION: CPT

## 2022-10-18 PROCEDURE — 25000003 PHARM REV CODE 250

## 2022-10-18 PROCEDURE — 63600175 PHARM REV CODE 636 W HCPCS: Performed by: THORACIC SURGERY (CARDIOTHORACIC VASCULAR SURGERY)

## 2022-10-18 PROCEDURE — 84100 ASSAY OF PHOSPHORUS: CPT | Performed by: STUDENT IN AN ORGANIZED HEALTH CARE EDUCATION/TRAINING PROGRAM

## 2022-10-18 PROCEDURE — 80048 BASIC METABOLIC PNL TOTAL CA: CPT | Mod: XB

## 2022-10-18 PROCEDURE — 85730 THROMBOPLASTIN TIME PARTIAL: CPT | Performed by: THORACIC SURGERY (CARDIOTHORACIC VASCULAR SURGERY)

## 2022-10-18 PROCEDURE — 83735 ASSAY OF MAGNESIUM: CPT | Performed by: STUDENT IN AN ORGANIZED HEALTH CARE EDUCATION/TRAINING PROGRAM

## 2022-10-18 PROCEDURE — 85025 COMPLETE CBC W/AUTO DIFF WBC: CPT | Performed by: THORACIC SURGERY (CARDIOTHORACIC VASCULAR SURGERY)

## 2022-10-18 PROCEDURE — 99291 CRITICAL CARE FIRST HOUR: CPT | Mod: ,,, | Performed by: ANESTHESIOLOGY

## 2022-10-18 RX ORDER — PHENAZOPYRIDINE HYDROCHLORIDE 100 MG/1
100 TABLET, FILM COATED ORAL
Status: DISCONTINUED | OUTPATIENT
Start: 2022-10-18 | End: 2022-10-18

## 2022-10-18 RX ORDER — BISACODYL 10 MG
10 SUPPOSITORY, RECTAL RECTAL ONCE
Status: COMPLETED | OUTPATIENT
Start: 2022-10-18 | End: 2022-10-18

## 2022-10-18 RX ORDER — PHENAZOPYRIDINE HYDROCHLORIDE 100 MG/1
100 TABLET, FILM COATED ORAL
Status: DISPENSED | OUTPATIENT
Start: 2022-10-18 | End: 2022-10-19

## 2022-10-18 RX ORDER — HEPARIN SODIUM 10000 [USP'U]/100ML
2200 INJECTION, SOLUTION INTRAVENOUS CONTINUOUS
Status: DISCONTINUED | OUTPATIENT
Start: 2022-10-18 | End: 2022-10-19

## 2022-10-18 RX ORDER — HEPARIN SODIUM 10000 [USP'U]/100ML
2100 INJECTION, SOLUTION INTRAVENOUS CONTINUOUS
Status: DISCONTINUED | OUTPATIENT
Start: 2022-10-18 | End: 2022-10-18

## 2022-10-18 RX ORDER — BISACODYL 10 MG
10 SUPPOSITORY, RECTAL RECTAL DAILY PRN
Status: DISCONTINUED | OUTPATIENT
Start: 2022-10-19 | End: 2022-10-25 | Stop reason: HOSPADM

## 2022-10-18 RX ORDER — FUROSEMIDE 10 MG/ML
80 INJECTION INTRAMUSCULAR; INTRAVENOUS 2 TIMES DAILY
Status: DISCONTINUED | OUTPATIENT
Start: 2022-10-18 | End: 2022-10-18

## 2022-10-18 RX ORDER — MILRINONE LACTATE 0.2 MG/ML
0.12 INJECTION, SOLUTION INTRAVENOUS CONTINUOUS
Status: DISCONTINUED | OUTPATIENT
Start: 2022-10-18 | End: 2022-10-25

## 2022-10-18 RX ORDER — DOBUTAMINE HYDROCHLORIDE 400 MG/100ML
2.5 INJECTION INTRAVENOUS CONTINUOUS
Status: DISCONTINUED | OUTPATIENT
Start: 2022-10-18 | End: 2022-10-19

## 2022-10-18 RX ORDER — FUROSEMIDE 10 MG/ML
80 INJECTION INTRAMUSCULAR; INTRAVENOUS 3 TIMES DAILY
Status: DISCONTINUED | OUTPATIENT
Start: 2022-10-18 | End: 2022-10-24

## 2022-10-18 RX ORDER — FUROSEMIDE 10 MG/ML
80 INJECTION INTRAMUSCULAR; INTRAVENOUS ONCE
Status: COMPLETED | OUTPATIENT
Start: 2022-10-18 | End: 2022-10-18

## 2022-10-18 RX ORDER — FAMOTIDINE 20 MG/1
20 TABLET, FILM COATED ORAL 2 TIMES DAILY
Status: DISCONTINUED | OUTPATIENT
Start: 2022-10-18 | End: 2022-10-25 | Stop reason: HOSPADM

## 2022-10-18 RX ADMIN — PHENAZOPYRIDINE HYDROCHLORIDE 100 MG: 100 TABLET ORAL at 04:10

## 2022-10-18 RX ADMIN — ACETAMINOPHEN 1000 MG: 500 TABLET ORAL at 01:10

## 2022-10-18 RX ADMIN — FUROSEMIDE 80 MG: 10 INJECTION, SOLUTION INTRAMUSCULAR; INTRAVENOUS at 08:10

## 2022-10-18 RX ADMIN — TAMSULOSIN HYDROCHLORIDE 0.4 MG: 0.4 CAPSULE ORAL at 08:10

## 2022-10-18 RX ADMIN — SPIRONOLACTONE 25 MG: 25 TABLET, FILM COATED ORAL at 08:10

## 2022-10-18 RX ADMIN — ACETAMINOPHEN 1000 MG: 500 TABLET ORAL at 09:10

## 2022-10-18 RX ADMIN — FUROSEMIDE 80 MG: 10 INJECTION, SOLUTION INTRAMUSCULAR; INTRAVENOUS at 06:10

## 2022-10-18 RX ADMIN — SENNOSIDES AND DOCUSATE SODIUM 1 TABLET: 50; 8.6 TABLET ORAL at 08:10

## 2022-10-18 RX ADMIN — HYDROMORPHONE HYDROCHLORIDE 0.5 MG: 1 INJECTION, SOLUTION INTRAMUSCULAR; INTRAVENOUS; SUBCUTANEOUS at 02:10

## 2022-10-18 RX ADMIN — HEPARIN SODIUM 2200 UNITS/HR: 10000 INJECTION, SOLUTION INTRAVENOUS at 11:10

## 2022-10-18 RX ADMIN — FAMOTIDINE 20 MG: 20 TABLET ORAL at 08:10

## 2022-10-18 RX ADMIN — ASPIRIN 325 MG ORAL TABLET 325 MG: 325 PILL ORAL at 08:10

## 2022-10-18 RX ADMIN — POTASSIUM CHLORIDE 20 MEQ: 200 INJECTION, SOLUTION INTRAVENOUS at 05:10

## 2022-10-18 RX ADMIN — OXYCODONE HYDROCHLORIDE 10 MG: 10 TABLET ORAL at 04:10

## 2022-10-18 RX ADMIN — FUROSEMIDE 80 MG: 10 INJECTION, SOLUTION INTRAMUSCULAR; INTRAVENOUS at 01:10

## 2022-10-18 RX ADMIN — MILRINONE LACTATE IN DEXTROSE 0.25 MCG/KG/MIN: 200 INJECTION, SOLUTION INTRAVENOUS at 08:10

## 2022-10-18 RX ADMIN — OXYCODONE HYDROCHLORIDE 10 MG: 10 TABLET ORAL at 07:10

## 2022-10-18 RX ADMIN — PHENAZOPYRIDINE HYDROCHLORIDE 100 MG: 100 TABLET ORAL at 11:10

## 2022-10-18 RX ADMIN — AMIODARONE HYDROCHLORIDE 200 MG: 200 TABLET ORAL at 08:10

## 2022-10-18 RX ADMIN — OXYCODONE HYDROCHLORIDE 10 MG: 10 TABLET ORAL at 01:10

## 2022-10-18 RX ADMIN — CHLOROTHIAZIDE SODIUM 500 MG: 500 INJECTION, POWDER, LYOPHILIZED, FOR SOLUTION INTRAVENOUS at 02:10

## 2022-10-18 RX ADMIN — DOBUTAMINE HYDROCHLORIDE 2.5 MCG/KG/MIN: 400 INJECTION INTRAVENOUS at 10:10

## 2022-10-18 RX ADMIN — POTASSIUM CHLORIDE 20 MEQ: 20 TABLET, EXTENDED RELEASE ORAL at 08:10

## 2022-10-18 RX ADMIN — Medication 10 ML: at 12:10

## 2022-10-18 RX ADMIN — Medication 10 ML: at 06:10

## 2022-10-18 RX ADMIN — CEFTRIAXONE 2 G: 2 INJECTION, SOLUTION INTRAVENOUS at 07:10

## 2022-10-18 RX ADMIN — BISACODYL 10 MG: 10 SUPPOSITORY RECTAL at 11:10

## 2022-10-18 NOTE — ASSESSMENT & PLAN NOTE
  Neuro/Psych:   -- Sedation: none  -- Pain: PRN Oxy + Dilaudid  -- Scheduled Tylenol             Cards:   -- S/P TV replacement, MV replacement, MAZE, Lt Atrial Appendage ligation and Impella placement on 10/7/22  -- Impella: Removed 10/14  -- Hemodynamically unstable post-op requiring high support and antiarrhythmics for frequent VTach runs, multiple cardioversions in transport from OR  -- Reopened POD#0 for high chest tube output - found to have slow persistent bleeding from the IVC and left atriotomy sites  -- MAP > 65, Syst < 140  -- Aspirin 325mg  -- In rate-controlled Afib intermittently, on PO amio and Toprol XL (uptitrating as clinically needed)   -- Due to multiple runs VT in setting of low EF, will need lifevest at discharge.  -- milrinone off, UOP decreased. IV lasix initiated for edematous exam.   -- TTE 10/14: EF 30% with possible thrombi. Heparin gtt   -- GDMT:  Metoprolol succinate 25 QD, Spironolactone 25 mg QD     Pulm:   -- Goal O2 sat > 90%  -- extubated to NC 10/12  -- Chest tubes removed 10/15  -- PEP therapy ordered      Renal:  -- Diuresis with IVP lasix   -- Keep lambert for strict I/O  -- Trend BUN/Cr      Recent Labs     10/16/22  0225 10/17/22  0218 10/18/22  0430   BUN 33* 25* 27*   CREATININE 0.8 0.7 0.9        FEN / GI:   -- Replace lytes as needed  -- Nutrition: cardiac diet   -- GI ppx: not indicated   -- Bowel reg: miralax  -- Scheduled potassium      ID:   -- Afebrile  -- persistent leukocytosis. Blood cx cleared since 10/14 on appropriate abx. Steroidal vs leukemoid reaction vs chimney graft infection. Up to 31 today.   -- Viry-op ancef completed  -- serratia macescens bacteremia 10/12  - pan sensitive. Narrowed abx to ceftriaxone  -- Replaced central line  -- Sternal culture with GNR growth, with complicated post-operative source, high risk to go back to OR for debridement. wound vac for now.     Recent Labs     10/16/22  0225 10/17/22  0218 10/18/22  0430   WBC 26.65* 28.74*  31.39*        Heme/Onc:   -- stable post-operative anemia   -- Daily CBC  -- Aspirin 325mg QD;   -- Anticoagulation with heparin due to intracardiac thrombus, PTT goal 60-80    Recent Labs     10/18/22  0430   HGB 8.2*   HCT 25.5*   APTT 52.1*        Endo:   -- BG goal 140-180  -- Insulin gtt  -- Hgb A1c 5.5      PPx:   Feeding: Cardiac diet  Analgesia/Sedation: precedex/PRN Oxy + Dilaudid  Thromboembolic prevention: SCDs, Heparin gtt  HOB >30: Yes  Stress Ulcer ppx: not indicated   Glucose control: Critical care goal 140-180 g/dl, ISS     Lines/Drains/Airway: PICC       Dispo/Code Status/Palliative:   -- SICU / Full Code.

## 2022-10-18 NOTE — ADDENDUM NOTE
Addendum  created 10/18/22 1110 by Eda Keller MD    Clinical Note Signed, Diagnosis association updated, Intraprocedure Blocks edited, Pend clinical note, SmartForm saved

## 2022-10-18 NOTE — PROGRESS NOTES
[facility]  Critical Care - Surgery  Progress Note    Patient Name: David Barrios  MRN: [id number]  Admission Date: 10/2/2022  Hospital Length of Stay: 16 days  Code Status: Full Code  Attending Provider: Mike Hedrick MD  Primary Care Provider: Breann Tavera MD   Principal Problem: Nonrheumatic mitral valve regurgitation    Subjective:     Hospital/ICU Course:  No notes on file    Interval History/Significant Events: poor diuresis with PO lasix, transitioned back to IV. Overtly edematous on exam. BP/HR well controlled on toprol. No complaints of pain this AM. Wound vac alarming occlusion - will plan to change dressing today.     Follow-up For: Procedure(s) (LRB):  VALVULOPLASTY,MITRAL VALVE (N/A)  REPAIR, TRICUSPID VALVE (N/A)  INSERTION, IMPELLA (N/A)  MEYER MAZE PROCEDURE (N/A)  EXCLUSION, LEFT ATRIAL APPENDAGE (N/A)    Post-Operative Day: 11 Days Post-Op    Objective:     Vital Signs (Most Recent):  Temp: 98.3 °F (36.8 °C) (10/18/22 0300)  Pulse: 88 (10/18/22 0545)  Resp: (!) 35 (10/18/22 0545)  BP: (!) 120/56 (10/18/22 0530)  SpO2: 100 % (10/18/22 0545)   Vital Signs (24h Range):  Temp:  [98.2 °F (36.8 °C)-98.5 °F (36.9 °C)] 98.3 °F (36.8 °C)  Pulse:  [] 88  Resp:  [16-48] 35  SpO2:  [84 %-100 %] 100 %  BP: ()/(50-78) 120/56     Weight: 85 kg (187 lb 6.3 oz)  Body mass index is 27.67 kg/m².      Intake/Output Summary (Last 24 hours) at 10/18/2022 0630  Last data filed at 10/18/2022 0600  Gross per 24 hour   Intake 1110.81 ml   Output 1050 ml   Net 60.81 ml       Physical Exam  Vitals and nursing note reviewed.   Constitutional:       General: He is not in acute distress.  Eyes:      General: No scleral icterus.     Extraocular Movements: Extraocular movements intact.      Pupils: Pupils are equal, round, and reactive to light.   Cardiovascular:      Rate and Rhythm: Normal rate and regular rhythm.      Pulses: Normal pulses.      Heart sounds: No murmur  heard.  Pulmonary:      Effort: Pulmonary effort is normal. No respiratory distress.      Breath sounds: Decreased breath sounds present.   Abdominal:      General: Abdomen is flat. Bowel sounds are normal. There is no distension.      Palpations: Abdomen is soft.      Tenderness: There is no abdominal tenderness.   Genitourinary:     Comments: Fierro in place  Musculoskeletal:      Right lower leg: Edema present.      Left lower leg: Edema present.   Skin:     General: Skin is warm and dry.      Capillary Refill: Capillary refill takes less than 2 seconds.      Findings: Bruising present. No lesion.      Comments: MSI with wound vac- sanguinous drainage    Neurological:      General: No focal deficit present.      Mental Status: He is alert. Mental status is at baseline.       Vents:  Vent Mode: Spont (10/12/22 1252)  Ventilator Initiated: Yes (10/11/22 0830)  Set Rate: 24 BPM (10/12/22 0916)  Vt Set: 470 mL (10/12/22 0916)  Pressure Support: 5 cmH20 (10/12/22 1252)  PEEP/CPAP: 5 cmH20 (10/12/22 1252)  Oxygen Concentration (%): 36 (10/17/22 2123)  Peak Airway Pressure: 11 cmH2O (10/12/22 1252)  Plateau Pressure: 20 cmH20 (10/12/22 1252)  Total Ve: 9.34 mL (10/12/22 1252)  Negative Inspiratory Force (cm H2O): -34 (10/12/22 1310)  F/VT Ratio<105 (RSBI): (!) 49.59 (10/12/22 0916)    Lines/Drains/Airways       Peripherally Inserted Central Catheter Line  Duration             PICC Double Lumen 10/17/22 1709 right brachial <1 day              Drain  Duration                  Urethral Catheter 10/11/22 1600 Silicone 6 days                    Significant Labs:    CBC/Anemia Profile:  Recent Labs   Lab 10/17/22  0218 10/18/22  0430   WBC 28.74* 31.39*   HGB 8.2* 8.2*   HCT 25.5* 25.5*    518*   MCV 91 91   RDW 17.7* 17.9*        Chemistries:  Recent Labs   Lab 10/17/22  0218 10/17/22  2008 10/18/22  0430   *  --  133*   K 3.6 4.1 3.9   CL 99  --  99   CO2 28  --  25   BUN 25*  --  27*   CREATININE 0.7  --  0.9    CALCIUM 8.6*  --  9.0   ALBUMIN 2.5*  --  2.6*   PROT 5.6*  --  6.2   BILITOT 0.8  --  1.0   ALKPHOS 91  --  108   ALT 19  --  16   AST 21  --  16   MG 2.2  --  2.2   PHOS 2.8  --  3.4       All pertinent labs within the past 24 hours have been reviewed.    Significant Imaging:  I have reviewed all pertinent imaging results/findings within the past 24 hours.    Assessment/Plan:     * Nonrheumatic mitral valve regurgitation    Neuro/Psych:   -- Sedation: none  -- Pain: PRN Oxy + Dilaudid  -- Scheduled Tylenol             Cards:   -- S/P TV replacement, MV replacement, MAZE, Lt Atrial Appendage ligation and Impella placement on 10/7/22  -- Impella: Removed 10/14  -- Hemodynamically unstable post-op requiring high support and antiarrhythmics for frequent VTach runs, multiple cardioversions in transport from OR  -- Reopened POD#0 for high chest tube output - found to have slow persistent bleeding from the IVC and left atriotomy sites  -- MAP > 65, Syst < 140  -- Aspirin 325mg  -- In rate-controlled Afib intermittently, on PO amio and Toprol XL (uptitrating as clinically needed)   -- Due to multiple runs VT in setting of low EF, will need lifevest at discharge.  -- milrinone off, UOP decreased. IV lasix initiated for edematous exam.   -- TTE 10/14: EF 30% with possible thrombi. Heparin gtt   -- GDMT:  Metoprolol succinate 25 QD, Spironolactone 25 mg QD     Pulm:   -- Goal O2 sat > 90%  -- extubated to NC 10/12  -- Chest tubes removed 10/15  -- PEP therapy ordered      Renal:  -- Diuresis with IVP lasix   -- Keep lambert for strict I/O  -- Trend BUN/Cr      Recent Labs     10/16/22  0225 10/17/22  0218 10/18/22  0430   BUN 33* 25* 27*   CREATININE 0.8 0.7 0.9        FEN / GI:   -- Replace lytes as needed  -- Nutrition: cardiac diet   -- GI ppx: not indicated   -- Bowel reg: miralax  -- Scheduled potassium      ID:   -- Afebrile  -- persistent leukocytosis. Blood cx cleared since 10/14 on appropriate abx. Steroidal  vs leukemoid reaction vs chimney graft infection. Up to 31 today.   -- Viry-op ancef completed  -- serratia macescens bacteremia 10/12  - pan sensitive. Narrowed abx to ceftriaxone  -- Replaced central line  -- Sternal culture with GNR growth, with complicated post-operative source, high risk to go back to OR for debridement. wound vac for now.     Recent Labs     10/16/22  0225 10/17/22  0218 10/18/22  0430   WBC 26.65* 28.74* 31.39*        Heme/Onc:   -- stable post-operative anemia   -- Daily CBC  -- Aspirin 325mg QD;   -- Anticoagulation with heparin due to intracardiac thrombus, PTT goal 60-80    Recent Labs     10/18/22  0430   HGB 8.2*   HCT 25.5*   APTT 52.1*        Endo:   -- BG goal 140-180  -- Insulin gtt  -- Hgb A1c 5.5      PPx:   Feeding: Cardiac diet  Analgesia/Sedation: precedex/PRN Oxy + Dilaudid  Thromboembolic prevention: SCDs, Heparin gtt  HOB >30: Yes  Stress Ulcer ppx: not indicated   Glucose control: Critical care goal 140-180 g/dl, ISS     Lines/Drains/Airway: PICC       Dispo/Code Status/Palliative:   -- SICU / Full Code.          Leah Lay NP  Critical Care - Surgery  [facility]

## 2022-10-18 NOTE — PROGRESS NOTES
"      SICU PLAN OF CARE NOTE    Dx: Nonrheumatic mitral valve regurgitation    Shift Events: All VSS this shift. Low urine output despite lasix pushes. Milrinone gtt turned back on. Fierro exchanged to prevent leaking. Woundvac exchanged due to blockage. Increased work of breathing this AM and when changing positions. LifeVest representative at bedside to fit pt to vest and educate on the purpose/importance of wearing the LifeVest.    Goals of Care: MAP >65. SBP <140.     Neuro: AAO x4, Follows Commands, and Moves All Extremities    Vital Signs: /63   Pulse 106   Temp 97.8 °F (36.6 °C) (Oral)   Resp 20   Ht 5' 9" (1.753 m)   Wt 85 kg (187 lb 6.3 oz)   SpO2 100%   BMI 27.67 kg/m²     Respiratory: Room Air. Nasal cannula when OOBTC and flat.     Diet: Cardiac Diet. 1500 FR.     Gtts: Milrinone and Heparin    Urine Output: Urinary Catheter  cc/hour    Wound Vac 125 mmHg. Exchanged today. See flowsheets for details.      Labs/Accuchecks: Daily aPTT. Daily Labs. Blood Cultures x2.    Skin: No new breakdown noted. Pt assisted with weight shifting. Specialty bed used.        "

## 2022-10-18 NOTE — PLAN OF CARE
80 mg Lasix IVP given overnight per Dr. Hedrick. UOP responded appropriately.     OOBTC this AM.      VSS. NSR this morning 90s. Maintaining MAP > 65 and SBP < 140. Sats 100% on 2 L nasal cannula.      Neuro: PERRLA, oriented x 4, follows commands, moves all extremities.     Gtts:   Heparin 2100 units/hr      Fierro in place with marginal UOP. See flowsheets.      Midsternal incision with continuous wound vac in place. Blockage detected. Tubing free of kinks and unclampled. MD aware and no new orders/interventions.      Skin: No new breakdown noted. Old impella site with gauze dressing CDI. Old chest tube sites dressing CDI.      All labs reviewed. Electrolytes replaced prn.

## 2022-10-18 NOTE — CARE UPDATE
Life vest ordered. Pt will have prior to discharge.       Elias Reid MD  Surgery, PGY-2  [phone]

## 2022-10-18 NOTE — SUBJECTIVE & OBJECTIVE
Interval History/Significant Events: poor diuresis with PO lasix, transitioned back to IV. Overtly edematous on exam. BP/HR well controlled on toprol. No complaints of pain this AM. Wound vac alarming occlusion - will plan to change dressing today.     Follow-up For: Procedure(s) (LRB):  VALVULOPLASTY,MITRAL VALVE (N/A)  REPAIR, TRICUSPID VALVE (N/A)  INSERTION, IMPELLA (N/A)  MEYER MAZE PROCEDURE (N/A)  EXCLUSION, LEFT ATRIAL APPENDAGE (N/A)    Post-Operative Day: 11 Days Post-Op    Objective:     Vital Signs (Most Recent):  Temp: 98.3 °F (36.8 °C) (10/18/22 0300)  Pulse: 88 (10/18/22 0545)  Resp: (!) 35 (10/18/22 0545)  BP: (!) 120/56 (10/18/22 0530)  SpO2: 100 % (10/18/22 0545)   Vital Signs (24h Range):  Temp:  [98.2 °F (36.8 °C)-98.5 °F (36.9 °C)] 98.3 °F (36.8 °C)  Pulse:  [] 88  Resp:  [16-48] 35  SpO2:  [84 %-100 %] 100 %  BP: ()/(50-78) 120/56     Weight: 85 kg (187 lb 6.3 oz)  Body mass index is 27.67 kg/m².      Intake/Output Summary (Last 24 hours) at 10/18/2022 0630  Last data filed at 10/18/2022 0600  Gross per 24 hour   Intake 1110.81 ml   Output 1050 ml   Net 60.81 ml       Physical Exam  Vitals and nursing note reviewed.   Constitutional:       General: He is not in acute distress.  Eyes:      General: No scleral icterus.     Extraocular Movements: Extraocular movements intact.      Pupils: Pupils are equal, round, and reactive to light.   Cardiovascular:      Rate and Rhythm: Normal rate and regular rhythm.      Pulses: Normal pulses.      Heart sounds: No murmur heard.  Pulmonary:      Effort: Pulmonary effort is normal. No respiratory distress.      Breath sounds: Decreased breath sounds present.   Abdominal:      General: Abdomen is flat. Bowel sounds are normal. There is no distension.      Palpations: Abdomen is soft.      Tenderness: There is no abdominal tenderness.   Genitourinary:     Comments: Fierro in place  Musculoskeletal:      Right lower leg: Edema present.      Left lower  leg: Edema present.   Skin:     General: Skin is warm and dry.      Capillary Refill: Capillary refill takes less than 2 seconds.      Findings: Bruising present. No lesion.      Comments: MSI with wound vac- sanguinous drainage    Neurological:      General: No focal deficit present.      Mental Status: He is alert. Mental status is at baseline.       Vents:  Vent Mode: Spont (10/12/22 1252)  Ventilator Initiated: Yes (10/11/22 0830)  Set Rate: 24 BPM (10/12/22 0916)  Vt Set: 470 mL (10/12/22 0916)  Pressure Support: 5 cmH20 (10/12/22 1252)  PEEP/CPAP: 5 cmH20 (10/12/22 1252)  Oxygen Concentration (%): 36 (10/17/22 2123)  Peak Airway Pressure: 11 cmH2O (10/12/22 1252)  Plateau Pressure: 20 cmH20 (10/12/22 1252)  Total Ve: 9.34 mL (10/12/22 1252)  Negative Inspiratory Force (cm H2O): -34 (10/12/22 1310)  F/VT Ratio<105 (RSBI): (!) 49.59 (10/12/22 0916)    Lines/Drains/Airways       Peripherally Inserted Central Catheter Line  Duration             PICC Double Lumen 10/17/22 1709 right brachial <1 day              Drain  Duration                  Urethral Catheter 10/11/22 1600 Silicone 6 days                    Significant Labs:    CBC/Anemia Profile:  Recent Labs   Lab 10/17/22  0218 10/18/22  0430   WBC 28.74* 31.39*   HGB 8.2* 8.2*   HCT 25.5* 25.5*    518*   MCV 91 91   RDW 17.7* 17.9*        Chemistries:  Recent Labs   Lab 10/17/22  0218 10/17/22  2008 10/18/22  0430   *  --  133*   K 3.6 4.1 3.9   CL 99  --  99   CO2 28  --  25   BUN 25*  --  27*   CREATININE 0.7  --  0.9   CALCIUM 8.6*  --  9.0   ALBUMIN 2.5*  --  2.6*   PROT 5.6*  --  6.2   BILITOT 0.8  --  1.0   ALKPHOS 91  --  108   ALT 19  --  16   AST 21  --  16   MG 2.2  --  2.2   PHOS 2.8  --  3.4       All pertinent labs within the past 24 hours have been reviewed.    Significant Imaging:  I have reviewed all pertinent imaging results/findings within the past 24 hours.

## 2022-10-18 NOTE — PT/OT/SLP PROGRESS
Occupational Therapy      Patient Name:  David Barrios   MRN:  [id number]    Patient not seen today secondary to Patient politely declining 2' family at bedside and pt on phone. Unable to make second attempt. Will follow-up as appropriate.    10/18/2022

## 2022-10-18 NOTE — PLAN OF CARE
Patient requires a [facility] @ [facility] [place] @ [phone] and referred patient team notified of the above   CM will follow

## 2022-10-19 LAB
ABO + RH BLD: NORMAL
ALBUMIN SERPL BCP-MCNC: 2.4 G/DL (ref 3.5–5.2)
ALP SERPL-CCNC: 113 U/L (ref 55–135)
ALT SERPL W/O P-5'-P-CCNC: 14 U/L (ref 10–44)
ANION GAP SERPL CALC-SCNC: 8 MMOL/L (ref 8–16)
ANISOCYTOSIS BLD QL SMEAR: SLIGHT
APTT BLDCRRT: 75.3 SEC (ref 21–32)
ASCENDING AORTA: 3.32 CM
AST SERPL-CCNC: 21 U/L (ref 10–40)
AV INDEX (PROSTH): 0.64
AV MEAN GRADIENT: 6 MMHG
AV PEAK GRADIENT: 10 MMHG
AV REGURGITATION PRESSURE HALF TIME: 320 MS
AV VALVE AREA: 2.03 CM2
AV VELOCITY RATIO: 0.64
BACTERIA BLD CULT: NORMAL
BACTERIA BLD CULT: NORMAL
BACTERIA SPEC ANAEROBE CULT: NORMAL
BACTERIA SPEC ANAEROBE CULT: NORMAL
BASO STIPL BLD QL SMEAR: ABNORMAL
BASOPHILS # BLD AUTO: 0.03 K/UL (ref 0–0.2)
BASOPHILS NFR BLD: 0.1 % (ref 0–1.9)
BILIRUB SERPL-MCNC: 1.1 MG/DL (ref 0.1–1)
BLD GP AB SCN CELLS X3 SERPL QL: NORMAL
BLD PROD TYP BPU: NORMAL
BLOOD UNIT EXPIRATION DATE: NORMAL
BLOOD UNIT TYPE CODE: 6200
BLOOD UNIT TYPE: NORMAL
BSA FOR ECHO PROCEDURE: 2.03 M2
BUN SERPL-MCNC: 30 MG/DL (ref 8–23)
BUN SERPL-MCNC: 32 MG/DL (ref 8–23)
BUN SERPL-MCNC: 33 MG/DL (ref 8–23)
CALCIUM SERPL-MCNC: 8.4 MG/DL (ref 8.7–10.5)
CALCIUM SERPL-MCNC: 8.6 MG/DL (ref 8.7–10.5)
CALCIUM SERPL-MCNC: 8.7 MG/DL (ref 8.7–10.5)
CHLORIDE SERPL-SCNC: 93 MMOL/L (ref 95–110)
CHLORIDE SERPL-SCNC: 93 MMOL/L (ref 95–110)
CHLORIDE SERPL-SCNC: 94 MMOL/L (ref 95–110)
CO2 SERPL-SCNC: 26 MMOL/L (ref 23–29)
CO2 SERPL-SCNC: 26 MMOL/L (ref 23–29)
CO2 SERPL-SCNC: 27 MMOL/L (ref 23–29)
CODING SYSTEM: NORMAL
CREAT SERPL-MCNC: 1 MG/DL (ref 0.5–1.4)
CV ECHO LV RWT: 0.33 CM
DIFFERENTIAL METHOD: ABNORMAL
DISPENSE STATUS: NORMAL
DOP CALC AO PEAK VEL: 1.57 M/S
DOP CALC AO VTI: 23.85 CM
DOP CALC LVOT AREA: 3.2 CM2
DOP CALC LVOT DIAMETER: 2.01 CM
DOP CALC LVOT PEAK VEL: 1 M/S
DOP CALC LVOT STROKE VOLUME: 48.43 CM3
DOP CALCLVOT PEAK VEL VTI: 15.27 CM
E WAVE DECELERATION TIME: 175.84 MSEC
E/A RATIO: 1.61
E/E' RATIO: 18 M/S
ECHO LV POSTERIOR WALL: 0.94 CM (ref 0.6–1.1)
EJECTION FRACTION: 35 %
EOSINOPHIL # BLD AUTO: 0.1 K/UL (ref 0–0.5)
EOSINOPHIL NFR BLD: 0.2 % (ref 0–8)
ERYTHROCYTE [DISTWIDTH] IN BLOOD BY AUTOMATED COUNT: 18.1 % (ref 11.5–14.5)
EST. GFR  (NO RACE VARIABLE): >60 ML/MIN/1.73 M^2
FRACTIONAL SHORTENING: 19 % (ref 28–44)
GLUCOSE SERPL-MCNC: 110 MG/DL (ref 70–110)
GLUCOSE SERPL-MCNC: 122 MG/DL (ref 70–110)
GLUCOSE SERPL-MCNC: 148 MG/DL (ref 70–110)
HCT VFR BLD AUTO: 22.5 % (ref 40–54)
HGB BLD-MCNC: 7.2 G/DL (ref 14–18)
IMM GRANULOCYTES # BLD AUTO: 0.98 K/UL (ref 0–0.04)
IMM GRANULOCYTES NFR BLD AUTO: 3.1 % (ref 0–0.5)
INTERVENTRICULAR SEPTUM: 0.97 CM (ref 0.6–1.1)
LA MAJOR: 6.32 CM
LA MINOR: 6.35 CM
LA WIDTH: 4.91 CM
LEFT ATRIUM SIZE: 4.26 CM
LEFT ATRIUM VOLUME INDEX MOD: 46.6 ML/M2
LEFT ATRIUM VOLUME INDEX: 56 ML/M2
LEFT ATRIUM VOLUME MOD: 93.62 CM3
LEFT ATRIUM VOLUME: 112.63 CM3
LEFT INTERNAL DIMENSION IN SYSTOLE: 4.68 CM (ref 2.1–4)
LEFT VENTRICLE DIASTOLIC VOLUME INDEX: 82.09 ML/M2
LEFT VENTRICLE DIASTOLIC VOLUME: 165.01 ML
LEFT VENTRICLE MASS INDEX: 109 G/M2
LEFT VENTRICLE SYSTOLIC VOLUME INDEX: 50.4 ML/M2
LEFT VENTRICLE SYSTOLIC VOLUME: 101.25 ML
LEFT VENTRICULAR INTERNAL DIMENSION IN DIASTOLE: 5.78 CM (ref 3.5–6)
LEFT VENTRICULAR MASS: 218.3 G
LV LATERAL E/E' RATIO: 18 M/S
LV SEPTAL E/E' RATIO: 18 M/S
LYMPHOCYTES # BLD AUTO: 0.9 K/UL (ref 1–4.8)
LYMPHOCYTES NFR BLD: 2.8 % (ref 18–48)
MAGNESIUM SERPL-MCNC: 2.1 MG/DL (ref 1.6–2.6)
MCH RBC QN AUTO: 29.8 PG (ref 27–31)
MCHC RBC AUTO-ENTMCNC: 32 G/DL (ref 32–36)
MCV RBC AUTO: 93 FL (ref 82–98)
MONOCYTES # BLD AUTO: 2 K/UL (ref 0.3–1)
MONOCYTES NFR BLD: 6.5 % (ref 4–15)
MV PEAK A VEL: 0.67 M/S
MV PEAK E VEL: 1.08 M/S
MV STENOSIS PRESSURE HALF TIME: 50.99 MS
MV VALVE AREA P 1/2 METHOD: 4.31 CM2
NEUTROPHILS # BLD AUTO: 27.4 K/UL (ref 1.8–7.7)
NEUTROPHILS NFR BLD: 87.3 % (ref 38–73)
NRBC BLD-RTO: 1 /100 WBC
OVALOCYTES BLD QL SMEAR: ABNORMAL
PHOSPHATE SERPL-MCNC: 3.6 MG/DL (ref 2.7–4.5)
PLATELET # BLD AUTO: 504 K/UL (ref 150–450)
PLATELET BLD QL SMEAR: ABNORMAL
PMV BLD AUTO: 10.7 FL (ref 9.2–12.9)
POIKILOCYTOSIS BLD QL SMEAR: SLIGHT
POTASSIUM SERPL-SCNC: 3.9 MMOL/L (ref 3.5–5.1)
POTASSIUM SERPL-SCNC: 4.1 MMOL/L (ref 3.5–5.1)
POTASSIUM SERPL-SCNC: 4.1 MMOL/L (ref 3.5–5.1)
PROT SERPL-MCNC: 5.8 G/DL (ref 6–8.4)
RA MAJOR: 3.77 CM
RA WIDTH: 3.77 CM
RBC # BLD AUTO: 2.42 M/UL (ref 4.6–6.2)
RIGHT VENTRICULAR END-DIASTOLIC DIMENSION: 4.17 CM
RV TISSUE DOPPLER FREE WALL SYSTOLIC VELOCITY 1 (APICAL 4 CHAMBER VIEW): 6.8 CM/S
SCHISTOCYTES BLD QL SMEAR: ABNORMAL
SCHISTOCYTES BLD QL SMEAR: PRESENT
SINUS: 3.23 CM
SODIUM SERPL-SCNC: 127 MMOL/L (ref 136–145)
SODIUM SERPL-SCNC: 127 MMOL/L (ref 136–145)
SODIUM SERPL-SCNC: 129 MMOL/L (ref 136–145)
STJ: 2.6 CM
TARGETS BLD QL SMEAR: ABNORMAL
TDI LATERAL: 0.06 M/S
TDI SEPTAL: 0.06 M/S
TDI: 0.06 M/S
TRANS ERYTHROCYTES VOL PATIENT: NORMAL ML
TRICUSPID ANNULAR PLANE SYSTOLIC EXCURSION: 1.19 CM
WBC # BLD AUTO: 31.36 K/UL (ref 3.9–12.7)

## 2022-10-19 PROCEDURE — 80048 BASIC METABOLIC PNL TOTAL CA: CPT | Mod: 91,XB | Performed by: STUDENT IN AN ORGANIZED HEALTH CARE EDUCATION/TRAINING PROGRAM

## 2022-10-19 PROCEDURE — 86901 BLOOD TYPING SEROLOGIC RH(D): CPT

## 2022-10-19 PROCEDURE — 94664 DEMO&/EVAL PT USE INHALER: CPT

## 2022-10-19 PROCEDURE — 63600175 PHARM REV CODE 636 W HCPCS: Performed by: THORACIC SURGERY (CARDIOTHORACIC VASCULAR SURGERY)

## 2022-10-19 PROCEDURE — 63600175 PHARM REV CODE 636 W HCPCS

## 2022-10-19 PROCEDURE — 93010 EKG 12-LEAD: ICD-10-PCS | Mod: ,,, | Performed by: INTERNAL MEDICINE

## 2022-10-19 PROCEDURE — 25000003 PHARM REV CODE 250

## 2022-10-19 PROCEDURE — 25000003 PHARM REV CODE 250: Performed by: THORACIC SURGERY (CARDIOTHORACIC VASCULAR SURGERY)

## 2022-10-19 PROCEDURE — 83735 ASSAY OF MAGNESIUM: CPT | Performed by: STUDENT IN AN ORGANIZED HEALTH CARE EDUCATION/TRAINING PROGRAM

## 2022-10-19 PROCEDURE — 99900035 HC TECH TIME PER 15 MIN (STAT)

## 2022-10-19 PROCEDURE — 25000003 PHARM REV CODE 250: Performed by: STUDENT IN AN ORGANIZED HEALTH CARE EDUCATION/TRAINING PROGRAM

## 2022-10-19 PROCEDURE — 94799 UNLISTED PULMONARY SVC/PX: CPT

## 2022-10-19 PROCEDURE — 99291 CRITICAL CARE FIRST HOUR: CPT | Mod: ,,, | Performed by: ANESTHESIOLOGY

## 2022-10-19 PROCEDURE — 63600175 PHARM REV CODE 636 W HCPCS: Performed by: STUDENT IN AN ORGANIZED HEALTH CARE EDUCATION/TRAINING PROGRAM

## 2022-10-19 PROCEDURE — 97535 SELF CARE MNGMENT TRAINING: CPT

## 2022-10-19 PROCEDURE — A4216 STERILE WATER/SALINE, 10 ML: HCPCS | Performed by: THORACIC SURGERY (CARDIOTHORACIC VASCULAR SURGERY)

## 2022-10-19 PROCEDURE — P9021 RED BLOOD CELLS UNIT: HCPCS

## 2022-10-19 PROCEDURE — 84100 ASSAY OF PHOSPHORUS: CPT | Performed by: STUDENT IN AN ORGANIZED HEALTH CARE EDUCATION/TRAINING PROGRAM

## 2022-10-19 PROCEDURE — 20000000 HC ICU ROOM

## 2022-10-19 PROCEDURE — 80048 BASIC METABOLIC PNL TOTAL CA: CPT | Mod: XB

## 2022-10-19 PROCEDURE — 93005 ELECTROCARDIOGRAM TRACING: CPT

## 2022-10-19 PROCEDURE — 85730 THROMBOPLASTIN TIME PARTIAL: CPT | Performed by: THORACIC SURGERY (CARDIOTHORACIC VASCULAR SURGERY)

## 2022-10-19 PROCEDURE — 80053 COMPREHEN METABOLIC PANEL: CPT

## 2022-10-19 PROCEDURE — 85025 COMPLETE CBC W/AUTO DIFF WBC: CPT | Performed by: THORACIC SURGERY (CARDIOTHORACIC VASCULAR SURGERY)

## 2022-10-19 PROCEDURE — 93010 ELECTROCARDIOGRAM REPORT: CPT | Mod: ,,, | Performed by: INTERNAL MEDICINE

## 2022-10-19 PROCEDURE — 27000221 HC OXYGEN, UP TO 24 HOURS

## 2022-10-19 PROCEDURE — 99291 PR CRITICAL CARE, E/M 30-74 MINUTES: ICD-10-PCS | Mod: ,,, | Performed by: ANESTHESIOLOGY

## 2022-10-19 PROCEDURE — 97116 GAIT TRAINING THERAPY: CPT

## 2022-10-19 PROCEDURE — 86920 COMPATIBILITY TEST SPIN: CPT

## 2022-10-19 PROCEDURE — 94761 N-INVAS EAR/PLS OXIMETRY MLT: CPT

## 2022-10-19 RX ORDER — HYDROCODONE BITARTRATE AND ACETAMINOPHEN 500; 5 MG/1; MG/1
TABLET ORAL
Status: DISCONTINUED | OUTPATIENT
Start: 2022-10-19 | End: 2022-10-25 | Stop reason: HOSPADM

## 2022-10-19 RX ORDER — TALC
6 POWDER (GRAM) TOPICAL NIGHTLY PRN
Status: DISCONTINUED | OUTPATIENT
Start: 2022-10-19 | End: 2022-10-21

## 2022-10-19 RX ORDER — QUETIAPINE FUMARATE 25 MG/1
50 TABLET, FILM COATED ORAL NIGHTLY
Status: DISCONTINUED | OUTPATIENT
Start: 2022-10-19 | End: 2022-10-21

## 2022-10-19 RX ORDER — HYDROCHLOROTHIAZIDE 25 MG/1
25 TABLET ORAL DAILY
Status: ON HOLD | COMMUNITY
Start: 2022-09-28 | End: 2022-11-30 | Stop reason: HOSPADM

## 2022-10-19 RX ORDER — ENOXAPARIN SODIUM 100 MG/ML
80 INJECTION SUBCUTANEOUS EVERY 12 HOURS
Status: DISCONTINUED | OUTPATIENT
Start: 2022-10-19 | End: 2022-10-22

## 2022-10-19 RX ADMIN — OXYCODONE 5 MG: 5 TABLET ORAL at 04:10

## 2022-10-19 RX ADMIN — SODIUM CHLORIDE TAB 1 GM 2 G: 1 TAB at 04:10

## 2022-10-19 RX ADMIN — OXYCODONE HYDROCHLORIDE 10 MG: 10 TABLET ORAL at 01:10

## 2022-10-19 RX ADMIN — Medication 10 ML: at 12:10

## 2022-10-19 RX ADMIN — Medication 6 MG: at 01:10

## 2022-10-19 RX ADMIN — POTASSIUM CHLORIDE 20 MEQ: 20 TABLET, EXTENDED RELEASE ORAL at 08:10

## 2022-10-19 RX ADMIN — AMIODARONE HYDROCHLORIDE 200 MG: 200 TABLET ORAL at 09:10

## 2022-10-19 RX ADMIN — AMIODARONE HYDROCHLORIDE 200 MG: 200 TABLET ORAL at 08:10

## 2022-10-19 RX ADMIN — PHENAZOPYRIDINE HYDROCHLORIDE 100 MG: 100 TABLET ORAL at 11:10

## 2022-10-19 RX ADMIN — MILRINONE LACTATE IN DEXTROSE 0.25 MCG/KG/MIN: 200 INJECTION, SOLUTION INTRAVENOUS at 11:10

## 2022-10-19 RX ADMIN — Medication 6 MG: at 09:10

## 2022-10-19 RX ADMIN — ONDANSETRON 4 MG: 2 INJECTION INTRAMUSCULAR; INTRAVENOUS at 05:10

## 2022-10-19 RX ADMIN — ENOXAPARIN SODIUM 80 MG: 80 INJECTION SUBCUTANEOUS at 10:10

## 2022-10-19 RX ADMIN — SODIUM CHLORIDE TAB 1 GM 2 G: 1 TAB at 09:10

## 2022-10-19 RX ADMIN — PHENAZOPYRIDINE HYDROCHLORIDE 100 MG: 100 TABLET ORAL at 08:10

## 2022-10-19 RX ADMIN — ACETAMINOPHEN 1000 MG: 500 TABLET ORAL at 05:10

## 2022-10-19 RX ADMIN — SENNOSIDES AND DOCUSATE SODIUM 1 TABLET: 50; 8.6 TABLET ORAL at 08:10

## 2022-10-19 RX ADMIN — OXYCODONE 5 MG: 5 TABLET ORAL at 08:10

## 2022-10-19 RX ADMIN — Medication 10 ML: at 11:10

## 2022-10-19 RX ADMIN — SENNOSIDES AND DOCUSATE SODIUM 1 TABLET: 50; 8.6 TABLET ORAL at 09:10

## 2022-10-19 RX ADMIN — FUROSEMIDE 80 MG: 10 INJECTION, SOLUTION INTRAMUSCULAR; INTRAVENOUS at 03:10

## 2022-10-19 RX ADMIN — ACETAMINOPHEN 1000 MG: 500 TABLET ORAL at 02:10

## 2022-10-19 RX ADMIN — FUROSEMIDE 80 MG: 10 INJECTION, SOLUTION INTRAMUSCULAR; INTRAVENOUS at 08:10

## 2022-10-19 RX ADMIN — Medication 10 ML: at 05:10

## 2022-10-19 RX ADMIN — ACETAMINOPHEN 1000 MG: 500 TABLET ORAL at 09:10

## 2022-10-19 RX ADMIN — TAMSULOSIN HYDROCHLORIDE 0.4 MG: 0.4 CAPSULE ORAL at 08:10

## 2022-10-19 RX ADMIN — CEFTRIAXONE 2 G: 2 INJECTION, SOLUTION INTRAVENOUS at 07:10

## 2022-10-19 RX ADMIN — ENOXAPARIN SODIUM 80 MG: 80 INJECTION SUBCUTANEOUS at 09:10

## 2022-10-19 RX ADMIN — POLYETHYLENE GLYCOL 3350 17 G: 17 POWDER, FOR SOLUTION ORAL at 08:10

## 2022-10-19 RX ADMIN — QUETIAPINE FUMARATE 50 MG: 25 TABLET ORAL at 09:10

## 2022-10-19 RX ADMIN — FAMOTIDINE 20 MG: 20 TABLET ORAL at 08:10

## 2022-10-19 RX ADMIN — FAMOTIDINE 20 MG: 20 TABLET ORAL at 09:10

## 2022-10-19 RX ADMIN — POTASSIUM CHLORIDE 20 MEQ: 200 INJECTION, SOLUTION INTRAVENOUS at 05:10

## 2022-10-19 RX ADMIN — CHLOROTHIAZIDE SODIUM 250 MG: 500 INJECTION, POWDER, LYOPHILIZED, FOR SOLUTION INTRAVENOUS at 08:10

## 2022-10-19 RX ADMIN — FUROSEMIDE 80 MG: 10 INJECTION, SOLUTION INTRAMUSCULAR; INTRAVENOUS at 09:10

## 2022-10-19 RX ADMIN — ASPIRIN 325 MG ORAL TABLET 325 MG: 325 PILL ORAL at 08:10

## 2022-10-19 NOTE — PT/OT/SLP PROGRESS
Occupational Therapy   Treatment    Name: David Barrios  MRN: [id number]  Admitting Diagnosis:  Nonrheumatic mitral valve regurgitation  12 Days Post-Op    Recommendations:     Discharge Recommendations: rehabilitation facility  Discharge Equipment Recommendations:   (TBD)  Barriers to discharge:       Assessment:     David Barrios is a 62 y.o. male with a medical diagnosis of Nonrheumatic mitral valve regurgitation. Pt able to walk to the bathroom and complete ADLs standing at the sink with SBA and c/o of minor fatigue. Performance deficits affecting function are weakness, impaired endurance, impaired self care skills, impaired functional mobility, gait instability, impaired balance, decreased coordination.     Rehab Prognosis:  Good; patient would benefit from acute skilled OT services to address these deficits and reach maximum level of function.       Plan:     Patient to be seen 5 x/week to address the above listed problems via self-care/home management, therapeutic exercises, therapeutic activities  Plan of Care Expires: 11/12/22  Plan of Care Reviewed with: patient    Subjective     Ready to go home.    Pain/Comfort:  Pain Rating 1: 0/10    Objective:     Communicated with: rn prior to session.  Patient found up in chair with lambert catheter, wound vac, telemetry, pulse ox (continuous), peripheral IV, oxygen upon OT entry to room.    General Precautions: Standard, fall, sternal   Orthopedic Precautions:N/A   Braces:    Respiratory Status: Nasal cannula, flow 2 L/min     Occupational Performance:     Bed Mobility:    Up in chair.  Sit>supine mod A    Functional Mobility/Transfers:  Patient completed Sit <> Stand Transfer with contact guard assistance  with  no assistive device   Functional Mobility: Chair<>bathroom ~28' with Min A / no AD.    Activities of Daily Living:  Grooming: stand by assistance standing at the sink.    [facility] 6 Click ADL: 19    Treatment & Education:  Discussed OT POC and progress.  Pt  able to recall 3/3 sternal precautions.    Patient left supine with all lines intact and call button in reach    GOALS:   Multidisciplinary Problems       Occupational Therapy Goals          Problem: Occupational Therapy    Goal Priority Disciplines Outcome Interventions   Occupational Therapy Goal     OT, PT/OT     Description: Goals to be met by: 10/20/22     Patient will increase functional independence with ADLs by performing:    Grooming while standing with Supervision.  Toileting from toilet with CGA for hygiene and clothing management.   Supine to sit with Supervision.  Toilet transfer to toilet with CGA.                         Time Tracking:     OT Date of Treatment: 10/19/22  OT Start Time: 0950  OT Stop Time: 1009  OT Total Time (min): 19 min    Billable Minutes:Self Care/Home Management 19      OT/DUONG: OT          10/19/2022

## 2022-10-19 NOTE — PLAN OF CARE
"      SICU PLAN OF CARE NOTE    Dx: Nonrheumatic mitral valve regurgitation    Shift Events: NAEO. Pt restless most of night. In and out of bed to chair x3. Pain controlled with PRN meds. Plan of care reviewed with patient and sister. Questions answered and verbalized understanding.     Goals of Care: MAP>65, SBP<140, SpO2>88%, aPTT 60-80    Neuro: AAO x4, Follows Commands, and Moves All Extremities     Vital Signs: /66 (BP Location: Left arm, Patient Position: Lying)   Pulse (!) 112   Temp 98.1 °F (36.7 °C) (Oral)   Resp 18   Ht 5' 9" (1.753 m)   Wt 85 kg (187 lb 6.3 oz)   SpO2 100%   BMI 27.67 kg/m²     Respiratory: Room Air in bed, pt desats when getting out of bed to chair, 2L NC applied     Diet: Cardiac Diet    Gtts: Dobutamine, Milrinone, and Heparin    Urine Output: Urinary Catheter 900 cc/shift    Labs/Accuchecks: Daily CBC, CMP/Mg/Phos, aPTT     Skin: Wound vac to midsternal incision. No skin breakdown or redness noted to moy prominences this shift. Weight shifting assistance provided. Heel and sacral foams in place. Bed plugged in and working.         "

## 2022-10-19 NOTE — PLAN OF CARE
Problem: Physical Therapy  Goal: Physical Therapy Goal  Description: Goals to be met by: 10/27/2022     Patient will increase functional independence with mobility by performin. Supine to sit with independence-not met  2. Sit to supine with independence -not met  3. Sit to stand transfer with stand by assistance -not met  4. Bed to chair transfer with contact guard assistance using PRW as needed -not met  5. Gait  x 100 feet with minimum assistance using PRW as needed -not met  6. Ascend/descend 3 stair with no handrails minimum assistance using LRAD as needed -not met  7. Lower extremity exercise program x10 reps per handout, with independence -not met  8. Recall 3/3 sternal precautions-met 10/19  Outcome: Ongoing, Progressing   Goals remain appropriate. 10/19/2022

## 2022-10-19 NOTE — ASSESSMENT & PLAN NOTE
  Neuro/Psych:   -- Sedation: none  -- Pain: PRN Oxy + Dilaudid  -- Scheduled Tylenol             Cards:   -- S/P TV replacement, MV replacement, MAZE, Lt Atrial Appendage ligation and Impella placement on 10/7/22  -- Impella: Removed 10/14  -- Hemodynamically unstable post-op requiring high support and antiarrhythmics for frequent VTach runs, multiple cardioversions in transport from OR  -- Reopened POD#0 for high chest tube output - found to have slow persistent bleeding from the IVC and left atriotomy sites  -- MAP > 65, Syst < 140  -- Aspirin 325mg  -- In rate-controlled Afib intermittently, on PO amio and Toprol XL (uptitrating as clinically needed)   -- lifevest obtained for low EF and VT   -- milrinone restarted, lasix with PRN diuril for diuresis, hypervolemic on exam   -- TTE 10/14: EF 30% with possible thrombi. Heparin gtt   -- GDMT:  Metoprolol succinate 25 QD, Spironolactone 25 mg QD     Pulm:   -- Goal O2 sat > 90%  -- extubated to NC 10/12  -- Chest tubes removed 10/15  -- CXR with atelectasis   -- PEP therapy ordered      Renal:  -- Diuresis with IVP lasix and diuril  -- Keep lambert for strict I/O  -- Trend BUN/Cr      Recent Labs     10/18/22  0430 10/18/22  1323 10/19/22  0308   BUN 27* 28* 30*   CREATININE 0.9 0.9 1.0        FEN / GI:   -- Replace lytes as needed  -- Nutrition: cardiac diet   -- GI ppx: not indicated   -- Bowel reg: miralax  -- Scheduled potassium      ID:   -- Afebrile  -- persistent leukocytosis. Blood cx cleared since 10/14 on appropriate abx. Suspect possible graft infection. Blood cx repeated.   -- Viry-op ancef completed  -- serratia macescens bacteremia 10/12  - pan sensitive. Narrowed abx to ceftriaxone  -- Sternal culture with GNR growth, wound vac dressing changed   -- PICC in place     Recent Labs     10/17/22  0218 10/18/22  0430 10/19/22  0308   WBC 28.74* 31.39* 31.36*        Heme/Onc:   -- stable post-operative anemia, transfused 1 unit PRBC today  -- Daily  CBC  -- Aspirin 325mg QD;   -- Anticoagulation with heparin due to intracardiac thrombus, PTT goal 60-80    Recent Labs     10/19/22  0308   HGB 7.2*   HCT 22.5*   APTT 75.3*        Endo:   -- BG goal 140-180  -- Insulin gtt  -- Hgb A1c 5.5      PPx:   Feeding: Cardiac diet  Analgesia/Sedation: precedex/PRN Oxy + Dilaudid  Thromboembolic prevention: SCDs, Heparin gtt  HOB >30: Yes  Stress Ulcer ppx: not indicated   Glucose control: Critical care goal 140-180 g/dl, ISS     Lines/Drains/Airway: PICC       Dispo/Code Status/Palliative:   -- SICU / Full Code.

## 2022-10-19 NOTE — PROGRESS NOTES
[facility] - [facility]  Critical Care - Surgery  Progress Note    Patient Name: David Barrios  MRN: [id number]  Admission Date: 10/2/2022  Hospital Length of Stay: 17 days  Code Status: Full Code  Attending Provider: Mike Hedrick MD  Primary Care Provider: Breann Tavera MD   Principal Problem: Nonrheumatic mitral valve regurgitation    Subjective:     Hospital/ICU Course:  No notes on file    Interval History/Significant Events: initiated milrinone and increased lasix toTID with PRN diuril for diuresis. Wound vac dressing changed yesterday, blood cultures repeated for increased leukocytosis.     Follow-up For: Procedure(s) (LRB):  VALVULOPLASTY,MITRAL VALVE (N/A)  REPAIR, TRICUSPID VALVE (N/A)  INSERTION, IMPELLA (N/A)  MEYER MAZE PROCEDURE (N/A)  EXCLUSION, LEFT ATRIAL APPENDAGE (N/A)    Post-Operative Day: 11 Days Post-Op    Objective:     Vital Signs (Most Recent):  Temp: 98.1 °F (36.7 °C) (10/19/22 0300)  Pulse: 105 (10/19/22 0700)  Resp: 17 (10/19/22 0700)  BP: (!) 115/56 (10/19/22 0700)  SpO2: 99 % (10/19/22 0700)   Vital Signs (24h Range):  Temp:  [97.7 °F (36.5 °C)-98.4 °F (36.9 °C)] 98.1 °F (36.7 °C)  Pulse:  [] 105  Resp:  [17-41] 17  SpO2:  [92 %-100 %] 99 %  BP: ()/(51-90) 115/56     Weight: 85 kg (187 lb 6.3 oz)  Body mass index is 27.67 kg/m².      Intake/Output Summary (Last 24 hours) at 10/19/2022 0711  Last data filed at 10/19/2022 0600  Gross per 24 hour   Intake 1150.77 ml   Output 1587 ml   Net -436.23 ml       Physical Exam  Vitals and nursing note reviewed.   Constitutional:       General: He is not in acute distress.  Eyes:      General: No scleral icterus.     Extraocular Movements: Extraocular movements intact.      Pupils: Pupils are equal, round, and reactive to light.   Cardiovascular:      Rate and Rhythm: Normal rate and regular rhythm.      Pulses: Normal pulses.      Heart sounds: No murmur heard.  Pulmonary:      Effort: Pulmonary effort is normal. No  respiratory distress.      Breath sounds: Decreased breath sounds present.   Abdominal:      General: Abdomen is flat. Bowel sounds are normal. There is no distension.      Palpations: Abdomen is soft.      Tenderness: There is no abdominal tenderness.   Genitourinary:     Comments: Fierro in place  Musculoskeletal:      Right lower leg: Edema present.      Left lower leg: Edema present.   Skin:     General: Skin is warm and dry.      Capillary Refill: Capillary refill takes less than 2 seconds.      Findings: Bruising present. No lesion.      Comments: MSI with wound vac- sanguinous drainage    Neurological:      General: No focal deficit present.      Mental Status: He is alert. Mental status is at baseline.       Vents:  Vent Mode: Spont (10/12/22 1252)  Ventilator Initiated: Yes (10/11/22 0830)  Set Rate: 24 BPM (10/12/22 0916)  Vt Set: 470 mL (10/12/22 0916)  Pressure Support: 5 cmH20 (10/12/22 1252)  PEEP/CPAP: 5 cmH20 (10/12/22 1252)  Oxygen Concentration (%): 36 (10/17/22 2123)  Peak Airway Pressure: 11 cmH2O (10/12/22 1252)  Plateau Pressure: 20 cmH20 (10/12/22 1252)  Total Ve: 9.34 mL (10/12/22 1252)  Negative Inspiratory Force (cm H2O): -34 (10/12/22 1310)  F/VT Ratio<105 (RSBI): (!) 49.59 (10/12/22 0916)    Lines/Drains/Airways       Peripherally Inserted Central Catheter Line  Duration             PICC Double Lumen 10/17/22 1709 right brachial 1 day              Drain  Duration                  Urethral Catheter 10/18/22 1500 Silicone <1 day                    Significant Labs:    CBC/Anemia Profile:  Recent Labs   Lab 10/18/22  0430 10/18/22  0914 10/19/22  0308   WBC 31.39*  --  31.36*   HGB 8.2*  --  7.2*   HCT 25.5* 24* 22.5*   *  --  504*   MCV 91  --  93   RDW 17.9*  --  18.1*        Chemistries:  Recent Labs   Lab 10/18/22  0430 10/18/22  1323 10/19/22  0308   * 133* 129*   K 3.9 4.3 3.9   CL 99 97 94*   CO2 25 24 27   BUN 27* 28* 30*   CREATININE 0.9 0.9 1.0   CALCIUM 9.0 8.8 8.7    ALBUMIN 2.6*  --  2.4*   PROT 6.2  --  5.8*   BILITOT 1.0  --  1.1*   ALKPHOS 108  --  113   ALT 16  --  14   AST 16  --  21   MG 2.2  --  2.1   PHOS 3.4  --  3.6       All pertinent labs within the past 24 hours have been reviewed.    Significant Imaging:  I have reviewed all pertinent imaging results/findings within the past 24 hours.    Assessment/Plan:     * Nonrheumatic mitral valve regurgitation    Neuro/Psych:   -- Sedation: none  -- Pain: PRN Oxy + Dilaudid  -- Scheduled Tylenol             Cards:   -- S/P TV replacement, MV replacement, MAZE, Lt Atrial Appendage ligation and Impella placement on 10/7/22  -- Impella: Removed 10/14  -- Hemodynamically unstable post-op requiring high support and antiarrhythmics for frequent VTach runs, multiple cardioversions in transport from OR  -- Reopened POD#0 for high chest tube output - found to have slow persistent bleeding from the IVC and left atriotomy sites  -- MAP > 65, Syst < 140  -- Aspirin 325mg  -- In rate-controlled Afib intermittently, on PO amio and Toprol XL (uptitrating as clinically needed)   -- lifevest obtained for low EF and VT   -- milrinone restarted, lasix with PRN diuril for diuresis, hypervolemic on exam   -- TTE 10/14: EF 30% with possible thrombi. Heparin gtt   -- GDMT:  Metoprolol succinate 25 QD, Spironolactone 25 mg QD     Pulm:   -- Goal O2 sat > 90%  -- extubated to NC 10/12  -- Chest tubes removed 10/15  -- CXR with atelectasis   -- PEP therapy ordered      Renal:  -- Diuresis with IVP lasix and diuril  -- Keep lambert for strict I/O  -- Trend BUN/Cr      Recent Labs     10/18/22  0430 10/18/22  1323 10/19/22  0308   BUN 27* 28* 30*   CREATININE 0.9 0.9 1.0        FEN / GI:   -- Replace lytes as needed  -- Nutrition: cardiac diet   -- GI ppx: not indicated   -- Bowel reg: miralax  -- Scheduled potassium      ID:   -- Afebrile  -- persistent leukocytosis. Blood cx cleared since 10/14 on appropriate abx. Suspect possible graft  infection. Blood cx repeated.   -- Viry-op ancef completed  -- serratia macescens bacteremia 10/12  - pan sensitive. Narrowed abx to ceftriaxone  -- Sternal culture with GNR growth, wound vac dressing changed   -- PICC in place     Recent Labs     10/17/22  0218 10/18/22  0430 10/19/22  0308   WBC 28.74* 31.39* 31.36*        Heme/Onc:   -- stable post-operative anemia, transfused 1 unit PRBC today  -- Daily CBC  -- Aspirin 325mg QD;   -- Anticoagulation with heparin due to intracardiac thrombus, PTT goal 60-80    Recent Labs     10/19/22  0308   HGB 7.2*   HCT 22.5*   APTT 75.3*        Endo:   -- BG goal 140-180  -- Insulin gtt  -- Hgb A1c 5.5      PPx:   Feeding: Cardiac diet  Analgesia/Sedation: precedex/PRN Oxy + Dilaudid  Thromboembolic prevention: SCDs, Heparin gtt  HOB >30: Yes  Stress Ulcer ppx: not indicated   Glucose control: Critical care goal 140-180 g/dl, ISS     Lines/Drains/Airway: PICC       Dispo/Code Status/Palliative:   -- SICU / Full Code.           Leah Lay NP  Critical Care - Surgery  [facility]

## 2022-10-19 NOTE — PLAN OF CARE
10/19/22 0911   Post-Acute Status   Post-Acute Authorization HME  (Life Vest)   HME Status Set-up Complete/Auth obtained       The SW contacted Delaney Oviedo to follow-up regarding the patient's life vest referral. Per Delaney Oviedo, the patient's life vest has been delivered to the patient at bedside. Delaney Oviedo informed the SW that she would be able to provide training to the LTACH if needed on the life vest.  Delaney Oviedo stated that she would need at least a hour of notice in advance.       9:17 AM  The SW notified Jessica with [facility] the above information via secure chat.     2:42 PM  The SW received a call from Petra with [employer] [phone] regarding this patient d/c planning needs. Petra informed the SW that she was his clinical  and wanted to know if she could be over assistance. The SW informed Petra that the patient is currently pending auth for LTACH. Petra reviewed her system and stated that she was unable to locate the request for LTACH services. The SW sent Jessica with [employer] a secure chat regarding the above information.     The SW will continue to follow.       Saturnino Reyes LMSW  Case Management [facility]  Ext: [phone]

## 2022-10-19 NOTE — SUBJECTIVE & OBJECTIVE
Interval History/Significant Events: initiated milrinone and increased lasix toTID with PRN diuril for diuresis. Wound vac dressing changed yesterday, blood cultures repeated for increased leukocytosis.     Follow-up For: Procedure(s) (LRB):  VALVULOPLASTY,MITRAL VALVE (N/A)  REPAIR, TRICUSPID VALVE (N/A)  INSERTION, IMPELLA (N/A)  MEYER MAZE PROCEDURE (N/A)  EXCLUSION, LEFT ATRIAL APPENDAGE (N/A)    Post-Operative Day: 11 Days Post-Op    Objective:     Vital Signs (Most Recent):  Temp: 98.1 °F (36.7 °C) (10/19/22 0300)  Pulse: 105 (10/19/22 0700)  Resp: 17 (10/19/22 0700)  BP: (!) 115/56 (10/19/22 0700)  SpO2: 99 % (10/19/22 0700)   Vital Signs (24h Range):  Temp:  [97.7 °F (36.5 °C)-98.4 °F (36.9 °C)] 98.1 °F (36.7 °C)  Pulse:  [] 105  Resp:  [17-41] 17  SpO2:  [92 %-100 %] 99 %  BP: ()/(51-90) 115/56     Weight: 85 kg (187 lb 6.3 oz)  Body mass index is 27.67 kg/m².      Intake/Output Summary (Last 24 hours) at 10/19/2022 0711  Last data filed at 10/19/2022 0600  Gross per 24 hour   Intake 1150.77 ml   Output 1587 ml   Net -436.23 ml       Physical Exam  Vitals and nursing note reviewed.   Constitutional:       General: He is not in acute distress.  Eyes:      General: No scleral icterus.     Extraocular Movements: Extraocular movements intact.      Pupils: Pupils are equal, round, and reactive to light.   Cardiovascular:      Rate and Rhythm: Normal rate and regular rhythm.      Pulses: Normal pulses.      Heart sounds: No murmur heard.  Pulmonary:      Effort: Pulmonary effort is normal. No respiratory distress.      Breath sounds: Decreased breath sounds present.   Abdominal:      General: Abdomen is flat. Bowel sounds are normal. There is no distension.      Palpations: Abdomen is soft.      Tenderness: There is no abdominal tenderness.   Genitourinary:     Comments: Fierro in place  Musculoskeletal:      Right lower leg: Edema present.      Left lower leg: Edema present.   Skin:     General: Skin is  warm and dry.      Capillary Refill: Capillary refill takes less than 2 seconds.      Findings: Bruising present. No lesion.      Comments: MSI with wound vac- sanguinous drainage    Neurological:      General: No focal deficit present.      Mental Status: He is alert. Mental status is at baseline.       Vents:  Vent Mode: Spont (10/12/22 1252)  Ventilator Initiated: Yes (10/11/22 0830)  Set Rate: 24 BPM (10/12/22 0916)  Vt Set: 470 mL (10/12/22 0916)  Pressure Support: 5 cmH20 (10/12/22 1252)  PEEP/CPAP: 5 cmH20 (10/12/22 1252)  Oxygen Concentration (%): 36 (10/17/22 2123)  Peak Airway Pressure: 11 cmH2O (10/12/22 1252)  Plateau Pressure: 20 cmH20 (10/12/22 1252)  Total Ve: 9.34 mL (10/12/22 1252)  Negative Inspiratory Force (cm H2O): -34 (10/12/22 1310)  F/VT Ratio<105 (RSBI): (!) 49.59 (10/12/22 0916)    Lines/Drains/Airways       Peripherally Inserted Central Catheter Line  Duration             PICC Double Lumen 10/17/22 1709 right brachial 1 day              Drain  Duration                  Urethral Catheter 10/18/22 1500 Silicone <1 day                    Significant Labs:    CBC/Anemia Profile:  Recent Labs   Lab 10/18/22  0430 10/18/22  0914 10/19/22  0308   WBC 31.39*  --  31.36*   HGB 8.2*  --  7.2*   HCT 25.5* 24* 22.5*   *  --  504*   MCV 91  --  93   RDW 17.9*  --  18.1*        Chemistries:  Recent Labs   Lab 10/18/22  0430 10/18/22  1323 10/19/22  0308   * 133* 129*   K 3.9 4.3 3.9   CL 99 97 94*   CO2 25 24 27   BUN 27* 28* 30*   CREATININE 0.9 0.9 1.0   CALCIUM 9.0 8.8 8.7   ALBUMIN 2.6*  --  2.4*   PROT 6.2  --  5.8*   BILITOT 1.0  --  1.1*   ALKPHOS 108  --  113   ALT 16  --  14   AST 16  --  21   MG 2.2  --  2.1   PHOS 3.4  --  3.6       All pertinent labs within the past 24 hours have been reviewed.    Significant Imaging:  I have reviewed all pertinent imaging results/findings within the past 24 hours.

## 2022-10-19 NOTE — PT/OT/SLP PROGRESS
Physical Therapy T Co-treatment with OT    Patient Name:  David Barrios   MRN:  [id number]    Recommendations:     Discharge Recommendations:  rehabilitation facility   Discharge Equipment Recommendations:  (will determine DME needs closer to discharge.)   Barriers to discharge: Decreased caregiver support    Assessment:     David Barrios is a 62 y.o. male admitted with a medical diagnosis of Nonrheumatic mitral valve regurgitation.  He presents with the following impairments/functional limitations:  impaired endurance, impaired functional mobility, gait instability, impaired balance pt tolerated treatment better being able to gait train farther and with less assistance. Pt will cont to benefit from skilled PT 5x/wk to progress physically.pt will need inpt rehab when medically stable to maximize rehab potential.pt is s/p MVr, TVr, Meyer MAZE 10/7/22.    Rehab Prognosis: Good; patient would benefit from acute skilled PT services to address these deficits and reach maximum level of function.    Recent Surgery: Procedure(s) (LRB):  VALVULOPLASTY,MITRAL VALVE (N/A)  REPAIR, TRICUSPID VALVE (N/A)  INSERTION, IMPELLA (N/A)  MEYER MAZE PROCEDURE (N/A)  EXCLUSION, LEFT ATRIAL APPENDAGE (N/A) 12 Days Post-Op    Plan:     During this hospitalization, patient to be seen 5 x/week to address the identified rehab impairments via gait training, therapeutic activities, therapeutic exercises and progress toward the following goals:    Plan of Care Expires:  11/13/22    Subjective     Chief Complaint: pt c/o chest pain with treatment.   Patient/Family Comments/goals: to get better and go home.   Pain/Comfort:  Pain Rating 1: 7/10 (chest)  Pain Addressed 1: Distraction, Reposition  Pain Rating Post-Intervention 1: 7/10 (chest)      Objective:     Communicated with nurse prior to session.  Patient found up in chair with telemetry, blood pressure cuff, wound vac, oxygen, PICC line, lambert catheter upon PT entry to room.     General  Precautions: Standard, fall, sternal   Orthopedic Precautions:N/A   Braces:    Respiratory Status: Nasal cannula, flow 2 L/min     Functional Mobility:  Bed Mobility: pt needed verbal cues for functional mobility with sternal precautions.     Rolling Left:  moderate assistance  Sit to Supine: moderate assistance    Transfers:     Sit to Stand:  contact guard assistance with hand-held assist    Gait: pt received gait training ~ 28 ft with min assist and O2 intact.     Balance: pt was CGA standing balance at sink performing ADLS with OT. Pt needed significantly increased time to perform tasks due to being tired.     Due to pt complex medical condition, the skill of 2 licensed therapists is needed to maximize treatment session and progression towards goals        AM-PAC 6 CLICK MOBILITY  Turning over in bed (including adjusting bedclothes, sheets and blankets)?: 3  Sitting down on and standing up from a chair with arms (e.g., wheelchair, bedside commode, etc.): 3  Moving from lying on back to sitting on the side of the bed?: 2  Moving to and from a bed to a chair (including a wheelchair)?: 3  Need to walk in hospital room?: 3  Climbing 3-5 steps with a railing?: 1  Basic Mobility Total Score: 15       Treatment & Education:  Pt received verbal instructions in PT POC and verbally expressed understanding of such. Pt was able to recall 3/3 sternal precautions.     Patient left supine with all lines intact, call button in reach, and RN present..pt returned to supine in bed for echo.     GOALS:   Multidisciplinary Problems       Physical Therapy Goals          Problem: Physical Therapy    Goal Priority Disciplines Outcome Goal Variances Interventions   Physical Therapy Goal     PT, PT/OT Ongoing, Progressing     Description: Goals to be met by: 10/27/2022     Patient will increase functional independence with mobility by performin. Supine to sit with independence-not met  2. Sit to supine with independence -not  met  3. Sit to stand transfer with stand by assistance -not met  4. Bed to chair transfer with contact guard assistance using PRW as needed -not met  5. Gait  x 100 feet with minimum assistance using PRW as needed -not met  6. Ascend/descend 3 stair with no handrails minimum assistance using LRAD as needed -not met  7. Lower extremity exercise program x10 reps per handout, with independence -not met  8. Recall 3/3 sternal precautions-met 10/19                       Time Tracking:     PT Received On: 10/19/22  PT Start Time: 0950     PT Stop Time: 1009  PT Total Time (min): 19 min     Billable Minutes: Gait Training 19 min       PT/PTA: PT     PTA Visit Number: 0     10/19/2022

## 2022-10-20 LAB
ALBUMIN SERPL BCP-MCNC: 2.2 G/DL (ref 3.5–5.2)
ALP SERPL-CCNC: 112 U/L (ref 55–135)
ALT SERPL W/O P-5'-P-CCNC: 14 U/L (ref 10–44)
ANION GAP SERPL CALC-SCNC: 7 MMOL/L (ref 8–16)
ANION GAP SERPL CALC-SCNC: 7 MMOL/L (ref 8–16)
ANION GAP SERPL CALC-SCNC: 9 MMOL/L (ref 8–16)
ANISOCYTOSIS BLD QL SMEAR: SLIGHT
AST SERPL-CCNC: 15 U/L (ref 10–40)
BASOPHILS # BLD AUTO: 0.03 K/UL (ref 0–0.2)
BASOPHILS NFR BLD: 0.1 % (ref 0–1.9)
BILIRUB SERPL-MCNC: 1.1 MG/DL (ref 0.1–1)
BUN SERPL-MCNC: 33 MG/DL (ref 8–23)
BUN SERPL-MCNC: 33 MG/DL (ref 8–23)
BUN SERPL-MCNC: 36 MG/DL (ref 8–23)
CALCIUM SERPL-MCNC: 8.5 MG/DL (ref 8.7–10.5)
CALCIUM SERPL-MCNC: 8.8 MG/DL (ref 8.7–10.5)
CALCIUM SERPL-MCNC: 8.8 MG/DL (ref 8.7–10.5)
CHLORIDE SERPL-SCNC: 94 MMOL/L (ref 95–110)
CHLORIDE SERPL-SCNC: 95 MMOL/L (ref 95–110)
CHLORIDE SERPL-SCNC: 96 MMOL/L (ref 95–110)
CO2 SERPL-SCNC: 26 MMOL/L (ref 23–29)
CO2 SERPL-SCNC: 26 MMOL/L (ref 23–29)
CO2 SERPL-SCNC: 28 MMOL/L (ref 23–29)
CREAT SERPL-MCNC: 1.1 MG/DL (ref 0.5–1.4)
DIFFERENTIAL METHOD: ABNORMAL
EOSINOPHIL # BLD AUTO: 0.1 K/UL (ref 0–0.5)
EOSINOPHIL NFR BLD: 0.4 % (ref 0–8)
ERYTHROCYTE [DISTWIDTH] IN BLOOD BY AUTOMATED COUNT: 18 % (ref 11.5–14.5)
EST. GFR  (NO RACE VARIABLE): >60 ML/MIN/1.73 M^2
GLUCOSE SERPL-MCNC: 138 MG/DL (ref 70–110)
GLUCOSE SERPL-MCNC: 143 MG/DL (ref 70–110)
GLUCOSE SERPL-MCNC: 154 MG/DL (ref 70–110)
HCT VFR BLD AUTO: 24.1 % (ref 40–54)
HGB BLD-MCNC: 7.8 G/DL (ref 14–18)
HYPOCHROMIA BLD QL SMEAR: ABNORMAL
IMM GRANULOCYTES # BLD AUTO: 0.52 K/UL (ref 0–0.04)
IMM GRANULOCYTES NFR BLD AUTO: 2 % (ref 0–0.5)
LYMPHOCYTES # BLD AUTO: 0.6 K/UL (ref 1–4.8)
LYMPHOCYTES NFR BLD: 2.5 % (ref 18–48)
MAGNESIUM SERPL-MCNC: 2.2 MG/DL (ref 1.6–2.6)
MCH RBC QN AUTO: 28.9 PG (ref 27–31)
MCHC RBC AUTO-ENTMCNC: 32.4 G/DL (ref 32–36)
MCV RBC AUTO: 89 FL (ref 82–98)
MONOCYTES # BLD AUTO: 1.9 K/UL (ref 0.3–1)
MONOCYTES NFR BLD: 7.1 % (ref 4–15)
NEUTROPHILS # BLD AUTO: 22.8 K/UL (ref 1.8–7.7)
NEUTROPHILS NFR BLD: 87.9 % (ref 38–73)
NRBC BLD-RTO: 1 /100 WBC
OVALOCYTES BLD QL SMEAR: ABNORMAL
PHOSPHATE SERPL-MCNC: 4.2 MG/DL (ref 2.7–4.5)
PLATELET # BLD AUTO: 526 K/UL (ref 150–450)
PMV BLD AUTO: 10.3 FL (ref 9.2–12.9)
POIKILOCYTOSIS BLD QL SMEAR: SLIGHT
POLYCHROMASIA BLD QL SMEAR: ABNORMAL
POTASSIUM SERPL-SCNC: 3.9 MMOL/L (ref 3.5–5.1)
POTASSIUM SERPL-SCNC: 4.2 MMOL/L (ref 3.5–5.1)
POTASSIUM SERPL-SCNC: 4.3 MMOL/L (ref 3.5–5.1)
PROT SERPL-MCNC: 5.6 G/DL (ref 6–8.4)
RBC # BLD AUTO: 2.7 M/UL (ref 4.6–6.2)
SODIUM SERPL-SCNC: 128 MMOL/L (ref 136–145)
SODIUM SERPL-SCNC: 129 MMOL/L (ref 136–145)
SODIUM SERPL-SCNC: 131 MMOL/L (ref 136–145)
SPHEROCYTES BLD QL SMEAR: ABNORMAL
WBC # BLD AUTO: 25.9 K/UL (ref 3.9–12.7)

## 2022-10-20 PROCEDURE — 80048 BASIC METABOLIC PNL TOTAL CA: CPT | Mod: 91,XB

## 2022-10-20 PROCEDURE — A4216 STERILE WATER/SALINE, 10 ML: HCPCS | Performed by: THORACIC SURGERY (CARDIOTHORACIC VASCULAR SURGERY)

## 2022-10-20 PROCEDURE — 99900035 HC TECH TIME PER 15 MIN (STAT)

## 2022-10-20 PROCEDURE — 25000003 PHARM REV CODE 250

## 2022-10-20 PROCEDURE — 63600175 PHARM REV CODE 636 W HCPCS

## 2022-10-20 PROCEDURE — 25000003 PHARM REV CODE 250: Performed by: THORACIC SURGERY (CARDIOTHORACIC VASCULAR SURGERY)

## 2022-10-20 PROCEDURE — 99291 CRITICAL CARE FIRST HOUR: CPT | Mod: ,,, | Performed by: ANESTHESIOLOGY

## 2022-10-20 PROCEDURE — 94664 DEMO&/EVAL PT USE INHALER: CPT

## 2022-10-20 PROCEDURE — 63600175 PHARM REV CODE 636 W HCPCS: Performed by: STUDENT IN AN ORGANIZED HEALTH CARE EDUCATION/TRAINING PROGRAM

## 2022-10-20 PROCEDURE — 94799 UNLISTED PULMONARY SVC/PX: CPT

## 2022-10-20 PROCEDURE — 97116 GAIT TRAINING THERAPY: CPT

## 2022-10-20 PROCEDURE — 84100 ASSAY OF PHOSPHORUS: CPT | Performed by: STUDENT IN AN ORGANIZED HEALTH CARE EDUCATION/TRAINING PROGRAM

## 2022-10-20 PROCEDURE — 85025 COMPLETE CBC W/AUTO DIFF WBC: CPT | Performed by: THORACIC SURGERY (CARDIOTHORACIC VASCULAR SURGERY)

## 2022-10-20 PROCEDURE — 63600175 PHARM REV CODE 636 W HCPCS: Performed by: THORACIC SURGERY (CARDIOTHORACIC VASCULAR SURGERY)

## 2022-10-20 PROCEDURE — 27000221 HC OXYGEN, UP TO 24 HOURS

## 2022-10-20 PROCEDURE — 83735 ASSAY OF MAGNESIUM: CPT | Performed by: STUDENT IN AN ORGANIZED HEALTH CARE EDUCATION/TRAINING PROGRAM

## 2022-10-20 PROCEDURE — 25000003 PHARM REV CODE 250: Performed by: STUDENT IN AN ORGANIZED HEALTH CARE EDUCATION/TRAINING PROGRAM

## 2022-10-20 PROCEDURE — 80053 COMPREHEN METABOLIC PANEL: CPT

## 2022-10-20 PROCEDURE — 27000646 HC AEROBIKA DEVICE

## 2022-10-20 PROCEDURE — 94761 N-INVAS EAR/PLS OXIMETRY MLT: CPT

## 2022-10-20 PROCEDURE — 20000000 HC ICU ROOM

## 2022-10-20 PROCEDURE — 99291 PR CRITICAL CARE, E/M 30-74 MINUTES: ICD-10-PCS | Mod: ,,, | Performed by: ANESTHESIOLOGY

## 2022-10-20 RX ORDER — POTASSIUM CHLORIDE 20 MEQ/1
40 TABLET, EXTENDED RELEASE ORAL DAILY
Status: DISCONTINUED | OUTPATIENT
Start: 2022-10-20 | End: 2022-10-25 | Stop reason: HOSPADM

## 2022-10-20 RX ORDER — AMIODARONE HYDROCHLORIDE 200 MG/1
200 TABLET ORAL DAILY
Status: DISCONTINUED | OUTPATIENT
Start: 2022-10-23 | End: 2022-10-25 | Stop reason: HOSPADM

## 2022-10-20 RX ORDER — TOLVAPTAN 30 MG/1
30 TABLET ORAL ONCE
Status: COMPLETED | OUTPATIENT
Start: 2022-10-20 | End: 2022-10-20

## 2022-10-20 RX ADMIN — FUROSEMIDE 80 MG: 10 INJECTION, SOLUTION INTRAMUSCULAR; INTRAVENOUS at 08:10

## 2022-10-20 RX ADMIN — POLYETHYLENE GLYCOL 3350 17 G: 17 POWDER, FOR SOLUTION ORAL at 08:10

## 2022-10-20 RX ADMIN — FUROSEMIDE 80 MG: 10 INJECTION, SOLUTION INTRAMUSCULAR; INTRAVENOUS at 02:10

## 2022-10-20 RX ADMIN — SODIUM CHLORIDE TAB 1 GM 2 G: 1 TAB at 08:10

## 2022-10-20 RX ADMIN — SENNOSIDES AND DOCUSATE SODIUM 1 TABLET: 50; 8.6 TABLET ORAL at 08:10

## 2022-10-20 RX ADMIN — CHLOROTHIAZIDE SODIUM 250 MG: 500 INJECTION, POWDER, LYOPHILIZED, FOR SOLUTION INTRAVENOUS at 10:10

## 2022-10-20 RX ADMIN — QUETIAPINE FUMARATE 50 MG: 25 TABLET ORAL at 08:10

## 2022-10-20 RX ADMIN — AMIODARONE HYDROCHLORIDE 200 MG: 200 TABLET ORAL at 08:10

## 2022-10-20 RX ADMIN — OXYCODONE 5 MG: 5 TABLET ORAL at 10:10

## 2022-10-20 RX ADMIN — ENOXAPARIN SODIUM 80 MG: 80 INJECTION SUBCUTANEOUS at 08:10

## 2022-10-20 RX ADMIN — TAMSULOSIN HYDROCHLORIDE 0.4 MG: 0.4 CAPSULE ORAL at 08:10

## 2022-10-20 RX ADMIN — ASPIRIN 325 MG ORAL TABLET 325 MG: 325 PILL ORAL at 08:10

## 2022-10-20 RX ADMIN — OXYCODONE 5 MG: 5 TABLET ORAL at 08:10

## 2022-10-20 RX ADMIN — POTASSIUM CHLORIDE 40 MEQ: 29.8 INJECTION, SOLUTION INTRAVENOUS at 05:10

## 2022-10-20 RX ADMIN — TOLVAPTAN 30 MG: 30 TABLET ORAL at 08:10

## 2022-10-20 RX ADMIN — CHLOROTHIAZIDE SODIUM 250 MG: 500 INJECTION, POWDER, LYOPHILIZED, FOR SOLUTION INTRAVENOUS at 08:10

## 2022-10-20 RX ADMIN — FAMOTIDINE 20 MG: 20 TABLET ORAL at 08:10

## 2022-10-20 RX ADMIN — MILRINONE LACTATE IN DEXTROSE 0.25 MCG/KG/MIN: 200 INJECTION, SOLUTION INTRAVENOUS at 03:10

## 2022-10-20 RX ADMIN — ACETAMINOPHEN 1000 MG: 500 TABLET ORAL at 02:10

## 2022-10-20 RX ADMIN — SODIUM CHLORIDE TAB 1 GM 3 G: 1 TAB at 08:10

## 2022-10-20 RX ADMIN — ACETAMINOPHEN 1000 MG: 500 TABLET ORAL at 10:10

## 2022-10-20 RX ADMIN — MILRINONE LACTATE IN DEXTROSE 0.25 MCG/KG/MIN: 200 INJECTION, SOLUTION INTRAVENOUS at 08:10

## 2022-10-20 RX ADMIN — ACETAMINOPHEN 1000 MG: 500 TABLET ORAL at 05:10

## 2022-10-20 RX ADMIN — Medication 10 ML: at 05:10

## 2022-10-20 RX ADMIN — SODIUM CHLORIDE TAB 1 GM 2 G: 1 TAB at 02:10

## 2022-10-20 RX ADMIN — POTASSIUM CHLORIDE 40 MEQ: 20 TABLET, EXTENDED RELEASE ORAL at 08:10

## 2022-10-20 RX ADMIN — CEFTRIAXONE 2 G: 2 INJECTION, SOLUTION INTRAVENOUS at 09:10

## 2022-10-20 RX ADMIN — CHLOROTHIAZIDE SODIUM 500 MG: 500 INJECTION, POWDER, LYOPHILIZED, FOR SOLUTION INTRAVENOUS at 03:10

## 2022-10-20 NOTE — SUBJECTIVE & OBJECTIVE
Interval History/Significant Events: better response to diuresis with milrinone, now with mild hyponatremia. Net negative 838ml. Leukocytosis downtrending.     Follow-up For: Procedure(s) (LRB):  VALVULOPLASTY,MITRAL VALVE (N/A)  REPAIR, TRICUSPID VALVE (N/A)  INSERTION, IMPELLA (N/A)  MEYER MAZE PROCEDURE (N/A)  EXCLUSION, LEFT ATRIAL APPENDAGE (N/A)    Post-Operative Day: 11 Days Post-Op    Objective:     Vital Signs (Most Recent):  Temp: 98.4 °F (36.9 °C) (10/19/22 2300)  Pulse: 109 (10/20/22 0600)  Resp: (!) 22 (10/20/22 0600)  BP: 130/63 (10/20/22 0600)  SpO2: 96 % (10/20/22 0600)   Vital Signs (24h Range):  Temp:  [97.9 °F (36.6 °C)-98.9 °F (37.2 °C)] 98.4 °F (36.9 °C)  Pulse:  [] 109  Resp:  [17-37] 22  SpO2:  [89 %-100 %] 96 %  BP: ()/(50-74) 130/63     Weight: 84.8 kg (187 lb)  Body mass index is 27.62 kg/m².      Intake/Output Summary (Last 24 hours) at 10/20/2022 0630  Last data filed at 10/20/2022 0600  Gross per 24 hour   Intake 1136.36 ml   Output 1975 ml   Net -838.64 ml       Physical Exam  Vitals and nursing note reviewed.   Constitutional:       General: He is not in acute distress.  Eyes:      General: No scleral icterus.     Extraocular Movements: Extraocular movements intact.      Pupils: Pupils are equal, round, and reactive to light.   Cardiovascular:      Rate and Rhythm: Normal rate and regular rhythm.      Pulses: Normal pulses.      Heart sounds: No murmur heard.  Pulmonary:      Effort: Pulmonary effort is normal. No respiratory distress.      Breath sounds: Decreased breath sounds present.   Abdominal:      General: Abdomen is flat. Bowel sounds are normal. There is no distension.      Palpations: Abdomen is soft.      Tenderness: There is no abdominal tenderness.   Genitourinary:     Comments: Fierro in place  Musculoskeletal:      Right lower leg: Edema present.      Left lower leg: Edema present.   Skin:     General: Skin is warm and dry.      Capillary Refill: Capillary  refill takes less than 2 seconds.      Findings: Bruising present. No lesion.      Comments: MSI with wound vac- sanguinous drainage    Neurological:      General: No focal deficit present.      Mental Status: He is alert. Mental status is at baseline.       Vents:  Vent Mode: Spont (10/12/22 1252)  Ventilator Initiated: Yes (10/11/22 0830)  Set Rate: 24 BPM (10/12/22 0916)  Vt Set: 470 mL (10/12/22 0916)  Pressure Support: 5 cmH20 (10/12/22 1252)  PEEP/CPAP: 5 cmH20 (10/12/22 1252)  Oxygen Concentration (%): 36 (10/17/22 2123)  Peak Airway Pressure: 11 cmH2O (10/12/22 1252)  Plateau Pressure: 20 cmH20 (10/12/22 1252)  Total Ve: 9.34 mL (10/12/22 1252)  Negative Inspiratory Force (cm H2O): -34 (10/12/22 1310)  F/VT Ratio<105 (RSBI): (!) 49.59 (10/12/22 0916)    Lines/Drains/Airways       Peripherally Inserted Central Catheter Line  Duration             PICC Double Lumen 10/17/22 1709 right brachial 2 days              Drain  Duration                  Urethral Catheter 10/18/22 1500 Silicone 1 day                    Significant Labs:    CBC/Anemia Profile:  Recent Labs   Lab 10/18/22  0914 10/19/22  0308 10/20/22  0509   WBC  --  31.36* 25.90*   HGB  --  7.2* 7.8*   HCT 24* 22.5* 24.1*   PLT  --  504* 526*   MCV  --  93 89   RDW  --  18.1* 18.0*        Chemistries:  Recent Labs   Lab 10/19/22  0308 10/19/22  1146 10/19/22  2114 10/20/22  0509   * 127* 127* 129*   K 3.9 4.1 4.1 3.9   CL 94* 93* 93* 94*   CO2 27 26 26 26   BUN 30* 32* 33* 33*   CREATININE 1.0 1.0 1.0 1.1   CALCIUM 8.7 8.4* 8.6* 8.5*   ALBUMIN 2.4*  --   --  2.2*   PROT 5.8*  --   --  5.6*   BILITOT 1.1*  --   --  1.1*   ALKPHOS 113  --   --  112   ALT 14  --   --  14   AST 21  --   --  15   MG 2.1  --   --  2.2   PHOS 3.6  --   --  4.2       All pertinent labs within the past 24 hours have been reviewed.    Significant Imaging:  I have reviewed all pertinent imaging results/findings within the past 24 hours.   back pain general

## 2022-10-20 NOTE — PT/OT/SLP PROGRESS
Physical Therapy Treatment    Patient Name:  David Barrios   MRN:  [id number]    Recommendations:     Discharge Recommendations:  rehabilitation facility   Discharge Equipment Recommendations:  (will determine DME needs closer to discharge)   Barriers to discharge: Decreased caregiver support family will not be able to assist at current functional level.     Assessment:     David Barrios is a 62 y.o. male admitted with a medical diagnosis of Nonrheumatic mitral valve regurgitation.  He presents with the following impairments/functional limitations:  weakness, impaired endurance, impaired functional mobility, gait instability, impaired balance, decreased safety awareness pt tolerated treatment better being able to gait train farther. Pt cont to be significantly debilitated and has no support system in place for discharge. Pt will benefit from cont skilled PT 5x/wk to progress physically. Pt will need inpt rehab when medically stable to maximize rehab potential. Pt is s/p MVr, TVr, Meyer MAZE 10/7/22.    Rehab Prognosis: Good; patient would benefit from acute skilled PT services to address these deficits and reach maximum level of function.    Recent Surgery: Procedure(s) (LRB):  VALVULOPLASTY,MITRAL VALVE (N/A)  REPAIR, TRICUSPID VALVE (N/A)  INSERTION, IMPELLA (N/A)  MEYER MAZE PROCEDURE (N/A)  EXCLUSION, LEFT ATRIAL APPENDAGE (N/A) 13 Days Post-Op    Plan:     During this hospitalization, patient to be seen 5 x/week to address the identified rehab impairments via gait training, therapeutic activities, therapeutic exercises and progress toward the following goals:    Plan of Care Expires:  11/13/22    Subjective     Chief Complaint: pt c/o being tired and SOB with gait training.   Patient/Family Comments/goals: to get better and go home.   Pain/Comfort:  Pain Rating 1: 0/10  Pain Rating Post-Intervention 1: 0/10      Objective:     Communicated with nurse prior to session.  Patient found up in chair with telemetry,  blood pressure cuff, wound vac, lambert catheter, oxygen, PICC line upon PT entry to room.     General Precautions: Standard, fall, sternal   Orthopedic Precautions:N/A   Braces:    Respiratory Status: Room air     Functional Mobility:  Transfers:  pt needed verbal cues for functional mobility with sternal precautions.    Sit to Stand:  minimum assistance with hand-held assist    Gait: pt received gait training ~ 26 ft then 30 ft (56 ft total) with min assist. Pt had sitting rest period between distance ambulated. Pt had  mask on, O2 intact, RN present with portable monitor and additional assist of 1 to follow with chair.  RN put pt on 2L O2 for first distance gait trained and 4L O2 for 2nd distance gait trained.      AM-PAC 6 CLICK MOBILITY  Turning over in bed (including adjusting bedclothes, sheets and blankets)?: 3  Sitting down on and standing up from a chair with arms (e.g., wheelchair, bedside commode, etc.): 3  Moving from lying on back to sitting on the side of the bed?: 3  Moving to and from a bed to a chair (including a wheelchair)?: 3  Need to walk in hospital room?: 3  Climbing 3-5 steps with a railing?: 1  Basic Mobility Total Score: 16       Treatment & Education:  Pt received verbal instructions in PT POC and verbally expressed understanding of such.     Patient left up in chair with all lines intact, call button in reach, and RN present..    GOALS:   Multidisciplinary Problems       Physical Therapy Goals          Problem: Physical Therapy    Goal Priority Disciplines Outcome Goal Variances Interventions   Physical Therapy Goal     PT, PT/OT Ongoing, Progressing     Description: Goals to be met by: 10/27/2022     Patient will increase functional independence with mobility by performin. Supine to sit with independence-not met  2. Sit to supine with independence -not met  3. Sit to stand transfer with stand by assistance -not met  4. Bed to chair transfer with contact guard assistance using PRW  as needed -not met  5. Gait  x 100 feet with minimum assistance using PRW as needed -not met  6. Ascend/descend 3 stair with no handrails minimum assistance using LRAD as needed -not met  7. Lower extremity exercise program x10 reps per handout, with independence -not met  8. Recall 3/3 sternal precautions-met 10/19                       Time Tracking:     PT Received On: 10/20/22  PT Start Time: 1021     PT Stop Time: 1041  PT Total Time (min): 20 min     Billable Minutes: Gait Training 20 min    Treatment Type: Treatment  PT/PTA: PT     PTA Visit Number: 0     10/20/2022

## 2022-10-20 NOTE — ASSESSMENT & PLAN NOTE
  Neuro/Psych:   -- Sedation: none  -- Pain: PRN Oxy + Dilaudid  -- Scheduled Tylenol             Cards:   -- S/P TV replacement, MV replacement, MAZE, Lt Atrial Appendage ligation and Impella placement on 10/7/22, course complicated by bleeding, requiring reopening POD#0 and  multiple VT runs requiring DCCV.      -- Impella: Removed 10/14  -- MAP > 65, Syst < 140  -- Aspirin 325mg  -- In rate-controlled Afib intermittently, on PO amio   -- lifevest obtained for low EF and VT   -- milrinone restarted, lasix with PRN diuril for diuresis, hypervolemic on exam   -- TTE 10/19 on milrinone: ef 35%, now with mod RV dysfunction, mild pericardial effusion without tamponade.   -- GDMT:  Currently on hold. Will initiate when clinically appropriate      Pulm:   -- Goal O2 sat > 90%  -- extubated to NC 10/12  -- Chest tubes removed 10/15  -- CXR with atelectasis   -- PEP therapy ordered      Renal:  -- Diuresis with IVP lasix  -- Keep lambert for strict I/O  -- Trend BUN/Cr   -- hyponatremic on CMP - like iatrogenic, holding off on thiazides      Recent Labs     10/19/22  1146 10/19/22  2114 10/20/22  0509   BUN 32* 33* 33*   CREATININE 1.0 1.0 1.1        FEN / GI:   -- Replace lytes as needed  -- Nutrition: cardiac diet   -- GI ppx: not indicated   -- Bowel reg: miralax      ID:   -- Afebrile  -- persistent leukocytosis. Blood cx cleared since 10/14 on appropriate abx. Suspect possible graft infection. Blood cx repeated with NGTD  -- Viry-op ancef completed  -- serratia macescens bacteremia 10/12  - pan sensitive. Narrowed abx to ceftriaxone  -- Sternal culture with GNR growth, wound vac  -- PICC in place for long term abx plan     Recent Labs     10/18/22  0430 10/19/22  0308 10/20/22  0509   WBC 31.39* 31.36* 25.90*        Heme/Onc:   -- stable post-operative anemia  -- Daily CBC  -- Aspirin 325mg QD;   -- Anticoagulation for intracardiac thrombus transitioned to levonox    Recent Labs     10/19/22  0308 10/20/22  7713    HGB 7.2* 7.8*   HCT 22.5* 24.1*   APTT 75.3*  --         Endo:   -- BG goal 140-180  -- Insulin gtt  -- Hgb A1c 5.5      PPx:   Feeding: Cardiac diet  Analgesia/Sedation: precedex/PRN Oxy + Dilaudid  Thromboembolic prevention: SCDs, lovenox   HOB >30: Yes  Stress Ulcer ppx: not indicated   Glucose control: Critical care goal 140-180 g/dl, ISS     Lines/Drains/Airway: PICC       Dispo/Code Status/Palliative:   -- SICU / Full Code.

## 2022-10-20 NOTE — PLAN OF CARE
Problem: Physical Therapy  Goal: Physical Therapy Goal  Description: Goals to be met by: 10/27/2022     Patient will increase functional independence with mobility by performin. Supine to sit with independence-not met  2. Sit to supine with independence -not met  3. Sit to stand transfer with stand by assistance -not met  4. Bed to chair transfer with contact guard assistance using PRW as needed -not met  5. Gait  x 100 feet with minimum assistance using PRW as needed -not met  6. Ascend/descend 3 stair with no handrails minimum assistance using LRAD as needed -not met  7. Lower extremity exercise program x10 reps per handout, with independence -not met  8. Recall 3/3 sternal precautions-met 10/19  Outcome: Ongoing, Progressing   Goals remain appropriate. 10/20/2022

## 2022-10-20 NOTE — PROGRESS NOTES
[facility] - [facility] Intensive Care  Critical Care - Surgery  Progress Note    Patient Name: David Barrios  MRN: [id number]  Admission Date: 10/2/2022  Hospital Length of Stay: 18 days  Code Status: Full Code  Attending Provider: Mike Hedrick MD  Primary Care Provider: Breann Tavera MD   Principal Problem: Nonrheumatic mitral valve regurgitation    Subjective:     Hospital/ICU Course:  No notes on file    Interval History/Significant Events: better response to diuresis with milrinone, now with mild hyponatremia. Net negative 838ml. Leukocytosis downtrending.     Follow-up For: Procedure(s) (LRB):  VALVULOPLASTY,MITRAL VALVE (N/A)  REPAIR, TRICUSPID VALVE (N/A)  INSERTION, IMPELLA (N/A)  MEYER MAZE PROCEDURE (N/A)  EXCLUSION, LEFT ATRIAL APPENDAGE (N/A)    Post-Operative Day: 11 Days Post-Op    Objective:     Vital Signs (Most Recent):  Temp: 98.4 °F (36.9 °C) (10/19/22 2300)  Pulse: 109 (10/20/22 0600)  Resp: (!) 22 (10/20/22 0600)  BP: 130/63 (10/20/22 0600)  SpO2: 96 % (10/20/22 0600)   Vital Signs (24h Range):  Temp:  [97.9 °F (36.6 °C)-98.9 °F (37.2 °C)] 98.4 °F (36.9 °C)  Pulse:  [] 109  Resp:  [17-37] 22  SpO2:  [89 %-100 %] 96 %  BP: ()/(50-74) 130/63     Weight: 84.8 kg (187 lb)  Body mass index is 27.62 kg/m².      Intake/Output Summary (Last 24 hours) at 10/20/2022 0630  Last data filed at 10/20/2022 0600  Gross per 24 hour   Intake 1136.36 ml   Output 1975 ml   Net -838.64 ml       Physical Exam  Vitals and nursing note reviewed.   Constitutional:       General: He is not in acute distress.  Eyes:      General: No scleral icterus.     Extraocular Movements: Extraocular movements intact.      Pupils: Pupils are equal, round, and reactive to light.   Cardiovascular:      Rate and Rhythm: Normal rate and regular rhythm.      Pulses: Normal pulses.      Heart sounds: No murmur heard.  Pulmonary:      Effort: Pulmonary effort is normal. No respiratory distress.      Breath sounds: Decreased  breath sounds present.   Abdominal:      General: Abdomen is flat. Bowel sounds are normal. There is no distension.      Palpations: Abdomen is soft.      Tenderness: There is no abdominal tenderness.   Genitourinary:     Comments: Fierro in place  Musculoskeletal:      Right lower leg: Edema present.      Left lower leg: Edema present.   Skin:     General: Skin is warm and dry.      Capillary Refill: Capillary refill takes less than 2 seconds.      Findings: Bruising present. No lesion.      Comments: MSI with wound vac- sanguinous drainage    Neurological:      General: No focal deficit present.      Mental Status: He is alert. Mental status is at baseline.       Vents:  Vent Mode: Spont (10/12/22 1252)  Ventilator Initiated: Yes (10/11/22 0830)  Set Rate: 24 BPM (10/12/22 0916)  Vt Set: 470 mL (10/12/22 0916)  Pressure Support: 5 cmH20 (10/12/22 1252)  PEEP/CPAP: 5 cmH20 (10/12/22 1252)  Oxygen Concentration (%): 36 (10/17/22 2123)  Peak Airway Pressure: 11 cmH2O (10/12/22 1252)  Plateau Pressure: 20 cmH20 (10/12/22 1252)  Total Ve: 9.34 mL (10/12/22 1252)  Negative Inspiratory Force (cm H2O): -34 (10/12/22 1310)  F/VT Ratio<105 (RSBI): (!) 49.59 (10/12/22 0916)    Lines/Drains/Airways       Peripherally Inserted Central Catheter Line  Duration             PICC Double Lumen 10/17/22 1709 right brachial 2 days              Drain  Duration                  Urethral Catheter 10/18/22 1500 Silicone 1 day                    Significant Labs:    CBC/Anemia Profile:  Recent Labs   Lab 10/18/22  0914 10/19/22  0308 10/20/22  0509   WBC  --  31.36* 25.90*   HGB  --  7.2* 7.8*   HCT 24* 22.5* 24.1*   PLT  --  504* 526*   MCV  --  93 89   RDW  --  18.1* 18.0*        Chemistries:  Recent Labs   Lab 10/19/22  0308 10/19/22  1146 10/19/22  2114 10/20/22  0509   * 127* 127* 129*   K 3.9 4.1 4.1 3.9   CL 94* 93* 93* 94*   CO2 27 26 26 26   BUN 30* 32* 33* 33*   CREATININE 1.0 1.0 1.0 1.1   CALCIUM 8.7 8.4* 8.6* 8.5*    ALBUMIN 2.4*  --   --  2.2*   PROT 5.8*  --   --  5.6*   BILITOT 1.1*  --   --  1.1*   ALKPHOS 113  --   --  112   ALT 14  --   --  14   AST 21  --   --  15   MG 2.1  --   --  2.2   PHOS 3.6  --   --  4.2       All pertinent labs within the past 24 hours have been reviewed.    Significant Imaging:  I have reviewed all pertinent imaging results/findings within the past 24 hours.    Assessment/Plan:     * Nonrheumatic mitral valve regurgitation    Neuro/Psych:   -- Sedation: none  -- Pain: PRN Oxy + Dilaudid  -- Scheduled Tylenol             Cards:   -- S/P TV replacement, MV replacement, MAZE, Lt Atrial Appendage ligation and Impella placement on 10/7/22, course complicated by bleeding, requiring reopening POD#0 and  multiple VT runs requiring DCCV.      -- Impella: Removed 10/14  -- MAP > 65, Syst < 140  -- Aspirin 325mg  -- In rate-controlled Afib intermittently, on PO amio   -- lifevest obtained for low EF and VT   -- milrinone restarted, lasix with PRN diuril for diuresis, hypervolemic on exam   -- TTE 10/19 on milrinone: ef 35%, now with mod RV dysfunction, mild pericardial effusion without tamponade.   -- GDMT:  Currently on hold. Will initiate when clinically appropriate      Pulm:   -- Goal O2 sat > 90%  -- extubated to NC 10/12  -- Chest tubes removed 10/15  -- CXR with atelectasis   -- PEP therapy ordered      Renal:  -- Diuresis with IVP lasix  -- Keep lambert for strict I/O  -- Trend BUN/Cr   -- hyponatremic on CMP - like iatrogenic, holding off on thiazides      Recent Labs     10/19/22  1146 10/19/22  2114 10/20/22  0509   BUN 32* 33* 33*   CREATININE 1.0 1.0 1.1        FEN / GI:   -- Replace lytes as needed  -- Nutrition: cardiac diet   -- GI ppx: not indicated   -- Bowel reg: miralax      ID:   -- Afebrile  -- persistent leukocytosis. Blood cx cleared since 10/14 on appropriate abx. Suspect possible graft infection. Blood cx repeated with NGTD  -- Viry-op ancef completed  -- serratia macescens  bacteremia 10/12  - pan sensitive. Narrowed abx to ceftriaxone  -- Sternal culture with GNR growth, wound vac  -- PICC in place for long term abx plan     Recent Labs     10/18/22  0430 10/19/22  0308 10/20/22  0509   WBC 31.39* 31.36* 25.90*        Heme/Onc:   -- stable post-operative anemia  -- Daily CBC  -- Aspirin 325mg QD;   -- Anticoagulation for intracardiac thrombus transitioned to levonox    Recent Labs     10/19/22  0308 10/20/22  0509   HGB 7.2* 7.8*   HCT 22.5* 24.1*   APTT 75.3*  --         Endo:   -- BG goal 140-180  -- Insulin gtt  -- Hgb A1c 5.5      PPx:   Feeding: Cardiac diet  Analgesia/Sedation: precedex/PRN Oxy + Dilaudid  Thromboembolic prevention: SCDs, lovenox   HOB >30: Yes  Stress Ulcer ppx: not indicated   Glucose control: Critical care goal 140-180 g/dl, ISS     Lines/Drains/Airway: PICC       Dispo/Code Status/Palliative:   -- SICU / Full Code.            Leah Lay NP  Critical Care - Surgery  [facility] - [facility]

## 2022-10-21 PROBLEM — R78.81 BACTEREMIA: Status: ACTIVE | Noted: 2022-10-21

## 2022-10-21 LAB
ALBUMIN SERPL BCP-MCNC: 2.2 G/DL (ref 3.5–5.2)
ALP SERPL-CCNC: 146 U/L (ref 55–135)
ALT SERPL W/O P-5'-P-CCNC: 20 U/L (ref 10–44)
ANION GAP SERPL CALC-SCNC: 8 MMOL/L (ref 8–16)
ANION GAP SERPL CALC-SCNC: 8 MMOL/L (ref 8–16)
ANION GAP SERPL CALC-SCNC: 9 MMOL/L (ref 8–16)
ANION GAP SERPL CALC-SCNC: 9 MMOL/L (ref 8–16)
ANISOCYTOSIS BLD QL SMEAR: SLIGHT
AST SERPL-CCNC: 23 U/L (ref 10–40)
BACTERIA BLD CULT: NORMAL
BACTERIA BLD CULT: NORMAL
BASO STIPL BLD QL SMEAR: ABNORMAL
BASOPHILS # BLD AUTO: 0.03 K/UL (ref 0–0.2)
BASOPHILS NFR BLD: 0.1 % (ref 0–1.9)
BILIRUB SERPL-MCNC: 0.8 MG/DL (ref 0.1–1)
BUN SERPL-MCNC: 34 MG/DL (ref 8–23)
BUN SERPL-MCNC: 34 MG/DL (ref 8–23)
BUN SERPL-MCNC: 35 MG/DL (ref 8–23)
BUN SERPL-MCNC: 35 MG/DL (ref 8–23)
BURR CELLS BLD QL SMEAR: ABNORMAL
CALCIUM SERPL-MCNC: 8.5 MG/DL (ref 8.7–10.5)
CALCIUM SERPL-MCNC: 8.9 MG/DL (ref 8.7–10.5)
CHLORIDE SERPL-SCNC: 100 MMOL/L (ref 95–110)
CHLORIDE SERPL-SCNC: 100 MMOL/L (ref 95–110)
CHLORIDE SERPL-SCNC: 97 MMOL/L (ref 95–110)
CHLORIDE SERPL-SCNC: 99 MMOL/L (ref 95–110)
CO2 SERPL-SCNC: 24 MMOL/L (ref 23–29)
CO2 SERPL-SCNC: 27 MMOL/L (ref 23–29)
CO2 SERPL-SCNC: 29 MMOL/L (ref 23–29)
CO2 SERPL-SCNC: 29 MMOL/L (ref 23–29)
CREAT SERPL-MCNC: 1 MG/DL (ref 0.5–1.4)
DIFFERENTIAL METHOD: ABNORMAL
EOSINOPHIL # BLD AUTO: 0.1 K/UL (ref 0–0.5)
EOSINOPHIL NFR BLD: 0.3 % (ref 0–8)
ERYTHROCYTE [DISTWIDTH] IN BLOOD BY AUTOMATED COUNT: 18.3 % (ref 11.5–14.5)
EST. GFR  (NO RACE VARIABLE): >60 ML/MIN/1.73 M^2
GIANT PLATELETS BLD QL SMEAR: PRESENT
GLUCOSE SERPL-MCNC: 117 MG/DL (ref 70–110)
GLUCOSE SERPL-MCNC: 124 MG/DL (ref 70–110)
GLUCOSE SERPL-MCNC: 140 MG/DL (ref 70–110)
GLUCOSE SERPL-MCNC: 140 MG/DL (ref 70–110)
HCT VFR BLD AUTO: 24.7 % (ref 40–54)
HGB BLD-MCNC: 7.8 G/DL (ref 14–18)
HYPOCHROMIA BLD QL SMEAR: ABNORMAL
IMM GRANULOCYTES # BLD AUTO: 0.41 K/UL (ref 0–0.04)
IMM GRANULOCYTES NFR BLD AUTO: 1.7 % (ref 0–0.5)
LYMPHOCYTES # BLD AUTO: 0.8 K/UL (ref 1–4.8)
LYMPHOCYTES NFR BLD: 3.3 % (ref 18–48)
MAGNESIUM SERPL-MCNC: 2.3 MG/DL (ref 1.6–2.6)
MCH RBC QN AUTO: 28.7 PG (ref 27–31)
MCHC RBC AUTO-ENTMCNC: 31.6 G/DL (ref 32–36)
MCV RBC AUTO: 91 FL (ref 82–98)
MONOCYTES # BLD AUTO: 2.2 K/UL (ref 0.3–1)
MONOCYTES NFR BLD: 8.9 % (ref 4–15)
NEUTROPHILS # BLD AUTO: 21 K/UL (ref 1.8–7.7)
NEUTROPHILS NFR BLD: 85.7 % (ref 38–73)
NRBC BLD-RTO: 1 /100 WBC
OVALOCYTES BLD QL SMEAR: ABNORMAL
PHOSPHATE SERPL-MCNC: 4 MG/DL (ref 2.7–4.5)
PLATELET # BLD AUTO: 555 K/UL (ref 150–450)
PLATELET BLD QL SMEAR: ABNORMAL
PMV BLD AUTO: 10.3 FL (ref 9.2–12.9)
POIKILOCYTOSIS BLD QL SMEAR: SLIGHT
POLYCHROMASIA BLD QL SMEAR: ABNORMAL
POTASSIUM SERPL-SCNC: 4.1 MMOL/L (ref 3.5–5.1)
POTASSIUM SERPL-SCNC: 4.7 MMOL/L (ref 3.5–5.1)
POTASSIUM SERPL-SCNC: 4.7 MMOL/L (ref 3.5–5.1)
POTASSIUM SERPL-SCNC: 4.8 MMOL/L (ref 3.5–5.1)
PROT SERPL-MCNC: 5.7 G/DL (ref 6–8.4)
RBC # BLD AUTO: 2.72 M/UL (ref 4.6–6.2)
SCHISTOCYTES BLD QL SMEAR: PRESENT
SODIUM SERPL-SCNC: 130 MMOL/L (ref 136–145)
SODIUM SERPL-SCNC: 135 MMOL/L (ref 136–145)
SODIUM SERPL-SCNC: 137 MMOL/L (ref 136–145)
SODIUM SERPL-SCNC: 137 MMOL/L (ref 136–145)
SPHEROCYTES BLD QL SMEAR: ABNORMAL
TOXIC GRANULES BLD QL SMEAR: PRESENT
WBC # BLD AUTO: 24.54 K/UL (ref 3.9–12.7)

## 2022-10-21 PROCEDURE — A4216 STERILE WATER/SALINE, 10 ML: HCPCS | Performed by: THORACIC SURGERY (CARDIOTHORACIC VASCULAR SURGERY)

## 2022-10-21 PROCEDURE — 83735 ASSAY OF MAGNESIUM: CPT | Performed by: STUDENT IN AN ORGANIZED HEALTH CARE EDUCATION/TRAINING PROGRAM

## 2022-10-21 PROCEDURE — 63600175 PHARM REV CODE 636 W HCPCS

## 2022-10-21 PROCEDURE — 99291 PR CRITICAL CARE, E/M 30-74 MINUTES: ICD-10-PCS | Mod: ,,, | Performed by: ANESTHESIOLOGY

## 2022-10-21 PROCEDURE — 80048 BASIC METABOLIC PNL TOTAL CA: CPT | Mod: 91,XB

## 2022-10-21 PROCEDURE — 80048 BASIC METABOLIC PNL TOTAL CA: CPT | Mod: XB | Performed by: STUDENT IN AN ORGANIZED HEALTH CARE EDUCATION/TRAINING PROGRAM

## 2022-10-21 PROCEDURE — 27000646 HC AEROBIKA DEVICE

## 2022-10-21 PROCEDURE — 85025 COMPLETE CBC W/AUTO DIFF WBC: CPT | Performed by: THORACIC SURGERY (CARDIOTHORACIC VASCULAR SURGERY)

## 2022-10-21 PROCEDURE — 99900035 HC TECH TIME PER 15 MIN (STAT)

## 2022-10-21 PROCEDURE — 94799 UNLISTED PULMONARY SVC/PX: CPT

## 2022-10-21 PROCEDURE — 99223 1ST HOSP IP/OBS HIGH 75: CPT | Mod: ,,, | Performed by: INTERNAL MEDICINE

## 2022-10-21 PROCEDURE — 20000000 HC ICU ROOM

## 2022-10-21 PROCEDURE — 63600175 PHARM REV CODE 636 W HCPCS: Performed by: THORACIC SURGERY (CARDIOTHORACIC VASCULAR SURGERY)

## 2022-10-21 PROCEDURE — 80053 COMPREHEN METABOLIC PANEL: CPT

## 2022-10-21 PROCEDURE — 25000003 PHARM REV CODE 250

## 2022-10-21 PROCEDURE — 25000003 PHARM REV CODE 250: Performed by: STUDENT IN AN ORGANIZED HEALTH CARE EDUCATION/TRAINING PROGRAM

## 2022-10-21 PROCEDURE — 84100 ASSAY OF PHOSPHORUS: CPT | Performed by: STUDENT IN AN ORGANIZED HEALTH CARE EDUCATION/TRAINING PROGRAM

## 2022-10-21 PROCEDURE — 94664 DEMO&/EVAL PT USE INHALER: CPT

## 2022-10-21 PROCEDURE — 99223 PR INITIAL HOSPITAL CARE,LEVL III: ICD-10-PCS | Mod: ,,, | Performed by: INTERNAL MEDICINE

## 2022-10-21 PROCEDURE — 25000003 PHARM REV CODE 250: Performed by: THORACIC SURGERY (CARDIOTHORACIC VASCULAR SURGERY)

## 2022-10-21 PROCEDURE — 94761 N-INVAS EAR/PLS OXIMETRY MLT: CPT

## 2022-10-21 PROCEDURE — 99291 CRITICAL CARE FIRST HOUR: CPT | Mod: ,,, | Performed by: ANESTHESIOLOGY

## 2022-10-21 PROCEDURE — 63600175 PHARM REV CODE 636 W HCPCS: Performed by: STUDENT IN AN ORGANIZED HEALTH CARE EDUCATION/TRAINING PROGRAM

## 2022-10-21 RX ORDER — CARVEDILOL 6.25 MG/1
6.25 TABLET ORAL 2 TIMES DAILY
Status: DISCONTINUED | OUTPATIENT
Start: 2022-10-21 | End: 2022-10-24

## 2022-10-21 RX ORDER — TOLVAPTAN 30 MG/1
30 TABLET ORAL ONCE
Status: COMPLETED | OUTPATIENT
Start: 2022-10-21 | End: 2022-10-21

## 2022-10-21 RX ORDER — ACETAMINOPHEN 500 MG
1000 TABLET ORAL EVERY 8 HOURS PRN
Status: DISCONTINUED | OUTPATIENT
Start: 2022-10-21 | End: 2022-10-25 | Stop reason: HOSPADM

## 2022-10-21 RX ORDER — TALC
6 POWDER (GRAM) TOPICAL NIGHTLY
Status: DISCONTINUED | OUTPATIENT
Start: 2022-10-21 | End: 2022-10-25 | Stop reason: HOSPADM

## 2022-10-21 RX ORDER — QUETIAPINE FUMARATE 25 MG/1
100 TABLET, FILM COATED ORAL NIGHTLY
Status: DISCONTINUED | OUTPATIENT
Start: 2022-10-21 | End: 2022-10-25 | Stop reason: HOSPADM

## 2022-10-21 RX ORDER — CEFEPIME HYDROCHLORIDE 1 G/50ML
2 INJECTION, SOLUTION INTRAVENOUS
Status: DISCONTINUED | OUTPATIENT
Start: 2022-10-21 | End: 2022-10-25 | Stop reason: HOSPADM

## 2022-10-21 RX ADMIN — CEFEPIME 2 G: 2 INJECTION, POWDER, FOR SOLUTION INTRAVENOUS at 12:10

## 2022-10-21 RX ADMIN — SENNOSIDES AND DOCUSATE SODIUM 1 TABLET: 50; 8.6 TABLET ORAL at 08:10

## 2022-10-21 RX ADMIN — CARVEDILOL 6.25 MG: 6.25 TABLET, FILM COATED ORAL at 12:10

## 2022-10-21 RX ADMIN — AMIODARONE HYDROCHLORIDE 200 MG: 200 TABLET ORAL at 08:10

## 2022-10-21 RX ADMIN — CHLOROTHIAZIDE SODIUM 250 MG: 500 INJECTION, POWDER, LYOPHILIZED, FOR SOLUTION INTRAVENOUS at 09:10

## 2022-10-21 RX ADMIN — FUROSEMIDE 80 MG: 10 INJECTION, SOLUTION INTRAMUSCULAR; INTRAVENOUS at 08:10

## 2022-10-21 RX ADMIN — AMIODARONE HYDROCHLORIDE 200 MG: 200 TABLET ORAL at 09:10

## 2022-10-21 RX ADMIN — TAMSULOSIN HYDROCHLORIDE 0.4 MG: 0.4 CAPSULE ORAL at 09:10

## 2022-10-21 RX ADMIN — MILRINONE LACTATE IN DEXTROSE 0.25 MCG/KG/MIN: 200 INJECTION, SOLUTION INTRAVENOUS at 02:10

## 2022-10-21 RX ADMIN — ACETAMINOPHEN 1000 MG: 500 TABLET ORAL at 12:10

## 2022-10-21 RX ADMIN — FUROSEMIDE 80 MG: 10 INJECTION, SOLUTION INTRAMUSCULAR; INTRAVENOUS at 02:10

## 2022-10-21 RX ADMIN — ACETAMINOPHEN 1000 MG: 500 TABLET ORAL at 05:10

## 2022-10-21 RX ADMIN — FAMOTIDINE 20 MG: 20 TABLET ORAL at 09:10

## 2022-10-21 RX ADMIN — QUETIAPINE FUMARATE 100 MG: 25 TABLET ORAL at 08:10

## 2022-10-21 RX ADMIN — SODIUM CHLORIDE TAB 1 GM 3 G: 1 TAB at 11:10

## 2022-10-21 RX ADMIN — SENNOSIDES AND DOCUSATE SODIUM 1 TABLET: 50; 8.6 TABLET ORAL at 09:10

## 2022-10-21 RX ADMIN — Medication 10 ML: at 06:10

## 2022-10-21 RX ADMIN — POLYETHYLENE GLYCOL 3350 17 G: 17 POWDER, FOR SOLUTION ORAL at 09:10

## 2022-10-21 RX ADMIN — SODIUM CHLORIDE TAB 1 GM 3 G: 1 TAB at 09:10

## 2022-10-21 RX ADMIN — ENOXAPARIN SODIUM 80 MG: 80 INJECTION SUBCUTANEOUS at 08:10

## 2022-10-21 RX ADMIN — ASPIRIN 325 MG ORAL TABLET 325 MG: 325 PILL ORAL at 09:10

## 2022-10-21 RX ADMIN — Medication 10 ML: at 12:10

## 2022-10-21 RX ADMIN — POTASSIUM CHLORIDE 40 MEQ: 20 TABLET, EXTENDED RELEASE ORAL at 09:10

## 2022-10-21 RX ADMIN — CARVEDILOL 6.25 MG: 6.25 TABLET, FILM COATED ORAL at 08:10

## 2022-10-21 RX ADMIN — SODIUM CHLORIDE TAB 1 GM 3 G: 1 TAB at 02:10

## 2022-10-21 RX ADMIN — Medication 6 MG: at 08:10

## 2022-10-21 RX ADMIN — OXYCODONE 5 MG: 5 TABLET ORAL at 09:10

## 2022-10-21 RX ADMIN — FUROSEMIDE 80 MG: 10 INJECTION, SOLUTION INTRAMUSCULAR; INTRAVENOUS at 09:10

## 2022-10-21 RX ADMIN — OXYCODONE HYDROCHLORIDE 10 MG: 10 TABLET ORAL at 07:10

## 2022-10-21 RX ADMIN — FAMOTIDINE 20 MG: 20 TABLET ORAL at 08:10

## 2022-10-21 RX ADMIN — TOLVAPTAN 30 MG: 30 TABLET ORAL at 12:10

## 2022-10-21 RX ADMIN — ENOXAPARIN SODIUM 80 MG: 80 INJECTION SUBCUTANEOUS at 09:10

## 2022-10-21 RX ADMIN — CEFEPIME 2 G: 2 INJECTION, POWDER, FOR SOLUTION INTRAVENOUS at 07:10

## 2022-10-21 RX ADMIN — Medication 10 ML: at 11:10

## 2022-10-21 RX ADMIN — Medication 10 ML: at 05:10

## 2022-10-21 NOTE — ASSESSMENT & PLAN NOTE
62 y.o. male with history of hypertension, atrial fibrillation and CHF along with severe mitral regurgitation, admitted for surgical evaluation, underwent Tricuspid and mitral valve replacement, MAZE, left atrial appendage ligation and impella placement (gelweave graft used) on 10/7, post op course complicated by bleeding on POD#0 that required emergent sternotomy and bleeding was subsequently controlled thereafter. Blood and aerobic wound cultures from the sternum show serratia marcescens/ampC inducible organism . Afebrile, empirically was on ceftriaxone (based on reported susceptibilities) , ID consulted in the setting of bacteremia.          Recommendations    1. Serratia is part of a group of organisms that can easily develop inducible resistance to third generation cephalosporins, hence ceftriaxone is not appropriate, antibiotic regimen changed to cefepime 2g q8h IV.     2. Anticipate antibiotic therapy for 6 weeks via PICC line, need to establish a stable address and follow up in the clinic should be certain before proceeding. Presence of graft complicates infection, and hence will need prolonged therapy and follow up.

## 2022-10-21 NOTE — PLAN OF CARE
[facility]  Discharge Reassessment    Primary Care Provider: Breann Tavera MD    Expected Discharge Date: 10/24/2022    Reassessment (most recent)       Discharge Reassessment - 10/21/22 0933          Discharge Reassessment    Assessment Type Discharge Planning Reassessment     Did the patient's condition or plan change since previous assessment? Yes     Discharge Plan discussed with: Patient;Sibling     Communicated AMARILYS with patient/caregiver Date not available/Unable to determine     Discharge Plan A Long-term acute care facility (LTAC)     Discharge Plan B Rehab     DME Needed Upon Discharge  other (see comments)   TBD    Discharge Barriers Identified None     Why the patient remains in the hospital Requires continued medical care        Post-Acute Status    Post-Acute Authorization Placement   [facility]    Post-Acute Placement Status Pending payor review/awaiting authorization (if required)                         Saturnino Reyes LMSW  Case Management [facility]  Ext: [phone]

## 2022-10-21 NOTE — CONSULTS
[facility]  Infectious Disease  Consult Note    Patient Name: David Barrios  MRN: [id number]  Admission Date: 10/2/2022  Hospital Length of Stay: 19 days  Attending Physician: Mike Hedrick MD  Primary Care Provider: Breann Tavera MD     Isolation Status: No active isolations    Patient information was obtained from patient and ER records.      Inpatient consult to Infectious Diseases  Consult performed by: Jeferson Artis MD  Consult ordered by: Leah Lay NP        Assessment/Plan:     Bacteremia  62 y.o. male with history of hypertension, atrial fibrillation and CHF along with severe mitral regurgitation, admitted for surgical evaluation, underwent Tricuspid and mitral valve replacement, MAZE, left atrial appendage ligation and impella placement (gelweave graft used) on 10/7, post op course complicated by bleeding on POD#0 that required emergent sternotomy and bleeding was subsequently controlled thereafter. Blood and aerobic wound cultures from the sternum show serratia marcescens/ampC inducible organism . Afebrile, empirically was on ceftriaxone (based on reported susceptibilities) , ID consulted in the setting of bacteremia.          Recommendations    1. Serratia is part of a group of organisms that can easily develop inducible resistance to third generation cephalosporins, hence ceftriaxone is not appropriate, antibiotic regimen changed to cefepime 2g q8h IV.     2. Anticipate antibiotic therapy for 6 weeks via PICC line, need to establish a stable address and follow up in the clinic should be certain before proceeding. Presence of graft complicates infection, and hence will need prolonged therapy and follow up.                 Thank you for your consult. I will follow-up with patient. Please contact us if you have any additional questions.    Jeferson Artis MD  Infectious Disease  [facility]    Subjective:     Principal Problem: Nonrheumatic  mitral valve regurgitation    HPI: 62 y.o. male with history of hypertension, atrial fibrillation and CHF along with severe mitral regurgitation, admitted for surgical evaluation, underwent Tricuspid and mitral valve replacement, MAZE, left atrial appendage ligation and impella placement on 10/7, post op course complicated by bleeding on POD#0 that required emergent sternotomy and bleeding was subsequently controlled thereafter.     Post operative course was complicated by bleeding, requiring re-opening and bleeding subsequently controlled with emergent sternotomy. of note, there is presence of an aortic graft (10mm Vascutek Gelweave) into the left ventricle that is used during insertion of impella pump. The patient also had multiple VT runs requiring DCCV.    Blood cultures with serratia marcescens bacteremia on 10/11, 10/12. BCx 10/14 NGTD. Aerobic wound culture from sternal wound 10/14 also with serratia marcescens. The patient immediately starting spiking fevers 10/17, until 10/11; afebrile since 10/11. He was initially given vancomycin and piperacillin/tazobactam, and then switched over to ceftriaxone once susceptibilities were resulted from blood culture. He has been afebrile with persistent leukocytosis. CXR 10/18: Lung zones also appear essentially stable, with note again made of opacities consistent with subsegmental atelectasis in the right mid lung zone but with no new areas of airspace consolidation or volume loss having developed. Today the patient states he is feeling well, denies diarrhea, dysuria, SOB, cough, has minimal pain at sternotomy site, denies fevers, and states energy level is improving.       ID was consulted for long term plan for gram negative bacteremia, source likely infected graft - not stable for OR. PICC in place, dc to LTACH        Past Medical History:   Diagnosis Date    Anemia     Atrial fibrillation     Encounter for blood transfusion     Esophageal ulcer     GI bleed      Hypertension        Past Surgical History:   Procedure Laterality Date    CARDIOVERSION Left 9/15/2022    Procedure: Cardioversion;  Surgeon: Julio James MD;  Location: [facility] CATH LAB/EP;  Service: Cardiology;  Laterality: Left;  With NORBERT    CATHETERIZATION OF BOTH LEFT AND RIGHT HEART N/A 9/22/2022    Procedure: CATHETERIZATION, HEART, BOTH LEFT AND RIGHT;  Surgeon: Julio James MD;  Location: [facility] CATH LAB/EP;  Service: Cardiology;  Laterality: N/A;    COLONOSCOPY N/A 4/4/2019    Procedure: COLONOSCOPY Gol[place];  Surgeon: Umair Randolph MD;  Location: [facility] ENDO;  Service: Endoscopy;  Laterality: N/A;    CORONARY ANGIOGRAPHY N/A 9/22/2022    Procedure: ANGIOGRAM, CORONARY ARTERY;  Surgeon: Julio James MD;  Location: [facility] CATH LAB/EP;  Service: Cardiology;  Laterality: N/A;    MEYER MAZE PROCEDURE N/A 10/7/2022    Procedure: MEYER MAZE PROCEDURE;  Surgeon: Mike Hedrick MD;  Location: [address];  Service: Cardiovascular;  Laterality: N/A;    ESOPHAGOGASTRODUODENOSCOPY N/A 10/12/2018    Procedure: EGD (ESOPHAGOGASTRODUODENOSCOPY);  Surgeon: Braden Herring MD;  Location: [facility];  Service: Endoscopy;  Laterality: N/A;    ESOPHAGOGASTRODUODENOSCOPY N/A 4/4/2019    Procedure: EGD;  Surgeon: Umair Randolph MD;  Location: [facility];  Service: Endoscopy;  Laterality: N/A;    EXCLUSION OF LEFT ATRIAL APPENDAGE N/A 10/7/2022    Procedure: EXCLUSION, LEFT ATRIAL APPENDAGE;  Surgeon: Mike Hedrick MD;  Location: [address];  Service: Cardiovascular;  Laterality: N/A;    HERNIA REPAIR      INSERTION OF INTRAVASCULAR MICROAXIAL BLOOD PUMP N/A 10/7/2022    Procedure: INSERTION, IMPELLA;  Surgeon: Mike Hedrick MD;  Location: [facility] OR [address] [place] [address];  Service: Cardiovascular;  Laterality: N/A;  direct aortic insertion    IRRIGATION OF MEDIASTINUM N/A 10/7/2022    Procedure: IRRIGATION, MEDIASTINUM;  Surgeon: Mike Hedrick MD;  Location: [facility] OR [address] [place] [address];  Service:  Cardiovascular;  Laterality: N/A;    TRICUSPID VALVULOPLASTY N/A 10/7/2022    Procedure: REPAIR, TRICUSPID VALVE;  Surgeon: Mike Hedrick MD;  Location: [facility] OR [address] [place] [address];  Service: Cardiovascular;  Laterality: N/A;       Review of patient's allergies indicates:  No Known Allergies    Medications:  Medications Prior to Admission   Medication Sig    albuterol (PROVENTIL/VENTOLIN HFA) 90 mcg/actuation inhaler inhale 1-2 Puff(s) By Mouth Every 4 Hours as needed    amiodarone (PACERONE) 200 MG Tab Take 1 tablet (200 mg total) by mouth 2 (two) times daily.    furosemide (LASIX) 40 MG tablet TAKE 1 TABLET(40 MG) BY MOUTH TWICE DAILY    hydroCHLOROthiazide (HYDRODIURIL) 25 MG tablet Take 25 mg by mouth once daily.    rivaroxaban (XARELTO) 20 mg Tab Take 1 tablet (20 mg total) by mouth daily with dinner or evening meal.    carvediloL (COREG) 12.5 MG tablet Take 1 tablet (12.5 mg total) by mouth 2 (two) times daily with meals.    ferrous sulfate (FEOSOL) 325 mg (65 mg iron) Tab tablet Take 1 tablet (325 mg total) by mouth daily with breakfast.     Antibiotics (From admission, onward)      Start     Stop Route Frequency Ordered    10/21/22 1245  cefepime in dextrose 5 % IVPB 2 g         -- IV Every 8 hours (non-standard times) 10/21/22 1131          Antifungals (From admission, onward)      None          Antivirals (From admission, onward)      None             Immunization History   Administered Date(s) Administered    Influenza - Quadrivalent - PF *Preferred* (6 months and older) 10/12/2018    Pneumococcal Conjugate - 13 Valent 10/12/2018       Family History       Problem Relation (Age of Onset)    COPD Mother    Cancer Father          Social History     Socioeconomic History    Marital status: Single   Tobacco Use    Smoking status: Never    Smokeless tobacco: Current     Types: Chew   Substance and Sexual Activity    Alcohol use: Yes     Alcohol/week: 20.0 standard drinks     Types: 20 Cans of beer per  week    Drug use: No     Review of Systems   Constitutional:  Positive for fatigue. Negative for activity change, appetite change and fever.   HENT:  Negative for congestion and sore throat.    Respiratory:  Positive for shortness of breath. Negative for chest tightness.    Cardiovascular:  Positive for chest pain and leg swelling.   Gastrointestinal:  Negative for blood in stool, diarrhea, nausea and vomiting.   Endocrine: Negative for cold intolerance and heat intolerance.   Genitourinary:  Negative for dysuria.   Musculoskeletal:  Negative for arthralgias, joint swelling and neck stiffness.   Neurological:  Negative for syncope and light-headedness.   Hematological:  Negative for adenopathy. Does not bruise/bleed easily.   Psychiatric/Behavioral:  Negative for confusion.    Objective:     Vital Signs (Most Recent):  Temp: 98.8 °F (37.1 °C) (10/21/22 1200)  Pulse: 89 (10/21/22 1504)  Resp: (!) 22 (10/21/22 1504)  BP: (!) 112/53 (10/21/22 1504)  SpO2: 97 % (10/21/22 1504)   Vital Signs (24h Range):  Temp:  [97.6 °F (36.4 °C)-98.8 °F (37.1 °C)] 98.8 °F (37.1 °C)  Pulse:  [] 89  Resp:  [17-43] 22  SpO2:  [83 %-100 %] 97 %  BP: ()/(52-76) 112/53     Weight: 84.8 kg (187 lb)  Body mass index is 27.62 kg/m².    Estimated Creatinine Clearance: 75.6 mL/min (based on SCr of 1 mg/dL).    Physical Exam  Constitutional:       Appearance: Normal appearance.   HENT:      Head: Normocephalic and atraumatic.      Nose: Nose normal.      Mouth/Throat:      Mouth: Mucous membranes are moist.      Pharynx: Oropharynx is clear.   Eyes:      Extraocular Movements: Extraocular movements intact.      Conjunctiva/sclera: Conjunctivae normal.      Pupils: Pupils are equal, round, and reactive to light.   Cardiovascular:      Rate and Rhythm: Normal rate and regular rhythm.      Pulses: Normal pulses.      Heart sounds: Normal heart sounds.   Pulmonary:      Effort: Pulmonary effort is normal.      Breath sounds: Rales  present.   Abdominal:      General: Abdomen is flat. Bowel sounds are normal.      Palpations: Abdomen is soft.      Tenderness: There is no guarding or rebound.   Genitourinary:     Comments: lambert  Musculoskeletal:         General: No deformity.      Cervical back: Normal range of motion and neck supple.      Right lower leg: Edema present.      Left lower leg: Edema present.   Skin:     General: Skin is warm and dry.      Coloration: Skin is not jaundiced.      Findings: No rash.      Comments: Midline sternotomy wound with wound vac- wound site appears clean, sanguinous drainage   Neurological:      General: No focal deficit present.      Mental Status: He is alert and oriented to person, place, and time. Mental status is at baseline.   Psychiatric:         Mood and Affect: Mood normal.         Behavior: Behavior normal.         Thought Content: Thought content normal.         Judgment: Judgment normal.       Significant Labs: BMP:   Recent Labs   Lab 10/21/22  0353 10/21/22  1435   * 140*  140*   * 137  137   K 4.1 4.7  4.7   CL 97 100  100   CO2 24 29  29   BUN 34* 35*  35*   CREATININE 1.0 1.0  1.0   CALCIUM 8.5* 8.9  8.9   MG 2.3  --      CBC:   Recent Labs   Lab 10/20/22  0509 10/21/22  0353   WBC 25.90* 24.54*   HGB 7.8* 7.8*   HCT 24.1* 24.7*   * 555*     CMP:   Recent Labs   Lab 10/20/22  0509 10/20/22  1438 10/20/22  2113 10/21/22  0353 10/21/22  1435   *   < > 131* 130* 137  137   K 3.9   < > 4.2 4.1 4.7  4.7   CL 94*   < > 96 97 100  100   CO2 26   < > 28 24 29  29   *   < > 138* 124* 140*  140*   BUN 33*   < > 36* 34* 35*  35*   CREATININE 1.1   < > 1.1 1.0 1.0  1.0   CALCIUM 8.5*   < > 8.8 8.5* 8.9  8.9   PROT 5.6*  --   --  5.7*  --    ALBUMIN 2.2*  --   --  2.2*  --    BILITOT 1.1*  --   --  0.8  --    ALKPHOS 112  --   --  146*  --    AST 15  --   --  23  --    ALT 14  --   --  20  --    ANIONGAP 9   < > 7* 9 8  8    < > = values in this interval  not displayed.     Microbiology Results (last 7 days)       Procedure Component Value Units Date/Time    Blood culture [[id number]] Collected: 10/18/22 1637    Order Status: Completed Specimen: Blood from Peripheral, Antecubital, Left Updated: 10/20/22 1812     Blood Culture, Routine No Growth to date      No Growth to date      No Growth to date    Blood culture [[id number]] Collected: 10/18/22 1608    Order Status: Completed Specimen: Blood from Peripheral, Wrist, Left Updated: 10/20/22 1812     Blood Culture, Routine No Growth to date      No Growth to date      No Growth to date    Blood culture [[id number]] Collected: 10/16/22 1436    Order Status: Completed Specimen: Blood from Peripheral, Wrist, Right Updated: 10/20/22 1812     Blood Culture, Routine No Growth to date      No Growth to date      No Growth to date      No Growth to date      No Growth to date    Blood culture [[id number]] Collected: 10/16/22 1419    Order Status: Completed Specimen: Blood from Peripheral, Antecubital, Left Updated: 10/20/22 1812     Blood Culture, Routine No Growth to date      No Growth to date      No Growth to date      No Growth to date      No Growth to date    Blood culture [[id number]] Collected: 10/14/22 1638    Order Status: Completed Specimen: Blood from Peripheral, Hand, Left Updated: 10/19/22 1812     Blood Culture, Routine No growth after 5 days.    Blood culture [[id number]] Collected: 10/14/22 1638    Order Status: Completed Specimen: Blood from Peripheral, Wrist, Left Updated: 10/19/22 1812     Blood Culture, Routine No growth after 5 days.    Culture, Anaerobe [[id number]] Collected: 10/15/22 1240    Order Status: Completed Specimen: Incision site from Sternum Updated: 10/19/22 0900     Anaerobic Culture No anaerobes isolated    Culture, Anaerobe [[id number]] Collected: 10/15/22 1240    Order Status: Completed Specimen: Incision site from Sternum Updated: 10/19/22 0859     Anaerobic Culture No anaerobes isolated     Aerobic culture [[id number]]  (Abnormal)  (Susceptibility) Collected: 10/15/22 1240    Order Status: Completed Specimen: Incision site from Sternum Updated: 10/17/22 1113     Aerobic Bacterial Culture SERRATIA MARCESCENS  Few      Aerobic culture [[id number]]  (Abnormal)  (Susceptibility) Collected: 10/15/22 1240    Order Status: Completed Specimen: Incision site from Sternum Updated: 10/17/22 1113     Aerobic Bacterial Culture SERRATIA MARCESCENS  Few      Aerobic culture [[id number]]  (Abnormal)  (Susceptibility) Collected: 10/14/22 1643    Order Status: Completed Specimen: Wound from Sternum Updated: 10/17/22 1029     Aerobic Bacterial Culture SERRATIA MARCESCENS  Moderate      Gram stain [[id number]] Collected: 10/15/22 1240    Order Status: Completed Specimen: Incision site from Sternum Updated: 10/15/22 1357     Gram Stain Result Rare WBC's      No organisms seen    Gram stain [[id number]] Collected: 10/15/22 1240    Order Status: Completed Specimen: Incision site from Sternum Updated: 10/15/22 1356     Gram Stain Result Rare WBC's      No organisms seen    Blood culture [[id number]]  (Abnormal) Collected: 10/12/22 1252    Order Status: Completed Specimen: Blood from Peripheral, Hand, Right Updated: 10/15/22 1102     Blood Culture, Routine Gram stain aer bottle: Gram negative rods      Results called to and read back by: India Howard RN 10/13/2022  11:31      SERRATIA MARCESCENS  For susceptibility see order #[id number]      Blood culture [[id number]]  (Abnormal) Collected: 10/12/22 1252    Order Status: Completed Specimen: Blood from Peripheral, Hand, Left Updated: 10/15/22 1102     Blood Culture, Routine Gram stain aer bottle: Gram negative rods      Results called to and read back by: India Howard RN  10/13/2022      13:44      SERRATIA MARCESCENS  For susceptibility see order #[id number]      Narrative:      X2  from different sites          Pathology Results  (Last 10 years)                 10/07/22 1310   Specimen to Pathology, Surgery Cardiovascular Final result    Narrative:  Pre-op Diagnosis: Nonrheumatic mitral valve regurgitation   [I34.0]   Left ventricular systolic dysfunction [I51.9]   Persistent atrial fibrillation [I48.19]   Procedure(s):   VALVULOPLASTY,MITRAL VALVE   REPAIR, TRICUSPID VALVE   INSERTION, IMPELLA   MEYER MAZE PROCEDURE   EXCLUSION, LEFT ATRIAL APPENDAGE   Number of specimens: 1   Name of specimens: 1. Left atrial appendage- perm   Which provider would you like to cc?->LISE GUZMÁN   Release to patient->Immediate   Specimen total (fresh, frozen, permanent):->1             All pertinent labs within the past 24 hours have been reviewed.  Recent Lab Results         10/21/22  1435   10/21/22  0353   10/20/22  2113        Albumin   2.2         Alkaline Phosphatase   146         ALT   20         Anion Gap 8   9   7        8           Aniso   Slight         AST   23         Baso #   0.03         Basophilic Stippling   Occasional         Basophil %   0.1         BILIRUBIN TOTAL   0.8  Comment: For infants and newborns, interpretation of results should be based  on gestational age, weight and in agreement with clinical  observations.    Premature Infant recommended reference ranges:  Up to 24 hours.............<8.0 mg/dL  Up to 48 hours............<12.0 mg/dL  3-5 days..................<15.0 mg/dL  6-29 days.................<15.0 mg/dL           BUN 35   34   36        35           [place]/Echinocytes   Occasional         Calcium 8.9   8.5   8.8        8.9           Chloride 100   97   96        100           CO2 29   24   28        29           Creatinine 1.0   1.0   1.1        1.0           Differential Method   Automated         eGFR >60.0   >60.0   >60.0        >60.0           Eos #   0.1         Eosinophil %   0.3         Large/Giant Platelets   Present         Glucose 140   124   138        140           Gran # (ANC)   21.0         Gran %   85.7         Hematocrit   24.7         Hemoglobin    7.8         Hypo   Occasional         Immature Grans (Abs)   0.41  Comment: Mild elevation in immature granulocytes is non specific and   can be seen in a variety of conditions including stress response,   acute inflammation, trauma and pregnancy. Correlation with other   laboratory and clinical findings is essential.           Immature Granulocytes   1.7         Lymph #   0.8         Lymph %   3.3         Magnesium   2.3         MCH   28.7         MCHC   31.6         MCV   91         Mono #   2.2         Mono %   8.9         MPV   10.3         nRBC   1         Ovalocytes   Occasional         Phosphorus   4.0         Platelet Estimate   Increased         Platelets   555         Poikilocytosis   Slight         Poly   Occasional         Potassium 4.7   4.1   4.2        4.7           PROTEIN TOTAL   5.7         RBC   2.72         RDW   18.3         Schistocytes   Present         Sodium 137   130   131        137           Spherocytes   Occasional         Toxic Granulation   Present         WBC   24.54                 Significant Imaging: I have reviewed all pertinent imaging results/findings within the past 24 hours.

## 2022-10-21 NOTE — PROGRESS NOTES
[facility]  Critical Care - Surgery  Progress Note    Patient Name: David Barrios  MRN: [id number]  Admission Date: 10/2/2022  Hospital Length of Stay: 19 days  Code Status: Full Code  Attending Provider: Mike Hedrick MD  Primary Care Provider: Breann Tavera MD   Principal Problem: Nonrheumatic mitral valve regurgitation    Subjective:     Hospital/ICU Course:  No notes on file    Interval History/Significant Events: aggressive diuresis with diuril, lasix and tolvaptan. Na+ stable on CMP. Nursing reports agitation and delirium overnight. Wound vac dressing changed yesterday for better seal.     Follow-up For: Procedure(s) (LRB):  VALVULOPLASTY,MITRAL VALVE (N/A)  REPAIR, TRICUSPID VALVE (N/A)  INSERTION, IMPELLA (N/A)  MEYER MAZE PROCEDURE (N/A)  EXCLUSION, LEFT ATRIAL APPENDAGE (N/A)    Post-Operative Day: 11 Days Post-Op    Objective:     Vital Signs (Most Recent):  Temp: 97.6 °F (36.4 °C) (10/21/22 0500)  Pulse: 102 (10/21/22 0630)  Resp: (!) 23 (10/21/22 0630)  BP: (!) 97/58 (10/21/22 0600)  SpO2: 98 % (10/21/22 0630)   Vital Signs (24h Range):  Temp:  [97.6 °F (36.4 °C)-98.4 °F (36.9 °C)] 97.6 °F (36.4 °C)  Pulse:  [] 102  Resp:  [17-43] 23  SpO2:  [83 %-100 %] 98 %  BP: ()/(53-76) 97/58     Weight: 84.8 kg (187 lb)  Body mass index is 27.62 kg/m².      Intake/Output Summary (Last 24 hours) at 10/21/2022 0701  Last data filed at 10/21/2022 0620  Gross per 24 hour   Intake 1668.59 ml   Output 3730 ml   Net -2061.41 ml         Physical Exam  Vitals and nursing note reviewed.   Constitutional:       General: He is not in acute distress.  Eyes:      General: No scleral icterus.     Extraocular Movements: Extraocular movements intact.      Pupils: Pupils are equal, round, and reactive to light.   Cardiovascular:      Rate and Rhythm: Normal rate and regular rhythm.      Pulses: Normal pulses.      Heart sounds: No murmur heard.  Pulmonary:      Effort: Pulmonary effort is  normal. No respiratory distress.      Breath sounds: Decreased breath sounds present.   Abdominal:      General: Abdomen is flat. Bowel sounds are normal. There is no distension.      Palpations: Abdomen is soft.      Tenderness: There is no abdominal tenderness.   Genitourinary:     Comments: Fierro in place  Musculoskeletal:      Right lower leg: Edema present.      Left lower leg: Edema present.   Skin:     General: Skin is warm and dry.      Capillary Refill: Capillary refill takes less than 2 seconds.      Findings: Bruising present. No lesion.      Comments: MSI with wound vac- sanguinous drainage    Neurological:      General: No focal deficit present.      Mental Status: He is alert. Mental status is at baseline.       Vents:  Vent Mode: Spont (10/12/22 1252)  Ventilator Initiated: Yes (10/11/22 0830)  Set Rate: 24 BPM (10/12/22 0916)  Vt Set: 470 mL (10/12/22 0916)  Pressure Support: 5 cmH20 (10/12/22 1252)  PEEP/CPAP: 5 cmH20 (10/12/22 1252)  Oxygen Concentration (%): 36 (10/17/22 2123)  Peak Airway Pressure: 11 cmH2O (10/12/22 1252)  Plateau Pressure: 20 cmH20 (10/12/22 1252)  Total Ve: 9.34 mL (10/12/22 1252)  Negative Inspiratory Force (cm H2O): -34 (10/12/22 1310)  F/VT Ratio<105 (RSBI): (!) 49.59 (10/12/22 0916)    Lines/Drains/Airways       Peripherally Inserted Central Catheter Line  Duration             PICC Double Lumen 10/17/22 1709 right brachial 3 days              Drain  Duration                  Urethral Catheter 10/20/22 1700 Straight-tip 20 Fr. <1 day                    Significant Labs:    CBC/Anemia Profile:  Recent Labs   Lab 10/20/22  0509 10/21/22  0353   WBC 25.90* 24.54*   HGB 7.8* 7.8*   HCT 24.1* 24.7*   * 555*   MCV 89 91   RDW 18.0* 18.3*          Chemistries:  Recent Labs   Lab 10/20/22  0509 10/20/22  1438 10/20/22  2113 10/21/22  0353   * 128* 131* 130*   K 3.9 4.3 4.2 4.1   CL 94* 95 96 97   CO2 26 26 28 24   BUN 33* 33* 36* 34*   CREATININE 1.1 1.1 1.1 1.0    CALCIUM 8.5* 8.8 8.8 8.5*   ALBUMIN 2.2*  --   --  2.2*   PROT 5.6*  --   --  5.7*   BILITOT 1.1*  --   --  0.8   ALKPHOS 112  --   --  146*   ALT 14  --   --  20   AST 15  --   --  23   MG 2.2  --   --  2.3   PHOS 4.2  --   --  4.0         All pertinent labs within the past 24 hours have been reviewed.    Significant Imaging:  I have reviewed all pertinent imaging results/findings within the past 24 hours.    Assessment/Plan:     * Nonrheumatic mitral valve regurgitation    Neuro/Psych:   -- Sedation: none  -- Pain: PRN Oxy  -- Scheduled Tylenol             Cards:   -- S/P TV replacement, MV replacement, MAZE, Lt Atrial Appendage ligation and Impella placement on 10/7/22, course complicated by bleeding, requiring reopening POD#0 and  multiple VT runs requiring DCCV.      -- Impella: Removed 10/14  -- MAP > 65, Syst < 140  -- Aspirin 325mg  -- In rate-controlled Afib intermittently, on PO amio   -- lifevest obtained for low EF and VT   -- milrinone restarted, lasix with diuril BID for diuresis, hypervolemic on exam. tolvaptan given once for hyponatremia in the setting of thiazides.    -- TTE 10/19 on milrinone: ef 35%, now with mod RV dysfunction, mild pericardial effusion without tamponade.   -- GDMT:  Currently on hold. Will initiate when clinically appropriate      Pulm:   -- Goal O2 sat > 90%  -- extubated to NC 10/12  -- Chest tubes removed 10/15  -- CXR with atelectasis   -- PEP therapy ordered      Renal:  -- Diuresis with IVP lasix, BID diuril. PRN tolvaptan for iatrogenic hyponatremia  -- Keep lambert for strict I/O  -- Trend BUN/Cr        Recent Labs     10/20/22  1438 10/20/22  2113 10/21/22  0353   BUN 33* 36* 34*   CREATININE 1.1 1.1 1.0        FEN / GI:   -- Replace lytes as needed  -- Nutrition: cardiac diet   -- GI ppx: not indicated   -- Bowel reg: miralax      ID:   -- Afebrile  -- persistent leukocytosis. Blood cx cleared since 10/14 on appropriate abx. Suspect possible graft infection. Blood cx  repeated with NGTD  -- Viry-op ancef completed  -- serratia macescens bacteremia 10/12  - pan sensitive. Narrowed abx to ceftriaxone  -- Sternal culture with GNR growth, wound vac (change q3 days, last changed 10/20)  -- PICC in place for long term abx plan     Recent Labs     10/19/22  0308 10/20/22  0509 10/21/22  0353   WBC 31.36* 25.90* 24.54*        Heme/Onc:   -- stable post-operative anemia  -- Daily CBC  -- Aspirin 325mg QD;   -- Anticoagulation for intracardiac thrombus transitioned to levonox    Recent Labs     10/19/22  0308 10/20/22  0509 10/21/22  0353   HGB 7.2*   < > 7.8*   HCT 22.5*   < > 24.7*   APTT 75.3*  --   --     < > = values in this interval not displayed.        Endo:   -- BG goal 140-180  -- Insulin gtt  -- Hgb A1c 5.5      PPx:   Feeding: Cardiac diet  Analgesia/Sedation: precedex/PRN Oxy  Thromboembolic prevention: SCDs, lovenox   HOB >30: Yes  Stress Ulcer ppx: not indicated   Glucose control: Critical care goal 140-180 g/dl, ISS     Lines/Drains/Airway: PICC       Dispo/Code Status/Palliative:   -- SICU / Full Code.            Leah Lay NP  Critical Care - Surgery  [facility]

## 2022-10-21 NOTE — ASSESSMENT & PLAN NOTE
  Neuro/Psych:   -- Sedation: none  -- Pain: PRN Oxy  -- Scheduled Tylenol             Cards:   -- S/P TV replacement, MV replacement, MAZE, Lt Atrial Appendage ligation and Impella placement on 10/7/22, course complicated by bleeding, requiring reopening POD#0 and  multiple VT runs requiring DCCV.      -- Impella: Removed 10/14  -- MAP > 65, Syst < 140  -- Aspirin 325mg  -- In rate-controlled Afib intermittently, on PO amio   -- lifevest obtained for low EF and VT   -- milrinone restarted, lasix with diuril BID for diuresis, hypervolemic on exam. tolvaptan given once for hyponatremia in the setting of thiazides.    -- TTE 10/19 on milrinone: ef 35%, now with mod RV dysfunction, mild pericardial effusion without tamponade.   -- GDMT:  Currently on hold. Will initiate when clinically appropriate      Pulm:   -- Goal O2 sat > 90%  -- extubated to NC 10/12  -- Chest tubes removed 10/15  -- CXR with atelectasis   -- PEP therapy ordered      Renal:  -- Diuresis with IVP lasix, BID diuril. PRN tolvaptan for iatrogenic hyponatremia  -- Keep lambert for strict I/O  -- Trend BUN/Cr        Recent Labs     10/20/22  1438 10/20/22  2113 10/21/22  0353   BUN 33* 36* 34*   CREATININE 1.1 1.1 1.0        FEN / GI:   -- Replace lytes as needed  -- Nutrition: cardiac diet   -- GI ppx: not indicated   -- Bowel reg: miralax      ID:   -- Afebrile  -- persistent leukocytosis. Blood cx cleared since 10/14 on appropriate abx. Suspect possible graft infection. Blood cx repeated with NGTD  -- Viry-op ancef completed  -- serratia macescens bacteremia 10/12  - pan sensitive. Narrowed abx to ceftriaxone  -- Sternal culture with GNR growth, wound vac  -- PICC in place for long term abx plan     Recent Labs     10/19/22  0308 10/20/22  0509 10/21/22  0353   WBC 31.36* 25.90* 24.54*        Heme/Onc:   -- stable post-operative anemia  -- Daily CBC  -- Aspirin 325mg QD;   -- Anticoagulation for intracardiac thrombus transitioned to  levonox    Recent Labs     10/19/22  0308 10/20/22  0509 10/21/22  0353   HGB 7.2*   < > 7.8*   HCT 22.5*   < > 24.7*   APTT 75.3*  --   --     < > = values in this interval not displayed.        Endo:   -- BG goal 140-180  -- Insulin gtt  -- Hgb A1c 5.5      PPx:   Feeding: Cardiac diet  Analgesia/Sedation: precedex/PRN Oxy  Thromboembolic prevention: SCDs, lovenox   HOB >30: Yes  Stress Ulcer ppx: not indicated   Glucose control: Critical care goal 140-180 g/dl, ISS     Lines/Drains/Airway: PICC       Dispo/Code Status/Palliative:   -- SICU / Full Code.

## 2022-10-21 NOTE — PLAN OF CARE
"      SICU PLAN OF CARE NOTE    Dx: Nonrheumatic mitral valve regurgitation    Goals of Care: continue abx and diuresis     Vital Signs: BP (!) 119/59 (BP Location: Left arm, Patient Position: Lying)   Pulse 91   Temp 98.8 °F (37.1 °C) (Oral)   Resp (!) 22   Ht 5' 9" (1.753 m)   Wt 84.8 kg (187 lb)   SpO2 (!) 93%   BMI 27.62 kg/m²     Neuro: AAO x4    Cardiac: NSR/ST    Respiratory: Room Air    Diet: Cardiac Diet    Gtts: Milrinone @0.25    Urine Output: Urinary Catheter     Drains:   Wound Vac 50 oup through shift      Labs/Accuchecks: N/A    Skin: midline incision with wound vac; sacrum CDI     Shift Events: Pt up to chair with meals; continue education on medications and why it is important he take them; continue education on wound vac;       "

## 2022-10-21 NOTE — SUBJECTIVE & OBJECTIVE
Past Medical History:   Diagnosis Date    Anemia     Atrial fibrillation     Encounter for blood transfusion     Esophageal ulcer     GI bleed     Hypertension        Past Surgical History:   Procedure Laterality Date    CARDIOVERSION Left 9/15/2022    Procedure: Cardioversion;  Surgeon: Julio James MD;  Location: [facility] CATH LAB/EP;  Service: Cardiology;  Laterality: Left;  With NORBERT    CATHETERIZATION OF BOTH LEFT AND RIGHT HEART N/A 9/22/2022    Procedure: CATHETERIZATION, HEART, BOTH LEFT AND RIGHT;  Surgeon: Julio James MD;  Location: [facility] CATH LAB/EP;  Service: Cardiology;  Laterality: N/A;    COLONOSCOPY N/A 4/4/2019    Procedure: COLONOSCOPY Golytely;  Surgeon: Umair Randolph MD;  Location: [facility] ENDO;  Service: Endoscopy;  Laterality: N/A;    CORONARY ANGIOGRAPHY N/A 9/22/2022    Procedure: ANGIOGRAM, CORONARY ARTERY;  Surgeon: Julio James MD;  Location: [facility] CATH LAB/EP;  Service: Cardiology;  Laterality: N/A;    MEYER MAZE PROCEDURE N/A 10/7/2022    Procedure: MEYER MAZE PROCEDURE;  Surgeon: Mike Hedrick MD;  Location: [address];  Service: Cardiovascular;  Laterality: N/A;    ESOPHAGOGASTRODUODENOSCOPY N/A 10/12/2018    Procedure: EGD (ESOPHAGOGASTRODUODENOSCOPY);  Surgeon: Braden Herring MD;  Location: [facility];  Service: Endoscopy;  Laterality: N/A;    ESOPHAGOGASTRODUODENOSCOPY N/A 4/4/2019    Procedure: EGD;  Surgeon: Umair Randolph MD;  Location: [facility];  Service: Endoscopy;  Laterality: N/A;    EXCLUSION OF LEFT ATRIAL APPENDAGE N/A 10/7/2022    Procedure: EXCLUSION, LEFT ATRIAL APPENDAGE;  Surgeon: Mike Hedrick MD;  Location: [facility] OR [address] [place] [address];  Service: Cardiovascular;  Laterality: N/A;    HERNIA REPAIR      INSERTION OF INTRAVASCULAR MICROAXIAL BLOOD PUMP N/A 10/7/2022    Procedure: INSERTION, IMPELLA;  Surgeon: Mike Hedrick MD;  [facility];  Service: Cardiovascular;  Laterality: N/A;  direct aortic insertion    IRRIGATION OF MEDIASTINUM N/A  10/7/2022    Procedure: IRRIGATION, MEDIASTINUM;  Surgeon: Mike Hedrick MD;  [facility];  Service: Cardiovascular;  Laterality: N/A;    TRICUSPID VALVULOPLASTY N/A 10/7/2022    Procedure: REPAIR, TRICUSPID VALVE;  Surgeon: Mike Hedrick MD;  [facility];  Service: Cardiovascular;  Laterality: N/A;       Review of patient's allergies indicates:  No Known Allergies    Medications:  Medications Prior to Admission   Medication Sig    albuterol (PROVENTIL/VENTOLIN HFA) 90 mcg/actuation inhaler inhale 1-2 Puff(s) By Mouth Every 4 Hours as needed    amiodarone (PACERONE) 200 MG Tab Take 1 tablet (200 mg total) by mouth 2 (two) times daily.    furosemide (LASIX) 40 MG tablet TAKE 1 TABLET(40 MG) BY MOUTH TWICE DAILY    hydroCHLOROthiazide (HYDRODIURIL) 25 MG tablet Take 25 mg by mouth once daily.    rivaroxaban (XARELTO) 20 mg Tab Take 1 tablet (20 mg total) by mouth daily with dinner or evening meal.    carvediloL (COREG) 12.5 MG tablet Take 1 tablet (12.5 mg total) by mouth 2 (two) times daily with meals.    ferrous sulfate (FEOSOL) 325 mg (65 mg iron) Tab tablet Take 1 tablet (325 mg total) by mouth daily with breakfast.     Antibiotics (From admission, onward)      Start     Stop Route Frequency Ordered    10/21/22 1245  cefepime in dextrose 5 % IVPB 2 g         -- IV Every 8 hours (non-standard times) 10/21/22 1131          Antifungals (From admission, onward)      None          Antivirals (From admission, onward)      None             Immunization History   Administered Date(s) Administered    Influenza - Quadrivalent - PF *Preferred* (6 months and older) 10/12/2018    Pneumococcal Conjugate - 13 Valent 10/12/2018       Family History       Problem Relation (Age of Onset)    COPD Mother    Cancer Father          Social History     Socioeconomic History    Marital status: Single   Tobacco Use    Smoking status: Never    Smokeless tobacco: Current     Types: Chew   Substance and  Sexual Activity    Alcohol use: Yes     Alcohol/week: 20.0 standard drinks     Types: 20 Cans of beer per week    Drug use: No     Review of Systems   Constitutional:  Positive for fatigue. Negative for activity change, appetite change and fever.   HENT:  Negative for congestion and sore throat.    Respiratory:  Positive for shortness of breath. Negative for chest tightness.    Cardiovascular:  Positive for chest pain and leg swelling.   Gastrointestinal:  Negative for blood in stool, diarrhea, nausea and vomiting.   Endocrine: Negative for cold intolerance and heat intolerance.   Genitourinary:  Negative for dysuria.   Musculoskeletal:  Negative for arthralgias, joint swelling and neck stiffness.   Neurological:  Negative for syncope and light-headedness.   Hematological:  Negative for adenopathy. Does not bruise/bleed easily.   Psychiatric/Behavioral:  Negative for confusion.    Objective:     Vital Signs (Most Recent):  Temp: 98.8 °F (37.1 °C) (10/21/22 1200)  Pulse: 89 (10/21/22 1504)  Resp: (!) 22 (10/21/22 1504)  BP: (!) 112/53 (10/21/22 1504)  SpO2: 97 % (10/21/22 1504)   Vital Signs (24h Range):  Temp:  [97.6 °F (36.4 °C)-98.8 °F (37.1 °C)] 98.8 °F (37.1 °C)  Pulse:  [] 89  Resp:  [17-43] 22  SpO2:  [83 %-100 %] 97 %  BP: ()/(52-76) 112/53     Weight: 84.8 kg (187 lb)  Body mass index is 27.62 kg/m².    Estimated Creatinine Clearance: 75.6 mL/min (based on SCr of 1 mg/dL).    Physical Exam  Constitutional:       Appearance: Normal appearance.   HENT:      Head: Normocephalic and atraumatic.      Nose: Nose normal.      Mouth/Throat:      Mouth: Mucous membranes are moist.      Pharynx: Oropharynx is clear.   Eyes:      Extraocular Movements: Extraocular movements intact.      Conjunctiva/sclera: Conjunctivae normal.      Pupils: Pupils are equal, round, and reactive to light.   Cardiovascular:      Rate and Rhythm: Normal rate and regular rhythm.      Pulses: Normal pulses.      Heart sounds:  Normal heart sounds.   Pulmonary:      Effort: Pulmonary effort is normal.      Breath sounds: Rales present.   Abdominal:      General: Abdomen is flat. Bowel sounds are normal.      Palpations: Abdomen is soft.      Tenderness: There is no guarding or rebound.   Genitourinary:     Comments: lambert  Musculoskeletal:         General: No deformity.      Cervical back: Normal range of motion and neck supple.      Right lower leg: Edema present.      Left lower leg: Edema present.   Skin:     General: Skin is warm and dry.      Coloration: Skin is not jaundiced.      Findings: No rash.      Comments: Midline sternotomy wound with wound vac- wound site appears clean, sanguinous drainage   Neurological:      General: No focal deficit present.      Mental Status: He is alert and oriented to person, place, and time. Mental status is at baseline.   Psychiatric:         Mood and Affect: Mood normal.         Behavior: Behavior normal.         Thought Content: Thought content normal.         Judgment: Judgment normal.       Significant Labs: BMP:   Recent Labs   Lab 10/21/22  0353 10/21/22  1435   * 140*  140*   * 137  137   K 4.1 4.7  4.7   CL 97 100  100   CO2 24 29  29   BUN 34* 35*  35*   CREATININE 1.0 1.0  1.0   CALCIUM 8.5* 8.9  8.9   MG 2.3  --      CBC:   Recent Labs   Lab 10/20/22  0509 10/21/22  0353   WBC 25.90* 24.54*   HGB 7.8* 7.8*   HCT 24.1* 24.7*   * 555*     CMP:   Recent Labs   Lab 10/20/22  0509 10/20/22  1438 10/20/22  2113 10/21/22  0353 10/21/22  1435   *   < > 131* 130* 137  137   K 3.9   < > 4.2 4.1 4.7  4.7   CL 94*   < > 96 97 100  100   CO2 26   < > 28 24 29  29   *   < > 138* 124* 140*  140*   BUN 33*   < > 36* 34* 35*  35*   CREATININE 1.1   < > 1.1 1.0 1.0  1.0   CALCIUM 8.5*   < > 8.8 8.5* 8.9  8.9   PROT 5.6*  --   --  5.7*  --    ALBUMIN 2.2*  --   --  2.2*  --    BILITOT 1.1*  --   --  0.8  --    ALKPHOS 112  --   --  146*  --    AST 15  --    --  23  --    ALT 14  --   --  20  --    ANIONGAP 9   < > 7* 9 8  8    < > = values in this interval not displayed.     Microbiology Results (last 7 days)       Procedure Component Value Units Date/Time    Blood culture [[id number]] Collected: 10/18/22 1637    Order Status: Completed Specimen: Blood from Peripheral, Antecubital, Left Updated: 10/20/22 1812     Blood Culture, Routine No Growth to date      No Growth to date      No Growth to date    Blood culture [[id number]] Collected: 10/18/22 1608    Order Status: Completed Specimen: Blood from Peripheral, Wrist, Left Updated: 10/20/22 1812     Blood Culture, Routine No Growth to date      No Growth to date      No Growth to date    Blood culture [[id number]] Collected: 10/16/22 1436    Order Status: Completed Specimen: Blood from Peripheral, Wrist, Right Updated: 10/20/22 1812     Blood Culture, Routine No Growth to date      No Growth to date      No Growth to date      No Growth to date      No Growth to date    Blood culture [[id number]] Collected: 10/16/22 1419    Order Status: Completed Specimen: Blood from Peripheral, Antecubital, Left Updated: 10/20/22 1812     Blood Culture, Routine No Growth to date      No Growth to date      No Growth to date      No Growth to date      No Growth to date    Blood culture [[id number]] Collected: 10/14/22 1638    Order Status: Completed Specimen: Blood from Peripheral, Hand, Left Updated: 10/19/22 1812     Blood Culture, Routine No growth after 5 days.    Blood culture [[id number]] Collected: 10/14/22 1638    Order Status: Completed Specimen: Blood from Peripheral, Wrist, Left Updated: 10/19/22 1812     Blood Culture, Routine No growth after 5 days.    Culture, Anaerobe [[id number]] Collected: 10/15/22 1240    Order Status: Completed Specimen: Incision site from Sternum Updated: 10/19/22 0900     Anaerobic Culture No anaerobes isolated    Culture, Anaerobe [[id number]] Collected: 10/15/22 1240    Order Status: Completed  Specimen: Incision site from Sternum Updated: 10/19/22 0859     Anaerobic Culture No anaerobes isolated    Aerobic culture [[id number]]  (Abnormal)  (Susceptibility) Collected: 10/15/22 1240    Order Status: Completed Specimen: Incision site from Sternum Updated: 10/17/22 1113     Aerobic Bacterial Culture SERRATIA MARCESCENS  Few      Aerobic culture [[id number]]  (Abnormal)  (Susceptibility) Collected: 10/15/22 1240    Order Status: Completed Specimen: Incision site from Sternum Updated: 10/17/22 1113     Aerobic Bacterial Culture SERRATIA MARCESCENS  Few      Aerobic culture [[id number]]  (Abnormal)  (Susceptibility) Collected: 10/14/22 1643    Order Status: Completed Specimen: Wound from Sternum Updated: 10/17/22 1029     Aerobic Bacterial Culture SERRATIA MARCESCENS  Moderate      Gram stain [[id number]] Collected: 10/15/22 1240    Order Status: Completed Specimen: Incision site from Sternum Updated: 10/15/22 1357     Gram Stain Result Rare WBC's      No organisms seen    Gram stain [[id number]] Collected: 10/15/22 1240    Order Status: Completed Specimen: Incision site from Sternum Updated: 10/15/22 1356     Gram Stain Result Rare WBC's      No organisms seen    Blood culture [[id number]]  (Abnormal) Collected: 10/12/22 1252    Order Status: Completed Specimen: Blood from Peripheral, Hand, Right Updated: 10/15/22 1102     Blood Culture, Routine Gram stain aer bottle: Gram negative rods      Results called to and read back by: India Howard RN 10/13/2022  11:31      SERRATIA MARCESCENS  For susceptibility see order #[id number]      Blood culture [[id number]]  (Abnormal) Collected: 10/12/22 1252    Order Status: Completed Specimen: Blood from Peripheral, Hand, Left Updated: 10/15/22 1102     Blood Culture, Routine Gram stain aer bottle: Gram negative rods      Results called to and read back by: India Howard RN  10/13/2022      13:44      SERRATIA MARCESCENS  For susceptibility see order #[id number]       Narrative:      X2  from different sites          Pathology Results  (Last 10 years)                 10/07/22 1310  Specimen to Pathology, Surgery Cardiovascular Final result    Narrative:  Pre-op Diagnosis: Nonrheumatic mitral valve regurgitation   [I34.0]   Left ventricular systolic dysfunction [I51.9]   Persistent atrial fibrillation [I48.19]   Procedure(s):   VALVULOPLASTY,MITRAL VALVE   REPAIR, TRICUSPID VALVE   INSERTION, IMPELLA   MEYER MAZE PROCEDURE   EXCLUSION, LEFT ATRIAL APPENDAGE   Number of specimens: 1   Name of specimens: 1. Left atrial appendage- perm   Which provider would you like to cc?->LISE GUZMÁN   Release to patient->Immediate   Specimen total (fresh, frozen, permanent):->1             All pertinent labs within the past 24 hours have been reviewed.  Recent Lab Results         10/21/22  1435   10/21/22  0353   10/20/22  2113        Albumin   2.2         Alkaline Phosphatase   146         ALT   20         Anion Gap 8   9   7        8           Aniso   Slight         AST   23         Baso #   0.03         Basophilic Stippling   Occasional         Basophil %   0.1         BILIRUBIN TOTAL   0.8  Comment: For infants and newborns, interpretation of results should be based  on gestational age, weight and in agreement with clinical  observations.    Premature Infant recommended reference ranges:  Up to 24 hours.............<8.0 mg/dL  Up to 48 hours............<12.0 mg/dL  3-5 days..................<15.0 mg/dL  6-29 days.................<15.0 mg/dL           BUN 35   34   36        35           [place]/Echinocytes   Occasional         Calcium 8.9   8.5   8.8        8.9           Chloride 100   97   96        100           CO2 29   24   28        29           Creatinine 1.0   1.0   1.1        1.0           Differential Method   Automated         eGFR >60.0   >60.0   >60.0        >60.0           Eos #   0.1         Eosinophil %   0.3         Large/Giant Platelets   Present         Glucose 140    124   138        140           Gran # (ANC)   21.0         Gran %   85.7         Hematocrit   24.7         Hemoglobin   7.8         Hypo   Occasional         Immature Grans (Abs)   0.41  Comment: Mild elevation in immature granulocytes is non specific and   can be seen in a variety of conditions including stress response,   acute inflammation, trauma and pregnancy. Correlation with other   laboratory and clinical findings is essential.           Immature Granulocytes   1.7         Lymph #   0.8         Lymph %   3.3         Magnesium   2.3         MCH   28.7         MCHC   31.6         MCV   91         Mono #   2.2         Mono %   8.9         MPV   10.3         nRBC   1         Ovalocytes   Occasional         Phosphorus   4.0         Platelet Estimate   Increased         Platelets   555         Poikilocytosis   Slight         Poly   Occasional         Potassium 4.7   4.1   4.2        4.7           PROTEIN TOTAL   5.7         RBC   2.72         RDW   18.3         Schistocytes   Present         Sodium 137   130   131        137           Spherocytes   Occasional         Toxic Granulation   Present         WBC   24.54                 Significant Imaging: I have reviewed all pertinent imaging results/findings within the past 24 hours.

## 2022-10-21 NOTE — SUBJECTIVE & OBJECTIVE
Interval History/Significant Events: aggressive diuresis with diuril, lasix and tolvaptan. Na+ stable on CMP. Nursing reports agitation and delirium overnight. Wound vac dressing changed yesterday for better seal.     Follow-up For: Procedure(s) (LRB):  VALVULOPLASTY,MITRAL VALVE (N/A)  REPAIR, TRICUSPID VALVE (N/A)  INSERTION, IMPELLA (N/A)  MEYER MAZE PROCEDURE (N/A)  EXCLUSION, LEFT ATRIAL APPENDAGE (N/A)    Post-Operative Day: 11 Days Post-Op    Objective:     Vital Signs (Most Recent):  Temp: 97.6 °F (36.4 °C) (10/21/22 0500)  Pulse: 102 (10/21/22 0630)  Resp: (!) 23 (10/21/22 0630)  BP: (!) 97/58 (10/21/22 0600)  SpO2: 98 % (10/21/22 0630)   Vital Signs (24h Range):  Temp:  [97.6 °F (36.4 °C)-98.4 °F (36.9 °C)] 97.6 °F (36.4 °C)  Pulse:  [] 102  Resp:  [17-43] 23  SpO2:  [83 %-100 %] 98 %  BP: ()/(53-76) 97/58     Weight: 84.8 kg (187 lb)  Body mass index is 27.62 kg/m².      Intake/Output Summary (Last 24 hours) at 10/21/2022 0701  Last data filed at 10/21/2022 0620  Gross per 24 hour   Intake 1668.59 ml   Output 3730 ml   Net -2061.41 ml         Physical Exam  Vitals and nursing note reviewed.   Constitutional:       General: He is not in acute distress.  Eyes:      General: No scleral icterus.     Extraocular Movements: Extraocular movements intact.      Pupils: Pupils are equal, round, and reactive to light.   Cardiovascular:      Rate and Rhythm: Normal rate and regular rhythm.      Pulses: Normal pulses.      Heart sounds: No murmur heard.  Pulmonary:      Effort: Pulmonary effort is normal. No respiratory distress.      Breath sounds: Decreased breath sounds present.   Abdominal:      General: Abdomen is flat. Bowel sounds are normal. There is no distension.      Palpations: Abdomen is soft.      Tenderness: There is no abdominal tenderness.   Genitourinary:     Comments: Fierro in place  Musculoskeletal:      Right lower leg: Edema present.      Left lower leg: Edema present.   Skin:      General: Skin is warm and dry.      Capillary Refill: Capillary refill takes less than 2 seconds.      Findings: Bruising present. No lesion.      Comments: MSI with wound vac- sanguinous drainage    Neurological:      General: No focal deficit present.      Mental Status: He is alert. Mental status is at baseline.       Vents:  Vent Mode: Spont (10/12/22 1252)  Ventilator Initiated: Yes (10/11/22 0830)  Set Rate: 24 BPM (10/12/22 0916)  Vt Set: 470 mL (10/12/22 0916)  Pressure Support: 5 cmH20 (10/12/22 1252)  PEEP/CPAP: 5 cmH20 (10/12/22 1252)  Oxygen Concentration (%): 36 (10/17/22 2123)  Peak Airway Pressure: 11 cmH2O (10/12/22 1252)  Plateau Pressure: 20 cmH20 (10/12/22 1252)  Total Ve: 9.34 mL (10/12/22 1252)  Negative Inspiratory Force (cm H2O): -34 (10/12/22 1310)  F/VT Ratio<105 (RSBI): (!) 49.59 (10/12/22 0916)    Lines/Drains/Airways       Peripherally Inserted Central Catheter Line  Duration             PICC Double Lumen 10/17/22 1709 right brachial 3 days              Drain  Duration                  Urethral Catheter 10/20/22 1700 Straight-tip 20 Fr. <1 day                    Significant Labs:    CBC/Anemia Profile:  Recent Labs   Lab 10/20/22  0509 10/21/22  0353   WBC 25.90* 24.54*   HGB 7.8* 7.8*   HCT 24.1* 24.7*   * 555*   MCV 89 91   RDW 18.0* 18.3*          Chemistries:  Recent Labs   Lab 10/20/22  0509 10/20/22  1438 10/20/22  2113 10/21/22  0353   * 128* 131* 130*   K 3.9 4.3 4.2 4.1   CL 94* 95 96 97   CO2 26 26 28 24   BUN 33* 33* 36* 34*   CREATININE 1.1 1.1 1.1 1.0   CALCIUM 8.5* 8.8 8.8 8.5*   ALBUMIN 2.2*  --   --  2.2*   PROT 5.6*  --   --  5.7*   BILITOT 1.1*  --   --  0.8   ALKPHOS 112  --   --  146*   ALT 14  --   --  20   AST 15  --   --  23   MG 2.2  --   --  2.3   PHOS 4.2  --   --  4.0         All pertinent labs within the past 24 hours have been reviewed.    Significant Imaging:  I have reviewed all pertinent imaging results/findings within the past 24 hours.

## 2022-10-21 NOTE — ASSESSMENT & PLAN NOTE
  Neuro/Psych:   -- Sedation: none  -- Pain: PRN Oxy  -- Scheduled Tylenol  -- Neuro: Seroquel 100mg             Cards:   -- S/P TV replacement, MV replacement, MAZE, Lt Atrial Appendage ligation and Impella placement on 10/7/22, course complicated by bleeding, requiring reopening POD#0 and  multiple VT runs requiring DCCV.      -- Impella: Removed 10/14  -- MAP > 65, Syst < 140  -- Aspirin 325mg  -- In rate-controlled Afib intermittently, on PO amio   -- lifevest obtained for low EF and VT   -- milrinone restarted, lasix with diuril BID for diuresis, hypervolemic on exam. tolvaptan given once for hyponatremia in the setting of thiazides.    -- TTE 10/19 on milrinone: ef 35%, now with mod RV dysfunction, mild pericardial effusion without tamponade.      Pulm:   -- Goal O2 sat > 90%  -- Chest tubes removed 10/15   -- PEP therapy      Renal:  -- Diuresis with IVP lasix, BID diuril. PRN tolvaptan for iatrogenic hyponatremia  --Sodium normalized this am   -- Possibly remove lambert today   -- Trend BUN/Cr        Recent Labs     10/20/22  1438 10/20/22  2113 10/21/22  0353   BUN 33* 36* 34*   CREATININE 1.1 1.1 1.0        FEN / GI:   -- Replace lytes as needed  -- Nutrition: cardiac diet   -- GI ppx: not indicated   -- Bowel reg: miralax      ID:   -- Afebrile  -- persistent leukocytosis. Blood cx cleared since 10/14 on appropriate abx. Suspect possible graft infection. Blood cx repeated with NGTD  -- serratia macescens bacteremia 10/12  - pan sensitive.   -- Sternal culture with GNR growth, wound vac (change dressing q3 days, last changed 10/20)  -- PICC in place for long term abx plan. ID consulted and recommend Cefepime fo 6 weeks.     Recent Labs     10/19/22  0308 10/20/22  0509 10/21/22  0353   WBC 31.36* 25.90* 24.54*        Heme/Onc:   -- stable post-operative anemia  -- Daily CBC  -- Aspirin 325mg QD;   -- Anticoagulation for intracardiac thrombus transitioned to levonox    Recent Labs     10/19/22  9810  10/20/22  0509 10/21/22  0353   HGB 7.2*   < > 7.8*   HCT 22.5*   < > 24.7*   APTT 75.3*  --   --     < > = values in this interval not displayed.        Endo:   -- BG goal 140-180  -- Hgb A1c 5.5      PPx:   Feeding: Cardiac diet  Analgesia/Sedation: precedex/PRN Oxy  Thromboembolic prevention: SCDs, lovenox   HOB >30: Yes  Stress Ulcer ppx: not indicated   Glucose control: Critical care goal 140-180 g/dl,     Lines/Drains/Airway: PICC       Dispo/Code Status/Palliative:   -- SICU / Full Code.

## 2022-10-21 NOTE — HPI
62 y.o. male with history of hypertension, atrial fibrillation and CHF along with severe mitral regurgitation, admitted for surgical evaluation, underwent Tricuspid and mitral valve replacement, MAZE, left atrial appendage ligation and impella placement on 10/7, post op course complicated by bleeding on POD#0 that required emergent sternotomy and bleeding was subsequently controlled thereafter.     Post operative course was complicated by bleeding, requiring re-opening and bleeding subsequently controlled with emergent sternotomy. of note, there is presence of an aortic graft (10mm Vascutek Gelweave) into the left ventricle that is used during insertion of impella pump. The patient also had multiple VT runs requiring DCCV.    Blood cultures with serratia marcescens bacteremia on 10/11, 10/12. BCx 10/14 NGTD. Aerobic wound culture from sternal wound 10/14 also with serratia marcescens. The patient immediately starting spiking fevers 10/17, until 10/11; afebrile since 10/11. He was initially given vancomycin and piperacillin/tazobactam, and then switched over to ceftriaxone once susceptibilities were resulted from blood culture. He has been afebrile with persistent leukocytosis. CXR 10/18: Lung zones also appear essentially stable, with note again made of opacities consistent with subsegmental atelectasis in the right mid lung zone but with no new areas of airspace consolidation or volume loss having developed. Today the patient states he is feeling well, denies diarrhea, dysuria, SOB, cough, has minimal pain at sternotomy site, denies fevers, and states energy level is improving.       ID was consulted for long term plan for gram negative bacteremia, source likely infected graft - not stable for OR. PICC in place, dc to LTACH

## 2022-10-22 LAB
ALBUMIN SERPL BCP-MCNC: 2.3 G/DL (ref 3.5–5.2)
ALP SERPL-CCNC: 147 U/L (ref 55–135)
ALT SERPL W/O P-5'-P-CCNC: 20 U/L (ref 10–44)
ANION GAP SERPL CALC-SCNC: 10 MMOL/L (ref 8–16)
ANION GAP SERPL CALC-SCNC: 10 MMOL/L (ref 8–16)
AST SERPL-CCNC: 20 U/L (ref 10–40)
BASOPHILS # BLD AUTO: 0.04 K/UL (ref 0–0.2)
BASOPHILS NFR BLD: 0.2 % (ref 0–1.9)
BILIRUB SERPL-MCNC: 1 MG/DL (ref 0.1–1)
BUN SERPL-MCNC: 35 MG/DL (ref 8–23)
BUN SERPL-MCNC: 36 MG/DL (ref 8–23)
CALCIUM SERPL-MCNC: 9 MG/DL (ref 8.7–10.5)
CALCIUM SERPL-MCNC: 9.1 MG/DL (ref 8.7–10.5)
CHLORIDE SERPL-SCNC: 100 MMOL/L (ref 95–110)
CHLORIDE SERPL-SCNC: 97 MMOL/L (ref 95–110)
CO2 SERPL-SCNC: 28 MMOL/L (ref 23–29)
CO2 SERPL-SCNC: 28 MMOL/L (ref 23–29)
CREAT SERPL-MCNC: 1 MG/DL (ref 0.5–1.4)
CREAT SERPL-MCNC: 1.1 MG/DL (ref 0.5–1.4)
DIFFERENTIAL METHOD: ABNORMAL
EOSINOPHIL # BLD AUTO: 0.1 K/UL (ref 0–0.5)
EOSINOPHIL NFR BLD: 0.4 % (ref 0–8)
ERYTHROCYTE [DISTWIDTH] IN BLOOD BY AUTOMATED COUNT: 18.2 % (ref 11.5–14.5)
EST. GFR  (NO RACE VARIABLE): >60 ML/MIN/1.73 M^2
EST. GFR  (NO RACE VARIABLE): >60 ML/MIN/1.73 M^2
GLUCOSE SERPL-MCNC: 104 MG/DL (ref 70–110)
GLUCOSE SERPL-MCNC: 115 MG/DL (ref 70–110)
HCT VFR BLD AUTO: 26.7 % (ref 40–54)
HGB BLD-MCNC: 8.3 G/DL (ref 14–18)
IMM GRANULOCYTES # BLD AUTO: 0.46 K/UL (ref 0–0.04)
IMM GRANULOCYTES NFR BLD AUTO: 1.8 % (ref 0–0.5)
LYMPHOCYTES # BLD AUTO: 0.9 K/UL (ref 1–4.8)
LYMPHOCYTES NFR BLD: 3.6 % (ref 18–48)
MAGNESIUM SERPL-MCNC: 2.3 MG/DL (ref 1.6–2.6)
MCH RBC QN AUTO: 28.8 PG (ref 27–31)
MCHC RBC AUTO-ENTMCNC: 31.1 G/DL (ref 32–36)
MCV RBC AUTO: 93 FL (ref 82–98)
MONOCYTES # BLD AUTO: 2.4 K/UL (ref 0.3–1)
MONOCYTES NFR BLD: 9.4 % (ref 4–15)
NEUTROPHILS # BLD AUTO: 21.3 K/UL (ref 1.8–7.7)
NEUTROPHILS NFR BLD: 84.6 % (ref 38–73)
NRBC BLD-RTO: 1 /100 WBC
PHOSPHATE SERPL-MCNC: 3.9 MG/DL (ref 2.7–4.5)
PLATELET # BLD AUTO: 636 K/UL (ref 150–450)
PMV BLD AUTO: 10.4 FL (ref 9.2–12.9)
POTASSIUM SERPL-SCNC: 4.3 MMOL/L (ref 3.5–5.1)
POTASSIUM SERPL-SCNC: 4.3 MMOL/L (ref 3.5–5.1)
PROT SERPL-MCNC: 6.1 G/DL (ref 6–8.4)
RBC # BLD AUTO: 2.88 M/UL (ref 4.6–6.2)
SODIUM SERPL-SCNC: 135 MMOL/L (ref 136–145)
SODIUM SERPL-SCNC: 138 MMOL/L (ref 136–145)
WBC # BLD AUTO: 25.12 K/UL (ref 3.9–12.7)

## 2022-10-22 PROCEDURE — 63600175 PHARM REV CODE 636 W HCPCS

## 2022-10-22 PROCEDURE — 27000646 HC AEROBIKA DEVICE

## 2022-10-22 PROCEDURE — 63600175 PHARM REV CODE 636 W HCPCS: Performed by: THORACIC SURGERY (CARDIOTHORACIC VASCULAR SURGERY)

## 2022-10-22 PROCEDURE — 99900035 HC TECH TIME PER 15 MIN (STAT)

## 2022-10-22 PROCEDURE — 83735 ASSAY OF MAGNESIUM: CPT | Performed by: STUDENT IN AN ORGANIZED HEALTH CARE EDUCATION/TRAINING PROGRAM

## 2022-10-22 PROCEDURE — 97530 THERAPEUTIC ACTIVITIES: CPT

## 2022-10-22 PROCEDURE — 25000003 PHARM REV CODE 250

## 2022-10-22 PROCEDURE — 63600175 PHARM REV CODE 636 W HCPCS: Performed by: STUDENT IN AN ORGANIZED HEALTH CARE EDUCATION/TRAINING PROGRAM

## 2022-10-22 PROCEDURE — 94640 AIRWAY INHALATION TREATMENT: CPT

## 2022-10-22 PROCEDURE — 25000242 PHARM REV CODE 250 ALT 637 W/ HCPCS

## 2022-10-22 PROCEDURE — 27000221 HC OXYGEN, UP TO 24 HOURS

## 2022-10-22 PROCEDURE — 80048 BASIC METABOLIC PNL TOTAL CA: CPT | Mod: XB

## 2022-10-22 PROCEDURE — 85025 COMPLETE CBC W/AUTO DIFF WBC: CPT | Performed by: THORACIC SURGERY (CARDIOTHORACIC VASCULAR SURGERY)

## 2022-10-22 PROCEDURE — 20600001 HC STEP DOWN PRIVATE ROOM

## 2022-10-22 PROCEDURE — 94664 DEMO&/EVAL PT USE INHALER: CPT

## 2022-10-22 PROCEDURE — 25000003 PHARM REV CODE 250: Performed by: STUDENT IN AN ORGANIZED HEALTH CARE EDUCATION/TRAINING PROGRAM

## 2022-10-22 PROCEDURE — 25000003 PHARM REV CODE 250: Performed by: THORACIC SURGERY (CARDIOTHORACIC VASCULAR SURGERY)

## 2022-10-22 PROCEDURE — 80053 COMPREHEN METABOLIC PANEL: CPT

## 2022-10-22 PROCEDURE — 97535 SELF CARE MNGMENT TRAINING: CPT

## 2022-10-22 PROCEDURE — 84100 ASSAY OF PHOSPHORUS: CPT | Performed by: STUDENT IN AN ORGANIZED HEALTH CARE EDUCATION/TRAINING PROGRAM

## 2022-10-22 PROCEDURE — 99233 PR SUBSEQUENT HOSPITAL CARE,LEVL III: ICD-10-PCS | Mod: ,,, | Performed by: ANESTHESIOLOGY

## 2022-10-22 PROCEDURE — 94761 N-INVAS EAR/PLS OXIMETRY MLT: CPT

## 2022-10-22 PROCEDURE — 99233 SBSQ HOSP IP/OBS HIGH 50: CPT | Mod: ,,, | Performed by: ANESTHESIOLOGY

## 2022-10-22 RX ORDER — IPRATROPIUM BROMIDE AND ALBUTEROL SULFATE 2.5; .5 MG/3ML; MG/3ML
3 SOLUTION RESPIRATORY (INHALATION)
Status: DISCONTINUED | OUTPATIENT
Start: 2022-10-22 | End: 2022-10-25 | Stop reason: HOSPADM

## 2022-10-22 RX ADMIN — CEFEPIME 2 G: 2 INJECTION, POWDER, FOR SOLUTION INTRAVENOUS at 01:10

## 2022-10-22 RX ADMIN — ASPIRIN 325 MG ORAL TABLET 325 MG: 325 PILL ORAL at 08:10

## 2022-10-22 RX ADMIN — OXYCODONE HYDROCHLORIDE 10 MG: 10 TABLET ORAL at 05:10

## 2022-10-22 RX ADMIN — MILRINONE LACTATE IN DEXTROSE 0.25 MCG/KG/MIN: 200 INJECTION, SOLUTION INTRAVENOUS at 04:10

## 2022-10-22 RX ADMIN — FUROSEMIDE 80 MG: 10 INJECTION, SOLUTION INTRAMUSCULAR; INTRAVENOUS at 08:10

## 2022-10-22 RX ADMIN — IPRATROPIUM BROMIDE AND ALBUTEROL SULFATE 3 ML: 2.5; .5 SOLUTION RESPIRATORY (INHALATION) at 08:10

## 2022-10-22 RX ADMIN — IPRATROPIUM BROMIDE AND ALBUTEROL SULFATE 3 ML: 2.5; .5 SOLUTION RESPIRATORY (INHALATION) at 03:10

## 2022-10-22 RX ADMIN — CHLOROTHIAZIDE SODIUM 250 MG: 500 INJECTION, POWDER, LYOPHILIZED, FOR SOLUTION INTRAVENOUS at 10:10

## 2022-10-22 RX ADMIN — MILRINONE LACTATE IN DEXTROSE 0.25 MCG/KG/MIN: 200 INJECTION, SOLUTION INTRAVENOUS at 09:10

## 2022-10-22 RX ADMIN — ENOXAPARIN SODIUM 80 MG: 80 INJECTION SUBCUTANEOUS at 08:10

## 2022-10-22 RX ADMIN — APIXABAN 5 MG: 5 TABLET, FILM COATED ORAL at 08:10

## 2022-10-22 RX ADMIN — CEFEPIME 2 G: 2 INJECTION, POWDER, FOR SOLUTION INTRAVENOUS at 08:10

## 2022-10-22 RX ADMIN — Medication 6 MG: at 08:10

## 2022-10-22 RX ADMIN — SENNOSIDES AND DOCUSATE SODIUM 1 TABLET: 50; 8.6 TABLET ORAL at 08:10

## 2022-10-22 RX ADMIN — FAMOTIDINE 20 MG: 20 TABLET ORAL at 08:10

## 2022-10-22 RX ADMIN — CARVEDILOL 6.25 MG: 6.25 TABLET, FILM COATED ORAL at 08:10

## 2022-10-22 RX ADMIN — CHLOROTHIAZIDE SODIUM 250 MG: 500 INJECTION, POWDER, LYOPHILIZED, FOR SOLUTION INTRAVENOUS at 09:10

## 2022-10-22 RX ADMIN — POLYETHYLENE GLYCOL 3350 17 G: 17 POWDER, FOR SOLUTION ORAL at 08:10

## 2022-10-22 RX ADMIN — CEFEPIME 2 G: 2 INJECTION, POWDER, FOR SOLUTION INTRAVENOUS at 04:10

## 2022-10-22 RX ADMIN — CARVEDILOL 6.25 MG: 6.25 TABLET, FILM COATED ORAL at 09:10

## 2022-10-22 RX ADMIN — QUETIAPINE FUMARATE 100 MG: 25 TABLET ORAL at 08:10

## 2022-10-22 RX ADMIN — TAMSULOSIN HYDROCHLORIDE 0.4 MG: 0.4 CAPSULE ORAL at 08:10

## 2022-10-22 RX ADMIN — FUROSEMIDE 80 MG: 10 INJECTION, SOLUTION INTRAMUSCULAR; INTRAVENOUS at 03:10

## 2022-10-22 NOTE — SUBJECTIVE & OBJECTIVE
Interval History/Significant Events: NAEO. Made 4.9L or urine and was net negative 2.9 liters. Otherwise doing well. No fevers. WBC stable.     Follow-up For: Procedure(s) (LRB):  VALVULOPLASTY,MITRAL VALVE (N/A)  REPAIR, TRICUSPID VALVE (N/A)  INSERTION, IMPELLA (N/A)  MEYER MAZE PROCEDURE (N/A)  EXCLUSION, LEFT ATRIAL APPENDAGE (N/A)    Post-Operative Day: 15 Days Post-Op    Objective:     Vital Signs (Most Recent):  Temp: 98 °F (36.7 °C) (10/22/22 0330)  Pulse: 106 (10/22/22 0500)  Resp: (!) 22 (10/22/22 0500)  BP: (!) 140/67 (10/22/22 0500)  SpO2: 97 % (10/22/22 0500)   Vital Signs (24h Range):  Temp:  [98 °F (36.7 °C)-98.8 °F (37.1 °C)] 98 °F (36.7 °C)  Pulse:  [] 106  Resp:  [17-26] 22  SpO2:  [85 %-100 %] 97 %  BP: ()/(52-71) 140/67     Weight: 84.8 kg (187 lb)  Body mass index is 27.62 kg/m².      Intake/Output Summary (Last 24 hours) at 10/22/2022 0713  Last data filed at 10/22/2022 0712  Gross per 24 hour   Intake 2029.69 ml   Output 4705 ml   Net -2675.31 ml       Physical Exam  Vitals and nursing note reviewed.   Constitutional:       General: He is not in acute distress.  Eyes:      General: No scleral icterus.     Extraocular Movements: Extraocular movements intact.      Pupils: Pupils are equal, round, and reactive to light.   Cardiovascular:      Rate and Rhythm: Normal rate and regular rhythm.      Pulses: Normal pulses.      Heart sounds: No murmur heard.  Pulmonary:      Effort: Pulmonary effort is normal. No respiratory distress.      Breath sounds: Decreased breath sounds present.   Abdominal:      General: Abdomen is flat. Bowel sounds are normal. There is no distension.      Palpations: Abdomen is soft.      Tenderness: There is no abdominal tenderness.   Genitourinary:     Comments: Fierro in place  Musculoskeletal:      Right lower leg: Edema present.      Left lower leg: Edema present.   Skin:     General: Skin is warm and dry.      Capillary Refill: Capillary refill takes less  than 2 seconds.      Findings: Bruising present. No lesion.      Comments: MSI with wound vac- sanguinous drainage    Neurological:      General: No focal deficit present.      Mental Status: He is alert. Mental status is at baseline.       Vents:  Vent Mode: Spont (10/12/22 1252)  Ventilator Initiated: Yes (10/11/22 0830)  Set Rate: 24 BPM (10/12/22 0916)  Vt Set: 470 mL (10/12/22 0916)  Pressure Support: 5 cmH20 (10/12/22 1252)  PEEP/CPAP: 5 cmH20 (10/12/22 1252)  Oxygen Concentration (%): 36 (10/17/22 2123)  Peak Airway Pressure: 11 cmH2O (10/12/22 1252)  Plateau Pressure: 20 cmH20 (10/12/22 1252)  Total Ve: 9.34 mL (10/12/22 1252)  Negative Inspiratory Force (cm H2O): -34 (10/12/22 1310)  F/VT Ratio<105 (RSBI): (!) 49.59 (10/12/22 0916)    Lines/Drains/Airways       Peripherally Inserted Central Catheter Line  Duration             PICC Double Lumen 10/17/22 1709 right brachial 4 days              Drain  Duration                  Urethral Catheter 10/20/22 1700 Straight-tip 20 Fr. 1 day                    Significant Labs:    CBC/Anemia Profile:  Recent Labs   Lab 10/21/22  0353 10/22/22  0302   WBC 24.54* 25.12*   HGB 7.8* 8.3*   HCT 24.7* 26.7*   * 636*   MCV 91 93   RDW 18.3* 18.2*        Chemistries:  Recent Labs   Lab 10/21/22  0353 10/21/22  1435 10/21/22  2035 10/22/22  0302   * 137  137 135* 138   K 4.1 4.7  4.7 4.8 4.3   CL 97 100  100 99 100   CO2 24 29  29 27 28   BUN 34* 35*  35* 34* 35*   CREATININE 1.0 1.0  1.0 1.0 1.0   CALCIUM 8.5* 8.9  8.9 8.9 9.1   ALBUMIN 2.2*  --   --  2.3*   PROT 5.7*  --   --  6.1   BILITOT 0.8  --   --  1.0   ALKPHOS 146*  --   --  147*   ALT 20  --   --  20   AST 23  --   --  20   MG 2.3  --   --  2.3   PHOS 4.0  --   --  3.9       All pertinent labs within the past 24 hours have been reviewed.    Significant Imaging:  I have reviewed all pertinent imaging results/findings within the past 24 hours.

## 2022-10-22 NOTE — PLAN OF CARE
"Patient increasingly confused and irritable as my shift progressed; at one stating that he wants a new nurse and that he is "walking out of here" after he removed his gown, monitor equipment and almost pulled his PICC out. Patient reoriented to his current medical condition and he expressed being very frustrated at not being able to drink as much as he would like. Sodium has improved, no other issues. See flowsheets.  "

## 2022-10-22 NOTE — PROGRESS NOTES
[facility] - Surgical Intensive Care  Critical Care - Surgery  Progress Note    Patient Name: David Barrios  MRN: [id number]  Admission Date: 10/2/2022  Hospital Length of Stay: 20 days  Code Status: Full Code  Attending Provider: Mike Hedrick MD  Primary Care Provider: Breann Tavera MD   Principal Problem: Nonrheumatic mitral valve regurgitation    Subjective:     Hospital/ICU Course:  No notes on file    Interval History/Significant Events: NAEO. Made 4.9L or urine and was net negative 2.9 liters. Otherwise doing well. No fevers. WBC stable.     Follow-up For: Procedure(s) (LRB):  VALVULOPLASTY,MITRAL VALVE (N/A)  REPAIR, TRICUSPID VALVE (N/A)  INSERTION, IMPELLA (N/A)  MEYER MAZE PROCEDURE (N/A)  EXCLUSION, LEFT ATRIAL APPENDAGE (N/A)    Post-Operative Day: 15 Days Post-Op    Objective:     Vital Signs (Most Recent):  Temp: 98 °F (36.7 °C) (10/22/22 0330)  Pulse: 106 (10/22/22 0500)  Resp: (!) 22 (10/22/22 0500)  BP: (!) 140/67 (10/22/22 0500)  SpO2: 97 % (10/22/22 0500)   Vital Signs (24h Range):  Temp:  [98 °F (36.7 °C)-98.8 °F (37.1 °C)] 98 °F (36.7 °C)  Pulse:  [] 106  Resp:  [17-26] 22  SpO2:  [85 %-100 %] 97 %  BP: ()/(52-71) 140/67     Weight: 84.8 kg (187 lb)  Body mass index is 27.62 kg/m².      Intake/Output Summary (Last 24 hours) at 10/22/2022 0713  Last data filed at 10/22/2022 0712  Gross per 24 hour   Intake 2029.69 ml   Output 4705 ml   Net -2675.31 ml       Physical Exam  Vitals and nursing note reviewed.   Constitutional:       General: He is not in acute distress.  Eyes:      General: No scleral icterus.     Extraocular Movements: Extraocular movements intact.      Pupils: Pupils are equal, round, and reactive to light.   Cardiovascular:      Rate and Rhythm: Normal rate and regular rhythm.      Pulses: Normal pulses.      Heart sounds: No murmur heard.  Pulmonary:      Effort: Pulmonary effort is normal. No respiratory distress.      Breath sounds: Decreased breath sounds  present.   Abdominal:      General: Abdomen is flat. Bowel sounds are normal. There is no distension.      Palpations: Abdomen is soft.      Tenderness: There is no abdominal tenderness.   Genitourinary:     Comments: Fierro in place  Musculoskeletal:      Right lower leg: Edema present.      Left lower leg: Edema present.   Skin:     General: Skin is warm and dry.      Capillary Refill: Capillary refill takes less than 2 seconds.      Findings: Bruising present. No lesion.      Comments: MSI with wound vac- sanguinous drainage    Neurological:      General: No focal deficit present.      Mental Status: He is alert. Mental status is at baseline.       Vents:  Vent Mode: Spont (10/12/22 1252)  Ventilator Initiated: Yes (10/11/22 0830)  Set Rate: 24 BPM (10/12/22 0916)  Vt Set: 470 mL (10/12/22 0916)  Pressure Support: 5 cmH20 (10/12/22 1252)  PEEP/CPAP: 5 cmH20 (10/12/22 1252)  Oxygen Concentration (%): 36 (10/17/22 2123)  Peak Airway Pressure: 11 cmH2O (10/12/22 1252)  Plateau Pressure: 20 cmH20 (10/12/22 1252)  Total Ve: 9.34 mL (10/12/22 1252)  Negative Inspiratory Force (cm H2O): -34 (10/12/22 1310)  F/VT Ratio<105 (RSBI): (!) 49.59 (10/12/22 0916)    Lines/Drains/Airways       Peripherally Inserted Central Catheter Line  Duration             PICC Double Lumen 10/17/22 1709 right brachial 4 days              Drain  Duration                  Urethral Catheter 10/20/22 1700 Straight-tip 20 Fr. 1 day                    Significant Labs:    CBC/Anemia Profile:  Recent Labs   Lab 10/21/22  0353 10/22/22  0302   WBC 24.54* 25.12*   HGB 7.8* 8.3*   HCT 24.7* 26.7*   * 636*   MCV 91 93   RDW 18.3* 18.2*        Chemistries:  Recent Labs   Lab 10/21/22  0353 10/21/22  1435 10/21/22  2035 10/22/22  0302   * 137  137 135* 138   K 4.1 4.7  4.7 4.8 4.3   CL 97 100  100 99 100   CO2 24 29  29 27 28   BUN 34* 35*  35* 34* 35*   CREATININE 1.0 1.0  1.0 1.0 1.0   CALCIUM 8.5* 8.9  8.9 8.9 9.1   ALBUMIN 2.2*  --    --  2.3*   PROT 5.7*  --   --  6.1   BILITOT 0.8  --   --  1.0   ALKPHOS 146*  --   --  147*   ALT 20  --   --  20   AST 23  --   --  20   MG 2.3  --   --  2.3   PHOS 4.0  --   --  3.9       All pertinent labs within the past 24 hours have been reviewed.    Significant Imaging:  I have reviewed all pertinent imaging results/findings within the past 24 hours.    Assessment/Plan:     * Nonrheumatic mitral valve regurgitation    Neuro/Psych:   -- Sedation: none  -- Pain: PRN Oxy  -- Scheduled Tylenol  -- Neuro: Seroquel 100mg             Cards:   -- S/P TV replacement, MV replacement, MAZE, Lt Atrial Appendage ligation and Impella placement on 10/7/22, course complicated by bleeding, requiring reopening POD#0 and  multiple VT runs requiring DCCV.      -- Impella: Removed 10/14  -- MAP > 65, Syst < 140  -- Aspirin 325mg  -- In rate-controlled Afib intermittently, on PO amio   -- lifevest obtained for low EF and VT   -- milrinone restarted, lasix with diuril BID for diuresis, hypervolemic on exam. tolvaptan given once for hyponatremia in the setting of thiazides.    -- TTE 10/19 on milrinone: ef 35%, now with mod RV dysfunction, mild pericardial effusion without tamponade.      Pulm:   -- Goal O2 sat > 90%  -- Chest tubes removed 10/15   -- PEP therapy      Renal:  -- Diuresis with IVP lasix, BID diuril. PRN tolvaptan for iatrogenic hyponatremia  --Sodium normalized this am   -- Possibly remove lambert today   -- Trend BUN/Cr        Recent Labs     10/20/22  1438 10/20/22  2113 10/21/22  0353   BUN 33* 36* 34*   CREATININE 1.1 1.1 1.0        FEN / GI:   -- Replace lytes as needed  -- Nutrition: cardiac diet   -- GI ppx: not indicated   -- Bowel reg: miralax      ID:   -- Afebrile  -- persistent leukocytosis. Blood cx cleared since 10/14 on appropriate abx. Suspect possible graft infection. Blood cx repeated with NGTD  -- serratia macescens bacteremia 10/12  - pan sensitive.   -- Sternal culture with GNR growth,  wound vac (change dressing q3 days, last changed 10/20)  -- PICC in place for long term abx plan. ID consulted and recommend Cefepime fo 6 weeks.     Recent Labs     10/19/22  0308 10/20/22  0509 10/21/22  0353   WBC 31.36* 25.90* 24.54*        Heme/Onc:   -- stable post-operative anemia  -- Daily CBC  -- Aspirin 325mg QD;   -- Anticoagulation for intracardiac thrombus transitioned to levonox    Recent Labs     10/19/22  0308 10/20/22  0509 10/21/22  0353   HGB 7.2*   < > 7.8*   HCT 22.5*   < > 24.7*   APTT 75.3*  --   --     < > = values in this interval not displayed.        Endo:   -- BG goal 140-180  -- Hgb A1c 5.5      PPx:   Feeding: Cardiac diet  Analgesia/Sedation: precedex/PRN Oxy  Thromboembolic prevention: SCDs, lovenox   HOB >30: Yes  Stress Ulcer ppx: not indicated   Glucose control: Critical care goal 140-180 g/dl,     Lines/Drains/Airway: PICC, Fierro       Dispo/Code Status/Palliative:   -- SICU / Full Code.             Elias Reid MD  Critical Care - Surgery  [facility] - [facility]

## 2022-10-22 NOTE — PT/OT/SLP PROGRESS
Occupational Therapy   Treatment    Name: David Barrios  MRN: [id number]  Admitting Diagnosis:  Nonrheumatic mitral valve regurgitation  15 Days Post-Op    Recommendations:     Discharge Recommendations: rehabilitation facility  Discharge Equipment Recommendations:   (TBD)  Barriers to discharge:       Assessment:     David Barrios is a 62 y.o. male with a medical diagnosis of Nonrheumatic mitral valve regurgitation. Performance deficits affecting function are weakness, impaired endurance, impaired self care skills, impaired functional mobility, gait instability, impaired balance.     Rehab Prognosis:  Good; patient would benefit from acute skilled OT services to address these deficits and reach maximum level of function.       Plan:     Patient to be seen 5 x/week to address the above listed problems via self-care/home management, therapeutic activities, therapeutic exercises  Plan of Care Expires: 11/12/22  Plan of Care Reviewed with: patient    Subjective     Pain/Comfort:  Pain Rating 1: 0/10    Objective:     Communicated with: rn prior to session.  Patient found supine with wound vac, pulse ox (continuous), telemetry, peripheral IV, lambert catheter upon OT entry to room.    General Precautions: Standard, fall, sternal   Orthopedic Precautions:N/A   Braces:    Respiratory Status: Room air     Occupational Performance:     Bed Mobility:    Patient completed Scooting/Bridging with contact guard assistance  Patient completed Supine to Sit with minimum assistance     Functional Mobility/Transfers:  Patient completed Sit <> Stand Transfer with contact guard assistance  with  no assistive device   Patient completed Toilet Transfer Stand Pivot technique with contact guard assistance with  bedside commode    Activities of Daily Living:  Grooming: stand by assistance in standing.    [facility] 6 Click ADL: 17    Treatment & Education:  Pt required cues to not use UEs due to sternal precautions.  Discussed OT POC and  progress.    Patient left  on BSC  with all lines intact, call button in reach, and nurse notified    GOALS:   Multidisciplinary Problems       Occupational Therapy Goals          Problem: Occupational Therapy    Goal Priority Disciplines Outcome Interventions   Occupational Therapy Goal     OT, PT/OT     Description: Goals to be met by: 10/20/22     Patient will increase functional independence with ADLs by performing:    Grooming while standing with Supervision.  Toileting from toilet with CGA for hygiene and clothing management.   Supine to sit with Supervision.  Toilet transfer to toilet with CGA.                         Time Tracking:     OT Date of Treatment: 10/22/22  OT Start Time: 1040  OT Stop Time: 1103  OT Total Time (min): 23 min    Billable Minutes:Self Care/Home Management 10  Therapeutic Activity 13    OT/DUONG: OT          10/22/2022

## 2022-10-23 LAB
ALBUMIN SERPL BCP-MCNC: 2.1 G/DL (ref 3.5–5.2)
ALP SERPL-CCNC: 150 U/L (ref 55–135)
ALT SERPL W/O P-5'-P-CCNC: 27 U/L (ref 10–44)
ANION GAP SERPL CALC-SCNC: 7 MMOL/L (ref 8–16)
ANISOCYTOSIS BLD QL SMEAR: SLIGHT
AST SERPL-CCNC: 35 U/L (ref 10–40)
BACTERIA BLD CULT: NORMAL
BACTERIA BLD CULT: NORMAL
BASOPHILS # BLD AUTO: 0.06 K/UL (ref 0–0.2)
BASOPHILS NFR BLD: 0.2 % (ref 0–1.9)
BILIRUB SERPL-MCNC: 1 MG/DL (ref 0.1–1)
BUN SERPL-MCNC: 38 MG/DL (ref 8–23)
CALCIUM SERPL-MCNC: 8.5 MG/DL (ref 8.7–10.5)
CHLORIDE SERPL-SCNC: 96 MMOL/L (ref 95–110)
CO2 SERPL-SCNC: 34 MMOL/L (ref 23–29)
CREAT SERPL-MCNC: 1.1 MG/DL (ref 0.5–1.4)
DIFFERENTIAL METHOD: ABNORMAL
EOSINOPHIL # BLD AUTO: 0.1 K/UL (ref 0–0.5)
EOSINOPHIL NFR BLD: 0.5 % (ref 0–8)
ERYTHROCYTE [DISTWIDTH] IN BLOOD BY AUTOMATED COUNT: 18.3 % (ref 11.5–14.5)
EST. GFR  (NO RACE VARIABLE): >60 ML/MIN/1.73 M^2
GLUCOSE SERPL-MCNC: 107 MG/DL (ref 70–110)
HCT VFR BLD AUTO: 25.6 % (ref 40–54)
HGB BLD-MCNC: 8 G/DL (ref 14–18)
HYPOCHROMIA BLD QL SMEAR: ABNORMAL
IMM GRANULOCYTES # BLD AUTO: 0.53 K/UL (ref 0–0.04)
IMM GRANULOCYTES NFR BLD AUTO: 2.2 % (ref 0–0.5)
LYMPHOCYTES # BLD AUTO: 1.2 K/UL (ref 1–4.8)
LYMPHOCYTES NFR BLD: 4.8 % (ref 18–48)
MAGNESIUM SERPL-MCNC: 2.3 MG/DL (ref 1.6–2.6)
MCH RBC QN AUTO: 29.3 PG (ref 27–31)
MCHC RBC AUTO-ENTMCNC: 31.3 G/DL (ref 32–36)
MCV RBC AUTO: 94 FL (ref 82–98)
MONOCYTES # BLD AUTO: 2.5 K/UL (ref 0.3–1)
MONOCYTES NFR BLD: 10.4 % (ref 4–15)
NEUTROPHILS # BLD AUTO: 20 K/UL (ref 1.8–7.7)
NEUTROPHILS NFR BLD: 81.9 % (ref 38–73)
NRBC BLD-RTO: 1 /100 WBC
PHOSPHATE SERPL-MCNC: 4.5 MG/DL (ref 2.7–4.5)
PLATELET # BLD AUTO: 631 K/UL (ref 150–450)
PLATELET BLD QL SMEAR: ABNORMAL
PMV BLD AUTO: 10.1 FL (ref 9.2–12.9)
POLYCHROMASIA BLD QL SMEAR: ABNORMAL
POTASSIUM SERPL-SCNC: 3.7 MMOL/L (ref 3.5–5.1)
PROT SERPL-MCNC: 5.7 G/DL (ref 6–8.4)
RBC # BLD AUTO: 2.73 M/UL (ref 4.6–6.2)
SODIUM SERPL-SCNC: 137 MMOL/L (ref 136–145)
WBC # BLD AUTO: 24.4 K/UL (ref 3.9–12.7)

## 2022-10-23 PROCEDURE — 25000003 PHARM REV CODE 250

## 2022-10-23 PROCEDURE — 63600175 PHARM REV CODE 636 W HCPCS: Performed by: STUDENT IN AN ORGANIZED HEALTH CARE EDUCATION/TRAINING PROGRAM

## 2022-10-23 PROCEDURE — 85025 COMPLETE CBC W/AUTO DIFF WBC: CPT | Performed by: THORACIC SURGERY (CARDIOTHORACIC VASCULAR SURGERY)

## 2022-10-23 PROCEDURE — 83735 ASSAY OF MAGNESIUM: CPT | Performed by: STUDENT IN AN ORGANIZED HEALTH CARE EDUCATION/TRAINING PROGRAM

## 2022-10-23 PROCEDURE — 25000003 PHARM REV CODE 250: Performed by: STUDENT IN AN ORGANIZED HEALTH CARE EDUCATION/TRAINING PROGRAM

## 2022-10-23 PROCEDURE — 63600175 PHARM REV CODE 636 W HCPCS

## 2022-10-23 PROCEDURE — 25000242 PHARM REV CODE 250 ALT 637 W/ HCPCS

## 2022-10-23 PROCEDURE — A4216 STERILE WATER/SALINE, 10 ML: HCPCS | Performed by: THORACIC SURGERY (CARDIOTHORACIC VASCULAR SURGERY)

## 2022-10-23 PROCEDURE — 94761 N-INVAS EAR/PLS OXIMETRY MLT: CPT

## 2022-10-23 PROCEDURE — 80053 COMPREHEN METABOLIC PANEL: CPT

## 2022-10-23 PROCEDURE — 27000646 HC AEROBIKA DEVICE

## 2022-10-23 PROCEDURE — 27000221 HC OXYGEN, UP TO 24 HOURS

## 2022-10-23 PROCEDURE — 94799 UNLISTED PULMONARY SVC/PX: CPT

## 2022-10-23 PROCEDURE — 20600001 HC STEP DOWN PRIVATE ROOM

## 2022-10-23 PROCEDURE — 94640 AIRWAY INHALATION TREATMENT: CPT

## 2022-10-23 PROCEDURE — 25000003 PHARM REV CODE 250: Performed by: THORACIC SURGERY (CARDIOTHORACIC VASCULAR SURGERY)

## 2022-10-23 PROCEDURE — 99900035 HC TECH TIME PER 15 MIN (STAT)

## 2022-10-23 PROCEDURE — 94664 DEMO&/EVAL PT USE INHALER: CPT

## 2022-10-23 PROCEDURE — 84100 ASSAY OF PHOSPHORUS: CPT | Performed by: STUDENT IN AN ORGANIZED HEALTH CARE EDUCATION/TRAINING PROGRAM

## 2022-10-23 RX ORDER — HYDROMORPHONE HYDROCHLORIDE 1 MG/ML
0.5 INJECTION, SOLUTION INTRAMUSCULAR; INTRAVENOUS; SUBCUTANEOUS ONCE AS NEEDED
Status: COMPLETED | OUTPATIENT
Start: 2022-10-23 | End: 2022-10-23

## 2022-10-23 RX ORDER — ENOXAPARIN SODIUM 100 MG/ML
40 INJECTION SUBCUTANEOUS EVERY 24 HOURS
Status: DISCONTINUED | OUTPATIENT
Start: 2022-10-24 | End: 2022-10-25

## 2022-10-23 RX ORDER — LIDOCAINE HYDROCHLORIDE 10 MG/ML
20 INJECTION INFILTRATION; PERINEURAL ONCE
Status: COMPLETED | OUTPATIENT
Start: 2022-10-23 | End: 2022-10-23

## 2022-10-23 RX ADMIN — POTASSIUM CHLORIDE 40 MEQ: 20 TABLET, EXTENDED RELEASE ORAL at 08:10

## 2022-10-23 RX ADMIN — OXYCODONE 5 MG: 5 TABLET ORAL at 06:10

## 2022-10-23 RX ADMIN — CARVEDILOL 6.25 MG: 6.25 TABLET, FILM COATED ORAL at 09:10

## 2022-10-23 RX ADMIN — Medication 6 MG: at 09:10

## 2022-10-23 RX ADMIN — TAMSULOSIN HYDROCHLORIDE 0.4 MG: 0.4 CAPSULE ORAL at 08:10

## 2022-10-23 RX ADMIN — IPRATROPIUM BROMIDE AND ALBUTEROL SULFATE 3 ML: 2.5; .5 SOLUTION RESPIRATORY (INHALATION) at 07:10

## 2022-10-23 RX ADMIN — FUROSEMIDE 80 MG: 10 INJECTION, SOLUTION INTRAMUSCULAR; INTRAVENOUS at 09:10

## 2022-10-23 RX ADMIN — HYDROMORPHONE HYDROCHLORIDE 0.5 MG: 1 INJECTION, SOLUTION INTRAMUSCULAR; INTRAVENOUS; SUBCUTANEOUS at 10:10

## 2022-10-23 RX ADMIN — MILRINONE LACTATE IN DEXTROSE 0.12 MCG/KG/MIN: 200 INJECTION, SOLUTION INTRAVENOUS at 05:10

## 2022-10-23 RX ADMIN — FAMOTIDINE 20 MG: 20 TABLET ORAL at 09:10

## 2022-10-23 RX ADMIN — FAMOTIDINE 20 MG: 20 TABLET ORAL at 08:10

## 2022-10-23 RX ADMIN — IPRATROPIUM BROMIDE AND ALBUTEROL SULFATE 3 ML: 2.5; .5 SOLUTION RESPIRATORY (INHALATION) at 02:10

## 2022-10-23 RX ADMIN — Medication 10 ML: at 12:10

## 2022-10-23 RX ADMIN — QUETIAPINE FUMARATE 100 MG: 25 TABLET ORAL at 09:10

## 2022-10-23 RX ADMIN — FUROSEMIDE 80 MG: 10 INJECTION, SOLUTION INTRAMUSCULAR; INTRAVENOUS at 08:10

## 2022-10-23 RX ADMIN — CEFEPIME 2 G: 2 INJECTION, POWDER, FOR SOLUTION INTRAVENOUS at 09:10

## 2022-10-23 RX ADMIN — Medication 10 ML: at 06:10

## 2022-10-23 RX ADMIN — FUROSEMIDE 80 MG: 10 INJECTION, SOLUTION INTRAMUSCULAR; INTRAVENOUS at 04:10

## 2022-10-23 RX ADMIN — CEFEPIME 2 G: 2 INJECTION, POWDER, FOR SOLUTION INTRAVENOUS at 04:10

## 2022-10-23 RX ADMIN — ASPIRIN 325 MG ORAL TABLET 325 MG: 325 PILL ORAL at 08:10

## 2022-10-23 RX ADMIN — CEFEPIME 2 G: 2 INJECTION, POWDER, FOR SOLUTION INTRAVENOUS at 11:10

## 2022-10-23 RX ADMIN — AMIODARONE HYDROCHLORIDE 200 MG: 200 TABLET ORAL at 08:10

## 2022-10-23 RX ADMIN — CHLOROTHIAZIDE SODIUM 250 MG: 500 INJECTION, POWDER, LYOPHILIZED, FOR SOLUTION INTRAVENOUS at 09:10

## 2022-10-23 RX ADMIN — CARVEDILOL 6.25 MG: 6.25 TABLET, FILM COATED ORAL at 08:10

## 2022-10-23 RX ADMIN — CHLOROTHIAZIDE SODIUM 250 MG: 500 INJECTION, POWDER, LYOPHILIZED, FOR SOLUTION INTRAVENOUS at 10:10

## 2022-10-23 RX ADMIN — LIDOCAINE HYDROCHLORIDE 20 ML: 10 INJECTION, SOLUTION INFILTRATION; PERINEURAL at 09:10

## 2022-10-23 RX ADMIN — SENNOSIDES AND DOCUSATE SODIUM 1 TABLET: 50; 8.6 TABLET ORAL at 09:10

## 2022-10-23 NOTE — PLAN OF CARE
Problem: Adult Inpatient Plan of Care  Goal: Patient-Specific Goal (Individualized)  Outcome: Ongoing, Progressing  Flowsheets (Taken 10/23/2022 2196)  Anxieties, Fears or Concerns: Sleep being interrupted  Individualized Care Needs: Maintain Milrinone gtt infusing @ 3.2mL/hr. Monitor and manage for pain. Perform I&O on R chest drain. Maintain sternal precautions. MSI pain managed.   Patient-Specific Goals (Include Timeframe): Pt will be free of falls and injures throughout my shift.    R chest drain inserted today @ bedside. Output @ 1836, 840 mL

## 2022-10-23 NOTE — PROCEDURES
"David Barrios is a 62 y.o. male patient.    Temp: 98.1 °F (36.7 °C) (10/23/22 1118)  Pulse: 73 (10/23/22 1118)  Resp: 16 (10/23/22 1118)  BP: (!) 95/55 (10/23/22 1118)  SpO2: 95 % (10/23/22 1118)  Weight: 83.6 kg (184 lb 4.9 oz) (10/23/22 0857)  Height: 5' 9" (175.3 cm) (10/19/22 1325)       Chest Tube Insertion    Date/Time: 10/23/2022 12:12 PM  Location procedure was performed: [facility] CARDIOTHORACIC SURGERY  Performed by: Javier Rolle MD  Authorized by: Javier Rolle MD   Post-operative diagnosis: pleural effusion  Pre-operative diagnosis: pleural effusion  Indications: pleural effusion    Patient sedated: no    Anesthesia:  Local Anesthetic: lidocaine 1% without epinephrine  Anesthetic total: 15 mL  Preparation: skin prepped with ChloraPrep  Placement location: right lateral  Scalpel size: 11  Tube size (Japanese): 14Fr.  Ultrasound guidance: no  Tension pneumothorax heard: no  Tube connected to: suction  Drainage characteristics: serosanguinous and yellow  Drainage amount: 1000 ml  Suture material: 2-0 silk  Post-insertion x-ray findings: tube in good position  Patient tolerance: Patient tolerated the procedure well with no immediate complications  Complications: No  Estimated blood loss (mL): 2  Specimens: No  Implants: No      Javier Rolle MD  Cardiothoracic Surgery PGY6      "

## 2022-10-23 NOTE — PLAN OF CARE
Pt free of falls and injury. Pt AAOx4. Fall precautions remain in place. Sternal precautions reinforced. IS and ambulation encouraged. Pt on 3L NC. Sats >95%. AF on telemetry. HR 90s-100s. Educated pt on importance of accurate I/Os and adherence to fluid restriction. Plan of care reviewed with pt. Milrinone gtt infusing at 0.25 mcg/kg/min and 6.4 ml/hr. IV abx administered per MAR. Telesitter at bedside. Pt resting comfortably with no complaints of pain. Pt denies chest pain, headache, and SOB. Pt VSS, no distress, will continue to monitor.

## 2022-10-23 NOTE — PLAN OF CARE
Problem: Adult Inpatient Plan of Care  Goal: Plan of Care Review  Outcome: Ongoing, Progressing     Problem: Adult Inpatient Plan of Care  Goal: Patient-Specific Goal (Individualized)  Outcome: Ongoing, Progressing     Problem: Adult Inpatient Plan of Care  Goal: Absence of Hospital-Acquired Illness or Injury  Outcome: Ongoing, Progressing       Received from SICU via bed with tele.  Transfer assessment complete.  Wound vac with sternal chest incision clean, dry and intact.  Patient with o2 sat 75% on room air.  His fingers are cool to touch and was difficult to obtain a reading.  Multiple dinamaps used and obtained same reading.  O2 NC placed with sats increasing to 90% on 4L NC.  Dr Shoemaker notified and came to assess patient.  New orders rec'd and carried out.  I/s given to patient and instructed on use.  He return demonstrated use.  He ate no lunch or dinner, drank half of boost.  [employer] camera at bedside with continuous monitoring.  Urinals x2 at bedside, able to use unassisted.

## 2022-10-23 NOTE — SUBJECTIVE & OBJECTIVE
Interval History: Stepped down to floor yesterday  Increased O2 requirement yesterday, CXR with large right sided effusion   No issues overnight  Afebrile     Review of Systems   Constitutional: Negative for chills and fever.   Cardiovascular:  Positive for leg swelling. Negative for chest pain, orthopnea and palpitations.   Respiratory:  Negative for cough and shortness of breath.    Hematologic/Lymphatic: Negative.    Skin: Negative.    Gastrointestinal:  Negative for abdominal pain.   Neurological:  Negative for dizziness, headaches and light-headedness.   Psychiatric/Behavioral:  Negative for altered mental status.    Medications:  Continuous Infusions:   milrinone 20mg/100ml D5W (200mcg/ml) 0.125 mcg/kg/min (10/23/22 0838)     Scheduled Meds:   albuterol-ipratropium  3 mL Nebulization Q6H WAKE    amiodarone  200 mg Oral Daily    aspirin  325 mg Per NG tube Daily    carvediloL  6.25 mg Oral BID    ceFEPime (MAXIPIME) IVPB  2 g Intravenous Q8H    chlorothiazide (DIURIL) IVPB  250 mg Intravenous BID    famotidine  20 mg Oral BID    furosemide (LASIX) injection  80 mg Intravenous TID    melatonin  6 mg Oral Nightly    polyethylene glycol  17 g Per NG tube Daily    potassium chloride  40 mEq Oral Daily    QUEtiapine  100 mg Oral QHS    senna-docusate 8.6-50 mg  1 tablet Per NG tube BID    sodium chloride 0.9%  10 mL Intravenous Q6H    tamsulosin  0.4 mg Oral Daily     PRN Meds:sodium chloride, sodium chloride 0.9%, acetaminophen, bisacodyL, calcium gluconate IVPB, calcium gluconate IVPB, calcium gluconate IVPB, dextrose 10%, dextrose 10%, magnesium sulfate IVPB, magnesium sulfate IVPB, ondansetron, oxyCODONE, oxyCODONE, potassium chloride in water, potassium chloride in water, potassium chloride in water, Flushing PICC Protocol **AND** sodium chloride 0.9% **AND** sodium chloride 0.9%     Objective:     Vital Signs (Most Recent):  Temp: 98.1 °F (36.7 °C) (10/23/22 0724)  Pulse: 98 (10/23/22 0730)  Resp: 15 (10/23/22  0730)  BP: (!) 104/59 (10/23/22 0724)  SpO2: 96 % (10/23/22 0730)   Vital Signs (24h Range):  Temp:  [96.2 °F (35.7 °C)-99.8 °F (37.7 °C)] 98.1 °F (36.7 °C)  Pulse:  [] 98  Resp:  [15-42] 15  SpO2:  [70 %-99 %] 96 %  BP: ()/(51-73) 104/59     Weight: 84.8 kg (187 lb)  Body mass index is 27.62 kg/m².    SpO2: 96 %  O2 Device (Oxygen Therapy): nasal cannula    Intake/Output - Last 3 Shifts         10/21 0700  10/22 0659 10/22 0700  10/23 0659 10/23 0700  10/24 0659    P.O. 1470 967     I.V. (mL/kg) 116.9 (1.4) 57.8 (0.7)     IV Piggyback 197 48.9     Total Intake(mL/kg) 1783.9 (21) 1073.7 (12.7)     Urine (mL/kg/hr) 5030 (2.5) 3600 (1.8)     Other 75 50     Stool 0      Total Output 5105 3650     Net -3321.1 -2576.3            Stool Occurrence 1 x              Lines/Drains/Airways       Peripherally Inserted Central Catheter Line  Duration             PICC Double Lumen 10/17/22 1709 right brachial 5 days                    Physical Exam  Vitals and nursing note reviewed.   Constitutional:       General: He is not in acute distress.  Eyes:      General: No scleral icterus.     Extraocular Movements: Extraocular movements intact.      Pupils: Pupils are equal, round, and reactive to light.   Cardiovascular:      Rate and Rhythm: Normal rate and regular rhythm.      Pulses: Normal pulses.      Heart sounds: No murmur heard.  Pulmonary:      Effort: Pulmonary effort is normal. No respiratory distress.      Breath sounds: Decreased breath sounds present.   Abdominal:      General: Abdomen is flat. Bowel sounds are normal. There is no distension.      Palpations: Abdomen is soft.      Tenderness: There is no abdominal tenderness.   Musculoskeletal:      Right lower leg: Edema present.      Left lower leg: Edema present.   Skin:     General: Skin is warm and dry.      Capillary Refill: Capillary refill takes less than 2 seconds.      Findings: Bruising present. No lesion.      Comments: MSI with wound vac- SS  output   Neurological:      General: No focal deficit present.      Mental Status: He is alert. Mental status is at baseline.       Significant Labs:  All pertinent labs from the last 24 hours have been reviewed.    Significant Diagnostics:  I have reviewed all pertinent imaging results/findings within the past 24 hours.

## 2022-10-23 NOTE — ASSESSMENT & PLAN NOTE
S/P TV replacement, MV replacement, MAZE, Lt Atrial Appendage ligation and Impella placement on 10/7/22, course complicated by bleeding, requiring reopening POD#0 and  multiple VT runs requiring DCCV.    Chest tube today for large right pleural effusion  Impella: Removed 10/14  Decrease Milrinone to 0.125  Aspirin 325mg  D/c Eliquis  Lovenox for ppx  Lasix TID, Diuril BID  In rate-controlled Afib intermittently, on PO amio   Lfevest obtained for low EF and VT   TTE 10/19 on milrinone: ef 35%, now with mod RV dysfunction, mild pericardial effusion without tamponade.   Ambulate TID

## 2022-10-23 NOTE — PROGRESS NOTES
[facility]  Cardiothoracic Surgery  Progress Note    Patient Name: David Barrios  MRN: [id number]  Admission Date: 10/2/2022  Hospital Length of Stay: 21 days  Code Status: Full Code   Attending Physician: Mike Hedrick MD   Referring Provider: Mike Hedrick MD  Principal Problem:Nonrheumatic mitral valve regurgitation    Subjective:     Post-Op Info:  Procedure(s) (LRB):  VALVULOPLASTY,MITRAL VALVE (N/A)  REPAIR, TRICUSPID VALVE (N/A)  INSERTION, IMPELLA (N/A)  MEYER MAZE PROCEDURE (N/A)  EXCLUSION, LEFT ATRIAL APPENDAGE (N/A)   16 Days Post-Op     Interval History: Stepped down to floor yesterday  Increased O2 requirement yesterday, CXR with large right sided effusion   No issues overnight  Afebrile     Review of Systems   Constitutional: Negative for chills and fever.   Cardiovascular:  Positive for leg swelling. Negative for chest pain, orthopnea and palpitations.   Respiratory:  Negative for cough and shortness of breath.    Hematologic/Lymphatic: Negative.    Skin: Negative.    Gastrointestinal:  Negative for abdominal pain.   Neurological:  Negative for dizziness, headaches and light-headedness.   Psychiatric/Behavioral:  Negative for altered mental status.    Medications:  Continuous Infusions:   milrinone 20mg/100ml D5W (200mcg/ml) 0.125 mcg/kg/min (10/23/22 0838)     Scheduled Meds:   albuterol-ipratropium  3 mL Nebulization Q6H WAKE    amiodarone  200 mg Oral Daily    aspirin  325 mg Per NG tube Daily    carvediloL  6.25 mg Oral BID    ceFEPime (MAXIPIME) IVPB  2 g Intravenous Q8H    chlorothiazide (DIURIL) IVPB  250 mg Intravenous BID    famotidine  20 mg Oral BID    furosemide (LASIX) injection  80 mg Intravenous TID    melatonin  6 mg Oral Nightly    polyethylene glycol  17 g Per NG tube Daily    potassium chloride  40 mEq Oral Daily    QUEtiapine  100 mg Oral QHS    senna-docusate 8.6-50 mg  1 tablet Per NG tube BID    sodium chloride 0.9%  10 mL  Intravenous Q6H    tamsulosin  0.4 mg Oral Daily     PRN Meds:sodium chloride, sodium chloride 0.9%, acetaminophen, bisacodyL, calcium gluconate IVPB, calcium gluconate IVPB, calcium gluconate IVPB, dextrose 10%, dextrose 10%, magnesium sulfate IVPB, magnesium sulfate IVPB, ondansetron, oxyCODONE, oxyCODONE, potassium chloride in water, potassium chloride in water, potassium chloride in water, Flushing PICC Protocol **AND** sodium chloride 0.9% **AND** sodium chloride 0.9%     Objective:     Vital Signs (Most Recent):  Temp: 98.1 °F (36.7 °C) (10/23/22 0724)  Pulse: 98 (10/23/22 0730)  Resp: 15 (10/23/22 0730)  BP: (!) 104/59 (10/23/22 0724)  SpO2: 96 % (10/23/22 0730)   Vital Signs (24h Range):  Temp:  [96.2 °F (35.7 °C)-99.8 °F (37.7 °C)] 98.1 °F (36.7 °C)  Pulse:  [] 98  Resp:  [15-42] 15  SpO2:  [70 %-99 %] 96 %  BP: ()/(51-73) 104/59     Weight: 84.8 kg (187 lb)  Body mass index is 27.62 kg/m².    SpO2: 96 %  O2 Device (Oxygen Therapy): nasal cannula    Intake/Output - Last 3 Shifts         10/21 0700  10/22 0659 10/22 0700  10/23 0659 10/23 0700  10/24 0659    P.O. 1470 967     I.V. (mL/kg) 116.9 (1.4) 57.8 (0.7)     IV Piggyback 197 48.9     Total Intake(mL/kg) 1783.9 (21) 1073.7 (12.7)     Urine (mL/kg/hr) 5030 (2.5) 3600 (1.8)     Other 75 50     Stool 0      Total Output 5105 3650     Net -3321.1 -2576.3            Stool Occurrence 1 x              Lines/Drains/Airways       Peripherally Inserted Central Catheter Line  Duration             PICC Double Lumen 10/17/22 1709 right brachial 5 days                    Physical Exam  Vitals and nursing note reviewed.   Constitutional:       General: He is not in acute distress.  Eyes:      General: No scleral icterus.     Extraocular Movements: Extraocular movements intact.      Pupils: Pupils are equal, round, and reactive to light.   Cardiovascular:      Rate and Rhythm: Normal rate and regular rhythm.      Pulses: Normal pulses.      Heart sounds:  No murmur heard.  Pulmonary:      Effort: Pulmonary effort is normal. No respiratory distress.      Breath sounds: Decreased breath sounds present.   Abdominal:      General: Abdomen is flat. Bowel sounds are normal. There is no distension.      Palpations: Abdomen is soft.      Tenderness: There is no abdominal tenderness.   Musculoskeletal:      Right lower leg: Edema present.      Left lower leg: Edema present.   Skin:     General: Skin is warm and dry.      Capillary Refill: Capillary refill takes less than 2 seconds.      Findings: Bruising present. No lesion.      Comments: MSI with wound vac- SS output   Neurological:      General: No focal deficit present.      Mental Status: He is alert. Mental status is at baseline.       Significant Labs:  All pertinent labs from the last 24 hours have been reviewed.    Significant Diagnostics:  I have reviewed all pertinent imaging results/findings within the past 24 hours.    Assessment/Plan:     * Nonrheumatic mitral valve regurgitation  S/P TV replacement, MV replacement, MAZE, Lt Atrial Appendage ligation and Impella placement on 10/7/22, course complicated by bleeding, requiring reopening POD#0 and  multiple VT runs requiring DCCV.    Chest tube today for large right pleural effusion  Impella: Removed 10/14  Decrease Milrinone to 0.125  Aspirin 325mg  D/c Eliquis  Lovenox for ppx  Lasix TID, Diuril BID  In rate-controlled Afib intermittently, on PO amio   Lfevest obtained for low EF and VT   TTE 10/19 on milrinone: ef 35%, now with mod RV dysfunction, mild pericardial effusion without tamponade.   Ambulate TID    Javier Rolle MD  Cardiothoracic Surgery  [facility]

## 2022-10-24 PROBLEM — Z98.890 STATUS POST TRICUSPID VALVE REPAIR: Status: ACTIVE | Noted: 2022-10-24

## 2022-10-24 PROBLEM — Z86.79 STATUS POST MAZE OPERATION FOR ATRIAL FIBRILLATION: Status: ACTIVE | Noted: 2022-10-24

## 2022-10-24 PROBLEM — Z98.890 STATUS POST MITRAL VALVE REPAIR: Status: ACTIVE | Noted: 2022-10-24

## 2022-10-24 PROBLEM — Z98.890 STATUS POST LIGATION OF LEFT ATRIAL APPENDAGE: Status: ACTIVE | Noted: 2022-10-24

## 2022-10-24 PROBLEM — Z98.890 STATUS POST MAZE OPERATION FOR ATRIAL FIBRILLATION: Status: ACTIVE | Noted: 2022-10-24

## 2022-10-24 LAB
ALBUMIN SERPL BCP-MCNC: 2.2 G/DL (ref 3.5–5.2)
ALP SERPL-CCNC: 140 U/L (ref 55–135)
ALT SERPL W/O P-5'-P-CCNC: 30 U/L (ref 10–44)
ANION GAP SERPL CALC-SCNC: 10 MMOL/L (ref 8–16)
AST SERPL-CCNC: 36 U/L (ref 10–40)
BASOPHILS # BLD AUTO: 0.06 K/UL (ref 0–0.2)
BASOPHILS NFR BLD: 0.3 % (ref 0–1.9)
BILIRUB SERPL-MCNC: 0.9 MG/DL (ref 0.1–1)
BUN SERPL-MCNC: 34 MG/DL (ref 8–23)
CALCIUM SERPL-MCNC: 8.9 MG/DL (ref 8.7–10.5)
CHLORIDE SERPL-SCNC: 96 MMOL/L (ref 95–110)
CO2 SERPL-SCNC: 31 MMOL/L (ref 23–29)
CREAT SERPL-MCNC: 1.1 MG/DL (ref 0.5–1.4)
DIFFERENTIAL METHOD: ABNORMAL
EOSINOPHIL # BLD AUTO: 0.1 K/UL (ref 0–0.5)
EOSINOPHIL NFR BLD: 0.6 % (ref 0–8)
ERYTHROCYTE [DISTWIDTH] IN BLOOD BY AUTOMATED COUNT: 18.3 % (ref 11.5–14.5)
EST. GFR  (NO RACE VARIABLE): >60 ML/MIN/1.73 M^2
GLUCOSE SERPL-MCNC: 95 MG/DL (ref 70–110)
HCT VFR BLD AUTO: 27.1 % (ref 40–54)
HGB BLD-MCNC: 8.2 G/DL (ref 14–18)
IMM GRANULOCYTES # BLD AUTO: 0.38 K/UL (ref 0–0.04)
IMM GRANULOCYTES NFR BLD AUTO: 2 % (ref 0–0.5)
LYMPHOCYTES # BLD AUTO: 1.2 K/UL (ref 1–4.8)
LYMPHOCYTES NFR BLD: 6.4 % (ref 18–48)
MAGNESIUM SERPL-MCNC: 2.3 MG/DL (ref 1.6–2.6)
MCH RBC QN AUTO: 28.7 PG (ref 27–31)
MCHC RBC AUTO-ENTMCNC: 30.3 G/DL (ref 32–36)
MCV RBC AUTO: 95 FL (ref 82–98)
MONOCYTES # BLD AUTO: 1.8 K/UL (ref 0.3–1)
MONOCYTES NFR BLD: 9.3 % (ref 4–15)
NEUTROPHILS # BLD AUTO: 15.6 K/UL (ref 1.8–7.7)
NEUTROPHILS NFR BLD: 81.4 % (ref 38–73)
NRBC BLD-RTO: 0 /100 WBC
PHOSPHATE SERPL-MCNC: 3.9 MG/DL (ref 2.7–4.5)
PLATELET # BLD AUTO: 657 K/UL (ref 150–450)
PMV BLD AUTO: 10.2 FL (ref 9.2–12.9)
POTASSIUM SERPL-SCNC: 3.9 MMOL/L (ref 3.5–5.1)
PROT SERPL-MCNC: 6 G/DL (ref 6–8.4)
RBC # BLD AUTO: 2.86 M/UL (ref 4.6–6.2)
SODIUM SERPL-SCNC: 137 MMOL/L (ref 136–145)
WBC # BLD AUTO: 19.18 K/UL (ref 3.9–12.7)

## 2022-10-24 PROCEDURE — 63600175 PHARM REV CODE 636 W HCPCS: Performed by: THORACIC SURGERY (CARDIOTHORACIC VASCULAR SURGERY)

## 2022-10-24 PROCEDURE — 85025 COMPLETE CBC W/AUTO DIFF WBC: CPT | Performed by: THORACIC SURGERY (CARDIOTHORACIC VASCULAR SURGERY)

## 2022-10-24 PROCEDURE — 63600175 PHARM REV CODE 636 W HCPCS

## 2022-10-24 PROCEDURE — 99900035 HC TECH TIME PER 15 MIN (STAT)

## 2022-10-24 PROCEDURE — 25000003 PHARM REV CODE 250

## 2022-10-24 PROCEDURE — 27000221 HC OXYGEN, UP TO 24 HOURS

## 2022-10-24 PROCEDURE — 84100 ASSAY OF PHOSPHORUS: CPT | Performed by: STUDENT IN AN ORGANIZED HEALTH CARE EDUCATION/TRAINING PROGRAM

## 2022-10-24 PROCEDURE — 25000003 PHARM REV CODE 250: Performed by: THORACIC SURGERY (CARDIOTHORACIC VASCULAR SURGERY)

## 2022-10-24 PROCEDURE — 83735 ASSAY OF MAGNESIUM: CPT | Performed by: STUDENT IN AN ORGANIZED HEALTH CARE EDUCATION/TRAINING PROGRAM

## 2022-10-24 PROCEDURE — 99222 1ST HOSP IP/OBS MODERATE 55: CPT | Mod: ,,, | Performed by: NURSE PRACTITIONER

## 2022-10-24 PROCEDURE — 94640 AIRWAY INHALATION TREATMENT: CPT

## 2022-10-24 PROCEDURE — 25000242 PHARM REV CODE 250 ALT 637 W/ HCPCS

## 2022-10-24 PROCEDURE — 94761 N-INVAS EAR/PLS OXIMETRY MLT: CPT

## 2022-10-24 PROCEDURE — A4216 STERILE WATER/SALINE, 10 ML: HCPCS | Performed by: THORACIC SURGERY (CARDIOTHORACIC VASCULAR SURGERY)

## 2022-10-24 PROCEDURE — 99222 PR INITIAL HOSPITAL CARE,LEVL II: ICD-10-PCS | Mod: ,,, | Performed by: NURSE PRACTITIONER

## 2022-10-24 PROCEDURE — 97116 GAIT TRAINING THERAPY: CPT

## 2022-10-24 PROCEDURE — 20600001 HC STEP DOWN PRIVATE ROOM

## 2022-10-24 PROCEDURE — 63600175 PHARM REV CODE 636 W HCPCS: Performed by: STUDENT IN AN ORGANIZED HEALTH CARE EDUCATION/TRAINING PROGRAM

## 2022-10-24 PROCEDURE — 25000003 PHARM REV CODE 250: Performed by: STUDENT IN AN ORGANIZED HEALTH CARE EDUCATION/TRAINING PROGRAM

## 2022-10-24 PROCEDURE — 80053 COMPREHEN METABOLIC PANEL: CPT

## 2022-10-24 RX ORDER — FUROSEMIDE 10 MG/ML
80 INJECTION INTRAMUSCULAR; INTRAVENOUS 2 TIMES DAILY
Status: DISCONTINUED | OUTPATIENT
Start: 2022-10-24 | End: 2022-10-25

## 2022-10-24 RX ORDER — METOLAZONE 2.5 MG/1
2.5 TABLET ORAL 2 TIMES DAILY
Status: DISCONTINUED | OUTPATIENT
Start: 2022-10-24 | End: 2022-10-25 | Stop reason: HOSPADM

## 2022-10-24 RX ADMIN — AMIODARONE HYDROCHLORIDE 200 MG: 200 TABLET ORAL at 09:10

## 2022-10-24 RX ADMIN — CEFEPIME 2 G: 2 INJECTION, POWDER, FOR SOLUTION INTRAVENOUS at 12:10

## 2022-10-24 RX ADMIN — FAMOTIDINE 20 MG: 20 TABLET ORAL at 08:10

## 2022-10-24 RX ADMIN — Medication 10 ML: at 12:10

## 2022-10-24 RX ADMIN — CEFEPIME 2 G: 2 INJECTION, POWDER, FOR SOLUTION INTRAVENOUS at 04:10

## 2022-10-24 RX ADMIN — Medication 10 ML: at 06:10

## 2022-10-24 RX ADMIN — POLYETHYLENE GLYCOL 3350 17 G: 17 POWDER, FOR SOLUTION ORAL at 09:10

## 2022-10-24 RX ADMIN — FUROSEMIDE 80 MG: 10 INJECTION, SOLUTION INTRAMUSCULAR; INTRAVENOUS at 09:10

## 2022-10-24 RX ADMIN — Medication 6 MG: at 08:10

## 2022-10-24 RX ADMIN — OXYCODONE HYDROCHLORIDE 10 MG: 10 TABLET ORAL at 08:10

## 2022-10-24 RX ADMIN — ASPIRIN 325 MG ORAL TABLET 325 MG: 325 PILL ORAL at 09:10

## 2022-10-24 RX ADMIN — METOLAZONE 2.5 MG: 2.5 TABLET ORAL at 09:10

## 2022-10-24 RX ADMIN — Medication 10 ML: at 05:10

## 2022-10-24 RX ADMIN — POTASSIUM CHLORIDE 40 MEQ: 20 TABLET, EXTENDED RELEASE ORAL at 09:10

## 2022-10-24 RX ADMIN — FAMOTIDINE 20 MG: 20 TABLET ORAL at 09:10

## 2022-10-24 RX ADMIN — METOPROLOL SUCCINATE 12.5 MG: 25 TABLET, EXTENDED RELEASE ORAL at 09:10

## 2022-10-24 RX ADMIN — METOLAZONE 2.5 MG: 2.5 TABLET ORAL at 05:10

## 2022-10-24 RX ADMIN — OXYCODONE 5 MG: 5 TABLET ORAL at 03:10

## 2022-10-24 RX ADMIN — CEFEPIME 2 G: 2 INJECTION, POWDER, FOR SOLUTION INTRAVENOUS at 08:10

## 2022-10-24 RX ADMIN — IPRATROPIUM BROMIDE AND ALBUTEROL SULFATE 3 ML: 2.5; .5 SOLUTION RESPIRATORY (INHALATION) at 08:10

## 2022-10-24 RX ADMIN — TAMSULOSIN HYDROCHLORIDE 0.4 MG: 0.4 CAPSULE ORAL at 09:10

## 2022-10-24 RX ADMIN — QUETIAPINE FUMARATE 100 MG: 25 TABLET ORAL at 08:10

## 2022-10-24 RX ADMIN — IPRATROPIUM BROMIDE AND ALBUTEROL SULFATE 3 ML: 2.5; .5 SOLUTION RESPIRATORY (INHALATION) at 01:10

## 2022-10-24 RX ADMIN — FUROSEMIDE 80 MG: 10 INJECTION, SOLUTION INTRAMUSCULAR; INTRAVENOUS at 05:10

## 2022-10-24 RX ADMIN — ENOXAPARIN SODIUM 40 MG: 100 INJECTION SUBCUTANEOUS at 05:10

## 2022-10-24 RX ADMIN — SENNOSIDES AND DOCUSATE SODIUM 1 TABLET: 50; 8.6 TABLET ORAL at 09:10

## 2022-10-24 NOTE — PLAN OF CARE
Patient free from falls and injuries throughout shift.  AAO X 4 and VSS. Afib on tele. Continues on Milrinone gtt. Sternal precautions in place. Pt ambulated to bed. R chest drain sanguinous drainage marked at 950mL. Telesitter at bedside. Patient denies chest pain and SOB. No acute events overnight. Patient resting well at this time.  Plan of care discussed with patient.  Patient verbalizes understanding.  Will continue to monitor.

## 2022-10-24 NOTE — PROGRESS NOTES
[facility]  Cardiothoracic Surgery  Progress Note    Patient Name: David Barrios  MRN: [id number]  Admission Date: 10/2/2022  Hospital Length of Stay: 22 days  Code Status: Full Code   Attending Physician: Mike Hedrick MD   Referring Provider: Mike Hedrick MD  Principal Problem:Status post mitral valve repair            Subjective:     Post-Op Info:  Procedure(s) (LRB):  VALVULOPLASTY,MITRAL VALVE (N/A)  REPAIR, TRICUSPID VALVE (N/A)  INSERTION, IMPELLA (N/A)  MEYER MAZE PROCEDURE (N/A)  EXCLUSION, LEFT ATRIAL APPENDAGE (N/A)   17 Days Post-Op     Interval History: NAEON. AFVSS. WBC trending down 19.2 from 24.4. ID following, Continue Cefepime IV eed 11/15. Awaiting LTAC vs REHAB. Milrinone gtt 0.125. Will plan to transition to PO Lasix tomorrow. Wound care consulted for wound vac, last dressing change was yesterday. Chest tube to remain in place for right pleural effusion.     Review of Systems   Constitutional: Negative for malaise/fatigue.   Cardiovascular:  Negative for chest pain, claudication, dyspnea on exertion, irregular heartbeat, leg swelling and palpitations.   Respiratory:  Negative for cough and shortness of breath.    Hematologic/Lymphatic: Negative for bleeding problem.   Gastrointestinal:  Negative for abdominal pain.   Genitourinary:  Negative for dysuria.   Neurological:  Negative for headaches and weakness.   Medications:  Continuous Infusions:   milrinone 20mg/100ml D5W (200mcg/ml) 0.125 mcg/kg/min (10/23/22 4736)     Scheduled Meds:   albuterol-ipratropium  3 mL Nebulization Q6H WAKE    amiodarone  200 mg Oral Daily    aspirin  325 mg Per NG tube Daily    ceFEPime (MAXIPIME) IVPB  2 g Intravenous Q8H    enoxaparin  40 mg Subcutaneous Daily    famotidine  20 mg Oral BID    furosemide (LASIX) injection  80 mg Intravenous BID    melatonin  6 mg Oral Nightly    metOLazone  2.5 mg Oral BID    metoprolol succinate  12.5 mg Oral Daily    polyethylene glycol   17 g Per NG tube Daily    potassium chloride  40 mEq Oral Daily    QUEtiapine  100 mg Oral QHS    senna-docusate 8.6-50 mg  1 tablet Per NG tube BID    sodium chloride 0.9%  10 mL Intravenous Q6H    tamsulosin  0.4 mg Oral Daily     PRN Meds:sodium chloride, sodium chloride 0.9%, acetaminophen, bisacodyL, dextrose 10%, dextrose 10%, ondansetron, oxyCODONE, oxyCODONE, Flushing PICC Protocol **AND** sodium chloride 0.9% **AND** sodium chloride 0.9%     Objective:     Vital Signs (Most Recent):  Temp: 98.7 °F (37.1 °C) (10/24/22 0802)  Pulse: 80 (10/24/22 0823)  Resp: 18 (10/24/22 0823)  BP: (!) 106/55 (10/24/22 0911)  SpO2: 95 % (10/24/22 0823)   Vital Signs (24h Range):  Temp:  [97.4 °F (36.3 °C)-98.7 °F (37.1 °C)] 98.7 °F (37.1 °C)  Pulse:  [65-87] 80  Resp:  [16-20] 18  SpO2:  [93 %-99 %] 95 %  BP: ()/(54-59) 106/55     Weight: 82.8 kg (182 lb 8.7 oz)  Body mass index is 26.96 kg/m².    SpO2: 95 %  O2 Device (Oxygen Therapy): nasal cannula    Intake/Output - Last 3 Shifts         10/22 0700  10/23 0659 10/23 0700  10/24 0659 10/24 0700  10/25 0659    P.O. 967 1435     I.V. (mL/kg) 57.8 (0.7) 207.2 (2.5)     IV Piggyback 48.9 228.1     Total Intake(mL/kg) 1073.7 (12.7) 1870.3 (22.4)     Urine (mL/kg/hr) 3600 (1.8) 1750 (0.9)     Other 50      Stool       Total Output 3650 1750     Net -2576.3 +120.3                    Lines/Drains/Airways       Peripherally Inserted Central Catheter Line  Duration             PICC Double Lumen 10/17/22 1709 right brachial 6 days              Drain  Duration                  Chest Tube 10/23/22 Right Pleural 14 Fr. 1 day                    Physical Exam  Vitals and nursing note reviewed.   Constitutional:       Appearance: Normal appearance.   HENT:      Head: Normocephalic.   Eyes:      Pupils: Pupils are equal, round, and reactive to light.   Cardiovascular:      Rate and Rhythm: Normal rate and regular rhythm.      Pulses: Normal pulses.   Pulmonary:      Effort:  Pulmonary effort is normal.   Abdominal:      General: Abdomen is flat. Bowel sounds are normal.      Palpations: Abdomen is soft.   Musculoskeletal:         General: Normal range of motion.      Right lower le+ Pitting Edema present.      Left lower le+ Pitting Edema present.   Skin:     General: Skin is warm and dry.      Comments: MSI with wound vac- SS output   CT to suction    Neurological:      General: No focal deficit present.      Mental Status: He is alert.       Significant Labs:  CBC:   Recent Labs   Lab 10/24/22  0500   WBC 19.18*   RBC 2.86*   HGB 8.2*   HCT 27.1*   *   MCV 95   MCH 28.7   MCHC 30.3*     CMP:   Recent Labs   Lab 10/24/22  0500   GLU 95   CALCIUM 8.9   ALBUMIN 2.2*   PROT 6.0      K 3.9   CO2 31*   CL 96   BUN 34*   CREATININE 1.1   ALKPHOS 140*   ALT 30   AST 36   BILITOT 0.9     Coagulation: No results for input(s): PT, INR, APTT in the last 48 hours.    Significant Diagnostics:  I have reviewed all pertinent imaging results/findings within the past 24 hours.    Assessment/Plan:     * Status post mitral valve repair  - Maintain sternal precautions   - ASA  - BB  - milirinone gtt 0.125  - Lasix with scheduled potassium ordered   - will transition to PO lasix tomorrow  - will plan to start NOAC at discharge   - Encourage ambulation  - PT/OT  - Cardiac diet with 1000 cc fluid restriction   - Bowel regimen in place   - famotidine for ulcer prevention   - Pacer wires- removed  - Chest tubes   - Monitor electrolytes to keep Mag above 2 and Potassium above 4  - OOBTO   - Encourage IS use     Status post Maze operation for atrial fibrillation  See s/p Mvr    Status post ligation of left atrial appendage  See s/p Mvr    Status post tricuspid valve repair  See s/p Mvr    Bacteremia  ID following  Continue Cefepime IV eed 11/15    Surgical wound present  Wound vac on MSI   Wound care consulted         Persistent atrial fibrillation  - Amiodarone   - Tele  monitor    Hypokalemia  - Expected with lasix administration   - Scheduled potassium  - Potassium lab daily to monitor trends   - Resolved    Microcytic anemia  - CBC daily    Dispo- CSU to LTAC vs Rehab    Jodee French PA-C  Cardiothoracic Surgery  [facility]

## 2022-10-24 NOTE — PLAN OF CARE
Plan of care reviewed with patient. Patient ambulating in hallway with x1 assistance and up in chair for meals. Chest tube to suction, site CDI, 20mL serosanguineous output for this shift. Wound vac in place, no complications. Sterile dressing change completed to PICC line RUE, dated 10/24. Milrinone gtt infusing as ordered at 0.125 mcg/kg. Pt reeducated on fluid restriction, 3+ edema to bilateral lower extremities. Pt reports 6/10 incisional chest pain, relieved with prn pain medications, no complaints of shortness of breath. Telesitter at bedside, call light within reach, bed alarm on.

## 2022-10-24 NOTE — PLAN OF CARE
Sent referrals to 11 Inpatient Rehab Facilities within 40 miles of Pt's home.    Delaney Kumar [facility] [place]  Case Management Department  [email or web address]       10/24/22 1544   Post-Acute Status   Post-Acute Authorization Placement   Post-Acute Placement Status Referrals Sent   Coverage BCBS OOS   Patient choice form signed by patient/caregiver List with quality metrics by geographic area provided   Discharge Delays None known at this time   Discharge Plan   Discharge Plan A Rehab   Discharge Plan B Long-term acute care facility (LTAC)

## 2022-10-24 NOTE — NURSING
Notified CTS PA that patient is struggling with 1L fluid restriction and requesting more. Orders changed to 1.5L FR.    Notified PA of 960 mL total output in chest tube atrium, only 10 mL output total for this shift.

## 2022-10-24 NOTE — ASSESSMENT & PLAN NOTE
- Maintain sternal precautions   - ASA  - BB  - milirinone gtt 0.125  - Lasix with scheduled potassium ordered   - will transition to PO lasix tomorrow  - Encourage ambulation  - PT/OT  - Cardiac diet with 1000 cc fluid restriction   - Bowel regimen in place   - famotidine for ulcer prevention   - Pacer wires- removed  - Chest tubes   - Monitor electrolytes to keep Mag above 2 and Potassium above 4  - OOBTO   - Encourage IS use

## 2022-10-24 NOTE — PT/OT/SLP PROGRESS
Physical Therapy Treatment    Patient Name:  David Barrios   MRN:  [id number]  Admit Date: 10/2/2022  Admitting Diagnosis:  Status post mitral valve repair   Length of Stay: 22 days  Recent Surgery: Procedure(s) (LRB):  VALVULOPLASTY,MITRAL VALVE (N/A)  REPAIR, TRICUSPID VALVE (N/A)  INSERTION, IMPELLA (N/A)  MEYER MAZE PROCEDURE (N/A)  EXCLUSION, LEFT ATRIAL APPENDAGE (N/A) 17 Days Post-Op    Recommendations:     Discharge Recommendations:  rehabilitation facility   Discharge Equipment Recommendations:  (TBD)   Barriers to discharge: None    Plan:     During this hospitalization, patient to be seen 5 x/week to address the listed problems via gait training, therapeutic activities, therapeutic exercises, neuromuscular re-education  Plan of Care Expires:  22  Plan of Care Reviewed with: patient    Assessment:     David Barrios is a 62 y.o. male admitted with a medical diagnosis of Status post mitral valve repair.       Problem List: impaired endurance, gait instability, impaired functional mobility, impaired balance, decreased upper extremity function, impaired cardiopulmonary response to activity.  Rehab Prognosis: Good     GOALS:   Multidisciplinary Problems       Physical Therapy Goals          Problem: Physical Therapy    Goal Priority Disciplines Outcome Goal Variances Interventions   Physical Therapy Goal     PT, PT/OT Ongoing, Progressing     Description: Goals to be met by: 10/27/2022     Patient will increase functional independence with mobility by performin. Supine to sit with independence-not met  2. Sit to supine with independence -not met  3. Sit to stand transfer with stand by assistance -not met  4. Bed to chair transfer with contact guard assistance using PRW as needed -not met  5. Gait  x 100 feet with minimum assistance using PRW as needed -not met  6. Ascend/descend 3 stair with no handrails minimum assistance using LRAD as needed -not met  7. Lower extremity exercise program x10 reps  "per handout, with independence -not met  8. Recall 3/3 sternal precautions-met 10/19                       Subjective   Communicated with RN prior to session.  Patient found sitting edge of bed upon PT entry to room, agreeable to evaluation. David Barrios's sister present during session.    Chief Complaint: debility   Patient/Family Comments/goals: to get better   Pain/Comfort:  Pain Rating 1: 0/10  Pain Rating Post-Intervention 1: 0/10    Objective:   Patient found with: telemetry, chest tube, PICC line, wound vac, oxygen   General Precautions: Standard, Cardiac fall, sternal   Orthopedic Precautions:N/A   Braces: N/A   Oxygen Device: Nasal Cannula  Vitals: BP (!) 101/52 (Patient Position: Sitting)   Pulse 80   Temp 97.4 °F (36.3 °C) (Oral)   Resp 18   Ht 5' 9" (1.753 m)   Wt 82.8 kg (182 lb 8.7 oz)   SpO2 95%   BMI 26.96 kg/m²     Outcome Measures:  AM-PAC 6 CLICK MOBILITY  Turning over in bed (including adjusting bedclothes, sheets and blankets)?: 3  Sitting down on and standing up from a chair with arms (e.g., wheelchair, bedside commode, etc.): 4  Moving from lying on back to sitting on the side of the bed?: 3  Moving to and from a bed to a chair (including a wheelchair)?: 3  Need to walk in hospital room?: 3  Climbing 3-5 steps with a railing?: 3  Basic Mobility Total Score: 19       Functional Mobility:  Additional staff present: N/A  Bed Mobility:  Sit to Supine: minimum assistance; HOB flat    Sitting Balance at Edge of Bed:  Assistance Level Required: Supervision    Transfers:   Sit <> Stand Transfer: stand by assistance with no assistive device x 5 trials from EOB      Gait:  Patient ambulated: 80ft with 4 standing rest breaks   Patient required: standy by assistance  Patient used: fluctuating between IV pole and no AD  Gait Pattern observed: reciprocal gait  Gait Deviation(s): decreased step length and decreased goldy  Impairments due to: decreased endurance and impaired balance  Comments: "   Mask donned  Pt demo'd steady gait with IV and with no AD  No LOB  Frequent short rest breaks due to SOB  Verbal cuing for pacing       Therapeutic Activities, Exercises, and Education:   Educated pt on PT role/POC  Educated pt on importance of OOB activity and daily ambulation   Educated pt on sternal precautions  Pt verbalized understanding     Pt brushed teeth standing at sink with SBA     Patient left HOB elevated with all lines intact, call button in reach, RN notified, and sister present..    Time Tracking:     PT Received On: 10/24/22  PT Start Time: 1433     PT Stop Time: 1507  PT Total Time (min): 34 min       Billable Minutes:   Gait Training 23    Treatment Type: Treatment  PT/PTA: PT

## 2022-10-24 NOTE — PROGRESS NOTES
Nurse alerted by telesitter to bedside. Pt attempting to get out of bed without assistance. Pt confused and oriented to person and place only. Attempting to pull out chest drain and PICC line.  Pt reoriented by nurse and layed back into bed. Pt instructed to call for assistance.

## 2022-10-24 NOTE — SUBJECTIVE & OBJECTIVE
Interval History: NAEON. AFVSS. WBC trending down 19.2 from 24.4. ID following, Continue Cefepime IV eed 11/15. Awaiting LTAC vs REHAB. Milrinone gtt 0.125. Will plan to transition to PO Lasix tomorrow. Wound care consulted for wound vac, last dressing change was yesterday. Chest tube to remain in place for right pleural effusion.     Review of Systems   Constitutional: Negative for malaise/fatigue.   Cardiovascular:  Negative for chest pain, claudication, dyspnea on exertion, irregular heartbeat, leg swelling and palpitations.   Respiratory:  Negative for cough and shortness of breath.    Hematologic/Lymphatic: Negative for bleeding problem.   Gastrointestinal:  Negative for abdominal pain.   Genitourinary:  Negative for dysuria.   Neurological:  Negative for headaches and weakness.   Medications:  Continuous Infusions:   milrinone 20mg/100ml D5W (200mcg/ml) 0.125 mcg/kg/min (10/23/22 1713)     Scheduled Meds:   albuterol-ipratropium  3 mL Nebulization Q6H WAKE    amiodarone  200 mg Oral Daily    aspirin  325 mg Per NG tube Daily    ceFEPime (MAXIPIME) IVPB  2 g Intravenous Q8H    enoxaparin  40 mg Subcutaneous Daily    famotidine  20 mg Oral BID    furosemide (LASIX) injection  80 mg Intravenous BID    melatonin  6 mg Oral Nightly    metOLazone  2.5 mg Oral BID    metoprolol succinate  12.5 mg Oral Daily    polyethylene glycol  17 g Per NG tube Daily    potassium chloride  40 mEq Oral Daily    QUEtiapine  100 mg Oral QHS    senna-docusate 8.6-50 mg  1 tablet Per NG tube BID    sodium chloride 0.9%  10 mL Intravenous Q6H    tamsulosin  0.4 mg Oral Daily     PRN Meds:sodium chloride, sodium chloride 0.9%, acetaminophen, bisacodyL, dextrose 10%, dextrose 10%, ondansetron, oxyCODONE, oxyCODONE, Flushing PICC Protocol **AND** sodium chloride 0.9% **AND** sodium chloride 0.9%     Objective:     Vital Signs (Most Recent):  Temp: 98.7 °F (37.1 °C) (10/24/22 0802)  Pulse: 80 (10/24/22 0823)  Resp: 18 (10/24/22 0823)  BP:  (!) 106/55 (10/24/22 0911)  SpO2: 95 % (10/24/22 0823)   Vital Signs (24h Range):  Temp:  [97.4 °F (36.3 °C)-98.7 °F (37.1 °C)] 98.7 °F (37.1 °C)  Pulse:  [65-87] 80  Resp:  [16-20] 18  SpO2:  [93 %-99 %] 95 %  BP: ()/(54-59) 106/55     Weight: 82.8 kg (182 lb 8.7 oz)  Body mass index is 26.96 kg/m².    SpO2: 95 %  O2 Device (Oxygen Therapy): nasal cannula    Intake/Output - Last 3 Shifts         10/22 0700  10/23 0659 10/23 0700  10/24 0659 10/24 0700  10/25 06    P.O. 967 1435     I.V. (mL/kg) 57.8 (0.7) 207.2 (2.5)     IV Piggyback 48.9 228.1     Total Intake(mL/kg) 1073.7 (12.7) 1870.3 (22.4)     Urine (mL/kg/hr) 3600 (1.8) 1750 (0.9)     Other 50      Stool       Total Output 3650 1750     Net -2576.3 +120.3                    Lines/Drains/Airways       Peripherally Inserted Central Catheter Line  Duration             PICC Double Lumen 10/17/22 1709 right brachial 6 days              Drain  Duration                  Chest Tube 10/23/22 Right Pleural 14 Fr. 1 day                    Physical Exam  Vitals and nursing note reviewed.   Constitutional:       Appearance: Normal appearance.   HENT:      Head: Normocephalic.   Eyes:      Pupils: Pupils are equal, round, and reactive to light.   Cardiovascular:      Rate and Rhythm: Normal rate and regular rhythm.      Pulses: Normal pulses.   Pulmonary:      Effort: Pulmonary effort is normal.   Abdominal:      General: Abdomen is flat. Bowel sounds are normal.      Palpations: Abdomen is soft.   Musculoskeletal:         General: Normal range of motion.      Right lower le+ Pitting Edema present.      Left lower le+ Pitting Edema present.   Skin:     General: Skin is warm and dry.      Comments: MSI with wound vac- SS output   CT to suction    Neurological:      General: No focal deficit present.      Mental Status: He is alert.       Significant Labs:  CBC:   Recent Labs   Lab 10/24/22  0500   WBC 19.18*   RBC 2.86*   HGB 8.2*   HCT 27.1*   *    MCV 95   MCH 28.7   MCHC 30.3*     CMP:   Recent Labs   Lab 10/24/22  0500   GLU 95   CALCIUM 8.9   ALBUMIN 2.2*   PROT 6.0      K 3.9   CO2 31*   CL 96   BUN 34*   CREATININE 1.1   ALKPHOS 140*   ALT 30   AST 36   BILITOT 0.9     Coagulation: No results for input(s): PT, INR, APTT in the last 48 hours.    Significant Diagnostics:  I have reviewed all pertinent imaging results/findings within the past 24 hours.

## 2022-10-25 VITALS
BODY MASS INDEX: 26.81 KG/M2 | HEIGHT: 69 IN | DIASTOLIC BLOOD PRESSURE: 57 MMHG | RESPIRATION RATE: 18 BRPM | SYSTOLIC BLOOD PRESSURE: 103 MMHG | HEART RATE: 93 BPM | WEIGHT: 181 LBS | TEMPERATURE: 98 F | OXYGEN SATURATION: 98 %

## 2022-10-25 DIAGNOSIS — I34.0 NONRHEUMATIC MITRAL VALVE REGURGITATION: Primary | ICD-10-CM

## 2022-10-25 DIAGNOSIS — Z98.890 STATUS POST MITRAL VALVE REPAIR: ICD-10-CM

## 2022-10-25 LAB
ABO + RH BLD: NORMAL
ALBUMIN SERPL BCP-MCNC: 2 G/DL (ref 3.5–5.2)
ALP SERPL-CCNC: 121 U/L (ref 55–135)
ALT SERPL W/O P-5'-P-CCNC: 28 U/L (ref 10–44)
ANION GAP SERPL CALC-SCNC: 5 MMOL/L (ref 8–16)
ANISOCYTOSIS BLD QL SMEAR: SLIGHT
AST SERPL-CCNC: 27 U/L (ref 10–40)
BASO STIPL BLD QL SMEAR: ABNORMAL
BASOPHILS # BLD AUTO: 0.03 K/UL (ref 0–0.2)
BASOPHILS # BLD AUTO: 0.05 K/UL (ref 0–0.2)
BASOPHILS NFR BLD: 0.2 % (ref 0–1.9)
BASOPHILS NFR BLD: 0.2 % (ref 0–1.9)
BILIRUB SERPL-MCNC: 0.8 MG/DL (ref 0.1–1)
BLD GP AB SCN CELLS X3 SERPL QL: NORMAL
BLD PROD TYP BPU: NORMAL
BLOOD UNIT EXPIRATION DATE: NORMAL
BLOOD UNIT TYPE CODE: 600
BLOOD UNIT TYPE: NORMAL
BUN SERPL-MCNC: 32 MG/DL (ref 8–23)
CALCIUM SERPL-MCNC: 8.8 MG/DL (ref 8.7–10.5)
CHLORIDE SERPL-SCNC: 96 MMOL/L (ref 95–110)
CO2 SERPL-SCNC: 34 MMOL/L (ref 23–29)
CODING SYSTEM: NORMAL
CREAT SERPL-MCNC: 1.1 MG/DL (ref 0.5–1.4)
DIFFERENTIAL METHOD: ABNORMAL
DIFFERENTIAL METHOD: ABNORMAL
DISPENSE STATUS: NORMAL
EOSINOPHIL # BLD AUTO: 0.1 K/UL (ref 0–0.5)
EOSINOPHIL # BLD AUTO: 0.2 K/UL (ref 0–0.5)
EOSINOPHIL NFR BLD: 0.5 % (ref 0–8)
EOSINOPHIL NFR BLD: 0.8 % (ref 0–8)
ERYTHROCYTE [DISTWIDTH] IN BLOOD BY AUTOMATED COUNT: 16.9 % (ref 11.5–14.5)
ERYTHROCYTE [DISTWIDTH] IN BLOOD BY AUTOMATED COUNT: 17.7 % (ref 11.5–14.5)
EST. GFR  (NO RACE VARIABLE): >60 ML/MIN/1.73 M^2
GLUCOSE SERPL-MCNC: 103 MG/DL (ref 70–110)
HCT VFR BLD AUTO: 24.6 % (ref 40–54)
HCT VFR BLD AUTO: 30 % (ref 40–54)
HGB BLD-MCNC: 7.6 G/DL (ref 14–18)
HGB BLD-MCNC: 9.2 G/DL (ref 14–18)
HYPOCHROMIA BLD QL SMEAR: ABNORMAL
IMM GRANULOCYTES # BLD AUTO: 0.27 K/UL (ref 0–0.04)
IMM GRANULOCYTES # BLD AUTO: 0.6 K/UL (ref 0–0.04)
IMM GRANULOCYTES NFR BLD AUTO: 1.7 % (ref 0–0.5)
IMM GRANULOCYTES NFR BLD AUTO: 1.9 % (ref 0–0.5)
LYMPHOCYTES # BLD AUTO: 0.9 K/UL (ref 1–4.8)
LYMPHOCYTES # BLD AUTO: 1.2 K/UL (ref 1–4.8)
LYMPHOCYTES NFR BLD: 2.9 % (ref 18–48)
LYMPHOCYTES NFR BLD: 7.7 % (ref 18–48)
MAGNESIUM SERPL-MCNC: 2.2 MG/DL (ref 1.6–2.6)
MCH RBC QN AUTO: 28.6 PG (ref 27–31)
MCH RBC QN AUTO: 28.8 PG (ref 27–31)
MCHC RBC AUTO-ENTMCNC: 30.7 G/DL (ref 32–36)
MCHC RBC AUTO-ENTMCNC: 30.9 G/DL (ref 32–36)
MCV RBC AUTO: 93 FL (ref 82–98)
MCV RBC AUTO: 94 FL (ref 82–98)
MONOCYTES # BLD AUTO: 1.6 K/UL (ref 0.3–1)
MONOCYTES # BLD AUTO: 2.4 K/UL (ref 0.3–1)
MONOCYTES NFR BLD: 10 % (ref 4–15)
MONOCYTES NFR BLD: 7.5 % (ref 4–15)
NEUTROPHILS # BLD AUTO: 12.6 K/UL (ref 1.8–7.7)
NEUTROPHILS # BLD AUTO: 27.3 K/UL (ref 1.8–7.7)
NEUTROPHILS NFR BLD: 79.6 % (ref 38–73)
NEUTROPHILS NFR BLD: 87 % (ref 38–73)
NRBC BLD-RTO: 0 /100 WBC
NRBC BLD-RTO: 0 /100 WBC
OVALOCYTES BLD QL SMEAR: ABNORMAL
PHOSPHATE SERPL-MCNC: 3.6 MG/DL (ref 2.7–4.5)
PLATELET # BLD AUTO: 572 K/UL (ref 150–450)
PLATELET # BLD AUTO: 634 K/UL (ref 150–450)
PLATELET BLD QL SMEAR: ABNORMAL
PMV BLD AUTO: 9.6 FL (ref 9.2–12.9)
PMV BLD AUTO: 9.9 FL (ref 9.2–12.9)
POIKILOCYTOSIS BLD QL SMEAR: SLIGHT
POLYCHROMASIA BLD QL SMEAR: ABNORMAL
POTASSIUM SERPL-SCNC: 3.7 MMOL/L (ref 3.5–5.1)
PROT SERPL-MCNC: 5.7 G/DL (ref 6–8.4)
RBC # BLD AUTO: 2.66 M/UL (ref 4.6–6.2)
RBC # BLD AUTO: 3.19 M/UL (ref 4.6–6.2)
SODIUM SERPL-SCNC: 135 MMOL/L (ref 136–145)
STOMATOCYTES BLD QL SMEAR: PRESENT
TARGETS BLD QL SMEAR: ABNORMAL
TRANS ERYTHROCYTES VOL PATIENT: NORMAL ML
WBC # BLD AUTO: 15.77 K/UL (ref 3.9–12.7)
WBC # BLD AUTO: 31.42 K/UL (ref 3.9–12.7)
WBC TOXIC VACUOLES BLD QL SMEAR: PRESENT

## 2022-10-25 PROCEDURE — 86920 COMPATIBILITY TEST SPIN: CPT | Performed by: PHYSICIAN ASSISTANT

## 2022-10-25 PROCEDURE — 85025 COMPLETE CBC W/AUTO DIFF WBC: CPT | Performed by: THORACIC SURGERY (CARDIOTHORACIC VASCULAR SURGERY)

## 2022-10-25 PROCEDURE — 97116 GAIT TRAINING THERAPY: CPT

## 2022-10-25 PROCEDURE — A4216 STERILE WATER/SALINE, 10 ML: HCPCS | Performed by: THORACIC SURGERY (CARDIOTHORACIC VASCULAR SURGERY)

## 2022-10-25 PROCEDURE — 84100 ASSAY OF PHOSPHORUS: CPT | Performed by: STUDENT IN AN ORGANIZED HEALTH CARE EDUCATION/TRAINING PROGRAM

## 2022-10-25 PROCEDURE — P9021 RED BLOOD CELLS UNIT: HCPCS | Performed by: PHYSICIAN ASSISTANT

## 2022-10-25 PROCEDURE — 25000003 PHARM REV CODE 250: Performed by: PHYSICIAN ASSISTANT

## 2022-10-25 PROCEDURE — 86850 RBC ANTIBODY SCREEN: CPT | Performed by: PHYSICIAN ASSISTANT

## 2022-10-25 PROCEDURE — 97535 SELF CARE MNGMENT TRAINING: CPT

## 2022-10-25 PROCEDURE — 25000003 PHARM REV CODE 250: Performed by: THORACIC SURGERY (CARDIOTHORACIC VASCULAR SURGERY)

## 2022-10-25 PROCEDURE — 80053 COMPREHEN METABOLIC PANEL: CPT

## 2022-10-25 PROCEDURE — 25000003 PHARM REV CODE 250

## 2022-10-25 PROCEDURE — 83735 ASSAY OF MAGNESIUM: CPT | Performed by: STUDENT IN AN ORGANIZED HEALTH CARE EDUCATION/TRAINING PROGRAM

## 2022-10-25 PROCEDURE — 63600175 PHARM REV CODE 636 W HCPCS

## 2022-10-25 PROCEDURE — 63600175 PHARM REV CODE 636 W HCPCS: Performed by: THORACIC SURGERY (CARDIOTHORACIC VASCULAR SURGERY)

## 2022-10-25 PROCEDURE — 63600175 PHARM REV CODE 636 W HCPCS: Performed by: STUDENT IN AN ORGANIZED HEALTH CARE EDUCATION/TRAINING PROGRAM

## 2022-10-25 PROCEDURE — 36430 TRANSFUSION BLD/BLD COMPNT: CPT

## 2022-10-25 PROCEDURE — 97530 THERAPEUTIC ACTIVITIES: CPT

## 2022-10-25 PROCEDURE — 25000003 PHARM REV CODE 250: Performed by: STUDENT IN AN ORGANIZED HEALTH CARE EDUCATION/TRAINING PROGRAM

## 2022-10-25 RX ORDER — ASPIRIN 81 MG/1
81 TABLET ORAL DAILY
Status: CANCELLED | OUTPATIENT
Start: 2022-10-25

## 2022-10-25 RX ORDER — POTASSIUM CHLORIDE 20 MEQ/1
40 TABLET, EXTENDED RELEASE ORAL DAILY
Status: CANCELLED | OUTPATIENT
Start: 2022-10-26

## 2022-10-25 RX ORDER — CEFEPIME HYDROCHLORIDE 1 G/50ML
2 INJECTION, SOLUTION INTRAVENOUS
Status: CANCELLED | OUTPATIENT
Start: 2022-10-25

## 2022-10-25 RX ORDER — OXYCODONE HYDROCHLORIDE 5 MG/1
5 TABLET ORAL EVERY 6 HOURS PRN
Status: CANCELLED | OUTPATIENT
Start: 2022-10-25

## 2022-10-25 RX ORDER — HYDROCODONE BITARTRATE AND ACETAMINOPHEN 500; 5 MG/1; MG/1
TABLET ORAL
Status: DISCONTINUED | OUTPATIENT
Start: 2022-10-25 | End: 2022-10-25 | Stop reason: HOSPADM

## 2022-10-25 RX ORDER — IPRATROPIUM BROMIDE AND ALBUTEROL SULFATE 2.5; .5 MG/3ML; MG/3ML
3 SOLUTION RESPIRATORY (INHALATION)
Status: CANCELLED | OUTPATIENT
Start: 2022-10-25

## 2022-10-25 RX ORDER — MILRINONE LACTATE 0.2 MG/ML
0.12 INJECTION, SOLUTION INTRAVENOUS CONTINUOUS
Status: CANCELLED | OUTPATIENT
Start: 2022-10-25

## 2022-10-25 RX ORDER — SODIUM CHLORIDE 0.9 % (FLUSH) 0.9 %
10 SYRINGE (ML) INJECTION
Status: CANCELLED | OUTPATIENT
Start: 2022-10-25

## 2022-10-25 RX ORDER — FUROSEMIDE 80 MG/1
80 TABLET ORAL 2 TIMES DAILY
Status: CANCELLED | OUTPATIENT
Start: 2022-10-25

## 2022-10-25 RX ORDER — FUROSEMIDE 80 MG/1
80 TABLET ORAL 2 TIMES DAILY
Status: DISCONTINUED | OUTPATIENT
Start: 2022-10-25 | End: 2022-10-25 | Stop reason: HOSPADM

## 2022-10-25 RX ORDER — TALC
6 POWDER (GRAM) TOPICAL NIGHTLY PRN
Status: CANCELLED | OUTPATIENT
Start: 2022-10-25

## 2022-10-25 RX ORDER — OXYCODONE HYDROCHLORIDE 5 MG/1
5 TABLET ORAL EVERY 4 HOURS PRN
Qty: 42 TABLET | Refills: 0 | Status: SHIPPED | OUTPATIENT
Start: 2022-10-25 | End: 2022-10-25 | Stop reason: SDUPTHER

## 2022-10-25 RX ORDER — ACETAMINOPHEN 500 MG
1000 TABLET ORAL EVERY 8 HOURS PRN
Status: CANCELLED | OUTPATIENT
Start: 2022-10-25

## 2022-10-25 RX ORDER — POLYETHYLENE GLYCOL 3350 17 G/17G
17 POWDER, FOR SOLUTION ORAL DAILY
Status: CANCELLED | OUTPATIENT
Start: 2022-10-26

## 2022-10-25 RX ORDER — QUETIAPINE FUMARATE 25 MG/1
100 TABLET, FILM COATED ORAL NIGHTLY
Status: CANCELLED | OUTPATIENT
Start: 2022-10-25

## 2022-10-25 RX ORDER — AMOXICILLIN 250 MG
1 CAPSULE ORAL 2 TIMES DAILY
Status: CANCELLED | OUTPATIENT
Start: 2022-10-25

## 2022-10-25 RX ORDER — POLYETHYLENE GLYCOL 3350 17 G/17G
17 POWDER, FOR SOLUTION ORAL DAILY
Status: CANCELLED | OUTPATIENT
Start: 2022-10-25

## 2022-10-25 RX ORDER — ONDANSETRON 4 MG/1
8 TABLET, ORALLY DISINTEGRATING ORAL EVERY 8 HOURS PRN
Status: CANCELLED | OUTPATIENT
Start: 2022-10-25

## 2022-10-25 RX ORDER — OXYCODONE HYDROCHLORIDE 5 MG/1
5 TABLET ORAL EVERY 4 HOURS PRN
Status: CANCELLED | OUTPATIENT
Start: 2022-10-25

## 2022-10-25 RX ORDER — METOLAZONE 2.5 MG/1
2.5 TABLET ORAL 2 TIMES DAILY
Status: CANCELLED | OUTPATIENT
Start: 2022-10-25

## 2022-10-25 RX ORDER — OXYCODONE HYDROCHLORIDE 5 MG/1
5 TABLET ORAL EVERY 4 HOURS PRN
Status: CANCELLED | OUTPATIENT
Start: 2022-10-25 | End: 2022-11-01

## 2022-10-25 RX ORDER — SODIUM CHLORIDE 0.9 % (FLUSH) 0.9 %
10 SYRINGE (ML) INJECTION EVERY 6 HOURS
Status: CANCELLED | OUTPATIENT
Start: 2022-10-25

## 2022-10-25 RX ORDER — TALC
6 POWDER (GRAM) TOPICAL NIGHTLY
Status: CANCELLED | OUTPATIENT
Start: 2022-10-25

## 2022-10-25 RX ORDER — AMIODARONE HYDROCHLORIDE 200 MG/1
200 TABLET ORAL DAILY
Status: CANCELLED | OUTPATIENT
Start: 2022-10-26

## 2022-10-25 RX ORDER — FAMOTIDINE 10 MG/ML
20 INJECTION INTRAVENOUS 2 TIMES DAILY
Status: CANCELLED | OUTPATIENT
Start: 2022-10-25

## 2022-10-25 RX ORDER — MILRINONE LACTATE 0.2 MG/ML
0.12 INJECTION, SOLUTION INTRAVENOUS CONTINUOUS
Status: DISCONTINUED | OUTPATIENT
Start: 2022-10-25 | End: 2022-10-25 | Stop reason: HOSPADM

## 2022-10-25 RX ORDER — TAMSULOSIN HYDROCHLORIDE 0.4 MG/1
0.4 CAPSULE ORAL DAILY
Status: CANCELLED | OUTPATIENT
Start: 2022-10-26

## 2022-10-25 RX ORDER — OXYCODONE HYDROCHLORIDE 5 MG/1
5 TABLET ORAL EVERY 4 HOURS PRN
Qty: 42 TABLET | Refills: 0 | Status: ON HOLD | OUTPATIENT
Start: 2022-10-25 | End: 2022-11-07 | Stop reason: HOSPADM

## 2022-10-25 RX ORDER — FAMOTIDINE 20 MG/1
20 TABLET, FILM COATED ORAL 2 TIMES DAILY
Status: CANCELLED | OUTPATIENT
Start: 2022-10-25

## 2022-10-25 RX ORDER — BISACODYL 10 MG
10 SUPPOSITORY, RECTAL RECTAL DAILY PRN
Status: CANCELLED | OUTPATIENT
Start: 2022-10-25

## 2022-10-25 RX ORDER — ENOXAPARIN SODIUM 100 MG/ML
1 INJECTION SUBCUTANEOUS EVERY 24 HOURS
Status: DISCONTINUED | OUTPATIENT
Start: 2022-10-25 | End: 2022-10-25 | Stop reason: HOSPADM

## 2022-10-25 RX ORDER — POTASSIUM CHLORIDE 20 MEQ/1
20 TABLET, EXTENDED RELEASE ORAL ONCE
Status: COMPLETED | OUTPATIENT
Start: 2022-10-25 | End: 2022-10-25

## 2022-10-25 RX ADMIN — METOLAZONE 2.5 MG: 2.5 TABLET ORAL at 09:10

## 2022-10-25 RX ADMIN — POTASSIUM CHLORIDE 20 MEQ: 1500 TABLET, EXTENDED RELEASE ORAL at 09:10

## 2022-10-25 RX ADMIN — MILRINONE LACTATE IN DEXTROSE 0.12 MCG/KG/MIN: 200 INJECTION, SOLUTION INTRAVENOUS at 05:10

## 2022-10-25 RX ADMIN — MILRINONE LACTATE IN DEXTROSE 0.12 MCG/KG/MIN: 200 INJECTION, SOLUTION INTRAVENOUS at 12:10

## 2022-10-25 RX ADMIN — TAMSULOSIN HYDROCHLORIDE 0.4 MG: 0.4 CAPSULE ORAL at 09:10

## 2022-10-25 RX ADMIN — CEFEPIME 2 G: 2 INJECTION, POWDER, FOR SOLUTION INTRAVENOUS at 12:10

## 2022-10-25 RX ADMIN — Medication 10 ML: at 12:10

## 2022-10-25 RX ADMIN — AMIODARONE HYDROCHLORIDE 200 MG: 200 TABLET ORAL at 09:10

## 2022-10-25 RX ADMIN — FAMOTIDINE 20 MG: 20 TABLET ORAL at 09:10

## 2022-10-25 RX ADMIN — SENNOSIDES AND DOCUSATE SODIUM 1 TABLET: 50; 8.6 TABLET ORAL at 09:10

## 2022-10-25 RX ADMIN — Medication 10 ML: at 05:10

## 2022-10-25 RX ADMIN — OXYCODONE HYDROCHLORIDE 10 MG: 10 TABLET ORAL at 09:10

## 2022-10-25 RX ADMIN — ASPIRIN 325 MG ORAL TABLET 325 MG: 325 PILL ORAL at 09:10

## 2022-10-25 RX ADMIN — METOPROLOL SUCCINATE 12.5 MG: 25 TABLET, EXTENDED RELEASE ORAL at 09:10

## 2022-10-25 RX ADMIN — POTASSIUM CHLORIDE 40 MEQ: 20 TABLET, EXTENDED RELEASE ORAL at 09:10

## 2022-10-25 RX ADMIN — MENTHOL, METHYL SALICYLATE: 10; 15 CREAM TOPICAL at 12:10

## 2022-10-25 RX ADMIN — FUROSEMIDE 80 MG: 80 TABLET ORAL at 09:10

## 2022-10-25 RX ADMIN — CEFEPIME 2 G: 2 INJECTION, POWDER, FOR SOLUTION INTRAVENOUS at 05:10

## 2022-10-25 RX ADMIN — POLYETHYLENE GLYCOL 3350 17 G: 17 POWDER, FOR SOLUTION ORAL at 09:10

## 2022-10-25 NOTE — HOSPITAL COURSE
On 10/7/19 the patient was taken to the Operating Room for the above stated procedure. Please see the previously dictated operative report for complete details. Postoperatively, the patient was taken from the  Operating Room to the ICU where the vital signs were monitored and pain was kept under control. The patient was weaned from the drips and extubated in the ICU per protocol. Once hemodynamically stable, the patient was transferred to the Cardiac Step-Down floor for continued strengthening and ambulation.  Post op course complicated by bleeding on POD#0 that required emergent sternotomy and bleeding was subsequently controlled thereafter. Blood and aerobic wound cultures from the sternum show serratia marcescens/ampC inducible organism . Afebrile, empirically was on ceftriaxone (based on reported susceptibilities) , ID consulted in the setting of bacteremia and placed on IV cefepime eed 11/15/22. On postoperative day 18, the patient was ready for not discharge to home and will be sent to a LTAC center to continue to optimize strength and conditioning. At the time of transfer, the patient was unable to ambulating unassisted. Pain was well controlled with oral analgesics and the patient was tolerating the diet.    Patient not placed on Ace-Inhibitor at the time of discharge due to potential for hypotension   Statin not indicated.      MOBILITY AND ACTIVITY: As tolerated. Patient may shower. No heavy lifting of greater than 5 pounds and no driving.     DIET: A Cardiac diet      WOUND CARE INSTRUCTIONS: Check for redness, swelling and drainage around the  incision or wound. Patient is to call for any obvious bleeding, drainage, pus from the wound, unusual problems or difficulties or temperature of greater than 101   degrees.     FOLLOWUP: Follow up with *** in approximately 3 weeks. Prior to this  appointment, the patient will have a chest x-ray and EKG.     Patient not placed on Ace-Inhibitor at the time of  discharge due to potential for hypotension         DISCHARGE CONDITION: At the time of discharge, the patient was in sinus rhythm and afebrile with stable vital signs.

## 2022-10-25 NOTE — ASSESSMENT & PLAN NOTE
-S/P DCCV.  -Monitor electrolytes closely.  -Monitor  HR and rhythm closely.   -Continue Amiodarone.

## 2022-10-25 NOTE — SUBJECTIVE & OBJECTIVE
Past Medical History:   Diagnosis Date    Anemia     Atrial fibrillation     Encounter for blood transfusion     Esophageal ulcer     GI bleed     Hypertension      Past Surgical History:   Procedure Laterality Date    CARDIOVERSION Left 9/15/2022    Procedure: Cardioversion;  Surgeon: Julio James MD;  Location: [facility] CATH LAB/EP;  Service: Cardiology;  Laterality: Left;  With NORBERT    CATHETERIZATION OF BOTH LEFT AND RIGHT HEART N/A 9/22/2022    Procedure: CATHETERIZATION, HEART, BOTH LEFT AND RIGHT;  Surgeon: Julio James MD;  Location: [facility] CATH LAB/EP;  Service: Cardiology;  Laterality: N/A;    COLONOSCOPY N/A 4/4/2019    Procedure: COLONOSCOPY Golytely;  Surgeon: Umair Randolph MD;  Location: [facility] ENDO;  Service: Endoscopy;  Laterality: N/A;    CORONARY ANGIOGRAPHY N/A 9/22/2022    Procedure: ANGIOGRAM, CORONARY ARTERY;  Surgeon: Julio James MD;  Location: [facility] CATH LAB/EP;  Service: Cardiology;  Laterality: N/A;    MEYER MAZE PROCEDURE N/A 10/7/2022    Procedure: MEYER MAZE PROCEDURE;  Surgeon: Mike Hedrick MD;  Location: [address];  Service: Cardiovascular;  Laterality: N/A;    ESOPHAGOGASTRODUODENOSCOPY N/A 10/12/2018    Procedure: EGD (ESOPHAGOGASTRODUODENOSCOPY);  Surgeon: Braden Herring MD;  Location: [facility];  Service: Endoscopy;  Laterality: N/A;    ESOPHAGOGASTRODUODENOSCOPY N/A 4/4/2019    Procedure: EGD;  Surgeon: Umair Randolph MD;  Location: [facility];  Service: Endoscopy;  Laterality: N/A;    EXCLUSION OF LEFT ATRIAL APPENDAGE N/A 10/7/2022    Procedure: EXCLUSION, LEFT ATRIAL APPENDAGE;  Surgeon: Mike Hedrick MD;  Location: [facility] OR [address] [place] [address];  Service: Cardiovascular;  Laterality: N/A;    HERNIA REPAIR      INSERTION OF INTRAVASCULAR MICROAXIAL BLOOD PUMP N/A 10/7/2022    Procedure: INSERTION, IMPELLA;  Surgeon: Mike Hedrick MD;  [facility];  Service: Cardiovascular;  Laterality: N/A;  direct aortic insertion    IRRIGATION OF MEDIASTINUM N/A  10/7/2022    Procedure: IRRIGATION, MEDIASTINUM;  Surgeon: Mike Hedrick MD;  Location: [facility] OR [facility] FLR;  Service: Cardiovascular;  Laterality: N/A;    TRICUSPID VALVULOPLASTY N/A 10/7/2022    Procedure: REPAIR, TRICUSPID VALVE;  Surgeon: Mike Hedrick MD;  [facility];  Service: Cardiovascular;  Laterality: N/A;     Review of patient's allergies indicates:  No Known Allergies    Scheduled Medications:    albuterol-ipratropium  3 mL Nebulization Q6H WAKE    amiodarone  200 mg Oral Daily    aspirin  325 mg Per NG tube Daily    ceFEPime (MAXIPIME) IVPB  2 g Intravenous Q8H    enoxaparin  40 mg Subcutaneous Daily    famotidine  20 mg Oral BID    furosemide (LASIX) injection  80 mg Intravenous BID    melatonin  6 mg Oral Nightly    metOLazone  2.5 mg Oral BID    metoprolol succinate  12.5 mg Oral Daily    polyethylene glycol  17 g Per NG tube Daily    potassium chloride  40 mEq Oral Daily    QUEtiapine  100 mg Oral QHS    senna-docusate 8.6-50 mg  1 tablet Per NG tube BID    sodium chloride 0.9%  10 mL Intravenous Q6H    tamsulosin  0.4 mg Oral Daily       PRN Medications: sodium chloride, sodium chloride 0.9%, acetaminophen, bisacodyL, dextrose 10%, dextrose 10%, ondansetron, oxyCODONE, oxyCODONE, Flushing PICC Protocol **AND** sodium chloride 0.9% **AND** sodium chloride 0.9%    Family History       Problem Relation (Age of Onset)    COPD Mother    Cancer Father          Tobacco Use    Smoking status: Never    Smokeless tobacco: Current     Types: Chew   Substance and Sexual Activity    Alcohol use: Yes     Alcohol/week: 20.0 standard drinks     Types: 20 Cans of beer per week    Drug use: No    Sexual activity: Not on file     Review of Systems   Constitutional:  Positive for activity change.   Respiratory:  Negative for cough and shortness of breath.         On 4 L O2 Per NC   Cardiovascular:  Negative for chest pain.   Gastrointestinal:  Negative for nausea and vomiting.   Musculoskeletal:   Positive for gait problem.   Neurological:  Positive for weakness. Negative for dizziness, light-headedness and headaches.   Psychiatric/Behavioral:  Positive for confusion and decreased concentration. Negative for agitation and behavioral problems.    Objective:     Vital Signs (Most Recent):  Temp: 97.1 °F (36.2 °C) (10/24/22 1609)  Pulse: 80 (10/24/22 1609)  Resp: 17 (10/24/22 1609)  BP: (!) 111/57 (10/24/22 1712)  SpO2: 95 % (10/24/22 1609)      Vital Signs (24h Range):  Temp:  [97.1 °F (36.2 °C)-98.7 °F (37.1 °C)] 97.1 °F (36.2 °C)  Pulse:  [65-90] 80  Resp:  [17-22] 17  SpO2:  [93 %-100 %] 95 %  BP: ()/(52-59) 111/57     Body mass index is 26.96 kg/m².    Physical Exam  Vitals and nursing note reviewed.   Constitutional:       General: He is awake.   HENT:      Head: Normocephalic and atraumatic.   Eyes:      Extraocular Movements: Extraocular movements intact.   Pulmonary:      Effort: Pulmonary effort is normal. No respiratory distress.   Abdominal:      General: There is no distension.      Palpations: Abdomen is soft.   Musculoskeletal:      Cervical back: Normal range of motion and neck supple.      Comments: Generalized weakness.    Skin:     General: Skin is warm and dry.      Capillary Refill: Capillary refill takes 2 to 3 seconds.      Comments: Midline chest incision with wound vac in place.  Right CT in place.   Neurological:      General: No focal deficit present.      Mental Status: He is alert and oriented to person, place, and time.      GCS: GCS eye subscore is 4. GCS verbal subscore is 5. GCS motor subscore is 6.      Sensory: Sensation is intact.      Motor: Weakness present.      Gait: Gait abnormal.   Psychiatric:         Attention and Perception: He is inattentive.         Mood and Affect: Mood normal.         Speech: Speech normal.         Behavior: Behavior is slowed. Behavior is cooperative.         Cognition and Memory: Memory is impaired.     NEUROLOGICAL EXAMINATION:      MENTAL STATUS   Oriented to person, place, and time.   Speech: speech is normal     Diagnostic Results: Labs: Reviewed

## 2022-10-25 NOTE — PT/OT/SLP PROGRESS
Occupational Therapy  Co Treatment    Name: David Barrios  MRN: [id number]  Admitting Diagnosis:  Status post mitral valve repair  18 Days Post-Op    Recommendations:     Discharge Recommendations: rehabilitation facility      Assessment:     David Barrios is a 62 y.o. male with a medical diagnosis of Status post mitral valve repair.  . Performance deficits affecting function are weakness, impaired endurance, impaired self care skills, impaired functional mobility, gait instability, impaired balance, pain. Pt tolerated session well with good effort and performance. Pt with slow functional progress and is not safe to return home alone. Pt to benefit from post acute therapy.   Pt has had complicated medical course and has impaired endurance for functional activity     Rehab Prognosis:  Good; patient would benefit from acute skilled OT services to address these deficits and reach maximum level of function.       Plan:     Patient to be seen 5 x/week to address the above listed problems via self-care/home management, therapeutic activities, therapeutic exercises  Plan of Care Expires: 11/12/22  Plan of Care Reviewed with: patient    Subjective   Pt agreeable to therapy.   Pain/Comfort:  Pain Rating 1: 7/10  Location 1: back  Pain Addressed 1: Reposition, Distraction    Objective:     Communicated with: nsg  prior to session.  Patient found in chair with nsg present.   Pt with wound vac and IV in place. Pt with 3 LPM oxygen via NC in place.     General Precautions: Standard, fall, sternal     Occupational Performance     Functional Mobility/Transfers:  Sit>stand with SBA from bed and chair     Activities of Daily Living:  Feeding: independent  G/H; standing for oral care with SBA. Pt with good B UE integration and tolerated stand approx 5 min for the task.   Pt resting in stand after the task prior to continued functional mobility.   LE dressing: education provided re: adaptive LE dressing skills. Pt requiring MOD A to  manage footwear. Pt limited by LE swelling.  Anticipate MIN A needed to kendall short/pants.       AMPAC 6 Click ADL: 18    Treatment & Education:  Pt demo SBA for sit>stand from chair and bed. Pt completed functional mobility in room and hallway with SBA. Pt moves slowly with frequent rest breaks during ADL's and mobility. Pt reports rest breaks needed due to SOB and overall fatigue. Saturation remained >90% on 3 LPM oxygen via NC   Education provided re: OT POCC  and safety with functional mobility/ADL skills.     Patient left up in chair with all lines intact and call button in reach    GOALS:   Multidisciplinary Problems       Occupational Therapy Goals          Problem: Occupational Therapy    Goal Priority Disciplines Outcome Interventions   Occupational Therapy Goal     OT, PT/OT     Description: Goals to be met by: 10/20/22     Patient will increase functional independence with ADLs by performing:    Grooming while standing with Supervision.  Toileting from toilet with CGA for hygiene and clothing management.   Supine to sit with Supervision.  Toilet transfer to toilet with CGA.                         Time Tracking:     OT Date of Treatment: 10/25/22  OT Start Time: 0908  OT Stop Time: 0935  OT Total Time (min): 27 min    Billable Minutes:Self Care/Home Management 15  Therapeutic Activity 12    OT/DUONG: OT          10/25/2022

## 2022-10-25 NOTE — PT/OT/SLP PROGRESS
Physical Therapy Treatment    Patient Name:  David Barrios   MRN:  [id number]  Admit Date: 10/2/2022  Admitting Diagnosis:  Status post mitral valve repair   Length of Stay: 23 days  Recent Surgery: Procedure(s) (LRB):  VALVULOPLASTY,MITRAL VALVE (N/A)  REPAIR, TRICUSPID VALVE (N/A)  INSERTION, IMPELLA (N/A)  MEYER MAZE PROCEDURE (N/A)  EXCLUSION, LEFT ATRIAL APPENDAGE (N/A) 18 Days Post-Op    Recommendations:     Discharge Recommendations:  rehabilitation facility   Discharge Equipment Recommendations:  (TBD)   Barriers to discharge: None    Plan:     During this hospitalization, patient to be seen 5 x/week to address the listed problems via gait training, therapeutic activities, therapeutic exercises, neuromuscular re-education  Plan of Care Expires:  22  Plan of Care Reviewed with: patient    Assessment:     David Barrios is a 62 y.o. male admitted with a medical diagnosis of Status post mitral valve repair.       Problem List: weakness, impaired endurance, impaired functional mobility, impaired cardiopulmonary response to activity.  Rehab Prognosis: Good     GOALS:   Multidisciplinary Problems       Physical Therapy Goals          Problem: Physical Therapy    Goal Priority Disciplines Outcome Goal Variances Interventions   Physical Therapy Goal     PT, PT/OT Ongoing, Progressing     Description: Goals to be met by: 10/27/2022     Patient will increase functional independence with mobility by performin. Supine to sit with independence-not met  2. Sit to supine with independence -not met  3. Sit to stand transfer with stand by assistance -not met  4. Bed to chair transfer with contact guard assistance using PRW as needed -not met  5. Gait  x 100 feet with minimum assistance using PRW as needed -not met  6. Ascend/descend 3 stair with no handrails minimum assistance using LRAD as needed -not met  7. Lower extremity exercise program x10 reps per handout, with independence -not met  8. Recall 3/3 sternal  "precautions-met 10/19                       Subjective   Communicated with RN prior to session.  Patient found up in chair upon PT entry to room, agreeable to evaluation. David Barrios's alone during session.    Chief Complaint: pain   Patient/Family Comments/goals: to get better and return home   Pain/Comfort:  Pain Rating 1: 7/10  Location 1:  (back and R side)  Pain Addressed 1: Distraction, Reposition  Pain Rating Post-Intervention 1: 7/10    Objective:   Patient found with: telemetry, PICC line, wound vac, oxygen   General Precautions: Standard, Cardiac fall, sternal   Orthopedic Precautions:N/A   Braces: N/A   Oxygen Device: Nasal Cannula  Vitals: BP (!) 107/57 (BP Location: Left arm, Patient Position: Sitting)   Pulse 90   Temp 97.3 °F (36.3 °C) (Tympanic)   Resp 18   Ht 5' 9" (1.753 m)   Wt 82.1 kg (181 lb)   SpO2 95%   BMI 26.73 kg/m²     Outcome Measures:  AM-PAC 6 CLICK MOBILITY  Turning over in bed (including adjusting bedclothes, sheets and blankets)?: 3  Sitting down on and standing up from a chair with arms (e.g., wheelchair, bedside commode, etc.): 4  Moving from lying on back to sitting on the side of the bed?: 3  Moving to and from a bed to a chair (including a wheelchair)?: 3  Need to walk in hospital room?: 3  Climbing 3-5 steps with a railing?: 3  Basic Mobility Total Score: 19     Functional Mobility:  Additional staff present: OT - due to pt requiring 2 skilled therapists to safely perform functional mobility and to accommodate pt's activity tolerance    Bed Mobility:  Not performed 2nd to pt found sitting in chair     Transfers:   Sit <> Stand Transfer: stand by assistance with no assistive device x 2 trials (chair, EOB)      Gait:  Patient ambulated: 100ft with 6 standing rest breaks due to SOB  Patient required: contact guard  Patient used: no assistive device  Gait Pattern observed: reciprocal gait  Gait Deviation(s): decreased step length and decreased goldy  Impairments due to: " decreased endurance and edema   Comments:   Mask donned  Verbal cuing for pacing  No LOB  Moderate SOB present         Therapeutic Activities, Exercises, and Education:   Educated pt on PT role/POC  Educated pt on importance of OOB activity and daily ambulation   Educated pt on sternal precautions   Pt verbalized understanding     Patient left up in chair with all lines intact, call button in reach, and RN notified..    Time Tracking:     PT Received On: 10/25/22  PT Start Time: 0908     PT Stop Time: 0934  PT Total Time (min): 26 min       Billable Minutes:   Gait Training 23    Treatment Type: Treatment  PT/PTA: PT

## 2022-10-25 NOTE — PLAN OF CARE
[facility]     Department of Hospital Medicine     [address] [place] [address]     [phone] after hours  [phone] fax                                        FACILITY TRANSFER ORDERS     Patient Name: David Barrios  YOB: 1960    10/25/2022    Admit to:  LTAC     Diagnoses:  Active Hospital Problems    Diagnosis  POA    *Status post mitral valve repair [Z98.890]  Not Applicable    Status post tricuspid valve repair [Z98.890]  Not Applicable    Status post ligation of left atrial appendage [Z98.890]  Not Applicable    Status post Maze operation for atrial fibrillation [Z98.890, Z86.79]  Not Applicable    Bacteremia [R78.81]  No    Shock [R57.9]  Unknown    Transient hyperglycemia post procedure [R73.9]  Unknown    Surgical wound present [T14.8XXA]  Unknown    VT (ventricular tachycardia) [I47.20]  Unknown    Left ventricular systolic dysfunction [I51.9]  Yes    Nonrheumatic mitral valve regurgitation [I34.0]  Yes    Persistent atrial fibrillation [I48.19]  Yes    Microcytic anemia [D50.9]  Yes    Hypokalemia [E87.6]  Yes      Resolved Hospital Problems   No resolved problems to display.       Vital Signs: Routine.    Allergies:Review of patient's allergies indicates:  No Known Allergies    Diet: cardiac diet     Acitivities: activity as tolerated. No pushing or pulling with arms/hands. No lifting >5 pounds (ex. One gallon container of milk) for 6 weeks after surgery.      Nursing: Shower daily. Gently cleanse with soap and water only. Do not apply lotion, ointments, or powder to the incision. Open to air. Use dry gauze dressing when drainage present. Notify MD for any redness, swelling, or drainage from incisions.    PICC line care per protocol     Labs: CMP, CBC, Mg daily. K> 4, Mg>2    CONSULTS:      Physical Therapy to evaluate and treat     Occupational Therapy to evaluate and treat        MISCELLANEOUS CARE:      Skilled nurse to  change VAC dressing 3 times per week as follows:  Remove old dressing;   Cleanse/irrigate with: Normal Saline or wound cleanser, use 5 mL syringe and 18 gauge Angiocath or spray bottle;   Protect periwound with: VAC drape;  Fill wound with: Black GranuFoam;  VAC setting at: 125 mmHg and Continuous;  Skilled nursing instruction to patient/caregiver to include: VAC safety alarms, Troubleshooting, Canister changes, Waste disposal, Patching leaks, s/s reportable to skilled nurse/MD, Customer service phone number, Alternate dressing placement, VAC Patient handbook, and Universal precautions;  Alternate dressing change PRN.      DIABETES CARE:        Fingerstick blood sugar AC and HS      Report CBG < 60 or > 350 to physician.                                          Insulin Sliding Scale          Glucose  Novolog Insulin Subcutaneous        0 - 60   Orange juice or glucose tablet, hold insulin      No insulin   201-250  2 units   251-300  4 units   301-350  6 units   351-400  8 units   >400   10 units then call physician    Medications:   Scheduled Meds:   albuterol-ipratropium  3 mL Nebulization Q6H WAKE    amiodarone  200 mg Oral Daily    aspirin  325 mg Oral  Daily    ceFEPime (MAXIPIME) IVPB  2 g Intravenous Q8H eed 11/15/22    Xarelto  20 mg Oral  Q daily     famotidine  20 mg Oral BID    furosemide  80 mg Oral BID    melatonin  6 mg Oral Nightly    metOLazone  2.5 mg Oral BID    metoprolol succinate  12.5 mg Oral Daily    polyethylene glycol  17 g Oral  Daily           potassium chloride  40 mEq Oral Daily    QUEtiapine  100 mg Oral QHS    senna-docusate 8.6-50 mg  1 tablet Oral  BID           tamsulosin  0.4 mg Oral Daily     Continuous Infusions:   milrinone 20mg/100ml D5W (200mcg/ml) 0.125 mcg/kg/min (10/25/22 0551)   Plan to turn off at the end of the week     PRN Meds:.   oxyCODONE 5 mg PO q4 pain               _________________________________  Jodee French PA-C  10/25/2022

## 2022-10-25 NOTE — HPI
Per chart review, David Barrios is a 62 y.o. male who presents as an initial consult for surgical evaluation secondary to severe mitral regurgitation.  He has a medical history significant for atrial fibrillation, hypertension, HFrEF (EF 35%), and severe mitral regurgitation.  He states he has SOB over the past few weeks and has progressive worsened. He was recently seen in clinic by his cardiologist (Dr. Limon) on 8/4/22 for dyspnea and was noted to be in afib with RVR, rate 120's. He was switched to coreg BID and restarted back on xarelto. He reports cough, orthopnea and bilateral leg swelling. He also noticed weight gain but is unsure how many lbs. He denies  chest pain. Pt is now S/P  S/P TV replacement, MV replacement, MAZE, Lt Atrial Appendage ligation and Impella placement on 10/7/22. PM &R was consulted to evaluate pt for IPR placement.     Functional History: Patient lives  alone  in a trailer with 3 stairs  to enter.  Prior to admission, pt was I with ADLs and mobility.  DME: None.

## 2022-10-25 NOTE — DISCHARGE SUMMARY
[facility] - Cardiology Stepdown  Cardiothoracic Surgery  Discharge Summary      Patient Name: David Barrios  MRN: [id number]  Admission Date: 10/2/2022  Hospital Length of Stay: 23 days  Discharge Date and Time:  10/25/2022 9:45 AM  Attending Physician: Mike Hedrick MD   Discharging Provider: Jodee French PA-C  Primary Care Provider: Breann Tavera MD    HPI:   Mr. Barrios is a 62 year-old gentleman with a history of heart failure reduced ejection fraction (35% ejection fraction) secondary to valvular cardiomyopathy and paroxysmal atrial fibrillation, pulmonary hypertension, hypertension, and BMI of 29.  Recently, he has been symptomatic with dyspnea on exertion symptoms interfering with his quality of life.  His most recent echocardiogram showed 35% ejection fraction with LVEDD of 6.1cm, severe mitral regurgitation with central jet, mild to moderate tricuspid regurgitation, and estimated SPAP of 45mmHg.  Left and right heart catheterization showed nonsignificant coronary disease and PAP of 70/30.     CTA chest (PE study) shows minimal ascending aortic and moderate aortic arch calcifications.     We had an extensive discussion with him regarding the ACC/AHA guidelines and we agreed that he has class I indications for surgery involving mitral valve repair vs replacement (bioprosthesis), tricuspid valve repair, Meyer Maze procedure, left atrial appendage resection, possible impella placement. The risks and benefits were explained and informed consent was obtained.       Procedure(s) (LRB):  VALVULOPLASTY,MITRAL VALVE (N/A)  REPAIR, TRICUSPID VALVE (N/A)  INSERTION, IMPELLA (N/A)  MEYER MAZE PROCEDURE (N/A)  EXCLUSION, LEFT ATRIAL APPENDAGE (N/A)      Indwelling Lines/Drains at time of discharge:   Lines/Drains/Airways       Peripherally Inserted Central Catheter Line  Duration             PICC Double Lumen 10/17/22 1709 right brachial 7 days              Drain  Duration                  Chest Tube 10/23/22 Right  Pleural 14 Fr. 2 days                  Hospital Course: On 10/7/19 the patient was taken to the Operating Room for the above stated procedure. Please see the previously dictated operative report for complete details. Postoperatively, the patient was taken from the  Operating Room to the ICU where the vital signs were monitored and pain was kept under control. The patient was weaned from the drips and extubated in the ICU per protocol. Once hemodynamically stable, the patient was transferred to the Cardiac Step-Down floor for continued strengthening and ambulation.  Post op course complicated by bleeding on POD#0 that required emergent sternotomy and bleeding was subsequently controlled thereafter. Wound vac was placed for which he was discharged with. Blood and aerobic wound cultures from the sternum show serratia marcescens/ampC inducible organism . Afebrile, empirically was on ceftriaxone (based on reported susceptibilities) , ID consulted in the setting of bacteremia and placed on IV cefepime eed 11/15/22. On 10/7/22, post op also complicated with episode of polymorphic VT in which patient was shocked back into normal sinus rhythm. Patient has prolonged FL before and after, life vest ordered upon discharge. On postoperative day 18, the patient was ready for not discharge to home and will be sent to a LTAC center to continue to optimize strength and conditioning. At the time of transfer, the patient was unable to ambulating unassisted. Pain was well controlled with oral analgesics and the patient was tolerating the diet.    Patient not placed on Ace-Inhibitor at the time of discharge due to potential for hypotension   Statin not indicated.      MOBILITY AND ACTIVITY: As tolerated. Patient may shower. No heavy lifting of greater than 5 pounds and no driving.     DIET: A Cardiac diet      WOUND CARE INSTRUCTIONS: Check for redness, swelling and drainage around the  incision or wound. Patient is to call for any  obvious bleeding, drainage, pus from the wound, unusual problems or difficulties or temperature of greater than 101   degrees.     FOLLOWUP: Follow up with Dr. Hedrick in approximately 5 weeks. Prior to this  appointment, the patient will have a EKG.     Patient not placed on Ace-Inhibitor at the time of discharge due to potential for hypotension         DISCHARGE CONDITION: At the time of discharge, the patient was in sinus rhythm and afebrile with stable vital signs.           Goals of Care Treatment Preferences:  Code Status: Full Code      Consults (From admission, onward)          Status Ordering Provider     Inpatient consult to Infectious Diseases  Once        Provider:  (Not yet assigned)    Completed KALPANA JARVIS     Inpatient consult to Social Work/Case Management  Once        Provider:  (Not yet assigned)    Acknowledged WILSON KIM     Inpatient consult to Social Work/Case Management  Once        Provider:  (Not yet assigned)    Acknowledged SUSHILA GALINDO     Inpatient consult to Physical Medicine Rehab  Once        Provider:  (Not yet assigned)    Completed KALPANA JARVIS     Inpatient consult to PICC team ([facility])  Once        Provider:  (Not yet assigned)    Completed ERUM LI     Inpatient consult to Electrophysiology  Once        Provider:  (Not yet assigned)    Completed ERUM LI     Inpatient consult to Endocrinology  Once        Provider:  (Not yet assigned)    Completed LAUREL ROMAN     Inpatient consult to Electrophysiology  Once        Provider:  (Not yet assigned)    Completed LAUREL ROMAN     Inpatient consult to Registered Dietitian/Nutritionist  Once        Provider:  (Not yet assigned)    Completed LAUREL ORMAN              No new Assessment & Plan notes have been filed under this hospital service since the last note was generated.  Service: Cardiothoracic Surgery    Final Active Diagnoses:    Diagnosis Date Noted POA    PRINCIPAL PROBLEM:  Status  post mitral valve repair [Z98.890] 10/24/2022 Not Applicable    Status post tricuspid valve repair [Z98.890] 10/24/2022 Not Applicable    Status post ligation of left atrial appendage [Z98.890] 10/24/2022 Not Applicable    Status post Maze operation for atrial fibrillation [Z98.890, Z86.79] 10/24/2022 Not Applicable    Bacteremia [R78.81] 10/21/2022 No    Shock [R57.9] 10/11/2022 Unknown    Transient hyperglycemia post procedure [R73.9] 10/08/2022 Unknown    Surgical wound present [T14.8XXA] 10/08/2022 Unknown    VT (ventricular tachycardia) [I47.20] 10/07/2022 Unknown    Left ventricular systolic dysfunction [I51.9] 09/09/2022 Yes    Nonrheumatic mitral valve regurgitation [I34.0] 08/30/2022 Yes    Persistent atrial fibrillation [I48.19] 05/23/2019 Yes    Microcytic anemia [D50.9] 10/11/2018 Yes    Hypokalemia [E87.6] 10/11/2018 Yes      Problems Resolved During this Admission:      Discharged Condition: stable    Disposition: Long Term Care    Follow Up:    Patient Instructions:   No discharge procedures on file.  Medications:  See plan of care note   Time spent on the discharge of patient: 38 minutes    Jodee French PA-C  Cardiothoracic Surgery  [facility] - [facility]

## 2022-10-25 NOTE — PLAN OF CARE
Pt discharged to [facility]. Transported by [facility] [place].     Delaney Kumar [facility]  Case Management Department  [email or web address]       10/25/22 1552   Final Note   Assessment Type Final Discharge Note   Anticipated Discharge Disposition LTAC   Hospital Resources/Appts/Education Provided Provided patient/caregiver with written discharge plan information   Post-Acute Status   Post-Acute Authorization Placement   Post-Acute Placement Status Set-up Complete/Auth obtained   Discharge Delays None known at this time

## 2022-10-25 NOTE — NURSING
Type and screened not collected right when order was placed in for blood due to myself being caught up with other patients for unforseen issues. When type and screened pulled, epic issues happened where paper could not be printed from any printers that had the barcode on it that lab needed to process request, had to get with lab to figure out what we were going to do,  finally Mrs harris from lab and I came up with a conclusion, type and screen sent in process now, will start blood ASAP and keep everyone updated so patient can be transferred to LTAC today.     1400- blood started, let FRANCES Moser and case management know. Instructed by FRANCES Moser to run blood over and hour with a follow up STAT cbc. Patient updated on POC, will call report to LTAC and have patient ready to go for when transport shows up.     1535- blood transfusion complete, along with STAT cbc, and disconnection of wound vac (wet and dry in place for now), disconnection of Milirone until patient reaches facility ( per FRANCES Moser orders), patient also weaned down to room air prior to leaving today so no oxygen needed and d/c of telemetry completed. All patient belongings sent with him and his wife along with life vest. LTAC updated on patients whereabouts an eta to their facility.

## 2022-10-25 NOTE — HOSPITAL COURSE
PT-10/24    Functional Mobility:  Additional staff present: N/A  Bed Mobility:  Sit to Supine: minimum assistance; HOB flat     Sitting Balance at Edge of Bed:  Assistance Level Required: Supervision     Transfers:   Sit <> Stand Transfer: stand by assistance with no assistive device x 5 trials from EOB           Gait:  Patient ambulated: 80ft with 4 standing rest breaks   Patient required: standy by assistance  OT- 10/22    Bed Mobility:    Patient completed Scooting/Bridging with contact guard assistance  Patient completed Supine to Sit with minimum assistance      Functional Mobility/Transfers:  Patient completed Sit <> Stand Transfer with contact guard assistance  with  no assistive device   Patient completed Toilet Transfer Stand Pivot technique with contact guard assistance with  bedside commode     Activities of Daily Living:  Grooming: stand by assistance in standing.

## 2022-10-25 NOTE — ASSESSMENT & PLAN NOTE
-S/P TV replacement, MV replacement, MAZE, Lt Atrial Appendage ligation and Impella placement on 10/7/22.  -Impella removed on 10/14.  -Continues with Primacor gtt.  -Continues with IV Lasix.  -Last BM 10/23.  -Monitor CMP, MG Phos.  -Right CT in place.  -PT/OT rec IPR. Pt amenable. If there is continuous need for CT, May be better of with LTAC Disposition.  -Sternal precautions in place.

## 2022-10-25 NOTE — HPI
Mr. Barrios is a 62 year-old gentleman with a history of heart failure reduced ejection fraction (35% ejection fraction) secondary to valvular cardiomyopathy and paroxysmal atrial fibrillation, pulmonary hypertension, hypertension, and BMI of 29.  Recently, he has been symptomatic with dyspnea on exertion symptoms interfering with his quality of life.  His most recent echocardiogram showed 35% ejection fraction with LVEDD of 6.1cm, severe mitral regurgitation with central jet, mild to moderate tricuspid regurgitation, and estimated SPAP of 45mmHg.  Left and right heart catheterization showed nonsignificant coronary disease and PAP of 70/30.     CTA chest (PE study) shows minimal ascending aortic and moderate aortic arch calcifications.     We had an extensive discussion with him regarding the ACC/AHA guidelines and we agreed that he has class I indications for surgery involving mitral valve repair vs replacement (bioprosthesis), tricuspid valve repair, Benz Maze procedure, left atrial appendage resection, possible impella placement. The risks and benefits were explained and informed consent was obtained.

## 2022-10-25 NOTE — CONSULTS
[facility] - Cardiology Stepdown  Physical Medicine & Rehab  Consult Note    Patient Name: David Barrios  MRN: [id number]  Admission Date: 10/2/2022  Hospital Length of Stay: 22 days  Attending Physician: Mike Hedrick MD  Consults  Subjective:     Principal Problem: Status post mitral valve repair    HPI: Per chart review, David Barrios is a 62 y.o. male who presents as an initial consult for surgical evaluation secondary to severe mitral regurgitation.  He has a medical history significant for atrial fibrillation, hypertension, HFrEF (EF 35%), and severe mitral regurgitation.  He states he has SOB over the past few weeks and has progressive worsened. He was recently seen in clinic by his cardiologist (Dr. Limon) on 8/4/22 for dyspnea and was noted to be in afib with RVR, rate 120's. He was switched to coreg BID and restarted back on xarelto. He reports cough, orthopnea and bilateral leg swelling. He also noticed weight gain but is unsure how many lbs. He denies  chest pain. Pt is now S/P  S/P TV replacement, MV replacement, MAZE, Lt Atrial Appendage ligation and Impella placement on 10/7/22. PM &R was consulted to evaluate pt for IPR placement.     Functional History: Patient lives  alone  in a trailer with 3 stairs  to enter.  Prior to admission, pt was I with ADLs and mobility.  DME: None.       Hospital Course: PT-10/24    Functional Mobility:  Additional staff present: N/A  Bed Mobility:   Sit to Supine: minimum assistance; HOB flat     Sitting Balance at Edge of Bed:   Assistance Level Required: Supervision     Transfers:    Sit <> Stand Transfer: stand by assistance with no assistive device x 5 trials from EOB           Gait:   Patient ambulated: 80ft with 4 standing rest breaks    Patient required: standy by assistance  OT- 10/22    Bed Mobility:     Patient completed Scooting/Bridging with contact guard assistance   Patient completed Supine to Sit with minimum assistance      Functional  Mobility/Transfers:   Patient completed Sit <> Stand Transfer with contact guard assistance  with  no assistive device    Patient completed Toilet Transfer Stand Pivot technique with contact guard assistance with  bedside commode     Activities of Daily Living:   Grooming: stand by assistance in standing.        Past Medical History:   Diagnosis Date    Anemia     Atrial fibrillation     Encounter for blood transfusion     Esophageal ulcer     GI bleed     Hypertension      Past Surgical History:   Procedure Laterality Date    CARDIOVERSION Left 9/15/2022    Procedure: Cardioversion;  Surgeon: Julio James MD;  Location: [facility] CATH LAB[facility]EP;  Service: Cardiology;  Laterality: Left;  With NORBERT    CATHETERIZATION OF BOTH LEFT AND RIGHT HEART N/A 9/22/2022    Procedure: CATHETERIZATION, HEART, BOTH LEFT AND RIGHT;  Surgeon: Julio James MD;  Location: [facility] CATH LAB/EP;  Service: Cardiology;  Laterality: N/A;    COLONOSCOPY N/A 4/4/2019    Procedure: COLONOSCOPY Golytely;  Surgeon: Umair Randolph MD;  Location: [facility];  Service: Endoscopy;  Laterality: N/A;    CORONARY ANGIOGRAPHY N/A 9/22/2022    Procedure: ANGIOGRAM, CORONARY ARTERY;  Surgeon: Julio James MD;  Location: [facility] CATH LAB/EP;  Service: Cardiology;  Laterality: N/A;    MEYER MAZE PROCEDURE N/A 10/7/2022    Procedure: MEYER MAZE PROCEDURE;  Surgeon: Mike Hedrick MD;  Location: [address];  Service: Cardiovascular;  Laterality: N/A;    ESOPHAGOGASTRODUODENOSCOPY N/A 10/12/2018    Procedure: EGD (ESOPHAGOGASTRODUODENOSCOPY);  Surgeon: Braden Herring MD;  Location: [facility];  Service: Endoscopy;  Laterality: N/A;    ESOPHAGOGASTRODUODENOSCOPY N/A 4/4/2019    Procedure: EGD;  Surgeon: Umair Randolph MD;  Location: [facility];  Service: Endoscopy;  Laterality: N/A;    EXCLUSION OF LEFT ATRIAL APPENDAGE N/A 10/7/2022    Procedure: EXCLUSION, LEFT ATRIAL APPENDAGE;  Surgeon: Mike Hedrick MD;  Location: [address]  [facility];  Service: Cardiovascular;  Laterality: N/A;    HERNIA REPAIR      INSERTION OF INTRAVASCULAR MICROAXIAL BLOOD PUMP N/A 10/7/2022    Procedure: INSERTION, IMPELLA;  Surgeon: Mike Hedrick MD;  Location: [facility];  Service: Cardiovascular;  Laterality: N/A;  direct aortic insertion    IRRIGATION OF MEDIASTINUM N/A 10/7/2022    Procedure: IRRIGATION, MEDIASTINUM;  Surgeon: Mike Hedrick MD;  Location: [facility];  Service: Cardiovascular;  Laterality: N/A;    TRICUSPID VALVULOPLASTY N/A 10/7/2022    Procedure: REPAIR, TRICUSPID VALVE;  Surgeon: Mike Hedrick MD;  Location: [facility];  Service: Cardiovascular;  Laterality: N/A;     Review of patient's allergies indicates:  No Known Allergies    Scheduled Medications:    albuterol-ipratropium  3 mL Nebulization Q6H WAKE    amiodarone  200 mg Oral Daily    aspirin  325 mg Per NG tube Daily    ceFEPime (MAXIPIME) IVPB  2 g Intravenous Q8H    enoxaparin  40 mg Subcutaneous Daily    famotidine  20 mg Oral BID    furosemide (LASIX) injection  80 mg Intravenous BID    melatonin  6 mg Oral Nightly    metOLazone  2.5 mg Oral BID    metoprolol succinate  12.5 mg Oral Daily    polyethylene glycol  17 g Per NG tube Daily    potassium chloride  40 mEq Oral Daily    QUEtiapine  100 mg Oral QHS    senna-docusate 8.6-50 mg  1 tablet Per NG tube BID    sodium chloride 0.9%  10 mL Intravenous Q6H    tamsulosin  0.4 mg Oral Daily       PRN Medications: sodium chloride, sodium chloride 0.9%, acetaminophen, bisacodyL, dextrose 10%, dextrose 10%, ondansetron, oxyCODONE, oxyCODONE, Flushing PICC Protocol **AND** sodium chloride 0.9% **AND** sodium chloride 0.9%    Family History       Problem Relation (Age of Onset)    COPD Mother    Cancer Father          Tobacco Use    Smoking status: Never    Smokeless tobacco: Current     Types: Chew   Substance and Sexual Activity    Alcohol use: Yes     Alcohol/week: 20.0 standard drinks      Types: 20 Cans of beer per week    Drug use: No    Sexual activity: Not on file     Review of Systems   Constitutional:  Positive for activity change.   Respiratory:  Negative for cough and shortness of breath.         On 4 L O2 Per NC   Cardiovascular:  Negative for chest pain.   Gastrointestinal:  Negative for nausea and vomiting.   Musculoskeletal:  Positive for gait problem.   Neurological:  Positive for weakness. Negative for dizziness, light-headedness and headaches.   Psychiatric/Behavioral:  Positive for confusion and decreased concentration. Negative for agitation and behavioral problems.    Objective:     Vital Signs (Most Recent):  Temp: 97.1 °F (36.2 °C) (10/24/22 1609)  Pulse: 80 (10/24/22 1609)  Resp: 17 (10/24/22 1609)  BP: (!) 111/57 (10/24/22 1712)  SpO2: 95 % (10/24/22 1609)      Vital Signs (24h Range):  Temp:  [97.1 °F (36.2 °C)-98.7 °F (37.1 °C)] 97.1 °F (36.2 °C)  Pulse:  [65-90] 80  Resp:  [17-22] 17  SpO2:  [93 %-100 %] 95 %  BP: ()/(52-59) 111/57     Body mass index is 26.96 kg/m².    Physical Exam  Vitals and nursing note reviewed.   Constitutional:       General: He is awake.   HENT:      Head: Normocephalic and atraumatic.   Eyes:      Extraocular Movements: Extraocular movements intact.   Pulmonary:      Effort: Pulmonary effort is normal. No respiratory distress.   Abdominal:      General: There is no distension.      Palpations: Abdomen is soft.   Musculoskeletal:      Cervical back: Normal range of motion and neck supple.      Comments: Generalized weakness.    Skin:     General: Skin is warm and dry.      Capillary Refill: Capillary refill takes 2 to 3 seconds.      Comments: Midline chest incision with wound vac in place.  Right CT in place.   Neurological:      General: No focal deficit present.      Mental Status: He is alert and oriented to person, place, and time.      GCS: GCS eye subscore is 4. GCS verbal subscore is 5. GCS motor subscore is 6.      Sensory: Sensation  is intact.      Motor: Weakness present.      Gait: Gait abnormal.   Psychiatric:         Attention and Perception: He is inattentive.         Mood and Affect: Mood normal.         Speech: Speech normal.         Behavior: Behavior is slowed. Behavior is cooperative.         Cognition and Memory: Memory is impaired.     NEUROLOGICAL EXAMINATION:     MENTAL STATUS   Oriented to person, place, and time.   Speech: speech is normal     Diagnostic Results: Labs: Reviewed    Assessment/Plan:     * Status post mitral valve repair  -S/P TV replacement, MV replacement, MAZE, Lt Atrial Appendage ligation and Impella placement on 10/7/22.  -Impella removed on 10/14.  -Continues with Primacor gtt.  -Continues with IV Lasix.  -Last BM 10/23.  -Monitor CMP, MG Phos.  -Right CT in place.  -PT/OT rec IPR. Pt amenable. If there is continuous need for CT, May be better of with LTAC Disposition.  -Sternal precautions in place.    Bacteremia  -ID following . Will be on Cefepime till 11/15/22.    VT (ventricular tachycardia)  -S/P DCCV.  -Monitor electrolytes closely.  -Monitor  HR and rhythm closely.   -Continue Amiodarone.     Nonrheumatic mitral valve regurgitation  -See S/P Mitral valve repair.    Persistent atrial fibrillation  -Continue Amiodarone.  -Monitor HR and rhythm.     Hypokalemia  -Monitor CMP daily.    Microcytic anemia  -Monitor CBC daily.    PM&R Recommendation:     At this time, the PM&R team has reviewed this patient's ongoing medical case including inpatient diagnosis, medical history, clinical examination, labs, vitals, current social and functional history to provide the post-acute recommendation as follows:     RECOMMENDATIONS:inpatient rehabilitation due to fair to good potential for improvement with therapies and need of physician oversight for management of ongoing active medical issues, once medically stable. Current barriers include: Chest tube removal, switching of IV Lasix to PO Lasix, hemodynamic  stability, improvement of WBC.       The patient will be admitted for comprehensive interdisciplinary inpatient rehabilitation to address the impairments due to his medical diagnosis of  S/P Mitral valve. Tricuspid valve and Maze procedure on 10/07/2022.. The patient will benefit from an inpatient rehabilitation program to promote functional recovery, implement compensatory strategies and will undergo assessment for needs for durable medical equipment for safe discharge to the community. This patient will benefit from a coordinated interdisciplinary rehabilitation program services that require close monitoring and treatment with 24-hour rehabilitative nursing and physical/occupational therapies for 3 hours/day for 5 days/week. This interdisciplinary program will be performed under the direction of a physiatrist.    MEDICAL STABILITY:         We will continue to follow.      Thank you for your consult.     Cee Parker NP  Department of Physical Medicine & Rehab  [facility] - Cardiology Stepdown

## 2022-10-26 DIAGNOSIS — Z01.818 PRE-OP TESTING: ICD-10-CM

## 2022-10-26 DIAGNOSIS — Z98.890 STATUS POST MITRAL VALVE REPAIR: Primary | ICD-10-CM

## 2022-10-26 DIAGNOSIS — Z01.818 PRE-OP TESTING: Primary | ICD-10-CM

## 2022-10-26 DIAGNOSIS — Z98.890 STATUS POST MITRAL VALVE REPAIR: ICD-10-CM

## 2022-10-26 DIAGNOSIS — R07.9 CHEST PAIN, UNSPECIFIED TYPE: ICD-10-CM

## 2022-10-26 DIAGNOSIS — I34.0 NONRHEUMATIC MITRAL VALVE REGURGITATION: Primary | ICD-10-CM

## 2022-10-26 PROBLEM — M86.9 STERNAL OSTEOMYELITIS: Status: ACTIVE | Noted: 2022-10-26

## 2022-10-26 PROBLEM — A41.53: Status: ACTIVE | Noted: 2022-10-21

## 2022-10-26 PROBLEM — I50.21 ACUTE SYSTOLIC CONGESTIVE HEART FAILURE: Status: ACTIVE | Noted: 2022-08-29

## 2022-11-01 ENCOUNTER — TELEPHONE (OUTPATIENT)
Dept: CARDIOTHORACIC SURGERY | Facility: CLINIC | Age: 62
End: 2022-11-01
Payer: COMMERCIAL

## 2022-11-01 NOTE — TELEPHONE ENCOUNTER
Called JIGNESH Hamilton at [facility], to let them know patient was scheduled for surgery on 11/8 with Dr Hedrick for wire removal and muscle flap creation.    She did not know patient was scheduled and asked if patient needed any preop testing.    Reached out to Dr Hedrick as he requested a TTE and CT chest without contrast prior to original surgery date of 11/17. I told Joy I would update her as soon as I confirmed what patient needed prior to surgery.    Dr Johansen is patient's attending at [facility]. They have multidisciplinary treatment rounds this morning and she will update care team in the meeting.    Julie Haase RN  CTS Nurse Navigator [phone]

## 2022-11-07 ENCOUNTER — ANESTHESIA EVENT (OUTPATIENT)
Dept: SURGERY | Facility: HOSPITAL | Age: 62
DRG: 268 | End: 2022-11-07
Payer: COMMERCIAL

## 2022-11-07 ENCOUNTER — TELEPHONE (OUTPATIENT)
Dept: CARDIOTHORACIC SURGERY | Facility: CLINIC | Age: 62
End: 2022-11-07
Payer: COMMERCIAL

## 2022-11-07 PROBLEM — A41.53: Status: RESOLVED | Noted: 2022-10-21 | Resolved: 2022-11-07

## 2022-11-07 NOTE — TELEPHONE ENCOUNTER
Called DAGOBERTO Hamilton for [facility], and reviewed arrival time for surgery.  Pt ishould report to [facility] at 5:00 tomorrow morning. [facility] to set up transport.     Reminded Joy pt should become NPO at midnight and take any beta blockers or thyroid medicine prior to surgery.  Joy verbalized understanding. I gave her my direct number in case she had any questions.    Julie Haase RN  CTS Nurse Navigator [phone]

## 2022-11-07 NOTE — ANESTHESIA PREPROCEDURE EVALUATION
[facility]  Anesthesia Pre-Operative Evaluation         Patient Name: David Barrios  YOB: 1960  MRN: [id number]    SUBJECTIVE:     Pre-operative evaluation for Procedure(s) (LRB):  REMOVAL, STERNAL WIRE (N/A)  RECONSTRUCTION-MUSCLE (N/A)  FLAP GRAFT - muscle flap (N/A)     11/07/2022    David Barrios is a 62 y.o. male w/ a significant PMHx of AF, HTN, HLD, HFrEF (EF 35%), severe mitral regurgitations/p MVr, TVr, and centrally-placed impella via aortic graft. Post-op, remained intubated and sedated with increasing pressor and inotrope requirements and increasing lactate as well as several episodes of VT requiring cardioversion. Post-op also c/b bleeding requiring return to the OR for emergent sternotomy and washout with wound vac placement. Discharged to LTAC in stable condition presents now for closure.    Patient now presents for the above procedure(s).    TTE 10/19/22:   The left ventricle is normal in size with moderately decreased systolic function. The estimated ejection fraction is 35%.   Mild right ventricular enlargement with moderately reduced right ventricular systolic function.   Indeterminate left ventricular diastolic function.   Severe left atrial enlargement.   Moderate aortic regurgitation.   The mitral and tricuspid valves are s/p repair with no significant residual regurgitation.   Moderate circumferential pericardial effusion with focal stranding. No evidence of tamponade.   The IVC was not visualized.      LDA: None documented.  PICC Double Lumen 10/17/22 1709 right brachial (Active)   Line Necessity Review Longterm central access required;Incompatible infusions 10/31/22 1901   Verification by X-ray Yes 10/26/22 1901   Site Assessment No redness;No drainage;No swelling 11/06/22 1930   Extremity Assessment Distal to IV No abnormal discoloration;No redness;No swelling;No warmth 11/06/22 1505   Line Securement Device Secured with sutureless device 11/06/22  1930   Dressing Type Biopatch in place 11/06/22 1930   Dressing Status Clean;Intact;Dry 11/06/22 1930   Dressing Intervention Integrity maintained 11/06/22 1930   Date on Dressing 11/05/22 11/06/22 1930   Dressing Due to be Changed 11/12/22 11/06/22 1930   Left Lumen Patency/Care Flushed w/o difficulty 11/06/22 1930   Right Lumen Patency/Care Infusing 11/06/22 1930   Current Insertion Depth (cm) 39 cm 10/17/22 1709   Current Exposed Catheter (cm) 0 cm 10/17/22 1709   Extremity Circumference (cm) 34 cm 10/17/22 1709   Waveform Not being transduced 10/31/22 1901   Number of days: 20       Prev airway:   Intubation     Date/Time: 10/7/2022 7:18 AM  Performed by: Martin Gimenez MD  Authorized by: Amadeo Levy MD      Intubation:     Induction:  Intravenous    Intubated:  Postinduction    Mask Ventilation:  Easy mask    Attempts:  1    Attempted By:  Resident anesthesiologist    Method of Intubation:  Direct    Blade:  Олег 4    Laryngeal View Grade: Grade I - full view of cords      Difficult Airway Encountered?: No      Complications:  None    Airway Device:  Oral endotracheal tube    Airway Device Size:  7.5    Style/Cuff Inflation:  Cuffed    Inflation Amount (mL):  5    Tube secured:  22    Secured at:  The teeth    Placement Verified By:  Capnometry    Complicating Factors:  None    Findings Post-Intubation:  BS equal bilateral and atraumatic/condition of teeth unchanged    Drips: None documented.      Patient Active Problem List   Diagnosis    Microcytic anemia    Hypokalemia    Elevated LFTs    Essential hypertension    Screening for malignant neoplasm of colon    Persistent atrial fibrillation    Dyspnea on exertion    Abnormal electrocardiogram    Acute systolic congestive heart failure    Nonrheumatic mitral valve regurgitation    Left ventricular systolic dysfunction    VT (ventricular tachycardia)    Transient hyperglycemia post procedure    Surgical wound present    Shock     Septicemia due to Serratia    Status post mitral valve repair    Status post tricuspid valve repair    Status post ligation of left atrial appendage    Status post Maze operation for atrial fibrillation    Sternal osteomyelitis       Review of patient's allergies indicates:  No Known Allergies    Current Inpatient Medications:      Current Facility-Administered Medications on File Prior to Encounter   Medication Dose Route Frequency Provider Last Rate Last Admin    acetaminophen tablet 1,000 mg  1,000 mg Oral Q8H PRN Shanti Leone PA-C        albuterol-ipratropium 2.5 mg-0.5 mg/3 mL nebulizer solution 3 mL  3 mL Nebulization Q6H WAKE Shanti Leone PA-C   3 mL at 11/07/22 0746    amiodarone tablet 200 mg  200 mg Oral Daily Shanti Leone PA-C   200 mg at 11/06/22 0958    aspirin EC tablet 81 mg  81 mg Oral Daily Shanti Leone PA-C   81 mg at 11/06/22 0958    bisacodyL suppository 10 mg  10 mg Rectal Daily PRN Shanti Leone PA-C        enoxaparin injection 40 mg  40 mg Subcutaneous Daily Satish Johansen MD   40 mg at 11/06/22 1800    famotidine tablet 20 mg  20 mg Oral Daily Satish Johansen MD        ferrous sulfate tablet 1 each  1 tablet Oral Daily Satish Johansen MD   1 each at 11/06/22 0957    furosemide injection 80 mg  80 mg Intravenous TID Satish Johansen MD   80 mg at 11/06/22 2111    melatonin tablet 6 mg  6 mg Oral Nightly Shanti Leone PA-C   6 mg at 11/06/22 2112    melatonin tablet 6 mg  6 mg Oral Nightly PRN Shanti Leone PA-C   6 mg at 11/05/22 1949    methyl salicylate-menthol 15-10% cream   Topical (Top) Q6H PRN Satish Johansen MD        metOLazone tablet 2.5 mg  2.5 mg Oral Daily Satish Johansen MD   2.5 mg at 11/06/22 1010    metoprolol succinate (TOPROL-XL) 24 hr split tablet 12.5 mg  12.5 mg Oral Daily YRN LopezC   12.5 mg at 11/06/22 0957    milrinone 20mg in D5W 100 mL infusion   0.125 mcg/kg/min Intravenous Continuous Satish Johansen MD 3.1 mL/hr at 11/06/22 2215 0.125 mcg/kg/min at 11/06/22 2215    ondansetron disintegrating tablet 8 mg  8 mg Oral Q8H PRN Shanti Leone PA-C        oxyCODONE immediate release tablet 5 mg  5 mg Oral Q4H PRN Satish Johansen MD   5 mg at 11/06/22 2111    piperacillin-tazobactam 4.5 g in sodium chloride 0.9% 100 mL IVPB (ready to mix system)  4.5 g Intravenous Q8H Mayur Victoria MD 25 mL/hr at 11/07/22 0556 4.5 g at 11/07/22 0556    polyethylene glycol packet 17 g  17 g Oral Daily Satish Johansen MD   17 g at 11/06/22 0957    potassium chloride SA CR tablet 40 mEq  40 mEq Oral Daily Satish Johansen MD        QUEtiapine tablet 100 mg  100 mg Oral QHS Shanti Leone PA-C   100 mg at 11/06/22 2112    senna-docusate 8.6-50 mg per tablet 1 tablet  1 tablet Oral BID Satish Johansen MD   1 tablet at 11/06/22 2112    sodium chloride 0.9% flush 10 mL  10 mL Intravenous Q6H Shanti Leone PA-C   10 mL at 11/07/22 0556    And    sodium chloride 0.9% flush 10 mL  10 mL Intravenous PRN Shanti Leone PA-C   10 mL at 11/02/22 0600    tamsulosin 24 hr capsule 0.4 mg  0.4 mg Oral Daily Shanti Leone PA-C   0.4 mg at 11/06/22 0958     Current Outpatient Medications on File Prior to Encounter   Medication Sig Dispense Refill    albuterol (PROVENTIL/VENTOLIN HFA) 90 mcg/actuation inhaler inhale 1-2 Puff(s) By Mouth Every 4 Hours as needed 8.5 g 0    amiodarone (PACERONE) 200 MG Tab Take 1 tablet (200 mg total) by mouth 2 (two) times daily. 60 tablet 2    carvediloL (COREG) 12.5 MG tablet Take 1 tablet (12.5 mg total) by mouth 2 (two) times daily with meals. 180 tablet 3    ferrous sulfate (FEOSOL) 325 mg (65 mg iron) Tab tablet Take 1 tablet (325 mg total) by mouth daily with breakfast.  0    furosemide (LASIX) 40 MG tablet TAKE 1 TABLET(40 MG) BY MOUTH TWICE DAILY 180 tablet 3    hydroCHLOROthiazide  (HYDRODIURIL) 25 MG tablet Take 25 mg by mouth once daily.      rivaroxaban (XARELTO) 20 mg Tab Take 1 tablet (20 mg total) by mouth daily with dinner or evening meal. 90 tablet 3       Past Surgical History:   Procedure Laterality Date    CARDIOVERSION Left 9/15/2022    Procedure: Cardioversion;  Surgeon: Julio James MD;  Location: [facility] CATH LAB/EP;  Service: Cardiology;  Laterality: Left;  With NORBERT    CATHETERIZATION OF BOTH LEFT AND RIGHT HEART N/A 9/22/2022    Procedure: CATHETERIZATION, HEART, BOTH LEFT AND RIGHT;  Surgeon: Julio James MD;  Location: [facility] CATH LAB/EP;  Service: Cardiology;  Laterality: N/A;    COLONOSCOPY N/A 4/4/2019    Procedure: COLONOSCOPY Golytely;  Surgeon: Umair Randolph MD;  Location: [facility];  Service: Endoscopy;  Laterality: N/A;    CORONARY ANGIOGRAPHY N/A 9/22/2022    Procedure: ANGIOGRAM, CORONARY ARTERY;  Surgeon: Julio James MD;  Location: [facility] CATH LAB/EP;  Service: Cardiology;  Laterality: N/A;    MEYER MAZE PROCEDURE N/A 10/7/2022    Procedure: MEYER MAZE PROCEDURE;  Surgeon: Mike Hedrick MD;  Location: [facility] OR [address] [place] [address];  Service: Cardiovascular;  Laterality: N/A;    ESOPHAGOGASTRODUODENOSCOPY N/A 10/12/2018    Procedure: EGD (ESOPHAGOGASTRODUODENOSCOPY);  Surgeon: Braden Herring MD;  Location: [facility];  Service: Endoscopy;  Laterality: N/A;    ESOPHAGOGASTRODUODENOSCOPY N/A 4/4/2019    Procedure: EGD;  Surgeon: Umair Randolph MD;  Location: [facility];  Service: Endoscopy;  Laterality: N/A;    EXCLUSION OF LEFT ATRIAL APPENDAGE N/A 10/7/2022    Procedure: EXCLUSION, LEFT ATRIAL APPENDAGE;  Surgeon: Mike Hedrick MD;  [facility];  Service: Cardiovascular;  Laterality: N/A;    HERNIA REPAIR      INSERTION OF INTRAVASCULAR MICROAXIAL BLOOD PUMP N/A 10/7/2022    Procedure: INSERTION, IMPELLA;  Surgeon: Mike Hedrick MD;  Location: [facility] OR 2ND FLR;  Service: Cardiovascular;  Laterality: N/A;  direct aortic insertion     IRRIGATION OF MEDIASTINUM N/A 10/7/2022    Procedure: IRRIGATION, MEDIASTINUM;  Surgeon: Mike Hedrick MD;  [facility];  Service: Cardiovascular;  Laterality: N/A;    TRICUSPID VALVULOPLASTY N/A 10/7/2022    Procedure: REPAIR, TRICUSPID VALVE;  Surgeon: Mike Hedrick MD;  Location: [facility] OR 2ND FLR;  Service: Cardiovascular;  Laterality: N/A;       Social History     Socioeconomic History    Marital status: Single   Tobacco Use    Smoking status: Never    Smokeless tobacco: Current     Types: Chew   Substance and Sexual Activity    Alcohol use: Yes     Alcohol/week: 20.0 standard drinks     Types: 20 Cans of beer per week    Drug use: No       OBJECTIVE:     Vital Signs Range (Last 24H):  Temp:  [36.4 °C (97.5 °F)-37.3 °C (99.1 °F)]   Pulse:  [104-109]   Resp:  [16-22]   BP: (108-129)/(58-68)   SpO2:  [94 %-99 %]       Significant Labs:  Lab Results   Component Value Date    WBC 15.60 (H) 11/07/2022    HGB 8.2 (L) 11/07/2022    HCT 27.4 (L) 11/07/2022     11/07/2022    ALT 13 11/07/2022    AST 16 11/07/2022     (L) 11/07/2022    K 3.0 (L) 11/07/2022    CL 87 (L) 11/07/2022    CREATININE 2.7 (H) 11/07/2022    BUN 35 (H) 11/07/2022    CO2 35 (H) 11/07/2022    TSH 2.183 08/29/2022    INR 1.3 (H) 10/08/2022    HGBA1C 5.5 09/02/2022       Diagnostic Studies: No relevant studies.    EKG:   Results for orders placed or performed during the hospital encounter of 10/02/22   EKG 12-lead    Collection Time: 10/19/22  9:47 AM    Narrative    Test Reason : I50.9,    Vent. Rate : 099 BPM     Atrial Rate : 107 BPM     P-R Int : 000 ms          QRS Dur : 102 ms      QT Int : 344 ms       P-R-T Axes : 000 -10 038 degrees     QTc Int : 441 ms    Atrial fibrillation  Nonspecific ST and T wave abnormality  Abnormal ECG  When compared with ECG of 11-OCT-2022 10:39,  Atrial fibrillation has replaced Sinus rhythm  QT has shortened  Confirmed by KEANU RODRIGUEZ, LALI ([id number]) on 10/19/2022 11:19:27  AM    Referred By: LISE GUZMÁN           Confirmed By:LALI COLUNGA MD       2D ECHO:  TTE:  Results for orders placed or performed during the hospital encounter of 10/02/22   Echo   Result Value Ref Range    Ascending aorta 3.32 cm    STJ 2.60 cm    AV mean gradient 6 mmHg    Ao peak nick 1.57 m/s    Ao VTI 23.85 cm    IVS 0.97 0.6 - 1.1 cm    LA size 4.26 cm    Left Atrium Major Axis 6.32 cm    Left Atrium Minor Axis 6.35 cm    LVIDd 5.78 3.5 - 6.0 cm    LVIDs 4.68 (A) 2.1 - 4.0 cm    LVOT diameter 2.01 cm    LVOT peak VTI 15.27 cm    Posterior Wall 0.94 0.6 - 1.1 cm    MV Peak A Nick 0.67 m/s    E wave deceleration time 175.84 msec    MV Peak E Nick 1.08 m/s    RA Major Axis 3.77 cm    RA Width 3.77 cm    RVDD 4.17 cm    Sinus 3.23 cm    TAPSE 1.19 cm    TDI LATERAL 0.06 m/s    TDI SEPTAL 0.06 m/s    LA WIDTH 4.91 cm    MV stenosis pressure 1/2 time 50.99 ms    LV Diastolic Volume 165.01 mL    LV Systolic Volume 101.25 mL    RV S' 6.80 cm/s    LVOT peak nick 1.00 m/s    LA volume (mod) 93.62 cm3    LV LATERAL E/E' RATIO 18.00 m/s    LV SEPTAL E/E' RATIO 18.00 m/s    FS 19 %    LA volume 112.63 cm3    LV mass 218.30 g    Left Ventricle Relative Wall Thickness 0.33 cm    AV valve area 2.03 cm2    AV Velocity Ratio 0.64     AV index (prosthetic) 0.64     MV valve area p 1/2 method 4.31 cm2    E/A ratio 1.61     Mean e' 0.06 m/s    LVOT area 3.2 cm2    LVOT stroke volume 48.43 cm3    AV peak gradient 10 mmHg    E/E' ratio 18.00 m/s    LV Systolic Volume Index 50.4 mL/m2    LV Diastolic Volume Index 82.09 mL/m2    LA Volume Index 56.0 mL/m2    LV Mass Index 109 g/m2    LA Volume Index (Mod) 46.6 mL/m2    BSA 2.03 m2    EF 35 %    AV regurgitation pressure 1/2 time 320 ms    Narrative    · The left ventricle is normal in size with moderately decreased systolic   function. The estimated ejection fraction is 35%.  · Mild right ventricular enlargement with moderately reduced right   ventricular systolic function.  ·  Indeterminate left ventricular diastolic function.  · Severe left atrial enlargement.  · Moderate aortic regurgitation.  · The mitral and tricuspid valves are s/p repair with no significant   residual regurgitation.  · Moderate circumferential pericardial effusion with focal stranding. No   evidence of tamponade.  · The IVC was not visualized.          NORBERT:  No results found for this or any previous visit.    ASSESSMENT/PLAN:                                                                                                                  11/07/2022  David Barrios is a 62 y.o., male.      Pre-op Assessment    I have reviewed the Patient Summary Reports.     I have reviewed the Nursing Notes. I have reviewed the NPO Status.   I have reviewed the Medications.     Review of Systems  Anesthesia Hx:  No problems with previous Anesthesia Denies Hx of Anesthetic complications  History of prior surgery of interest to airway management or planning: heart surgery. Previous anesthesia: General, MAC Denies Family Hx of Anesthesia complications.   Denies Personal Hx of Anesthesia complications.   Cardiovascular:   Exercise tolerance: good Hypertension Valvular problems/Murmurs, MR Denies CAD.    Denies CABG/stent. Dysrhythmias atrial fibrillation CHF hyperlipidemia BAI S/p MV/TV repair on 10/7/22  S/p impella   Pulmonary:   Denies COPD.  Denies Asthma. Shortness of breath  Denies Sleep Apnea.    Renal/:   Denies Chronic Renal Disease.     Hepatic/GI:   PUD, Denies GERD.    Neurological:   Denies CVA.  Denies Headaches. Denies Seizures.    Endocrine:   Denies Diabetes.  Denies Obesity / BMI > 30  Psych:   Denies Psychiatric History.          Physical Exam  General: Well nourished and Cooperative    Airway:  Mallampati: I   Mouth Opening: Normal  TM Distance: Normal  Neck ROM: Normal ROM    Dental:  Intact        Anesthesia Plan  Type of Anesthesia, risks & benefits discussed:    Anesthesia Type: Gen ETT  Intra-op Monitoring  Plan: Standard ASA Monitors and Art Line  Post Op Pain Control Plan: multimodal analgesia and IV/PO Opioids PRN  Induction:  IV  Airway Plan: Direct and Video, Post-Induction  Informed Consent: Informed consent signed with the Patient and all parties understand the risks and agree with anesthesia plan.  All questions answered.   ASA Score: 4  Day of Surgery Review of History & Physical: H&P Update referred to the surgeon/provider.    Ready For Surgery From Anesthesia Perspective.     .

## 2022-11-08 ENCOUNTER — ANESTHESIA (OUTPATIENT)
Dept: SURGERY | Facility: HOSPITAL | Age: 62
DRG: 268 | End: 2022-11-08
Payer: COMMERCIAL

## 2022-11-08 ENCOUNTER — HOSPITAL ENCOUNTER (INPATIENT)
Facility: HOSPITAL | Age: 62
LOS: 23 days | Discharge: HOME HEALTH | DRG: 268 | End: 2022-12-01
Attending: THORACIC SURGERY (CARDIOTHORACIC VASCULAR SURGERY) | Admitting: THORACIC SURGERY (CARDIOTHORACIC VASCULAR SURGERY)
Payer: COMMERCIAL

## 2022-11-08 DIAGNOSIS — Z98.890 S/P MITRAL VALVE REPAIR: ICD-10-CM

## 2022-11-08 DIAGNOSIS — T14.8XXA SURGICAL WOUND PRESENT: ICD-10-CM

## 2022-11-08 DIAGNOSIS — R73.9 TRANSIENT HYPERGLYCEMIA POST PROCEDURE: ICD-10-CM

## 2022-11-08 DIAGNOSIS — L08.9 STERNAL WOUND INFECTION: Primary | ICD-10-CM

## 2022-11-08 DIAGNOSIS — T81.32XA: ICD-10-CM

## 2022-11-08 DIAGNOSIS — Z98.890 STATUS POST TRICUSPID VALVE REPAIR: Primary | ICD-10-CM

## 2022-11-08 DIAGNOSIS — S21.101A STERNAL WOUND INFECTION: Primary | ICD-10-CM

## 2022-11-08 LAB
ABO + RH BLD: NORMAL
ALBUMIN SERPL BCP-MCNC: 1.5 G/DL (ref 3.5–5.2)
ALBUMIN SERPL BCP-MCNC: 2.6 G/DL (ref 3.5–5.2)
ALLENS TEST: ABNORMAL
ALLENS TEST: NORMAL
ALP SERPL-CCNC: 102 U/L (ref 55–135)
ALP SERPL-CCNC: 78 U/L (ref 55–135)
ALT SERPL W/O P-5'-P-CCNC: 7 U/L (ref 10–44)
ALT SERPL W/O P-5'-P-CCNC: 9 U/L (ref 10–44)
ANION GAP SERPL CALC-SCNC: 12 MMOL/L (ref 8–16)
ANION GAP SERPL CALC-SCNC: 13 MMOL/L (ref 8–16)
ANISOCYTOSIS BLD QL SMEAR: SLIGHT
ANISOCYTOSIS BLD QL SMEAR: SLIGHT
APTT BLDCRRT: 24.7 SEC (ref 21–32)
APTT BLDCRRT: 25.9 SEC (ref 21–32)
AST SERPL-CCNC: 12 U/L (ref 10–40)
AST SERPL-CCNC: 17 U/L (ref 10–40)
BASOPHILS # BLD AUTO: 0.07 K/UL (ref 0–0.2)
BASOPHILS # BLD AUTO: ABNORMAL K/UL (ref 0–0.2)
BASOPHILS NFR BLD: 0 % (ref 0–1.9)
BASOPHILS NFR BLD: 0.2 % (ref 0–1.9)
BILIRUB SERPL-MCNC: 0.9 MG/DL (ref 0.1–1)
BILIRUB SERPL-MCNC: 2 MG/DL (ref 0.1–1)
BLD GP AB SCN CELLS X3 SERPL QL: NORMAL
BLD PROD TYP BPU: NORMAL
BLOOD UNIT EXPIRATION DATE: NORMAL
BLOOD UNIT TYPE CODE: 6200
BLOOD UNIT TYPE CODE: 6200
BLOOD UNIT TYPE CODE: 8400
BLOOD UNIT TYPE CODE: 8400
BLOOD UNIT TYPE: NORMAL
BUN SERPL-MCNC: 36 MG/DL (ref 8–23)
BUN SERPL-MCNC: 37 MG/DL (ref 8–23)
CA-I BLDV-SCNC: 1.11 MMOL/L (ref 1.06–1.42)
CALCIUM SERPL-MCNC: 9.3 MG/DL (ref 8.7–10.5)
CALCIUM SERPL-MCNC: 9.5 MG/DL (ref 8.7–10.5)
CHLORIDE SERPL-SCNC: 95 MMOL/L (ref 95–110)
CHLORIDE SERPL-SCNC: 96 MMOL/L (ref 95–110)
CO2 SERPL-SCNC: 26 MMOL/L (ref 23–29)
CO2 SERPL-SCNC: 27 MMOL/L (ref 23–29)
CODING SYSTEM: NORMAL
CREAT SERPL-MCNC: 3 MG/DL (ref 0.5–1.4)
CREAT SERPL-MCNC: 3.1 MG/DL (ref 0.5–1.4)
DELSYS: ABNORMAL
DELSYS: NORMAL
DIFFERENTIAL METHOD: ABNORMAL
DIFFERENTIAL METHOD: ABNORMAL
DISPENSE STATUS: NORMAL
EOSINOPHIL # BLD AUTO: 0 K/UL (ref 0–0.5)
EOSINOPHIL # BLD AUTO: ABNORMAL K/UL (ref 0–0.5)
EOSINOPHIL NFR BLD: 0 % (ref 0–8)
EOSINOPHIL NFR BLD: 0 % (ref 0–8)
ERYTHROCYTE [DISTWIDTH] IN BLOOD BY AUTOMATED COUNT: 14.5 % (ref 11.5–14.5)
ERYTHROCYTE [DISTWIDTH] IN BLOOD BY AUTOMATED COUNT: 14.6 % (ref 11.5–14.5)
ERYTHROCYTE [SEDIMENTATION RATE] IN BLOOD BY WESTERGREN METHOD: 24 MM/H
EST. GFR  (NO RACE VARIABLE): 21.8 ML/MIN/1.73 M^2
EST. GFR  (NO RACE VARIABLE): 22.6 ML/MIN/1.73 M^2
FIBRINOGEN PPP-MCNC: 369 MG/DL (ref 182–400)
FIO2: 40
GLUCOSE SERPL-MCNC: 109 MG/DL (ref 70–110)
GLUCOSE SERPL-MCNC: 109 MG/DL (ref 70–110)
GLUCOSE SERPL-MCNC: 193 MG/DL (ref 70–110)
GLUCOSE SERPL-MCNC: 201 MG/DL (ref 70–110)
HCO3 UR-SCNC: 27.8 MMOL/L (ref 24–28)
HCO3 UR-SCNC: 28.5 MMOL/L (ref 24–28)
HCO3 UR-SCNC: 28.7 MMOL/L (ref 24–28)
HCO3 UR-SCNC: 28.7 MMOL/L (ref 24–28)
HCO3 UR-SCNC: 28.9 MMOL/L (ref 24–28)
HCO3 UR-SCNC: 29.6 MMOL/L (ref 24–28)
HCO3 UR-SCNC: 29.8 MMOL/L (ref 24–28)
HCO3 UR-SCNC: 30.3 MMOL/L (ref 24–28)
HCO3 UR-SCNC: 32.5 MMOL/L (ref 24–28)
HCO3 UR-SCNC: 34.7 MMOL/L (ref 24–28)
HCO3 UR-SCNC: 36.1 MMOL/L (ref 24–28)
HCT VFR BLD AUTO: 21.5 % (ref 40–54)
HCT VFR BLD AUTO: 27.4 % (ref 40–54)
HCT VFR BLD CALC: 20 %PCV (ref 36–54)
HCT VFR BLD CALC: 21 %PCV (ref 36–54)
HCT VFR BLD CALC: 21 %PCV (ref 36–54)
HCT VFR BLD CALC: 22 %PCV (ref 36–54)
HCT VFR BLD CALC: 23 %PCV (ref 36–54)
HCT VFR BLD CALC: 25 %PCV (ref 36–54)
HCT VFR BLD CALC: 26 %PCV (ref 36–54)
HCT VFR BLD CALC: 28 %PCV (ref 36–54)
HCT VFR BLD CALC: 32 %PCV (ref 36–54)
HGB BLD-MCNC: 7.2 G/DL (ref 14–18)
HGB BLD-MCNC: 9.2 G/DL (ref 14–18)
HYPOCHROMIA BLD QL SMEAR: ABNORMAL
IMM GRANULOCYTES # BLD AUTO: 1.06 K/UL (ref 0–0.04)
IMM GRANULOCYTES # BLD AUTO: ABNORMAL K/UL (ref 0–0.04)
IMM GRANULOCYTES NFR BLD AUTO: 2.8 % (ref 0–0.5)
IMM GRANULOCYTES NFR BLD AUTO: ABNORMAL % (ref 0–0.5)
INR PPP: 1.2 (ref 0.8–1.2)
INR PPP: 1.2 (ref 0.8–1.2)
LDH SERPL L TO P-CCNC: 0.77 MMOL/L (ref 0.36–1.25)
LDH SERPL L TO P-CCNC: 0.8 MMOL/L (ref 0.36–1.25)
LDH SERPL L TO P-CCNC: 1.66 MMOL/L (ref 0.36–1.25)
LDH SERPL L TO P-CCNC: 1.85 MMOL/L (ref 0.36–1.25)
LDH SERPL L TO P-CCNC: 2.32 MMOL/L (ref 0.36–1.25)
LDH SERPL L TO P-CCNC: 3.25 MMOL/L (ref 0.36–1.25)
LDH SERPL L TO P-CCNC: 3.57 MMOL/L (ref 0.36–1.25)
LDH SERPL L TO P-CCNC: 3.76 MMOL/L (ref 0.36–1.25)
LYMPHOCYTES # BLD AUTO: 0.8 K/UL (ref 1–4.8)
LYMPHOCYTES # BLD AUTO: ABNORMAL K/UL (ref 1–4.8)
LYMPHOCYTES NFR BLD: 2.1 % (ref 18–48)
LYMPHOCYTES NFR BLD: 3 % (ref 18–48)
MAGNESIUM SERPL-MCNC: 1.8 MG/DL (ref 1.6–2.6)
MAGNESIUM SERPL-MCNC: 2.3 MG/DL (ref 1.6–2.6)
MCH RBC QN AUTO: 29.5 PG (ref 27–31)
MCH RBC QN AUTO: 29.9 PG (ref 27–31)
MCHC RBC AUTO-ENTMCNC: 33.5 G/DL (ref 32–36)
MCHC RBC AUTO-ENTMCNC: 33.6 G/DL (ref 32–36)
MCV RBC AUTO: 88 FL (ref 82–98)
MCV RBC AUTO: 89 FL (ref 82–98)
MIN VOL: 10.5
MIN VOL: 10.9
MIN VOL: 11
MODE: ABNORMAL
MODE: NORMAL
MONOCYTES # BLD AUTO: 1.3 K/UL (ref 0.3–1)
MONOCYTES # BLD AUTO: ABNORMAL K/UL (ref 0.3–1)
MONOCYTES NFR BLD: 0 % (ref 4–15)
MONOCYTES NFR BLD: 3.3 % (ref 4–15)
NEUTROPHILS # BLD AUTO: 34.6 K/UL (ref 1.8–7.7)
NEUTROPHILS # BLD AUTO: ABNORMAL K/UL (ref 1.8–7.7)
NEUTROPHILS NFR BLD: 91.6 % (ref 38–73)
NEUTROPHILS NFR BLD: 93 % (ref 38–73)
NEUTS BAND NFR BLD MANUAL: 4 %
NRBC BLD-RTO: 0 /100 WBC
NRBC BLD-RTO: 0 /100 WBC
NUM UNITS TRANS PACKED RBC: NORMAL
PCO2 BLDA: 34.8 MMHG (ref 35–45)
PCO2 BLDA: 35.1 MMHG (ref 35–45)
PCO2 BLDA: 35.5 MMHG (ref 35–45)
PCO2 BLDA: 36.2 MMHG (ref 35–45)
PCO2 BLDA: 36.3 MMHG (ref 35–45)
PCO2 BLDA: 36.7 MMHG (ref 35–45)
PCO2 BLDA: 37.6 MMHG (ref 35–45)
PCO2 BLDA: 37.9 MMHG (ref 35–45)
PCO2 BLDA: 39.4 MMHG (ref 35–45)
PCO2 BLDA: 40.4 MMHG (ref 35–45)
PCO2 BLDA: 45.6 MMHG (ref 35–45)
PEEP: 5
PEEP: 8
PH SMN: 7.46 [PH] (ref 7.35–7.45)
PH SMN: 7.48 [PH] (ref 7.35–7.45)
PH SMN: 7.49 [PH] (ref 7.35–7.45)
PH SMN: 7.51 [PH] (ref 7.35–7.45)
PH SMN: 7.52 [PH] (ref 7.35–7.45)
PH SMN: 7.52 [PH] (ref 7.35–7.45)
PH SMN: 7.54 [PH] (ref 7.35–7.45)
PH SMN: 7.6 [PH] (ref 7.35–7.45)
PHOSPHATE SERPL-MCNC: 2.9 MG/DL (ref 2.7–4.5)
PHOSPHATE SERPL-MCNC: 4.3 MG/DL (ref 2.7–4.5)
PIP: 24
PIP: 25
PLATELET # BLD AUTO: 417 K/UL (ref 150–450)
PLATELET # BLD AUTO: 532 K/UL (ref 150–450)
PLATELET BLD QL SMEAR: ABNORMAL
PLATELET BLD QL SMEAR: ABNORMAL
PMV BLD AUTO: 9.7 FL (ref 9.2–12.9)
PMV BLD AUTO: 9.7 FL (ref 9.2–12.9)
PO2 BLDA: 146 MMHG (ref 80–100)
PO2 BLDA: 149 MMHG (ref 80–100)
PO2 BLDA: 157 MMHG (ref 80–100)
PO2 BLDA: 158 MMHG (ref 80–100)
PO2 BLDA: 160 MMHG (ref 80–100)
PO2 BLDA: 163 MMHG (ref 80–100)
PO2 BLDA: 205 MMHG (ref 80–100)
PO2 BLDA: 207 MMHG (ref 80–100)
PO2 BLDA: 279 MMHG (ref 80–100)
PO2 BLDA: 326 MMHG (ref 80–100)
PO2 BLDA: 347 MMHG (ref 80–100)
POC BE: 12 MMOL/L
POC BE: 15 MMOL/L
POC BE: 5 MMOL/L
POC BE: 5 MMOL/L
POC BE: 6 MMOL/L
POC BE: 7 MMOL/L
POC BE: 7 MMOL/L
POC BE: 9 MMOL/L
POC IONIZED CALCIUM: 1.07 MMOL/L (ref 1.06–1.42)
POC IONIZED CALCIUM: 1.16 MMOL/L (ref 1.06–1.42)
POC IONIZED CALCIUM: 1.19 MMOL/L (ref 1.06–1.42)
POC IONIZED CALCIUM: 1.21 MMOL/L (ref 1.06–1.42)
POC IONIZED CALCIUM: 1.22 MMOL/L (ref 1.06–1.42)
POC IONIZED CALCIUM: 1.23 MMOL/L (ref 1.06–1.42)
POC IONIZED CALCIUM: 1.25 MMOL/L (ref 1.06–1.42)
POC IONIZED CALCIUM: 1.28 MMOL/L (ref 1.06–1.42)
POC IONIZED CALCIUM: 1.32 MMOL/L (ref 1.06–1.42)
POC SATURATED O2: 100 % (ref 95–100)
POC SATURATED O2: 99 % (ref 95–100)
POC TCO2: 29 MMOL/L (ref 23–27)
POC TCO2: 30 MMOL/L (ref 23–27)
POC TCO2: 31 MMOL/L (ref 23–27)
POC TCO2: 34 MMOL/L (ref 23–27)
POC TCO2: 36 MMOL/L (ref 23–27)
POC TCO2: 37 MMOL/L (ref 23–27)
POCT GLUCOSE: 111 MG/DL (ref 70–110)
POCT GLUCOSE: 131 MG/DL (ref 70–110)
POCT GLUCOSE: 135 MG/DL (ref 70–110)
POCT GLUCOSE: 152 MG/DL (ref 70–110)
POCT GLUCOSE: 153 MG/DL (ref 70–110)
POCT GLUCOSE: 154 MG/DL (ref 70–110)
POCT GLUCOSE: 158 MG/DL (ref 70–110)
POCT GLUCOSE: 173 MG/DL (ref 70–110)
POCT GLUCOSE: 187 MG/DL (ref 70–110)
POCT GLUCOSE: 190 MG/DL (ref 70–110)
POCT GLUCOSE: 204 MG/DL (ref 70–110)
POCT GLUCOSE: 216 MG/DL (ref 70–110)
POCT GLUCOSE: 74 MG/DL (ref 70–110)
POCT GLUCOSE: 74 MG/DL (ref 70–110)
POCT GLUCOSE: 79 MG/DL (ref 70–110)
POIKILOCYTOSIS BLD QL SMEAR: SLIGHT
POLYCHROMASIA BLD QL SMEAR: ABNORMAL
POLYCHROMASIA BLD QL SMEAR: ABNORMAL
POTASSIUM BLD-SCNC: 2.9 MMOL/L (ref 3.5–5.1)
POTASSIUM BLD-SCNC: 3 MMOL/L (ref 3.5–5.1)
POTASSIUM BLD-SCNC: 3.1 MMOL/L (ref 3.5–5.1)
POTASSIUM BLD-SCNC: 3.3 MMOL/L (ref 3.5–5.1)
POTASSIUM BLD-SCNC: 3.4 MMOL/L (ref 3.5–5.1)
POTASSIUM BLD-SCNC: 3.5 MMOL/L (ref 3.5–5.1)
POTASSIUM BLD-SCNC: 3.7 MMOL/L (ref 3.5–5.1)
POTASSIUM BLD-SCNC: 3.9 MMOL/L (ref 3.5–5.1)
POTASSIUM BLD-SCNC: 4 MMOL/L (ref 3.5–5.1)
POTASSIUM SERPL-SCNC: 3.5 MMOL/L (ref 3.5–5.1)
POTASSIUM SERPL-SCNC: 3.9 MMOL/L (ref 3.5–5.1)
PROT SERPL-MCNC: 5.1 G/DL (ref 6–8.4)
PROT SERPL-MCNC: 5.4 G/DL (ref 6–8.4)
PROTHROMBIN TIME: 12.7 SEC (ref 9–12.5)
PROTHROMBIN TIME: 12.8 SEC (ref 9–12.5)
RBC # BLD AUTO: 2.41 M/UL (ref 4.6–6.2)
RBC # BLD AUTO: 3.12 M/UL (ref 4.6–6.2)
SAMPLE: ABNORMAL
SAMPLE: NORMAL
SAMPLE: NORMAL
SITE: ABNORMAL
SITE: NORMAL
SMUDGE CELLS BLD QL SMEAR: PRESENT
SODIUM BLD-SCNC: 132 MMOL/L (ref 136–145)
SODIUM BLD-SCNC: 132 MMOL/L (ref 136–145)
SODIUM BLD-SCNC: 133 MMOL/L (ref 136–145)
SODIUM BLD-SCNC: 134 MMOL/L (ref 136–145)
SODIUM BLD-SCNC: 134 MMOL/L (ref 136–145)
SODIUM BLD-SCNC: 135 MMOL/L (ref 136–145)
SODIUM SERPL-SCNC: 134 MMOL/L (ref 136–145)
SODIUM SERPL-SCNC: 135 MMOL/L (ref 136–145)
SP02: 100
SP02: ABNORMAL
TRANS ERYTHROCYTES VOL PATIENT: NORMAL ML
VT: 450
WBC # BLD AUTO: 37.8 K/UL (ref 3.9–12.7)
WBC # BLD AUTO: 54.29 K/UL (ref 3.9–12.7)

## 2022-11-08 PROCEDURE — 63600175 PHARM REV CODE 636 W HCPCS

## 2022-11-08 PROCEDURE — 27201041 HC RESERVOIR, CARDIOTOMY

## 2022-11-08 PROCEDURE — 27201423 OPTIME MED/SURG SUP & DEVICES STERILE SUPPLY: Performed by: THORACIC SURGERY (CARDIOTHORACIC VASCULAR SURGERY)

## 2022-11-08 PROCEDURE — 83605 ASSAY OF LACTIC ACID: CPT

## 2022-11-08 PROCEDURE — 82803 BLOOD GASES ANY COMBINATION: CPT

## 2022-11-08 PROCEDURE — 85610 PROTHROMBIN TIME: CPT | Mod: 91

## 2022-11-08 PROCEDURE — 87102 FUNGUS ISOLATION CULTURE: CPT | Mod: 59 | Performed by: THORACIC SURGERY (CARDIOTHORACIC VASCULAR SURGERY)

## 2022-11-08 PROCEDURE — 27201037 HC PRESSURE MONITORING SET UP

## 2022-11-08 PROCEDURE — 27100108

## 2022-11-08 PROCEDURE — P9021 RED BLOOD CELLS UNIT: HCPCS | Performed by: STUDENT IN AN ORGANIZED HEALTH CARE EDUCATION/TRAINING PROGRAM

## 2022-11-08 PROCEDURE — 87116 MYCOBACTERIA CULTURE: CPT | Mod: 59 | Performed by: THORACIC SURGERY (CARDIOTHORACIC VASCULAR SURGERY)

## 2022-11-08 PROCEDURE — 99900026 HC AIRWAY MAINTENANCE (STAT)

## 2022-11-08 PROCEDURE — 80053 COMPREHEN METABOLIC PANEL: CPT | Mod: 91

## 2022-11-08 PROCEDURE — 99291 CRITICAL CARE FIRST HOUR: CPT | Mod: ,,, | Performed by: ANESTHESIOLOGY

## 2022-11-08 PROCEDURE — 83735 ASSAY OF MAGNESIUM: CPT | Mod: 91

## 2022-11-08 PROCEDURE — 85730 THROMBOPLASTIN TIME PARTIAL: CPT | Mod: 91

## 2022-11-08 PROCEDURE — 82330 ASSAY OF CALCIUM: CPT

## 2022-11-08 PROCEDURE — 25000003 PHARM REV CODE 250

## 2022-11-08 PROCEDURE — 35907 PR EXCISION, INFEC GRAFT, ABDOMEN: ICD-10-PCS | Mod: 78,,, | Performed by: THORACIC SURGERY (CARDIOTHORACIC VASCULAR SURGERY)

## 2022-11-08 PROCEDURE — 63600175 PHARM REV CODE 636 W HCPCS: Performed by: STUDENT IN AN ORGANIZED HEALTH CARE EDUCATION/TRAINING PROGRAM

## 2022-11-08 PROCEDURE — 63600175 PHARM REV CODE 636 W HCPCS: Mod: JG | Performed by: ANESTHESIOLOGY

## 2022-11-08 PROCEDURE — 83735 ASSAY OF MAGNESIUM: CPT | Mod: 91 | Performed by: THORACIC SURGERY (CARDIOTHORACIC VASCULAR SURGERY)

## 2022-11-08 PROCEDURE — 25000003 PHARM REV CODE 250: Performed by: STUDENT IN AN ORGANIZED HEALTH CARE EDUCATION/TRAINING PROGRAM

## 2022-11-08 PROCEDURE — D9220A PRA ANESTHESIA: ICD-10-PCS | Mod: ,,, | Performed by: ANESTHESIOLOGY

## 2022-11-08 PROCEDURE — 85384 FIBRINOGEN ACTIVITY: CPT | Performed by: THORACIC SURGERY (CARDIOTHORACIC VASCULAR SURGERY)

## 2022-11-08 PROCEDURE — 27200966 HC CLOSED SUCTION SYSTEM

## 2022-11-08 PROCEDURE — 37000008 HC ANESTHESIA 1ST 15 MINUTES: Performed by: THORACIC SURGERY (CARDIOTHORACIC VASCULAR SURGERY)

## 2022-11-08 PROCEDURE — 36000706: Performed by: THORACIC SURGERY (CARDIOTHORACIC VASCULAR SURGERY)

## 2022-11-08 PROCEDURE — 86920 COMPATIBILITY TEST SPIN: CPT | Performed by: STUDENT IN AN ORGANIZED HEALTH CARE EDUCATION/TRAINING PROGRAM

## 2022-11-08 PROCEDURE — 87205 SMEAR GRAM STAIN: CPT | Mod: 59 | Performed by: THORACIC SURGERY (CARDIOTHORACIC VASCULAR SURGERY)

## 2022-11-08 PROCEDURE — 84295 ASSAY OF SERUM SODIUM: CPT

## 2022-11-08 PROCEDURE — 87070 CULTURE OTHR SPECIMN AEROBIC: CPT | Mod: 59 | Performed by: THORACIC SURGERY (CARDIOTHORACIC VASCULAR SURGERY)

## 2022-11-08 PROCEDURE — 87075 CULTR BACTERIA EXCEPT BLOOD: CPT | Mod: 59 | Performed by: THORACIC SURGERY (CARDIOTHORACIC VASCULAR SURGERY)

## 2022-11-08 PROCEDURE — P9016 RBC LEUKOCYTES REDUCED: HCPCS | Performed by: STUDENT IN AN ORGANIZED HEALTH CARE EDUCATION/TRAINING PROGRAM

## 2022-11-08 PROCEDURE — 36430 TRANSFUSION BLD/BLD COMPNT: CPT

## 2022-11-08 PROCEDURE — D9220A PRA ANESTHESIA: Mod: ,,, | Performed by: ANESTHESIOLOGY

## 2022-11-08 PROCEDURE — 93312 ECHO TRANSESOPHAGEAL: CPT | Mod: 26,59,, | Performed by: ANESTHESIOLOGY

## 2022-11-08 PROCEDURE — 99223 1ST HOSP IP/OBS HIGH 75: CPT | Mod: ,,, | Performed by: NURSE PRACTITIONER

## 2022-11-08 PROCEDURE — 99900031 HC PATIENT EDUCATION (STAT)

## 2022-11-08 PROCEDURE — 99223 PR INITIAL HOSPITAL CARE,LEVL III: ICD-10-PCS | Mod: ,,, | Performed by: NURSE PRACTITIONER

## 2022-11-08 PROCEDURE — 94761 N-INVAS EAR/PLS OXIMETRY MLT: CPT

## 2022-11-08 PROCEDURE — 86920 COMPATIBILITY TEST SPIN: CPT

## 2022-11-08 PROCEDURE — 35907 EXCISION GRAFT ABDOMEN: CPT | Mod: 78,,, | Performed by: THORACIC SURGERY (CARDIOTHORACIC VASCULAR SURGERY)

## 2022-11-08 PROCEDURE — 82330 ASSAY OF CALCIUM: CPT | Performed by: THORACIC SURGERY (CARDIOTHORACIC VASCULAR SURGERY)

## 2022-11-08 PROCEDURE — 87176 TISSUE HOMOGENIZATION CULTR: CPT | Performed by: THORACIC SURGERY (CARDIOTHORACIC VASCULAR SURGERY)

## 2022-11-08 PROCEDURE — 87206 SMEAR FLUORESCENT/ACID STAI: CPT | Mod: 91 | Performed by: THORACIC SURGERY (CARDIOTHORACIC VASCULAR SURGERY)

## 2022-11-08 PROCEDURE — 84100 ASSAY OF PHOSPHORUS: CPT | Mod: 91 | Performed by: THORACIC SURGERY (CARDIOTHORACIC VASCULAR SURGERY)

## 2022-11-08 PROCEDURE — 80053 COMPREHEN METABOLIC PANEL: CPT | Performed by: THORACIC SURGERY (CARDIOTHORACIC VASCULAR SURGERY)

## 2022-11-08 PROCEDURE — 36556 PR INSERT NON-TUNNEL CV CATH 5+ YRS OLD: ICD-10-PCS | Mod: 59,,, | Performed by: ANESTHESIOLOGY

## 2022-11-08 PROCEDURE — 20000000 HC ICU ROOM

## 2022-11-08 PROCEDURE — 85007 BL SMEAR W/DIFF WBC COUNT: CPT | Performed by: THORACIC SURGERY (CARDIOTHORACIC VASCULAR SURGERY)

## 2022-11-08 PROCEDURE — 36000707: Performed by: THORACIC SURGERY (CARDIOTHORACIC VASCULAR SURGERY)

## 2022-11-08 PROCEDURE — 99291 PR CRITICAL CARE, E/M 30-74 MINUTES: ICD-10-PCS | Mod: ,,, | Performed by: ANESTHESIOLOGY

## 2022-11-08 PROCEDURE — 27000239 HC STAND-BY BYPASS PUMP

## 2022-11-08 PROCEDURE — P9045 ALBUMIN (HUMAN), 5%, 250 ML: HCPCS | Mod: JG | Performed by: ANESTHESIOLOGY

## 2022-11-08 PROCEDURE — 86850 RBC ANTIBODY SCREEN: CPT | Performed by: STUDENT IN AN ORGANIZED HEALTH CARE EDUCATION/TRAINING PROGRAM

## 2022-11-08 PROCEDURE — 37000009 HC ANESTHESIA EA ADD 15 MINS: Performed by: THORACIC SURGERY (CARDIOTHORACIC VASCULAR SURGERY)

## 2022-11-08 PROCEDURE — 93312 PR ECHO HEART,TRANSESOPHAGEAL: ICD-10-PCS | Mod: 26,59,, | Performed by: ANESTHESIOLOGY

## 2022-11-08 PROCEDURE — 36620 ARTERIAL: ICD-10-PCS | Mod: 59,,, | Performed by: ANESTHESIOLOGY

## 2022-11-08 PROCEDURE — 85027 COMPLETE CBC AUTOMATED: CPT | Performed by: THORACIC SURGERY (CARDIOTHORACIC VASCULAR SURGERY)

## 2022-11-08 PROCEDURE — 27000221 HC OXYGEN, UP TO 24 HOURS

## 2022-11-08 PROCEDURE — 27000191 HC C-V MONITORING

## 2022-11-08 PROCEDURE — 37799 UNLISTED PX VASCULAR SURGERY: CPT

## 2022-11-08 PROCEDURE — 94002 VENT MGMT INPAT INIT DAY: CPT

## 2022-11-08 PROCEDURE — 85730 THROMBOPLASTIN TIME PARTIAL: CPT | Performed by: THORACIC SURGERY (CARDIOTHORACIC VASCULAR SURGERY)

## 2022-11-08 PROCEDURE — 85025 COMPLETE CBC W/AUTO DIFF WBC: CPT

## 2022-11-08 PROCEDURE — 36556 INSERT NON-TUNNEL CV CATH: CPT | Mod: 59,,, | Performed by: ANESTHESIOLOGY

## 2022-11-08 PROCEDURE — 85014 HEMATOCRIT: CPT

## 2022-11-08 PROCEDURE — 85610 PROTHROMBIN TIME: CPT | Performed by: THORACIC SURGERY (CARDIOTHORACIC VASCULAR SURGERY)

## 2022-11-08 PROCEDURE — 84132 ASSAY OF SERUM POTASSIUM: CPT

## 2022-11-08 PROCEDURE — 84100 ASSAY OF PHOSPHORUS: CPT | Mod: 91

## 2022-11-08 PROCEDURE — 99900035 HC TECH TIME PER 15 MIN (STAT)

## 2022-11-08 PROCEDURE — 36620 INSERTION CATHETER ARTERY: CPT | Mod: 59,,, | Performed by: ANESTHESIOLOGY

## 2022-11-08 RX ORDER — VASOPRESSIN 20 [USP'U]/ML
INJECTION, SOLUTION INTRAMUSCULAR; SUBCUTANEOUS
Status: DISCONTINUED | OUTPATIENT
Start: 2022-11-08 | End: 2022-11-08

## 2022-11-08 RX ORDER — NOREPINEPHRINE BITARTRATE/D5W 4MG/250ML
0-3 PLASTIC BAG, INJECTION (ML) INTRAVENOUS CONTINUOUS
Status: DISCONTINUED | OUTPATIENT
Start: 2022-11-08 | End: 2022-11-09

## 2022-11-08 RX ORDER — MAGNESIUM SULFATE HEPTAHYDRATE 40 MG/ML
2 INJECTION, SOLUTION INTRAVENOUS ONCE
Status: COMPLETED | OUTPATIENT
Start: 2022-11-08 | End: 2022-11-08

## 2022-11-08 RX ORDER — SODIUM CHLORIDE 0.9 % (FLUSH) 0.9 %
10 SYRINGE (ML) INJECTION
Status: CANCELLED | OUTPATIENT
Start: 2022-11-08

## 2022-11-08 RX ORDER — HYDROCODONE BITARTRATE AND ACETAMINOPHEN 500; 5 MG/1; MG/1
TABLET ORAL
Status: DISCONTINUED | OUTPATIENT
Start: 2022-11-08 | End: 2022-11-15

## 2022-11-08 RX ORDER — ALBUMIN HUMAN 50 G/1000ML
25 SOLUTION INTRAVENOUS ONCE
Status: DISCONTINUED | OUTPATIENT
Start: 2022-11-08 | End: 2022-11-10

## 2022-11-08 RX ORDER — FENTANYL CITRATE 50 UG/ML
INJECTION, SOLUTION INTRAMUSCULAR; INTRAVENOUS
Status: COMPLETED
Start: 2022-11-08 | End: 2022-11-08

## 2022-11-08 RX ORDER — KETAMINE HCL IN 0.9 % NACL 50 MG/5 ML
SYRINGE (ML) INTRAVENOUS
Status: DISCONTINUED | OUTPATIENT
Start: 2022-11-08 | End: 2022-11-08

## 2022-11-08 RX ORDER — HYDROMORPHONE HYDROCHLORIDE 1 MG/ML
0.2 INJECTION, SOLUTION INTRAMUSCULAR; INTRAVENOUS; SUBCUTANEOUS EVERY 5 MIN PRN
Status: CANCELLED | OUTPATIENT
Start: 2022-11-08

## 2022-11-08 RX ORDER — LIDOCAINE HYDROCHLORIDE 10 MG/ML
1 INJECTION, SOLUTION EPIDURAL; INFILTRATION; INTRACAUDAL; PERINEURAL ONCE
Status: DISCONTINUED | OUTPATIENT
Start: 2022-11-08 | End: 2022-11-08 | Stop reason: HOSPADM

## 2022-11-08 RX ORDER — SODIUM CHLORIDE 9 MG/ML
INJECTION, SOLUTION INTRAVENOUS CONTINUOUS PRN
Status: DISCONTINUED | OUTPATIENT
Start: 2022-11-08 | End: 2022-11-08

## 2022-11-08 RX ORDER — DEXAMETHASONE SODIUM PHOSPHATE 4 MG/ML
INJECTION, SOLUTION INTRA-ARTICULAR; INTRALESIONAL; INTRAMUSCULAR; INTRAVENOUS; SOFT TISSUE
Status: DISCONTINUED | OUTPATIENT
Start: 2022-11-08 | End: 2022-11-08

## 2022-11-08 RX ORDER — ALBUMIN HUMAN 50 G/1000ML
25 SOLUTION INTRAVENOUS ONCE
Status: COMPLETED | OUTPATIENT
Start: 2022-11-08 | End: 2022-11-08

## 2022-11-08 RX ORDER — MIDAZOLAM HYDROCHLORIDE 1 MG/ML
INJECTION INTRAMUSCULAR; INTRAVENOUS
Status: DISCONTINUED | OUTPATIENT
Start: 2022-11-08 | End: 2022-11-08

## 2022-11-08 RX ORDER — FAMOTIDINE 20 MG/1
20 TABLET, FILM COATED ORAL DAILY
Status: DISCONTINUED | OUTPATIENT
Start: 2022-11-09 | End: 2022-11-15

## 2022-11-08 RX ORDER — MUPIROCIN 20 MG/G
OINTMENT TOPICAL
Status: DISCONTINUED | OUTPATIENT
Start: 2022-11-08 | End: 2022-11-08 | Stop reason: HOSPADM

## 2022-11-08 RX ORDER — FENTANYL CITRATE-0.9 % NACL/PF 10 MCG/ML
0-250 PLASTIC BAG, INJECTION (ML) INTRAVENOUS CONTINUOUS
Status: DISCONTINUED | OUTPATIENT
Start: 2022-11-08 | End: 2022-11-09

## 2022-11-08 RX ORDER — HEPARIN SODIUM 5000 [USP'U]/ML
5000 INJECTION, SOLUTION INTRAVENOUS; SUBCUTANEOUS EVERY 8 HOURS
Status: DISCONTINUED | OUTPATIENT
Start: 2022-11-09 | End: 2022-12-01 | Stop reason: HOSPADM

## 2022-11-08 RX ORDER — CEFAZOLIN SODIUM/WATER 2 G/20 ML
2 SYRINGE (ML) INTRAVENOUS
Status: COMPLETED | OUTPATIENT
Start: 2022-11-08 | End: 2022-11-08

## 2022-11-08 RX ORDER — ALBUMIN HUMAN 50 G/1000ML
SOLUTION INTRAVENOUS
Status: DISPENSED
Start: 2022-11-08 | End: 2022-11-09

## 2022-11-08 RX ORDER — FENTANYL CITRATE-0.9 % NACL/PF 10 MCG/ML
0-200 PLASTIC BAG, INJECTION (ML) INTRAVENOUS CONTINUOUS
Status: DISCONTINUED | OUTPATIENT
Start: 2022-11-08 | End: 2022-11-08

## 2022-11-08 RX ORDER — POTASSIUM CHLORIDE 14.9 MG/ML
60 INJECTION INTRAVENOUS
Status: DISCONTINUED | OUTPATIENT
Start: 2022-11-08 | End: 2022-11-08

## 2022-11-08 RX ORDER — POTASSIUM CHLORIDE 29.8 MG/ML
40 INJECTION INTRAVENOUS
Status: DISCONTINUED | OUTPATIENT
Start: 2022-11-08 | End: 2022-11-08

## 2022-11-08 RX ORDER — CALCIUM GLUCONATE 20 MG/ML
2 INJECTION, SOLUTION INTRAVENOUS
Status: DISCONTINUED | OUTPATIENT
Start: 2022-11-08 | End: 2022-11-08

## 2022-11-08 RX ORDER — ONDANSETRON 2 MG/ML
4 INJECTION INTRAMUSCULAR; INTRAVENOUS DAILY PRN
Status: CANCELLED | OUTPATIENT
Start: 2022-11-08

## 2022-11-08 RX ORDER — CALCIUM GLUCONATE 20 MG/ML
3 INJECTION, SOLUTION INTRAVENOUS
Status: DISCONTINUED | OUTPATIENT
Start: 2022-11-08 | End: 2022-11-08

## 2022-11-08 RX ORDER — CEFEPIME HYDROCHLORIDE 1 G/50ML
2 INJECTION, SOLUTION INTRAVENOUS
Status: DISCONTINUED | OUTPATIENT
Start: 2022-11-08 | End: 2022-11-17

## 2022-11-08 RX ORDER — SODIUM CHLORIDE 0.9 % (FLUSH) 0.9 %
10 SYRINGE (ML) INJECTION
Status: DISCONTINUED | OUTPATIENT
Start: 2022-11-08 | End: 2022-11-08 | Stop reason: HOSPADM

## 2022-11-08 RX ORDER — PROPOFOL 10 MG/ML
0-50 INJECTION, EMULSION INTRAVENOUS CONTINUOUS
Status: DISCONTINUED | OUTPATIENT
Start: 2022-11-08 | End: 2022-11-09

## 2022-11-08 RX ORDER — FENTANYL CITRATE 50 UG/ML
INJECTION, SOLUTION INTRAMUSCULAR; INTRAVENOUS
Status: DISCONTINUED | OUTPATIENT
Start: 2022-11-08 | End: 2022-11-08

## 2022-11-08 RX ORDER — POTASSIUM CHLORIDE 29.8 MG/ML
40 INJECTION INTRAVENOUS ONCE
Status: COMPLETED | OUTPATIENT
Start: 2022-11-08 | End: 2022-11-08

## 2022-11-08 RX ORDER — LIDOCAINE HCL/PF 100 MG/5ML
SYRINGE (ML) INTRAVENOUS
Status: DISCONTINUED | OUTPATIENT
Start: 2022-11-08 | End: 2022-11-08

## 2022-11-08 RX ORDER — ROCURONIUM BROMIDE 10 MG/ML
INJECTION, SOLUTION INTRAVENOUS
Status: DISCONTINUED | OUTPATIENT
Start: 2022-11-08 | End: 2022-11-08

## 2022-11-08 RX ORDER — HEPARIN SODIUM 5000 [USP'U]/ML
5000 INJECTION, SOLUTION INTRAVENOUS; SUBCUTANEOUS ONCE
Status: COMPLETED | OUTPATIENT
Start: 2022-11-08 | End: 2022-11-08

## 2022-11-08 RX ORDER — PROPOFOL 10 MG/ML
VIAL (ML) INTRAVENOUS
Status: DISCONTINUED | OUTPATIENT
Start: 2022-11-08 | End: 2022-11-08

## 2022-11-08 RX ORDER — MAGNESIUM SULFATE HEPTAHYDRATE 40 MG/ML
4 INJECTION, SOLUTION INTRAVENOUS
Status: DISCONTINUED | OUTPATIENT
Start: 2022-11-08 | End: 2022-11-08

## 2022-11-08 RX ORDER — MILRINONE LACTATE 0.2 MG/ML
0.12 INJECTION, SOLUTION INTRAVENOUS CONTINUOUS
Status: DISCONTINUED | OUTPATIENT
Start: 2022-11-08 | End: 2022-11-21

## 2022-11-08 RX ORDER — FENTANYL CITRATE 50 UG/ML
50 INJECTION, SOLUTION INTRAMUSCULAR; INTRAVENOUS ONCE
Status: COMPLETED | OUTPATIENT
Start: 2022-11-08 | End: 2022-11-08

## 2022-11-08 RX ORDER — CALCIUM GLUCONATE 20 MG/ML
1 INJECTION, SOLUTION INTRAVENOUS
Status: DISCONTINUED | OUTPATIENT
Start: 2022-11-08 | End: 2022-11-08

## 2022-11-08 RX ORDER — HYDROCODONE BITARTRATE AND ACETAMINOPHEN 500; 5 MG/1; MG/1
TABLET ORAL
Status: DISCONTINUED | OUTPATIENT
Start: 2022-11-08 | End: 2022-11-09

## 2022-11-08 RX ORDER — ONDANSETRON 2 MG/ML
INJECTION INTRAMUSCULAR; INTRAVENOUS
Status: DISCONTINUED | OUTPATIENT
Start: 2022-11-08 | End: 2022-11-08

## 2022-11-08 RX ORDER — NOREPINEPHRINE BITARTRATE 1 MG/ML
INJECTION, SOLUTION INTRAVENOUS
Status: DISCONTINUED | OUTPATIENT
Start: 2022-11-08 | End: 2022-11-08

## 2022-11-08 RX ADMIN — FENTANYL CITRATE 100 MCG: 50 INJECTION, SOLUTION INTRAMUSCULAR; INTRAVENOUS at 07:11

## 2022-11-08 RX ADMIN — SODIUM CHLORIDE, SODIUM LACTATE, POTASSIUM CHLORIDE, AND CALCIUM CHLORIDE 500 ML: .6; .31; .03; .02 INJECTION, SOLUTION INTRAVENOUS at 03:11

## 2022-11-08 RX ADMIN — NOREPINEPHRINE BITARTRATE 16 MCG: 1 INJECTION, SOLUTION, CONCENTRATE INTRAVENOUS at 10:11

## 2022-11-08 RX ADMIN — FENTANYL CITRATE 100 MCG: 50 INJECTION, SOLUTION INTRAMUSCULAR; INTRAVENOUS at 08:11

## 2022-11-08 RX ADMIN — NOREPINEPHRINE BITARTRATE 0.04 MCG/KG/MIN: 1 INJECTION, SOLUTION, CONCENTRATE INTRAVENOUS at 08:11

## 2022-11-08 RX ADMIN — EPINEPHRINE 0.05 MCG/KG/MIN: 1 INJECTION INTRAMUSCULAR; INTRAVENOUS; SUBCUTANEOUS at 09:11

## 2022-11-08 RX ADMIN — PROPOFOL 50 MG: 10 INJECTION, EMULSION INTRAVENOUS at 07:11

## 2022-11-08 RX ADMIN — DEXAMETHASONE SODIUM PHOSPHATE 4 MG: 4 INJECTION INTRA-ARTICULAR; INTRALESIONAL; INTRAMUSCULAR; INTRAVENOUS; SOFT TISSUE at 08:11

## 2022-11-08 RX ADMIN — POTASSIUM CHLORIDE 40 MEQ: 29.8 INJECTION, SOLUTION INTRAVENOUS at 01:11

## 2022-11-08 RX ADMIN — VASOPRESSIN 0.04 UNITS/MIN: 20 INJECTION INTRAVENOUS at 05:11

## 2022-11-08 RX ADMIN — PROPOFOL 35 MCG/KG/MIN: 10 INJECTION, EMULSION INTRAVENOUS at 02:11

## 2022-11-08 RX ADMIN — MAGNESIUM SULFATE 2 G: 2 INJECTION INTRAVENOUS at 02:11

## 2022-11-08 RX ADMIN — FENTANYL CITRATE 50 MCG: 50 INJECTION, SOLUTION INTRAMUSCULAR; INTRAVENOUS at 12:11

## 2022-11-08 RX ADMIN — CALCIUM CHLORIDE 1 G: 100 INJECTION, SOLUTION INTRAVENOUS at 10:11

## 2022-11-08 RX ADMIN — NOREPINEPHRINE BITARTRATE 8 MCG: 1 INJECTION, SOLUTION, CONCENTRATE INTRAVENOUS at 09:11

## 2022-11-08 RX ADMIN — NOREPINEPHRINE BITARTRATE 16 MCG: 1 INJECTION, SOLUTION, CONCENTRATE INTRAVENOUS at 09:11

## 2022-11-08 RX ADMIN — NOREPINEPHRINE BITARTRATE 16 MCG: 1 INJECTION, SOLUTION, CONCENTRATE INTRAVENOUS at 11:11

## 2022-11-08 RX ADMIN — MILRINONE LACTATE IN DEXTROSE 0.13 MCG/KG/MIN: 200 INJECTION, SOLUTION INTRAVENOUS at 06:11

## 2022-11-08 RX ADMIN — VASOPRESSIN 0.04 UNITS/MIN: 20 INJECTION INTRAVENOUS at 10:11

## 2022-11-08 RX ADMIN — VASOPRESSIN 1 UNITS: 20 INJECTION INTRAVENOUS at 10:11

## 2022-11-08 RX ADMIN — SODIUM CHLORIDE, SODIUM GLUCONATE, SODIUM ACETATE, POTASSIUM CHLORIDE, MAGNESIUM CHLORIDE, SODIUM PHOSPHATE, DIBASIC, AND POTASSIUM PHOSPHATE: .53; .5; .37; .037; .03; .012; .00082 INJECTION, SOLUTION INTRAVENOUS at 07:11

## 2022-11-08 RX ADMIN — MIDAZOLAM HYDROCHLORIDE 2 MG: 1 INJECTION, SOLUTION INTRAMUSCULAR; INTRAVENOUS at 06:11

## 2022-11-08 RX ADMIN — INSULIN HUMAN 2 UNITS/HR: 1 INJECTION, SOLUTION INTRAVENOUS at 10:11

## 2022-11-08 RX ADMIN — ROCURONIUM BROMIDE 40 MG: 10 INJECTION INTRAVENOUS at 07:11

## 2022-11-08 RX ADMIN — LIDOCAINE HYDROCHLORIDE 60 MG: 20 INJECTION, SOLUTION INTRAVENOUS at 07:11

## 2022-11-08 RX ADMIN — CALCIUM CHLORIDE 1 G: 100 INJECTION, SOLUTION INTRAVENOUS at 08:11

## 2022-11-08 RX ADMIN — Medication 50 MCG/HR: at 12:11

## 2022-11-08 RX ADMIN — NOREPINEPHRINE BITARTRATE 8 MCG: 1 INJECTION, SOLUTION, CONCENTRATE INTRAVENOUS at 07:11

## 2022-11-08 RX ADMIN — Medication 2 G: at 11:11

## 2022-11-08 RX ADMIN — MUPIROCIN: 20 OINTMENT TOPICAL at 06:11

## 2022-11-08 RX ADMIN — NOREPINEPHRINE BITARTRATE 8 MCG: 1 INJECTION, SOLUTION, CONCENTRATE INTRAVENOUS at 08:11

## 2022-11-08 RX ADMIN — Medication 2 G: at 07:11

## 2022-11-08 RX ADMIN — PROPOFOL 45 MCG/KG/MIN: 10 INJECTION, EMULSION INTRAVENOUS at 08:11

## 2022-11-08 RX ADMIN — EPINEPHRINE 0.07 MCG/KG/MIN: 1 INJECTION INTRAMUSCULAR; INTRAVENOUS; SUBCUTANEOUS at 08:11

## 2022-11-08 RX ADMIN — Medication 50 MG: at 07:11

## 2022-11-08 RX ADMIN — ROCURONIUM BROMIDE 10 MG: 10 INJECTION INTRAVENOUS at 07:11

## 2022-11-08 RX ADMIN — CEFEPIME 2 G: 2 INJECTION, POWDER, FOR SOLUTION INTRAVENOUS at 04:11

## 2022-11-08 RX ADMIN — FENTANYL CITRATE 50 MCG: 50 INJECTION, SOLUTION INTRAMUSCULAR; INTRAVENOUS at 11:11

## 2022-11-08 RX ADMIN — ROCURONIUM BROMIDE 50 MG: 10 INJECTION INTRAVENOUS at 08:11

## 2022-11-08 RX ADMIN — SODIUM CHLORIDE: 0.9 INJECTION, SOLUTION INTRAVENOUS at 06:11

## 2022-11-08 RX ADMIN — HEPARIN SODIUM 5000 UNITS: 5000 INJECTION INTRAVENOUS; SUBCUTANEOUS at 06:11

## 2022-11-08 RX ADMIN — ALBUMIN (HUMAN) 25 G: 12.5 SOLUTION INTRAVENOUS at 03:11

## 2022-11-08 NOTE — H&P
[facility] - Surgery ([facility])  History & Physical  Cardiothoracic Surgery    SUBJECTIVE:     Chief Complaint/Reason for Admission: surgery    History of Present Illness:  62 y.o. male S/P TV replacement, MV replacement, MAZE, Lt Atrial Appendage ligation and Impella placement on 10/7/22, course complicated by bleeding, requiring reopening POD#0, multiple VT runs requiring DCCV, and sternal wound infections. He presents today for planned wound debridement and wire removal.        PTA Medications   Medication Sig    albuterol (PROVENTIL/VENTOLIN HFA) 90 mcg/actuation inhaler inhale 1-2 Puff(s) By Mouth Every 4 Hours as needed    amiodarone (PACERONE) 200 MG Tab Take 1 tablet (200 mg total) by mouth 2 (two) times daily.    carvediloL (COREG) 12.5 MG tablet Take 1 tablet (12.5 mg total) by mouth 2 (two) times daily with meals.    ferrous sulfate (FEOSOL) 325 mg (65 mg iron) Tab tablet Take 1 tablet (325 mg total) by mouth daily with breakfast.    furosemide (LASIX) 40 MG tablet TAKE 1 TABLET(40 MG) BY MOUTH TWICE DAILY    hydroCHLOROthiazide (HYDRODIURIL) 25 MG tablet Take 25 mg by mouth once daily.    rivaroxaban (XARELTO) 20 mg Tab Take 1 tablet (20 mg total) by mouth daily with dinner or evening meal.       Review of patient's allergies indicates:  No Known Allergies    Past Medical History:   Diagnosis Date    Anemia     Atrial fibrillation     Encounter for blood transfusion     Esophageal ulcer     GI bleed     Hypertension      Past Surgical History:   Procedure Laterality Date    CARDIOVERSION Left 9/15/2022    Procedure: Cardioversion;  Surgeon: Julio James MD;  Location: [facility] CATH LAB/EP;  Service: Cardiology;  Laterality: Left;  With NORBERT    CATHETERIZATION OF BOTH LEFT AND RIGHT HEART N/A 9/22/2022    Procedure: CATHETERIZATION, HEART, BOTH LEFT AND RIGHT;  Surgeon: Julio James MD;  Location: [facility] CATH LAB/EP;  Service: Cardiology;  Laterality: N/A;    COLONOSCOPY N/A 4/4/2019    Procedure:  COLONOSCOPY Golytely;  Surgeon: Umair Randolph MD;  Location: [facility] ENDO;  Service: Endoscopy;  Laterality: N/A;    CORONARY ANGIOGRAPHY N/A 9/22/2022    Procedure: ANGIOGRAM, CORONARY ARTERY;  Surgeon: Julio James MD;  Location: [facility] CATH LAB/EP;  Service: Cardiology;  Laterality: N/A;    MEYER MAZE PROCEDURE N/A 10/7/2022    Procedure: MEYER MAZE PROCEDURE;  Surgeon: Mike Hedrick MD;  [facility];  Service: Cardiovascular;  Laterality: N/A;    ESOPHAGOGASTRODUODENOSCOPY N/A 10/12/2018    Procedure: EGD (ESOPHAGOGASTRODUODENOSCOPY);  Surgeon: Braden Herring MD;  Location: [facility] ENDO;  Service: Endoscopy;  Laterality: N/A;    ESOPHAGOGASTRODUODENOSCOPY N/A 4/4/2019    Procedure: EGD;  Surgeon: Umair Randolph MD;  Location: [facility] ENDO;  Service: Endoscopy;  Laterality: N/A;    EXCLUSION OF LEFT ATRIAL APPENDAGE N/A 10/7/2022    Procedure: EXCLUSION, LEFT ATRIAL APPENDAGE;  Surgeon: Mike Hedrick MD;  [facility];  Service: Cardiovascular;  Laterality: N/A;    HERNIA REPAIR      INSERTION OF INTRAVASCULAR MICROAXIAL BLOOD PUMP N/A 10/7/2022    Procedure: INSERTION, IMPELLA;  Surgeon: Mike Hedrick MD;  [facility];  Service: Cardiovascular;  Laterality: N/A;  direct aortic insertion    IRRIGATION OF MEDIASTINUM N/A 10/7/2022    Procedure: IRRIGATION, MEDIASTINUM;  Surgeon: Mike Hedrick MD;  [facility];  Service: Cardiovascular;  Laterality: N/A;    TRICUSPID VALVULOPLASTY N/A 10/7/2022    Procedure: REPAIR, TRICUSPID VALVE;  Surgeon: Mike Hedrick MD;  Location: [facility] OR [facility] FLR;  Service: Cardiovascular;  Laterality: N/A;     Family History   Problem Relation Age of Onset    COPD Mother     Cancer Father      Social History     Tobacco Use    Smoking status: Never    Smokeless tobacco: Current     Types: Chew   Substance Use Topics    Alcohol use: Yes     Alcohol/week: 20.0 standard drinks     Types: 20 Cans of beer per week     Drug use: No        Review of Systems:  Review of Systems   Constitutional:  Negative for chills and fever.   HENT:  Negative for congestion and sore throat.    Eyes:  Negative for blurred vision and redness.   Respiratory:  Negative for cough and shortness of breath.    Cardiovascular:  Positive for leg swelling. Negative for chest pain.   Gastrointestinal:  Negative for nausea and vomiting.   Genitourinary:  Negative for flank pain and hematuria.   Musculoskeletal:  Negative for falls and myalgias.   Skin:  Negative for rash.        +wound   Neurological:  Positive for weakness. Negative for dizziness.   Psychiatric/Behavioral:  Negative for hallucinations. The patient is not nervous/anxious.       OBJECTIVE:     Vital Signs (Most Recent):  Temp: 98.2 °F (36.8 °C) (11/08/22 0541)  Resp: 16 (11/08/22 0541)  BP: 107/60 (11/08/22 0542)  SpO2: (!) 91 % (11/08/22 0541)    Admission: Weight: 76.6 kg (168 lb 14 oz) (11/08/22 0541)   Most Recent: Weight: 76.6 kg (168 lb 14 oz) (11/08/22 0541)    Physical Exam:  Physical Exam  Vitals and nursing note reviewed.   Constitutional:       Appearance: Normal appearance.   HENT:      Mouth/Throat:      Mouth: Mucous membranes are moist.   Eyes:      Extraocular Movements: Extraocular movements intact.      Pupils: Pupils are equal, round, and reactive to light.   Cardiovascular:      Rate and Rhythm: Normal rate.   Abdominal:      General: Abdomen is flat.      Palpations: Abdomen is soft.   Skin:     General: Skin is warm.      Comments: Open sternal wound   Neurological:      General: No focal deficit present.      Mental Status: He is alert.   Psychiatric:         Mood and Affect: Mood normal.         Behavior: Behavior normal.         Laboratory:  I have reviewed all pertinent lab results within the past 24 hours.    Diagnostic Results:  ECG: Reviewed  US: Reviewed  CT: Reviewed    ASSESSMENT/PLAN:     62 y.o. male S/P TV replacement, MV replacement, MAZE, Lt Atrial Appendage  ligation and Impella placement on 10/7/22, now with deep sternal wound    To OR for debridement, wire removal, and possible muscle flap reconstruction with plastic surgery   Send 2PRBC to OR    Javier Rolle MD  Cardiothoracic Surgery PGY6

## 2022-11-08 NOTE — ANESTHESIA POSTPROCEDURE EVALUATION
Anesthesia Post Evaluation    Patient: David Barrios    Procedure(s) Performed: Procedure(s) (LRB):  REMOVAL, STERNAL WIRE (N/A)  DEBRIDEMENT, STERNUM (N/A)  APPLICATION, WOUND VAC (N/A)    Final Anesthesia Type: general      Patient location during evaluation: ICU  Patient participation: No - Unable to Participate, Sedation  Level of consciousness: sedated  Post-procedure vital signs: reviewed and stable  Pain management: adequate  Airway patency: patent      Anesthetic complications: no      Cardiovascular status: hemodynamically stable  Respiratory status: ETT and ventilator  Follow-up not needed.              No case tracking events are documented in the log.      Pain/Elza Score: Pain Rating Prior to Med Admin: 10 (11/8/2022 11:39 AM)  Pain Rating Post Med Admin: 0 (11/7/2022 10:51 PM)

## 2022-11-08 NOTE — ASSESSMENT & PLAN NOTE
Endocrinology consulted for BG management.   BG goal 110-140 (CTS)      - IIP  - Requires intensive BG monitoring.   - BG checks q1hr  - Hypoglycemia protocol in place    - Notify endocrine if patient becomes hypokalemic (K < 3.3) and/or is not responding to replacement.   - Notify endocrine if patient requiring > 20 units/hr.      ** Please notify Endocrine for any change and/or advance in diet**  ** Please call Endocrine for any BG related issues **    Discharge Planning:   TBD. Please notify endocrinology prior to discharge.

## 2022-11-08 NOTE — SUBJECTIVE & OBJECTIVE
Follow-up For: Procedure(s) (LRB):  REMOVAL, STERNAL WIRE (N/A)  DEBRIDEMENT, STERNUM (N/A)  APPLICATION, WOUND VAC (N/A)    Post-Operative Day: Day of Surgery     Past Medical History:   Diagnosis Date    Anemia     Atrial fibrillation     Encounter for blood transfusion     Esophageal ulcer     GI bleed     Hypertension        Past Surgical History:   Procedure Laterality Date    CARDIOVERSION Left 9/15/2022    Procedure: Cardioversion;  Surgeon: Julio James MD;  Location: [facility] CATH LAB/EP;  Service: Cardiology;  Laterality: Left;  With NORBERT    CATHETERIZATION OF BOTH LEFT AND RIGHT HEART N/A 9/22/2022    Procedure: CATHETERIZATION, HEART, BOTH LEFT AND RIGHT;  Surgeon: Julio James MD;  Location: [facility] CATH LAB/EP;  Service: Cardiology;  Laterality: N/A;    COLONOSCOPY N/A 4/4/2019    Procedure: COLONOSCOPY Golytely;  Surgeon: Umair Randolph MD;  Location: [facility];  Service: Endoscopy;  Laterality: N/A;    CORONARY ANGIOGRAPHY N/A 9/22/2022    Procedure: ANGIOGRAM, CORONARY ARTERY;  Surgeon: Julio James MD;  Location: [facility] CATH LAB/EP;  Service: Cardiology;  Laterality: N/A;    MEYER MAZE PROCEDURE N/A 10/7/2022    Procedure: MEYER MAZE PROCEDURE;  Surgeon: Mike Hedrick MD;  Location: [address];  Service: Cardiovascular;  Laterality: N/A;    ESOPHAGOGASTRODUODENOSCOPY N/A 10/12/2018    Procedure: EGD (ESOPHAGOGASTRODUODENOSCOPY);  Surgeon: Braden Herring MD;  Location: [facility];  Service: Endoscopy;  Laterality: N/A;    ESOPHAGOGASTRODUODENOSCOPY N/A 4/4/2019    Procedure: EGD;  Surgeon: Umair Randolph MD;  Location: [facility];  Service: Endoscopy;  Laterality: N/A;    EXCLUSION OF LEFT ATRIAL APPENDAGE N/A 10/7/2022    Procedure: EXCLUSION, LEFT ATRIAL APPENDAGE;  Surgeon: Mike Hedrick MD;  Location: [facility] OR [address] [place] [address];  Service: Cardiovascular;  Laterality: N/A;    HERNIA REPAIR      INSERTION OF INTRAVASCULAR MICROAXIAL BLOOD PUMP N/A 10/7/2022    Procedure: INSERTION, IMPELLA;   Surgeon: Mike Hedrick MD;  [facility];  Service: Cardiovascular;  Laterality: N/A;  direct aortic insertion    IRRIGATION OF MEDIASTINUM N/A 10/7/2022    Procedure: IRRIGATION, MEDIASTINUM;  Surgeon: Mike Hedrick MD;  Location: [facility] OR [facility];  Service: Cardiovascular;  Laterality: N/A;    TRICUSPID VALVULOPLASTY N/A 10/7/2022    Procedure: REPAIR, TRICUSPID VALVE;  Surgeon: Mike Hedrick MD;  [facility];  Service: Cardiovascular;  Laterality: N/A;       Review of patient's allergies indicates:  No Known Allergies    Family History       Problem Relation (Age of Onset)    COPD Mother    Cancer Father          Tobacco Use    Smoking status: Never    Smokeless tobacco: Current     Types: Chew   Substance and Sexual Activity    Alcohol use: Yes     Alcohol/week: 20.0 standard drinks     Types: 20 Cans of beer per week    Drug use: No    Sexual activity: Not on file      Review of Systems   Unable to perform ROS: Intubated   Objective:     Vital Signs (Most Recent):  Temp: 97.3 °F (36.3 °C) (11/08/22 1500)  Pulse: 103 (11/08/22 1500)  Resp: (!) 24 (11/08/22 1500)  BP: 129/63 (11/08/22 1500)  SpO2: 100 % (11/08/22 1500)   Vital Signs (24h Range):  Temp:  [96.1 °F (35.6 °C)-98.3 °F (36.8 °C)] 97.3 °F (36.3 °C)  Pulse:  [101-111] 103  Resp:  [16-35] 24  SpO2:  [91 %-100 %] 100 %  BP: (107-129)/(55-63) 129/63  Arterial Line BP: ()/(48-52) 109/49     Weight: 76.6 kg (168 lb 14 oz)  Body mass index is 24.94 kg/m².      Intake/Output Summary (Last 24 hours) at 11/8/2022 1505  Last data filed at 11/8/2022 1400  Gross per 24 hour   Intake 2443.79 ml   Output 668 ml   Net 1775.79 ml       Physical Exam  Vitals and nursing note reviewed.   Constitutional:       General: He is not in acute distress.     Appearance: He is ill-appearing.      Interventions: He is sedated and intubated.   HENT:      Head: Normocephalic and atraumatic.   Neck:      Comments: CHUCHO  CVC  Cardiovascular:      Rate and Rhythm: Normal rate and regular rhythm.      Pulses: Normal pulses.      Heart sounds: No murmur heard.    No gallop.      Comments: Midline incision with wound vac in place    Pulmonary:      Effort: He is intubated.      Breath sounds: No wheezing or rales.   Abdominal:      Palpations: Abdomen is soft.   Genitourinary:     Comments: Fierro in place  Skin:     General: Skin is warm and dry.      Capillary Refill: Capillary refill takes less than 2 seconds.      Findings: Bruising present. No lesion.      Comments: MSI with wound vac. Sanguinous output   Neurological:      Comments: sedated       Vents:  Oxygen Concentration (%): 40 (11/08/22 1500)    Lines/Drains/Airways       Peripherally Inserted Central Catheter Line  Duration             PICC Double Lumen 10/17/22 1709 right brachial 21 days              Central Venous Catheter Line  Duration             Introducer 11/08/22 0802 <1 day    Percutaneous Central Line Insertion/Assessment - Triple Lumen  11/08/22 0800 right internal jugular <1 day              Drain  Duration                  Urethral Catheter 11/08/22 0730 Non-latex;Temperature probe 14 Fr. <1 day              Airway  Duration                  Airway - Non-Surgical 11/08/22 0712 <1 day              Arterial Line  Duration             Arterial Line 11/08/22 0712 Right Radial <1 day              Peripheral Intravenous Line  Duration                  Peripheral IV - Single Lumen 11/08/22 0730 14 G  Left Forearm <1 day                    Significant Labs:    CBC/Anemia Profile:  Recent Labs   Lab 11/07/22  0420 11/08/22  0808 11/08/22  1134 11/08/22  1143 11/08/22  1305 11/08/22  1414   WBC 15.60*  --  54.29*  --   --   --    HGB 8.2*  --  9.2*  --   --   --    HCT 27.4*   < > 27.4* 28* 26* 23*     --  532*  --   --   --    MCV 93  --  88  --   --   --    RDW 15.5*  --  14.6*  --   --   --     < > = values in this interval not displayed.         Chemistries:  Recent Labs   Lab 11/07/22  0420 11/08/22  0353 11/08/22  1134   *  --  135*   K 3.0*  --  3.5   CL 87*  --  95   CO2 35*  --  27   BUN 35*  --  36*   CREATININE 2.7*  --  3.1*   CALCIUM 9.0  --  9.5   ALBUMIN 1.9*  --  1.5*   PROT 6.7  --  5.1*   BILITOT 0.4  --  0.9   ALKPHOS 141*  --  102   ALT 13  --  9*   AST 16  --  17   MG 1.9 1.8 1.8   PHOS 4.5 4.0 4.3       All pertinent labs within the past 24 hours have been reviewed.    Significant Imaging: I have reviewed all pertinent imaging results/findings within the past 24 hours.

## 2022-11-08 NOTE — CONSULTS
[facility] - Surgical Intensive Care  Endocrinology  Diabetes Consult Note    Consult Requested by: Mike Hedrick MD   Reason for admit: <principal problem not specified>    HISTORY OF PRESENT ILLNESS:  Reason for Consult: Management of Hyperglycemia     Surgical Procedure and Date:   Mediastinal exploration with evacuation of purulent pericarditis; Debridement and decortication of mediastinal structures; Resection of 10mm aortic graft on the ascending aorta; Removal of sternal wires; Partial bilateral sternectomy; and Wound vac placement by Dr. Carrasquillo' team on 11/8/2022    Patient is not diabetic and does not currently take any oral/injectable antidiabetic/hypoglycemic medications.       HPI: 62 y.o. male S/P TV replacement, MV replacement, MAZE, Lt Atrial Appendage ligation and Impella placement on 10/7/22, course complicated by bleeding, requiring reopening POD#0, multiple VT runs requiring DCCV, and sternal wound infections. He presents to the SICU s/p wound debridement and wire removal. Endocrine consulted to manage post-operative hyperglycemia.       Lab Results   Component Value Date    HGBA1C 5.5 09/02/2022           Interval HPI:   Overnight events: No acute events overnight. Patient on the SICU in room 83088/58756 A. Blood glucose stable. BG at and above goal on current insulin regimen (IIP). Steroid use- Dexamethasone  4 mg (perioperatively). Day of Surgery  Renal function- Abnormal - Creatinine 3.1   Vasopressors-  Epinephrine  and Norepinephrine       Endocrine will continue to follow and manage insulin orders inpatient.         No diet orders on file     Eating:   NPO  Nausea: No  Hypoglycemia and intervention: No  Fever: No  TPN and/or TF: No    PMH, PSH, FH, SH updated and reviewed     ROS:  Review of Systems  ELIZABETH due to intubation.     Current Medications and/or Treatments Impacting Glycemic Control  Immunotherapy:    Immunosuppressants       None          Steroids:   Hormones (From admission,  onward)      Start     Stop Route Frequency Ordered    11/08/22 1245  vasopressin (PITRESSIN) 0.2 Units/mL in dextrose 5 % 100 mL infusion         -- IV Continuous 11/08/22 1132          Pressors:    Autonomic Drugs (From admission, onward)      Start     Stop Route Frequency Ordered    11/08/22 1145  NORepinephrine 4 mg in dextrose 5% 250 mL infusion (premix) (titrating)        Question Answer Comment   Begin at (in mcg/kg/min): 0.02    Titrate by: (in mcg/kg/min) 0.02    Titrate interval: (in minutes) 5    Titrate to maintain: (MAP or SBP) MAP    Greater than: (in mmHg) 65    Maximum dose: (in mcg/kg/min) 3        -- IV Continuous 11/08/22 1132    11/08/22 1145  EPINEPHrine 5 mg in dextrose 5% 250 mL infusion (premix)        Question Answer Comment   Begin at (in mcg/kg/min): 0.02    Titrate by: (in mcg/kg/min) 0.02    Titrate interval: (in minutes) 5    Titrate to maintain: (SBP or MAP or Cardiac Index) MAP    Greater than: (in mmHg) 65    Maximum dose: (in mcg/kg/min) 2        -- IV Continuous 11/08/22 1132          Hyperglycemia/Diabetes Medications:   Antihyperglycemics (From admission, onward)      Start     Stop Route Frequency Ordered    11/08/22 1200  insulin regular in 0.9 % NaCl 100 unit/100 mL (1 unit/mL) infusion        Question Answer Comment   Insulin rate changes (DO NOT MODIFY ANSWER) [email or web address] [email or web address]    Enter initial dose (Units/hr): 1        -- IV Continuous 11/08/22 1145             PHYSICAL EXAMINATION:  Vitals:    11/08/22 1200   BP:    Pulse:    Resp: (!) 24   Temp:      Body mass index is 24.94 kg/m².    Physical Exam  Constitutional: Well developed, obese, NAD.  ENT: External ears no masses with nose patent  Neck: Supple; trachea midline  Cardiovascular: Normal heart sounds, no LE edema. DP +2 bilaterally.  Lungs: Normal effort; lungs anterior bilaterally clear to auscultation.  Intubated on a ventilator.  Abdomen: Soft, no masses,  no hernias.  Hypoactive BS noted.  MS: No clubbing or cyanosis of nails noted; unable to assess gait.  Skin: No rashes, lesions, or ulcers; no nodules.  Injection sites are ok. No lipo hypertropthy or atrophy.  Mid-sternal incision with telfa island dressing, CDI.  CT x 2.  LLE wrapped with ACE wrap.  Psychiatric: ELIZABETH  Neurological: ELIZABETH  Foot: Nails in good condition, no amputations noted        Labs Reviewed and Include   Recent Labs   Lab 11/08/22  1134   *   CALCIUM 9.5   ALBUMIN 1.5*   PROT 5.1*   *   K 3.5   CO2 27   CL 95   BUN 36*   CREATININE 3.1*   ALKPHOS 102   ALT 9*   AST 17   BILITOT 0.9     Lab Results   Component Value Date    WBC 54.29 (HH) 11/08/2022    HGB 9.2 (L) 11/08/2022    HCT 26 (L) 11/08/2022    MCV 88 11/08/2022     (H) 11/08/2022     No results for input(s): TSH, FREET4 in the last 168 hours.  Lab Results   Component Value Date    HGBA1C 5.5 09/02/2022       Nutritional status:   Body mass index is 24.94 kg/m².  Lab Results   Component Value Date    ALBUMIN 1.5 (L) 11/08/2022    ALBUMIN 1.9 (L) 11/07/2022    ALBUMIN 2.2 (L) 11/04/2022     No results found for: PREALBUMIN    Estimated Creatinine Clearance: 24.4 mL/min (A) (based on SCr of 3.1 mg/dL (H)).    Accu-Checks  Recent Labs     11/08/22  1132 11/08/22  1147   POCTGLUCOSE 173* 216*        ASSESSMENT and PLAN    Transient hyperglycemia post procedure  Endocrinology consulted for BG management.   BG goal 110-140 (CTS)      - IIP  - Requires intensive BG monitoring.   - BG checks q1hr  - Hypoglycemia protocol in place    - Notify endocrine if patient becomes hypokalemic (K < 3.3) and/or is not responding to replacement.   - Notify endocrine if patient requiring > 20 units/hr.      ** Please notify Endocrine for any change and/or advance in diet**  ** Please call Endocrine for any BG related issues **    Discharge Planning:   TBD. Please notify endocrinology prior to discharge.        S/P mitral valve repair  Managed  per primary team  Avoid hypoglycemia        Surgical wound present  Optimize BG control to improve wound healing            Plan discussed with patient, family, and RN at bedside.      Rigoberto Dee, DNP, FNP  Endocrinology  [facility] - Surgical Intensive Care

## 2022-11-08 NOTE — TRANSFER OF CARE
"Anesthesia Transfer of Care Note    Patient: David Barrios    Procedure(s) Performed: Procedure(s) (LRB):  REMOVAL, STERNAL WIRE (N/A)  DEBRIDEMENT, STERNUM (N/A)  APPLICATION, WOUND VAC (N/A)    Patient location: ICU    Anesthesia Type: general    Transport from OR: Transported from OR intubated on 100% O2 by AMBU with adequate controlled ventilation    Post pain: adequate analgesia    Post assessment: no apparent anesthetic complications and tolerated procedure well    Post vital signs: stable    Level of consciousness: awake and responds to stimulation    Nausea/Vomiting: no nausea/vomiting    Complications: none    Transfer of care protocol was followed      Last vitals:   Visit Vitals  /60   Temp 36.8 °C (98.2 °F) (Oral)   Resp 16   Ht 5' 9" (1.753 m)   Wt 76.6 kg (168 lb 14 oz)   SpO2 100%   BMI 24.94 kg/m²     "

## 2022-11-08 NOTE — ANESTHESIA PROCEDURE NOTES
Arterial    Diagnosis: CAD    Patient location during procedure: done in OR  Procedure start time: 11/8/2022 7:12 AM  Timeout: 11/8/2022 7:12 AM  Procedure end time: 11/8/2022 7:17 AM    Staffing  Authorizing Provider: Jesus Pop MD  Performing Provider: Carlos Enrique Nicole MD    Anesthesiologist was present at the time of the procedure.    Preanesthetic Checklist  Completed: patient identified, IV checked, site marked, risks and benefits discussed, surgical consent, monitors and equipment checked, pre-op evaluation, timeout performed and anesthesia consent givenArterial  Skin Prep: chlorhexidine gluconate  Local Infiltration: none  Orientation: right  Location: radial    Catheter Size: 20 G  Catheter placement by Ultrasound guidance. Heme positive aspiration all ports.   Vessel Caliber: small, patent, compressibility normal  Needle advanced into vessel with real time Ultrasound guidance.Insertion Attempts: 1  Assessment  Dressing: secured with tape and tegaderm  Patient: Tolerated well

## 2022-11-08 NOTE — NURSING
Pt arrived to SICU from OR. Pt arrived with cardiac and o2 monitoring in place. PT arrived with ordered gtts infusing ( Levo, Vaso, Epi, Propofol, Milrinone, Insulin). Pt connected to ICU continuous monitoring. ABG and all appropriate labs collected.           CARINA

## 2022-11-08 NOTE — PROGRESS NOTES
Perfusion Standby Note:      11/08/2022  Perfusionist:  Janis Carballo  [facility]  Surgeon(s) and Role:  Panel 1:     * Mike Hedrick MD - Primary  Panel 2:     * Amadeo Carrasquillo MD - Primary  Anesthesiologist:  Jesus Pop MD    Past Medical History:   Diagnosis Date    Anemia     Atrial fibrillation     Encounter for blood transfusion     Esophageal ulcer     GI bleed     Hypertension          Perfusion department standby was requested for this case, utilizing either a full cardiopulmonary machine or ECMO pump.      All pre-op checklists were completed, all equipment was available, as were cannulae and other disposables.  A TAVR instrument tray was also verified as available, per the checklist, for any case requiring ECMO standby.    I fully participated in the briefing and time-out for this procedure.  I was present in the room for all critical portions of this case during which mechanical circulatory support could be required.  Please see cardiopulmonary bypass record for details.  There were no complications.    10:12 AM

## 2022-11-08 NOTE — HPI
Mr. Barrios is 62 year old male who underwent mitral valve repair, tricuspid valve repair, left atrial maze, left atrial appendage resection, and direct aortic impella 5.5 insertion on October 7, 2022, which was complicated by Serratia bacteremia and sternal wound infection.  Mr. Barrios was discharged with a wound vac to LTAC to increase his strength and allow wound healing.  Unfortunately, the sternal wound continued to be infected and the decision was made to return to the operating room for sternal wire removal with mediastinal exploration and removal of the ascending aortic graft    The patient presents to the SICU s/p mediastinal exploration with evacuation of purulent pericarditis, and resection of aortic graft on the ascending aorta with Dr. Martínez on 11/08/2022. On admission, they are intubated, sedated with propofol, and in stable condition. Inotropic and vasopressor requirements upon admission are .08 mcg/kg/min of epinephrine, .08 mcg/kg/min of norepinephrine, and 0.04 units/min of vasopressin to maintain blood pressure at a MAP 60-80 and SBP < 140. Central access includes a RIJ CVC, arterial access includes a left radial arterial line. They also have an open chest wwith wound vac in place.     Intraoperatively, they received 2L of crystalloid,  2 PRBCs, 0 FFP, 0 platelets, and 0 cryoprecipitate. Urine output intraoperatively was 250cc. The pre-operative echocardiogram was notable for moderately decreased systolic function with an estimated ejection fraction is 35% and moderately reduced right ventricular systolic function.  Post-operative echo was notable for severely depressed LV systolic function, EF 25-30%, moderately depressed RV systolic function, mitral and tricuspid s/p repair, no MR/TR, and moderate AI, prolapsing NCC.    Immediate post-operative plans include hemodynamic stabilization and weaning of cardiac and respiratory support. Plan to wean vasopressors and inotropes as tolerated, wean  ventilator support with goal of early extubation, and closely monitor fluid status with strict Is/Os and continued fluid resuscitation as needed.

## 2022-11-08 NOTE — ADDENDUM NOTE
Addendum  created 11/08/22 1004 by Amadeo Levy MD    Attestation recorded in Intraprocedure, Intraprocedure Attestations filed, Intraprocedure Event edited, Intraprocedure Staff edited

## 2022-11-08 NOTE — ANESTHESIA PROCEDURE NOTES
Central Line    Diagnosis: CAD  Patient location during procedure: done in OR  Timeout: 11/8/2022 7:35 AM  Procedure end time: 11/8/2022 7:50 AM    Staffing  Authorizing Provider: Jesus Pop MD  Performing Provider: Carlos Enrique Nicole MD    Staffing  Performed: resident/CRNA   Anesthesiologist: Jesus Pop MD  Resident/CRNA: Carlos Enrique Nicole MD  Anesthesiologist was present at the time of the procedure.  Preanesthetic Checklist  Completed: patient identified, IV checked, site marked, risks and benefits discussed, surgical consent, monitors and equipment checked, pre-op evaluation, timeout performed and anesthesia consent given  Indication   Indication: hemodynamic monitoring, vascular access, med administration     Anesthesia   general anesthesia    Central Line   Skin Prep: skin prepped with ChloraPrep, skin prep agent completely dried prior to procedure  Sterile Barriers Followed: Yes    All five maximal barriers used- gloves, gown, cap, mask, and large sterile sheet    hand hygiene performed prior to central venous catheter insertion  Location: right internal jugular.   Catheter type: introducer  Catheter Size: 9 Fr  Ultrasound: vascular probe with ultrasound   Vessel Caliber: large, medium, patent, compressibility normal  Needle advanced into vessel with real time Ultrasound guidance.  sterile gel and probe cover used in ultrasound-guided central venous catheter insertion   Manometry: Venous cannualation confirmed by visual estimation of blood vessel pressure using manometry.  Insertion Attempts: 1   Securement:line sutured, chlorhexidine patch, sterile dressing applied and blood return through all ports    Post-Procedure        Guidewire Guidewire removed intact.

## 2022-11-08 NOTE — H&P
[facility]  Critical Care - Surgery  History & Physical    Patient Name: David Barrios  MRN: [id number]  Admission Date: 11/8/2022  Code Status: Prior  Attending Physician: Mike Hedrick MD   Primary Care Provider: Breann Tavera MD   Principal Problem: <principal problem not specified>    Subjective:     HPI:  Mr. Barrios is 62 year old male who underwent mitral valve repair, tricuspid valve repair, left atrial maze, left atrial appendage resection, and direct aortic impella 5.5 insertion on October 7, 2022, which was complicated by Serratia bacteremia and sternal wound infection.  Mr. Barrios was discharged with a wound vac to LTAC to increase his strength and allow wound healing.  Unfortunately, the sternal wound continued to be infected and the decision was made to return to the operating room for sternal wire removal with mediastinal exploration and removal of the ascending aortic graft    The patient presents to the SICU s/p mediastinal exploration with evacuation of purulent pericarditis, and resection of aortic graft on the ascending aorta with Dr. Martínez on 11/08/2022. On admission, they are intubated, sedated with propofol, and in stable condition. Inotropic and vasopressor requirements upon admission are .08 mcg/kg/min of epinephrine, .08 mcg/kg/min of norepinephrine, and 0.04 units/min of vasopressin to maintain blood pressure at a MAP 60-80 and SBP < 140. Central access includes a RIJ CVC, arterial access includes a left radial arterial line. They also have an open chest wwith wound vac in place.     Intraoperatively, they received 2L of crystalloid,  2 PRBCs, 0 FFP, 0 platelets, and 0 cryoprecipitate. Urine output intraoperatively was 250cc. The pre-operative echocardiogram was notable for moderately decreased systolic function with an estimated ejection fraction is 35% and moderately reduced right ventricular systolic function.  Post-operative echo was notable for  severely depressed LV systolic function, EF 25-30%, moderately depressed RV systolic function, mitral and tricuspid s/p repair, no MR/TR, and moderate AI, prolapsing NCC.    Immediate post-operative plans include hemodynamic stabilization and weaning of cardiac and respiratory support. Plan to wean vasopressors and inotropes as tolerated, wean ventilator support with goal of early extubation, and closely monitor fluid status with strict Is/Os and continued fluid resuscitation as needed.      Hospital/ICU Course:  No notes on file    Follow-up For: Procedure(s) (LRB):  REMOVAL, STERNAL WIRE (N/A)  DEBRIDEMENT, STERNUM (N/A)  APPLICATION, WOUND VAC (N/A)    Post-Operative Day: Day of Surgery     Past Medical History:   Diagnosis Date    Anemia     Atrial fibrillation     Encounter for blood transfusion     Esophageal ulcer     GI bleed     Hypertension        Past Surgical History:   Procedure Laterality Date    CARDIOVERSION Left 9/15/2022    Procedure: Cardioversion;  Surgeon: Julio James MD;  Location: [facility] CATH LAB/EP;  Service: Cardiology;  Laterality: Left;  With NORBERT    CATHETERIZATION OF BOTH LEFT AND RIGHT HEART N/A 9/22/2022    Procedure: CATHETERIZATION, HEART, BOTH LEFT AND RIGHT;  Surgeon: Julio James MD;  Location: [facility] CATH LAB/EP;  Service: Cardiology;  Laterality: N/A;    COLONOSCOPY N/A 4/4/2019    Procedure: COLONOSCOPY Golytely;  Surgeon: Umair Randolph MD;  Location: [facility] ENDO;  Service: Endoscopy;  Laterality: N/A;    CORONARY ANGIOGRAPHY N/A 9/22/2022    Procedure: ANGIOGRAM, CORONARY ARTERY;  Surgeon: Julio James MD;  Location: [facility] CATH LAB/EP;  Service: Cardiology;  Laterality: N/A;    MEYER MAZE PROCEDURE N/A 10/7/2022    Procedure: MEYER MAZE PROCEDURE;  Surgeon: Mike Hedrick MD;  Location: [facility] OR [address] [place] [address];  Service: Cardiovascular;  Laterality: N/A;    ESOPHAGOGASTRODUODENOSCOPY N/A 10/12/2018    Procedure: EGD (ESOPHAGOGASTRODUODENOSCOPY);  Surgeon: Braden WINTERS  MD Nima;  Location: [facility] ENDO;  Service: Endoscopy;  Laterality: N/A;    ESOPHAGOGASTRODUODENOSCOPY N/A 4/4/2019    Procedure: EGD;  Surgeon: Umair Randolph MD;  Location: [facility] ENDO;  Service: Endoscopy;  Laterality: N/A;    EXCLUSION OF LEFT ATRIAL APPENDAGE N/A 10/7/2022    Procedure: EXCLUSION, LEFT ATRIAL APPENDAGE;  Surgeon: Mike Hedrick MD;  Location: [facility] OR [facility] FLR;  Service: Cardiovascular;  Laterality: N/A;    HERNIA REPAIR      INSERTION OF INTRAVASCULAR MICROAXIAL BLOOD PUMP N/A 10/7/2022    Procedure: INSERTION, IMPELLA;  Surgeon: Mike Hedrick MD;  Location: [facility] OR [facility] FLR;  Service: Cardiovascular;  Laterality: N/A;  direct aortic insertion    IRRIGATION OF MEDIASTINUM N/A 10/7/2022    Procedure: IRRIGATION, MEDIASTINUM;  Surgeon: Mike Hedrick MD;  Location: [facility] OR [facility] FLR;  Service: Cardiovascular;  Laterality: N/A;    TRICUSPID VALVULOPLASTY N/A 10/7/2022    Procedure: REPAIR, TRICUSPID VALVE;  Surgeon: Mike Hedrick MD;  Location: [facility] OR [facility] FLR;  Service: Cardiovascular;  Laterality: N/A;       Review of patient's allergies indicates:  No Known Allergies    Family History       Problem Relation (Age of Onset)    COPD Mother    Cancer Father          Tobacco Use    Smoking status: Never    Smokeless tobacco: Current     Types: Chew   Substance and Sexual Activity    Alcohol use: Yes     Alcohol/week: 20.0 standard drinks     Types: 20 Cans of beer per week    Drug use: No    Sexual activity: Not on file      Review of Systems   Unable to perform ROS: Intubated   Objective:     Vital Signs (Most Recent):  Temp: 97.3 °F (36.3 °C) (11/08/22 1500)  Pulse: 103 (11/08/22 1500)  Resp: (!) 24 (11/08/22 1500)  BP: 129/63 (11/08/22 1500)  SpO2: 100 % (11/08/22 1500)   Vital Signs (24h Range):  Temp:  [96.1 °F (35.6 °C)-98.3 °F (36.8 °C)] 97.3 °F (36.3 °C)  Pulse:  [101-111] 103  Resp:  [16-35] 24  SpO2:  [91 %-100 %] 100 %  BP: (107-129)/(55-63) 129/63  Arterial  Line BP: ()/(48-52) 109/49     Weight: 76.6 kg (168 lb 14 oz)  Body mass index is 24.94 kg/m².      Intake/Output Summary (Last 24 hours) at 11/8/2022 1505  Last data filed at 11/8/2022 1400  Gross per 24 hour   Intake 2443.79 ml   Output 668 ml   Net 1775.79 ml       Physical Exam  Vitals and nursing note reviewed.   Constitutional:       General: He is not in acute distress.     Appearance: He is ill-appearing.      Interventions: He is sedated and intubated.   HENT:      Head: Normocephalic and atraumatic.   Neck:      Comments: [facility] CVC  Cardiovascular:      Rate and Rhythm: Normal rate and regular rhythm.      Pulses: Normal pulses.      Heart sounds: No murmur heard.    No gallop.      Comments: Midline incision with wound vac in place    Pulmonary:      Effort: He is intubated.      Breath sounds: No wheezing or rales.   Abdominal:      Palpations: Abdomen is soft.   Genitourinary:     Comments: Fierro in place  Skin:     General: Skin is warm and dry.      Capillary Refill: Capillary refill takes less than 2 seconds.      Findings: Bruising present. No lesion.      Comments: MSI with wound vac. Sanguinous output   Neurological:      Comments: sedated       Vents:  Oxygen Concentration (%): 40 (11/08/22 1500)    Lines/Drains/Airways       Peripherally Inserted Central Catheter Line  Duration             PICC Double Lumen 10/17/22 1709 right brachial 21 days              Central Venous Catheter Line  Duration             Introducer 11/08/22 0802 <1 day    Percutaneous Central Line Insertion/Assessment - Triple Lumen  11/08/22 0800 right internal jugular <1 day              Drain  Duration                  Urethral Catheter 11/08/22 0730 Non-latex;Temperature probe 14 Fr. <1 day              Airway  Duration                  Airway - Non-Surgical 11/08/22 0712 <1 day              Arterial Line  Duration             Arterial Line 11/08/22 0712 Right Radial <1 day              Peripheral Intravenous Line   Duration                  Peripheral IV - Single Lumen 11/08/22 0730 14 G  Left Forearm <1 day                    Significant Labs:    CBC/Anemia Profile:  Recent Labs   Lab 11/07/22  0420 11/08/22  0808 11/08/22  1134 11/08/22  1143 11/08/22  1305 11/08/22  1414   WBC 15.60*  --  54.29*  --   --   --    HGB 8.2*  --  9.2*  --   --   --    HCT 27.4*   < > 27.4* 28* 26* 23*     --  532*  --   --   --    MCV 93  --  88  --   --   --    RDW 15.5*  --  14.6*  --   --   --     < > = values in this interval not displayed.        Chemistries:  Recent Labs   Lab 11/07/22  0420 11/08/22  0353 11/08/22  1134   *  --  135*   K 3.0*  --  3.5   CL 87*  --  95   CO2 35*  --  27   BUN 35*  --  36*   CREATININE 2.7*  --  3.1*   CALCIUM 9.0  --  9.5   ALBUMIN 1.9*  --  1.5*   PROT 6.7  --  5.1*   BILITOT 0.4  --  0.9   ALKPHOS 141*  --  102   ALT 13  --  9*   AST 16  --  17   MG 1.9 1.8 1.8   PHOS 4.5 4.0 4.3       All pertinent labs within the past 24 hours have been reviewed.    Significant Imaging: I have reviewed all pertinent imaging results/findings within the past 24 hours.    Assessment/Plan:     S/P mitral valve repair  62 y.o. male S/P TV replacement, MV replacement, MAZE, Lt Atrial Appendage ligation and Impella placement on 10/7/22, course complicated by bleeding, requiring reopening POD#0, multiple VT runs requiring DCCV, and sternal wound infections. He presents to the SICU s/p wound debridement and wire removal on 11/08       Neuro/Psych:     - Sedation: propofol and fentanyl     - Pain:    - Scheduled Tylenol 1g q8h   - Fentanyl PRN while intubated, Oxy PRN             Cardiac:      - BP Goal: MAP 60-80 and SBP <140    - Cleviprex/Cardene PRN    - Pressors: Epi .08, Vaso .04 and NorEpi .06    - Anti-HTNs: Will start when appropriate    - Rhythm: NSR. S/p MAZE for Afib,    - Beta blocker: Will start when appropriate    - Statin: Atorvastatin 40 mg QD   - Consider stress dose steroids after discussion  with plastics      Pulmonary:     - Goal SpO2 >92%    - Will wean ventilator support as tolerated to extubate    - Chest Tubes x ** (** Meds & ** Pleural)    - ABGs PRN      Renal:    - Trend BUN/Cr     - Maintain Fierro, record strict Is/Os    Recent Labs   Lab 11/06/22  0530 11/07/22  0420 11/08/22  1134   BUN 31* 35* 36*   CREATININE 2.3* 2.7* 3.1*         FEN / GI:     - Daily CMP, PRN K/Mag/Phos per protocol     - Replace electrolytes as needed    - Nutrition: NPO pending extubation    - Bowel Regimen: Miralax, docusate      ID:     - Suspect sepsis secondary to presumed mediastinal infection     - WBC elevated post op, on schedule Abx    - Abx: Continue Cefepime, lactic acid improving      Recent Labs   Lab 11/04/22  0952 11/07/22  0420 11/08/22  1134   WBC 15.52* 15.60* 54.29*         Heme/Onc:     - Hgb 9.2 pre-operatively    - CBC daily    - ASA 325mg daily    Recent Labs   Lab 11/04/22  0952 11/07/22  0420 11/08/22  1134   HGB 9.8* 8.2* 9.2*   * 432 532*   APTT  --   --  24.7   INR  --   --  1.2         Endocrine:     - CTS Goal -140    - HgbA1c:     - Endocrinology consulted for insulin management      PPx:   Feeding: npo while intubated  Analgesia/Sedation: propofol and fentanyl   Thromboembolic Prevention: scds  HOB >30: Yes  Stress Ulcer: famotidine   Glucose Control: Yes, insulin management per Endocrinology     Lines/Drains/Airway:   ETT   Left radial arterial line   RIJ CVC   Fierro   Wound Vac      Dispo/Code Status/Palliative:     - Continue SICU Care    - Full Code                Critical secondary to Patient has a condition that poses threat to life and bodily function.    Critical care was time spent personally by me on the following activities: development of treatment plan with patient or surrogate and bedside caregivers, discussions with consultants, evaluation of patient's response to treatment, examination of patient, ordering and performing treatments and interventions,  ordering and review of laboratory studies, ordering and review of radiographic studies, pulse oximetry, re-evaluation of patient's condition.  This critical care time did not overlap with that of any other provider or involve time for any procedures.     Gustavo Aguero,   Critical Care - Surgery  [facility] - [facility]

## 2022-11-08 NOTE — PROGRESS NOTES
"Noted to be in preop room 10, having been transported via ambulance unit 372 from rehab facility. EMS asked about "pump for milrinone",   Noted that current bag of milrinone had very little in the bag and no new bag available. Assisting nurse contacted resident for 0520 Dr. Hedrick, Gin Saul MD, for milronone order. Notified EMS that would need to maintain current pump and medication until new order and supply available.   "

## 2022-11-08 NOTE — HPI
Reason for Consult: Management of Hyperglycemia     Surgical Procedure and Date:   Mediastinal exploration with evacuation of purulent pericarditis; Debridement and decortication of mediastinal structures; Resection of 10mm aortic graft on the ascending aorta; Removal of sternal wires; Partial bilateral sternectomy; and Wound vac placement by Dr. Carrasquillo' team on 11/8/2022    Patient is not diabetic and does not currently take any oral/injectable antidiabetic/hypoglycemic medications.       HPI: 62 y.o. male S/P TV replacement, MV replacement, MAZE, Lt Atrial Appendage ligation and Impella placement on 10/7/22, course complicated by bleeding, requiring reopening POD#0, multiple VT runs requiring DCCV, and sternal wound infections. He presents to the SICU s/p wound debridement and wire removal. Endocrine consulted to manage post-operative hyperglycemia.       Lab Results   Component Value Date    HGBA1C 5.5 09/02/2022

## 2022-11-08 NOTE — BRIEF OP NOTE
Brief Operative Note     SUMMARY     Surgery Date: 11/8/2022     Surgeon(s) and Role:  Panel 1:     * Mike Hedrick MD - Primary  Panel 2:     * Amadeo Carrasquillo MD - Primary    Assisting Surgeon: Keyshawn Whitlock PGY6    Pre-op Diagnosis:  Status post mitral valve repair [Z98.890]    Post-op Diagnosis:  Status post mitral valve repair [Z98.890]    Procedure(s) (LRB):  REMOVAL, STERNAL WIRE (N/A)  FLAP GRAFT - muscle flap (N/A)  DEBRIDEMENT, STERNUM (N/A)  APPLICATION, WOUND VAC (N/A)    Anesthesia: General    Description of Procedure:   Wound vac placement    Findings/Key Components:  3 pieces of Nonadherent barrier, 7 pieces of white foam, 1 black foam    Estimated Blood Loss: Minimal         Specimens Removed:   Specimen (24h ago, onward)      None

## 2022-11-08 NOTE — H&P (VIEW-ONLY)
H and P    Subjective:     History of Present Illness:  62 y.o. male S/P TV replacement, MV replacement, MAZE, Lt Atrial Appendage ligation and Impella placement on 10/7/22, course complicated by bleeding, requiring reopening POD#0, multiple VT runs requiring DCCV, and sternal wound infections. He presents today for planned wound debridement and wire removal.  Plastic surgery team has been consulted for help with wound coverage.       Post-Op Info:  * No surgery found *         No current facility-administered medications on file prior to encounter.     Current Outpatient Medications on File Prior to Encounter   Medication Sig    albuterol (PROVENTIL/VENTOLIN HFA) 90 mcg/actuation inhaler inhale 1-2 Puff(s) By Mouth Every 4 Hours as needed    amiodarone (PACERONE) 200 MG Tab Take 1 tablet (200 mg total) by mouth 2 (two) times daily.    carvediloL (COREG) 12.5 MG tablet Take 1 tablet (12.5 mg total) by mouth 2 (two) times daily with meals.    ferrous sulfate (FEOSOL) 325 mg (65 mg iron) Tab tablet Take 1 tablet (325 mg total) by mouth daily with breakfast.    furosemide (LASIX) 40 MG tablet TAKE 1 TABLET(40 MG) BY MOUTH TWICE DAILY    hydroCHLOROthiazide (HYDRODIURIL) 25 MG tablet Take 25 mg by mouth once daily.    rivaroxaban (XARELTO) 20 mg Tab Take 1 tablet (20 mg total) by mouth daily with dinner or evening meal.       Review of patient's allergies indicates:  No Known Allergies    Past Medical History:   Diagnosis Date    Anemia     Atrial fibrillation     Encounter for blood transfusion     Esophageal ulcer     GI bleed     Hypertension      Past Surgical History:   Procedure Laterality Date    CARDIOVERSION Left 9/15/2022    Procedure: Cardioversion;  Surgeon: Julio James MD;  Location: [facility] CATH LAB/EP;  Service: Cardiology;  Laterality: Left;  With NORBERT    CATHETERIZATION OF BOTH LEFT AND RIGHT HEART N/A 9/22/2022    Procedure: CATHETERIZATION, HEART, BOTH LEFT AND RIGHT;  Surgeon: Julio WHELAN  MD Jacob;  Location: [facility] CATH LAB/EP;  Service: Cardiology;  Laterality: N/A;    COLONOSCOPY N/A 4/4/2019    Procedure: COLONOSCOPY Golytely;  Surgeon: Umair Randolph MD;  Location: [facility] ENDO;  Service: Endoscopy;  Laterality: N/A;    CORONARY ANGIOGRAPHY N/A 9/22/2022    Procedure: ANGIOGRAM, CORONARY ARTERY;  Surgeon: Julio James MD;  Location: [facility] CATH LAB/EP;  Service: Cardiology;  Laterality: N/A;    MEYER MAZE PROCEDURE N/A 10/7/2022    Procedure: MEYER MAZE PROCEDURE;  Surgeon: Mike Hedrick MD;  Location: [facility] OR 2ND FLR;  Service: Cardiovascular;  Laterality: N/A;    ESOPHAGOGASTRODUODENOSCOPY N/A 10/12/2018    Procedure: EGD (ESOPHAGOGASTRODUODENOSCOPY);  Surgeon: Braden Herring MD;  Location: [facility] ENDO;  Service: Endoscopy;  Laterality: N/A;    ESOPHAGOGASTRODUODENOSCOPY N/A 4/4/2019    Procedure: EGD;  Surgeon: Umair Randolph MD;  Location: [facility];  Service: Endoscopy;  Laterality: N/A;    EXCLUSION OF LEFT ATRIAL APPENDAGE N/A 10/7/2022    Procedure: EXCLUSION, LEFT ATRIAL APPENDAGE;  Surgeon: Mike Hedrick MD;  Location: [facility] OR 2ND FLR;  Service: Cardiovascular;  Laterality: N/A;    HERNIA REPAIR      INSERTION OF INTRAVASCULAR MICROAXIAL BLOOD PUMP N/A 10/7/2022    Procedure: INSERTION, IMPELLA;  Surgeon: Mike Hedrick MD;  Location: [facility] OR 2ND FLR;  Service: Cardiovascular;  Laterality: N/A;  direct aortic insertion    IRRIGATION OF MEDIASTINUM N/A 10/7/2022    Procedure: IRRIGATION, MEDIASTINUM;  Surgeon: Mike Hedrick MD;  Location: [facility] OR 2ND FLR;  Service: Cardiovascular;  Laterality: N/A;    TRICUSPID VALVULOPLASTY N/A 10/7/2022    Procedure: REPAIR, TRICUSPID VALVE;  Surgeon: Mike Hedrick MD;  Location: [facility] OR 2ND FLR;  Service: Cardiovascular;  Laterality: N/A;     Family History       Problem Relation (Age of Onset)    COPD Mother    Cancer Father          Tobacco Use    Smoking status: Never    Smokeless tobacco: Current     Types:  Chew   Substance and Sexual Activity    Alcohol use: Yes     Alcohol/week: 20.0 standard drinks     Types: 20 Cans of beer per week    Drug use: No    Sexual activity: Not on file     Review of Systems   Constitutional: Negative.    Respiratory: Negative.     Cardiovascular: Negative.    Gastrointestinal: Negative.    All other systems reviewed and are negative.  Objective:     Vital Signs (Most Recent):  Temp: 98.1 °F (36.7 °C) (11/08/22 0000)  Pulse: 107 (11/08/22 0000)  Resp: 16 (11/08/22 0000)  BP: (!) 107/58 (11/08/22 0000)  SpO2: (!) 94 % (11/08/22 0000)   Vital Signs (24h Range):  Temp:  [97.6 °F (36.4 °C)-98.6 °F (37 °C)] 98.2 °F (36.8 °C)  Pulse:  [104-111] 107  Resp:  [16-22] 16  SpO2:  [91 %-99 %] 91 %  BP: (105-117)/(58-63) 107/60     Weight: 76.6 kg (168 lb 14 oz)  Body mass index is 24.94 kg/m².    Physical Exam  Vitals and nursing note reviewed.   Constitutional:       General: He is not in acute distress.  Eyes:      General: No scleral icterus.     Extraocular Movements: Extraocular movements intact.      Pupils: Pupils are equal, round, and reactive to light.   Cardiovascular:      Rate and Rhythm: Normal rate and regular rhythm.      Pulses: Normal pulses.      Heart sounds: No murmur heard.  Pulmonary:      Effort: Pulmonary effort is normal. No respiratory distress.      Breath sounds: Decreased breath sounds present.   Abdominal:      General: Abdomen is flat. Bowel sounds are normal. There is no distension.      Palpations: Abdomen is soft.      Tenderness: There is no abdominal tenderness.   Musculoskeletal:      Right lower leg: Edema present.      Left lower leg: Edema present.   Skin:     General: Skin is warm and dry.      Capillary Refill: Capillary refill takes less than 2 seconds.      Findings: Bruising present. No lesion.      Comments: Open sternal wound    Neurological:      General: No focal deficit present.      Mental Status: He is alert. Mental status is at baseline.        Significant Labs:  I have reviewed all pertinent lab results within the past 24 hours.  CBC:   Recent Labs   Lab 11/07/22 0420   WBC 15.60*   RBC 2.96*   HGB 8.2*   HCT 27.4*      MCV 93   MCH 27.7   MCHC 29.9*     BMP:   Recent Labs   Lab 11/07/22 0420 11/08/22  0353   GLU 84  --    *  --    K 3.0*  --    CL 87*  --    CO2 35*  --    BUN 35*  --    CREATININE 2.7*  --    CALCIUM 9.0  --    MG 1.9 1.8       Significant Diagnostics:  I have reviewed all pertinent imaging results/findings within the past 24 hours.      Assessment/Plan:     Surgical wound present  62 y.o. male S/P TV replacement, MV replacement, MAZE, Lt Atrial Appendage ligation and Impella placement on 10/7/22, now with deep sternal wound     -To OR for debridement, wire removal, and possible muscle flap reconstruction   - Send 2PRBC to OR        Chuyita Aviles MD  General Surgery  [facility]

## 2022-11-08 NOTE — OP NOTE
Date of surgery 11/08/2022   Preoperative diagnosis sternal wound infection   Postoperative diagnosis the same   Procedure performed  1. Intraoperative consultation for sternal wound  2. Placement of wound VAC   Surgeon Neida  Anesthesia general  Complications none   Blood loss none  Drains none     I was consulted to evaluate the sternal wound post debridement.  Patient had previous cardiac surgery.  Developed a mediastinal infection.  Debridement was performed by the Cardiothoracic Service which included the sternum.  Plastic surgery placed a wound VAC within the wound.  Patient will be washed out on Thursday in undergo a muscle flap closure on Monday.

## 2022-11-08 NOTE — ANESTHESIA PROCEDURE NOTES
NORBERT    Diagnosis: mediastinitis  Patient location during procedure: OR  Exam type: Baseline    Staffing  Performed: anesthesiologist     Anesthesiologist: Jesus Pop MD        Anesthesiologist Present  Yes      Setup & Induction  Probe Insertion: easy  Exam: completeDoppler Echo: 2D and color flow mapping.  Exam         LVAD  Estimated Ejection Fraction: 25-34% severe            Right Heart  Right Ventricle Function: moderately decreased    Intra Atrial Septum  PFO: no shunt by color flow doppler          Right Ventricle    Aortic Valve:  Stenosis: none.  Morphology: trileaflet    Regurgitation: moderate (3+) aortic regurgitation      Mitral Valve:   Prolapse: none  Flail: no flail  Jet Description: none    Tricuspid Valve:  Morphology: normal  Regurgitation: none    Pulmonic Valve:  Morphology:normal  Regurgitation(color flow): none    Great Vessels  Ascending Aorta Atherosclerosis: 1=nl-min dz  Aortic Arch Atherosclerosis: 1=nl-min dz  Descending Aorta Atherosclerosis: 1=nl-min dz      Effusions    SummaryFindings discussed with surgeon.    Other Findings   Severely depressed LV systolic function, EF 25-30%  Moderately depressed RV systolic function  Mitral and tricuspid s/p repair, no MR/TR  Moderate AI, prolapsing NCC

## 2022-11-08 NOTE — OP NOTE
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 Carrasquillo' team    Surgeon:  Mike Hedrick MD    Assistant Surgeon:  Claudy Carrasquillo MD    Anesthesiologist:  Jesus Pop MD    ---------------------------------------------------------------------------------------------------------------------    Indications for surgery: Mr. Barrios is a 62 year old male who underwent mitral valve repair, tricuspid valve repair, left atrial maze, left atrial appendage resection, and direct aortic impella 5.5 insertion on October 7, 2022, which was complicated by Serratia bacteremia and sternal wound infection.  Mr. Barrios was discharged with a wound vac to LTAC to increase his strength and allow wound healing.  Unfortunately, the sternal wound continued to be infected and the decision was made to return to the operating room for sternal wire removal with mediastinal exploration and removal of the ascending aortic graft.  The risks and benefits were explained and informed consent was obtained.     Gross findings: Extensive purulent pericarditis noted anterior to the cardiac structures and along the aortic graft.  No pockets noted posteriorly on transesophageal echocardiogram as well as on intra operative inspection.      Procedure:  The patient was brought to the operating room and was placed supine on the operating room table then prepped and draped in the usual sterile fashion. A surgical time out was performed.      The chest was  re-entered from the previous midline incision.  The sternal wires were carefully removed and the bone was noted to be very pourous with poor bone healing.  The chest was reopened easily due to the poor wound healing and a chest retractor was placed.  Extensive purulent pericarditis was noted anterior to the cardiac structures and along the aortic graft.  No pockets were noted posteriorly on transesophageal echocardiogram as well as on intra operative inspection. The purulenet pericarditis was removed via extensive but careful decortication and debridement of the mediastinal structures using rongeurs and curettes.  Attention was turned to resection of the 10mm graft anastomosed to the ascending aorta.  Using multiple pledgetted 3-0 SH prolene sutures, horizontal mattresses were placed at the base of the graft, one after another, then tied.  Afterwards, an 11 blade was used to cut the prolene suture used on the graft anastomosis and the graft was carefully removed.  No bleeding was noted.  Again, the mediastinal structures were extensively debrideded then irrigated with 1L of warm saline.  Attention was turned to the partial bilateral sternectomy.  Using heavy rongeurs, the sternum was debrided extensively back to where healthy bone was noted.  This was done bilaterally.  Further irrigation was performed and the chest packed to maintain adequate hemostasis.  Afterwards, a wound vac system was placed by Dr. Carrasquillo and his plastic surgery team.  The patient was brought to the ICU in stable condition.       I was present in the operating room for the entire operation and immediately available thereafter.

## 2022-11-08 NOTE — PLAN OF CARE
"      SICU PLAN OF CARE NOTE    Dx: Sternal wound infection    Shift Events: PT admitted to SICU from OR. Pt given 1 L Albumin and 500 cc Lactated Ringers. Pressor requirements decreasing throughout shift. CVP 6-8. MD. Hedrick added on milrinone. [facility]    Goals of Care: MAP >65    Neuro: Arouses to Voice, Follows Commands, and Moves All Extremities    Vital Signs: /63   Pulse 101   Temp 98.6 °F (37 °C)   Resp (!) 24   Ht 5' 9" (1.753 m)   Wt 76.6 kg (168 lb 14 oz)   SpO2 100%   BMI 24.94 kg/m²     Respiratory: Ventilator    Diet: NPO    Gtts: Propfol, Fentanyl, Insulin, Norepinephrine, Vasopressin, Epinephrine, and Milrinone    Urine Output: Urinary Catheter 0-50 cc/hour    Drains: Wound Vac- 550 Shift    Restraints no noted skin breakdown. Cap refill less than 3 seconds. Education provided     Labs/Accuchecks: Q1 accuchecks. Q6 labs    Skin: No noted skin breakdown. Heels elevated off of bed. Pt repositioned off of pressure points. Lines and devices free from skin contact.       "

## 2022-11-08 NOTE — SUBJECTIVE & OBJECTIVE
Interval HPI:   Overnight events: No acute events overnight. Patient on the SICU in room 03943/60785 A. Blood glucose stable. BG at and above goal on current insulin regimen (IIP). Steroid use- Dexamethasone  4 mg (perioperatively). Day of Surgery  Renal function- Abnormal - Creatinine 3.1   Vasopressors-  Epinephrine  and Norepinephrine       Endocrine will continue to follow and manage insulin orders inpatient.         No diet orders on file     Eating:   NPO  Nausea: No  Hypoglycemia and intervention: No  Fever: No  TPN and/or TF: No    PMH, PSH, FH, SH updated and reviewed     ROS:  Review of Systems  ELIZABETH due to intubation.     Current Medications and/or Treatments Impacting Glycemic Control  Immunotherapy:    Immunosuppressants       None          Steroids:   Hormones (From admission, onward)      Start     Stop Route Frequency Ordered    11/08/22 1245  vasopressin (PITRESSIN) 0.2 Units/mL in dextrose 5 % 100 mL infusion         -- IV Continuous 11/08/22 1132          Pressors:    Autonomic Drugs (From admission, onward)      Start     Stop Route Frequency Ordered    11/08/22 1145  NORepinephrine 4 mg in dextrose 5% 250 mL infusion (premix) (titrating)        Question Answer Comment   Begin at (in mcg/kg/min): 0.02    Titrate by: (in mcg/kg/min) 0.02    Titrate interval: (in minutes) 5    Titrate to maintain: (MAP or SBP) MAP    Greater than: (in mmHg) 65    Maximum dose: (in mcg/kg/min) 3        -- IV Continuous 11/08/22 1132    11/08/22 1145  EPINEPHrine 5 mg in dextrose 5% 250 mL infusion (premix)        Question Answer Comment   Begin at (in mcg/kg/min): 0.02    Titrate by: (in mcg/kg/min) 0.02    Titrate interval: (in minutes) 5    Titrate to maintain: (SBP or MAP or Cardiac Index) MAP    Greater than: (in mmHg) 65    Maximum dose: (in mcg/kg/min) 2        -- IV Continuous 11/08/22 1132          Hyperglycemia/Diabetes Medications:   Antihyperglycemics (From admission, onward)      Start     Stop Route  Frequency Ordered    11/08/22 1200  insulin regular in 0.9 % NaCl 100 unit/100 mL (1 unit/mL) infusion        Question Answer Comment   Insulin rate changes (DO NOT MODIFY ANSWER) \\[email or web address]\[email or web address]\Images\[email or web address]\InsulinInfusions\CTS INSULIN [id number]    Enter initial dose (Units/hr): 1        -- IV Continuous 11/08/22 1145             PHYSICAL EXAMINATION:  Vitals:    11/08/22 1200   BP:    Pulse:    Resp: (!) 24   Temp:      Body mass index is 24.94 kg/m².    Physical Exam  Constitutional: Well developed, obese, NAD.  ENT: External ears no masses with nose patent  Neck: Supple; trachea midline  Cardiovascular: Normal heart sounds, no LE edema. DP +2 bilaterally.  Lungs: Normal effort; lungs anterior bilaterally clear to auscultation.  Intubated on a ventilator.  Abdomen: Soft, no masses, no hernias.  Hypoactive BS noted.  MS: No clubbing or cyanosis of nails noted; unable to assess gait.  Skin: No rashes, lesions, or ulcers; no nodules.  Injection sites are ok. No lipo hypertropthy or atrophy.  Mid-sternal incision with telfa island dressing, CDI.  CT x 2.  LLE wrapped with ACE wrap.  Psychiatric: ELIZABETH  Neurological: ELIZABETH  Foot: Nails in good condition, no amputations noted

## 2022-11-08 NOTE — ASSESSMENT & PLAN NOTE
62 y.o. male S/P TV replacement, MV replacement, MAZE, Lt Atrial Appendage ligation and Impella placement on 10/7/22, course complicated by bleeding, requiring reopening POD#0, multiple VT runs requiring DCCV, and sternal wound infections. He presents to the SICU s/p wound debridement and wire removal on 11/08       Neuro/Psych:     - Sedation: propofol and fentanyl     - Pain:    - Scheduled Tylenol 1g q8h   - Fentanyl PRN while intubated, Oxy PRN             Cardiac:      - BP Goal: MAP 60-80 and SBP <140    - Cleviprex/Cardene PRN    - Pressors: Epi .08, Vaso .04 and NorEpi .06    - Anti-HTNs: Will start when appropriate    - Rhythm: NSR. S/p MAZE for Afib,    - Beta blocker: Will start when appropriate    - Statin: Atorvastatin 40 mg QD   - Consider stress dose steroids after discussion with plastics      Pulmonary:     - Goal SpO2 >92%    - Will wean ventilator support as tolerated to extubate    - Chest Tubes x ** (** Meds & ** Pleural)    - ABGs PRN      Renal:    - Trend BUN/Cr     - Maintain Fierro, record strict Is/Os    Recent Labs   Lab 11/06/22  0530 11/07/22 0420 11/08/22  1134   BUN 31* 35* 36*   CREATININE 2.3* 2.7* 3.1*         FEN / GI:     - Daily CMP, PRN K/Mag/Phos per protocol     - Replace electrolytes as needed    - Nutrition: NPO pending extubation    - Bowel Regimen: Miralax, docusate      ID:     - Suspect sepsis secondary to presumed mediastinal infection     - WBC elevated post op, on schedule Abx    - Abx: Continue Cefepime, lactic acid improving      Recent Labs   Lab 11/04/22 0952 11/07/22 0420 11/08/22  1134   WBC 15.52* 15.60* 54.29*         Heme/Onc:     - Hgb 9.2 pre-operatively    - CBC daily    - ASA 325mg daily    Recent Labs   Lab 11/04/22  0952 11/07/22  0420 11/08/22  1134   HGB 9.8* 8.2* 9.2*   * 432 532*   APTT  --   --  24.7   INR  --   --  1.2         Endocrine:     - CTS Goal -140    - HgbA1c:     - Endocrinology consulted for insulin management       PPx:   Feeding: npo while intubated  Analgesia/Sedation: propofol and fentanyl   Thromboembolic Prevention: scds  HOB >30: Yes  Stress Ulcer: famotidine   Glucose Control: Yes, insulin management per Endocrinology     Lines/Drains/Airway:   ETT   Left radial arterial line   RIJ CVC   Fierro   Wound Vac      Dispo/Code Status/Palliative:     - Continue SICU Care    - Full Code

## 2022-11-09 ENCOUNTER — ANESTHESIA EVENT (OUTPATIENT)
Dept: SURGERY | Facility: HOSPITAL | Age: 62
DRG: 268 | End: 2022-11-09
Payer: COMMERCIAL

## 2022-11-09 LAB
ALBUMIN SERPL BCP-MCNC: 2.5 G/DL (ref 3.5–5.2)
ALBUMIN SERPL BCP-MCNC: 2.6 G/DL (ref 3.5–5.2)
ALBUMIN SERPL BCP-MCNC: 2.7 G/DL (ref 3.5–5.2)
ALBUMIN SERPL BCP-MCNC: 2.7 G/DL (ref 3.5–5.2)
ALLENS TEST: ABNORMAL
ALLENS TEST: ABNORMAL
ALLENS TEST: NORMAL
ALLENS TEST: NORMAL
ALP SERPL-CCNC: 83 U/L (ref 55–135)
ALP SERPL-CCNC: 88 U/L (ref 55–135)
ALP SERPL-CCNC: 91 U/L (ref 55–135)
ALP SERPL-CCNC: 91 U/L (ref 55–135)
ALT SERPL W/O P-5'-P-CCNC: 5 U/L (ref 10–44)
ALT SERPL W/O P-5'-P-CCNC: 5 U/L (ref 10–44)
ALT SERPL W/O P-5'-P-CCNC: 6 U/L (ref 10–44)
ALT SERPL W/O P-5'-P-CCNC: <5 U/L (ref 10–44)
ANION GAP SERPL CALC-SCNC: 11 MMOL/L (ref 8–16)
ANION GAP SERPL CALC-SCNC: 11 MMOL/L (ref 8–16)
ANION GAP SERPL CALC-SCNC: 14 MMOL/L (ref 8–16)
ANION GAP SERPL CALC-SCNC: 14 MMOL/L (ref 8–16)
ANISOCYTOSIS BLD QL SMEAR: SLIGHT
APTT BLDCRRT: 23.9 SEC (ref 21–32)
APTT BLDCRRT: 25.1 SEC (ref 21–32)
APTT BLDCRRT: 25.4 SEC (ref 21–32)
AST SERPL-CCNC: 13 U/L (ref 10–40)
AST SERPL-CCNC: 15 U/L (ref 10–40)
AST SERPL-CCNC: 15 U/L (ref 10–40)
AST SERPL-CCNC: 20 U/L (ref 10–40)
BASO STIPL BLD QL SMEAR: ABNORMAL
BASOPHILS # BLD AUTO: 0.08 K/UL (ref 0–0.2)
BASOPHILS # BLD AUTO: 0.12 K/UL (ref 0–0.2)
BASOPHILS # BLD AUTO: ABNORMAL K/UL (ref 0–0.2)
BASOPHILS NFR BLD: 0.2 % (ref 0–1.9)
BASOPHILS NFR BLD: 0.3 % (ref 0–1.9)
BASOPHILS NFR BLD: 0.5 % (ref 0–1.9)
BILIRUB SERPL-MCNC: 1.3 MG/DL (ref 0.1–1)
BILIRUB SERPL-MCNC: 2.6 MG/DL (ref 0.1–1)
BILIRUB SERPL-MCNC: 3.4 MG/DL (ref 0.1–1)
BILIRUB SERPL-MCNC: 3.6 MG/DL (ref 0.1–1)
BUN SERPL-MCNC: 40 MG/DL (ref 8–23)
BUN SERPL-MCNC: 43 MG/DL (ref 8–23)
BUN SERPL-MCNC: 46 MG/DL (ref 8–23)
BUN SERPL-MCNC: 46 MG/DL (ref 8–23)
CA-I BLDV-SCNC: 1.17 MMOL/L (ref 1.06–1.42)
CALCIUM SERPL-MCNC: 8.7 MG/DL (ref 8.7–10.5)
CALCIUM SERPL-MCNC: 9.1 MG/DL (ref 8.7–10.5)
CALCIUM SERPL-MCNC: 9.2 MG/DL (ref 8.7–10.5)
CALCIUM SERPL-MCNC: 9.4 MG/DL (ref 8.7–10.5)
CHLORIDE SERPL-SCNC: 95 MMOL/L (ref 95–110)
CHLORIDE SERPL-SCNC: 95 MMOL/L (ref 95–110)
CHLORIDE SERPL-SCNC: 96 MMOL/L (ref 95–110)
CHLORIDE SERPL-SCNC: 98 MMOL/L (ref 95–110)
CO2 SERPL-SCNC: 24 MMOL/L (ref 23–29)
CO2 SERPL-SCNC: 25 MMOL/L (ref 23–29)
CO2 SERPL-SCNC: 25 MMOL/L (ref 23–29)
CO2 SERPL-SCNC: 27 MMOL/L (ref 23–29)
CREAT SERPL-MCNC: 3.2 MG/DL (ref 0.5–1.4)
CREAT SERPL-MCNC: 3.4 MG/DL (ref 0.5–1.4)
CREAT SERPL-MCNC: 3.5 MG/DL (ref 0.5–1.4)
CREAT SERPL-MCNC: 3.5 MG/DL (ref 0.5–1.4)
DELSYS: ABNORMAL
DELSYS: ABNORMAL
DELSYS: NORMAL
DELSYS: NORMAL
DIFFERENTIAL METHOD: ABNORMAL
EOSINOPHIL # BLD AUTO: 0 K/UL (ref 0–0.5)
EOSINOPHIL # BLD AUTO: 0 K/UL (ref 0–0.5)
EOSINOPHIL # BLD AUTO: ABNORMAL K/UL (ref 0–0.5)
EOSINOPHIL NFR BLD: 0 % (ref 0–8)
EOSINOPHIL NFR BLD: 0 % (ref 0–8)
EOSINOPHIL NFR BLD: 0.1 % (ref 0–8)
ERYTHROCYTE [DISTWIDTH] IN BLOOD BY AUTOMATED COUNT: 15 % (ref 11.5–14.5)
ERYTHROCYTE [DISTWIDTH] IN BLOOD BY AUTOMATED COUNT: 15 % (ref 11.5–14.5)
ERYTHROCYTE [DISTWIDTH] IN BLOOD BY AUTOMATED COUNT: 15.3 % (ref 11.5–14.5)
ERYTHROCYTE [SEDIMENTATION RATE] IN BLOOD BY WESTERGREN METHOD: 14 MM/H
ERYTHROCYTE [SEDIMENTATION RATE] IN BLOOD BY WESTERGREN METHOD: 14 MM/H
ERYTHROCYTE [SEDIMENTATION RATE] IN BLOOD BY WESTERGREN METHOD: 22 MM/H
ERYTHROCYTE [SEDIMENTATION RATE] IN BLOOD BY WESTERGREN METHOD: 22 MM/H
EST. GFR  (NO RACE VARIABLE): 18.8 ML/MIN/1.73 M^2
EST. GFR  (NO RACE VARIABLE): 18.8 ML/MIN/1.73 M^2
EST. GFR  (NO RACE VARIABLE): 19.5 ML/MIN/1.73 M^2
EST. GFR  (NO RACE VARIABLE): 20.9 ML/MIN/1.73 M^2
FIO2: 35
FIO2: 35
FIO2: 40
FIO2: 40
GLUCOSE SERPL-MCNC: 121 MG/DL (ref 70–110)
GLUCOSE SERPL-MCNC: 122 MG/DL (ref 70–110)
GLUCOSE SERPL-MCNC: 130 MG/DL (ref 70–110)
GLUCOSE SERPL-MCNC: 151 MG/DL (ref 70–110)
HCO3 UR-SCNC: 27.6 MMOL/L (ref 24–28)
HCO3 UR-SCNC: 29.7 MMOL/L (ref 24–28)
HCT VFR BLD AUTO: 23.4 % (ref 40–54)
HCT VFR BLD AUTO: 24.1 % (ref 40–54)
HCT VFR BLD AUTO: 24.5 % (ref 40–54)
HCT VFR BLD CALC: 22 %PCV (ref 36–54)
HCT VFR BLD CALC: 24 %PCV (ref 36–54)
HGB BLD-MCNC: 7.9 G/DL (ref 14–18)
HGB BLD-MCNC: 8.1 G/DL (ref 14–18)
HGB BLD-MCNC: 8.2 G/DL (ref 14–18)
HYPOCHROMIA BLD QL SMEAR: ABNORMAL
IMM GRANULOCYTES # BLD AUTO: 1.09 K/UL (ref 0–0.04)
IMM GRANULOCYTES # BLD AUTO: 1.4 K/UL (ref 0–0.04)
IMM GRANULOCYTES # BLD AUTO: ABNORMAL K/UL (ref 0–0.04)
IMM GRANULOCYTES NFR BLD AUTO: 2.8 % (ref 0–0.5)
IMM GRANULOCYTES NFR BLD AUTO: 3.1 % (ref 0–0.5)
IMM GRANULOCYTES NFR BLD AUTO: ABNORMAL % (ref 0–0.5)
INR PPP: 1.1 (ref 0.8–1.2)
INR PPP: 1.1 (ref 0.8–1.2)
INR PPP: 1.2 (ref 0.8–1.2)
LDH SERPL L TO P-CCNC: 0.63 MMOL/L (ref 0.36–1.25)
LDH SERPL L TO P-CCNC: 0.81 MMOL/L (ref 0.36–1.25)
LYMPHOCYTES # BLD AUTO: 1.4 K/UL (ref 1–4.8)
LYMPHOCYTES # BLD AUTO: 1.5 K/UL (ref 1–4.8)
LYMPHOCYTES # BLD AUTO: ABNORMAL K/UL (ref 1–4.8)
LYMPHOCYTES NFR BLD: 2.5 % (ref 18–48)
LYMPHOCYTES NFR BLD: 3.2 % (ref 18–48)
LYMPHOCYTES NFR BLD: 3.6 % (ref 18–48)
MAGNESIUM SERPL-MCNC: 2.2 MG/DL (ref 1.6–2.6)
MAGNESIUM SERPL-MCNC: 2.3 MG/DL (ref 1.6–2.6)
MCH RBC QN AUTO: 29.5 PG (ref 27–31)
MCH RBC QN AUTO: 29.9 PG (ref 27–31)
MCH RBC QN AUTO: 30 PG (ref 27–31)
MCHC RBC AUTO-ENTMCNC: 33.1 G/DL (ref 32–36)
MCHC RBC AUTO-ENTMCNC: 33.8 G/DL (ref 32–36)
MCHC RBC AUTO-ENTMCNC: 34 G/DL (ref 32–36)
MCV RBC AUTO: 88 FL (ref 82–98)
MCV RBC AUTO: 89 FL (ref 82–98)
MCV RBC AUTO: 89 FL (ref 82–98)
MIN VOL: 10
MIN VOL: 10
MODE: ABNORMAL
MODE: ABNORMAL
MODE: NORMAL
MODE: NORMAL
MONOCYTES # BLD AUTO: 2.6 K/UL (ref 0.3–1)
MONOCYTES # BLD AUTO: 3.2 K/UL (ref 0.3–1)
MONOCYTES # BLD AUTO: ABNORMAL K/UL (ref 0.3–1)
MONOCYTES NFR BLD: 1.5 % (ref 4–15)
MONOCYTES NFR BLD: 6.8 % (ref 4–15)
MONOCYTES NFR BLD: 7 % (ref 4–15)
NEUTROPHILS # BLD AUTO: 33.2 K/UL (ref 1.8–7.7)
NEUTROPHILS # BLD AUTO: 39 K/UL (ref 1.8–7.7)
NEUTROPHILS # BLD AUTO: ABNORMAL K/UL (ref 1.8–7.7)
NEUTROPHILS NFR BLD: 86.4 % (ref 38–73)
NEUTROPHILS NFR BLD: 86.5 % (ref 38–73)
NEUTROPHILS NFR BLD: 93.5 % (ref 38–73)
NEUTS BAND NFR BLD MANUAL: 2 %
NRBC BLD-RTO: 0 /100 WBC
PCO2 BLDA: 37 MMHG (ref 35–45)
PCO2 BLDA: 44.3 MMHG (ref 35–45)
PEEP: 8
PEEP: 8
PH SMN: 7.43 [PH] (ref 7.35–7.45)
PH SMN: 7.48 [PH] (ref 7.35–7.45)
PHOSPHATE SERPL-MCNC: 3.8 MG/DL (ref 2.7–4.5)
PHOSPHATE SERPL-MCNC: 4.3 MG/DL (ref 2.7–4.5)
PHOSPHATE SERPL-MCNC: 4.8 MG/DL (ref 2.7–4.5)
PIP: 23
PIP: 24
PLATELET # BLD AUTO: 384 K/UL (ref 150–450)
PLATELET # BLD AUTO: 390 K/UL (ref 150–450)
PLATELET # BLD AUTO: 392 K/UL (ref 150–450)
PLATELET BLD QL SMEAR: ABNORMAL
PMV BLD AUTO: 10 FL (ref 9.2–12.9)
PMV BLD AUTO: 9.9 FL (ref 9.2–12.9)
PMV BLD AUTO: 9.9 FL (ref 9.2–12.9)
PO2 BLDA: 119 MMHG (ref 80–100)
PO2 BLDA: 147 MMHG (ref 80–100)
POC BE: 4 MMOL/L
POC BE: 5 MMOL/L
POC IONIZED CALCIUM: 1.17 MMOL/L (ref 1.06–1.42)
POC IONIZED CALCIUM: 1.23 MMOL/L (ref 1.06–1.42)
POC SATURATED O2: 99 % (ref 95–100)
POC SATURATED O2: 99 % (ref 95–100)
POC TCO2: 29 MMOL/L (ref 23–27)
POC TCO2: 31 MMOL/L (ref 23–27)
POCT GLUCOSE: 112 MG/DL (ref 70–110)
POCT GLUCOSE: 117 MG/DL (ref 70–110)
POCT GLUCOSE: 127 MG/DL (ref 70–110)
POCT GLUCOSE: 131 MG/DL (ref 70–110)
POCT GLUCOSE: 133 MG/DL (ref 70–110)
POCT GLUCOSE: 137 MG/DL (ref 70–110)
POCT GLUCOSE: 138 MG/DL (ref 70–110)
POCT GLUCOSE: 151 MG/DL (ref 70–110)
POLYCHROMASIA BLD QL SMEAR: ABNORMAL
POTASSIUM BLD-SCNC: 3.8 MMOL/L (ref 3.5–5.1)
POTASSIUM BLD-SCNC: 3.9 MMOL/L (ref 3.5–5.1)
POTASSIUM SERPL-SCNC: 3.6 MMOL/L (ref 3.5–5.1)
POTASSIUM SERPL-SCNC: 3.8 MMOL/L (ref 3.5–5.1)
POTASSIUM SERPL-SCNC: 4.1 MMOL/L (ref 3.5–5.1)
POTASSIUM SERPL-SCNC: 4.2 MMOL/L (ref 3.5–5.1)
PROT SERPL-MCNC: 5.4 G/DL (ref 6–8.4)
PROT SERPL-MCNC: 5.5 G/DL (ref 6–8.4)
PROT SERPL-MCNC: 5.7 G/DL (ref 6–8.4)
PROT SERPL-MCNC: 6.1 G/DL (ref 6–8.4)
PROTHROMBIN TIME: 11.4 SEC (ref 9–12.5)
PROTHROMBIN TIME: 11.7 SEC (ref 9–12.5)
PROTHROMBIN TIME: 11.9 SEC (ref 9–12.5)
RBC # BLD AUTO: 2.64 M/UL (ref 4.6–6.2)
RBC # BLD AUTO: 2.73 M/UL (ref 4.6–6.2)
RBC # BLD AUTO: 2.75 M/UL (ref 4.6–6.2)
SAMPLE: ABNORMAL
SAMPLE: ABNORMAL
SAMPLE: NORMAL
SAMPLE: NORMAL
SITE: ABNORMAL
SITE: ABNORMAL
SITE: NORMAL
SITE: NORMAL
SODIUM BLD-SCNC: 135 MMOL/L (ref 136–145)
SODIUM BLD-SCNC: 136 MMOL/L (ref 136–145)
SODIUM SERPL-SCNC: 133 MMOL/L (ref 136–145)
SODIUM SERPL-SCNC: 134 MMOL/L (ref 136–145)
SP02: 100
SP02: 99
VT: 450
VT: 450
WBC # BLD AUTO: 38.39 K/UL (ref 3.9–12.7)
WBC # BLD AUTO: 43.23 K/UL (ref 3.9–12.7)
WBC # BLD AUTO: 45.14 K/UL (ref 3.9–12.7)

## 2022-11-09 PROCEDURE — 63600175 PHARM REV CODE 636 W HCPCS

## 2022-11-09 PROCEDURE — 27000221 HC OXYGEN, UP TO 24 HOURS

## 2022-11-09 PROCEDURE — 85014 HEMATOCRIT: CPT

## 2022-11-09 PROCEDURE — 94761 N-INVAS EAR/PLS OXIMETRY MLT: CPT

## 2022-11-09 PROCEDURE — 84295 ASSAY OF SERUM SODIUM: CPT

## 2022-11-09 PROCEDURE — 99291 CRITICAL CARE FIRST HOUR: CPT | Mod: ,,, | Performed by: ANESTHESIOLOGY

## 2022-11-09 PROCEDURE — 25000003 PHARM REV CODE 250: Performed by: STUDENT IN AN ORGANIZED HEALTH CARE EDUCATION/TRAINING PROGRAM

## 2022-11-09 PROCEDURE — 25000003 PHARM REV CODE 250

## 2022-11-09 PROCEDURE — 85730 THROMBOPLASTIN TIME PARTIAL: CPT | Mod: 91

## 2022-11-09 PROCEDURE — 83605 ASSAY OF LACTIC ACID: CPT

## 2022-11-09 PROCEDURE — 85025 COMPLETE CBC W/AUTO DIFF WBC: CPT

## 2022-11-09 PROCEDURE — 99900035 HC TECH TIME PER 15 MIN (STAT)

## 2022-11-09 PROCEDURE — 80053 COMPREHEN METABOLIC PANEL: CPT | Mod: 91

## 2022-11-09 PROCEDURE — 84100 ASSAY OF PHOSPHORUS: CPT

## 2022-11-09 PROCEDURE — 82330 ASSAY OF CALCIUM: CPT

## 2022-11-09 PROCEDURE — 84100 ASSAY OF PHOSPHORUS: CPT | Mod: 91

## 2022-11-09 PROCEDURE — 99900026 HC AIRWAY MAINTENANCE (STAT)

## 2022-11-09 PROCEDURE — 99232 SBSQ HOSP IP/OBS MODERATE 35: CPT | Mod: ,,, | Performed by: NURSE PRACTITIONER

## 2022-11-09 PROCEDURE — 82803 BLOOD GASES ANY COMBINATION: CPT

## 2022-11-09 PROCEDURE — 82330 ASSAY OF CALCIUM: CPT | Performed by: THORACIC SURGERY (CARDIOTHORACIC VASCULAR SURGERY)

## 2022-11-09 PROCEDURE — 99291 PR CRITICAL CARE, E/M 30-74 MINUTES: ICD-10-PCS | Mod: ,,, | Performed by: ANESTHESIOLOGY

## 2022-11-09 PROCEDURE — 85007 BL SMEAR W/DIFF WBC COUNT: CPT

## 2022-11-09 PROCEDURE — 37799 UNLISTED PX VASCULAR SURGERY: CPT

## 2022-11-09 PROCEDURE — 63600175 PHARM REV CODE 636 W HCPCS: Performed by: ANESTHESIOLOGY

## 2022-11-09 PROCEDURE — 85610 PROTHROMBIN TIME: CPT | Mod: 91

## 2022-11-09 PROCEDURE — 85027 COMPLETE CBC AUTOMATED: CPT

## 2022-11-09 PROCEDURE — 83735 ASSAY OF MAGNESIUM: CPT | Mod: 91

## 2022-11-09 PROCEDURE — 83735 ASSAY OF MAGNESIUM: CPT

## 2022-11-09 PROCEDURE — 25000003 PHARM REV CODE 250: Performed by: ANESTHESIOLOGY

## 2022-11-09 PROCEDURE — 84132 ASSAY OF SERUM POTASSIUM: CPT

## 2022-11-09 PROCEDURE — 99232 PR SUBSEQUENT HOSPITAL CARE,LEVL II: ICD-10-PCS | Mod: ,,, | Performed by: NURSE PRACTITIONER

## 2022-11-09 PROCEDURE — 20000000 HC ICU ROOM

## 2022-11-09 RX ORDER — OXYCODONE HYDROCHLORIDE 10 MG/1
10 TABLET ORAL EVERY 6 HOURS PRN
Status: DISCONTINUED | OUTPATIENT
Start: 2022-11-09 | End: 2022-11-15

## 2022-11-09 RX ORDER — AMOXICILLIN 250 MG
1 CAPSULE ORAL DAILY
Status: DISCONTINUED | OUTPATIENT
Start: 2022-11-09 | End: 2022-11-12

## 2022-11-09 RX ORDER — GLUCAGON 1 MG
1 KIT INJECTION
Status: DISCONTINUED | OUTPATIENT
Start: 2022-11-09 | End: 2022-11-11

## 2022-11-09 RX ORDER — AMIODARONE HYDROCHLORIDE 200 MG/1
200 TABLET ORAL 2 TIMES DAILY
Status: DISCONTINUED | OUTPATIENT
Start: 2022-11-09 | End: 2022-12-01 | Stop reason: HOSPADM

## 2022-11-09 RX ORDER — INSULIN ASPART 100 [IU]/ML
0-5 INJECTION, SOLUTION INTRAVENOUS; SUBCUTANEOUS EVERY 6 HOURS PRN
Status: DISCONTINUED | OUTPATIENT
Start: 2022-11-09 | End: 2022-11-11

## 2022-11-09 RX ORDER — NAPROXEN SODIUM 220 MG/1
81 TABLET, FILM COATED ORAL DAILY
Status: DISCONTINUED | OUTPATIENT
Start: 2022-11-10 | End: 2022-12-01 | Stop reason: HOSPADM

## 2022-11-09 RX ORDER — OXYCODONE HYDROCHLORIDE 5 MG/1
5 TABLET ORAL EVERY 6 HOURS PRN
Status: DISCONTINUED | OUTPATIENT
Start: 2022-11-09 | End: 2022-11-15

## 2022-11-09 RX ORDER — DEXMEDETOMIDINE HYDROCHLORIDE 4 UG/ML
0-1.4 INJECTION, SOLUTION INTRAVENOUS CONTINUOUS
Status: DISCONTINUED | OUTPATIENT
Start: 2022-11-09 | End: 2022-11-09

## 2022-11-09 RX ORDER — POLYETHYLENE GLYCOL 3350 17 G/17G
17 POWDER, FOR SOLUTION ORAL DAILY
Status: DISCONTINUED | OUTPATIENT
Start: 2022-11-09 | End: 2022-12-01 | Stop reason: HOSPADM

## 2022-11-09 RX ORDER — OXYCODONE HYDROCHLORIDE 5 MG/1
5 TABLET ORAL EVERY 6 HOURS PRN
Status: DISCONTINUED | OUTPATIENT
Start: 2022-11-09 | End: 2022-11-09

## 2022-11-09 RX ORDER — POTASSIUM CHLORIDE 20 MEQ/1
20 TABLET, EXTENDED RELEASE ORAL ONCE
Status: COMPLETED | OUTPATIENT
Start: 2022-11-09 | End: 2022-11-09

## 2022-11-09 RX ORDER — AMIODARONE HYDROCHLORIDE 200 MG/1
200 TABLET ORAL DAILY
Status: CANCELLED | OUTPATIENT
Start: 2022-11-10

## 2022-11-09 RX ADMIN — AMIODARONE HYDROCHLORIDE 200 MG: 200 TABLET ORAL at 08:11

## 2022-11-09 RX ADMIN — OXYCODONE HYDROCHLORIDE 10 MG: 10 TABLET ORAL at 11:11

## 2022-11-09 RX ADMIN — MILRINONE LACTATE IN DEXTROSE 0.25 MCG/KG/MIN: 200 INJECTION, SOLUTION INTRAVENOUS at 02:11

## 2022-11-09 RX ADMIN — POTASSIUM CHLORIDE 20 MEQ: 1500 TABLET, EXTENDED RELEASE ORAL at 06:11

## 2022-11-09 RX ADMIN — HEPARIN SODIUM 5000 UNITS: 5000 INJECTION INTRAVENOUS; SUBCUTANEOUS at 06:11

## 2022-11-09 RX ADMIN — POLYETHYLENE GLYCOL 3350 17 G: 17 POWDER, FOR SOLUTION ORAL at 11:11

## 2022-11-09 RX ADMIN — VASOPRESSIN 0.04 UNITS/MIN: 20 INJECTION INTRAVENOUS at 02:11

## 2022-11-09 RX ADMIN — DEXMEDETOMIDINE HYDROCHLORIDE 0.2 MCG/KG/HR: 4 INJECTION, SOLUTION INTRAVENOUS at 02:11

## 2022-11-09 RX ADMIN — CEFEPIME 2 G: 2 INJECTION, POWDER, FOR SOLUTION INTRAVENOUS at 03:11

## 2022-11-09 RX ADMIN — AMIODARONE HYDROCHLORIDE 200 MG: 200 TABLET ORAL at 09:11

## 2022-11-09 RX ADMIN — SENNOSIDES AND DOCUSATE SODIUM 1 TABLET: 50; 8.6 TABLET ORAL at 11:11

## 2022-11-09 RX ADMIN — OXYCODONE 5 MG: 5 TABLET ORAL at 09:11

## 2022-11-09 RX ADMIN — PROPOFOL 40 MCG/KG/MIN: 10 INJECTION, EMULSION INTRAVENOUS at 01:11

## 2022-11-09 RX ADMIN — FAMOTIDINE 20 MG: 20 TABLET ORAL at 08:11

## 2022-11-09 RX ADMIN — HEPARIN SODIUM 5000 UNITS: 5000 INJECTION INTRAVENOUS; SUBCUTANEOUS at 09:11

## 2022-11-09 RX ADMIN — HEPARIN SODIUM 5000 UNITS: 5000 INJECTION INTRAVENOUS; SUBCUTANEOUS at 03:11

## 2022-11-09 NOTE — ASSESSMENT & PLAN NOTE
Endocrinology consulted for BG management.   BG goal 110-140 CTS    - Novolog (Insulin Aspart) prn for BG excursions LDC SSI (150/50)  - BG checks q6hr while NPO  - Hypoglycemia protocol in place      ** Please notify Endocrine for any change and/or advance in diet**  ** Please call Endocrine for any BG related issues **    Discharge Planning:   TBD. Please notify endocrinology prior to discharge.

## 2022-11-09 NOTE — ANESTHESIA PREPROCEDURE EVALUATION
[facility]  Anesthesia Pre-Operative Evaluation         Patient Name: David Barrios  YOB: 1960  MRN: [id number]    SUBJECTIVE:     Pre-operative evaluation for Procedure(s) (LRB):  WASHOUT (N/A)     11/09/2022    David Barrios is a 62 y.o. male w/ a significant PMHx of AF, HTN, HLD, HFrEF (EF 35%), severe mitral regurgitations/p MVr, TVr, and centrally-placed impella via aortic graft. Post-op, remained intubated and sedated with increasing pressor and inotrope requirements and increasing lactate as well as several episodes of VT requiring cardioversion. Post-op also c/b bleeding requiring return to the OR for emergent sternotomy and washout with wound vac placement. Discharged to LTAC in stable condition, admitted back to [facility] and underwent sternal closure on 11/08/22.    On 0.03 epi and 0.25 milrinone.    Patient now presents for the above procedure(s).      LDA:  PICC Double Lumen 10/17/22 1709 right brachial (Active)   Line Necessity Review Medication caustic to vasculature 11/09/22 0715   Verification by X-ray Yes 11/09/22 0715   Site Assessment No drainage;No swelling;No warmth;No redness 11/09/22 0715   Extremity Assessment Distal to IV No warmth;No swelling;No redness;No abnormal discoloration 11/09/22 0715   Line Securement Device Secured with sutureless device 11/09/22 0715   Dressing Type Biopatch in place;Central line dressing 11/09/22 0715   Dressing Status Clean;Dry;Intact 11/09/22 0715   Dressing Intervention Integrity maintained 11/09/22 0715   Date on Dressing 11/08/22 11/09/22 0715   Dressing Due to be Changed 11/15/22 11/09/22 0715   Left Lumen Patency/Care Blood return not present;Flushed w/o difficulty 11/09/22 0715   Right Lumen Patency/Care Blood return not present 11/09/22 0715   Current Insertion Depth (cm) 39 cm 10/17/22 1709   Current Exposed Catheter (cm) 0 cm 10/17/22 1709   Extremity Circumference (cm) 34 cm 10/17/22 1709   Waveform Not being transduced  11/09/22 0305   Number of days: 22       Introducer 11/08/22 0802 (Active)   Line Necessity Review Medication caustic to vasculature 11/09/22 0715   Site Assessment No warmth;No swelling;No redness;No drainage 11/09/22 0715   Line Securement Device Secured with sutures 11/09/22 0715   Dressing Type Biopatch in place;Central line dressing 11/09/22 0715   Dressing Status Clean;Dry;Intact 11/09/22 0715   Dressing Intervention Integrity maintained 11/09/22 0715   Date on Dressing 11/12/22 11/09/22 0715   Dressing Due to be Changed 11/12/22 11/09/22 0715   Number of days: 1       Percutaneous Central Line Insertion/Assessment - Triple Lumen  11/08/22 0800 right internal jugular (Active)   Line Necessity Review Medication caustic to vasculature 11/09/22 0715   Verification by X-ray Yes 11/09/22 0715   Site Assessment No warmth;No swelling;No redness;No drainage 11/09/22 0715   Line Securement Device Secured with sutures 11/09/22 0715   Dressing Type Biopatch in place;Central line dressing 11/09/22 0715   Dressing Status Dry;Intact;Clean 11/09/22 0715   Dressing Intervention Integrity maintained 11/09/22 0715   Date on Dressing 11/08/22 11/09/22 0715   Dressing Due to be Changed 11/15/22 11/09/22 0715   Distal Patency/Care infusing 11/09/22 0715   Medial 1 Patency/Care infusing 11/09/22 0715   Proximal 1 Patency/Care infusing 11/09/22 0715   Waveform Normal 11/09/22 0305   Number of days: 1            Peripheral IV - Single Lumen 11/08/22 0730 14 G  Left Forearm (Active)   Site Assessment Clean;Dry;Intact;No redness;No swelling 11/09/22 0715   Extremity Assessment Distal to IV No warmth;No swelling;No redness;No abnormal discoloration 11/09/22 0715   Line Status Infusing 11/09/22 0715   Dressing Status Clean;Intact;Dry 11/09/22 0715   Dressing Intervention Integrity maintained 11/09/22 0715   Dressing Change Due 11/12/22 11/09/22 0715   Site Change Due 11/12/22 11/09/22 0715   Reason Not Rotated Not due 11/09/22 6210  "  Number of days: 1       Arterial Line 11/08/22 0712 Right Radial (Active)   Site Assessment Clean;Dry;Intact;No redness;No swelling 11/09/22 0715   Line Status Pulsatile blood flow 11/09/22 0715   Art Line Waveform Appropriate 11/09/22 0715   Arterial Line Interventions Zeroed and calibrated;Leveled 11/09/22 0715   Color/Movement/Sensation Capillary refill less than 3 sec 11/09/22 0715   Dressing Type Biopatch in place;Central line dressing 11/09/22 0715   Dressing Status Clean;Intact;Dry 11/09/22 0715   Dressing Intervention Integrity maintained 11/09/22 0715   Dressing Change Due 11/12/22 11/09/22 0715   Tubing Change Due 11/12/22 11/09/22 0715   Number of days: 1            Urethral Catheter 11/08/22 0730 Non-latex;Temperature probe 14 Fr. (Active)   Site Assessment Clean;Intact 11/09/22 0715   Collection Container Urimeter 11/09/22 0715   Securement Method secured to top of thigh w/ adhesive device 11/09/22 0715   Catheter Care Performed yes 11/09/22 0715   Reason for Continuing Urinary Catheterization Critically ill in ICU and requiring hourly monitoring of intake/output;Post operative 11/09/22 0715   CAUTI Prevention Bundle Securement Device in place with 1" slack;Drainage bag/urimeter off the floor;Intact seal between catheter & drainage tubing;Sheeting clip in use;No dependent loops or kinks;Drainage bag/urimeter not overfilled (<2/3 full);Drainage bag/urimeter below bladder 11/09/22 0715   Output (mL) 25 mL 11/09/22 0900   Number of days: 1       Prev airway:  Intubation     Date/Time: 11/8/2022 7:12 AM  Performed by: Carlos Enrique Nicole MD  Authorized by: Jesus Pop MD      Intubation:     Induction:  Intravenous    Intubated:  Postinduction    Mask Ventilation:  Easy mask    Attempts:  1    Attempted By:  Student    Method of Intubation:  Video laryngoscopy    Blade:  William 3    Laryngeal View Grade: Grade I - full view of cords      Difficult Airway Encountered?: No      Complications:  None    " Airway Device:  Oral endotracheal tube    Airway Device Size:  7.5    Tube secured:  22    Secured at:  The teeth    Placement Verified By:  Capnometry    Complicating Factors:  None    Findings Post-Intubation:  BS equal bilateral and atraumatic/condition of teeth unchanged       Drips:   EPINEPHrine 0.04 mcg/kg/min (11/09/22 0900)    milrinone 20mg/100ml D5W (200mcg/ml) 0.375 mcg/kg/min (11/09/22 0900)    NORepinephrine bitartrate-D5W Stopped (11/08/22 2246)    vasopressin Stopped (11/09/22 0228)       Patient Active Problem List   Diagnosis    Microcytic anemia    Hypokalemia    Elevated LFTs    Essential hypertension    Screening for malignant neoplasm of colon    Persistent atrial fibrillation    Dyspnea on exertion    Abnormal electrocardiogram    Acute systolic congestive heart failure    Nonrheumatic mitral valve regurgitation    Left ventricular systolic dysfunction    VT (ventricular tachycardia)    Transient hyperglycemia post procedure    Surgical wound present    Shock    Sternal wound infection    Status post tricuspid valve repair    Status post ligation of left atrial appendage    Status post Maze operation for atrial fibrillation    Sternal osteomyelitis       Review of patient's allergies indicates:  No Known Allergies    Current Inpatient Medications:   albumin human 5%  25 g Intravenous Once    amiodarone  200 mg Oral BID    ceFEPime (MAXIPIME) IVPB  2 g Intravenous Q24H    famotidine  20 mg Oral Daily    heparin (porcine)  5,000 Units Subcutaneous Q8H    polyethylene glycol  17 g Oral Daily    senna-docusate 8.6-50 mg  1 tablet Oral Daily       No current facility-administered medications on file prior to encounter.     Current Outpatient Medications on File Prior to Encounter   Medication Sig Dispense Refill    amiodarone (PACERONE) 200 MG Tab Take 1 tablet (200 mg total) by mouth 2 (two) times daily. 60 tablet 2    carvediloL (COREG) 12.5 MG tablet Take 1  tablet (12.5 mg total) by mouth 2 (two) times daily with meals. 180 tablet 3    furosemide (LASIX) 40 MG tablet TAKE 1 TABLET(40 MG) BY MOUTH TWICE DAILY 180 tablet 3    hydroCHLOROthiazide (HYDRODIURIL) 25 MG tablet Take 25 mg by mouth once daily.      albuterol (PROVENTIL/VENTOLIN HFA) 90 mcg/actuation inhaler inhale 1-2 Puff(s) By Mouth Every 4 Hours as needed 8.5 g 0    ferrous sulfate (FEOSOL) 325 mg (65 mg iron) Tab tablet Take 1 tablet (325 mg total) by mouth daily with breakfast.  0    rivaroxaban (XARELTO) 20 mg Tab Take 1 tablet (20 mg total) by mouth daily with dinner or evening meal. 90 tablet 3       Past Surgical History:   Procedure Laterality Date    APPLICATION OF WOUND VACUUM-ASSISTED CLOSURE DEVICE N/A 11/8/2022    Procedure: APPLICATION, WOUND VAC;  Surgeon: Mike Hedrick MD;  Location: [address];  Service: General;  Laterality: N/A;    CARDIOVERSION Left 9/15/2022    Procedure: Cardioversion;  Surgeon: Julio James MD;  Location: [facility] CATH LAB/EP;  Service: Cardiology;  Laterality: Left;  With NORBERT    CATHETERIZATION OF BOTH LEFT AND RIGHT HEART N/A 9/22/2022    Procedure: CATHETERIZATION, HEART, BOTH LEFT AND RIGHT;  Surgeon: Julio James MD;  Location: [facility] CATH LAB/EP;  Service: Cardiology;  Laterality: N/A;    COLONOSCOPY N/A 4/4/2019    Procedure: COLONOSCOPY Golytely;  Surgeon: Umair Randolph MD;  Location: [facility] ENDO;  Service: Endoscopy;  Laterality: N/A;    CORONARY ANGIOGRAPHY N/A 9/22/2022    Procedure: ANGIOGRAM, CORONARY ARTERY;  Surgeon: Julio James MD;  Location: [facility] CATH LAB/EP;  Service: Cardiology;  Laterality: N/A;    MEYER MAZE PROCEDURE N/A 10/7/2022    Procedure: MEYER MAZE PROCEDURE;  Surgeon: Mike Hedrick MD;  Location: [facility] OR [address] [place] [address];  Service: Cardiovascular;  Laterality: N/A;    DEBRIDEMENT OF STERNUM N/A 11/8/2022    Procedure: DEBRIDEMENT, STERNUM;  Surgeon: Mike Hedrick MD;  Location: [facility] OR [address] [place] [address];  Service:  General;  Laterality: N/A;    ESOPHAGOGASTRODUODENOSCOPY N/A 10/12/2018    Procedure: EGD (ESOPHAGOGASTRODUODENOSCOPY);  Surgeon: Braden Herring MD;  Location: [facility] ENDO;  Service: Endoscopy;  Laterality: N/A;    ESOPHAGOGASTRODUODENOSCOPY N/A 4/4/2019    Procedure: EGD;  Surgeon: Umair Randolph MD;  Location: [facility] ENDO;  Service: Endoscopy;  Laterality: N/A;    EXCLUSION OF LEFT ATRIAL APPENDAGE N/A 10/7/2022    Procedure: EXCLUSION, LEFT ATRIAL APPENDAGE;  Surgeon: Mike Hedrick MD;  Location: [facility] OR 2ND FLR;  Service: Cardiovascular;  Laterality: N/A;    HERNIA REPAIR      INSERTION OF INTRAVASCULAR MICROAXIAL BLOOD PUMP N/A 10/7/2022    Procedure: INSERTION, IMPELLA;  Surgeon: Mike Hedrick MD;  Location: [facility] OR 2ND FLR;  Service: Cardiovascular;  Laterality: N/A;  direct aortic insertion    IRRIGATION OF MEDIASTINUM N/A 10/7/2022    Procedure: IRRIGATION, MEDIASTINUM;  Surgeon: Mike Hedrick MD;  Location: [facility] OR 2ND FLR;  Service: Cardiovascular;  Laterality: N/A;    STERNAL WIRES REMOVAL N/A 11/8/2022    Procedure: REMOVAL, STERNAL WIRE;  Surgeon: Mike Hedrick MD;  Location: [facility] OR 2ND FLR;  Service: General;  Laterality: N/A;  Sternal wire removal with muscle flap creation    TRICUSPID VALVULOPLASTY N/A 10/7/2022    Procedure: REPAIR, TRICUSPID VALVE;  Surgeon: Mike Hedrick MD;  Location: [facility] OR 2ND FLR;  Service: Cardiovascular;  Laterality: N/A;       Social History     Socioeconomic History    Marital status: Single   Tobacco Use    Smoking status: Never    Smokeless tobacco: Current     Types: Chew   Substance and Sexual Activity    Alcohol use: Yes     Alcohol/week: 20.0 standard drinks     Types: 20 Cans of beer per week    Drug use: No       OBJECTIVE:     Vital Signs Range (Last 24H):  Temp:  [35.6 °C (96.1 °F)-37.7 °C (99.9 °F)]   Pulse:  []   Resp:  [6-35]   BP: (108-144)/(54-67)   SpO2:  [94 %-100 %]   Arterial Line BP: ()/(44-57)        Significant Labs:  Lab Results   Component Value Date    WBC 45.14 (H) 11/09/2022    HGB 8.1 (L) 11/09/2022    HCT 24.5 (L) 11/09/2022     11/09/2022    ALT 5 (L) 11/09/2022    AST 15 11/09/2022     (L) 11/09/2022    K 4.1 11/09/2022    CL 95 11/09/2022    CREATININE 3.5 (H) 11/09/2022    BUN 43 (H) 11/09/2022    CO2 25 11/09/2022    TSH 2.183 08/29/2022    INR 1.1 11/09/2022    HGBA1C 5.5 09/02/2022       Diagnostic Studies: No relevant studies.    EKG:   Results for orders placed or performed during the hospital encounter of 10/02/22   EKG 12-lead    Collection Time: 10/19/22  9:47 AM    Narrative    Test Reason : I50.9,    Vent. Rate : 099 BPM     Atrial Rate : 107 BPM     P-R Int : 000 ms          QRS Dur : 102 ms      QT Int : 344 ms       P-R-T Axes : 000 -10 038 degrees     QTc Int : 441 ms    Atrial fibrillation  Nonspecific ST and T wave abnormality  Abnormal ECG  When compared with ECG of 11-OCT-2022 10:39,  Atrial fibrillation has replaced Sinus rhythm  QT has shortened  Confirmed by LALI COLUNGA MD ([id number]) on 10/19/2022 11:19:27 AM    Referred By: LISE GUZMÁN           Confirmed By:LALI COLUNGA MD       2D ECHO:  TTE:  Results for orders placed or performed during the hospital encounter of 10/02/22   Echo   Result Value Ref Range    Ascending aorta 3.32 cm    STJ 2.60 cm    AV mean gradient 6 mmHg    Ao peak nick 1.57 m/s    Ao VTI 23.85 cm    IVS 0.97 0.6 - 1.1 cm    LA size 4.26 cm    Left Atrium Major Axis 6.32 cm    Left Atrium Minor Axis 6.35 cm    LVIDd 5.78 3.5 - 6.0 cm    LVIDs 4.68 (A) 2.1 - 4.0 cm    LVOT diameter 2.01 cm    LVOT peak VTI 15.27 cm    Posterior Wall 0.94 0.6 - 1.1 cm    MV Peak A Nick 0.67 m/s    E wave deceleration time 175.84 msec    MV Peak E Nick 1.08 m/s    RA Major Axis 3.77 cm    RA Width 3.77 cm    RVDD 4.17 cm    Sinus 3.23 cm    TAPSE 1.19 cm    TDI LATERAL 0.06 m/s    TDI SEPTAL 0.06 m/s    LA WIDTH 4.91 cm    MV stenosis pressure 1/2 time 50.99  ms    LV Diastolic Volume 165.01 mL    LV Systolic Volume 101.25 mL    RV S' 6.80 cm/s    LVOT peak eliezer 1.00 m/s    LA volume (mod) 93.62 cm3    LV LATERAL E/E' RATIO 18.00 m/s    LV SEPTAL E/E' RATIO 18.00 m/s    FS 19 %    LA volume 112.63 cm3    LV mass 218.30 g    Left Ventricle Relative Wall Thickness 0.33 cm    AV valve area 2.03 cm2    AV Velocity Ratio 0.64     AV index (prosthetic) 0.64     MV valve area p 1/2 method 4.31 cm2    E/A ratio 1.61     Mean e' 0.06 m/s    LVOT area 3.2 cm2    LVOT stroke volume 48.43 cm3    AV peak gradient 10 mmHg    E/E' ratio 18.00 m/s    LV Systolic Volume Index 50.4 mL/m2    LV Diastolic Volume Index 82.09 mL/m2    LA Volume Index 56.0 mL/m2    LV Mass Index 109 g/m2    LA Volume Index (Mod) 46.6 mL/m2    BSA 2.03 m2    EF 35 %    AV regurgitation pressure 1/2 time 320 ms    Narrative    · The left ventricle is normal in size with moderately decreased systolic   function. The estimated ejection fraction is 35%.  · Mild right ventricular enlargement with moderately reduced right   ventricular systolic function.  · Indeterminate left ventricular diastolic function.  · Severe left atrial enlargement.  · Moderate aortic regurgitation.  · The mitral and tricuspid valves are s/p repair with no significant   residual regurgitation.  · Moderate circumferential pericardial effusion with focal stranding. No   evidence of tamponade.  · The IVC was not visualized.          NORBERT:  No results found for this or any previous visit.    ASSESSMENT/PLAN:                                                                                                                  11/09/2022  David Barrios is a 62 y.o., male.      Pre-op Assessment    I have reviewed the Patient Summary Reports.     I have reviewed the Nursing Notes. I have reviewed the NPO Status.   I have reviewed the Medications.     Review of Systems  Anesthesia Hx:  No previous Anesthesia Denies Hx of Anesthetic complications  History  of prior surgery of interest to airway management or planning: heart surgery. Previous anesthesia: General, MAC Denies Family Hx of Anesthesia complications.   Denies Personal Hx of Anesthesia complications.   Hematology/Oncology:         -- Anemia:   Cardiovascular:   Exercise tolerance: good Hypertension Valvular problems/Murmurs, MR Denies CAD.    Denies CABG/stent. Dysrhythmias atrial fibrillation  Denies CHF. no hyperlipidemia BAI S/p MV/TV repair on 10/7/22  S/p impella   Pulmonary:   Denies COPD.  Denies Asthma. Shortness of breath  Denies Sleep Apnea.    Renal/:   Denies Chronic Renal Disease.     Hepatic/GI:   PUD, Denies GERD.    Neurological:   Denies CVA.  Denies Headaches. Denies Seizures.    Endocrine:   Denies Diabetes.  Denies Obesity / BMI > 30  Psych:   Denies Psychiatric History.          Physical Exam  General: Well nourished, Cooperative, Alert and Oriented    Airway:  Mallampati: II   Neck ROM: Normal ROM    Dental:  Intact        Anesthesia Plan  Type of Anesthesia, risks & benefits discussed:    Anesthesia Type: Gen ETT  Intra-op Monitoring Plan: Standard ASA Monitors and Art Line  Post Op Pain Control Plan: multimodal analgesia and IV/PO Opioids PRN  Induction:  IV  Airway Plan: Direct and Video, Post-Induction  Informed Consent: Informed consent signed with the Patient and all parties understand the risks and agree with anesthesia plan.  All questions answered.   ASA Score: 4  Day of Surgery Review of History & Physical: H&P Update referred to the surgeon/provider.    Ready For Surgery From Anesthesia Perspective.     .

## 2022-11-09 NOTE — PLAN OF CARE
"[facility] - Surgical Intensive Care  Initial Discharge Assessment       Primary Care Provider: Breann Tavera MD    Admission Diagnosis: Status post mitral valve repair [Z98.890]  Dehiscence of closure of sternum or sternotomy [T81.32XA]    Admission Date: 11/8/2022  Expected Discharge Date:     Discharge Barriers Identified: None    Payor: BLUE CROSS BLUE SHIELD / Plan: BCBS ALL OUT OF STATE / Product Type: PPO /     Extended Emergency Contact Information  Primary Emergency Contact: Lulu Steel  Mobile Phone: [phone]  Relation: Sister  Secondary Emergency Contact: Bettina Padron  Mobile Phone: [phone]  Relation: Significant other    Discharge Plan A: Long-term acute care facility (LTAC)  Discharge Plan B: Skilled Nursing Facility      [id number] [place] - [address] AT [facility] [address]  [address]  [place] [facility] [address]  Phone: [phone] Fax: [phone]    [facility] - [place], [place] - [address] [facility]  [address] [facility] [place] [address]  Phone: [phone] Fax: [phone]      Initial Assessment (most recent)       Adult Discharge Assessment - 11/09/22 1155          Discharge Assessment    Assessment Type Discharge Planning Assessment     Confirmed/corrected address, phone number and insurance Yes     Confirmed Demographics Correct on Facesheet     Source of Information patient     Reason For Admission sternal wound infection     Lives With alone     Facility Arrived From: [facility]     Do you expect to return to your current living situation? Other (see comments)   pt came from [facility]    Do you have help at home or someone to help you manage your care at home? --   unknown.  Pt said there is a "friend" nearby, would not elaborate    Prior to hospitilization cognitive status: Unable to Assess     Current cognitive status: Alert/Oriented     Walking or Climbing Stairs Difficulty none     Dressing/Bathing Difficulty " none     Equipment Currently Used at Home none     Patient currently being followed by outpatient case management? No     Do you currently have service(s) that help you manage your care at home? No     Who is going to help you get home at discharge? friend     How do you get to doctors appointments? family or friend will provide     Are you on dialysis? No     Do you take coumadin? No     Discharge Plan A Long-term acute care facility (LTAC)     Discharge Plan B Skilled Nursing Facility     DME Needed Upon Discharge  other (see comments)   TBD    Discharge Barriers Identified None                 CM spoke with patient in [facility] for DISCHARGE PLANNING ASSESSMENT. Per patient, he lives alone in a single family home with 0 steps to porch and point of entry.  Patient was independent with ADLS and DID NOT use DME or in-home assistive equipment.  Pt is not on dialysis or Coumadin, takes medications as prescribed / keeps refilled / has resources for all daily and prescriptive needs.  Preferred pharmacy is [facility]-Agreeable to bedside delivery.  Will have help from friend and other immediate family upon discharge.  All questions addressed.  Will continue to follow for course of hospitalization.    *Pt reports he has a friend that will provide transportation home at time of d/c.      Kulwant Francis RN CM  [id number]  Case Management

## 2022-11-09 NOTE — PROGRESS NOTES
Plastic and Reconstructive Surgery   Progress Note    Subjective:    Patient seen and examined at bedside in SICU. Patient extubated this morning. He is currently sitting up in chair doing well. Patient still on millirione and epinephrine. Complains of some pain at chest wound site but controlled.      Objective:  Vital signs in last 24 hours:  Temp:  [97.7 °F (36.5 °C)-99.9 °F (37.7 °C)] 97.8 °F (36.6 °C)  Pulse:  [] 114  Resp:  [6-38] 24  SpO2:  [91 %-100 %] 93 %  BP: (108-151)/(54-71) 139/65  Arterial Line BP: (100-147)/(44-58) 133/57    Intake/Output last 3 shifts:  I/O last 3 completed shifts:  In: 84262.2 [I.V.:18559.2; Blood:909; NG/GT:55; IV Piggyback:649]  Out: 1563 [Urine:763; Other:800]    Intake/Output this shift:  I/O this shift:  In: 5107.6 [P.O.:360; I.V.:4710.2; IV Piggyback:37.3]  Out: 370 [Urine:270; Other:100]        Physical Exam:  VITAL SIGNS:   Vitals:    22 1515 22 1530 22 1545 22 1600   BP:       BP Location:       Patient Position:       Pulse: (!) 112 (!) 112 (!) 113 (!) 114   Resp: 20 17 19 (!) 24   Temp:       TempSrc:       SpO2: 99% 98% 97% (!) 93%   Weight:       Height:         TMAX: Temp (24hrs), Av.8 °F (37.1 °C), Min:97.7 °F (36.5 °C), Max:99.9 °F (37.7 °C)    General: Alert; No acute distress  Cardiovascular: Regular rate   Respiratory: Normal respiratory effort. Chest rise symmetric.   Abdomen: Soft, nontender, nondistended  Extremity: Moves all extremities equally.  Neurologic: No focal deficit. Speech normal  SKIN: chest wound with wound vac in place with 600cc serosanguinous output since OR      Scheduled Medications albumin human 5%, 25 g, Once  amiodarone, 200 mg, BID  [START ON 11/10/2022] aspirin, 81 mg, Daily  ceFEPime (MAXIPIME) IVPB, 2 g, Q24H  famotidine, 20 mg, Daily  heparin (porcine), 5,000 Units, Q8H  polyethylene glycol, 17 g, Daily  senna-docusate 8.6-50 mg, 1 tablet, Daily      PRN Medications sodium chloride, dextrose 10%,  dextrose 10%, glucagon (human recombinant), insulin aspart U-100, oxyCODONE, oxyCODONE    Recent Labs:   Lab Results   Component Value Date    WBC 38.39 (H) 11/09/2022    HGB 8.2 (L) 11/09/2022    HCT 24.1 (L) 11/09/2022    MCV 88 11/09/2022     11/09/2022     Lab Results   Component Value Date     (H) 11/09/2022     (L) 11/09/2022    K 3.8 11/09/2022    CL 96 11/09/2022    BUN 46 (H) 11/09/2022         Assessment: 62 y.o. y/o male 1 Day Post-Op s/p Procedure(s):  REMOVAL, STERNAL WIRE  DEBRIDEMENT, STERNUM  APPLICATION, WOUND VAC Doing well postoperatively.    Plan  Take back to OR for washout 11/10  Pain control  Monitor Vac output  I's & O's  Dvt ppx  ASA and plavix as per CTS  Medical management as per CECILU      Keyshawn Whitlock MD- Fellow  Department of Plastic and Reconstructive Surgery  [phone] (office)

## 2022-11-09 NOTE — PLAN OF CARE
2 u PRBCs overnight.   Patient extubated this AM to nasal cannula and tolerating.     Sinus tach 110s. Maintaining MAP > 65 with decreasing pressor requirements. Sats > 90% on 2 L NC. CVP 8-9.     Neuro: oriented x 4, PERRLA, follows commands, moves all extremities.     Gtts: Epinephrine, Milrinone     Fierro in place.     Midsternal incision with WV in place with approx 100 cc sanguineous output.     Skin: turned q2h to prevent breakdown. No new breakdown noted. Foams and SCDs on.

## 2022-11-09 NOTE — SUBJECTIVE & OBJECTIVE
"Interval HPI:   Overnight events: No acute events overnight. Patient on the SICU in room 43501/82920 A. Blood glucose stable. BG at goal on current insulin regimen (SSI ). Steroid use- None. 1 Day Post-Op  Renal function- Abnormal - Creatinine 3.5   Vasopressors-  Epinephrine  and Norepinephrine       Endocrine will continue to follow and manage insulin orders inpatient.         Diet NPO  Diet clear liquid     Eating:   NPO  Nausea: No  Hypoglycemia and intervention: No  Fever: No  TPN and/or TF: No      BP (!) 122/57   Pulse (!) 115   Temp 97.7 °F (36.5 °C)   Resp 16   Ht 5' 9" (1.753 m)   Wt 76.6 kg (168 lb 14 oz)   SpO2 100%   BMI 24.94 kg/m²     Labs Reviewed and Include    Recent Labs   Lab 11/09/22  0521   *   CALCIUM 9.1   ALBUMIN 2.7*   PROT 5.4*   *   K 4.1   CO2 25   CL 95   BUN 43*   CREATININE 3.5*   ALKPHOS 88   ALT 5*   AST 15   BILITOT 3.6*     Lab Results   Component Value Date    WBC 45.14 (H) 11/09/2022    HGB 8.1 (L) 11/09/2022    HCT 24.5 (L) 11/09/2022    MCV 89 11/09/2022     11/09/2022     No results for input(s): TSH, FREET4 in the last 168 hours.  Lab Results   Component Value Date    HGBA1C 5.5 09/02/2022       Nutritional status:   Body mass index is 24.94 kg/m².  Lab Results   Component Value Date    ALBUMIN 2.7 (L) 11/09/2022    ALBUMIN 2.6 (L) 11/09/2022    ALBUMIN 2.6 (L) 11/08/2022     No results found for: PREALBUMIN    Estimated Creatinine Clearance: 21.6 mL/min (A) (based on SCr of 3.5 mg/dL (H)).    Accu-Checks  Recent Labs     11/08/22  2006 11/08/22  2113 11/08/22  2213 11/08/22  2303 11/09/22  0027 11/09/22  0116 11/09/22  0205 11/09/22  0314 11/09/22  0519 11/09/22  0656   POCTGLUCOSE 135* 158* 152* 154* 151* 127* 138* 131* 137* 112*       Current Medications and/or Treatments Impacting Glycemic Control  Immunotherapy:    Immunosuppressants       None          Steroids:   Hormones (From admission, onward)      Start     Stop Route Frequency Ordered "    11/08/22 1245  vasopressin (PITRESSIN) 0.2 Units/mL in dextrose 5 % 100 mL infusion         -- IV Continuous 11/08/22 1132          Pressors:    Autonomic Drugs (From admission, onward)      Start     Stop Route Frequency Ordered    11/08/22 1145  NORepinephrine 4 mg in dextrose 5% 250 mL infusion (premix) (titrating)        Question Answer Comment   Begin at (in mcg/kg/min): 0.02    Titrate by: (in mcg/kg/min) 0.02    Titrate interval: (in minutes) 5    Titrate to maintain: (MAP or SBP) MAP    Greater than: (in mmHg) 65    Maximum dose: (in mcg/kg/min) 3        -- IV Continuous 11/08/22 1132    11/08/22 1145  EPINEPHrine 5 mg in dextrose 5% 250 mL infusion (premix)        Question Answer Comment   Begin at (in mcg/kg/min): 0.02    Titrate by: (in mcg/kg/min) 0.02    Titrate interval: (in minutes) 5    Titrate to maintain: (SBP or MAP or Cardiac Index) MAP    Greater than: (in mmHg) 65    Maximum dose: (in mcg/kg/min) 2        -- IV Continuous 11/08/22 1132          Hyperglycemia/Diabetes Medications:   Antihyperglycemics (From admission, onward)      Start     Stop Route Frequency Ordered    11/09/22 1026  insulin aspart U-100 pen 0-5 Units         -- SubQ Every 6 hours PRN 11/09/22 0938

## 2022-11-09 NOTE — PROGRESS NOTES
[facility]  Critical Care - Surgery  Progress Note    Patient Name: David aBrrios  MRN: [id number]  Admission Date: 11/8/2022  Hospital Length of Stay: 1 days  Code Status: Prior  Attending Provider: Mike Hedrick MD  Primary Care Provider: Breann Tavera MD   Principal Problem: Sternal wound infection    Subjective:     Hospital/ICU Course:  62 y.o. male S/P TV replacement, MV replacement, MAZE, Lt Atrial Appendage ligation and Impella placement on 10/7/22, course complicated by bleeding, requiring reopening POD#0, multiple VT runs requiring DCCV, and sternal wound infections. He presents to the SICU s/p wound debridement and wire removal on 11/08       Interval History/Significant Events: stable overnight, decreasing pressor requirements. Extubated to NC this AM. 700ml out from wound vac since yesterday. H/H stable.     Follow-up For: Procedure(s) (LRB):  REMOVAL, STERNAL WIRE (N/A)  DEBRIDEMENT, STERNUM (N/A)  APPLICATION, WOUND VAC (N/A)    Post-Operative Day: 1 Day Post-Op    Objective:     Vital Signs (Most Recent):  Temp: 98.1 °F (36.7 °C) (11/09/22 0615)  Pulse: 110 (11/09/22 0615)  Resp: 16 (11/09/22 0615)  BP: 139/64 (11/09/22 0600)  SpO2: 96 % (11/09/22 0615) Vital Signs (24h Range):  Temp:  [96.1 °F (35.6 °C)-99.9 °F (37.7 °C)] 98.1 °F (36.7 °C)  Pulse:  [] 110  Resp:  [6-35] 16  SpO2:  [94 %-100 %] 96 %  BP: (109-144)/(55-67) 139/64  Arterial Line BP: ()/(44-57) 122/53     Weight: 76.6 kg (168 lb 14 oz)  Body mass index is 24.94 kg/m².      Intake/Output Summary (Last 24 hours) at 11/9/2022 0654  Last data filed at 11/9/2022 0600  Gross per 24 hour   Intake 7189.55 ml   Output 1403 ml   Net 5786.55 ml       Physical Exam  Vitals and nursing note reviewed.   Constitutional:       General: He is not in acute distress.     Appearance: He is ill-appearing.      Interventions: He is sedated and intubated.   HENT:      Head: Normocephalic and atraumatic.   Neck:       Comments: [facility] CV  Cardiovascular:      Rate and Rhythm: Normal rate and regular rhythm.      Pulses: Normal pulses.      Heart sounds: No murmur heard.    No gallop.      Comments: Midline incision with wound vac in place    Pulmonary:      Effort: He is intubated.      Breath sounds: No wheezing or rales.   Abdominal:      Palpations: Abdomen is soft.   Genitourinary:     Comments: Fierro in place  Skin:     General: Skin is warm and dry.      Capillary Refill: Capillary refill takes less than 2 seconds.      Findings: Bruising present. No lesion.      Comments: MSI with wound vac. Sanguinous output   Neurological:      Comments: sedated       Vents:  Vent Mode: Spont (11/09/22 0347)  Set Rate: 22 BPM (11/09/22 0345)  Vt Set: 450 mL (11/09/22 0345)  Pressure Support: 5 cmH20 (11/09/22 0347)  PEEP/CPAP: 5 cmH20 (11/09/22 0347)  Oxygen Concentration (%): 35 (11/09/22 0600)  Peak Airway Pressure: 10 cmH2O (11/09/22 0347)  Plateau Pressure: 19 cmH20 (11/09/22 0347)  Total Ve: 4.29 L/m (11/09/22 0347)  Negative Inspiratory Force (cm H2O): 0 (11/09/22 0347)  F/VT Ratio<105 (RSBI): (!) 35.37 (11/09/22 0347)    Lines/Drains/Airways       Peripherally Inserted Central Catheter Line  Duration             PICC Double Lumen 10/17/22 1709 right brachial 22 days              Central Venous Catheter Line  Duration             Introducer 11/08/22 0802 <1 day    Percutaneous Central Line Insertion/Assessment - Triple Lumen  11/08/22 0800 right internal jugular <1 day              Drain  Duration                  NG/OG Tube 11/08/22 1200 Center mouth <1 day         Urethral Catheter 11/08/22 0730 Non-latex;Temperature probe 14 Fr. <1 day              Arterial Line  Duration             Arterial Line 11/08/22 0712 Right Radial <1 day              Peripheral Intravenous Line  Duration                  Peripheral IV - Single Lumen 11/08/22 0730 14 G  Left Forearm <1 day                    Significant Labs:    CBC/Anemia  Profile:  Recent Labs   Lab 11/08/22 1709 11/08/22 2006 11/09/22  0028 11/09/22  0206 11/09/22  0506 11/09/22  0521   WBC 37.80*  --  43.23*  --   --  45.14*   HGB 7.2*  --  7.9*  --   --  8.1*   HCT 21.5*   < > 23.4* 22* 24* 24.5*     --  390  --   --  384   MCV 89  --  89  --   --  89   RDW 14.5  --  15.0*  --   --  15.0*    < > = values in this interval not displayed.        Chemistries:  Recent Labs   Lab 11/08/22 1709 11/09/22 0028 11/09/22  0521   * 133* 134*   K 3.9 4.2 4.1   CL 96 95 95   CO2 26 24 25   BUN 37* 40* 43*   CREATININE 3.0* 3.2* 3.5*   CALCIUM 9.3 9.2 9.1   ALBUMIN 2.6* 2.6* 2.7*   PROT 5.4* 5.5* 5.4*   BILITOT 2.0* 3.4* 3.6*   ALKPHOS 78 83 88   ALT 7* 6* 5*   AST 12 20 15   MG 2.3 2.2 2.2   PHOS 2.9 3.8 4.8*       All pertinent labs within the past 24 hours have been reviewed.    Significant Imaging:  I have reviewed all pertinent imaging results/findings within the past 24 hours.    Assessment/Plan:     * Sternal wound infection  62 y.o. male S/P TV replacement, MV replacement, MAZE, Lt Atrial Appendage ligation and Impella placement on 10/7/22, course complicated by bleeding, requiring reopening POD#0, multiple VT runs requiring DCCV, and sternal wound infections. He presents to the SICU s/p wound debridement and wire removal on 11/08       Neuro/Psych:     - Sedation: none    - Pain:    - Scheduled Tylenol 1g q8h              Cardiac:      - BP Goal: MAP 60-80 and SBP <140.     - Pressors: weaned off pressors over night. EF 35% with mild RV enlargement and moderate RV dysfunction. Requiring epi 0.04 and milrinone 0.375 for inotropy. Plan to wean as tolerated.     - wound vac applied to sternotomy, with sanguinous drainage     - Anti-HTNs: Will start when appropriate    - Rhythm: NSR. S/p MAZE for Afib, will restart home amio.     - Beta blocker: Will start when appropriate and off epi.     - Statin: Atorvastatin 40 mg QD      Pulmonary:     - Goal SpO2 >92%    -  extubated this AM to 3L NC, will wean as tolerated. CXR with atelectasis vs pleural effusion in RLL     - ABGs PRN      Renal:    - Trend BUN/Cr. Oliguric KIMANI. Will monitor and support kidneys with MAP >65. Hold off diuresis. Making urine.     - Maintain Fierro, record strict Is/Os    Recent Labs   Lab 11/08/22  1709 11/09/22  0028 11/09/22  0521   BUN 37* 40* 43*   CREATININE 3.0* 3.2* 3.5*         FEN / GI:     - Daily CMP, PRN K/Mag/Phos per protocol     - Replace electrolytes as needed    - Nutrition: NPO pending swallow eval. Will advance to clear liquid     - Bowel Regimen: Miralax, docusate      ID:     - Suspect sepsis secondary to presumed mediastinal infection    - Leukocytosis without fever    - Abx: Continue Cefepime, lactic acid improving. Will need long term abx plan, will discuss with plastics.      Recent Labs   Lab 11/08/22 1709 11/09/22  0028 11/09/22  0521   WBC 37.80* 43.23* 45.14*         Heme/Onc:     - H/H with post-operative anemia     - CBC daily    - ASA 325mg daily    Recent Labs   Lab 11/08/22  1709 11/09/22  0028 11/09/22  0521   HGB 7.2* 7.9* 8.1*    390 384   APTT 25.9 23.9 25.1   INR 1.2 1.2 1.1         Endocrine:     - CTS Goal -140    - HgbA1c:     - Endocrinology consulted for insulin management      PPx:   Feeding: npo while intubated  Analgesia/Sedation: tylenol   Thromboembolic Prevention: scds  HOB >30: Yes  Stress Ulcer: famotidine   Glucose Control: Yes, insulin management per Endocrinology     Lines/Drains/Airway:   Left radial arterial line   RIJ CVC   Fierro   Wound Vac      Dispo/Code Status/Palliative:     - Continue SICU Care    - Full Code             Critical care was time spent personally by me on the following activities: development of treatment plan with patient or surrogate and bedside caregivers, discussions with consultants, evaluation of patient's response to treatment, examination of patient, ordering and performing treatments and  interventions, ordering and review of laboratory studies, ordering and review of radiographic studies, pulse oximetry, re-evaluation of patient's condition.  This critical care time did not overlap with that of any other provider or involve time for any procedures.     Leah Lay NP  Critical Care - Surgery  [facility]

## 2022-11-09 NOTE — PLAN OF CARE
"      SICU PLAN OF CARE NOTE    Dx: Sternal wound infection    Shift Events: No significant shift events. PT weaned off of EPI, but patient remains on .25 Milrinone    Goals of Care: MAP >65    Neuro: AAO x4    Vital Signs: /70   Pulse 109   Temp 97.8 °F (36.6 °C) (Core Bladder)   Resp 18   Ht 5' 9.02" (1.753 m)   Wt 76.2 kg (168 lb)   SpO2 (!) 93%   BMI 24.80 kg/m²     Respiratory: Nasal Cannula 1 L    Diet: Clear Liquid and advanced to cardiac diet. NPO at Midnight    Gtts: Epinephrine and Milrinone    Urine Output: Urinary Catheter 30-60 cc/hour    Drains: Wound Vac- 200 cc /shift       Labs/Accuchecks: Q6 accuchecks.    Skin: No noted skin breakdown. Heels elevated off bed. PT up to chair for majority of shift. PT refused day time bath.       "

## 2022-11-09 NOTE — ADDENDUM NOTE
Addendum  created 11/09/22 1426 by Martin Gimenez MD    Clinical Note Signed, Intraprocedure Blocks edited, SmartForm saved

## 2022-11-09 NOTE — PROGRESS NOTES
"[facility]  Endocrinology  Progress Note    Admit Date: 11/8/2022     Reason for Consult: Management of Hyperglycemia     Surgical Procedure and Date:   Mediastinal exploration with evacuation of purulent pericarditis; Debridement and decortication of mediastinal structures; Resection of 10mm aortic graft on the ascending aorta; Removal of sternal wires; Partial bilateral sternectomy; and Wound vac placement by Dr. Carrasquillo' team on 11/8/2022    Patient is not diabetic and does not currently take any oral/injectable antidiabetic/hypoglycemic medications.       HPI: 62 y.o. male S/P TV replacement, MV replacement, MAZE, Lt Atrial Appendage ligation and Impella placement on 10/7/22, course complicated by bleeding, requiring reopening POD#0, multiple VT runs requiring DCCV, and sternal wound infections. He presents to the SICU s/p wound debridement and wire removal. Endocrine consulted to manage post-operative hyperglycemia.       Lab Results   Component Value Date    HGBA1C 5.5 09/02/2022           Interval HPI:   Overnight events: No acute events overnight. Patient on the SICU in room 09957/80473 A. Blood glucose stable. BG at goal on current insulin regimen (SSI ). Steroid use- None. 1 Day Post-Op  Renal function- Abnormal - Creatinine 3.5   Vasopressors-  Epinephrine  and Norepinephrine       Endocrine will continue to follow and manage insulin orders inpatient.         Diet NPO  Diet clear liquid     Eating:   NPO  Nausea: No  Hypoglycemia and intervention: No  Fever: No  TPN and/or TF: No      BP (!) 122/57   Pulse (!) 115   Temp 97.7 °F (36.5 °C)   Resp 16   Ht 5' 9" (1.753 m)   Wt 76.6 kg (168 lb 14 oz)   SpO2 100%   BMI 24.94 kg/m²     Labs Reviewed and Include    Recent Labs   Lab 11/09/22  0521   *   CALCIUM 9.1   ALBUMIN 2.7*   PROT 5.4*   *   K 4.1   CO2 25   CL 95   BUN 43*   CREATININE 3.5*   ALKPHOS 88   ALT 5*   AST 15   BILITOT 3.6*     Lab Results "   Component Value Date    WBC 45.14 (H) 11/09/2022    HGB 8.1 (L) 11/09/2022    HCT 24.5 (L) 11/09/2022    MCV 89 11/09/2022     11/09/2022     No results for input(s): TSH, FREET4 in the last 168 hours.  Lab Results   Component Value Date    HGBA1C 5.5 09/02/2022       Nutritional status:   Body mass index is 24.94 kg/m².  Lab Results   Component Value Date    ALBUMIN 2.7 (L) 11/09/2022    ALBUMIN 2.6 (L) 11/09/2022    ALBUMIN 2.6 (L) 11/08/2022     No results found for: PREALBUMIN    Estimated Creatinine Clearance: 21.6 mL/min (A) (based on SCr of 3.5 mg/dL (H)).    Accu-Checks  Recent Labs     11/08/22  2006 11/08/22  2113 11/08/22  2213 11/08/22  2303 11/09/22  0027 11/09/22  0116 11/09/22  0205 11/09/22  0314 11/09/22  0519 11/09/22  0656   POCTGLUCOSE 135* 158* 152* 154* 151* 127* 138* 131* 137* 112*       Current Medications and/or Treatments Impacting Glycemic Control  Immunotherapy:    Immunosuppressants       None          Steroids:   Hormones (From admission, onward)      Start     Stop Route Frequency Ordered    11/08/22 1245  vasopressin (PITRESSIN) 0.2 Units/mL in dextrose 5 % 100 mL infusion         -- IV Continuous 11/08/22 1132          Pressors:    Autonomic Drugs (From admission, onward)      Start     Stop Route Frequency Ordered    11/08/22 1145  NORepinephrine 4 mg in dextrose 5% 250 mL infusion (premix) (titrating)        Question Answer Comment   Begin at (in mcg/kg/min): 0.02    Titrate by: (in mcg/kg/min) 0.02    Titrate interval: (in minutes) 5    Titrate to maintain: (MAP or SBP) MAP    Greater than: (in mmHg) 65    Maximum dose: (in mcg/kg/min) 3        -- IV Continuous 11/08/22 1132    11/08/22 1145  EPINEPHrine 5 mg in dextrose 5% 250 mL infusion (premix)        Question Answer Comment   Begin at (in mcg/kg/min): 0.02    Titrate by: (in mcg/kg/min) 0.02    Titrate interval: (in minutes) 5    Titrate to maintain: (SBP or MAP or Cardiac Index) MAP    Greater than: (in mmHg) 65     Maximum dose: (in mcg/kg/min) 2        -- IV Continuous 11/08/22 1132          Hyperglycemia/Diabetes Medications:   Antihyperglycemics (From admission, onward)      Start     Stop Route Frequency Ordered    11/09/22 1026  insulin aspart U-100 pen 0-5 Units         -- SubQ Every 6 hours PRN 11/09/22 0926            ASSESSMENT and PLAN    * Sternal wound infection  Managed per primary team  Avoid hypoglycemia        Transient hyperglycemia post procedure  Endocrinology consulted for BG management.   BG goal 110-140 CTS    - D/C IIP   - Novolog (Insulin Aspart) prn for BG excursions LDC SSI (150/50)  - BG checks q6hr while NPO  - Hypoglycemia protocol in place      ** Please notify Endocrine for any change and/or advance in diet**  ** Please call Endocrine for any BG related issues **    Discharge Planning:   TBD. Please notify endocrinology prior to discharge.          Surgical wound present  Optimize BG control to improve wound healing             Rigoberto Dee, DNP, FNP  Endocrinology  [facility]

## 2022-11-09 NOTE — SUBJECTIVE & OBJECTIVE
Interval History/Significant Events: stable overnight, decreasing pressor requirements. Extubated to NC this AM. 700ml out from wound vac since yesterday. H/H stable.     Follow-up For: Procedure(s) (LRB):  REMOVAL, STERNAL WIRE (N/A)  DEBRIDEMENT, STERNUM (N/A)  APPLICATION, WOUND VAC (N/A)    Post-Operative Day: 1 Day Post-Op    Objective:     Vital Signs (Most Recent):  Temp: 98.1 °F (36.7 °C) (11/09/22 0615)  Pulse: 110 (11/09/22 0615)  Resp: 16 (11/09/22 0615)  BP: 139/64 (11/09/22 0600)  SpO2: 96 % (11/09/22 0615) Vital Signs (24h Range):  Temp:  [96.1 °F (35.6 °C)-99.9 °F (37.7 °C)] 98.1 °F (36.7 °C)  Pulse:  [] 110  Resp:  [6-35] 16  SpO2:  [94 %-100 %] 96 %  BP: (109-144)/(55-67) 139/64  Arterial Line BP: ()/(44-57) 122/53     Weight: 76.6 kg (168 lb 14 oz)  Body mass index is 24.94 kg/m².      Intake/Output Summary (Last 24 hours) at 11/9/2022 0654  Last data filed at 11/9/2022 0600  Gross per 24 hour   Intake 7189.55 ml   Output 1403 ml   Net 5786.55 ml       Physical Exam  Vitals and nursing note reviewed.   Constitutional:       General: He is not in acute distress.     Appearance: He is ill-appearing.      Interventions: He is sedated and intubated.   HENT:      Head: Normocephalic and atraumatic.   Neck:      Comments: [facility] CVC  Cardiovascular:      Rate and Rhythm: Normal rate and regular rhythm.      Pulses: Normal pulses.      Heart sounds: No murmur heard.    No gallop.      Comments: Midline incision with wound vac in place    Pulmonary:      Effort: He is intubated.      Breath sounds: No wheezing or rales.   Abdominal:      Palpations: Abdomen is soft.   Genitourinary:     Comments: Fierro in place  Skin:     General: Skin is warm and dry.      Capillary Refill: Capillary refill takes less than 2 seconds.      Findings: Bruising present. No lesion.      Comments: MSI with wound vac. Sanguinous output   Neurological:      Comments: sedated       Vents:  Vent Mode: Spont (11/09/22  0347)  Set Rate: 22 BPM (11/09/22 0345)  Vt Set: 450 mL (11/09/22 0345)  Pressure Support: 5 cmH20 (11/09/22 0347)  PEEP/CPAP: 5 cmH20 (11/09/22 0347)  Oxygen Concentration (%): 35 (11/09/22 0600)  Peak Airway Pressure: 10 cmH2O (11/09/22 0347)  Plateau Pressure: 19 cmH20 (11/09/22 0347)  Total Ve: 4.29 L/m (11/09/22 0347)  Negative Inspiratory Force (cm H2O): 0 (11/09/22 0347)  F/VT Ratio<105 (RSBI): (!) 35.37 (11/09/22 0347)    Lines/Drains/Airways       Peripherally Inserted Central Catheter Line  Duration             PICC Double Lumen 10/17/22 1709 right brachial 22 days              Central Venous Catheter Line  Duration             Introducer 11/08/22 0802 <1 day    Percutaneous Central Line Insertion/Assessment - Triple Lumen  11/08/22 0800 right internal jugular <1 day              Drain  Duration                  NG/OG Tube 11/08/22 1200 Center mouth <1 day         Urethral Catheter 11/08/22 0730 Non-latex;Temperature probe 14 Fr. <1 day              Arterial Line  Duration             Arterial Line 11/08/22 0712 Right Radial <1 day              Peripheral Intravenous Line  Duration                  Peripheral IV - Single Lumen 11/08/22 0730 14 G  Left Forearm <1 day                    Significant Labs:    CBC/Anemia Profile:  Recent Labs   Lab 11/08/22 1709 11/08/22 2006 11/09/22  0028 11/09/22  0206 11/09/22  0506 11/09/22  0521   WBC 37.80*  --  43.23*  --   --  45.14*   HGB 7.2*  --  7.9*  --   --  8.1*   HCT 21.5*   < > 23.4* 22* 24* 24.5*     --  390  --   --  384   MCV 89  --  89  --   --  89   RDW 14.5  --  15.0*  --   --  15.0*    < > = values in this interval not displayed.        Chemistries:  Recent Labs   Lab 11/08/22 1709 11/09/22  0028 11/09/22  0521   * 133* 134*   K 3.9 4.2 4.1   CL 96 95 95   CO2 26 24 25   BUN 37* 40* 43*   CREATININE 3.0* 3.2* 3.5*   CALCIUM 9.3 9.2 9.1   ALBUMIN 2.6* 2.6* 2.7*   PROT 5.4* 5.5* 5.4*   BILITOT 2.0* 3.4* 3.6*   ALKPHOS 78 83 88   ALT 7* 6*  5*   AST 12 20 15   MG 2.3 2.2 2.2   PHOS 2.9 3.8 4.8*       All pertinent labs within the past 24 hours have been reviewed.    Significant Imaging:  I have reviewed all pertinent imaging results/findings within the past 24 hours.

## 2022-11-09 NOTE — ASSESSMENT & PLAN NOTE
62 y.o. male S/P TV replacement, MV replacement, MAZE, Lt Atrial Appendage ligation and Impella placement on 10/7/22, course complicated by bleeding, requiring reopening POD#0, multiple VT runs requiring DCCV, and sternal wound infections. He presents to the SICU s/p wound debridement and wire removal on 11/08       Neuro/Psych:     - Sedation: none    - Pain:    - Scheduled Tylenol 1g q8h              Cardiac:      - BP Goal: MAP 60-80 and SBP <140.     - Pressors: weaned off pressors over night. EF 35% with mild RV enlargement and moderate RV dysfunction. Requiring epi 0.04 and milrinone 0.375 for inotropy. Plan to wean as tolerated.     - wound vac applied to sternotomy, with sanguinous drainage     - Anti-HTNs: Will start when appropriate    - Rhythm: NSR. S/p MAZE for Afib, will restart home amio.     - Beta blocker: Will start when appropriate and off epi.     - Statin: Atorvastatin 40 mg QD      Pulmonary:     - Goal SpO2 >92%    - extubated this AM to 3L NC, will wean as tolerated. CXR with atelectasis vs pleural effusion in RLL     - ABGs PRN      Renal:    - Trend BUN/Cr. Oliguric KIMANI. Will monitor and support kidneys with MAP >65. Hold off diuresis. Making urine.     - Maintain Fierro, record strict Is/Os    Recent Labs   Lab 11/08/22  1709 11/09/22  0028 11/09/22  0521   BUN 37* 40* 43*   CREATININE 3.0* 3.2* 3.5*         FEN / GI:     - Daily CMP, PRN K/Mag/Phos per protocol     - Replace electrolytes as needed    - Nutrition: NPO pending swallow eval. Will advance to clear liquid     - Bowel Regimen: Miralax, docusate      ID:     - Suspect sepsis secondary to presumed mediastinal infection    - Leukocytosis without fever    - Abx: Continue Cefepime, lactic acid improving. Will need long term abx plan, will discuss with plastics.      Recent Labs   Lab 11/08/22  1709 11/09/22  0028 11/09/22  0521   WBC 37.80* 43.23* 45.14*         Heme/Onc:     - H/H with post-operative anemia     - CBC daily     - ASA 325mg daily    Recent Labs   Lab 11/08/22  1709 11/09/22  0028 11/09/22  0521   HGB 7.2* 7.9* 8.1*    390 384   APTT 25.9 23.9 25.1   INR 1.2 1.2 1.1         Endocrine:     - CTS Goal -140    - HgbA1c:     - Endocrinology consulted for insulin management      PPx:   Feeding: npo while intubated  Analgesia/Sedation: tylenol   Thromboembolic Prevention: scds  HOB >30: Yes  Stress Ulcer: famotidine   Glucose Control: Yes, insulin management per Endocrinology     Lines/Drains/Airway:   Left radial arterial line   RIJ CVC   Fierro   Wound Vac      Dispo/Code Status/Palliative:     - Continue SICU Care    - Full Code

## 2022-11-10 ENCOUNTER — ANESTHESIA (OUTPATIENT)
Dept: SURGERY | Facility: HOSPITAL | Age: 62
DRG: 268 | End: 2022-11-10
Payer: COMMERCIAL

## 2022-11-10 LAB
ALBUMIN SERPL BCP-MCNC: 2.3 G/DL (ref 3.5–5.2)
ALBUMIN SERPL BCP-MCNC: 2.4 G/DL (ref 3.5–5.2)
ALBUMIN SERPL BCP-MCNC: 2.4 G/DL (ref 3.5–5.2)
ALP SERPL-CCNC: 119 U/L (ref 55–135)
ALP SERPL-CCNC: 89 U/L (ref 55–135)
ALP SERPL-CCNC: 96 U/L (ref 55–135)
ALT SERPL W/O P-5'-P-CCNC: <5 U/L (ref 10–44)
ANION GAP SERPL CALC-SCNC: 10 MMOL/L (ref 8–16)
ANION GAP SERPL CALC-SCNC: 11 MMOL/L (ref 8–16)
ANION GAP SERPL CALC-SCNC: 12 MMOL/L (ref 8–16)
ANISOCYTOSIS BLD QL SMEAR: SLIGHT
APTT BLDCRRT: 27.8 SEC (ref 21–32)
AST SERPL-CCNC: 14 U/L (ref 10–40)
AST SERPL-CCNC: 15 U/L (ref 10–40)
AST SERPL-CCNC: 22 U/L (ref 10–40)
BASO STIPL BLD QL SMEAR: ABNORMAL
BASOPHILS # BLD AUTO: 0.03 K/UL (ref 0–0.2)
BASOPHILS # BLD AUTO: 0.06 K/UL (ref 0–0.2)
BASOPHILS NFR BLD: 0.1 % (ref 0–1.9)
BASOPHILS NFR BLD: 0.3 % (ref 0–1.9)
BILIRUB SERPL-MCNC: 0.8 MG/DL (ref 0.1–1)
BILIRUB SERPL-MCNC: 0.8 MG/DL (ref 0.1–1)
BILIRUB SERPL-MCNC: 0.9 MG/DL (ref 0.1–1)
BUN SERPL-MCNC: 41 MG/DL (ref 8–23)
BUN SERPL-MCNC: 46 MG/DL (ref 8–23)
BUN SERPL-MCNC: 49 MG/DL (ref 8–23)
CALCIUM SERPL-MCNC: 8.4 MG/DL (ref 8.7–10.5)
CALCIUM SERPL-MCNC: 8.5 MG/DL (ref 8.7–10.5)
CALCIUM SERPL-MCNC: 9 MG/DL (ref 8.7–10.5)
CHLORIDE SERPL-SCNC: 100 MMOL/L (ref 95–110)
CHLORIDE SERPL-SCNC: 97 MMOL/L (ref 95–110)
CHLORIDE SERPL-SCNC: 98 MMOL/L (ref 95–110)
CO2 SERPL-SCNC: 25 MMOL/L (ref 23–29)
CO2 SERPL-SCNC: 25 MMOL/L (ref 23–29)
CO2 SERPL-SCNC: 26 MMOL/L (ref 23–29)
CREAT SERPL-MCNC: 3.3 MG/DL (ref 0.5–1.4)
CREAT SERPL-MCNC: 3.4 MG/DL (ref 0.5–1.4)
CREAT SERPL-MCNC: 3.7 MG/DL (ref 0.5–1.4)
DIFFERENTIAL METHOD: ABNORMAL
DIFFERENTIAL METHOD: ABNORMAL
EOSINOPHIL # BLD AUTO: 0 K/UL (ref 0–0.5)
EOSINOPHIL # BLD AUTO: 0.1 K/UL (ref 0–0.5)
EOSINOPHIL NFR BLD: 0 % (ref 0–8)
EOSINOPHIL NFR BLD: 0.6 % (ref 0–8)
ERYTHROCYTE [DISTWIDTH] IN BLOOD BY AUTOMATED COUNT: 15.4 % (ref 11.5–14.5)
ERYTHROCYTE [DISTWIDTH] IN BLOOD BY AUTOMATED COUNT: 15.5 % (ref 11.5–14.5)
EST. GFR  (NO RACE VARIABLE): 17.6 ML/MIN/1.73 M^2
EST. GFR  (NO RACE VARIABLE): 19.5 ML/MIN/1.73 M^2
EST. GFR  (NO RACE VARIABLE): 20.2 ML/MIN/1.73 M^2
GLUCOSE SERPL-MCNC: 128 MG/DL (ref 70–110)
GLUCOSE SERPL-MCNC: 92 MG/DL (ref 70–110)
GLUCOSE SERPL-MCNC: 94 MG/DL (ref 70–110)
GRAM STN SPEC: NORMAL
HCT VFR BLD AUTO: 22.8 % (ref 40–54)
HCT VFR BLD AUTO: 23.9 % (ref 40–54)
HGB BLD-MCNC: 7.5 G/DL (ref 14–18)
HGB BLD-MCNC: 7.8 G/DL (ref 14–18)
HYPOCHROMIA BLD QL SMEAR: ABNORMAL
IMM GRANULOCYTES # BLD AUTO: 0.37 K/UL (ref 0–0.04)
IMM GRANULOCYTES # BLD AUTO: 0.45 K/UL (ref 0–0.04)
IMM GRANULOCYTES NFR BLD AUTO: 1.6 % (ref 0–0.5)
IMM GRANULOCYTES NFR BLD AUTO: 2 % (ref 0–0.5)
INR PPP: 1.1 (ref 0.8–1.2)
LYMPHOCYTES # BLD AUTO: 0.7 K/UL (ref 1–4.8)
LYMPHOCYTES # BLD AUTO: 1.5 K/UL (ref 1–4.8)
LYMPHOCYTES NFR BLD: 3.1 % (ref 18–48)
LYMPHOCYTES NFR BLD: 6.4 % (ref 18–48)
MAGNESIUM SERPL-MCNC: 2.2 MG/DL (ref 1.6–2.6)
MAGNESIUM SERPL-MCNC: 2.2 MG/DL (ref 1.6–2.6)
MCH RBC QN AUTO: 29.4 PG (ref 27–31)
MCH RBC QN AUTO: 29.8 PG (ref 27–31)
MCHC RBC AUTO-ENTMCNC: 32.6 G/DL (ref 32–36)
MCHC RBC AUTO-ENTMCNC: 32.9 G/DL (ref 32–36)
MCV RBC AUTO: 89 FL (ref 82–98)
MCV RBC AUTO: 91 FL (ref 82–98)
MONOCYTES # BLD AUTO: 0.5 K/UL (ref 0.3–1)
MONOCYTES # BLD AUTO: 1.7 K/UL (ref 0.3–1)
MONOCYTES NFR BLD: 2.2 % (ref 4–15)
MONOCYTES NFR BLD: 7.5 % (ref 4–15)
NEUTROPHILS # BLD AUTO: 18.8 K/UL (ref 1.8–7.7)
NEUTROPHILS # BLD AUTO: 21.1 K/UL (ref 1.8–7.7)
NEUTROPHILS NFR BLD: 83.2 % (ref 38–73)
NEUTROPHILS NFR BLD: 93 % (ref 38–73)
NRBC BLD-RTO: 0 /100 WBC
NRBC BLD-RTO: 0 /100 WBC
OVALOCYTES BLD QL SMEAR: ABNORMAL
PHOSPHATE SERPL-MCNC: 3.7 MG/DL (ref 2.7–4.5)
PHOSPHATE SERPL-MCNC: 3.9 MG/DL (ref 2.7–4.5)
PLATELET # BLD AUTO: 319 K/UL (ref 150–450)
PLATELET # BLD AUTO: 323 K/UL (ref 150–450)
PLATELET BLD QL SMEAR: ABNORMAL
PMV BLD AUTO: 10 FL (ref 9.2–12.9)
PMV BLD AUTO: 9.7 FL (ref 9.2–12.9)
POCT GLUCOSE: 125 MG/DL (ref 70–110)
POCT GLUCOSE: 134 MG/DL (ref 70–110)
POCT GLUCOSE: 97 MG/DL (ref 70–110)
POIKILOCYTOSIS BLD QL SMEAR: SLIGHT
POLYCHROMASIA BLD QL SMEAR: ABNORMAL
POTASSIUM SERPL-SCNC: 3.6 MMOL/L (ref 3.5–5.1)
POTASSIUM SERPL-SCNC: 4 MMOL/L (ref 3.5–5.1)
POTASSIUM SERPL-SCNC: 4.4 MMOL/L (ref 3.5–5.1)
PROT SERPL-MCNC: 5.4 G/DL (ref 6–8.4)
PROT SERPL-MCNC: 5.5 G/DL (ref 6–8.4)
PROT SERPL-MCNC: 5.7 G/DL (ref 6–8.4)
PROTHROMBIN TIME: 11.4 SEC (ref 9–12.5)
RBC # BLD AUTO: 2.55 M/UL (ref 4.6–6.2)
RBC # BLD AUTO: 2.62 M/UL (ref 4.6–6.2)
SODIUM SERPL-SCNC: 133 MMOL/L (ref 136–145)
SODIUM SERPL-SCNC: 135 MMOL/L (ref 136–145)
SODIUM SERPL-SCNC: 136 MMOL/L (ref 136–145)
WBC # BLD AUTO: 22.58 K/UL (ref 3.9–12.7)
WBC # BLD AUTO: 22.76 K/UL (ref 3.9–12.7)

## 2022-11-10 PROCEDURE — 87116 MYCOBACTERIA CULTURE: CPT | Performed by: SURGERY

## 2022-11-10 PROCEDURE — 85025 COMPLETE CBC W/AUTO DIFF WBC: CPT

## 2022-11-10 PROCEDURE — 94761 N-INVAS EAR/PLS OXIMETRY MLT: CPT

## 2022-11-10 PROCEDURE — 99291 CRITICAL CARE FIRST HOUR: CPT | Mod: ,,, | Performed by: ANESTHESIOLOGY

## 2022-11-10 PROCEDURE — 97166 OT EVAL MOD COMPLEX 45 MIN: CPT

## 2022-11-10 PROCEDURE — 85730 THROMBOPLASTIN TIME PARTIAL: CPT

## 2022-11-10 PROCEDURE — D9220A PRA ANESTHESIA: Mod: ANES,,, | Performed by: ANESTHESIOLOGY

## 2022-11-10 PROCEDURE — 97605 NEG PRS WND THER DME<=50SQCM: CPT | Mod: ,,, | Performed by: SURGERY

## 2022-11-10 PROCEDURE — 97535 SELF CARE MNGMENT TRAINING: CPT

## 2022-11-10 PROCEDURE — 25000003 PHARM REV CODE 250: Performed by: STUDENT IN AN ORGANIZED HEALTH CARE EDUCATION/TRAINING PROGRAM

## 2022-11-10 PROCEDURE — 97605 PR NEG PRESS WOUND THERAPY (NPWT) W/NON-DISPOSABLE WOUND VAC DEVICE (DME), <=50 CM: ICD-10-PCS | Mod: ,,, | Performed by: SURGERY

## 2022-11-10 PROCEDURE — 63600175 PHARM REV CODE 636 W HCPCS: Mod: JG | Performed by: STUDENT IN AN ORGANIZED HEALTH CARE EDUCATION/TRAINING PROGRAM

## 2022-11-10 PROCEDURE — 87206 SMEAR FLUORESCENT/ACID STAI: CPT | Mod: 91 | Performed by: SURGERY

## 2022-11-10 PROCEDURE — 99900035 HC TECH TIME PER 15 MIN (STAT)

## 2022-11-10 PROCEDURE — 36000704 HC OR TIME LEV I 1ST 15 MIN: Performed by: SURGERY

## 2022-11-10 PROCEDURE — 97163 PT EVAL HIGH COMPLEX 45 MIN: CPT

## 2022-11-10 PROCEDURE — 63600175 PHARM REV CODE 636 W HCPCS: Performed by: ANESTHESIOLOGY

## 2022-11-10 PROCEDURE — 83735 ASSAY OF MAGNESIUM: CPT | Mod: 91 | Performed by: STUDENT IN AN ORGANIZED HEALTH CARE EDUCATION/TRAINING PROGRAM

## 2022-11-10 PROCEDURE — 87102 FUNGUS ISOLATION CULTURE: CPT | Mod: 59 | Performed by: SURGERY

## 2022-11-10 PROCEDURE — 87205 SMEAR GRAM STAIN: CPT | Mod: 59 | Performed by: SURGERY

## 2022-11-10 PROCEDURE — D9220A PRA ANESTHESIA: ICD-10-PCS | Mod: ANES,,, | Performed by: ANESTHESIOLOGY

## 2022-11-10 PROCEDURE — 25000003 PHARM REV CODE 250

## 2022-11-10 PROCEDURE — 87075 CULTR BACTERIA EXCEPT BLOOD: CPT | Mod: 59 | Performed by: SURGERY

## 2022-11-10 PROCEDURE — 97530 THERAPEUTIC ACTIVITIES: CPT

## 2022-11-10 PROCEDURE — 83735 ASSAY OF MAGNESIUM: CPT

## 2022-11-10 PROCEDURE — 20000000 HC ICU ROOM

## 2022-11-10 PROCEDURE — 27000221 HC OXYGEN, UP TO 24 HOURS

## 2022-11-10 PROCEDURE — 80053 COMPREHEN METABOLIC PANEL: CPT | Mod: 91 | Performed by: STUDENT IN AN ORGANIZED HEALTH CARE EDUCATION/TRAINING PROGRAM

## 2022-11-10 PROCEDURE — 94799 UNLISTED PULMONARY SVC/PX: CPT

## 2022-11-10 PROCEDURE — 63600175 PHARM REV CODE 636 W HCPCS: Mod: JG

## 2022-11-10 PROCEDURE — 25000003 PHARM REV CODE 250: Performed by: ANESTHESIOLOGY

## 2022-11-10 PROCEDURE — 27201423 OPTIME MED/SURG SUP & DEVICES STERILE SUPPLY: Performed by: SURGERY

## 2022-11-10 PROCEDURE — D9220A PRA ANESTHESIA: ICD-10-PCS | Mod: CRNA,,, | Performed by: STUDENT IN AN ORGANIZED HEALTH CARE EDUCATION/TRAINING PROGRAM

## 2022-11-10 PROCEDURE — 85610 PROTHROMBIN TIME: CPT

## 2022-11-10 PROCEDURE — 87070 CULTURE OTHR SPECIMN AEROBIC: CPT | Mod: 59 | Performed by: SURGERY

## 2022-11-10 PROCEDURE — 36000705 HC OR TIME LEV I EA ADD 15 MIN: Performed by: SURGERY

## 2022-11-10 PROCEDURE — 84100 ASSAY OF PHOSPHORUS: CPT | Mod: 91 | Performed by: STUDENT IN AN ORGANIZED HEALTH CARE EDUCATION/TRAINING PROGRAM

## 2022-11-10 PROCEDURE — 99291 PR CRITICAL CARE, E/M 30-74 MINUTES: ICD-10-PCS | Mod: ,,, | Performed by: ANESTHESIOLOGY

## 2022-11-10 PROCEDURE — 63600175 PHARM REV CODE 636 W HCPCS

## 2022-11-10 PROCEDURE — 37000008 HC ANESTHESIA 1ST 15 MINUTES: Performed by: SURGERY

## 2022-11-10 PROCEDURE — 84100 ASSAY OF PHOSPHORUS: CPT

## 2022-11-10 PROCEDURE — D9220A PRA ANESTHESIA: Mod: CRNA,,, | Performed by: STUDENT IN AN ORGANIZED HEALTH CARE EDUCATION/TRAINING PROGRAM

## 2022-11-10 PROCEDURE — 63600175 PHARM REV CODE 636 W HCPCS: Performed by: STUDENT IN AN ORGANIZED HEALTH CARE EDUCATION/TRAINING PROGRAM

## 2022-11-10 PROCEDURE — 80053 COMPREHEN METABOLIC PANEL: CPT | Mod: 91

## 2022-11-10 PROCEDURE — 37000009 HC ANESTHESIA EA ADD 15 MINS: Performed by: SURGERY

## 2022-11-10 RX ORDER — ONDANSETRON 2 MG/ML
INJECTION INTRAMUSCULAR; INTRAVENOUS
Status: DISCONTINUED | OUTPATIENT
Start: 2022-11-10 | End: 2022-11-10

## 2022-11-10 RX ORDER — DEXAMETHASONE SODIUM PHOSPHATE 4 MG/ML
INJECTION, SOLUTION INTRA-ARTICULAR; INTRALESIONAL; INTRAMUSCULAR; INTRAVENOUS; SOFT TISSUE
Status: DISCONTINUED | OUTPATIENT
Start: 2022-11-10 | End: 2022-11-10

## 2022-11-10 RX ORDER — ETOMIDATE 2 MG/ML
INJECTION INTRAVENOUS
Status: DISCONTINUED | OUTPATIENT
Start: 2022-11-10 | End: 2022-11-10

## 2022-11-10 RX ORDER — MIDAZOLAM HYDROCHLORIDE 1 MG/ML
INJECTION, SOLUTION INTRAMUSCULAR; INTRAVENOUS
Status: DISCONTINUED | OUTPATIENT
Start: 2022-11-10 | End: 2022-11-10

## 2022-11-10 RX ORDER — PHENYLEPHRINE HCL IN 0.9% NACL 1 MG/10 ML
SYRINGE (ML) INTRAVENOUS
Status: DISCONTINUED | OUTPATIENT
Start: 2022-11-10 | End: 2022-11-10

## 2022-11-10 RX ORDER — FENTANYL CITRATE 50 UG/ML
INJECTION, SOLUTION INTRAMUSCULAR; INTRAVENOUS
Status: DISCONTINUED | OUTPATIENT
Start: 2022-11-10 | End: 2022-11-10

## 2022-11-10 RX ORDER — FUROSEMIDE 10 MG/ML
60 INJECTION INTRAMUSCULAR; INTRAVENOUS ONCE
Status: COMPLETED | OUTPATIENT
Start: 2022-11-10 | End: 2022-11-10

## 2022-11-10 RX ORDER — HYDROMORPHONE HYDROCHLORIDE 2 MG/ML
INJECTION, SOLUTION INTRAMUSCULAR; INTRAVENOUS; SUBCUTANEOUS
Status: DISCONTINUED | OUTPATIENT
Start: 2022-11-10 | End: 2022-11-10

## 2022-11-10 RX ORDER — ROCURONIUM BROMIDE 10 MG/ML
INJECTION, SOLUTION INTRAVENOUS
Status: DISCONTINUED | OUTPATIENT
Start: 2022-11-10 | End: 2022-11-10

## 2022-11-10 RX ADMIN — ETOMIDATE 12 MG: 2 INJECTION INTRAVENOUS at 07:11

## 2022-11-10 RX ADMIN — SENNOSIDES AND DOCUSATE SODIUM 1 TABLET: 50; 8.6 TABLET ORAL at 10:11

## 2022-11-10 RX ADMIN — FUROSEMIDE 60 MG: 10 INJECTION, SOLUTION INTRAMUSCULAR; INTRAVENOUS at 11:11

## 2022-11-10 RX ADMIN — FENTANYL CITRATE 50 MCG: 50 INJECTION, SOLUTION INTRAMUSCULAR; INTRAVENOUS at 07:11

## 2022-11-10 RX ADMIN — AMIODARONE HYDROCHLORIDE 200 MG: 200 TABLET ORAL at 10:11

## 2022-11-10 RX ADMIN — ROCURONIUM BROMIDE 40 MG: 10 INJECTION INTRAVENOUS at 07:11

## 2022-11-10 RX ADMIN — FUROSEMIDE 10 MG/HR: 10 INJECTION, SOLUTION INTRAMUSCULAR; INTRAVENOUS at 12:11

## 2022-11-10 RX ADMIN — OXYCODONE 5 MG: 5 TABLET ORAL at 06:11

## 2022-11-10 RX ADMIN — Medication 100 MCG: at 08:11

## 2022-11-10 RX ADMIN — SUGAMMADEX 200 MG: 100 INJECTION, SOLUTION INTRAVENOUS at 08:11

## 2022-11-10 RX ADMIN — POLYETHYLENE GLYCOL 3350 17 G: 17 POWDER, FOR SOLUTION ORAL at 10:11

## 2022-11-10 RX ADMIN — MIDAZOLAM 2 MG: 1 INJECTION INTRAMUSCULAR; INTRAVENOUS at 07:11

## 2022-11-10 RX ADMIN — HEPARIN SODIUM 5000 UNITS: 5000 INJECTION INTRAVENOUS; SUBCUTANEOUS at 09:11

## 2022-11-10 RX ADMIN — ASPIRIN 81 MG 81 MG: 81 TABLET ORAL at 10:11

## 2022-11-10 RX ADMIN — AMIODARONE HYDROCHLORIDE 200 MG: 200 TABLET ORAL at 08:11

## 2022-11-10 RX ADMIN — OXYCODONE 5 MG: 5 TABLET ORAL at 11:11

## 2022-11-10 RX ADMIN — ALTEPLASE 2 MG: 2.2 INJECTION, POWDER, LYOPHILIZED, FOR SOLUTION INTRAVENOUS at 11:11

## 2022-11-10 RX ADMIN — SODIUM CHLORIDE, SODIUM GLUCONATE, SODIUM ACETATE, POTASSIUM CHLORIDE, MAGNESIUM CHLORIDE, SODIUM PHOSPHATE, DIBASIC, AND POTASSIUM PHOSPHATE: .53; .5; .37; .037; .03; .012; .00082 INJECTION, SOLUTION INTRAVENOUS at 07:11

## 2022-11-10 RX ADMIN — HYDROMORPHONE HYDROCHLORIDE 1 MG: 2 INJECTION INTRAMUSCULAR; INTRAVENOUS; SUBCUTANEOUS at 08:11

## 2022-11-10 RX ADMIN — DEXAMETHASONE SODIUM PHOSPHATE 4 MG: 4 INJECTION INTRA-ARTICULAR; INTRALESIONAL; INTRAMUSCULAR; INTRAVENOUS; SOFT TISSUE at 08:11

## 2022-11-10 RX ADMIN — FAMOTIDINE 20 MG: 20 TABLET ORAL at 10:11

## 2022-11-10 RX ADMIN — ALTEPLASE 2 MG: 2.2 INJECTION, POWDER, LYOPHILIZED, FOR SOLUTION INTRAVENOUS at 08:11

## 2022-11-10 RX ADMIN — HEPARIN SODIUM 5000 UNITS: 5000 INJECTION INTRAVENOUS; SUBCUTANEOUS at 05:11

## 2022-11-10 RX ADMIN — ONDANSETRON 4 MG: 2 INJECTION INTRAMUSCULAR; INTRAVENOUS at 08:11

## 2022-11-10 RX ADMIN — HEPARIN SODIUM 5000 UNITS: 5000 INJECTION INTRAVENOUS; SUBCUTANEOUS at 03:11

## 2022-11-10 RX ADMIN — CEFEPIME 2 G: 2 INJECTION, POWDER, FOR SOLUTION INTRAVENOUS at 03:11

## 2022-11-10 NOTE — TRANSFER OF CARE
"Anesthesia Transfer of Care Note    Patient: David Barrios    Procedure(s) Performed: Procedure(s) (LRB):  WASHOUT (N/A)    Patient location: ICU    Anesthesia Type: general    Transport from OR: Transported from OR on 6-10 L/min O2 by face mask with adequate spontaneous ventilation    Post pain: adequate analgesia    Post assessment: no apparent anesthetic complications    Post vital signs: stable    Level of consciousness: awake and alert    Nausea/Vomiting: no nausea/vomiting    Complications: none    Transfer of care protocol was followed      Last vitals:   Visit Vitals  BP (!) 149/78   Pulse 104   Temp 36.8 °C (98.2 °F) (Oral)   Resp (!) 21   Ht 5' 9.02" (1.753 m)   Wt 76.2 kg (168 lb)   SpO2 95%   BMI 24.80 kg/m²     "

## 2022-11-10 NOTE — PROGRESS NOTES
[facility] - [facility] Intensive [facility]  Critical Care - Surgery  Progress Note    Patient Name: David Barrios  MRN: [id number]  Admission Date: 11/8/2022  Hospital Length of Stay: 2 days  Code Status: Full Code  Attending Provider: Mike Hedrick MD  Primary Care Provider: Breann Tavera MD   Principal Problem: Sternal wound infection    Subjective:     Hospital/ICU Course:  62 y.o. male S/P TV replacement, MV replacement, MAZE, Lt Atrial Appendage ligation and Impella placement on 10/7/22, course complicated by bleeding, requiring reopening POD#0, multiple VT runs requiring DCCV, and sternal wound infections. He presents to the SICU s/p wound debridement and wire removal on 11/08       Interval History/Significant Events: weaned off epi. Remains on milrinone 0.25. oliguric KIMANI. Going to OR today with plastics for washout.     Follow-up For: Procedure(s) (LRB):  REMOVAL, STERNAL WIRE (N/A)  DEBRIDEMENT, STERNUM (N/A)  APPLICATION, WOUND VAC (N/A)    Post-Operative Day: 1 Day Post-Op    Objective:     Vital Signs (Most Recent):  Temp: 98.3 °F (36.8 °C) (11/09/22 2300)  Pulse: 102 (11/10/22 0600)  Resp: 18 (11/10/22 0600)  BP: (!) 148/74 (11/10/22 0600)  SpO2: 96 % (11/10/22 0600) Vital Signs (24h Range):  Temp:  [97.7 °F (36.5 °C)-98.4 °F (36.9 °C)] 98.3 °F (36.8 °C)  Pulse:  [100-116] 102  Resp:  [14-38] 18  SpO2:  [91 %-100 %] 96 %  BP: (119-166)/(55-81) 148/74  Arterial Line BP: (110-155)/() 131/56     Weight: 76.2 kg (168 lb)  Body mass index is 24.8 kg/m².      Intake/Output Summary (Last 24 hours) at 11/10/2022 0708  Last data filed at 11/10/2022 0600  Gross per 24 hour   Intake 99364.85 ml   Output 1215 ml   Net 68923.85 ml         Physical Exam  Vitals and nursing note reviewed.   Constitutional:       General: He is not in acute distress.     Appearance: He is ill-appearing.      Interventions: He is sedated and intubated.   HENT:      Head: Normocephalic and atraumatic.   Neck:      Comments: CHUCHO  CVC  Cardiovascular:      Rate and Rhythm: Normal rate and regular rhythm.      Pulses: Normal pulses.      Heart sounds: No murmur heard.    No gallop.      Comments: Midline incision with wound vac in place    Pulmonary:      Effort: He is intubated.      Breath sounds: No wheezing or rales.   Abdominal:      Palpations: Abdomen is soft.   Genitourinary:     Comments: Fierro in place  Skin:     General: Skin is warm and dry.      Capillary Refill: Capillary refill takes less than 2 seconds.      Findings: Bruising present. No lesion.      Comments: MSI with wound vac. Sanguinous output   Neurological:      Comments: sedated       Vents:  Vent Mode: Spont (11/09/22 0347)  Set Rate: 22 BPM (11/09/22 0345)  Vt Set: 450 mL (11/09/22 0345)  Pressure Support: 5 cmH20 (11/09/22 0347)  PEEP/CPAP: 5 cmH20 (11/09/22 0347)  Oxygen Concentration (%): 35 (11/09/22 0600)  Peak Airway Pressure: 10 cmH2O (11/09/22 0347)  Plateau Pressure: 19 cmH20 (11/09/22 0347)  Total Ve: 4.29 L/m (11/09/22 0347)  Negative Inspiratory Force (cm H2O): 0 (11/09/22 0347)  F/VT Ratio<105 (RSBI): (!) 35.37 (11/09/22 0347)    Lines/Drains/Airways       Peripherally Inserted Central Catheter Line  Duration             PICC Double Lumen 10/17/22 1709 right brachial 23 days              Central Venous Catheter Line  Duration             Introducer 11/08/22 0802 1 day    Percutaneous Central Line Insertion/Assessment - Triple Lumen  11/08/22 0800 right internal jugular 1 day              Drain  Duration                  Urethral Catheter 11/08/22 0730 Non-latex;Temperature probe 14 Fr. 1 day              Arterial Line  Duration             Arterial Line 11/08/22 0712 Right Radial 1 day              Peripheral Intravenous Line  Duration                  Peripheral IV - Single Lumen 11/08/22 0730 14 G  Left Forearm 1 day                    Significant Labs:    CBC/Anemia Profile:  Recent Labs   Lab 11/09/22  0521 11/09/22  1159 11/10/22  0306   WBC 45.14*  38.39* 22.58*   HGB 8.1* 8.2* 7.5*   HCT 24.5* 24.1* 22.8*    392 323   MCV 89 88 89   RDW 15.0* 15.3* 15.4*          Chemistries:  Recent Labs   Lab 11/09/22  1159 11/09/22  1625 11/10/22  0018 11/10/22  0306   * 134* 136 133*   K 3.8 3.6 3.6 4.0   CL 96 98 100 97   CO2 27 25 25 26   BUN 46* 46* 41* 46*   CREATININE 3.5* 3.4* 3.3* 3.4*   CALCIUM 9.4 8.7 8.4* 9.0   ALBUMIN 2.7* 2.5* 2.3* 2.4*   PROT 6.1 5.7* 5.4* 5.5*   BILITOT 2.6* 1.3* 0.8 0.9   ALKPHOS 91 91 89 96   ALT 5* <5* <5* <5*   AST 15 13 14 15   MG 2.2 2.3 2.2 2.2   PHOS 4.3  --  3.7 3.9         All pertinent labs within the past 24 hours have been reviewed.    Significant Imaging:  I have reviewed all pertinent imaging results/findings within the past 24 hours.    Assessment/Plan:     * Sternal wound infection  62 y.o. male S/P TV replacement, MV replacement, MAZE, Lt Atrial Appendage ligation and Impella placement on 10/7/22, course complicated by bleeding, requiring reopening POD#0, multiple VT runs requiring DCCV, and sternal wound infections. He presents to the SICU s/p wound debridement and wire removal on 11/08       Neuro/Psych:     - Sedation: none    - Pain:    - Scheduled Tylenol 1g q8h              Cardiac:      - BP Goal: MAP 60-80 and SBP <140.     - Pressors: weaned off pressors over night. EF 35% with mild RV enlargement and moderate RV dysfunction. Weaned to milrinone 0.25.     - wound vac applied to sternotomy, with sanguinous drainage     - Anti-HTNs: Will restart home meds when back from OR.     - Rhythm: NSR. S/p MAZE for Afib, will restart home amio.     - Beta blocker: Will start when appropriate and off epi.     - Statin: Atorvastatin 40 mg QD      Pulmonary:     - Goal SpO2 >92%          Renal:    - Trend BUN/Cr. Oliguric KIMAIN. Will monitor and support kidneys with MAP >65. Hold off diuresis.    - Maintain Fierro, record strict Is/Os    Recent Labs   Lab 11/09/22  1625 11/10/22  0018 11/10/22  0306   BUN 46* 41*  46*   CREATININE 3.4* 3.3* 3.4*         FEN / GI:     - Daily CMP, PRN K/Mag/Phos per protocol     - Replace electrolytes as needed    - Nutrition: NPO for surgery     - Bowel Regimen: Miralax, docusate      ID:     - Leukocytosis without fever, trending down     - Abx: Continue Cefepime, lactic acid improving. Will need long term abx plan, 6weeks post op flap    Recent Labs   Lab 11/09/22  0521 11/09/22  1159 11/10/22  0306   WBC 45.14* 38.39* 22.58*         Heme/Onc:     - H/H with post-operative anemia     - CBC daily    - ASA 325mg daily    Recent Labs   Lab 11/09/22 0521 11/09/22  1159 11/10/22  0306   HGB 8.1* 8.2* 7.5*    392 323   APTT 25.1 25.4 27.8   INR 1.1 1.1 1.1         Endocrine:     - CTS Goal -140    - HgbA1c:     - Endocrinology consulted for insulin management      PPx:   Feeding: npo for OR  Analgesia/Sedation: tylenol   Thromboembolic Prevention: scds  HOB >30: Yes  Stress Ulcer: famotidine   Glucose Control: Yes, insulin management per Endocrinology     Lines/Drains/Airway:   Left radial arterial line   RIJ CVC   Fierro   Wound Vac      Dispo/Code Status/Palliative:     - Continue SICU Care    - Full Code                  Critical care was time spent personally by me on the following activities: development of treatment plan with patient or surrogate and bedside caregivers, discussions with consultants, evaluation of patient's response to treatment, examination of patient, ordering and performing treatments and interventions, ordering and review of laboratory studies, ordering and review of radiographic studies, pulse oximetry, re-evaluation of patient's condition.  This critical care time did not overlap with that of any other provider or involve time for any procedures.     Leah Lay NP  Critical Care - Surgery  [facility] - [facility]

## 2022-11-10 NOTE — PLAN OF CARE
Problem: Occupational Therapy  Goal: Occupational Therapy Goal  Description: Goals to be met by: 11/24/22     Patient will increase functional independence with ADLs by performing:    Feeding with [place].  UE Dressing with [place].  LE Dressing with Stand-by Assistance.  Grooming while standing at sink with Supervision.  Toilet transfer to toilet with Supervision.    Outcome: Ongoing, Progressing

## 2022-11-10 NOTE — PT/OT/SLP EVAL
"Physical Therapy Co-Evaluation  Co-evaluation and treatment performed due to acuity and complexity of pt's medical status with 2 skilled disciplines needed to optimize pts functional performance in ICU setting.    Patient Name:  David Barrios   MRN:  [id number]    Recommendations:     Discharge Recommendations:  home health PT   Discharge Equipment Recommendations: none   Barriers to discharge:  increased level of assistance    Assessment:     David Barrios is a 62 y.o. male admitted with a medical diagnosis of Sternal wound infection.  He presents with the following impairments/functional limitations:  weakness, impaired endurance, impaired self care skills, impaired functional mobility, gait instability, impaired balance, decreased upper extremity function, decreased lower extremity function, orthopedic precautions, pain. Pt would benefit from a HHPT for: Dynamic/static standing/sitting balance through skilled balance training, strengthening with the use of skilled therapeutic exercises interventions, and mobility through adaptive equipment training. Pt continues to benefit from a collaborative PT program to improve quality of life and focus on recovery of impairments.      Rehab Prognosis: Good; patient would benefit from acute skilled PT services to address these deficits and reach maximum level of function.    Recent Surgery: Procedure(s) (LRB):  WASHOUT (N/A) Day of Surgery    Plan:     During this hospitalization, patient to be seen 5 x/week to address the identified rehab impairments via gait training, therapeutic activities, therapeutic exercises, neuromuscular re-education and progress toward the following goals:    Plan of Care Expires:  12/10/22    Subjective     Chief Complaint: pain  Patient/Family Comments/goals: pt stating he wanted to perform bed mobility by himself  Pain/Comfort:  Pain Rating 1:  ("6.5")  Location - Orientation 1: generalized  Location 1: sternal  Pain Addressed 1: Reposition, " Distraction  Pain Rating Post-Intervention 1: other (see comments) (not rated)    Patients cultural, spiritual, Yazdanism conflicts given the current situation: no    Living Environment:  Pt lives in a mobile home in [place], but will be going to stay with his brother in [place]. His brother's house is a 2SH with 3STE, but patient is able to stay on the 1st floor.   Prior to admission, patients level of function was independent for all functional mobility, ADLs, and driving. Pt reports he travels a lot and stays in hotels as a .  Equipment used at home: none.  DME owned (not currently used): none.  Upon discharge, patient will have assistance from brother.    Objective:     Communicated with RN prior to session.  Patient found HOB elevated with wound vac, telemetry, pulse ox (continuous), peripheral IV, oxygen, lambert catheter, blood pressure cuff, arterial line, central line  upon PT entry to room.    General Precautions: Standard, fall, sternal   Orthopedic Precautions:N/A   Braces: N/A  Respiratory Status: Nasal cannula, flow 1 L/min    Exams:  Cognitive Exam:  Patient is oriented to Person, Place, Time, and Situation  Gross Motor Coordination:  WFL  Postural Exam:  Patient presented with the following abnormalities:    -       Rounded shoulders  Sensation:  denies numbness/tingling in BLE, B LT inact  RLE ROM: WFL  RLE Strength: Deficits: grossly 4/5  LLE ROM: WFL  LLE Strength: Deficits: grossly 4/5    Functional Mobility:  Bed Mobility:     Scooting: stand by assistance  Supine to Sit: stand by assistance  Transfers:     Sit to Stand:  stand by assistance with no AD  Gait: 4 ft, CGA, no AD, HHA; decreased gait speed and step length  Balance:   Static Sitting: SBA  Dynamic Sitting: SBA  Static Standing: SBA  Dynamic Standing: CGA      AM-PAC 6 CLICK MOBILITY  Total Score:18       Treatment & Education:  Sternal precautions reviewed.   Patient educated on role of therapy, goals of session, and  benefits of mobilizing.   Discussed PT plan of care during hospitalization.   Patient educated on calling for assistance.   Patient educated on how their diagnosis impacts their mobility within PT scope of practice.   Communication board up to date.  All questions answered within PT scope of practice.    Pt safe for OOB to chair with 1 person nursing staff assistance.      Patient left up in chair with all lines intact, call button in reach, and RN notified.    GOALS:   Multidisciplinary Problems       Physical Therapy Goals          Problem: Physical Therapy    Goal Priority Disciplines Outcome Goal Variances Interventions   Physical Therapy Goal     PT, PT/OT Ongoing, Progressing     Description: Goals to be met by: 2022     Patient will increase functional independence with mobility by performin. Supine to sit with Modified [place]  2. Sit to supine with Modified [place]  3. Sit to stand transfer with Supervision  4. Bed to chair transfer with Supervision using No Assistive Device  5. Gait  x 200 feet with Supervision using No Assistive Device.   6. Pt will ascend/descend 3 steps with no HR and SBA.                         History:     Past Medical History:   Diagnosis Date    Anemia     Atrial fibrillation     Encounter for blood transfusion     Esophageal ulcer     GI bleed     Hypertension        Past Surgical History:   Procedure Laterality Date    APPLICATION OF WOUND VACUUM-ASSISTED CLOSURE DEVICE N/A 2022    Procedure: APPLICATION, WOUND VAC;  Surgeon: Mike Hedrick MD;  Location: [facility] OR [address] [place] [address];  Service: General;  Laterality: N/A;    CARDIOVERSION Left 9/15/2022    Procedure: Cardioversion;  Surgeon: Julio James MD;  Location: [facility] CATH LAB/EP;  Service: Cardiology;  Laterality: Left;  With NORBERT    CATHETERIZATION OF BOTH LEFT AND RIGHT HEART N/A 2022    Procedure: CATHETERIZATION, HEART, BOTH LEFT AND RIGHT;  Surgeon: Julio James MD;  Location: [facility] CATH  LAB/EP;  Service: Cardiology;  Laterality: N/A;    COLONOSCOPY N/A 4/4/2019    Procedure: COLONOSCOPY [place];  Surgeon: Umair Randolph MD;  Location: [facility] ENDO;  Service: Endoscopy;  Laterality: N/A;    CORONARY ANGIOGRAPHY N/A 9/22/2022    Procedure: ANGIOGRAM, CORONARY ARTERY;  Surgeon: Julio James MD;  Location: [facility] CATH LAB/EP;  Service: Cardiology;  Laterality: N/A;    MEYER MAZE PROCEDURE N/A 10/7/2022    Procedure: MEYER MAZE PROCEDURE;  Surgeon: Mike Hedrick MD;  Location: [facility] OR 2ND FLR;  Service: Cardiovascular;  Laterality: N/A;    DEBRIDEMENT OF STERNUM N/A 11/8/2022    Procedure: DEBRIDEMENT, STERNUM;  Surgeon: Mike Hedrick MD;  Location: [facility] OR 2ND FLR;  Service: General;  Laterality: N/A;    ESOPHAGOGASTRODUODENOSCOPY N/A 10/12/2018    Procedure: EGD (ESOPHAGOGASTRODUODENOSCOPY);  Surgeon: Braden Herring MD;  Location: [facility];  Service: Endoscopy;  Laterality: N/A;    ESOPHAGOGASTRODUODENOSCOPY N/A 4/4/2019    Procedure: EGD;  Surgeon: Umair Randolph MD;  Location: [facility];  Service: Endoscopy;  Laterality: N/A;    EXCLUSION OF LEFT ATRIAL APPENDAGE N/A 10/7/2022    Procedure: EXCLUSION, LEFT ATRIAL APPENDAGE;  Surgeon: Mike Hedrick MD;  [facility];  Service: Cardiovascular;  Laterality: N/A;    HERNIA REPAIR      INSERTION OF INTRAVASCULAR MICROAXIAL BLOOD PUMP N/A 10/7/2022    Procedure: INSERTION, IMPELLA;  Surgeon: Mike Hedrick MD;  Location: [facility] OR 2ND FLR;  Service: Cardiovascular;  Laterality: N/A;  direct aortic insertion    IRRIGATION OF MEDIASTINUM N/A 10/7/2022    Procedure: IRRIGATION, MEDIASTINUM;  Surgeon: Mike Hedrick MD;  Location: [facility] OR 2ND FLR;  Service: Cardiovascular;  Laterality: N/A;    STERNAL WIRES REMOVAL N/A 11/8/2022    Procedure: REMOVAL, STERNAL WIRE;  Surgeon: Mike Hedrick MD;  Location: [facility] OR 2ND FLR;  Service: General;  Laterality: N/A;  Sternal wire removal with muscle flap creation     TRICUSPID VALVULOPLASTY N/A 10/7/2022    Procedure: REPAIR, TRICUSPID VALVE;  Surgeon: Mike Hedrick MD;  Location: [facility] OR [address] [place] [address];  Service: Cardiovascular;  Laterality: N/A;       Time Tracking:     PT Received On: 11/10/22  PT Start Time: 1327     PT Stop Time: 1351  PT Total Time (min): 24 min     Billable Minutes: Evaluation 12 and Therapeutic Activity 12      11/10/2022

## 2022-11-10 NOTE — ANESTHESIA PROCEDURE NOTES
Intubation    Date/Time: 11/10/2022 7:54 AM  Performed by: Marine Mcmahon CRNA  Authorized by: Alonso Herring MD     Intubation:     Induction:  Intravenous    Intubated:  Postinduction    Mask Ventilation:  Easy with oral airway    Attempts:  1    Attempted By:  CRNA    Method of Intubation:  Video laryngoscopy    Blade:  William 3    Laryngeal View Grade: Grade I - full view of cords      Difficult Airway Encountered?: No      Complications:  None    Airway Device:  Oral endotracheal tube    Airway Device Size:  7.5    Style/Cuff Inflation:  Cuffed (inflated to minimal occlusive pressure)    Inflation Amount (mL):  7    Tube secured:  23    Secured at:  The lips    Placement Verified By:  Capnometry    Complicating Factors:  None    Findings Post-Intubation:  BS equal bilateral and atraumatic/condition of teeth unchanged

## 2022-11-10 NOTE — PLAN OF CARE
"      SICU PLAN OF CARE NOTE    Dx: Sternal wound infection    Shift Events: PT went to OR for sternal washout. Pt came back to SICU with no acute problems. PT up to chair for majority of shift    Goals of Care: MAP >65    Neuro: AAO x4    Vital Signs: /61   Pulse 103   Temp 97.6 °F (36.4 °C) (Oral)   Resp (!) 25   Ht 5' 9.02" (1.753 m)   Wt 76.2 kg (168 lb)   SpO2 98%   BMI 24.80 kg/m²     Respiratory: Nasal Cannula 1 L    Diet: Cardiac Diet    Gtts: Lasix and Milrinone    Urine Output: Urinary Catheter 150 -175cc/hour    Drains: Wound Vac 250      Labs/Accuchecks: Q4 Accuchecks.    Skin: No skin breakdown noted. Lines and devices free from skin contact. Pressure points protected. Pt repositioned self with frequent reminders.       "

## 2022-11-10 NOTE — SUBJECTIVE & OBJECTIVE
Interval History/Significant Events: weaned off epi. Remains on milrinone 0.25. oliguric KIMANI. Going to OR today with plastics for washout.     Follow-up For: Procedure(s) (LRB):  REMOVAL, STERNAL WIRE (N/A)  DEBRIDEMENT, STERNUM (N/A)  APPLICATION, WOUND VAC (N/A)    Post-Operative Day: 1 Day Post-Op    Objective:     Vital Signs (Most Recent):  Temp: 98.3 °F (36.8 °C) (11/09/22 2300)  Pulse: 102 (11/10/22 0600)  Resp: 18 (11/10/22 0600)  BP: (!) 148/74 (11/10/22 0600)  SpO2: 96 % (11/10/22 0600) Vital Signs (24h Range):  Temp:  [97.7 °F (36.5 °C)-98.4 °F (36.9 °C)] 98.3 °F (36.8 °C)  Pulse:  [100-116] 102  Resp:  [14-38] 18  SpO2:  [91 %-100 %] 96 %  BP: (119-166)/(55-81) 148/74  Arterial Line BP: (110-155)/() 131/56     Weight: 76.2 kg (168 lb)  Body mass index is 24.8 kg/m².      Intake/Output Summary (Last 24 hours) at 11/10/2022 0708  Last data filed at 11/10/2022 0600  Gross per 24 hour   Intake 30645.85 ml   Output 1215 ml   Net 63916.85 ml         Physical Exam  Vitals and nursing note reviewed.   Constitutional:       General: He is not in acute distress.     Appearance: He is ill-appearing.      Interventions: He is sedated and intubated.   HENT:      Head: Normocephalic and atraumatic.   Neck:      Comments: [facility] CVC  Cardiovascular:      Rate and Rhythm: Normal rate and regular rhythm.      Pulses: Normal pulses.      Heart sounds: No murmur heard.    No gallop.      Comments: Midline incision with wound vac in place    Pulmonary:      Effort: He is intubated.      Breath sounds: No wheezing or rales.   Abdominal:      Palpations: Abdomen is soft.   Genitourinary:     Comments: Fierro in place  Skin:     General: Skin is warm and dry.      Capillary Refill: Capillary refill takes less than 2 seconds.      Findings: Bruising present. No lesion.      Comments: MSI with wound vac. Sanguinous output   Neurological:      Comments: sedated       Vents:  Vent Mode: Spont (11/09/22 0347)  Set Rate: 22 BPM  (11/09/22 0345)  Vt Set: 450 mL (11/09/22 0345)  Pressure Support: 5 cmH20 (11/09/22 0347)  PEEP/CPAP: 5 cmH20 (11/09/22 0347)  Oxygen Concentration (%): 35 (11/09/22 0600)  Peak Airway Pressure: 10 cmH2O (11/09/22 0347)  Plateau Pressure: 19 cmH20 (11/09/22 0347)  Total Ve: 4.29 L/m (11/09/22 0347)  Negative Inspiratory Force (cm H2O): 0 (11/09/22 0347)  F/VT Ratio<105 (RSBI): (!) 35.37 (11/09/22 0347)    Lines/Drains/Airways       Peripherally Inserted Central Catheter Line  Duration             PICC Double Lumen 10/17/22 1709 right brachial 23 days              Central Venous Catheter Line  Duration             Introducer 11/08/22 0802 1 day    Percutaneous Central Line Insertion/Assessment - Triple Lumen  11/08/22 0800 right internal jugular 1 day              Drain  Duration                  Urethral Catheter 11/08/22 0730 Non-latex;Temperature probe 14 Fr. 1 day              Arterial Line  Duration             Arterial Line 11/08/22 0712 Right Radial 1 day              Peripheral Intravenous Line  Duration                  Peripheral IV - Single Lumen 11/08/22 0730 14 G  Left Forearm 1 day                    Significant Labs:    CBC/Anemia Profile:  Recent Labs   Lab 11/09/22  0521 11/09/22  1159 11/10/22  0306   WBC 45.14* 38.39* 22.58*   HGB 8.1* 8.2* 7.5*   HCT 24.5* 24.1* 22.8*    392 323   MCV 89 88 89   RDW 15.0* 15.3* 15.4*          Chemistries:  Recent Labs   Lab 11/09/22  1159 11/09/22  1625 11/10/22  0018 11/10/22  0306   * 134* 136 133*   K 3.8 3.6 3.6 4.0   CL 96 98 100 97   CO2 27 25 25 26   BUN 46* 46* 41* 46*   CREATININE 3.5* 3.4* 3.3* 3.4*   CALCIUM 9.4 8.7 8.4* 9.0   ALBUMIN 2.7* 2.5* 2.3* 2.4*   PROT 6.1 5.7* 5.4* 5.5*   BILITOT 2.6* 1.3* 0.8 0.9   ALKPHOS 91 91 89 96   ALT 5* <5* <5* <5*   AST 15 13 14 15   MG 2.2 2.3 2.2 2.2   PHOS 4.3  --  3.7 3.9         All pertinent labs within the past 24 hours have been reviewed.    Significant Imaging:  I have reviewed all pertinent  imaging results/findings within the past 24 hours.

## 2022-11-10 NOTE — PLAN OF CARE
PT Eval complete and POC established.    11/10/2022    Problem: Physical Therapy  Goal: Physical Therapy Goal  Description: Goals to be met by: 2022     Patient will increase functional independence with mobility by performin. Supine to sit with Modified [place]  2. Sit to supine with Modified [place]  3. Sit to stand transfer with Supervision  4. Bed to chair transfer with Supervision using No Assistive Device  5. Gait  x 200 feet with Supervision using No Assistive Device.   6. Pt will ascend/descend 3 steps with no HR and SBA.    Outcome: Ongoing, Progressing

## 2022-11-10 NOTE — ASSESSMENT & PLAN NOTE
62 y.o. male S/P TV replacement, MV replacement, MAZE, Lt Atrial Appendage ligation and Impella placement on 10/7/22, course complicated by bleeding, requiring reopening POD#0, multiple VT runs requiring DCCV, and sternal wound infections. He presents to the SICU s/p wound debridement and wire removal on 11/08       Neuro/Psych:     - Sedation: none    - Pain:    - Scheduled Tylenol 1g q8h              Cardiac:      - BP Goal: MAP 60-80 and SBP <140.     - Pressors: weaned off pressors over night. EF 35% with mild RV enlargement and moderate RV dysfunction. Weaned to milrinone 0.25.     - wound vac applied to sternotomy, with sanguinous drainage     - Anti-HTNs: Will restart home meds when back from OR.     - Rhythm: NSR. S/p MAZE for Afib, will restart home amio.     - Beta blocker: Will start when appropriate and off epi.     - Statin: Atorvastatin 40 mg QD      Pulmonary:     - Goal SpO2 >92%          Renal:    - Trend BUN/Cr. Oliguric KIMANI. Will monitor and support kidneys with MAP >65. Hold off diuresis.    - Maintain Fierro, record strict Is/Os    Recent Labs   Lab 11/09/22  1625 11/10/22  0018 11/10/22  0306   BUN 46* 41* 46*   CREATININE 3.4* 3.3* 3.4*         FEN / GI:     - Daily CMP, PRN K/Mag/Phos per protocol     - Replace electrolytes as needed    - Nutrition: NPO for surgery     - Bowel Regimen: Miralax, docusate      ID:     - Leukocytosis without fever, trending down     - Abx: Continue Cefepime, lactic acid improving. Will need long term abx plan, 6weeks post op flap    Recent Labs   Lab 11/09/22  0521 11/09/22  1159 11/10/22  0306   WBC 45.14* 38.39* 22.58*         Heme/Onc:     - H/H with post-operative anemia     - CBC daily    - ASA 325mg daily    Recent Labs   Lab 11/09/22  0521 11/09/22  1159 11/10/22  0306   HGB 8.1* 8.2* 7.5*    392 323   APTT 25.1 25.4 27.8   INR 1.1 1.1 1.1         Endocrine:     - CTS Goal -140    - HgbA1c:     - Endocrinology consulted for insulin  management      PPx:   Feeding: npo for OR  Analgesia/Sedation: tylenol   Thromboembolic Prevention: scds  HOB >30: Yes  Stress Ulcer: famotidine   Glucose Control: Yes, insulin management per Endocrinology     Lines/Drains/Airway:   Left radial arterial line   RIJ CVC   Fierro   Wound Vac      Dispo/Code Status/Palliative:     - Continue SICU Care    - Full Code

## 2022-11-10 NOTE — OP NOTE
Date of surgery 11/10/2022   Preoperative diagnosis sternal wound infection   Postoperative diagnosis is same  Procedure performed  1. Irrigation and debridement sternal wound   2. Placement of wound VAC   Surgeon Neida  Anesthesia general  Complications none  Blood loss minimal  Drains none     Patient was placed in supine position after adequate general anesthesia was prepped and draped in a normal sterile fashion previous wound VAC was removed.  Wound was then irrigated with Vashe solution.  We waited 3 minutes.  Next 3 L of saline antibiotic solution was used next, Vashe solution was used again for 3 minutes.  All nonviable tissue was debrided..  There were no complications.  A wound VAC was then applied.  End of dictation

## 2022-11-10 NOTE — CARE UPDATE
Care Update:     No acute events overnight. Patient on the SICU in room 24375/15146 A. Blood glucose stable. BG at goal on current insulin regimen (SSI ). Steroid use- Dexamethasone  4 mg in the OR. Day of Surgery  Renal function- Abnormal - Creatinine 3.4   Vasopressors-  None       Diet Cardiac     POCT Glucose   Date Value Ref Range Status   11/10/2022 97 70 - 110 mg/dL Final   11/09/2022 117 (H) 70 - 110 mg/dL Final   11/09/2022 133 (H) 70 - 110 mg/dL Final   11/09/2022 112 (H) 70 - 110 mg/dL Final   11/09/2022 137 (H) 70 - 110 mg/dL Final   11/09/2022 131 (H) 70 - 110 mg/dL Final   11/09/2022 138 (H) 70 - 110 mg/dL Final   11/09/2022 127 (H) 70 - 110 mg/dL Final   11/09/2022 151 (H) 70 - 110 mg/dL Final   11/08/2022 154 (H) 70 - 110 mg/dL Final   11/08/2022 152 (H) 70 - 110 mg/dL Final   11/08/2022 158 (H) 70 - 110 mg/dL Final   11/08/2022 135 (H) 70 - 110 mg/dL Final   11/08/2022 79 70 - 110 mg/dL Final   11/08/2022 74 70 - 110 mg/dL Final   11/08/2022 74 70 - 110 mg/dL Final   11/08/2022 111 (H) 70 - 110 mg/dL Final   11/08/2022 131 (H) 70 - 110 mg/dL Final   11/08/2022 153 (H) 70 - 110 mg/dL Final   11/08/2022 187 (H) 70 - 110 mg/dL Final   11/08/2022 204 (H) 70 - 110 mg/dL Final   11/08/2022 190 (H) 70 - 110 mg/dL Final   11/08/2022 216 (H) 70 - 110 mg/dL Final   11/08/2022 173 (H) 70 - 110 mg/dL Final     Lab Results   Component Value Date    HGBA1C 5.5 09/02/2022       Endocrinology consulted for BG management.   BG goal 140-180    Hospital Medications               glucagon (human recombinant) injection 1 mg 1 mg, Intramuscular, As needed (PRN), Patient with IV access, give D50W 25 mL (12.5 gms) IV push (1/2 of syringe) x 1 dose. Turn patient on their side and give Glucagon IM x 1 dose and NOTIFY MD IMMEDIATELY to obtain IV fluid order since patient is NPO.<BR>Re-check blood glucose in 15 minutes. If blood glucose remains &lt; 70 mg/dL, retreat as directed above and notify MD immediately.    insulin  aspart U-100 pen 0-5 Units 0-5 Units, Subcutaneous, Every 6 hours PRN, **LOW CORRECTION DOSE**<BR>Blood Glucose<BR>mg/dL                  0600                0000<BR>                             1200<BR>                             1800<BR>151-200               0 unit                0 unit<BR>201-250               2 units              1 unit<BR>251-300               3 units              1 unit<BR>301-350               4 units              2 units<BR>&gt;350                     5 units              3 units<BR>Administer subcutaneously if needed at times designated by monitoring schedule.<BR>DO NOT HOLD correction dose insulin for patients who are  NPO.<BR>&quot;HIGH ALERT MEDICATION&quot; - Administer with meals or TF/TPN.              ** Please notify Endocrine for any change and/or advance in diet**  ** Please call Endocrine for any BG related issues **    Discharge Planning:   TBD. Please notify endocrinology prior to discharge.      Rigoberto Dee DNP, FNP-C  Department of Endocrinology  Inpatient Glycemic Management

## 2022-11-10 NOTE — PT/OT/SLP EVAL
Occupational Therapy   Co-Evaluation/Treatment    Name: David Barrios  MRN: [id number]  Admitting Diagnosis:  Sternal wound infection  Recent Surgery: Procedure(s) (LRB):  WASHOUT (N/A) Day of Surgery    Recommendations:     Discharge Recommendations: home health OT  Discharge Equipment Recommendations:  none  Barriers to discharge:  None    Assessment:     David Barrios is a 62 y.o. male with a medical diagnosis of Sternal wound infection.  He presents with good motivation and participation. Pt is s/p sternal washout 11/10/22. He is currently performing functional tasks below baseline. Performance deficits affecting function: weakness, impaired endurance, impaired self care skills, impaired functional mobility, gait instability, impaired balance, decreased lower extremity function, decreased upper extremity function.  Pt would benefit from skilled OT services in order to maximize independence with ADLs and facilitate safe discharge. Pt would benefit from home health OT once medically stable for discharge.     Rehab Prognosis: Good; patient would benefit from acute skilled OT services to address these deficits and reach maximum level of function.       Plan:     Patient to be seen 4 x/week to address the above listed problems via self-care/home management, therapeutic activities, therapeutic exercises  Plan of Care Expires: 12/10/22  Plan of Care Reviewed with: patient    Subjective     Chief Complaint: sternal pain  Patient/Family Comments/goals: to return home    Occupational Profile:  Living Environment: Pt travels for work and typically stays in a hotel. He lives in a mobile home in [place]. He plans to discharge to his brother's 2  with [facility] in [place] where he can stay on the first floor. Pt admitted to hospital from LTAC  Previous level of function: independent  Roles and Routines: works as a    Equipment Used at Home:  none  Assistance upon Discharge: Upon discharge, pt will have assist  "from family    Pain/Comfort:  Pain Rating 1:  ("6.5")  Location 1: sternal  Pain Addressed 1: Reposition    Patients cultural, spiritual, Amish conflicts given the current situation: no    Objective:     Communicated with: RN and PT prior to session.  Patient found HOB elevated with wound vac, PICC line, central line, telemetry, pulse ox (continuous), oxygen, blood pressure cuff upon OT entry to room.    General Precautions: Standard, fall, sternal   Orthopedic Precautions:N/A   Braces: N/A  Respiratory Status: Room air    Occupational Performance:    Bed Mobility:    Patient completed Scooting/Bridging with stand by assistance  Patient completed Supine to Sit with stand by assistance  Pt required frequent cuing to adhere to sternal precautions    Functional Mobility/Transfers:  Patient completed Sit <> Stand Transfer with stand by assistance  with  no assistive device   Patient completed Bed <> Chair Transfer using Step Transfer technique with contact guard assistance with no assistive device  Functional Mobility: Pt ambulated bed>chair with CGA and no AD in order to maximize functional activity tolerance and standing balance required for engagement in occupations of choice.      Activities of Daily Living:  Grooming: stand by assistance to perform facial hygiene seated in bedside chair  Lower Body Dressing: maximal assistance to don B  socks     Cognitive/Visual Perceptual:  Cognitive/Psychosocial Skills:     -       Oriented to: Person, Place, Time, and Situation   -       Follows Commands/attention:Follows multistep  commands  -       Communication: clear/fluent  -       Memory: No Deficits noted  -       Safety awareness/insight to disability: intact   -       Mood/Affect/Coping skills/emotional control: Appropriate to situation    Physical Exam:  Sensation:    -       Intact  Upper Extremity Range of Motion:     -       Right Upper Extremity: WFL  -       Left Upper Extremity: WFL  Upper Extremity " Strength:    -       Right Upper Extremity: NT 2' sternal precautions  -       Left Upper Extremity: NT 2' sternal precautions   Strength:    -       Right Upper Extremity: WFL  -       Left Upper Extremity: WFL  Fine Motor Coordination:    -       Intact    AMPAC 6 Click ADL:  AMPAC Total Score: 18    Treatment & Education:  -Therapist provided facilitation and instruction of proper body mechanics, energy conservation, and fall prevention strategies during tasks listed above.  -Pt educated on role of OT, POC and goals for therapy  -Pt educated on Post-op sternal precautions; no lifting > 5 lbs, pulling or pushing with BUEs. Able to move arms within a pain free range   -Pt educated on importance of OOB activities with staff member assistance and sitting OOB majority of the day.   -Pt verbalized understanding. Pt expressed no further concerns/questions  -Whiteboard updated   Evaluation completed with PT to best establish plan of care for acute setting.     Patient left up in chair with all lines intact and call button in reach    GOALS:   Multidisciplinary Problems       Occupational Therapy Goals          Problem: Occupational Therapy    Goal Priority Disciplines Outcome Interventions   Occupational Therapy Goal     OT, PT/OT Ongoing, Progressing    Description: Goals to be met by: 11/24/22     Patient will increase functional independence with ADLs by performing:    Feeding with [place].  UE Dressing with [place].  LE Dressing with Stand-by Assistance.  Grooming while standing at sink with Supervision.  Toilet transfer to toilet with Supervision.                         History:     Past Medical History:   Diagnosis Date    Anemia     Atrial fibrillation     Encounter for blood transfusion     Esophageal ulcer     GI bleed     Hypertension          Past Surgical History:   Procedure Laterality Date    APPLICATION OF WOUND VACUUM-ASSISTED CLOSURE DEVICE N/A 11/8/2022    Procedure: APPLICATION, WOUND  VAC;  Surgeon: Mike Hedrick MD;  [facility];  Service: General;  Laterality: N/A;    CARDIOVERSION Left 9/15/2022    Procedure: Cardioversion;  Surgeon: Julio James MD;  Location: [facility] CATH LAB/EP;  Service: Cardiology;  Laterality: Left;  With NORBERT    CATHETERIZATION OF BOTH LEFT AND RIGHT HEART N/A 9/22/2022    Procedure: CATHETERIZATION, HEART, BOTH LEFT AND RIGHT;  Surgeon: Julio James MD;  Location: [facility] CATH LAB/EP;  Service: Cardiology;  Laterality: N/A;    COLONOSCOPY N/A 4/4/2019    Procedure: COLONOSCOPY Gol[place];  Surgeon: Umair Randolph MD;  Location: [facility] ENDO;  Service: Endoscopy;  Laterality: N/A;    CORONARY ANGIOGRAPHY N/A 9/22/2022    Procedure: ANGIOGRAM, CORONARY ARTERY;  Surgeon: Julio James MD;  Location: [facility] CATH LAB/EP;  Service: Cardiology;  Laterality: N/A;    MEYER MAZE PROCEDURE N/A 10/7/2022    Procedure: MEYER MAZE PROCEDURE;  Surgeon: Mike Hedrick MD;  [address];  Service: Cardiovascular;  Laterality: N/A;    DEBRIDEMENT OF STERNUM N/A 11/8/2022    Procedure: DEBRIDEMENT, STERNUM;  Surgeon: Mike Hedrick MD;  Location: [address];  Service: General;  Laterality: N/A;    ESOPHAGOGASTRODUODENOSCOPY N/A 10/12/2018    Procedure: EGD (ESOPHAGOGASTRODUODENOSCOPY);  Surgeon: Braden Herring MD;  [facility];  Service: Endoscopy;  Laterality: N/A;    ESOPHAGOGASTRODUODENOSCOPY N/A 4/4/2019    Procedure: EGD;  Surgeon: Umair Randolph MD;  Location: [facility] ENDO;  Service: Endoscopy;  Laterality: N/A;    EXCLUSION OF LEFT ATRIAL APPENDAGE N/A 10/7/2022    Procedure: EXCLUSION, LEFT ATRIAL APPENDAGE;  Surgeon: Mike Hedrick MD;  Location: [address];  Service: Cardiovascular;  Laterality: N/A;    HERNIA REPAIR      INSERTION OF INTRAVASCULAR MICROAXIAL BLOOD PUMP N/A 10/7/2022    Procedure: INSERTION, IMPELLA;  Surgeon: Mike Hedrick MD;  Location: [facility] [address] [place] [address];  Service: Cardiovascular;  Laterality:  N/A;  direct aortic insertion    IRRIGATION OF MEDIASTINUM N/A 10/7/2022    Procedure: IRRIGATION, MEDIASTINUM;  Surgeon: Mike Hedrick MD;  Location: [facility];  Service: Cardiovascular;  Laterality: N/A;    STERNAL WIRES REMOVAL N/A 11/8/2022    Procedure: REMOVAL, STERNAL WIRE;  Surgeon: Mike Hedrick MD;  [facility];  Service: General;  Laterality: N/A;  Sternal wire removal with muscle flap creation    TRICUSPID VALVULOPLASTY N/A 10/7/2022    Procedure: REPAIR, TRICUSPID VALVE;  Surgeon: Mike Hedrick MD;  [facility];  Service: Cardiovascular;  Laterality: N/A;       Time Tracking:     OT Date of Treatment: 11/10/22  OT Start Time: 1327  OT Stop Time: 1349  OT Total Time (min): 22 min    Billable Minutes:Evaluation 14  Self Care/Home Management 8    11/10/2022

## 2022-11-10 NOTE — NURSING
SICU PLAN OF CARE NOTE    Dx: Sternal wound infection    Shift Events: VSS, no acute events during shift. Plan is for patient to go to OR for washout this AM.    Gtts: Milrinone 5    Neuro: AAO x4, Follows Commands, and Moves All Extremities    Cardiac: Sinus tachy 100s-105    Respiratory: Nasal Cannula 1LPM    GI: NPO    : Urinary Catheter 625 cc/shift    Drains: wound vac:100cc/shift     Labs/Accuchecks: labs q6, accuchecks q6    Skin: no skin breakdown noted, pt turned and repositioned throughout shift. Bed plugged in,wheels locked, call light in reach, waffle mattress inflated.Pt updated on plan of care and verbalizes understanding.

## 2022-11-11 ENCOUNTER — ANESTHESIA EVENT (OUTPATIENT)
Dept: SURGERY | Facility: HOSPITAL | Age: 62
DRG: 268 | End: 2022-11-11
Payer: COMMERCIAL

## 2022-11-11 LAB
ALBUMIN SERPL BCP-MCNC: 2.3 G/DL (ref 3.5–5.2)
ALP SERPL-CCNC: 119 U/L (ref 55–135)
ALT SERPL W/O P-5'-P-CCNC: 7 U/L (ref 10–44)
ANION GAP SERPL CALC-SCNC: 11 MMOL/L (ref 8–16)
ANION GAP SERPL CALC-SCNC: 12 MMOL/L (ref 8–16)
APTT BLDCRRT: 27.6 SEC (ref 21–32)
AST SERPL-CCNC: 19 U/L (ref 10–40)
BASOPHILS # BLD AUTO: 0.03 K/UL (ref 0–0.2)
BASOPHILS NFR BLD: 0.1 % (ref 0–1.9)
BILIRUB SERPL-MCNC: 0.6 MG/DL (ref 0.1–1)
BLD PROD TYP BPU: NORMAL
BLOOD UNIT EXPIRATION DATE: NORMAL
BLOOD UNIT TYPE CODE: 600
BLOOD UNIT TYPE: NORMAL
BUN SERPL-MCNC: 50 MG/DL (ref 8–23)
BUN SERPL-MCNC: 50 MG/DL (ref 8–23)
CALCIUM SERPL-MCNC: 8.7 MG/DL (ref 8.7–10.5)
CALCIUM SERPL-MCNC: 8.9 MG/DL (ref 8.7–10.5)
CHLORIDE SERPL-SCNC: 94 MMOL/L (ref 95–110)
CHLORIDE SERPL-SCNC: 97 MMOL/L (ref 95–110)
CO2 SERPL-SCNC: 26 MMOL/L (ref 23–29)
CO2 SERPL-SCNC: 28 MMOL/L (ref 23–29)
CODING SYSTEM: NORMAL
CREAT SERPL-MCNC: 3.7 MG/DL (ref 0.5–1.4)
CREAT SERPL-MCNC: 3.8 MG/DL (ref 0.5–1.4)
DIFFERENTIAL METHOD: ABNORMAL
DISPENSE STATUS: NORMAL
EOSINOPHIL # BLD AUTO: 0 K/UL (ref 0–0.5)
EOSINOPHIL NFR BLD: 0.1 % (ref 0–8)
ERYTHROCYTE [DISTWIDTH] IN BLOOD BY AUTOMATED COUNT: 15.4 % (ref 11.5–14.5)
EST. GFR  (NO RACE VARIABLE): 17 ML/MIN/1.73 M^2
EST. GFR  (NO RACE VARIABLE): 17.6 ML/MIN/1.73 M^2
GLUCOSE SERPL-MCNC: 109 MG/DL (ref 70–110)
GLUCOSE SERPL-MCNC: 119 MG/DL (ref 70–110)
HCT VFR BLD AUTO: 21.3 % (ref 40–54)
HGB BLD-MCNC: 7.1 G/DL (ref 14–18)
IMM GRANULOCYTES # BLD AUTO: 0.38 K/UL (ref 0–0.04)
IMM GRANULOCYTES NFR BLD AUTO: 1.8 % (ref 0–0.5)
INR PPP: 1.1 (ref 0.8–1.2)
LYMPHOCYTES # BLD AUTO: 1.4 K/UL (ref 1–4.8)
LYMPHOCYTES NFR BLD: 6.4 % (ref 18–48)
MAGNESIUM SERPL-MCNC: 2.2 MG/DL (ref 1.6–2.6)
MCH RBC QN AUTO: 30.7 PG (ref 27–31)
MCHC RBC AUTO-ENTMCNC: 33.3 G/DL (ref 32–36)
MCV RBC AUTO: 92 FL (ref 82–98)
MONOCYTES # BLD AUTO: 1.4 K/UL (ref 0.3–1)
MONOCYTES NFR BLD: 6.5 % (ref 4–15)
NEUTROPHILS # BLD AUTO: 18.3 K/UL (ref 1.8–7.7)
NEUTROPHILS NFR BLD: 85.1 % (ref 38–73)
NRBC BLD-RTO: 0 /100 WBC
PHOSPHATE SERPL-MCNC: 4.7 MG/DL (ref 2.7–4.5)
PLATELET # BLD AUTO: 312 K/UL (ref 150–450)
PMV BLD AUTO: 9.8 FL (ref 9.2–12.9)
POCT GLUCOSE: 112 MG/DL (ref 70–110)
POCT GLUCOSE: 116 MG/DL (ref 70–110)
POCT GLUCOSE: 98 MG/DL (ref 70–110)
POTASSIUM SERPL-SCNC: 4 MMOL/L (ref 3.5–5.1)
POTASSIUM SERPL-SCNC: 4 MMOL/L (ref 3.5–5.1)
PROT SERPL-MCNC: 5.4 G/DL (ref 6–8.4)
PROTHROMBIN TIME: 11.4 SEC (ref 9–12.5)
RBC # BLD AUTO: 2.31 M/UL (ref 4.6–6.2)
SODIUM SERPL-SCNC: 133 MMOL/L (ref 136–145)
SODIUM SERPL-SCNC: 135 MMOL/L (ref 136–145)
TRANS ERYTHROCYTES VOL PATIENT: NORMAL ML
WBC # BLD AUTO: 21.49 K/UL (ref 3.9–12.7)

## 2022-11-11 PROCEDURE — 94761 N-INVAS EAR/PLS OXIMETRY MLT: CPT

## 2022-11-11 PROCEDURE — 80053 COMPREHEN METABOLIC PANEL: CPT

## 2022-11-11 PROCEDURE — P9021 RED BLOOD CELLS UNIT: HCPCS

## 2022-11-11 PROCEDURE — 85610 PROTHROMBIN TIME: CPT

## 2022-11-11 PROCEDURE — 85025 COMPLETE CBC W/AUTO DIFF WBC: CPT

## 2022-11-11 PROCEDURE — 25000003 PHARM REV CODE 250: Performed by: ANESTHESIOLOGY

## 2022-11-11 PROCEDURE — 36430 TRANSFUSION BLD/BLD COMPNT: CPT

## 2022-11-11 PROCEDURE — 84100 ASSAY OF PHOSPHORUS: CPT

## 2022-11-11 PROCEDURE — 20000000 HC ICU ROOM

## 2022-11-11 PROCEDURE — 99291 CRITICAL CARE FIRST HOUR: CPT | Mod: ,,, | Performed by: ANESTHESIOLOGY

## 2022-11-11 PROCEDURE — 99291 PR CRITICAL CARE, E/M 30-74 MINUTES: ICD-10-PCS | Mod: ,,, | Performed by: ANESTHESIOLOGY

## 2022-11-11 PROCEDURE — 63600175 PHARM REV CODE 636 W HCPCS: Performed by: ANESTHESIOLOGY

## 2022-11-11 PROCEDURE — 63600175 PHARM REV CODE 636 W HCPCS

## 2022-11-11 PROCEDURE — 99900035 HC TECH TIME PER 15 MIN (STAT)

## 2022-11-11 PROCEDURE — 25000003 PHARM REV CODE 250

## 2022-11-11 PROCEDURE — 83735 ASSAY OF MAGNESIUM: CPT

## 2022-11-11 PROCEDURE — 80048 BASIC METABOLIC PNL TOTAL CA: CPT | Mod: XB | Performed by: STUDENT IN AN ORGANIZED HEALTH CARE EDUCATION/TRAINING PROGRAM

## 2022-11-11 PROCEDURE — 25000003 PHARM REV CODE 250: Performed by: STUDENT IN AN ORGANIZED HEALTH CARE EDUCATION/TRAINING PROGRAM

## 2022-11-11 PROCEDURE — 85730 THROMBOPLASTIN TIME PARTIAL: CPT

## 2022-11-11 RX ORDER — INSULIN ASPART 100 [IU]/ML
0-5 INJECTION, SOLUTION INTRAVENOUS; SUBCUTANEOUS
Status: DISCONTINUED | OUTPATIENT
Start: 2022-11-11 | End: 2022-11-13

## 2022-11-11 RX ORDER — IBUPROFEN 200 MG
24 TABLET ORAL
Status: DISCONTINUED | OUTPATIENT
Start: 2022-11-11 | End: 2022-11-13

## 2022-11-11 RX ORDER — IBUPROFEN 200 MG
16 TABLET ORAL
Status: DISCONTINUED | OUTPATIENT
Start: 2022-11-11 | End: 2022-11-13

## 2022-11-11 RX ORDER — GLUCAGON 1 MG
1 KIT INJECTION
Status: DISCONTINUED | OUTPATIENT
Start: 2022-11-11 | End: 2022-11-13

## 2022-11-11 RX ORDER — HYDROCODONE BITARTRATE AND ACETAMINOPHEN 500; 5 MG/1; MG/1
TABLET ORAL
Status: DISCONTINUED | OUTPATIENT
Start: 2022-11-11 | End: 2022-11-15

## 2022-11-11 RX ADMIN — HEPARIN SODIUM 5000 UNITS: 5000 INJECTION INTRAVENOUS; SUBCUTANEOUS at 09:11

## 2022-11-11 RX ADMIN — SENNOSIDES AND DOCUSATE SODIUM 1 TABLET: 50; 8.6 TABLET ORAL at 09:11

## 2022-11-11 RX ADMIN — OXYCODONE HYDROCHLORIDE 10 MG: 10 TABLET ORAL at 03:11

## 2022-11-11 RX ADMIN — FAMOTIDINE 20 MG: 20 TABLET ORAL at 09:11

## 2022-11-11 RX ADMIN — AMIODARONE HYDROCHLORIDE 200 MG: 200 TABLET ORAL at 09:11

## 2022-11-11 RX ADMIN — HEPARIN SODIUM 5000 UNITS: 5000 INJECTION INTRAVENOUS; SUBCUTANEOUS at 02:11

## 2022-11-11 RX ADMIN — OXYCODONE HYDROCHLORIDE 10 MG: 10 TABLET ORAL at 08:11

## 2022-11-11 RX ADMIN — HEPARIN SODIUM 5000 UNITS: 5000 INJECTION INTRAVENOUS; SUBCUTANEOUS at 05:11

## 2022-11-11 RX ADMIN — MILRINONE LACTATE IN DEXTROSE 0.25 MCG/KG/MIN: 200 INJECTION, SOLUTION INTRAVENOUS at 11:11

## 2022-11-11 RX ADMIN — POLYETHYLENE GLYCOL 3350 17 G: 17 POWDER, FOR SOLUTION ORAL at 09:11

## 2022-11-11 RX ADMIN — CEFEPIME 2 G: 2 INJECTION, POWDER, FOR SOLUTION INTRAVENOUS at 04:11

## 2022-11-11 RX ADMIN — ASPIRIN 81 MG 81 MG: 81 TABLET ORAL at 09:11

## 2022-11-11 RX ADMIN — OXYCODONE HYDROCHLORIDE 10 MG: 10 TABLET ORAL at 09:11

## 2022-11-11 NOTE — SUBJECTIVE & OBJECTIVE
Interval History/Significant Events: diuresed yesterday with subsequent rise in sCr, discontinued. Giving 1 unit PRBC today. Minimal wound vac output. Afebrile.      Follow-up For: Procedure(s) (LRB):  REMOVAL, STERNAL WIRE (N/A)  DEBRIDEMENT, STERNUM (N/A)  APPLICATION, WOUND VAC (N/A)    Post-Operative Day: 1 Day Post-Op    Objective:     Vital Signs (Most Recent):  Temp: 97.7 °F (36.5 °C) (11/11/22 0634)  Pulse: 98 (11/11/22 0634)  Resp: (!) 21 (11/11/22 0634)  BP: 122/61 (11/10/22 1330)  SpO2: 99 % (11/11/22 0634) Vital Signs (24h Range):  Temp:  [97.6 °F (36.4 °C)-98.2 °F (36.8 °C)] 97.7 °F (36.5 °C)  Pulse:  [] 98  Resp:  [10-44] 21  SpO2:  [88 %-100 %] 99 %  BP: (122)/(61) 122/61  Arterial Line BP: ()/(37-72) 135/53     Weight: 76.2 kg (168 lb)  Body mass index is 24.8 kg/m².      Intake/Output Summary (Last 24 hours) at 11/11/2022 0708  Last data filed at 11/11/2022 0600  Gross per 24 hour   Intake 1555.64 ml   Output 2785 ml   Net -1229.36 ml         Physical Exam  Vitals and nursing note reviewed.   Constitutional:       General: He is not in acute distress.     Appearance: He is ill-appearing.      Interventions: He is sedated and intubated.   HENT:      Head: Normocephalic and atraumatic.   Neck:      Comments: [facility] CVC  Cardiovascular:      Rate and Rhythm: Normal rate and regular rhythm.      Pulses: Normal pulses.      Heart sounds: No murmur heard.    No gallop.      Comments: Midline incision with wound vac in place    Pulmonary:      Effort: He is intubated.      Breath sounds: No wheezing or rales.   Abdominal:      Palpations: Abdomen is soft.   Genitourinary:     Comments: Fierro in place  Skin:     General: Skin is warm and dry.      Capillary Refill: Capillary refill takes less than 2 seconds.      Findings: Bruising present. No lesion.      Comments: MSI with wound vac. Sanguinous output   Neurological:      Comments: sedated       Vents:  Vent Mode: Spont (11/09/22 0347)  Set Rate:  22 BPM (11/09/22 0345)  Vt Set: 450 mL (11/09/22 0345)  Pressure Support: 5 cmH20 (11/09/22 0347)  PEEP/CPAP: 5 cmH20 (11/09/22 0347)  Oxygen Concentration (%): 35 (11/09/22 0600)  Peak Airway Pressure: 10 cmH2O (11/09/22 0347)  Plateau Pressure: 19 cmH20 (11/09/22 0347)  Total Ve: 4.29 L/m (11/09/22 0347)  Negative Inspiratory Force (cm H2O): 0 (11/09/22 0347)  F/VT Ratio<105 (RSBI): (!) 35.37 (11/09/22 0347)    Lines/Drains/Airways       Peripherally Inserted Central Catheter Line  Duration             PICC Double Lumen 10/17/22 1709 right brachial 24 days              Drain  Duration                  Urethral Catheter 11/08/22 0730 Non-latex;Temperature probe 14 Fr. 2 days              Arterial Line  Duration             Arterial Line 11/08/22 0712 Right Radial 2 days              Peripheral Intravenous Line  Duration                  Peripheral IV - Single Lumen 11/08/22 0730 14 G  Left Forearm 2 days                    Significant Labs:    CBC/Anemia Profile:  Recent Labs   Lab 11/10/22  0306 11/10/22  1641 11/11/22  0311   WBC 22.58* 22.76* 21.49*   HGB 7.5* 7.8* 7.1*   HCT 22.8* 23.9* 21.3*    319 312   MCV 89 91 92   RDW 15.4* 15.5* 15.4*          Chemistries:  Recent Labs   Lab 11/10/22  0018 11/10/22  0306 11/10/22  1641 11/11/22  0311    133* 135* 133*   K 3.6 4.0 4.4 4.0    97 98 94*   CO2 25 26 25 28   BUN 41* 46* 49* 50*   CREATININE 3.3* 3.4* 3.7* 3.7*   CALCIUM 8.4* 9.0 8.5* 8.7   ALBUMIN 2.3* 2.4* 2.4* 2.3*   PROT 5.4* 5.5* 5.7* 5.4*   BILITOT 0.8 0.9 0.8 0.6   ALKPHOS 89 96 119 119   ALT <5* <5* <5* 7*   AST 14 15 22 19   MG 2.2 2.2  --  2.2   PHOS 3.7 3.9  --  4.7*         All pertinent labs within the past 24 hours have been reviewed.    Significant Imaging:  I have reviewed all pertinent imaging results/findings within the past 24 hours.

## 2022-11-11 NOTE — PROGRESS NOTES
[facility]  Plastic Surgery  Progress Note    Subjective:     History of Present Illness:  No notes on file    Post-Op Info:  Procedure(s) (LRB):  WASHOUT (N/A)   1 Day Post-Op     Interval History: NAEO.  AFVSS.  He is currently sitting up in chair doing well, no complaints this AM.  Still on milnrinone, off epi for 2 days now.    Medications:  Continuous Infusions:   EPINEPHrine Stopped (11/09/22 1719)    milrinone 20mg/100ml D5W (200mcg/ml) 0.25 mcg/kg/min (11/11/22 0900)     Scheduled Meds:   amiodarone  200 mg Oral BID    aspirin  81 mg Oral Daily    ceFEPime (MAXIPIME) IVPB  2 g Intravenous Q24H    famotidine  20 mg Oral Daily    heparin (porcine)  5,000 Units Subcutaneous Q8H    polyethylene glycol  17 g Oral Daily    senna-docusate 8.6-50 mg  1 tablet Oral Daily     PRN Meds:sodium chloride, sodium chloride, dextrose 10%, dextrose 10%, dextrose 50%, dextrose 50%, glucagon (human recombinant), glucose, glucose, insulin aspart U-100, oxyCODONE, oxyCODONE     Review of patient's allergies indicates:  No Known Allergies  Objective:     Vital Signs (Most Recent):  Temp: 97.8 °F (36.6 °C) (11/11/22 0730)  Pulse: 104 (11/11/22 0930)  Resp: (!) 24 (11/11/22 0930)  BP: 122/61 (11/10/22 1330)  SpO2: (!) 91 % (11/11/22 0930)   Vital Signs (24h Range):  Temp:  [97.6 °F (36.4 °C)-98.2 °F (36.8 °C)] 97.8 °F (36.6 °C)  Pulse:  [] 104  Resp:  [10-44] 24  SpO2:  [86 %-100 %] 91 %  BP: (122)/(61) 122/61  Arterial Line BP: ()/(37-68) 124/58     Weight: 76.2 kg (168 lb)  Body mass index is 24.8 kg/m².    Intake/Output - Last 3 Shifts         11/09 0700  11/10 0659 11/10 0700  11/11 0659 11/11 0700  11/12 0659    P.O. 360 720     I.V. (mL/kg) 35533.1 (232.4) 192.2 (2.5) 17.2 (0.2)    Blood       NG/GT       IV Piggyback 37.3 649.1     Total Intake(mL/kg) 77166.5 (237.7) 1561.4 (20.5) 17.2 (0.2)    Urine (mL/kg/hr) 1075 (0.6) 2885 (1.6) 260 (1.1)    Other 300 50 50    Total Output 1375  2935 310    Net +04801.5 -1373.6 -292.8                   Physical Exam    General: Alert; No acute distress  Cardiovascular: Regular rate   Respiratory: Normal respiratory effort. Chest rise symmetric.   Abdomen: Soft, nontender, nondistended  Extremity: Moves all extremities equally.  Neurologic: No focal deficit. Speech normal  SKIN: chest wound with wound vac in place with 50cc serosanguinous output in the last 24H      Significant Labs:  I have reviewed all pertinent lab results within the past 24 hours.  CBC:   Recent Labs   Lab 11/11/22 0311   WBC 21.49*   RBC 2.31*   HGB 7.1*   HCT 21.3*      MCV 92   MCH 30.7   MCHC 33.3     BMP:   Recent Labs   Lab 11/11/22 0311      *   K 4.0   CL 94*   CO2 28   BUN 50*   CREATININE 3.7*   CALCIUM 8.7   MG 2.2       Significant Diagnostics:  I have reviewed all pertinent imaging results/findings within the past 24 hours.    Assessment/Plan:     Surgical wound present  Plan  S/p washout 11/10, will plan for rotational pec flap for sternal coverage on Monday 11/14  Pain control  Monitor Vac output  I's & O's  Dvt ppx  ASA and plavix as per CTS  Medical management as per SICU        Chuyita Aviles MD  Plastic Surgery  [facility]

## 2022-11-11 NOTE — PROGRESS NOTES
[facility] - [facility]ensive [facility]  Critical Care - Surgery  Progress Note    Patient Name: David Barrios  MRN: [id number]  Admission Date: 11/8/2022  Hospital Length of Stay: 3 days  Code Status: Full Code  Attending Provider: Mike Hedrick MD  Primary Care Provider: Breann Tavera MD   Principal Problem: Sternal wound infection    Subjective:     Hospital/ICU Course:  62 y.o. male S/P TV replacement, MV replacement, MAZE, Lt Atrial Appendage ligation and Impella placement on 10/7/22, course complicated by bleeding, requiring reopening POD#0, multiple VT runs requiring DCCV, and sternal wound infections. He presents to the SICU s/p wound debridement and wire removal on 11/08       Interval History/Significant Events: diuresed yesterday with subsequent rise in sCr, discontinued. Giving 1 unit PRBC today. Minimal wound vac output. Afebrile.      Follow-up For: Procedure(s) (LRB):  REMOVAL, STERNAL WIRE (N/A)  DEBRIDEMENT, STERNUM (N/A)  APPLICATION, WOUND VAC (N/A)    Post-Operative Day: 1 Day Post-Op    Objective:     Vital Signs (Most Recent):  Temp: 97.7 °F (36.5 °C) (11/11/22 0634)  Pulse: 98 (11/11/22 0634)  Resp: (!) 21 (11/11/22 0634)  BP: 122/61 (11/10/22 1330)  SpO2: 99 % (11/11/22 0634) Vital Signs (24h Range):  Temp:  [97.6 °F (36.4 °C)-98.2 °F (36.8 °C)] 97.7 °F (36.5 °C)  Pulse:  [] 98  Resp:  [10-44] 21  SpO2:  [88 %-100 %] 99 %  BP: (122)/(61) 122/61  Arterial Line BP: ()/(37-72) 135/53     Weight: 76.2 kg (168 lb)  Body mass index is 24.8 kg/m².      Intake/Output Summary (Last 24 hours) at 11/11/2022 0708  Last data filed at 11/11/2022 0600  Gross per 24 hour   Intake 1555.64 ml   Output 2785 ml   Net -1229.36 ml         Physical Exam  Vitals and nursing note reviewed.   Constitutional:       General: He is not in acute distress.     Appearance: He is ill-appearing.      Interventions: He is sedated and intubated.   HENT:      Head: Normocephalic and atraumatic.   Neck:       Comments: [facility] CV  Cardiovascular:      Rate and Rhythm: Normal rate and regular rhythm.      Pulses: Normal pulses.      Heart sounds: No murmur heard.    No gallop.      Comments: Midline incision with wound vac in place    Pulmonary:      Effort: He is intubated.      Breath sounds: No wheezing or rales.   Abdominal:      Palpations: Abdomen is soft.   Genitourinary:     Comments: Fierro in place  Skin:     General: Skin is warm and dry.      Capillary Refill: Capillary refill takes less than 2 seconds.      Findings: Bruising present. No lesion.      Comments: MSI with wound vac. Sanguinous output   Neurological:      Comments: sedated       Vents:  Vent Mode: Spont (11/09/22 0347)  Set Rate: 22 BPM (11/09/22 0345)  Vt Set: 450 mL (11/09/22 0345)  Pressure Support: 5 cmH20 (11/09/22 0347)  PEEP/CPAP: 5 cmH20 (11/09/22 0347)  Oxygen Concentration (%): 35 (11/09/22 0600)  Peak Airway Pressure: 10 cmH2O (11/09/22 0347)  Plateau Pressure: 19 cmH20 (11/09/22 0347)  Total Ve: 4.29 L/m (11/09/22 0347)  Negative Inspiratory Force (cm H2O): 0 (11/09/22 0347)  F/VT Ratio<105 (RSBI): (!) 35.37 (11/09/22 0347)    Lines/Drains/Airways       Peripherally Inserted Central Catheter Line  Duration             PICC Double Lumen 10/17/22 1709 right brachial 24 days              Drain  Duration                  Urethral Catheter 11/08/22 0730 Non-latex;Temperature probe 14 Fr. 2 days              Arterial Line  Duration             Arterial Line 11/08/22 0712 Right Radial 2 days              Peripheral Intravenous Line  Duration                  Peripheral IV - Single Lumen 11/08/22 0730 14 G  Left Forearm 2 days                    Significant Labs:    CBC/Anemia Profile:  Recent Labs   Lab 11/10/22  0306 11/10/22  1641 11/11/22  0311   WBC 22.58* 22.76* 21.49*   HGB 7.5* 7.8* 7.1*   HCT 22.8* 23.9* 21.3*    319 312   MCV 89 91 92   RDW 15.4* 15.5* 15.4*          Chemistries:  Recent Labs   Lab 11/10/22  0018 11/10/22  0309  11/10/22  1641 11/11/22 0311    133* 135* 133*   K 3.6 4.0 4.4 4.0    97 98 94*   CO2 25 26 25 28   BUN 41* 46* 49* 50*   CREATININE 3.3* 3.4* 3.7* 3.7*   CALCIUM 8.4* 9.0 8.5* 8.7   ALBUMIN 2.3* 2.4* 2.4* 2.3*   PROT 5.4* 5.5* 5.7* 5.4*   BILITOT 0.8 0.9 0.8 0.6   ALKPHOS 89 96 119 119   ALT <5* <5* <5* 7*   AST 14 15 22 19   MG 2.2 2.2  --  2.2   PHOS 3.7 3.9  --  4.7*         All pertinent labs within the past 24 hours have been reviewed.    Significant Imaging:  I have reviewed all pertinent imaging results/findings within the past 24 hours.    Assessment/Plan:     * Sternal wound infection  62 y.o. male S/P TV replacement, MV replacement, MAZE, Lt Atrial Appendage ligation and Impella placement on 10/7/22, course complicated by bleeding, requiring reopening POD#0, multiple VT runs requiring DCCV, and sternal wound infections. He presents to the SICU s/p wound debridement and wire removal on 11/08       Neuro/Psych:     - Sedation: none    - Pain:    - Scheduled Tylenol 1g q8h              Cardiac:      - BP Goal: MAP 60-80 and SBP <140.     - Pressors: weaned off pressors over night. EF 35% with mild RV enlargement and moderate RV dysfunction. Weaned to milrinone 0.25.     - wound vac applied to sternotomy, with sanguinous drainage     - Anti-HTNs: Will restart home meds when appropriate     - Rhythm: NSR. S/p MAZE for Afib, will restart home amio.     - Beta blocker: Will start when appropriate and off epi.     - Statin: Atorvastatin 40 mg QD      Pulmonary:     - Goal SpO2 >92%          Renal:    - Trend BUN/Cr. Oliguric KIMANI. Will monitor and support kidneys with MAP >65. Hold off diuresis.    - Maintain Fierro, record strict Is/Os    Recent Labs   Lab 11/10/22  0306 11/10/22  1641 11/11/22  0311   BUN 46* 49* 50*   CREATININE 3.4* 3.7* 3.7*         FEN / GI:     - Daily CMP, PRN K/Mag/Phos per protocol     - Replace electrolytes as needed    - Nutrition: cardiac diet     - Bowel Regimen:  Miralax, docusate      ID:     - Leukocytosis without fever, trending down     - Abx: Continue Cefepime, lactic acid improving. Will need long term abx plan, 6weeks post op flap    Recent Labs   Lab 11/10/22  0306 11/10/22  1641 11/11/22  0311   WBC 22.58* 22.76* 21.49*         Heme/Onc:     - H/H with post-operative anemia     - CBC daily    - ASA 325mg daily    Recent Labs   Lab 11/09/22  1159 11/10/22  0306 11/10/22  1641 11/11/22  0311   HGB 8.2* 7.5* 7.8* 7.1*    323 319 312   APTT 25.4 27.8  --  27.6   INR 1.1 1.1  --  1.1         Endocrine:     - CTS Goal -140    - HgbA1c:     - Endocrinology consulted for insulin management      PPx:   Feeding: cardiac diet   Analgesia/Sedation: tylenol   Thromboembolic Prevention: scds  HOB >30: Yes  Stress Ulcer: famotidine   Glucose Control: Yes, insulin management per Endocrinology     Lines/Drains/Airway:   Left radial arterial line   RIJ CVC   Fierro   Wound Vac      Dispo/Code Status/Palliative:     - Continue SICU Care    - Full Code                Critical care was time spent personally by me on the following activities: development of treatment plan with patient or surrogate and bedside caregivers, discussions with consultants, evaluation of patient's response to treatment, examination of patient, ordering and performing treatments and interventions, ordering and review of laboratory studies, ordering and review of radiographic studies, pulse oximetry, re-evaluation of patient's condition.  This critical care time did not overlap with that of any other provider or involve time for any procedures.     Leah Lay, NP  Critical Care - Surgery  [facility]

## 2022-11-11 NOTE — ASSESSMENT & PLAN NOTE
62 y.o. male S/P TV replacement, MV replacement, MAZE, Lt Atrial Appendage ligation and Impella placement on 10/7/22, course complicated by bleeding, requiring reopening POD#0, multiple VT runs requiring DCCV, and sternal wound infections. He presents to the SICU s/p wound debridement and wire removal on 11/08       Neuro/Psych:     - Sedation: none    - Pain:    - Scheduled Tylenol 1g q8h              Cardiac:      - BP Goal: MAP 60-80 and SBP <140.     - Pressors: weaned off pressors over night. EF 35% with mild RV enlargement and moderate RV dysfunction. Weaned to milrinone 0.25.     - wound vac applied to sternotomy, with sanguinous drainage     - Anti-HTNs: Will restart home meds when appropriate     - Rhythm: NSR. S/p MAZE for Afib, will restart home amio.     - Beta blocker: Will start when appropriate and off epi.     - Statin: Atorvastatin 40 mg QD      Pulmonary:     - Goal SpO2 >92%          Renal:    - Trend BUN/Cr. Oliguric KIMANI. Will monitor and support kidneys with MAP >65. Hold off diuresis.    - Maintain Fierro, record strict Is/Os    Recent Labs   Lab 11/10/22  0306 11/10/22  1641 11/11/22  0311   BUN 46* 49* 50*   CREATININE 3.4* 3.7* 3.7*         FEN / GI:     - Daily CMP, PRN K/Mag/Phos per protocol     - Replace electrolytes as needed    - Nutrition: cardiac diet     - Bowel Regimen: Miralax, docusate      ID:     - Leukocytosis without fever, trending down     - Abx: Continue Cefepime, lactic acid improving. Will need long term abx plan, 6weeks post op flap    Recent Labs   Lab 11/10/22  0306 11/10/22  1641 11/11/22  0311   WBC 22.58* 22.76* 21.49*         Heme/Onc:     - H/H with post-operative anemia     - CBC daily    - ASA 325mg daily    Recent Labs   Lab 11/09/22  1159 11/10/22  0306 11/10/22  1641 11/11/22  0311   HGB 8.2* 7.5* 7.8* 7.1*    323 319 312   APTT 25.4 27.8  --  27.6   INR 1.1 1.1  --  1.1         Endocrine:     - CTS Goal -140    - HgbA1c:     -  Endocrinology consulted for insulin management      PPx:   Feeding: cardiac diet   Analgesia/Sedation: tylenol   Thromboembolic Prevention: scds  HOB >30: Yes  Stress Ulcer: famotidine   Glucose Control: Yes, insulin management per Endocrinology     Lines/Drains/Airway:   Left radial arterial line   RIJ CVC   Fierro   Wound Vac      Dispo/Code Status/Palliative:     - Continue SICU Care    - Full Code

## 2022-11-11 NOTE — ANESTHESIA POSTPROCEDURE EVALUATION
Anesthesia Post Evaluation    Patient: David Barrios    Procedure(s) Performed: Procedure(s) (LRB):  WASHOUT (N/A)    Final Anesthesia Type: general      Patient location during evaluation: ICU  Patient participation: Yes- Able to Participate  Level of consciousness: awake and alert  Post-procedure vital signs: reviewed and stable  Pain management: adequate  Airway patency: patent    PONV status at discharge: No PONV  Anesthetic complications: no      Cardiovascular status: blood pressure returned to baseline  Respiratory status: unassisted  Hydration status: euvolemic  Follow-up not needed.          Vitals Value Taken Time   /61 11/10/22 1330   Temp 36.6 °C (97.8 °F) 11/11/22 0300   Pulse 97 11/11/22 0615   Resp 15 11/11/22 0615   SpO2 98 % 11/11/22 0615   Vitals shown include unvalidated device data.      No case tracking events are documented in the log.      Pain/Elza Score: Pain Rating Prior to Med Admin: 7 (11/10/2022  6:15 PM)

## 2022-11-11 NOTE — ANESTHESIA PREPROCEDURE EVALUATION
[facility]  Anesthesia Pre-Operative Evaluation     Patient Name: David Barrios  YOB: 1960  MRN: [id number]  [facility]: [id number]       Admit Date: 11/8/2022   Admit Team: Networked reference to record PCT   Hospital Day: 7  Date of Procedure: 11/14/2022  Anesthesia: General Procedure: Procedure(s) (LRB):  CREATION, FLAP, MUSCLE ROTATION (N/A)  Pre-Operative Diagnosis: Sternal wound infection [S21.101A, L08.9]  Proceduralist:Surgeon(s) and Role:     * Amadeo Carrasquillo MD - Primary  Code Status: Full Code   Advanced Directive: <no information>  Isolation Precautions: No active isolations  Capacity: Full capacity     SUBJECTIVE:   David Barrios is a 62 y.o. male who  has a past medical history of Anemia, Atrial fibrillation, Encounter for blood transfusion, Esophageal ulcer, GI bleed, and Hypertension.    HPI: 62 y.o. male S/P TV replacement, MV replacement, MAZE, Lt Atrial Appendage ligation and Impella placement on 10/7/22, course complicated by bleeding, requiring reopening POD#0, multiple VT runs requiring DCCV, and sternal wound infections. He presents to the SICU s/p wound debridement and wire removal.     David Barrios is a 62 y.o. male w/ a significant PMHx of AF, HTN, HLD, HFrEF (EF 35%), severe mitral regurgitations/p MVr, TVr, and centrally-placed impella via aortic graft. Post-op, remained intubated and sedated with increasing pressor and inotrope requirements and increasing lactate as well as several episodes of VT requiring cardioversion. Post-op also c/b bleeding requiring return to the OR for emergent sternotomy and washout with wound vac placement. Discharged to LTAC in stable condition, [facility] and underwent sternal closure on 11/08/22 with washout on 11/10/2022.      Paused Epi drip  On 0.25 milrinone.       Drips:    EPINEPHrine Stopped (11/10/22 2120)    milrinone 20mg/100ml D5W (200mcg/ml) 0.25 mcg/kg/min (11/14/22 0900)         Hospital LOS:  6 days  ICU LOS: 5d 22h      he has a current medication list which includes the following long-term medication(s): amiodarone, carvedilol, furosemide, albuterol, and rivaroxaban.     ALLERGIES:   Review of patient's allergies indicates:  No Known Allergies  LDA:   AIRWAY:     * No LDAs found *      Lines/Drains/Airways     Peripherally Inserted Central Catheter Line  Duration           PICC Double Lumen 10/17/22 1709 right brachial 27 days          Drain  Duration                Urethral Catheter 11/12/22 2125 Straight-tip;Non-latex 16 Fr. 1 day          Arterial Line  Duration           Arterial Line 11/08/22 0712 Right Radial 6 days          Peripheral Intravenous Line  Duration                Peripheral IV - Single Lumen 11/08/22 0730 14 G  Left Forearm 6 days               Anesthesia Evaluation      Airway   Mallampati: II  Dental    (+) Intact    Pulmonary    (+) shortness of breath,   (-) COPD, asthma, sleep apnea  Cardiovascular   Exercise tolerance: good  (+) hypertension, valvular problems/murmurs MR, dysrhythmias, BAI,   (-) CAD, CABG/stent, CHF    Neuro/Psych    (-) seizures, CVA, headaches, psychiatric history    GI/Hepatic/Renal    (+) PUD,   (-) GERD, renal disease    Endo/Other    (-) diabetes mellitus  Abdominal                    MEDICATIONS:     Current Outpatient Medications on File Prior to Encounter   Medication Sig Dispense Refill Last Dose    amiodarone (PACERONE) 200 MG Tab Take 1 tablet (200 mg total) by mouth 2 (two) times daily. 60 tablet 2     carvediloL (COREG) 12.5 MG tablet Take 1 tablet (12.5 mg total) by mouth 2 (two) times daily with meals. 180 tablet 3 11/7/2022    furosemide (LASIX) 40 MG tablet TAKE 1 TABLET(40 MG) BY MOUTH TWICE DAILY 180 tablet 3     hydroCHLOROthiazide (HYDRODIURIL) 25 MG tablet Take 25 mg by mouth once daily.       albuterol (PROVENTIL/VENTOLIN HFA) 90 mcg/actuation inhaler inhale 1-2 Puff(s) By Mouth Every 4 Hours as needed 8.5 g 0     ferrous sulfate  (FEOSOL) 325 mg (65 mg iron) Tab tablet Take 1 tablet (325 mg total) by mouth daily with breakfast.  0     rivaroxaban (XARELTO) 20 mg Tab Take 1 tablet (20 mg total) by mouth daily with dinner or evening meal. 90 tablet 3       Inpatient Medications:  Antibiotics (From admission, onward)    Start     Stop Route Frequency Ordered    11/08/22 1530  cefepime in dextrose 5 % IVPB 2 g         -- IV Every 24 hours (non-standard times) 11/08/22 1416        VTE Risk Mitigation (From admission, onward)         Ordered     heparin (porcine) injection 5,000 Units  Every 8 hours         11/08/22 1604     Place KAMERON hose  Until discontinued         11/08/22 0512     Place sequential compression device  Until discontinued         11/08/22 0512               amiodarone  200 mg Oral BID    aspirin  81 mg Oral Daily    ceFEPime (MAXIPIME) IVPB  2 g Intravenous Q24H    famotidine  20 mg Oral Daily    heparin (porcine)  5,000 Units Subcutaneous Q8H    polyethylene glycol  17 g Oral Daily    senna-docusate 8.6-50 mg  1 tablet Oral BID       Current Facility-Administered Medications   Medication Dose Route Frequency Provider Last Rate Last Admin    0.9%  NaCl infusion (for blood administration)   Intravenous Q24H PRN China Falcon MD        0.9%  NaCl infusion (for blood administration)   Intravenous Q24H PRN Elias Reid MD        0.9%  NaCl infusion (for blood administration)   Intravenous Q24H PRN Keyshawn Whitlock MD        amiodarone tablet 200 mg  200 mg Oral BID Leah Lay, HAO   200 mg at 11/13/22 2157    aspirin chewable tablet 81 mg  81 mg Oral Daily Elias Reid MD   81 mg at 11/13/22 0815    cefepime in dextrose 5 % IVPB 2 g  2 g Intravenous Q24H Elias Reid MD   Stopped at 11/13/22 1627    dextrose 10% bolus 125 mL  12.5 g Intravenous PRN Gustavo Aguero, DO        dextrose 10% bolus 250 mL  25 g Intravenous PRN Gustavo Aguero,         EPINEPHrine 5 mg in dextrose 5% 250 mL infusion  (premix)  0-2 mcg/kg/min Intravenous Continuous Elias Reid MD   Stopped at 11/10/22 2120    famotidine tablet 20 mg  20 mg Oral Daily Sanjeev Thomas MD   20 mg at 11/13/22 0815    heparin (porcine) injection 5,000 Units  5,000 Units Subcutaneous Q8H Sanjeev Thomas MD   5,000 Units at 11/14/22 0559    milrinone 20mg in D5W 100 mL infusion  0.25 mcg/kg/min Intravenous Continuous Leah Lay NP 5.7 mL/hr at 11/14/22 0900 0.25 mcg/kg/min at 11/14/22 0900    ondansetron injection 4 mg  4 mg Intravenous Q8H PRN Ann Marie Benz MD   4 mg at 11/12/22 1809    oxyCODONE immediate release tablet 5 mg  5 mg Oral Q6H PRN Gustavo Aguero, DO   5 mg at 11/13/22 0815    oxyCODONE immediate release tablet Tab 10 mg  10 mg Oral Q6H PRN Gustavo Aguero, DO   10 mg at 11/13/22 2157    polyethylene glycol packet 17 g  17 g Oral Daily Leah Lay NP   17 g at 11/13/22 0818    senna-docusate 8.6-50 mg per tablet 1 tablet  1 tablet Oral BID Gustavo Aguero, DO   1 tablet at 11/13/22 2157          History:     Active Hospital Problems    Diagnosis  POA    *Sternal wound infection [S21.101A, L08.9]  Yes    Transient hyperglycemia post procedure [R73.9]  Yes    Surgical wound present [T14.8XXA]  Yes      Resolved Hospital Problems   No resolved problems to display.     Surgical History:    has a past surgical history that includes Hernia repair; Esophagogastroduodenoscopy (N/A, 10/12/2018); Esophagogastroduodenoscopy (N/A, 4/4/2019); Colonoscopy (N/A, 4/4/2019); Cardioversion (Left, 9/15/2022); Catheterization of both left and right heart (N/A, 9/22/2022); Coronary angiography (N/A, 9/22/2022); Irrigation of mediastinum (N/A, 10/7/2022); Tricuspid valvuloplasty (N/A, 10/7/2022); Insertion of intravascular microaxial blood pump (N/A, 10/7/2022); Benz maze procedure (N/A, 10/7/2022); Exclusion of left atrial appendage (N/A, 10/7/2022); Sternal wires removal (N/A, 11/8/2022); Debridement of sternum (N/A,  11/8/2022); and Application of wound vacuum-assisted closure device (N/A, 11/8/2022).   Social History:    has no history on file for sexual activity.  reports that he has never smoked. His smokeless tobacco use includes chew. He reports current alcohol use of about 20.0 standard drinks per week. He reports that he does not use drugs.    Vitals:    11/14/22 0900 11/14/22 0915 11/14/22 0930 11/14/22 0945   BP: 135/82      BP Location:       Patient Position:       Pulse: 102 103 102 100   Resp: 18 19 19 15   Temp:       TempSrc:       SpO2: 95% (!) 94% (!) 92% (!) 91%   Weight:       Height:         Vital Signs Range (Last 24H):  Temp:  [36.6 °C (97.8 °F)-36.9 °C (98.4 °F)]   Pulse:  []   Resp:  [10-26]   BP: (130-149)/(69-82)   SpO2:  [88 %-97 %]   Arterial Line BP: (107-262)/()     Body mass index is 24.08 kg/m².  Wt Readings from Last 4 Encounters:   11/12/22 74 kg (163 lb 2.3 oz)   11/07/22 76.6 kg (168 lb 14 oz)   10/25/22 82.1 kg (181 lb)   09/23/22 88.6 kg (195 lb 3.5 oz)        Intake/Output - Last 3 Shifts       11/12 0700 11/13 0659 11/13 0700 11/14 0659 11/14 0700  11/15 0659    P.O. 697 490     I.V. (mL/kg) 137.6 (1.9) 137.8 (1.9) 17.2 (0.2)    Blood       IV Piggyback 50.3      Total Intake(mL/kg) 884.9 (12) 627.8 (8.5) 17.2 (0.2)    Urine (mL/kg/hr) 1430 (0.8) 1705 (1) 220 (1)    Other 200 80 0    Total Output 1630 1785 220    Net -745.1 -1157.2 -202.8           Urine Occurrence 2 x          Lab Results   Component Value Date    WBC 19.66 (H) 11/14/2022    HGB 8.4 (L) 11/14/2022    HCT 25.8 (L) 11/14/2022     11/14/2022     (L) 11/14/2022    K 4.2 11/14/2022    CL 97 11/14/2022    CREATININE 4.2 (H) 11/14/2022    BUN 52 (H) 11/14/2022    CO2 29 11/14/2022    GLU 97 11/14/2022    CALCIUM 9.1 11/14/2022    MG 2.2 11/14/2022    PHOS 3.7 11/14/2022    ALKPHOS 122 11/14/2022    ALT 13 11/14/2022    AST 18 11/14/2022    ALBUMIN 2.3 (L) 11/14/2022    INR 1.0 11/14/2022    APTT 27.1  11/14/2022    HGBA1C 5.5 09/02/2022    TROPONINI 0.034 (H) 08/30/2022     (H) 11/04/2022    NTPROBNP 3520 (H) 08/25/2022     Recent Results (from the past 12 hour(s))   APTT    Collection Time: 11/14/22  3:38 AM   Result Value Ref Range    aPTT 27.1 21.0 - 32.0 sec   CBC auto differential    Collection Time: 11/14/22  3:38 AM   Result Value Ref Range    WBC 19.66 (H) 3.90 - 12.70 K/uL    RBC 2.74 (L) 4.60 - 6.20 M/uL    Hemoglobin 8.4 (L) 14.0 - 18.0 g/dL    Hematocrit 25.8 (L) 40.0 - 54.0 %    MCV 94 82 - 98 fL    MCH 30.7 27.0 - 31.0 pg    MCHC 32.6 32.0 - 36.0 g/dL    RDW 16.1 (H) 11.5 - 14.5 %    Platelets 349 150 - 450 K/uL    MPV 9.6 9.2 - 12.9 fL    Immature Granulocytes 2.4 (H) 0.0 - 0.5 %    Gran # (ANC) 16.0 (H) 1.8 - 7.7 K/uL    Immature Grans (Abs) 0.47 (H) 0.00 - 0.04 K/uL    Lymph # 1.5 1.0 - 4.8 K/uL    Mono # 1.4 (H) 0.3 - 1.0 K/uL    Eos # 0.2 0.0 - 0.5 K/uL    Baso # 0.07 0.00 - 0.20 K/uL    nRBC 0 0 /100 WBC    Gran % 81.2 (H) 38.0 - 73.0 %    Lymph % 7.6 (L) 18.0 - 48.0 %    Mono % 7.3 4.0 - 15.0 %    Eosinophil % 1.1 0.0 - 8.0 %    Basophil % 0.4 0.0 - 1.9 %    Differential Method Automated    Magnesium    Collection Time: 11/14/22  3:38 AM   Result Value Ref Range    Magnesium 2.2 1.6 - 2.6 mg/dL   Comprehensive metabolic panel    Collection Time: 11/14/22  3:38 AM   Result Value Ref Range    Sodium 135 (L) 136 - 145 mmol/L    Potassium 4.2 3.5 - 5.1 mmol/L    Chloride 97 95 - 110 mmol/L    CO2 29 23 - 29 mmol/L    Glucose 97 70 - 110 mg/dL    BUN 52 (H) 8 - 23 mg/dL    Creatinine 4.2 (H) 0.5 - 1.4 mg/dL    Calcium 9.1 8.7 - 10.5 mg/dL    Total Protein 6.2 6.0 - 8.4 g/dL    Albumin 2.3 (L) 3.5 - 5.2 g/dL    Total Bilirubin 0.5 0.1 - 1.0 mg/dL    Alkaline Phosphatase 122 55 - 135 U/L    AST 18 10 - 40 U/L    ALT 13 10 - 44 U/L    Anion Gap 9 8 - 16 mmol/L    eGFR 15.1 (A) >60 mL/min/1.73 m^2   Phosphorus    Collection Time: 11/14/22  3:38 AM   Result Value Ref Range    Phosphorus 3.7 2.7  - 4.5 mg/dL   Protime-INR    Collection Time: 11/14/22  3:38 AM   Result Value Ref Range    Prothrombin Time 10.9 9.0 - 12.5 sec    INR 1.0 0.8 - 1.2     Recent Labs   Lab 11/12/22  0309 11/13/22  0428 11/14/22  0338   WBC 17.78* 18.53* 19.66*   HGB 7.9* 8.1* 8.4*   HCT 24.5* 24.5* 25.8*    308 349    138 135*   K 3.9 4.1 4.2   CREATININE 3.9* 4.0* 4.2*   GLU 94 104 97   INR 1.1 1.1 1.0     No LMP for male patient.    EKG:   Results for orders placed or performed during the hospital encounter of 10/02/22   EKG 12-lead    Collection Time: 10/19/22  9:47 AM    Narrative    Test Reason : I50.9,    Vent. Rate : 099 BPM     Atrial Rate : 107 BPM     P-R Int : 000 ms          QRS Dur : 102 ms      QT Int : 344 ms       P-R-T Axes : 000 -10 038 degrees     QTc Int : 441 ms    Atrial fibrillation  Nonspecific ST and T wave abnormality  Abnormal ECG  When compared with ECG of 11-OCT-2022 10:39,  Atrial fibrillation has replaced Sinus rhythm  QT has shortened  Confirmed by LALI COLUNGA MD ([id number]) on 10/19/2022 11:19:27 AM    Referred By: LISE GUZMÁN           Confirmed By:LALI COLUNGA MD     TTE:  Results for orders placed or performed during the hospital encounter of 10/02/22   Echo   Result Value Ref Range    Ascending aorta 3.32 cm    STJ 2.60 cm    AV mean gradient 6 mmHg    Ao peak nick 1.57 m/s    Ao VTI 23.85 cm    IVS 0.97 0.6 - 1.1 cm    LA size 4.26 cm    Left Atrium Major Axis 6.32 cm    Left Atrium Minor Axis 6.35 cm    LVIDd 5.78 3.5 - 6.0 cm    LVIDs 4.68 (A) 2.1 - 4.0 cm    LVOT diameter 2.01 cm    LVOT peak VTI 15.27 cm    Posterior Wall 0.94 0.6 - 1.1 cm    MV Peak A Nick 0.67 m/s    E wave deceleration time 175.84 msec    MV Peak E Nick 1.08 m/s    RA Major Axis 3.77 cm    RA Width 3.77 cm    RVDD 4.17 cm    Sinus 3.23 cm    TAPSE 1.19 cm    TDI LATERAL 0.06 m/s    TDI SEPTAL 0.06 m/s    LA WIDTH 4.91 cm    MV stenosis pressure 1/2 time 50.99 ms    LV Diastolic Volume 165.01 mL    LV  Systolic Volume 101.25 mL    RV S' 6.80 cm/s    LVOT peak eliezer 1.00 m/s    LA volume (mod) 93.62 cm3    LV LATERAL E/E' RATIO 18.00 m/s    LV SEPTAL E/E' RATIO 18.00 m/s    FS 19 %    LA volume 112.63 cm3    LV mass 218.30 g    Left Ventricle Relative Wall Thickness 0.33 cm    AV valve area 2.03 cm2    AV Velocity Ratio 0.64     AV index (prosthetic) 0.64     MV valve area p 1/2 method 4.31 cm2    E/A ratio 1.61     Mean e' 0.06 m/s    LVOT area 3.2 cm2    LVOT stroke volume 48.43 cm3    AV peak gradient 10 mmHg    E/E' ratio 18.00 m/s    LV Systolic Volume Index 50.4 mL/m2    LV Diastolic Volume Index 82.09 mL/m2    LA Volume Index 56.0 mL/m2    LV Mass Index 109 g/m2    LA Volume Index (Mod) 46.6 mL/m2    BSA 2.03 m2    EF 35 %    AV regurgitation pressure 1/2 time 320 ms    Narrative    · The left ventricle is normal in size with moderately decreased systolic   function. The estimated ejection fraction is 35%.  · Mild right ventricular enlargement with moderately reduced right   ventricular systolic function.  · Indeterminate left ventricular diastolic function.  · Severe left atrial enlargement.  · Moderate aortic regurgitation.  · The mitral and tricuspid valves are s/p repair with no significant   residual regurgitation.  · Moderate circumferential pericardial effusion with focal stranding. No   evidence of tamponade.  · The IVC was not visualized.        Results for orders placed or performed during the hospital encounter of 10/11/18   2D echo with color flow doppler   Result Value Ref Range    EF + QEF 55 55 - 65    Diastolic Dysfunction No     Est. PA Systolic Pressure 16.65     Tricuspid Valve Regurgitation TRIVIAL      NORBERT:  No results found for this or any previous visit.  Stress Test:  No results found for this or any previous visit.     [facility]:  Results for orders placed during the hospital encounter of 09/22/22    Cardiac catheterization    Conclusion    The ejection fraction was calculated to be 30%.     There was moderate to severe left ventricular systolic dysfunction.    There was moderate (3+) mitral regurgitation.    PA 70/30 mmHG    No significant CAD confirmed.    The pre-procedure left ventricular end diastolic pressure was 30.    The estimated blood loss was <50 mL.    The procedure log was documented by Documenter: RT Sandee and verified by Julio James MD.    Date: 9/22/2022  Time: 12:13 PM     PFT:  No results found for: FEV1, FVC, YZF4DNY, TLC, DLCO       ASSESSMENT/PLAN:       Pre-op Assessment    I have reviewed the Patient Summary Reports.    I have reviewed the Nursing Notes. I have reviewed the NPO Status.   I have reviewed the Medications.     Review of Systems  Anesthesia Hx:  No previous Anesthesia  History of prior surgery of interest to airway management or planning: heart surgery. Previous anesthesia: General, MAC Denies Family Hx of Anesthesia complications.   Denies Personal Hx of Anesthesia complications.   Hematology/Oncology:         -- Anemia:   Cardiovascular:   Exercise tolerance: good Hypertension Valvular problems/Murmurs, MR Denies CAD.    Denies CABG/stent. Dysrhythmias atrial fibrillation  Denies CHF. no hyperlipidemia BAI S/p MV/TV repair on 10/7/22  S/p impella   Pulmonary:   Denies COPD.  Denies Asthma. Shortness of breath  Denies Sleep Apnea.    Renal/:   Denies Chronic Renal Disease.     Hepatic/GI:   PUD, Denies GERD.    Neurological:   Denies CVA.  Denies Headaches. Denies Seizures.    Endocrine:   Denies Diabetes.  Denies Obesity / BMI > 30  Psych:   Denies Psychiatric History.          Physical Exam  General: Well nourished, Cooperative, Alert and Oriented    Airway:  Mallampati: II       Dental:  Intact        Anesthesia Plan  Type of Anesthesia, risks & benefits discussed:    Anesthesia Type: Gen ETT  Intra-op Monitoring Plan: Standard ASA Monitors and Art Line  Post Op Pain Control Plan: multimodal analgesia and IV/PO Opioids PRN  Induction:   IV  Airway Plan: Direct and Video, Post-Induction  Informed Consent: Informed consent signed with the Patient and all parties understand the risks and agree with anesthesia plan.  All questions answered.   ASA Score: 4  Day of Surgery Review of History & Physical: H&P Update referred to the surgeon/provider.    Ready For Surgery From Anesthesia Perspective.     .

## 2022-11-11 NOTE — ASSESSMENT & PLAN NOTE
Plan  S/p washout 11/10, will plan for rotational pec flap for sternal coverage on Monday 11/14  Pain control  Monitor Vac output  I's & O's  Dvt ppx  ASA and plavix as per CTS  Medical management as per SICU

## 2022-11-11 NOTE — CARE UPDATE
Care Update:     No acute events overnight. Patient on the SICU in room 19448/73998 A. Blood glucose stable. BG at goal on current insulin regimen (SSI ). Steroid use- None. 1 Day Post-Op  Renal function- Abnormal - Creatinine 3.9   Vasopressors-  Epinephrine       Diet Cardiac     POCT Glucose   Date Value Ref Range Status   11/10/2022 134 (H) 70 - 110 mg/dL Final   11/10/2022 125 (H) 70 - 110 mg/dL Final   11/10/2022 97 70 - 110 mg/dL Final   11/09/2022 117 (H) 70 - 110 mg/dL Final   11/09/2022 133 (H) 70 - 110 mg/dL Final   11/09/2022 112 (H) 70 - 110 mg/dL Final   11/09/2022 137 (H) 70 - 110 mg/dL Final   11/09/2022 131 (H) 70 - 110 mg/dL Final   11/09/2022 138 (H) 70 - 110 mg/dL Final   11/09/2022 127 (H) 70 - 110 mg/dL Final   11/09/2022 151 (H) 70 - 110 mg/dL Final   11/08/2022 154 (H) 70 - 110 mg/dL Final   11/08/2022 152 (H) 70 - 110 mg/dL Final   11/08/2022 158 (H) 70 - 110 mg/dL Final   11/08/2022 135 (H) 70 - 110 mg/dL Final   11/08/2022 79 70 - 110 mg/dL Final   11/08/2022 74 70 - 110 mg/dL Final   11/08/2022 74 70 - 110 mg/dL Final   11/08/2022 111 (H) 70 - 110 mg/dL Final   11/08/2022 131 (H) 70 - 110 mg/dL Final   11/08/2022 153 (H) 70 - 110 mg/dL Final   11/08/2022 187 (H) 70 - 110 mg/dL Final   11/08/2022 204 (H) 70 - 110 mg/dL Final   11/08/2022 190 (H) 70 - 110 mg/dL Final   11/08/2022 216 (H) 70 - 110 mg/dL Final   11/08/2022 173 (H) 70 - 110 mg/dL Final     Lab Results   Component Value Date    HGBA1C 5.5 09/02/2022       Endocrinology consulted for BG management.   BG goal 140-180    Hospital Medications    AC/HS           glucagon (human recombinant) injection 1 mg 1 mg, Intramuscular, As needed (PRN), Turn patient on their side, give IM, and NOTIFY MD IMMEDIATELY.<BR><BR>Feed the patient as soon as patient awakens and is able to swallow.    glucose chewable tablet 16 g 16 g, Oral, As needed (PRN), (16 grams = #  four 4gm glucose tablets)    glucose chewable tablet 24 g 24 g, Oral, As needed  (PRN), (24 grams = # six 4gm glucose tablets)    insulin aspart U-100 pen 0-5 Units 0-5 Units, Subcutaneous, Before meals &amp; nightly PRN, **LOW CORRECTION DOSE**<BR>Blood Glucose<BR>mg/dL                  Pre-meal                2200<BR>151-200                0 unit                      0 unit<BR>201-250                2 units                    1 unit<BR>251-300                3 units                    1 unit<BR>301-350                4 units                    2 units<BR>&gt;350                     5 units                    3 units<BR>Administer subcutaneously if needed at times designated by monitoring schedule. <BR>DO NOT HOLD correction dose insulin for patients who are  NPO.<BR>&quot;HIGH ALERT MEDICATION&quot; - Administer with meals or TF/TPN.              ** Please notify Endocrine for any change and/or advance in diet**  ** Please call Endocrine for any BG related issues **    Discharge Planning:   TBD. Please notify endocrinology prior to discharge.      Rigoberto Dee DNP, FNP-C  Department of Endocrinology  Inpatient Glycemic Management

## 2022-11-11 NOTE — NURSING
SICU PLAN OF CARE NOTE    Dx: Sternal wound infection    Shift Events: VSS, no acute events during shift. Lasix gtt off at beginning of shift per . 1U PRBC given.    Gtts: milrinone    Neuro: AAO x4, Follows Commands, and Moves All Extremities    Cardiac: NSR 80s-90s    Respiratory: Nasal Cannula 1LPM    GI: Cardiac Diet    : Urinary Catheter 1335 cc/shift    Drains: wound vac:50 cc/shift     Labs/Accuchecks: daily labs accuchecks q6    Skin: No skin breakdown noted during shift, pt turned and repositioned throughout shift. Pillows in use, foams in place, SCDs on. Bed plugged in,wheels locked, call light in reach. Pt updated on plan of care and verbalizes understanding.

## 2022-11-11 NOTE — SUBJECTIVE & OBJECTIVE
Interval History: NAEO.  AFVSS.  He is currently sitting up in chair doing well, no complaints this AM.  Still on milnrinone, off epi for 2 days now.    Medications:  Continuous Infusions:   EPINEPHrine Stopped (11/09/22 1719)    milrinone 20mg/100ml D5W (200mcg/ml) 0.25 mcg/kg/min (11/11/22 0900)     Scheduled Meds:   amiodarone  200 mg Oral BID    aspirin  81 mg Oral Daily    ceFEPime (MAXIPIME) IVPB  2 g Intravenous Q24H    famotidine  20 mg Oral Daily    heparin (porcine)  5,000 Units Subcutaneous Q8H    polyethylene glycol  17 g Oral Daily    senna-docusate 8.6-50 mg  1 tablet Oral Daily     PRN Meds:sodium chloride, sodium chloride, dextrose 10%, dextrose 10%, dextrose 50%, dextrose 50%, glucagon (human recombinant), glucose, glucose, insulin aspart U-100, oxyCODONE, oxyCODONE     Review of patient's allergies indicates:  No Known Allergies  Objective:     Vital Signs (Most Recent):  Temp: 97.8 °F (36.6 °C) (11/11/22 0730)  Pulse: 104 (11/11/22 0930)  Resp: (!) 24 (11/11/22 0930)  BP: 122/61 (11/10/22 1330)  SpO2: (!) 91 % (11/11/22 0930)   Vital Signs (24h Range):  Temp:  [97.6 °F (36.4 °C)-98.2 °F (36.8 °C)] 97.8 °F (36.6 °C)  Pulse:  [] 104  Resp:  [10-44] 24  SpO2:  [86 %-100 %] 91 %  BP: (122)/(61) 122/61  Arterial Line BP: ()/(37-68) 124/58     Weight: 76.2 kg (168 lb)  Body mass index is 24.8 kg/m².    Intake/Output - Last 3 Shifts         11/09 0700  11/10 0659 11/10 0700  11/11 0659 11/11 0700  11/12 0659    P.O. 360 720     I.V. (mL/kg) 62698.1 (232.4) 192.2 (2.5) 17.2 (0.2)    Blood       NG/GT       IV Piggyback 37.3 649.1     Total Intake(mL/kg) 87444.5 (237.7) 1561.4 (20.5) 17.2 (0.2)    Urine (mL/kg/hr) 1075 (0.6) 2885 (1.6) 260 (1.1)    Other 300 50 50    Total Output 1375 2935 310    Net +02789.5 -1373.6 -292.8                   Physical Exam    General: Alert; No acute distress  Cardiovascular: Regular rate   Respiratory: Normal respiratory effort. Chest rise symmetric.   Abdomen:  Soft, nontender, nondistended  Extremity: Moves all extremities equally.  Neurologic: No focal deficit. Speech normal  SKIN: chest wound with wound vac in place with 50cc serosanguinous output in the last 24H      Significant Labs:  I have reviewed all pertinent lab results within the past 24 hours.  CBC:   Recent Labs   Lab 11/11/22 0311   WBC 21.49*   RBC 2.31*   HGB 7.1*   HCT 21.3*      MCV 92   MCH 30.7   MCHC 33.3     BMP:   Recent Labs   Lab 11/11/22 0311      *   K 4.0   CL 94*   CO2 28   BUN 50*   CREATININE 3.7*   CALCIUM 8.7   MG 2.2       Significant Diagnostics:  I have reviewed all pertinent imaging results/findings within the past 24 hours.

## 2022-11-12 LAB
ABO + RH BLD: NORMAL
ALBUMIN SERPL BCP-MCNC: 2.2 G/DL (ref 3.5–5.2)
ALP SERPL-CCNC: 118 U/L (ref 55–135)
ALT SERPL W/O P-5'-P-CCNC: 11 U/L (ref 10–44)
ANION GAP SERPL CALC-SCNC: 10 MMOL/L (ref 8–16)
APTT BLDCRRT: 26.6 SEC (ref 21–32)
AST SERPL-CCNC: 25 U/L (ref 10–40)
BACTERIA SPEC AEROBE CULT: NORMAL
BASOPHILS # BLD AUTO: 0.05 K/UL (ref 0–0.2)
BASOPHILS NFR BLD: 0.3 % (ref 0–1.9)
BILIRUB SERPL-MCNC: 0.7 MG/DL (ref 0.1–1)
BLD GP AB SCN CELLS X3 SERPL QL: NORMAL
BUN SERPL-MCNC: 56 MG/DL (ref 8–23)
CALCIUM SERPL-MCNC: 8.9 MG/DL (ref 8.7–10.5)
CHLORIDE SERPL-SCNC: 98 MMOL/L (ref 95–110)
CO2 SERPL-SCNC: 29 MMOL/L (ref 23–29)
CREAT SERPL-MCNC: 3.9 MG/DL (ref 0.5–1.4)
DIFFERENTIAL METHOD: ABNORMAL
EOSINOPHIL # BLD AUTO: 0.2 K/UL (ref 0–0.5)
EOSINOPHIL NFR BLD: 1 % (ref 0–8)
ERYTHROCYTE [DISTWIDTH] IN BLOOD BY AUTOMATED COUNT: 15.4 % (ref 11.5–14.5)
EST. GFR  (NO RACE VARIABLE): 16.5 ML/MIN/1.73 M^2
GLUCOSE SERPL-MCNC: 94 MG/DL (ref 70–110)
HCT VFR BLD AUTO: 24.5 % (ref 40–54)
HGB BLD-MCNC: 7.9 G/DL (ref 14–18)
IMM GRANULOCYTES # BLD AUTO: 0.36 K/UL (ref 0–0.04)
IMM GRANULOCYTES NFR BLD AUTO: 2 % (ref 0–0.5)
INR PPP: 1.1 (ref 0.8–1.2)
LYMPHOCYTES # BLD AUTO: 1.8 K/UL (ref 1–4.8)
LYMPHOCYTES NFR BLD: 10.3 % (ref 18–48)
MAGNESIUM SERPL-MCNC: 2.1 MG/DL (ref 1.6–2.6)
MCH RBC QN AUTO: 30.2 PG (ref 27–31)
MCHC RBC AUTO-ENTMCNC: 32.2 G/DL (ref 32–36)
MCV RBC AUTO: 94 FL (ref 82–98)
MONOCYTES # BLD AUTO: 1.5 K/UL (ref 0.3–1)
MONOCYTES NFR BLD: 8.2 % (ref 4–15)
NEUTROPHILS # BLD AUTO: 13.9 K/UL (ref 1.8–7.7)
NEUTROPHILS NFR BLD: 78.2 % (ref 38–73)
NRBC BLD-RTO: 0 /100 WBC
PHOSPHATE SERPL-MCNC: 3.6 MG/DL (ref 2.7–4.5)
PLATELET # BLD AUTO: 337 K/UL (ref 150–450)
PMV BLD AUTO: 9.8 FL (ref 9.2–12.9)
POCT GLUCOSE: 119 MG/DL (ref 70–110)
POCT GLUCOSE: 119 MG/DL (ref 70–110)
POCT GLUCOSE: 94 MG/DL (ref 70–110)
POTASSIUM SERPL-SCNC: 3.9 MMOL/L (ref 3.5–5.1)
PROT SERPL-MCNC: 5.8 G/DL (ref 6–8.4)
PROTHROMBIN TIME: 11.2 SEC (ref 9–12.5)
RBC # BLD AUTO: 2.62 M/UL (ref 4.6–6.2)
SODIUM SERPL-SCNC: 137 MMOL/L (ref 136–145)
WBC # BLD AUTO: 17.78 K/UL (ref 3.9–12.7)

## 2022-11-12 PROCEDURE — 27200966 HC CLOSED SUCTION SYSTEM

## 2022-11-12 PROCEDURE — 63600175 PHARM REV CODE 636 W HCPCS

## 2022-11-12 PROCEDURE — 25000003 PHARM REV CODE 250

## 2022-11-12 PROCEDURE — 86920 COMPATIBILITY TEST SPIN: CPT

## 2022-11-12 PROCEDURE — 80053 COMPREHEN METABOLIC PANEL: CPT

## 2022-11-12 PROCEDURE — 85610 PROTHROMBIN TIME: CPT

## 2022-11-12 PROCEDURE — 25000003 PHARM REV CODE 250: Performed by: STUDENT IN AN ORGANIZED HEALTH CARE EDUCATION/TRAINING PROGRAM

## 2022-11-12 PROCEDURE — 20000000 HC ICU ROOM

## 2022-11-12 PROCEDURE — 99900035 HC TECH TIME PER 15 MIN (STAT)

## 2022-11-12 PROCEDURE — 94761 N-INVAS EAR/PLS OXIMETRY MLT: CPT

## 2022-11-12 PROCEDURE — 63600175 PHARM REV CODE 636 W HCPCS: Performed by: ANESTHESIOLOGY

## 2022-11-12 PROCEDURE — 25000003 PHARM REV CODE 250: Performed by: ANESTHESIOLOGY

## 2022-11-12 PROCEDURE — 27000221 HC OXYGEN, UP TO 24 HOURS

## 2022-11-12 PROCEDURE — 83735 ASSAY OF MAGNESIUM: CPT

## 2022-11-12 PROCEDURE — 86850 RBC ANTIBODY SCREEN: CPT | Performed by: THORACIC SURGERY (CARDIOTHORACIC VASCULAR SURGERY)

## 2022-11-12 PROCEDURE — 85730 THROMBOPLASTIN TIME PARTIAL: CPT

## 2022-11-12 PROCEDURE — 85025 COMPLETE CBC W/AUTO DIFF WBC: CPT

## 2022-11-12 PROCEDURE — 99291 PR CRITICAL CARE, E/M 30-74 MINUTES: ICD-10-PCS | Mod: ,,, | Performed by: ANESTHESIOLOGY

## 2022-11-12 PROCEDURE — 86920 COMPATIBILITY TEST SPIN: CPT | Performed by: STUDENT IN AN ORGANIZED HEALTH CARE EDUCATION/TRAINING PROGRAM

## 2022-11-12 PROCEDURE — 84100 ASSAY OF PHOSPHORUS: CPT

## 2022-11-12 PROCEDURE — 99291 CRITICAL CARE FIRST HOUR: CPT | Mod: ,,, | Performed by: ANESTHESIOLOGY

## 2022-11-12 RX ORDER — AMOXICILLIN 250 MG
1 CAPSULE ORAL 2 TIMES DAILY
Status: DISCONTINUED | OUTPATIENT
Start: 2022-11-12 | End: 2022-12-01 | Stop reason: HOSPADM

## 2022-11-12 RX ORDER — ONDANSETRON 2 MG/ML
4 INJECTION INTRAMUSCULAR; INTRAVENOUS EVERY 8 HOURS PRN
Status: DISCONTINUED | OUTPATIENT
Start: 2022-11-12 | End: 2022-12-01 | Stop reason: HOSPADM

## 2022-11-12 RX ORDER — BISACODYL 10 MG
10 SUPPOSITORY, RECTAL RECTAL ONCE
Status: COMPLETED | OUTPATIENT
Start: 2022-11-12 | End: 2022-11-12

## 2022-11-12 RX ADMIN — OXYCODONE HYDROCHLORIDE 10 MG: 10 TABLET ORAL at 09:11

## 2022-11-12 RX ADMIN — HEPARIN SODIUM 5000 UNITS: 5000 INJECTION INTRAVENOUS; SUBCUTANEOUS at 09:11

## 2022-11-12 RX ADMIN — ASPIRIN 81 MG 81 MG: 81 TABLET ORAL at 09:11

## 2022-11-12 RX ADMIN — AMIODARONE HYDROCHLORIDE 200 MG: 200 TABLET ORAL at 09:11

## 2022-11-12 RX ADMIN — FAMOTIDINE 20 MG: 20 TABLET ORAL at 09:11

## 2022-11-12 RX ADMIN — OXYCODONE HYDROCHLORIDE 10 MG: 10 TABLET ORAL at 07:11

## 2022-11-12 RX ADMIN — BISACODYL 10 MG: 10 SUPPOSITORY RECTAL at 09:11

## 2022-11-12 RX ADMIN — CEFEPIME 2 G: 2 INJECTION, POWDER, FOR SOLUTION INTRAVENOUS at 04:11

## 2022-11-12 RX ADMIN — SENNOSIDES AND DOCUSATE SODIUM 1 TABLET: 50; 8.6 TABLET ORAL at 09:11

## 2022-11-12 RX ADMIN — MILRINONE LACTATE IN DEXTROSE 0.25 MCG/KG/MIN: 200 INJECTION, SOLUTION INTRAVENOUS at 01:11

## 2022-11-12 RX ADMIN — ONDANSETRON 4 MG: 2 INJECTION INTRAMUSCULAR; INTRAVENOUS at 06:11

## 2022-11-12 RX ADMIN — HEPARIN SODIUM 5000 UNITS: 5000 INJECTION INTRAVENOUS; SUBCUTANEOUS at 05:11

## 2022-11-12 RX ADMIN — HEPARIN SODIUM 5000 UNITS: 5000 INJECTION INTRAVENOUS; SUBCUTANEOUS at 01:11

## 2022-11-12 RX ADMIN — POLYETHYLENE GLYCOL 3350 17 G: 17 POWDER, FOR SOLUTION ORAL at 09:11

## 2022-11-12 NOTE — PLAN OF CARE
"      SICU PLAN OF CARE NOTE    Dx: Sternal wound infection    Shift Events: no acute events overnight. Sternal precautions encouraged. Prn oxycodone administered x1.    Goals of Care: MAP >65. SPB<160. Monitor kidney function.     Neuro: AAO x4, Follows Commands, and Moves All Extremities    Vital Signs: /60   Pulse 97   Temp 97.8 °F (36.6 °C) (Oral)   Resp (!) 21   Ht 5' 9.02" (1.753 m)   Wt 74 kg (163 lb 2.3 oz)   SpO2 99%   BMI 24.08 kg/m²     Respiratory: Nasal Cannula 1L    Diet: Cardiac Diet    Gtts: Milrinone    Urine Output: Urinary Catheter 705 cc/shift    Drains: wound vac in place.     Labs/Accuchecks: accuchecks ACHS. Labs reviewed with MD.    Skin: incisions per documentation. Patient requires minimal assistance with weight shifting. Foams in place.      "

## 2022-11-12 NOTE — PROGRESS NOTES
[facility]  Critical Care - Surgery  Progress Note    Patient Name: David Barrios  MRN: [id number]  Admission Date: 11/8/2022  Hospital Length of Stay: 4 days  Code Status: Full Code  Attending Provider: Mike Hedrick MD  Primary Care Provider: Breann Tavera MD   Principal Problem: Sternal wound infection    Subjective:     Interval History/Significant Events: NAEO. Resting comfortably in bed this AM. Urine output 40-50 mL per hour off of lasix. Pt denies any complaints. Milrinone at .25    Follow-up For: Procedure(s) (LRB):  WASHOUT (N/A)    Post-Operative Day: 2 Days Post-Op    Objective:     Vital Signs (Most Recent):  Temp: 97.8 °F (36.6 °C) (11/12/22 0300)  Pulse: 98 (11/12/22 0545)  Resp: 14 (11/12/22 0545)  BP: 124/60 (11/11/22 1000)  SpO2: 98 % (11/12/22 0545) Vital Signs (24h Range):  Temp:  [97.7 °F (36.5 °C)-98.2 °F (36.8 °C)] 97.8 °F (36.6 °C)  Pulse:  [] 98  Resp:  [12-45] 14  SpO2:  [86 %-100 %] 98 %  BP: (124)/(60) 124/60  Arterial Line BP: ()/(46-87) 99/57     Weight: 76.2 kg (168 lb)  Body mass index is 24.8 kg/m².      Intake/Output Summary (Last 24 hours) at 11/12/2022 0608  Last data filed at 11/12/2022 0500  Gross per 24 hour   Intake 656.4 ml   Output 1325 ml   Net -668.6 ml       Physical Exam  Vitals and nursing note reviewed.   Constitutional:       General: He is not in acute distress.     Appearance: He is not ill-appearing.      Interventions: He is sedated.   HENT:      Head: Normocephalic and atraumatic.   Neck:      Comments: RIJ CVC  Cardiovascular:      Rate and Rhythm: Normal rate and regular rhythm.      Pulses: Normal pulses.      Heart sounds: No murmur heard.    No gallop.      Comments: Midline incision with wound vac in place    Pulmonary:      Effort: Pulmonary effort is normal.      Breath sounds: Normal breath sounds. No wheezing or rales.   Abdominal:      Palpations: Abdomen is soft.   Genitourinary:     Comments: Sri in  place  Skin:     General: Skin is warm and dry.      Capillary Refill: Capillary refill takes less than 2 seconds.      Findings: Bruising present. No lesion.      Comments: MSI with wound vac. Sanguinous output       Vents: N/A    Lines/Drains/Airways       Peripherally Inserted Central Catheter Line  Duration             PICC Double Lumen 10/17/22 1709 right brachial 25 days              Drain  Duration                  Urethral Catheter 11/08/22 0730 Non-latex;Temperature probe 14 Fr. 3 days              Arterial Line  Duration             Arterial Line 11/08/22 0712 Right Radial 3 days              Peripheral Intravenous Line  Duration                  Peripheral IV - Single Lumen 11/08/22 0730 14 G  Left Forearm 3 days                    Significant Labs:    CBC/Anemia Profile:  Recent Labs   Lab 11/10/22  1641 11/11/22  0311 11/12/22  0309   WBC 22.76* 21.49* 17.78*   HGB 7.8* 7.1* 7.9*   HCT 23.9* 21.3* 24.5*    312 337   MCV 91 92 94   RDW 15.5* 15.4* 15.4*        Chemistries:  Recent Labs   Lab 11/10/22  1641 11/11/22 0311 11/11/22  1701 11/12/22  0309   * 133* 135* 137   K 4.4 4.0 4.0 3.9   CL 98 94* 97 98   CO2 25 28 26 29   BUN 49* 50* 50* 56*   CREATININE 3.7* 3.7* 3.8* 3.9*   CALCIUM 8.5* 8.7 8.9 8.9   ALBUMIN 2.4* 2.3*  --  2.2*   PROT 5.7* 5.4*  --  5.8*   BILITOT 0.8 0.6  --  0.7   ALKPHOS 119 119  --  118   ALT <5* 7*  --  11   AST 22 19  --  25   MG  --  2.2  --  2.1   PHOS  --  4.7*  --  3.6       All pertinent labs within the past 24 hours have been reviewed.    Significant Imaging:  I have reviewed all pertinent imaging results/findings within the past 24 hours.    Assessment/Plan:     * Sternal wound infection  62 y.o. male S/P TV replacement, MV replacement, MAZE, Lt Atrial Appendage ligation and Impella placement on 10/7/22, course complicated by bleeding, requiring reopening POD#0, multiple VT runs requiring DCCV, and sternal wound infections. He presents to the SICU s/p wound  debridement and wire removal on 11/08       Neuro/Psych:     - Sedation: none    - Pain:    - Scheduled Tylenol 1g q8h              Cardiac:      - BP Goal: MAP 60-80 and SBP <140.     - Pressors: weaned off pressors over night. EF 35% with mild RV enlargement and moderate RV dysfunction. Weaned to milrinone 0.25.     - wound vac applied to sternotomy, with sanguinous drainage     - Anti-HTNs: Will restart home meds when appropriate     - Rhythm: NSR. S/p MAZE for Afib, will restart home amio.     - Beta blocker: Will start when appropriate and off epi.     - Statin: Atorvastatin 40 mg QD      Pulmonary:     - Goal SpO2 >92%          Renal:    - Trend BUN/Cr. Oliguric KIMANI. Will monitor and support kidneys with MAP >65. Hold off diuresis.    - Maintain Fierro, record strict Is/Os    Recent Labs   Lab 11/11/22  0311 11/11/22  1701 11/12/22  0309   BUN 50* 50* 56*   CREATININE 3.7* 3.8* 3.9*         FEN / GI:     - Daily CMP, PRN K/Mag/Phos per protocol     - Replace electrolytes as needed    - Nutrition: cardiac diet     - Bowel Regimen: Miralax, docusate      ID:     - Leukocytosis without fever, trending down     - Abx: Continue Cefepime, lactic acid improving. Will need long term abx plan, 6weeks post op flap    Recent Labs   Lab 11/10/22  1641 11/11/22  0311 11/12/22  0309   WBC 22.76* 21.49* 17.78*         Heme/Onc:     - H/H with post-operative anemia     - CBC daily    - ASA 325mg daily    Recent Labs   Lab 11/10/22  0306 11/10/22  1641 11/11/22  0311 11/12/22  0309   HGB 7.5* 7.8* 7.1* 7.9*    319 312 337   APTT 27.8  --  27.6 26.6   INR 1.1  --  1.1 1.1         Endocrine:     - CTS Goal -140    - HgbA1c:     - Endocrinology consulted for insulin management      PPx:   Feeding: cardiac diet   Analgesia/Sedation: tylenol   Thromboembolic Prevention: scds  HOB >30: Yes  Stress Ulcer: famotidine   Glucose Control: Yes, insulin management per Endocrinology     Lines/Drains/Airway:   Left  radial arterial line   RIJ CVC   Fierro   Wound Vac      Dispo/Code Status/Palliative:     - Continue SICU Care    - Full Code             Critical secondary to Patient has a condition that poses threat to life and bodily function.     Critical care was time spent personally by me on the following activities: development of treatment plan with patient or surrogate and bedside caregivers, discussions with consultants, evaluation of patient's response to treatment, examination of patient, ordering and performing treatments and interventions, ordering and review of laboratory studies, ordering and review of radiographic studies, pulse oximetry, re-evaluation of patient's condition.  This critical care time did not overlap with that of any other provider or involve time for any procedures.     Gustavo Aguero, DO  Critical Care - Surgery  [facility]

## 2022-11-12 NOTE — PLAN OF CARE
Plan of Care Note  Cardiothoracic Surgery    Sternal wound infection    Specific issues: IV antibiotics, awaiting flap coverage with Plastics    Plan of care for patient was discussed with ICU staff including nurses, residents, and faculty and appropriate consulting services.    Will continue to monitor patient's hemodynamics, functionality, neuro status, fluid status and renal function, and labs and will adjust medications and fluids as necessary while monitoring appropriateness for de-escalation of support and monitoring and transport to stepdown unit.    Gin Saul MD, PGY-7  Cardiothoracic Surgery   [phone]

## 2022-11-12 NOTE — PLAN OF CARE
Recommendations     1) Continue cardiac diet.      2) Continue ONS Boost Plus Chocolate TID. Consider arginaid BID x 2 weeks, vit C, zinc, MVI to promote wound healing.      3) RD to monitor and f/u.     Goals: Meet % EEN by RD f/u.  Nutrition Goal Status: new  Communication of RD Recs: other (comment) (POC)

## 2022-11-12 NOTE — SUBJECTIVE & OBJECTIVE
Interval History/Significant Events: NAEO. Resting comfortably in bed this AM. Urine output 40-50 mL per hour off of lasix. Pt denies any complaints. Milrinone at .25    Follow-up For: Procedure(s) (LRB):  WASHOUT (N/A)    Post-Operative Day: 2 Days Post-Op    Objective:     Vital Signs (Most Recent):  Temp: 97.8 °F (36.6 °C) (11/12/22 0300)  Pulse: 98 (11/12/22 0545)  Resp: 14 (11/12/22 0545)  BP: 124/60 (11/11/22 1000)  SpO2: 98 % (11/12/22 0545) Vital Signs (24h Range):  Temp:  [97.7 °F (36.5 °C)-98.2 °F (36.8 °C)] 97.8 °F (36.6 °C)  Pulse:  [] 98  Resp:  [12-45] 14  SpO2:  [86 %-100 %] 98 %  BP: (124)/(60) 124/60  Arterial Line BP: ()/(46-87) 99/57     Weight: 76.2 kg (168 lb)  Body mass index is 24.8 kg/m².      Intake/Output Summary (Last 24 hours) at 11/12/2022 0608  Last data filed at 11/12/2022 0500  Gross per 24 hour   Intake 656.4 ml   Output 1325 ml   Net -668.6 ml       Physical Exam  Vitals and nursing note reviewed.   Constitutional:       General: He is not in acute distress.     Appearance: He is not ill-appearing.      Interventions: He is sedated.   HENT:      Head: Normocephalic and atraumatic.   Neck:      Comments: [facility] CVC  Cardiovascular:      Rate and Rhythm: Normal rate and regular rhythm.      Pulses: Normal pulses.      Heart sounds: No murmur heard.    No gallop.      Comments: Midline incision with wound vac in place    Pulmonary:      Effort: Pulmonary effort is normal.      Breath sounds: Normal breath sounds. No wheezing or rales.   Abdominal:      Palpations: Abdomen is soft.   Genitourinary:     Comments: Fierro in place  Skin:     General: Skin is warm and dry.      Capillary Refill: Capillary refill takes less than 2 seconds.      Findings: Bruising present. No lesion.      Comments: MSI with wound vac. Sanguinous output       Vents: N/A    Lines/Drains/Airways       Peripherally Inserted Central Catheter Line  Duration             PICC Double Lumen 10/17/22 1709 right  brachial 25 days              Drain  Duration                  Urethral Catheter 11/08/22 0730 Non-latex;Temperature probe 14 Fr. 3 days              Arterial Line  Duration             Arterial Line 11/08/22 0712 Right Radial 3 days              Peripheral Intravenous Line  Duration                  Peripheral IV - Single Lumen 11/08/22 0730 14 G  Left Forearm 3 days                    Significant Labs:    CBC/Anemia Profile:  Recent Labs   Lab 11/10/22  1641 11/11/22  0311 11/12/22  0309   WBC 22.76* 21.49* 17.78*   HGB 7.8* 7.1* 7.9*   HCT 23.9* 21.3* 24.5*    312 337   MCV 91 92 94   RDW 15.5* 15.4* 15.4*        Chemistries:  Recent Labs   Lab 11/10/22  1641 11/11/22 0311 11/11/22  1701 11/12/22  0309   * 133* 135* 137   K 4.4 4.0 4.0 3.9   CL 98 94* 97 98   CO2 25 28 26 29   BUN 49* 50* 50* 56*   CREATININE 3.7* 3.7* 3.8* 3.9*   CALCIUM 8.5* 8.7 8.9 8.9   ALBUMIN 2.4* 2.3*  --  2.2*   PROT 5.7* 5.4*  --  5.8*   BILITOT 0.8 0.6  --  0.7   ALKPHOS 119 119  --  118   ALT <5* 7*  --  11   AST 22 19  --  25   MG  --  2.2  --  2.1   PHOS  --  4.7*  --  3.6       All pertinent labs within the past 24 hours have been reviewed.    Significant Imaging:  I have reviewed all pertinent imaging results/findings within the past 24 hours.

## 2022-11-12 NOTE — ASSESSMENT & PLAN NOTE
62 y.o. male S/P TV replacement, MV replacement, MAZE, Lt Atrial Appendage ligation and Impella placement on 10/7/22, course complicated by bleeding, requiring reopening POD#0, multiple VT runs requiring DCCV, and sternal wound infections. He presents to the SICU s/p wound debridement and wire removal on 11/08       Neuro/Psych:     - Sedation: none    - Pain:    - Scheduled Tylenol 1g q8h              Cardiac:      - BP Goal: MAP 60-80 and SBP <140.     - Pressors: weaned off pressors over night. EF 35% with mild RV enlargement and moderate RV dysfunction. Weaned to milrinone 0.25.     - wound vac applied to sternotomy, with sanguinous drainage     - Anti-HTNs: Will restart home meds when appropriate     - Rhythm: NSR. S/p MAZE for Afib, will restart home amio.     - Beta blocker: Will start when appropriate and off epi.     - Statin: Atorvastatin 40 mg QD      Pulmonary:     - Goal SpO2 >92%          Renal:    - Trend BUN/Cr. Oliguric KIMANI. Will monitor and support kidneys with MAP >65. Hold off diuresis.    - Maintain Fierro, record strict Is/Os    Recent Labs   Lab 11/11/22  0311 11/11/22  1701 11/12/22  0309   BUN 50* 50* 56*   CREATININE 3.7* 3.8* 3.9*         FEN / GI:     - Daily CMP, PRN K/Mag/Phos per protocol     - Replace electrolytes as needed    - Nutrition: cardiac diet     - Bowel Regimen: Miralax, docusate      ID:     - Leukocytosis without fever, trending down     - Abx: Continue Cefepime, lactic acid improving. Will need long term abx plan, 6weeks post op flap    Recent Labs   Lab 11/10/22  1641 11/11/22  0311 11/12/22  0309   WBC 22.76* 21.49* 17.78*         Heme/Onc:     - H/H with post-operative anemia     - CBC daily    - ASA 325mg daily    Recent Labs   Lab 11/10/22  0306 11/10/22  1641 11/11/22  0311 11/12/22  0309   HGB 7.5* 7.8* 7.1* 7.9*    319 312 337   APTT 27.8  --  27.6 26.6   INR 1.1  --  1.1 1.1         Endocrine:     - CTS Goal -140    - HgbA1c:     -  Endocrinology consulted for insulin management      PPx:   Feeding: cardiac diet   Analgesia/Sedation: tylenol   Thromboembolic Prevention: scds  HOB >30: Yes  Stress Ulcer: famotidine   Glucose Control: Yes, insulin management per Endocrinology     Lines/Drains/Airway:   Left radial arterial line   RIJ CVC   Fierro   Wound Vac      Dispo/Code Status/Palliative:     - Continue SICU Care    - Full Code

## 2022-11-12 NOTE — CONSULTS
[facility]  Adult Nutrition  Consult Note    SUMMARY     Recommendations    1) Continue cardiac diet.     2) Continue ONS Boost Plus Chocolate TID. Consider arginaid BID x 2 weeks, vit C, zinc, MVI to promote wound healing.     3) RD to monitor and f/u.    Goals: Meet % EEN by RD f/u.  Nutrition Goal Status: new  Communication of RD Recs: other (comment) (POC)    Assessment and Plan    Nutrition Problem  Severe Protein-Calorie Malnutrition    Related to (etiology):   Inadequate energy intake    Signs and Symptoms (as evidenced by):   Poor PO intake: energy intake <75% of estimated energy requirement  Weight loss of 19.9 kg (43.78 lbs) x 3 months (21% weight loss x 3 months)    Interventions/Recommendations (treatment strategy):  Collaboration of nutrition care with other providers  Adequate meal intake  ONS    Nutrition Diagnosis Status:   New    Malnutrition Assessment  Malnutrition Type: chronic illness          Weight Loss (Malnutrition): greater than 7.5% in 3 months  Energy Intake (Malnutrition): less than or equal to 50% for greater than or equal to 5 days   Orbital Region (Subcutaneous Fat Loss): severe depletion  Upper Arm Region (Subcutaneous Fat Loss): severe depletion  Thoracic and Lumbar Region: severe depletion   [place] (Muscle Loss): severe depletion  Clavicle Bone Region (Muscle Loss): severe depletion  Clavicle and Acromion Bone Region (Muscle Loss): severe depletion       Subcutaneous Fat Loss (Final Summary): severe protein-calorie malnutrition  Muscle Loss Evaluation (Final Summary): severe protein-calorie malnutrition    Severe Weight Loss (Malnutrition): other (see comments) (21% x 3 months)    Reason for Assessment    Reason For Assessment: consult (malnutrition)  Diagnosis: other (see comments) (sternal wound infection)  Relevant Medical History: transient hyperglycemia post procedure, surigcal wound, hypokalemia, microcytic anemia, persistent a fib,  "acute systolic congestive heart failure  Interdisciplinary Rounds: did not attend    General Information Comments: Patient currently on Cardiac diet. Pt reports he did not follow a diet at home, and does not like the cardiac diet d/t flavor preferences. Pt reports he prefers Boost Plus Strawberry, however, this item is no longer produced. Pt agreed to drink chocolate Boost. CBW 74 kg. BW of 93.9 kg on 8/24/22. Weight loss of 19.9 kg (43.78#) x 3 months. Visual NFPE performed. Patient reports he gets sick when he eats too much which is why he is only eating around 50% of meals, plus he does not prefer hospital food. Pt states he drinks most of his ONS Boost Plus that is on all trays.    Nutrition Discharge Planning: Pending clinical course.    Nutrition Risk Screen    Nutrition Risk Screen: no indicators present    Nutrition/Diet History    Spiritual, Cultural Beliefs, Catholic Practices, Values that Affect Care: no  Food Allergies: NKFA  Factors Affecting Nutritional Intake: None identified at this time    Anthropometrics    Temp: 97.7 °F (36.5 °C)  Height Method: Stated  Height: 5' 9.02" (175.3 cm)  Height (inches): 69.02 in  Weight Method: Bed Scale  Weight: 74 kg (163 lb 2.3 oz)  Weight (lb): 163.14 lb  Ideal Body Weight (IBW), Male: 160.12 lb  % Ideal Body Weight, Male (lb): 101.89 %  BMI (Calculated): 24.1  BMI Grade: 18.5-24.9 - normal     Lab/Procedures/Meds    Pertinent Labs Reviewed: reviewed  Pertinent Labs Comments: BUN 56, Crt 3.9, GFR 16.5, Pro Tot 5.8, Alb 2.2  Pertinent Medications Reviewed: reviewed  Pertinent Medications Comments: famotidine, heparin, polyethylene glycol, senna    Estimated/Assessed Needs    Weight Used For Calorie Calculations: 74 kg (163 lb 2.3 oz)  Energy Calorie Requirements (kcal): 8190-7897 kcal (25-30 kcal/oz)  Energy Need Method: Kcal/kg  Protein Requirements: 59-74 g protein (0.8-1.0 g/kg)  Weight Used For Protein Calculations: 74 kg (163 lb 2.3 oz)  Fluid Requirements " (mL): 1 ml/kcal or per MD     RDA Method (mL): 1850     Nutrition Prescription Ordered    Current Diet Order: Cardiac  Oral Nutrition Supplement: Boost Plus TID    Evaluation of Received Nutrient/Fluid Intake    I/O: +20,389 ml since admit  Energy Calories Required: not meeting needs  Protein Required: not meeting needs  Comments: Last BM noted on 11/7  Tolerance: tolerating  % Intake of Estimated Energy Needs: 25 - 50 %  % Meal Intake: 25 - 50 %    Nutrition Risk    Level of Risk/Frequency of Follow-up:  (1x/week)     Monitor and Evaluation    Food and Nutrient Intake: energy intake, food and beverage intake  Food and Nutrient Adminstration: diet order  Knowledge/Beliefs/Attitudes: food and nutrition knowledge/skill, beliefs and attitudes  Anthropometric Measurements: height/length, weight, weight change, body mass index  Biochemical Data, Medical Tests and Procedures: electrolyte and renal panel, gastrointestinal profile, glucose/endocrine profile, inflammatory profile, lipid profile  Nutrition-Focused Physical Findings: overall appearance, extremities, muscles and bones     Nutrition Follow-Up    RD Follow-up?: Yes

## 2022-11-13 LAB
ALBUMIN SERPL BCP-MCNC: 2.3 G/DL (ref 3.5–5.2)
ALP SERPL-CCNC: 126 U/L (ref 55–135)
ALT SERPL W/O P-5'-P-CCNC: 15 U/L (ref 10–44)
ANION GAP SERPL CALC-SCNC: 12 MMOL/L (ref 8–16)
APTT BLDCRRT: 28.5 SEC (ref 21–32)
AST SERPL-CCNC: 24 U/L (ref 10–40)
BASOPHILS # BLD AUTO: 0.06 K/UL (ref 0–0.2)
BASOPHILS NFR BLD: 0.3 % (ref 0–1.9)
BILIRUB SERPL-MCNC: 0.5 MG/DL (ref 0.1–1)
BUN SERPL-MCNC: 57 MG/DL (ref 8–23)
CALCIUM SERPL-MCNC: 9 MG/DL (ref 8.7–10.5)
CHLORIDE SERPL-SCNC: 96 MMOL/L (ref 95–110)
CO2 SERPL-SCNC: 30 MMOL/L (ref 23–29)
CREAT SERPL-MCNC: 4 MG/DL (ref 0.5–1.4)
DIFFERENTIAL METHOD: ABNORMAL
EOSINOPHIL # BLD AUTO: 0.2 K/UL (ref 0–0.5)
EOSINOPHIL NFR BLD: 1.2 % (ref 0–8)
ERYTHROCYTE [DISTWIDTH] IN BLOOD BY AUTOMATED COUNT: 15.9 % (ref 11.5–14.5)
EST. GFR  (NO RACE VARIABLE): 16 ML/MIN/1.73 M^2
GLUCOSE SERPL-MCNC: 104 MG/DL (ref 70–110)
HCT VFR BLD AUTO: 24.5 % (ref 40–54)
HGB BLD-MCNC: 8.1 G/DL (ref 14–18)
IMM GRANULOCYTES # BLD AUTO: 0.46 K/UL (ref 0–0.04)
IMM GRANULOCYTES NFR BLD AUTO: 2.5 % (ref 0–0.5)
INR PPP: 1.1 (ref 0.8–1.2)
LYMPHOCYTES # BLD AUTO: 1.6 K/UL (ref 1–4.8)
LYMPHOCYTES NFR BLD: 8.8 % (ref 18–48)
MAGNESIUM SERPL-MCNC: 2.2 MG/DL (ref 1.6–2.6)
MCH RBC QN AUTO: 30.7 PG (ref 27–31)
MCHC RBC AUTO-ENTMCNC: 33.1 G/DL (ref 32–36)
MCV RBC AUTO: 93 FL (ref 82–98)
MONOCYTES # BLD AUTO: 1.6 K/UL (ref 0.3–1)
MONOCYTES NFR BLD: 8.5 % (ref 4–15)
NEUTROPHILS # BLD AUTO: 14.6 K/UL (ref 1.8–7.7)
NEUTROPHILS NFR BLD: 78.7 % (ref 38–73)
NRBC BLD-RTO: 0 /100 WBC
PHOSPHATE SERPL-MCNC: 3.7 MG/DL (ref 2.7–4.5)
PLATELET # BLD AUTO: 308 K/UL (ref 150–450)
PMV BLD AUTO: 9.5 FL (ref 9.2–12.9)
POTASSIUM SERPL-SCNC: 4.1 MMOL/L (ref 3.5–5.1)
PROT SERPL-MCNC: 5.9 G/DL (ref 6–8.4)
PROTHROMBIN TIME: 11.1 SEC (ref 9–12.5)
RBC # BLD AUTO: 2.64 M/UL (ref 4.6–6.2)
SODIUM SERPL-SCNC: 138 MMOL/L (ref 136–145)
WBC # BLD AUTO: 18.53 K/UL (ref 3.9–12.7)

## 2022-11-13 PROCEDURE — 63600175 PHARM REV CODE 636 W HCPCS

## 2022-11-13 PROCEDURE — 80053 COMPREHEN METABOLIC PANEL: CPT

## 2022-11-13 PROCEDURE — 84100 ASSAY OF PHOSPHORUS: CPT

## 2022-11-13 PROCEDURE — 25000003 PHARM REV CODE 250

## 2022-11-13 PROCEDURE — 85730 THROMBOPLASTIN TIME PARTIAL: CPT

## 2022-11-13 PROCEDURE — 25000003 PHARM REV CODE 250: Performed by: STUDENT IN AN ORGANIZED HEALTH CARE EDUCATION/TRAINING PROGRAM

## 2022-11-13 PROCEDURE — 85025 COMPLETE CBC W/AUTO DIFF WBC: CPT

## 2022-11-13 PROCEDURE — 83735 ASSAY OF MAGNESIUM: CPT

## 2022-11-13 PROCEDURE — 20000000 HC ICU ROOM

## 2022-11-13 PROCEDURE — 27200966 HC CLOSED SUCTION SYSTEM

## 2022-11-13 PROCEDURE — 99900035 HC TECH TIME PER 15 MIN (STAT)

## 2022-11-13 PROCEDURE — 99291 PR CRITICAL CARE, E/M 30-74 MINUTES: ICD-10-PCS | Mod: ,,, | Performed by: ANESTHESIOLOGY

## 2022-11-13 PROCEDURE — 25000003 PHARM REV CODE 250: Performed by: ANESTHESIOLOGY

## 2022-11-13 PROCEDURE — 63600175 PHARM REV CODE 636 W HCPCS: Performed by: ANESTHESIOLOGY

## 2022-11-13 PROCEDURE — 99291 CRITICAL CARE FIRST HOUR: CPT | Mod: ,,, | Performed by: ANESTHESIOLOGY

## 2022-11-13 PROCEDURE — 94761 N-INVAS EAR/PLS OXIMETRY MLT: CPT

## 2022-11-13 PROCEDURE — 85610 PROTHROMBIN TIME: CPT

## 2022-11-13 RX ORDER — MUPIROCIN 20 MG/G
OINTMENT TOPICAL
Status: CANCELLED | OUTPATIENT
Start: 2022-11-13

## 2022-11-13 RX ORDER — SODIUM CHLORIDE 0.9 % (FLUSH) 0.9 %
10 SYRINGE (ML) INJECTION
Status: CANCELLED | OUTPATIENT
Start: 2022-11-14

## 2022-11-13 RX ORDER — HEPARIN SODIUM 5000 [USP'U]/ML
5000 INJECTION, SOLUTION INTRAVENOUS; SUBCUTANEOUS ONCE
Status: CANCELLED | OUTPATIENT
Start: 2022-11-14

## 2022-11-13 RX ORDER — CEFAZOLIN SODIUM/WATER 2 G/20 ML
2 SYRINGE (ML) INTRAVENOUS
Status: CANCELLED | OUTPATIENT
Start: 2022-11-13

## 2022-11-13 RX ORDER — HYDROCODONE BITARTRATE AND ACETAMINOPHEN 500; 5 MG/1; MG/1
TABLET ORAL
Status: DISCONTINUED | OUTPATIENT
Start: 2022-11-13 | End: 2022-11-17

## 2022-11-13 RX ORDER — LIDOCAINE HYDROCHLORIDE 10 MG/ML
1 INJECTION, SOLUTION EPIDURAL; INFILTRATION; INTRACAUDAL; PERINEURAL ONCE
Status: CANCELLED | OUTPATIENT
Start: 2022-11-14 | End: 2022-11-14

## 2022-11-13 RX ADMIN — POLYETHYLENE GLYCOL 3350 17 G: 17 POWDER, FOR SOLUTION ORAL at 08:11

## 2022-11-13 RX ADMIN — AMIODARONE HYDROCHLORIDE 200 MG: 200 TABLET ORAL at 08:11

## 2022-11-13 RX ADMIN — OXYCODONE HYDROCHLORIDE 10 MG: 10 TABLET ORAL at 02:11

## 2022-11-13 RX ADMIN — CEFEPIME 2 G: 2 INJECTION, POWDER, FOR SOLUTION INTRAVENOUS at 03:11

## 2022-11-13 RX ADMIN — AMIODARONE HYDROCHLORIDE 200 MG: 200 TABLET ORAL at 09:11

## 2022-11-13 RX ADMIN — SENNOSIDES AND DOCUSATE SODIUM 1 TABLET: 50; 8.6 TABLET ORAL at 09:11

## 2022-11-13 RX ADMIN — FAMOTIDINE 20 MG: 20 TABLET ORAL at 08:11

## 2022-11-13 RX ADMIN — SENNOSIDES AND DOCUSATE SODIUM 1 TABLET: 50; 8.6 TABLET ORAL at 08:11

## 2022-11-13 RX ADMIN — ASPIRIN 81 MG 81 MG: 81 TABLET ORAL at 08:11

## 2022-11-13 RX ADMIN — OXYCODONE HYDROCHLORIDE 10 MG: 10 TABLET ORAL at 09:11

## 2022-11-13 RX ADMIN — HEPARIN SODIUM 5000 UNITS: 5000 INJECTION INTRAVENOUS; SUBCUTANEOUS at 05:11

## 2022-11-13 RX ADMIN — HEPARIN SODIUM 5000 UNITS: 5000 INJECTION INTRAVENOUS; SUBCUTANEOUS at 09:11

## 2022-11-13 RX ADMIN — OXYCODONE 5 MG: 5 TABLET ORAL at 02:11

## 2022-11-13 RX ADMIN — MILRINONE LACTATE IN DEXTROSE 0.25 MCG/KG/MIN: 200 INJECTION, SOLUTION INTRAVENOUS at 08:11

## 2022-11-13 RX ADMIN — OXYCODONE 5 MG: 5 TABLET ORAL at 08:11

## 2022-11-13 RX ADMIN — HEPARIN SODIUM 5000 UNITS: 5000 INJECTION INTRAVENOUS; SUBCUTANEOUS at 02:11

## 2022-11-13 NOTE — SUBJECTIVE & OBJECTIVE
Interval History/Significant Events: NAEO. Patient remains afebrile, HDS stable. Resting comfortably in bed. NPO for flap tomorrow. Bowel movement x1 overnight.    Follow-up For: Procedure(s) (LRB):  WASHOUT (N/A)    Post-Operative Day: 3 Days Post-Op    Objective:     Vital Signs (Most Recent):  Temp: 98 °F (36.7 °C) (11/13/22 0300)  Pulse: 101 (11/13/22 0515)  Resp: 15 (11/13/22 0515)  BP: 124/60 (11/11/22 1000)  SpO2: 97 % (11/13/22 0515) Vital Signs (24h Range):  Temp:  [97.7 °F (36.5 °C)-98.1 °F (36.7 °C)] 98 °F (36.7 °C)  Pulse:  [] 101  Resp:  [11-37] 15  SpO2:  [88 %-99 %] 97 %  Arterial Line BP: ()/() 155/77     Weight: 74 kg (163 lb 2.3 oz)  Body mass index is 24.08 kg/m².      Intake/Output Summary (Last 24 hours) at 11/13/2022 0557  Last data filed at 11/13/2022 0500  Gross per 24 hour   Intake 784.91 ml   Output 1340 ml   Net -555.09 ml       Physical Exam  Vitals and nursing note reviewed.   Constitutional:       General: He is not in acute distress.     Appearance: He is not ill-appearing.      Interventions: He is sedated.   HENT:      Head: Normocephalic and atraumatic.   Neck:      Comments: [facility] CVC  Cardiovascular:      Rate and Rhythm: Normal rate and regular rhythm.      Pulses: Normal pulses.      Heart sounds: No murmur heard.    No gallop.      Comments: Midline incision with wound vac in place    Pulmonary:      Effort: Pulmonary effort is normal.      Breath sounds: Normal breath sounds. No wheezing or rales.   Abdominal:      Palpations: Abdomen is soft.   Genitourinary:     Comments: Fierro in place  Skin:     General: Skin is warm and dry.      Capillary Refill: Capillary refill takes less than 2 seconds.      Findings: Bruising present. No lesion.      Comments: MSI with wound vac. Sanguinous output       Vents:  Vent Mode: Spont (11/09/22 0347)  Set Rate: 22 BPM (11/09/22 0345)  Vt Set: 450 mL (11/09/22 0345)  Pressure Support: 5 cmH20 (11/09/22 0347)  PEEP/CPAP: 5  cmH20 (11/09/22 0347)  Oxygen Concentration (%): 24 (11/12/22 0804)  Peak Airway Pressure: 10 cmH2O (11/09/22 0347)  Plateau Pressure: 19 cmH20 (11/09/22 0347)  Total Ve: 4.29 L/m (11/09/22 0347)  Negative Inspiratory Force (cm H2O): 0 (11/09/22 0347)  F/VT Ratio<105 (RSBI): (!) 35.37 (11/09/22 0347)    Lines/Drains/Airways       Peripherally Inserted Central Catheter Line  Duration             PICC Double Lumen 10/17/22 1709 right brachial 26 days              Drain  Duration                  Urethral Catheter 11/12/22 2125 Straight-tip;Non-latex 16 Fr. <1 day              Arterial Line  Duration             Arterial Line 11/08/22 0712 Right Radial 4 days              Peripheral Intravenous Line  Duration                  Peripheral IV - Single Lumen 11/08/22 0730 14 G  Left Forearm 4 days                    Significant Labs:    CBC/Anemia Profile:  Recent Labs   Lab 11/12/22 0309 11/13/22 0428   WBC 17.78* 18.53*   HGB 7.9* 8.1*   HCT 24.5* 24.5*    308   MCV 94 93   RDW 15.4* 15.9*        Chemistries:  Recent Labs   Lab 11/11/22 1701 11/12/22 0309 11/13/22 0428   * 137 138   K 4.0 3.9 4.1   CL 97 98 96   CO2 26 29 30*   BUN 50* 56* 57*   CREATININE 3.8* 3.9* 4.0*   CALCIUM 8.9 8.9 9.0   ALBUMIN  --  2.2* 2.3*   PROT  --  5.8* 5.9*   BILITOT  --  0.7 0.5   ALKPHOS  --  118 126   ALT  --  11 15   AST  --  25 24   MG  --  2.1 2.2   PHOS  --  3.6 3.7       All pertinent labs within the past 24 hours have been reviewed.    Significant Imaging:  I have reviewed all pertinent imaging results/findings within the past 24 hours.

## 2022-11-13 NOTE — PLAN OF CARE
"      SICU PLAN OF CARE NOTE    Dx: Sternal wound infection    Shift Events: Patient ambulated back to bed from chair with standy-by assist. VSS. No acute events. Exchanged Lambert due to leaking. Pain medication administered x2.     Goals of Care: MAP > 65  SBP <160    Neuro: AAO x4, Follows Commands, and Moves All Extremities    Vital Signs: /60   Pulse 103   Temp 98 °F (36.7 °C) (Oral)   Resp (!) 23   Ht 5' 9.02" (1.753 m)   Wt 74 kg (163 lb 2.3 oz)   SpO2 97%   BMI 24.08 kg/m²     Respiratory: Room Air    Diet: Cardiac Diet    Gtts: Milrinone    Urine Output: Urinary Catheter 715 cc/shift and voids X1 lambert was leaking.     Drains: Wound Vac 100 ml/shift     Labs/Accuchecks: routine labs with Accuchecks AC/HS.      Skin: Midsternal incision with wound vac in place; serosanguineous drainage.        "

## 2022-11-13 NOTE — PLAN OF CARE
Plan of Care Note  Cardiothoracic Surgery    Sternal wound infection    Specific issues: IV antibiotics, awaiting flap coverage with Plastics, NPO at midnight, continue milrinone    Plan of care for patient was discussed with ICU staff including nurses, residents, and faculty and appropriate consulting services.    Will continue to monitor patient's hemodynamics, functionality, neuro status, fluid status and renal function, and labs and will adjust medications and fluids as necessary while monitoring appropriateness for de-escalation of support and monitoring and transport to stepdown unit.    Gin Saul MD, PGY-7  Cardiothoracic Surgery   [phone]

## 2022-11-13 NOTE — ASSESSMENT & PLAN NOTE
62 y.o. male S/P TV replacement, MV replacement, MAZE, Lt Atrial Appendage ligation and Impella placement on 10/7/22, course complicated by bleeding, requiring reopening POD#0, multiple VT runs requiring DCCV, and sternal wound infections. He presents to the SICU s/p wound debridement and wire removal on 11/08       Neuro/Psych:     - Sedation: none    - Pain:    - Scheduled Tylenol 1g q8h with prn Oxy              Cardiac:      -BP Goal: MAP 60-80 and SBP <140.      - Echo with EF 35% with mild RV enlargement and moderate RV dysfunction. Cont milrinone 0.25     - Anti-HTNs: Will restart home meds when appropriate     - Rhythm: NSR. S/p MAZE for Afib, cont po amio.     - Beta blocker: hold in setting of Milrinone     - Statin: Atorvastatin 40 mg QD      Pulmonary:     - Goal SpO2 >92%          Renal:    - Trend BUN/Cr. Oliguric KIMANI. Will monitor and support kidneys with MAP >65. Hold off diuresis.    - Maintain Fierro, record strict Is/Os    Recent Labs   Lab 11/11/22  1701 11/12/22 0309 11/13/22 0428   BUN 50* 56* 57*   CREATININE 3.8* 3.9* 4.0*         FEN / GI:     - Daily CMP, PRN K/Mag/Phos per protocol     - Replace electrolytes as needed    - Nutrition: cardiac diet, BOOST    - Bowel Regimen: Miralax, docusate      ID:     - Leukocytosis without fever, trending down     - Abx: Continue Cefepime, lactic acid improving. Will need long term abx plan, 6weeks post op flap    Recent Labs   Lab 11/11/22 0311 11/12/22 0309 11/13/22 0428   WBC 21.49* 17.78* 18.53*         Heme/Onc:     - H/H with post-operative anemia, monitor     - CBC daily    - ASA 325mg daily    Recent Labs   Lab 11/11/22 0311 11/12/22 0309 11/13/22  0428   HGB 7.1* 7.9* 8.1*    337 308   APTT 27.6 26.6 28.5   INR 1.1 1.1 1.1         Endocrine:     - CTS Goal -140    - HgbA1c:     - Endocrinology consulted for insulin management      PPx:   Feeding: cardiac diet   Analgesia/Sedation: tylenol  Thromboembolic Prevention:  scds  HOB >30: Yes  Stress Ulcer: n/a  Glucose Control: Yes, insulin management per Endocrinology     Lines/Drains/Airway:   Left radial arterial line   RIJ CVC   Fierro   Wound Vac      Dispo/Code Status/Palliative:     - Continue SICU Care    - Full Code

## 2022-11-13 NOTE — PROGRESS NOTES
Plastic and Reconstructive Surgery   Progress Note    Subjective:    Patient seen and examined at bedside in SICU.  He is currently sitting up in chair doing well. Pain controlled. Tolerating diet. Plan for OR     Objective:  Vital signs in last 24 hours:  Temp:  [97.8 °F (36.6 °C)-98.3 °F (36.8 °C)] 97.8 °F (36.6 °C)  Pulse:  [] 103  Resp:  [11-30] 16  SpO2:  [88 %-99 %] 94 %  BP: (126)/(66) 126/66  Arterial Line BP: (108-262)/() 110/78    Intake/Output last 3 shifts:  I/O last 3 completed shifts:  In: 1224.6 [P.O.:937; I.V.:237.2; IV Piggyback:50.3]  Out: 2335 [Urine:2135; Other:200]    Intake/Output this shift:  I/O this shift:  In: 307.4 [P.O.:250; I.V.:57.4]  Out: 700 [Urine:700]        Physical Exam:  VITAL SIGNS:   Vitals:    22 1419 22 1500 22 1600 22 1700   BP:       BP Location:       Patient Position:       Pulse:  103 103 103   Resp: 18 15 16 16   Temp:   97.8 °F (36.6 °C)    TempSrc:   Oral    SpO2:  95% (!) 94% (!) 94%   Weight:       Height:         TMAX: Temp (24hrs), Av °F (36.7 °C), Min:97.8 °F (36.6 °C), Max:98.3 °F (36.8 °C)    General: Alert; No acute distress  Cardiovascular: Regular rate   Respiratory: Normal respiratory effort. Chest rise symmetric.   Abdomen: Soft, nontender, nondistended  Extremity: Moves all extremities equally.  Neurologic: No focal deficit. Speech normal  SKIN: chest wound with wound vac in place       Scheduled Medications amiodarone, 200 mg, BID  aspirin, 81 mg, Daily  ceFEPime (MAXIPIME) IVPB, 2 g, Q24H  famotidine, 20 mg, Daily  heparin (porcine), 5,000 Units, Q8H  polyethylene glycol, 17 g, Daily  senna-docusate 8.6-50 mg, 1 tablet, BID    PRN Medications sodium chloride, sodium chloride, dextrose 10%, dextrose 10%, ondansetron, oxyCODONE, oxyCODONE    Recent Labs:   Lab Results   Component Value Date    WBC 18.53 (H) 2022    HGB 8.1 (L) 2022    HCT 24.5 (L) 2022    MCV 93 2022      11/13/2022     Lab Results   Component Value Date     11/13/2022     11/13/2022    K 4.1 11/13/2022    CL 96 11/13/2022    BUN 57 (H) 11/13/2022         Assessment: 62 y.o. y/o male 3 Days Post-Op s/p Procedure(s):  REMOVAL, STERNAL WIRE  DEBRIDEMENT, STERNUM  APPLICATION, WOUND VAC Doing well postoperatively.    Plan  Take back to OR for pec flaps 11/14  NPO past MN  Pain control  Monitor Vac output  I's & O's  Dvt ppx  Medical management as per SICU      Keyshawn Whitlock MD- Fellow  Department of Plastic and Reconstructive Surgery  [phone] (office)

## 2022-11-13 NOTE — PROGRESS NOTES
[facility] - Surgical Intensive Care  Critical Care - Surgery  Progress Note    Patient Name: David Barrios  MRN: [id number]  Admission Date: 11/8/2022  Hospital Length of Stay: 5 days  Code Status: Full Code  Attending Provider: Mike Hedrick MD  Primary Care Provider: Breann Tavera MD   Principal Problem: Sternal wound infection    Subjective:       Interval History/Significant Events: NAEO. Patient remains afebrile, HDS stable. Resting comfortably in bed. NPO AT midnight for flap tomorrow. Bowel movement x1 overnight.    Follow-up For: Procedure(s) (LRB):  WASHOUT (N/A)    Post-Operative Day: 3 Days Post-Op    Objective:     Vital Signs (Most Recent):  Temp: 98 °F (36.7 °C) (11/13/22 0300)  Pulse: 101 (11/13/22 0515)  Resp: 15 (11/13/22 0515)  BP: 124/60 (11/11/22 1000)  SpO2: 97 % (11/13/22 0515) Vital Signs (24h Range):  Temp:  [97.7 °F (36.5 °C)-98.1 °F (36.7 °C)] 98 °F (36.7 °C)  Pulse:  [] 101  Resp:  [11-37] 15  SpO2:  [88 %-99 %] 97 %  Arterial Line BP: ()/() 155/77     Weight: 74 kg (163 lb 2.3 oz)  Body mass index is 24.08 kg/m².      Intake/Output Summary (Last 24 hours) at 11/13/2022 0557  Last data filed at 11/13/2022 0500  Gross per 24 hour   Intake 784.91 ml   Output 1340 ml   Net -555.09 ml       Physical Exam  Vitals and nursing note reviewed.   Constitutional:       General: He is not in acute distress.     Appearance: He is not ill-appearing.      Interventions: He is sedated.   HENT:      Head: Normocephalic and atraumatic.   Neck:      Comments: [facility] CVC  Cardiovascular:      Rate and Rhythm: Normal rate and regular rhythm.      Pulses: Normal pulses.      Heart sounds: No murmur heard.    No gallop.      Comments: Midline incision with wound vac in place    Pulmonary:      Effort: Pulmonary effort is normal.      Breath sounds: Normal breath sounds. No wheezing or rales.   Abdominal:      Palpations: Abdomen is soft.   Genitourinary:     Comments: Fierro in place  Skin:      General: Skin is warm and dry.      Capillary Refill: Capillary refill takes less than 2 seconds.      Findings: Bruising present. No lesion.      Comments: MSI with wound vac. Sanguinous output       Vents:  Vent Mode: Spont (11/09/22 0347)  Set Rate: 22 BPM (11/09/22 0345)  Vt Set: 450 mL (11/09/22 0345)  Pressure Support: 5 cmH20 (11/09/22 0347)  PEEP/CPAP: 5 cmH20 (11/09/22 0347)  Oxygen Concentration (%): 24 (11/12/22 0804)  Peak Airway Pressure: 10 cmH2O (11/09/22 0347)  Plateau Pressure: 19 cmH20 (11/09/22 0347)  Total Ve: 4.29 L/m (11/09/22 0347)  Negative Inspiratory Force (cm H2O): 0 (11/09/22 0347)  F/VT Ratio<105 (RSBI): (!) 35.37 (11/09/22 0347)    Lines/Drains/Airways       Peripherally Inserted Central Catheter Line  Duration             PICC Double Lumen 10/17/22 1709 right brachial 26 days              Drain  Duration                  Urethral Catheter 11/12/22 2125 Straight-tip;Non-latex 16 Fr. <1 day              Arterial Line  Duration             Arterial Line 11/08/22 0712 Right Radial 4 days              Peripheral Intravenous Line  Duration                  Peripheral IV - Single Lumen 11/08/22 0730 14 G  Left Forearm 4 days                    Significant Labs:    CBC/Anemia Profile:  Recent Labs   Lab 11/12/22 0309 11/13/22 0428   WBC 17.78* 18.53*   HGB 7.9* 8.1*   HCT 24.5* 24.5*    308   MCV 94 93   RDW 15.4* 15.9*        Chemistries:  Recent Labs   Lab 11/11/22  1701 11/12/22 0309 11/13/22 0428   * 137 138   K 4.0 3.9 4.1   CL 97 98 96   CO2 26 29 30*   BUN 50* 56* 57*   CREATININE 3.8* 3.9* 4.0*   CALCIUM 8.9 8.9 9.0   ALBUMIN  --  2.2* 2.3*   PROT  --  5.8* 5.9*   BILITOT  --  0.7 0.5   ALKPHOS  --  118 126   ALT  --  11 15   AST  --  25 24   MG  --  2.1 2.2   PHOS  --  3.6 3.7       All pertinent labs within the past 24 hours have been reviewed.    Significant Imaging:  I have reviewed all pertinent imaging results/findings within the past 24  hours.    Assessment/Plan:     * Sternal wound infection  62 y.o. male S/P TV replacement, MV replacement, MAZE, Lt Atrial Appendage ligation and Impella placement on 10/7/22, course complicated by bleeding, requiring reopening POD#0, multiple VT runs requiring DCCV, and sternal wound infections. He presents to the SICU s/p wound debridement and wire removal on 11/08 and washout 11/10/22      Neuro/Psych:     - Sedation: none    - Pain:    - Scheduled Tylenol 1g q8h with prn Oxy              Cardiac:      -BP Goal: MAP 60-80 and SBP <140.      - Echo with EF 35% with mild RV enlargement and moderate RV dysfunction. Cont milrinone 0.25     - Anti-HTNs: Will restart home meds when appropriate     - Rhythm: NSR. S/p MAZE for Afib, cont po amio.     - Beta blocker: hold in setting of Milrinone     - Statin: Atorvastatin 40 mg QD      Pulmonary:     - Goal SpO2 >92%          Renal:    - Trend BUN/Cr. Oliguric KIMANI. Will monitor and support kidneys with MAP >65. Hold off diuresis.    - Maintain Fierro, record strict Is/Os    Recent Labs   Lab 11/11/22  1701 11/12/22 0309 11/13/22  0428   BUN 50* 56* 57*   CREATININE 3.8* 3.9* 4.0*         FEN / GI:     - Daily CMP, PRN K/Mag/Phos per protocol     - Replace electrolytes as needed    - Nutrition: cardiac diet, BOOST    - Bowel Regimen: Miralax, docusate      ID:     - Leukocytosis without fever, trending down     - Abx: Continue Cefepime, lactic acid improving. Will need long term abx plan, 6weeks post op flap    Recent Labs   Lab 11/11/22  0311 11/12/22  0309 11/13/22  0428   WBC 21.49* 17.78* 18.53*         Heme/Onc:     - H/H with post-operative anemia, monitor     - CBC daily    - ASA 325mg daily    Recent Labs   Lab 11/11/22  0311 11/12/22  0309 11/13/22  0428   HGB 7.1* 7.9* 8.1*    337 308   APTT 27.6 26.6 28.5   INR 1.1 1.1 1.1         Endocrine:     - CTS Goal -140    - HgbA1c:     - Endocrinology consulted for insulin management      PPx:   Feeding:  cardiac diet   Analgesia/Sedation: tylenol  Thromboembolic Prevention: scds  HOB >30: Yes  Stress Ulcer: n/a  Glucose Control: Yes, insulin management per Endocrinology     Lines/Drains/Airway:   Left radial arterial line   RIJ CVC   Fierro   Wound Vac      Dispo/Code Status/Palliative:     - Continue SICU Care    - Full Code             critical secondary to Patient has a condition that poses threat to life and bodily function     Critical care was time spent personally by me on the following activities: development of treatment plan with patient or surrogate and bedside caregivers, discussions with consultants, evaluation of patient's response to treatment, examination of patient, ordering and performing treatments and interventions, ordering and review of laboratory studies, ordering and review of radiographic studies, pulse oximetry, re-evaluation of patient's condition.  This critical care time did not overlap with that of any other provider or involve time for any procedures.     Gustavo Aguero, DO  Critical Care - Surgery  [facility] - Surgical Intensive Care

## 2022-11-13 NOTE — PROGRESS NOTES
Plastic and Reconstructive Surgery   Progress Note    Subjective:    Patient seen and examined at bedside in SICU.  He is currently sitting up in chair doing well. Pain controlled. Tolerating diet.    Objective:  Vital signs in last 24 hours:  Temp:  [97.8 °F (36.6 °C)-98.3 °F (36.8 °C)] 97.8 °F (36.6 °C)  Pulse:  [] 103  Resp:  [11-30] 16  SpO2:  [88 %-99 %] 94 %  BP: (126)/(66) 126/66  Arterial Line BP: (108-262)/() 110/78    Intake/Output last 3 shifts:  I/O last 3 completed shifts:  In: 1224.6 [P.O.:937; I.V.:237.2; IV Piggyback:50.3]  Out: 2335 [Urine:2135; Other:200]    Intake/Output this shift:  I/O this shift:  In: 307.4 [P.O.:250; I.V.:57.4]  Out: 700 [Urine:700]        Physical Exam:  VITAL SIGNS:   Vitals:    22 1419 22 1500 22 1600 22 1700   BP:       BP Location:       Patient Position:       Pulse:  103 103 103   Resp: 18 15 16 16   Temp:   97.8 °F (36.6 °C)    TempSrc:   Oral    SpO2:  95% (!) 94% (!) 94%   Weight:       Height:         TMAX: Temp (24hrs), Av °F (36.7 °C), Min:97.8 °F (36.6 °C), Max:98.3 °F (36.8 °C)    General: Alert; No acute distress  Cardiovascular: Regular rate   Respiratory: Normal respiratory effort. Chest rise symmetric.   Abdomen: Soft, nontender, nondistended  Extremity: Moves all extremities equally.  Neurologic: No focal deficit. Speech normal  SKIN: chest wound with wound vac in place       Scheduled Medications amiodarone, 200 mg, BID  aspirin, 81 mg, Daily  ceFEPime (MAXIPIME) IVPB, 2 g, Q24H  famotidine, 20 mg, Daily  heparin (porcine), 5,000 Units, Q8H  polyethylene glycol, 17 g, Daily  senna-docusate 8.6-50 mg, 1 tablet, BID    PRN Medications sodium chloride, sodium chloride, dextrose 10%, dextrose 10%, ondansetron, oxyCODONE, oxyCODONE    Recent Labs:   Lab Results   Component Value Date    WBC 18.53 (H) 2022    HGB 8.1 (L) 2022    HCT 24.5 (L) 2022    MCV 93 2022     2022     Lab Results    Component Value Date     11/13/2022     11/13/2022    K 4.1 11/13/2022    CL 96 11/13/2022    BUN 57 (H) 11/13/2022         Assessment: 62 y.o. y/o male 3 Days Post-Op s/p Procedure(s):  REMOVAL, STERNAL WIRE  DEBRIDEMENT, STERNUM  APPLICATION, WOUND VAC Doing well postoperatively.    Plan  Take back to OR for pec flaps 11/14  Pain control  Monitor Vac output  I's & O's  Dvt ppx  Medical management as per CECILU      Keyshawn Whitlock MD- Fellow  Department of Plastic and Reconstructive Surgery  [phone] (office)

## 2022-11-13 NOTE — CARE UPDATE
SIGN OFF    Discontinue BG monitoring. Will sign off. Please re-consult Endo as needed.    Patient has no Dx of DM, and is tolerating PO intake without BG excursions and/or complications.     Thank you for the consult.     ** Please call Endocrine for any BG related issues **    Rigoberto Dee DNP, FNP-C  Department of Endocrinology  Inpatient Glycemic Management

## 2022-11-13 NOTE — ASSESSMENT & PLAN NOTE
62 y.o. male S/P TV replacement, MV replacement, MAZE, Lt Atrial Appendage ligation and Impella placement on 10/7/22, course complicated by bleeding, requiring reopening POD#0, multiple VT runs requiring DCCV, and sternal wound infections. He presents to the SICU s/p wound debridement and wire removal on 11/08 followed by washout on 11/10      Neuro/Psych:     - Sedation: none    - Pain:    - Scheduled Tylenol 1g q8h with prn Oxy              Cardiac:      -BP Goal: MAP 60-80 and SBP <140.      - Echo with EF 35% with mild RV enlargement and moderate RV dysfunction. Cont milrinone 0.25     - Anti-HTNs: Will restart home meds when appropriate     - Rhythm: NSR. S/p MAZE for Afib, cont po amio.     - Beta blocker: hold in setting of Milrinone     - Statin: Atorvastatin 40 mg QD      Pulmonary:     - Goal SpO2 >92%          Renal:    - Trend BUN/Cr. Oliguric KIMANI. Will monitor and support kidneys with MAP >65. Hold off diuresis.    - Maintain Fierro, record strict Is/Os    Recent Labs   Lab 11/12/22 0309 11/13/22 0428 11/14/22 0338   BUN 56* 57* 52*   CREATININE 3.9* 4.0* 4.2*         FEN / GI:     - Daily CMP, PRN K/Mag/Phos per protocol     - Replace electrolytes as needed    - Nutrition: cardiac diet, BOOST    - Bowel Regimen: Miralax, docusate      ID:     - Leukocytosis without fever, trending down     - Abx: Continue Cefepime, lactic acid improving. Will need long term abx plan, 6weeks post op flap    Recent Labs   Lab 11/12/22 0309 11/13/22 0428 11/14/22 0338   WBC 17.78* 18.53* 19.66*         Heme/Onc:     - H/H with post-operative anemia, monitor     - CBC daily    - ASA 325mg daily    Recent Labs   Lab 11/12/22 0309 11/13/22 0428 11/14/22 0338   HGB 7.9* 8.1* 8.4*    308 349   APTT 26.6 28.5 27.1   INR 1.1 1.1 1.0         Endocrine:     - CTS Goal -140    - HgbA1c:     - Endocrinology consulted for insulin management      PPx:   Feeding: cardiac diet   Analgesia/Sedation:  tylenol  Thromboembolic Prevention: scds  HOB >30: Yes  Stress Ulcer: n/a  Glucose Control: Yes, insulin management per Endocrinology     Lines/Drains/Airway:   Left radial arterial line   RIJ CVC   Fierro   Wound Vac      Dispo/Code Status/Palliative:     - Continue SICU Care    - Full Code

## 2022-11-14 ENCOUNTER — ANESTHESIA (OUTPATIENT)
Dept: SURGERY | Facility: HOSPITAL | Age: 62
DRG: 268 | End: 2022-11-14
Payer: COMMERCIAL

## 2022-11-14 LAB
ALBUMIN SERPL BCP-MCNC: 2.3 G/DL (ref 3.5–5.2)
ALBUMIN SERPL BCP-MCNC: 2.3 G/DL (ref 3.5–5.2)
ALP SERPL-CCNC: 122 U/L (ref 55–135)
ALP SERPL-CCNC: 122 U/L (ref 55–135)
ALT SERPL W/O P-5'-P-CCNC: 11 U/L (ref 10–44)
ALT SERPL W/O P-5'-P-CCNC: 13 U/L (ref 10–44)
ANION GAP SERPL CALC-SCNC: 11 MMOL/L (ref 8–16)
ANION GAP SERPL CALC-SCNC: 9 MMOL/L (ref 8–16)
ANISOCYTOSIS BLD QL SMEAR: SLIGHT
APTT BLDCRRT: 27.1 SEC (ref 21–32)
AST SERPL-CCNC: 18 U/L (ref 10–40)
AST SERPL-CCNC: 26 U/L (ref 10–40)
BACTERIA SPEC AEROBE CULT: NO GROWTH
BASOPHILS # BLD AUTO: 0.07 K/UL (ref 0–0.2)
BASOPHILS NFR BLD: 0 % (ref 0–1.9)
BASOPHILS NFR BLD: 0.4 % (ref 0–1.9)
BILIRUB SERPL-MCNC: 0.5 MG/DL (ref 0.1–1)
BILIRUB SERPL-MCNC: 1.1 MG/DL (ref 0.1–1)
BLD PROD TYP BPU: NORMAL
BLOOD UNIT EXPIRATION DATE: NORMAL
BLOOD UNIT TYPE CODE: 6200
BLOOD UNIT TYPE: NORMAL
BUN SERPL-MCNC: 52 MG/DL (ref 8–23)
BUN SERPL-MCNC: 52 MG/DL (ref 8–23)
CALCIUM SERPL-MCNC: 8.9 MG/DL (ref 8.7–10.5)
CALCIUM SERPL-MCNC: 9.1 MG/DL (ref 8.7–10.5)
CHLORIDE SERPL-SCNC: 97 MMOL/L (ref 95–110)
CHLORIDE SERPL-SCNC: 97 MMOL/L (ref 95–110)
CO2 SERPL-SCNC: 25 MMOL/L (ref 23–29)
CO2 SERPL-SCNC: 29 MMOL/L (ref 23–29)
CODING SYSTEM: NORMAL
CREAT SERPL-MCNC: 3.8 MG/DL (ref 0.5–1.4)
CREAT SERPL-MCNC: 4.2 MG/DL (ref 0.5–1.4)
DIFFERENTIAL METHOD: ABNORMAL
DIFFERENTIAL METHOD: ABNORMAL
DISPENSE STATUS: NORMAL
DOHLE BOD BLD QL SMEAR: PRESENT
EOSINOPHIL # BLD AUTO: 0.2 K/UL (ref 0–0.5)
EOSINOPHIL NFR BLD: 1 % (ref 0–8)
EOSINOPHIL NFR BLD: 1.1 % (ref 0–8)
ERYTHROCYTE [DISTWIDTH] IN BLOOD BY AUTOMATED COUNT: 16.1 % (ref 11.5–14.5)
ERYTHROCYTE [DISTWIDTH] IN BLOOD BY AUTOMATED COUNT: 16.5 % (ref 11.5–14.5)
EST. GFR  (NO RACE VARIABLE): 15.1 ML/MIN/1.73 M^2
EST. GFR  (NO RACE VARIABLE): 17 ML/MIN/1.73 M^2
GIANT PLATELETS BLD QL SMEAR: PRESENT
GLUCOSE SERPL-MCNC: 121 MG/DL (ref 70–110)
GLUCOSE SERPL-MCNC: 97 MG/DL (ref 70–110)
HCT VFR BLD AUTO: 25.8 % (ref 40–54)
HCT VFR BLD AUTO: 31.3 % (ref 40–54)
HGB BLD-MCNC: 10.5 G/DL (ref 14–18)
HGB BLD-MCNC: 8.4 G/DL (ref 14–18)
IMM GRANULOCYTES # BLD AUTO: 0.47 K/UL (ref 0–0.04)
IMM GRANULOCYTES # BLD AUTO: ABNORMAL K/UL (ref 0–0.04)
IMM GRANULOCYTES NFR BLD AUTO: 2.4 % (ref 0–0.5)
IMM GRANULOCYTES NFR BLD AUTO: ABNORMAL % (ref 0–0.5)
INR PPP: 1 (ref 0.8–1.2)
LYMPHOCYTES # BLD AUTO: 1.5 K/UL (ref 1–4.8)
LYMPHOCYTES NFR BLD: 1 % (ref 18–48)
LYMPHOCYTES NFR BLD: 7.6 % (ref 18–48)
MAGNESIUM SERPL-MCNC: 2.2 MG/DL (ref 1.6–2.6)
MAGNESIUM SERPL-MCNC: 2.2 MG/DL (ref 1.6–2.6)
MCH RBC QN AUTO: 30.6 PG (ref 27–31)
MCH RBC QN AUTO: 30.7 PG (ref 27–31)
MCHC RBC AUTO-ENTMCNC: 32.6 G/DL (ref 32–36)
MCHC RBC AUTO-ENTMCNC: 33.5 G/DL (ref 32–36)
MCV RBC AUTO: 91 FL (ref 82–98)
MCV RBC AUTO: 94 FL (ref 82–98)
MONOCYTES # BLD AUTO: 1.4 K/UL (ref 0.3–1)
MONOCYTES NFR BLD: 2 % (ref 4–15)
MONOCYTES NFR BLD: 7.3 % (ref 4–15)
MYELOCYTES NFR BLD MANUAL: 1 %
NEUTROPHILS # BLD AUTO: 16 K/UL (ref 1.8–7.7)
NEUTROPHILS NFR BLD: 81.2 % (ref 38–73)
NEUTROPHILS NFR BLD: 95 % (ref 38–73)
NRBC BLD-RTO: 0 /100 WBC
NRBC BLD-RTO: 0 /100 WBC
NUM UNITS TRANS PACKED RBC: NORMAL
PHOSPHATE SERPL-MCNC: 3.7 MG/DL (ref 2.7–4.5)
PHOSPHATE SERPL-MCNC: 4.3 MG/DL (ref 2.7–4.5)
PLATELET # BLD AUTO: 349 K/UL (ref 150–450)
PLATELET # BLD AUTO: 432 K/UL (ref 150–450)
PLATELET BLD QL SMEAR: ABNORMAL
PMV BLD AUTO: 9.3 FL (ref 9.2–12.9)
PMV BLD AUTO: 9.6 FL (ref 9.2–12.9)
POCT GLUCOSE: 102 MG/DL (ref 70–110)
POLYCHROMASIA BLD QL SMEAR: ABNORMAL
POTASSIUM SERPL-SCNC: 4.2 MMOL/L (ref 3.5–5.1)
POTASSIUM SERPL-SCNC: 4.9 MMOL/L (ref 3.5–5.1)
PROT SERPL-MCNC: 6.2 G/DL (ref 6–8.4)
PROT SERPL-MCNC: 6.3 G/DL (ref 6–8.4)
PROTHROMBIN TIME: 10.9 SEC (ref 9–12.5)
RBC # BLD AUTO: 2.74 M/UL (ref 4.6–6.2)
RBC # BLD AUTO: 3.43 M/UL (ref 4.6–6.2)
SODIUM SERPL-SCNC: 133 MMOL/L (ref 136–145)
SODIUM SERPL-SCNC: 135 MMOL/L (ref 136–145)
WBC # BLD AUTO: 19.66 K/UL (ref 3.9–12.7)
WBC # BLD AUTO: 41.3 K/UL (ref 3.9–12.7)

## 2022-11-14 PROCEDURE — 99291 PR CRITICAL CARE, E/M 30-74 MINUTES: ICD-10-PCS | Mod: 25,,, | Performed by: ANESTHESIOLOGY

## 2022-11-14 PROCEDURE — 99291 CRITICAL CARE FIRST HOUR: CPT | Mod: 25,,, | Performed by: ANESTHESIOLOGY

## 2022-11-14 PROCEDURE — 63600175 PHARM REV CODE 636 W HCPCS

## 2022-11-14 PROCEDURE — D9220A PRA ANESTHESIA: ICD-10-PCS | Mod: ANES,,, | Performed by: ANESTHESIOLOGY

## 2022-11-14 PROCEDURE — 14301 TIS TRNFR ANY 30.1-60 SQ CM: CPT | Mod: 51,,, | Performed by: SURGERY

## 2022-11-14 PROCEDURE — 27201423 OPTIME MED/SURG SUP & DEVICES STERILE SUPPLY: Performed by: SURGERY

## 2022-11-14 PROCEDURE — 85730 THROMBOPLASTIN TIME PARTIAL: CPT

## 2022-11-14 PROCEDURE — 36000708 HC OR TIME LEV III 1ST 15 MIN: Performed by: SURGERY

## 2022-11-14 PROCEDURE — 14302 TIS TRNFR ADDL 30 SQ CM: CPT | Mod: ,,, | Performed by: SURGERY

## 2022-11-14 PROCEDURE — 36430 TRANSFUSION BLD/BLD COMPNT: CPT

## 2022-11-14 PROCEDURE — 85025 COMPLETE CBC W/AUTO DIFF WBC: CPT

## 2022-11-14 PROCEDURE — 15002 WOUND PREP TRK/ARM/LEG: CPT | Mod: ,,, | Performed by: SURGERY

## 2022-11-14 PROCEDURE — 63600175 PHARM REV CODE 636 W HCPCS: Performed by: ANESTHESIOLOGY

## 2022-11-14 PROCEDURE — 63600175 PHARM REV CODE 636 W HCPCS: Performed by: NURSE ANESTHETIST, CERTIFIED REGISTERED

## 2022-11-14 PROCEDURE — 25000003 PHARM REV CODE 250

## 2022-11-14 PROCEDURE — 15734 PR MUSCLE-SKIN FLAP,TRUNK: ICD-10-PCS | Mod: ,,, | Performed by: SURGERY

## 2022-11-14 PROCEDURE — 15734 MUSCLE-SKIN GRAFT TRUNK: CPT | Mod: ,,, | Performed by: SURGERY

## 2022-11-14 PROCEDURE — 37000008 HC ANESTHESIA 1ST 15 MINUTES: Performed by: SURGERY

## 2022-11-14 PROCEDURE — 37000009 HC ANESTHESIA EA ADD 15 MINS: Performed by: SURGERY

## 2022-11-14 PROCEDURE — 83735 ASSAY OF MAGNESIUM: CPT

## 2022-11-14 PROCEDURE — 84100 ASSAY OF PHOSPHORUS: CPT

## 2022-11-14 PROCEDURE — 84100 ASSAY OF PHOSPHORUS: CPT | Mod: 91 | Performed by: STUDENT IN AN ORGANIZED HEALTH CARE EDUCATION/TRAINING PROGRAM

## 2022-11-14 PROCEDURE — 25000003 PHARM REV CODE 250: Performed by: STUDENT IN AN ORGANIZED HEALTH CARE EDUCATION/TRAINING PROGRAM

## 2022-11-14 PROCEDURE — 25000003 PHARM REV CODE 250: Performed by: NURSE ANESTHETIST, CERTIFIED REGISTERED

## 2022-11-14 PROCEDURE — 80053 COMPREHEN METABOLIC PANEL: CPT

## 2022-11-14 PROCEDURE — 15002 PR WOUND PREP, PED, TRK/ARM/LG 1ST 100 CM: ICD-10-PCS | Mod: ,,, | Performed by: SURGERY

## 2022-11-14 PROCEDURE — P9016 RBC LEUKOCYTES REDUCED: HCPCS | Performed by: STUDENT IN AN ORGANIZED HEALTH CARE EDUCATION/TRAINING PROGRAM

## 2022-11-14 PROCEDURE — 63600175 PHARM REV CODE 636 W HCPCS: Performed by: STUDENT IN AN ORGANIZED HEALTH CARE EDUCATION/TRAINING PROGRAM

## 2022-11-14 PROCEDURE — D9220A PRA ANESTHESIA: Mod: CRNA,,, | Performed by: NURSE ANESTHETIST, CERTIFIED REGISTERED

## 2022-11-14 PROCEDURE — 36000709 HC OR TIME LEV III EA ADD 15 MIN: Performed by: SURGERY

## 2022-11-14 PROCEDURE — 14301 PR ADJ TISS XFER ANY AREA,30.1-60 SQCM: ICD-10-PCS | Mod: 51,,, | Performed by: SURGERY

## 2022-11-14 PROCEDURE — 80053 COMPREHEN METABOLIC PANEL: CPT | Mod: 91 | Performed by: STUDENT IN AN ORGANIZED HEALTH CARE EDUCATION/TRAINING PROGRAM

## 2022-11-14 PROCEDURE — D9220A PRA ANESTHESIA: ICD-10-PCS | Mod: CRNA,,, | Performed by: NURSE ANESTHETIST, CERTIFIED REGISTERED

## 2022-11-14 PROCEDURE — 20000000 HC ICU ROOM

## 2022-11-14 PROCEDURE — 85610 PROTHROMBIN TIME: CPT

## 2022-11-14 PROCEDURE — 14302 PR ADJ TISS XFER ANY AREA,EA ADD 30.0 SQCM: ICD-10-PCS | Mod: ,,, | Performed by: SURGERY

## 2022-11-14 PROCEDURE — 94761 N-INVAS EAR/PLS OXIMETRY MLT: CPT

## 2022-11-14 PROCEDURE — 85025 COMPLETE CBC W/AUTO DIFF WBC: CPT | Mod: 91 | Performed by: STUDENT IN AN ORGANIZED HEALTH CARE EDUCATION/TRAINING PROGRAM

## 2022-11-14 PROCEDURE — D9220A PRA ANESTHESIA: Mod: ANES,,, | Performed by: ANESTHESIOLOGY

## 2022-11-14 PROCEDURE — C1729 CATH, DRAINAGE: HCPCS | Performed by: SURGERY

## 2022-11-14 PROCEDURE — 83735 ASSAY OF MAGNESIUM: CPT | Mod: 91 | Performed by: STUDENT IN AN ORGANIZED HEALTH CARE EDUCATION/TRAINING PROGRAM

## 2022-11-14 PROCEDURE — 97530 THERAPEUTIC ACTIVITIES: CPT

## 2022-11-14 RX ORDER — ROCURONIUM BROMIDE 10 MG/ML
INJECTION, SOLUTION INTRAVENOUS
Status: DISCONTINUED | OUTPATIENT
Start: 2022-11-14 | End: 2022-11-14

## 2022-11-14 RX ORDER — PHENYLEPHRINE HYDROCHLORIDE 10 MG/ML
INJECTION INTRAVENOUS
Status: DISCONTINUED | OUTPATIENT
Start: 2022-11-14 | End: 2022-11-14

## 2022-11-14 RX ORDER — HYDROMORPHONE HYDROCHLORIDE 1 MG/ML
0.2 INJECTION, SOLUTION INTRAMUSCULAR; INTRAVENOUS; SUBCUTANEOUS EVERY 10 MIN PRN
Status: CANCELLED | OUTPATIENT
Start: 2022-11-14

## 2022-11-14 RX ORDER — FENTANYL CITRATE 50 UG/ML
INJECTION, SOLUTION INTRAMUSCULAR; INTRAVENOUS
Status: DISCONTINUED | OUTPATIENT
Start: 2022-11-14 | End: 2022-11-14

## 2022-11-14 RX ORDER — MORPHINE SULFATE 2 MG/ML
INJECTION, SOLUTION INTRAMUSCULAR; INTRAVENOUS
Status: DISPENSED
Start: 2022-11-14 | End: 2022-11-15

## 2022-11-14 RX ORDER — MIDAZOLAM HYDROCHLORIDE 1 MG/ML
INJECTION, SOLUTION INTRAMUSCULAR; INTRAVENOUS
Status: DISCONTINUED | OUTPATIENT
Start: 2022-11-14 | End: 2022-11-14

## 2022-11-14 RX ORDER — PROPOFOL 10 MG/ML
VIAL (ML) INTRAVENOUS
Status: DISCONTINUED | OUTPATIENT
Start: 2022-11-14 | End: 2022-11-14

## 2022-11-14 RX ORDER — ONDANSETRON 2 MG/ML
INJECTION INTRAMUSCULAR; INTRAVENOUS
Status: DISCONTINUED | OUTPATIENT
Start: 2022-11-14 | End: 2022-11-14

## 2022-11-14 RX ORDER — OXYCODONE AND ACETAMINOPHEN 5; 325 MG/1; MG/1
1 TABLET ORAL
Status: CANCELLED | OUTPATIENT
Start: 2022-11-14

## 2022-11-14 RX ORDER — EPINEPHRINE 1 MG/ML
INJECTION, SOLUTION, CONCENTRATE INTRAVENOUS
Status: DISCONTINUED | OUTPATIENT
Start: 2022-11-14 | End: 2022-11-14

## 2022-11-14 RX ORDER — HALOPERIDOL 5 MG/ML
0.5 INJECTION INTRAMUSCULAR EVERY 10 MIN PRN
Status: CANCELLED | OUTPATIENT
Start: 2022-11-14

## 2022-11-14 RX ORDER — HYDROCODONE BITARTRATE AND ACETAMINOPHEN 500; 5 MG/1; MG/1
TABLET ORAL
Status: CANCELLED | OUTPATIENT
Start: 2022-11-14

## 2022-11-14 RX ORDER — MORPHINE SULFATE 2 MG/ML
2 INJECTION, SOLUTION INTRAMUSCULAR; INTRAVENOUS
Status: DISCONTINUED | OUTPATIENT
Start: 2022-11-14 | End: 2022-11-15

## 2022-11-14 RX ORDER — KETAMINE HCL IN 0.9 % NACL 50 MG/5 ML
SYRINGE (ML) INTRAVENOUS
Status: DISCONTINUED | OUTPATIENT
Start: 2022-11-14 | End: 2022-11-14

## 2022-11-14 RX ORDER — LIDOCAINE HYDROCHLORIDE 20 MG/ML
INJECTION INTRAVENOUS
Status: DISCONTINUED | OUTPATIENT
Start: 2022-11-14 | End: 2022-11-14

## 2022-11-14 RX ORDER — ETOMIDATE 2 MG/ML
INJECTION INTRAVENOUS
Status: DISCONTINUED | OUTPATIENT
Start: 2022-11-14 | End: 2022-11-14

## 2022-11-14 RX ORDER — DEXAMETHASONE SODIUM PHOSPHATE 4 MG/ML
INJECTION, SOLUTION INTRA-ARTICULAR; INTRALESIONAL; INTRAMUSCULAR; INTRAVENOUS; SOFT TISSUE
Status: DISCONTINUED | OUTPATIENT
Start: 2022-11-14 | End: 2022-11-14

## 2022-11-14 RX ADMIN — SUGAMMADEX 200 MG: 100 INJECTION, SOLUTION INTRAVENOUS at 09:11

## 2022-11-14 RX ADMIN — ONDANSETRON 4 MG: 2 INJECTION INTRAMUSCULAR; INTRAVENOUS at 11:11

## 2022-11-14 RX ADMIN — LIDOCAINE HYDROCHLORIDE 40 MG: 20 INJECTION INTRAVENOUS at 05:11

## 2022-11-14 RX ADMIN — ASPIRIN 81 MG 81 MG: 81 TABLET ORAL at 09:11

## 2022-11-14 RX ADMIN — MORPHINE SULFATE 2 MG: 2 INJECTION, SOLUTION INTRAMUSCULAR; INTRAVENOUS at 09:11

## 2022-11-14 RX ADMIN — MIDAZOLAM HYDROCHLORIDE 2 MG: 1 INJECTION, SOLUTION INTRAMUSCULAR; INTRAVENOUS at 05:11

## 2022-11-14 RX ADMIN — MILRINONE LACTATE IN DEXTROSE 0.25 MCG/KG/MIN: 200 INJECTION, SOLUTION INTRAVENOUS at 12:11

## 2022-11-14 RX ADMIN — AMIODARONE HYDROCHLORIDE 200 MG: 200 TABLET ORAL at 10:11

## 2022-11-14 RX ADMIN — PHENYLEPHRINE HYDROCHLORIDE 200 MCG: 10 INJECTION INTRAVENOUS at 06:11

## 2022-11-14 RX ADMIN — Medication 10 MG: at 07:11

## 2022-11-14 RX ADMIN — ROCURONIUM BROMIDE 10 MG: 10 INJECTION INTRAVENOUS at 07:11

## 2022-11-14 RX ADMIN — SODIUM CHLORIDE, SODIUM GLUCONATE, SODIUM ACETATE, POTASSIUM CHLORIDE, MAGNESIUM CHLORIDE, SODIUM PHOSPHATE, DIBASIC, AND POTASSIUM PHOSPHATE: .53; .5; .37; .037; .03; .012; .00082 INJECTION, SOLUTION INTRAVENOUS at 05:11

## 2022-11-14 RX ADMIN — FENTANYL CITRATE 100 MCG: 50 INJECTION, SOLUTION INTRAMUSCULAR; INTRAVENOUS at 05:11

## 2022-11-14 RX ADMIN — PROPOFOL 50 MG: 10 INJECTION, EMULSION INTRAVENOUS at 06:11

## 2022-11-14 RX ADMIN — FENTANYL CITRATE 50 MCG: 50 INJECTION, SOLUTION INTRAMUSCULAR; INTRAVENOUS at 05:11

## 2022-11-14 RX ADMIN — DEXTROSE 2 G: 50 INJECTION, SOLUTION INTRAVENOUS at 06:11

## 2022-11-14 RX ADMIN — ROCURONIUM BROMIDE 20 MG: 10 INJECTION INTRAVENOUS at 06:11

## 2022-11-14 RX ADMIN — EPINEPHRINE 20 MCG: 1 INJECTION, SOLUTION, CONCENTRATE INTRAVENOUS at 05:11

## 2022-11-14 RX ADMIN — Medication 20 MG: at 06:11

## 2022-11-14 RX ADMIN — DEXAMETHASONE SODIUM PHOSPHATE 8 MG: 4 INJECTION, SOLUTION INTRAMUSCULAR; INTRAVENOUS at 06:11

## 2022-11-14 RX ADMIN — HEPARIN SODIUM 5000 UNITS: 5000 INJECTION INTRAVENOUS; SUBCUTANEOUS at 02:11

## 2022-11-14 RX ADMIN — OXYCODONE HYDROCHLORIDE 10 MG: 10 TABLET ORAL at 02:11

## 2022-11-14 RX ADMIN — HEPARIN SODIUM 5000 UNITS: 5000 INJECTION INTRAVENOUS; SUBCUTANEOUS at 10:11

## 2022-11-14 RX ADMIN — PHENYLEPHRINE HYDROCHLORIDE 100 MCG: 10 INJECTION INTRAVENOUS at 08:11

## 2022-11-14 RX ADMIN — ONDANSETRON 4 MG: 2 INJECTION INTRAMUSCULAR; INTRAVENOUS at 07:11

## 2022-11-14 RX ADMIN — ROCURONIUM BROMIDE 50 MG: 10 INJECTION INTRAVENOUS at 05:11

## 2022-11-14 RX ADMIN — AMIODARONE HYDROCHLORIDE 200 MG: 200 TABLET ORAL at 09:11

## 2022-11-14 RX ADMIN — FENTANYL CITRATE 50 MCG: 50 INJECTION, SOLUTION INTRAMUSCULAR; INTRAVENOUS at 06:11

## 2022-11-14 RX ADMIN — ETOMIDATE 12 MG: 2 INJECTION INTRAVENOUS at 05:11

## 2022-11-14 RX ADMIN — HEPARIN SODIUM 5000 UNITS: 5000 INJECTION INTRAVENOUS; SUBCUTANEOUS at 05:11

## 2022-11-14 RX ADMIN — FENTANYL CITRATE 100 MCG: 50 INJECTION, SOLUTION INTRAMUSCULAR; INTRAVENOUS at 06:11

## 2022-11-14 RX ADMIN — Medication 20 MG: at 07:11

## 2022-11-14 RX ADMIN — OXYCODONE HYDROCHLORIDE 10 MG: 10 TABLET ORAL at 10:11

## 2022-11-14 RX ADMIN — EPINEPHRINE 0.02 MCG/KG/MIN: 1 INJECTION INTRAMUSCULAR; INTRAVENOUS; SUBCUTANEOUS at 05:11

## 2022-11-14 RX ADMIN — CEFEPIME 2 G: 2 INJECTION, POWDER, FOR SOLUTION INTRAVENOUS at 03:11

## 2022-11-14 RX ADMIN — LIDOCAINE HYDROCHLORIDE 20 MG: 20 INJECTION INTRAVENOUS at 06:11

## 2022-11-14 RX ADMIN — ETOMIDATE 4 MG: 2 INJECTION INTRAVENOUS at 05:11

## 2022-11-14 RX ADMIN — SENNOSIDES AND DOCUSATE SODIUM 1 TABLET: 50; 8.6 TABLET ORAL at 10:11

## 2022-11-14 NOTE — PLAN OF CARE
SICU PLAN OF CARE NOTE    Dx: Sternal wound infection    Shift Events: No acute events throughout shift. To OR for pec flap.    Gtts:     Milrinone @ 0.25    Neuro: AAO x4, Follows Commands, and Moves All Extremities    Cardiac: NSR to ST, HR 90-100s.    Respiratory: Room Air    GI: NPO    : Urinary Catheter 730 cc/shift    Drains:     Midsternal wound vac: 25 mL/shift, sanguinous     Labs/Accuchecks: Daily labs reviewed.     Skin: No skin breakdown noted. Bed plugged in with mattress inflated. Heel and sacral foams in place. Weight shift assistance provided throughout shift.

## 2022-11-14 NOTE — PT/OT/SLP PROGRESS
"Occupational Therapy   Treatment    Name: David Barrios  MRN: [id number]  Admitting Diagnosis:  Sternal wound infection  4 Days Post-Op    Recommendations:     Discharge Recommendations: home health OT, home health PT  Discharge Equipment Recommendations:  none  Barriers to discharge:  None    Assessment:     David Barrios is a 62 y.o. male with a medical diagnosis of Sternal wound infection.  He presents initially with c/o of 7/10 pain, RN notified and writing therapist returned ~1 hour later. Pt with no c/o of pain and tolerated session well. He is progressing towards goals. Performance deficits affecting function are weakness, impaired endurance, impaired self care skills, impaired functional mobility, impaired balance, gait instability, pain, impaired skin, orthopedic precautions. Pt would benefit from skilled OT services in order to maximize independence with ADLs and facilitate safe discharge. Pt would benefit from home health OT once medically stable for discharge.     Rehab Prognosis:  Good; patient would benefit from acute skilled OT services to address these deficits and reach maximum level of function.       Plan:     Patient to be seen 4 x/week to address the above listed problems via self-care/home management, therapeutic activities, therapeutic exercises  Plan of Care Expires: 12/10/22  Plan of Care Reviewed with: patient    Subjective   "I haven't seen outside in weeks"    Pain/Comfort:  Pain Rating 1: 0/10  Pain Addressed 1: Pre-medicate for activity  Pain Rating Post-Intervention 1: 0/10    Objective:     Communicated with: RN prior to session.  Patient found up in chair with wound vac, telemetry, pulse ox (continuous), peripheral IV, oxygen, lambert catheter, blood pressure cuff, arterial line, central line upon OT entry to room.    General Precautions: Standard, fall, sternal   Orthopedic Precautions:N/A   Braces: N/A  Respiratory Status: Room air     Occupational Performance:     Functional " Mobility/Transfers:  Patient completed Sit <> Stand Transfer with minimum assistance  with  no assistive device   Cuing to adhere to sternal precautions  Functional Mobility: Pt ambulated 40 ft x2 with SBA-CGA using IV pole for balance in order to maximize functional activity tolerance and standing balance required for engagement in occupations of choice.    Pt with 1 LOB following episode of scissoring requiring min A to recover  No SOB or c/o of dizziness    Activities of Daily Living:  Pt declined to participate; reports completing oral care prior to session       [facility] 6 Click ADL: 18    Treatment & Education:  -Therapist provided facilitation and instruction of proper body mechanics, energy conservation, and fall prevention strategies during tasks listed above.  -Pt educated on role of OT, POC and goals for therapy  -pt educated on adhering to sternal precautions during transfer  -Pt educated on importance of OOB activities with staff member assistance and sitting OOB majority of the day.   -Pt verbalized understanding. Pt expressed no further concerns/questions  -Whiteboard updated     Patient left up in chair with all lines intact, call button in reach, and RN notified    GOALS:   Multidisciplinary Problems       Occupational Therapy Goals          Problem: Occupational Therapy    Goal Priority Disciplines Outcome Interventions   Occupational Therapy Goal     OT, PT/OT Ongoing, Progressing    Description: Goals to be met by: 11/24/22     Patient will increase functional independence with ADLs by performing:    Feeding with [place].  UE Dressing with [place].  LE Dressing with Stand-by Assistance.  Grooming while standing at sink with Supervision.  Toilet transfer to toilet with Supervision.                         Time Tracking:     OT Date of Treatment: 11/14/22  OT Start Time: 1409  OT Stop Time: 1422  OT Total Time (min): 13 min    Billable Minutes:Therapeutic Activity 13    OT/DUONG: OT           11/14/2022

## 2022-11-14 NOTE — PROGRESS NOTES
[facility]  Critical Care - Surgery  Progress Note    Patient Name: David Barrios  MRN: [id number]  Admission Date: 11/8/2022  Hospital Length of Stay: 6 days  Code Status: Full Code  Attending Provider: Mike Hedrick MD  Primary Care Provider: Breann Tavera MD   Principal Problem: Sternal wound infection    Subjective:     Hospital/ICU Course:        Interval History/Significant Events: NAEO. Remains afebrile, HDS. Resting comfortable in bed. Denies any complaints. OR today for PEC flap.    Follow-up For: Procedure(s) (LRB):  WASHOUT (N/A)    Post-Operative Day: 4 Days Post-Op    Objective:     Vital Signs (Most Recent):  Temp: 98.4 °F (36.9 °C) (11/14/22 0300)  Pulse: 98 (11/14/22 0315)  Resp: 15 (11/14/22 0315)  BP: 126/66 (11/13/22 0722)  SpO2: 96 % (11/14/22 0315) Vital Signs (24h Range):  Temp:  [97.8 °F (36.6 °C)-98.4 °F (36.9 °C)] 98.4 °F (36.9 °C)  Pulse:  [] 98  Resp:  [12-26] 15  SpO2:  [89 %-97 %] 96 %  BP: (126)/(66) 126/66  Arterial Line BP: (107-262)/() 117/78     Weight: 74 kg (163 lb 2.3 oz)  Body mass index is 24.08 kg/m².      Intake/Output Summary (Last 24 hours) at 11/14/2022 0556  Last data filed at 11/14/2022 0300  Gross per 24 hour   Intake 616.39 ml   Output 1760 ml   Net -1143.61 ml       Physical Exam  Vitals and nursing note reviewed.   Constitutional:       General: He is not in acute distress.     Appearance: He is not ill-appearing.      Interventions: He is sedated.   HENT:      Head: Normocephalic and atraumatic.   Neck:      Comments: RIJ CVC  Cardiovascular:      Rate and Rhythm: Normal rate and regular rhythm.      Pulses: Normal pulses.      Heart sounds: No murmur heard.    No gallop.      Comments: Midline incision with wound vac in place     Pulmonary:      Effort: Pulmonary effort is normal.      Breath sounds: Normal breath sounds. No wheezing or rales.   Abdominal:      Palpations: Abdomen is soft.   Genitourinary:     Comments:  Fierro in place  Skin:     General: Skin is warm and dry.      Capillary Refill: Capillary refill takes less than 2 seconds.      Findings: Bruising present. No lesion.      Comments: MSI with wound vac. Sanguinous output      Vents: NA    Lines/Drains/Airways       Peripherally Inserted Central Catheter Line  Duration             PICC Double Lumen 10/17/22 1709 right brachial 27 days              Drain  Duration                  Urethral Catheter 11/12/22 2125 Straight-tip;Non-latex 16 Fr. 1 day              Arterial Line  Duration             Arterial Line 11/08/22 0712 Right Radial 5 days              Peripheral Intravenous Line  Duration                  Peripheral IV - Single Lumen 11/08/22 0730 14 G  Left Forearm 5 days                    Significant Labs:    CBC/Anemia Profile:  Recent Labs   Lab 11/13/22 0428 11/14/22  0338   WBC 18.53* 19.66*   HGB 8.1* 8.4*   HCT 24.5* 25.8*    349   MCV 93 94   RDW 15.9* 16.1*        Chemistries:  Recent Labs   Lab 11/13/22 0428 11/14/22 0338    135*   K 4.1 4.2   CL 96 97   CO2 30* 29   BUN 57* 52*   CREATININE 4.0* 4.2*   CALCIUM 9.0 9.1   ALBUMIN 2.3* 2.3*   PROT 5.9* 6.2   BILITOT 0.5 0.5   ALKPHOS 126 122   ALT 15 13   AST 24 18   MG 2.2 2.2   PHOS 3.7 3.7       All pertinent labs within the past 24 hours have been reviewed.    Significant Imaging:  I have reviewed all pertinent imaging results/findings within the past 24 hours.    Assessment/Plan:     * Sternal wound infection  62 y.o. male S/P TV replacement, MV replacement, MAZE, Lt Atrial Appendage ligation and Impella placement on 10/7/22, course complicated by bleeding, requiring reopening POD#0, multiple VT runs requiring DCCV, and sternal wound infections. He presents to the SICU s/p wound debridement and wire removal on 11/08 followed by washout on 11/10      Neuro/Psych:     - Sedation: none    - Pain:    - Scheduled Tylenol 1g q8h with prn Oxy              Cardiac:      -BP Goal: MAP 60-80  and SBP <140.      - Echo with EF 35% with mild RV enlargement and moderate RV dysfunction. Cont milrinone 0.25     - Anti-HTNs: Will restart home meds when appropriate     - Rhythm: NSR. S/p MAZE for Afib, cont po amio.     - Beta blocker: hold in setting of Milrinone     - Statin: Atorvastatin 40 mg QD      Pulmonary:     - Goal SpO2 >92%          Renal:    - Trend BUN/Cr. Oliguric KIMANI. Will monitor and support kidneys with MAP >65. Hold off diuresis.    - Maintain Fierro, record strict Is/Os    Recent Labs   Lab 11/12/22 0309 11/13/22 0428 11/14/22  0338   BUN 56* 57* 52*   CREATININE 3.9* 4.0* 4.2*         FEN / GI:     - Daily CMP, PRN K/Mag/Phos per protocol     - Replace electrolytes as needed    - Nutrition: cardiac diet, BOOST    - Bowel Regimen: Miralax, docusate      ID:     - Leukocytosis without fever, trending down     - Abx: Continue Cefepime, lactic acid improving. Will need long term abx plan, 6weeks post op flap    Recent Labs   Lab 11/12/22 0309 11/13/22 0428 11/14/22  0338   WBC 17.78* 18.53* 19.66*         Heme/Onc:     - H/H with post-operative anemia, monitor     - CBC daily    - ASA 325mg daily    Recent Labs   Lab 11/12/22 0309 11/13/22 0428 11/14/22  0338   HGB 7.9* 8.1* 8.4*    308 349   APTT 26.6 28.5 27.1   INR 1.1 1.1 1.0         Endocrine:     - CTS Goal -140    - HgbA1c:     - Endocrinology consulted for insulin management      PPx:   Feeding: cardiac diet   Analgesia/Sedation: tylenol  Thromboembolic Prevention: scds  HOB >30: Yes  Stress Ulcer: n/a  Glucose Control: Yes, insulin management per Endocrinology     Lines/Drains/Airway:   Left radial arterial line   RIJ CVC   Fierro   Wound Vac      Dispo/Code Status/Palliative:     - Continue SICU Care    - Full Code             Critical secondary to Patient has a condition that poses threat to life and bodily function     Critical care was time spent personally by me on the following activities: development of  treatment plan with patient or surrogate and bedside caregivers, discussions with consultants, evaluation of patient's response to treatment, examination of patient, ordering and performing treatments and interventions, ordering and review of laboratory studies, ordering and review of radiographic studies, pulse oximetry, re-evaluation of patient's condition.  This critical care time did not overlap with that of any other provider or involve time for any procedures.     Gustavo Aguero, DO  Critical Care - Surgery  [facility]

## 2022-11-14 NOTE — SUBJECTIVE & OBJECTIVE
Interval History/Significant Events: NAEO. Remains afebrile, HDS. Resting comfortable in bed. Denies any complaints. OR today for PEC flap.    Follow-up For: Procedure(s) (LRB):  WASHOUT (N/A)    Post-Operative Day: 4 Days Post-Op    Objective:     Vital Signs (Most Recent):  Temp: 98.4 °F (36.9 °C) (11/14/22 0300)  Pulse: 98 (11/14/22 0315)  Resp: 15 (11/14/22 0315)  BP: 126/66 (11/13/22 0722)  SpO2: 96 % (11/14/22 0315) Vital Signs (24h Range):  Temp:  [97.8 °F (36.6 °C)-98.4 °F (36.9 °C)] 98.4 °F (36.9 °C)  Pulse:  [] 98  Resp:  [12-26] 15  SpO2:  [89 %-97 %] 96 %  BP: (126)/(66) 126/66  Arterial Line BP: (107-262)/() 117/78     Weight: 74 kg (163 lb 2.3 oz)  Body mass index is 24.08 kg/m².      Intake/Output Summary (Last 24 hours) at 11/14/2022 0556  Last data filed at 11/14/2022 0300  Gross per 24 hour   Intake 616.39 ml   Output 1760 ml   Net -1143.61 ml       Physical Exam    Vents: NA    Lines/Drains/Airways       Peripherally Inserted Central Catheter Line  Duration             PICC Double Lumen 10/17/22 1709 right brachial 27 days              Drain  Duration                  Urethral Catheter 11/12/22 2125 Straight-tip;Non-latex 16 Fr. 1 day              Arterial Line  Duration             Arterial Line 11/08/22 0712 Right Radial 5 days              Peripheral Intravenous Line  Duration                  Peripheral IV - Single Lumen 11/08/22 0730 14 G  Left Forearm 5 days                    Significant Labs:    CBC/Anemia Profile:  Recent Labs   Lab 11/13/22 0428 11/14/22  0338   WBC 18.53* 19.66*   HGB 8.1* 8.4*   HCT 24.5* 25.8*    349   MCV 93 94   RDW 15.9* 16.1*        Chemistries:  Recent Labs   Lab 11/13/22 0428 11/14/22  0338    135*   K 4.1 4.2   CL 96 97   CO2 30* 29   BUN 57* 52*   CREATININE 4.0* 4.2*   CALCIUM 9.0 9.1   ALBUMIN 2.3* 2.3*   PROT 5.9* 6.2   BILITOT 0.5 0.5   ALKPHOS 126 122   ALT 15 13   AST 24 18   MG 2.2 2.2   PHOS 3.7 3.7       All pertinent labs  within the past 24 hours have been reviewed.    Significant Imaging:  I have reviewed all pertinent imaging results/findings within the past 24 hours.

## 2022-11-14 NOTE — PLAN OF CARE
[facility]  Discharge Reassessment    Primary Care Provider: Breann Tavera MD    Expected Discharge Date: 11/21/2022    Reassessment (most recent)       Discharge Reassessment - 11/14/22 1122          Discharge Reassessment    Assessment Type Discharge Planning Reassessment     Did the patient's condition or plan change since previous assessment? Yes     Discharge Plan discussed with: Patient     Why the patient remains in the hospital Requires continued medical care

## 2022-11-14 NOTE — INTERVAL H&P NOTE
To OR today for sternal wound exploration/debridement and coverage with pec flap.      The patient has been examined and the H&P has been reviewed:    I concur with the findings and no changes have occurred since H&P was written.    Surgery risks, benefits and alternative options discussed and understood by patient/family.          Active Hospital Problems    Diagnosis  POA    *Sternal wound infection [S21.101A, L08.9]  Yes    Transient hyperglycemia post procedure [R73.9]  Yes    Surgical wound present [T14.8XXA]  Yes      Resolved Hospital Problems   No resolved problems to display.

## 2022-11-15 PROBLEM — I77.6 VASCULAR INFLAMMATION OR INFECTION: Status: ACTIVE | Noted: 2022-11-15

## 2022-11-15 PROBLEM — J18.9 PNEUMONIA OF BOTH LOWER LOBES DUE TO INFECTIOUS ORGANISM: Status: ACTIVE | Noted: 2022-11-15

## 2022-11-15 LAB
ABO GROUP BLD: NORMAL
ALBUMIN SERPL BCP-MCNC: 2.7 G/DL (ref 3.5–5.2)
ALBUMIN SERPL BCP-MCNC: 3.2 G/DL (ref 3.5–5.2)
ALP SERPL-CCNC: 120 U/L (ref 55–135)
ALP SERPL-CCNC: 87 U/L (ref 55–135)
ALT SERPL W/O P-5'-P-CCNC: 10 U/L (ref 10–44)
ALT SERPL W/O P-5'-P-CCNC: 7 U/L (ref 10–44)
ANION GAP SERPL CALC-SCNC: 10 MMOL/L (ref 8–16)
ANION GAP SERPL CALC-SCNC: 10 MMOL/L (ref 8–16)
ANION GAP SERPL CALC-SCNC: 12 MMOL/L (ref 8–16)
ANION GAP SERPL CALC-SCNC: 13 MMOL/L (ref 8–16)
ANISOCYTOSIS BLD QL SMEAR: SLIGHT
APTT BLDCRRT: 25.2 SEC (ref 21–32)
AST SERPL-CCNC: 24 U/L (ref 10–40)
AST SERPL-CCNC: 26 U/L (ref 10–40)
BASO STIPL BLD QL SMEAR: ABNORMAL
BASOPHILS # BLD AUTO: 0.03 K/UL (ref 0–0.2)
BASOPHILS # BLD AUTO: 0.05 K/UL (ref 0–0.2)
BASOPHILS # BLD AUTO: 0.1 K/UL (ref 0–0.2)
BASOPHILS NFR BLD: 0.1 % (ref 0–1.9)
BASOPHILS NFR BLD: 0.2 % (ref 0–1.9)
BASOPHILS NFR BLD: 0.2 % (ref 0–1.9)
BILIRUB SERPL-MCNC: 0.7 MG/DL (ref 0.1–1)
BILIRUB SERPL-MCNC: 1 MG/DL (ref 0.1–1)
BLD GP AB SCN CELLS X3 SERPL QL: NORMAL
BLD PROD TYP BPU: NORMAL
BLD PROD TYP BPU: NORMAL
BLOOD UNIT EXPIRATION DATE: NORMAL
BLOOD UNIT EXPIRATION DATE: NORMAL
BLOOD UNIT TYPE CODE: 6200
BLOOD UNIT TYPE CODE: 6200
BLOOD UNIT TYPE: NORMAL
BLOOD UNIT TYPE: NORMAL
BUN SERPL-MCNC: 58 MG/DL (ref 8–23)
BUN SERPL-MCNC: 59 MG/DL (ref 8–23)
BUN SERPL-MCNC: 62 MG/DL (ref 8–23)
BUN SERPL-MCNC: 64 MG/DL (ref 8–23)
CALCIUM SERPL-MCNC: 8.8 MG/DL (ref 8.7–10.5)
CALCIUM SERPL-MCNC: 9 MG/DL (ref 8.7–10.5)
CALCIUM SERPL-MCNC: 9 MG/DL (ref 8.7–10.5)
CALCIUM SERPL-MCNC: 9.2 MG/DL (ref 8.7–10.5)
CHLORIDE SERPL-SCNC: 100 MMOL/L (ref 95–110)
CHLORIDE SERPL-SCNC: 97 MMOL/L (ref 95–110)
CHLORIDE SERPL-SCNC: 98 MMOL/L (ref 95–110)
CHLORIDE SERPL-SCNC: 98 MMOL/L (ref 95–110)
CO2 SERPL-SCNC: 23 MMOL/L (ref 23–29)
CO2 SERPL-SCNC: 26 MMOL/L (ref 23–29)
CO2 SERPL-SCNC: 26 MMOL/L (ref 23–29)
CO2 SERPL-SCNC: 27 MMOL/L (ref 23–29)
CODING SYSTEM: NORMAL
CODING SYSTEM: NORMAL
CREAT SERPL-MCNC: 4.3 MG/DL (ref 0.5–1.4)
CREAT SERPL-MCNC: 4.3 MG/DL (ref 0.5–1.4)
CREAT SERPL-MCNC: 4.4 MG/DL (ref 0.5–1.4)
CREAT SERPL-MCNC: 4.4 MG/DL (ref 0.5–1.4)
DIFFERENTIAL METHOD: ABNORMAL
DISPENSE STATUS: NORMAL
DISPENSE STATUS: NORMAL
EOSINOPHIL # BLD AUTO: 0 K/UL (ref 0–0.5)
EOSINOPHIL NFR BLD: 0 % (ref 0–8)
ERYTHROCYTE [DISTWIDTH] IN BLOOD BY AUTOMATED COUNT: 17.1 % (ref 11.5–14.5)
ERYTHROCYTE [DISTWIDTH] IN BLOOD BY AUTOMATED COUNT: 17.3 % (ref 11.5–14.5)
ERYTHROCYTE [DISTWIDTH] IN BLOOD BY AUTOMATED COUNT: 17.5 % (ref 11.5–14.5)
EST. GFR  (NO RACE VARIABLE): 14.3 ML/MIN/1.73 M^2
EST. GFR  (NO RACE VARIABLE): 14.3 ML/MIN/1.73 M^2
EST. GFR  (NO RACE VARIABLE): 14.7 ML/MIN/1.73 M^2
EST. GFR  (NO RACE VARIABLE): 14.7 ML/MIN/1.73 M^2
GLUCOSE SERPL-MCNC: 125 MG/DL (ref 70–110)
GLUCOSE SERPL-MCNC: 128 MG/DL (ref 70–110)
GLUCOSE SERPL-MCNC: 140 MG/DL (ref 70–110)
GLUCOSE SERPL-MCNC: 145 MG/DL (ref 70–110)
HCT VFR BLD AUTO: 20.2 % (ref 40–54)
HCT VFR BLD AUTO: 21.6 % (ref 40–54)
HCT VFR BLD AUTO: 28 % (ref 40–54)
HGB BLD-MCNC: 6.8 G/DL (ref 14–18)
HGB BLD-MCNC: 7.2 G/DL (ref 14–18)
HGB BLD-MCNC: 9.3 G/DL (ref 14–18)
HYPOCHROMIA BLD QL SMEAR: ABNORMAL
IMM GRANULOCYTES # BLD AUTO: 0.34 K/UL (ref 0–0.04)
IMM GRANULOCYTES # BLD AUTO: 0.62 K/UL (ref 0–0.04)
IMM GRANULOCYTES # BLD AUTO: 1.19 K/UL (ref 0–0.04)
IMM GRANULOCYTES NFR BLD AUTO: 1.3 % (ref 0–0.5)
IMM GRANULOCYTES NFR BLD AUTO: 1.9 % (ref 0–0.5)
IMM GRANULOCYTES NFR BLD AUTO: 2.5 % (ref 0–0.5)
INR PPP: 1.1 (ref 0.8–1.2)
LYMPHOCYTES # BLD AUTO: 1.2 K/UL (ref 1–4.8)
LYMPHOCYTES # BLD AUTO: 1.3 K/UL (ref 1–4.8)
LYMPHOCYTES # BLD AUTO: 1.3 K/UL (ref 1–4.8)
LYMPHOCYTES NFR BLD: 2.4 % (ref 18–48)
LYMPHOCYTES NFR BLD: 4.1 % (ref 18–48)
LYMPHOCYTES NFR BLD: 5.1 % (ref 18–48)
MAGNESIUM SERPL-MCNC: 2.1 MG/DL (ref 1.6–2.6)
MAGNESIUM SERPL-MCNC: 2.3 MG/DL (ref 1.6–2.6)
MCH RBC QN AUTO: 29.8 PG (ref 27–31)
MCH RBC QN AUTO: 30.5 PG (ref 27–31)
MCH RBC QN AUTO: 30.9 PG (ref 27–31)
MCHC RBC AUTO-ENTMCNC: 33.2 G/DL (ref 32–36)
MCHC RBC AUTO-ENTMCNC: 33.3 G/DL (ref 32–36)
MCHC RBC AUTO-ENTMCNC: 33.7 G/DL (ref 32–36)
MCV RBC AUTO: 89 FL (ref 82–98)
MCV RBC AUTO: 92 FL (ref 82–98)
MCV RBC AUTO: 92 FL (ref 82–98)
MONOCYTES # BLD AUTO: 1.7 K/UL (ref 0.3–1)
MONOCYTES # BLD AUTO: 2.1 K/UL (ref 0.3–1)
MONOCYTES # BLD AUTO: 2.5 K/UL (ref 0.3–1)
MONOCYTES NFR BLD: 3.5 % (ref 4–15)
MONOCYTES NFR BLD: 7.5 % (ref 4–15)
MONOCYTES NFR BLD: 7.9 % (ref 4–15)
NEUTROPHILS # BLD AUTO: 22.5 K/UL (ref 1.8–7.7)
NEUTROPHILS # BLD AUTO: 28.4 K/UL (ref 1.8–7.7)
NEUTROPHILS # BLD AUTO: 43.9 K/UL (ref 1.8–7.7)
NEUTROPHILS NFR BLD: 85.6 % (ref 38–73)
NEUTROPHILS NFR BLD: 86.3 % (ref 38–73)
NEUTROPHILS NFR BLD: 91.4 % (ref 38–73)
NRBC BLD-RTO: 0 /100 WBC
NUM UNITS TRANS PACKED RBC: NORMAL
PHOSPHATE SERPL-MCNC: 5.5 MG/DL (ref 2.7–4.5)
PHOSPHATE SERPL-MCNC: 5.8 MG/DL (ref 2.7–4.5)
PLATELET # BLD AUTO: 312 K/UL (ref 150–450)
PLATELET # BLD AUTO: 363 K/UL (ref 150–450)
PLATELET # BLD AUTO: 455 K/UL (ref 150–450)
PLATELET BLD QL SMEAR: ABNORMAL
PLATELET BLD QL SMEAR: ABNORMAL
PMV BLD AUTO: 10 FL (ref 9.2–12.9)
PMV BLD AUTO: 10.2 FL (ref 9.2–12.9)
PMV BLD AUTO: 9.5 FL (ref 9.2–12.9)
POCT GLUCOSE: 123 MG/DL (ref 70–110)
POLYCHROMASIA BLD QL SMEAR: ABNORMAL
POTASSIUM SERPL-SCNC: 5.2 MMOL/L (ref 3.5–5.1)
POTASSIUM SERPL-SCNC: 5.3 MMOL/L (ref 3.5–5.1)
POTASSIUM SERPL-SCNC: 5.3 MMOL/L (ref 3.5–5.1)
POTASSIUM SERPL-SCNC: 5.4 MMOL/L (ref 3.5–5.1)
PROT SERPL-MCNC: 6.1 G/DL (ref 6–8.4)
PROT SERPL-MCNC: 6.5 G/DL (ref 6–8.4)
PROTHROMBIN TIME: 11.2 SEC (ref 9–12.5)
RBC # BLD AUTO: 2.2 M/UL (ref 4.6–6.2)
RBC # BLD AUTO: 2.42 M/UL (ref 4.6–6.2)
RBC # BLD AUTO: 3.05 M/UL (ref 4.6–6.2)
RH BLD: NORMAL
SODIUM SERPL-SCNC: 133 MMOL/L (ref 136–145)
SODIUM SERPL-SCNC: 135 MMOL/L (ref 136–145)
SODIUM SERPL-SCNC: 136 MMOL/L (ref 136–145)
SODIUM SERPL-SCNC: 136 MMOL/L (ref 136–145)
TRANS ERYTHROCYTES VOL PATIENT: NORMAL ML
WBC # BLD AUTO: 26.24 K/UL (ref 3.9–12.7)
WBC # BLD AUTO: 32.89 K/UL (ref 3.9–12.7)
WBC # BLD AUTO: 47.99 K/UL (ref 3.9–12.7)

## 2022-11-15 PROCEDURE — 63600175 PHARM REV CODE 636 W HCPCS

## 2022-11-15 PROCEDURE — 63600175 PHARM REV CODE 636 W HCPCS: Performed by: STUDENT IN AN ORGANIZED HEALTH CARE EDUCATION/TRAINING PROGRAM

## 2022-11-15 PROCEDURE — 85730 THROMBOPLASTIN TIME PARTIAL: CPT

## 2022-11-15 PROCEDURE — 86920 COMPATIBILITY TEST SPIN: CPT

## 2022-11-15 PROCEDURE — 85025 COMPLETE CBC W/AUTO DIFF WBC: CPT | Mod: 91 | Performed by: STUDENT IN AN ORGANIZED HEALTH CARE EDUCATION/TRAINING PROGRAM

## 2022-11-15 PROCEDURE — 25000003 PHARM REV CODE 250: Performed by: STUDENT IN AN ORGANIZED HEALTH CARE EDUCATION/TRAINING PROGRAM

## 2022-11-15 PROCEDURE — 80048 BASIC METABOLIC PNL TOTAL CA: CPT | Mod: XB

## 2022-11-15 PROCEDURE — 85610 PROTHROMBIN TIME: CPT

## 2022-11-15 PROCEDURE — 36430 TRANSFUSION BLD/BLD COMPNT: CPT

## 2022-11-15 PROCEDURE — P9021 RED BLOOD CELLS UNIT: HCPCS

## 2022-11-15 PROCEDURE — P9016 RBC LEUKOCYTES REDUCED: HCPCS

## 2022-11-15 PROCEDURE — P9045 ALBUMIN (HUMAN), 5%, 250 ML: HCPCS | Mod: JG | Performed by: STUDENT IN AN ORGANIZED HEALTH CARE EDUCATION/TRAINING PROGRAM

## 2022-11-15 PROCEDURE — 80053 COMPREHEN METABOLIC PANEL: CPT

## 2022-11-15 PROCEDURE — 80048 BASIC METABOLIC PNL TOTAL CA: CPT | Mod: 91,XB

## 2022-11-15 PROCEDURE — 97535 SELF CARE MNGMENT TRAINING: CPT

## 2022-11-15 PROCEDURE — 25000003 PHARM REV CODE 250

## 2022-11-15 PROCEDURE — P9045 ALBUMIN (HUMAN), 5%, 250 ML: HCPCS | Mod: JG

## 2022-11-15 PROCEDURE — 85025 COMPLETE CBC W/AUTO DIFF WBC: CPT

## 2022-11-15 PROCEDURE — 94761 N-INVAS EAR/PLS OXIMETRY MLT: CPT

## 2022-11-15 PROCEDURE — 83735 ASSAY OF MAGNESIUM: CPT | Mod: 91

## 2022-11-15 PROCEDURE — 99223 1ST HOSP IP/OBS HIGH 75: CPT | Mod: ,,, | Performed by: INTERNAL MEDICINE

## 2022-11-15 PROCEDURE — 86850 RBC ANTIBODY SCREEN: CPT

## 2022-11-15 PROCEDURE — 99223 PR INITIAL HOSPITAL CARE,LEVL III: ICD-10-PCS | Mod: ,,, | Performed by: INTERNAL MEDICINE

## 2022-11-15 PROCEDURE — 86901 BLOOD TYPING SEROLOGIC RH(D): CPT

## 2022-11-15 PROCEDURE — 99291 PR CRITICAL CARE, E/M 30-74 MINUTES: ICD-10-PCS | Mod: ,,, | Performed by: ANESTHESIOLOGY

## 2022-11-15 PROCEDURE — 99291 CRITICAL CARE FIRST HOUR: CPT | Mod: ,,, | Performed by: ANESTHESIOLOGY

## 2022-11-15 PROCEDURE — 20000000 HC ICU ROOM

## 2022-11-15 PROCEDURE — 85025 COMPLETE CBC W/AUTO DIFF WBC: CPT | Mod: 91

## 2022-11-15 PROCEDURE — 25000003 PHARM REV CODE 250: Performed by: ANESTHESIOLOGY

## 2022-11-15 PROCEDURE — 63600175 PHARM REV CODE 636 W HCPCS: Performed by: ANESTHESIOLOGY

## 2022-11-15 PROCEDURE — 86900 BLOOD TYPING SEROLOGIC ABO: CPT

## 2022-11-15 PROCEDURE — 80053 COMPREHEN METABOLIC PANEL: CPT | Mod: 91 | Performed by: THORACIC SURGERY (CARDIOTHORACIC VASCULAR SURGERY)

## 2022-11-15 PROCEDURE — 84100 ASSAY OF PHOSPHORUS: CPT

## 2022-11-15 PROCEDURE — 97168 OT RE-EVAL EST PLAN CARE: CPT

## 2022-11-15 RX ORDER — HYDROXYZINE PAMOATE 25 MG/1
50 CAPSULE ORAL EVERY 8 HOURS PRN
Status: DISCONTINUED | OUTPATIENT
Start: 2022-11-15 | End: 2022-12-01 | Stop reason: HOSPADM

## 2022-11-15 RX ORDER — ALBUMIN HUMAN 50 G/1000ML
SOLUTION INTRAVENOUS
Status: COMPLETED
Start: 2022-11-15 | End: 2022-11-15

## 2022-11-15 RX ORDER — ALBUMIN HUMAN 50 G/1000ML
12.5 SOLUTION INTRAVENOUS ONCE
Status: COMPLETED | OUTPATIENT
Start: 2022-11-15 | End: 2022-11-15

## 2022-11-15 RX ORDER — ALBUMIN HUMAN 50 G/1000ML
25 SOLUTION INTRAVENOUS ONCE
Status: COMPLETED | OUTPATIENT
Start: 2022-11-15 | End: 2022-11-15

## 2022-11-15 RX ORDER — ACETAMINOPHEN 500 MG
1000 TABLET ORAL EVERY 8 HOURS
Status: DISCONTINUED | OUTPATIENT
Start: 2022-11-15 | End: 2022-12-01 | Stop reason: HOSPADM

## 2022-11-15 RX ORDER — METHOCARBAMOL 500 MG/1
500 TABLET, FILM COATED ORAL 4 TIMES DAILY
Status: DISCONTINUED | OUTPATIENT
Start: 2022-11-15 | End: 2022-11-16

## 2022-11-15 RX ORDER — FENTANYL CITRATE 50 UG/ML
INJECTION, SOLUTION INTRAMUSCULAR; INTRAVENOUS
Status: COMPLETED
Start: 2022-11-15 | End: 2022-11-15

## 2022-11-15 RX ORDER — HYDROCODONE BITARTRATE AND ACETAMINOPHEN 500; 5 MG/1; MG/1
TABLET ORAL
Status: DISCONTINUED | OUTPATIENT
Start: 2022-11-15 | End: 2022-11-17

## 2022-11-15 RX ORDER — FENTANYL CITRATE 50 UG/ML
25 INJECTION, SOLUTION INTRAMUSCULAR; INTRAVENOUS ONCE
Status: COMPLETED | OUTPATIENT
Start: 2022-11-15 | End: 2022-11-15

## 2022-11-15 RX ORDER — ALBUMIN HUMAN 250 G/1000ML
12.5 SOLUTION INTRAVENOUS ONCE
Status: DISCONTINUED | OUTPATIENT
Start: 2022-11-15 | End: 2022-11-15

## 2022-11-15 RX ORDER — HYDROMORPHONE HYDROCHLORIDE 1 MG/ML
1 INJECTION, SOLUTION INTRAMUSCULAR; INTRAVENOUS; SUBCUTANEOUS EVERY 4 HOURS PRN
Status: DISCONTINUED | OUTPATIENT
Start: 2022-11-15 | End: 2022-12-01 | Stop reason: HOSPADM

## 2022-11-15 RX ORDER — FUROSEMIDE 10 MG/ML
40 INJECTION INTRAMUSCULAR; INTRAVENOUS ONCE
Status: COMPLETED | OUTPATIENT
Start: 2022-11-15 | End: 2022-11-15

## 2022-11-15 RX ORDER — FUROSEMIDE 10 MG/ML
120 INJECTION INTRAMUSCULAR; INTRAVENOUS ONCE
Status: COMPLETED | OUTPATIENT
Start: 2022-11-15 | End: 2022-11-15

## 2022-11-15 RX ORDER — FUROSEMIDE 10 MG/ML
120 INJECTION INTRAMUSCULAR; INTRAVENOUS ONCE
Status: COMPLETED | OUTPATIENT
Start: 2022-11-16 | End: 2022-11-15

## 2022-11-15 RX ORDER — OXYCODONE HYDROCHLORIDE 5 MG/1
5 TABLET ORAL EVERY 4 HOURS PRN
Status: DISCONTINUED | OUTPATIENT
Start: 2022-11-15 | End: 2022-12-01 | Stop reason: HOSPADM

## 2022-11-15 RX ORDER — OXYCODONE HYDROCHLORIDE 5 MG/1
10 TABLET ORAL EVERY 4 HOURS PRN
Status: DISCONTINUED | OUTPATIENT
Start: 2022-11-15 | End: 2022-12-01 | Stop reason: HOSPADM

## 2022-11-15 RX ORDER — FUROSEMIDE 10 MG/ML
100 INJECTION INTRAMUSCULAR; INTRAVENOUS ONCE
Status: COMPLETED | OUTPATIENT
Start: 2022-11-15 | End: 2022-11-15

## 2022-11-15 RX ADMIN — FUROSEMIDE 120 MG: 10 INJECTION, SOLUTION INTRAMUSCULAR; INTRAVENOUS at 04:11

## 2022-11-15 RX ADMIN — ACETAMINOPHEN 1000 MG: 500 TABLET ORAL at 06:11

## 2022-11-15 RX ADMIN — HEPARIN SODIUM 5000 UNITS: 5000 INJECTION INTRAVENOUS; SUBCUTANEOUS at 01:11

## 2022-11-15 RX ADMIN — FUROSEMIDE 40 MG: 10 INJECTION, SOLUTION INTRAMUSCULAR; INTRAVENOUS at 10:11

## 2022-11-15 RX ADMIN — CEFEPIME 2 G: 2 INJECTION, POWDER, FOR SOLUTION INTRAVENOUS at 05:11

## 2022-11-15 RX ADMIN — POLYETHYLENE GLYCOL 3350 17 G: 17 POWDER, FOR SOLUTION ORAL at 09:11

## 2022-11-15 RX ADMIN — FENTANYL CITRATE 25 MCG: 50 INJECTION, SOLUTION INTRAMUSCULAR; INTRAVENOUS at 04:11

## 2022-11-15 RX ADMIN — AMIODARONE HYDROCHLORIDE 200 MG: 200 TABLET ORAL at 09:11

## 2022-11-15 RX ADMIN — CHLOROTHIAZIDE SODIUM 500 MG: 500 INJECTION, POWDER, LYOPHILIZED, FOR SOLUTION INTRAVENOUS at 05:11

## 2022-11-15 RX ADMIN — METHOCARBAMOL 500 MG: 500 TABLET ORAL at 09:11

## 2022-11-15 RX ADMIN — HYDROXYZINE PAMOATE 50 MG: 50 CAPSULE ORAL at 05:11

## 2022-11-15 RX ADMIN — ASPIRIN 81 MG 81 MG: 81 TABLET ORAL at 09:11

## 2022-11-15 RX ADMIN — CHLOROTHIAZIDE SODIUM 500 MG: 500 INJECTION, POWDER, LYOPHILIZED, FOR SOLUTION INTRAVENOUS at 11:11

## 2022-11-15 RX ADMIN — FUROSEMIDE 120 MG: 10 INJECTION, SOLUTION INTRAMUSCULAR; INTRAVENOUS at 11:11

## 2022-11-15 RX ADMIN — METHOCARBAMOL 500 MG: 500 TABLET ORAL at 05:11

## 2022-11-15 RX ADMIN — ALBUMIN HUMAN 25 G: 50 SOLUTION INTRAVENOUS at 09:11

## 2022-11-15 RX ADMIN — OXYCODONE HYDROCHLORIDE 10 MG: 10 TABLET ORAL at 01:11

## 2022-11-15 RX ADMIN — ALBUMIN (HUMAN) 25 G: 12.5 SOLUTION INTRAVENOUS at 09:11

## 2022-11-15 RX ADMIN — ALBUMIN (HUMAN) 12.5 G: 12.5 SOLUTION INTRAVENOUS at 05:11

## 2022-11-15 RX ADMIN — MORPHINE SULFATE 2 MG: 2 INJECTION, SOLUTION INTRAMUSCULAR; INTRAVENOUS at 12:11

## 2022-11-15 RX ADMIN — ALBUMIN (HUMAN) 12.5 G: 12.5 SOLUTION INTRAVENOUS at 01:11

## 2022-11-15 RX ADMIN — SENNOSIDES AND DOCUSATE SODIUM 1 TABLET: 50; 8.6 TABLET ORAL at 09:11

## 2022-11-15 RX ADMIN — ACETAMINOPHEN 1000 MG: 500 TABLET ORAL at 09:11

## 2022-11-15 RX ADMIN — HEPARIN SODIUM 5000 UNITS: 5000 INJECTION INTRAVENOUS; SUBCUTANEOUS at 06:11

## 2022-11-15 RX ADMIN — FAMOTIDINE 20 MG: 20 TABLET ORAL at 09:11

## 2022-11-15 RX ADMIN — FUROSEMIDE 100 MG: 10 INJECTION, SOLUTION INTRAMUSCULAR; INTRAVENOUS at 01:11

## 2022-11-15 RX ADMIN — HYDROMORPHONE HYDROCHLORIDE 1 MG: 1 INJECTION, SOLUTION INTRAMUSCULAR; INTRAVENOUS; SUBCUTANEOUS at 08:11

## 2022-11-15 RX ADMIN — OXYCODONE HYDROCHLORIDE 10 MG: 10 TABLET ORAL at 05:11

## 2022-11-15 RX ADMIN — ACETAMINOPHEN 1000 MG: 500 TABLET ORAL at 01:11

## 2022-11-15 RX ADMIN — OXYCODONE HYDROCHLORIDE 10 MG: 10 TABLET ORAL at 10:11

## 2022-11-15 RX ADMIN — MORPHINE SULFATE 2 MG: 2 INJECTION, SOLUTION INTRAMUSCULAR; INTRAVENOUS at 03:11

## 2022-11-15 NOTE — PLAN OF CARE
"      SICU PLAN OF CARE NOTE    Dx: Sternal wound infection    Shift Events: Da Silva flap and sternal debridement. SOB episode. See previous note for details.     Goals of Care: MAP > 65; SBP < 160    Neuro: AAO x4, Follows Commands, and Moves All Extremities    Cardiac: Sinus tach 110s.      Vital Signs: /62   Pulse (!) 115   Temp 97.4 °F (36.3 °C) (Oral)   Resp 15   Ht 5' 9.02" (1.753 m)   Wt 74 kg (163 lb 2.3 oz)   SpO2 (!) 94%   BMI 24.08 kg/m²     Respiratory: Nasal Cannula 2L     Diet: NPO    Gtts: Milrinone    Urine Output: Urinary Catheter 5-30 cc/hour    Drains: See flowsheet for hourly BLANCA drain output.     Labs/Accuchecks: Daily labs.    Skin: No skin breakdown noted. Weight shifting done throughout shift. Pt in chair for half of shift with chair cushion. Sacral and heel foams in place and in tact.     All questions answered/concerns addressed at this time.        "

## 2022-11-15 NOTE — PLAN OF CARE
Problem: Occupational Therapy  Goal: Occupational Therapy Goal  Description: Goals to be met by: 11/29/22     Patient will increase functional independence with ADLs by performing:    Feeding with [place].  UE Dressing with [place].  LE Dressing with Stand-by Assistance.  Grooming while standing at sink with Supervision.  Toilet transfer to toilet with Supervision.    Outcome: Ongoing, Progressing

## 2022-11-15 NOTE — NURSING
Pt transferred back to SICU [id number] from OR. Pt transported by OR team with portable monitor and O2. VSS on arrival. Pt connected to wall monitor and wall O2. MD Juanjose at bedside. Family notified.

## 2022-11-15 NOTE — TRANSFER OF CARE
"Anesthesia Transfer of Care Note    Patient: David Barrios    Procedure(s) Performed: Procedure(s) (LRB):  CREATION, FLAP, MUSCLE ROTATION (N/A)  IRRIGATION AND DEBRIDEMENT, WOUND, STERNUM (N/A)  CLOSURE, WOUND, STERNUM (N/A)  EXPLORATION, WOUND (N/A)    Patient location: ICU    Anesthesia Type: general    Transport from OR: Transported from OR on 6-10 L/min O2 by face mask with adequate spontaneous ventilation. Continuous ECG monitoring in transport. Continuous SpO2 monitoring in transport. Continuos invasive BP monitoring in transport    Post pain: adequate analgesia    Post assessment: no apparent anesthetic complications and tolerated procedure well    Post vital signs: stable    Level of consciousness: awake, alert and oriented    Nausea/Vomiting: no nausea/vomiting    Complications: none    Transfer of care protocol was followed      Last vitals:   Visit Vitals  /73   Pulse 103   Temp 36.9 °C (98.4 °F) (Oral)   Resp 18   Ht 5' 9.02" (1.753 m)   Wt 74 kg (163 lb 2.3 oz)   SpO2 100%   BMI 24.08 kg/m²     "

## 2022-11-15 NOTE — PT/OT/SLP PROGRESS
Physical Therapy      Patient Name:  David Barrios   MRN:  [id number]    Patient not seen today secondary to  (New orders needed s/p flap recosntruction. PT unable to f/u in PM after new orders placed.). Will follow-up tomorrow as appropriate.

## 2022-11-15 NOTE — NURSING
0320: Pt called RN into room and reported new SOB. Pt sat mid 90s on 2L NC. RN re-tightened chest binder d/t one Velcro piece being undone. MD Cathy notified of SOB. CXR ordered STAT per MD order.     0340: MD Cathy called to bedside per pt request. Pt made multiple requests to remove the chest binder. RN and MD educated pt on need for binder to stay on until plastics team rounds in the AM and can further assess. MD aware of current VS.

## 2022-11-15 NOTE — NURSING
MD Cathy notified of pt continuing to report increased pain despite PRN morphine given an hour ago. MD aware of pt blood pressure at this time. Per MD, OK to give PRN oxycodone and 2100 PO meds.

## 2022-11-15 NOTE — SUBJECTIVE & OBJECTIVE
Interval History/Significant Events: short of breath overnight, but no worsening hypoxia. CXR with R sided patchy opacities and SQ air. BLANCA drains with moderate bloody output. Afebrile.     Follow-up For: Procedure(s) (LRB):  WASHOUT (N/A)    Post-Operative Day: 4 Days Post-Op    Objective:     Vital Signs (Most Recent):  Temp: 97.6 °F (36.4 °C) (11/15/22 0315)  Pulse: 110 (11/15/22 0645)  Resp: (!) 27 (11/15/22 0645)  BP: 118/60 (11/15/22 0630)  SpO2: 96 % (11/15/22 0645) Vital Signs (24h Range):  Temp:  [97.4 °F (36.3 °C)-98.4 °F (36.9 °C)] 97.6 °F (36.4 °C)  Pulse:  [] 110  Resp:  [11-36] 27  SpO2:  [88 %-100 %] 96 %  BP: (102-150)/(59-82) 118/60  Arterial Line BP: ()/() 107/59     Weight: 74 kg (163 lb 2.3 oz)  Body mass index is 24.08 kg/m².      Intake/Output Summary (Last 24 hours) at 11/15/2022 0705  Last data filed at 11/15/2022 0600  Gross per 24 hour   Intake 1437.04 ml   Output 2521 ml   Net -1083.96 ml         Physical Exam  Vitals and nursing note reviewed.   Constitutional:       General: He is not in acute distress.     Appearance: He is not ill-appearing.   Cardiovascular:      Rate and Rhythm: Normal rate and regular rhythm.      Pulses: Normal pulses.   Pulmonary:      Effort: Pulmonary effort is normal.   Abdominal:      General: Abdomen is flat.      Palpations: Abdomen is soft.   Musculoskeletal:      Right lower leg: No edema.      Left lower leg: No edema.   Skin:     General: Skin is warm.      Findings: No bruising or lesion.      Comments: BLANCA drains with sanguinous output    Neurological:      General: No focal deficit present.       Vents: NA    Lines/Drains/Airways       Peripherally Inserted Central Catheter Line  Duration             PICC Double Lumen 10/17/22 1709 right brachial 28 days              Drain  Duration                  Urethral Catheter 11/12/22 2125 Straight-tip;Non-latex 16 Fr. 2 days         Closed/Suction Drain 11/14/22 2010 Inferior;Right Chest Bulb  15 Fr. <1 day         Closed/Suction Drain 11/14/22 2010 Superior;Midline Abdomen Bulb 15 Fr. <1 day         Closed/Suction Drain 11/14/22 2011 Lateral;Right Other (Comment) Bulb 15 Fr. <1 day         Closed/Suction Drain 11/14/22 2015 Inferior;Left Chest Bulb 15 Fr. <1 day         Closed/Suction Drain 11/14/22 2016 Lateral;Left Other (Comment) Bulb 15 Fr. <1 day              Arterial Line  Duration             Arterial Line 11/08/22 0712 Right Radial 6 days              Peripheral Intravenous Line  Duration                  Peripheral IV - Single Lumen 11/08/22 0730 14 G  Left Forearm 6 days                    Significant Labs:    CBC/Anemia Profile:  Recent Labs   Lab 11/14/22  0338 11/14/22  2140 11/15/22  0412   WBC 19.66* 41.30* 47.99*   HGB 8.4* 10.5* 9.3*   HCT 25.8* 31.3* 28.0*    432 455*   MCV 94 91 92   RDW 16.1* 16.5* 17.3*          Chemistries:  Recent Labs   Lab 11/14/22  0338 11/14/22  2140 11/15/22  0412   * 133* 133*   K 4.2 4.9 5.2*   CL 97 97 97   CO2 29 25 26   BUN 52* 52* 58*   CREATININE 4.2* 3.8* 4.3*   CALCIUM 9.1 8.9 9.2   ALBUMIN 2.3* 2.3* 2.7*   PROT 6.2 6.3 6.5   BILITOT 0.5 1.1* 1.0   ALKPHOS 122 122 120   ALT 13 11 10   AST 18 26 26   MG 2.2 2.2 2.3   PHOS 3.7 4.3 5.5*         All pertinent labs within the past 24 hours have been reviewed.    Significant Imaging:  I have reviewed all pertinent imaging results/findings within the past 24 hours.

## 2022-11-15 NOTE — PT/OT/SLP RE-EVAL
Occupational Therapy   Re-evaluation/Treatment    Name: David Barrios  MRN: [id number]  Admitting Diagnosis:  Sternal wound infection  Recent Surgery: Procedure(s) (LRB):  CREATION, FLAP, MUSCLE ROTATION (N/A)  IRRIGATION AND DEBRIDEMENT, WOUND, STERNUM (N/A)  CLOSURE, WOUND, STERNUM (N/A)  EXPLORATION, WOUND (N/A) 1 Day Post-Op    Recommendations:     Discharge Recommendations: home health OT, home health PT  Discharge Equipment Recommendations:  none  Barriers to discharge:  None    Assessment:     David Barrios is a 62 y.o. male with a medical diagnosis of Sternal wound infection.  He presents with c/o of chest pain. Re-evaluation completed following muscle flap creation with wound closure 11/14/22. He is currently requiring increased assistance for functional tasks.  Performance deficits affecting function are weakness, impaired endurance, impaired functional mobility, impaired self care skills, gait instability, impaired balance, pain, impaired skin, orthopedic precautions.  Pt would benefit from skilled OT services in order to maximize independence with ADLs and facilitate safe discharge. Pt would benefit from home health OT once medically stable for discharge.     Rehab Prognosis:  good; patient would benefit from acute skilled OT services to address these deficits and reach maximum level of function.       Plan:     Patient to be seen 4 x/week to address the above listed problems via self-care/home management, therapeutic activities, therapeutic exercises  Plan of Care Expires: 12/15/22  Plan of Care Reviewed with: patient    Subjective     Chief Complaint: sternal pain  Patient/Family stated goals: to return home  Communicated with: RN prior to session.  Pain/Comfort:  Pain Rating 1: 7/10  Location 1: sternal  Pain Addressed 1: Pre-medicate for activity  Pain Rating Post-Intervention 1: 10/10    Objective:     Communicated with: RN prior to session.  Patient found up in chair with: telemetry, pulse ox  (continuous), peripheral IV, oxygen, lambert catheter, blood pressure cuff, arterial line, central line upon OT entry to room.    General Precautions: Standard, fall, sternal   Orthopedic Precautions:N/A   Braces: N/A  Respiratory Status: Nasal cannula, flow 2 L/min    Occupational Performance:    Functional Mobility/Transfers:  Patient completed Sit <> Stand Transfer with moderate assistance  with  hand-held assist   Cuing to adhere to sternal precautions  Functional Mobility: Pt ambulated 10 ft x2 with min A using IV pole for balance in order to maximize functional activity tolerance and standing balance required for engagement in occupations of choice.      Activities of Daily Living:  Grooming: contact guard assistance to perform oral care and facial hygiene standing at the sink    Cognitive/Visual Perceptual:  Cognitive/Psychosocial Skills:     -       Oriented to: Person, Place, Time, and Situation   -       Follows Commands/attention:Follows multistep  commands  -       Communication: clear/fluent  -       Memory: No Deficits noted  -       Safety awareness/insight to disability: impaired   -       Mood/Affect/Coping skills/emotional control: Appropriate to situation    Physical Exam:  Upper Extremity Range of Motion:     -       Right Upper Extremity: WFL  -       Left Upper Extremity: WFL  Upper Extremity Strength:    -       Right Upper Extremity: NT 2' sternal precautions  -       Left Upper Extremity: NT 2' sternal precautions   Strength:    -       Right Upper Extremity: WFL  -       Left Upper Extremity: WFL  Fine Motor Coordination:    -       Intact    AMPAC 6 Click:  AMPAC Total Score: 18    Treatment & Education:  -Therapist provided facilitation and instruction of proper body mechanics, energy conservation, and fall prevention strategies during tasks listed above.  -Pt educated on role of OT, POC and goals for therapy  -Pt educated regarding lifting restriction and sternal precautions  -Pt  educated on importance of OOB activities with staff member assistance and sitting OOB majority of the day.   -Pt verbalized understanding. Pt expressed no further concerns/questions  -Whiteboard updated     Patient left up in chair with all lines intact, call button in reach, and RN notified    GOALS:   Multidisciplinary Problems       Occupational Therapy Goals          Problem: Occupational Therapy    Goal Priority Disciplines Outcome Interventions   Occupational Therapy Goal     OT, PT/OT Ongoing, Progressing    Description: Goals to be met by: 11/29/22     Patient will increase functional independence with ADLs by performing:    Feeding with Reeves.  UE Dressing with Reeves.  LE Dressing with Stand-by Assistance.  Grooming while standing at sink with Supervision.  Toilet transfer to toilet with Supervision.                         History:     Past Medical History:   Diagnosis Date    Anemia     Atrial fibrillation     Encounter for blood transfusion     Esophageal ulcer     GI bleed     Hypertension          Past Surgical History:   Procedure Laterality Date    APPLICATION OF WOUND VACUUM-ASSISTED CLOSURE DEVICE N/A 11/8/2022    Procedure: APPLICATION, WOUND VAC;  Surgeon: Mike Hedrick MD;  Location: [facility] OR [address] [place] [address];  Service: General;  Laterality: N/A;    CARDIOVERSION Left 9/15/2022    Procedure: Cardioversion;  Surgeon: Julio James MD;  Location: [facility] CATH LAB/EP;  Service: Cardiology;  Laterality: Left;  With NORBERT    CATHETERIZATION OF BOTH LEFT AND RIGHT HEART N/A 9/22/2022    Procedure: CATHETERIZATION, HEART, BOTH LEFT AND RIGHT;  Surgeon: Julio James MD;  Location: [facility] CATH LAB/EP;  Service: Cardiology;  Laterality: N/A;    COLONOSCOPY N/A 4/4/2019    Procedure: COLONOSCOPY Golytely;  Surgeon: Umair Randolph MD;  Location: [facility] ENDO;  Service: Endoscopy;  Laterality: N/A;    CORONARY ANGIOGRAPHY N/A 9/22/2022    Procedure: ANGIOGRAM, CORONARY ARTERY;  Surgeon: Julio WHELAN  MD Jacob;  Location: [facility] CATH LAB/EP;  Service: Cardiology;  Laterality: N/A;    MEYER MAZE PROCEDURE N/A 10/7/2022    Procedure: MEYER MAZE PROCEDURE;  Surgeon: Mike Hedrick MD;  [facility];  Service: Cardiovascular;  Laterality: N/A;    CREATION OF MUSCLE ROTATIONAL FLAP N/A 11/14/2022    Procedure: CREATION, FLAP, MUSCLE ROTATION;  Surgeon: Amadeo Carrasquillo MD;  [address]  Service: Plastics;  Laterality: N/A;  sternal wound that need pec flap coverage    DEBRIDEMENT OF STERNUM N/A 11/8/2022    Procedure: DEBRIDEMENT, STERNUM;  Surgeon: Mike Hedrick MD;  Location: [address];  Service: General;  Laterality: N/A;    ESOPHAGOGASTRODUODENOSCOPY N/A 10/12/2018    Procedure: EGD (ESOPHAGOGASTRODUODENOSCOPY);  Surgeon: Braden Herring MD;  Location: [facility] ENDO;  Service: Endoscopy;  Laterality: N/A;    ESOPHAGOGASTRODUODENOSCOPY N/A 4/4/2019    Procedure: EGD;  Surgeon: Umair Randolph MD;  Location: [facility] ENDO;  Service: Endoscopy;  Laterality: N/A;    EXCLUSION OF LEFT ATRIAL APPENDAGE N/A 10/7/2022    Procedure: EXCLUSION, LEFT ATRIAL APPENDAGE;  Surgeon: Mike Hedrick MD;  Location: [address];  Service: Cardiovascular;  Laterality: N/A;    HERNIA REPAIR      INSERTION OF INTRAVASCULAR MICROAXIAL BLOOD PUMP N/A 10/7/2022    Procedure: INSERTION, IMPELLA;  Surgeon: Mike Hedrick MD;  Location: [address];  Service: Cardiovascular;  Laterality: N/A;  direct aortic insertion    IRRIGATION OF MEDIASTINUM N/A 10/7/2022    Procedure: IRRIGATION, MEDIASTINUM;  Surgeon: Mike Hedrick MD;  Location: [address];  Service: Cardiovascular;  Laterality: N/A;    STERNAL WIRES REMOVAL N/A 11/8/2022    Procedure: REMOVAL, STERNAL WIRE;  Surgeon: Mike Hedrick MD;  Location: [address];  Service: General;  Laterality: N/A;  Sternal wire removal with muscle flap creation    STERNAL WOUND CLOSURE N/A 11/14/2022    Procedure: CLOSURE,  WOUND, STERNUM;  Surgeon: Amadeo Carrasquillo MD;  [facility];  Service: Plastics;  Laterality: N/A;    TRICUSPID VALVULOPLASTY N/A 10/7/2022    Procedure: REPAIR, TRICUSPID VALVE;  Surgeon: Mike Hedrick MD;  Location: [facility] OR [address] [place] [address];  Service: Cardiovascular;  Laterality: N/A;    WOUND EXPLORATION N/A 11/14/2022    Procedure: EXPLORATION, WOUND;  Surgeon: Amadeo Carrasquillo MD;  Location: [facility] OR [address] [place] [address];  Service: Plastics;  Laterality: N/A;       Time Tracking:     OT Date of Treatment: 11/15/22  OT Start Time: 1249  OT Stop Time: 1315  OT Total Time (min): 26 min    Billable Minutes:Re-eval 16  Self Care/Home Management 10    11/15/2022

## 2022-11-15 NOTE — BRIEF OP NOTE
[facility] - Surgery ([facility])  Brief Operative Note    SUMMARY     Surgery Date: 11/14/2022     Surgeon(s) and Role:     * Amadeo Carrasquillo MD - Primary     * Chuyita Aviles MD - Resident - Assisting        Pre-op Diagnosis:  Sternal wound infection [S21.101A, L08.9]    Post-op Diagnosis:  Post-Op Diagnosis Codes:     * Sternal wound infection [S21.101A, L08.9]    Procedure(s) (LRB):  CREATION, FLAP, MUSCLE ROTATION (N/A)  IRRIGATION AND DEBRIDEMENT, WOUND, STERNUM (N/A)  CLOSURE, WOUND, STERNUM (N/A)  EXPLORATION, WOUND (N/A)    Anesthesia: General    Operative Findings: Sternal washout and debridement, bilateral pectoralis flaps     Estimated Blood Loss: 300 mL    Estimated Blood Loss has been documented.         Specimens:   Specimen (24h ago, onward)      None            [id number]

## 2022-11-15 NOTE — PT/OT/SLP DISCHARGE
"Physical Therapy Discharge Summary    Name: David Barrios  MRN: [id number]   Principal Problem: Sternal wound infection     Patient Discharged from acute Physical Therapy on 11/15/2022.  Please refer to prior PT noted date on 11/10/2022 for functional status.     Assessment:     Pt discharged 2/2 "CREATION, FLAP, MUSCLE ROTATION" by plastic surgery on 2022. Please place new orders when appropriate as well as any new restrictions post-op.    Objective:     GOALS:   Multidisciplinary Problems       Physical Therapy Goals          Problem: Physical Therapy    Goal Priority Disciplines Outcome Goal Variances Interventions   Physical Therapy Goal     PT, PT/OT Ongoing, Progressing     Description: Goals to be met by: 2022     Patient will increase functional independence with mobility by performin. Supine to sit with Modified Otero  2. Sit to supine with Modified Otero  3. Sit to stand transfer with Supervision  4. Bed to chair transfer with Supervision using No Assistive Device  5. Gait  x 200 feet with Supervision using No Assistive Device.   6. Pt will ascend/descend 3 steps with no HR and SBA.                         Reasons for Discontinuation of Therapy Services  S/p surgery       Plan:     Patient Discharged to:  SICU .      11/15/2022    "

## 2022-11-15 NOTE — PROGRESS NOTES
Plastic and Reconstructive Surgery   Progress Note    Subjective:    Patient seen and examined at bedside in [place]. Patient is in pain and complaining of binder. Patient hgb stable. BUN/Cr uptrending.    Objective:  Vital signs in last 24 hours:  Temp:  [97.5 °F (36.4 °C)-98.4 °F (36.9 °C)] 97.7 °F (36.5 °C)  Pulse:  [100-113] 104  Resp:  [10-37] 15  SpO2:  [82 %-100 %] 90 %  BP: (110-147)/(54-70) 132/63  Arterial Line BP: ()/(42-62) 137/62    Intake/Output last 3 shifts:  I/O last 3 completed shifts:  In: 3013.6 [I.V.:1188.8; Blood:928; IV Piggyback:896.8]  Out: 1880 [Urine:780; Drains:800; Blood:300]    Intake/Output this shift:  I/O this shift:  In: 11.5 [I.V.:11.5]  Out: 35 [Urine:35]        Physical Exam:  VITAL SIGNS:   Vitals:    22 0700 22 0728 22 0800 22 0827   BP: (!) 122/59  132/63    BP Location: Left arm  Left arm    Patient Position: Lying  Lying    Pulse: 103 104 104    Resp: 16 13 (!) 21 15   Temp:   97.7 °F (36.5 °C)    TempSrc:   Oral    SpO2: (!) 91% (!) 91% (!) 90%    Weight:       Height:         TMAX: Temp (24hrs), Av.9 °F (36.6 °C), Min:97.5 °F (36.4 °C), Max:98.4 °F (36.9 °C)    General: Alert; No acute distress  Cardiovascular: Regular rate   Respiratory: Normal respiratory effort. Chest rise symmetric.   Abdomen: Soft, nontender, nondistended  Extremity: Moves all extremities equally.  Neurologic: No focal deficit. Speech normal  SKIN: midline sternal and bilateral axillary incisions with dressings c/d/I, 5 drains       Scheduled Medications acetaminophen, 1,000 mg, Q8H  amiodarone, 200 mg, BID  aspirin, 81 mg, Daily  ceFEPime (MAXIPIME) IVPB, 2 g, Q24H  chlorothiazide (DIURIL) IVPB, 500 mg, TID WAKE  furosemide (LASIX) injection, 120 mg, TID WAKE  heparin (porcine), 5,000 Units, Q8H  methocarbamoL, 500 mg, TID  polyethylene glycol, 17 g, Daily  senna-docusate 8.6-50 mg, 1 tablet, BID    PRN Medications sodium chloride, sodium chloride, sodium chloride,  dextrose 10%, dextrose 10%, HYDROmorphone, hydrOXYzine pamoate, ondansetron, oxyCODONE, oxyCODONE    Recent Labs:   Lab Results   Component Value Date    WBC 25.02 (H) 11/16/2022    HGB 7.8 (L) 11/16/2022    HCT 23.8 (L) 11/16/2022    MCV 91 11/16/2022     11/16/2022     Lab Results   Component Value Date     11/16/2022     (L) 11/16/2022    K 5.3 (H) 11/16/2022    CL 97 11/16/2022    BUN 73 (H) 11/16/2022         Assessment: 62 y.o. y/o male 2 Days Post-Op s/p Procedure(s):  REMOVAL, STERNAL WIRE  DEBRIDEMENT, STERNUM  APPLICATION, WOUND VAC Doing well postoperatively.    Plan  Maintain binder  Monitor drain output  Pain control  I's & O's  Dvt ppx  Medical management as per BHARATI Whitlock MD- Fellow  Department of Plastic and Reconstructive Surgery  [phone] (office)

## 2022-11-15 NOTE — ASSESSMENT & PLAN NOTE
62 y.o. male S/P TV replacement, MV replacement, MAZE, Lt Atrial Appendage ligation and Impella placement on 10/7/22, course complicated by bleeding, requiring reopening POD#0, multiple VT runs requiring DCCV, and sternal wound infections. He presents to the SICU s/p wound debridement and wire removal on 11/08 followed by washout on 11/10      Neuro/Psych:     - Sedation: none    - Pain:    - Scheduled Tylenol 1g q8h with prn Oxy              Cardiac:      -BP Goal: MAP 60-80 and SBP <140.      - Echo with EF 35% with mild RV enlargement and moderate RV dysfunction. Cont milrinone 0.25     - Anti-HTNs: Will restart home meds when appropriate     - Rhythm: NSR. S/p MAZE for Afib, cont po amio.     - Beta blocker: hold in setting of Milrinone     - Statin: Atorvastatin 40 mg QD      Pulmonary:     - Goal SpO2 >92%, 2L NC     - CXR with patchy opacities on R side and SQ air          Renal:    - Trend BUN/Cr. Oliguric KIMANI. Will monitor and support kidneys with MAP >65. Hold off diuresis.    - received 500ml albumin overnight without improvement in UOP, mild hyperkalemia on CMP without EKG changes. Diuretic challenge today. Consider consulting nephrology     - Maintain Fierro, record strict Is/Os    Recent Labs   Lab 11/14/22  0338 11/14/22  2140 11/15/22  0412   BUN 52* 52* 58*   CREATININE 4.2* 3.8* 4.3*         FEN / GI:     - Daily CMP, PRN K/Mag/Phos per protocol     - Replace electrolytes as needed    - Nutrition: cardiac diet, BOOST    - Bowel Regimen: Miralax, docusate      ID:     - Leukocytosis without fever, s/p debridement and Pec flap 11/14    - Abx: Continue Cefepime, lactic acid improving. Will need long term abx plan, 6weeks post op flap    - R sided patchy opacities on CXR on cefepime, consulting ID for broadening abx     Recent Labs   Lab 11/14/22  0338 11/14/22  2140 11/15/22  0412   WBC 19.66* 41.30* 47.99*         Heme/Onc:     - H/H with post-operative anemia, monitor     - CBC daily    - ASA  325mg daily    Recent Labs   Lab 11/13/22  0428 11/14/22  0338 11/14/22  2140 11/15/22  0412   HGB 8.1* 8.4* 10.5* 9.3*    349 432 455*   APTT 28.5 27.1  --  25.2   INR 1.1 1.0  --  1.1         Endocrine:     - CTS Goal -140        PPx:   Feeding: cardiac diet   Analgesia/Sedation: tylenol, oxy, dilaudid PRN   Thromboembolic Prevention: scds  HOB >30: Yes  Stress Ulcer: n/a  Glucose Control: none      Lines/Drains/Airway:   Left radial arterial line   RIJ CVC   Fierro   BLANCA drains       Dispo/Code Status/Palliative:     - Continue SICU Care    - Full Code

## 2022-11-15 NOTE — OP NOTE
Date of surgery 11/14/2022   Preoperative diagnosis sternal wound infection   Postoperative diagnosis is the same   Procedure performed  1. Wound preparation for flap  1. Wound preparation for flap  2. Right pectoralis major muscle flap to sternal wound (greater services  3. Left pectoralis major muscle flap to sternal wound (greater services)  4. Advancement flap closure of skin measuring 20 cm x 8 cm  Surgeon Neida  Anesthesia general  Complications none  Blood loss 400 cc   Drains x5     The patient was placed in the supine position after adequate general anesthesia was prepped and draped in a normal sterile fashion.  The wound was then reexcised and scraped of all granulation tissue.  Evaluation of the wound revealed it was very deep.  We need to get as much bulk muscle in as possible.  Thus on the left side the pectoralis major muscle was  from skin and elevated from the chest wall.  It was then divided from its humeral attachment in the arm through a separate axillary incision which is not common in much more involved then simply moving the pectoralis major muscle over.  The fibers were divided and the muscle was then completely freed up.  Next, the thoracal acromial vessel was identified and dissection both from the medial aspect of the pectoralis muscle as well from laterally we encroached upon the vessel thereby creating an island flap in order to increase the excursion.  By doing so this allowed the flap to reach the most distal portion of the sternal wound.  There was a lot of bulk distally with no tension.  A similar procedure was performed on the right side.  Again the sternal wound was so deep we divided its humeral attachments again this is a greater service this is not normally done a 4 and a pectoralis major muscle flap.  This gave excellent excursion and the bulk of this was used to pack into the superior 1/2 of the sternal wound.  The muscles were then sutured together in 2 layers using  interrupted 2-0 Vicryl followed by running 2-0 Vicryl suture.  Three drains were placed anteriorly and 1 in each axilla.  Advancement flap closure of the skin was performed and closed in layers using a running 2-0 Vicryl interrupted 3-0 Monocryl and a running 4-0 Monocryl subcuticular suture.  The axillary incisions on both sides were closed using 2-0 Vicryl 3-0 Monocryl and skin staples.  There were no complications with this procedure.

## 2022-11-15 NOTE — CONSULTS
[facility]  Infectious Disease  Consult Note    Patient Name: David Barrios  MRN: [id number]  Admission Date: 11/8/2022  Hospital Length of Stay: 7 days  Attending Physician: Mike Hedrick MD  Primary Care Provider: Breann Tavera MD     Isolation Status: No active isolations    Patient information was obtained from patient, past medical records and ER records.      Inpatient consult to Infectious Diseases  Consult performed by: Alix Jara MD  Consult ordered by: Leah Lay NP        Assessment/Plan:     * Sternal wound infection  Known infection from prior admission. On long term cefepime due to below. Repeat surgical cultures no growth.   · Continue cefepime.     Vascular inflammation or infection  Patient with Serratia bacteremia during prior admission with involvement of vascular graft.   · Continue cefepime as above.  · Will get clarification from primary service to see if graft in question was removed.     Pneumonia of both lower lobes due to infectious organism  Airspace opacities seen on CXR on admission. Now with bilateral opacities and pleural effusions. Unclear if this is infectious in nature at this time or pulmonary edema.  · Can consider addition of doxycycline or linezolid for expanded gram positive coverage.   · Recommend performing sputum samples for culture.   · Consider incentive spirometry unless contraindicated.       Thank you for your consult. I will follow-up with patient. Please contact us if you have any additional questions.    Alix Jara MD  Infectious Disease  [facility]    Subjective:     Principal Problem: Sternal wound infection    HPI: A 62-year-old man with heart failure, AFib, hypertension, status post tricuspid and mitral valve replacement plus Maze procedure, left atrial appendage ligation, s/p Impella placement with removal, recent Serratia marcescens bacteremia plus infected graft secondary to  "wound infection, and on a 6 week course of cefepime followed by chronic antimicrobial suppression who was readmitted for planned sternal wire removal and wound revision.  He underwent mediastinal exploration with evacuation of purulent pericarditis, debridement and decortication of the mediastinal structures, resection of the ascending aortic graft, removal of sternal wires, and partial bilateral sternectomy on 11/08.  He also underwent sternal wound exploration with minimal debridement, washout, and wound VAC placement on 11/10.  Mr. Cornejo also underwent exploration of his wound, irrigation and the debridement of the sternal wound, creation of a muscle flap with rotation and sternal wound closure on 11/14.  Throughout his hospital stay he was continued on therapy with cefepime as per prior recommendations.  Unfortunately his hospital course was complicated by shortness of breath overnight.  A chest x-ray revealed cardiomegaly with bilateral pulmonary opacities, greater on the right, and bilateral pleural effusions.  Checks x-ray also demonstrated new subcutaneous emphysema of the chest wall.    Infectious Disease is consulted for "on cefepime for serratio bacteremia x6 weeks. new R sided opacity on CXR and leukocytosis. wanting to broaden abx."          Past Medical History:   Diagnosis Date    Anemia     Atrial fibrillation     Encounter for blood transfusion     Esophageal ulcer     GI bleed     Hypertension        Past Surgical History:   Procedure Laterality Date    APPLICATION OF WOUND VACUUM-ASSISTED CLOSURE DEVICE N/A 11/8/2022    Procedure: APPLICATION, WOUND VAC;  Surgeon: Mike Hedrick MD;  Location: [facility] OR [address] [place] [address];  Service: General;  Laterality: N/A;    CARDIOVERSION Left 9/15/2022    Procedure: Cardioversion;  Surgeon: Julio James MD;  Location: [facility] CATH LAB/EP;  Service: Cardiology;  Laterality: Left;  With NORBERT    CATHETERIZATION OF BOTH LEFT AND RIGHT HEART N/A 9/22/2022    " Procedure: CATHETERIZATION, HEART, BOTH LEFT AND RIGHT;  Surgeon: Julio James MD;  Location: [facility] CATH LAB/EP;  Service: Cardiology;  Laterality: N/A;    COLONOSCOPY N/A 4/4/2019    Procedure: COLONOSCOPY Golytely;  Surgeon: Umair Randolph MD;  Location: [facility] ENDO;  Service: Endoscopy;  Laterality: N/A;    CORONARY ANGIOGRAPHY N/A 9/22/2022    Procedure: ANGIOGRAM, CORONARY ARTERY;  Surgeon: Julio James MD;  Location: [facility] CATH LAB/EP;  Service: Cardiology;  Laterality: N/A;    MEYER MAZE PROCEDURE N/A 10/7/2022    Procedure: MEYER MAZE PROCEDURE;  Surgeon: Mike Hedrick MD;  [facility];  Service: Cardiovascular;  Laterality: N/A;    CREATION OF MUSCLE ROTATIONAL FLAP N/A 11/14/2022    Procedure: CREATION, FLAP, MUSCLE ROTATION;  Surgeon: Amadeo Carrasquillo MD;  [facility];  Service: Plastics;  Laterality: N/A;  sternal wound that need pec flap coverage    DEBRIDEMENT OF STERNUM N/A 11/8/2022    Procedure: DEBRIDEMENT, STERNUM;  Surgeon: Mike Hedrick MD;  [facility];  Service: General;  Laterality: N/A;    ESOPHAGOGASTRODUODENOSCOPY N/A 10/12/2018    Procedure: EGD (ESOPHAGOGASTRODUODENOSCOPY);  Surgeon: Braden Herring MD;  Location: [facility];  Service: Endoscopy;  Laterality: N/A;    ESOPHAGOGASTRODUODENOSCOPY N/A 4/4/2019    Procedure: EGD;  Surgeon: Umair Randolph MD;  Location: [facility];  Service: Endoscopy;  Laterality: N/A;    EXCLUSION OF LEFT ATRIAL APPENDAGE N/A 10/7/2022    Procedure: EXCLUSION, LEFT ATRIAL APPENDAGE;  Surgeon: Mike Hedrick MD;  [facility];  Service: Cardiovascular;  Laterality: N/A;    HERNIA REPAIR      INSERTION OF INTRAVASCULAR MICROAXIAL BLOOD PUMP N/A 10/7/2022    Procedure: INSERTION, IMPELLA;  Surgeon: Mike Hedrick MD;  [facility];  Service: Cardiovascular;  Laterality: N/A;  direct aortic insertion    IRRIGATION OF MEDIASTINUM N/A 10/7/2022     Procedure: IRRIGATION, MEDIASTINUM;  Surgeon: Mike Hedrick MD;  Location: [facility] OR 2ND FLR;  Service: Cardiovascular;  Laterality: N/A;    STERNAL WIRES REMOVAL N/A 11/8/2022    Procedure: REMOVAL, STERNAL WIRE;  Surgeon: Mike Hedrick MD;  Location: [facility] OR 2ND FLR;  Service: General;  Laterality: N/A;  Sternal wire removal with muscle flap creation    STERNAL WOUND CLOSURE N/A 11/14/2022    Procedure: CLOSURE, WOUND, STERNUM;  Surgeon: Amadeo Carrasquillo MD;  Location: [facility] OR 2ND FLR;  Service: Plastics;  Laterality: N/A;    TRICUSPID VALVULOPLASTY N/A 10/7/2022    Procedure: REPAIR, TRICUSPID VALVE;  Surgeon: Mike Hedrick MD;  Location: [facility] OR 2ND FLR;  Service: Cardiovascular;  Laterality: N/A;    WOUND EXPLORATION N/A 11/14/2022    Procedure: EXPLORATION, WOUND;  Surgeon: Amadeo Carrasquillo MD;  Location: [facility] OR [facility] FLR;  Service: Plastics;  Laterality: N/A;       Review of patient's allergies indicates:  No Known Allergies    Medications:  Medications Prior to Admission   Medication Sig    amiodarone (PACERONE) 200 MG Tab Take 1 tablet (200 mg total) by mouth 2 (two) times daily.    carvediloL (COREG) 12.5 MG tablet Take 1 tablet (12.5 mg total) by mouth 2 (two) times daily with meals.    furosemide (LASIX) 40 MG tablet TAKE 1 TABLET(40 MG) BY MOUTH TWICE DAILY    hydroCHLOROthiazide (HYDRODIURIL) 25 MG tablet Take 25 mg by mouth once daily.    albuterol (PROVENTIL/VENTOLIN HFA) 90 mcg/actuation inhaler inhale 1-2 Puff(s) By Mouth Every 4 Hours as needed    ferrous sulfate (FEOSOL) 325 mg (65 mg iron) Tab tablet Take 1 tablet (325 mg total) by mouth daily with breakfast.    rivaroxaban (XARELTO) 20 mg Tab Take 1 tablet (20 mg total) by mouth daily with dinner or evening meal.     Antibiotics (From admission, onward)      Start     Stop Route Frequency Ordered    11/08/22 1530  cefepime in dextrose 5 % IVPB 2 g         -- IV Every 24 hours (non-standard times) 11/08/22  1416          Antifungals (From admission, onward)      None          Antivirals (From admission, onward)      None             Immunization History   Administered Date(s) Administered    Influenza - Quadrivalent - PF *Preferred* (6 months and older) 10/12/2018    Pneumococcal Conjugate - 13 Valent 10/12/2018       Family History       Problem Relation (Age of Onset)    COPD Mother    Cancer Father          Social History     Socioeconomic History    Marital status: Single   Tobacco Use    Smoking status: Never    Smokeless tobacco: Current     Types: Chew   Substance and Sexual Activity    Alcohol use: Yes     Alcohol/week: 20.0 standard drinks     Types: 20 Cans of beer per week    Drug use: No     Review of Systems   Constitutional:  Negative for chills, fatigue and fever.   HENT:  Negative for ear pain, mouth sores, nosebleeds, postnasal drip, rhinorrhea, sinus pressure, sore throat, tinnitus, trouble swallowing and voice change.    Eyes:  Negative for photophobia, pain, redness and visual disturbance.   Respiratory:  Positive for chest tightness. Negative for apnea, cough, shortness of breath and wheezing.    Cardiovascular:  Negative for chest pain, palpitations and leg swelling.   Gastrointestinal:  Negative for abdominal pain, blood in stool, constipation, diarrhea, nausea and vomiting.   Endocrine: Negative for cold intolerance, heat intolerance, polydipsia and polyuria.   Genitourinary:  Negative for decreased urine volume, difficulty urinating, dysuria, flank pain, frequency, genital sores, hematuria, penile discharge, penile pain, penile swelling, scrotal swelling, testicular pain and urgency.   Musculoskeletal:  Positive for arthralgias. Negative for back pain, joint swelling, myalgias and neck pain.   Skin:  Negative for color change and rash.   Allergic/Immunologic: Negative for environmental allergies and food allergies.   Neurological:  Negative for dizziness, seizures, syncope, weakness,  light-headedness, numbness and headaches.   Hematological:  Negative for adenopathy. Does not bruise/bleed easily.   Psychiatric/Behavioral:  Negative for agitation, confusion, decreased concentration, hallucinations, self-injury, sleep disturbance and suicidal ideas. The patient is not nervous/anxious.    Objective:     Vital Signs (Most Recent):  Temp: 97.8 °F (36.6 °C) (11/15/22 1915)  Pulse: 105 (11/15/22 1930)  Resp: 17 (11/15/22 1930)  BP: 137/63 (11/15/22 1930)  SpO2: 97 % (11/15/22 1930) Vital Signs (24h Range):  Temp:  [97.4 °F (36.3 °C)-97.8 °F (36.6 °C)] 97.8 °F (36.6 °C)  Pulse:  [103-121] 105  Resp:  [11-37] 17  SpO2:  [82 %-100 %] 97 %  BP: (102-137)/(54-80) 137/63  Arterial Line BP: ()/(42-70) 134/55     Weight: 74 kg (163 lb 2.3 oz)  Body mass index is 24.08 kg/m².    Estimated Creatinine Clearance: 17.2 mL/min (A) (based on SCr of 4.4 mg/dL (H)).    Physical Exam  Vitals reviewed.   Constitutional:       Appearance: He is well-developed.   HENT:      Head: Normocephalic and atraumatic.      Right Ear: External ear normal.      Left Ear: External ear normal.   Eyes:      Conjunctiva/sclera: Conjunctivae normal.   Neck:      Thyroid: No thyromegaly.   Cardiovascular:      Rate and Rhythm: Normal rate and regular rhythm.      Heart sounds: Normal heart sounds. No murmur heard.  Pulmonary:      Effort: Pulmonary effort is normal.      Breath sounds: Examination of the right-lower field reveals decreased breath sounds. Examination of the left-lower field reveals decreased breath sounds. Decreased breath sounds present. No wheezing or rales.   Chest:      Comments: Palpable subcutaneous emphysema  Abdominal:      General: Bowel sounds are normal.      Palpations: Abdomen is soft. There is no mass.      Tenderness: There is no abdominal tenderness. There is no rebound.   Musculoskeletal:         General: Normal range of motion.      Cervical back: Normal range of motion and neck supple.    Lymphadenopathy:      Cervical: No cervical adenopathy.   Skin:     General: Skin is warm and dry.   Neurological:      Mental Status: He is alert and oriented to person, place, and time.   Psychiatric:         Behavior: Behavior normal.       Significant Labs: Blood Culture:   Recent Labs   Lab 10/14/22  1638 10/16/22  1419 10/16/22  1436 10/18/22  1608 10/18/22  1637   LABBLOO No growth after 5 days.  No growth after 5 days. No growth after 5 days. No growth after 5 days. No growth after 5 days. No growth after 5 days.     BMP:   Recent Labs   Lab 11/15/22  0822 11/15/22  1213 11/15/22  1800   *   < > 128*      < > 136   K 5.4*   < > 5.3*   CL 98   < > 100   CO2 26   < > 23   BUN 59*   < > 64*   CREATININE 4.3*   < > 4.4*   CALCIUM 9.0   < > 8.8   MG 2.1  --   --     < > = values in this interval not displayed.     CBC:   Recent Labs   Lab 11/15/22  0412 11/15/22  1213 11/15/22  1800   WBC 47.99* 32.89* 26.24*   HGB 9.3* 6.8* 7.2*   HCT 28.0* 20.2* 21.6*   * 363 312     Respiratory Culture:   Recent Labs   Lab 10/07/22  0647 10/11/22  0919   GSRESP  --  No WBC's  No organisms seen   RESPIRATORYC Normal respiratory janna  --        Significant Imaging: I have reviewed all pertinent imaging results/findings within the past 24 hours.

## 2022-11-15 NOTE — ANESTHESIA PROCEDURE NOTES
Intubation    Date/Time: 11/14/2022 5:37 PM  Performed by: Nelsy Foster CRNA  Authorized by: Robi Strickland MD     Intubation:     Induction:  Intravenous    Intubated:  Postinduction    Mask Ventilation:  Easy mask    Attempts:  1    Attempted By:  CRNA    Method of Intubation:  Direct    Blade:  Vásquez 2    Laryngeal View Grade: Grade I - full view of cords      Difficult Airway Encountered?: No      Complications:  None    Airway Device:  Oral endotracheal tube    Airway Device Size:  7.5    Style/Cuff Inflation:  Cuffed (inflated to minimal occlusive pressure)    Secured at:  The lips    Complicating Factors:  None    Findings Post-Intubation:  BS equal bilateral

## 2022-11-15 NOTE — NURSING
MD Franklin notified of WBC, K, and Phos. Per MD, monitor UOP over next few hours and recheck BMP if UOP does not increase with Albumin.

## 2022-11-15 NOTE — PROGRESS NOTES
[facility]  Critical Care - Surgery  Progress Note    Patient Name: David Barrios  MRN: [id number]  Admission Date: 11/8/2022  Hospital Length of Stay: 7 days  Code Status: Full Code  Attending Provider: Mike Hedrick MD  Primary Care Provider: Breann Tavera MD   Principal Problem: Sternal wound infection    Subjective:     Hospital/ICU Course:        Interval History/Significant Events: short of breath overnight, but no worsening hypoxia. CXR with R sided patchy opacities and SQ air. BLANCA drains with moderate bloody output. Afebrile.     Follow-up For: Procedure(s) (LRB):  WASHOUT (N/A)    Post-Operative Day: 4 Days Post-Op    Objective:     Vital Signs (Most Recent):  Temp: 97.6 °F (36.4 °C) (11/15/22 0315)  Pulse: 110 (11/15/22 0645)  Resp: (!) 27 (11/15/22 0645)  BP: 118/60 (11/15/22 0630)  SpO2: 96 % (11/15/22 0645) Vital Signs (24h Range):  Temp:  [97.4 °F (36.3 °C)-98.4 °F (36.9 °C)] 97.6 °F (36.4 °C)  Pulse:  [] 110  Resp:  [11-36] 27  SpO2:  [88 %-100 %] 96 %  BP: (102-150)/(59-82) 118/60  Arterial Line BP: ()/() 107/59     Weight: 74 kg (163 lb 2.3 oz)  Body mass index is 24.08 kg/m².      Intake/Output Summary (Last 24 hours) at 11/15/2022 0705  Last data filed at 11/15/2022 0600  Gross per 24 hour   Intake 1437.04 ml   Output 2521 ml   Net -1083.96 ml         Physical Exam  Vitals and nursing note reviewed.   Constitutional:       General: He is not in acute distress.     Appearance: He is not ill-appearing.   Cardiovascular:      Rate and Rhythm: Normal rate and regular rhythm.      Pulses: Normal pulses.   Pulmonary:      Effort: Pulmonary effort is normal.   Abdominal:      General: Abdomen is flat.      Palpations: Abdomen is soft.   Musculoskeletal:      Right lower leg: No edema.      Left lower leg: No edema.   Skin:     General: Skin is warm.      Findings: No bruising or lesion.      Comments: BLANCA drains with sanguinous output    Neurological:       General: No focal deficit present.       Vents: NA    Lines/Drains/Airways       Peripherally Inserted Central Catheter Line  Duration             PICC Double Lumen 10/17/22 1709 right brachial 28 days              Drain  Duration                  Urethral Catheter 11/12/22 2125 Straight-tip;Non-latex 16 Fr. 2 days         Closed/Suction Drain 11/14/22 2010 Inferior;Right Chest Bulb 15 Fr. <1 day         Closed/Suction Drain 11/14/22 2010 Superior;Midline Abdomen Bulb 15 Fr. <1 day         Closed/Suction Drain 11/14/22 2011 Lateral;Right Other (Comment) Bulb 15 Fr. <1 day         Closed/Suction Drain 11/14/22 2015 Inferior;Left Chest Bulb 15 Fr. <1 day         Closed/Suction Drain 11/14/22 2016 Lateral;Left Other (Comment) Bulb 15 Fr. <1 day              Arterial Line  Duration             Arterial Line 11/08/22 0712 Right Radial 6 days              Peripheral Intravenous Line  Duration                  Peripheral IV - Single Lumen 11/08/22 0730 14 G  Left Forearm 6 days                    Significant Labs:    CBC/Anemia Profile:  Recent Labs   Lab 11/14/22  0338 11/14/22  2140 11/15/22  0412   WBC 19.66* 41.30* 47.99*   HGB 8.4* 10.5* 9.3*   HCT 25.8* 31.3* 28.0*    432 455*   MCV 94 91 92   RDW 16.1* 16.5* 17.3*          Chemistries:  Recent Labs   Lab 11/14/22  0338 11/14/22  2140 11/15/22  0412   * 133* 133*   K 4.2 4.9 5.2*   CL 97 97 97   CO2 29 25 26   BUN 52* 52* 58*   CREATININE 4.2* 3.8* 4.3*   CALCIUM 9.1 8.9 9.2   ALBUMIN 2.3* 2.3* 2.7*   PROT 6.2 6.3 6.5   BILITOT 0.5 1.1* 1.0   ALKPHOS 122 122 120   ALT 13 11 10   AST 18 26 26   MG 2.2 2.2 2.3   PHOS 3.7 4.3 5.5*         All pertinent labs within the past 24 hours have been reviewed.    Significant Imaging:  I have reviewed all pertinent imaging results/findings within the past 24 hours.    Assessment/Plan:     * Sternal wound infection  62 y.o. male S/P TV replacement, MV replacement, MAZE, Lt Atrial Appendage ligation and Impella  placement on 10/7/22, course complicated by bleeding, requiring reopening POD#0, multiple VT runs requiring DCCV, and sternal wound infections. He presents to the SICU s/p wound debridement and wire removal on 11/08 followed by washout on 11/10      Neuro/Psych:     - Sedation: none    - Pain:    - Scheduled Tylenol 1g q8h with prn Oxy              Cardiac:      -BP Goal: MAP 60-80 and SBP <140.      - Echo with EF 35% with mild RV enlargement and moderate RV dysfunction. Cont milrinone 0.25     - Anti-HTNs: Will restart home meds when appropriate     - Rhythm: NSR. S/p MAZE for Afib, cont po amio.     - Beta blocker: hold in setting of Milrinone     - Statin: Atorvastatin 40 mg QD      Pulmonary:     - Goal SpO2 >92%, 2L NC     - CXR with patchy opacities on R side and SQ air          Renal:    - Trend BUN/Cr. Oliguric KIMANI. Will monitor and support kidneys with MAP >65. Hold off diuresis.    - received 500ml albumin overnight without improvement in UOP, mild hyperkalemia on CMP without EKG changes. Diuretic challenge today. Consider consulting nephrology     - Maintain Fierro, record strict Is/Os    Recent Labs   Lab 11/14/22  0338 11/14/22  2140 11/15/22  0412   BUN 52* 52* 58*   CREATININE 4.2* 3.8* 4.3*         FEN / GI:     - Daily CMP, PRN K/Mag/Phos per protocol     - Replace electrolytes as needed    - Nutrition: cardiac diet, BOOST    - Bowel Regimen: Miralax, docusate      ID:     - Leukocytosis without fever, s/p debridement and Pec flap 11/14    - Abx: Continue Cefepime, lactic acid improving. Will need long term abx plan, 6weeks post op flap    - R sided patchy opacities on CXR on cefepime, consulting ID for broadening abx     Recent Labs   Lab 11/14/22 0338 11/14/22  2140 11/15/22  0412   WBC 19.66* 41.30* 47.99*         Heme/Onc:     - H/H with post-operative anemia, monitor     - CBC daily    - ASA 325mg daily    Recent Labs   Lab 11/13/22  0428 11/14/22 0338 11/14/22  2140 11/15/22  0412    HGB 8.1* 8.4* 10.5* 9.3*    349 432 455*   APTT 28.5 27.1  --  25.2   INR 1.1 1.0  --  1.1         Endocrine:     - CTS Goal -140        PPx:   Feeding: cardiac diet   Analgesia/Sedation: tylenol, oxy, dilaudid PRN   Thromboembolic Prevention: scds  HOB >30: Yes  Stress Ulcer: n/a  Glucose Control: none      Lines/Drains/Airway:   Left radial arterial line   RIJ CVC   Fierro   BLANCA drains       Dispo/Code Status/Palliative:     - Continue SICU Care    - Full Code                    Critical care was time spent personally by me on the following activities: development of treatment plan with patient or surrogate and bedside caregivers, discussions with consultants, evaluation of patient's response to treatment, examination of patient, ordering and performing treatments and interventions, ordering and review of laboratory studies, ordering and review of radiographic studies, pulse oximetry, re-evaluation of patient's condition.  This critical care time did not overlap with that of any other provider or involve time for any procedures.     Leah Lay NP  Critical Care - Surgery  [facility]

## 2022-11-15 NOTE — PROCEDURES
"David Barrios is a 62 y.o. male patient.    Temp: 97.7 °F (36.5 °C) (11/15/22 1500)  Pulse: 106 (11/15/22 1645)  Resp: 20 (11/15/22 1655)  BP: 131/66 (11/15/22 1600)  SpO2: 100 % (11/15/22 1645)  Weight: 74 kg (163 lb 2.3 oz) (11/12/22 1519)  Height: 5' 9.02" (175.3 cm) (11/12/22 1532)       Central Line    Date/Time: 11/15/2022 5:03 PM  Performed by: Gustavo Aguero DO  Authorized by: Gustavo Aguero DO     Location procedure was performed:  [facility] SURGICAL ICU (SICU)  Indications:  Hemodialysis  Anesthesia:  Local infiltration  Local anesthetic:  Lidocaine 1% with epinephrine  Preparation:  Skin prepped with ChloraPrep  Skin prep agent dried: Skin prep agent completely dried prior to procedure    Sterile barriers: All five maximal sterile barriers used - gloves, gown, cap, mask and large sterile sheet    Hand hygiene: Hand hygiene performed immediately prior to central venous catheter insertion    Location:  Right internal jugular  Catheter size:  13 Fr  Ultrasound guidance: Yes    Vessel Caliber:  Medium  Manometry: Yes    Number of attempts:  1  Securement:  Line sutured, chlorhexidine patch and blood return through all ports  Complications: No    XRay:  Placement verified by x-ray  Adverse Events:  None    11/15/2022    "

## 2022-11-16 PROBLEM — N17.9 AKI (ACUTE KIDNEY INJURY): Status: ACTIVE | Noted: 2022-11-16

## 2022-11-16 LAB
ALBUMIN SERPL BCP-MCNC: 2.6 G/DL (ref 3.5–5.2)
ALBUMIN SERPL BCP-MCNC: 2.8 G/DL (ref 3.5–5.2)
ALP SERPL-CCNC: 89 U/L (ref 55–135)
ALT SERPL W/O P-5'-P-CCNC: 6 U/L (ref 10–44)
ANION GAP SERPL CALC-SCNC: 10 MMOL/L (ref 8–16)
ANION GAP SERPL CALC-SCNC: 11 MMOL/L (ref 8–16)
ANION GAP SERPL CALC-SCNC: 12 MMOL/L (ref 8–16)
ANION GAP SERPL CALC-SCNC: 12 MMOL/L (ref 8–16)
ANION GAP SERPL CALC-SCNC: 8 MMOL/L (ref 8–16)
ANISOCYTOSIS BLD QL SMEAR: SLIGHT
APTT BLDCRRT: 28.2 SEC (ref 21–32)
APTT BLDCRRT: 28.3 SEC (ref 21–32)
AST SERPL-CCNC: 26 U/L (ref 10–40)
BACTERIA SPEC ANAEROBE CULT: NORMAL
BACTERIA SPEC ANAEROBE CULT: NORMAL
BASO STIPL BLD QL SMEAR: ABNORMAL
BASOPHILS # BLD AUTO: 0.04 K/UL (ref 0–0.2)
BASOPHILS # BLD AUTO: 0.05 K/UL (ref 0–0.2)
BASOPHILS # BLD AUTO: ABNORMAL K/UL (ref 0–0.2)
BASOPHILS NFR BLD: 0 % (ref 0–1.9)
BASOPHILS NFR BLD: 0.2 % (ref 0–1.9)
BILIRUB SERPL-MCNC: 1.2 MG/DL (ref 0.1–1)
BUN SERPL-MCNC: 34 MG/DL (ref 8–23)
BUN SERPL-MCNC: 64 MG/DL (ref 8–23)
BUN SERPL-MCNC: 70 MG/DL (ref 8–23)
BUN SERPL-MCNC: 73 MG/DL (ref 8–23)
BUN SERPL-MCNC: 74 MG/DL (ref 8–23)
CALCIUM SERPL-MCNC: 8.4 MG/DL (ref 8.7–10.5)
CALCIUM SERPL-MCNC: 8.6 MG/DL (ref 8.7–10.5)
CALCIUM SERPL-MCNC: 8.6 MG/DL (ref 8.7–10.5)
CALCIUM SERPL-MCNC: 8.7 MG/DL (ref 8.7–10.5)
CALCIUM SERPL-MCNC: 8.8 MG/DL (ref 8.7–10.5)
CHLORIDE SERPL-SCNC: 100 MMOL/L (ref 95–110)
CHLORIDE SERPL-SCNC: 96 MMOL/L (ref 95–110)
CHLORIDE SERPL-SCNC: 97 MMOL/L (ref 95–110)
CHLORIDE SERPL-SCNC: 97 MMOL/L (ref 95–110)
CHLORIDE SERPL-SCNC: 99 MMOL/L (ref 95–110)
CO2 SERPL-SCNC: 22 MMOL/L (ref 23–29)
CO2 SERPL-SCNC: 23 MMOL/L (ref 23–29)
CO2 SERPL-SCNC: 23 MMOL/L (ref 23–29)
CO2 SERPL-SCNC: 24 MMOL/L (ref 23–29)
CO2 SERPL-SCNC: 25 MMOL/L (ref 23–29)
CREAT SERPL-MCNC: 2.8 MG/DL (ref 0.5–1.4)
CREAT SERPL-MCNC: 4.8 MG/DL (ref 0.5–1.4)
CREAT SERPL-MCNC: 5.2 MG/DL (ref 0.5–1.4)
CREAT SERPL-MCNC: 5.4 MG/DL (ref 0.5–1.4)
CREAT SERPL-MCNC: 5.5 MG/DL (ref 0.5–1.4)
CREAT UR-MCNC: 32 MG/DL (ref 23–375)
DIFFERENTIAL METHOD: ABNORMAL
EOSINOPHIL # BLD AUTO: 0 K/UL (ref 0–0.5)
EOSINOPHIL # BLD AUTO: 0.1 K/UL (ref 0–0.5)
EOSINOPHIL # BLD AUTO: ABNORMAL K/UL (ref 0–0.5)
EOSINOPHIL NFR BLD: 0 % (ref 0–8)
EOSINOPHIL NFR BLD: 0.1 % (ref 0–8)
EOSINOPHIL NFR BLD: 0.2 % (ref 0–8)
ERYTHROCYTE [DISTWIDTH] IN BLOOD BY AUTOMATED COUNT: 17.1 % (ref 11.5–14.5)
ERYTHROCYTE [DISTWIDTH] IN BLOOD BY AUTOMATED COUNT: 17.2 % (ref 11.5–14.5)
ERYTHROCYTE [DISTWIDTH] IN BLOOD BY AUTOMATED COUNT: 17.6 % (ref 11.5–14.5)
ERYTHROCYTE [DISTWIDTH] IN BLOOD BY AUTOMATED COUNT: 17.6 % (ref 11.5–14.5)
ERYTHROCYTE [DISTWIDTH] IN BLOOD BY AUTOMATED COUNT: 17.7 % (ref 11.5–14.5)
EST. GFR  (NO RACE VARIABLE): 10.9 ML/MIN/1.73 M^2
EST. GFR  (NO RACE VARIABLE): 11.2 ML/MIN/1.73 M^2
EST. GFR  (NO RACE VARIABLE): 11.7 ML/MIN/1.73 M^2
EST. GFR  (NO RACE VARIABLE): 12.9 ML/MIN/1.73 M^2
EST. GFR  (NO RACE VARIABLE): 24.6 ML/MIN/1.73 M^2
GIANT PLATELETS BLD QL SMEAR: PRESENT
GLUCOSE SERPL-MCNC: 105 MG/DL (ref 70–110)
GLUCOSE SERPL-MCNC: 105 MG/DL (ref 70–110)
GLUCOSE SERPL-MCNC: 108 MG/DL (ref 70–110)
GLUCOSE SERPL-MCNC: 109 MG/DL (ref 70–110)
GLUCOSE SERPL-MCNC: 90 MG/DL (ref 70–110)
HCT VFR BLD AUTO: 23.4 % (ref 40–54)
HCT VFR BLD AUTO: 23.8 % (ref 40–54)
HCT VFR BLD AUTO: 23.8 % (ref 40–54)
HCT VFR BLD AUTO: 24.5 % (ref 40–54)
HCT VFR BLD AUTO: 25.1 % (ref 40–54)
HGB BLD-MCNC: 7.7 G/DL (ref 14–18)
HGB BLD-MCNC: 7.8 G/DL (ref 14–18)
HGB BLD-MCNC: 7.8 G/DL (ref 14–18)
HGB BLD-MCNC: 7.9 G/DL (ref 14–18)
HGB BLD-MCNC: 8.1 G/DL (ref 14–18)
HYPOCHROMIA BLD QL SMEAR: ABNORMAL
HYPOCHROMIA BLD QL SMEAR: ABNORMAL
IMM GRANULOCYTES # BLD AUTO: 0.24 K/UL (ref 0–0.04)
IMM GRANULOCYTES # BLD AUTO: 0.25 K/UL (ref 0–0.04)
IMM GRANULOCYTES # BLD AUTO: 0.26 K/UL (ref 0–0.04)
IMM GRANULOCYTES # BLD AUTO: 0.29 K/UL (ref 0–0.04)
IMM GRANULOCYTES # BLD AUTO: ABNORMAL K/UL (ref 0–0.04)
IMM GRANULOCYTES NFR BLD AUTO: 1 % (ref 0–0.5)
IMM GRANULOCYTES NFR BLD AUTO: 1.1 % (ref 0–0.5)
IMM GRANULOCYTES NFR BLD AUTO: 1.2 % (ref 0–0.5)
IMM GRANULOCYTES NFR BLD AUTO: 1.2 % (ref 0–0.5)
IMM GRANULOCYTES NFR BLD AUTO: ABNORMAL % (ref 0–0.5)
INR PPP: 1.1 (ref 0.8–1.2)
INR PPP: 1.1 (ref 0.8–1.2)
LYMPHOCYTES # BLD AUTO: 1.1 K/UL (ref 1–4.8)
LYMPHOCYTES # BLD AUTO: 1.1 K/UL (ref 1–4.8)
LYMPHOCYTES # BLD AUTO: 1.2 K/UL (ref 1–4.8)
LYMPHOCYTES # BLD AUTO: 1.3 K/UL (ref 1–4.8)
LYMPHOCYTES # BLD AUTO: ABNORMAL K/UL (ref 1–4.8)
LYMPHOCYTES NFR BLD: 3.5 % (ref 18–48)
LYMPHOCYTES NFR BLD: 4.4 % (ref 18–48)
LYMPHOCYTES NFR BLD: 5 % (ref 18–48)
LYMPHOCYTES NFR BLD: 5.2 % (ref 18–48)
LYMPHOCYTES NFR BLD: 5.4 % (ref 18–48)
MAGNESIUM SERPL-MCNC: 1.9 MG/DL (ref 1.6–2.6)
MAGNESIUM SERPL-MCNC: 2.3 MG/DL (ref 1.6–2.6)
MCH RBC QN AUTO: 29.8 PG (ref 27–31)
MCH RBC QN AUTO: 30 PG (ref 27–31)
MCH RBC QN AUTO: 30 PG (ref 27–31)
MCH RBC QN AUTO: 30.2 PG (ref 27–31)
MCH RBC QN AUTO: 30.3 PG (ref 27–31)
MCHC RBC AUTO-ENTMCNC: 32.2 G/DL (ref 32–36)
MCHC RBC AUTO-ENTMCNC: 32.3 G/DL (ref 32–36)
MCHC RBC AUTO-ENTMCNC: 32.4 G/DL (ref 32–36)
MCHC RBC AUTO-ENTMCNC: 32.8 G/DL (ref 32–36)
MCHC RBC AUTO-ENTMCNC: 33.3 G/DL (ref 32–36)
MCV RBC AUTO: 91 FL (ref 82–98)
MCV RBC AUTO: 91 FL (ref 82–98)
MCV RBC AUTO: 93 FL (ref 82–98)
MCV RBC AUTO: 93 FL (ref 82–98)
MCV RBC AUTO: 94 FL (ref 82–98)
METAMYELOCYTES NFR BLD MANUAL: 0.5 %
MONOCYTES # BLD AUTO: 1.9 K/UL (ref 0.3–1)
MONOCYTES # BLD AUTO: 2 K/UL (ref 0.3–1)
MONOCYTES # BLD AUTO: 2 K/UL (ref 0.3–1)
MONOCYTES # BLD AUTO: 2.1 K/UL (ref 0.3–1)
MONOCYTES # BLD AUTO: ABNORMAL K/UL (ref 0.3–1)
MONOCYTES NFR BLD: 10.2 % (ref 4–15)
MONOCYTES NFR BLD: 6.5 % (ref 4–15)
MONOCYTES NFR BLD: 7.6 % (ref 4–15)
MONOCYTES NFR BLD: 8.1 % (ref 4–15)
MONOCYTES NFR BLD: 8.4 % (ref 4–15)
MYELOCYTES NFR BLD MANUAL: 0.5 %
NEUTROPHILS # BLD AUTO: 17 K/UL (ref 1.8–7.7)
NEUTROPHILS # BLD AUTO: 20.3 K/UL (ref 1.8–7.7)
NEUTROPHILS # BLD AUTO: 20.8 K/UL (ref 1.8–7.7)
NEUTROPHILS # BLD AUTO: 21.5 K/UL (ref 1.8–7.7)
NEUTROPHILS NFR BLD: 82.8 % (ref 38–73)
NEUTROPHILS NFR BLD: 85.3 % (ref 38–73)
NEUTROPHILS NFR BLD: 85.6 % (ref 38–73)
NEUTROPHILS NFR BLD: 86 % (ref 38–73)
NEUTROPHILS NFR BLD: 88 % (ref 38–73)
NEUTS BAND NFR BLD MANUAL: 1 %
NRBC BLD-RTO: 0 /100 WBC
OVALOCYTES BLD QL SMEAR: ABNORMAL
PHOSPHATE SERPL-MCNC: 2.9 MG/DL (ref 2.7–4.5)
PHOSPHATE SERPL-MCNC: 6 MG/DL (ref 2.7–4.5)
PLATELET # BLD AUTO: 264 K/UL (ref 150–450)
PLATELET # BLD AUTO: 275 K/UL (ref 150–450)
PLATELET # BLD AUTO: 278 K/UL (ref 150–450)
PLATELET # BLD AUTO: 283 K/UL (ref 150–450)
PLATELET # BLD AUTO: 298 K/UL (ref 150–450)
PLATELET BLD QL SMEAR: ABNORMAL
PMV BLD AUTO: 10 FL (ref 9.2–12.9)
PMV BLD AUTO: 10.1 FL (ref 9.2–12.9)
PMV BLD AUTO: 10.3 FL (ref 9.2–12.9)
PMV BLD AUTO: 9.9 FL (ref 9.2–12.9)
PMV BLD AUTO: 9.9 FL (ref 9.2–12.9)
POIKILOCYTOSIS BLD QL SMEAR: SLIGHT
POLYCHROMASIA BLD QL SMEAR: ABNORMAL
POLYCHROMASIA BLD QL SMEAR: ABNORMAL
POTASSIUM SERPL-SCNC: 4.3 MMOL/L (ref 3.5–5.1)
POTASSIUM SERPL-SCNC: 4.8 MMOL/L (ref 3.5–5.1)
POTASSIUM SERPL-SCNC: 5 MMOL/L (ref 3.5–5.1)
POTASSIUM SERPL-SCNC: 5.2 MMOL/L (ref 3.5–5.1)
POTASSIUM SERPL-SCNC: 5.3 MMOL/L (ref 3.5–5.1)
PROT SERPL-MCNC: 5.8 G/DL (ref 6–8.4)
PROTHROMBIN TIME: 11.5 SEC (ref 9–12.5)
PROTHROMBIN TIME: 11.8 SEC (ref 9–12.5)
RBC # BLD AUTO: 2.57 M/UL (ref 4.6–6.2)
RBC # BLD AUTO: 2.58 M/UL (ref 4.6–6.2)
RBC # BLD AUTO: 2.62 M/UL (ref 4.6–6.2)
RBC # BLD AUTO: 2.63 M/UL (ref 4.6–6.2)
RBC # BLD AUTO: 2.67 M/UL (ref 4.6–6.2)
SODIUM SERPL-SCNC: 130 MMOL/L (ref 136–145)
SODIUM SERPL-SCNC: 130 MMOL/L (ref 136–145)
SODIUM SERPL-SCNC: 132 MMOL/L (ref 136–145)
SODIUM SERPL-SCNC: 133 MMOL/L (ref 136–145)
SODIUM SERPL-SCNC: 134 MMOL/L (ref 136–145)
SODIUM UR-SCNC: 71 MMOL/L (ref 20–250)
UUN UR-MCNC: 147 MG/DL (ref 140–1050)
WBC # BLD AUTO: 20.53 K/UL (ref 3.9–12.7)
WBC # BLD AUTO: 23.81 K/UL (ref 3.9–12.7)
WBC # BLD AUTO: 24.13 K/UL (ref 3.9–12.7)
WBC # BLD AUTO: 25.02 K/UL (ref 3.9–12.7)
WBC # BLD AUTO: 30.96 K/UL (ref 3.9–12.7)

## 2022-11-16 PROCEDURE — 99233 PR SUBSEQUENT HOSPITAL CARE,LEVL III: ICD-10-PCS | Mod: ,,, | Performed by: ANESTHESIOLOGY

## 2022-11-16 PROCEDURE — 25000003 PHARM REV CODE 250: Performed by: STUDENT IN AN ORGANIZED HEALTH CARE EDUCATION/TRAINING PROGRAM

## 2022-11-16 PROCEDURE — 99900035 HC TECH TIME PER 15 MIN (STAT)

## 2022-11-16 PROCEDURE — 90945 DIALYSIS ONE EVALUATION: CPT

## 2022-11-16 PROCEDURE — 20000000 HC ICU ROOM

## 2022-11-16 PROCEDURE — 63600175 PHARM REV CODE 636 W HCPCS: Performed by: STUDENT IN AN ORGANIZED HEALTH CARE EDUCATION/TRAINING PROGRAM

## 2022-11-16 PROCEDURE — 97530 THERAPEUTIC ACTIVITIES: CPT

## 2022-11-16 PROCEDURE — 99233 PR SUBSEQUENT HOSPITAL CARE,LEVL III: ICD-10-PCS | Mod: ,,, | Performed by: INTERNAL MEDICINE

## 2022-11-16 PROCEDURE — 25000003 PHARM REV CODE 250

## 2022-11-16 PROCEDURE — 80048 BASIC METABOLIC PNL TOTAL CA: CPT | Mod: 91,XB

## 2022-11-16 PROCEDURE — 84100 ASSAY OF PHOSPHORUS: CPT

## 2022-11-16 PROCEDURE — 85610 PROTHROMBIN TIME: CPT | Performed by: THORACIC SURGERY (CARDIOTHORACIC VASCULAR SURGERY)

## 2022-11-16 PROCEDURE — 85730 THROMBOPLASTIN TIME PARTIAL: CPT | Mod: 91

## 2022-11-16 PROCEDURE — 99223 1ST HOSP IP/OBS HIGH 75: CPT | Mod: ,,, | Performed by: INTERNAL MEDICINE

## 2022-11-16 PROCEDURE — 63600175 PHARM REV CODE 636 W HCPCS

## 2022-11-16 PROCEDURE — 80053 COMPREHEN METABOLIC PANEL: CPT

## 2022-11-16 PROCEDURE — 63600175 PHARM REV CODE 636 W HCPCS: Performed by: ANESTHESIOLOGY

## 2022-11-16 PROCEDURE — 80069 RENAL FUNCTION PANEL: CPT | Performed by: STUDENT IN AN ORGANIZED HEALTH CARE EDUCATION/TRAINING PROGRAM

## 2022-11-16 PROCEDURE — 85007 BL SMEAR W/DIFF WBC COUNT: CPT | Performed by: THORACIC SURGERY (CARDIOTHORACIC VASCULAR SURGERY)

## 2022-11-16 PROCEDURE — 99233 SBSQ HOSP IP/OBS HIGH 50: CPT | Mod: ,,, | Performed by: ANESTHESIOLOGY

## 2022-11-16 PROCEDURE — 83735 ASSAY OF MAGNESIUM: CPT

## 2022-11-16 PROCEDURE — 84540 ASSAY OF URINE/UREA-N: CPT | Performed by: STUDENT IN AN ORGANIZED HEALTH CARE EDUCATION/TRAINING PROGRAM

## 2022-11-16 PROCEDURE — 85025 COMPLETE CBC W/AUTO DIFF WBC: CPT | Mod: 91

## 2022-11-16 PROCEDURE — 85027 COMPLETE CBC AUTOMATED: CPT | Performed by: THORACIC SURGERY (CARDIOTHORACIC VASCULAR SURGERY)

## 2022-11-16 PROCEDURE — 99233 SBSQ HOSP IP/OBS HIGH 50: CPT | Mod: ,,, | Performed by: INTERNAL MEDICINE

## 2022-11-16 PROCEDURE — 85025 COMPLETE CBC W/AUTO DIFF WBC: CPT

## 2022-11-16 PROCEDURE — 80048 BASIC METABOLIC PNL TOTAL CA: CPT

## 2022-11-16 PROCEDURE — 99223 PR INITIAL HOSPITAL CARE,LEVL III: ICD-10-PCS | Mod: ,,, | Performed by: INTERNAL MEDICINE

## 2022-11-16 PROCEDURE — 85730 THROMBOPLASTIN TIME PARTIAL: CPT | Performed by: THORACIC SURGERY (CARDIOTHORACIC VASCULAR SURGERY)

## 2022-11-16 PROCEDURE — 94761 N-INVAS EAR/PLS OXIMETRY MLT: CPT

## 2022-11-16 PROCEDURE — 83735 ASSAY OF MAGNESIUM: CPT | Mod: 91 | Performed by: STUDENT IN AN ORGANIZED HEALTH CARE EDUCATION/TRAINING PROGRAM

## 2022-11-16 PROCEDURE — 97164 PT RE-EVAL EST PLAN CARE: CPT

## 2022-11-16 PROCEDURE — 85610 PROTHROMBIN TIME: CPT | Mod: 91

## 2022-11-16 PROCEDURE — 84300 ASSAY OF URINE SODIUM: CPT | Performed by: STUDENT IN AN ORGANIZED HEALTH CARE EDUCATION/TRAINING PROGRAM

## 2022-11-16 PROCEDURE — 82570 ASSAY OF URINE CREATININE: CPT | Performed by: STUDENT IN AN ORGANIZED HEALTH CARE EDUCATION/TRAINING PROGRAM

## 2022-11-16 RX ORDER — HYDROCODONE BITARTRATE AND ACETAMINOPHEN 500; 5 MG/1; MG/1
TABLET ORAL CONTINUOUS
Status: DISCONTINUED | OUTPATIENT
Start: 2022-11-16 | End: 2022-11-17

## 2022-11-16 RX ORDER — HEPARIN SODIUM 1000 [USP'U]/ML
1000 INJECTION, SOLUTION INTRAVENOUS; SUBCUTANEOUS
Status: CANCELLED | OUTPATIENT
Start: 2022-11-16 | End: 2022-11-17

## 2022-11-16 RX ORDER — METHOCARBAMOL 500 MG/1
500 TABLET, FILM COATED ORAL 3 TIMES DAILY
Status: DISCONTINUED | OUTPATIENT
Start: 2022-11-16 | End: 2022-12-01 | Stop reason: HOSPADM

## 2022-11-16 RX ORDER — DOXYCYCLINE HYCLATE 100 MG
100 TABLET ORAL EVERY 12 HOURS
Status: DISCONTINUED | OUTPATIENT
Start: 2022-11-16 | End: 2022-11-16

## 2022-11-16 RX ORDER — FUROSEMIDE 10 MG/ML
120 INJECTION INTRAMUSCULAR; INTRAVENOUS
Status: DISCONTINUED | OUTPATIENT
Start: 2022-11-16 | End: 2022-11-16

## 2022-11-16 RX ORDER — SODIUM CHLORIDE 9 MG/ML
INJECTION, SOLUTION INTRAVENOUS
Status: CANCELLED | OUTPATIENT
Start: 2022-11-16

## 2022-11-16 RX ORDER — MAGNESIUM SULFATE HEPTAHYDRATE 40 MG/ML
2 INJECTION, SOLUTION INTRAVENOUS
Status: DISCONTINUED | OUTPATIENT
Start: 2022-11-16 | End: 2022-11-17

## 2022-11-16 RX ORDER — SODIUM CHLORIDE 9 MG/ML
INJECTION, SOLUTION INTRAVENOUS ONCE
Status: CANCELLED | OUTPATIENT
Start: 2022-11-16 | End: 2022-11-16

## 2022-11-16 RX ORDER — MUPIROCIN 20 MG/G
OINTMENT TOPICAL 2 TIMES DAILY
Status: COMPLETED | OUTPATIENT
Start: 2022-11-16 | End: 2022-11-20

## 2022-11-16 RX ORDER — DOXYCYCLINE HYCLATE 100 MG
100 TABLET ORAL EVERY 12 HOURS
Status: COMPLETED | OUTPATIENT
Start: 2022-11-16 | End: 2022-11-20

## 2022-11-16 RX ADMIN — METHOCARBAMOL 500 MG: 500 TABLET ORAL at 09:11

## 2022-11-16 RX ADMIN — MILRINONE LACTATE IN DEXTROSE 0.25 MCG/KG/MIN: 200 INJECTION, SOLUTION INTRAVENOUS at 05:11

## 2022-11-16 RX ADMIN — ASPIRIN 81 MG 81 MG: 81 TABLET ORAL at 08:11

## 2022-11-16 RX ADMIN — MUPIROCIN: 20 OINTMENT TOPICAL at 11:11

## 2022-11-16 RX ADMIN — HYDROMORPHONE HYDROCHLORIDE 1 MG: 1 INJECTION, SOLUTION INTRAMUSCULAR; INTRAVENOUS; SUBCUTANEOUS at 08:11

## 2022-11-16 RX ADMIN — OXYCODONE HYDROCHLORIDE 10 MG: 10 TABLET ORAL at 09:11

## 2022-11-16 RX ADMIN — HYDROMORPHONE HYDROCHLORIDE 1 MG: 1 INJECTION, SOLUTION INTRAMUSCULAR; INTRAVENOUS; SUBCUTANEOUS at 06:11

## 2022-11-16 RX ADMIN — HEPARIN SODIUM 5000 UNITS: 5000 INJECTION INTRAVENOUS; SUBCUTANEOUS at 05:11

## 2022-11-16 RX ADMIN — HEPARIN SODIUM 5000 UNITS: 5000 INJECTION INTRAVENOUS; SUBCUTANEOUS at 02:11

## 2022-11-16 RX ADMIN — DOXYCYCLINE HYCLATE 100 MG: 100 TABLET, COATED ORAL at 09:11

## 2022-11-16 RX ADMIN — METHOCARBAMOL 500 MG: 500 TABLET ORAL at 02:11

## 2022-11-16 RX ADMIN — CEFEPIME 2 G: 2 INJECTION, POWDER, FOR SOLUTION INTRAVENOUS at 04:11

## 2022-11-16 RX ADMIN — FUROSEMIDE 120 MG: 10 INJECTION, SOLUTION INTRAMUSCULAR; INTRAVENOUS at 08:11

## 2022-11-16 RX ADMIN — OXYCODONE HYDROCHLORIDE 10 MG: 10 TABLET ORAL at 05:11

## 2022-11-16 RX ADMIN — CHLOROTHIAZIDE SODIUM 500 MG: 500 INJECTION, POWDER, LYOPHILIZED, FOR SOLUTION INTRAVENOUS at 09:11

## 2022-11-16 RX ADMIN — ACETAMINOPHEN 1000 MG: 500 TABLET ORAL at 09:11

## 2022-11-16 RX ADMIN — AMIODARONE HYDROCHLORIDE 200 MG: 200 TABLET ORAL at 08:11

## 2022-11-16 RX ADMIN — ACETAMINOPHEN 1000 MG: 500 TABLET ORAL at 02:11

## 2022-11-16 RX ADMIN — METHOCARBAMOL 500 MG: 500 TABLET ORAL at 08:11

## 2022-11-16 RX ADMIN — SENNOSIDES AND DOCUSATE SODIUM 1 TABLET: 50; 8.6 TABLET ORAL at 08:11

## 2022-11-16 RX ADMIN — MAGNESIUM SULFATE 2 G: 2 INJECTION INTRAVENOUS at 11:11

## 2022-11-16 RX ADMIN — SODIUM CHLORIDE: 0.9 INJECTION, SOLUTION INTRAVENOUS at 04:11

## 2022-11-16 RX ADMIN — HEPARIN SODIUM 5000 UNITS: 5000 INJECTION INTRAVENOUS; SUBCUTANEOUS at 09:11

## 2022-11-16 RX ADMIN — VASOPRESSIN 0.04 UNITS/MIN: 20 INJECTION INTRAVENOUS at 11:11

## 2022-11-16 RX ADMIN — MUPIROCIN: 20 OINTMENT TOPICAL at 09:11

## 2022-11-16 RX ADMIN — SENNOSIDES AND DOCUSATE SODIUM 1 TABLET: 50; 8.6 TABLET ORAL at 09:11

## 2022-11-16 RX ADMIN — CHLOROTHIAZIDE SODIUM 500 MG: 500 INJECTION, POWDER, LYOPHILIZED, FOR SOLUTION INTRAVENOUS at 02:11

## 2022-11-16 RX ADMIN — POLYETHYLENE GLYCOL 3350 17 G: 17 POWDER, FOR SOLUTION ORAL at 08:11

## 2022-11-16 RX ADMIN — MILRINONE LACTATE IN DEXTROSE 0.25 MCG/KG/MIN: 200 INJECTION, SOLUTION INTRAVENOUS at 11:11

## 2022-11-16 RX ADMIN — OXYCODONE HYDROCHLORIDE 10 MG: 10 TABLET ORAL at 03:11

## 2022-11-16 RX ADMIN — DOXYCYCLINE HYCLATE 100 MG: 100 TABLET, COATED ORAL at 10:11

## 2022-11-16 RX ADMIN — AMIODARONE HYDROCHLORIDE 200 MG: 200 TABLET ORAL at 09:11

## 2022-11-16 RX ADMIN — FUROSEMIDE 120 MG: 10 INJECTION, SOLUTION INTRAMUSCULAR; INTRAVENOUS at 02:11

## 2022-11-16 NOTE — PLAN OF CARE
PT Re-Eval complete and POC.    Problem: Physical Therapy  Goal: Physical Therapy Goal  Description: Goals to be met by: 2022     Patient will increase functional independence with mobility by performin. Supine to sit with Modified Witten  2. Sit to supine with Modified Witten  3. Sit to stand transfer with Supervision  4. Bed to chair transfer with Supervision using No Assistive Device  5. Gait  x 200 feet with Supervision using No Assistive Device.   6. Pt will ascend/descend 3 steps with no HR and SBA.    Outcome: Ongoing, Progressing

## 2022-11-16 NOTE — ASSESSMENT & PLAN NOTE
Patient with Serratia bacteremia during prior admission with involvement of vascular graft.   · Continue cefepime as above.  · Will get clarification from primary service to see if graft in question was removed.

## 2022-11-16 NOTE — PLAN OF CARE
SICU PLAN OF CARE NOTE    Dx: Sternal wound infection    Shift Events: 500 mL albumin, 260 mg lasix IVP, 500 mg diuril admin with minimal UOP response. BMP trended Q6h. Trialysis placed. 1 PRBC.    Gtts: Milrinone    Neuro: AAO x4, Follows Commands, and Moves All Extremities    Cardiac: ST, HR 100s    Respiratory: Room Air    GI: Cardiac Diet, 1500 mL FR    : Urinary Catheter 0-15 cc/hour    Drains: BLANCA x 5, 144 mL/shift, sanguinous     Labs/Accuchecks: Q6h BMP    Skin: No skin breakdown noted. Midsternal island/border CDI. Pressure dressing and binder to chest CDI. Bed plugged in with mattress inflated. Heel and sacral foams in place. Weight shift assistance provided throughout shift.

## 2022-11-16 NOTE — HPI
61 yo male w/ hx of Afib; HFrEF (35%); HTN; HLD. Patient underwent who underwent mitral valve repair, tricuspid valve repair, left atrial maze, left atrial appendage resection, and direct aortic impella 5.5 insertion on 10/07/2022. Hospital course complicated by Serratia bacteremia (on 6wk course of cefepime), sternal wound infection, and cardiogenic shock. On 10/31 patient developed KIMANI. Per chart review no documented course of hypotension during that time. Patient started on diuresis with IV lasix, with good urine output. Patient required increased diuretics (IV Diuril 500mg TID; and Lasix 120mg TID) and continued to have increased Scr, which progressed to oliguria, and now having metabolic derangements.     Patient denies any prior history of kidney disease. Does not have significant family history of kidney disease. Does endorse worsening fatigue and dyspnea. Denies any nausea vomiting, or metallic taste in mouth.     Nephrology was consulted for oliguric KIMANI.

## 2022-11-16 NOTE — PROGRESS NOTES
Plastic and Reconstructive Surgery   Progress Note    Subjective:    Patient seen and examined at bedside in [place]. Patient is still in pain and complaining of binder. Hgb downtrending and received transfusion yesterday. Hgb 7.8 from 7.2.    Objective:  Vital signs in last 24 hours:  Temp:  [97.5 °F (36.4 °C)-98.4 °F (36.9 °C)] 97.7 °F (36.5 °C)  Pulse:  [100-113] 104  Resp:  [10-37] 15  SpO2:  [82 %-100 %] 90 %  BP: (110-147)/(54-70) 132/63  Arterial Line BP: ()/(42-62) 137/62    Intake/Output last 3 shifts:  I/O last 3 completed shifts:  In: 3013.6 [I.V.:1188.8; Blood:928; IV Piggyback:896.8]  Out: 1880 [Urine:780; Drains:800; Blood:300]    Intake/Output this shift:  I/O this shift:  In: 11.5 [I.V.:11.5]  Out: 35 [Urine:35]        Physical Exam:  VITAL SIGNS:   Vitals:    22 0700 22 0728 22 0800 22 0827   BP: (!) 122/59  132/63    BP Location: Left arm  Left arm    Patient Position: Lying  Lying    Pulse: 103 104 104    Resp: 16 13 (!) 21 15   Temp:   97.7 °F (36.5 °C)    TempSrc:   Oral    SpO2: (!) 91% (!) 91% (!) 90%    Weight:       Height:         TMAX: Temp (24hrs), Av.9 °F (36.6 °C), Min:97.5 °F (36.4 °C), Max:98.4 °F (36.9 °C)    General: Alert; No acute distress  Cardiovascular: Regular rate   Respiratory: Normal respiratory effort. Chest rise symmetric.   Abdomen: Soft, nontender, nondistended  Extremity: Moves all extremities equally.  Neurologic: No focal deficit. Speech normal  SKIN: midline sternal and bilateral axillary incisions c/d/I, 5 drains - mediastinal drain and right chest drain with darker clot and output      Scheduled Medications acetaminophen, 1,000 mg, Q8H  amiodarone, 200 mg, BID  aspirin, 81 mg, Daily  ceFEPime (MAXIPIME) IVPB, 2 g, Q24H  chlorothiazide (DIURIL) IVPB, 500 mg, TID WAKE  furosemide (LASIX) injection, 120 mg, TID WAKE  heparin (porcine), 5,000 Units, Q8H  methocarbamoL, 500 mg, TID  polyethylene glycol, 17 g, Daily  senna-docusate 8.6-50  mg, 1 tablet, BID    PRN Medications sodium chloride, sodium chloride, sodium chloride, dextrose 10%, dextrose 10%, HYDROmorphone, hydrOXYzine pamoate, ondansetron, oxyCODONE, oxyCODONE    Recent Labs:   Lab Results   Component Value Date    WBC 25.02 (H) 11/16/2022    HGB 7.8 (L) 11/16/2022    HCT 23.8 (L) 11/16/2022    MCV 91 11/16/2022     11/16/2022     Lab Results   Component Value Date     11/16/2022     (L) 11/16/2022    K 5.3 (H) 11/16/2022    CL 97 11/16/2022    BUN 73 (H) 11/16/2022         Assessment: 62 y.o. y/o male 2 Days Post-Op s/p Procedure(s):  REMOVAL, STERNAL WIRE  DEBRIDEMENT, STERNUM  APPLICATION, WOUND VAC Doing well postoperatively.    Plan  Maintain binder and pressure dressing  Trend Hgb, transfuse as needed  Monitor Bun/Cr and platelet function  Monitor HYperkalemia  Consider HD and DDVAP for Uremia to help with oozing and platelet function  Monitor drain output  Pain control  I's & O's  Dvt ppx  Medical management as per SICU  Discussed with Dr Neida Whitlock MD- Fellow  Department of Plastic and Reconstructive Surgery  [phone] (office)

## 2022-11-16 NOTE — PROGRESS NOTES
[facility] - Surgical Intensive Care  Infectious Disease  Progress Note    Patient Name: David Barrios  MRN: [id number]  Admission Date: 11/8/2022  Length of Stay: 8 days  Attending Physician: Mike Hedrick MD  Primary Care Provider: Breann Tavera MD    Isolation Status: No active isolations  Assessment/Plan:      * Sternal wound infection  Known infection from prior admission. On long term cefepime due to below. Repeat surgical cultures no growth.   · Continue cefepime.   · Adjust cefepime for renal clearance if undergoing renal replacement therapy.    Vascular inflammation or infection  Patient with Serratia bacteremia during prior admission with involvement of vascular graft. Per discussion with SICU service, the vascular graft has been removed. He is afebrile and with down trending leukocytosis.   · Continue cefepime as above.  · Will plan for a 6 week course of therapy.   · See OPAT plan below.     Outpatient Antibiotic Therapy Plan:    Please send referral to [facility].    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 Marek Jara    [facility] Fax Number: [phone]    6) Outpatient Infectious Diseases Follow-up     Follow-up appointment will be arranged by the ID clinic and will be found in the patient's appointments tab.     Prior to discharge, please ensure the patient's follow-up has been scheduled.     If there is still no follow-up scheduled prior to discharge, please send an EPIC message to Mahi Campbell in Infectious  Diseases.              Pneumonia of both lower lobes due to infectious organism  Airspace opacities seen on CXR on admission. Now with bilateral opacities and pleural effusions. Unclear if this is infectious in nature at this time or pulmonary edema.  · Complete a 5 day course of doxycycline.  · Discussed with primary service.      Anticipated Disposition: per primary    Thank you for your consult. I will sign off. Please contact us if you have any additional questions.    Alix Jara MD  Infectious Disease  [facility] - Surgical Intensive Care    Subjective:     Principal Problem:Sternal wound infection    HPI: A 62-year-old man with heart failure, AFib, hypertension, status post tricuspid and mitral valve replacement plus Maze procedure, left atrial appendage ligation, s/p Impella placement with removal, recent Serratia marcescens bacteremia plus infected graft secondary to wound infection, and on a 6 week course of cefepime followed by chronic antimicrobial suppression who was readmitted for planned sternal wire removal and wound revision.  He underwent mediastinal exploration with evacuation of purulent pericarditis, debridement and decortication of the mediastinal structures, resection of the ascending aortic graft, removal of sternal wires, and partial bilateral sternectomy on 11/08.  He also underwent sternal wound exploration with minimal debridement, washout, and wound VAC placement on 11/10.  Mr. Cornejo also underwent exploration of his wound, irrigation and the debridement of the sternal wound, creation of a muscle flap with rotation and sternal wound closure on 11/14.  Throughout his hospital stay he was continued on therapy with cefepime as per prior recommendations.  Unfortunately his hospital course was complicated by shortness of breath overnight.  A chest x-ray revealed cardiomegaly with bilateral pulmonary opacities, greater on the right, and bilateral pleural effusions.  Checks x-ray also  "demonstrated new subcutaneous emphysema of the chest wall.    Infectious Disease is consulted for "on cefepime for serratio bacteremia x6 weeks. new R sided opacity on CXR and leukocytosis. wanting to broaden abx."        Interval History: "They're talking about dialysis". Afebrile and without leukocytosis. Pending renal replacement therapy.    Review of Systems   Constitutional:  Negative for chills, fatigue and fever.   HENT:  Negative for ear pain, mouth sores, nosebleeds, postnasal drip, rhinorrhea, sinus pressure, sore throat, tinnitus, trouble swallowing and voice change.    Eyes:  Negative for photophobia, pain, redness and visual disturbance.   Respiratory:  Positive for chest tightness. Negative for apnea, cough, shortness of breath and wheezing.    Cardiovascular:  Negative for chest pain, palpitations and leg swelling.   Gastrointestinal:  Negative for abdominal pain, blood in stool, constipation, diarrhea, nausea and vomiting.   Endocrine: Negative for cold intolerance, heat intolerance, polydipsia and polyuria.   Genitourinary:  Negative for decreased urine volume, difficulty urinating, dysuria, flank pain, frequency, genital sores, hematuria, penile discharge, penile pain, penile swelling, scrotal swelling, testicular pain and urgency.   Musculoskeletal:  Positive for arthralgias. Negative for back pain, joint swelling, myalgias and neck pain.   Skin:  Negative for color change and rash.   Allergic/Immunologic: Negative for environmental allergies and food allergies.   Neurological:  Negative for dizziness, seizures, syncope, weakness, light-headedness, numbness and headaches.   Hematological:  Negative for adenopathy. Does not bruise/bleed easily.   Psychiatric/Behavioral:  Negative for agitation, confusion, decreased concentration, hallucinations, self-injury, sleep disturbance and suicidal ideas. The patient is not nervous/anxious.    Objective:     Vital Signs (Most Recent):  Temp: 97.4 °F (36.3 " °C) (11/16/22 1915)  Pulse: 99 (11/16/22 1915)  Resp: 12 (11/16/22 1915)  BP: (!) 114/55 (11/16/22 1400)  SpO2: 100 % (11/16/22 1915)   Vital Signs (24h Range):  Temp:  [97.4 °F (36.3 °C)-98.4 °F (36.9 °C)] 97.4 °F (36.3 °C)  Pulse:  [] 99  Resp:  [10-23] 12  SpO2:  [86 %-100 %] 100 %  BP: (110-147)/(55-70) 114/55  Arterial Line BP: ()/(44-68) 100/48     Weight: 74 kg (163 lb 2.3 oz)  Body mass index is 24.08 kg/m².    Estimated Creatinine Clearance: 15.8 mL/min (A) (based on SCr of 4.8 mg/dL (H)).    Physical Exam  Vitals reviewed.   Constitutional:       Appearance: He is well-developed.   HENT:      Head: Normocephalic and atraumatic.      Right Ear: External ear normal.      Left Ear: External ear normal.   Eyes:      Conjunctiva/sclera: Conjunctivae normal.   Neck:      Thyroid: No thyromegaly.   Cardiovascular:      Rate and Rhythm: Normal rate and regular rhythm.      Heart sounds: Normal heart sounds. No murmur heard.  Pulmonary:      Effort: Pulmonary effort is normal.      Breath sounds: Examination of the right-lower field reveals decreased breath sounds. Examination of the left-lower field reveals decreased breath sounds. Decreased breath sounds present. No wheezing or rales.   Chest:      Comments: Palpable subcutaneous emphysema  Abdominal:      General: Bowel sounds are normal.      Palpations: Abdomen is soft. There is no mass.      Tenderness: There is no abdominal tenderness. There is no rebound.   Musculoskeletal:         General: Normal range of motion.      Cervical back: Normal range of motion and neck supple.   Lymphadenopathy:      Cervical: No cervical adenopathy.   Skin:     General: Skin is warm and dry.   Neurological:      Mental Status: He is alert and oriented to person, place, and time.   Psychiatric:         Behavior: Behavior normal.       Significant Labs: Blood Culture:   Recent Labs   Lab 10/14/22  1638 10/16/22  1419 10/16/22  1436 10/18/22  1608 10/18/22  4219    LABBLOO No growth after 5 days.  No growth after 5 days. No growth after 5 days. No growth after 5 days. No growth after 5 days. No growth after 5 days.     BMP:   Recent Labs   Lab 11/16/22  0344 11/16/22  1117 11/16/22  1726      < > 108   *   < > 134*   K 5.3*   < > 4.8   CL 97   < > 100   CO2 23   < > 24   BUN 73*   < > 64*   CREATININE 5.4*   < > 4.8*   CALCIUM 8.8   < > 8.6*   MG 2.3  --   --     < > = values in this interval not displayed.     CBC:   Recent Labs   Lab 11/16/22  0923 11/16/22  1117 11/16/22  1929   WBC 23.81* 24.13* 20.53*   HGB 7.9* 8.1* 7.7*   HCT 24.5* 25.1* 23.8*    278 275     Respiratory Culture:   Recent Labs   Lab 10/07/22  0647 10/11/22  0919   GSRESP  --  No WBC's  No organisms seen   RESPIRATORYC Normal respiratory janna  --      Wound Culture:   Recent Labs   Lab 10/14/22  1643 10/15/22  1240 11/08/22  1106 11/10/22  0834 11/10/22  0836   LABAERO SERRATIA MARCESCENS  Moderate  * SERRATIA MARCESCENS  Few  *  SERRATIA MARCESCENS  Few  * No growth  No growth  No growth  No growth  No growth No growth Skin janna,  no predominant organism       Significant Imaging: I have reviewed all pertinent imaging results/findings within the past 24 hours.

## 2022-11-16 NOTE — SUBJECTIVE & OBJECTIVE
Past Medical History:   Diagnosis Date    Anemia     Atrial fibrillation     Encounter for blood transfusion     Esophageal ulcer     GI bleed     Hypertension        Past Surgical History:   Procedure Laterality Date    APPLICATION OF WOUND VACUUM-ASSISTED CLOSURE DEVICE N/A 11/8/2022    Procedure: APPLICATION, WOUND VAC;  Surgeon: Mike Hedrick MD;  Location: [facility] OR 2ND FLR;  Service: General;  Laterality: N/A;    CARDIOVERSION Left 9/15/2022    Procedure: Cardioversion;  Surgeon: Julio James MD;  Location: [facility] CATH LAB/EP;  Service: Cardiology;  Laterality: Left;  With NORBERT    CATHETERIZATION OF BOTH LEFT AND RIGHT HEART N/A 9/22/2022    Procedure: CATHETERIZATION, HEART, BOTH LEFT AND RIGHT;  Surgeon: Julio James MD;  Location: [facility] CATH LAB/EP;  Service: Cardiology;  Laterality: N/A;    COLONOSCOPY N/A 4/4/2019    Procedure: COLONOSCOPY Golytely;  Surgeon: Umair Randolph MD;  Location: [facility] ENDO;  Service: Endoscopy;  Laterality: N/A;    CORONARY ANGIOGRAPHY N/A 9/22/2022    Procedure: ANGIOGRAM, CORONARY ARTERY;  Surgeon: Julio James MD;  Location: [facility] CATH LAB/EP;  Service: Cardiology;  Laterality: N/A;    MEYER MAZE PROCEDURE N/A 10/7/2022    Procedure: MEYER MAZE PROCEDURE;  Surgeon: Mike Hedrick MD;  [facility];  Service: Cardiovascular;  Laterality: N/A;    CREATION OF MUSCLE ROTATIONAL FLAP N/A 11/14/2022    Procedure: CREATION, FLAP, MUSCLE ROTATION;  Surgeon: Amadeo Carrasquillo MD;  [facility];  Service: Plastics;  Laterality: N/A;  sternal wound that need pec flap coverage    DEBRIDEMENT OF STERNUM N/A 11/8/2022    Procedure: DEBRIDEMENT, STERNUM;  Surgeon: Mike Hedrick MD;  [facility];  Service: General;  Laterality: N/A;    ESOPHAGOGASTRODUODENOSCOPY N/A 10/12/2018    Procedure: EGD (ESOPHAGOGASTRODUODENOSCOPY);  Surgeon: Braden Herring MD;  Location: [facility] ENDO;  Service: Endoscopy;  Laterality: N/A;     ESOPHAGOGASTRODUODENOSCOPY N/A 4/4/2019    Procedure: EGD;  Surgeon: Umair Randolph MD;  Location: [facility] ENDO;  Service: Endoscopy;  Laterality: N/A;    EXCLUSION OF LEFT ATRIAL APPENDAGE N/A 10/7/2022    Procedure: EXCLUSION, LEFT ATRIAL APPENDAGE;  Surgeon: Mike Hedrick MD;  Location: [facility] OR [facility] FLR;  Service: Cardiovascular;  Laterality: N/A;    HERNIA REPAIR      INSERTION OF INTRAVASCULAR MICROAXIAL BLOOD PUMP N/A 10/7/2022    Procedure: INSERTION, IMPELLA;  Surgeon: Mike Hedrick MD;  Location: [facility] OR [facility] FLR;  Service: Cardiovascular;  Laterality: N/A;  direct aortic insertion    IRRIGATION OF MEDIASTINUM N/A 10/7/2022    Procedure: IRRIGATION, MEDIASTINUM;  Surgeon: Mike Hedrick MD;  Location: [facility] OR [facility] FLR;  Service: Cardiovascular;  Laterality: N/A;    STERNAL WIRES REMOVAL N/A 11/8/2022    Procedure: REMOVAL, STERNAL WIRE;  Surgeon: Mike Hedrick MD;  [facility];  Service: General;  Laterality: N/A;  Sternal wire removal with muscle flap creation    STERNAL WOUND CLOSURE N/A 11/14/2022    Procedure: CLOSURE, WOUND, STERNUM;  Surgeon: Amadeo Carrasquillo MD;  [facility];  Service: Plastics;  Laterality: N/A;    TRICUSPID VALVULOPLASTY N/A 10/7/2022    Procedure: REPAIR, TRICUSPID VALVE;  Surgeon: Mike Hedrick MD;  [facility];  Service: Cardiovascular;  Laterality: N/A;    WOUND EXPLORATION N/A 11/14/2022    Procedure: EXPLORATION, WOUND;  Surgeon: Amadeo Carrasquillo MD;  [facility];  Service: Plastics;  Laterality: N/A;       Review of patient's allergies indicates:  No Known Allergies    Medications:  Medications Prior to Admission   Medication Sig    amiodarone (PACERONE) 200 MG Tab Take 1 tablet (200 mg total) by mouth 2 (two) times daily.    carvediloL (COREG) 12.5 MG tablet Take 1 tablet (12.5 mg total) by mouth 2 (two) times daily with meals.    furosemide (LASIX) 40 MG tablet TAKE 1 TABLET(40  MG) BY MOUTH TWICE DAILY    hydroCHLOROthiazide (HYDRODIURIL) 25 MG tablet Take 25 mg by mouth once daily.    albuterol (PROVENTIL/VENTOLIN HFA) 90 mcg/actuation inhaler inhale 1-2 Puff(s) By Mouth Every 4 Hours as needed    ferrous sulfate (FEOSOL) 325 mg (65 mg iron) Tab tablet Take 1 tablet (325 mg total) by mouth daily with breakfast.    rivaroxaban (XARELTO) 20 mg Tab Take 1 tablet (20 mg total) by mouth daily with dinner or evening meal.     Antibiotics (From admission, onward)      Start     Stop Route Frequency Ordered    11/08/22 1530  cefepime in dextrose 5 % IVPB 2 g         -- IV Every 24 hours (non-standard times) 11/08/22 1416          Antifungals (From admission, onward)      None          Antivirals (From admission, onward)      None             Immunization History   Administered Date(s) Administered    Influenza - Quadrivalent - PF *Preferred* (6 months and older) 10/12/2018    Pneumococcal Conjugate - 13 Valent 10/12/2018       Family History       Problem Relation (Age of Onset)    COPD Mother    Cancer Father          Social History     Socioeconomic History    Marital status: Single   Tobacco Use    Smoking status: Never    Smokeless tobacco: Current     Types: Chew   Substance and Sexual Activity    Alcohol use: Yes     Alcohol/week: 20.0 standard drinks     Types: 20 Cans of beer per week    Drug use: No     Review of Systems   Constitutional:  Negative for chills, fatigue and fever.   HENT:  Negative for ear pain, mouth sores, nosebleeds, postnasal drip, rhinorrhea, sinus pressure, sore throat, tinnitus, trouble swallowing and voice change.    Eyes:  Negative for photophobia, pain, redness and visual disturbance.   Respiratory:  Positive for chest tightness. Negative for apnea, cough, shortness of breath and wheezing.    Cardiovascular:  Negative for chest pain, palpitations and leg swelling.   Gastrointestinal:  Negative for abdominal pain, blood in stool, constipation, diarrhea, nausea and  vomiting.   Endocrine: Negative for cold intolerance, heat intolerance, polydipsia and polyuria.   Genitourinary:  Negative for decreased urine volume, difficulty urinating, dysuria, flank pain, frequency, genital sores, hematuria, penile discharge, penile pain, penile swelling, scrotal swelling, testicular pain and urgency.   Musculoskeletal:  Positive for arthralgias. Negative for back pain, joint swelling, myalgias and neck pain.   Skin:  Negative for color change and rash.   Allergic/Immunologic: Negative for environmental allergies and food allergies.   Neurological:  Negative for dizziness, seizures, syncope, weakness, light-headedness, numbness and headaches.   Hematological:  Negative for adenopathy. Does not bruise/bleed easily.   Psychiatric/Behavioral:  Negative for agitation, confusion, decreased concentration, hallucinations, self-injury, sleep disturbance and suicidal ideas. The patient is not nervous/anxious.    Objective:     Vital Signs (Most Recent):  Temp: 97.8 °F (36.6 °C) (11/15/22 1915)  Pulse: 105 (11/15/22 1930)  Resp: 17 (11/15/22 1930)  BP: 137/63 (11/15/22 1930)  SpO2: 97 % (11/15/22 1930) Vital Signs (24h Range):  Temp:  [97.4 °F (36.3 °C)-97.8 °F (36.6 °C)] 97.8 °F (36.6 °C)  Pulse:  [103-121] 105  Resp:  [11-37] 17  SpO2:  [82 %-100 %] 97 %  BP: (102-137)/(54-80) 137/63  Arterial Line BP: ()/(42-70) 134/55     Weight: 74 kg (163 lb 2.3 oz)  Body mass index is 24.08 kg/m².    Estimated Creatinine Clearance: 17.2 mL/min (A) (based on SCr of 4.4 mg/dL (H)).    Physical Exam  Vitals reviewed.   Constitutional:       Appearance: He is well-developed.   HENT:      Head: Normocephalic and atraumatic.      Right Ear: External ear normal.      Left Ear: External ear normal.   Eyes:      Conjunctiva/sclera: Conjunctivae normal.   Neck:      Thyroid: No thyromegaly.   Cardiovascular:      Rate and Rhythm: Normal rate and regular rhythm.      Heart sounds: Normal heart sounds. No murmur  heard.  Pulmonary:      Effort: Pulmonary effort is normal.      Breath sounds: Examination of the right-lower field reveals decreased breath sounds. Examination of the left-lower field reveals decreased breath sounds. Decreased breath sounds present. No wheezing or rales.   Chest:      Comments: Palpable subcutaneous emphysema  Abdominal:      General: Bowel sounds are normal.      Palpations: Abdomen is soft. There is no mass.      Tenderness: There is no abdominal tenderness. There is no rebound.   Musculoskeletal:         General: Normal range of motion.      Cervical back: Normal range of motion and neck supple.   Lymphadenopathy:      Cervical: No cervical adenopathy.   Skin:     General: Skin is warm and dry.   Neurological:      Mental Status: He is alert and oriented to person, place, and time.   Psychiatric:         Behavior: Behavior normal.       Significant Labs: Blood Culture:   Recent Labs   Lab 10/14/22  1638 10/16/22  1419 10/16/22  1436 10/18/22  1608 10/18/22  1637   LABBLOO No growth after 5 days.  No growth after 5 days. No growth after 5 days. No growth after 5 days. No growth after 5 days. No growth after 5 days.     BMP:   Recent Labs   Lab 11/15/22  0822 11/15/22  1213 11/15/22  1800   *   < > 128*      < > 136   K 5.4*   < > 5.3*   CL 98   < > 100   CO2 26   < > 23   BUN 59*   < > 64*   CREATININE 4.3*   < > 4.4*   CALCIUM 9.0   < > 8.8   MG 2.1  --   --     < > = values in this interval not displayed.     CBC:   Recent Labs   Lab 11/15/22  0412 11/15/22  1213 11/15/22  1800   WBC 47.99* 32.89* 26.24*   HGB 9.3* 6.8* 7.2*   HCT 28.0* 20.2* 21.6*   * 363 312     Respiratory Culture:   Recent Labs   Lab 10/07/22  0647 10/11/22  0919   GSRESP  --  No WBC's  No organisms seen   RESPIRATORYC Normal respiratory janna  --        Significant Imaging: I have reviewed all pertinent imaging results/findings within the past 24 hours.

## 2022-11-16 NOTE — PT/OT/SLP RE-EVAL
Physical Therapy Re-evaluation    Patient Name:  David Barrios   MRN:  [id number]    Recommendations:     Discharge Recommendations:  home health PT   Discharge Equipment Recommendations:  (TBD pending patient progress)   Barriers to discharge:  increased need for caregiver support    Assessment:     David Barrios is a 62 y.o. male admitted with a medical diagnosis of Sternal wound infection.  He presents with the following impairments/functional limitations:  weakness, impaired endurance, impaired self care skills, impaired functional mobility, gait instability, impaired balance, decreased upper extremity function, decreased lower extremity function, decreased safety awareness, pain, orthopedic precautions, impaired cardiopulmonary response to activity. Pt transferred from bed to chair with stand-step technique using CGA assist, no AD. Further mobility was limited by MD team interruption. Patient would benefit from home health PT to improve strength, balance, and endurance in order to ensure patient's safety when navigating home environment.    Rehab Prognosis:  Good; patient would benefit from acute skilled PT services to address these deficits and reach maximum level of function.      Recent Surgery: Procedure(s) (LRB):  CREATION, FLAP, MUSCLE ROTATION (N/A)  IRRIGATION AND DEBRIDEMENT, WOUND, STERNUM (N/A)  CLOSURE, WOUND, STERNUM (N/A)  EXPLORATION, WOUND (N/A) 2 Days Post-Op    Plan:     During this hospitalization, patient to be seen 5 x/week to address the above listed problems via gait training, therapeutic activities, therapeutic exercises, neuromuscular re-education  Plan of Care Expires:  12/16/22  Plan of Care Reviewed with: patient    Subjective     Communicated with RN prior to session.  Patient found HOB elevated with telemetry upon PT entry to room, agreeable to evaluation.      Chief Complaint: pain   Patient comments/goals: Pt reports he likes to take his time.  Pain/Comfort:  Pain Rating 1:  8/10  Location 1: sternal  Pain Addressed 1: Pre-medicate for activity, Reposition, Cessation of Activity  Pain Rating Post-Intervention 1:  (not rated)    Patients cultural, spiritual, Buddhism conflicts given the current situation: no      Objective:     Patient found with: telemetry     General Precautions: Standard, fall, sternal   Orthopedic Precautions:N/A   Braces: N/A  Respiratory Status: Room air    Exams:  Gross Motor Coordination:  WFL  Postural Exam:  Patient presented with the following abnormalities:    Rounded shoulders  RLE ROM: WFL  RLE Strength: Grossly 3-4/5 observed  LLE ROM: WFL  LLE Strength: Grossly 3-4/5 observed    Functional Mobility:  Bed Mobility:     Scooting: minimum assistance  Supine to Sit: minimum assistance, HOB elevated   Transfers:     Sit to Stand:  contact guard assistance with no AD  Gait: 3 shuffled steps turning R with Alon, no AD  Decreased step length, decreased speed, decreased foot clearance.  Balance:  Static Sitting: SBA  Dynamic Sitting: not assessed  Static Standing: CGA, no AD  Dynamic Standing: not assessed    AM-PAC 6 CLICK MOBILITY  Total Score:11       Treatment and Education:  Pt educated on the importance of strict sternal precautions and strict lifting precautions per plastics. Pt verbalized understanding by reiterating precautions.  Pt educated on role of therapy, goals of session, and benefits of out of bed mobility  Discussed PT plan of care during hospitalization.  Pt educated on need to call for assistance.  No questions asked.      Patient left up in chair with all lines intact and medical team present.    GOALS:   Multidisciplinary Problems       Physical Therapy Goals          Problem: Physical Therapy    Goal Priority Disciplines Outcome Goal Variances Interventions   Physical Therapy Goal     PT, PT/OT Ongoing, Progressing     Description: Goals to be met by: 11/30/2022     Patient will increase functional independence with mobility by  performin. Supine to sit with Modified [place]  2. Sit to supine with Modified [place]  3. Sit to stand transfer with Supervision  4. Bed to chair transfer with Supervision using No Assistive Device  5. Gait  x 200 feet with Supervision using No Assistive Device.   6. Pt will ascend/descend 3 steps with no HR and SBA.                         History:     Past Medical History:   Diagnosis Date    Anemia     Atrial fibrillation     Encounter for blood transfusion     Esophageal ulcer     GI bleed     Hypertension        Past Surgical History:   Procedure Laterality Date    APPLICATION OF WOUND VACUUM-ASSISTED CLOSURE DEVICE N/A 2022    Procedure: APPLICATION, WOUND VAC;  Surgeon: Mike Hedrick MD;  Location: [facility] OR [address] [place] [address];  Service: General;  Laterality: N/A;    CARDIOVERSION Left 9/15/2022    Procedure: Cardioversion;  Surgeon: Julio James MD;  Location: [facility] CATH LAB/EP;  Service: Cardiology;  Laterality: Left;  With NORBERT    CATHETERIZATION OF BOTH LEFT AND RIGHT HEART N/A 2022    Procedure: CATHETERIZATION, HEART, BOTH LEFT AND RIGHT;  Surgeon: Julio James MD;  Location: [facility] CATH LAB/EP;  Service: Cardiology;  Laterality: N/A;    COLONOSCOPY N/A 2019    Procedure: COLONOSCOPY Golytely;  Surgeon: Umair Randolph MD;  Location: [facility] ENDO;  Service: Endoscopy;  Laterality: N/A;    CORONARY ANGIOGRAPHY N/A 2022    Procedure: ANGIOGRAM, CORONARY ARTERY;  Surgeon: Julio James MD;  Location: [facility] CATH LAB/EP;  Service: Cardiology;  Laterality: N/A;    MEYER MAZE PROCEDURE N/A 10/7/2022    Procedure: MEYER MAZE PROCEDURE;  Surgeon: Mike Hedrick MD;  [facility];  Service: Cardiovascular;  Laterality: N/A;    CREATION OF MUSCLE ROTATIONAL FLAP N/A 2022    Procedure: CREATION, FLAP, MUSCLE ROTATION;  Surgeon: Amadeo Carrasquillo MD;  [facility];  Service: Plastics;  Laterality: N/A;  sternal wound that need pec flap coverage     DEBRIDEMENT OF STERNUM N/A 11/8/2022    Procedure: DEBRIDEMENT, STERNUM;  Surgeon: Mike Hedrick MD;  Location: [facility] OR 2ND FLR;  Service: General;  Laterality: N/A;    ESOPHAGOGASTRODUODENOSCOPY N/A 10/12/2018    Procedure: EGD (ESOPHAGOGASTRODUODENOSCOPY);  Surgeon: Braden Herirng MD;  Location: [facility] ENDO;  Service: Endoscopy;  Laterality: N/A;    ESOPHAGOGASTRODUODENOSCOPY N/A 4/4/2019    Procedure: EGD;  Surgeon: Umair Randolph MD;  Location: [facility] ENDO;  Service: Endoscopy;  Laterality: N/A;    EXCLUSION OF LEFT ATRIAL APPENDAGE N/A 10/7/2022    Procedure: EXCLUSION, LEFT ATRIAL APPENDAGE;  Surgeon: Mike Hedrick MD;  Location: [facility] OR 2ND FLR;  Service: Cardiovascular;  Laterality: N/A;    HERNIA REPAIR      INSERTION OF INTRAVASCULAR MICROAXIAL BLOOD PUMP N/A 10/7/2022    Procedure: INSERTION, IMPELLA;  Surgeon: Mike Hedrick MD;  Location: [facility] OR 2ND FLR;  Service: Cardiovascular;  Laterality: N/A;  direct aortic insertion    IRRIGATION OF MEDIASTINUM N/A 10/7/2022    Procedure: IRRIGATION, MEDIASTINUM;  Surgeon: Mike Hedrick MD;  Location: [facility] OR 2ND FLR;  Service: Cardiovascular;  Laterality: N/A;    STERNAL WIRES REMOVAL N/A 11/8/2022    Procedure: REMOVAL, STERNAL WIRE;  Surgeon: Mike Hedrick MD;  Location: [facility] OR 2ND FLR;  Service: General;  Laterality: N/A;  Sternal wire removal with muscle flap creation    STERNAL WOUND CLOSURE N/A 11/14/2022    Procedure: CLOSURE, WOUND, STERNUM;  Surgeon: Amadeo Carrasquillo MD;  Location: [facility] OR 2ND FLR;  Service: Plastics;  Laterality: N/A;    TRICUSPID VALVULOPLASTY N/A 10/7/2022    Procedure: REPAIR, TRICUSPID VALVE;  Surgeon: Mike Hedrick MD;  Location: [facility] OR 2ND FLR;  Service: Cardiovascular;  Laterality: N/A;    WOUND EXPLORATION N/A 11/14/2022    Procedure: EXPLORATION, WOUND;  Surgeon: Amadeo Carrasquillo MD;  [facility];  Service: Plastics;  Laterality: N/A;       Time Tracking:     PT  Received On: 11/16/22  PT Start Time: 0945     PT Stop Time: 1016  PT Total Time (min): 31 min     Billable Minutes: Re-eval 10 and Therapeutic Activity 15      11/16/2022

## 2022-11-16 NOTE — ASSESSMENT & PLAN NOTE
Airspace opacities seen on CXR on admission. Now with bilateral opacities and pleural effusions. Unclear if this is infectious in nature at this time or pulmonary edema.  · Can consider addition of doxycycline or linezolid for expanded gram positive coverage.   · Recommend performing sputum samples for culture.   · Consider incentive spirometry unless contraindicated.

## 2022-11-16 NOTE — ASSESSMENT & PLAN NOTE
Known infection from prior admission. On long term cefepime due to below. Repeat surgical cultures no growth.   · Continue cefepime.

## 2022-11-16 NOTE — SUBJECTIVE & OBJECTIVE
Past Medical History:   Diagnosis Date    Anemia     Atrial fibrillation     Encounter for blood transfusion     Esophageal ulcer     GI bleed     Hypertension        Past Surgical History:   Procedure Laterality Date    APPLICATION OF WOUND VACUUM-ASSISTED CLOSURE DEVICE N/A 11/8/2022    Procedure: APPLICATION, WOUND VAC;  Surgeon: Mike Hedrick MD;  Location: [facility] OR 2ND FLR;  Service: General;  Laterality: N/A;    CARDIOVERSION Left 9/15/2022    Procedure: Cardioversion;  Surgeon: Julio James MD;  Location: [facility] CATH LAB/EP;  Service: Cardiology;  Laterality: Left;  With NORBERT    CATHETERIZATION OF BOTH LEFT AND RIGHT HEART N/A 9/22/2022    Procedure: CATHETERIZATION, HEART, BOTH LEFT AND RIGHT;  Surgeon: Julio James MD;  Location: [facility] CATH LAB/EP;  Service: Cardiology;  Laterality: N/A;    COLONOSCOPY N/A 4/4/2019    Procedure: COLONOSCOPY Golytely;  Surgeon: Umair Randolph MD;  Location: [facility] ENDO;  Service: Endoscopy;  Laterality: N/A;    CORONARY ANGIOGRAPHY N/A 9/22/2022    Procedure: ANGIOGRAM, CORONARY ARTERY;  Surgeon: Julio James MD;  Location: [facility] CATH LAB/EP;  Service: Cardiology;  Laterality: N/A;    MEYER MAZE PROCEDURE N/A 10/7/2022    Procedure: MEYER MAZE PROCEDURE;  Surgeon: Mike Hedrick MD;  [facility];  Service: Cardiovascular;  Laterality: N/A;    CREATION OF MUSCLE ROTATIONAL FLAP N/A 11/14/2022    Procedure: CREATION, FLAP, MUSCLE ROTATION;  Surgeon: Amadeo Carrasquillo MD;  [facility];  Service: Plastics;  Laterality: N/A;  sternal wound that need pec flap coverage    DEBRIDEMENT OF STERNUM N/A 11/8/2022    Procedure: DEBRIDEMENT, STERNUM;  Surgeon: Mike Hedrick MD;  [facility];  Service: General;  Laterality: N/A;    ESOPHAGOGASTRODUODENOSCOPY N/A 10/12/2018    Procedure: EGD (ESOPHAGOGASTRODUODENOSCOPY);  Surgeon: Braden Herring MD;  Location: [facility] ENDO;  Service: Endoscopy;  Laterality: N/A;     ESOPHAGOGASTRODUODENOSCOPY N/A 4/4/2019    Procedure: EGD;  Surgeon: Umair Randolph MD;  Location: [facility] ENDO;  Service: Endoscopy;  Laterality: N/A;    EXCLUSION OF LEFT ATRIAL APPENDAGE N/A 10/7/2022    Procedure: EXCLUSION, LEFT ATRIAL APPENDAGE;  Surgeon: Mike Hedrick MD;  Location: [facility] OR 2ND FLR;  Service: Cardiovascular;  Laterality: N/A;    HERNIA REPAIR      INSERTION OF INTRAVASCULAR MICROAXIAL BLOOD PUMP N/A 10/7/2022    Procedure: INSERTION, IMPELLA;  Surgeon: Mike Hedrick MD;  Location: [facility] OR 2ND FLR;  Service: Cardiovascular;  Laterality: N/A;  direct aortic insertion    IRRIGATION OF MEDIASTINUM N/A 10/7/2022    Procedure: IRRIGATION, MEDIASTINUM;  Surgeon: Mike Hedrick MD;  Location: [facility] OR [facility] FLR;  Service: Cardiovascular;  Laterality: N/A;    STERNAL WIRES REMOVAL N/A 11/8/2022    Procedure: REMOVAL, STERNAL WIRE;  Surgeon: Mike Hedrick MD;  [facility];  Service: General;  Laterality: N/A;  Sternal wire removal with muscle flap creation    STERNAL WOUND CLOSURE N/A 11/14/2022    Procedure: CLOSURE, WOUND, STERNUM;  Surgeon: Amadeo Carrasquillo MD;  [facility];  Service: Plastics;  Laterality: N/A;    TRICUSPID VALVULOPLASTY N/A 10/7/2022    Procedure: REPAIR, TRICUSPID VALVE;  Surgeon: Mike Hedrick MD;  Location: [facility] OR [facility] FLR;  Service: Cardiovascular;  Laterality: N/A;    WOUND EXPLORATION N/A 11/14/2022    Procedure: EXPLORATION, WOUND;  Surgeon: Amadeo Carrasquillo MD;  Location: [facility] OR [facility] FLR;  Service: Plastics;  Laterality: N/A;       Review of patient's allergies indicates:  No Known Allergies  Current Facility-Administered Medications   Medication Frequency    0.9%  NaCl infusion (for blood administration) Q24H PRN    0.9%  NaCl infusion (for blood administration) Q24H PRN    0.9%  NaCl infusion (for blood administration) Q24H PRN    acetaminophen tablet 1,000 mg Q8H    amiodarone tablet 200 mg BID     aspirin chewable tablet 81 mg Daily    cefepime in dextrose 5 % IVPB 2 g Q24H    chlorothiazide (DIURIL) 500 mg in dextrose 5 % 50 mL IVPB TID WAKE    dextrose 10% bolus 125 mL PRN    dextrose 10% bolus 250 mL PRN    doxycycline tablet 100 mg Q12H    furosemide injection 120 mg TID WAKE    heparin (porcine) injection 5,000 Units Q8H    HYDROmorphone injection 1 mg Q4H PRN    hydrOXYzine pamoate capsule 50 mg Q8H PRN    methocarbamoL tablet 500 mg TID    milrinone 20mg in D5W 100 mL infusion Continuous    mupirocin 2 % ointment BID    ondansetron injection 4 mg Q8H PRN    oxyCODONE immediate release tablet 5 mg Q4H PRN    oxyCODONE immediate release tablet Tab 10 mg Q4H PRN    polyethylene glycol packet 17 g Daily    senna-docusate 8.6-50 mg per tablet 1 tablet BID     Family History       Problem Relation (Age of Onset)    COPD Mother    Cancer Father          Tobacco Use    Smoking status: Never    Smokeless tobacco: Current     Types: Chew   Substance and Sexual Activity    Alcohol use: Yes     Alcohol/week: 20.0 standard drinks     Types: 20 Cans of beer per week    Drug use: No    Sexual activity: Not on file     Review of Systems   Constitutional:  Negative for chills, fatigue and fever.   HENT:  Negative for ear pain, mouth sores, nosebleeds, postnasal drip, rhinorrhea, sinus pressure, sore throat, tinnitus, trouble swallowing and voice change.    Eyes:  Negative for photophobia, pain, redness and visual disturbance.   Respiratory:  Positive for chest tightness. Negative for apnea, cough, shortness of breath and wheezing.    Cardiovascular:  Negative for chest pain, palpitations and leg swelling.   Gastrointestinal:  Negative for abdominal pain, blood in stool, constipation, diarrhea, nausea and vomiting.   Endocrine: Negative for cold intolerance, heat intolerance, polydipsia and polyuria.   Genitourinary:  Negative for decreased urine volume, difficulty urinating, dysuria, flank pain, frequency, genital  sores, hematuria, penile discharge, penile pain, penile swelling, scrotal swelling, testicular pain and urgency.   Musculoskeletal:  Positive for arthralgias. Negative for back pain, joint swelling, myalgias and neck pain.   Skin:  Negative for color change and rash.   Allergic/Immunologic: Negative for environmental allergies and food allergies.   Neurological:  Negative for dizziness, seizures, syncope, weakness, light-headedness, numbness and headaches.   Hematological:  Negative for adenopathy. Does not bruise/bleed easily.   Psychiatric/Behavioral:  Negative for agitation, confusion, decreased concentration, hallucinations, self-injury, sleep disturbance and suicidal ideas. The patient is not nervous/anxious.    Objective:     Vital Signs (Most Recent):  Temp: 97.5 °F (36.4 °C) (11/16/22 1200)  Pulse: 106 (11/16/22 1400)  Resp: 16 (11/16/22 1400)  BP: (!) 114/55 (11/16/22 1400)  SpO2: (!) 90 % (11/16/22 1400)  O2 Device (Oxygen Therapy): room air (11/16/22 1400)   Vital Signs (24h Range):  Temp:  [97.5 °F (36.4 °C)-98.4 °F (36.9 °C)] 97.5 °F (36.4 °C)  Pulse:  [100-113] 106  Resp:  [10-37] 16  SpO2:  [82 %-100 %] 90 %  BP: (110-147)/(54-70) 114/55  Arterial Line BP: ()/(42-64) 112/57     Weight: 74 kg (163 lb 2.3 oz) (11/12/22 1519)  Body mass index is 24.08 kg/m².  Body surface area is 1.9 meters squared.    I/O last 3 completed shifts:  In: 3013.6 [I.V.:1188.8; Blood:928; IV Piggyback:896.8]  Out: 1880 [Urine:780; Drains:800; Blood:300]    Physical Exam  Vitals and nursing note reviewed.   Constitutional:       General: He is not in acute distress.     Appearance: He is not ill-appearing.   Cardiovascular:      Rate and Rhythm: Normal rate and regular rhythm.      Pulses: Normal pulses.   Pulmonary:      Effort: Pulmonary effort is normal.   Abdominal:      General: Abdomen is flat.      Palpations: Abdomen is soft.   Musculoskeletal:      Right lower leg: No edema.      Left lower leg: No edema.    Skin:     General: Skin is warm.      Findings: No bruising or lesion.      Comments: BLANCA drains with sanguinous output    Neurological:      General: No focal deficit present.       Significant Labs:  All labs within the past 24 hours have been reviewed.    Significant Imaging:  Labs: Reviewed

## 2022-11-16 NOTE — PLAN OF CARE
"      SICU PLAN OF CARE NOTE    Dx: Sternal wound infection    Shift Events: NAPETRONA, VSS. 1 unit PRBC given for low Hgb. Continuing to trend H&H. Plan of care reviewed with patient and family.     Goals of Care: MAP > 65, SBP < 160    Neuro: AAO x4, Follows Commands, and Moves All Extremities    Vital Signs: /60   Pulse 101   Temp 98.4 °F (36.9 °C) (Oral)   Resp 15   Ht 5' 9.02" (1.753 m)   Wt 74 kg (163 lb 2.3 oz)   SpO2 (!) 93%   BMI 24.08 kg/m²     Respiratory: Room Air    Diet: Cardiac Diet    Gtts: Milrinone    Urine Output: Urinary Catheter 185 cc/shift    Drains: 5 BLANCA drains, see flowsheet for output detials      Labs/Accuchecks: BMP q6hr.    Skin: Skin intact during shift, no new breakdown. Foams in place, mattress plugged in and functioning properly. Pt weight shifting and OOBTC in AM.        "

## 2022-11-16 NOTE — SUBJECTIVE & OBJECTIVE
Interval History/Significant Events: poor response to aggressive diuresis yesterday, remains oliguric. Trialysis placed, nephrology consulted. Received 2 units PRBC for anemia.     Follow-up For: Procedure(s) (LRB):  WASHOUT (N/A)    Post-Operative Day: 4 Days Post-Op    Objective:     Vital Signs (Most Recent):  Temp: 98.3 °F (36.8 °C) (11/16/22 0315)  Pulse: 104 (11/16/22 0728)  Resp: 13 (11/16/22 0728)  BP: (!) 122/59 (11/16/22 0700)  SpO2: (!) 91 % (11/16/22 0728) Vital Signs (24h Range):  Temp:  [97.5 °F (36.4 °C)-98.4 °F (36.9 °C)] 98.3 °F (36.8 °C)  Pulse:  [100-113] 104  Resp:  [10-37] 13  SpO2:  [82 %-100 %] 91 %  BP: (106-147)/(54-71) 122/59  Arterial Line BP: ()/(42-62) 138/57     Weight: 74 kg (163 lb 2.3 oz)  Body mass index is 24.08 kg/m².      Intake/Output Summary (Last 24 hours) at 11/16/2022 0739  Last data filed at 11/16/2022 0705  Gross per 24 hour   Intake 1513.8 ml   Output 611 ml   Net 902.8 ml         Physical Exam  Vitals and nursing note reviewed.   Constitutional:       General: He is not in acute distress.     Appearance: He is not ill-appearing.   Cardiovascular:      Rate and Rhythm: Normal rate and regular rhythm.      Pulses: Normal pulses.   Pulmonary:      Effort: Pulmonary effort is normal.   Abdominal:      General: Abdomen is flat.      Palpations: Abdomen is soft.   Musculoskeletal:      Right lower leg: No edema.      Left lower leg: No edema.   Skin:     General: Skin is warm.      Findings: No bruising or lesion.      Comments: BLANCA drains with sanguinous output    Neurological:      General: No focal deficit present.       Vents: NA    Lines/Drains/Airways       Peripherally Inserted Central Catheter Line  Duration             PICC Double Lumen 10/17/22 1709 right brachial 29 days              Central Venous Catheter Line  Duration             Trialysis (Dialysis) Catheter 11/15/22 1654 right internal jugular <1 day              Drain  Duration                  Urethral  Catheter 11/12/22 2125 Straight-tip;Non-latex 16 Fr. 3 days         Closed/Suction Drain 11/14/22 2010 Inferior;Right Chest Bulb 15 Fr. 1 day         Closed/Suction Drain 11/14/22 2010 Superior;Midline Abdomen Bulb 15 Fr. 1 day         Closed/Suction Drain 11/14/22 2011 Lateral;Right Other (Comment) Bulb 15 Fr. 1 day         Closed/Suction Drain 11/14/22 2015 Inferior;Left Chest Bulb 15 Fr. 1 day         Closed/Suction Drain 11/14/22 2016 Lateral;Left Other (Comment) Bulb 15 Fr. 1 day              Arterial Line  Duration             Arterial Line 11/08/22 0712 Right Radial 8 days                    Significant Labs:    CBC/Anemia Profile:  Recent Labs   Lab 11/15/22  1800 11/16/22  0053 11/16/22  0344   WBC 26.24* 30.96* 25.02*   HGB 7.2* 7.8* 7.8*   HCT 21.6* 23.4* 23.8*    298 283   MCV 89 91 91   RDW 17.1* 17.1* 17.2*          Chemistries:  Recent Labs   Lab 11/15/22  0412 11/15/22  0822 11/15/22  1213 11/15/22  1800 11/16/22  0053 11/16/22  0344   * 136 135* 136 133* 132*   K 5.2* 5.4* 5.3* 5.3* 5.2* 5.3*   CL 97 98 98 100 99 97   CO2 26 26 27 23 23 23   BUN 58* 59* 62* 64* 70* 73*   CREATININE 4.3* 4.3* 4.4* 4.4* 5.2* 5.4*   CALCIUM 9.2 9.0 9.0 8.8 8.7 8.8   ALBUMIN 2.7*  --  3.2*  --   --  2.8*   PROT 6.5  --  6.1  --   --  5.8*   BILITOT 1.0  --  0.7  --   --  1.2*   ALKPHOS 120  --  87  --   --  89   ALT 10  --  7*  --   --  6*   AST 26  --  24  --   --  26   MG 2.3 2.1  --   --   --  2.3   PHOS 5.5* 5.8*  --   --   --  6.0*         All pertinent labs within the past 24 hours have been reviewed.    Significant Imaging:  I have reviewed all pertinent imaging results/findings within the past 24 hours.

## 2022-11-16 NOTE — PLAN OF CARE
[facility]  Discharge Reassessment    Primary Care Provider: Breann Tavera MD    Expected Discharge Date: 11/21/2022    Reassessment (most recent)       Discharge Reassessment - 11/16/22 1136          Discharge Reassessment    Assessment Type Discharge Planning Reassessment     Communicated AMARILYS with patient/caregiver Date not available/Unable to determine     Discharge Plan A Home Health     Discharge Plan B Long-term acute care facility (LTAC)     DME Needed Upon Discharge  other (see comments)   TBD    Discharge Barriers Identified None     Why the patient remains in the hospital Requires continued medical care                     Per MD's Note,  poor response to aggressive diuresis yesterday, remains oliguric. Trialysis placed, nephrology consulted. Received 2 units PRBC for anemia.       Saturnino Reyes LMSW  Case Management [facility]  Ext: [phone]

## 2022-11-16 NOTE — ASSESSMENT & PLAN NOTE
62 y.o. male S/P TV replacement, MV replacement, MAZE, Lt Atrial Appendage ligation and Impella placement on 10/7/22, course complicated by bleeding, requiring reopening POD#0, multiple VT runs requiring DCCV, and sternal wound infections. He presents to the SICU s/p wound debridement and wire removal on 11/08 followed by washout on 11/10      Neuro/Psych:     - Sedation: none    - Pain:    - Scheduled Tylenol 1g q8h with prn Oxy, dilaudid for break through pain               Cardiac:      -BP Goal: MAP 60-80 and SBP <140.  Vaso 0.04, will wean.     - Echo with EF 35% with mild RV enlargement and moderate RV dysfunction. Cont milrinone 0.25     - Anti-HTNs: Will restart home meds when appropriate     - Rhythm: NSR. S/p MAZE for Afib, cont po amio.     - Beta blocker: hold in setting of Milrinone     - Statin: Atorvastatin 40 mg QD      Pulmonary:     - Goal SpO2 >92%, 2L NC     - CXR with patchy opacities on R side and SQ air, leukocytosis downtrending. ID recs doxy x5 days for PNA.           Renal:    - Trend BUN/Cr. Oliguric KIMANI. Will monitor and support kidneys with MAP >65.     - CRRT, appreciate nephrology recs     - Maintain Fierro, record strict Is/Os    Recent Labs   Lab 11/15/22  1800 11/16/22 0053 11/16/22  0344   BUN 64* 70* 73*   CREATININE 4.4* 5.2* 5.4*         FEN / GI:     - Daily CMP, PRN K/Mag/Phos per protocol     - Replace electrolytes as needed    - Nutrition: cardiac diet, BOOST    - Bowel Regimen: Miralax, docusate      ID:     - Leukocytosis without fever, s/p debridement and Pec flap 11/14. Now down trending     - Abx: Continue Cefepime, lactic acid improving. Will need long term abx plan, 6weeks post op flap    - R sided patchy opacities on CXR on cefepime    - ID recs broadening with doxy or linezolid if leukocytosis persists.     Recent Labs   Lab 11/15/22  1800 11/16/22  0053 11/16/22  0344   WBC 26.24* 30.96* 25.02*         Heme/Onc:     - H/H with post-operative anemia,  transfusing 1 unit.    - CBC daily    - ASA 325mg daily    Recent Labs   Lab 11/15/22  0412 11/15/22  1213 11/15/22  1800 11/16/22  0053 11/16/22  0344   HGB 9.3*   < > 7.2* 7.8* 7.8*   *   < > 312 298 283   APTT 25.2  --   --  28.3 28.2   INR 1.1  --   --  1.1 1.1    < > = values in this interval not displayed.         Endocrine:     - CTS Goal -140        PPx:   Feeding: cardiac diet   Analgesia/Sedation: tylenol, oxy, dilaudid PRN   Thromboembolic Prevention: scds  HOB >30: Yes  Stress Ulcer: n/a  Glucose Control: none      Lines/Drains/Airway:   Left radial arterial line   RIMARY ANN CVC   Sri   BLANCA drains       Dispo/Code Status/Palliative:     - Continue SICU Care    - Full Code

## 2022-11-16 NOTE — PROGRESS NOTES
[facility]  Critical Care - Surgery  Progress Note    Patient Name: David Barrios  MRN: [id number]  Admission Date: 11/8/2022  Hospital Length of Stay: 8 days  Code Status: Full Code  Attending Provider: Mike Hedrick MD  Primary Care Provider: Breann Tavera MD   Principal Problem: Sternal wound infection    Subjective:     Hospital/ICU Course:        Interval History/Significant Events: poor response to aggressive diuresis yesterday, remains oliguric. Trialysis placed, nephrology consulted. Received 2 units PRBC for anemia.     Follow-up For: Procedure(s) (LRB):  WASHOUT (N/A)    Post-Operative Day: 4 Days Post-Op    Objective:     Vital Signs (Most Recent):  Temp: 98.3 °F (36.8 °C) (11/16/22 0315)  Pulse: 104 (11/16/22 0728)  Resp: 13 (11/16/22 0728)  BP: (!) 122/59 (11/16/22 0700)  SpO2: (!) 91 % (11/16/22 0728) Vital Signs (24h Range):  Temp:  [97.5 °F (36.4 °C)-98.4 °F (36.9 °C)] 98.3 °F (36.8 °C)  Pulse:  [100-113] 104  Resp:  [10-37] 13  SpO2:  [82 %-100 %] 91 %  BP: (106-147)/(54-71) 122/59  Arterial Line BP: ()/(42-62) 138/57     Weight: 74 kg (163 lb 2.3 oz)  Body mass index is 24.08 kg/m².      Intake/Output Summary (Last 24 hours) at 11/16/2022 0739  Last data filed at 11/16/2022 0705  Gross per 24 hour   Intake 1513.8 ml   Output 611 ml   Net 902.8 ml         Physical Exam  Vitals and nursing note reviewed.   Constitutional:       General: He is not in acute distress.     Appearance: He is not ill-appearing.   Cardiovascular:      Rate and Rhythm: Normal rate and regular rhythm.      Pulses: Normal pulses.   Pulmonary:      Effort: Pulmonary effort is normal.   Abdominal:      General: Abdomen is flat.      Palpations: Abdomen is soft.   Musculoskeletal:      Right lower leg: No edema.      Left lower leg: No edema.   Skin:     General: Skin is warm.      Findings: No bruising or lesion.      Comments: BLANCA drains with sanguinous output    Neurological:      General:  No focal deficit present.       Vents: NA    Lines/Drains/Airways       Peripherally Inserted Central Catheter Line  Duration             PICC Double Lumen 10/17/22 1709 right brachial 29 days              Central Venous Catheter Line  Duration             Trialysis (Dialysis) Catheter 11/15/22 1654 right internal jugular <1 day              Drain  Duration                  Urethral Catheter 11/12/22 2125 Straight-tip;Non-latex 16 Fr. 3 days         Closed/Suction Drain 11/14/22 2010 Inferior;Right Chest Bulb 15 Fr. 1 day         Closed/Suction Drain 11/14/22 2010 Superior;Midline Abdomen Bulb 15 Fr. 1 day         Closed/Suction Drain 11/14/22 2011 Lateral;Right Other (Comment) Bulb 15 Fr. 1 day         Closed/Suction Drain 11/14/22 2015 Inferior;Left Chest Bulb 15 Fr. 1 day         Closed/Suction Drain 11/14/22 2016 Lateral;Left Other (Comment) Bulb 15 Fr. 1 day              Arterial Line  Duration             Arterial Line 11/08/22 0712 Right Radial 8 days                    Significant Labs:    CBC/Anemia Profile:  Recent Labs   Lab 11/15/22  1800 11/16/22  0053 11/16/22  0344   WBC 26.24* 30.96* 25.02*   HGB 7.2* 7.8* 7.8*   HCT 21.6* 23.4* 23.8*    298 283   MCV 89 91 91   RDW 17.1* 17.1* 17.2*          Chemistries:  Recent Labs   Lab 11/15/22  0412 11/15/22  0822 11/15/22  1213 11/15/22  1800 11/16/22  0053 11/16/22  0344   * 136 135* 136 133* 132*   K 5.2* 5.4* 5.3* 5.3* 5.2* 5.3*   CL 97 98 98 100 99 97   CO2 26 26 27 23 23 23   BUN 58* 59* 62* 64* 70* 73*   CREATININE 4.3* 4.3* 4.4* 4.4* 5.2* 5.4*   CALCIUM 9.2 9.0 9.0 8.8 8.7 8.8   ALBUMIN 2.7*  --  3.2*  --   --  2.8*   PROT 6.5  --  6.1  --   --  5.8*   BILITOT 1.0  --  0.7  --   --  1.2*   ALKPHOS 120  --  87  --   --  89   ALT 10  --  7*  --   --  6*   AST 26  --  24  --   --  26   MG 2.3 2.1  --   --   --  2.3   PHOS 5.5* 5.8*  --   --   --  6.0*         All pertinent labs within the past 24 hours have been reviewed.    Significant  Imaging:  I have reviewed all pertinent imaging results/findings within the past 24 hours.    Assessment/Plan:     * Sternal wound infection  62 y.o. male S/P TV replacement, MV replacement, MAZE, Lt Atrial Appendage ligation and Impella placement on 10/7/22, course complicated by bleeding, requiring reopening POD#0, multiple VT runs requiring DCCV, and sternal wound infections. He presents to the SICU s/p wound debridement and wire removal on 11/08 followed by washout on 11/10      Neuro/Psych:     - Sedation: none    - Pain:    - Scheduled Tylenol 1g q8h with prn Oxy, dilaudid for break through pain               Cardiac:      -BP Goal: MAP 60-80 and SBP <140.      - Echo with EF 35% with mild RV enlargement and moderate RV dysfunction. Cont milrinone 0.25     - Anti-HTNs: Will restart home meds when appropriate     - Rhythm: NSR. S/p MAZE for Afib, cont po amio.     - Beta blocker: hold in setting of Milrinone     - Statin: Atorvastatin 40 mg QD      Pulmonary:     - Goal SpO2 >92%, 2L NC     - CXR with patchy opacities on R side and SQ air, leukocytosis downtrending. ID recs doxy or linezolid if leukocytosis worsens or fever.           Renal:    - Trend BUN/Cr. Oliguric KIMANI. Will monitor and support kidneys with MAP >65.     - aggressive diuresis and consulting nephrology for oliguria    - Maintain Fierro, record strict Is/Os    Recent Labs   Lab 11/15/22  1800 11/16/22  0053 11/16/22  0344   BUN 64* 70* 73*   CREATININE 4.4* 5.2* 5.4*         FEN / GI:     - Daily CMP, PRN K/Mag/Phos per protocol     - Replace electrolytes as needed    - Nutrition: cardiac diet, BOOST    - Bowel Regimen: Miralax, docusate      ID:     - Leukocytosis without fever, s/p debridement and Pec flap 11/14. Now down trending     - Abx: Continue Cefepime, lactic acid improving. Will need long term abx plan, 6weeks post op flap    - R sided patchy opacities on CXR on cefepime    - ID recs broadening with doxy or linezolid if  leukocytosis persists.     Recent Labs   Lab 11/15/22  1800 11/16/22  0053 11/16/22  0344   WBC 26.24* 30.96* 25.02*         Heme/Onc:     - H/H with post-operative anemia, transfused 2 units PRBC. Will discuss with plastics.     - CBC daily    - ASA 325mg daily    Recent Labs   Lab 11/15/22  0412 11/15/22  1213 11/15/22  1800 11/16/22  0053 11/16/22  0344   HGB 9.3*   < > 7.2* 7.8* 7.8*   *   < > 312 298 283   APTT 25.2  --   --  28.3 28.2   INR 1.1  --   --  1.1 1.1    < > = values in this interval not displayed.         Endocrine:     - CTS Goal -140        PPx:   Feeding: cardiac diet   Analgesia/Sedation: tylenol, oxy, dilaudid PRN   Thromboembolic Prevention: scds  HOB >30: Yes  Stress Ulcer: n/a  Glucose Control: none      Lines/Drains/Airway:   Left radial arterial line   RIJ CVC   Fierro   BLANCA drains       Dispo/Code Status/Palliative:     - Continue SICU Care    - Full Code                  Critical care was time spent personally by me on the following activities: development of treatment plan with patient or surrogate and bedside caregivers, discussions with consultants, evaluation of patient's response to treatment, examination of patient, ordering and performing treatments and interventions, ordering and review of laboratory studies, ordering and review of radiographic studies, pulse oximetry, re-evaluation of patient's condition.  This critical care time did not overlap with that of any other provider or involve time for any procedures.     Leah Lay NP  Critical Care - Surgery  [facility]

## 2022-11-16 NOTE — ASSESSMENT & PLAN NOTE
- Baseline Scr 0.9-1.   - 10/31 Scr increased to ~1.5; steadly increased to peak of 5.3 today  - Hyperkalemia noted to 5.3  - Anuric today despite IV diuril 500mg TID; and IV lasix 120mg TID  - Risk factors for KIMANI include Cardiorenal syndrome (less likely given aggressive diuresis in last 72hrs; and normal CVP readings); AIN; IRGN; and prolonged prerenal state from over diuresis.   Plan:  - iHD today for metabolic clearance and volume removal (~1.5L UF).   - Obtain RFP daily  - Discontiue diuretics  - Will perform urine microscopy  - obtain Urine na, Cr, urea  - Renally dose medications to GFR 0  - Avoid nephrotoxins as much as possible  - consent for RRT obtained and placed in chart.

## 2022-11-16 NOTE — ANESTHESIA POSTPROCEDURE EVALUATION
Anesthesia Post Evaluation    Patient: David Barrios had no intraoperative complications. Tolerated a unit of blood without problem.     Procedure(s) Performed: Procedure(s) (LRB):  CREATION, FLAP, MUSCLE ROTATION (N/A)  IRRIGATION AND DEBRIDEMENT, WOUND, STERNUM (N/A)  CLOSURE, WOUND, STERNUM (N/A)  EXPLORATION, WOUND (N/A)    Final Anesthesia Type: general      Patient location during evaluation: PACU  Patient participation: Yes- Able to Participate  Level of consciousness: awake and alert  Post-procedure vital signs: reviewed and stable  Pain management: adequate  Airway patency: patent    PONV status at discharge: No PONV  Anesthetic complications: no      Cardiovascular status: blood pressure returned to baseline  Respiratory status: unassisted  Hydration status: euvolemic  Follow-up not needed.          Vitals Value Taken Time   /62 11/15/22 1802   Temp 36.5 °C (97.7 °F) 11/15/22 1500   Pulse 103 11/15/22 1822   Resp 13 11/15/22 1822   SpO2 92 % 11/15/22 1822   Vitals shown include unvalidated device data.      No case tracking events are documented in the log.      Pain/Elza Score: Pain Rating Prior to Med Admin: 7 (11/15/2022  1:16 PM)  Pain Rating Post Med Admin: 0 (11/15/2022  6:36 AM)

## 2022-11-16 NOTE — CONSULTS
"[facility]  Nephrology  Consult Note    Patient Name: David Barrios  MRN: [id number]  Admission Date: 11/8/2022  Hospital Length of Stay: 8 days  Attending Provider: Mike Hedrick MD   Primary Care Physician: Breann Tavera MD  Principal Problem:Sternal wound infection    Inpatient consult to Nephrology  Consult performed by: Jaspal Crockett DO  Consult ordered by: Gustavo Aguero DO  Reason for consult: "Oliguric KIMANI"  Assessment/Recommendations: Please see consult note and staff attestation for full recommendations. Thank you!         Subjective:     HPI: 61 yo male w/ hx of Afib; HFrEF (35%); HTN; HLD. Patient underwent who underwent mitral valve repair, tricuspid valve repair, left atrial maze, left atrial appendage resection, and direct aortic impella 5.5 insertion on 10/07/2022. Hospital course complicated by Serratia bacteremia (on 6wk course of cefepime), sternal wound infection, and cardiogenic shock. On 10/31 patient developed KIMANI. Per chart review no documented course of hypotension during that time. Patient started on diuresis with IV lasix, with good urine output. Patient required increased diuretics (IV Diuril 500mg TID; and Lasix 120mg TID) and continued to have increased Scr, which progressed to oliguria, and now having metabolic derangements.     Patient denies any prior history of kidney disease. Does not have significant family history of kidney disease. Does endorse worsening fatigue and dyspnea. Denies any nausea vomiting, or metallic taste in mouth.     Nephrology was consulted for oliguric KIMANI.       Past Medical History:   Diagnosis Date    Anemia     Atrial fibrillation     Encounter for blood transfusion     Esophageal ulcer     GI bleed     Hypertension        Past Surgical History:   Procedure Laterality Date    APPLICATION OF WOUND VACUUM-ASSISTED CLOSURE DEVICE N/A 11/8/2022    Procedure: APPLICATION, WOUND VAC;  Surgeon: Mike Hedrick MD;  Location: " [facility] OR 2ND FLR;  Service: General;  Laterality: N/A;    CARDIOVERSION Left 9/15/2022    Procedure: Cardioversion;  Surgeon: Julio James MD;  Location: [facility] CATH LAB/EP;  Service: Cardiology;  Laterality: Left;  With NORBERT    CATHETERIZATION OF BOTH LEFT AND RIGHT HEART N/A 9/22/2022    Procedure: CATHETERIZATION, HEART, BOTH LEFT AND RIGHT;  Surgeon: Julio James MD;  Location: [facility] CATH LAB/EP;  Service: Cardiology;  Laterality: N/A;    COLONOSCOPY N/A 4/4/2019    Procedure: COLONOSCOPY Golytely;  Surgeon: Umair Randolph MD;  Location: [facility] ENDO;  Service: Endoscopy;  Laterality: N/A;    CORONARY ANGIOGRAPHY N/A 9/22/2022    Procedure: ANGIOGRAM, CORONARY ARTERY;  Surgeon: Julio James MD;  Location: [facility] CATH LAB/EP;  Service: Cardiology;  Laterality: N/A;    MEYER MAZE PROCEDURE N/A 10/7/2022    Procedure: MEYER MAZE PROCEDURE;  Surgeon: Mike Hedrick MD;  Location: [facility] OR 2ND FLR;  Service: Cardiovascular;  Laterality: N/A;    CREATION OF MUSCLE ROTATIONAL FLAP N/A 11/14/2022    Procedure: CREATION, FLAP, MUSCLE ROTATION;  Surgeon: Amadeo Carrasquillo MD;  Location: [facility] OR 2ND FLR;  Service: Plastics;  Laterality: N/A;  sternal wound that need pec flap coverage    DEBRIDEMENT OF STERNUM N/A 11/8/2022    Procedure: DEBRIDEMENT, STERNUM;  Surgeon: Mike Hedrick MD;  [facility];  Service: General;  Laterality: N/A;    ESOPHAGOGASTRODUODENOSCOPY N/A 10/12/2018    Procedure: EGD (ESOPHAGOGASTRODUODENOSCOPY);  Surgeon: Braden Herring MD;  Location: [facility] ENDO;  Service: Endoscopy;  Laterality: N/A;    ESOPHAGOGASTRODUODENOSCOPY N/A 4/4/2019    Procedure: EGD;  Surgeon: Umair Randolph MD;  Location: [facility] ENDO;  Service: Endoscopy;  Laterality: N/A;    EXCLUSION OF LEFT ATRIAL APPENDAGE N/A 10/7/2022    Procedure: EXCLUSION, LEFT ATRIAL APPENDAGE;  Surgeon: Mike Hedrick MD;  Location: [facility] OR 2ND FLR;  Service: Cardiovascular;  Laterality: N/A;    HERNIA REPAIR       INSERTION OF INTRAVASCULAR MICROAXIAL BLOOD PUMP N/A 10/7/2022    Procedure: INSERTION, IMPELLA;  Surgeon: Mike Hedrick MD;  Location: [facility] OR 2ND FLR;  Service: Cardiovascular;  Laterality: N/A;  direct aortic insertion    IRRIGATION OF MEDIASTINUM N/A 10/7/2022    Procedure: IRRIGATION, MEDIASTINUM;  Surgeon: Mike Hedrick MD;  Location: [facility] OR 2ND FLR;  Service: Cardiovascular;  Laterality: N/A;    STERNAL WIRES REMOVAL N/A 11/8/2022    Procedure: REMOVAL, STERNAL WIRE;  Surgeon: Mike Hedrick MD;  Location: [facility] OR 2ND FLR;  Service: General;  Laterality: N/A;  Sternal wire removal with muscle flap creation    STERNAL WOUND CLOSURE N/A 11/14/2022    Procedure: CLOSURE, WOUND, STERNUM;  Surgeon: Amadeo Carrasquillo MD;  Location: [facility] OR 2ND FLR;  Service: Plastics;  Laterality: N/A;    TRICUSPID VALVULOPLASTY N/A 10/7/2022    Procedure: REPAIR, TRICUSPID VALVE;  Surgeon: Mike Hedrick MD;  Location: [facility] OR 2ND FLR;  Service: Cardiovascular;  Laterality: N/A;    WOUND EXPLORATION N/A 11/14/2022    Procedure: EXPLORATION, WOUND;  Surgeon: Amadeo Carrasquillo MD;  Location: [facility] OR 2ND FLR;  Service: Plastics;  Laterality: N/A;       Review of patient's allergies indicates:  No Known Allergies  Current Facility-Administered Medications   Medication Frequency    0.9%  NaCl infusion (for blood administration) Q24H PRN    0.9%  NaCl infusion (for blood administration) Q24H PRN    0.9%  NaCl infusion (for blood administration) Q24H PRN    acetaminophen tablet 1,000 mg Q8H    amiodarone tablet 200 mg BID    aspirin chewable tablet 81 mg Daily    cefepime in dextrose 5 % IVPB 2 g Q24H    chlorothiazide (DIURIL) 500 mg in dextrose 5 % 50 mL IVPB TID WAKE    dextrose 10% bolus 125 mL PRN    dextrose 10% bolus 250 mL PRN    doxycycline tablet 100 mg Q12H    furosemide injection 120 mg TID WAKE    heparin (porcine) injection 5,000 Units Q8H    HYDROmorphone injection 1 mg Q4H PRN     hydrOXYzine pamoate capsule 50 mg Q8H PRN    methocarbamoL tablet 500 mg TID    milrinone 20mg in D5W 100 mL infusion Continuous    mupirocin 2 % ointment BID    ondansetron injection 4 mg Q8H PRN    oxyCODONE immediate release tablet 5 mg Q4H PRN    oxyCODONE immediate release tablet Tab 10 mg Q4H PRN    polyethylene glycol packet 17 g Daily    senna-docusate 8.6-50 mg per tablet 1 tablet BID     Family History       Problem Relation (Age of Onset)    COPD Mother    Cancer Father          Tobacco Use    Smoking status: Never    Smokeless tobacco: Current     Types: Chew   Substance and Sexual Activity    Alcohol use: Yes     Alcohol/week: 20.0 standard drinks     Types: 20 Cans of beer per week    Drug use: No    Sexual activity: Not on file     Review of Systems   Constitutional:  Negative for chills, fatigue and fever.   HENT:  Negative for ear pain, mouth sores, nosebleeds, postnasal drip, rhinorrhea, sinus pressure, sore throat, tinnitus, trouble swallowing and voice change.    Eyes:  Negative for photophobia, pain, redness and visual disturbance.   Respiratory:  Positive for chest tightness. Negative for apnea, cough, shortness of breath and wheezing.    Cardiovascular:  Negative for chest pain, palpitations and leg swelling.   Gastrointestinal:  Negative for abdominal pain, blood in stool, constipation, diarrhea, nausea and vomiting.   Endocrine: Negative for cold intolerance, heat intolerance, polydipsia and polyuria.   Genitourinary:  Negative for decreased urine volume, difficulty urinating, dysuria, flank pain, frequency, genital sores, hematuria, penile discharge, penile pain, penile swelling, scrotal swelling, testicular pain and urgency.   Musculoskeletal:  Positive for arthralgias. Negative for back pain, joint swelling, myalgias and neck pain.   Skin:  Negative for color change and rash.   Allergic/Immunologic: Negative for environmental allergies and food allergies.   Neurological:  Negative for  dizziness, seizures, syncope, weakness, light-headedness, numbness and headaches.   Hematological:  Negative for adenopathy. Does not bruise/bleed easily.   Psychiatric/Behavioral:  Negative for agitation, confusion, decreased concentration, hallucinations, self-injury, sleep disturbance and suicidal ideas. The patient is not nervous/anxious.    Objective:     Vital Signs (Most Recent):  Temp: 97.5 °F (36.4 °C) (11/16/22 1200)  Pulse: 106 (11/16/22 1400)  Resp: 16 (11/16/22 1400)  BP: (!) 114/55 (11/16/22 1400)  SpO2: (!) 90 % (11/16/22 1400)  O2 Device (Oxygen Therapy): room air (11/16/22 1400)   Vital Signs (24h Range):  Temp:  [97.5 °F (36.4 °C)-98.4 °F (36.9 °C)] 97.5 °F (36.4 °C)  Pulse:  [100-113] 106  Resp:  [10-37] 16  SpO2:  [82 %-100 %] 90 %  BP: (110-147)/(54-70) 114/55  Arterial Line BP: ()/(42-64) 112/57     Weight: 74 kg (163 lb 2.3 oz) (11/12/22 1519)  Body mass index is 24.08 kg/m².  Body surface area is 1.9 meters squared.    I/O last 3 completed shifts:  In: 3013.6 [I.V.:1188.8; Blood:928; IV Piggyback:896.8]  Out: 1880 [Urine:780; Drains:800; Blood:300]    Physical Exam  Vitals and nursing note reviewed.   Constitutional:       General: He is not in acute distress.     Appearance: He is not ill-appearing.   Cardiovascular:      Rate and Rhythm: Normal rate and regular rhythm.      Pulses: Normal pulses.   Pulmonary:      Effort: Pulmonary effort is normal.   Abdominal:      General: Abdomen is flat.      Palpations: Abdomen is soft.   Musculoskeletal:      Right lower leg: No edema.      Left lower leg: No edema.   Skin:     General: Skin is warm.      Findings: No bruising or lesion.      Comments: BLANCA drains with sanguinous output    Neurological:      General: No focal deficit present.       Significant Labs:  All labs within the past 24 hours have been reviewed.    Significant Imaging:  Labs: Reviewed    Assessment/Plan:     * Sternal wound infection  - per primary and ID team    KIMANI  (acute kidney injury)  - Baseline Scr 0.9-1.   - 10/31 Scr increased to ~1.5; steadly increased to peak of 5.3 today  - Hyperkalemia noted to 5.3  - Anuric today despite IV diuril 500mg TID; and IV lasix 120mg TID  - urine microscopy multiple dark granular casts consistent with ATN  - Risk factors for KIMANI include Cardiorenal syndrome (less likely given aggressive diuresis in last 72hrs; and normal CVP readings); AIN (less likely given bland microscopy)  - KIMANI best explained by prolonged prerenal state leading to ATN from over diuresis.   Plan:  - CRRT/SLED for 8 hours today for metabolic clearance and volume removal (~1.5L UF).   - Obtain RFP daily  - Discontiue diuretics  - obtain Urine na, Cr, urea  - Renally dose medications to GFR 0  - Avoid nephrotoxins as much as possible  - consent for RRT obtained and placed in chart.         Thank you for your consult. I will follow-up with patient. Please contact us if you have any additional questions.     Case discussed with attending. Attestation to follow.       Jaspal Crockett DO  Nephrology  [facility]

## 2022-11-16 NOTE — PROGRESS NOTES
11/16/22 1645   Treatment   Treatment Type SLED   Treatment Status New start   Dialysis Machine Number [id number]   Solutions Labeled and Current  Yes   Access Temporary Cath;Right;IJ   Catheter Dressing Intact  Yes   Alarms Engaged Yes   CRRT Comments New start     8 hours Sled Tx initiated. UF @200. BFR @150. Primary nurse at bedside. UF goal reviewed. Primary nurse will titrate as tolerated.

## 2022-11-16 NOTE — HPI
"A 62-year-old man with heart failure, AFib, hypertension, status post tricuspid and mitral valve replacement plus Maze procedure, left atrial appendage ligation, s/p Impella placement with removal, recent Serratia marcescens bacteremia plus infected graft secondary to wound infection, and on a 6 week course of cefepime followed by chronic antimicrobial suppression who was readmitted for planned sternal wire removal and wound revision.  He underwent mediastinal exploration with evacuation of purulent pericarditis, debridement and decortication of the mediastinal structures, resection of the ascending aortic graft, removal of sternal wires, and partial bilateral sternectomy on 11/08.  He also underwent sternal wound exploration with minimal debridement, washout, and wound VAC placement on 11/10.  Mr. Cornejo also underwent exploration of his wound, irrigation and the debridement of the sternal wound, creation of a muscle flap with rotation and sternal wound closure on 11/14.  Throughout his hospital stay he was continued on therapy with cefepime as per prior recommendations.  Unfortunately his hospital course was complicated by shortness of breath overnight.  A chest x-ray revealed cardiomegaly with bilateral pulmonary opacities, greater on the right, and bilateral pleural effusions.  Checks x-ray also demonstrated new subcutaneous emphysema of the chest wall.    Infectious Disease is consulted for "on cefepime for serratio bacteremia x6 weeks. new R sided opacity on CXR and leukocytosis. wanting to broaden abx."      "

## 2022-11-16 NOTE — ASSESSMENT & PLAN NOTE
62 y.o. male S/P TV replacement, MV replacement, MAZE, Lt Atrial Appendage ligation and Impella placement on 10/7/22, course complicated by bleeding, requiring reopening POD#0, multiple VT runs requiring DCCV, and sternal wound infections. He presents to the SICU s/p wound debridement and wire removal on 11/08 followed by washout on 11/10      Neuro/Psych:     - Sedation: none    - Pain:    - Scheduled Tylenol 1g q8h with prn Oxy, dilaudid for break through pain               Cardiac:      -BP Goal: MAP 60-80 and SBP <140.      - Echo with EF 35% with mild RV enlargement and moderate RV dysfunction. Cont milrinone 0.25     - Anti-HTNs: Will restart home meds when appropriate     - Rhythm: NSR. S/p MAZE for Afib, cont po amio.     - Beta blocker: hold in setting of Milrinone     - Statin: Atorvastatin 40 mg QD      Pulmonary:     - Goal SpO2 >92%, 2L NC     - CXR with patchy opacities on R side and SQ air, leukocytosis downtrending. ID recs doxy or linezolid if leukocytosis worsens or fever.           Renal:    - Trend BUN/Cr. Oliguric KIMANI. Will monitor and support kidneys with MAP >65.     - aggressive diuresis and consulting nephrology for oliguria    - Maintain Fierro, record strict Is/Os    Recent Labs   Lab 11/15/22  1800 11/16/22 0053 11/16/22  0344   BUN 64* 70* 73*   CREATININE 4.4* 5.2* 5.4*         FEN / GI:     - Daily CMP, PRN K/Mag/Phos per protocol     - Replace electrolytes as needed    - Nutrition: cardiac diet, BOOST    - Bowel Regimen: Miralax, docusate      ID:     - Leukocytosis without fever, s/p debridement and Pec flap 11/14. Now down trending     - Abx: Continue Cefepime, lactic acid improving. Will need long term abx plan, 6weeks post op flap    - R sided patchy opacities on CXR on cefepime    - ID recs broadening with doxy or linezolid if leukocytosis persists.     Recent Labs   Lab 11/15/22  1800 11/16/22  0053 11/16/22  0344   WBC 26.24* 30.96* 25.02*         Heme/Onc:     - H/H  with post-operative anemia, transfused 2 units PRBC. Will discuss with plastics.     - CBC daily    - ASA 325mg daily    Recent Labs   Lab 11/15/22  0412 11/15/22  1213 11/15/22  1800 11/16/22  0053 11/16/22  0344   HGB 9.3*   < > 7.2* 7.8* 7.8*   *   < > 312 298 283   APTT 25.2  --   --  28.3 28.2   INR 1.1  --   --  1.1 1.1    < > = values in this interval not displayed.         Endocrine:     - CTS Goal -140        PPx:   Feeding: cardiac diet   Analgesia/Sedation: tylenol, oxy, dilaudid PRN   Thromboembolic Prevention: scds  HOB >30: Yes  Stress Ulcer: n/a  Glucose Control: none      Lines/Drains/Airway:   Left radial arterial line   RIJ CVC   Fierro   BLANCA drains       Dispo/Code Status/Palliative:     - Continue SICU Care    - Full Code

## 2022-11-17 LAB
ALBUMIN SERPL BCP-MCNC: 2.4 G/DL (ref 3.5–5.2)
ALBUMIN SERPL BCP-MCNC: 2.4 G/DL (ref 3.5–5.2)
ALBUMIN SERPL BCP-MCNC: 2.6 G/DL (ref 3.5–5.2)
ALP SERPL-CCNC: 91 U/L (ref 55–135)
ALT SERPL W/O P-5'-P-CCNC: <5 U/L (ref 10–44)
ANION GAP SERPL CALC-SCNC: 11 MMOL/L (ref 8–16)
ANION GAP SERPL CALC-SCNC: 12 MMOL/L (ref 8–16)
ANION GAP SERPL CALC-SCNC: 8 MMOL/L (ref 8–16)
ANION GAP SERPL CALC-SCNC: 9 MMOL/L (ref 8–16)
APTT BLDCRRT: 30.5 SEC (ref 21–32)
AST SERPL-CCNC: 22 U/L (ref 10–40)
BACTERIA SPEC ANAEROBE CULT: NORMAL
BASOPHILS # BLD AUTO: 0.03 K/UL (ref 0–0.2)
BASOPHILS # BLD AUTO: 0.03 K/UL (ref 0–0.2)
BASOPHILS # BLD AUTO: 0.04 K/UL (ref 0–0.2)
BASOPHILS # BLD AUTO: 0.04 K/UL (ref 0–0.2)
BASOPHILS # BLD AUTO: 0.05 K/UL (ref 0–0.2)
BASOPHILS NFR BLD: 0.2 % (ref 0–1.9)
BASOPHILS NFR BLD: 0.3 % (ref 0–1.9)
BILIRUB SERPL-MCNC: 0.6 MG/DL (ref 0.1–1)
BLD PROD TYP BPU: NORMAL
BLD PROD TYP BPU: NORMAL
BLOOD UNIT EXPIRATION DATE: NORMAL
BLOOD UNIT EXPIRATION DATE: NORMAL
BLOOD UNIT TYPE CODE: 6200
BLOOD UNIT TYPE CODE: 8400
BLOOD UNIT TYPE: NORMAL
BLOOD UNIT TYPE: NORMAL
BUN SERPL-MCNC: 23 MG/DL (ref 8–23)
BUN SERPL-MCNC: 26 MG/DL (ref 8–23)
BUN SERPL-MCNC: 32 MG/DL (ref 8–23)
BUN SERPL-MCNC: 32 MG/DL (ref 8–23)
BUN SERPL-MCNC: 37 MG/DL (ref 8–23)
BUN SERPL-MCNC: 39 MG/DL (ref 8–23)
CALCIUM SERPL-MCNC: 8 MG/DL (ref 8.7–10.5)
CALCIUM SERPL-MCNC: 8.2 MG/DL (ref 8.7–10.5)
CALCIUM SERPL-MCNC: 8.2 MG/DL (ref 8.7–10.5)
CALCIUM SERPL-MCNC: 8.3 MG/DL (ref 8.7–10.5)
CALCIUM SERPL-MCNC: 8.5 MG/DL (ref 8.7–10.5)
CALCIUM SERPL-MCNC: 8.5 MG/DL (ref 8.7–10.5)
CHLORIDE SERPL-SCNC: 100 MMOL/L (ref 95–110)
CHLORIDE SERPL-SCNC: 101 MMOL/L (ref 95–110)
CHLORIDE SERPL-SCNC: 101 MMOL/L (ref 95–110)
CHLORIDE SERPL-SCNC: 98 MMOL/L (ref 95–110)
CO2 SERPL-SCNC: 20 MMOL/L (ref 23–29)
CO2 SERPL-SCNC: 22 MMOL/L (ref 23–29)
CO2 SERPL-SCNC: 23 MMOL/L (ref 23–29)
CO2 SERPL-SCNC: 24 MMOL/L (ref 23–29)
CO2 SERPL-SCNC: 24 MMOL/L (ref 23–29)
CO2 SERPL-SCNC: 25 MMOL/L (ref 23–29)
CODING SYSTEM: NORMAL
CODING SYSTEM: NORMAL
CREAT SERPL-MCNC: 2.2 MG/DL (ref 0.5–1.4)
CREAT SERPL-MCNC: 2.7 MG/DL (ref 0.5–1.4)
CREAT SERPL-MCNC: 3.4 MG/DL (ref 0.5–1.4)
CREAT SERPL-MCNC: 3.5 MG/DL (ref 0.5–1.4)
CREAT SERPL-MCNC: 3.8 MG/DL (ref 0.5–1.4)
CREAT SERPL-MCNC: 4.5 MG/DL (ref 0.5–1.4)
DIFFERENTIAL METHOD: ABNORMAL
DISPENSE STATUS: NORMAL
DISPENSE STATUS: NORMAL
EOSINOPHIL # BLD AUTO: 0.1 K/UL (ref 0–0.5)
EOSINOPHIL NFR BLD: 0.4 % (ref 0–8)
EOSINOPHIL NFR BLD: 0.5 % (ref 0–8)
EOSINOPHIL NFR BLD: 0.5 % (ref 0–8)
ERYTHROCYTE [DISTWIDTH] IN BLOOD BY AUTOMATED COUNT: 16 % (ref 11.5–14.5)
ERYTHROCYTE [DISTWIDTH] IN BLOOD BY AUTOMATED COUNT: 16.4 % (ref 11.5–14.5)
ERYTHROCYTE [DISTWIDTH] IN BLOOD BY AUTOMATED COUNT: 16.6 % (ref 11.5–14.5)
ERYTHROCYTE [DISTWIDTH] IN BLOOD BY AUTOMATED COUNT: 16.6 % (ref 11.5–14.5)
ERYTHROCYTE [DISTWIDTH] IN BLOOD BY AUTOMATED COUNT: 17.3 % (ref 11.5–14.5)
EST. GFR  (NO RACE VARIABLE): 13.9 ML/MIN/1.73 M^2
EST. GFR  (NO RACE VARIABLE): 17 ML/MIN/1.73 M^2
EST. GFR  (NO RACE VARIABLE): 18.8 ML/MIN/1.73 M^2
EST. GFR  (NO RACE VARIABLE): 19.5 ML/MIN/1.73 M^2
EST. GFR  (NO RACE VARIABLE): 25.7 ML/MIN/1.73 M^2
EST. GFR  (NO RACE VARIABLE): 32.8 ML/MIN/1.73 M^2
GLUCOSE SERPL-MCNC: 104 MG/DL (ref 70–110)
GLUCOSE SERPL-MCNC: 106 MG/DL (ref 70–110)
GLUCOSE SERPL-MCNC: 110 MG/DL (ref 70–110)
GLUCOSE SERPL-MCNC: 117 MG/DL (ref 70–110)
GLUCOSE SERPL-MCNC: 126 MG/DL (ref 70–110)
GLUCOSE SERPL-MCNC: 210 MG/DL (ref 70–110)
HCT VFR BLD AUTO: 22 % (ref 40–54)
HCT VFR BLD AUTO: 22.7 % (ref 40–54)
HCT VFR BLD AUTO: 22.7 % (ref 40–54)
HCT VFR BLD AUTO: 26.2 % (ref 40–54)
HCT VFR BLD AUTO: 27.1 % (ref 40–54)
HGB BLD-MCNC: 7 G/DL (ref 14–18)
HGB BLD-MCNC: 7.7 G/DL (ref 14–18)
HGB BLD-MCNC: 7.7 G/DL (ref 14–18)
HGB BLD-MCNC: 8.6 G/DL (ref 14–18)
HGB BLD-MCNC: 8.9 G/DL (ref 14–18)
IMM GRANULOCYTES # BLD AUTO: 0.16 K/UL (ref 0–0.04)
IMM GRANULOCYTES # BLD AUTO: 0.17 K/UL (ref 0–0.04)
IMM GRANULOCYTES # BLD AUTO: 0.22 K/UL (ref 0–0.04)
IMM GRANULOCYTES NFR BLD AUTO: 0.9 % (ref 0–0.5)
IMM GRANULOCYTES NFR BLD AUTO: 1.1 % (ref 0–0.5)
INR PPP: 1.1 (ref 0.8–1.2)
LYMPHOCYTES # BLD AUTO: 0.9 K/UL (ref 1–4.8)
LYMPHOCYTES # BLD AUTO: 0.9 K/UL (ref 1–4.8)
LYMPHOCYTES # BLD AUTO: 1 K/UL (ref 1–4.8)
LYMPHOCYTES # BLD AUTO: 1.1 K/UL (ref 1–4.8)
LYMPHOCYTES # BLD AUTO: 1.2 K/UL (ref 1–4.8)
LYMPHOCYTES NFR BLD: 5.2 % (ref 18–48)
LYMPHOCYTES NFR BLD: 5.2 % (ref 18–48)
LYMPHOCYTES NFR BLD: 5.4 % (ref 18–48)
LYMPHOCYTES NFR BLD: 5.5 % (ref 18–48)
LYMPHOCYTES NFR BLD: 6.6 % (ref 18–48)
MAGNESIUM SERPL-MCNC: 2.2 MG/DL (ref 1.6–2.6)
MAGNESIUM SERPL-MCNC: 2.2 MG/DL (ref 1.6–2.6)
MAGNESIUM SERPL-MCNC: 2.3 MG/DL (ref 1.6–2.6)
MCH RBC QN AUTO: 30 PG (ref 27–31)
MCH RBC QN AUTO: 30.5 PG (ref 27–31)
MCH RBC QN AUTO: 30.7 PG (ref 27–31)
MCH RBC QN AUTO: 31 PG (ref 27–31)
MCH RBC QN AUTO: 31 PG (ref 27–31)
MCHC RBC AUTO-ENTMCNC: 31.8 G/DL (ref 32–36)
MCHC RBC AUTO-ENTMCNC: 32.8 G/DL (ref 32–36)
MCHC RBC AUTO-ENTMCNC: 32.8 G/DL (ref 32–36)
MCHC RBC AUTO-ENTMCNC: 33.9 G/DL (ref 32–36)
MCHC RBC AUTO-ENTMCNC: 33.9 G/DL (ref 32–36)
MCV RBC AUTO: 92 FL (ref 82–98)
MCV RBC AUTO: 92 FL (ref 82–98)
MCV RBC AUTO: 93 FL (ref 82–98)
MCV RBC AUTO: 94 FL (ref 82–98)
MCV RBC AUTO: 94 FL (ref 82–98)
MONOCYTES # BLD AUTO: 1.4 K/UL (ref 0.3–1)
MONOCYTES # BLD AUTO: 1.5 K/UL (ref 0.3–1)
MONOCYTES # BLD AUTO: 1.5 K/UL (ref 0.3–1)
MONOCYTES # BLD AUTO: 1.6 K/UL (ref 0.3–1)
MONOCYTES # BLD AUTO: 1.6 K/UL (ref 0.3–1)
MONOCYTES NFR BLD: 7.3 % (ref 4–15)
MONOCYTES NFR BLD: 8.4 % (ref 4–15)
MONOCYTES NFR BLD: 8.4 % (ref 4–15)
MONOCYTES NFR BLD: 8.6 % (ref 4–15)
MONOCYTES NFR BLD: 8.7 % (ref 4–15)
NEUTROPHILS # BLD AUTO: 15.2 K/UL (ref 1.8–7.7)
NEUTROPHILS # BLD AUTO: 15.3 K/UL (ref 1.8–7.7)
NEUTROPHILS # BLD AUTO: 15.3 K/UL (ref 1.8–7.7)
NEUTROPHILS # BLD AUTO: 15.4 K/UL (ref 1.8–7.7)
NEUTROPHILS # BLD AUTO: 16.5 K/UL (ref 1.8–7.7)
NEUTROPHILS NFR BLD: 83 % (ref 38–73)
NEUTROPHILS NFR BLD: 84.5 % (ref 38–73)
NEUTROPHILS NFR BLD: 84.9 % (ref 38–73)
NEUTROPHILS NFR BLD: 84.9 % (ref 38–73)
NEUTROPHILS NFR BLD: 85.4 % (ref 38–73)
NRBC BLD-RTO: 0 /100 WBC
NUM UNITS TRANS PACKED RBC: NORMAL
PHOSPHATE SERPL-MCNC: 3.8 MG/DL (ref 2.7–4.5)
PHOSPHATE SERPL-MCNC: 4.4 MG/DL (ref 2.7–4.5)
PHOSPHATE SERPL-MCNC: 4.4 MG/DL (ref 2.7–4.5)
PLATELET # BLD AUTO: 219 K/UL (ref 150–450)
PLATELET # BLD AUTO: 222 K/UL (ref 150–450)
PLATELET # BLD AUTO: 222 K/UL (ref 150–450)
PLATELET # BLD AUTO: 236 K/UL (ref 150–450)
PLATELET # BLD AUTO: 251 K/UL (ref 150–450)
PMV BLD AUTO: 10 FL (ref 9.2–12.9)
PMV BLD AUTO: 10 FL (ref 9.2–12.9)
PMV BLD AUTO: 10.1 FL (ref 9.2–12.9)
PMV BLD AUTO: 9.7 FL (ref 9.2–12.9)
PMV BLD AUTO: 9.7 FL (ref 9.2–12.9)
POTASSIUM SERPL-SCNC: 4 MMOL/L (ref 3.5–5.1)
POTASSIUM SERPL-SCNC: 4.3 MMOL/L (ref 3.5–5.1)
POTASSIUM SERPL-SCNC: 4.4 MMOL/L (ref 3.5–5.1)
POTASSIUM SERPL-SCNC: 4.5 MMOL/L (ref 3.5–5.1)
POTASSIUM SERPL-SCNC: 4.5 MMOL/L (ref 3.5–5.1)
POTASSIUM SERPL-SCNC: 4.6 MMOL/L (ref 3.5–5.1)
PROT SERPL-MCNC: 5.5 G/DL (ref 6–8.4)
PROTHROMBIN TIME: 11.7 SEC (ref 9–12.5)
RBC # BLD AUTO: 2.33 M/UL (ref 4.6–6.2)
RBC # BLD AUTO: 2.48 M/UL (ref 4.6–6.2)
RBC # BLD AUTO: 2.48 M/UL (ref 4.6–6.2)
RBC # BLD AUTO: 2.8 M/UL (ref 4.6–6.2)
RBC # BLD AUTO: 2.92 M/UL (ref 4.6–6.2)
SODIUM SERPL-SCNC: 131 MMOL/L (ref 136–145)
SODIUM SERPL-SCNC: 132 MMOL/L (ref 136–145)
SODIUM SERPL-SCNC: 133 MMOL/L (ref 136–145)
SODIUM SERPL-SCNC: 134 MMOL/L (ref 136–145)
TRANS ERYTHROCYTES VOL PATIENT: NORMAL ML
WBC # BLD AUTO: 17.99 K/UL (ref 3.9–12.7)
WBC # BLD AUTO: 17.99 K/UL (ref 3.9–12.7)
WBC # BLD AUTO: 18.23 K/UL (ref 3.9–12.7)
WBC # BLD AUTO: 18.31 K/UL (ref 3.9–12.7)
WBC # BLD AUTO: 19.26 K/UL (ref 3.9–12.7)

## 2022-11-17 PROCEDURE — 85025 COMPLETE CBC W/AUTO DIFF WBC: CPT | Mod: 91

## 2022-11-17 PROCEDURE — 85730 THROMBOPLASTIN TIME PARTIAL: CPT

## 2022-11-17 PROCEDURE — 63600175 PHARM REV CODE 636 W HCPCS: Performed by: ANESTHESIOLOGY

## 2022-11-17 PROCEDURE — 20000000 HC ICU ROOM

## 2022-11-17 PROCEDURE — 83735 ASSAY OF MAGNESIUM: CPT

## 2022-11-17 PROCEDURE — 85025 COMPLETE CBC W/AUTO DIFF WBC: CPT | Performed by: STUDENT IN AN ORGANIZED HEALTH CARE EDUCATION/TRAINING PROGRAM

## 2022-11-17 PROCEDURE — 25000003 PHARM REV CODE 250: Performed by: STUDENT IN AN ORGANIZED HEALTH CARE EDUCATION/TRAINING PROGRAM

## 2022-11-17 PROCEDURE — 84100 ASSAY OF PHOSPHORUS: CPT

## 2022-11-17 PROCEDURE — 63600175 PHARM REV CODE 636 W HCPCS: Performed by: THORACIC SURGERY (CARDIOTHORACIC VASCULAR SURGERY)

## 2022-11-17 PROCEDURE — 85610 PROTHROMBIN TIME: CPT

## 2022-11-17 PROCEDURE — 83735 ASSAY OF MAGNESIUM: CPT | Mod: 91 | Performed by: STUDENT IN AN ORGANIZED HEALTH CARE EDUCATION/TRAINING PROGRAM

## 2022-11-17 PROCEDURE — 85025 COMPLETE CBC W/AUTO DIFF WBC: CPT | Mod: 91 | Performed by: THORACIC SURGERY (CARDIOTHORACIC VASCULAR SURGERY)

## 2022-11-17 PROCEDURE — 80048 BASIC METABOLIC PNL TOTAL CA: CPT | Mod: 91,XB

## 2022-11-17 PROCEDURE — 90945 DIALYSIS ONE EVALUATION: CPT

## 2022-11-17 PROCEDURE — 36430 TRANSFUSION BLD/BLD COMPNT: CPT

## 2022-11-17 PROCEDURE — 63600175 PHARM REV CODE 636 W HCPCS: Performed by: STUDENT IN AN ORGANIZED HEALTH CARE EDUCATION/TRAINING PROGRAM

## 2022-11-17 PROCEDURE — 99223 PR INITIAL HOSPITAL CARE,LEVL III: ICD-10-PCS | Mod: 25,,, | Performed by: INTERNAL MEDICINE

## 2022-11-17 PROCEDURE — 90935 HEMODIALYSIS ONE EVALUATION: CPT | Mod: ,,, | Performed by: INTERNAL MEDICINE

## 2022-11-17 PROCEDURE — P9021 RED BLOOD CELLS UNIT: HCPCS

## 2022-11-17 PROCEDURE — 25000003 PHARM REV CODE 250

## 2022-11-17 PROCEDURE — 80048 BASIC METABOLIC PNL TOTAL CA: CPT | Mod: XB

## 2022-11-17 PROCEDURE — 99232 PR SUBSEQUENT HOSPITAL CARE,LEVL II: ICD-10-PCS | Mod: ,,, | Performed by: ANESTHESIOLOGY

## 2022-11-17 PROCEDURE — P9016 RBC LEUKOCYTES REDUCED: HCPCS

## 2022-11-17 PROCEDURE — 80069 RENAL FUNCTION PANEL: CPT | Performed by: STUDENT IN AN ORGANIZED HEALTH CARE EDUCATION/TRAINING PROGRAM

## 2022-11-17 PROCEDURE — 99223 1ST HOSP IP/OBS HIGH 75: CPT | Mod: 25,,, | Performed by: INTERNAL MEDICINE

## 2022-11-17 PROCEDURE — 80053 COMPREHEN METABOLIC PANEL: CPT

## 2022-11-17 PROCEDURE — 63600175 PHARM REV CODE 636 W HCPCS

## 2022-11-17 PROCEDURE — 80100008 HC CRRT DAILY MAINTENANCE

## 2022-11-17 PROCEDURE — 90935 PR HEMODIALYSIS, ONE EVALUATION: ICD-10-PCS | Mod: ,,, | Performed by: INTERNAL MEDICINE

## 2022-11-17 PROCEDURE — 94761 N-INVAS EAR/PLS OXIMETRY MLT: CPT

## 2022-11-17 PROCEDURE — 99232 SBSQ HOSP IP/OBS MODERATE 35: CPT | Mod: ,,, | Performed by: ANESTHESIOLOGY

## 2022-11-17 RX ORDER — HYDROCODONE BITARTRATE AND ACETAMINOPHEN 500; 5 MG/1; MG/1
TABLET ORAL
Status: DISCONTINUED | OUTPATIENT
Start: 2022-11-17 | End: 2022-11-21

## 2022-11-17 RX ORDER — CEFEPIME HYDROCHLORIDE 1 G/50ML
1 INJECTION, SOLUTION INTRAVENOUS
Status: DISCONTINUED | OUTPATIENT
Start: 2022-11-17 | End: 2022-11-21

## 2022-11-17 RX ORDER — MAGNESIUM SULFATE HEPTAHYDRATE 40 MG/ML
2 INJECTION, SOLUTION INTRAVENOUS
Status: DISCONTINUED | OUTPATIENT
Start: 2022-11-17 | End: 2022-11-18

## 2022-11-17 RX ORDER — HYDROCODONE BITARTRATE AND ACETAMINOPHEN 500; 5 MG/1; MG/1
TABLET ORAL CONTINUOUS
Status: DISCONTINUED | OUTPATIENT
Start: 2022-11-17 | End: 2022-11-18

## 2022-11-17 RX ADMIN — OXYCODONE HYDROCHLORIDE 10 MG: 10 TABLET ORAL at 09:11

## 2022-11-17 RX ADMIN — OXYCODONE 5 MG: 5 TABLET ORAL at 09:11

## 2022-11-17 RX ADMIN — AMIODARONE HYDROCHLORIDE 200 MG: 200 TABLET ORAL at 09:11

## 2022-11-17 RX ADMIN — ASPIRIN 81 MG 81 MG: 81 TABLET ORAL at 09:11

## 2022-11-17 RX ADMIN — MILRINONE LACTATE IN DEXTROSE 0.25 MCG/KG/MIN: 200 INJECTION, SOLUTION INTRAVENOUS at 05:11

## 2022-11-17 RX ADMIN — HYDROMORPHONE HYDROCHLORIDE 1 MG: 1 INJECTION, SOLUTION INTRAMUSCULAR; INTRAVENOUS; SUBCUTANEOUS at 02:11

## 2022-11-17 RX ADMIN — SODIUM CHLORIDE: 0.9 INJECTION, SOLUTION INTRAVENOUS at 11:11

## 2022-11-17 RX ADMIN — HYDROMORPHONE HYDROCHLORIDE 1 MG: 1 INJECTION, SOLUTION INTRAMUSCULAR; INTRAVENOUS; SUBCUTANEOUS at 06:11

## 2022-11-17 RX ADMIN — HEPARIN SODIUM 5000 UNITS: 5000 INJECTION INTRAVENOUS; SUBCUTANEOUS at 09:11

## 2022-11-17 RX ADMIN — METHOCARBAMOL 500 MG: 500 TABLET ORAL at 09:11

## 2022-11-17 RX ADMIN — HEPARIN SODIUM 5000 UNITS: 5000 INJECTION INTRAVENOUS; SUBCUTANEOUS at 02:11

## 2022-11-17 RX ADMIN — MUPIROCIN: 20 OINTMENT TOPICAL at 09:11

## 2022-11-17 RX ADMIN — SENNOSIDES AND DOCUSATE SODIUM 1 TABLET: 50; 8.6 TABLET ORAL at 09:11

## 2022-11-17 RX ADMIN — DOXYCYCLINE HYCLATE 100 MG: 100 TABLET, COATED ORAL at 09:11

## 2022-11-17 RX ADMIN — OXYCODONE HYDROCHLORIDE 10 MG: 10 TABLET ORAL at 03:11

## 2022-11-17 RX ADMIN — METHOCARBAMOL 500 MG: 500 TABLET ORAL at 02:11

## 2022-11-17 RX ADMIN — ACETAMINOPHEN 1000 MG: 500 TABLET ORAL at 09:11

## 2022-11-17 RX ADMIN — ACETAMINOPHEN 1000 MG: 500 TABLET ORAL at 02:11

## 2022-11-17 RX ADMIN — HYDROMORPHONE HYDROCHLORIDE 1 MG: 1 INJECTION, SOLUTION INTRAMUSCULAR; INTRAVENOUS; SUBCUTANEOUS at 07:11

## 2022-11-17 RX ADMIN — VASOPRESSIN 0.04 UNITS/MIN: 20 INJECTION INTRAVENOUS at 09:11

## 2022-11-17 RX ADMIN — CEFEPIME HYDROCHLORIDE 1 G: 1 INJECTION, SOLUTION INTRAVENOUS at 04:11

## 2022-11-17 RX ADMIN — SODIUM PHOSPHATE, MONOBASIC, MONOHYDRATE AND SODIUM PHOSPHATE, DIBASIC, ANHYDROUS 20.01 MMOL: 142; 276 INJECTION, SOLUTION INTRAVENOUS at 12:11

## 2022-11-17 RX ADMIN — ACETAMINOPHEN 1000 MG: 500 TABLET ORAL at 05:11

## 2022-11-17 RX ADMIN — HEPARIN SODIUM 5000 UNITS: 5000 INJECTION INTRAVENOUS; SUBCUTANEOUS at 05:11

## 2022-11-17 RX ADMIN — OXYCODONE HYDROCHLORIDE 10 MG: 10 TABLET ORAL at 12:11

## 2022-11-17 RX ADMIN — POLYETHYLENE GLYCOL 3350 17 G: 17 POWDER, FOR SOLUTION ORAL at 09:11

## 2022-11-17 NOTE — PROGRESS NOTES
Plastic and Reconstructive Surgery   Progress Note    Subjective:    Patient seen and examined at bedside in [place]. Patient is still in pain and complaining of binder. Hgb been relatively stable. Drain outputs decreasing.    Objective:  Vital signs in last 24 hours:  Temp:  [97.7 °F (36.5 °C)-98.4 °F (36.9 °C)] 97.7 °F (36.5 °C)  Pulse:  [] 98  Resp:  [11-38] 16  SpO2:  [91 %-98 %] 95 %  BP: (117-156)/(56-77) 118/68  Arterial Line BP: (104-173)/(43-87) 118/50    Intake/Output last 3 shifts:  I/O last 3 completed shifts:  In: 7204.5 [P.O.:1520; I.V.:5345.1; IV Piggyback:339.4]  Out: 6730 [Urine:225; Drains:461; Other:6044]    Intake/Output this shift:  I/O this shift:  In: 271.4 [I.V.:228.2; IV Piggyback:43.3]  Out: 96 [Urine:15; Drains:81]        Physical Exam:  VITAL SIGNS:   Vitals:    22 0915 22 0930 22 0945 22 1000   BP:       BP Location:       Patient Position:       Pulse: 101 99 98 98   Resp:    16   Temp:       TempSrc:       SpO2: 95% 95% 95% 95%   Weight:       Height:         TMAX: Temp (24hrs), Av °F (36.7 °C), Min:97.7 °F (36.5 °C), Max:98.4 °F (36.9 °C)    General: Alert; No acute distress  Cardiovascular: Regular rate   Respiratory: Normal respiratory effort. Chest rise symmetric.   Abdomen: Soft, nontender, nondistended  Extremity: Moves all extremities equally.  Neurologic: No focal deficit. Speech normal  SKIN: midline sternal and bilateral axillary incisions c/d/I, 5 drains     Scheduled Medications acetaminophen, 1,000 mg, Q8H  amiodarone, 200 mg, BID  aspirin, 81 mg, Daily  atorvastatin, 40 mg, Daily  ceFEPime (MAXIPIME) IVPB, 1 g, Q12H  doxycycline, 100 mg, Q12H  heparin (porcine), 5,000 Units, Q8H  methocarbamoL, 500 mg, TID  mupirocin, , BID  polyethylene glycol, 17 g, Daily  senna-docusate 8.6-50 mg, 1 tablet, BID    PRN Medications sodium chloride, sodium chloride, dextrose 10%, dextrose 10%, HYDROmorphone, hydrOXYzine pamoate, magnesium sulfate IVPB,  magnesium sulfate IVPB, ondansetron, oxyCODONE, oxyCODONE, sodium phosphate IVPB, sodium phosphate IVPB, sodium phosphate IVPB, sodium phosphate IVPB, sodium phosphate IVPB, sodium phosphate IVPB    Recent Labs:   Lab Results   Component Value Date    WBC 17.10 (H) 11/19/2022    HGB 8.9 (L) 11/19/2022    HCT 27.4 (L) 11/19/2022    MCV 95 11/19/2022     11/19/2022     Lab Results   Component Value Date    GLU 80 11/19/2022     11/19/2022    K 4.4 11/19/2022     11/19/2022    BUN 5 (L) 11/19/2022         Assessment: 62 y.o. y/o male 5 Days Post-Op s/p Procedure(s):  REMOVAL, STERNAL WIRE  DEBRIDEMENT, STERNUM  APPLICATION, WOUND VAC Doing well postoperatively.    Plan  Maintain binder   Trend Hgb, transfuse as needed  Monitor Bun/Cr  Monitor drain output  Pain control  I's & O's  Dvt ppx  Medical management as per SICU  Discussed with Dr Neida Whitlock MD- Fellow  Department of Plastic and Reconstructive Surgery  [phone] (office)

## 2022-11-17 NOTE — PROGRESS NOTES
[facility]  Nephrology  Progress Note    Patient Name: David Barrios  MRN: [id number]  Admission Date: 11/8/2022  Hospital Length of Stay: 9 days  Attending Provider: Mike Hedrick MD   Primary Care Physician: Breann Tavera MD  Principal Problem:Sternal wound infection    Subjective:     HPI: 61 yo male w/ hx of Afib; HFrEF (35%); HTN; HLD. Patient underwent who underwent mitral valve repair, tricuspid valve repair, left atrial maze, left atrial appendage resection, and direct aortic impella 5.5 insertion on 10/07/2022. Hospital course complicated by Serratia bacteremia (on 6wk course of cefepime), sternal wound infection, and cardiogenic shock. On 10/31 patient developed KIMANI. Per chart review no documented course of hypotension during that time. Patient started on diuresis with IV lasix, with good urine output. Patient required increased diuretics (IV Diuril 500mg TID; and Lasix 120mg TID) and continued to have increased Scr, which progressed to oliguria, and now having metabolic derangements.     Patient denies any prior history of kidney disease. Does not have significant family history of kidney disease. Does endorse worsening fatigue and dyspnea. Denies any nausea vomiting, or metallic taste in mouth.     Nephrology was consulted for oliguric KIMANI.       Interval History:     Overnight patient had to placed on Vasopressin while on CRRT. Only able to achieve ~200cc of UF. Able to be weaned off of vasopressin this AM. Remains on milrinone.     I- 1.3L  UOP- 240cc  CRRT- 250cc  Net (+) 600cc    Review of patient's allergies indicates:  No Known Allergies  Current Facility-Administered Medications   Medication Frequency    0.9%  NaCl infusion (CRRT USE ONLY) Continuous    0.9%  NaCl infusion (for blood administration) Q24H PRN    0.9%  NaCl infusion (for blood administration) Q24H PRN    acetaminophen tablet 1,000 mg Q8H    amiodarone tablet 200 mg BID    aspirin chewable tablet  81 mg Daily    cefepime in dextrose 5 % IVPB 2 g Q24H    dextrose 10% bolus 125 mL PRN    dextrose 10% bolus 250 mL PRN    doxycycline tablet 100 mg Q12H    heparin (porcine) injection 5,000 Units Q8H    HYDROmorphone injection 1 mg Q4H PRN    hydrOXYzine pamoate capsule 50 mg Q8H PRN    magnesium sulfate 2g in water 50mL IVPB (premix) PRN    methocarbamoL tablet 500 mg TID    milrinone 20mg in D5W 100 mL infusion Continuous    mupirocin 2 % ointment BID    ondansetron injection 4 mg Q8H PRN    oxyCODONE immediate release tablet 5 mg Q4H PRN    oxyCODONE immediate release tablet Tab 10 mg Q4H PRN    polyethylene glycol packet 17 g Daily    senna-docusate 8.6-50 mg per tablet 1 tablet BID    sodium phosphate 20.01 mmol in dextrose 5 % 250 mL IVPB PRN    sodium phosphate 30 mmol in dextrose 5 % 250 mL IVPB PRN    sodium phosphate 39.99 mmol in dextrose 5 % 250 mL IVPB PRN    vasopressin (PITRESSIN) 0.2 Units/mL in dextrose 5 % 100 mL infusion Continuous       Objective:     Vital Signs (Most Recent):  Temp: 97.7 °F (36.5 °C) (11/17/22 0753)  Pulse: 96 (11/17/22 1115)  Resp: (!) 22 (11/17/22 1234)  BP: 121/64 (11/17/22 0000)  SpO2: 95 % (11/17/22 1115)  O2 Device (Oxygen Therapy): room air (11/17/22 0813)   Vital Signs (24h Range):  Temp:  [97.4 °F (36.3 °C)-98.1 °F (36.7 °C)] 97.7 °F (36.5 °C)  Pulse:  [] 96  Resp:  [11-45] 22  SpO2:  [88 %-100 %] 95 %  BP: (113-137)/(55-65) 121/64  Arterial Line BP: ()/(36-77) 117/40     Weight: 74 kg (163 lb 2.3 oz) (11/12/22 1519)  Body mass index is 24.08 kg/m².  Body surface area is 1.9 meters squared.    I/O last 3 completed shifts:  In: 3357.2 [P.O.:462; I.V.:2168.8; Blood:278; IV Piggyback:448.4]  Out: 2659 [Urine:425; Drains:368; Other:1866]    Physical Exam  Vitals and nursing note reviewed.   Constitutional:       General: He is not in acute distress.     Appearance: He is not ill-appearing.   Cardiovascular:      Rate and Rhythm: Normal rate  and regular rhythm.      Pulses: Normal pulses.   Pulmonary:      Effort: Pulmonary effort is normal.   Abdominal:      General: Abdomen is flat.      Palpations: Abdomen is soft.   Musculoskeletal:      Right lower leg: No edema.      Left lower leg: No edema.   Skin:     General: Skin is warm.      Findings: No bruising or lesion.      Comments: BLANCA drains with sanguinous output    Neurological:      General: No focal deficit present.       Significant Labs:  All labs within the past 24 hours have been reviewed.     Significant Imaging:  Labs: Reviewed    Assessment/Plan:     * Sternal wound infection  - per primary and ID team    KIMANI (acute kidney injury)  - Baseline Scr 0.9-1.   - 10/31 Scr increased to ~1.5; steadly increased to peak of 5.3 today  - Hyperkalemia noted to 5.3  - Anuric today despite IV diuril 500mg TID; and IV lasix 120mg TID  - Risk factors for KIMANI include Cardiorenal syndrome (less likely given aggressive diuresis in last 72hrs; and normal CVP readings); AIN; IRGN; and prolonged prerenal state from over diuresis, sepsis.  - BED side US performed showed engorged femoral veins and IJ. Unable visualize IVC 2/2 to abdominal binder.  Plan:  - CRRT SLED 12 hours tonight for metabolic clearance and volume removal (Net ~100cc/hr UF).   - Please transfuse 1 unit of pRBC today.   - Obtain RFP daily  - Discontiue diuretics  - Will perform urine microscopy  - obtain Urine na, Cr, urea  - Renally dose medications to GFR 0  - Avoid nephrotoxins as much as possible  - consent for RRT obtained and placed in chart.         Thank you for your consult. I will follow-up with patient. Please contact us if you have any additional questions.     Case discussed with attending. Attestation to follow.       Jaspal Crockett DO  Nephrology  [facility]

## 2022-11-17 NOTE — SUBJECTIVE & OBJECTIVE
"Interval History: "They're talking about dialysis". Afebrile and without leukocytosis. Pending renal replacement therapy.    Review of Systems   Constitutional:  Negative for chills, fatigue and fever.   HENT:  Negative for ear pain, mouth sores, nosebleeds, postnasal drip, rhinorrhea, sinus pressure, sore throat, tinnitus, trouble swallowing and voice change.    Eyes:  Negative for photophobia, pain, redness and visual disturbance.   Respiratory:  Positive for chest tightness. Negative for apnea, cough, shortness of breath and wheezing.    Cardiovascular:  Negative for chest pain, palpitations and leg swelling.   Gastrointestinal:  Negative for abdominal pain, blood in stool, constipation, diarrhea, nausea and vomiting.   Endocrine: Negative for cold intolerance, heat intolerance, polydipsia and polyuria.   Genitourinary:  Negative for decreased urine volume, difficulty urinating, dysuria, flank pain, frequency, genital sores, hematuria, penile discharge, penile pain, penile swelling, scrotal swelling, testicular pain and urgency.   Musculoskeletal:  Positive for arthralgias. Negative for back pain, joint swelling, myalgias and neck pain.   Skin:  Negative for color change and rash.   Allergic/Immunologic: Negative for environmental allergies and food allergies.   Neurological:  Negative for dizziness, seizures, syncope, weakness, light-headedness, numbness and headaches.   Hematological:  Negative for adenopathy. Does not bruise/bleed easily.   Psychiatric/Behavioral:  Negative for agitation, confusion, decreased concentration, hallucinations, self-injury, sleep disturbance and suicidal ideas. The patient is not nervous/anxious.    Objective:     Vital Signs (Most Recent):  Temp: 97.4 °F (36.3 °C) (11/16/22 1915)  Pulse: 99 (11/16/22 1915)  Resp: 12 (11/16/22 1915)  BP: (!) 114/55 (11/16/22 1400)  SpO2: 100 % (11/16/22 1915)   Vital Signs (24h Range):  Temp:  [97.4 °F (36.3 °C)-98.4 °F (36.9 °C)] 97.4 °F (36.3 " °C)  Pulse:  [] 99  Resp:  [10-23] 12  SpO2:  [86 %-100 %] 100 %  BP: (110-147)/(55-70) 114/55  Arterial Line BP: ()/(44-68) 100/48     Weight: 74 kg (163 lb 2.3 oz)  Body mass index is 24.08 kg/m².    Estimated Creatinine Clearance: 15.8 mL/min (A) (based on SCr of 4.8 mg/dL (H)).    Physical Exam  Vitals reviewed.   Constitutional:       Appearance: He is well-developed.   HENT:      Head: Normocephalic and atraumatic.      Right Ear: External ear normal.      Left Ear: External ear normal.   Eyes:      Conjunctiva/sclera: Conjunctivae normal.   Neck:      Thyroid: No thyromegaly.   Cardiovascular:      Rate and Rhythm: Normal rate and regular rhythm.      Heart sounds: Normal heart sounds. No murmur heard.  Pulmonary:      Effort: Pulmonary effort is normal.      Breath sounds: Examination of the right-lower field reveals decreased breath sounds. Examination of the left-lower field reveals decreased breath sounds. Decreased breath sounds present. No wheezing or rales.   Chest:      Comments: Palpable subcutaneous emphysema  Abdominal:      General: Bowel sounds are normal.      Palpations: Abdomen is soft. There is no mass.      Tenderness: There is no abdominal tenderness. There is no rebound.   Musculoskeletal:         General: Normal range of motion.      Cervical back: Normal range of motion and neck supple.   Lymphadenopathy:      Cervical: No cervical adenopathy.   Skin:     General: Skin is warm and dry.   Neurological:      Mental Status: He is alert and oriented to person, place, and time.   Psychiatric:         Behavior: Behavior normal.       Significant Labs: Blood Culture:   Recent Labs   Lab 10/14/22  1638 10/16/22  1419 10/16/22  1436 10/18/22  1608 10/18/22  1637   LABBLOO No growth after 5 days.  No growth after 5 days. No growth after 5 days. No growth after 5 days. No growth after 5 days. No growth after 5 days.     BMP:   Recent Labs   Lab 11/16/22  0344 11/16/22  1117  11/16/22  1726      < > 108   *   < > 134*   K 5.3*   < > 4.8   CL 97   < > 100   CO2 23   < > 24   BUN 73*   < > 64*   CREATININE 5.4*   < > 4.8*   CALCIUM 8.8   < > 8.6*   MG 2.3  --   --     < > = values in this interval not displayed.     CBC:   Recent Labs   Lab 11/16/22  0923 11/16/22  1117 11/16/22  1929   WBC 23.81* 24.13* 20.53*   HGB 7.9* 8.1* 7.7*   HCT 24.5* 25.1* 23.8*    278 275     Respiratory Culture:   Recent Labs   Lab 10/07/22  0647 10/11/22  0919   GSRESP  --  No WBC's  No organisms seen   RESPIRATORYC Normal respiratory janna  --      Wound Culture:   Recent Labs   Lab 10/14/22  1643 10/15/22  1240 11/08/22  1106 11/10/22  0834 11/10/22  0836   LABAERO SERRATIA MARCESCENS  Moderate  * SERRATIA MARCESCENS  Few  *  SERRATIA MARCESCENS  Few  * No growth  No growth  No growth  No growth  No growth No growth Skin janna,  no predominant organism       Significant Imaging: I have reviewed all pertinent imaging results/findings within the past 24 hours.

## 2022-11-17 NOTE — ASSESSMENT & PLAN NOTE
- Baseline Scr 0.9-1.   - 10/31 Scr increased to ~1.5; steadly increased to peak of 5.3 today  - Hyperkalemia noted to 5.3  - Anuric today despite IV diuril 500mg TID; and IV lasix 120mg TID  - Risk factors for KIMANI include Cardiorenal syndrome (less likely given aggressive diuresis in last 72hrs; and normal CVP readings); AIN; IRGN; and prolonged prerenal state from over diuresis, sepsis.  - BED side US performed showed engorged femoral veins and IJ. Unable visualize IVC 2/2 to abdominal binder.  Plan:  - CRRT SLED 12 hours tonight for metabolic clearance and volume removal (Net ~100cc/hr UF).   - Please transfuse 1 unit of pRBC today.   - Obtain RFP daily  - Discontiue diuretics  - Will perform urine microscopy  - obtain Urine na, Cr, urea  - Renally dose medications to GFR 0  - Avoid nephrotoxins as much as possible  - consent for RRT obtained and placed in chart.

## 2022-11-17 NOTE — SUBJECTIVE & OBJECTIVE
Interval History/Significant Events: CRRT overnight with some hypotension, stable on vaso 0.04 this AM. Mild anemia on CBC.     Follow-up For: Procedure(s) (LRB):  WASHOUT (N/A)    Post-Operative Day: 4 Days Post-Op    Objective:     Vital Signs (Most Recent):  Temp: 98.1 °F (36.7 °C) (11/17/22 0315)  Pulse: 91 (11/17/22 0645)  Resp: (!) 21 (11/17/22 0645)  BP: 121/64 (11/17/22 0000)  SpO2: 100 % (11/17/22 0645) Vital Signs (24h Range):  Temp:  [97.4 °F (36.3 °C)-98.1 °F (36.7 °C)] 98.1 °F (36.7 °C)  Pulse:  [] 91  Resp:  [11-45] 21  SpO2:  [88 %-100 %] 100 %  BP: (113-143)/(55-65) 121/64  Arterial Line BP: ()/(36-77) 104/36     Weight: 74 kg (163 lb 2.3 oz)  Body mass index is 24.08 kg/m².      Intake/Output Summary (Last 24 hours) at 11/17/2022 0723  Last data filed at 11/17/2022 0600  Gross per 24 hour   Intake 2960.07 ml   Output 2339 ml   Net 621.07 ml         Physical Exam  Vitals and nursing note reviewed.   Constitutional:       General: He is not in acute distress.     Appearance: He is not ill-appearing.   Cardiovascular:      Rate and Rhythm: Normal rate and regular rhythm.      Pulses: Normal pulses.   Pulmonary:      Effort: Pulmonary effort is normal.   Abdominal:      General: Abdomen is flat.      Palpations: Abdomen is soft.   Musculoskeletal:      Right lower leg: No edema.      Left lower leg: No edema.   Skin:     General: Skin is warm.      Findings: No bruising or lesion.      Comments: BLANCA drains with sanguinous output    Neurological:      General: No focal deficit present.       Vents: NA    Lines/Drains/Airways       Peripherally Inserted Central Catheter Line  Duration             PICC Double Lumen 10/17/22 1709 right brachial 30 days              Central Venous Catheter Line  Duration             Trialysis (Dialysis) Catheter 11/15/22 1654 right internal jugular 1 day              Drain  Duration                  Urethral Catheter 11/12/22 6182 Straight-tip;Non-latex 16 Fr. 4  days         Closed/Suction Drain 11/14/22 2010 Inferior;Right Chest Bulb 15 Fr. 2 days         Closed/Suction Drain 11/14/22 2010 Superior;Midline Abdomen Bulb 15 Fr. 2 days         Closed/Suction Drain 11/14/22 2011 Lateral;Right Other (Comment) Bulb 15 Fr. 2 days         Closed/Suction Drain 11/14/22 2015 Inferior;Left Chest Bulb 15 Fr. 2 days         Closed/Suction Drain 11/14/22 2016 Lateral;Left Other (Comment) Bulb 15 Fr. 2 days              Arterial Line  Duration             Arterial Line 11/08/22 0712 Right Radial 9 days                    Significant Labs:    CBC/Anemia Profile:  Recent Labs   Lab 11/16/22  1117 11/16/22  1929 11/17/22  0306   WBC 24.13* 20.53* 18.23*   HGB 8.1* 7.7* 7.0*   HCT 25.1* 23.8* 22.0*    275 251   MCV 94 93 94   RDW 17.7* 17.6* 17.3*          Chemistries:  Recent Labs   Lab 11/15/22  1213 11/15/22  1800 11/16/22  0344 11/16/22  1117 11/16/22  2136 11/17/22  0042 11/17/22  0306   *   < > 132*   < > 130* 134* 133*   K 5.3*   < > 5.3*   < > 4.3 4.0 4.5   CL 98   < > 97   < > 97 100 100   CO2 27   < > 23   < > 25 25 24   BUN 62*   < > 73*   < > 34* 23 26*   CREATININE 4.4*   < > 5.4*   < > 2.8* 2.2* 2.7*   CALCIUM 9.0   < > 8.8   < > 8.4* 8.5* 8.3*   ALBUMIN 3.2*  --  2.8*  --  2.6*  --  2.6*   PROT 6.1  --  5.8*  --   --   --  5.5*   BILITOT 0.7  --  1.2*  --   --   --  0.6   ALKPHOS 87  --  89  --   --   --  91   ALT 7*  --  6*  --   --   --  <5*   AST 24  --  26  --   --   --  22   MG  --   --  2.3  --  1.9  --  2.3   PHOS  --   --  6.0*  --  2.9  --  4.4    < > = values in this interval not displayed.         All pertinent labs within the past 24 hours have been reviewed.    Significant Imaging:  I have reviewed all pertinent imaging results/findings within the past 24 hours.

## 2022-11-17 NOTE — ASSESSMENT & PLAN NOTE
Airspace opacities seen on CXR on admission. Now with bilateral opacities and pleural effusions. Unclear if this is infectious in nature at this time or pulmonary edema.  · Complete a 5 day course of doxycycline.  · Discussed with primary service.

## 2022-11-17 NOTE — PLAN OF CARE
"      SICU PLAN OF CARE NOTE    Dx: Sternal wound infection    Vital Signs: BP (!) 117/59   Pulse 101   Temp 97.4 °F (36.3 °C) (Oral)   Resp 14   Ht 5' 9.02" (1.753 m)   Wt 74 kg (163 lb 2.3 oz)   SpO2 100%   BMI 24.08 kg/m²     SHIFT EVENTS:  VSS / No acute events this shift. Vaso started to maintain BP. 8 hr CRRT completed.    Neuro: AAO x4, Follows Commands, and Moves All Extremities    Respiratory: Room Air    Cardiac: NSR- ST; HR 90-100s    Diet: Cardiac Diet; 1500 ml FR    Gtts: Vasopressin and Milrinone     Urine Output: Urinary Catheter 60 ml/shift    Drains:     5 BLANCA Drain, serosanguinous drainage; See flow sheet for details     Labs: daily, CBC 12hr    SKIN NOTE:  Skin: NO new skin tears noted at this time.    Skin precautions maintained including:  Sacrum and heels with foam dressing in place for pressure protection. Frequent weight shift encouraged / assistance provided Q2 hr to prevent breakdown. Bed plugged in and mattress inflated; Adhesive use limited. Heels elevated off bed. Pressure points protected and positioning supports utilized.  Skin-to-device areas padded. Skin-to-skin areas padded    POC reviewed with patient and family; questions and concerns addressed. See flow sheets for full assessment details.     "

## 2022-11-17 NOTE — PROGRESS NOTES
[facility]  Critical Care - Surgery  Progress Note    Patient Name: aDvid Barrios  MRN: [id number]  Admission Date: 11/8/2022  Hospital Length of Stay: 9 days  Code Status: Full Code  Attending Provider: Mike Hedrick MD  Primary Care Provider: Breann Tavera MD   Principal Problem: Sternal wound infection    Subjective:     Hospital/ICU Course:        Interval History/Significant Events: CRRT overnight with some hypotension, stable on vaso 0.04 this AM. Mild anemia on CBC.     Follow-up For: Procedure(s) (LRB):  WASHOUT (N/A)    Post-Operative Day: 4 Days Post-Op    Objective:     Vital Signs (Most Recent):  Temp: 98.1 °F (36.7 °C) (11/17/22 0315)  Pulse: 91 (11/17/22 0645)  Resp: (!) 21 (11/17/22 0645)  BP: 121/64 (11/17/22 0000)  SpO2: 100 % (11/17/22 0645) Vital Signs (24h Range):  Temp:  [97.4 °F (36.3 °C)-98.1 °F (36.7 °C)] 98.1 °F (36.7 °C)  Pulse:  [] 91  Resp:  [11-45] 21  SpO2:  [88 %-100 %] 100 %  BP: (113-143)/(55-65) 121/64  Arterial Line BP: ()/(36-77) 104/36     Weight: 74 kg (163 lb 2.3 oz)  Body mass index is 24.08 kg/m².      Intake/Output Summary (Last 24 hours) at 11/17/2022 0723  Last data filed at 11/17/2022 0600  Gross per 24 hour   Intake 2960.07 ml   Output 2339 ml   Net 621.07 ml         Physical Exam  Vitals and nursing note reviewed.   Constitutional:       General: He is not in acute distress.     Appearance: He is not ill-appearing.   Cardiovascular:      Rate and Rhythm: Normal rate and regular rhythm.      Pulses: Normal pulses.   Pulmonary:      Effort: Pulmonary effort is normal.   Abdominal:      General: Abdomen is flat.      Palpations: Abdomen is soft.   Musculoskeletal:      Right lower leg: No edema.      Left lower leg: No edema.   Skin:     General: Skin is warm.      Findings: No bruising or lesion.      Comments: BLANCA drains with sanguinous output    Neurological:      General: No focal deficit present.       Vents:  NA    Lines/Drains/Airways       Peripherally Inserted Central Catheter Line  Duration             PICC Double Lumen 10/17/22 1709 right brachial 30 days              Central Venous Catheter Line  Duration             Trialysis (Dialysis) Catheter 11/15/22 1654 right internal jugular 1 day              Drain  Duration                  Urethral Catheter 11/12/22 2125 Straight-tip;Non-latex 16 Fr. 4 days         Closed/Suction Drain 11/14/22 2010 Inferior;Right Chest Bulb 15 Fr. 2 days         Closed/Suction Drain 11/14/22 2010 Superior;Midline Abdomen Bulb 15 Fr. 2 days         Closed/Suction Drain 11/14/22 2011 Lateral;Right Other (Comment) Bulb 15 Fr. 2 days         Closed/Suction Drain 11/14/22 2015 Inferior;Left Chest Bulb 15 Fr. 2 days         Closed/Suction Drain 11/14/22 2016 Lateral;Left Other (Comment) Bulb 15 Fr. 2 days              Arterial Line  Duration             Arterial Line 11/08/22 0712 Right Radial 9 days                    Significant Labs:    CBC/Anemia Profile:  Recent Labs   Lab 11/16/22  1117 11/16/22  1929 11/17/22  0306   WBC 24.13* 20.53* 18.23*   HGB 8.1* 7.7* 7.0*   HCT 25.1* 23.8* 22.0*    275 251   MCV 94 93 94   RDW 17.7* 17.6* 17.3*          Chemistries:  Recent Labs   Lab 11/15/22  1213 11/15/22  1800 11/16/22  0344 11/16/22  1117 11/16/22  2136 11/17/22  0042 11/17/22  0306   *   < > 132*   < > 130* 134* 133*   K 5.3*   < > 5.3*   < > 4.3 4.0 4.5   CL 98   < > 97   < > 97 100 100   CO2 27   < > 23   < > 25 25 24   BUN 62*   < > 73*   < > 34* 23 26*   CREATININE 4.4*   < > 5.4*   < > 2.8* 2.2* 2.7*   CALCIUM 9.0   < > 8.8   < > 8.4* 8.5* 8.3*   ALBUMIN 3.2*  --  2.8*  --  2.6*  --  2.6*   PROT 6.1  --  5.8*  --   --   --  5.5*   BILITOT 0.7  --  1.2*  --   --   --  0.6   ALKPHOS 87  --  89  --   --   --  91   ALT 7*  --  6*  --   --   --  <5*   AST 24  --  26  --   --   --  22   MG  --   --  2.3  --  1.9  --  2.3   PHOS  --   --  6.0*  --  2.9  --  4.4    < > = values  in this interval not displayed.         All pertinent labs within the past 24 hours have been reviewed.    Significant Imaging:  I have reviewed all pertinent imaging results/findings within the past 24 hours.    Assessment/Plan:     * Sternal wound infection  62 y.o. male S/P TV replacement, MV replacement, MAZE, Lt Atrial Appendage ligation and Impella placement on 10/7/22, course complicated by bleeding, requiring reopening POD#0, multiple VT runs requiring DCCV, and sternal wound infections. He presents to the SICU s/p wound debridement and wire removal on 11/08 followed by washout on 11/10      Neuro/Psych:     - Sedation: none    - Pain:    - Scheduled Tylenol 1g q8h with prn Oxy, dilaudid for break through pain               Cardiac:      -BP Goal: MAP 60-80 and SBP <140.  Vaso 0.04, will wean.     - Echo with EF 35% with mild RV enlargement and moderate RV dysfunction. Cont milrinone 0.25     - Anti-HTNs: Will restart home meds when appropriate     - Rhythm: NSR. S/p MAZE for Afib, cont po amio.     - Beta blocker: hold in setting of Milrinone     - Statin: Atorvastatin 40 mg QD      Pulmonary:     - Goal SpO2 >92%, 2L NC     - CXR with patchy opacities on R side and SQ air, leukocytosis downtrending. ID recs doxy x5 days for PNA.           Renal:    - Trend BUN/Cr. Oliguric KIMANI. Will monitor and support kidneys with MAP >65.     - CRRT, appreciate nephrology recs     - Maintain Fierro, record strict Is/Os    Recent Labs   Lab 11/15/22  1800 11/16/22  0053 11/16/22  0344   BUN 64* 70* 73*   CREATININE 4.4* 5.2* 5.4*         FEN / GI:     - Daily CMP, PRN K/Mag/Phos per protocol     - Replace electrolytes as needed    - Nutrition: cardiac diet, BOOST    - Bowel Regimen: Miralax, docusate      ID:     - Leukocytosis without fever, s/p debridement and Pec flap 11/14. Now down trending     - Abx: Continue Cefepime, lactic acid improving. Will need long term abx plan, 6weeks post op flap    - R sided patchy  opacities on CXR on cefepime    - ID recs broadening with doxy or linezolid if leukocytosis persists.     Recent Labs   Lab 11/15/22  1800 11/16/22  0053 11/16/22  0344   WBC 26.24* 30.96* 25.02*         Heme/Onc:     - H/H with post-operative anemia, transfusing 1 unit.    - CBC daily    - ASA 325mg daily    Recent Labs   Lab 11/15/22  0412 11/15/22  1213 11/15/22  1800 11/16/22  0053 11/16/22  0344   HGB 9.3*   < > 7.2* 7.8* 7.8*   *   < > 312 298 283   APTT 25.2  --   --  28.3 28.2   INR 1.1  --   --  1.1 1.1    < > = values in this interval not displayed.         Endocrine:     - CTS Goal -140        PPx:   Feeding: cardiac diet   Analgesia/Sedation: tylenol, oxy, dilaudid PRN   Thromboembolic Prevention: scds  HOB >30: Yes  Stress Ulcer: n/a  Glucose Control: none      Lines/Drains/Airway:   Left radial arterial line   RIJ CVC   Fierro   BLANCA drains       Dispo/Code Status/Palliative:     - Continue SICU Care    - Full Code                  Critical care was time spent personally by me on the following activities: development of treatment plan with patient or surrogate and bedside caregivers, discussions with consultants, evaluation of patient's response to treatment, examination of patient, ordering and performing treatments and interventions, ordering and review of laboratory studies, ordering and review of radiographic studies, pulse oximetry, re-evaluation of patient's condition.  This critical care time did not overlap with that of any other provider or involve time for any procedures.     Leah Lay, NP  Critical Care - Surgery  [facility]

## 2022-11-17 NOTE — NURSING
11/17/22 0045   Treatment   Treatment Type SLED   Treatment Status Blood returned   Dialysis Machine Number [id number]   Dialyzer Time (hours) 8.02   BVP (Liters) 72.4 L   Solutions Labeled and Current  Yes   Access IJ;Right;Temporary Cath   Catheter Dressing Intact  Yes   Alarms Engaged Yes   CRRT Comments Tx completed.  risnsed back by primary nurse.   Prescription   Time (Hours) 8   Dialysate K + (mEq/L) 3   Dialysate CA + (mEq/L) 2.25   Dialysate HCO3 - (Bicarb) (mEq/L) 30   Dialysate Na + (mEq/L) 138   Cartridge Type   (r300)   Dialysate Flow Rate (mL/min) 200   UF Goal Rate 350 mL/hr

## 2022-11-17 NOTE — SUBJECTIVE & OBJECTIVE
Interval History:     Overnight patient had to placed on Vasopressin while on CRRT. Only able to achieve ~200cc of UF. Able to be weaned off of vasopressin this AM. Remains on milrinone.     I- 1.3L  UOP- 240cc  CRRT- 250cc  Net (+) 600cc    Review of patient's allergies indicates:  No Known Allergies  Current Facility-Administered Medications   Medication Frequency    0.9%  NaCl infusion (CRRT USE ONLY) Continuous    0.9%  NaCl infusion (for blood administration) Q24H PRN    0.9%  NaCl infusion (for blood administration) Q24H PRN    acetaminophen tablet 1,000 mg Q8H    amiodarone tablet 200 mg BID    aspirin chewable tablet 81 mg Daily    cefepime in dextrose 5 % IVPB 2 g Q24H    dextrose 10% bolus 125 mL PRN    dextrose 10% bolus 250 mL PRN    doxycycline tablet 100 mg Q12H    heparin (porcine) injection 5,000 Units Q8H    HYDROmorphone injection 1 mg Q4H PRN    hydrOXYzine pamoate capsule 50 mg Q8H PRN    magnesium sulfate 2g in water 50mL IVPB (premix) PRN    methocarbamoL tablet 500 mg TID    milrinone 20mg in D5W 100 mL infusion Continuous    mupirocin 2 % ointment BID    ondansetron injection 4 mg Q8H PRN    oxyCODONE immediate release tablet 5 mg Q4H PRN    oxyCODONE immediate release tablet Tab 10 mg Q4H PRN    polyethylene glycol packet 17 g Daily    senna-docusate 8.6-50 mg per tablet 1 tablet BID    sodium phosphate 20.01 mmol in dextrose 5 % 250 mL IVPB PRN    sodium phosphate 30 mmol in dextrose 5 % 250 mL IVPB PRN    sodium phosphate 39.99 mmol in dextrose 5 % 250 mL IVPB PRN    vasopressin (PITRESSIN) 0.2 Units/mL in dextrose 5 % 100 mL infusion Continuous       Objective:     Vital Signs (Most Recent):  Temp: 97.7 °F (36.5 °C) (11/17/22 0753)  Pulse: 96 (11/17/22 1115)  Resp: (!) 22 (11/17/22 1234)  BP: 121/64 (11/17/22 0000)  SpO2: 95 % (11/17/22 1115)  O2 Device (Oxygen Therapy): room air (11/17/22 0813)   Vital Signs (24h Range):  Temp:  [97.4 °F (36.3 °C)-98.1 °F (36.7 °C)] 97.7 °F (36.5  °C)  Pulse:  [] 96  Resp:  [11-45] 22  SpO2:  [88 %-100 %] 95 %  BP: (113-137)/(55-65) 121/64  Arterial Line BP: ()/(36-77) 117/40     Weight: 74 kg (163 lb 2.3 oz) (11/12/22 1519)  Body mass index is 24.08 kg/m².  Body surface area is 1.9 meters squared.    I/O last 3 completed shifts:  In: 3357.2 [P.O.:462; I.V.:2168.8; Blood:278; IV Piggyback:448.4]  Out: 2659 [Urine:425; Drains:368; Other:1866]    Physical Exam  Vitals and nursing note reviewed.   Constitutional:       General: He is not in acute distress.     Appearance: He is not ill-appearing.   Cardiovascular:      Rate and Rhythm: Normal rate and regular rhythm.      Pulses: Normal pulses.   Pulmonary:      Effort: Pulmonary effort is normal.   Abdominal:      General: Abdomen is flat.      Palpations: Abdomen is soft.   Musculoskeletal:      Right lower leg: No edema.      Left lower leg: No edema.   Skin:     General: Skin is warm.      Findings: No bruising or lesion.      Comments: BLANCA drains with sanguinous output    Neurological:      General: No focal deficit present.       Significant Labs:  All labs within the past 24 hours have been reviewed.     Significant Imaging:  Labs: Reviewed

## 2022-11-17 NOTE — ASSESSMENT & PLAN NOTE
Patient with Serratia bacteremia during prior admission with involvement of vascular graft. Per discussion with SICU service, the vascular graft has been removed. He is afebrile and with down trending leukocytosis.   · Continue cefepime as above.  · Will plan for a 6 week course of therapy.   · See OPAT plan below.     Outpatient Antibiotic Therapy Plan:    Please send referral to [facility].    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 Marek Jara    [facility] Fax Number: [phone]    6) Outpatient Infectious Diseases Follow-up     Follow-up appointment will be arranged by the ID clinic and will be found in the patient's appointments tab.     Prior to discharge, please ensure the patient's follow-up has been scheduled.     If there is still no follow-up scheduled prior to discharge, please send an EPIC message to Mahi Campbell in Infectious Diseases.

## 2022-11-17 NOTE — NURSING
MD Krishna notified pt SBP  and MAP 55-60. Pt on CRRT currently and will complete at 0045. Orders to start vasopressin @ 0.04 units/min.

## 2022-11-17 NOTE — PLAN OF CARE
11:54 AM  The SW emailed [facility] for assistance with applying for Medicaid benefits on  behalf of this patient.     The SW will continue to follow.    Saturnino Reyes LMSW  Case Management [facility]  Ext: [phone]

## 2022-11-18 LAB
ABO + RH BLD: NORMAL
ALBUMIN SERPL BCP-MCNC: 2.2 G/DL (ref 3.5–5.2)
ALBUMIN SERPL BCP-MCNC: 2.5 G/DL (ref 3.5–5.2)
ALBUMIN SERPL BCP-MCNC: 2.6 G/DL (ref 3.5–5.2)
ALP SERPL-CCNC: 97 U/L (ref 55–135)
ALT SERPL W/O P-5'-P-CCNC: <5 U/L (ref 10–44)
ANION GAP SERPL CALC-SCNC: 10 MMOL/L (ref 8–16)
ANION GAP SERPL CALC-SCNC: 6 MMOL/L (ref 8–16)
ANION GAP SERPL CALC-SCNC: 7 MMOL/L (ref 8–16)
ANION GAP SERPL CALC-SCNC: 8 MMOL/L (ref 8–16)
ANION GAP SERPL CALC-SCNC: 9 MMOL/L (ref 8–16)
APTT BLDCRRT: 29.4 SEC (ref 21–32)
AST SERPL-CCNC: 21 U/L (ref 10–40)
BASOPHILS # BLD AUTO: 0.02 K/UL (ref 0–0.2)
BASOPHILS # BLD AUTO: 0.04 K/UL (ref 0–0.2)
BASOPHILS # BLD AUTO: 0.04 K/UL (ref 0–0.2)
BASOPHILS # BLD AUTO: 0.05 K/UL (ref 0–0.2)
BASOPHILS NFR BLD: 0.1 % (ref 0–1.9)
BASOPHILS NFR BLD: 0.2 % (ref 0–1.9)
BASOPHILS NFR BLD: 0.2 % (ref 0–1.9)
BASOPHILS NFR BLD: 0.3 % (ref 0–1.9)
BILIRUB SERPL-MCNC: 0.7 MG/DL (ref 0.1–1)
BLD GP AB SCN CELLS X3 SERPL QL: NORMAL
BUN SERPL-MCNC: 10 MG/DL (ref 8–23)
BUN SERPL-MCNC: 12 MG/DL (ref 8–23)
BUN SERPL-MCNC: 14 MG/DL (ref 8–23)
BUN SERPL-MCNC: 21 MG/DL (ref 8–23)
BUN SERPL-MCNC: 32 MG/DL (ref 8–23)
CALCIUM SERPL-MCNC: 7.6 MG/DL (ref 8.7–10.5)
CALCIUM SERPL-MCNC: 7.7 MG/DL (ref 8.7–10.5)
CALCIUM SERPL-MCNC: 7.9 MG/DL (ref 8.7–10.5)
CALCIUM SERPL-MCNC: 7.9 MG/DL (ref 8.7–10.5)
CALCIUM SERPL-MCNC: 8.1 MG/DL (ref 8.7–10.5)
CHLORIDE SERPL-SCNC: 102 MMOL/L (ref 95–110)
CHLORIDE SERPL-SCNC: 104 MMOL/L (ref 95–110)
CHLORIDE SERPL-SCNC: 104 MMOL/L (ref 95–110)
CHLORIDE SERPL-SCNC: 105 MMOL/L (ref 95–110)
CHLORIDE SERPL-SCNC: 106 MMOL/L (ref 95–110)
CO2 SERPL-SCNC: 22 MMOL/L (ref 23–29)
CO2 SERPL-SCNC: 22 MMOL/L (ref 23–29)
CO2 SERPL-SCNC: 24 MMOL/L (ref 23–29)
CO2 SERPL-SCNC: 26 MMOL/L (ref 23–29)
CO2 SERPL-SCNC: 27 MMOL/L (ref 23–29)
CREAT SERPL-MCNC: 1.5 MG/DL (ref 0.5–1.4)
CREAT SERPL-MCNC: 1.6 MG/DL (ref 0.5–1.4)
CREAT SERPL-MCNC: 2.2 MG/DL (ref 0.5–1.4)
CREAT SERPL-MCNC: 2.4 MG/DL (ref 0.5–1.4)
CREAT SERPL-MCNC: 3.8 MG/DL (ref 0.5–1.4)
DIFFERENTIAL METHOD: ABNORMAL
EOSINOPHIL # BLD AUTO: 0.1 K/UL (ref 0–0.5)
EOSINOPHIL # BLD AUTO: 0.2 K/UL (ref 0–0.5)
EOSINOPHIL NFR BLD: 0.8 % (ref 0–8)
EOSINOPHIL NFR BLD: 0.8 % (ref 0–8)
EOSINOPHIL NFR BLD: 0.9 % (ref 0–8)
EOSINOPHIL NFR BLD: 1.1 % (ref 0–8)
ERYTHROCYTE [DISTWIDTH] IN BLOOD BY AUTOMATED COUNT: 16.8 % (ref 11.5–14.5)
ERYTHROCYTE [DISTWIDTH] IN BLOOD BY AUTOMATED COUNT: 16.9 % (ref 11.5–14.5)
ERYTHROCYTE [DISTWIDTH] IN BLOOD BY AUTOMATED COUNT: 17.2 % (ref 11.5–14.5)
ERYTHROCYTE [DISTWIDTH] IN BLOOD BY AUTOMATED COUNT: 17.2 % (ref 11.5–14.5)
EST. GFR  (NO RACE VARIABLE): 17 ML/MIN/1.73 M^2
EST. GFR  (NO RACE VARIABLE): 29.6 ML/MIN/1.73 M^2
EST. GFR  (NO RACE VARIABLE): 32.8 ML/MIN/1.73 M^2
EST. GFR  (NO RACE VARIABLE): 48.1 ML/MIN/1.73 M^2
EST. GFR  (NO RACE VARIABLE): 52 ML/MIN/1.73 M^2
GLUCOSE SERPL-MCNC: 139 MG/DL (ref 70–110)
GLUCOSE SERPL-MCNC: 88 MG/DL (ref 70–110)
GLUCOSE SERPL-MCNC: 88 MG/DL (ref 70–110)
GLUCOSE SERPL-MCNC: 93 MG/DL (ref 70–110)
GLUCOSE SERPL-MCNC: 98 MG/DL (ref 70–110)
HCT VFR BLD AUTO: 26.2 % (ref 40–54)
HCT VFR BLD AUTO: 26.6 % (ref 40–54)
HCT VFR BLD AUTO: 28.5 % (ref 40–54)
HCT VFR BLD AUTO: 29.5 % (ref 40–54)
HGB BLD-MCNC: 8.6 G/DL (ref 14–18)
HGB BLD-MCNC: 8.7 G/DL (ref 14–18)
HGB BLD-MCNC: 9.1 G/DL (ref 14–18)
HGB BLD-MCNC: 9.5 G/DL (ref 14–18)
IMM GRANULOCYTES # BLD AUTO: 0.14 K/UL (ref 0–0.04)
IMM GRANULOCYTES # BLD AUTO: 0.18 K/UL (ref 0–0.04)
IMM GRANULOCYTES # BLD AUTO: 0.21 K/UL (ref 0–0.04)
IMM GRANULOCYTES # BLD AUTO: 0.22 K/UL (ref 0–0.04)
IMM GRANULOCYTES NFR BLD AUTO: 0.8 % (ref 0–0.5)
IMM GRANULOCYTES NFR BLD AUTO: 1.1 % (ref 0–0.5)
IMM GRANULOCYTES NFR BLD AUTO: 1.2 % (ref 0–0.5)
IMM GRANULOCYTES NFR BLD AUTO: 1.5 % (ref 0–0.5)
INR PPP: 1.1 (ref 0.8–1.2)
LYMPHOCYTES # BLD AUTO: 1 K/UL (ref 1–4.8)
LYMPHOCYTES # BLD AUTO: 1 K/UL (ref 1–4.8)
LYMPHOCYTES # BLD AUTO: 1.1 K/UL (ref 1–4.8)
LYMPHOCYTES # BLD AUTO: 1.3 K/UL (ref 1–4.8)
LYMPHOCYTES NFR BLD: 5.8 % (ref 18–48)
LYMPHOCYTES NFR BLD: 6.5 % (ref 18–48)
LYMPHOCYTES NFR BLD: 6.9 % (ref 18–48)
LYMPHOCYTES NFR BLD: 7.2 % (ref 18–48)
MAGNESIUM SERPL-MCNC: 1.8 MG/DL (ref 1.6–2.6)
MAGNESIUM SERPL-MCNC: 2 MG/DL (ref 1.6–2.6)
MCH RBC QN AUTO: 30.2 PG (ref 27–31)
MCH RBC QN AUTO: 30.4 PG (ref 27–31)
MCHC RBC AUTO-ENTMCNC: 31.9 G/DL (ref 32–36)
MCHC RBC AUTO-ENTMCNC: 32.2 G/DL (ref 32–36)
MCHC RBC AUTO-ENTMCNC: 32.7 G/DL (ref 32–36)
MCHC RBC AUTO-ENTMCNC: 32.8 G/DL (ref 32–36)
MCV RBC AUTO: 92 FL (ref 82–98)
MCV RBC AUTO: 93 FL (ref 82–98)
MCV RBC AUTO: 94 FL (ref 82–98)
MCV RBC AUTO: 95 FL (ref 82–98)
MONOCYTES # BLD AUTO: 1.3 K/UL (ref 0.3–1)
MONOCYTES # BLD AUTO: 1.3 K/UL (ref 0.3–1)
MONOCYTES # BLD AUTO: 1.5 K/UL (ref 0.3–1)
MONOCYTES # BLD AUTO: 1.6 K/UL (ref 0.3–1)
MONOCYTES NFR BLD: 7.5 % (ref 4–15)
MONOCYTES NFR BLD: 8.5 % (ref 4–15)
MONOCYTES NFR BLD: 8.6 % (ref 4–15)
MONOCYTES NFR BLD: 9.2 % (ref 4–15)
NEUTROPHILS # BLD AUTO: 12.2 K/UL (ref 1.8–7.7)
NEUTROPHILS # BLD AUTO: 14.1 K/UL (ref 1.8–7.7)
NEUTROPHILS # BLD AUTO: 14.2 K/UL (ref 1.8–7.7)
NEUTROPHILS # BLD AUTO: 14.5 K/UL (ref 1.8–7.7)
NEUTROPHILS NFR BLD: 81.4 % (ref 38–73)
NEUTROPHILS NFR BLD: 81.8 % (ref 38–73)
NEUTROPHILS NFR BLD: 83.6 % (ref 38–73)
NEUTROPHILS NFR BLD: 84 % (ref 38–73)
NRBC BLD-RTO: 0 /100 WBC
PHOSPHATE SERPL-MCNC: 2 MG/DL (ref 2.7–4.5)
PHOSPHATE SERPL-MCNC: 2.3 MG/DL (ref 2.7–4.5)
PHOSPHATE SERPL-MCNC: 2.8 MG/DL (ref 2.7–4.5)
PLATELET # BLD AUTO: 251 K/UL (ref 150–450)
PLATELET # BLD AUTO: 260 K/UL (ref 150–450)
PLATELET # BLD AUTO: 273 K/UL (ref 150–450)
PLATELET # BLD AUTO: 283 K/UL (ref 150–450)
PMV BLD AUTO: 10 FL (ref 9.2–12.9)
PMV BLD AUTO: 10.1 FL (ref 9.2–12.9)
PMV BLD AUTO: 10.2 FL (ref 9.2–12.9)
PMV BLD AUTO: 9.9 FL (ref 9.2–12.9)
POCT GLUCOSE: 101 MG/DL (ref 70–110)
POCT GLUCOSE: 130 MG/DL (ref 70–110)
POTASSIUM SERPL-SCNC: 4.3 MMOL/L (ref 3.5–5.1)
POTASSIUM SERPL-SCNC: 4.4 MMOL/L (ref 3.5–5.1)
POTASSIUM SERPL-SCNC: 4.5 MMOL/L (ref 3.5–5.1)
POTASSIUM SERPL-SCNC: 4.5 MMOL/L (ref 3.5–5.1)
POTASSIUM SERPL-SCNC: 4.6 MMOL/L (ref 3.5–5.1)
PROT SERPL-MCNC: 5.5 G/DL (ref 6–8.4)
PROTHROMBIN TIME: 11.4 SEC (ref 9–12.5)
RBC # BLD AUTO: 2.83 M/UL (ref 4.6–6.2)
RBC # BLD AUTO: 2.88 M/UL (ref 4.6–6.2)
RBC # BLD AUTO: 2.99 M/UL (ref 4.6–6.2)
RBC # BLD AUTO: 3.13 M/UL (ref 4.6–6.2)
SODIUM SERPL-SCNC: 134 MMOL/L (ref 136–145)
SODIUM SERPL-SCNC: 135 MMOL/L (ref 136–145)
SODIUM SERPL-SCNC: 137 MMOL/L (ref 136–145)
SODIUM SERPL-SCNC: 138 MMOL/L (ref 136–145)
SODIUM SERPL-SCNC: 138 MMOL/L (ref 136–145)
WBC # BLD AUTO: 14.94 K/UL (ref 3.9–12.7)
WBC # BLD AUTO: 16.97 K/UL (ref 3.9–12.7)
WBC # BLD AUTO: 17.24 K/UL (ref 3.9–12.7)
WBC # BLD AUTO: 17.38 K/UL (ref 3.9–12.7)

## 2022-11-18 PROCEDURE — 25000003 PHARM REV CODE 250: Performed by: STUDENT IN AN ORGANIZED HEALTH CARE EDUCATION/TRAINING PROGRAM

## 2022-11-18 PROCEDURE — 25000003 PHARM REV CODE 250

## 2022-11-18 PROCEDURE — 80048 BASIC METABOLIC PNL TOTAL CA: CPT | Mod: 91,XB

## 2022-11-18 PROCEDURE — 63600175 PHARM REV CODE 636 W HCPCS: Performed by: THORACIC SURGERY (CARDIOTHORACIC VASCULAR SURGERY)

## 2022-11-18 PROCEDURE — 86850 RBC ANTIBODY SCREEN: CPT | Performed by: STUDENT IN AN ORGANIZED HEALTH CARE EDUCATION/TRAINING PROGRAM

## 2022-11-18 PROCEDURE — 85610 PROTHROMBIN TIME: CPT

## 2022-11-18 PROCEDURE — 97535 SELF CARE MNGMENT TRAINING: CPT

## 2022-11-18 PROCEDURE — 80053 COMPREHEN METABOLIC PANEL: CPT

## 2022-11-18 PROCEDURE — 63600175 PHARM REV CODE 636 W HCPCS: Performed by: ANESTHESIOLOGY

## 2022-11-18 PROCEDURE — 99233 SBSQ HOSP IP/OBS HIGH 50: CPT | Mod: ,,, | Performed by: INTERNAL MEDICINE

## 2022-11-18 PROCEDURE — 90945 DIALYSIS ONE EVALUATION: CPT

## 2022-11-18 PROCEDURE — 85025 COMPLETE CBC W/AUTO DIFF WBC: CPT | Mod: 91

## 2022-11-18 PROCEDURE — 97530 THERAPEUTIC ACTIVITIES: CPT

## 2022-11-18 PROCEDURE — 80069 RENAL FUNCTION PANEL: CPT | Mod: 91 | Performed by: STUDENT IN AN ORGANIZED HEALTH CARE EDUCATION/TRAINING PROGRAM

## 2022-11-18 PROCEDURE — 99233 PR SUBSEQUENT HOSPITAL CARE,LEVL III: ICD-10-PCS | Mod: ,,, | Performed by: INTERNAL MEDICINE

## 2022-11-18 PROCEDURE — 80100008 HC CRRT DAILY MAINTENANCE

## 2022-11-18 PROCEDURE — 80048 BASIC METABOLIC PNL TOTAL CA: CPT | Mod: XB

## 2022-11-18 PROCEDURE — 84100 ASSAY OF PHOSPHORUS: CPT

## 2022-11-18 PROCEDURE — 94761 N-INVAS EAR/PLS OXIMETRY MLT: CPT

## 2022-11-18 PROCEDURE — 20000000 HC ICU ROOM

## 2022-11-18 PROCEDURE — 63600175 PHARM REV CODE 636 W HCPCS: Performed by: STUDENT IN AN ORGANIZED HEALTH CARE EDUCATION/TRAINING PROGRAM

## 2022-11-18 PROCEDURE — 99232 PR SUBSEQUENT HOSPITAL CARE,LEVL II: ICD-10-PCS | Mod: ,,, | Performed by: ANESTHESIOLOGY

## 2022-11-18 PROCEDURE — 83735 ASSAY OF MAGNESIUM: CPT

## 2022-11-18 PROCEDURE — 85730 THROMBOPLASTIN TIME PARTIAL: CPT

## 2022-11-18 PROCEDURE — 99232 SBSQ HOSP IP/OBS MODERATE 35: CPT | Mod: ,,, | Performed by: ANESTHESIOLOGY

## 2022-11-18 PROCEDURE — 83735 ASSAY OF MAGNESIUM: CPT | Mod: 91 | Performed by: STUDENT IN AN ORGANIZED HEALTH CARE EDUCATION/TRAINING PROGRAM

## 2022-11-18 PROCEDURE — 97116 GAIT TRAINING THERAPY: CPT

## 2022-11-18 RX ORDER — MAGNESIUM SULFATE HEPTAHYDRATE 40 MG/ML
2 INJECTION, SOLUTION INTRAVENOUS
Status: ACTIVE | OUTPATIENT
Start: 2022-11-18 | End: 2022-11-19

## 2022-11-18 RX ORDER — ATORVASTATIN CALCIUM 20 MG/1
40 TABLET, FILM COATED ORAL DAILY
Status: DISCONTINUED | OUTPATIENT
Start: 2022-11-18 | End: 2022-12-01 | Stop reason: HOSPADM

## 2022-11-18 RX ORDER — HYDROCODONE BITARTRATE AND ACETAMINOPHEN 500; 5 MG/1; MG/1
TABLET ORAL CONTINUOUS
Status: DISCONTINUED | OUTPATIENT
Start: 2022-11-18 | End: 2022-11-19

## 2022-11-18 RX ADMIN — HYDROMORPHONE HYDROCHLORIDE 1 MG: 1 INJECTION, SOLUTION INTRAMUSCULAR; INTRAVENOUS; SUBCUTANEOUS at 03:11

## 2022-11-18 RX ADMIN — METHOCARBAMOL 500 MG: 500 TABLET ORAL at 09:11

## 2022-11-18 RX ADMIN — OXYCODONE HYDROCHLORIDE 10 MG: 10 TABLET ORAL at 05:11

## 2022-11-18 RX ADMIN — HEPARIN SODIUM 5000 UNITS: 5000 INJECTION INTRAVENOUS; SUBCUTANEOUS at 05:11

## 2022-11-18 RX ADMIN — OXYCODONE HYDROCHLORIDE 10 MG: 10 TABLET ORAL at 10:11

## 2022-11-18 RX ADMIN — ACETAMINOPHEN 1000 MG: 500 TABLET ORAL at 10:11

## 2022-11-18 RX ADMIN — HYDROMORPHONE HYDROCHLORIDE 1 MG: 1 INJECTION, SOLUTION INTRAMUSCULAR; INTRAVENOUS; SUBCUTANEOUS at 09:11

## 2022-11-18 RX ADMIN — OXYCODONE HYDROCHLORIDE 10 MG: 10 TABLET ORAL at 02:11

## 2022-11-18 RX ADMIN — DOXYCYCLINE HYCLATE 100 MG: 100 TABLET, COATED ORAL at 09:11

## 2022-11-18 RX ADMIN — AMIODARONE HYDROCHLORIDE 200 MG: 200 TABLET ORAL at 10:11

## 2022-11-18 RX ADMIN — METHOCARBAMOL 500 MG: 500 TABLET ORAL at 02:11

## 2022-11-18 RX ADMIN — MUPIROCIN: 20 OINTMENT TOPICAL at 09:11

## 2022-11-18 RX ADMIN — MUPIROCIN: 20 OINTMENT TOPICAL at 10:11

## 2022-11-18 RX ADMIN — AMIODARONE HYDROCHLORIDE 200 MG: 200 TABLET ORAL at 09:11

## 2022-11-18 RX ADMIN — ACETAMINOPHEN 1000 MG: 500 TABLET ORAL at 05:11

## 2022-11-18 RX ADMIN — DOXYCYCLINE HYCLATE 100 MG: 100 TABLET, COATED ORAL at 10:11

## 2022-11-18 RX ADMIN — HEPARIN SODIUM 5000 UNITS: 5000 INJECTION INTRAVENOUS; SUBCUTANEOUS at 02:11

## 2022-11-18 RX ADMIN — ACETAMINOPHEN 1000 MG: 500 TABLET ORAL at 01:11

## 2022-11-18 RX ADMIN — HYDROMORPHONE HYDROCHLORIDE 1 MG: 1 INJECTION, SOLUTION INTRAMUSCULAR; INTRAVENOUS; SUBCUTANEOUS at 07:11

## 2022-11-18 RX ADMIN — SODIUM CHLORIDE: 0.9 INJECTION, SOLUTION INTRAVENOUS at 08:11

## 2022-11-18 RX ADMIN — ATORVASTATIN CALCIUM 40 MG: 20 TABLET, FILM COATED ORAL at 10:11

## 2022-11-18 RX ADMIN — SODIUM PHOSPHATE, MONOBASIC, MONOHYDRATE 30 MMOL: 276; 142 INJECTION, SOLUTION INTRAVENOUS at 07:11

## 2022-11-18 RX ADMIN — HYDROMORPHONE HYDROCHLORIDE 1 MG: 1 INJECTION, SOLUTION INTRAMUSCULAR; INTRAVENOUS; SUBCUTANEOUS at 11:11

## 2022-11-18 RX ADMIN — CEFEPIME HYDROCHLORIDE 1 G: 1 INJECTION, SOLUTION INTRAVENOUS at 03:11

## 2022-11-18 RX ADMIN — METHOCARBAMOL 500 MG: 500 TABLET ORAL at 10:11

## 2022-11-18 RX ADMIN — CEFEPIME HYDROCHLORIDE 1 G: 1 INJECTION, SOLUTION INTRAVENOUS at 04:11

## 2022-11-18 RX ADMIN — HEPARIN SODIUM 5000 UNITS: 5000 INJECTION INTRAVENOUS; SUBCUTANEOUS at 10:11

## 2022-11-18 RX ADMIN — OXYCODONE HYDROCHLORIDE 10 MG: 10 TABLET ORAL at 06:11

## 2022-11-18 RX ADMIN — MILRINONE LACTATE IN DEXTROSE 0.12 MCG/KG/MIN: 200 INJECTION, SOLUTION INTRAVENOUS at 01:11

## 2022-11-18 RX ADMIN — HYDROMORPHONE HYDROCHLORIDE 1 MG: 1 INJECTION, SOLUTION INTRAMUSCULAR; INTRAVENOUS; SUBCUTANEOUS at 04:11

## 2022-11-18 RX ADMIN — ASPIRIN 81 MG 81 MG: 81 TABLET ORAL at 10:11

## 2022-11-18 NOTE — ASSESSMENT & PLAN NOTE
62 y.o. male S/P TV repair , MV repair, MAZE, Lt Atrial Appendage ligation and Impella placement on 10/7/22, course complicated by bleeding, requiring reopening POD#0, multiple VT runs requiring DCCV, and sternal wound infections. He presents to the SICU s/p wound debridement and wire removal on 11/08 followed by washout on 11/10      Neuro/Psych:     - Sedation: none    - Pain:    - Scheduled Tylenol 1g q8h with prn Oxy, dilaudid for break through pain               Cardiac:      -BP Goal: MAP 60-80 and SBP <140.  Vaso 0.04, will wean.     - Echo with EF 35% with mild RV enlargement and moderate RV dysfunction. Cont milrinone 0.25     - Anti-HTNs: Will restart home meds when appropriate     - Rhythm: NSR. S/p MAZE for Afib, cont po amio.     - Beta blocker: hold in setting of Milrinone     - Statin: Atorvastatin 40 mg QD      Pulmonary:     - Goal SpO2 >92%, 2L NC     - CXR with patchy opacities on R side and SQ air, leukocytosis downtrending. ID recs doxy x5 days for PNA.       Renal:    - Trend BUN/Cr. Oliguric KIMANI. Will monitor and support kidneys with MAP >65.     - CRRT, appreciate nephrology recs     - Maintain Fierro, record strict Is/Os   - Will f/u with nephrology regarding plans for HD vs perm cath. Interventional Neph consulted.     Recent Labs   Lab 11/17/22  2223 11/18/22  0002 11/18/22  0305   BUN 39* 32* 21   CREATININE 4.5* 3.8* 2.4*         FEN / GI:     - Daily CMP, PRN K/Mag/Phos per protocol     - Replace electrolytes as needed    - Nutrition: cardiac diet, BOOST    - Bowel Regimen: Miralax, docusate      ID:     - Leukocytosis without fever, s/p debridement and Pec flap 11/14. Now down trending     - Abx: Continue Cefepime, lactic acid improving. Will need long term abx plan, 6weeks post op flap    - R sided patchy opacities on CXR on cefepime    - ID recs Cefepime and Doxy      Recent Labs   Lab 11/17/22  1745 11/18/22  0002 11/18/22  0305   WBC 19.26* 14.94* 17.38*         Heme/Onc:      - CBC daily    - ASA 81mg daily    Recent Labs   Lab 11/16/22  0344 11/16/22  0923 11/17/22  0306 11/17/22  1106 11/17/22  1745 11/18/22  0002 11/18/22  0305   HGB 7.8*   < > 7.0*   < > 8.9* 8.6* 8.7*      < > 251   < > 236 260 273   APTT 28.2  --  30.5  --   --   --  29.4   INR 1.1  --  1.1  --   --   --  1.1    < > = values in this interval not displayed.         Endocrine:     - CTS Goal -140        PPx:   Feeding: cardiac diet   Analgesia/Sedation: tylenol, oxy, dilaudid PRN   Thromboembolic Prevention: scds, Heparin  HOB >30: Yes  Stress Ulcer: n/a  Glucose Control: none      Lines/Drains/Airway:   Left radial arterial line   RIJ CVC   Fierro   BLANCA drains       Dispo/Code Status/Palliative:     - Continue SICU Care    - Full Code

## 2022-11-18 NOTE — PROGRESS NOTES
11/18/22 0159   Treatment   Treatment Type SLED   Treatment Status Daily equipment check   Dialysis Machine Number [id number]   Dialyzer Time (hours) 2.4   BVP (Liters) 24 L   Solutions Labeled and Current  Yes   Access Temporary Cath;Right;IJ   Catheter Dressing Intact  Yes   Alarms Engaged Yes   CRRT Comments Daily checks.   Prescription   UF Goal Rate 400 mL/hr   CRRT Hourly Documentation   Blood Flow (mL/min) 150   UF Rate 300 cc/hr   Arterial Pressure (mmHg) -40 mmHg   Venous Pressure (mmHg) 30 mmHg   Effluent Pressure (EP) (mmHg) 20 mmHg

## 2022-11-18 NOTE — ASSESSMENT & PLAN NOTE
- Baseline Scr 0.9-1.   - 10/31 Scr increased to ~1.5; steadly increased to peak of 5.3 today  - Hyperkalemia noted to 5.3  - Anuric today despite IV diuril 500mg TID; and IV lasix 120mg TID  - Risk factors for KIMANI include Cardiorenal syndrome (less likely given aggressive diuresis in last 72hrs; and normal CVP readings); AIN; IRGN; and prolonged prerenal state from over diuresis, sepsis.  - 11/18 BED side US performed showed engorged femoral veins and IJ. Unable visualize IVC 2/2 to abdominal binder.  Plan:  - CRRT SLED 8 hours tonight for metabolic clearance and volume removal (Net ~100cc/hr UF). Plan for another nocturnal 8hr SLED session on 11/19, with tentative break on 11/20   - Obtain RFP daily  - Discontiue diuretics  - Will perform urine microscopy  - obtain Urine na, Cr, urea  - Renally dose medications to GFR 0  - Avoid nephrotoxins as much as possible  - consent for RRT obtained and placed in chart.

## 2022-11-18 NOTE — SUBJECTIVE & OBJECTIVE
Interval History:     No acute events overnight. Patient is on CRRT on exam today. Denies any symptoms from CRRT. Reports wanting to get up out of the bed. Denies SOB, nasuea, vomiting, or diarrhea. Remains oliguric.     I- 1L  UOP- 165cc  CRRT- 700cc  Net (Even)    Review of patient's allergies indicates:  No Known Allergies  Current Facility-Administered Medications   Medication Frequency    0.9%  NaCl infusion (CRRT USE ONLY) Continuous    0.9%  NaCl infusion (for blood administration) Q24H PRN    0.9%  NaCl infusion (for blood administration) Q24H PRN    acetaminophen tablet 1,000 mg Q8H    amiodarone tablet 200 mg BID    aspirin chewable tablet 81 mg Daily    atorvastatin tablet 40 mg Daily    cefepime in dextrose 5 % 1 gram/50 mL IVPB 1 g Q12H    dextrose 10% bolus 125 mL PRN    dextrose 10% bolus 250 mL PRN    doxycycline tablet 100 mg Q12H    heparin (porcine) injection 5,000 Units Q8H    HYDROmorphone injection 1 mg Q4H PRN    hydrOXYzine pamoate capsule 50 mg Q8H PRN    magnesium sulfate 2g in water 50mL IVPB (premix) PRN    methocarbamoL tablet 500 mg TID    milrinone 20mg in D5W 100 mL infusion Continuous    mupirocin 2 % ointment BID    ondansetron injection 4 mg Q8H PRN    oxyCODONE immediate release tablet 5 mg Q4H PRN    oxyCODONE immediate release tablet Tab 10 mg Q4H PRN    polyethylene glycol packet 17 g Daily    senna-docusate 8.6-50 mg per tablet 1 tablet BID    sodium phosphate 20.01 mmol in dextrose 5 % 250 mL IVPB PRN    sodium phosphate 30 mmol in dextrose 5 % 250 mL IVPB PRN    sodium phosphate 39.99 mmol in dextrose 5 % 250 mL IVPB PRN    vasopressin (PITRESSIN) 0.2 Units/mL in dextrose 5 % 100 mL infusion Continuous       Objective:     Vital Signs (Most Recent):  Temp: 97.9 °F (36.6 °C) (11/18/22 1100)  Pulse: 98 (11/18/22 1230)  Resp: (!) 23 (11/18/22 1230)  BP: 122/64 (11/18/22 1200)  SpO2: (!) 93 % (11/18/22 1230)  O2 Device (Oxygen Therapy): room air (11/18/22 1100)   Vital Signs  (24h Range):  Temp:  [97.6 °F (36.4 °C)-98.3 °F (36.8 °C)] 97.9 °F (36.6 °C)  Pulse:  [] 98  Resp:  [14-35] 23  SpO2:  [90 %-95 %] 93 %  BP: (122-142)/(59-74) 122/64  Arterial Line BP: (110-152)/(40-60) 113/50     Weight: 74 kg (163 lb 2.3 oz) (11/12/22 1519)  Body mass index is 24.08 kg/m².  Body surface area is 1.9 meters squared.    I/O last 3 completed shifts:  In: 4332.5 [I.V.:3572.8; Blood:423.8; IV Piggyback:335.9]  Out: 4227 [Urine:225; Drains:181; Other:3821]    Physical Exam  Vitals and nursing note reviewed.   Constitutional:       General: He is not in acute distress.     Appearance: He is not ill-appearing.   Cardiovascular:      Rate and Rhythm: Normal rate and regular rhythm.      Pulses: Normal pulses.   Pulmonary:      Effort: Pulmonary effort is normal.   Abdominal:      General: Abdomen is flat.      Palpations: Abdomen is soft.   Musculoskeletal:      Right lower leg: No edema.      Left lower leg: No edema.   Skin:     General: Skin is warm.      Findings: No bruising or lesion.      Comments: BLANCA drains with sanguinous output    Neurological:      General: No focal deficit present.       Significant Labs:  All labs within the past 24 hours have been reviewed.     Significant Imaging:  Labs: Reviewed

## 2022-11-18 NOTE — PROGRESS NOTES
11/17/22 2317   Treatment   Treatment Type SLED   Treatment Status Restart   Dialysis Machine Number [id number]   Dialyzer Time (hours) 0   BVP (Liters) 0 L   Solutions Labeled and Current  Yes   Access Temporary Cath;Right;IJ   Catheter Dressing Intact  Yes   Alarms Engaged Yes   CRRT Comments CRRT restarted.   Prescription   Time (Hours) 12   Dialysate K + (mEq/L) 4   Dialysate CA + (mEq/L) 2.25   Dialysate HCO3 - (Bicarb) (mEq/L) 30   Dialysate Na + (mEq/L) 138   Cartridge Type Other  (R300)   Dialysate Flow Rate (mL/min) 200   UF Goal Rate 400 mL/hr   CRRT Hourly Documentation   Blood Flow (mL/min) 150   UF Rate 300 cc/hr   Arterial Pressure (mmHg) -40 mmHg   Venous Pressure (mmHg) 30 mmHg   Effluent Pressure (EP) (mmHg) 20 mmHg   Report from primary nurse, agreed on UF rate 300cc.

## 2022-11-18 NOTE — SUBJECTIVE & OBJECTIVE
Interval History: NAEO.  AFVSS.  Pt complaining of pain in axilla from binder.  Adjusted binder at bedside.  Last Hgb 8.7.      Medications:  Continuous Infusions:   sodium chloride 0.9% 200 mL/hr at 11/18/22 0900    milrinone 20mg/100ml D5W (200mcg/ml) 0.125 mcg/kg/min (11/18/22 0900)    vasopressin Stopped (11/17/22 1601)     Scheduled Meds:   acetaminophen  1,000 mg Oral Q8H    amiodarone  200 mg Oral BID    aspirin  81 mg Oral Daily    atorvastatin  40 mg Oral Daily    ceFEPime (MAXIPIME) IVPB  1 g Intravenous Q12H    doxycycline  100 mg Oral Q12H    heparin (porcine)  5,000 Units Subcutaneous Q8H    methocarbamoL  500 mg Oral TID    mupirocin   Nasal BID    polyethylene glycol  17 g Oral Daily    senna-docusate 8.6-50 mg  1 tablet Oral BID     PRN Meds:sodium chloride, sodium chloride, dextrose 10%, dextrose 10%, HYDROmorphone, hydrOXYzine pamoate, magnesium sulfate IVPB, ondansetron, oxyCODONE, oxyCODONE, sodium phosphate IVPB, sodium phosphate IVPB, sodium phosphate IVPB     Review of patient's allergies indicates:  No Known Allergies  Objective:     Vital Signs (Most Recent):  Temp: 98.2 °F (36.8 °C) (11/18/22 0301)  Pulse: 99 (11/18/22 0930)  Resp: (!) 22 (11/18/22 0930)  BP: (!) 142/74 (11/18/22 0905)  SpO2: (!) 93 % (11/18/22 0930)   Vital Signs (24h Range):  Temp:  [98.1 °F (36.7 °C)-98.3 °F (36.8 °C)] 98.2 °F (36.8 °C)  Pulse:  [] 99  Resp:  [14-35] 22  SpO2:  [90 %-96 %] 93 %  BP: (126-142)/(61-74) 142/74  Arterial Line BP: (110-152)/(40-60) 149/60     Weight: 74 kg (163 lb 2.3 oz)  Body mass index is 24.08 kg/m².    Intake/Output - Last 3 Shifts         11/16 0700 11/17 0659 11/17 0700 11/18 0659 11/18 0700 11/19 0659    P.O. 462      I.V. (mL/kg) 2105.6 (28.5) 1726.7 (23.3) 706.4 (9.5)    Blood  423.8     IV Piggyback 398.7 87.2 124.8    Total Intake(mL/kg) 2966.3 (40.1) 2237.7 (30.2) 831.2 (11.2)    Urine (mL/kg/hr) 240 (0.1) 135 (0.1) 40 (0.2)    Drains 253 110 53    Other 4169 5168 1081     Stool  0     Total Output 2359 2478 1232    Net +607.3 -240.3 -400.8           Stool Occurrence  2 x             Physical Exam  General: Alert; No acute distress  Cardiovascular: Regular rate   Respiratory: Normal respiratory effort. Chest rise symmetric.   Abdomen: Soft, nontender, nondistended  Extremity: Moves all extremities equally.  Neurologic: No focal deficit. Speech normal  SKIN: midline sternal and bilateral axillary incisions c/d/I, 5 drains - each with < 20 SS output       Significant Labs:  I have reviewed all pertinent lab results within the past 24 hours.  CBC:   Recent Labs   Lab 11/18/22  0305   WBC 17.38*   RBC 2.88*   HGB 8.7*   HCT 26.6*      MCV 92   MCH 30.2   MCHC 32.7     BMP:   Recent Labs   Lab 11/18/22  0305   GLU 88      K 4.5      CO2 27   BUN 21   CREATININE 2.4*   CALCIUM 7.7*   MG 2.0       Significant Diagnostics:  I have reviewed all pertinent imaging results/findings within the past 24 hours.

## 2022-11-18 NOTE — PROGRESS NOTES
[facility]  Critical Care - Surgery  Progress Note    Patient Name: David Barrios  MRN: [id number]  Admission Date: 11/8/2022  Hospital Length of Stay: 10 days  Code Status: Full Code  Attending Provider: Mike Hedrick MD  Primary Care Provider: Breann Tavera MD   Principal Problem: Sternal wound infection    Subjective:     Hospital/ICU Course:        Interval History/Significant Events: NAEO. Remains afebrile, VSS. CRRT overnight. Denies complaints this AM.    Follow-up For: Procedure(s) (LRB):  CREATION, FLAP, MUSCLE ROTATION (N/A)  IRRIGATION AND DEBRIDEMENT, WOUND, STERNUM (N/A)  CLOSURE, WOUND, STERNUM (N/A)  EXPLORATION, WOUND (N/A)    Post-Operative Day: 4 Days Post-Op    Objective:     Vital Signs (Most Recent):  Temp: 98.2 °F (36.8 °C) (11/18/22 0301)  Pulse: 97 (11/18/22 0830)  Resp: 20 (11/18/22 0830)  BP: 126/61 (11/18/22 0800)  SpO2: (!) 94 % (11/18/22 0830)   Vital Signs (24h Range):  Temp:  [98.1 °F (36.7 °C)-98.3 °F (36.8 °C)] 98.2 °F (36.8 °C)  Pulse:  [] 97  Resp:  [14-35] 20  SpO2:  [90 %-100 %] 94 %  BP: (126-134)/(61-66) 126/61  Arterial Line BP: (108-161)/(38-65) 136/56     Weight: 74 kg (163 lb 2.3 oz)  Body mass index is 24.08 kg/m².      Intake/Output Summary (Last 24 hours) at 11/18/2022 0849  Last data filed at 11/18/2022 0800  Gross per 24 hour   Intake 2746.51 ml   Output 3378 ml   Net -631.49 ml       Physical Exam  Vitals and nursing note reviewed.   Constitutional:       General: He is not in acute distress.     Appearance: He is not ill-appearing.   Cardiovascular:      Rate and Rhythm: Normal rate and regular rhythm.      Pulses: Normal pulses.   Pulmonary:      Effort: Pulmonary effort is normal.   Abdominal:      General: Abdomen is flat.      Palpations: Abdomen is soft.   Musculoskeletal:      Right lower leg: No edema.      Left lower leg: No edema.   Skin:     General: Skin is warm.      Findings: No bruising or lesion.      Comments: BLANCA  drains with sanguinous output    Neurological:      General: No focal deficit present.       Vents: NA    Lines/Drains/Airways       Peripherally Inserted Central Catheter Line  Duration             PICC Double Lumen 10/17/22 1709 right brachial 31 days              Central Venous Catheter Line  Duration             Trialysis (Dialysis) Catheter 11/15/22 1654 right internal jugular 2 days              Drain  Duration                  Urethral Catheter 11/12/22 2125 Straight-tip;Non-latex 16 Fr. 5 days         Closed/Suction Drain 11/14/22 2010 Inferior;Right Chest Bulb 15 Fr. 3 days         Closed/Suction Drain 11/14/22 2010 Superior;Midline Abdomen Bulb 15 Fr. 3 days         Closed/Suction Drain 11/14/22 2011 Lateral;Right Other (Comment) Bulb 15 Fr. 3 days         Closed/Suction Drain 11/14/22 2015 Inferior;Left Chest Bulb 15 Fr. 3 days         Closed/Suction Drain 11/14/22 2016 Lateral;Left Other (Comment) Bulb 15 Fr. 3 days              Arterial Line  Duration             Arterial Line 11/08/22 0712 Right Radial 10 days                    Significant Labs:    CBC/Anemia Profile:  Recent Labs   Lab 11/17/22  1745 11/18/22  0002 11/18/22  0305   WBC 19.26* 14.94* 17.38*   HGB 8.9* 8.6* 8.7*   HCT 27.1* 26.2* 26.6*    260 273   MCV 93 93 92   RDW 16.4* 16.8* 16.9*        Chemistries:  Recent Labs   Lab 11/17/22  0306 11/17/22  1106 11/17/22  1415 11/17/22  1745 11/17/22  2223 11/18/22  0002 11/18/22  0305   *   < > 132*   < > 133* 134* 138   K 4.5   < > 4.6   < > 4.5 4.5 4.5      < > 101   < > 101 102 104   CO2 24   < > 23   < > 20* 22* 27   BUN 26*   < > 32*   < > 39* 32* 21   CREATININE 2.7*   < > 3.5*   < > 4.5* 3.8* 2.4*   CALCIUM 8.3*   < > 8.2*   < > 8.0* 7.9* 7.7*   ALBUMIN 2.6*  --  2.4*  --  2.4*  --  2.5*   PROT 5.5*  --   --   --   --   --  5.5*   BILITOT 0.6  --   --   --   --   --  0.7   ALKPHOS 91  --   --   --   --   --  97   ALT <5*  --   --   --   --   --  <5*   AST 22  --   --    --   --   --  21   MG 2.3  --  2.2  --  2.2  --  2.0   PHOS 4.4  --  4.4  --  3.8  --  2.3*    < > = values in this interval not displayed.       All pertinent labs within the past 24 hours have been reviewed.    Significant Imaging:  I have reviewed all pertinent imaging results/findings within the past 24 hours.    Assessment/Plan:     * Sternal wound infection  62 y.o. male S/P TV replacement, MV replacement, MAZE, Lt Atrial Appendage ligation and Impella placement on 10/7/22, course complicated by bleeding, requiring reopening POD#0, multiple VT runs requiring DCCV, and sternal wound infections. He presents to the SICU s/p wound debridement and wire removal on 11/08 followed by washout on 11/10      Neuro/Psych:     - Sedation: none    - Pain:    - Scheduled Tylenol 1g q8h with prn Oxy, dilaudid for break through pain               Cardiac:      -BP Goal: MAP 60-80 and SBP <140.  Vaso 0.04, will wean.     - Echo with EF 35% with mild RV enlargement and moderate RV dysfunction. Cont milrinone 0.25     - Anti-HTNs: Will restart home meds when appropriate     - Rhythm: NSR. S/p MAZE for Afib, cont po amio.     - Beta blocker: hold in setting of Milrinone     - Statin: Atorvastatin 40 mg QD      Pulmonary:     - Goal SpO2 >92%, 2L NC     - CXR with patchy opacities on R side and SQ air, leukocytosis downtrending. ID recs doxy x5 days for PNA.           Renal:    - Trend BUN/Cr. Oliguric KIMANI. Will monitor and support kidneys with MAP >65.     - CRRT, appreciate nephrology recs     - Maintain Fierro, record strict Is/Os   - Will f/u with nephrology regarding plans for HD vs perm cath     Recent Labs   Lab 11/17/22  2223 11/18/22  0002 11/18/22  0305   BUN 39* 32* 21   CREATININE 4.5* 3.8* 2.4*         FEN / GI:     - Daily CMP, PRN K/Mag/Phos per protocol     - Replace electrolytes as needed    - Nutrition: cardiac diet, BOOST    - Bowel Regimen: Miralax, docusate      ID:     - Leukocytosis without fever, s/p  debridement and Pec flap 11/14. Now down trending     - Abx: Continue Cefepime, lactic acid improving. Will need long term abx plan, 6weeks post op flap    - R sided patchy opacities on CXR on cefepime    - ID recs broadening with doxy or linezolid if leukocytosis persists.     Recent Labs   Lab 11/17/22  1745 11/18/22  0002 11/18/22  0305   WBC 19.26* 14.94* 17.38*         Heme/Onc:     - H/H with post-operative anemia, transfusing 1 unit.    - CBC daily    - ASA 325mg daily    Recent Labs   Lab 11/16/22  0344 11/16/22  0923 11/17/22  0306 11/17/22  1106 11/17/22  1745 11/18/22  0002 11/18/22  0305   HGB 7.8*   < > 7.0*   < > 8.9* 8.6* 8.7*      < > 251   < > 236 260 273   APTT 28.2  --  30.5  --   --   --  29.4   INR 1.1  --  1.1  --   --   --  1.1    < > = values in this interval not displayed.         Endocrine:     - CTS Goal -140        PPx:   Feeding: cardiac diet   Analgesia/Sedation: tylenol, oxy, dilaudid PRN   Thromboembolic Prevention: scds  HOB >30: Yes  Stress Ulcer: n/a  Glucose Control: none      Lines/Drains/Airway:   Left radial arterial line   RIJ CVC   Fierro   BLANCA drains       Dispo/Code Status/Palliative:     - Continue SICU Care    - Full Code                 Critical secondary to Patient has a condition that poses threat to life and bodily function     Critical care was time spent personally by me on the following activities: development of treatment plan with patient or surrogate and bedside caregivers, discussions with consultants, evaluation of patient's response to treatment, examination of patient, ordering and performing treatments and interventions, ordering and review of laboratory studies, ordering and review of radiographic studies, pulse oximetry, re-evaluation of patient's condition.  This critical care time did not overlap with that of any other provider or involve time for any procedures.     Gustavo Aguero, DO  Critical Care - Surgery  [facility]

## 2022-11-18 NOTE — PLAN OF CARE
"      SICU PLAN OF CARE NOTE    Dx: Sternal wound infection    Vital Signs: /64   Pulse 99   Temp 98.2 °F (36.8 °C) (Oral)   Resp (!) 28   Ht 5' 9.02" (1.753 m)   Wt 74 kg (163 lb 2.3 oz)   SpO2 (!) 91%   BMI 24.08 kg/m²     SHIFT EVENTS:  VSS / No acute events this shift. 12hr CRRT started overnight. Plan to rinse pt back @ 11/18 1115    Neuro: AAO x4, Follows Commands, and Moves All Extremities    Respiratory: Room Air    Cardiac: NSR; HR 90s    Diet: Cardiac Diet; 1500 cc FR    Gtts: Milrinone     Urine Output: Urinary Catheter 95 ml/shift    Drains:      5 BLANCA Drain, serosanguinous drainage; See flow sheet for details     Labs: daily, CBC & BMP Q6, RFP & Mg Q8 while on CRRT     SKIN NOTE:  Skin: NO new skin tears noted at this time.     Skin precautions maintained including:  Sacrum and heels with foam dressing in place for pressure protection. Frequent weight shift encouraged / assistance provided Q2 hr to prevent breakdown. Bed plugged in and mattress inflated; Adhesive use limited. Heels elevated off bed. Pressure points protected and positioning supports utilized.  Skin-to-device areas padded. Skin-to-skin areas padded     POC reviewed with patient and family; questions and concerns addressed. See flow sheets for full assessment details.   "

## 2022-11-18 NOTE — SUBJECTIVE & OBJECTIVE
Interval History/Significant Events: NAEO. Remains afebrile, VSS. CRRT overnight. Denies complaints this AM.    Follow-up For: Procedure(s) (LRB):  CREATION, FLAP, MUSCLE ROTATION (N/A)  IRRIGATION AND DEBRIDEMENT, WOUND, STERNUM (N/A)  CLOSURE, WOUND, STERNUM (N/A)  EXPLORATION, WOUND (N/A)    Post-Operative Day: 4 Days Post-Op    Objective:     Vital Signs (Most Recent):  Temp: 98.2 °F (36.8 °C) (11/18/22 0301)  Pulse: 97 (11/18/22 0830)  Resp: 20 (11/18/22 0830)  BP: 126/61 (11/18/22 0800)  SpO2: (!) 94 % (11/18/22 0830)   Vital Signs (24h Range):  Temp:  [98.1 °F (36.7 °C)-98.3 °F (36.8 °C)] 98.2 °F (36.8 °C)  Pulse:  [] 97  Resp:  [14-35] 20  SpO2:  [90 %-100 %] 94 %  BP: (126-134)/(61-66) 126/61  Arterial Line BP: (108-161)/(38-65) 136/56     Weight: 74 kg (163 lb 2.3 oz)  Body mass index is 24.08 kg/m².      Intake/Output Summary (Last 24 hours) at 11/18/2022 0849  Last data filed at 11/18/2022 0800  Gross per 24 hour   Intake 2746.51 ml   Output 3378 ml   Net -631.49 ml       Physical Exam  Vitals and nursing note reviewed.   Constitutional:       General: He is not in acute distress.     Appearance: He is not ill-appearing.   Cardiovascular:      Rate and Rhythm: Normal rate and regular rhythm.      Pulses: Normal pulses.   Pulmonary:      Effort: Pulmonary effort is normal.   Abdominal:      General: Abdomen is flat.      Palpations: Abdomen is soft.   Musculoskeletal:      Right lower leg: No edema.      Left lower leg: No edema.   Skin:     General: Skin is warm.      Findings: No bruising or lesion.      Comments: BLANCA drains with sanguinous output    Neurological:      General: No focal deficit present.       Vents: NA    Lines/Drains/Airways       Peripherally Inserted Central Catheter Line  Duration             PICC Double Lumen 10/17/22 1709 right brachial 31 days              Central Venous Catheter Line  Duration             Trialysis (Dialysis) Catheter 11/15/22 1654 right internal jugular 2  days              Drain  Duration                  Urethral Catheter 11/12/22 2125 Straight-tip;Non-latex 16 Fr. 5 days         Closed/Suction Drain 11/14/22 2010 Inferior;Right Chest Bulb 15 Fr. 3 days         Closed/Suction Drain 11/14/22 2010 Superior;Midline Abdomen Bulb 15 Fr. 3 days         Closed/Suction Drain 11/14/22 2011 Lateral;Right Other (Comment) Bulb 15 Fr. 3 days         Closed/Suction Drain 11/14/22 2015 Inferior;Left Chest Bulb 15 Fr. 3 days         Closed/Suction Drain 11/14/22 2016 Lateral;Left Other (Comment) Bulb 15 Fr. 3 days              Arterial Line  Duration             Arterial Line 11/08/22 0712 Right Radial 10 days                    Significant Labs:    CBC/Anemia Profile:  Recent Labs   Lab 11/17/22  1745 11/18/22  0002 11/18/22  0305   WBC 19.26* 14.94* 17.38*   HGB 8.9* 8.6* 8.7*   HCT 27.1* 26.2* 26.6*    260 273   MCV 93 93 92   RDW 16.4* 16.8* 16.9*        Chemistries:  Recent Labs   Lab 11/17/22  0306 11/17/22  1106 11/17/22  1415 11/17/22  1745 11/17/22  2223 11/18/22  0002 11/18/22  0305   *   < > 132*   < > 133* 134* 138   K 4.5   < > 4.6   < > 4.5 4.5 4.5      < > 101   < > 101 102 104   CO2 24   < > 23   < > 20* 22* 27   BUN 26*   < > 32*   < > 39* 32* 21   CREATININE 2.7*   < > 3.5*   < > 4.5* 3.8* 2.4*   CALCIUM 8.3*   < > 8.2*   < > 8.0* 7.9* 7.7*   ALBUMIN 2.6*  --  2.4*  --  2.4*  --  2.5*   PROT 5.5*  --   --   --   --   --  5.5*   BILITOT 0.6  --   --   --   --   --  0.7   ALKPHOS 91  --   --   --   --   --  97   ALT <5*  --   --   --   --   --  <5*   AST 22  --   --   --   --   --  21   MG 2.3  --  2.2  --  2.2  --  2.0   PHOS 4.4  --  4.4  --  3.8  --  2.3*    < > = values in this interval not displayed.       All pertinent labs within the past 24 hours have been reviewed.    Significant Imaging:  I have reviewed all pertinent imaging results/findings within the past 24 hours.

## 2022-11-18 NOTE — PROGRESS NOTES
[facility]  Nephrology  Progress Note    Patient Name: David Barrios  MRN: [id number]  Admission Date: 11/8/2022  Hospital Length of Stay: 10 days  Attending Provider: Mike Hedrick MD   Primary Care Physician: Breann Tavera MD  Principal Problem:Sternal wound infection    Subjective:     HPI: 61 yo male w/ hx of Afib; HFrEF (35%); HTN; HLD. Patient underwent who underwent mitral valve repair, tricuspid valve repair, left atrial maze, left atrial appendage resection, and direct aortic impella 5.5 insertion on 10/07/2022. Hospital course complicated by Serratia bacteremia (on 6wk course of cefepime), sternal wound infection, and cardiogenic shock. On 10/31 patient developed KIMANI. Per chart review no documented course of hypotension during that time. Patient started on diuresis with IV lasix, with good urine output. Patient required increased diuretics (IV Diuril 500mg TID; and Lasix 120mg TID) and continued to have increased Scr, which progressed to oliguria, and now having metabolic derangements.     Patient denies any prior history of kidney disease. Does not have significant family history of kidney disease. Does endorse worsening fatigue and dyspnea. Denies any nausea vomiting, or metallic taste in mouth.     Nephrology was consulted for oliguric KIMANI.       Interval History:     No acute events overnight. Patient is on CRRT on exam today. Denies any symptoms from CRRT. Reports wanting to get up out of the bed. Denies SOB, nasuea, vomiting, or diarrhea. Remains oliguric.     I- 1L  UOP- 165cc  CRRT- 700cc  Net (Even)    Review of patient's allergies indicates:  No Known Allergies  Current Facility-Administered Medications   Medication Frequency    0.9%  NaCl infusion (CRRT USE ONLY) Continuous    0.9%  NaCl infusion (for blood administration) Q24H PRN    0.9%  NaCl infusion (for blood administration) Q24H PRN    acetaminophen tablet 1,000 mg Q8H    amiodarone tablet 200 mg BID     aspirin chewable tablet 81 mg Daily    atorvastatin tablet 40 mg Daily    cefepime in dextrose 5 % 1 gram/50 mL IVPB 1 g Q12H    dextrose 10% bolus 125 mL PRN    dextrose 10% bolus 250 mL PRN    doxycycline tablet 100 mg Q12H    heparin (porcine) injection 5,000 Units Q8H    HYDROmorphone injection 1 mg Q4H PRN    hydrOXYzine pamoate capsule 50 mg Q8H PRN    magnesium sulfate 2g in water 50mL IVPB (premix) PRN    methocarbamoL tablet 500 mg TID    milrinone 20mg in D5W 100 mL infusion Continuous    mupirocin 2 % ointment BID    ondansetron injection 4 mg Q8H PRN    oxyCODONE immediate release tablet 5 mg Q4H PRN    oxyCODONE immediate release tablet Tab 10 mg Q4H PRN    polyethylene glycol packet 17 g Daily    senna-docusate 8.6-50 mg per tablet 1 tablet BID    sodium phosphate 20.01 mmol in dextrose 5 % 250 mL IVPB PRN    sodium phosphate 30 mmol in dextrose 5 % 250 mL IVPB PRN    sodium phosphate 39.99 mmol in dextrose 5 % 250 mL IVPB PRN    vasopressin (PITRESSIN) 0.2 Units/mL in dextrose 5 % 100 mL infusion Continuous       Objective:     Vital Signs (Most Recent):  Temp: 97.9 °F (36.6 °C) (11/18/22 1100)  Pulse: 98 (11/18/22 1230)  Resp: (!) 23 (11/18/22 1230)  BP: 122/64 (11/18/22 1200)  SpO2: (!) 93 % (11/18/22 1230)  O2 Device (Oxygen Therapy): room air (11/18/22 1100)   Vital Signs (24h Range):  Temp:  [97.6 °F (36.4 °C)-98.3 °F (36.8 °C)] 97.9 °F (36.6 °C)  Pulse:  [] 98  Resp:  [14-35] 23  SpO2:  [90 %-95 %] 93 %  BP: (122-142)/(59-74) 122/64  Arterial Line BP: (110-152)/(40-60) 113/50     Weight: 74 kg (163 lb 2.3 oz) (11/12/22 1519)  Body mass index is 24.08 kg/m².  Body surface area is 1.9 meters squared.    I/O last 3 completed shifts:  In: 4332.5 [I.V.:3572.8; Blood:423.8; IV Piggyback:335.9]  Out: 4227 [Urine:225; Drains:181; Other:3821]    Physical Exam  Vitals and nursing note reviewed.   Constitutional:       General: He is not in acute distress.     Appearance: He  is not ill-appearing.   Cardiovascular:      Rate and Rhythm: Normal rate and regular rhythm.      Pulses: Normal pulses.   Pulmonary:      Effort: Pulmonary effort is normal.   Abdominal:      General: Abdomen is flat.      Palpations: Abdomen is soft.   Musculoskeletal:      Right lower leg: No edema.      Left lower leg: No edema.   Skin:     General: Skin is warm.      Findings: No bruising or lesion.      Comments: BLANCA drains with sanguinous output    Neurological:      General: No focal deficit present.       Significant Labs:  All labs within the past 24 hours have been reviewed.     Significant Imaging:  Labs: Reviewed    Assessment/Plan:     * Sternal wound infection  - per primary and ID team    KIMANI (acute kidney injury)  - Baseline Scr 0.9-1.   - 10/31 Scr increased to ~1.5; steadly increased to peak of 5.3 today  - Hyperkalemia noted to 5.3  - Anuric today despite IV diuril 500mg TID; and IV lasix 120mg TID  - Risk factors for KIMANI include Cardiorenal syndrome (less likely given aggressive diuresis in last 72hrs; and normal CVP readings); AIN; IRGN; and prolonged prerenal state from over diuresis, sepsis.  - 11/18 BED side US performed showed engorged femoral veins and IJ. Unable visualize IVC 2/2 to abdominal binder.  Plan:  - CRRT SLED 8 hours tonight for metabolic clearance and volume removal (Net ~100cc/hr UF). Plan for another nocturnal 8hr SLED session on 11/19, with tentative break on 11/20   - Obtain RFP daily  - Discontiue diuretics  - Will perform urine microscopy  - obtain Urine na, Cr, urea  - Renally dose medications to GFR 0  - Avoid nephrotoxins as much as possible  - consent for RRT obtained and placed in chart.         Thank you for your consult. I will follow-up with patient. Please contact us if you have any additional questions.     Case discussed with attending. Attestation to follow.       Jaspal Crockett DO  Nephrology  [facility]

## 2022-11-18 NOTE — PROGRESS NOTES
[facility]  Adult Nutrition  Progress Note    SUMMARY       Recommendations    1) Continue cardiac diet w/ fluid per MD.     2) Continue ONS Boost Plus Chocolate TID. Consider arginaid BID x 2 weeks, vit C, zinc, MVI to promote wound healing.     3) RD to monitor and f/u.    Goals: Meet % EEN by RD f/u.  Nutrition Goal Status: progressing towards goal  Communication of RD Recs: other (comment) (POC)    Assessment and Plan    Nutrition Problem  Severe Protein-Calorie Malnutrition     Related to (etiology):   Inadequate energy intake     Signs and Symptoms (as evidenced by):   Poor PO intake: energy intake <75% of estimated energy requirement  Weight loss of 19.9 kg (43.78 lbs) x 3 months (21% weight loss x 3 months)     Interventions/Recommendations (treatment strategy):  Collaboration of nutrition care with other providers  Adequate meal intake  ONS     Nutrition Diagnosis Status:   Continues      Malnutrition Assessment  Malnutrition Type: chronic illness          Weight Loss (Malnutrition): greater than 7.5% in 3 months  Energy Intake (Malnutrition): less than or equal to 50% for greater than or equal to 5 days   Orbital Region (Subcutaneous Fat Loss): severe depletion  Upper Arm Region (Subcutaneous Fat Loss): severe depletion  Thoracic and Lumbar Region: severe depletion   Druze Region (Muscle Loss): severe depletion  Clavicle Bone Region (Muscle Loss): severe depletion  Clavicle and Acromion Bone Region (Muscle Loss): severe depletion       Subcutaneous Fat Loss (Final Summary): severe protein-calorie malnutrition  Muscle Loss Evaluation (Final Summary): severe protein-calorie malnutrition    Severe Weight Loss (Malnutrition): other (see comments) (21% x 3 months)    Reason for Assessment    Reason For Assessment: RD follow-up  Diagnosis: other (see comments) (sternal wound infection)  Relevant Medical History: transient hyperglycemia post procedure, surigcal wound, hypokalemia,  "microcytic anemia, persistent a fib, acute systolic congestive heart failure  Interdisciplinary Rounds: did not attend  General Information Comments: Pt see for f/u, endorses good appetite, consuming 1/3 of meals. States he doesn't like the food or the low sodium flavoring. Drinking boost sometimes. Denies N/V/D/C. Visual NFPE performed, meets ASPEN criteria for severe chronic malnutrition. RD following.  Nutrition Discharge Planning: Pending clinical course.    Nutrition Risk Screen    Nutrition Risk Screen: no indicators present    Nutrition/Diet History    Spiritual, Cultural Beliefs, Protestant Practices, Values that Affect Care: no  Food Allergies: NKFA  Factors Affecting Nutritional Intake: None identified at this time    Anthropometrics    Temp: 97.9 °F (36.6 °C)  Height Method: Stated  Height: 5' 9.02" (175.3 cm)  Height (inches): 69.02 in  Weight Method: Bed Scale  Weight: 74 kg (163 lb 2.3 oz)  Weight (lb): 163.14 lb  Ideal Body Weight (IBW), Male: 160.12 lb  % Ideal Body Weight, Male (lb): 101.89 %  BMI (Calculated): 24.1  BMI Grade: 18.5-24.9 - normal       Lab/Procedures/Meds    Pertinent Labs Reviewed: reviewed  Pertinent Labs Comments: H/H:9.1/28.5, MCHC:31.9, GFR:52, julien:7.9  Pertinent Medications Reviewed: reviewed  Pertinent Medications Comments: atorvastatin, heparin, senna-docusate      Estimated/Assessed Needs    Weight Used For Calorie Calculations: 74 kg (163 lb 2.3 oz)  Energy Calorie Requirements (kcal): 7989-2886 kcal (25-30 kcal/oz)  Energy Need Method: Kcal/kg  Protein Requirements: 59-74 g protein (0.8-1.0 g/kg)  Weight Used For Protein Calculations: 74 kg (163 lb 2.3 oz)  Fluid Requirements (mL): 1 ml/kcal or per MD     RDA Method (mL): 1850         Nutrition Prescription Ordered    Current Diet Order: cardiac, 1500 ml fluid  Oral Nutrition Supplement: Boost Plus chocolate    Evaluation of Received Nutrient/Fluid Intake    I/O: -917.5  Energy Calories Required: not meeting needs  Protein " Required: not meeting needs  Comments: LBM: 11/12  Tolerance: tolerating  % Intake of Estimated Energy Needs: 25 - 50 %  % Meal Intake: 25 - 50 %    Nutrition Risk    Level of Risk/Frequency of Follow-up:  (1x/week)     Monitor and Evaluation    Food and Nutrient Intake: energy intake, food and beverage intake  Food and Nutrient Adminstration: diet order  Knowledge/Beliefs/Attitudes: food and nutrition knowledge/skill, beliefs and attitudes  Anthropometric Measurements: height/length, weight, weight change, body mass index  Biochemical Data, Medical Tests and Procedures: electrolyte and renal panel, gastrointestinal profile, glucose/endocrine profile, inflammatory profile, lipid profile  Nutrition-Focused Physical Findings: overall appearance, extremities, muscles and bones     Nutrition Follow-Up    RD Follow-up?: Yes    Khalida Monsalve, Registration Eligible, Provisional LDN

## 2022-11-18 NOTE — PT/OT/SLP PROGRESS
Physical Therapy Co-Treatment    Patient Name:  David Barrios   MRN:  [id number]    Co-treatment performed for this visit due to patient need for two skilled therapists to ensure patient and staff safety and to accommodate for patient activity tolerance/pain management in ICU setting  Recommendations:     Discharge Recommendations:  home health PT   Discharge Equipment Recommendations:  (TBD)   Barriers to discharge: Inaccessible home and Decreased caregiver support    Assessment:     David Barrios is a 62 y.o. male admitted with a medical diagnosis of Sternal wound infection. Patient tolerated session well. Patient demonstrates improved activity tolerance as demonstrated by increased gait distance however requires seated rest break between bouts of gait. Patient insistent on performing functional mobility with limited adherence to functional mobility (using IV pole support, HHA for bed mobility) despite education.     He presents with the following impairments/functional limitations: weakness, impaired endurance, impaired self care skills, impaired functional mobility, gait instability, impaired balance, decreased upper extremity function, decreased lower extremity function, pain, orthopedic precautions, impaired cardiopulmonary response to activity, decreased safety awareness. Once medically stable, recommending pt discharge to home health PT.    Rehab Prognosis: Good; patient continues to benefit from acute skilled PT services to address these deficits and reach maximum level of function.  Recent Surgery: Procedure(s) (LRB):  CREATION, FLAP, MUSCLE ROTATION (N/A)  IRRIGATION AND DEBRIDEMENT, WOUND, STERNUM (N/A)  CLOSURE, WOUND, STERNUM (N/A)  EXPLORATION, WOUND (N/A) 4 Days Post-Op    Plan:     During this hospitalization, patient to be seen 5 x/week to address the identified rehab impairments via gait training, therapeutic activities, therapeutic exercises, neuromuscular re-education and progress toward the  "following goals:    Plan of Care Expires:  12/16/22    Subjective     Chief Complaint: Sternal pain  Patient/Family Comments/Goals: "I need to hold onto you"  Pain/Comfort:  Pain Rating 1: 7/10  Location - Orientation 1: generalized  Location 1: sternal  Pain Addressed 1: Pre-medicate for activity, Reposition, Cessation of Activity  Pain Rating Post-Intervention 1:  (not rated)    Objective:     Communicated with RN prior to session. Patient found HOB elevated with telemetry, pulse ox (continuous), blood pressure cuff, peripheral IV, PICC line, arterial line, BLANCA drain, lambert catheter, Trialysis upon PT entry to room.     General Precautions: Standard, fall, sternal   Orthopedic Precautions:N/A   Braces: N/A    Functional Mobility:  Bed Mobility:     Rolling Right: minimum assistance  Supine to Sit: minimum assistance, cuing for sternal precautions with patient non-adherent  Transfers:     Sit to Stand: contact guard assistance with B hand-held assist with cues for hand placement  Gait: Patient ambulated 112 ft x2 with no AD and IV pole on the way back and contact guard -  minimum assistance. Patient demonstrates occasional unsteady gait, narrow base of support, flexed posture, and decreased goldy. Cuing for increased RADHA and upright posture. Patient demonstrated 1 LOB requiring minimal assistance from PT to regain balance. All lines remained intact throughout ambulation trial, mask donned for out of room ambulation, chair follow for patient safety, portable monitor utilized, nurse present for vital sign monitoring.  Balance:   Static Sitting: Good, able to maintain for 3 minute(s) with stand by assistance  Dynamic Sitting: Fair: Patient accepts minimal challenge, stand by assistance  Static Standing: Good, able to maintain for 10 seconds with contact guard assistance  Dynamic Standing: Fair: Patient accepts minimal challenge, minimum assistance    AM-PAC 6 CLICK MOBILITY  Turning over in bed (including adjusting " bedclothes, sheets and blankets)?: 3  Sitting down on and standing up from a chair with arms (e.g., wheelchair, bedside commode, etc.): 3  Moving from lying on back to sitting on the side of the bed?: 3  Moving to and from a bed to a chair (including a wheelchair)?: 3  Need to walk in hospital room?: 3  Climbing 3-5 steps with a railing?: 1  Basic Mobility Total Score: 16     Therapeutic Activities and Exercises:  Patient educated on role of acute care PT and PT POC, safety while in hospital including calling nurse for mobility, and call light usage  Patient is clear to ambulate to/from bathroom with RN/PCT, assist x1  Patient educated on Post-op sternal precautions, including no lifting > 5 lbs, pulling or pushing with BUEs. Provided cuing throughout for adherence with limited adherence noted  Educated about importance of OOB mobility and remaining UIC most of the day  Educated about pursed lip breathing technique and cued for use with mobility    Patient left up in chair with all lines intact, call button in reach, RN notified, and friend present.    GOALS:   Multidisciplinary Problems       Physical Therapy Goals          Problem: Physical Therapy    Goal Priority Disciplines Outcome Goal Variances Interventions   Physical Therapy Goal     PT, PT/OT Ongoing, Progressing     Description: Goals to be met by: 2022     Patient will increase functional independence with mobility by performin. Supine to sit with Modified [place]  2. Sit to supine with Modified [place]  3. Sit to stand transfer with Supervision  4. Bed to chair transfer with Supervision using No Assistive Device  5. Gait  x 200 feet with Supervision using No Assistive Device.   6. Pt will ascend/descend 3 steps with no HR and SBA.                         Time Tracking:     PT Received On: 22  PT Start Time: 1257     PT Stop Time: 1337  PT Total Time (min): 40 min     Billable Minutes: Gait Training 25 min and Therapeutic  Activity 15 min         PT/PTA: PT     PTA Visit Number: 0     11/18/2022

## 2022-11-18 NOTE — ASSESSMENT & PLAN NOTE
62 y.o. male S/P TV replacement, MV replacement, MAZE, Lt Atrial Appendage ligation and Impella placement on 10/7/22, course complicated by bleeding, requiring reopening POD#0, multiple VT runs requiring DCCV, and sternal wound infections. He presents to the SICU s/p wound debridement and wire removal on 11/08 followed by washout on 11/10      Neuro/Psych:     - Sedation: none    - Pain:    - Scheduled Tylenol 1g q8h with prn Oxy, dilaudid for break through pain               Cardiac:      -BP Goal: MAP 60-80 and SBP <140.  Vaso 0.04, will wean.     - Echo with EF 35% with mild RV enlargement and moderate RV dysfunction. Cont milrinone 0.25     - Anti-HTNs: Will restart home meds when appropriate     - Rhythm: NSR. S/p MAZE for Afib, cont po amio.     - Beta blocker: hold in setting of Milrinone     - Statin: Atorvastatin 40 mg QD      Pulmonary:     - Goal SpO2 >92%, 2L NC     - CXR with patchy opacities on R side and SQ air, leukocytosis downtrending. ID recs doxy x5 days for PNA.           Renal:    - Trend BUN/Cr. Oliguric KIMANI. Will monitor and support kidneys with MAP >65.     - CRRT, appreciate nephrology recs     - Maintain Fierro, record strict Is/Os   - Will f/u with nephrology regarding plans for HD vs perm cath     Recent Labs   Lab 11/17/22  2223 11/18/22  0002 11/18/22  0305   BUN 39* 32* 21   CREATININE 4.5* 3.8* 2.4*         FEN / GI:     - Daily CMP, PRN K/Mag/Phos per protocol     - Replace electrolytes as needed    - Nutrition: cardiac diet, BOOST    - Bowel Regimen: Miralax, docusate      ID:     - Leukocytosis without fever, s/p debridement and Pec flap 11/14. Now down trending     - Abx: Continue Cefepime, lactic acid improving. Will need long term abx plan, 6weeks post op flap    - R sided patchy opacities on CXR on cefepime    - ID recs broadening with doxy or linezolid if leukocytosis persists.     Recent Labs   Lab 11/17/22  1745 11/18/22  0002 11/18/22  0305   WBC 19.26* 14.94* 17.38*          Heme/Onc:     - H/H with post-operative anemia, transfusing 1 unit.    - CBC daily    - ASA 325mg daily    Recent Labs   Lab 11/16/22  0344 11/16/22  0923 11/17/22  0306 11/17/22  1106 11/17/22  1745 11/18/22  0002 11/18/22  0305   HGB 7.8*   < > 7.0*   < > 8.9* 8.6* 8.7*      < > 251   < > 236 260 273   APTT 28.2  --  30.5  --   --   --  29.4   INR 1.1  --  1.1  --   --   --  1.1    < > = values in this interval not displayed.         Endocrine:     - CTS Goal -140        PPx:   Feeding: cardiac diet   Analgesia/Sedation: tylenol, oxy, dilaudid PRN   Thromboembolic Prevention: scds  HOB >30: Yes  Stress Ulcer: n/a  Glucose Control: none      Lines/Drains/Airway:   Left radial arterial line   RIJ CVC   Fierro   BLANCA drains       Dispo/Code Status/Palliative:     - Continue SICU Care    - Full Code

## 2022-11-18 NOTE — PROGRESS NOTES
[facility]  Plastic Surgery  Progress Note    Subjective:     History of Present Illness:  No notes on file    Post-Op Info:  Procedure(s) (LRB):  CREATION, FLAP, MUSCLE ROTATION (N/A)  IRRIGATION AND DEBRIDEMENT, WOUND, STERNUM (N/A)  CLOSURE, WOUND, STERNUM (N/A)  EXPLORATION, WOUND (N/A)   4 Days Post-Op     Interval History: NAEO.  AFVSS.  Pt complaining of pain in axilla from binder.  Adjusted binder at bedside.  Last Hgb 8.7.      Medications:  Continuous Infusions:   sodium chloride 0.9% 200 mL/hr at 11/18/22 0900    milrinone 20mg/100ml D5W (200mcg/ml) 0.125 mcg/kg/min (11/18/22 0900)    vasopressin Stopped (11/17/22 1601)     Scheduled Meds:   acetaminophen  1,000 mg Oral Q8H    amiodarone  200 mg Oral BID    aspirin  81 mg Oral Daily    atorvastatin  40 mg Oral Daily    ceFEPime (MAXIPIME) IVPB  1 g Intravenous Q12H    doxycycline  100 mg Oral Q12H    heparin (porcine)  5,000 Units Subcutaneous Q8H    methocarbamoL  500 mg Oral TID    mupirocin   Nasal BID    polyethylene glycol  17 g Oral Daily    senna-docusate 8.6-50 mg  1 tablet Oral BID     PRN Meds:sodium chloride, sodium chloride, dextrose 10%, dextrose 10%, HYDROmorphone, hydrOXYzine pamoate, magnesium sulfate IVPB, ondansetron, oxyCODONE, oxyCODONE, sodium phosphate IVPB, sodium phosphate IVPB, sodium phosphate IVPB     Review of patient's allergies indicates:  No Known Allergies  Objective:     Vital Signs (Most Recent):  Temp: 98.2 °F (36.8 °C) (11/18/22 0301)  Pulse: 99 (11/18/22 0930)  Resp: (!) 22 (11/18/22 0930)  BP: (!) 142/74 (11/18/22 0905)  SpO2: (!) 93 % (11/18/22 0930)   Vital Signs (24h Range):  Temp:  [98.1 °F (36.7 °C)-98.3 °F (36.8 °C)] 98.2 °F (36.8 °C)  Pulse:  [] 99  Resp:  [14-35] 22  SpO2:  [90 %-96 %] 93 %  BP: (126-142)/(61-74) 142/74  Arterial Line BP: (110-152)/(40-60) 149/60     Weight: 74 kg (163 lb 2.3 oz)  Body mass index is 24.08 kg/m².    Intake/Output - Last 3 Shifts          11/16 0700  11/17 0659 11/17 0700  11/18 0659 11/18 0700  11/19 0659    P.O. 462      I.V. (mL/kg) 2105.6 (28.5) 1726.7 (23.3) 706.4 (9.5)    Blood  423.8     IV Piggyback 398.7 87.2 124.8    Total Intake(mL/kg) 2966.3 (40.1) 2237.7 (30.2) 831.2 (11.2)    Urine (mL/kg/hr) 240 (0.1) 135 (0.1) 40 (0.2)    Drains 253 110 53    Other 1866 2233 1139    Stool  0     Total Output 2359 2478 1232    Net +607.3 -240.3 -400.8           Stool Occurrence  2 x             Physical Exam  General: Alert; No acute distress  Cardiovascular: Regular rate   Respiratory: Normal respiratory effort. Chest rise symmetric.   Abdomen: Soft, nontender, nondistended  Extremity: Moves all extremities equally.  Neurologic: No focal deficit. Speech normal  SKIN: midline sternal and bilateral axillary incisions c/d/I, 5 drains - each with < 20 SS output       Significant Labs:  I have reviewed all pertinent lab results within the past 24 hours.  CBC:   Recent Labs   Lab 11/18/22  0305   WBC 17.38*   RBC 2.88*   HGB 8.7*   HCT 26.6*      MCV 92   MCH 30.2   MCHC 32.7     BMP:   Recent Labs   Lab 11/18/22  0305   GLU 88      K 4.5      CO2 27   BUN 21   CREATININE 2.4*   CALCIUM 7.7*   MG 2.0       Significant Diagnostics:  I have reviewed all pertinent imaging results/findings within the past 24 hours.    Assessment/Plan:     Surgical wound present  Plan  S/p washout 11/10, will plan for rotational pec flap for sternal coverage on Monday 11/14  Pain control  Monitor Vac output  I's & O's  Dvt ppx  ASA and plavix as per CTS  Medical management as per SICU        Chuyita Aviles MD  Plastic Surgery  [facility] - Surgical Intensive Care

## 2022-11-18 NOTE — PLAN OF CARE
Recommendations    1) Continue cardiac diet w/ fluid per MD.     2) Continue ONS Boost Plus Chocolate TID. Consider arginaid BID x 2 weeks, vit C, zinc, MVI to promote wound healing.     3) RD to monitor and f/u.    Goals: Meet % EEN by RD f/u.  Nutrition Goal Status: progressing towards goal  Communication of RD Recs: other (comment) (POC)

## 2022-11-18 NOTE — PLAN OF CARE
Problem: Fall Injury Risk  Goal: Absence of Fall and Fall-Related Injury  Outcome: Ongoing, Progressing  Intervention: Identify and Manage Contributors  Flowsheets (Taken 11/17/2022 1802)  Self-Care Promotion:   independence encouraged   BADL personal objects within reach   BADL personal routines maintained   meal set-up provided   safe use of adaptive equipment encouraged  Medication Review/Management:   medications reviewed   high-risk medications identified   infusion titrated  Intervention: Promote Injury-Free Environment  Flowsheets (Taken 11/17/2022 1802)  Safety Promotion/Fall Prevention:   assistive device/personal item within reach   bed alarm set   bedside commode chair   commode/urinal/bedpan at bedside   diversional activities provided   Fall Risk reviewed with patient/family   Fall Risk signage in place   family to remain at bedside   high risk medications identified   in recliner, wheels locked   lighting adjusted   medications reviewed   muscle strengthening facilitated   nonskid shoes/socks when out of bed   pulse ox   room near unit station   instructed to call staff for mobility     Problem: Skin Injury Risk Increased  Goal: Skin Health and Integrity  Outcome: Ongoing, Progressing  Intervention: Optimize Skin Protection  Flowsheets (Taken 11/17/2022 1802)  Pressure Reduction Techniques: weight shift assistance provided  Pressure Reduction Devices: specialty bed utilized  Skin Protection:   adhesive use limited   incontinence pads utilized   protective footwear used   pulse oximeter probe site changed   skin-to-device areas padded   skin-to-skin areas padded  Head of Bed (HOB) Positioning: HOB at 30-45 degrees

## 2022-11-19 LAB
ALBUMIN SERPL BCP-MCNC: 2.2 G/DL (ref 3.5–5.2)
ALBUMIN SERPL BCP-MCNC: 2.3 G/DL (ref 3.5–5.2)
ALBUMIN SERPL BCP-MCNC: 2.3 G/DL (ref 3.5–5.2)
ALBUMIN SERPL BCP-MCNC: 2.4 G/DL (ref 3.5–5.2)
ALP SERPL-CCNC: 98 U/L (ref 55–135)
ALT SERPL W/O P-5'-P-CCNC: 5 U/L (ref 10–44)
ANION GAP SERPL CALC-SCNC: 6 MMOL/L (ref 8–16)
ANION GAP SERPL CALC-SCNC: 8 MMOL/L (ref 8–16)
ANION GAP SERPL CALC-SCNC: 8 MMOL/L (ref 8–16)
ANION GAP SERPL CALC-SCNC: 9 MMOL/L (ref 8–16)
ANION GAP SERPL CALC-SCNC: 9 MMOL/L (ref 8–16)
APTT BLDCRRT: 32.5 SEC (ref 21–32)
AST SERPL-CCNC: 19 U/L (ref 10–40)
BASOPHILS # BLD AUTO: 0.04 K/UL (ref 0–0.2)
BASOPHILS # BLD AUTO: 0.05 K/UL (ref 0–0.2)
BASOPHILS NFR BLD: 0.3 % (ref 0–1.9)
BASOPHILS NFR BLD: 0.3 % (ref 0–1.9)
BILIRUB SERPL-MCNC: 0.7 MG/DL (ref 0.1–1)
BUN SERPL-MCNC: 10 MG/DL (ref 8–23)
BUN SERPL-MCNC: 5 MG/DL (ref 8–23)
BUN SERPL-MCNC: 5 MG/DL (ref 8–23)
BUN SERPL-MCNC: 6 MG/DL (ref 8–23)
BUN SERPL-MCNC: 8 MG/DL (ref 8–23)
CALCIUM SERPL-MCNC: 7.4 MG/DL (ref 8.7–10.5)
CALCIUM SERPL-MCNC: 7.8 MG/DL (ref 8.7–10.5)
CALCIUM SERPL-MCNC: 7.8 MG/DL (ref 8.7–10.5)
CALCIUM SERPL-MCNC: 7.9 MG/DL (ref 8.7–10.5)
CALCIUM SERPL-MCNC: 8.2 MG/DL (ref 8.7–10.5)
CHLORIDE SERPL-SCNC: 105 MMOL/L (ref 95–110)
CHLORIDE SERPL-SCNC: 106 MMOL/L (ref 95–110)
CHLORIDE SERPL-SCNC: 106 MMOL/L (ref 95–110)
CHLORIDE SERPL-SCNC: 107 MMOL/L (ref 95–110)
CHLORIDE SERPL-SCNC: 107 MMOL/L (ref 95–110)
CO2 SERPL-SCNC: 21 MMOL/L (ref 23–29)
CO2 SERPL-SCNC: 22 MMOL/L (ref 23–29)
CO2 SERPL-SCNC: 24 MMOL/L (ref 23–29)
CO2 SERPL-SCNC: 25 MMOL/L (ref 23–29)
CO2 SERPL-SCNC: 27 MMOL/L (ref 23–29)
CREAT SERPL-MCNC: 0.9 MG/DL (ref 0.5–1.4)
CREAT SERPL-MCNC: 1 MG/DL (ref 0.5–1.4)
CREAT SERPL-MCNC: 1.1 MG/DL (ref 0.5–1.4)
CREAT SERPL-MCNC: 1.3 MG/DL (ref 0.5–1.4)
CREAT SERPL-MCNC: 1.8 MG/DL (ref 0.5–1.4)
DIFFERENTIAL METHOD: ABNORMAL
DIFFERENTIAL METHOD: ABNORMAL
EOSINOPHIL # BLD AUTO: 0.2 K/UL (ref 0–0.5)
EOSINOPHIL # BLD AUTO: 0.2 K/UL (ref 0–0.5)
EOSINOPHIL NFR BLD: 1.2 % (ref 0–8)
EOSINOPHIL NFR BLD: 1.3 % (ref 0–8)
ERYTHROCYTE [DISTWIDTH] IN BLOOD BY AUTOMATED COUNT: 17.2 % (ref 11.5–14.5)
ERYTHROCYTE [DISTWIDTH] IN BLOOD BY AUTOMATED COUNT: 17.3 % (ref 11.5–14.5)
EST. GFR  (NO RACE VARIABLE): 41.8 ML/MIN/1.73 M^2
EST. GFR  (NO RACE VARIABLE): >60 ML/MIN/1.73 M^2
GLUCOSE SERPL-MCNC: 80 MG/DL (ref 70–110)
GLUCOSE SERPL-MCNC: 83 MG/DL (ref 70–110)
GLUCOSE SERPL-MCNC: 88 MG/DL (ref 70–110)
GLUCOSE SERPL-MCNC: 93 MG/DL (ref 70–110)
GLUCOSE SERPL-MCNC: 98 MG/DL (ref 70–110)
HCT VFR BLD AUTO: 27.4 % (ref 40–54)
HCT VFR BLD AUTO: 28.3 % (ref 40–54)
HGB BLD-MCNC: 8.6 G/DL (ref 14–18)
HGB BLD-MCNC: 8.9 G/DL (ref 14–18)
IMM GRANULOCYTES # BLD AUTO: 0.17 K/UL (ref 0–0.04)
IMM GRANULOCYTES # BLD AUTO: 0.18 K/UL (ref 0–0.04)
IMM GRANULOCYTES NFR BLD AUTO: 1.1 % (ref 0–0.5)
IMM GRANULOCYTES NFR BLD AUTO: 1.1 % (ref 0–0.5)
INR PPP: 1.2 (ref 0.8–1.2)
LYMPHOCYTES # BLD AUTO: 1.3 K/UL (ref 1–4.8)
LYMPHOCYTES # BLD AUTO: 1.4 K/UL (ref 1–4.8)
LYMPHOCYTES NFR BLD: 8 % (ref 18–48)
LYMPHOCYTES NFR BLD: 8.2 % (ref 18–48)
MAGNESIUM SERPL-MCNC: 1.7 MG/DL (ref 1.6–2.6)
MAGNESIUM SERPL-MCNC: 1.7 MG/DL (ref 1.6–2.6)
MAGNESIUM SERPL-MCNC: 1.8 MG/DL (ref 1.6–2.6)
MCH RBC QN AUTO: 30 PG (ref 27–31)
MCH RBC QN AUTO: 30.9 PG (ref 27–31)
MCHC RBC AUTO-ENTMCNC: 30.4 G/DL (ref 32–36)
MCHC RBC AUTO-ENTMCNC: 32.5 G/DL (ref 32–36)
MCV RBC AUTO: 95 FL (ref 82–98)
MCV RBC AUTO: 99 FL (ref 82–98)
MONOCYTES # BLD AUTO: 1.5 K/UL (ref 0.3–1)
MONOCYTES # BLD AUTO: 1.7 K/UL (ref 0.3–1)
MONOCYTES NFR BLD: 9.4 % (ref 4–15)
MONOCYTES NFR BLD: 9.9 % (ref 4–15)
NEUTROPHILS # BLD AUTO: 12.5 K/UL (ref 1.8–7.7)
NEUTROPHILS # BLD AUTO: 13.6 K/UL (ref 1.8–7.7)
NEUTROPHILS NFR BLD: 79.5 % (ref 38–73)
NEUTROPHILS NFR BLD: 79.7 % (ref 38–73)
NRBC BLD-RTO: 0 /100 WBC
NRBC BLD-RTO: 0 /100 WBC
PHOSPHATE SERPL-MCNC: 1.2 MG/DL (ref 2.7–4.5)
PHOSPHATE SERPL-MCNC: 1.3 MG/DL (ref 2.7–4.5)
PHOSPHATE SERPL-MCNC: 1.3 MG/DL (ref 2.7–4.5)
PHOSPHATE SERPL-MCNC: 2.2 MG/DL (ref 2.7–4.5)
PLATELET # BLD AUTO: 266 K/UL (ref 150–450)
PLATELET # BLD AUTO: 266 K/UL (ref 150–450)
PMV BLD AUTO: 10.1 FL (ref 9.2–12.9)
PMV BLD AUTO: 9.9 FL (ref 9.2–12.9)
POTASSIUM SERPL-SCNC: 4.4 MMOL/L (ref 3.5–5.1)
POTASSIUM SERPL-SCNC: 4.4 MMOL/L (ref 3.5–5.1)
POTASSIUM SERPL-SCNC: 4.5 MMOL/L (ref 3.5–5.1)
POTASSIUM SERPL-SCNC: 4.6 MMOL/L (ref 3.5–5.1)
POTASSIUM SERPL-SCNC: 4.6 MMOL/L (ref 3.5–5.1)
PROT SERPL-MCNC: 5.5 G/DL (ref 6–8.4)
PROTHROMBIN TIME: 12.1 SEC (ref 9–12.5)
RBC # BLD AUTO: 2.87 M/UL (ref 4.6–6.2)
RBC # BLD AUTO: 2.88 M/UL (ref 4.6–6.2)
SODIUM SERPL-SCNC: 136 MMOL/L (ref 136–145)
SODIUM SERPL-SCNC: 138 MMOL/L (ref 136–145)
SODIUM SERPL-SCNC: 140 MMOL/L (ref 136–145)
WBC # BLD AUTO: 15.66 K/UL (ref 3.9–12.7)
WBC # BLD AUTO: 17.1 K/UL (ref 3.9–12.7)

## 2022-11-19 PROCEDURE — 84100 ASSAY OF PHOSPHORUS: CPT

## 2022-11-19 PROCEDURE — 80069 RENAL FUNCTION PANEL: CPT | Mod: 91 | Performed by: INTERNAL MEDICINE

## 2022-11-19 PROCEDURE — 36415 COLL VENOUS BLD VENIPUNCTURE: CPT

## 2022-11-19 PROCEDURE — 99233 PR SUBSEQUENT HOSPITAL CARE,LEVL III: ICD-10-PCS | Mod: ,,, | Performed by: INTERNAL MEDICINE

## 2022-11-19 PROCEDURE — 85730 THROMBOPLASTIN TIME PARTIAL: CPT

## 2022-11-19 PROCEDURE — 25000003 PHARM REV CODE 250: Performed by: INTERNAL MEDICINE

## 2022-11-19 PROCEDURE — 85025 COMPLETE CBC W/AUTO DIFF WBC: CPT | Mod: 91

## 2022-11-19 PROCEDURE — 63600175 PHARM REV CODE 636 W HCPCS: Performed by: ANESTHESIOLOGY

## 2022-11-19 PROCEDURE — 83735 ASSAY OF MAGNESIUM: CPT | Performed by: STUDENT IN AN ORGANIZED HEALTH CARE EDUCATION/TRAINING PROGRAM

## 2022-11-19 PROCEDURE — 25000003 PHARM REV CODE 250: Performed by: STUDENT IN AN ORGANIZED HEALTH CARE EDUCATION/TRAINING PROGRAM

## 2022-11-19 PROCEDURE — 20000000 HC ICU ROOM

## 2022-11-19 PROCEDURE — 99232 SBSQ HOSP IP/OBS MODERATE 35: CPT | Mod: ,,, | Performed by: ANESTHESIOLOGY

## 2022-11-19 PROCEDURE — 80053 COMPREHEN METABOLIC PANEL: CPT

## 2022-11-19 PROCEDURE — 90945 DIALYSIS ONE EVALUATION: CPT

## 2022-11-19 PROCEDURE — 80100008 HC CRRT DAILY MAINTENANCE

## 2022-11-19 PROCEDURE — 80048 BASIC METABOLIC PNL TOTAL CA: CPT | Mod: XB

## 2022-11-19 PROCEDURE — 99232 PR SUBSEQUENT HOSPITAL CARE,LEVL II: ICD-10-PCS | Mod: ,,, | Performed by: ANESTHESIOLOGY

## 2022-11-19 PROCEDURE — 83735 ASSAY OF MAGNESIUM: CPT | Mod: 91 | Performed by: INTERNAL MEDICINE

## 2022-11-19 PROCEDURE — 25000003 PHARM REV CODE 250

## 2022-11-19 PROCEDURE — 99233 SBSQ HOSP IP/OBS HIGH 50: CPT | Mod: ,,, | Performed by: INTERNAL MEDICINE

## 2022-11-19 PROCEDURE — 63600175 PHARM REV CODE 636 W HCPCS: Performed by: THORACIC SURGERY (CARDIOTHORACIC VASCULAR SURGERY)

## 2022-11-19 PROCEDURE — 85610 PROTHROMBIN TIME: CPT

## 2022-11-19 PROCEDURE — 63600175 PHARM REV CODE 636 W HCPCS: Performed by: INTERNAL MEDICINE

## 2022-11-19 PROCEDURE — 94761 N-INVAS EAR/PLS OXIMETRY MLT: CPT

## 2022-11-19 PROCEDURE — 63600175 PHARM REV CODE 636 W HCPCS: Performed by: STUDENT IN AN ORGANIZED HEALTH CARE EDUCATION/TRAINING PROGRAM

## 2022-11-19 PROCEDURE — 80069 RENAL FUNCTION PANEL: CPT | Performed by: STUDENT IN AN ORGANIZED HEALTH CARE EDUCATION/TRAINING PROGRAM

## 2022-11-19 RX ORDER — MAGNESIUM SULFATE HEPTAHYDRATE 40 MG/ML
2 INJECTION, SOLUTION INTRAVENOUS
Status: DISPENSED | OUTPATIENT
Start: 2022-11-19 | End: 2022-11-20

## 2022-11-19 RX ORDER — HYDROCODONE BITARTRATE AND ACETAMINOPHEN 500; 5 MG/1; MG/1
TABLET ORAL CONTINUOUS
Status: ACTIVE | OUTPATIENT
Start: 2022-11-19 | End: 2022-11-20

## 2022-11-19 RX ADMIN — DOXYCYCLINE HYCLATE 100 MG: 100 TABLET, COATED ORAL at 09:11

## 2022-11-19 RX ADMIN — ACETAMINOPHEN 1000 MG: 500 TABLET ORAL at 09:11

## 2022-11-19 RX ADMIN — HEPARIN SODIUM 5000 UNITS: 5000 INJECTION INTRAVENOUS; SUBCUTANEOUS at 05:11

## 2022-11-19 RX ADMIN — SODIUM PHOSPHATE, MONOBASIC, MONOHYDRATE AND SODIUM PHOSPHATE, DIBASIC, ANHYDROUS 39.99 MMOL: 142; 276 INJECTION, SOLUTION INTRAVENOUS at 11:11

## 2022-11-19 RX ADMIN — HYDROMORPHONE HYDROCHLORIDE 1 MG: 1 INJECTION, SOLUTION INTRAMUSCULAR; INTRAVENOUS; SUBCUTANEOUS at 01:11

## 2022-11-19 RX ADMIN — CEFEPIME HYDROCHLORIDE 1 G: 1 INJECTION, SOLUTION INTRAVENOUS at 04:11

## 2022-11-19 RX ADMIN — MAGNESIUM SULFATE 2 G: 2 INJECTION INTRAVENOUS at 11:11

## 2022-11-19 RX ADMIN — METHOCARBAMOL 500 MG: 500 TABLET ORAL at 02:11

## 2022-11-19 RX ADMIN — ACETAMINOPHEN 1000 MG: 500 TABLET ORAL at 05:11

## 2022-11-19 RX ADMIN — SODIUM CHLORIDE: 0.9 INJECTION, SOLUTION INTRAVENOUS at 05:11

## 2022-11-19 RX ADMIN — OXYCODONE HYDROCHLORIDE 10 MG: 10 TABLET ORAL at 09:11

## 2022-11-19 RX ADMIN — HYDROMORPHONE HYDROCHLORIDE 1 MG: 1 INJECTION, SOLUTION INTRAMUSCULAR; INTRAVENOUS; SUBCUTANEOUS at 05:11

## 2022-11-19 RX ADMIN — SENNOSIDES AND DOCUSATE SODIUM 1 TABLET: 50; 8.6 TABLET ORAL at 09:11

## 2022-11-19 RX ADMIN — METHOCARBAMOL 500 MG: 500 TABLET ORAL at 09:11

## 2022-11-19 RX ADMIN — HEPARIN SODIUM 5000 UNITS: 5000 INJECTION INTRAVENOUS; SUBCUTANEOUS at 09:11

## 2022-11-19 RX ADMIN — HYDROMORPHONE HYDROCHLORIDE 1 MG: 1 INJECTION, SOLUTION INTRAMUSCULAR; INTRAVENOUS; SUBCUTANEOUS at 09:11

## 2022-11-19 RX ADMIN — HEPARIN SODIUM 5000 UNITS: 5000 INJECTION INTRAVENOUS; SUBCUTANEOUS at 02:11

## 2022-11-19 RX ADMIN — MUPIROCIN: 20 OINTMENT TOPICAL at 09:11

## 2022-11-19 RX ADMIN — POLYETHYLENE GLYCOL 3350 17 G: 17 POWDER, FOR SOLUTION ORAL at 11:11

## 2022-11-19 RX ADMIN — ATORVASTATIN CALCIUM 40 MG: 20 TABLET, FILM COATED ORAL at 10:11

## 2022-11-19 RX ADMIN — AMIODARONE HYDROCHLORIDE 200 MG: 200 TABLET ORAL at 09:11

## 2022-11-19 RX ADMIN — OXYCODONE HYDROCHLORIDE 10 MG: 10 TABLET ORAL at 03:11

## 2022-11-19 RX ADMIN — ASPIRIN 81 MG 81 MG: 81 TABLET ORAL at 09:11

## 2022-11-19 RX ADMIN — SODIUM CHLORIDE: 0.9 INJECTION, SOLUTION INTRAVENOUS at 01:11

## 2022-11-19 RX ADMIN — HYDROMORPHONE HYDROCHLORIDE 1 MG: 1 INJECTION, SOLUTION INTRAMUSCULAR; INTRAVENOUS; SUBCUTANEOUS at 12:11

## 2022-11-19 RX ADMIN — ACETAMINOPHEN 1000 MG: 500 TABLET ORAL at 02:11

## 2022-11-19 NOTE — SUBJECTIVE & OBJECTIVE
Interval History:     Net positive 827mLs over the past 24h. Scheduled for 8h CRRT/SLED today.     Review of patient's allergies indicates:  No Known Allergies  Current Facility-Administered Medications   Medication Frequency    0.9%  NaCl infusion (CRRT USE ONLY) Continuous    0.9%  NaCl infusion (CRRT USE ONLY) Continuous    0.9%  NaCl infusion (for blood administration) Q24H PRN    0.9%  NaCl infusion (for blood administration) Q24H PRN    acetaminophen tablet 1,000 mg Q8H    amiodarone tablet 200 mg BID    aspirin chewable tablet 81 mg Daily    atorvastatin tablet 40 mg Daily    cefepime in dextrose 5 % 1 gram/50 mL IVPB 1 g Q12H    dextrose 10% bolus 125 mL PRN    dextrose 10% bolus 250 mL PRN    doxycycline tablet 100 mg Q12H    heparin (porcine) injection 5,000 Units Q8H    HYDROmorphone injection 1 mg Q4H PRN    hydrOXYzine pamoate capsule 50 mg Q8H PRN    magnesium sulfate 2g in water 50mL IVPB (premix) PRN    magnesium sulfate 2g in water 50mL IVPB (premix) PRN    methocarbamoL tablet 500 mg TID    milrinone 20mg in D5W 100 mL infusion Continuous    mupirocin 2 % ointment BID    ondansetron injection 4 mg Q8H PRN    oxyCODONE immediate release tablet 5 mg Q4H PRN    oxyCODONE immediate release tablet Tab 10 mg Q4H PRN    polyethylene glycol packet 17 g Daily    senna-docusate 8.6-50 mg per tablet 1 tablet BID    sodium phosphate 20.01 mmol in dextrose 5 % 250 mL IVPB PRN    sodium phosphate 20.01 mmol in dextrose 5 % 250 mL IVPB PRN    sodium phosphate 30 mmol in dextrose 5 % 250 mL IVPB PRN    sodium phosphate 30 mmol in dextrose 5 % 250 mL IVPB PRN    sodium phosphate 39.99 mmol in dextrose 5 % 250 mL IVPB PRN    sodium phosphate 39.99 mmol in dextrose 5 % 250 mL IVPB PRN       Objective:     Vital Signs (Most Recent):  Temp: 97.7 °F (36.5 °C) (11/19/22 0700)  Pulse: 98 (11/19/22 1000)  Resp: 16 (11/19/22 1000)  BP: 118/68 (11/19/22 0700)  SpO2: 95 % (11/19/22 1000)  O2 Device (Oxygen Therapy): room air  (11/19/22 0826)   Vital Signs (24h Range):  Temp:  [97.7 °F (36.5 °C)-98.4 °F (36.9 °C)] 97.7 °F (36.5 °C)  Pulse:  [] 98  Resp:  [11-38] 16  SpO2:  [91 %-98 %] 95 %  BP: (117-156)/(56-77) 118/68  Arterial Line BP: (104-173)/(43-87) 118/50     Weight: 74 kg (163 lb 2.3 oz) (11/12/22 1519)  Body mass index is 24.08 kg/m².  Body surface area is 1.9 meters squared.    I/O last 3 completed shifts:  In: 7204.5 [P.O.:1520; I.V.:5345.1; IV Piggyback:339.4]  Out: 6730 [Urine:225; Drains:461; Other:6044]    Physical Exam  Vitals and nursing note reviewed.   Constitutional:       General: He is not in acute distress.     Appearance: He is not ill-appearing.   Cardiovascular:      Rate and Rhythm: Normal rate and regular rhythm.      Pulses: Normal pulses.   Pulmonary:      Effort: Pulmonary effort is normal.   Abdominal:      General: Abdomen is flat.      Palpations: Abdomen is soft.   Musculoskeletal:      Right lower leg: No edema.      Left lower leg: No edema.   Skin:     General: Skin is warm.      Findings: No bruising or lesion.      Comments: BLANCA drains with sanguinous output    Neurological:      General: No focal deficit present.       Significant Labs:  All labs within the past 24 hours have been reviewed.     Significant Imaging:  Labs: Reviewed

## 2022-11-19 NOTE — PROGRESS NOTES
Plastic Surgery Progress Note    Subjective:  Doing okay, Stable. C/o chest binder being a bit tight.  Wbc is still 17      Objective:    VITAL SIGNS:   Vitals:    22 0915 22 0930 22 0945 22 1000   BP:       BP Location:       Patient Position:       Pulse: 101 99 98 98   Resp:    16   Temp:       TempSrc:       SpO2: 95% 95% 95% 95%   Weight:       Height:         TMAX: Temp (24hrs), Av °F (36.7 °C), Min:97.7 °F (36.5 °C), Max:98.4 °F (36.9 °C)      Intake/Output - Last 3 Shifts          07 0659  07 0659  07 0659    P.O.  1520     I.V. (mL/kg) 1726.7 (23.3) 3687.3 (49.8) 228.2 (3.1)    Blood 423.8      IV Piggyback 87.2 292.3 43.3    Total Intake(mL/kg) 2237.7 (30.2) 5499.6 (74.3) 271.4 (3.7)    Urine (mL/kg/hr) 135 (0.1) 160 (0.1) 15 (0.1)    Drains 110 417 81    Other 2233 3811     Stool 0      Total Output 2478 4388 96    Net -240.3 +1111.6 +175.4           Stool Occurrence 2 x              Output by Drain (mL) 22 0701 - 22 1900 22 190 - 22 0700 22 0701 - 22 1900 22 1901 - 22 0700 22 0701 - 22 1052        Closed/Suction Drain 22 Superior;Midline Abdomen Bulb 15 Fr. 13 15 72 110 45        Closed/Suction Drain 22 Inferior;Right Chest Bulb 15 Fr. 6 5 59 29 4        Closed/Suction Drain 22 Lateral;Right Other (Comment) Bulb 15 Fr. 12 7 25 5 7        Closed/Suction Drain 22 Inferior;Left Chest Bulb 15 Fr. 15 7 13 0 0        Closed/Suction Drain 22 Lateral;Left Other (Comment) Bulb 15 Fr. 20 10 47 57 25       Recent Labs   Lab 22  1415 22  1745 22  0002 22  0305 22  1143 22  1818 22  0010   WBC 18.31* 19.26* 14.94* 17.38* 17.24* 16.97* 17.10*   HGB 8.6* 8.9* 8.6* 8.7* 9.1* 9.5* 8.9*   HCT 26.2* 27.1* 26.2* 26.6* 28.5* 29.5* 27.4*    236 260 273 251 283 266     Recent Labs   Lab  11/18/22  0305 11/18/22  1143 11/18/22  1818 11/18/22  2210 11/19/22  0010 11/19/22  0400 11/19/22  0614    137 135* 138 138 140 138   K 4.5 4.6 4.4 4.3 4.6 4.4 4.4    105 104 106 107 107 106   CO2 27 26 22* 24 22* 27 24   BUN 21 10 14 12 8 5* 5*   CREATININE 2.4* 1.5* 2.2* 1.6* 1.3 0.9 1.0   CALCIUM 7.7* 7.9* 8.1* 7.6* 7.9* 7.4* 7.8*     Recent Labs   Lab 11/17/22  1415 11/17/22  2223 11/18/22  0305 11/18/22  1143 11/18/22  1818 11/18/22  2210 11/19/22  0400   MG 2.2 2.2 2.0 1.8 1.8 1.8 1.8      Recent Labs   Lab 11/17/22  1415 11/17/22  2223 11/18/22  0305 11/18/22  1818 11/18/22  2210 11/19/22  0400 11/19/22  0614   PHOS 4.4 3.8 2.3* 2.8 2.0* 1.3* 1.2*     Recent Labs   Lab 11/17/22  1415 11/17/22  2223 11/18/22  0305 11/18/22  1818 11/18/22  2210 11/19/22  0400 11/19/22  0614   ALBUMIN 2.4* 2.4* 2.5* 2.6* 2.2* 2.3* 2.3*     No results for input(s): CRP in the last 168 hours.  Lab Results   Component Value Date    ALBUMIN 2.3 (L) 11/19/2022     No results found for: CRP  Lab Results   Component Value Date    INR 1.2 11/19/2022     No results found for: PTT    Microbiology Results (last 7 days)       Procedure Component Value Units Date/Time    Fungus culture [[id number]] Collected: 11/10/22 0834    Order Status: Completed Specimen: Wound from Sternum Updated: 11/17/22 1005     Fungus (Mycology) Culture Culture in progress    Narrative:      Sternal wound culture    Fungus culture [[id number]] Collected: 11/08/22 1106    Order Status: Completed Specimen: Wound from Sternum Updated: 11/17/22 1005     Fungus (Mycology) Culture Culture in progress    Narrative:      Sternal wound    Fungus culture [[id number]] Collected: 11/08/22 1106    Order Status: Completed Specimen: Wound from Sternum Updated: 11/17/22 1005     Fungus (Mycology) Culture Culture in progress    Narrative:      Sternal drainage    Fungus culture [[id number]] Collected: 11/08/22 1106    Order Status: Completed Specimen: Wound from Sternum  Updated: 11/17/22 1005     Fungus (Mycology) Culture Culture in progress    Narrative:      Pericardial peel    Fungus culture [[id number]] Collected: 11/08/22 1106    Order Status: Completed Specimen: Wound from Sternum Updated: 11/17/22 1005     Fungus (Mycology) Culture Culture in progress    Narrative:      Sternal wound    Fungus culture [[id number]] Collected: 11/08/22 1106    Order Status: Completed Specimen: Wound from Sternum Updated: 11/17/22 1005     Fungus (Mycology) Culture Culture in progress    Narrative:      Aortic graft    Fungus culture [[id number]] Collected: 11/10/22 0836    Order Status: Completed Specimen: Wound from Sternum Updated: 11/17/22 0934     Fungus (Mycology) Culture Culture in progress    Narrative:      Sternal hematoma culture    Culture, Anaerobe [[id number]] Collected: 11/10/22 0834    Order Status: Completed Specimen: Wound from Sternum Updated: 11/17/22 0750     Anaerobic Culture No anaerobes isolated    Narrative:      Sternal wound culture    Culture, Anaerobe [[id number]] Collected: 11/08/22 1106    Order Status: Completed Specimen: Wound from Sternum Updated: 11/17/22 0750     Anaerobic Culture No anaerobes isolated    Narrative:      Sternal wound    Culture, Anaerobe [[id number]] Collected: 11/08/22 1106    Order Status: Completed Specimen: Wound from Sternum Updated: 11/17/22 0750     Anaerobic Culture No anaerobes isolated    Narrative:      Sternal drainage    Culture, Anaerobe [[id number]] Collected: 11/08/22 1106    Order Status: Completed Specimen: Wound from Sternum Updated: 11/17/22 0750     Anaerobic Culture No anaerobes isolated    Narrative:      Pericardial peel    Culture, Anaerobe [[id number]] Collected: 11/08/22 1106    Order Status: Completed Specimen: Wound from Sternum Updated: 11/17/22 0750     Anaerobic Culture No anaerobes isolated    Narrative:      Sternal wound    Culture, Anaerobe [[id number]] Collected: 11/08/22 1106    Order Status: Completed Specimen:  Wound from Sternum Updated: 11/16/22 0746     Anaerobic Culture No anaerobes isolated    Narrative:      Aortic graft    Culture, Anaerobe [[id number]] Collected: 11/10/22 0836    Order Status: Completed Specimen: Wound from Sternum Updated: 11/16/22 0746     Anaerobic Culture No anaerobes isolated    Narrative:      Sternal hematoma culture    Aerobic culture [[id number]] Collected: 11/08/22 1106    Order Status: Completed Specimen: Wound from Sternum Updated: 11/14/22 1358     Aerobic Bacterial Culture No growth    Narrative:      Pericardial peel    Aerobic culture [[id number]] Collected: 11/08/22 1106    Order Status: Completed Specimen: Wound from Sternum Updated: 11/14/22 1357     Aerobic Bacterial Culture No growth    Narrative:      Sternal drainage    Aerobic culture [[id number]] Collected: 11/08/22 1106    Order Status: Completed Specimen: Wound from Sternum Updated: 11/14/22 1005     Aerobic Bacterial Culture No growth    Narrative:      Sternal wound    Aerobic culture [[id number]] Collected: 11/10/22 0834    Order Status: Completed Specimen: Wound from Sternum Updated: 11/14/22 1004     Aerobic Bacterial Culture No growth    Narrative:      Sternal wound culture    Aerobic culture [[id number]] Collected: 11/08/22 1106    Order Status: Completed Specimen: Wound from Sternum Updated: 11/14/22 1000     Aerobic Bacterial Culture No growth    Narrative:      Sternal wound    Aerobic culture [[id number]] Collected: 11/08/22 1106    Order Status: Completed Specimen: Wound from Sternum Updated: 11/14/22 1000     Aerobic Bacterial Culture No growth    Narrative:      Aortic graft    Aerobic culture [[id number]] Collected: 11/10/22 0836    Order Status: Completed Specimen: Wound from Sternum Updated: 11/12/22 1348     Aerobic Bacterial Culture Skin janna,  no predominant organism    Narrative:      Sternal hematoma culture            Current Facility-Administered Medications   Medication    0.9%  NaCl infusion (CRRT USE  ONLY)    0.9%  NaCl infusion (CRRT USE ONLY)    0.9%  NaCl infusion (for blood administration)    0.9%  NaCl infusion (for blood administration)    acetaminophen tablet 1,000 mg    amiodarone tablet 200 mg    aspirin chewable tablet 81 mg    atorvastatin tablet 40 mg    cefepime in dextrose 5 % 1 gram/50 mL IVPB 1 g    dextrose 10% bolus 125 mL    dextrose 10% bolus 250 mL    doxycycline tablet 100 mg    heparin (porcine) injection 5,000 Units    HYDROmorphone injection 1 mg    hydrOXYzine pamoate capsule 50 mg    magnesium sulfate 2g in water 50mL IVPB (premix)    magnesium sulfate 2g in water 50mL IVPB (premix)    methocarbamoL tablet 500 mg    milrinone 20mg in D5W 100 mL infusion    mupirocin 2 % ointment    ondansetron injection 4 mg    oxyCODONE immediate release tablet 5 mg    oxyCODONE immediate release tablet Tab 10 mg    polyethylene glycol packet 17 g    senna-docusate 8.6-50 mg per tablet 1 tablet    sodium phosphate 20.01 mmol in dextrose 5 % 250 mL IVPB    sodium phosphate 20.01 mmol in dextrose 5 % 250 mL IVPB    sodium phosphate 30 mmol in dextrose 5 % 250 mL IVPB    sodium phosphate 30 mmol in dextrose 5 % 250 mL IVPB    sodium phosphate 39.99 mmol in dextrose 5 % 250 mL IVPB    sodium phosphate 39.99 mmol in dextrose 5 % 250 mL IVPB        PE  Physical Exam  General: Alert; No acute distress  Cardiovascular: Regular rate   Respiratory: Normal respiratory effort. Chest rise symmetric.   Abdomen: Soft, nontender, nondistended  Extremity: Moves all extremities equally.  Neurologic: No focal deficit. Speech normal  SKIN: midline sternal and bilateral axillary incisions c/d/I, 5 drains - each with SS output   There is generalized swelling of the bilateral chest.        Diagnostics:      Assessment:  POD 5 b/l pec flaps for sternal recon    Plan:  Continue with chest binder,  Pain control   Monitor drain output, will consider removing then 1 or 2 at a time as output drops.

## 2022-11-19 NOTE — PROGRESS NOTES
[facility] - Surgical Intensive Care  Critical Care - Surgery  Progress Note    Patient Name: David Barrios  MRN: [id number]  Admission Date: 11/8/2022  Hospital Length of Stay: 11 days  Code Status: Full Code  Attending Provider: Mike Hedrick MD  Primary Care Provider: Breann Tavera MD   Principal Problem: Sternal wound infection    Subjective:     Hospital/ICU Course:        Interval History/Significant Events: NAEO. No major changes. Interventional Nephrology consulted for perm cath placement next week. AF. HDS.     Follow-up For: Procedure(s) (LRB):  CREATION, FLAP, MUSCLE ROTATION (N/A)  IRRIGATION AND DEBRIDEMENT, WOUND, STERNUM (N/A)  CLOSURE, WOUND, STERNUM (N/A)  EXPLORATION, WOUND (N/A)    Post-Operative Day: 5 Days Post-Op    Objective:     Vital Signs (Most Recent):  Temp: 97.9 °F (36.6 °C) (11/19/22 0301)  Pulse: 97 (11/19/22 0715)  Resp: 19 (11/19/22 0715)  BP: 118/68 (11/19/22 0700)  SpO2: (!) 92 % (11/19/22 0715)   Vital Signs (24h Range):  Temp:  [97.9 °F (36.6 °C)-98.4 °F (36.9 °C)] 97.9 °F (36.6 °C)  Pulse:  [] 97  Resp:  [11-38] 19  SpO2:  [91 %-98 %] 92 %  BP: (117-156)/(56-77) 118/68  Arterial Line BP: (104-173)/(43-87) 113/51     Weight: 74 kg (163 lb 2.3 oz)  Body mass index is 24.08 kg/m².      Intake/Output Summary (Last 24 hours) at 11/19/2022 0725  Last data filed at 11/19/2022 0705  Gross per 24 hour   Intake 5164.77 ml   Output 4358 ml   Net 806.77 ml       Physical Exam  Vitals and nursing note reviewed.   Constitutional:       General: He is not in acute distress.     Appearance: He is not ill-appearing.   Cardiovascular:      Rate and Rhythm: Normal rate and regular rhythm.      Pulses: Normal pulses.      Comments: Incisions from pec flap with plastics with dressing in place   Drains with benign output     Pulmonary:      Effort: Pulmonary effort is normal.   Abdominal:      General: Abdomen is flat.      Palpations: Abdomen is soft.   Musculoskeletal:      Right lower  leg: No edema.      Left lower leg: No edema.   Skin:     General: Skin is warm.      Findings: No bruising or lesion.      Comments: BLANCA drains with sanguinous output    Neurological:      General: No focal deficit present.       Vents:  Vent Mode: Spont (11/09/22 0347)  Set Rate: 22 BPM (11/09/22 0345)  Vt Set: 450 mL (11/09/22 0345)  Pressure Support: 5 cmH20 (11/09/22 0347)  PEEP/CPAP: 5 cmH20 (11/09/22 0347)  Oxygen Concentration (%): 21 (11/17/22 2105)  Peak Airway Pressure: 10 cmH2O (11/09/22 0347)  Plateau Pressure: 19 cmH20 (11/09/22 0347)  Total Ve: 4.29 L/m (11/09/22 0347)  Negative Inspiratory Force (cm H2O): 0 (11/09/22 0347)  F/VT Ratio<105 (RSBI): (!) 35.37 (11/09/22 0347)    Lines/Drains/Airways       Peripherally Inserted Central Catheter Line  Duration             PICC Double Lumen 10/17/22 1709 right brachial 32 days              Central Venous Catheter Line  Duration             Trialysis (Dialysis) Catheter 11/15/22 1654 right internal jugular 3 days              Drain  Duration                  Urethral Catheter 11/12/22 2125 Straight-tip;Non-latex 16 Fr. 6 days         Closed/Suction Drain 11/14/22 2010 Inferior;Right Chest Bulb 15 Fr. 4 days         Closed/Suction Drain 11/14/22 2010 Superior;Midline Abdomen Bulb 15 Fr. 4 days         Closed/Suction Drain 11/14/22 2011 Lateral;Right Other (Comment) Bulb 15 Fr. 4 days         Closed/Suction Drain 11/14/22 2015 Inferior;Left Chest Bulb 15 Fr. 4 days         Closed/Suction Drain 11/14/22 2016 Lateral;Left Other (Comment) Bulb 15 Fr. 4 days              Arterial Line  Duration             Arterial Line 11/08/22 0712 Right Radial 11 days                    Significant Labs:    CBC/Anemia Profile:  Recent Labs   Lab 11/18/22  1143 11/18/22  1818 11/19/22  0010   WBC 17.24* 16.97* 17.10*   HGB 9.1* 9.5* 8.9*   HCT 28.5* 29.5* 27.4*    283 266   MCV 95 94 95   RDW 17.2* 17.2* 17.2*        Chemistries:  Recent Labs   Lab 11/18/22  7827  11/18/22  1143 11/18/22  1818 11/18/22  2210 11/19/22  0010 11/19/22  0400 11/19/22  0614    137 135* 138 138 140 138   K 4.5 4.6 4.4 4.3 4.6 4.4 4.4    105 104 106 107 107 106   CO2 27 26 22* 24 22*  --   --    BUN 21 10 14 12 8  --   --    CREATININE 2.4* 1.5* 2.2* 1.6* 1.3  --   --    CALCIUM 7.7* 7.9* 8.1* 7.6* 7.9*  --   --    ALBUMIN 2.5*  --  2.6* 2.2*  --   --  2.3*   PROT 5.5*  --   --   --   --   --   --    BILITOT 0.7  --   --   --   --   --   --    ALKPHOS 97  --   --   --   --   --   --    ALT <5*  --   --   --   --   --   --    AST 21  --   --   --   --   --   --    MG 2.0 1.8 1.8 1.8  --   --   --    PHOS 2.3*  --  2.8 2.0*  --   --   --        All pertinent labs within the past 24 hours have been reviewed.    Significant Imaging:  I have reviewed all pertinent imaging results/findings within the past 24 hours.    Assessment/Plan:     * Sternal wound infection  62 y.o. male S/P TV repair , MV repair, MAZE, Lt Atrial Appendage ligation and Impella placement on 10/7/22, course complicated by bleeding, requiring reopening POD#0, multiple VT runs requiring DCCV, and sternal wound infections. He presents to the SICU s/p wound debridement and wire removal on 11/08 followed by washout on 11/10      Neuro/Psych:     - Sedation: none    - Pain:    - Scheduled Tylenol 1g q8h with prn Oxy, dilaudid for break through pain               Cardiac:      -BP Goal: MAP 60-80 and SBP <140.  Vaso 0.04, will wean.     - Echo with EF 35% with mild RV enlargement and moderate RV dysfunction. Cont milrinone 0.25     - Anti-HTNs: Will restart home meds when appropriate     - Rhythm: NSR. S/p MAZE for Afib, cont po amio.     - Beta blocker: hold in setting of Milrinone     - Statin: Atorvastatin 40 mg QD      Pulmonary:     - Goal SpO2 >92%, 2L NC     - CXR with patchy opacities on R side and SQ air, leukocytosis downtrending. ID recs doxy x5 days for PNA.       Renal:    - Trend BUN/Cr. Oliguric KIMANI. Will  monitor and support kidneys with MAP >65.     - CRRT, appreciate nephrology recs     - Maintain Fierro, record strict Is/Os   - Will f/u with nephrology regarding plans for HD vs perm cath. Interventional Neph consulted.     Recent Labs   Lab 11/17/22  2223 11/18/22  0002 11/18/22  0305   BUN 39* 32* 21   CREATININE 4.5* 3.8* 2.4*         FEN / GI:     - Daily CMP, PRN K/Mag/Phos per protocol     - Replace electrolytes as needed    - Nutrition: cardiac diet, BOOST    - Bowel Regimen: Miralax, docusate      ID:     - Leukocytosis without fever, s/p debridement and Pec flap 11/14. Now down trending     - Abx: Continue Cefepime, lactic acid improving. Will need long term abx plan, 6weeks post op flap    - R sided patchy opacities on CXR on cefepime    - ID recs Cefepime and Doxy      Recent Labs   Lab 11/17/22  1745 11/18/22  0002 11/18/22  0305   WBC 19.26* 14.94* 17.38*         Heme/Onc:     - CBC daily    - ASA 81mg daily    Recent Labs   Lab 11/16/22  0344 11/16/22  0923 11/17/22  0306 11/17/22  1106 11/17/22  1745 11/18/22  0002 11/18/22  0305   HGB 7.8*   < > 7.0*   < > 8.9* 8.6* 8.7*      < > 251   < > 236 260 273   APTT 28.2  --  30.5  --   --   --  29.4   INR 1.1  --  1.1  --   --   --  1.1    < > = values in this interval not displayed.         Endocrine:     - CTS Goal -140        PPx:   Feeding: cardiac diet   Analgesia/Sedation: tylenol, oxy, dilaudid PRN   Thromboembolic Prevention: scds, Heparin  HOB >30: Yes  Stress Ulcer: n/a  Glucose Control: none      Lines/Drains/Airway:   Left radial arterial line   RIJ CVC   Fierro   BLANCA drains       Dispo/Code Status/Palliative:     - Continue SICU Care    - Full Code                      Elias Reid MD  Critical Care - Surgery  [facility] - [facility]

## 2022-11-19 NOTE — SUBJECTIVE & OBJECTIVE
Interval History/Significant Events: NAEO. No major changes. Interventional Nephrology consulted for perm cath placement next week. AF. HDS.     Follow-up For: Procedure(s) (LRB):  CREATION, FLAP, MUSCLE ROTATION (N/A)  IRRIGATION AND DEBRIDEMENT, WOUND, STERNUM (N/A)  CLOSURE, WOUND, STERNUM (N/A)  EXPLORATION, WOUND (N/A)    Post-Operative Day: 5 Days Post-Op    Objective:     Vital Signs (Most Recent):  Temp: 97.9 °F (36.6 °C) (11/19/22 0301)  Pulse: 97 (11/19/22 0715)  Resp: 19 (11/19/22 0715)  BP: 118/68 (11/19/22 0700)  SpO2: (!) 92 % (11/19/22 0715)   Vital Signs (24h Range):  Temp:  [97.9 °F (36.6 °C)-98.4 °F (36.9 °C)] 97.9 °F (36.6 °C)  Pulse:  [] 97  Resp:  [11-38] 19  SpO2:  [91 %-98 %] 92 %  BP: (117-156)/(56-77) 118/68  Arterial Line BP: (104-173)/(43-87) 113/51     Weight: 74 kg (163 lb 2.3 oz)  Body mass index is 24.08 kg/m².      Intake/Output Summary (Last 24 hours) at 11/19/2022 0725  Last data filed at 11/19/2022 0705  Gross per 24 hour   Intake 5164.77 ml   Output 4358 ml   Net 806.77 ml       Physical Exam  Vitals and nursing note reviewed.   Constitutional:       General: He is not in acute distress.     Appearance: He is not ill-appearing.   Cardiovascular:      Rate and Rhythm: Normal rate and regular rhythm.      Pulses: Normal pulses.      Comments: Incisions from pec flap with plastics with dressing in place   Drains with benign output     Pulmonary:      Effort: Pulmonary effort is normal.   Abdominal:      General: Abdomen is flat.      Palpations: Abdomen is soft.   Musculoskeletal:      Right lower leg: No edema.      Left lower leg: No edema.   Skin:     General: Skin is warm.      Findings: No bruising or lesion.      Comments: BLANCA drains with sanguinous output    Neurological:      General: No focal deficit present.       Vents:  Vent Mode: Spont (11/09/22 0347)  Set Rate: 22 BPM (11/09/22 0345)  Vt Set: 450 mL (11/09/22 0345)  Pressure Support: 5 cmH20 (11/09/22  0347)  PEEP/CPAP: 5 cmH20 (11/09/22 0347)  Oxygen Concentration (%): 21 (11/17/22 2105)  Peak Airway Pressure: 10 cmH2O (11/09/22 0347)  Plateau Pressure: 19 cmH20 (11/09/22 0347)  Total Ve: 4.29 L/m (11/09/22 0347)  Negative Inspiratory Force (cm H2O): 0 (11/09/22 0347)  F/VT Ratio<105 (RSBI): (!) 35.37 (11/09/22 0347)    Lines/Drains/Airways       Peripherally Inserted Central Catheter Line  Duration             PICC Double Lumen 10/17/22 1709 right brachial 32 days              Central Venous Catheter Line  Duration             Trialysis (Dialysis) Catheter 11/15/22 1654 right internal jugular 3 days              Drain  Duration                  Urethral Catheter 11/12/22 2125 Straight-tip;Non-latex 16 Fr. 6 days         Closed/Suction Drain 11/14/22 2010 Inferior;Right Chest Bulb 15 Fr. 4 days         Closed/Suction Drain 11/14/22 2010 Superior;Midline Abdomen Bulb 15 Fr. 4 days         Closed/Suction Drain 11/14/22 2011 Lateral;Right Other (Comment) Bulb 15 Fr. 4 days         Closed/Suction Drain 11/14/22 2015 Inferior;Left Chest Bulb 15 Fr. 4 days         Closed/Suction Drain 11/14/22 2016 Lateral;Left Other (Comment) Bulb 15 Fr. 4 days              Arterial Line  Duration             Arterial Line 11/08/22 0712 Right Radial 11 days                    Significant Labs:    CBC/Anemia Profile:  Recent Labs   Lab 11/18/22  1143 11/18/22 1818 11/19/22  0010   WBC 17.24* 16.97* 17.10*   HGB 9.1* 9.5* 8.9*   HCT 28.5* 29.5* 27.4*    283 266   MCV 95 94 95   RDW 17.2* 17.2* 17.2*        Chemistries:  Recent Labs   Lab 11/18/22  0305 11/18/22  1143 11/18/22  1818 11/18/22  2210 11/19/22  0010 11/19/22  0400 11/19/22  0614    137 135* 138 138 140 138   K 4.5 4.6 4.4 4.3 4.6 4.4 4.4    105 104 106 107 107 106   CO2 27 26 22* 24 22*  --   --    BUN 21 10 14 12 8  --   --    CREATININE 2.4* 1.5* 2.2* 1.6* 1.3  --   --    CALCIUM 7.7* 7.9* 8.1* 7.6* 7.9*  --   --    ALBUMIN 2.5*  --  2.6* 2.2*  --   --   2.3*   PROT 5.5*  --   --   --   --   --   --    BILITOT 0.7  --   --   --   --   --   --    ALKPHOS 97  --   --   --   --   --   --    ALT <5*  --   --   --   --   --   --    AST 21  --   --   --   --   --   --    MG 2.0 1.8 1.8 1.8  --   --   --    PHOS 2.3*  --  2.8 2.0*  --   --   --        All pertinent labs within the past 24 hours have been reviewed.    Significant Imaging:  I have reviewed all pertinent imaging results/findings within the past 24 hours.

## 2022-11-19 NOTE — PROGRESS NOTES
11/18/22 2023   Treatment   Treatment Type SLED   Treatment Status Restart   Dialysis Machine Number [id number]   Dialyzer Time (hours) 0   BVP (Liters) 0 L   Solutions Labeled and Current  Yes   Access Temporary Cath;Right;IJ   Catheter Dressing Intact  Yes   Alarms Engaged Yes   CRRT Comments CRRT treatment initiated.   Prescription   Time (Hours) 8   Dialysate K + (mEq/L) 4   Dialysate CA + (mEq/L) 2.25   Dialysate HCO3 - (Bicarb) (mEq/L) 30   Dialysate Na + (mEq/L) 140   Cartridge Type Other  (R300)   Dialysate Flow Rate (mL/min) 200   UF Goal Rate 400 mL/hr   CRRT Hourly Documentation   Blood Flow (mL/min) 150   UF Rate 250 cc/hr   Arterial Pressure (mmHg) -30 mmHg   Venous Pressure (mmHg) 30 mmHg   Effluent Pressure (EP) (mmHg) 30 mmHg   Primary nurse agreed with treatment initiated UF rate at 250cc/hr.

## 2022-11-19 NOTE — PLAN OF CARE
VSS, CRRT initiated at 2030. Pt tolerated well. Pain well controlled. Labs trending. Lytes monitored and replaced. Pt on 0.125 milrinone mcg/kg/min. Skin breakdown prevention in place. Bed locked and in low position. Call light within reach. POC reviewed with patient and significant other at bedside. Questions/concerns addressed.

## 2022-11-19 NOTE — PROGRESS NOTES
11/19/22 0450   Treatment   Treatment Type SLED   Treatment Status Discontinued treatment;Blood returned   Dialysis Machine Number [id number]   Dialyzer Time (hours) 8.25   BVP (Liters) 75.8 L   Solutions Labeled and Current  Yes   Access Temporary Cath;Right;IJ   Catheter Dressing Intact  Yes   Alarms Engaged Yes   8 hr treatment completed, blood returned and post catheter care done.

## 2022-11-19 NOTE — PROGRESS NOTES
[facility]  Nephrology  Progress Note    Patient Name: David Barrios  MRN: [id number]  Admission Date: 11/8/2022  Hospital Length of Stay: 11 days  Attending Provider: Mike Hedrick MD   Primary Care Physician: Breann Tavera MD  Principal Problem:Sternal wound infection    Subjective:     HPI: 61 yo male w/ hx of Afib; HFrEF (35%); HTN; HLD. Patient underwent who underwent mitral valve repair, tricuspid valve repair, left atrial maze, left atrial appendage resection, and direct aortic impella 5.5 insertion on 10/07/2022. Hospital course complicated by Serratia bacteremia (on 6wk course of cefepime), sternal wound infection, and cardiogenic shock. On 10/31 patient developed KIMANI. Per chart review no documented course of hypotension during that time. Patient started on diuresis with IV lasix, with good urine output. Patient required increased diuretics (IV Diuril 500mg TID; and Lasix 120mg TID) and continued to have increased Scr, which progressed to oliguria, and now having metabolic derangements.     Patient denies any prior history of kidney disease. Does not have significant family history of kidney disease. Does endorse worsening fatigue and dyspnea. Denies any nausea vomiting, or metallic taste in mouth.     Nephrology was consulted for oliguric KIMANI.       Interval History:     Net positive 827mLs over the past 24h. Scheduled for 8h CRRT/SLED today.     Review of patient's allergies indicates:  No Known Allergies  Current Facility-Administered Medications   Medication Frequency    0.9%  NaCl infusion (CRRT USE ONLY) Continuous    0.9%  NaCl infusion (CRRT USE ONLY) Continuous    0.9%  NaCl infusion (for blood administration) Q24H PRN    0.9%  NaCl infusion (for blood administration) Q24H PRN    acetaminophen tablet 1,000 mg Q8H    amiodarone tablet 200 mg BID    aspirin chewable tablet 81 mg Daily    atorvastatin tablet 40 mg Daily    cefepime in dextrose 5 % 1 gram/50 mL  IVPB 1 g Q12H    dextrose 10% bolus 125 mL PRN    dextrose 10% bolus 250 mL PRN    doxycycline tablet 100 mg Q12H    heparin (porcine) injection 5,000 Units Q8H    HYDROmorphone injection 1 mg Q4H PRN    hydrOXYzine pamoate capsule 50 mg Q8H PRN    magnesium sulfate 2g in water 50mL IVPB (premix) PRN    magnesium sulfate 2g in water 50mL IVPB (premix) PRN    methocarbamoL tablet 500 mg TID    milrinone 20mg in D5W 100 mL infusion Continuous    mupirocin 2 % ointment BID    ondansetron injection 4 mg Q8H PRN    oxyCODONE immediate release tablet 5 mg Q4H PRN    oxyCODONE immediate release tablet Tab 10 mg Q4H PRN    polyethylene glycol packet 17 g Daily    senna-docusate 8.6-50 mg per tablet 1 tablet BID    sodium phosphate 20.01 mmol in dextrose 5 % 250 mL IVPB PRN    sodium phosphate 20.01 mmol in dextrose 5 % 250 mL IVPB PRN    sodium phosphate 30 mmol in dextrose 5 % 250 mL IVPB PRN    sodium phosphate 30 mmol in dextrose 5 % 250 mL IVPB PRN    sodium phosphate 39.99 mmol in dextrose 5 % 250 mL IVPB PRN    sodium phosphate 39.99 mmol in dextrose 5 % 250 mL IVPB PRN       Objective:     Vital Signs (Most Recent):  Temp: 97.7 °F (36.5 °C) (11/19/22 0700)  Pulse: 98 (11/19/22 1000)  Resp: 16 (11/19/22 1000)  BP: 118/68 (11/19/22 0700)  SpO2: 95 % (11/19/22 1000)  O2 Device (Oxygen Therapy): room air (11/19/22 0826)   Vital Signs (24h Range):  Temp:  [97.7 °F (36.5 °C)-98.4 °F (36.9 °C)] 97.7 °F (36.5 °C)  Pulse:  [] 98  Resp:  [11-38] 16  SpO2:  [91 %-98 %] 95 %  BP: (117-156)/(56-77) 118/68  Arterial Line BP: (104-173)/(43-87) 118/50     Weight: 74 kg (163 lb 2.3 oz) (11/12/22 1519)  Body mass index is 24.08 kg/m².  Body surface area is 1.9 meters squared.    I/O last 3 completed shifts:  In: 7204.5 [P.O.:1520; I.V.:5345.1; IV Piggyback:339.4]  Out: 6730 [Urine:225; Drains:461; Other:6044]    Physical Exam  Vitals and nursing note reviewed.   Constitutional:       General: He is not in  acute distress.     Appearance: He is not ill-appearing.   Cardiovascular:      Rate and Rhythm: Normal rate and regular rhythm.      Pulses: Normal pulses.   Pulmonary:      Effort: Pulmonary effort is normal.   Abdominal:      General: Abdomen is flat.      Palpations: Abdomen is soft.   Musculoskeletal:      Right lower leg: No edema.      Left lower leg: No edema.   Skin:     General: Skin is warm.      Findings: No bruising or lesion.      Comments: BLANCA drains with sanguinous output    Neurological:      General: No focal deficit present.       Significant Labs:  All labs within the past 24 hours have been reviewed.     Significant Imaging:  Labs: Reviewed    Assessment/Plan:     * Sternal wound infection  - per primary and ID team    KIMANI (acute kidney injury)  - Baseline Scr 0.9-1.   - 10/31 Scr increased to ~1.5; steadly increased to peak of 5.3 today  - Hyperkalemia noted to 5.3  - Anuric today despite IV diuril 500mg TID; and IV lasix 120mg TID  - Risk factors for KIMANI include Cardiorenal syndrome (less likely given aggressive diuresis in last 72hrs; and normal CVP readings); AIN; IRGN; and prolonged prerenal state from over diuresis, sepsis.  - 11/18 BED side US performed showed engorged femoral veins and IJ. Unable visualize IVC 2/2 to abdominal binder.    Plan:  - CRRT SLED 8 hours today for metabolic clearance and volume removal (Net ~100cc/hr UF).   - Tentative break on tomorrow and iHD trial on Monday   - Obtain RFP daily  - Renally dose medications to GFR  - Avoid nephrotoxins as much as possible  - consent for RRT obtained and placed in chart.         Alejandro Calvert MD  Nephrology  [facility]

## 2022-11-19 NOTE — ASSESSMENT & PLAN NOTE
- Baseline Scr 0.9-1.   - 10/31 Scr increased to ~1.5; steadly increased to peak of 5.3 today  - Hyperkalemia noted to 5.3  - Anuric today despite IV diuril 500mg TID; and IV lasix 120mg TID  - Risk factors for KIMANI include Cardiorenal syndrome (less likely given aggressive diuresis in last 72hrs; and normal CVP readings); AIN; IRGN; and prolonged prerenal state from over diuresis, sepsis.  - 11/18 BED side US performed showed engorged femoral veins and IJ. Unable visualize IVC 2/2 to abdominal binder.    Plan:  - CRRT SLED 8 hours today for metabolic clearance and volume removal (Net ~100cc/hr UF).   - Tentative break on tomorrow and iHD trial on Monday   - Obtain RFP daily  - Renally dose medications to GFR  - Avoid nephrotoxins as much as possible  - consent for RRT obtained and placed in chart.

## 2022-11-20 LAB
ALBUMIN SERPL BCP-MCNC: 2.1 G/DL (ref 3.5–5.2)
ALBUMIN SERPL BCP-MCNC: 2.3 G/DL (ref 3.5–5.2)
ALP SERPL-CCNC: 114 U/L (ref 55–135)
ALT SERPL W/O P-5'-P-CCNC: 5 U/L (ref 10–44)
ANION GAP SERPL CALC-SCNC: 8 MMOL/L (ref 8–16)
ANION GAP SERPL CALC-SCNC: 9 MMOL/L (ref 8–16)
AST SERPL-CCNC: 18 U/L (ref 10–40)
BASOPHILS # BLD AUTO: 0.04 K/UL (ref 0–0.2)
BASOPHILS NFR BLD: 0.2 % (ref 0–1.9)
BILIRUB SERPL-MCNC: 0.8 MG/DL (ref 0.1–1)
BUN SERPL-MCNC: 12 MG/DL (ref 8–23)
BUN SERPL-MCNC: 4 MG/DL (ref 8–23)
CALCIUM SERPL-MCNC: 7.8 MG/DL (ref 8.7–10.5)
CALCIUM SERPL-MCNC: 7.9 MG/DL (ref 8.7–10.5)
CHLORIDE SERPL-SCNC: 105 MMOL/L (ref 95–110)
CHLORIDE SERPL-SCNC: 105 MMOL/L (ref 95–110)
CO2 SERPL-SCNC: 22 MMOL/L (ref 23–29)
CO2 SERPL-SCNC: 25 MMOL/L (ref 23–29)
CREAT SERPL-MCNC: 1 MG/DL (ref 0.5–1.4)
CREAT SERPL-MCNC: 1.8 MG/DL (ref 0.5–1.4)
DIFFERENTIAL METHOD: ABNORMAL
EOSINOPHIL # BLD AUTO: 0.3 K/UL (ref 0–0.5)
EOSINOPHIL NFR BLD: 1.5 % (ref 0–8)
ERYTHROCYTE [DISTWIDTH] IN BLOOD BY AUTOMATED COUNT: 17.4 % (ref 11.5–14.5)
EST. GFR  (NO RACE VARIABLE): 41.8 ML/MIN/1.73 M^2
EST. GFR  (NO RACE VARIABLE): >60 ML/MIN/1.73 M^2
GLUCOSE SERPL-MCNC: 101 MG/DL (ref 70–110)
GLUCOSE SERPL-MCNC: 115 MG/DL (ref 70–110)
HCT VFR BLD AUTO: 28.5 % (ref 40–54)
HGB BLD-MCNC: 9 G/DL (ref 14–18)
IMM GRANULOCYTES # BLD AUTO: 0.18 K/UL (ref 0–0.04)
IMM GRANULOCYTES NFR BLD AUTO: 1.1 % (ref 0–0.5)
LYMPHOCYTES # BLD AUTO: 1.4 K/UL (ref 1–4.8)
LYMPHOCYTES NFR BLD: 8.4 % (ref 18–48)
MAGNESIUM SERPL-MCNC: 1.8 MG/DL (ref 1.6–2.6)
MAGNESIUM SERPL-MCNC: 2.1 MG/DL (ref 1.6–2.6)
MCH RBC QN AUTO: 30.1 PG (ref 27–31)
MCHC RBC AUTO-ENTMCNC: 31.6 G/DL (ref 32–36)
MCV RBC AUTO: 95 FL (ref 82–98)
MONOCYTES # BLD AUTO: 1.4 K/UL (ref 0.3–1)
MONOCYTES NFR BLD: 8.2 % (ref 4–15)
NEUTROPHILS # BLD AUTO: 13.8 K/UL (ref 1.8–7.7)
NEUTROPHILS NFR BLD: 80.6 % (ref 38–73)
NRBC BLD-RTO: 0 /100 WBC
PHOSPHATE SERPL-MCNC: 2.5 MG/DL (ref 2.7–4.5)
PHOSPHATE SERPL-MCNC: 4.1 MG/DL (ref 2.7–4.5)
PLATELET # BLD AUTO: 308 K/UL (ref 150–450)
PMV BLD AUTO: 9.9 FL (ref 9.2–12.9)
POTASSIUM SERPL-SCNC: 4.5 MMOL/L (ref 3.5–5.1)
POTASSIUM SERPL-SCNC: 4.5 MMOL/L (ref 3.5–5.1)
PROT SERPL-MCNC: 5.7 G/DL (ref 6–8.4)
RBC # BLD AUTO: 2.99 M/UL (ref 4.6–6.2)
SODIUM SERPL-SCNC: 135 MMOL/L (ref 136–145)
SODIUM SERPL-SCNC: 139 MMOL/L (ref 136–145)
WBC # BLD AUTO: 17.05 K/UL (ref 3.9–12.7)

## 2022-11-20 PROCEDURE — 84100 ASSAY OF PHOSPHORUS: CPT

## 2022-11-20 PROCEDURE — 63600175 PHARM REV CODE 636 W HCPCS: Performed by: THORACIC SURGERY (CARDIOTHORACIC VASCULAR SURGERY)

## 2022-11-20 PROCEDURE — 25000003 PHARM REV CODE 250: Performed by: STUDENT IN AN ORGANIZED HEALTH CARE EDUCATION/TRAINING PROGRAM

## 2022-11-20 PROCEDURE — 94761 N-INVAS EAR/PLS OXIMETRY MLT: CPT

## 2022-11-20 PROCEDURE — 25000003 PHARM REV CODE 250

## 2022-11-20 PROCEDURE — 63600175 PHARM REV CODE 636 W HCPCS: Performed by: ANESTHESIOLOGY

## 2022-11-20 PROCEDURE — 80053 COMPREHEN METABOLIC PANEL: CPT

## 2022-11-20 PROCEDURE — 20000000 HC ICU ROOM

## 2022-11-20 PROCEDURE — 85025 COMPLETE CBC W/AUTO DIFF WBC: CPT | Performed by: THORACIC SURGERY (CARDIOTHORACIC VASCULAR SURGERY)

## 2022-11-20 PROCEDURE — 63600175 PHARM REV CODE 636 W HCPCS

## 2022-11-20 PROCEDURE — 83735 ASSAY OF MAGNESIUM: CPT | Mod: 91 | Performed by: INTERNAL MEDICINE

## 2022-11-20 PROCEDURE — 63600175 PHARM REV CODE 636 W HCPCS: Performed by: STUDENT IN AN ORGANIZED HEALTH CARE EDUCATION/TRAINING PROGRAM

## 2022-11-20 PROCEDURE — 83735 ASSAY OF MAGNESIUM: CPT

## 2022-11-20 PROCEDURE — 80069 RENAL FUNCTION PANEL: CPT | Performed by: INTERNAL MEDICINE

## 2022-11-20 RX ADMIN — HEPARIN SODIUM 5000 UNITS: 5000 INJECTION INTRAVENOUS; SUBCUTANEOUS at 09:11

## 2022-11-20 RX ADMIN — OXYCODONE HYDROCHLORIDE 10 MG: 10 TABLET ORAL at 05:11

## 2022-11-20 RX ADMIN — OXYCODONE HYDROCHLORIDE 10 MG: 10 TABLET ORAL at 09:11

## 2022-11-20 RX ADMIN — HEPARIN SODIUM 5000 UNITS: 5000 INJECTION INTRAVENOUS; SUBCUTANEOUS at 05:11

## 2022-11-20 RX ADMIN — ASPIRIN 81 MG 81 MG: 81 TABLET ORAL at 08:11

## 2022-11-20 RX ADMIN — MUPIROCIN: 20 OINTMENT TOPICAL at 09:11

## 2022-11-20 RX ADMIN — METHOCARBAMOL 500 MG: 500 TABLET ORAL at 02:11

## 2022-11-20 RX ADMIN — POLYETHYLENE GLYCOL 3350 17 G: 17 POWDER, FOR SOLUTION ORAL at 09:11

## 2022-11-20 RX ADMIN — ONDANSETRON 4 MG: 2 INJECTION INTRAMUSCULAR; INTRAVENOUS at 06:11

## 2022-11-20 RX ADMIN — HYDROMORPHONE HYDROCHLORIDE 1 MG: 1 INJECTION, SOLUTION INTRAMUSCULAR; INTRAVENOUS; SUBCUTANEOUS at 11:11

## 2022-11-20 RX ADMIN — ATORVASTATIN CALCIUM 40 MG: 20 TABLET, FILM COATED ORAL at 08:11

## 2022-11-20 RX ADMIN — METHOCARBAMOL 500 MG: 500 TABLET ORAL at 09:11

## 2022-11-20 RX ADMIN — HYDROMORPHONE HYDROCHLORIDE 1 MG: 1 INJECTION, SOLUTION INTRAMUSCULAR; INTRAVENOUS; SUBCUTANEOUS at 06:11

## 2022-11-20 RX ADMIN — CEFEPIME HYDROCHLORIDE 1 G: 1 INJECTION, SOLUTION INTRAVENOUS at 04:11

## 2022-11-20 RX ADMIN — SENNOSIDES AND DOCUSATE SODIUM 1 TABLET: 50; 8.6 TABLET ORAL at 09:11

## 2022-11-20 RX ADMIN — DOXYCYCLINE HYCLATE 100 MG: 100 TABLET, COATED ORAL at 09:11

## 2022-11-20 RX ADMIN — ACETAMINOPHEN 1000 MG: 500 TABLET ORAL at 05:11

## 2022-11-20 RX ADMIN — HEPARIN SODIUM 5000 UNITS: 5000 INJECTION INTRAVENOUS; SUBCUTANEOUS at 02:11

## 2022-11-20 RX ADMIN — METHOCARBAMOL 500 MG: 500 TABLET ORAL at 08:11

## 2022-11-20 RX ADMIN — DOXYCYCLINE HYCLATE 100 MG: 100 TABLET, COATED ORAL at 08:11

## 2022-11-20 RX ADMIN — AMIODARONE HYDROCHLORIDE 200 MG: 200 TABLET ORAL at 09:11

## 2022-11-20 RX ADMIN — ACETAMINOPHEN 1000 MG: 500 TABLET ORAL at 09:11

## 2022-11-20 RX ADMIN — MILRINONE LACTATE IN DEXTROSE 0.12 MCG/KG/MIN: 200 INJECTION, SOLUTION INTRAVENOUS at 04:11

## 2022-11-20 RX ADMIN — AMIODARONE HYDROCHLORIDE 200 MG: 200 TABLET ORAL at 08:11

## 2022-11-20 RX ADMIN — HYDROMORPHONE HYDROCHLORIDE 1 MG: 1 INJECTION, SOLUTION INTRAMUSCULAR; INTRAVENOUS; SUBCUTANEOUS at 10:11

## 2022-11-20 RX ADMIN — HYDROMORPHONE HYDROCHLORIDE 1 MG: 1 INJECTION, SOLUTION INTRAMUSCULAR; INTRAVENOUS; SUBCUTANEOUS at 02:11

## 2022-11-20 RX ADMIN — ACETAMINOPHEN 1000 MG: 500 TABLET ORAL at 02:11

## 2022-11-20 NOTE — PLAN OF CARE
Plan of care reviewed with pt and family. Pt OOB to chair. CRRT planned for 8hrs overnight. Pain controlled with prn medication. Primaco 0.125mcg/kg/min. Plan for permacath Monday

## 2022-11-20 NOTE — PROGRESS NOTES
Plastic Surgery Progress Note    Subjective:  Doing okay, Stable. Comfortable in new chest binder.        Objective:    VITAL SIGNS:   Vitals:    22 0515 22 0530 22 0700 22 0728   BP:       BP Location:       Patient Position:       Pulse: 98 98 103 103   Resp: 16 (!) 27 (!) 22 17   Temp:   97.8 °F (36.6 °C)    TempSrc:   Oral    SpO2: (!) 94%  96% 96%   Weight:       Height:         TMAX: Temp (24hrs), Av.9 °F (36.6 °C), Min:97.6 °F (36.4 °C), Max:98.2 °F (36.8 °C)      Intake/Output - Last 3 Shifts          0659    P.O. 1520      I.V. (mL/kg) 3687.3 (49.8) 1976.4 (26.7) 3.1 (0)    Blood       IV Piggyback 292.3 392.2     Total Intake(mL/kg) 5499.6 (74.3) 2368.6 (32) 3.1 (0)    Urine (mL/kg/hr) 160 (0.1) 180 (0.1) 5 (0)    Drains 417 375 65    Other 3811 2728     Stool       Total Output 4388 3283 70    Net +1111.6 -914.4 -66.9                   Output by Drain (mL) 22 0701 - 22 19022 - 22 0700 22 0701 - 22 1900 22 190 - 22 0700 22 0701 - 22 0929        Closed/Suction Drain 22 Superior;Midline Abdomen Bulb 15 Fr. 72 110 145 97 35        Closed/Suction Drain 22 Inferior;Right Chest Bulb 15 Fr. 59 29 11 13 0        Closed/Suction Drain 22 Lateral;Right Other (Comment) Bulb 15 Fr. 25 5 7 24 20        Closed/Suction Drain 22 Inferior;Left Chest Bulb 15 Fr. 13 0 0 3 0        Closed/Suction Drain 22 2016 Lateral;Left Other (Comment) Bulb 15 Fr. 47 57 65 10 10       Recent Labs   Lab 22  0002 22  0305 22  1143 22  1818 22  0010 22  0600 22  0321   WBC 14.94* 17.38* 17.24* 16.97* 17.10* 15.66* 17.05*   HGB 8.6* 8.7* 9.1* 9.5* 8.9* 8.6* 9.0*   HCT 26.2* 26.6* 28.5* 29.5* 27.4* 28.3* 28.5*    273 251 283 266 266 308     Recent Labs   Lab 22  2210 22  0010  11/19/22  0400 11/19/22  0614 11/19/22  1434 11/19/22  2132 11/20/22  0321    138 140 138 136 138 139   K 4.3 4.6 4.4 4.4 4.6 4.5 4.5    107 107 106 106 105 105   CO2 24 22* 27 24 21* 25 25   BUN 12 8 5* 5* 10 6* 4*   CREATININE 1.6* 1.3 0.9 1.0 1.8* 1.1 1.0   CALCIUM 7.6* 7.9* 7.4* 7.8* 8.2* 7.8* 7.9*     Recent Labs   Lab 11/18/22  1143 11/18/22  1818 11/18/22  2210 11/19/22  0400 11/19/22  1434 11/19/22 2132 11/20/22  0321   MG 1.8 1.8 1.8 1.8 1.7 1.7 2.1      Recent Labs   Lab 11/18/22  1818 11/18/22  2210 11/19/22  0400 11/19/22  0614 11/19/22  1434 11/19/22  2132 11/20/22  0321   PHOS 2.8 2.0* 1.3* 1.2* 2.2* 1.3* 4.1     Recent Labs   Lab 11/18/22  1818 11/18/22  2210 11/19/22  0400 11/19/22  0614 11/19/22  1434 11/19/22  2132 11/20/22  0321   ALBUMIN 2.6* 2.2* 2.3* 2.3* 2.4* 2.2* 2.3*     No results for input(s): CRP in the last 168 hours.  Lab Results   Component Value Date    ALBUMIN 2.3 (L) 11/20/2022     No results found for: CRP  Lab Results   Component Value Date    INR 1.2 11/19/2022     No results found for: PTT    Microbiology Results (last 7 days)       Procedure Component Value Units Date/Time    Fungus culture [[id number]] Collected: 11/10/22 0834    Order Status: Completed Specimen: Wound from Sternum Updated: 11/17/22 1005     Fungus (Mycology) Culture Culture in progress    Narrative:      Sternal wound culture    Fungus culture [[id number]] Collected: 11/08/22 1106    Order Status: Completed Specimen: Wound from Sternum Updated: 11/17/22 1005     Fungus (Mycology) Culture Culture in progress    Narrative:      Sternal wound    Fungus culture [[id number]] Collected: 11/08/22 1106    Order Status: Completed Specimen: Wound from Sternum Updated: 11/17/22 1005     Fungus (Mycology) Culture Culture in progress    Narrative:      Sternal drainage    Fungus culture [[id number]] Collected: 11/08/22 1106    Order Status: Completed Specimen: Wound from Sternum Updated: 11/17/22 1005     Fungus  (Mycology) Culture Culture in progress    Narrative:      Pericardial peel    Fungus culture [[id number]] Collected: 11/08/22 1106    Order Status: Completed Specimen: Wound from Sternum Updated: 11/17/22 1005     Fungus (Mycology) Culture Culture in progress    Narrative:      Sternal wound    Fungus culture [[id number]] Collected: 11/08/22 1106    Order Status: Completed Specimen: Wound from Sternum Updated: 11/17/22 1005     Fungus (Mycology) Culture Culture in progress    Narrative:      Aortic graft    Fungus culture [[id number]] Collected: 11/10/22 0836    Order Status: Completed Specimen: Wound from Sternum Updated: 11/17/22 0934     Fungus (Mycology) Culture Culture in progress    Narrative:      Sternal hematoma culture    Culture, Anaerobe [[id number]] Collected: 11/10/22 0834    Order Status: Completed Specimen: Wound from Sternum Updated: 11/17/22 0750     Anaerobic Culture No anaerobes isolated    Narrative:      Sternal wound culture    Culture, Anaerobe [[id number]] Collected: 11/08/22 1106    Order Status: Completed Specimen: Wound from Sternum Updated: 11/17/22 0750     Anaerobic Culture No anaerobes isolated    Narrative:      Sternal wound    Culture, Anaerobe [[id number]] Collected: 11/08/22 1106    Order Status: Completed Specimen: Wound from Sternum Updated: 11/17/22 0750     Anaerobic Culture No anaerobes isolated    Narrative:      Sternal drainage    Culture, Anaerobe [[id number]] Collected: 11/08/22 1106    Order Status: Completed Specimen: Wound from Sternum Updated: 11/17/22 0750     Anaerobic Culture No anaerobes isolated    Narrative:      Pericardial peel    Culture, Anaerobe [[id number]] Collected: 11/08/22 1106    Order Status: Completed Specimen: Wound from Sternum Updated: 11/17/22 0750     Anaerobic Culture No anaerobes isolated    Narrative:      Sternal wound    Culture, Anaerobe [[id number]] Collected: 11/08/22 1106    Order Status: Completed Specimen: Wound from Sternum Updated:  11/16/22 0746     Anaerobic Culture No anaerobes isolated    Narrative:      Aortic graft    Culture, Anaerobe [[id number]] Collected: 11/10/22 0836    Order Status: Completed Specimen: Wound from Sternum Updated: 11/16/22 0746     Anaerobic Culture No anaerobes isolated    Narrative:      Sternal hematoma culture    Aerobic culture [[id number]] Collected: 11/08/22 1106    Order Status: Completed Specimen: Wound from Sternum Updated: 11/14/22 1358     Aerobic Bacterial Culture No growth    Narrative:      Pericardial peel    Aerobic culture [[id number]] Collected: 11/08/22 1106    Order Status: Completed Specimen: Wound from Sternum Updated: 11/14/22 1357     Aerobic Bacterial Culture No growth    Narrative:      Sternal drainage    Aerobic culture [[id number]] Collected: 11/08/22 1106    Order Status: Completed Specimen: Wound from Sternum Updated: 11/14/22 1005     Aerobic Bacterial Culture No growth    Narrative:      Sternal wound    Aerobic culture [[id number]] Collected: 11/10/22 0834    Order Status: Completed Specimen: Wound from Sternum Updated: 11/14/22 1004     Aerobic Bacterial Culture No growth    Narrative:      Sternal wound culture    Aerobic culture [[id number]] Collected: 11/08/22 1106    Order Status: Completed Specimen: Wound from Sternum Updated: 11/14/22 1000     Aerobic Bacterial Culture No growth    Narrative:      Sternal wound    Aerobic culture [[id number]] Collected: 11/08/22 1106    Order Status: Completed Specimen: Wound from Sternum Updated: 11/14/22 1000     Aerobic Bacterial Culture No growth    Narrative:      Aortic graft            Current Facility-Administered Medications   Medication    0.9%  NaCl infusion (CRRT USE ONLY)    0.9%  NaCl infusion (for blood administration)    0.9%  NaCl infusion (for blood administration)    acetaminophen tablet 1,000 mg    amiodarone tablet 200 mg    aspirin chewable tablet 81 mg    atorvastatin tablet 40 mg    cefepime in dextrose 5 % 1 gram/50 mL IVPB  1 g    dextrose 10% bolus 125 mL    dextrose 10% bolus 250 mL    doxycycline tablet 100 mg    heparin (porcine) injection 5,000 Units    HYDROmorphone injection 1 mg    hydrOXYzine pamoate capsule 50 mg    magnesium sulfate 2g in water 50mL IVPB (premix)    methocarbamoL tablet 500 mg    milrinone 20mg in D5W 100 mL infusion    mupirocin 2 % ointment    ondansetron injection 4 mg    oxyCODONE immediate release tablet 5 mg    oxyCODONE immediate release tablet Tab 10 mg    polyethylene glycol packet 17 g    senna-docusate 8.6-50 mg per tablet 1 tablet    sodium phosphate 20.01 mmol in dextrose 5 % 250 mL IVPB    sodium phosphate 30 mmol in dextrose 5 % 250 mL IVPB    sodium phosphate 39.99 mmol in dextrose 5 % 250 mL IVPB        PE  Physical Exam  General: Alert; No acute distress  Cardiovascular: Regular rate   Respiratory: Normal respiratory effort. Chest rise symmetric.   Abdomen: Soft, nontender, nondistended  Extremity: Moves all extremities equally.  Neurologic: No focal deficit. Speech normal  SKIN: midline sternal and bilateral axillary incisions c/d/I, 5 drains - each with SS output   There is generalized swelling of the bilateral chest.        Diagnostics:      Assessment:  POD 6 b/l pec flaps for sternal recon    Plan:  Continue with chest binder,  Pain control   Monitor drain output, will consider removing them 1 or 2 at a time as output drops.

## 2022-11-20 NOTE — PROGRESS NOTES
[facility]  Critical Care - Surgery  Progress Note    Patient Name: David Barrios  MRN: [id number]  Admission Date: 11/8/2022  Hospital Length of Stay: 12 days  Code Status: Full Code  Attending Provider: Mike Hedrick MD  Primary Care Provider: Breann Tavera MD   Principal Problem: Sternal wound infection    Subjective:     Hospital/ICU Course:        Interval History/Significant Events: No major changes. CRRT and HD tomorrow. AF. HDS.     Follow-up For: Procedure(s) (LRB):  CREATION, FLAP, MUSCLE ROTATION (N/A)  IRRIGATION AND DEBRIDEMENT, WOUND, STERNUM (N/A)  CLOSURE, WOUND, STERNUM (N/A)  EXPLORATION, WOUND (N/A)    Post-Operative Day: 6 Days Post-Op    Objective:     Vital Signs (Most Recent):  Temp: 97.8 °F (36.6 °C) (11/20/22 0700)  Pulse: 103 (11/20/22 0728)  Resp: 17 (11/20/22 0728)  BP: (!) 154/76 (11/19/22 1930)  SpO2: 96 % (11/20/22 0728)   Vital Signs (24h Range):  Temp:  [97.6 °F (36.4 °C)-98.2 °F (36.8 °C)] 97.8 °F (36.6 °C)  Pulse:  [] 103  Resp:  [12-31] 17  SpO2:  [93 %-100 %] 96 %  BP: (154)/(76) 154/76  Arterial Line BP: (114-166)/(46-73) 151/62     Weight: 74 kg (163 lb 2.3 oz)  Body mass index is 24.08 kg/m².      Intake/Output Summary (Last 24 hours) at 11/20/2022 0828  Last data filed at 11/20/2022 0705  Gross per 24 hour   Intake 2149.27 ml   Output 3262 ml   Net -1112.73 ml       Physical Exam  Vitals and nursing note reviewed.   Constitutional:       General: He is not in acute distress.     Appearance: He is not ill-appearing.   Cardiovascular:      Rate and Rhythm: Normal rate and regular rhythm.      Pulses: Normal pulses.      Comments: Incisions from pec flap with plastics with dressing in place . Midline sternotomy   Drains with benign output     Pulmonary:      Effort: Pulmonary effort is normal.   Abdominal:      General: Abdomen is flat.      Palpations: Abdomen is soft.   Musculoskeletal:      Right lower leg: No edema.      Left lower leg:  No edema.   Skin:     General: Skin is warm.      Findings: No bruising or lesion.      Comments: BLANCA drains with sanguinous output    Neurological:      General: No focal deficit present.       Vents:  Vent Mode: Spont (11/09/22 0347)  Set Rate: 22 BPM (11/09/22 0345)  Vt Set: 450 mL (11/09/22 0345)  Pressure Support: 5 cmH20 (11/09/22 0347)  PEEP/CPAP: 5 cmH20 (11/09/22 0347)  Oxygen Concentration (%): 21 (11/17/22 2105)  Peak Airway Pressure: 10 cmH2O (11/09/22 0347)  Plateau Pressure: 19 cmH20 (11/09/22 0347)  Total Ve: 4.29 L/m (11/09/22 0347)  Negative Inspiratory Force (cm H2O): 0 (11/09/22 0347)  F/VT Ratio<105 (RSBI): (!) 35.37 (11/09/22 0347)    Lines/Drains/Airways       Peripherally Inserted Central Catheter Line  Duration             PICC Double Lumen 10/17/22 1709 right brachial 33 days              Central Venous Catheter Line  Duration             Trialysis (Dialysis) Catheter 11/15/22 1654 right internal jugular 4 days              Drain  Duration                  Urethral Catheter 11/12/22 2125 Straight-tip;Non-latex 16 Fr. 7 days         Closed/Suction Drain 11/14/22 2010 Inferior;Right Chest Bulb 15 Fr. 5 days         Closed/Suction Drain 11/14/22 2010 Superior;Midline Abdomen Bulb 15 Fr. 5 days         Closed/Suction Drain 11/14/22 2011 Lateral;Right Other (Comment) Bulb 15 Fr. 5 days         Closed/Suction Drain 11/14/22 2015 Inferior;Left Chest Bulb 15 Fr. 5 days         Closed/Suction Drain 11/14/22 2016 Lateral;Left Other (Comment) Bulb 15 Fr. 5 days              Arterial Line  Duration             Arterial Line 11/08/22 0712 Right Radial 12 days                    Significant Labs:    CBC/Anemia Profile:  Recent Labs   Lab 11/19/22  0010 11/19/22  0600 11/20/22  0321   WBC 17.10* 15.66* 17.05*   HGB 8.9* 8.6* 9.0*   HCT 27.4* 28.3* 28.5*    266 308   MCV 95 99* 95   RDW 17.2* 17.3* 17.4*        Chemistries:  Recent Labs   Lab 11/19/22  0614 11/19/22  1434 11/19/22  2132 11/20/22  032     136 138 139   K 4.4 4.6 4.5 4.5    106 105 105   CO2 24 21* 25 25   BUN 5* 10 6* 4*   CREATININE 1.0 1.8* 1.1 1.0   CALCIUM 7.8* 8.2* 7.8* 7.9*   ALBUMIN 2.3* 2.4* 2.2* 2.3*   PROT 5.5*  --   --  5.7*   BILITOT 0.7  --   --  0.8   ALKPHOS 98  --   --  114   ALT 5*  --   --  5*   AST 19  --   --  18   MG  --  1.7 1.7 2.1   PHOS 1.2* 2.2* 1.3* 4.1       All pertinent labs within the past 24 hours have been reviewed.    Significant Imaging:  I have reviewed all pertinent imaging results/findings within the past 24 hours.    Assessment/Plan:     * Sternal wound infection  62 y.o. male S/P TV repair , MV repair, MAZE, Lt Atrial Appendage ligation and Impella placement on 10/7/22, course complicated by bleeding, requiring reopening POD#0, multiple VT runs requiring DCCV, and sternal wound infections. He presents to the SICU s/p wound debridement and wire removal on 11/08 followed by washout on 11/10      Neuro/Psych:     - Sedation: none    - Pain:    - Scheduled Tylenol 1g q8h with prn Oxy, dilaudid for break through pain               Cardiac:      -BP Goal: MAP 60-80 and SBP <140.  Vaso 0.04, will wean.     - Echo with EF 35% with mild RV enlargement and moderate RV dysfunction. Cont milrinone 0.25     - Anti-HTNs: Will restart home meds when appropriate     - Rhythm: NSR. S/p MAZE for Afib, cont po amio.     - Off Milrinone. May restart BB soon.     - Statin: Atorvastatin 40 mg QD      Pulmonary:     - Goal SpO2 >92%, 2L NC     - CXR with patchy opacities on R side and SQ air, leukocytosis downtrending. ID recs doxy x5 days for PNA.       Renal:    - Trend BUN/Cr. Oliguric KIMANI. Will monitor and support kidneys with MAP >65.     - CRRT, appreciate nephrology recs     - Maintain Fierro, record strict Is/Os   - Will f/u with nephrology regarding plans for HD vs perm cath. Interventional Neph consulted.     Recent Labs   Lab 11/19/22  1434 11/19/22  2132 11/20/22  0321   BUN 10 6* 4*   CREATININE 1.8*  1.1 1.0         FEN / GI:     - Daily CMP, PRN K/Mag/Phos per protocol     - Replace electrolytes as needed    - Nutrition: cardiac diet, BOOST    - Bowel Regimen: Miralax, docusate      ID:     - Leukocytosis without fever, s/p debridement and Pec flap 11/14. Now down trending     - Abx: Continue Cefepime. Will need long term abx plan, 6weeks post op flap    - ID recs Cefepime and Doxy      Recent Labs   Lab 11/19/22  0010 11/19/22  0600 11/20/22  0321   WBC 17.10* 15.66* 17.05*         Heme/Onc:     - CBC daily    - ASA 81mg daily    Recent Labs   Lab 11/17/22  0306 11/17/22  1106 11/18/22  0305 11/18/22  1143 11/19/22  0010 11/19/22  0600 11/19/22  0621 11/20/22  0321   HGB 7.0*   < > 8.7*   < > 8.9* 8.6*  --  9.0*      < > 273   < > 266 266  --  308   APTT 30.5  --  29.4  --   --   --  32.5*  --    INR 1.1  --  1.1  --   --   --  1.2  --     < > = values in this interval not displayed.         Endocrine:     - CTS Goal -140        PPx:   Feeding: cardiac diet   Analgesia/Sedation: tylenol, oxy, dilaudid PRN   Thromboembolic Prevention: scds, Heparin  HOB >30: Yes  Stress Ulcer: n/a  Glucose Control: none      Lines/Drains/Airway:   Left radial arterial line   RIJ CVC   Firero   BLANCA drains       Dispo/Code Status/Palliative:     - Continue SICU Care    - Full Code                      Elias Reid MD  Critical Care - Surgery  [facility]

## 2022-11-20 NOTE — PLAN OF CARE
"      SICU PLAN OF CARE NOTE    Dx: Sternal wound infection    Vital Signs: BP (!) 154/76 (BP Location: Left arm, Patient Position: Lying)   Pulse 101   Temp 98.2 °F (36.8 °C) (Oral)   Resp 13   Ht 5' 9.02" (1.753 m)   Wt 74 kg (163 lb 2.3 oz)   SpO2 (!) 94%   BMI 24.08 kg/m²     SHIFT EVENTS:  VSS / No acute events this shift. 8 hr SLED completed. Replaced e'lytes. OOBTC.    Neuro: AAO x4, Follows Commands, and Moves All Extremities    Respiratory: Room Air    Cardiac: NSR- ST; HR     Diet: Cardiac Diet; 1500 cc FR    Gtts: Milrinone     Urine Output: Urinary Catheter 130 ml/shift    Drains:     5 BLANCA drains, sanguinous drainage, See flowsheet for details     Labs: daily; Mg & RFP Q8 while on CRRT    SKIN NOTE:  Skin: No new skin breakdown or skin tears noted at this time    Skin precautions maintained including:  Sacrum and heels with foam dressing in place for pressure protection. Frequent weight shift encouraged / assistance provided Q2 hr to prevent breakdown. Bed plugged in and mattress inflated; Adhesive use limited. Heels elevated off bed. Pressure points protected and positioning supports utilized.  Skin-to-device areas padded. Skin-to-skin areas padded    POC reviewed with patient and family; questions and concerns addressed. See flow sheets for full assessment details.     "

## 2022-11-20 NOTE — PROGRESS NOTES
11/20/22 0202   Treatment   Treatment Type SLED   Treatment Status Discontinued treatment   Dialysis Machine Number [id number]   Dialyzer Time (hours)   (8.13)   BVP (Liters) 97.4 L   Solutions Labeled and Current  Yes   Access Temporary Cath;Right;IJ   Catheter Dressing Intact  Yes   Alarms Engaged Yes   CRRT Comments tx completed   Prescription   Time (Hours) 8   Dialysate K + (mEq/L) 4   Dialysate CA + (mEq/L) 2.25   Dialysate HCO3 - (Bicarb) (mEq/L) 30   Dialysate Na + (mEq/L) 140   Cartridge Type   (Rev 300)   CRRT Hourly Documentation   Blood Flow (mL/min) 200   UF Rate 400 cc/hr   Arterial Pressure (mmHg) -50 mmHg   Venous Pressure (mmHg) 40 mmHg   Effluent Pressure (EP) (mmHg) 10 mmHg   Total UF (Hourly Cleared) (mL) 474   Tx completed as ordered. All blood returned

## 2022-11-20 NOTE — ASSESSMENT & PLAN NOTE
62 y.o. male S/P TV repair , MV repair, MAZE, Lt Atrial Appendage ligation and Impella placement on 10/7/22, course complicated by bleeding, requiring reopening POD#0, multiple VT runs requiring DCCV, and sternal wound infections. He presents to the SICU s/p wound debridement and wire removal on 11/08 followed by washout on 11/10      Neuro/Psych:     - Sedation: none    - Pain:    - Scheduled Tylenol 1g q8h with prn Oxy, dilaudid for break through pain               Cardiac:      -BP Goal: MAP 60-80 and SBP <140.  Vaso 0.04, will wean.     - Echo with EF 35% with mild RV enlargement and moderate RV dysfunction. Cont milrinone 0.25     - Anti-HTNs: Will restart home meds when appropriate     - Rhythm: NSR. S/p MAZE for Afib, cont po amio.     - Off Milrinone. May restart BB soon.     - Statin: Atorvastatin 40 mg QD      Pulmonary:     - Goal SpO2 >92%, 2L NC     - CXR with patchy opacities on R side and SQ air, leukocytosis downtrending. ID recs doxy x5 days for PNA.       Renal:    - Trend BUN/Cr. Oliguric KIMANI. Will monitor and support kidneys with MAP >65.     - CRRT, appreciate nephrology recs     - Maintain Fierro, record strict Is/Os   - Will f/u with nephrology regarding plans for HD vs perm cath. Interventional Neph consulted.     Recent Labs   Lab 11/19/22  1434 11/19/22  2132 11/20/22  0321   BUN 10 6* 4*   CREATININE 1.8* 1.1 1.0         FEN / GI:     - Daily CMP, PRN K/Mag/Phos per protocol     - Replace electrolytes as needed    - Nutrition: cardiac diet, BOOST    - Bowel Regimen: Miralax, docusate      ID:     - Leukocytosis without fever, s/p debridement and Pec flap 11/14. Now down trending     - Abx: Continue Cefepime. Will need long term abx plan, 6weeks post op flap    - ID recs Cefepime and Doxy      Recent Labs   Lab 11/19/22  0010 11/19/22  0600 11/20/22  0321   WBC 17.10* 15.66* 17.05*         Heme/Onc:     - CBC daily    - ASA 81mg daily    Recent Labs   Lab 11/17/22  0306 11/17/22  1109  11/18/22  0305 11/18/22  1143 11/19/22  0010 11/19/22  0600 11/19/22  0621 11/20/22  0321   HGB 7.0*   < > 8.7*   < > 8.9* 8.6*  --  9.0*      < > 273   < > 266 266  --  308   APTT 30.5  --  29.4  --   --   --  32.5*  --    INR 1.1  --  1.1  --   --   --  1.2  --     < > = values in this interval not displayed.         Endocrine:     - CTS Goal -140        PPx:   Feeding: cardiac diet   Analgesia/Sedation: tylenol, oxy, dilaudid PRN   Thromboembolic Prevention: scds, Heparin  HOB >30: Yes  Stress Ulcer: n/a  Glucose Control: none      Lines/Drains/Airway:   Left radial arterial line   RIJ CVC   Fierro   BLANCA drains       Dispo/Code Status/Palliative:     - Continue SICU Care    - Full Code

## 2022-11-20 NOTE — SUBJECTIVE & OBJECTIVE
Interval History/Significant Events: No major changes. CRRT and HD tomorrow. AF. HDS.     Follow-up For: Procedure(s) (LRB):  CREATION, FLAP, MUSCLE ROTATION (N/A)  IRRIGATION AND DEBRIDEMENT, WOUND, STERNUM (N/A)  CLOSURE, WOUND, STERNUM (N/A)  EXPLORATION, WOUND (N/A)    Post-Operative Day: 6 Days Post-Op    Objective:     Vital Signs (Most Recent):  Temp: 97.8 °F (36.6 °C) (11/20/22 0700)  Pulse: 103 (11/20/22 0728)  Resp: 17 (11/20/22 0728)  BP: (!) 154/76 (11/19/22 1930)  SpO2: 96 % (11/20/22 0728)   Vital Signs (24h Range):  Temp:  [97.6 °F (36.4 °C)-98.2 °F (36.8 °C)] 97.8 °F (36.6 °C)  Pulse:  [] 103  Resp:  [12-31] 17  SpO2:  [93 %-100 %] 96 %  BP: (154)/(76) 154/76  Arterial Line BP: (114-166)/(46-73) 151/62     Weight: 74 kg (163 lb 2.3 oz)  Body mass index is 24.08 kg/m².      Intake/Output Summary (Last 24 hours) at 11/20/2022 0828  Last data filed at 11/20/2022 0705  Gross per 24 hour   Intake 2149.27 ml   Output 3262 ml   Net -1112.73 ml       Physical Exam  Vitals and nursing note reviewed.   Constitutional:       General: He is not in acute distress.     Appearance: He is not ill-appearing.   Cardiovascular:      Rate and Rhythm: Normal rate and regular rhythm.      Pulses: Normal pulses.      Comments: Incisions from pec flap with plastics with dressing in place . Midline sternotomy   Drains with benign output     Pulmonary:      Effort: Pulmonary effort is normal.   Abdominal:      General: Abdomen is flat.      Palpations: Abdomen is soft.   Musculoskeletal:      Right lower leg: No edema.      Left lower leg: No edema.   Skin:     General: Skin is warm.      Findings: No bruising or lesion.      Comments: BLANCA drains with sanguinous output    Neurological:      General: No focal deficit present.       Vents:  Vent Mode: Spont (11/09/22 0347)  Set Rate: 22 BPM (11/09/22 0345)  Vt Set: 450 mL (11/09/22 0345)  Pressure Support: 5 cmH20 (11/09/22 0347)  PEEP/CPAP: 5 cmH20 (11/09/22 0347)  Oxygen  Concentration (%): 21 (11/17/22 2105)  Peak Airway Pressure: 10 cmH2O (11/09/22 0347)  Plateau Pressure: 19 cmH20 (11/09/22 0347)  Total Ve: 4.29 L/m (11/09/22 0347)  Negative Inspiratory Force (cm H2O): 0 (11/09/22 0347)  F/VT Ratio<105 (RSBI): (!) 35.37 (11/09/22 0347)    Lines/Drains/Airways       Peripherally Inserted Central Catheter Line  Duration             PICC Double Lumen 10/17/22 1709 right brachial 33 days              Central Venous Catheter Line  Duration             Trialysis (Dialysis) Catheter 11/15/22 1654 right internal jugular 4 days              Drain  Duration                  Urethral Catheter 11/12/22 2125 Straight-tip;Non-latex 16 Fr. 7 days         Closed/Suction Drain 11/14/22 2010 Inferior;Right Chest Bulb 15 Fr. 5 days         Closed/Suction Drain 11/14/22 2010 Superior;Midline Abdomen Bulb 15 Fr. 5 days         Closed/Suction Drain 11/14/22 2011 Lateral;Right Other (Comment) Bulb 15 Fr. 5 days         Closed/Suction Drain 11/14/22 2015 Inferior;Left Chest Bulb 15 Fr. 5 days         Closed/Suction Drain 11/14/22 2016 Lateral;Left Other (Comment) Bulb 15 Fr. 5 days              Arterial Line  Duration             Arterial Line 11/08/22 0712 Right Radial 12 days                    Significant Labs:    CBC/Anemia Profile:  Recent Labs   Lab 11/19/22  0010 11/19/22  0600 11/20/22  0321   WBC 17.10* 15.66* 17.05*   HGB 8.9* 8.6* 9.0*   HCT 27.4* 28.3* 28.5*    266 308   MCV 95 99* 95   RDW 17.2* 17.3* 17.4*        Chemistries:  Recent Labs   Lab 11/19/22  0614 11/19/22  1434 11/19/22  2132 11/20/22  0321    136 138 139   K 4.4 4.6 4.5 4.5    106 105 105   CO2 24 21* 25 25   BUN 5* 10 6* 4*   CREATININE 1.0 1.8* 1.1 1.0   CALCIUM 7.8* 8.2* 7.8* 7.9*   ALBUMIN 2.3* 2.4* 2.2* 2.3*   PROT 5.5*  --   --  5.7*   BILITOT 0.7  --   --  0.8   ALKPHOS 98  --   --  114   ALT 5*  --   --  5*   AST 19  --   --  18   MG  --  1.7 1.7 2.1   PHOS 1.2* 2.2* 1.3* 4.1       All pertinent labs  within the past 24 hours have been reviewed.    Significant Imaging:  I have reviewed all pertinent imaging results/findings within the past 24 hours.

## 2022-11-21 LAB
ALBUMIN SERPL BCP-MCNC: 2.3 G/DL (ref 3.5–5.2)
ALP SERPL-CCNC: 99 U/L (ref 55–135)
ALT SERPL W/O P-5'-P-CCNC: 5 U/L (ref 10–44)
ANION GAP SERPL CALC-SCNC: 7 MMOL/L (ref 8–16)
AST SERPL-CCNC: 15 U/L (ref 10–40)
BACTERIA #/AREA URNS AUTO: ABNORMAL /HPF
BASOPHILS # BLD AUTO: 0.06 K/UL (ref 0–0.2)
BASOPHILS NFR BLD: 0.3 % (ref 0–1.9)
BILIRUB SERPL-MCNC: 0.6 MG/DL (ref 0.1–1)
BILIRUB UR QL STRIP: NEGATIVE
BUN SERPL-MCNC: 16 MG/DL (ref 8–23)
CALCIUM SERPL-MCNC: 8.5 MG/DL (ref 8.7–10.5)
CHLORIDE SERPL-SCNC: 103 MMOL/L (ref 95–110)
CLARITY UR REFRACT.AUTO: ABNORMAL
CO2 SERPL-SCNC: 26 MMOL/L (ref 23–29)
COLOR UR AUTO: YELLOW
CREAT SERPL-MCNC: 2.6 MG/DL (ref 0.5–1.4)
DIFFERENTIAL METHOD: ABNORMAL
EOSINOPHIL # BLD AUTO: 0.3 K/UL (ref 0–0.5)
EOSINOPHIL NFR BLD: 1.9 % (ref 0–8)
ERYTHROCYTE [DISTWIDTH] IN BLOOD BY AUTOMATED COUNT: 17.7 % (ref 11.5–14.5)
EST. GFR  (NO RACE VARIABLE): 26.9 ML/MIN/1.73 M^2
GLUCOSE SERPL-MCNC: 92 MG/DL (ref 70–110)
GLUCOSE UR QL STRIP: NEGATIVE
HCT VFR BLD AUTO: 28.7 % (ref 40–54)
HGB BLD-MCNC: 8.9 G/DL (ref 14–18)
HGB UR QL STRIP: ABNORMAL
HYALINE CASTS UR QL AUTO: 4 /LPF
IMM GRANULOCYTES # BLD AUTO: 0.19 K/UL (ref 0–0.04)
IMM GRANULOCYTES NFR BLD AUTO: 1.1 % (ref 0–0.5)
KETONES UR QL STRIP: NEGATIVE
LEUKOCYTE ESTERASE UR QL STRIP: ABNORMAL
LYMPHOCYTES # BLD AUTO: 1.6 K/UL (ref 1–4.8)
LYMPHOCYTES NFR BLD: 9.1 % (ref 18–48)
MAGNESIUM SERPL-MCNC: 1.9 MG/DL (ref 1.6–2.6)
MCH RBC QN AUTO: 30.4 PG (ref 27–31)
MCHC RBC AUTO-ENTMCNC: 31 G/DL (ref 32–36)
MCV RBC AUTO: 98 FL (ref 82–98)
MICROSCOPIC COMMENT: ABNORMAL
MONOCYTES # BLD AUTO: 1.4 K/UL (ref 0.3–1)
MONOCYTES NFR BLD: 7.9 % (ref 4–15)
NEUTROPHILS # BLD AUTO: 14.4 K/UL (ref 1.8–7.7)
NEUTROPHILS NFR BLD: 79.7 % (ref 38–73)
NITRITE UR QL STRIP: NEGATIVE
NON-SQ EPI CELLS #/AREA URNS AUTO: 0 /HPF
NRBC BLD-RTO: 0 /100 WBC
PH UR STRIP: 7 [PH] (ref 5–8)
PHOSPHATE SERPL-MCNC: 3.5 MG/DL (ref 2.7–4.5)
PLATELET # BLD AUTO: 301 K/UL (ref 150–450)
PMV BLD AUTO: 9.8 FL (ref 9.2–12.9)
POTASSIUM SERPL-SCNC: 4.9 MMOL/L (ref 3.5–5.1)
PROT SERPL-MCNC: 5.5 G/DL (ref 6–8.4)
PROT UR QL STRIP: ABNORMAL
RBC # BLD AUTO: 2.93 M/UL (ref 4.6–6.2)
RBC #/AREA URNS AUTO: 94 /HPF (ref 0–4)
SODIUM SERPL-SCNC: 136 MMOL/L (ref 136–145)
SP GR UR STRIP: 1.02 (ref 1–1.03)
SQUAMOUS #/AREA URNS AUTO: 1 /HPF
URN SPEC COLLECT METH UR: ABNORMAL
WBC # BLD AUTO: 18.03 K/UL (ref 3.9–12.7)
WBC #/AREA URNS AUTO: 23 /HPF (ref 0–5)
YEAST UR QL AUTO: ABNORMAL

## 2022-11-21 PROCEDURE — 85025 COMPLETE CBC W/AUTO DIFF WBC: CPT | Performed by: THORACIC SURGERY (CARDIOTHORACIC VASCULAR SURGERY)

## 2022-11-21 PROCEDURE — 99291 CRITICAL CARE FIRST HOUR: CPT | Mod: ,,, | Performed by: ANESTHESIOLOGY

## 2022-11-21 PROCEDURE — 25000003 PHARM REV CODE 250: Performed by: STUDENT IN AN ORGANIZED HEALTH CARE EDUCATION/TRAINING PROGRAM

## 2022-11-21 PROCEDURE — 80100014 HC HEMODIALYSIS 1:1

## 2022-11-21 PROCEDURE — 81001 URINALYSIS AUTO W/SCOPE: CPT | Performed by: THORACIC SURGERY (CARDIOTHORACIC VASCULAR SURGERY)

## 2022-11-21 PROCEDURE — 97530 THERAPEUTIC ACTIVITIES: CPT

## 2022-11-21 PROCEDURE — 97116 GAIT TRAINING THERAPY: CPT

## 2022-11-21 PROCEDURE — 25000003 PHARM REV CODE 250

## 2022-11-21 PROCEDURE — 87086 URINE CULTURE/COLONY COUNT: CPT | Performed by: THORACIC SURGERY (CARDIOTHORACIC VASCULAR SURGERY)

## 2022-11-21 PROCEDURE — 94761 N-INVAS EAR/PLS OXIMETRY MLT: CPT

## 2022-11-21 PROCEDURE — 84100 ASSAY OF PHOSPHORUS: CPT

## 2022-11-21 PROCEDURE — 83735 ASSAY OF MAGNESIUM: CPT

## 2022-11-21 PROCEDURE — 63600175 PHARM REV CODE 636 W HCPCS: Performed by: STUDENT IN AN ORGANIZED HEALTH CARE EDUCATION/TRAINING PROGRAM

## 2022-11-21 PROCEDURE — 63600175 PHARM REV CODE 636 W HCPCS: Performed by: ANESTHESIOLOGY

## 2022-11-21 PROCEDURE — 99291 PR CRITICAL CARE, E/M 30-74 MINUTES: ICD-10-PCS | Mod: ,,, | Performed by: ANESTHESIOLOGY

## 2022-11-21 PROCEDURE — 97535 SELF CARE MNGMENT TRAINING: CPT

## 2022-11-21 PROCEDURE — 20000000 HC ICU ROOM

## 2022-11-21 PROCEDURE — 87040 BLOOD CULTURE FOR BACTERIA: CPT | Mod: 59 | Performed by: STUDENT IN AN ORGANIZED HEALTH CARE EDUCATION/TRAINING PROGRAM

## 2022-11-21 PROCEDURE — 63600175 PHARM REV CODE 636 W HCPCS: Performed by: THORACIC SURGERY (CARDIOTHORACIC VASCULAR SURGERY)

## 2022-11-21 PROCEDURE — 80053 COMPREHEN METABOLIC PANEL: CPT

## 2022-11-21 RX ORDER — HEPARIN SODIUM 1000 [USP'U]/ML
1000 INJECTION, SOLUTION INTRAVENOUS; SUBCUTANEOUS
Status: CANCELLED | OUTPATIENT
Start: 2022-11-21 | End: 2022-11-22

## 2022-11-21 RX ORDER — SODIUM CHLORIDE 9 MG/ML
INJECTION, SOLUTION INTRAVENOUS ONCE
Status: CANCELLED | OUTPATIENT
Start: 2022-11-21 | End: 2022-11-21

## 2022-11-21 RX ORDER — CEFEPIME HYDROCHLORIDE 1 G/50ML
1 INJECTION, SOLUTION INTRAVENOUS
Status: DISCONTINUED | OUTPATIENT
Start: 2022-11-21 | End: 2022-12-01

## 2022-11-21 RX ADMIN — HYDROMORPHONE HYDROCHLORIDE 1 MG: 1 INJECTION, SOLUTION INTRAMUSCULAR; INTRAVENOUS; SUBCUTANEOUS at 09:11

## 2022-11-21 RX ADMIN — POLYETHYLENE GLYCOL 3350 17 G: 17 POWDER, FOR SOLUTION ORAL at 08:11

## 2022-11-21 RX ADMIN — ACETAMINOPHEN 1000 MG: 500 TABLET ORAL at 09:11

## 2022-11-21 RX ADMIN — METHOCARBAMOL 500 MG: 500 TABLET ORAL at 09:11

## 2022-11-21 RX ADMIN — AMIODARONE HYDROCHLORIDE 200 MG: 200 TABLET ORAL at 08:11

## 2022-11-21 RX ADMIN — CEFEPIME HYDROCHLORIDE 1 G: 1 INJECTION, POWDER, FOR SOLUTION INTRAMUSCULAR; INTRAVENOUS at 08:11

## 2022-11-21 RX ADMIN — ATORVASTATIN CALCIUM 40 MG: 20 TABLET, FILM COATED ORAL at 08:11

## 2022-11-21 RX ADMIN — METHOCARBAMOL 500 MG: 500 TABLET ORAL at 08:11

## 2022-11-21 RX ADMIN — OXYCODONE HYDROCHLORIDE 10 MG: 10 TABLET ORAL at 07:11

## 2022-11-21 RX ADMIN — ASPIRIN 81 MG 81 MG: 81 TABLET ORAL at 08:11

## 2022-11-21 RX ADMIN — HYDROMORPHONE HYDROCHLORIDE 1 MG: 1 INJECTION, SOLUTION INTRAMUSCULAR; INTRAVENOUS; SUBCUTANEOUS at 05:11

## 2022-11-21 RX ADMIN — HEPARIN SODIUM 5000 UNITS: 5000 INJECTION INTRAVENOUS; SUBCUTANEOUS at 09:11

## 2022-11-21 RX ADMIN — OXYCODONE HYDROCHLORIDE 10 MG: 10 TABLET ORAL at 02:11

## 2022-11-21 RX ADMIN — HEPARIN SODIUM 5000 UNITS: 5000 INJECTION INTRAVENOUS; SUBCUTANEOUS at 05:11

## 2022-11-21 RX ADMIN — METHOCARBAMOL 500 MG: 500 TABLET ORAL at 03:11

## 2022-11-21 RX ADMIN — SENNOSIDES AND DOCUSATE SODIUM 1 TABLET: 50; 8.6 TABLET ORAL at 08:11

## 2022-11-21 RX ADMIN — HEPARIN SODIUM 5000 UNITS: 5000 INJECTION INTRAVENOUS; SUBCUTANEOUS at 02:11

## 2022-11-21 RX ADMIN — OXYCODONE HYDROCHLORIDE 10 MG: 10 TABLET ORAL at 03:11

## 2022-11-21 RX ADMIN — CEFEPIME HYDROCHLORIDE 1 G: 1 INJECTION, SOLUTION INTRAVENOUS at 03:11

## 2022-11-21 RX ADMIN — OXYCODONE HYDROCHLORIDE 10 MG: 10 TABLET ORAL at 11:11

## 2022-11-21 NOTE — PLAN OF CARE
Plan of care reviewed with pt. Pt OOB to chair. Cardiac diet. Pain controlled with prn medication. NPO at MN for permacath im am. All vitals stable

## 2022-11-21 NOTE — PROGRESS NOTES
Plastic and Reconstructive Surgery   Progress Note    Subjective:    Patient seen and examined at bedside in [place]. Pain controlled. Drain outputs decreasing. Patient wishes to have binder off.    Objective:  Vital signs in last 24 hours:  Temp:  [97.3 °F (36.3 °C)-98.6 °F (37 °C)] 98.3 °F (36.8 °C)  Pulse:  [] 96  Resp:  [12-38] 17  SpO2:  [92 %-99 %] 98 %  BP: (120)/(63) 120/63  Arterial Line BP: ()/(48-67) 116/53    Intake/Output last 3 shifts:  I/O last 3 completed shifts:  In: 1432.5 [P.O.:650; I.V.:133.3; Other:500; IV Piggyback:149.1]  Out: 1669 [Urine:310; Drains:359; Other:1000]    Intake/Output this shift:  I/O this shift:  In: 720 [P.O.:220; Other:500]  Out: 1070 [Drains:70; Other:1000]        Physical Exam:  VITAL SIGNS:   Vitals:    22 1500 22 1524 22 1600 22 1814   BP:    120/63   BP Location:    Left arm   Patient Position:    Sitting   Pulse: 92  96 96   Resp: 18 15 17 17   Temp: 97.7 °F (36.5 °C)   98.3 °F (36.8 °C)   TempSrc: Oral   Oral   SpO2: 96%  97% 98%   Weight:       Height:         TMAX: Temp (24hrs), Av °F (36.7 °C), Min:97.3 °F (36.3 °C), Max:98.6 °F (37 °C)    General: Alert; No acute distress  Cardiovascular: Regular rate   Respiratory: Normal respiratory effort. Chest rise symmetric.   Abdomen: Soft, nontender, nondistended  Extremity: Moves all extremities equally.  Neurologic: No focal deficit. Speech normal  SKIN: midline sternal and bilateral axillary incisions c/d/I, 5 drains     Scheduled Medications acetaminophen, 1,000 mg, Q8H  amiodarone, 200 mg, BID  aspirin, 81 mg, Daily  atorvastatin, 40 mg, Daily  ceFEPime (MAXIPIME) IVPB, 1 g, Q24H  heparin (porcine), 5,000 Units, Q8H  methocarbamoL, 500 mg, TID  polyethylene glycol, 17 g, Daily  senna-docusate 8.6-50 mg, 1 tablet, BID    PRN Medications dextrose 10%, dextrose 10%, diazePAM, HYDROmorphone, hydrOXYzine pamoate, ondansetron, oxyCODONE, oxyCODONE    Recent Labs:   Lab Results    Component Value Date    WBC 17.18 (H) 11/22/2022    HGB 8.8 (L) 11/22/2022    HCT 28.6 (L) 11/22/2022    MCV 99 (H) 11/22/2022     11/22/2022     Lab Results   Component Value Date     11/22/2022     (L) 11/22/2022    K 4.9 11/22/2022     11/22/2022    BUN 17 11/22/2022         Assessment: 62 y.o. y/o male 8 Days Post-Op s/p Procedure(s):  REMOVAL, STERNAL WIRE  DEBRIDEMENT, STERNUM  APPLICATION, WOUND VAC Doing well postoperatively.    Plan  Maintain binder   Trend Hgb, transfuse as needed  Monitor Bun/Cr  Monitor drain output  Pain control  I's & O's  Dvt ppx  Medical management as per SICU  Discussed with Dr Neida Whitlock MD- Fellow  Department of Plastic and Reconstructive Surgery  [phone] (office)

## 2022-11-21 NOTE — ASSESSMENT & PLAN NOTE
- management per primary team  - leukocytosis up trending based on CBC over the last 2 days  - recommend obtaining repeat blood cultures and UA  - antibiotics per Infectious Disease

## 2022-11-21 NOTE — PLAN OF CARE
CM met with the patient and his sister at the request of the sister Patient states that he does not to return to [facility] LT[facility] CM explained that at this time the patient was not physically able to care for himself and that he would be starting H/D and that he will need a  dialysis chair also patient has no family or support in [place] and he lives in a camper in [place] CM expressed that he was an adult and could indeed make his own decisions but that no LTAC and no dialysis would not only have short term but long term effects on his health   Patients sister is leaving to go back to [place] today   CM will follow

## 2022-11-21 NOTE — PT/OT/SLP PROGRESS
"Occupational Therapy   Co-Treatment    Name: David Barrios  MRN: [id number]  Admitting Diagnosis:  Sternal wound infection  7 Days Post-Op    Recommendations:     Discharge Recommendations: home health OT  Discharge Equipment Recommendations:   (TBD)  Barriers to discharge:  None    Assessment:     David Barrios is a 62 y.o. male with a medical diagnosis of Sternal wound infection.  He presents with good motivation and participation. Pt tolerated session fair, with 1 LOB during mobility. Pt continues to require cuing to adhere to sternal precautions.  Performance deficits affecting function are weakness, impaired endurance, impaired self care skills, impaired functional mobility, gait instability, impaired balance, decreased lower extremity function, decreased upper extremity function, decreased safety awareness, pain, impaired cardiopulmonary response to activity, orthopedic precautions. Pt would benefit from skilled OT services in order to maximize independence with ADLs and facilitate safe discharge. Pt would benefit from home health OT once medically stable for discharge.     Rehab Prognosis:  Good; patient would benefit from acute skilled OT services to address these deficits and reach maximum level of function.       Plan:     Patient to be seen 4 x/week to address the above listed problems via self-care/home management, therapeutic activities, therapeutic exercises  Plan of Care Expires: 12/15/22  Plan of Care Reviewed with: patient, sibling    Subjective     Pain/Comfort:  Pain Rating 1: 5/10 ("all over")  Pain Addressed 1: Pre-medicate for activity    Objective:     Communicated with: RN and PT prior to session.  Patient found up in chair with telemetry, pulse ox (continuous), peripheral IV, PICC line, arterial line, BLANCA drain, Trialysis upon OT entry to room.    General Precautions: Standard, fall, sternal   Orthopedic Precautions:N/A   Braces: N/A  Respiratory Status: Room air     Occupational Performance: "     Functional Mobility/Transfers:  Patient completed Sit <> Stand Transfer with contact guard assistance  with  no assistive device   Functional Mobility: Pt ambulated >household distance with CGA and no AD in order to maximize functional activity tolerance and standing balance required for engagement in occupations of choice.    Pt with 1 LOB to the R requiring mod A to recover  Frequent standing rest breaks 2' SOB; unable to obtain accurate SpO2 reading   Portable monitor; RN present    Activities of Daily Living:  Feeding:  independence to take medication from RN  Upper Body Dressing: minimum assistance to don posterior gown      [facility] 6 Click ADL: 18    Treatment & Education:  -Therapist provided facilitation and instruction of proper body mechanics, energy conservation, and fall prevention strategies during tasks listed above.  -Pt educated on role of OT, POC and goals for therapy  -Pt educated on importance of OOB activities with staff member assistance and sitting OOB majority of the day.   -Pt verbalized understanding. Pt expressed no further concerns/questions  -Whiteboard updated   -Co-tx with PT performed due to need for education and assistance from two skilled therapy disciplines at pt's current functional level.      Patient left up in chair with all lines intact, call button in reach, and family present    GOALS:   Multidisciplinary Problems       Occupational Therapy Goals          Problem: Occupational Therapy    Goal Priority Disciplines Outcome Interventions   Occupational Therapy Goal     OT, PT/OT Ongoing, Progressing    Description: Goals to be met by: 11/29/22     Patient will increase functional independence with ADLs by performing:    Feeding with [place].  UE Dressing with [place].  LE Dressing with Stand-by Assistance.  Grooming while standing at sink with Supervision.  Toilet transfer to toilet with Supervision.                         Time Tracking:     OT Date of Treatment:  11/21/22  OT Start Time: 1047  OT Stop Time: 1115  OT Total Time (min): 28 min    Billable Minutes:Self Care/Home Management 15  Therapeutic Activity 13    OT/DUONG: OT          11/21/2022

## 2022-11-21 NOTE — PROGRESS NOTES
[facility]  Critical Care - Surgery  Progress Note    Patient Name: David Barrios  MRN: [id number]  Admission Date: 11/8/2022  Hospital Length of Stay: 13 days  Code Status: Full Code  Attending Provider: Mike Hedrick MD  Primary Care Provider: Breann Tavera MD   Principal Problem: Sternal wound infection    Subjective:     Hospital/ICU Course:        Interval History/Significant Events: NAEO. Afebrile, VSS. Pending perm cath placement and trial of HD. Continue ambulation as tolerated.    Follow-up For: Procedure(s) (LRB):  CREATION, FLAP, MUSCLE ROTATION (N/A)  IRRIGATION AND DEBRIDEMENT, WOUND, STERNUM (N/A)  CLOSURE, WOUND, STERNUM (N/A)  EXPLORATION, WOUND (N/A)    Post-Operative Day: 7 Days Post-Op    Objective:     Vital Signs (Most Recent):  Temp: 98.2 °F (36.8 °C) (11/21/22 0301)  Pulse: 101 (11/21/22 0748)  Resp: 19 (11/21/22 0748)  BP: (!) 154/77 (11/20/22 1915)  SpO2: 99 % (11/21/22 0748)   Vital Signs (24h Range):  Temp:  [97.8 °F (36.6 °C)-98.4 °F (36.9 °C)] 98.2 °F (36.8 °C)  Pulse:  [] 101  Resp:  [12-39] 19  SpO2:  [91 %-99 %] 99 %  BP: (154)/(77) 154/77  Arterial Line BP: (117-168)/(50-72) 150/61     Weight: 74 kg (163 lb 2.3 oz)  Body mass index is 24.08 kg/m².      Intake/Output Summary (Last 24 hours) at 11/21/2022 0808  Last data filed at 11/21/2022 0500  Gross per 24 hour   Intake 400.03 ml   Output 329 ml   Net 71.03 ml       Physical Exam  Vitals and nursing note reviewed.   Constitutional:       General: He is not in acute distress.     Appearance: He is not ill-appearing.   Cardiovascular:      Rate and Rhythm: Normal rate and regular rhythm.      Pulses: Normal pulses.   Pulmonary:      Effort: Pulmonary effort is normal.   Abdominal:      General: Abdomen is flat.      Palpations: Abdomen is soft.   Musculoskeletal:      Right lower leg: No edema.      Left lower leg: No edema.   Skin:     General: Skin is warm.      Findings: No bruising or lesion.       Comments: BLANCA drains with sanguinous output    Neurological:      General: No focal deficit present.         Lines/Drains/Airways       Peripherally Inserted Central Catheter Line  Duration             PICC Double Lumen 10/17/22 1709 right brachial 34 days              Central Venous Catheter Line  Duration             Trialysis (Dialysis) Catheter 11/15/22 1654 right internal jugular 5 days              Drain  Duration                  Urethral Catheter 11/12/22 2125 Straight-tip;Non-latex 16 Fr. 8 days         Closed/Suction Drain 11/14/22 2010 Inferior;Right Chest Bulb 15 Fr. 6 days         Closed/Suction Drain 11/14/22 2010 Superior;Midline Abdomen Bulb 15 Fr. 6 days         Closed/Suction Drain 11/14/22 2011 Lateral;Right Other (Comment) Bulb 15 Fr. 6 days         Closed/Suction Drain 11/14/22 2015 Inferior;Left Chest Bulb 15 Fr. 6 days         Closed/Suction Drain 11/14/22 2016 Lateral;Left Other (Comment) Bulb 15 Fr. 6 days              Arterial Line  Duration             Arterial Line 11/08/22 0712 Right Radial 13 days                    Significant Labs:    CBC/Anemia Profile:  Recent Labs   Lab 11/20/22  0321 11/21/22  0304   WBC 17.05* 18.03*   HGB 9.0* 8.9*   HCT 28.5* 28.7*    301   MCV 95 98   RDW 17.4* 17.7*        Chemistries:  Recent Labs   Lab 11/20/22  0321 11/20/22  1443 11/21/22  0304    135* 136   K 4.5 4.5 4.9    105 103   CO2 25 22* 26   BUN 4* 12 16   CREATININE 1.0 1.8* 2.6*   CALCIUM 7.9* 7.8* 8.5*   ALBUMIN 2.3* 2.1* 2.3*   PROT 5.7*  --  5.5*   BILITOT 0.8  --  0.6   ALKPHOS 114  --  99   ALT 5*  --  5*   AST 18  --  15   MG 2.1 1.8 1.9   PHOS 4.1 2.5* 3.5       All pertinent labs within the past 24 hours have been reviewed.    Significant Imaging:  I have reviewed all pertinent imaging results/findings within the past 24 hours.    Assessment/Plan:     * Sternal wound infection  62 y.o. male S/P TV repair , MV repair, MAZE, Lt Atrial Appendage ligation and Impella  placement on 10/7/22, course complicated by bleeding, requiring reopening POD#0, multiple VT runs requiring DCCV, and sternal wound infections. He presents to the SICU s/p wound debridement and wire removal on 11/08 followed by washout on 11/10      Neuro/Psych:     - Sedation: none    - Pain:    - Scheduled Tylenol 1g q8h with prn Oxy, dilaudid for break through pain               Cardiac:      -BP Goal: MAP 60-80 and SBP <140.  Vaso 0.04, will wean.     - Echo with EF 35% with mild RV enlargement and moderate RV dysfunction. Cont milrinone 0.25     - Anti-HTNs: Will restart home meds when appropriate     - Rhythm: NSR. S/p MAZE for Afib, cont po amio.     - Off Milrinone. May restart BB soon.     - Statin: Atorvastatin 40 mg QD      Pulmonary:     - Goal SpO2 >92%, 2L NC     - CXR with patchy opacities on R side and SQ air, leukocytosis downtrending. ID recs doxy x5 days for PNA.       Renal:    - Trend BUN/Cr. Oliguric KIMANI. Will monitor and support kidneys with MAP >65.     - CRRT, appreciate nephrology recs     - Maintain [place], record strict Is/Os   - Will f/u with nephrology regarding plans for HD vs perm cath. Interventional Neph consulted.     Recent Labs   Lab 11/20/22  0321 11/20/22  1443 11/21/22  0304   BUN 4* 12 16   CREATININE 1.0 1.8* 2.6*         FEN / GI:     - Daily CMP, PRN K/Mag/Phos per protocol     - Replace electrolytes as needed    - Nutrition: cardiac diet, BOOST    - Bowel Regimen: Miralax, docusate      ID:     - Leukocytosis without fever, s/p debridement and Pec flap 11/14. Now down trending     - Abx: Continue Cefepime. Will need long term abx plan, 6weeks post op flap    - ID recs Cefepime and Doxy      Recent Labs   Lab 11/19/22  0600 11/20/22  0321 11/21/22  0304   WBC 15.66* 17.05* 18.03*         Heme/Onc:     - CBC daily    - ASA 81mg daily    Recent Labs   Lab 11/17/22  0306 11/17/22  1106 11/18/22  0305 11/18/22  1143 11/19/22  0600 11/19/22  0621 11/20/22  0321  11/21/22  0304   HGB 7.0*   < > 8.7*   < > 8.6*  --  9.0* 8.9*      < > 273   < > 266  --  308 301   APTT 30.5  --  29.4  --   --  32.5*  --   --    INR 1.1  --  1.1  --   --  1.2  --   --     < > = values in this interval not displayed.         Endocrine:     - CTS Goal -140        PPx:   Feeding: cardiac diet   Analgesia/Sedation: tylenol, oxy, dilaudid PRN   Thromboembolic Prevention: scds, Heparin  HOB >30: Yes  Stress Ulcer: n/a  Glucose Control: none      Lines/Drains/Airway:   Left radial arterial line   RIJ CVC   BLANCA drains       Dispo/Code Status/Palliative:     - Continue SICU Care    - Full Code             Critical secondary to Patient has a condition that poses threat to life and bodily function.     Critical care was time spent personally by me on the following activities: development of treatment plan with patient or surrogate and bedside caregivers, discussions with consultants, evaluation of patient's response to treatment, examination of patient, ordering and performing treatments and interventions, ordering and review of laboratory studies, ordering and review of radiographic studies, pulse oximetry, re-evaluation of patient's condition.  This critical care time did not overlap with that of any other provider or involve time for any procedures.     Gustavo Aguero, DO  Critical Care - Surgery  [facility]

## 2022-11-21 NOTE — PLAN OF CARE
"      SICU PLAN OF CARE NOTE    Dx: Sternal wound infection    Vital Signs: BP (!) 154/77 (BP Location: Left arm, Patient Position: Lying)   Pulse 107   Temp 98.4 °F (36.9 °C) (Oral)   Resp (!) 26   Ht 5' 9.02" (1.753 m)   Wt 74 kg (163 lb 2.3 oz)   SpO2 95%   BMI 24.08 kg/m²     SHIFT EVENTS:  VSS / No acute events this shift. Managed pain.    Neuro: AAO x4, Follows Commands, and Moves All Extremities    Respiratory: Room Air    Cardiac: NSR-ST; HR 90s-105    Diet: Cardiac Diet    Gtts: Milrinone     Urine Output: Urinary Catheter *** ml/shift    Drains:      5 BLANCA drains, sanguinous drainage, See flowsheet for details     Labs: daily     SKIN NOTE:  Skin: No new skin breakdown or skin tears noted at this time     Skin precautions maintained including:  Sacrum and heels with foam dressing in place for pressure protection. Frequent weight shift encouraged / assistance provided Q2 hr to prevent breakdown. Bed plugged in and mattress inflated; Adhesive use limited. Heels elevated off bed. Pressure points protected and positioning supports utilized.  Skin-to-device areas padded. Skin-to-skin areas padded     POC reviewed with patient and family; questions and concerns addressed. See flow sheets for full assessment details.  "

## 2022-11-21 NOTE — PT/OT/SLP PROGRESS
"Physical Therapy Co-Treatment    Patient Name:  David Barrios   MRN:  [id number]    Co-treatment performed for this visit due to patient need for two skilled therapists to ensure patient and staff safety and to accommodate for patient decreased activity tolerance in ICU setting  Recommendations:     Discharge Recommendations:  home health PT   Discharge Equipment Recommendations: shower chair   Barriers to discharge: Inaccessible home and Decreased caregiver support    Assessment:     David Barrios is a 62 y.o. male admitted with a medical diagnosis of Sternal wound infection. Patient demonstrate slightly improved adherence to cuing for sternal precautions. Also demonstrates one significant loss of balance with gait requiring moderate assistance to correct.     He presents with the following impairments/functional limitations: weakness, impaired endurance, impaired self care skills, impaired functional mobility, gait instability, impaired balance, decreased upper extremity function, decreased lower extremity function, pain, orthopedic precautions, impaired cardiopulmonary response to activity, decreased safety awareness. Once medically stable, recommending pt discharge to home health PT.    Rehab Prognosis: Good; patient continues to benefit from acute skilled PT services to address these deficits and reach maximum level of function.  Recent Surgery: Procedure(s) (LRB):  CREATION, FLAP, MUSCLE ROTATION (N/A)  IRRIGATION AND DEBRIDEMENT, WOUND, STERNUM (N/A)  CLOSURE, WOUND, STERNUM (N/A)  EXPLORATION, WOUND (N/A) 7 Days Post-Op    Plan:     During this hospitalization, patient to be seen 5 x/week to address the identified rehab impairments via gait training, therapeutic activities, therapeutic exercises, neuromuscular re-education and progress toward the following goals:    Plan of Care Expires:  12/16/22    Subjective     Chief Complaint: Generalized pain  Patient/Family Comments/Goals: "We're not bringing the IV " "pole?"  Pain/Comfort:  Pain Rating 1: 5/10  Location - Orientation 1: generalized  Location 1:  ("all over")  Pain Addressed 1: Pre-medicate for activity    Objective:     Communicated with RN prior to session. Patient found up in chair with telemetry, pulse ox (continuous), peripheral IV, PICC line, arterial line, BLANCA drain, Trialysis upon PT entry to room.     General Precautions: Standard, fall, sternal   Orthopedic Precautions:N/A   Braces: N/A    Functional Mobility:  Bed Mobility:     Seated in chair at start of session and returned to chair  Transfers:     Sit to Stand: contact guard assistance with no AD with cues for hand placement  Gait: Patient ambulated 222 ft with no AD and contact guard -  moderate assistance. Patient demonstrates occasional unsteady gait, narrow base of support, and flexed posture. Patient required cues for increased step size. Patient also required multiple standing rest breaks, SpO2 reading as low as 54 however with poor waveform. Patient demonstrated 2 LOB requiring moderate assistance from PT to regain balance. All lines remained intact throughout ambulation trial, mask donned for out of room ambulation, portable monitor utilized.  Balance:   Static Sitting: Good, able to maintain for 3 minute(s) with stand by assistance  Dynamic Sitting: not assessed this visit  Static Standing: Good, able to maintain for 3 minute(s) with contact guard assistance  Dynamic Standing: Fair: Patient accepts minimal challenge, contact guard assistance    AM-PAC 6 CLICK MOBILITY  Turning over in bed (including adjusting bedclothes, sheets and blankets)?: 3  Sitting down on and standing up from a chair with arms (e.g., wheelchair, bedside commode, etc.): 3  Moving from lying on back to sitting on the side of the bed?: 3  Moving to and from a bed to a chair (including a wheelchair)?: 3  Need to walk in hospital room?: 3  Climbing 3-5 steps with a railing?: 2  Basic Mobility Total Score: 17     Therapeutic " Activities and Exercises:  Patient educated on role of acute care PT and PT POC, safety while in hospital including calling nurse for mobility, and call light usage  Patient is clear to ambulate to/from bathroom with RN/PCT, assist x1  Patient educated on Post-op sternal precautions, including no lifting > 5 lbs, pulling or pushing with BUEs  Educated about pursed lip breathing technique and cued for use with mobility    Patient left up in chair with all lines intact, call button in reach, RN notified, and family present.    GOALS:   Multidisciplinary Problems       Physical Therapy Goals          Problem: Physical Therapy    Goal Priority Disciplines Outcome Goal Variances Interventions   Physical Therapy Goal     PT, PT/OT Ongoing, Progressing     Description: Goals to be met by: 2022     Patient will increase functional independence with mobility by performin. Supine to sit with Modified [place]  2. Sit to supine with Modified [place]  3. Sit to stand transfer with Supervision  4. Bed to chair transfer with Supervision using No Assistive Device  5. Gait  x 200 feet with Supervision using No Assistive Device.   6. Pt will ascend/descend 3 steps with no HR and SBA.                         Time Tracking:     PT Received On: 22  PT Start Time: 1047     PT Stop Time: 1115  PT Total Time (min): 28 min     Billable Minutes: Gait Training 20 min and Therapeutic Activity 8 min         PT/PTA: PT     PTA Visit Number: 0     2022

## 2022-11-21 NOTE — ASSESSMENT & PLAN NOTE
- Baseline Scr 0.9-1.   - 10/31 Scr increased to ~1.5; steadly increased to peak of 5.3 today  - Hyperkalemia noted to 5.3  - Anuric today despite IV diuril 500mg TID; and IV lasix 120mg TID  - Risk factors for KIMANI include Cardiorenal syndrome (less likely given aggressive diuresis in last 72hrs; and normal CVP readings); AIN; IRGN; and prolonged prerenal state from over diuresis, sepsis.    Recommendations/Plan:  - Plan for trial of iHD today with UF of 500 mL to one liter as tolerated  - Tentative plan for tunneled HD catheter placement on 11/23 per Interventional Radiology if blood cultures without growth x48 hours  - Daily RFP and magnesium level  - Strict I/Os and daily weights  - Renally dose medications to eGFR  - Avoid nephrotoxins when feasible (i.e. intra-arterial contrast, NSAIDs, etc.)  - Renal diet when not NPO

## 2022-11-21 NOTE — PROGRESS NOTES
[facility]  Nephrology  Progress Note    Patient Name: David Barrios  MRN: [id number]  Admission Date: 11/8/2022  Hospital Length of Stay: 13 days  Attending Provider: Mike Hedrick MD   Primary Care Physician: Breann Tavera MD  Principal Problem:Sternal wound infection    Subjective:     HPI: 61 yo male w/ hx of Afib; HFrEF (35%); HTN; HLD. Patient underwent who underwent mitral valve repair, tricuspid valve repair, left atrial maze, left atrial appendage resection, and direct aortic impella 5.5 insertion on 10/07/2022. Hospital course complicated by Serratia bacteremia (on 6wk course of cefepime), sternal wound infection, and cardiogenic shock. On 10/31 patient developed KIMANI. Per chart review no documented course of hypotension during that time. Patient started on diuresis with IV lasix, with good urine output. Patient required increased diuretics (IV Diuril 500mg TID; and Lasix 120mg TID) and continued to have increased Scr, which progressed to oliguria, and now having metabolic derangements.     Patient denies any prior history of kidney disease. Does not have significant family history of kidney disease. Does endorse worsening fatigue and dyspnea. Denies any nausea vomiting, or metallic taste in mouth.     Nephrology was consulted for oliguric KIMANI.       Interval History: Patient seen and examined this AM. No acute events overnight. Afebrile with pulse ranging from 100-90s bpm. Systolic blood pressures ranging from 160-110s mmHg via arterial line. He is saturating +91% on room air with a documented 155 mL in the last 24 hours. Plan for trial of iHD today.    Review of patient's allergies indicates:  No Known Allergies  Current Facility-Administered Medications   Medication Frequency    0.9%  NaCl infusion (for blood administration) Q24H PRN    0.9%  NaCl infusion (for blood administration) Q24H PRN    acetaminophen tablet 1,000 mg Q8H    amiodarone tablet 200 mg BID     aspirin chewable tablet 81 mg Daily    atorvastatin tablet 40 mg Daily    cefepime in dextrose 5 % 1 gram/50 mL IVPB 1 g Q12H    dextrose 10% bolus 125 mL PRN    dextrose 10% bolus 250 mL PRN    heparin (porcine) injection 5,000 Units Q8H    HYDROmorphone injection 1 mg Q4H PRN    hydrOXYzine pamoate capsule 50 mg Q8H PRN    methocarbamoL tablet 500 mg TID    ondansetron injection 4 mg Q8H PRN    oxyCODONE immediate release tablet 5 mg Q4H PRN    oxyCODONE immediate release tablet Tab 10 mg Q4H PRN    polyethylene glycol packet 17 g Daily    senna-docusate 8.6-50 mg per tablet 1 tablet BID       Objective:     Vital Signs (Most Recent):  Temp: 98.2 °F (36.8 °C) (11/21/22 0301)  Pulse: 103 (11/21/22 0503)  Resp: 16 (11/21/22 0503)  BP: (!) 154/77 (11/20/22 1915)  SpO2: (!) 94 % (11/21/22 0503)  O2 Device (Oxygen Therapy): room air (11/21/22 0301)   Vital Signs (24h Range):  Temp:  [97.8 °F (36.6 °C)-98.4 °F (36.9 °C)] 98.2 °F (36.8 °C)  Pulse:  [] 103  Resp:  [12-39] 16  SpO2:  [91 %-99 %] 94 %  BP: (154)/(77) 154/77  Arterial Line BP: (117-168)/(50-72) 150/61     Weight: 74 kg (163 lb 2.3 oz) (11/12/22 1519)  Body mass index is 24.08 kg/m².  Body surface area is 1.9 meters squared.    I/O last 3 completed shifts:  In: 2640 [P.O.:480; I.V.:1761.4; IV Piggyback:398.7]  Out: 3404 [Urine:285; Drains:391; Other:2728]    Physical Exam  Vitals and nursing note reviewed.   Constitutional:       General: He is not in acute distress.     Appearance: He is normal weight. He is not ill-appearing or diaphoretic.   HENT:      Head: Normocephalic and atraumatic.      Right Ear: External ear normal.      Left Ear: External ear normal.      Nose: Nose normal.   Eyes:      General: No scleral icterus.        Right eye: No discharge.         Left eye: No discharge.      Extraocular Movements: Extraocular movements intact.      Conjunctiva/sclera: Conjunctivae normal.      Comments: Prescription eye glasses.   Neck:       Comments: Trialysis catheter to right internal jugular vein.  Cardiovascular:      Rate and Rhythm: Normal rate.      Heart sounds: Murmur heard.     No friction rub. No gallop.      Comments: Binder in place with closed incision overlying sternum.  Pulmonary:      Effort: No respiratory distress.      Breath sounds: No wheezing, rhonchi or rales.   Chest:      Comments: BLANCA drains present with serosanguinous output.  Abdominal:      General: Bowel sounds are normal. There is no distension.      Palpations: Abdomen is soft.      Tenderness: There is no abdominal tenderness.   Musculoskeletal:      Cervical back: Neck supple.      Right lower leg: No edema.      Left lower leg: No edema.   Skin:     General: Skin is warm and dry.      Coloration: Skin is not jaundiced.      Comments: BLANCA drains with sanguinous output    Neurological:      General: No focal deficit present.      Mental Status: He is alert. Mental status is at baseline.      Cranial Nerves: No cranial nerve deficit.   Psychiatric:         Mood and Affect: Mood normal.         Behavior: Behavior normal.       Significant Labs:  BMP:   Recent Labs   Lab 11/21/22  0304   GLU 92      K 4.9      CO2 26   BUN 16   CREATININE 2.6*   CALCIUM 8.5*   MG 1.9     CBC:   Recent Labs   Lab 11/21/22  0304   WBC 18.03*   RBC 2.93*   HGB 8.9*   HCT 28.7*      MCV 98   MCH 30.4   MCHC 31.0*     CMP:   Recent Labs   Lab 11/21/22  0304   GLU 92   CALCIUM 8.5*   ALBUMIN 2.3*   PROT 5.5*      K 4.9   CO2 26      BUN 16   CREATININE 2.6*   ALKPHOS 99   ALT 5*   AST 15   BILITOT 0.6     Coagulation:   Recent Labs   Lab 11/19/22  0621   INR 1.2   APTT 32.5*     LFTs:   Recent Labs   Lab 11/21/22  0304   ALT 5*   AST 15   ALKPHOS 99   BILITOT 0.6   PROT 5.5*   ALBUMIN 2.3*     Microbiology Results (last 7 days)       Procedure Component Value Units Date/Time    Blood culture [[id number]]     Order Status: No result Specimen: Blood     Blood  culture [[id number]]     Order Status: No result Specimen: Blood     Fungus culture [[id number]] Collected: 11/10/22 0834    Order Status: Completed Specimen: Wound from Sternum Updated: 11/17/22 1005     Fungus (Mycology) Culture Culture in progress    Narrative:      Sternal wound culture    Fungus culture [[id number]] Collected: 11/08/22 1106    Order Status: Completed Specimen: Wound from Sternum Updated: 11/17/22 1005     Fungus (Mycology) Culture Culture in progress    Narrative:      Sternal wound    Fungus culture [[id number]] Collected: 11/08/22 1106    Order Status: Completed Specimen: Wound from Sternum Updated: 11/17/22 1005     Fungus (Mycology) Culture Culture in progress    Narrative:      Sternal drainage    Fungus culture [[id number]] Collected: 11/08/22 1106    Order Status: Completed Specimen: Wound from Sternum Updated: 11/17/22 1005     Fungus (Mycology) Culture Culture in progress    Narrative:      Pericardial peel    Fungus culture [[id number]] Collected: 11/08/22 1106    Order Status: Completed Specimen: Wound from Sternum Updated: 11/17/22 1005     Fungus (Mycology) Culture Culture in progress    Narrative:      Sternal wound    Fungus culture [[id number]] Collected: 11/08/22 1106    Order Status: Completed Specimen: Wound from Sternum Updated: 11/17/22 1005     Fungus (Mycology) Culture Culture in progress    Narrative:      Aortic graft    Fungus culture [[id number]] Collected: 11/10/22 0836    Order Status: Completed Specimen: Wound from Sternum Updated: 11/17/22 0934     Fungus (Mycology) Culture Culture in progress    Narrative:      Sternal hematoma culture    Culture, Anaerobe [[id number]] Collected: 11/10/22 0834    Order Status: Completed Specimen: Wound from Sternum Updated: 11/17/22 0750     Anaerobic Culture No anaerobes isolated    Narrative:      Sternal wound culture    Culture, Anaerobe [[id number]] Collected: 11/08/22 1106    Order Status: Completed Specimen: Wound from Sternum  Updated: 11/17/22 0750     Anaerobic Culture No anaerobes isolated    Narrative:      Sternal wound    Culture, Anaerobe [[id number]] Collected: 11/08/22 1106    Order Status: Completed Specimen: Wound from Sternum Updated: 11/17/22 0750     Anaerobic Culture No anaerobes isolated    Narrative:      Sternal drainage    Culture, Anaerobe [[id number]] Collected: 11/08/22 1106    Order Status: Completed Specimen: Wound from Sternum Updated: 11/17/22 0750     Anaerobic Culture No anaerobes isolated    Narrative:      Pericardial peel    Culture, Anaerobe [[id number]] Collected: 11/08/22 1106    Order Status: Completed Specimen: Wound from Sternum Updated: 11/17/22 0750     Anaerobic Culture No anaerobes isolated    Narrative:      Sternal wound    Culture, Anaerobe [[id number]] Collected: 11/08/22 1106    Order Status: Completed Specimen: Wound from Sternum Updated: 11/16/22 0746     Anaerobic Culture No anaerobes isolated    Narrative:      Aortic graft    Culture, Anaerobe [[id number]] Collected: 11/10/22 0836    Order Status: Completed Specimen: Wound from Sternum Updated: 11/16/22 0746     Anaerobic Culture No anaerobes isolated    Narrative:      Sternal hematoma culture    Aerobic culture [[id number]] Collected: 11/08/22 1106    Order Status: Completed Specimen: Wound from Sternum Updated: 11/14/22 1358     Aerobic Bacterial Culture No growth    Narrative:      Pericardial peel    Aerobic culture [[id number]] Collected: 11/08/22 1106    Order Status: Completed Specimen: Wound from Sternum Updated: 11/14/22 1357     Aerobic Bacterial Culture No growth    Narrative:      Sternal drainage          Significant Imaging:  I have reviewed all imagining in the last 24 hours.    Assessment/Plan:     * Sternal wound infection  - per primary (Cardiothoracic Surgery) and Infectious Disease    KIMANI (acute kidney injury)  - Baseline Scr 0.9-1.   - 10/31 Scr increased to ~1.5; steadly increased to peak of 5.3 today  - Hyperkalemia noted  to 5.3  - Anuric today despite IV diuril 500mg TID; and IV lasix 120mg TID  - Risk factors for KIMANI include Cardiorenal syndrome (less likely given aggressive diuresis in last 72hrs; and normal CVP readings); AIN; IRGN; and prolonged prerenal state from over diuresis, sepsis.    Recommendations/Plan:  - Plan for trial of iHD today with UF of 500 mL to one liter as tolerated  - Tentative plan for tunneled HD catheter placement on 11/23 per Interventional Radiology if blood cultures without growth x48 hours  - Daily RFP and magnesium level  - Strict I/Os and daily weights  - Renally dose medications to eGFR  - Avoid nephrotoxins when feasible (i.e. intra-arterial contrast, NSAIDs, etc.)  - Renal diet when not NPO    Pneumonia of both lower lobes due to infectious organism  - management per primary team  - leukocytosis up trending based on CBC over the last 2 days  - recommend obtaining repeat blood cultures and UA  - antibiotics per Infectious Disease         Thank you for your consult. I will follow-up with patient. Please contact us if you have any additional questions.    Golden Allen MD  Nephrology  [facility]

## 2022-11-21 NOTE — ASSESSMENT & PLAN NOTE
62 y.o. male S/P TV repair , MV repair, MAZE, Lt Atrial Appendage ligation and Impella placement on 10/7/22, course complicated by bleeding, requiring reopening POD#0, multiple VT runs requiring DCCV, and sternal wound infections. He presents to the SICU s/p wound debridement and wire removal on 11/08 followed by washout on 11/10      Neuro/Psych:     - Sedation: none    - Pain:    - Scheduled Tylenol 1g q8h with prn Oxy, dilaudid for break through pain               Cardiac:      -BP Goal: MAP 60-80 and SBP <140.  Vaso 0.04, will wean.     - Echo with EF 35% with mild RV enlargement and moderate RV dysfunction. Cont milrinone 0.25     - Anti-HTNs: Will restart home meds when appropriate     - Rhythm: NSR. S/p MAZE for Afib, cont po amio.     - Off Milrinone. May restart BB soon.     - Statin: Atorvastatin 40 mg QD      Pulmonary:     - Goal SpO2 >92%, 2L NC     - CXR with patchy opacities on R side and SQ air, leukocytosis downtrending. ID recs doxy x5 days for PNA.       Renal:    - Trend BUN/Cr. Oliguric KIMANI. Will monitor and support kidneys with MAP >65.     - CRRT, appreciate nephrology recs     - Maintain Fierro, record strict Is/Os   - Will f/u with nephrology regarding plans for HD vs perm cath. Interventional Neph consulted.     Recent Labs   Lab 11/20/22  0321 11/20/22  1443 11/21/22  0304   BUN 4* 12 16   CREATININE 1.0 1.8* 2.6*         FEN / GI:     - Daily CMP, PRN K/Mag/Phos per protocol     - Replace electrolytes as needed    - Nutrition: cardiac diet, BOOST    - Bowel Regimen: Miralax, docusate      ID:     - Leukocytosis without fever, s/p debridement and Pec flap 11/14. Now down trending     - Abx: Continue Cefepime. Will need long term abx plan, 6weeks post op flap    - ID recs Cefepime and Doxy      Recent Labs   Lab 11/19/22  0600 11/20/22  0321 11/21/22  0304   WBC 15.66* 17.05* 18.03*         Heme/Onc:     - CBC daily    - ASA 81mg daily    Recent Labs   Lab 11/17/22  2983  11/17/22  1106 11/18/22  0305 11/18/22  1143 11/19/22  0600 11/19/22  0621 11/20/22  0321 11/21/22  0304   HGB 7.0*   < > 8.7*   < > 8.6*  --  9.0* 8.9*      < > 273   < > 266  --  308 301   APTT 30.5  --  29.4  --   --  32.5*  --   --    INR 1.1  --  1.1  --   --  1.2  --   --     < > = values in this interval not displayed.         Endocrine:     - CTS Goal -140        PPx:   Feeding: cardiac diet   Analgesia/Sedation: tylenol, oxy, dilaudid PRN   Thromboembolic Prevention: scds, Heparin  HOB >30: Yes  Stress Ulcer: n/a  Glucose Control: none      Lines/Drains/Airway:   Left radial arterial line   RIJ CVC   BLANCA drains       Dispo/Code Status/Palliative:     - Continue SICU Care    - Full Code

## 2022-11-21 NOTE — CONSULTS
Consult to IR received for tunneled catheter placement for patient expected to need continued dialysis. Will plan for TDC placement this Wednesday 11/23 pending negative blood cultures. Please make NPO morning of procedure. Okay to continue aspirin.       Poli Vazquez MD PGY-5  Interventional Radiology  [facility]

## 2022-11-21 NOTE — SUBJECTIVE & OBJECTIVE
Interval History: Patient seen and examined this AM. No acute events overnight. Afebrile with pulse ranging from 100-90s bpm. Systolic blood pressures ranging from 160-110s mmHg via arterial line. He is saturating +91% on room air with a documented 155 mL in the last 24 hours. Plan for trial of iHD today.    Review of patient's allergies indicates:  No Known Allergies  Current Facility-Administered Medications   Medication Frequency    0.9%  NaCl infusion (for blood administration) Q24H PRN    0.9%  NaCl infusion (for blood administration) Q24H PRN    acetaminophen tablet 1,000 mg Q8H    amiodarone tablet 200 mg BID    aspirin chewable tablet 81 mg Daily    atorvastatin tablet 40 mg Daily    cefepime in dextrose 5 % 1 gram/50 mL IVPB 1 g Q12H    dextrose 10% bolus 125 mL PRN    dextrose 10% bolus 250 mL PRN    heparin (porcine) injection 5,000 Units Q8H    HYDROmorphone injection 1 mg Q4H PRN    hydrOXYzine pamoate capsule 50 mg Q8H PRN    methocarbamoL tablet 500 mg TID    ondansetron injection 4 mg Q8H PRN    oxyCODONE immediate release tablet 5 mg Q4H PRN    oxyCODONE immediate release tablet Tab 10 mg Q4H PRN    polyethylene glycol packet 17 g Daily    senna-docusate 8.6-50 mg per tablet 1 tablet BID       Objective:     Vital Signs (Most Recent):  Temp: 98.2 °F (36.8 °C) (11/21/22 0301)  Pulse: 103 (11/21/22 0503)  Resp: 16 (11/21/22 0503)  BP: (!) 154/77 (11/20/22 1915)  SpO2: (!) 94 % (11/21/22 0503)  O2 Device (Oxygen Therapy): room air (11/21/22 0301)   Vital Signs (24h Range):  Temp:  [97.8 °F (36.6 °C)-98.4 °F (36.9 °C)] 98.2 °F (36.8 °C)  Pulse:  [] 103  Resp:  [12-39] 16  SpO2:  [91 %-99 %] 94 %  BP: (154)/(77) 154/77  Arterial Line BP: (117-168)/(50-72) 150/61     Weight: 74 kg (163 lb 2.3 oz) (11/12/22 1519)  Body mass index is 24.08 kg/m².  Body surface area is 1.9 meters squared.    I/O last 3 completed shifts:  In: 2640 [P.O.:480; I.V.:1761.4; IV Piggyback:398.7]  Out: 3404 [Urine:285;  Drains:391; Other:2728]    Physical Exam  Vitals and nursing note reviewed.   Constitutional:       General: He is not in acute distress.     Appearance: He is normal weight. He is not ill-appearing or diaphoretic.   HENT:      Head: Normocephalic and atraumatic.      Right Ear: External ear normal.      Left Ear: External ear normal.      Nose: Nose normal.   Eyes:      General: No scleral icterus.        Right eye: No discharge.         Left eye: No discharge.      Extraocular Movements: Extraocular movements intact.      Conjunctiva/sclera: Conjunctivae normal.      Comments: Prescription eye glasses.   Neck:      Comments: Trialysis catheter to right internal jugular vein.  Cardiovascular:      Rate and Rhythm: Normal rate.      Heart sounds: Murmur heard.     No friction rub. No gallop.      Comments: Binder in place with closed incision overlying sternum.  Pulmonary:      Effort: No respiratory distress.      Breath sounds: No wheezing, rhonchi or rales.   Chest:      Comments: BLANCA drains present with serosanguinous output.  Abdominal:      General: Bowel sounds are normal. There is no distension.      Palpations: Abdomen is soft.      Tenderness: There is no abdominal tenderness.   Musculoskeletal:      Cervical back: Neck supple.      Right lower leg: No edema.      Left lower leg: No edema.   Skin:     General: Skin is warm and dry.      Coloration: Skin is not jaundiced.      Comments: BLANCA drains with sanguinous output    Neurological:      General: No focal deficit present.      Mental Status: He is alert. Mental status is at baseline.      Cranial Nerves: No cranial nerve deficit.   Psychiatric:         Mood and Affect: Mood normal.         Behavior: Behavior normal.       Significant Labs:  BMP:   Recent Labs   Lab 11/21/22  0304   GLU 92      K 4.9      CO2 26   BUN 16   CREATININE 2.6*   CALCIUM 8.5*   MG 1.9     CBC:   Recent Labs   Lab 11/21/22  0304   WBC 18.03*   RBC 2.93*   HGB 8.9*    HCT 28.7*      MCV 98   MCH 30.4   MCHC 31.0*     CMP:   Recent Labs   Lab 11/21/22  0304   GLU 92   CALCIUM 8.5*   ALBUMIN 2.3*   PROT 5.5*      K 4.9   CO2 26      BUN 16   CREATININE 2.6*   ALKPHOS 99   ALT 5*   AST 15   BILITOT 0.6     Coagulation:   Recent Labs   Lab 11/19/22  0621   INR 1.2   APTT 32.5*     LFTs:   Recent Labs   Lab 11/21/22  0304   ALT 5*   AST 15   ALKPHOS 99   BILITOT 0.6   PROT 5.5*   ALBUMIN 2.3*     Microbiology Results (last 7 days)       Procedure Component Value Units Date/Time    Blood culture [[id number]]     Order Status: No result Specimen: Blood     Blood culture [[id number]]     Order Status: No result Specimen: Blood     Fungus culture [[id number]] Collected: 11/10/22 0834    Order Status: Completed Specimen: Wound from Sternum Updated: 11/17/22 1005     Fungus (Mycology) Culture Culture in progress    Narrative:      Sternal wound culture    Fungus culture [[id number]] Collected: 11/08/22 1106    Order Status: Completed Specimen: Wound from Sternum Updated: 11/17/22 1005     Fungus (Mycology) Culture Culture in progress    Narrative:      Sternal wound    Fungus culture [[id number]] Collected: 11/08/22 1106    Order Status: Completed Specimen: Wound from Sternum Updated: 11/17/22 1005     Fungus (Mycology) Culture Culture in progress    Narrative:      Sternal drainage    Fungus culture [[id number]] Collected: 11/08/22 1106    Order Status: Completed Specimen: Wound from Sternum Updated: 11/17/22 1005     Fungus (Mycology) Culture Culture in progress    Narrative:      Pericardial peel    Fungus culture [[id number]] Collected: 11/08/22 1106    Order Status: Completed Specimen: Wound from Sternum Updated: 11/17/22 1005     Fungus (Mycology) Culture Culture in progress    Narrative:      Sternal wound    Fungus culture [[id number]] Collected: 11/08/22 1106    Order Status: Completed Specimen: Wound from Sternum Updated: 11/17/22 1005     Fungus (Mycology) Culture  Culture in progress    Narrative:      Aortic graft    Fungus culture [[id number]] Collected: 11/10/22 0836    Order Status: Completed Specimen: Wound from Sternum Updated: 11/17/22 0934     Fungus (Mycology) Culture Culture in progress    Narrative:      Sternal hematoma culture    Culture, Anaerobe [[id number]] Collected: 11/10/22 0834    Order Status: Completed Specimen: Wound from Sternum Updated: 11/17/22 0750     Anaerobic Culture No anaerobes isolated    Narrative:      Sternal wound culture    Culture, Anaerobe [[id number]] Collected: 11/08/22 1106    Order Status: Completed Specimen: Wound from Sternum Updated: 11/17/22 0750     Anaerobic Culture No anaerobes isolated    Narrative:      Sternal wound    Culture, Anaerobe [[id number]] Collected: 11/08/22 1106    Order Status: Completed Specimen: Wound from Sternum Updated: 11/17/22 0750     Anaerobic Culture No anaerobes isolated    Narrative:      Sternal drainage    Culture, Anaerobe [[id number]] Collected: 11/08/22 1106    Order Status: Completed Specimen: Wound from Sternum Updated: 11/17/22 0750     Anaerobic Culture No anaerobes isolated    Narrative:      Pericardial peel    Culture, Anaerobe [[id number]] Collected: 11/08/22 1106    Order Status: Completed Specimen: Wound from Sternum Updated: 11/17/22 0750     Anaerobic Culture No anaerobes isolated    Narrative:      Sternal wound    Culture, Anaerobe [[id number]] Collected: 11/08/22 1106    Order Status: Completed Specimen: Wound from Sternum Updated: 11/16/22 0746     Anaerobic Culture No anaerobes isolated    Narrative:      Aortic graft    Culture, Anaerobe [[id number]] Collected: 11/10/22 0836    Order Status: Completed Specimen: Wound from Sternum Updated: 11/16/22 0746     Anaerobic Culture No anaerobes isolated    Narrative:      Sternal hematoma culture    Aerobic culture [[id number]] Collected: 11/08/22 1106    Order Status: Completed Specimen: Wound from Sternum Updated: 11/14/22 1358     Aerobic  Bacterial Culture No growth    Narrative:      Pericardial peel    Aerobic culture [[id number]] Collected: 11/08/22 1106    Order Status: Completed Specimen: Wound from Sternum Updated: 11/14/22 1357     Aerobic Bacterial Culture No growth    Narrative:      Sternal drainage          Significant Imaging:  I have reviewed all imagining in the last 24 hours.

## 2022-11-21 NOTE — SUBJECTIVE & OBJECTIVE
Interval History/Significant Events: NAEO. Afebrile, VSS. Pending perm cath placement and trial of HD. Continue ambulation as tolerated/    Follow-up For: Procedure(s) (LRB):  CREATION, FLAP, MUSCLE ROTATION (N/A)  IRRIGATION AND DEBRIDEMENT, WOUND, STERNUM (N/A)  CLOSURE, WOUND, STERNUM (N/A)  EXPLORATION, WOUND (N/A)    Post-Operative Day: 7 Days Post-Op    Objective:     Vital Signs (Most Recent):  Temp: 98.2 °F (36.8 °C) (11/21/22 0301)  Pulse: 101 (11/21/22 0748)  Resp: 19 (11/21/22 0748)  BP: (!) 154/77 (11/20/22 1915)  SpO2: 99 % (11/21/22 0748)   Vital Signs (24h Range):  Temp:  [97.8 °F (36.6 °C)-98.4 °F (36.9 °C)] 98.2 °F (36.8 °C)  Pulse:  [] 101  Resp:  [12-39] 19  SpO2:  [91 %-99 %] 99 %  BP: (154)/(77) 154/77  Arterial Line BP: (117-168)/(50-72) 150/61     Weight: 74 kg (163 lb 2.3 oz)  Body mass index is 24.08 kg/m².      Intake/Output Summary (Last 24 hours) at 11/21/2022 0808  Last data filed at 11/21/2022 0500  Gross per 24 hour   Intake 400.03 ml   Output 329 ml   Net 71.03 ml       Physical Exam  Vitals and nursing note reviewed.   Constitutional:       General: He is not in acute distress.     Appearance: He is not ill-appearing.   Cardiovascular:      Rate and Rhythm: Normal rate and regular rhythm.      Pulses: Normal pulses.   Pulmonary:      Effort: Pulmonary effort is normal.   Abdominal:      General: Abdomen is flat.      Palpations: Abdomen is soft.   Musculoskeletal:      Right lower leg: No edema.      Left lower leg: No edema.   Skin:     General: Skin is warm.      Findings: No bruising or lesion.      Comments: BLANCA drains with sanguinous output    Neurological:      General: No focal deficit present.         Lines/Drains/Airways       Peripherally Inserted Central Catheter Line  Duration             PICC Double Lumen 10/17/22 1709 right brachial 34 days              Central Venous Catheter Line  Duration             Trialysis (Dialysis) Catheter 11/15/22 1654 right internal  jugular 5 days              Drain  Duration                  Urethral Catheter 11/12/22 2125 Straight-tip;Non-latex 16 Fr. 8 days         Closed/Suction Drain 11/14/22 2010 Inferior;Right Chest Bulb 15 Fr. 6 days         Closed/Suction Drain 11/14/22 2010 Superior;Midline Abdomen Bulb 15 Fr. 6 days         Closed/Suction Drain 11/14/22 2011 Lateral;Right Other (Comment) Bulb 15 Fr. 6 days         Closed/Suction Drain 11/14/22 2015 Inferior;Left Chest Bulb 15 Fr. 6 days         Closed/Suction Drain 11/14/22 2016 Lateral;Left Other (Comment) Bulb 15 Fr. 6 days              Arterial Line  Duration             Arterial Line 11/08/22 0712 Right Radial 13 days                    Significant Labs:    CBC/Anemia Profile:  Recent Labs   Lab 11/20/22  0321 11/21/22  0304   WBC 17.05* 18.03*   HGB 9.0* 8.9*   HCT 28.5* 28.7*    301   MCV 95 98   RDW 17.4* 17.7*        Chemistries:  Recent Labs   Lab 11/20/22  0321 11/20/22  1443 11/21/22  0304    135* 136   K 4.5 4.5 4.9    105 103   CO2 25 22* 26   BUN 4* 12 16   CREATININE 1.0 1.8* 2.6*   CALCIUM 7.9* 7.8* 8.5*   ALBUMIN 2.3* 2.1* 2.3*   PROT 5.7*  --  5.5*   BILITOT 0.8  --  0.6   ALKPHOS 114  --  99   ALT 5*  --  5*   AST 18  --  15   MG 2.1 1.8 1.9   PHOS 4.1 2.5* 3.5       All pertinent labs within the past 24 hours have been reviewed.    Significant Imaging:  I have reviewed all pertinent imaging results/findings within the past 24 hours.

## 2022-11-22 LAB
ALBUMIN SERPL BCP-MCNC: 2.2 G/DL (ref 3.5–5.2)
ALP SERPL-CCNC: 112 U/L (ref 55–135)
ALT SERPL W/O P-5'-P-CCNC: <5 U/L (ref 10–44)
ANION GAP SERPL CALC-SCNC: 8 MMOL/L (ref 8–16)
AST SERPL-CCNC: 18 U/L (ref 10–40)
BACTERIA UR CULT: NO GROWTH
BASOPHILS # BLD AUTO: 0.06 K/UL (ref 0–0.2)
BASOPHILS NFR BLD: 0.3 % (ref 0–1.9)
BILIRUB SERPL-MCNC: 0.6 MG/DL (ref 0.1–1)
BUN SERPL-MCNC: 17 MG/DL (ref 8–23)
CALCIUM SERPL-MCNC: 8.5 MG/DL (ref 8.7–10.5)
CHLORIDE SERPL-SCNC: 102 MMOL/L (ref 95–110)
CO2 SERPL-SCNC: 23 MMOL/L (ref 23–29)
CREAT SERPL-MCNC: 2.8 MG/DL (ref 0.5–1.4)
DIFFERENTIAL METHOD: ABNORMAL
EOSINOPHIL # BLD AUTO: 0.3 K/UL (ref 0–0.5)
EOSINOPHIL NFR BLD: 1.5 % (ref 0–8)
ERYTHROCYTE [DISTWIDTH] IN BLOOD BY AUTOMATED COUNT: 17.6 % (ref 11.5–14.5)
EST. GFR  (NO RACE VARIABLE): 24.6 ML/MIN/1.73 M^2
GLUCOSE SERPL-MCNC: 100 MG/DL (ref 70–110)
HCT VFR BLD AUTO: 28.6 % (ref 40–54)
HGB BLD-MCNC: 8.8 G/DL (ref 14–18)
IMM GRANULOCYTES # BLD AUTO: 0.22 K/UL (ref 0–0.04)
IMM GRANULOCYTES NFR BLD AUTO: 1.3 % (ref 0–0.5)
LYMPHOCYTES # BLD AUTO: 1 K/UL (ref 1–4.8)
LYMPHOCYTES NFR BLD: 5.8 % (ref 18–48)
MAGNESIUM SERPL-MCNC: 1.8 MG/DL (ref 1.6–2.6)
MCH RBC QN AUTO: 30.4 PG (ref 27–31)
MCHC RBC AUTO-ENTMCNC: 30.8 G/DL (ref 32–36)
MCV RBC AUTO: 99 FL (ref 82–98)
MONOCYTES # BLD AUTO: 1.3 K/UL (ref 0.3–1)
MONOCYTES NFR BLD: 7.6 % (ref 4–15)
NEUTROPHILS # BLD AUTO: 14.3 K/UL (ref 1.8–7.7)
NEUTROPHILS NFR BLD: 83.5 % (ref 38–73)
NRBC BLD-RTO: 0 /100 WBC
PHOSPHATE SERPL-MCNC: 3.4 MG/DL (ref 2.7–4.5)
PLATELET # BLD AUTO: 300 K/UL (ref 150–450)
PMV BLD AUTO: 9.9 FL (ref 9.2–12.9)
POTASSIUM SERPL-SCNC: 4.9 MMOL/L (ref 3.5–5.1)
PROT SERPL-MCNC: 5.6 G/DL (ref 6–8.4)
RBC # BLD AUTO: 2.89 M/UL (ref 4.6–6.2)
SODIUM SERPL-SCNC: 133 MMOL/L (ref 136–145)
WBC # BLD AUTO: 17.18 K/UL (ref 3.9–12.7)

## 2022-11-22 PROCEDURE — 94761 N-INVAS EAR/PLS OXIMETRY MLT: CPT

## 2022-11-22 PROCEDURE — 80100014 HC HEMODIALYSIS 1:1

## 2022-11-22 PROCEDURE — 97110 THERAPEUTIC EXERCISES: CPT

## 2022-11-22 PROCEDURE — 63600175 PHARM REV CODE 636 W HCPCS: Performed by: ANESTHESIOLOGY

## 2022-11-22 PROCEDURE — 63600175 PHARM REV CODE 636 W HCPCS: Performed by: THORACIC SURGERY (CARDIOTHORACIC VASCULAR SURGERY)

## 2022-11-22 PROCEDURE — 25000003 PHARM REV CODE 250: Performed by: STUDENT IN AN ORGANIZED HEALTH CARE EDUCATION/TRAINING PROGRAM

## 2022-11-22 PROCEDURE — 80053 COMPREHEN METABOLIC PANEL: CPT

## 2022-11-22 PROCEDURE — 25000003 PHARM REV CODE 250

## 2022-11-22 PROCEDURE — 99233 PR SUBSEQUENT HOSPITAL CARE,LEVL III: ICD-10-PCS | Mod: ,,, | Performed by: ANESTHESIOLOGY

## 2022-11-22 PROCEDURE — 85025 COMPLETE CBC W/AUTO DIFF WBC: CPT | Performed by: THORACIC SURGERY (CARDIOTHORACIC VASCULAR SURGERY)

## 2022-11-22 PROCEDURE — 99233 SBSQ HOSP IP/OBS HIGH 50: CPT | Mod: ,,, | Performed by: ANESTHESIOLOGY

## 2022-11-22 PROCEDURE — 97530 THERAPEUTIC ACTIVITIES: CPT

## 2022-11-22 PROCEDURE — 63600175 PHARM REV CODE 636 W HCPCS

## 2022-11-22 PROCEDURE — 63600175 PHARM REV CODE 636 W HCPCS: Performed by: STUDENT IN AN ORGANIZED HEALTH CARE EDUCATION/TRAINING PROGRAM

## 2022-11-22 PROCEDURE — 84100 ASSAY OF PHOSPHORUS: CPT

## 2022-11-22 PROCEDURE — 99233 PR SUBSEQUENT HOSPITAL CARE,LEVL III: ICD-10-PCS | Mod: ,,, | Performed by: INTERNAL MEDICINE

## 2022-11-22 PROCEDURE — 83735 ASSAY OF MAGNESIUM: CPT

## 2022-11-22 PROCEDURE — 99233 SBSQ HOSP IP/OBS HIGH 50: CPT | Mod: ,,, | Performed by: INTERNAL MEDICINE

## 2022-11-22 PROCEDURE — 20600001 HC STEP DOWN PRIVATE ROOM

## 2022-11-22 RX ORDER — MAGNESIUM SULFATE 1 G/100ML
1 INJECTION INTRAVENOUS ONCE
Status: COMPLETED | OUTPATIENT
Start: 2022-11-22 | End: 2022-11-22

## 2022-11-22 RX ORDER — HEPARIN SODIUM 1000 [USP'U]/ML
1000 INJECTION, SOLUTION INTRAVENOUS; SUBCUTANEOUS
Status: CANCELLED | OUTPATIENT
Start: 2022-11-22 | End: 2022-11-23

## 2022-11-22 RX ORDER — DIAZEPAM 5 MG/1
5 TABLET ORAL EVERY 6 HOURS PRN
Status: DISCONTINUED | OUTPATIENT
Start: 2022-11-22 | End: 2022-12-01 | Stop reason: HOSPADM

## 2022-11-22 RX ORDER — SODIUM CHLORIDE 9 MG/ML
INJECTION, SOLUTION INTRAVENOUS ONCE
Status: CANCELLED | OUTPATIENT
Start: 2022-11-22 | End: 2022-11-22

## 2022-11-22 RX ADMIN — CEFEPIME HYDROCHLORIDE 1 G: 1 INJECTION, POWDER, FOR SOLUTION INTRAMUSCULAR; INTRAVENOUS at 11:11

## 2022-11-22 RX ADMIN — ACETAMINOPHEN 1000 MG: 500 TABLET ORAL at 10:11

## 2022-11-22 RX ADMIN — ATORVASTATIN CALCIUM 40 MG: 20 TABLET, FILM COATED ORAL at 08:11

## 2022-11-22 RX ADMIN — OXYCODONE HYDROCHLORIDE 10 MG: 10 TABLET ORAL at 05:11

## 2022-11-22 RX ADMIN — HEPARIN SODIUM 5000 UNITS: 5000 INJECTION INTRAVENOUS; SUBCUTANEOUS at 10:11

## 2022-11-22 RX ADMIN — SENNOSIDES AND DOCUSATE SODIUM 1 TABLET: 50; 8.6 TABLET ORAL at 08:11

## 2022-11-22 RX ADMIN — METHOCARBAMOL 500 MG: 500 TABLET ORAL at 08:11

## 2022-11-22 RX ADMIN — HYDROMORPHONE HYDROCHLORIDE 1 MG: 1 INJECTION, SOLUTION INTRAMUSCULAR; INTRAVENOUS; SUBCUTANEOUS at 10:11

## 2022-11-22 RX ADMIN — AMIODARONE HYDROCHLORIDE 200 MG: 200 TABLET ORAL at 08:11

## 2022-11-22 RX ADMIN — OXYCODONE HYDROCHLORIDE 10 MG: 10 TABLET ORAL at 08:11

## 2022-11-22 RX ADMIN — HEPARIN SODIUM 5000 UNITS: 5000 INJECTION INTRAVENOUS; SUBCUTANEOUS at 02:11

## 2022-11-22 RX ADMIN — OXYCODONE HYDROCHLORIDE 10 MG: 10 TABLET ORAL at 01:11

## 2022-11-22 RX ADMIN — ACETAMINOPHEN 1000 MG: 500 TABLET ORAL at 02:11

## 2022-11-22 RX ADMIN — ACETAMINOPHEN 1000 MG: 500 TABLET ORAL at 05:11

## 2022-11-22 RX ADMIN — ASPIRIN 81 MG 81 MG: 81 TABLET ORAL at 08:11

## 2022-11-22 RX ADMIN — METHOCARBAMOL 500 MG: 500 TABLET ORAL at 03:11

## 2022-11-22 RX ADMIN — OXYCODONE HYDROCHLORIDE 10 MG: 10 TABLET ORAL at 03:11

## 2022-11-22 RX ADMIN — POLYETHYLENE GLYCOL 3350 17 G: 17 POWDER, FOR SOLUTION ORAL at 08:11

## 2022-11-22 RX ADMIN — HEPARIN SODIUM 5000 UNITS: 5000 INJECTION INTRAVENOUS; SUBCUTANEOUS at 05:11

## 2022-11-22 RX ADMIN — MAGNESIUM SULFATE HEPTAHYDRATE 1 G: 500 INJECTION, SOLUTION INTRAMUSCULAR; INTRAVENOUS at 04:11

## 2022-11-22 NOTE — SUBJECTIVE & OBJECTIVE
Interval History: Patient seen and examined this AM. No acute events overnight. Tolerated iHD yesterday x3 hours with UF of 500 mL. Afebrile with pulse ranging from 100-90s bpm. Systolic blood pressures ranging from 140-90s mmHg. He is saturating +93% on room air with a documented 230 mL in the last 24 hours. UA not indicated of infection, blood cultures from yesterday without growth thus far and leukocytolysis mildly improved today. Plan for another session of HD today with tunneled HD catheter placement tomorrow per IR.    Review of patient's allergies indicates:  No Known Allergies  Current Facility-Administered Medications   Medication Frequency    acetaminophen tablet 1,000 mg Q8H    amiodarone tablet 200 mg BID    aspirin chewable tablet 81 mg Daily    atorvastatin tablet 40 mg Daily    cefepime in dextrose 5 % 1 gram/50 mL IVPB 1 g Q24H    dextrose 10% bolus 125 mL PRN    dextrose 10% bolus 250 mL PRN    heparin (porcine) injection 5,000 Units Q8H    HYDROmorphone injection 1 mg Q4H PRN    hydrOXYzine pamoate capsule 50 mg Q8H PRN    methocarbamoL tablet 500 mg TID    ondansetron injection 4 mg Q8H PRN    oxyCODONE immediate release tablet 5 mg Q4H PRN    oxyCODONE immediate release tablet Tab 10 mg Q4H PRN    polyethylene glycol packet 17 g Daily    senna-docusate 8.6-50 mg per tablet 1 tablet BID       Objective:     Vital Signs (Most Recent):  Temp: 98.2 °F (36.8 °C) (11/22/22 0300)  Pulse: 89 (11/22/22 0645)  Resp: 14 (11/22/22 0645)  BP: (!) 127/56 (11/21/22 1645)  SpO2: (!) 94 % (11/22/22 0645)  O2 Device (Oxygen Therapy): room air (11/22/22 0600)   Vital Signs (24h Range):  Temp:  [98.2 °F (36.8 °C)-98.6 °F (37 °C)] 98.2 °F (36.8 °C)  Pulse:  [] 89  Resp:  [12-38] 14  SpO2:  [92 %-99 %] 94 %  BP: (127-144)/(54-61) 127/56  Arterial Line BP: ()/(48-69) 99/49     Weight: 74 kg (163 lb 2.3 oz) (11/12/22 1519)  Body mass index is 24.08 kg/m².  Body surface area is 1.9 meters squared.    I/O last  3 completed shifts:  In: 1432.5 [P.O.:650; I.V.:133.3; Other:500; IV Piggyback:149.1]  Out: 1609 [Urine:310; Drains:299; Other:1000]    Physical Exam  Vitals and nursing note reviewed.   Constitutional:       General: He is not in acute distress.     Appearance: He is normal weight. He is not ill-appearing or diaphoretic.   HENT:      Head: Normocephalic and atraumatic.      Right Ear: External ear normal.      Left Ear: External ear normal.      Nose: Nose normal.   Eyes:      General: No scleral icterus.        Right eye: No discharge.         Left eye: No discharge.      Extraocular Movements: Extraocular movements intact.      Conjunctiva/sclera: Conjunctivae normal.      Comments: Prescription eye glasses.   Neck:      Comments: Trialysis catheter to right internal jugular vein.  Cardiovascular:      Rate and Rhythm: Normal rate.      Heart sounds: Murmur heard.     No friction rub. No gallop.      Comments: Binder in place with closed incision overlying sternum.  Pulmonary:      Effort: No respiratory distress.      Breath sounds: No wheezing, rhonchi or rales.   Chest:      Comments: BLANCA drains present with sanguinous output.  Abdominal:      General: Bowel sounds are normal. There is no distension.      Palpations: Abdomen is soft.      Tenderness: There is no abdominal tenderness.   Musculoskeletal:      Cervical back: Neck supple.      Right lower le+ Pitting Edema present.      Left lower le+ Pitting Edema present.   Skin:     General: Skin is warm and dry.      Coloration: Skin is not jaundiced.   Neurological:      General: No focal deficit present.      Mental Status: He is alert. Mental status is at baseline.      Cranial Nerves: No cranial nerve deficit.   Psychiatric:         Mood and Affect: Mood normal.         Behavior: Behavior normal.       Significant Labs:  BMP:   Recent Labs   Lab 22  0259      *   K 4.9      CO2 23   BUN 17   CREATININE 2.8*   CALCIUM 8.5*    MG 1.8       CBC:   Recent Labs   Lab 11/22/22 0259   WBC 17.18*   RBC 2.89*   HGB 8.8*   HCT 28.6*      MCV 99*   MCH 30.4   MCHC 30.8*       CMP:   Recent Labs   Lab 11/22/22 0259      CALCIUM 8.5*   ALBUMIN 2.2*   PROT 5.6*   *   K 4.9   CO2 23      BUN 17   CREATININE 2.8*   ALKPHOS 112   ALT <5*   AST 18   BILITOT 0.6       Coagulation:   Recent Labs   Lab 11/19/22  0621   INR 1.2   APTT 32.5*       LFTs:   Recent Labs   Lab 11/22/22 0259   ALT <5*   AST 18   ALKPHOS 112   BILITOT 0.6   PROT 5.6*   ALBUMIN 2.2*       Microbiology Results (last 7 days)       Procedure Component Value Units Date/Time    Blood culture [[id number]] Collected: 11/21/22 1559    Order Status: Completed Specimen: Blood from Peripheral, Lower Arm, Left Updated: 11/22/22 0115     Blood Culture, Routine No Growth to date    Blood culture [[id number]] Collected: 11/21/22 1558    Order Status: Completed Specimen: Blood from Peripheral, Antecubital, Left Updated: 11/22/22 0115     Blood Culture, Routine No Growth to date    Narrative:      Two different sites    Urine culture [[id number]] Collected: 11/21/22 1844    Order Status: No result Specimen: Urine Updated: 11/21/22 1905    Fungus culture [[id number]] Collected: 11/10/22 0834    Order Status: Completed Specimen: Wound from Sternum Updated: 11/17/22 1005     Fungus (Mycology) Culture Culture in progress    Narrative:      Sternal wound culture    Fungus culture [[id number]] Collected: 11/08/22 1106    Order Status: Completed Specimen: Wound from Sternum Updated: 11/17/22 1005     Fungus (Mycology) Culture Culture in progress    Narrative:      Sternal wound    Fungus culture [[id number]] Collected: 11/08/22 1106    Order Status: Completed Specimen: Wound from Sternum Updated: 11/17/22 1005     Fungus (Mycology) Culture Culture in progress    Narrative:      Sternal drainage    Fungus culture [[id number]] Collected: 11/08/22 1106    Order Status: Completed  Specimen: Wound from Sternum Updated: 11/17/22 1005     Fungus (Mycology) Culture Culture in progress    Narrative:      Pericardial peel    Fungus culture [[id number]] Collected: 11/08/22 1106    Order Status: Completed Specimen: Wound from Sternum Updated: 11/17/22 1005     Fungus (Mycology) Culture Culture in progress    Narrative:      Sternal wound    Fungus culture [[id number]] Collected: 11/08/22 1106    Order Status: Completed Specimen: Wound from Sternum Updated: 11/17/22 1005     Fungus (Mycology) Culture Culture in progress    Narrative:      Aortic graft    Fungus culture [[id number]] Collected: 11/10/22 0836    Order Status: Completed Specimen: Wound from Sternum Updated: 11/17/22 0934     Fungus (Mycology) Culture Culture in progress    Narrative:      Sternal hematoma culture    Culture, Anaerobe [[id number]] Collected: 11/10/22 0834    Order Status: Completed Specimen: Wound from Sternum Updated: 11/17/22 0750     Anaerobic Culture No anaerobes isolated    Narrative:      Sternal wound culture    Culture, Anaerobe [[id number]] Collected: 11/08/22 1106    Order Status: Completed Specimen: Wound from Sternum Updated: 11/17/22 0750     Anaerobic Culture No anaerobes isolated    Narrative:      Sternal wound    Culture, Anaerobe [[id number]] Collected: 11/08/22 1106    Order Status: Completed Specimen: Wound from Sternum Updated: 11/17/22 0750     Anaerobic Culture No anaerobes isolated    Narrative:      Sternal drainage    Culture, Anaerobe [[id number]] Collected: 11/08/22 1106    Order Status: Completed Specimen: Wound from Sternum Updated: 11/17/22 0750     Anaerobic Culture No anaerobes isolated    Narrative:      Pericardial peel    Culture, Anaerobe [[id number]] Collected: 11/08/22 1106    Order Status: Completed Specimen: Wound from Sternum Updated: 11/17/22 0750     Anaerobic Culture No anaerobes isolated    Narrative:      Sternal wound    Culture, Anaerobe [[id number]] Collected: 11/08/22 1106    Order  Status: Completed Specimen: Wound from Sternum Updated: 11/16/22 0746     Anaerobic Culture No anaerobes isolated    Narrative:      Aortic graft    Culture, Anaerobe [[id number]] Collected: 11/10/22 0836    Order Status: Completed Specimen: Wound from Sternum Updated: 11/16/22 0746     Anaerobic Culture No anaerobes isolated    Narrative:      Sternal hematoma culture          Significant Imaging:  I have reviewed all imagining in the last 24 hours.

## 2022-11-22 NOTE — PLAN OF CARE
Pt AAOx4. VSS on RA. UOP 100cc/shift. BLANCA drains output 130cc/shift. PRN pain meds used to treat pt reported pain. No acute events overnight. Pt OOBTC. POC reviewed with pt and all questions and concerns addressed. Refer to flowsheet for VS and further assessment.

## 2022-11-22 NOTE — PROGRESS NOTES
Pt tolerated entire 3 hour hd tx well. 0.5 liters removed. Lines flushed and clamped new caps in place.

## 2022-11-22 NOTE — ASSESSMENT & PLAN NOTE
- Baseline Scr 0.9-1.   - 10/31 Scr increased to ~1.5; steadly increased to peak of 5.3 today  - Hyperkalemia noted to 5.3  - Anuric today despite IV diuril 500mg TID; and IV lasix 120mg TID  - Risk factors for KIMANI include Cardiorenal syndrome (less likely given aggressive diuresis in last 72hrs; and normal CVP readings); AIN; IRGN; and prolonged prerenal state from over diuresis, sepsis.    Recommendations/Plan:  - Plan for iHD again today with UF of 500 mL as tolerated with break from HD tomorrow  - Tentative plan for tunneled HD catheter placement on 11/23 per Interventional Radiology if blood cultures without growth x48 hours (thus without growth)  - Daily RFP and magnesium level  - Strict I/Os and daily weights  - Renally dose medications to eGFR  - Avoid nephrotoxins when feasible (i.e. intra-arterial contrast, NSAIDs, etc.)  - RENAL DIET when not NPO

## 2022-11-22 NOTE — PROGRESS NOTES
Bedside hd 1:1 started via rt ij hd cath. Lines connected and secured see flow sheet. Per following orders:  Duration of Treatment 3 hours   Dialyzer F160   Dialysate Temperature (C) 36.5   BFR-As tolerated to a maximum of: 300 mL/min    mL/min   K+ 3 MEQ/L   Ca++ 2.5 MEQ/L   Na+ 138 MEQ/   Bicarb 30 meq   Access CVC   Fluid Removal (L): 500 mL as tolerated, please keep systolic blood pressure > 120 mmHg   Tubing 8 mm   Dialysate Bath Solution Protocol (DO NOT MODIFY ANSWER) [email or web address] [email or web address] Bath Solution Algorithm (formatted with date).pdf   Antibiotics on HD? No      Latest Reference Range & Units 11/22/22 02:59   WBC 3.90 - 12.70 K/uL 17.18 (H)   RBC 4.60 - 6.20 M/uL 2.89 (L)   Hemoglobin 14.0 - 18.0 g/dL 8.8 (L)   Hematocrit 40.0 - 54.0 % 28.6 (L)   MCV 82 - 98 fL 99 (H)   MCH 27.0 - 31.0 pg 30.4   MCHC 32.0 - 36.0 g/dL 30.8 (L)   RDW 11.5 - 14.5 % 17.6 (H)   Platelets 150 - 450 K/uL 300   MPV 9.2 - 12.9 fL 9.9   Gran % 38.0 - 73.0 % 83.5 (H)   Lymph % 18.0 - 48.0 % 5.8 (L)   Mono % 4.0 - 15.0 % 7.6   Eosinophil % 0.0 - 8.0 % 1.5   Basophil % 0.0 - 1.9 % 0.3   Immature Granulocytes 0.0 - 0.5 % 1.3 (H)   Gran # (ANC) 1.8 - 7.7 K/uL 14.3 (H)   Lymph # 1.0 - 4.8 K/uL 1.0   Mono # 0.3 - 1.0 K/uL 1.3 (H)   Eos # 0.0 - 0.5 K/uL 0.3   Baso # 0.00 - 0.20 K/uL 0.06   Immature Grans (Abs) 0.00 - 0.04 K/uL 0.22 (H)   nRBC 0 /100 WBC 0   Differential Method  Automated   Sodium 136 - 145 mmol/L 133 (L)   Potassium 3.5 - 5.1 mmol/L 4.9   Chloride 95 - 110 mmol/L 102   CO2 23 - 29 mmol/L 23   Anion Gap 8 - 16 mmol/L 8   BUN 8 - 23 mg/dL 17   Creatinine 0.5 - 1.4 mg/dL 2.8 (H)   eGFR >60 mL/min/1.73 m^2 24.6 !   Glucose 70 - 110 mg/dL 100   Calcium 8.7 - 10.5 mg/dL 8.5 (L)   Phosphorus 2.7 - 4.5 mg/dL 3.4   Magnesium 1.6 - 2.6 mg/dL 1.8   Alkaline Phosphatase 55 - 135 U/L 112   PROTEIN TOTAL 6.0 - 8.4 g/dL 5.6 (L)   Albumin 3.5 - 5.2 g/dL 2.2 (L)   BILIRUBIN TOTAL 0.1 - 1.0 mg/dL 0.6    AST 10 - 40 U/L 18   ALT 10 - 44 U/L <5 (L)   (H): Data is abnormally high  (L): Data is abnormally low  !: Data is abnormal

## 2022-11-22 NOTE — PROGRESS NOTES
[facility] - [facility]ensive [facility]  Critical Care - Surgery  Progress Note    Patient Name: David Barrios  MRN: [id number]  Admission Date: 11/8/2022  Hospital Length of Stay: 14 days  Code Status: Full Code  Attending Provider: Mike Hedrick MD  Primary Care Provider: Breann Tavera MD   Principal Problem: Sternal wound infection    Subjective:     Hospital/ICU Course:        Interval History/Significant Events: NAEO. Afebrile, VSS. Tolerated HD. Transfer to floor, perm cath placement on Wednesday.     Follow-up For: Procedure(s) (LRB):  CREATION, FLAP, MUSCLE ROTATION (N/A)  IRRIGATION AND DEBRIDEMENT, WOUND, STERNUM (N/A)  CLOSURE, WOUND, STERNUM (N/A)  EXPLORATION, WOUND (N/A)    Post-Operative Day: 8 Days Post-Op    Objective:     Vital Signs (Most Recent):  Temp: 98.2 °F (36.8 °C) (11/22/22 0300)  Pulse: 87 (11/22/22 0753)  Resp: 17 (11/22/22 0753)  BP: (!) 127/56 (11/21/22 1645)  SpO2: 95 % (11/22/22 0753)   Vital Signs (24h Range):  Temp:  [98.2 °F (36.8 °C)-98.6 °F (37 °C)] 98.2 °F (36.8 °C)  Pulse:  [] 87  Resp:  [12-38] 17  SpO2:  [92 %-97 %] 95 %  BP: (127-144)/(54-61) 127/56  Arterial Line BP: ()/(48-69) 101/48     Weight: 74 kg (163 lb 2.3 oz)  Body mass index is 24.08 kg/m².      Intake/Output Summary (Last 24 hours) at 11/22/2022 0832  Last data filed at 11/22/2022 0600  Gross per 24 hour   Intake 1301.17 ml   Output 1415 ml   Net -113.83 ml       Physical Exam  Vitals and nursing note reviewed.   Constitutional:       General: He is not in acute distress.     Appearance: He is not ill-appearing.   Cardiovascular:      Rate and Rhythm: Normal rate and regular rhythm.      Pulses: Normal pulses.      Comments: Incisions from pec flap with plastics with dressing in place . Midline sternotomy   Drains with benign output     Pulmonary:      Effort: Pulmonary effort is normal.   Abdominal:      General: Abdomen is flat.      Palpations: Abdomen is soft.   Musculoskeletal:      Right  lower leg: No edema.      Left lower leg: No edema.   Skin:     General: Skin is warm.      Findings: No bruising or lesion.      Comments: BLANCA drains with sanguinous output    Neurological:      General: No focal deficit present.       Vents:  Lines/Drains/Airways       Peripherally Inserted Central Catheter Line  Duration             PICC Double Lumen 10/17/22 1709 right brachial 35 days              Central Venous Catheter Line  Duration             Trialysis (Dialysis) Catheter 11/15/22 1654 right internal jugular 6 days              Drain  Duration                  Closed/Suction Drain 11/14/22 2010 Inferior;Right Chest Bulb 15 Fr. 7 days         Closed/Suction Drain 11/14/22 2010 Superior;Midline Abdomen Bulb 15 Fr. 7 days         Closed/Suction Drain 11/14/22 2011 Lateral;Right Other (Comment) Bulb 15 Fr. 7 days         Closed/Suction Drain 11/14/22 2015 Inferior;Left Chest Bulb 15 Fr. 7 days         Closed/Suction Drain 11/14/22 2016 Lateral;Left Other (Comment) Bulb 15 Fr. 7 days              Arterial Line  Duration             Arterial Line 11/08/22 0712 Right Radial 14 days                    Significant Labs:    CBC/Anemia Profile:  Recent Labs   Lab 11/21/22  0304 11/22/22  0259   WBC 18.03* 17.18*   HGB 8.9* 8.8*   HCT 28.7* 28.6*    300   MCV 98 99*   RDW 17.7* 17.6*        Chemistries:  Recent Labs   Lab 11/20/22  1443 11/21/22  0304 11/22/22  0259   * 136 133*   K 4.5 4.9 4.9    103 102   CO2 22* 26 23   BUN 12 16 17   CREATININE 1.8* 2.6* 2.8*   CALCIUM 7.8* 8.5* 8.5*   ALBUMIN 2.1* 2.3* 2.2*   PROT  --  5.5* 5.6*   BILITOT  --  0.6 0.6   ALKPHOS  --  99 112   ALT  --  5* <5*   AST  --  15 18   MG 1.8 1.9 1.8   PHOS 2.5* 3.5 3.4       All pertinent labs within the past 24 hours have been reviewed.    Significant Imaging:  I have reviewed all pertinent imaging results/findings within the past 24 hours.    Assessment/Plan:     * Sternal wound infection  62 y.o. male S/P TV repair ,  MV repair, MAZE, Lt Atrial Appendage ligation and Impella placement on 10/7/22, course complicated by bleeding, requiring reopening POD#0, multiple VT runs requiring DCCV, and sternal wound infections. He presents to the SICU s/p wound debridement and wire removal on 11/08 followed by washout on 11/10      Neuro/Psych:     - Sedation: none    - Pain:    - Scheduled Tylenol 1g q8h with prn Oxy, dilaudid for break through pain               Cardiac:      -BP Goal: MAP 60-80 and SBP <140.  Vaso 0.04, will wean.     - Echo with EF 35% with mild RV enlargement and moderate RV dysfunction. Cont milrinone 0.25     - Anti-HTNs: Will restart home meds when appropriate     - Rhythm: NSR. S/p MAZE for Afib, cont po amio.     - Off Milrinone. May restart BB soon.     - Statin: Atorvastatin 40 mg QD      Pulmonary:     - Goal SpO2 >92%, 2L NC     - CXR with patchy opacities on R side and SQ air, leukocytosis downtrending. ID recs doxy x5 days for PNA.       Renal:    - Trend BUN/Cr. Oliguric KIMANI. Will monitor and support kidneys with MAP >65.     - CRRT, appreciate nephrology recs     - Maintain Fierro, record strict Is/Os   - Will f/u with nephrology regarding plans for HD vs perm cath. Interventional Neph consulted.     Recent Labs   Lab 11/20/22  1443 11/21/22  0304 11/22/22  0259   BUN 12 16 17   CREATININE 1.8* 2.6* 2.8*         FEN / GI:     - Daily CMP, PRN K/Mag/Phos per protocol     - Replace electrolytes as needed    - Nutrition: cardiac diet, BOOST    - Bowel Regimen: Miralax, docusate      ID:     - Leukocytosis without fever, s/p debridement and Pec flap 11/14. Now down trending     - Abx: Continue Cefepime. Will need long term abx plan, 6weeks post op flap    - ID recs Cefepime and Doxy      Recent Labs   Lab 11/20/22  0321 11/21/22  0304 11/22/22  0259   WBC 17.05* 18.03* 17.18*         Heme/Onc:     - CBC daily    - ASA 81mg daily    Recent Labs   Lab 11/17/22  0306 11/17/22  1106 11/18/22  0305  11/18/22  1143 11/19/22  0621 11/20/22  0321 11/21/22  0304 11/22/22  0259   HGB 7.0*   < > 8.7*   < >  --  9.0* 8.9* 8.8*      < > 273   < >  --  308 301 300   APTT 30.5  --  29.4  --  32.5*  --   --   --    INR 1.1  --  1.1  --  1.2  --   --   --     < > = values in this interval not displayed.         Endocrine:     - CTS Goal -140        PPx:   Feeding: cardiac diet   Analgesia/Sedation: tylenol, oxy, dilaudid PRN   Thromboembolic Prevention: scds, Heparin  HOB >30: Yes  Stress Ulcer: n/a  Glucose Control: none      Lines/Drains/Airway:   Left radial arterial line   RIJ CVC   BLANCA drains       Dispo/Code Status/Palliative:     - Step down to floor    - Full Code               Critical secondary to Patient has a condition that poses threat to life and bodily function     Critical care was time spent personally by me on the following activities: development of treatment plan with patient or surrogate and bedside caregivers, discussions with consultants, evaluation of patient's response to treatment, examination of patient, ordering and performing treatments and interventions, ordering and review of laboratory studies, ordering and review of radiographic studies, pulse oximetry, re-evaluation of patient's condition.  This critical care time did not overlap with that of any other provider or involve time for any procedures.     Gustavo Aguero, DO  Critical Care - Surgery  [facility]

## 2022-11-22 NOTE — PROGRESS NOTES
[facility]  Nephrology  Progress Note    Patient Name: David Barrios  MRN: [id number]  Admission Date: 11/8/2022  Hospital Length of Stay: 14 days  Attending Provider: Mike Hedrick MD   Primary Care Physician: Breann Tavera MD  Principal Problem:Sternal wound infection    Subjective:     HPI: 63 yo male w/ hx of Afib; HFrEF (35%); HTN; HLD. Patient underwent who underwent mitral valve repair, tricuspid valve repair, left atrial maze, left atrial appendage resection, and direct aortic impella 5.5 insertion on 10/07/2022. Hospital course complicated by Serratia bacteremia (on 6wk course of cefepime), sternal wound infection, and cardiogenic shock. On 10/31 patient developed KIMANI. Per chart review no documented course of hypotension during that time. Patient started on diuresis with IV lasix, with good urine output. Patient required increased diuretics (IV Diuril 500mg TID; and Lasix 120mg TID) and continued to have increased Scr, which progressed to oliguria, and now having metabolic derangements.     Patient denies any prior history of kidney disease. Does not have significant family history of kidney disease. Does endorse worsening fatigue and dyspnea. Denies any nausea vomiting, or metallic taste in mouth.     Nephrology was consulted for oliguric KIMANI.       Interval History: Patient seen and examined this AM. No acute events overnight. Tolerated iHD yesterday x3 hours with UF of 500 mL. Afebrile with pulse ranging from 100-90s bpm. Systolic blood pressures ranging from 140-90s mmHg. He is saturating +93% on room air with a documented 230 mL in the last 24 hours. UA not indicated of infection, blood cultures from yesterday without growth thus far and leukocytolysis mildly improved today. Plan for another session of HD today with tunneled HD catheter placement tomorrow per IR.    Review of patient's allergies indicates:  No Known Allergies  Current Facility-Administered Medications    Medication Frequency    acetaminophen tablet 1,000 mg Q8H    amiodarone tablet 200 mg BID    aspirin chewable tablet 81 mg Daily    atorvastatin tablet 40 mg Daily    cefepime in dextrose 5 % 1 gram/50 mL IVPB 1 g Q24H    dextrose 10% bolus 125 mL PRN    dextrose 10% bolus 250 mL PRN    heparin (porcine) injection 5,000 Units Q8H    HYDROmorphone injection 1 mg Q4H PRN    hydrOXYzine pamoate capsule 50 mg Q8H PRN    methocarbamoL tablet 500 mg TID    ondansetron injection 4 mg Q8H PRN    oxyCODONE immediate release tablet 5 mg Q4H PRN    oxyCODONE immediate release tablet Tab 10 mg Q4H PRN    polyethylene glycol packet 17 g Daily    senna-docusate 8.6-50 mg per tablet 1 tablet BID       Objective:     Vital Signs (Most Recent):  Temp: 98.2 °F (36.8 °C) (11/22/22 0300)  Pulse: 89 (11/22/22 0645)  Resp: 14 (11/22/22 0645)  BP: (!) 127/56 (11/21/22 1645)  SpO2: (!) 94 % (11/22/22 0645)  O2 Device (Oxygen Therapy): room air (11/22/22 0600)   Vital Signs (24h Range):  Temp:  [98.2 °F (36.8 °C)-98.6 °F (37 °C)] 98.2 °F (36.8 °C)  Pulse:  [] 89  Resp:  [12-38] 14  SpO2:  [92 %-99 %] 94 %  BP: (127-144)/(54-61) 127/56  Arterial Line BP: ()/(48-69) 99/49     Weight: 74 kg (163 lb 2.3 oz) (11/12/22 1519)  Body mass index is 24.08 kg/m².  Body surface area is 1.9 meters squared.    I/O last 3 completed shifts:  In: 1432.5 [P.O.:650; I.V.:133.3; Other:500; IV Piggyback:149.1]  Out: 1609 [Urine:310; Drains:299; Other:1000]    Physical Exam  Vitals and nursing note reviewed.   Constitutional:       General: He is not in acute distress.     Appearance: He is normal weight. He is not ill-appearing or diaphoretic.   HENT:      Head: Normocephalic and atraumatic.      Right Ear: External ear normal.      Left Ear: External ear normal.      Nose: Nose normal.   Eyes:      General: No scleral icterus.        Right eye: No discharge.         Left eye: No discharge.      Extraocular Movements: Extraocular  movements intact.      Conjunctiva/sclera: Conjunctivae normal.      Comments: Prescription eye glasses.   Neck:      Comments: Trialysis catheter to right internal jugular vein.  Cardiovascular:      Rate and Rhythm: Normal rate.      Heart sounds: Murmur heard.     No friction rub. No gallop.      Comments: Binder in place with closed incision overlying sternum.  Pulmonary:      Effort: No respiratory distress.      Breath sounds: No wheezing, rhonchi or rales.   Chest:      Comments: BLANCA drains present with sanguinous output.  Abdominal:      General: Bowel sounds are normal. There is no distension.      Palpations: Abdomen is soft.      Tenderness: There is no abdominal tenderness.   Musculoskeletal:      Cervical back: Neck supple.      Right lower le+ Pitting Edema present.      Left lower le+ Pitting Edema present.   Skin:     General: Skin is warm and dry.      Coloration: Skin is not jaundiced.   Neurological:      General: No focal deficit present.      Mental Status: He is alert. Mental status is at baseline.      Cranial Nerves: No cranial nerve deficit.   Psychiatric:         Mood and Affect: Mood normal.         Behavior: Behavior normal.       Significant Labs:  BMP:   Recent Labs   Lab 22      *   K 4.9      CO2 23   BUN 17   CREATININE 2.8*   CALCIUM 8.5*   MG 1.8       CBC:   Recent Labs   Lab 22   WBC 17.18*   RBC 2.89*   HGB 8.8*   HCT 28.6*      MCV 99*   MCH 30.4   MCHC 30.8*       CMP:   Recent Labs   Lab 22      CALCIUM 8.5*   ALBUMIN 2.2*   PROT 5.6*   *   K 4.9   CO2 23      BUN 17   CREATININE 2.8*   ALKPHOS 112   ALT <5*   AST 18   BILITOT 0.6       Coagulation:   Recent Labs   Lab 22  0621   INR 1.2   APTT 32.5*       LFTs:   Recent Labs   Lab 22   ALT <5*   AST 18   ALKPHOS 112   BILITOT 0.6   PROT 5.6*   ALBUMIN 2.2*       Microbiology Results (last 7 days)       Procedure  Component Value Units Date/Time    Blood culture [[id number]] Collected: 11/21/22 1559    Order Status: Completed Specimen: Blood from Peripheral, Lower Arm, Left Updated: 11/22/22 0115     Blood Culture, Routine No Growth to date    Blood culture [[id number]] Collected: 11/21/22 1558    Order Status: Completed Specimen: Blood from Peripheral, Antecubital, Left Updated: 11/22/22 0115     Blood Culture, Routine No Growth to date    Narrative:      Two different sites    Urine culture [[id number]] Collected: 11/21/22 1844    Order Status: No result Specimen: Urine Updated: 11/21/22 1905    Fungus culture [[id number]] Collected: 11/10/22 0834    Order Status: Completed Specimen: Wound from Sternum Updated: 11/17/22 1005     Fungus (Mycology) Culture Culture in progress    Narrative:      Sternal wound culture    Fungus culture [[id number]] Collected: 11/08/22 1106    Order Status: Completed Specimen: Wound from Sternum Updated: 11/17/22 1005     Fungus (Mycology) Culture Culture in progress    Narrative:      Sternal wound    Fungus culture [[id number]] Collected: 11/08/22 1106    Order Status: Completed Specimen: Wound from Sternum Updated: 11/17/22 1005     Fungus (Mycology) Culture Culture in progress    Narrative:      Sternal drainage    Fungus culture [[id number]] Collected: 11/08/22 1106    Order Status: Completed Specimen: Wound from Sternum Updated: 11/17/22 1005     Fungus (Mycology) Culture Culture in progress    Narrative:      Pericardial peel    Fungus culture [[id number]] Collected: 11/08/22 1106    Order Status: Completed Specimen: Wound from Sternum Updated: 11/17/22 1005     Fungus (Mycology) Culture Culture in progress    Narrative:      Sternal wound    Fungus culture [[id number]] Collected: 11/08/22 1106    Order Status: Completed Specimen: Wound from Sternum Updated: 11/17/22 1005     Fungus (Mycology) Culture Culture in progress    Narrative:      Aortic graft    Fungus culture [[id number]] Collected:  11/10/22 0836    Order Status: Completed Specimen: Wound from Sternum Updated: 11/17/22 0934     Fungus (Mycology) Culture Culture in progress    Narrative:      Sternal hematoma culture    Culture, Anaerobe [[id number]] Collected: 11/10/22 0834    Order Status: Completed Specimen: Wound from Sternum Updated: 11/17/22 0750     Anaerobic Culture No anaerobes isolated    Narrative:      Sternal wound culture    Culture, Anaerobe [[id number]] Collected: 11/08/22 1106    Order Status: Completed Specimen: Wound from Sternum Updated: 11/17/22 0750     Anaerobic Culture No anaerobes isolated    Narrative:      Sternal wound    Culture, Anaerobe [[id number]] Collected: 11/08/22 1106    Order Status: Completed Specimen: Wound from Sternum Updated: 11/17/22 0750     Anaerobic Culture No anaerobes isolated    Narrative:      Sternal drainage    Culture, Anaerobe [[id number]] Collected: 11/08/22 1106    Order Status: Completed Specimen: Wound from Sternum Updated: 11/17/22 0750     Anaerobic Culture No anaerobes isolated    Narrative:      Pericardial peel    Culture, Anaerobe [[id number]] Collected: 11/08/22 1106    Order Status: Completed Specimen: Wound from Sternum Updated: 11/17/22 0750     Anaerobic Culture No anaerobes isolated    Narrative:      Sternal wound    Culture, Anaerobe [[id number]] Collected: 11/08/22 1106    Order Status: Completed Specimen: Wound from Sternum Updated: 11/16/22 0746     Anaerobic Culture No anaerobes isolated    Narrative:      Aortic graft    Culture, Anaerobe [[id number]] Collected: 11/10/22 0836    Order Status: Completed Specimen: Wound from Sternum Updated: 11/16/22 0746     Anaerobic Culture No anaerobes isolated    Narrative:      Sternal hematoma culture          Significant Imaging:  I have reviewed all imagining in the last 24 hours.    Assessment/Plan:     * Sternal wound infection  - per primary (Cardiothoracic Surgery) and Infectious Disease    KIMANI (acute kidney injury)  - Baseline Scr  0.9-1.   - 10/31 Scr increased to ~1.5; steadly increased to peak of 5.3 today  - Hyperkalemia noted to 5.3  - Anuric today despite IV diuril 500mg TID; and IV lasix 120mg TID  - Risk factors for KIMANI include Cardiorenal syndrome (less likely given aggressive diuresis in last 72hrs; and normal CVP readings); AIN; IRGN; and prolonged prerenal state from over diuresis, sepsis.    Recommendations/Plan:  - Plan for iHD again today with UF of 500 mL as tolerated with break from HD tomorrow  - Tentative plan for tunneled HD catheter placement on 11/23 per Interventional Radiology if blood cultures without growth x48 hours (thus without growth)  - Daily RFP and magnesium level  - Strict I/Os and daily weights  - Renally dose medications to eGFR  - Avoid nephrotoxins when feasible (i.e. intra-arterial contrast, NSAIDs, etc.)  - RENAL DIET when not NPO      Thank you for your consult. I will follow-up with patient. Please contact us if you have any additional questions.    Golden Allen MD  Nephrology  [facility]

## 2022-11-22 NOTE — ASSESSMENT & PLAN NOTE
62 y.o. male S/P TV repair , MV repair, MAZE, Lt Atrial Appendage ligation and Impella placement on 10/7/22, course complicated by bleeding, requiring reopening POD#0, multiple VT runs requiring DCCV, and sternal wound infections. He presents to the SICU s/p wound debridement and wire removal on 11/08 followed by washout on 11/10      Neuro/Psych:     - Sedation: none    - Pain:    - Scheduled Tylenol 1g q8h with prn Oxy, dilaudid for break through pain               Cardiac:      -BP Goal: MAP 60-80 and SBP <140.  Vaso 0.04, will wean.     - Echo with EF 35% with mild RV enlargement and moderate RV dysfunction. Cont milrinone 0.25     - Anti-HTNs: Will restart home meds when appropriate     - Rhythm: NSR. S/p MAZE for Afib, cont po amio.     - Off Milrinone. May restart BB soon.     - Statin: Atorvastatin 40 mg QD      Pulmonary:     - Goal SpO2 >92%, 2L NC     - CXR with patchy opacities on R side and SQ air, leukocytosis downtrending. ID recs doxy x5 days for PNA.       Renal:    - Trend BUN/Cr. Oliguric KIMANI. Will monitor and support kidneys with MAP >65.     - CRRT, appreciate nephrology recs     - Maintain [place], record strict Is/Os   - Will f/u with nephrology regarding plans for HD vs perm cath. Interventional Neph consulted.     Recent Labs   Lab 11/20/22  1443 11/21/22  0304 11/22/22  0259   BUN 12 16 17   CREATININE 1.8* 2.6* 2.8*         FEN / GI:     - Daily CMP, PRN K/Mag/Phos per protocol     - Replace electrolytes as needed    - Nutrition: cardiac diet, BOOST    - Bowel Regimen: Miralax, docusate      ID:     - Leukocytosis without fever, s/p debridement and Pec flap 11/14. Now down trending     - Abx: Continue Cefepime. Will need long term abx plan, 6weeks post op flap    - ID recs Cefepime and Doxy      Recent Labs   Lab 11/20/22  0321 11/21/22  0304 11/22/22  0259   WBC 17.05* 18.03* 17.18*         Heme/Onc:     - CBC daily    - ASA 81mg daily    Recent Labs   Lab 11/17/22  0890  11/17/22  1106 11/18/22  0305 11/18/22  1143 11/19/22  0621 11/20/22  0321 11/21/22  0304 11/22/22  0259   HGB 7.0*   < > 8.7*   < >  --  9.0* 8.9* 8.8*      < > 273   < >  --  308 301 300   APTT 30.5  --  29.4  --  32.5*  --   --   --    INR 1.1  --  1.1  --  1.2  --   --   --     < > = values in this interval not displayed.         Endocrine:     - CTS Goal -140        PPx:   Feeding: cardiac diet   Analgesia/Sedation: tylenol, oxy, dilaudid PRN   Thromboembolic Prevention: scds, Heparin  HOB >30: Yes  Stress Ulcer: n/a  Glucose Control: none      Lines/Drains/Airway:   Left radial arterial line   RIJ CVC   BLANCA drains       Dispo/Code Status/Palliative:     - Continue SICU Care    - Full Code

## 2022-11-22 NOTE — PLAN OF CARE
4:23 PM    The patient's bedside nurse informed the SW that the patient had a couple questions about his discharge plans.    The SW met with the patient and his friend at bedside to follow-up regarding d/c plans. The SW informed the patient that his d/c plans depends on his progress throughout his hospitalizations. The SW informed the patient that Plan A is LTACH. The patient reported that he may be okay with going back to [facility] once medically stable because he does not have all the lines anymore.  The patient reported that his sister wants him to move to [place] to be closer to his family for support.    The SW will continue to follow.       Saturnino Reyes LMSW  Case Management [facility]  Ext: [phone]

## 2022-11-22 NOTE — PT/OT/SLP PROGRESS
Occupational Therapy   Treatment    Name: David Barrios  MRN: [id number]  Admitting Diagnosis:  Sternal wound infection  8 Days Post-Op    Recommendations:     Discharge Recommendations: home health OT  Discharge Equipment Recommendations:   (TBD)  Barriers to discharge:       Assessment:     David Barrios is a 62 y.o. male with a medical diagnosis of Sternal wound infection. Pt progressing well with no complaints and walking 88' with CGA. Performance deficits affecting function are weakness, impaired endurance, impaired self care skills, impaired functional mobility, gait instability, impaired balance, decreased coordination, decreased safety awareness.     Rehab Prognosis:  Good; patient would benefit from acute skilled OT services to address these deficits and reach maximum level of function.       Plan:     Patient to be seen 4 x/week to address the above listed problems via self-care/home management, therapeutic activities, therapeutic exercises, neuromuscular re-education  Plan of Care Expires: 12/15/22  Plan of Care Reviewed with: patient, caregiver    Subjective     Pain/Comfort:  Pain Rating 1: 0/10    Objective:     Communicated with: rn prior to session.  Patient found up in chair with arterial line, pulse ox (continuous), telemetry upon OT entry to room.    General Precautions: Standard, fall, sternal   Orthopedic Precautions:N/A   Braces:    Respiratory Status: Room air     Occupational Performance:     Bed Mobility:    Up in chair.    Functional Mobility/Transfers:  Patient completed Sit <> Stand Transfer with minimum assistance  with  no assistive device   Functional Mobility: Pt walked 88' with CGA / no AD.    Activities of Daily Living:  Grooming: contact guard assistance in standing at the sink.    [facility] 6 Click ADL: 18    Treatment & Education:  From chair level, pt completed BLE therex (x 15 reps each) including:  - mini squats  - knee flexion/extension    **Discussed OT POC and  progress.    Patient left up in chair with all lines intact and call button in reach    GOALS:   Multidisciplinary Problems       Occupational Therapy Goals          Problem: Occupational Therapy    Goal Priority Disciplines Outcome Interventions   Occupational Therapy Goal     OT, PT/OT Ongoing, Progressing    Description: Goals to be met by: 11/29/22     Patient will increase functional independence with ADLs by performing:    Feeding with [place].  UE Dressing with [place].  LE Dressing with Stand-by Assistance.  Grooming while standing at sink with Supervision.  Toilet transfer to toilet with Supervision.                         Time Tracking:     OT Date of Treatment: 11/22/22  OT Start Time: 0815  OT Stop Time: 0842  OT Total Time (min): 27 min    Billable Minutes:Therapeutic Activity 17  Therapeutic Exercise 10               11/22/2022

## 2022-11-22 NOTE — SUBJECTIVE & OBJECTIVE
Interval History/Significant Events: NAEO. Afebrile, VSS. Tolerated HD. Transfer to floor, perm cath placement on Wednesday.     Follow-up For: Procedure(s) (LRB):  CREATION, FLAP, MUSCLE ROTATION (N/A)  IRRIGATION AND DEBRIDEMENT, WOUND, STERNUM (N/A)  CLOSURE, WOUND, STERNUM (N/A)  EXPLORATION, WOUND (N/A)    Post-Operative Day: 8 Days Post-Op    Objective:     Vital Signs (Most Recent):  Temp: 98.2 °F (36.8 °C) (11/22/22 0300)  Pulse: 87 (11/22/22 0753)  Resp: 17 (11/22/22 0753)  BP: (!) 127/56 (11/21/22 1645)  SpO2: 95 % (11/22/22 0753)   Vital Signs (24h Range):  Temp:  [98.2 °F (36.8 °C)-98.6 °F (37 °C)] 98.2 °F (36.8 °C)  Pulse:  [] 87  Resp:  [12-38] 17  SpO2:  [92 %-97 %] 95 %  BP: (127-144)/(54-61) 127/56  Arterial Line BP: ()/(48-69) 101/48     Weight: 74 kg (163 lb 2.3 oz)  Body mass index is 24.08 kg/m².      Intake/Output Summary (Last 24 hours) at 11/22/2022 0832  Last data filed at 11/22/2022 0600  Gross per 24 hour   Intake 1301.17 ml   Output 1415 ml   Net -113.83 ml       Physical Exam  Vitals and nursing note reviewed.   Constitutional:       General: He is not in acute distress.     Appearance: He is not ill-appearing.   Cardiovascular:      Rate and Rhythm: Normal rate and regular rhythm.      Pulses: Normal pulses.      Comments: Incisions from pec flap with plastics with dressing in place . Midline sternotomy   Drains with benign output     Pulmonary:      Effort: Pulmonary effort is normal.   Abdominal:      General: Abdomen is flat.      Palpations: Abdomen is soft.   Musculoskeletal:      Right lower leg: No edema.      Left lower leg: No edema.   Skin:     General: Skin is warm.      Findings: No bruising or lesion.      Comments: BLANCA drains with sanguinous output    Neurological:      General: No focal deficit present.       Vents:  Lines/Drains/Airways       Peripherally Inserted Central Catheter Line  Duration             PICC Double Lumen 10/17/22 1709 right brachial 35  days              Central Venous Catheter Line  Duration             Trialysis (Dialysis) Catheter 11/15/22 1654 right internal jugular 6 days              Drain  Duration                  Closed/Suction Drain 11/14/22 2010 Inferior;Right Chest Bulb 15 Fr. 7 days         Closed/Suction Drain 11/14/22 2010 Superior;Midline Abdomen Bulb 15 Fr. 7 days         Closed/Suction Drain 11/14/22 2011 Lateral;Right Other (Comment) Bulb 15 Fr. 7 days         Closed/Suction Drain 11/14/22 2015 Inferior;Left Chest Bulb 15 Fr. 7 days         Closed/Suction Drain 11/14/22 2016 Lateral;Left Other (Comment) Bulb 15 Fr. 7 days              Arterial Line  Duration             Arterial Line 11/08/22 0712 Right Radial 14 days                    Significant Labs:    CBC/Anemia Profile:  Recent Labs   Lab 11/21/22  0304 11/22/22  0259   WBC 18.03* 17.18*   HGB 8.9* 8.8*   HCT 28.7* 28.6*    300   MCV 98 99*   RDW 17.7* 17.6*        Chemistries:  Recent Labs   Lab 11/20/22  1443 11/21/22  0304 11/22/22  0259   * 136 133*   K 4.5 4.9 4.9    103 102   CO2 22* 26 23   BUN 12 16 17   CREATININE 1.8* 2.6* 2.8*   CALCIUM 7.8* 8.5* 8.5*   ALBUMIN 2.1* 2.3* 2.2*   PROT  --  5.5* 5.6*   BILITOT  --  0.6 0.6   ALKPHOS  --  99 112   ALT  --  5* <5*   AST  --  15 18   MG 1.8 1.9 1.8   PHOS 2.5* 3.5 3.4       All pertinent labs within the past 24 hours have been reviewed.    Significant Imaging:  I have reviewed all pertinent imaging results/findings within the past 24 hours.

## 2022-11-22 NOTE — PLAN OF CARE
Problem: Adult Inpatient Plan of Care  Goal: Plan of Care Review  Outcome: Ongoing, Progressing  Goal: Patient-Specific Goal (Individualized)  Outcome: Ongoing, Progressing  Goal: Absence of Hospital-Acquired Illness or Injury  Outcome: Ongoing, Progressing  Goal: Optimal Comfort and Wellbeing  Outcome: Ongoing, Progressing  Goal: Readiness for Transition of Care  Outcome: Ongoing, Progressing     Problem: Infection  Goal: Absence of Infection Signs and Symptoms  Outcome: Ongoing, Progressing     Problem: Fall Injury Risk  Goal: Absence of Fall and Fall-Related Injury  Outcome: Ongoing, Progressing     Problem: Skin Injury Risk Increased  Goal: Skin Health and Integrity  Outcome: Ongoing, Progressing     Problem: Fluid Imbalance (Pneumonia)  Goal: Fluid Balance  Outcome: Ongoing, Progressing     Problem: Infection (Pneumonia)  Goal: Resolution of Infection Signs and Symptoms  Outcome: Ongoing, Progressing     Problem: Respiratory Compromise (Pneumonia)  Goal: Effective Oxygenation and Ventilation  Outcome: Ongoing, Progressing     Problem: Device-Related Complication Risk (Hemodialysis)  Goal: Safe, Effective Therapy Delivery  Outcome: Ongoing, Progressing     Problem: Hemodynamic Instability (Hemodialysis)  Goal: Effective Tissue Perfusion  Outcome: Ongoing, Progressing     Problem: Infection (Hemodialysis)  Goal: Absence of Infection Signs and Symptoms  Outcome: Ongoing, Progressing     Problem: Fluid and Electrolyte Imbalance (Acute Kidney Injury/Impairment)  Goal: Fluid and Electrolyte Balance  Outcome: Ongoing, Progressing     Problem: Oral Intake Inadequate (Acute Kidney Injury/Impairment)  Goal: Optimal Nutrition Intake  Outcome: Ongoing, Progressing     Problem: Renal Function Impairment (Acute Kidney Injury/Impairment)  Goal: Effective Renal Function  Outcome: Ongoing, Progressing     Problem: Device-Related Complication Risk (CRRT (Continuous Renal Replacement Therapy))  Goal: Safe, Effective Therapy  Delivery  Outcome: Ongoing, Progressing     Problem: Hypothermia (CRRT (Continuous Renal Replacement Therapy))  Goal: Body Temperature Maintained in Desired Range  Outcome: Ongoing, Progressing     Problem: Infection (CRRT (Continuous Renal Replacement Therapy))  Goal: Absence of Infection Signs and Symptoms  Outcome: Ongoing, Progressing

## 2022-11-23 LAB
ALBUMIN SERPL BCP-MCNC: 2.3 G/DL (ref 3.5–5.2)
ALP SERPL-CCNC: 112 U/L (ref 55–135)
ALT SERPL W/O P-5'-P-CCNC: <5 U/L (ref 10–44)
ANION GAP SERPL CALC-SCNC: 7 MMOL/L (ref 8–16)
AST SERPL-CCNC: 17 U/L (ref 10–40)
BASOPHILS # BLD AUTO: 0.05 K/UL (ref 0–0.2)
BASOPHILS NFR BLD: 0.3 % (ref 0–1.9)
BILIRUB SERPL-MCNC: 0.6 MG/DL (ref 0.1–1)
BUN SERPL-MCNC: 17 MG/DL (ref 8–23)
CALCIUM SERPL-MCNC: 8.6 MG/DL (ref 8.7–10.5)
CHLORIDE SERPL-SCNC: 99 MMOL/L (ref 95–110)
CO2 SERPL-SCNC: 25 MMOL/L (ref 23–29)
CREAT SERPL-MCNC: 3 MG/DL (ref 0.5–1.4)
DIFFERENTIAL METHOD: ABNORMAL
EOSINOPHIL # BLD AUTO: 0.2 K/UL (ref 0–0.5)
EOSINOPHIL NFR BLD: 1.6 % (ref 0–8)
ERYTHROCYTE [DISTWIDTH] IN BLOOD BY AUTOMATED COUNT: 17.4 % (ref 11.5–14.5)
EST. GFR  (NO RACE VARIABLE): 22.6 ML/MIN/1.73 M^2
GLUCOSE SERPL-MCNC: 90 MG/DL (ref 70–110)
HCT VFR BLD AUTO: 28.2 % (ref 40–54)
HGB BLD-MCNC: 8.8 G/DL (ref 14–18)
IMM GRANULOCYTES # BLD AUTO: 0.12 K/UL (ref 0–0.04)
IMM GRANULOCYTES NFR BLD AUTO: 0.8 % (ref 0–0.5)
LYMPHOCYTES # BLD AUTO: 1.3 K/UL (ref 1–4.8)
LYMPHOCYTES NFR BLD: 9 % (ref 18–48)
MAGNESIUM SERPL-MCNC: 1.9 MG/DL (ref 1.6–2.6)
MAGNESIUM SERPL-MCNC: 1.9 MG/DL (ref 1.6–2.6)
MCH RBC QN AUTO: 30.7 PG (ref 27–31)
MCHC RBC AUTO-ENTMCNC: 31.2 G/DL (ref 32–36)
MCV RBC AUTO: 98 FL (ref 82–98)
MONOCYTES # BLD AUTO: 1.3 K/UL (ref 0.3–1)
MONOCYTES NFR BLD: 8.8 % (ref 4–15)
NEUTROPHILS # BLD AUTO: 11.4 K/UL (ref 1.8–7.7)
NEUTROPHILS NFR BLD: 79.5 % (ref 38–73)
NRBC BLD-RTO: 0 /100 WBC
PHOSPHATE SERPL-MCNC: 3.3 MG/DL (ref 2.7–4.5)
PLATELET # BLD AUTO: 301 K/UL (ref 150–450)
PMV BLD AUTO: 9.9 FL (ref 9.2–12.9)
POTASSIUM SERPL-SCNC: 4.6 MMOL/L (ref 3.5–5.1)
PROT SERPL-MCNC: 5.6 G/DL (ref 6–8.4)
RBC # BLD AUTO: 2.87 M/UL (ref 4.6–6.2)
SODIUM SERPL-SCNC: 131 MMOL/L (ref 136–145)
WBC # BLD AUTO: 14.38 K/UL (ref 3.9–12.7)

## 2022-11-23 PROCEDURE — 63600175 PHARM REV CODE 636 W HCPCS: Performed by: THORACIC SURGERY (CARDIOTHORACIC VASCULAR SURGERY)

## 2022-11-23 PROCEDURE — 20600001 HC STEP DOWN PRIVATE ROOM

## 2022-11-23 PROCEDURE — 63600175 PHARM REV CODE 636 W HCPCS: Performed by: ANESTHESIOLOGY

## 2022-11-23 PROCEDURE — 25000003 PHARM REV CODE 250: Performed by: STUDENT IN AN ORGANIZED HEALTH CARE EDUCATION/TRAINING PROGRAM

## 2022-11-23 PROCEDURE — 25000003 PHARM REV CODE 250

## 2022-11-23 PROCEDURE — 94761 N-INVAS EAR/PLS OXIMETRY MLT: CPT

## 2022-11-23 PROCEDURE — 97116 GAIT TRAINING THERAPY: CPT

## 2022-11-23 PROCEDURE — 99232 PR SUBSEQUENT HOSPITAL CARE,LEVL II: ICD-10-PCS | Mod: ,,, | Performed by: INTERNAL MEDICINE

## 2022-11-23 PROCEDURE — 97530 THERAPEUTIC ACTIVITIES: CPT

## 2022-11-23 PROCEDURE — 80053 COMPREHEN METABOLIC PANEL: CPT

## 2022-11-23 PROCEDURE — 63600175 PHARM REV CODE 636 W HCPCS: Performed by: STUDENT IN AN ORGANIZED HEALTH CARE EDUCATION/TRAINING PROGRAM

## 2022-11-23 PROCEDURE — 85025 COMPLETE CBC W/AUTO DIFF WBC: CPT | Performed by: THORACIC SURGERY (CARDIOTHORACIC VASCULAR SURGERY)

## 2022-11-23 PROCEDURE — 83735 ASSAY OF MAGNESIUM: CPT

## 2022-11-23 PROCEDURE — 99232 SBSQ HOSP IP/OBS MODERATE 35: CPT | Mod: ,,, | Performed by: INTERNAL MEDICINE

## 2022-11-23 PROCEDURE — 84100 ASSAY OF PHOSPHORUS: CPT

## 2022-11-23 RX ADMIN — ATORVASTATIN CALCIUM 40 MG: 20 TABLET, FILM COATED ORAL at 08:11

## 2022-11-23 RX ADMIN — OXYCODONE HYDROCHLORIDE 10 MG: 10 TABLET ORAL at 08:11

## 2022-11-23 RX ADMIN — HYDROMORPHONE HYDROCHLORIDE 1 MG: 1 INJECTION, SOLUTION INTRAMUSCULAR; INTRAVENOUS; SUBCUTANEOUS at 08:11

## 2022-11-23 RX ADMIN — ACETAMINOPHEN 1000 MG: 500 TABLET ORAL at 06:11

## 2022-11-23 RX ADMIN — HYDROMORPHONE HYDROCHLORIDE 1 MG: 1 INJECTION, SOLUTION INTRAMUSCULAR; INTRAVENOUS; SUBCUTANEOUS at 03:11

## 2022-11-23 RX ADMIN — ASPIRIN 81 MG 81 MG: 81 TABLET ORAL at 08:11

## 2022-11-23 RX ADMIN — METHOCARBAMOL 500 MG: 500 TABLET ORAL at 02:11

## 2022-11-23 RX ADMIN — HEPARIN SODIUM 5000 UNITS: 5000 INJECTION INTRAVENOUS; SUBCUTANEOUS at 09:11

## 2022-11-23 RX ADMIN — METHOCARBAMOL 500 MG: 500 TABLET ORAL at 08:11

## 2022-11-23 RX ADMIN — OXYCODONE 5 MG: 5 TABLET ORAL at 02:11

## 2022-11-23 RX ADMIN — OXYCODONE HYDROCHLORIDE 10 MG: 10 TABLET ORAL at 12:11

## 2022-11-23 RX ADMIN — CEFEPIME HYDROCHLORIDE 1 G: 1 INJECTION, POWDER, FOR SOLUTION INTRAMUSCULAR; INTRAVENOUS at 08:11

## 2022-11-23 RX ADMIN — OXYCODONE HYDROCHLORIDE 10 MG: 10 TABLET ORAL at 06:11

## 2022-11-23 RX ADMIN — ACETAMINOPHEN 1000 MG: 500 TABLET ORAL at 01:11

## 2022-11-23 RX ADMIN — AMIODARONE HYDROCHLORIDE 200 MG: 200 TABLET ORAL at 08:11

## 2022-11-23 RX ADMIN — HEPARIN SODIUM 5000 UNITS: 5000 INJECTION INTRAVENOUS; SUBCUTANEOUS at 06:11

## 2022-11-23 RX ADMIN — HYDROMORPHONE HYDROCHLORIDE 1 MG: 1 INJECTION, SOLUTION INTRAMUSCULAR; INTRAVENOUS; SUBCUTANEOUS at 10:11

## 2022-11-23 NOTE — SUBJECTIVE & OBJECTIVE
Interval History: Patient seen and examined this AM. No acute events overnight. Tolerated iHD yesterday x3 hours with UF of 500 mL. Afebrile with pulse in the 90s bpm. Systolic blood pressures ranging from 140-110s mmHg. He is saturating +93% on room air. Blood cultures remain without growth to date and leukocytolysis improving. Plan for tunneled dialysis catheter today per IR although may be delayed as blood cultures obtained around 4 PM on 11/21 (needs 48 hours).    Review of patient's allergies indicates:  No Known Allergies  Current Facility-Administered Medications   Medication Frequency    acetaminophen tablet 1,000 mg Q8H    amiodarone tablet 200 mg BID    aspirin chewable tablet 81 mg Daily    atorvastatin tablet 40 mg Daily    cefepime in dextrose 5 % 1 gram/50 mL IVPB 1 g Q24H    dextrose 10% bolus 125 mL PRN    dextrose 10% bolus 250 mL PRN    diazePAM tablet 5 mg Q6H PRN    heparin (porcine) injection 5,000 Units Q8H    HYDROmorphone injection 1 mg Q4H PRN    hydrOXYzine pamoate capsule 50 mg Q8H PRN    methocarbamoL tablet 500 mg TID    ondansetron injection 4 mg Q8H PRN    oxyCODONE immediate release tablet 5 mg Q4H PRN    oxyCODONE immediate release tablet Tab 10 mg Q4H PRN    polyethylene glycol packet 17 g Daily    senna-docusate 8.6-50 mg per tablet 1 tablet BID       Objective:     Vital Signs (Most Recent):  Temp: 97.6 °F (36.4 °C) (11/23/22 0409)  Pulse: 95 (11/23/22 0655)  Resp: 20 (11/23/22 0409)  BP: 129/65 (11/23/22 0409)  SpO2: (!) 94 % (11/23/22 0409)  O2 Device (Oxygen Therapy): room air (11/22/22 1600)   Vital Signs (24h Range):  Temp:  [97.3 °F (36.3 °C)-98.6 °F (37 °C)] 97.6 °F (36.4 °C)  Pulse:  [87-97] 95  Resp:  [13-30] 20  SpO2:  [93 %-99 %] 94 %  BP: (120-129)/(63-67) 129/65  Arterial Line BP: (110-144)/(51-67) 116/53     Weight: 74 kg (163 lb 2.3 oz) (11/12/22 1519)  Body mass index is 24.08 kg/m².  Body surface area is 1.9 meters squared.    I/O last 3 completed shifts:  In:  871.2 [P.O.:220; I.V.:101.8; Other:500; IV Piggyback:49.4]  Out: 1420 [Urine:100; Drains:320; Other:1000]    Physical Exam  Vitals and nursing note reviewed.   Constitutional:       General: He is not in acute distress.     Appearance: He is normal weight. He is not ill-appearing or diaphoretic.   HENT:      Head: Normocephalic and atraumatic.      Right Ear: External ear normal.      Left Ear: External ear normal.      Nose: Nose normal.   Eyes:      General: No scleral icterus.        Right eye: No discharge.         Left eye: No discharge.      Extraocular Movements: Extraocular movements intact.      Conjunctiva/sclera: Conjunctivae normal.      Comments: Prescription eye glasses.   Neck:      Comments: Trialysis catheter to right internal jugular vein.  Cardiovascular:      Rate and Rhythm: Normal rate.      Heart sounds: Murmur heard.     No friction rub. No gallop.      Comments: Binder in place with closed incision overlying sternum.  Pulmonary:      Effort: No respiratory distress.      Breath sounds: No wheezing, rhonchi or rales.   Chest:      Comments: BLANCA drains present with sanguinous output.  Abdominal:      General: Bowel sounds are normal. There is no distension.      Palpations: Abdomen is soft.      Tenderness: There is no abdominal tenderness.   Musculoskeletal:      Cervical back: Neck supple.      Right lower le+ Pitting Edema present.      Left lower le+ Pitting Edema present.   Skin:     General: Skin is warm and dry.      Coloration: Skin is not jaundiced.   Neurological:      General: No focal deficit present.      Mental Status: He is alert. Mental status is at baseline.      Cranial Nerves: No cranial nerve deficit.   Psychiatric:         Mood and Affect: Mood normal.         Behavior: Behavior normal.       Significant Labs:  BMP:   Recent Labs   Lab 22  0259      *   K 4.9      CO2 23   BUN 17   CREATININE 2.8*   CALCIUM 8.5*   MG 1.8       CBC:   Recent  Labs   Lab 11/22/22 0259   WBC 17.18*   RBC 2.89*   HGB 8.8*   HCT 28.6*      MCV 99*   MCH 30.4   MCHC 30.8*       CMP:   Recent Labs   Lab 11/22/22 0259      CALCIUM 8.5*   ALBUMIN 2.2*   PROT 5.6*   *   K 4.9   CO2 23      BUN 17   CREATININE 2.8*   ALKPHOS 112   ALT <5*   AST 18   BILITOT 0.6       Coagulation:   Recent Labs   Lab 11/19/22 0621   INR 1.2   APTT 32.5*       LFTs:   Recent Labs   Lab 11/22/22 0259   ALT <5*   AST 18   ALKPHOS 112   BILITOT 0.6   PROT 5.6*   ALBUMIN 2.2*       Microbiology Results (last 7 days)       Procedure Component Value Units Date/Time    Urine culture [[id number]] Collected: 11/21/22 1844    Order Status: Completed Specimen: Urine Updated: 11/22/22 2155     Urine Culture, Routine No growth    Narrative:      Specimen Source->Urine    Blood culture [[id number]] Collected: 11/21/22 1558    Order Status: Completed Specimen: Blood from Peripheral, Antecubital, Left Updated: 11/22/22 2012     Blood Culture, Routine No Growth to date      No Growth to date    Narrative:      Two different sites    Blood culture [[id number]] Collected: 11/21/22 1559    Order Status: Completed Specimen: Blood from Peripheral, Lower Arm, Left Updated: 11/22/22 2012     Blood Culture, Routine No Growth to date      No Growth to date    Fungus culture [[id number]] Collected: 11/10/22 0834    Order Status: Completed Specimen: Wound from Sternum Updated: 11/17/22 1005     Fungus (Mycology) Culture Culture in progress    Narrative:      Sternal wound culture    Fungus culture [[id number]] Collected: 11/08/22 1106    Order Status: Completed Specimen: Wound from Sternum Updated: 11/17/22 1005     Fungus (Mycology) Culture Culture in progress    Narrative:      Sternal wound    Fungus culture [[id number]] Collected: 11/08/22 1106    Order Status: Completed Specimen: Wound from Sternum Updated: 11/17/22 1005     Fungus (Mycology) Culture Culture in progress    Narrative:      Sternal  drainage    Fungus culture [[id number]] Collected: 11/08/22 1106    Order Status: Completed Specimen: Wound from Sternum Updated: 11/17/22 1005     Fungus (Mycology) Culture Culture in progress    Narrative:      Pericardial peel    Fungus culture [[id number]] Collected: 11/08/22 1106    Order Status: Completed Specimen: Wound from Sternum Updated: 11/17/22 1005     Fungus (Mycology) Culture Culture in progress    Narrative:      Sternal wound    Fungus culture [[id number]] Collected: 11/08/22 1106    Order Status: Completed Specimen: Wound from Sternum Updated: 11/17/22 1005     Fungus (Mycology) Culture Culture in progress    Narrative:      Aortic graft    Fungus culture [[id number]] Collected: 11/10/22 0836    Order Status: Completed Specimen: Wound from Sternum Updated: 11/17/22 0934     Fungus (Mycology) Culture Culture in progress    Narrative:      Sternal hematoma culture    Culture, Anaerobe [[id number]] Collected: 11/10/22 0834    Order Status: Completed Specimen: Wound from Sternum Updated: 11/17/22 0750     Anaerobic Culture No anaerobes isolated    Narrative:      Sternal wound culture    Culture, Anaerobe [[id number]] Collected: 11/08/22 1106    Order Status: Completed Specimen: Wound from Sternum Updated: 11/17/22 0750     Anaerobic Culture No anaerobes isolated    Narrative:      Sternal wound    Culture, Anaerobe [[id number]] Collected: 11/08/22 1106    Order Status: Completed Specimen: Wound from Sternum Updated: 11/17/22 0750     Anaerobic Culture No anaerobes isolated    Narrative:      Sternal drainage    Culture, Anaerobe [[id number]] Collected: 11/08/22 1106    Order Status: Completed Specimen: Wound from Sternum Updated: 11/17/22 0750     Anaerobic Culture No anaerobes isolated    Narrative:      Pericardial peel    Culture, Anaerobe [[id number]] Collected: 11/08/22 1106    Order Status: Completed Specimen: Wound from Sternum Updated: 11/17/22 0750     Anaerobic Culture No anaerobes isolated     Narrative:      Sternal wound    Culture, Anaerobe [[id number]] Collected: 11/08/22 1106    Order Status: Completed Specimen: Wound from Sternum Updated: 11/16/22 0746     Anaerobic Culture No anaerobes isolated    Narrative:      Aortic graft    Culture, Anaerobe [[id number]] Collected: 11/10/22 0836    Order Status: Completed Specimen: Wound from Sternum Updated: 11/16/22 0746     Anaerobic Culture No anaerobes isolated    Narrative:      Sternal hematoma culture          Significant Imaging:  I have reviewed all imagining in the last 24 hours.

## 2022-11-23 NOTE — PROGRESS NOTES
[facility] - Cardiology Stepdown  Cardiothoracic Surgery  Progress Note    Patient Name: David Barrios  MRN: [id number]  Admission Date: 11/8/2022  Hospital Length of Stay: 15 days  Code Status: Full Code   Attending Physician: Mike Hedrick MD   Referring Provider: Mike Hedrick MD  Principal Problem:Sternal wound infection            Subjective:     Post-Op Info:  Procedure(s) (LRB):  CREATION, FLAP, MUSCLE ROTATION (N/A)  IRRIGATION AND DEBRIDEMENT, WOUND, STERNUM (N/A)  CLOSURE, WOUND, STERNUM (N/A)  EXPLORATION, WOUND (N/A)   9 Days Post-Op     Interval History: NAEON. AFVSS. Patient scheduled for a permanent catheter for HD today. Patient complained of continuous  left axillary  incisional drainage of serosanguineous fluid; dressing changed. He also complained of buttock pain, erythema noted. Wound care consulted and order chair cushion.      Review of Systems   Constitutional: Negative for malaise/fatigue.   Cardiovascular:  Negative for chest pain, claudication, dyspnea on exertion, irregular heartbeat, leg swelling and palpitations.   Respiratory:  Negative for cough and shortness of breath.    Hematologic/Lymphatic: Negative for bleeding problem.   Musculoskeletal:         Pain to buttock    Gastrointestinal:  Negative for abdominal pain.   Genitourinary:  Negative for dysuria.   Neurological:  Negative for headaches and weakness.   Medications:  Continuous Infusions:  Scheduled Meds:   acetaminophen  1,000 mg Oral Q8H    amiodarone  200 mg Oral BID    aspirin  81 mg Oral Daily    atorvastatin  40 mg Oral Daily    ceFEPime (MAXIPIME) IVPB  1 g Intravenous Q24H    heparin (porcine)  5,000 Units Subcutaneous Q8H    methocarbamoL  500 mg Oral TID    polyethylene glycol  17 g Oral Daily    senna-docusate 8.6-50 mg  1 tablet Oral BID     PRN Meds:dextrose 10%, dextrose 10%, diazePAM, HYDROmorphone, hydrOXYzine pamoate, ondansetron, oxyCODONE, oxyCODONE     Objective:     Vital Signs (Most  Recent):  Temp: 97.9 °F (36.6 °C) (11/23/22 0809)  Pulse: 94 (11/23/22 1108)  Resp: 18 (11/23/22 1035)  BP: 131/63 (11/23/22 0809)  SpO2: 96 % (11/23/22 1018) Vital Signs (24h Range):  Temp:  [97.6 °F (36.4 °C)-98.6 °F (37 °C)] 97.9 °F (36.6 °C)  Pulse:  [90-96] 94  Resp:  [15-20] 18  SpO2:  [93 %-99 %] 96 %  BP: (120-131)/(63-67) 131/63  Arterial Line BP: (110-143)/(51-59) 116/53     Weight: 74 kg (163 lb 2.3 oz)  Body mass index is 24.08 kg/m².    SpO2: 96 %  O2 Device (Oxygen Therapy): room air    Intake/Output - Last 3 Shifts         11/21 0700  11/22 0659 11/22 0700  11/23 0659 11/23 0700  11/24 0659    P.O. 650 220     I.V. (mL/kg) 101.8 (1.4)      Other 500 500     IV Piggyback 49.4      Total Intake(mL/kg) 1301.2 (17.6) 720 (9.7)     Urine (mL/kg/hr) 230 (0.1)      Drains 185 190     Other 1000 1000     Total Output 1415 1190     Net -113.8 -470                    Lines/Drains/Airways       Peripherally Inserted Central Catheter Line  Duration             PICC Double Lumen 10/17/22 1709 right brachial 36 days              Central Venous Catheter Line  Duration             Trialysis (Dialysis) Catheter 11/15/22 1654 right internal jugular 7 days              Drain  Duration                  Closed/Suction Drain 11/14/22 2010 Inferior;Right Chest Bulb 15 Fr. 8 days         Closed/Suction Drain 11/14/22 2010 Superior;Midline Abdomen Bulb 15 Fr. 8 days         Closed/Suction Drain 11/14/22 2015 Inferior;Left Chest Bulb 15 Fr. 8 days         Closed/Suction Drain 11/14/22 2016 Lateral;Left Other (Comment) Bulb 15 Fr. 8 days              Arterial Line  Duration             Arterial Line 11/08/22 0712 Right Radial 15 days                    Physical Exam  Constitutional:       Appearance: Normal appearance.   HENT:      Head: Normocephalic.   Eyes:      Pupils: Pupils are equal, round, and reactive to light.   Cardiovascular:      Rate and Rhythm: Normal rate and regular rhythm.      Pulses: Normal pulses.    Pulmonary:      Effort: Pulmonary effort is normal.   Abdominal:      General: Abdomen is flat. Bowel sounds are normal.      Palpations: Abdomen is soft.   Musculoskeletal:         General: Normal range of motion.   Skin:     General: Skin is warm and dry.      Comments: MSI CDI   Incisions from pec flap with plastics with dressing in place . Midline sternotomy   Drains with benign output   Erythema noted to buttock.    Neurological:      General: No focal deficit present.      Mental Status: He is alert.       Significant Labs:  CBC:   Recent Labs   Lab 11/23/22  0628   WBC 14.38*   RBC 2.87*   HGB 8.8*   HCT 28.2*      MCV 98   MCH 30.7   MCHC 31.2*     CMP:   Recent Labs   Lab 11/23/22  0628   GLU 90   CALCIUM 8.6*   ALBUMIN 2.3*   PROT 5.6*   *   K 4.6   CO2 25   CL 99   BUN 17   CREATININE 3.0*   ALKPHOS 112   ALT <5*   AST 17   BILITOT 0.6     Coagulation: No results for input(s): PT, INR, APTT in the last 48 hours.    Significant Diagnostics:  I have reviewed all pertinent imaging results/findings within the past 24 hours.    Assessment/Plan:     * Sternal wound infection  S/P debridement and muscle flap   Renally dosed cefepime ending 12/26/22 per ID  Wound care consulted for buttock, chair cushion ordered     KIMANI (acute kidney injury)  Getting permacath today   Had HD yesterday     Sternal osteomyelitis  S/P washout and debridement and muscle flap with plastics     Transient hyperglycemia post procedure  Endocrine following     Persistent atrial fibrillation  Amiodarone       Microcytic anemia  CBC daily         Jodee French PA-C  Cardiothoracic Surgery  [facility] - Cardiology Stepdown

## 2022-11-23 NOTE — PROGRESS NOTES
Plastic and Reconstructive Surgery   Progress Note    Subjective:    Patient seen and examined. Patient downgraded to cardiac stepdown. Pain controlled. Patient walking around with PT this am.      Objective:  Vital signs in last 24 hours:  Temp:  [97.3 °F (36.3 °C)-98.6 °F (37 °C)] 97.9 °F (36.6 °C)  Pulse:  [90-97] 94  Resp:  [15-30] 18  SpO2:  [93 %-99 %] 96 %  BP: (120-131)/(63-67) 131/63  Arterial Line BP: (110-144)/(51-67) 116/53    Intake/Output last 3 shifts:  I/O last 3 completed shifts:  In: 871.2 [P.O.:220; I.V.:101.8; Other:500; IV Piggyback:49.4]  Out: 1420 [Urine:100; Drains:320; Other:1000]    Intake/Output this shift:  No intake/output data recorded.        Physical Exam:  VITAL SIGNS:   Vitals:    22 0409 22 0655 22 0804 22 0809   BP: 129/65   131/63   BP Location: Left arm   Right arm   Patient Position: Standing   Sitting   Pulse: 90 95  94   Resp: 20  18 18   Temp: 97.6 °F (36.4 °C)   97.9 °F (36.6 °C)   TempSrc: Oral   Oral   SpO2: (!) 94%   96%   Weight:       Height:         TMAX: Temp (24hrs), Av.9 °F (36.6 °C), Min:97.3 °F (36.3 °C), Max:98.6 °F (37 °C)    General: Alert; No acute distress  Cardiovascular: Regular rate   Respiratory: Normal respiratory effort. Chest rise symmetric.   Abdomen: Soft, nontender, nondistended  Extremity: Moves all extremities equally.  Neurologic: No focal deficit. Speech normal  SKIN: midline sternal and bilateral axillary incisions c/d/I, 3 drains left (right/left chest and mediastinum)    Scheduled Medications acetaminophen, 1,000 mg, Q8H  amiodarone, 200 mg, BID  aspirin, 81 mg, Daily  atorvastatin, 40 mg, Daily  ceFEPime (MAXIPIME) IVPB, 1 g, Q24H  heparin (porcine), 5,000 Units, Q8H  methocarbamoL, 500 mg, TID  polyethylene glycol, 17 g, Daily  senna-docusate 8.6-50 mg, 1 tablet, BID    PRN Medications dextrose 10%, dextrose 10%, diazePAM, HYDROmorphone, hydrOXYzine pamoate, ondansetron, oxyCODONE, oxyCODONE    Recent Labs:    Lab Results   Component Value Date    WBC 14.38 (H) 11/23/2022    HGB 8.8 (L) 11/23/2022    HCT 28.2 (L) 11/23/2022    MCV 98 11/23/2022     11/23/2022     Lab Results   Component Value Date    GLU 90 11/23/2022     (L) 11/23/2022    K 4.6 11/23/2022    CL 99 11/23/2022    BUN 17 11/23/2022         Assessment: 62 y.o. y/o male 9 Days Post-Op s/p Procedure(s):  REMOVAL, STERNAL WIRE  DEBRIDEMENT, STERNUM  APPLICATION, WOUND VAC Doing well postoperatively.    Plan  Monitor drain output  Pain control  I's & O's  Dvt ppx  Medical management as per SICU  Discussed with Dr Neida Whitlock MD- Fellow  Department of Plastic and Reconstructive Surgery  [phone] (office)

## 2022-11-23 NOTE — PROGRESS NOTES
Patient arrived to room 309. Patient ambulated with standby assist to the chair from wheelchair. Tele monitor applied. BLANCA drains x3 CDI.  MSI CDI and DUONG. No signs of distress. VSS. Spouse at bedside. Will continue to monitor.       11/22/22 1814   Vital Signs   Temp 98.3 °F (36.8 °C)   Temp src Oral   Pulse 96   Heart Rate Source Monitor   Resp 17   SpO2 98 %   /63   MAP (mmHg) 85   BP Location Left arm   Patient Position Sitting

## 2022-11-23 NOTE — ASSESSMENT & PLAN NOTE
- Baseline Scr 0.9-1.   - 10/31 Scr increased to ~1.5; steadly increased to peak of 5.3 today  - Hyperkalemia noted to 5.3  - Anuric today despite IV diuril 500mg TID; and IV lasix 120mg TID  - Risk factors for KIMANI include Cardiorenal syndrome (less likely given aggressive diuresis in last 72hrs; and normal CVP readings); AIN; IRGN; and prolonged prerenal state from over diuresis, sepsis.    Recommendations/Plan:  - Plan for iHD tomorrow  - Tentative plan for tunneled HD catheter placement on 11/25 per Interventional Radiology if blood cultures without growth x48 hours (thus far without growth)  - Daily RFP and magnesium level  - Strict I/Os and daily weights  - Renally dose medications to eGFR  - Avoid nephrotoxins when feasible (i.e. intra-arterial contrast, NSAIDs, etc.)  - RENAL DIET (low potassium) when not NPO

## 2022-11-23 NOTE — ASSESSMENT & PLAN NOTE
S/P debridement and muscle flap   Renally dosed cefepime ending 12/26/22 per ID  Wound care consulted for buttock, chair cushion ordered

## 2022-11-23 NOTE — PT/OT/SLP PROGRESS
Physical Therapy  Treatment    Patient Name:  David Barrios   MRN:  [id number]    Recommendations:     Discharge Recommendations:  home health PT   Discharge Equipment Recommendations: shower chair   Barriers to discharge: decreased functional mobility, fall risk, and decreased caregiver support    Assessment:     David Barrios is a 62 y.o. male admitted with a medical diagnosis of Sternal wound infection.  Pt demonstrates the below listed impairments with decreased tolerance to functional mobility, impaired endurance, and pain being the most limiting.  Pt demonstrates fair tolerance to out of bed mobility.  He recalls 1/3 sternal precautions and is compliant ~90% of the time.  Pt requires skilled PT due to patient's status as: a fall risk and patient has increased pain to allow safe d/c home.     Impairments and functional limitations:  weakness, impaired endurance, impaired self care skills, impaired functional mobility, gait instability, impaired balance, decreased upper extremity function, pain, impaired cardiopulmonary response to activity, impaired skin.  These deficits affect their roles and responsibilities in which they were able to complete prior to admit.  Rehab Prognosis:   Good ; patient would benefit from acute skilled PT services 5 x/week to address these deficits, improve quality of life, focus on recovery of impairments, provide patient/caregiver education, reduce fall risk, and reach maximum level of function.  Pt is highly  motivated to participated in skilled PT.    Recent Surgery:   Procedure(s) (LRB):  CREATION, FLAP, MUSCLE ROTATION (N/A)  IRRIGATION AND DEBRIDEMENT, WOUND, STERNUM (N/A)  CLOSURE, WOUND, STERNUM (N/A)  EXPLORATION, WOUND (N/A) 9 Days Post-Op    Plan:     During this hospitalization, patient to be seen 5 x/week to address the identified rehab impairments via gait training, therapeutic activities, therapeutic exercises, neuromuscular re-education and progress toward the  "following goals:    Plan of Care Expires:  12/16/22    Subjective     Chief Complaint: "I need to use the bathroom first"  Patient/Family Comments/Goals: Return home  Pain/Comfort:  Pain Rating 1: 7/10  Location - Orientation 1: generalized  Location 1: sternal  Pain Addressed 1: Reposition, Distraction  Pain Rating Post-Intervention 1:  (not rated)    Objective:     Communicated with RN prior to session.  Patient found up in chair with telemetry, central line, BLANCA drain upon PT entry to room.     General Precautions: Standard, fall, sternal   Orthopedic Precautions:N/A   Braces: N/A  Oxygen Device:      Functional Mobility:  Bed Mobility:  Seated in chair at start of session and returned to chair  Head of bed position: n/a    Transfers:  Sit to Stand: SBA with No AD  Toilet transfer: Sup with No AD using Step Transfer    Gait: Patient ambulated 10' and 190' with No AD and CGA. Patient demonstrates occasional unsteady gait, decreased step length, decreased arm swing, flexed posture, and decreased goldy. All lines remained intact throughout ambulation trial, mask donned for out of room ambulation.  Pt noted to have increased SOB and requires x2 standing rest breaks with longer bout to recover RR  SpO2 noted to range from 81-91%, recovers in <30sec with seated rest break    Balance:   Position Score Time   Static Sitting GOOD: Takes MODERATE challenges n/a   Dynamic Sitting GOOD-: Maintains balance through MODERATE excursions of active trunk motion, but inconstantly  n/a   Static Standing FAIR+: Takes MINIMAL challenges n/a   Dynamic Standing FAIR+: Maintains balance through MINIMAL excursions of active trunk motion n/a       AM-PAC 6 CLICK MOBILITY  Turning over in bed (including adjusting bedclothes, sheets and blankets)?: 3  Sitting down on and standing up from a chair with arms (e.g., wheelchair, bedside commode, etc.): 3  Moving from lying on back to sitting on the side of the bed?: 3  Moving to and from a bed " to a chair (including a wheelchair)?: 3  Need to walk in hospital room?: 3  Climbing 3-5 steps with a railing?: 2  Basic Mobility Total Score: 17     Therapeutic Activities:  Patient educated on role of acute care PT and PT POC, safety while in hospital including calling nurse for mobility, call light usage, benefits of out of bed mobility, breathing technique, fall risk, transfers, gait technique, positioning, posture, risks of prolonged bed rest, possible discharge disposition , sternal precautions, and benefits of continued PT by explanation and demonstration.    Patient demonstrates good understanding of education provided this day.   Whiteboard updated    Therapeutic Exercises:  n/a    Patient left up in chair with all lines intact, call button in reach, and RN notified.    GOALS:   Multidisciplinary Problems       Physical Therapy Goals          Problem: Physical Therapy    Goal Priority Disciplines Outcome Goal Variances Interventions   Physical Therapy Goal     PT, PT/OT Ongoing, Progressing     Description: Goals to be met by: 2022     Patient will increase functional independence with mobility by performin. Supine to sit with Modified [place]  2. Sit to supine with Modified [place]  3. Sit to stand transfer with Supervision  4. Bed to chair transfer with Supervision using No Assistive Device  5. Gait  x 200 feet with Supervision using No Assistive Device.   6. Pt will ascend/descend 3 steps with no HR and SBA.                         Time Tracking:     PT Received On: 22  PT Start Time: 805     PT Stop Time: 830  PT Total Time (min): 25 min     Billable Minutes: Gait Training 15 and Therapeutic Activity 10    Treatment Type: Treatment  PT/PTA: PT     PTA Visit Number: 0     2022

## 2022-11-23 NOTE — NURSING
Patient complaining of his bottom hurting, upon assessment his coccyx area was indeed red, but blanchable with a break in his skin on the right side with serous drainage that was non blanchable and hurtful to palpitation. A foam was placed over the area and patient was educated on the importance of TQ2H and weight shifting while up in the chair, verbal understanding was noted, waffle mattress overlay was ordered and chair cushion already in place. Wound care order by virgilio RODRIGUEZ cont to danielle.

## 2022-11-23 NOTE — PROGRESS NOTES
Plastic and Reconstructive Surgery   Progress Note    Subjective:    Patient seen and examined at bedside in [place]. Pain controlled. Drain outputs decreasing. Left and right axillary drains removed. Patient does not need binder.    Objective:  Vital signs in last 24 hours:  Temp:  [97.3 °F (36.3 °C)-98.6 °F (37 °C)] 98.3 °F (36.8 °C)  Pulse:  [] 96  Resp:  [12-38] 17  SpO2:  [92 %-99 %] 98 %  BP: (120)/(63) 120/63  Arterial Line BP: ()/(48-67) 116/53    Intake/Output last 3 shifts:  I/O last 3 completed shifts:  In: 1432.5 [P.O.:650; I.V.:133.3; Other:500; IV Piggyback:149.1]  Out: 1669 [Urine:310; Drains:359; Other:1000]    Intake/Output this shift:  I/O this shift:  In: 720 [P.O.:220; Other:500]  Out: 1070 [Drains:70; Other:1000]        Physical Exam:  VITAL SIGNS:   Vitals:    22 1500 22 1524 22 1600 22 1814   BP:    120/63   BP Location:    Left arm   Patient Position:    Sitting   Pulse: 92  96 96   Resp: 18 15 17 17   Temp: 97.7 °F (36.5 °C)   98.3 °F (36.8 °C)   TempSrc: Oral   Oral   SpO2: 96%  97% 98%   Weight:       Height:         TMAX: Temp (24hrs), Av °F (36.7 °C), Min:97.3 °F (36.3 °C), Max:98.6 °F (37 °C)    General: Alert; No acute distress  Cardiovascular: Regular rate   Respiratory: Normal respiratory effort. Chest rise symmetric.   Abdomen: Soft, nontender, nondistended  Extremity: Moves all extremities equally.  Neurologic: No focal deficit. Speech normal  SKIN: midline sternal and bilateral axillary incisions c/d/I, 3 drains left (right/left chest and mediastinum)    Scheduled Medications acetaminophen, 1,000 mg, Q8H  amiodarone, 200 mg, BID  aspirin, 81 mg, Daily  atorvastatin, 40 mg, Daily  ceFEPime (MAXIPIME) IVPB, 1 g, Q24H  heparin (porcine), 5,000 Units, Q8H  methocarbamoL, 500 mg, TID  polyethylene glycol, 17 g, Daily  senna-docusate 8.6-50 mg, 1 tablet, BID    PRN Medications dextrose 10%, dextrose 10%, diazePAM, HYDROmorphone, hydrOXYzine pamoate,  ondansetron, oxyCODONE, oxyCODONE    Recent Labs:   Lab Results   Component Value Date    WBC 17.18 (H) 11/22/2022    HGB 8.8 (L) 11/22/2022    HCT 28.6 (L) 11/22/2022    MCV 99 (H) 11/22/2022     11/22/2022     Lab Results   Component Value Date     11/22/2022     (L) 11/22/2022    K 4.9 11/22/2022     11/22/2022    BUN 17 11/22/2022         Assessment: 62 y.o. y/o male 8 Days Post-Op s/p Procedure(s):  REMOVAL, STERNAL WIRE  DEBRIDEMENT, STERNUM  APPLICATION, WOUND VAC Doing well postoperatively.    Plan  Monitor drain output  Pain control  I's & O's  Dvt ppx  Medical management as per SICU  Discussed with Dr Neida Whitlock MD- Fellow  Department of Plastic and Reconstructive Surgery  [phone] (office)

## 2022-11-23 NOTE — SUBJECTIVE & OBJECTIVE
Interval History: NAEON. AFVSS. Patient scheduled for a permanent catheter for HD today. Patient complained of continuous  left axillary  incisional drainage of serosanguineous fluid; dressing changed. He also complained of buttock pain, erythema noted. Wound care consulted and order chair cushion.      Review of Systems   Constitutional: Negative for malaise/fatigue.   Cardiovascular:  Negative for chest pain, claudication, dyspnea on exertion, irregular heartbeat, leg swelling and palpitations.   Respiratory:  Negative for cough and shortness of breath.    Hematologic/Lymphatic: Negative for bleeding problem.   Musculoskeletal:         Pain to buttock    Gastrointestinal:  Negative for abdominal pain.   Genitourinary:  Negative for dysuria.   Neurological:  Negative for headaches and weakness.   Medications:  Continuous Infusions:  Scheduled Meds:   acetaminophen  1,000 mg Oral Q8H    amiodarone  200 mg Oral BID    aspirin  81 mg Oral Daily    atorvastatin  40 mg Oral Daily    ceFEPime (MAXIPIME) IVPB  1 g Intravenous Q24H    heparin (porcine)  5,000 Units Subcutaneous Q8H    methocarbamoL  500 mg Oral TID    polyethylene glycol  17 g Oral Daily    senna-docusate 8.6-50 mg  1 tablet Oral BID     PRN Meds:dextrose 10%, dextrose 10%, diazePAM, HYDROmorphone, hydrOXYzine pamoate, ondansetron, oxyCODONE, oxyCODONE     Objective:     Vital Signs (Most Recent):  Temp: 97.9 °F (36.6 °C) (11/23/22 0809)  Pulse: 94 (11/23/22 1108)  Resp: 18 (11/23/22 1035)  BP: 131/63 (11/23/22 0809)  SpO2: 96 % (11/23/22 1018) Vital Signs (24h Range):  Temp:  [97.6 °F (36.4 °C)-98.6 °F (37 °C)] 97.9 °F (36.6 °C)  Pulse:  [90-96] 94  Resp:  [15-20] 18  SpO2:  [93 %-99 %] 96 %  BP: (120-131)/(63-67) 131/63  Arterial Line BP: (110-143)/(51-59) 116/53     Weight: 74 kg (163 lb 2.3 oz)  Body mass index is 24.08 kg/m².    SpO2: 96 %  O2 Device (Oxygen Therapy): room air    Intake/Output - Last 3 Shifts         11/21 0700  11/22 0659 11/22  0700  11/23 0659 11/23 0700  11/24 0659    P.O. 650 220     I.V. (mL/kg) 101.8 (1.4)      Other 500 500     IV Piggyback 49.4      Total Intake(mL/kg) 1301.2 (17.6) 720 (9.7)     Urine (mL/kg/hr) 230 (0.1)      Drains 185 190     Other 1000 1000     Total Output 1415 1190     Net -113.8 -470                    Lines/Drains/Airways       Peripherally Inserted Central Catheter Line  Duration             PICC Double Lumen 10/17/22 1709 right brachial 36 days              Central Venous Catheter Line  Duration             Trialysis (Dialysis) Catheter 11/15/22 1654 right internal jugular 7 days              Drain  Duration                  Closed/Suction Drain 11/14/22 2010 Inferior;Right Chest Bulb 15 Fr. 8 days         Closed/Suction Drain 11/14/22 2010 Superior;Midline Abdomen Bulb 15 Fr. 8 days         Closed/Suction Drain 11/14/22 2015 Inferior;Left Chest Bulb 15 Fr. 8 days         Closed/Suction Drain 11/14/22 2016 Lateral;Left Other (Comment) Bulb 15 Fr. 8 days              Arterial Line  Duration             Arterial Line 11/08/22 0712 Right Radial 15 days                    Physical Exam  Constitutional:       Appearance: Normal appearance.   HENT:      Head: Normocephalic.   Eyes:      Pupils: Pupils are equal, round, and reactive to light.   Cardiovascular:      Rate and Rhythm: Normal rate and regular rhythm.      Pulses: Normal pulses.   Pulmonary:      Effort: Pulmonary effort is normal.   Abdominal:      General: Abdomen is flat. Bowel sounds are normal.      Palpations: Abdomen is soft.   Musculoskeletal:         General: Normal range of motion.   Skin:     General: Skin is warm and dry.      Comments: MSI CDI   Incisions from pec flap with plastics with dressing in place . Midline sternotomy   Drains with benign output   Erythema noted to buttock.    Neurological:      General: No focal deficit present.      Mental Status: He is alert.       Significant Labs:  CBC:   Recent Labs   Lab 11/23/22  0681    WBC 14.38*   RBC 2.87*   HGB 8.8*   HCT 28.2*      MCV 98   MCH 30.7   MCHC 31.2*     CMP:   Recent Labs   Lab 11/23/22  0628   GLU 90   CALCIUM 8.6*   ALBUMIN 2.3*   PROT 5.6*   *   K 4.6   CO2 25   CL 99   BUN 17   CREATININE 3.0*   ALKPHOS 112   ALT <5*   AST 17   BILITOT 0.6     Coagulation: No results for input(s): PT, INR, APTT in the last 48 hours.    Significant Diagnostics:  I have reviewed all pertinent imaging results/findings within the past 24 hours.

## 2022-11-23 NOTE — PROGRESS NOTES
[facility] - Cardiology Stepdown  Nephrology  Progress Note    Patient Name: David Barrios  MRN: [id number]  Admission Date: 11/8/2022  Hospital Length of Stay: 15 days  Attending Provider: Mike Hedrick MD   Primary Care Physician: Breann Tavera MD  Principal Problem:Sternal wound infection    Subjective:     HPI: 61 yo male w/ hx of Afib; HFrEF (35%); HTN; HLD. Patient underwent who underwent mitral valve repair, tricuspid valve repair, left atrial maze, left atrial appendage resection, and direct aortic impella 5.5 insertion on 10/07/2022. Hospital course complicated by Serratia bacteremia (on 6wk course of cefepime), sternal wound infection, and cardiogenic shock. On 10/31 patient developed KIMANI. Per chart review no documented course of hypotension during that time. Patient started on diuresis with IV lasix, with good urine output. Patient required increased diuretics (IV Diuril 500mg TID; and Lasix 120mg TID) and continued to have increased Scr, which progressed to oliguria, and now having metabolic derangements.     Patient denies any prior history of kidney disease. Does not have significant family history of kidney disease. Does endorse worsening fatigue and dyspnea. Denies any nausea vomiting, or metallic taste in mouth.     Nephrology was consulted for oliguric KIMANI.       Interval History: Patient seen and examined this AM. No acute events overnight. Tolerated iHD yesterday x3 hours with UF of 500 mL. Afebrile with pulse in the 90s bpm. Systolic blood pressures ranging from 140-110s mmHg. He is saturating +93% on room air. Blood cultures remain without growth to date and leukocytolysis improving. Plan for tunneled dialysis catheter today per IR although may be delayed as blood cultures obtained around 4 PM on 11/21 (needs 48 hours).    Review of patient's allergies indicates:  No Known Allergies  Current Facility-Administered Medications   Medication Frequency    acetaminophen tablet 1,000 mg Q8H     amiodarone tablet 200 mg BID    aspirin chewable tablet 81 mg Daily    atorvastatin tablet 40 mg Daily    cefepime in dextrose 5 % 1 gram/50 mL IVPB 1 g Q24H    dextrose 10% bolus 125 mL PRN    dextrose 10% bolus 250 mL PRN    diazePAM tablet 5 mg Q6H PRN    heparin (porcine) injection 5,000 Units Q8H    HYDROmorphone injection 1 mg Q4H PRN    hydrOXYzine pamoate capsule 50 mg Q8H PRN    methocarbamoL tablet 500 mg TID    ondansetron injection 4 mg Q8H PRN    oxyCODONE immediate release tablet 5 mg Q4H PRN    oxyCODONE immediate release tablet Tab 10 mg Q4H PRN    polyethylene glycol packet 17 g Daily    senna-docusate 8.6-50 mg per tablet 1 tablet BID       Objective:     Vital Signs (Most Recent):  Temp: 97.6 °F (36.4 °C) (11/23/22 0409)  Pulse: 95 (11/23/22 0655)  Resp: 20 (11/23/22 0409)  BP: 129/65 (11/23/22 0409)  SpO2: (!) 94 % (11/23/22 0409)  O2 Device (Oxygen Therapy): room air (11/22/22 1600)   Vital Signs (24h Range):  Temp:  [97.3 °F (36.3 °C)-98.6 °F (37 °C)] 97.6 °F (36.4 °C)  Pulse:  [87-97] 95  Resp:  [13-30] 20  SpO2:  [93 %-99 %] 94 %  BP: (120-129)/(63-67) 129/65  Arterial Line BP: (110-144)/(51-67) 116/53     Weight: 74 kg (163 lb 2.3 oz) (11/12/22 1519)  Body mass index is 24.08 kg/m².  Body surface area is 1.9 meters squared.    I/O last 3 completed shifts:  In: 871.2 [P.O.:220; I.V.:101.8; Other:500; IV Piggyback:49.4]  Out: 1420 [Urine:100; Drains:320; Other:1000]    Physical Exam  Vitals and nursing note reviewed.   Constitutional:       General: He is not in acute distress.     Appearance: He is normal weight. He is not ill-appearing or diaphoretic.   HENT:      Head: Normocephalic and atraumatic.      Right Ear: External ear normal.      Left Ear: External ear normal.      Nose: Nose normal.   Eyes:      General: No scleral icterus.        Right eye: No discharge.         Left eye: No discharge.      Extraocular Movements: Extraocular movements intact.       Conjunctiva/sclera: Conjunctivae normal.      Comments: Prescription eye glasses.   Neck:      Comments: Trialysis catheter to right internal jugular vein.  Cardiovascular:      Rate and Rhythm: Normal rate.      Heart sounds: Murmur heard.     No friction rub. No gallop.      Comments: Binder in place with closed incision overlying sternum.  Pulmonary:      Effort: No respiratory distress.      Breath sounds: No wheezing, rhonchi or rales.   Chest:      Comments: BLANCA drains present with sanguinous output.  Abdominal:      General: Bowel sounds are normal. There is no distension.      Palpations: Abdomen is soft.      Tenderness: There is no abdominal tenderness.   Musculoskeletal:      Cervical back: Neck supple.      Right lower le+ Pitting Edema present.      Left lower le+ Pitting Edema present.   Skin:     General: Skin is warm and dry.      Coloration: Skin is not jaundiced.   Neurological:      General: No focal deficit present.      Mental Status: He is alert. Mental status is at baseline.      Cranial Nerves: No cranial nerve deficit.   Psychiatric:         Mood and Affect: Mood normal.         Behavior: Behavior normal.       Significant Labs:  BMP:   Recent Labs   Lab 22      *   K 4.9      CO2 23   BUN 17   CREATININE 2.8*   CALCIUM 8.5*   MG 1.8       CBC:   Recent Labs   Lab 22   WBC 17.18*   RBC 2.89*   HGB 8.8*   HCT 28.6*      MCV 99*   MCH 30.4   MCHC 30.8*       CMP:   Recent Labs   Lab 22      CALCIUM 8.5*   ALBUMIN 2.2*   PROT 5.6*   *   K 4.9   CO2 23      BUN 17   CREATININE 2.8*   ALKPHOS 112   ALT <5*   AST 18   BILITOT 0.6       Coagulation:   Recent Labs   Lab 22  0621   INR 1.2   APTT 32.5*       LFTs:   Recent Labs   Lab 22   ALT <5*   AST 18   ALKPHOS 112   BILITOT 0.6   PROT 5.6*   ALBUMIN 2.2*       Microbiology Results (last 7 days)       Procedure Component Value Units Date/Time     Urine culture [[id number]] Collected: 11/21/22 1844    Order Status: Completed Specimen: Urine Updated: 11/22/22 2155     Urine Culture, Routine No growth    Narrative:      Specimen Source->Urine    Blood culture [[id number]] Collected: 11/21/22 1558    Order Status: Completed Specimen: Blood from Peripheral, Antecubital, Left Updated: 11/22/22 2012     Blood Culture, Routine No Growth to date      No Growth to date    Narrative:      Two different sites    Blood culture [[id number]] Collected: 11/21/22 1559    Order Status: Completed Specimen: Blood from Peripheral, Lower Arm, Left Updated: 11/22/22 2012     Blood Culture, Routine No Growth to date      No Growth to date    Fungus culture [[id number]] Collected: 11/10/22 0834    Order Status: Completed Specimen: Wound from Sternum Updated: 11/17/22 1005     Fungus (Mycology) Culture Culture in progress    Narrative:      Sternal wound culture    Fungus culture [[id number]] Collected: 11/08/22 1106    Order Status: Completed Specimen: Wound from Sternum Updated: 11/17/22 1005     Fungus (Mycology) Culture Culture in progress    Narrative:      Sternal wound    Fungus culture [[id number]] Collected: 11/08/22 1106    Order Status: Completed Specimen: Wound from Sternum Updated: 11/17/22 1005     Fungus (Mycology) Culture Culture in progress    Narrative:      Sternal drainage    Fungus culture [[id number]] Collected: 11/08/22 1106    Order Status: Completed Specimen: Wound from Sternum Updated: 11/17/22 1005     Fungus (Mycology) Culture Culture in progress    Narrative:      Pericardial peel    Fungus culture [[id number]] Collected: 11/08/22 1106    Order Status: Completed Specimen: Wound from Sternum Updated: 11/17/22 1005     Fungus (Mycology) Culture Culture in progress    Narrative:      Sternal wound    Fungus culture [[id number]] Collected: 11/08/22 1106    Order Status: Completed Specimen: Wound from Sternum Updated: 11/17/22 1005     Fungus (Mycology) Culture  Culture in progress    Narrative:      Aortic graft    Fungus culture [[id number]] Collected: 11/10/22 0836    Order Status: Completed Specimen: Wound from Sternum Updated: 11/17/22 0934     Fungus (Mycology) Culture Culture in progress    Narrative:      Sternal hematoma culture    Culture, Anaerobe [[id number]] Collected: 11/10/22 0834    Order Status: Completed Specimen: Wound from Sternum Updated: 11/17/22 0750     Anaerobic Culture No anaerobes isolated    Narrative:      Sternal wound culture    Culture, Anaerobe [[id number]] Collected: 11/08/22 1106    Order Status: Completed Specimen: Wound from Sternum Updated: 11/17/22 0750     Anaerobic Culture No anaerobes isolated    Narrative:      Sternal wound    Culture, Anaerobe [[id number]] Collected: 11/08/22 1106    Order Status: Completed Specimen: Wound from Sternum Updated: 11/17/22 0750     Anaerobic Culture No anaerobes isolated    Narrative:      Sternal drainage    Culture, Anaerobe [[id number]] Collected: 11/08/22 1106    Order Status: Completed Specimen: Wound from Sternum Updated: 11/17/22 0750     Anaerobic Culture No anaerobes isolated    Narrative:      Pericardial peel    Culture, Anaerobe [[id number]] Collected: 11/08/22 1106    Order Status: Completed Specimen: Wound from Sternum Updated: 11/17/22 0750     Anaerobic Culture No anaerobes isolated    Narrative:      Sternal wound    Culture, Anaerobe [[id number]] Collected: 11/08/22 1106    Order Status: Completed Specimen: Wound from Sternum Updated: 11/16/22 0746     Anaerobic Culture No anaerobes isolated    Narrative:      Aortic graft    Culture, Anaerobe [[id number]] Collected: 11/10/22 0836    Order Status: Completed Specimen: Wound from Sternum Updated: 11/16/22 0746     Anaerobic Culture No anaerobes isolated    Narrative:      Sternal hematoma culture          Significant Imaging:  I have reviewed all imagining in the last 24 hours.    Assessment/Plan:     * Sternal wound infection  - per primary  (Cardiothoracic Surgery) and Infectious Disease    KIMANI (acute kidney injury)  - Baseline Scr 0.9-1.   - 10/31 Scr increased to ~1.5; steadly increased to peak of 5.3 today  - Hyperkalemia noted to 5.3  - Anuric today despite IV diuril 500mg TID; and IV lasix 120mg TID  - Risk factors for KIMANI include Cardiorenal syndrome (less likely given aggressive diuresis in last 72hrs; and normal CVP readings); AIN; IRGN; and prolonged prerenal state from over diuresis, sepsis.    Recommendations/Plan:  - Plan for iHD tomorrow  - Tentative plan for tunneled HD catheter placement on 11/25 per Interventional Radiology if blood cultures without growth x48 hours (thus far without growth)  - Daily RFP and magnesium level  - Strict I/Os and daily weights  - Renally dose medications to eGFR  - Avoid nephrotoxins when feasible (i.e. intra-arterial contrast, NSAIDs, etc.)  - RENAL DIET (low potassium) when not NPO      Thank you for your consult. I will follow-up with patient. Please contact us if you have any additional questions.    Golden Allen MD  Nephrology  [facility] - Cardiology Stepdown

## 2022-11-24 LAB
ALBUMIN SERPL BCP-MCNC: 2.4 G/DL (ref 3.5–5.2)
ALP SERPL-CCNC: 114 U/L (ref 55–135)
ALT SERPL W/O P-5'-P-CCNC: 7 U/L (ref 10–44)
ANION GAP SERPL CALC-SCNC: 9 MMOL/L (ref 8–16)
AST SERPL-CCNC: 15 U/L (ref 10–40)
BASOPHILS # BLD AUTO: 0.04 K/UL (ref 0–0.2)
BASOPHILS NFR BLD: 0.3 % (ref 0–1.9)
BILIRUB SERPL-MCNC: 0.5 MG/DL (ref 0.1–1)
BUN SERPL-MCNC: 24 MG/DL (ref 8–23)
CALCIUM SERPL-MCNC: 8.6 MG/DL (ref 8.7–10.5)
CHLORIDE SERPL-SCNC: 100 MMOL/L (ref 95–110)
CO2 SERPL-SCNC: 23 MMOL/L (ref 23–29)
CREAT SERPL-MCNC: 4.1 MG/DL (ref 0.5–1.4)
DIFFERENTIAL METHOD: ABNORMAL
EOSINOPHIL # BLD AUTO: 0.2 K/UL (ref 0–0.5)
EOSINOPHIL NFR BLD: 1.7 % (ref 0–8)
ERYTHROCYTE [DISTWIDTH] IN BLOOD BY AUTOMATED COUNT: 17.9 % (ref 11.5–14.5)
EST. GFR  (NO RACE VARIABLE): 15.6 ML/MIN/1.73 M^2
GLUCOSE SERPL-MCNC: 94 MG/DL (ref 70–110)
HCT VFR BLD AUTO: 28.5 % (ref 40–54)
HGB BLD-MCNC: 8.8 G/DL (ref 14–18)
IMM GRANULOCYTES # BLD AUTO: 0.11 K/UL (ref 0–0.04)
IMM GRANULOCYTES NFR BLD AUTO: 0.8 % (ref 0–0.5)
LYMPHOCYTES # BLD AUTO: 1.5 K/UL (ref 1–4.8)
LYMPHOCYTES NFR BLD: 10.8 % (ref 18–48)
MAGNESIUM SERPL-MCNC: 1.8 MG/DL (ref 1.6–2.6)
MCH RBC QN AUTO: 30.3 PG (ref 27–31)
MCHC RBC AUTO-ENTMCNC: 30.9 G/DL (ref 32–36)
MCV RBC AUTO: 98 FL (ref 82–98)
MONOCYTES # BLD AUTO: 1.4 K/UL (ref 0.3–1)
MONOCYTES NFR BLD: 9.9 % (ref 4–15)
NEUTROPHILS # BLD AUTO: 10.8 K/UL (ref 1.8–7.7)
NEUTROPHILS NFR BLD: 76.5 % (ref 38–73)
NRBC BLD-RTO: 0 /100 WBC
PHOSPHATE SERPL-MCNC: 3.9 MG/DL (ref 2.7–4.5)
PLATELET # BLD AUTO: 290 K/UL (ref 150–450)
PMV BLD AUTO: 9.8 FL (ref 9.2–12.9)
POTASSIUM SERPL-SCNC: 4.7 MMOL/L (ref 3.5–5.1)
PROT SERPL-MCNC: 5.7 G/DL (ref 6–8.4)
RBC # BLD AUTO: 2.9 M/UL (ref 4.6–6.2)
SODIUM SERPL-SCNC: 132 MMOL/L (ref 136–145)
WBC # BLD AUTO: 14.04 K/UL (ref 3.9–12.7)

## 2022-11-24 PROCEDURE — 25000003 PHARM REV CODE 250

## 2022-11-24 PROCEDURE — 99232 PR SUBSEQUENT HOSPITAL CARE,LEVL II: ICD-10-PCS | Mod: ,,, | Performed by: INTERNAL MEDICINE

## 2022-11-24 PROCEDURE — 80053 COMPREHEN METABOLIC PANEL: CPT

## 2022-11-24 PROCEDURE — 25000003 PHARM REV CODE 250: Performed by: STUDENT IN AN ORGANIZED HEALTH CARE EDUCATION/TRAINING PROGRAM

## 2022-11-24 PROCEDURE — 83735 ASSAY OF MAGNESIUM: CPT

## 2022-11-24 PROCEDURE — 90935 HEMODIALYSIS ONE EVALUATION: CPT

## 2022-11-24 PROCEDURE — 85025 COMPLETE CBC W/AUTO DIFF WBC: CPT | Performed by: THORACIC SURGERY (CARDIOTHORACIC VASCULAR SURGERY)

## 2022-11-24 PROCEDURE — 84100 ASSAY OF PHOSPHORUS: CPT

## 2022-11-24 PROCEDURE — 94761 N-INVAS EAR/PLS OXIMETRY MLT: CPT

## 2022-11-24 PROCEDURE — 99232 SBSQ HOSP IP/OBS MODERATE 35: CPT | Mod: ,,, | Performed by: INTERNAL MEDICINE

## 2022-11-24 PROCEDURE — 63600175 PHARM REV CODE 636 W HCPCS: Performed by: STUDENT IN AN ORGANIZED HEALTH CARE EDUCATION/TRAINING PROGRAM

## 2022-11-24 PROCEDURE — 20600001 HC STEP DOWN PRIVATE ROOM

## 2022-11-24 PROCEDURE — 63600175 PHARM REV CODE 636 W HCPCS

## 2022-11-24 PROCEDURE — 63600175 PHARM REV CODE 636 W HCPCS: Performed by: THORACIC SURGERY (CARDIOTHORACIC VASCULAR SURGERY)

## 2022-11-24 PROCEDURE — 63600175 PHARM REV CODE 636 W HCPCS: Performed by: ANESTHESIOLOGY

## 2022-11-24 RX ORDER — SODIUM CHLORIDE 9 MG/ML
INJECTION, SOLUTION INTRAVENOUS ONCE
Status: COMPLETED | OUTPATIENT
Start: 2022-11-24 | End: 2022-11-26

## 2022-11-24 RX ORDER — HEPARIN SODIUM 1000 [USP'U]/ML
1000 INJECTION, SOLUTION INTRAVENOUS; SUBCUTANEOUS
Status: DISPENSED | OUTPATIENT
Start: 2022-11-25 | End: 2022-11-25

## 2022-11-24 RX ADMIN — ACETAMINOPHEN 1000 MG: 500 TABLET ORAL at 05:11

## 2022-11-24 RX ADMIN — OXYCODONE HYDROCHLORIDE 10 MG: 10 TABLET ORAL at 12:11

## 2022-11-24 RX ADMIN — HEPARIN SODIUM 5000 UNITS: 5000 INJECTION INTRAVENOUS; SUBCUTANEOUS at 09:11

## 2022-11-24 RX ADMIN — HEPARIN SODIUM 5000 UNITS: 5000 INJECTION INTRAVENOUS; SUBCUTANEOUS at 05:11

## 2022-11-24 RX ADMIN — ACETAMINOPHEN 1000 MG: 500 TABLET ORAL at 09:11

## 2022-11-24 RX ADMIN — HYDROMORPHONE HYDROCHLORIDE 1 MG: 1 INJECTION, SOLUTION INTRAMUSCULAR; INTRAVENOUS; SUBCUTANEOUS at 04:11

## 2022-11-24 RX ADMIN — ATORVASTATIN CALCIUM 40 MG: 20 TABLET, FILM COATED ORAL at 12:11

## 2022-11-24 RX ADMIN — ONDANSETRON 4 MG: 2 INJECTION INTRAMUSCULAR; INTRAVENOUS at 08:11

## 2022-11-24 RX ADMIN — ONDANSETRON 4 MG: 2 INJECTION INTRAMUSCULAR; INTRAVENOUS at 02:11

## 2022-11-24 RX ADMIN — OXYCODONE HYDROCHLORIDE 10 MG: 10 TABLET ORAL at 05:11

## 2022-11-24 RX ADMIN — HEPARIN SODIUM 5000 UNITS: 5000 INJECTION INTRAVENOUS; SUBCUTANEOUS at 02:11

## 2022-11-24 RX ADMIN — HYDROMORPHONE HYDROCHLORIDE 1 MG: 1 INJECTION, SOLUTION INTRAMUSCULAR; INTRAVENOUS; SUBCUTANEOUS at 02:11

## 2022-11-24 RX ADMIN — OXYCODONE 5 MG: 5 TABLET ORAL at 09:11

## 2022-11-24 RX ADMIN — ASPIRIN 81 MG 81 MG: 81 TABLET ORAL at 12:11

## 2022-11-24 RX ADMIN — AMIODARONE HYDROCHLORIDE 200 MG: 200 TABLET ORAL at 12:11

## 2022-11-24 RX ADMIN — POLYETHYLENE GLYCOL 3350 17 G: 17 POWDER, FOR SOLUTION ORAL at 02:11

## 2022-11-24 RX ADMIN — ACETAMINOPHEN 1000 MG: 500 TABLET ORAL at 02:11

## 2022-11-24 RX ADMIN — CEFEPIME HYDROCHLORIDE 1 G: 1 INJECTION, POWDER, FOR SOLUTION INTRAMUSCULAR; INTRAVENOUS at 09:11

## 2022-11-24 RX ADMIN — AMIODARONE HYDROCHLORIDE 200 MG: 200 TABLET ORAL at 09:11

## 2022-11-24 RX ADMIN — ONDANSETRON 4 MG: 2 INJECTION INTRAMUSCULAR; INTRAVENOUS at 12:11

## 2022-11-24 RX ADMIN — METHOCARBAMOL 500 MG: 500 TABLET ORAL at 09:11

## 2022-11-24 RX ADMIN — METHOCARBAMOL 500 MG: 500 TABLET ORAL at 03:11

## 2022-11-24 RX ADMIN — OXYCODONE HYDROCHLORIDE 10 MG: 10 TABLET ORAL at 09:11

## 2022-11-24 RX ADMIN — HYDROMORPHONE HYDROCHLORIDE 1 MG: 1 INJECTION, SOLUTION INTRAMUSCULAR; INTRAVENOUS; SUBCUTANEOUS at 08:11

## 2022-11-24 NOTE — PLAN OF CARE
Patient cooperative,anxious, supported with active  listening and  plan of care discussed. Vitals stable on room air, pain managed with PRN's. BLANCA drains with sanguinous output. Trialysis  dressing changed, no signs of bleeding from chest tube site. Dressings CDI. Comfort and safety maintained, continue to monitor      Problem: Adult Inpatient Plan of Care  Goal: Plan of Care Review  Outcome: Ongoing, Progressing  Goal: Patient-Specific Goal (Individualized)  Outcome: Ongoing, Progressing  Goal: Absence of Hospital-Acquired Illness or Injury  Outcome: Ongoing, Progressing  Goal: Optimal Comfort and Wellbeing  Outcome: Ongoing, Progressing  Goal: Readiness for Transition of Care  Outcome: Ongoing, Progressing     Problem: Infection  Goal: Absence of Infection Signs and Symptoms  Outcome: Ongoing, Progressing     Problem: Fall Injury Risk  Goal: Absence of Fall and Fall-Related Injury  Outcome: Ongoing, Progressing     Problem: Skin Injury Risk Increased  Goal: Skin Health and Integrity  Outcome: Ongoing, Progressing     Problem: Fluid Imbalance (Pneumonia)  Goal: Fluid Balance  Outcome: Ongoing, Progressing     Problem: Infection (Pneumonia)  Goal: Resolution of Infection Signs and Symptoms  Outcome: Ongoing, Progressing     Problem: Respiratory Compromise (Pneumonia)  Goal: Effective Oxygenation and Ventilation  Outcome: Ongoing, Progressing     Problem: Fluid and Electrolyte Imbalance (Acute Kidney Injury/Impairment)  Goal: Fluid and Electrolyte Balance  Outcome: Ongoing, Progressing     Problem: Oral Intake Inadequate (Acute Kidney Injury/Impairment)  Goal: Optimal Nutrition Intake  Outcome: Ongoing, Progressing     Problem: Renal Function Impairment (Acute Kidney Injury/Impairment)  Goal: Effective Renal Function  Outcome: Ongoing, Progressing

## 2022-11-24 NOTE — SUBJECTIVE & OBJECTIVE
Interval History:   NAEON  Receiving HD today    Medications:  Continuous Infusions:  Scheduled Meds:   sodium chloride 0.9%   Intravenous Once    acetaminophen  1,000 mg Oral Q8H    amiodarone  200 mg Oral BID    aspirin  81 mg Oral Daily    atorvastatin  40 mg Oral Daily    ceFEPime (MAXIPIME) IVPB  1 g Intravenous Q24H    heparin (porcine)  5,000 Units Subcutaneous Q8H    methocarbamoL  500 mg Oral TID    polyethylene glycol  17 g Oral Daily    senna-docusate 8.6-50 mg  1 tablet Oral BID     PRN Meds:dextrose 10%, dextrose 10%, diazePAM, [START ON 11/25/2022] heparin (porcine), HYDROmorphone, hydrOXYzine pamoate, ondansetron, oxyCODONE, oxyCODONE, [START ON 11/25/2022] sodium chloride 0.9%     Objective:     Vital Signs (Most Recent):  Temp: 98.2 °F (36.8 °C) (11/24/22 0815)  Pulse: 86 (11/24/22 1045)  Resp: 19 (11/24/22 0935)  BP: 107/61 (11/24/22 1045)  SpO2: (!) 94 % (11/24/22 0815)   Vital Signs (24h Range):  Temp:  [96.7 °F (35.9 °C)-98.7 °F (37.1 °C)] 98.2 °F (36.8 °C)  Pulse:  [86-96] 86  Resp:  [16-20] 19  SpO2:  [92 %-96 %] 94 %  BP: (107-159)/(59-78) 107/61     Weight: 74 kg (163 lb 2.3 oz)  Body mass index is 24.08 kg/m².    SpO2: (!) 94 %  O2 Device (Oxygen Therapy): room air    Intake/Output - Last 3 Shifts         11/22 0700  11/23 0659 11/23 0700 11/24 0659 11/24 0700 11/25 0659    P.O. 220 620 240    I.V. (mL/kg)       Other 500      IV Piggyback       Total Intake(mL/kg) 720 (9.7) 620 (8.4) 240 (3.2)    Urine (mL/kg/hr)  150 (0.1)     Drains 190 263     Other 1000      Total Output 1190 413     Net -470 +207 +240           Urine Occurrence  1 x 1 x            Lines/Drains/Airways       Peripherally Inserted Central Catheter Line  Duration             PICC Double Lumen 10/17/22 1709 right brachial 37 days              Central Venous Catheter Line  Duration             Trialysis (Dialysis) Catheter 11/15/22 1654 right internal jugular 8 days              Drain  Duration                   Closed/Suction Drain 11/14/22 2010 Inferior;Right Chest Bulb 15 Fr. 9 days         Closed/Suction Drain 11/14/22 2010 Superior;Midline Abdomen Bulb 15 Fr. 9 days         Closed/Suction Drain 11/14/22 2015 Inferior;Left Chest Bulb 15 Fr. 9 days                    Physical Exam  Constitutional:       General: He is not in acute distress.     Appearance: He is well-developed.   Cardiovascular:      Rate and Rhythm: Normal rate and regular rhythm.   Pulmonary:      Effort: Pulmonary effort is normal. No respiratory distress.   Abdominal:      General: There is no distension.      Palpations: Abdomen is soft.   Skin:     General: Skin is warm and dry.   Neurological:      Mental Status: He is alert and oriented to person, place, and time.   Psychiatric:         Behavior: Behavior normal.       Significant Labs:  CBC:   Recent Labs   Lab 11/24/22  0407   WBC 14.04*   RBC 2.90*   HGB 8.8*   HCT 28.5*      MCV 98   MCH 30.3   MCHC 30.9*     CMP:   Recent Labs   Lab 11/24/22  0407   GLU 94   CALCIUM 8.6*   ALBUMIN 2.4*   PROT 5.7*   *   K 4.7   CO2 23      BUN 24*   CREATININE 4.1*   ALKPHOS 114   ALT 7*   AST 15   BILITOT 0.5       Significant Diagnostics:  I have reviewed all pertinent imaging results/findings within the past 24 hours.

## 2022-11-24 NOTE — PROGRESS NOTES
[facility] - [facility]  Nephrology  Progress Note    Patient Name: David Barrios  MRN: [id number]  Admission Date: 11/8/2022  Hospital Length of Stay: 16 days  Attending Provider: Mike Hedrick MD   Primary Care Physician: Breann Tavera MD  Principal Problem:Sternal wound infection    Subjective:     HPI: 61 yo male w/ hx of Afib; HFrEF (35%); HTN; HLD. Patient underwent who underwent mitral valve repair, tricuspid valve repair, left atrial maze, left atrial appendage resection, and direct aortic impella 5.5 insertion on 10/07/2022. Hospital course complicated by Serratia bacteremia (on 6wk course of cefepime), sternal wound infection, and cardiogenic shock. On 10/31 patient developed KIMANI. Per chart review no documented course of hypotension during that time. Patient started on diuresis with IV lasix, with good urine output. Patient required increased diuretics (IV Diuril 500mg TID; and Lasix 120mg TID) and continued to have increased Scr, which progressed to oliguria, and now having metabolic derangements.     Patient denies any prior history of kidney disease. Does not have significant family history of kidney disease. Does endorse worsening fatigue and dyspnea. Denies any nausea vomiting, or metallic taste in mouth.     Nephrology was consulted for oliguric KIMANI.       Interval History:     No acute events overnight. Patient tolerated iHD this AM with ~1950cc UF removed. Denies any acute complaints at this time.         Review of patient's allergies indicates:  No Known Allergies  Current Facility-Administered Medications   Medication Frequency    0.9%  NaCl infusion Once    acetaminophen tablet 1,000 mg Q8H    amiodarone tablet 200 mg BID    aspirin chewable tablet 81 mg Daily    atorvastatin tablet 40 mg Daily    cefepime in dextrose 5 % 1 gram/50 mL IVPB 1 g Q24H    dextrose 10% bolus 125 mL PRN    dextrose 10% bolus 250 mL PRN    diazePAM tablet 5 mg Q6H PRN    [START ON 11/25/2022] heparin  (porcine) injection 1,000 Units PRN    heparin (porcine) injection 5,000 Units Q8H    HYDROmorphone injection 1 mg Q4H PRN    hydrOXYzine pamoate capsule 50 mg Q8H PRN    methocarbamoL tablet 500 mg TID    ondansetron injection 4 mg Q8H PRN    oxyCODONE immediate release tablet 5 mg Q4H PRN    oxyCODONE immediate release tablet Tab 10 mg Q4H PRN    polyethylene glycol packet 17 g Daily    senna-docusate 8.6-50 mg per tablet 1 tablet BID    [START ON 11/25/2022] sodium chloride 0.9% bolus 250 mL PRN       Objective:     Vital Signs (Most Recent):  Temp: 98.2 °F (36.8 °C) (11/24/22 0815)  Pulse: 87 (11/24/22 1120)  Resp: 18 (11/24/22 1211)  BP: 124/67 (11/24/22 1120)  SpO2: (!) 94 % (11/24/22 0815)  O2 Device (Oxygen Therapy): room air (11/24/22 0815)   Vital Signs (24h Range):  Temp:  [96.7 °F (35.9 °C)-98.7 °F (37.1 °C)] 98.2 °F (36.8 °C)  Pulse:  [86-96] 87  Resp:  [16-20] 18  SpO2:  [92 %-96 %] 94 %  BP: (107-159)/(59-78) 124/67     Weight: 74 kg (163 lb 2.3 oz) (11/12/22 1519)  Body mass index is 24.08 kg/m².  Body surface area is 1.9 meters squared.    I/O last 3 completed shifts:  In: 620 [P.O.:620]  Out: 473 [Urine:150; Drains:323]    Physical Exam  Vitals and nursing note reviewed.   Constitutional:       General: He is not in acute distress.     Appearance: He is normal weight. He is not ill-appearing or diaphoretic.   HENT:      Head: Normocephalic and atraumatic.      Right Ear: External ear normal.      Left Ear: External ear normal.      Nose: Nose normal.   Eyes:      General: No scleral icterus.        Right eye: No discharge.         Left eye: No discharge.      Extraocular Movements: Extraocular movements intact.      Conjunctiva/sclera: Conjunctivae normal.      Comments: Prescription eye glasses.   Neck:      Comments: Trialysis catheter to right internal jugular vein.  Cardiovascular:      Rate and Rhythm: Normal rate.      Heart sounds: Murmur heard.     No friction rub. No gallop.      Comments:  Binder in place with closed incision overlying sternum.  Pulmonary:      Effort: No respiratory distress.      Breath sounds: No wheezing, rhonchi or rales.   Chest:      Comments: BLANCA drains present with sanguinous output.  Abdominal:      General: Bowel sounds are normal. There is no distension.      Palpations: Abdomen is soft.      Tenderness: There is no abdominal tenderness.   Musculoskeletal:      Cervical back: Neck supple.      Right lower le+ Pitting Edema present.      Left lower le+ Pitting Edema present.   Skin:     General: Skin is warm and dry.      Coloration: Skin is not jaundiced.   Neurological:      General: No focal deficit present.      Mental Status: He is alert. Mental status is at baseline.      Cranial Nerves: No cranial nerve deficit.   Psychiatric:         Mood and Affect: Mood normal.         Behavior: Behavior normal.       Significant Labs:  All labs within the past 24 hours have been reviewed.     Significant Imaging:  Labs: Reviewed    Assessment/Plan:     * Sternal wound infection  - per primary (Cardiothoracic Surgery) and Infectious Disease    KIMANI (acute kidney injury)  - Baseline Scr 0.9-1.   - 10/31 Scr increased to ~1.5; steadly increased to peak of 5.3 today  - Hyperkalemia noted to 5.3  - Anuric today despite IV diuril 500mg TID; and IV lasix 120mg TID  - Risk factors for KIMANI include Cardiorenal syndrome (less likely given aggressive diuresis in last 72hrs; and normal CVP readings); AIN; IRGN; and prolonged prerenal state from over diuresis, sepsis.    Recommendations/Plan:  - S/p iHD this AM with 1950cc of UF. Tolerated well. No indication for further RRT today.   - Tentative plan for tunneled HD catheter placement on  per Interventional Radiology if blood cultures without growth x48 hours (thus far without growth)  - Daily RFP and magnesium level  - Strict I/Os and daily weights  - Renally dose medications to eGFR  - Avoid nephrotoxins when feasible (i.e.  intra-arterial contrast, NSAIDs, etc.)  - RENAL DIET (low potassium) when not NPO    Pneumonia of both lower lobes due to infectious organism  - management per primary team  - leukocytosis up trending based on CBC over the last 2 days  - recommend obtaining repeat blood cultures and UA  - antibiotics per Infectious Disease         Thank you for your consult. I will follow-up with patient. Please contact us if you have any additional questions.   Case discussed with attending. Attestation to follow.       Jaspal Crockett DO  Nephrology  [facility]    ATTENDING PHYSICIAN ATTESTATION  I have personally verified the history and examined the patient. I thoroughly reviewed the demographic, clinical, laboratorial and imaging information available in medical records. I agree with the assessment and recommendations provided by the subspecialty resident who was under my supervision.

## 2022-11-24 NOTE — PROGRESS NOTES
[facility]  Cardiothoracic Surgery  Progress Note    Patient Name: David Barrios  MRN: [id number]  Admission Date: 11/8/2022  Hospital Length of Stay: 16 days  Code Status: Full Code   Attending Physician: Mike Hedrick MD   Referring Provider: Mike Hedrick MD  Principal Problem:Sternal wound infection            Subjective:     Post-Op Info:  Procedure(s) (LRB):  CREATION, FLAP, MUSCLE ROTATION (N/A)  IRRIGATION AND DEBRIDEMENT, WOUND, STERNUM (N/A)  CLOSURE, WOUND, STERNUM (N/A)  EXPLORATION, WOUND (N/A)   10 Days Post-Op     Interval History:   NAEON  Receiving HD today    Medications:  Continuous Infusions:  Scheduled Meds:   sodium chloride 0.9%   Intravenous Once    acetaminophen  1,000 mg Oral Q8H    amiodarone  200 mg Oral BID    aspirin  81 mg Oral Daily    atorvastatin  40 mg Oral Daily    ceFEPime (MAXIPIME) IVPB  1 g Intravenous Q24H    heparin (porcine)  5,000 Units Subcutaneous Q8H    methocarbamoL  500 mg Oral TID    polyethylene glycol  17 g Oral Daily    senna-docusate 8.6-50 mg  1 tablet Oral BID     PRN Meds:dextrose 10%, dextrose 10%, diazePAM, [START ON 11/25/2022] heparin (porcine), HYDROmorphone, hydrOXYzine pamoate, ondansetron, oxyCODONE, oxyCODONE, [START ON 11/25/2022] sodium chloride 0.9%     Objective:     Vital Signs (Most Recent):  Temp: 98.2 °F (36.8 °C) (11/24/22 0815)  Pulse: 86 (11/24/22 1045)  Resp: 19 (11/24/22 0935)  BP: 107/61 (11/24/22 1045)  SpO2: (!) 94 % (11/24/22 0815)   Vital Signs (24h Range):  Temp:  [96.7 °F (35.9 °C)-98.7 °F (37.1 °C)] 98.2 °F (36.8 °C)  Pulse:  [86-96] 86  Resp:  [16-20] 19  SpO2:  [92 %-96 %] 94 %  BP: (107-159)/(59-78) 107/61     Weight: 74 kg (163 lb 2.3 oz)  Body mass index is 24.08 kg/m².    SpO2: (!) 94 %  O2 Device (Oxygen Therapy): room air    Intake/Output - Last 3 Shifts         11/22 0700 11/23 0659 11/23 0700 11/24 0659 11/24 0700 11/25 0659    P.O. 220 620 240    I.V. (mL/kg)       Other 500       IV Piggyback       Total Intake(mL/kg) 720 (9.7) 620 (8.4) 240 (3.2)    Urine (mL/kg/hr)  150 (0.1)     Drains 190 263     Other 1000      Total Output 1190 413     Net -470 +207 +240           Urine Occurrence  1 x 1 x            Lines/Drains/Airways       Peripherally Inserted Central Catheter Line  Duration             PICC Double Lumen 10/17/22 1709 right brachial 37 days              Central Venous Catheter Line  Duration             Trialysis (Dialysis) Catheter 11/15/22 1654 right internal jugular 8 days              Drain  Duration                  Closed/Suction Drain 11/14/22 2010 Inferior;Right Chest Bulb 15 Fr. 9 days         Closed/Suction Drain 11/14/22 2010 Superior;Midline Abdomen Bulb 15 Fr. 9 days         Closed/Suction Drain 11/14/22 2015 Inferior;Left Chest Bulb 15 Fr. 9 days                    Physical Exam  Constitutional:       General: He is not in acute distress.     Appearance: He is well-developed.   Cardiovascular:      Rate and Rhythm: Normal rate and regular rhythm.   Pulmonary:      Effort: Pulmonary effort is normal. No respiratory distress.   Abdominal:      General: There is no distension.      Palpations: Abdomen is soft.   Skin:     General: Skin is warm and dry.   Neurological:      Mental Status: He is alert and oriented to person, place, and time.   Psychiatric:         Behavior: Behavior normal.       Significant Labs:  CBC:   Recent Labs   Lab 11/24/22  0407   WBC 14.04*   RBC 2.90*   HGB 8.8*   HCT 28.5*      MCV 98   MCH 30.3   MCHC 30.9*     CMP:   Recent Labs   Lab 11/24/22  0407   GLU 94   CALCIUM 8.6*   ALBUMIN 2.4*   PROT 5.7*   *   K 4.7   CO2 23      BUN 24*   CREATININE 4.1*   ALKPHOS 114   ALT 7*   AST 15   BILITOT 0.5       Significant Diagnostics:  I have reviewed all pertinent imaging results/findings within the past 24 hours.    Assessment/Plan:     * Sternal wound infection  S/P debridement and muscle flap   Renally dosed cefepime ending  12/26/22 per ID  Wound care consulted for buttock, chair cushion ordered     KIMANI (acute kidney injury)  Getting permacath tomorrow  NPO at midnight   Had HD yesterday     Sternal osteomyelitis  S/P washout and debridement and muscle flap with plastics     Transient hyperglycemia post procedure  Endocrine following     Persistent atrial fibrillation  Amiodarone       Microcytic anemia  CBC daily         Gin Saul MD  Cardiothoracic Surgery  [facility] - Cardiology Stepdown

## 2022-11-24 NOTE — ASSESSMENT & PLAN NOTE
- Baseline Scr 0.9-1.   - 10/31 Scr increased to ~1.5; steadly increased to peak of 5.3 today  - Hyperkalemia noted to 5.3  - Anuric today despite IV diuril 500mg TID; and IV lasix 120mg TID  - Risk factors for KIMANI include Cardiorenal syndrome (less likely given aggressive diuresis in last 72hrs; and normal CVP readings); AIN; IRGN; and prolonged prerenal state from over diuresis, sepsis.    Recommendations/Plan:  - S/p iHD this AM with 1950cc of UF. Tolerated well. No indication for further RRT today.   - Tentative plan for tunneled HD catheter placement on 11/25 per Interventional Radiology if blood cultures without growth x48 hours (thus far without growth)  - Daily RFP and magnesium level  - Strict I/Os and daily weights  - Renally dose medications to eGFR  - Avoid nephrotoxins when feasible (i.e. intra-arterial contrast, NSAIDs, etc.)  - RENAL DIET (low potassium) when not NPO

## 2022-11-24 NOTE — PROGRESS NOTES
HD TX completed.   Net fluid removed is 1950 ml.  VSS.   Tolerated HD TX well.  Report given to primary nurse.  Returned to floor via stretcher.

## 2022-11-24 NOTE — PROGRESS NOTES
Plastic and Reconstructive Surgery   Progress Note    Subjective:    Patient seen and examined. Pain controlled. Patient in dialysis this morning, no issues overnight. No sign of active bleeding or wound breakdown      Objective:  Vital signs in last 24 hours:  Temp:  [96.7 °F (35.9 °C)-98.7 °F (37.1 °C)] 98.2 °F (36.8 °C)  Pulse:  [88-96] 88  Resp:  [16-20] 19  SpO2:  [92 %-95 %] 94 %  BP: (136-159)/(65-78) 142/74    Intake/Output last 3 shifts:  I/O last 3 completed shifts:  In: 620 [P.O.:620]  Out: 473 [Urine:150; Drains:323]    Intake/Output this shift:  No intake/output data recorded.        Physical Exam:  VITAL SIGNS:   Vitals:    22 0830 22 0845 22 0900 22 0935   BP: (!) 145/78 139/74 (!) 142/74    BP Location:       Patient Position:       Pulse: 89 88 88    Resp:    19   Temp:       TempSrc:       SpO2:       Weight:       Height:         TMAX: Temp (24hrs), Av.8 °F (36.6 °C), Min:96.7 °F (35.9 °C), Max:98.7 °F (37.1 °C)    General: Alert; No acute distress  Cardiovascular: Regular rate   Respiratory: Normal respiratory effort. Chest rise symmetric.   Abdomen: Soft, nontender, nondistended  Extremity: Moves all extremities equally.  Neurologic: No focal deficit. Speech normal  SKIN: midline sternal and bilateral axillary incisions c/d/I, 3 drains left (right/left chest and mediastinum)    Scheduled Medications sodium chloride 0.9%, , Once  acetaminophen, 1,000 mg, Q8H  amiodarone, 200 mg, BID  aspirin, 81 mg, Daily  atorvastatin, 40 mg, Daily  ceFEPime (MAXIPIME) IVPB, 1 g, Q24H  heparin (porcine), 5,000 Units, Q8H  methocarbamoL, 500 mg, TID  polyethylene glycol, 17 g, Daily  senna-docusate 8.6-50 mg, 1 tablet, BID    PRN Medications dextrose 10%, dextrose 10%, diazePAM, [START ON 2022] heparin (porcine), HYDROmorphone, hydrOXYzine pamoate, ondansetron, oxyCODONE, oxyCODONE, [START ON 2022] sodium chloride 0.9%    Recent Labs:   Lab Results   Component Value Date     WBC 14.04 (H) 11/24/2022    HGB 8.8 (L) 11/24/2022    HCT 28.5 (L) 11/24/2022    MCV 98 11/24/2022     11/24/2022     Lab Results   Component Value Date    GLU 94 11/24/2022     (L) 11/24/2022    K 4.7 11/24/2022     11/24/2022    BUN 24 (H) 11/24/2022         Assessment: 62 y.o. y/o male 10 Days Post-Op s/p Procedure(s):  REMOVAL, STERNAL WIRE  DEBRIDEMENT, STERNUM  APPLICATION, WOUND VAC Doing well postoperatively.    Plan  Monitor drain output; BLANCA drains to remain in place  Encourage ambulation/OOB with PT  Pain control PRN  I's & O's  Dvt ppx  Plastic surgery will follow     Dada Griffin MD- Fellow  Department of Plastic and Reconstructive Surgery  [phone] (office)

## 2022-11-24 NOTE — SUBJECTIVE & OBJECTIVE
Interval History:     No acute events overnight. Patient tolerated iHD this AM with ~1950cc UF removed. Denies any acute complaints at this time.         Review of patient's allergies indicates:  No Known Allergies  Current Facility-Administered Medications   Medication Frequency    0.9%  NaCl infusion Once    acetaminophen tablet 1,000 mg Q8H    amiodarone tablet 200 mg BID    aspirin chewable tablet 81 mg Daily    atorvastatin tablet 40 mg Daily    cefepime in dextrose 5 % 1 gram/50 mL IVPB 1 g Q24H    dextrose 10% bolus 125 mL PRN    dextrose 10% bolus 250 mL PRN    diazePAM tablet 5 mg Q6H PRN    [START ON 11/25/2022] heparin (porcine) injection 1,000 Units PRN    heparin (porcine) injection 5,000 Units Q8H    HYDROmorphone injection 1 mg Q4H PRN    hydrOXYzine pamoate capsule 50 mg Q8H PRN    methocarbamoL tablet 500 mg TID    ondansetron injection 4 mg Q8H PRN    oxyCODONE immediate release tablet 5 mg Q4H PRN    oxyCODONE immediate release tablet Tab 10 mg Q4H PRN    polyethylene glycol packet 17 g Daily    senna-docusate 8.6-50 mg per tablet 1 tablet BID    [START ON 11/25/2022] sodium chloride 0.9% bolus 250 mL PRN       Objective:     Vital Signs (Most Recent):  Temp: 98.2 °F (36.8 °C) (11/24/22 0815)  Pulse: 87 (11/24/22 1120)  Resp: 18 (11/24/22 1211)  BP: 124/67 (11/24/22 1120)  SpO2: (!) 94 % (11/24/22 0815)  O2 Device (Oxygen Therapy): room air (11/24/22 0815)   Vital Signs (24h Range):  Temp:  [96.7 °F (35.9 °C)-98.7 °F (37.1 °C)] 98.2 °F (36.8 °C)  Pulse:  [86-96] 87  Resp:  [16-20] 18  SpO2:  [92 %-96 %] 94 %  BP: (107-159)/(59-78) 124/67     Weight: 74 kg (163 lb 2.3 oz) (11/12/22 1519)  Body mass index is 24.08 kg/m².  Body surface area is 1.9 meters squared.    I/O last 3 completed shifts:  In: 620 [P.O.:620]  Out: 473 [Urine:150; Drains:323]    Physical Exam  Vitals and nursing note reviewed.   Constitutional:       General: He is not in acute distress.     Appearance: He is normal weight. He  is not ill-appearing or diaphoretic.   HENT:      Head: Normocephalic and atraumatic.      Right Ear: External ear normal.      Left Ear: External ear normal.      Nose: Nose normal.   Eyes:      General: No scleral icterus.        Right eye: No discharge.         Left eye: No discharge.      Extraocular Movements: Extraocular movements intact.      Conjunctiva/sclera: Conjunctivae normal.      Comments: Prescription eye glasses.   Neck:      Comments: Trialysis catheter to right internal jugular vein.  Cardiovascular:      Rate and Rhythm: Normal rate.      Heart sounds: Murmur heard.     No friction rub. No gallop.      Comments: Binder in place with closed incision overlying sternum.  Pulmonary:      Effort: No respiratory distress.      Breath sounds: No wheezing, rhonchi or rales.   Chest:      Comments: BLANCA drains present with sanguinous output.  Abdominal:      General: Bowel sounds are normal. There is no distension.      Palpations: Abdomen is soft.      Tenderness: There is no abdominal tenderness.   Musculoskeletal:      Cervical back: Neck supple.      Right lower le+ Pitting Edema present.      Left lower le+ Pitting Edema present.   Skin:     General: Skin is warm and dry.      Coloration: Skin is not jaundiced.   Neurological:      General: No focal deficit present.      Mental Status: He is alert. Mental status is at baseline.      Cranial Nerves: No cranial nerve deficit.   Psychiatric:         Mood and Affect: Mood normal.         Behavior: Behavior normal.       Significant Labs:  All labs within the past 24 hours have been reviewed.     Significant Imaging:  Labs: Reviewed

## 2022-11-25 ENCOUNTER — TELEPHONE (OUTPATIENT)
Dept: PLASTIC SURGERY | Facility: CLINIC | Age: 62
End: 2022-11-25
Payer: COMMERCIAL

## 2022-11-25 LAB
ALBUMIN SERPL BCP-MCNC: 2.3 G/DL (ref 3.5–5.2)
ALP SERPL-CCNC: 118 U/L (ref 55–135)
ALT SERPL W/O P-5'-P-CCNC: 5 U/L (ref 10–44)
ANION GAP SERPL CALC-SCNC: 7 MMOL/L (ref 8–16)
AST SERPL-CCNC: 15 U/L (ref 10–40)
BASOPHILS # BLD AUTO: 0.03 K/UL (ref 0–0.2)
BASOPHILS NFR BLD: 0.3 % (ref 0–1.9)
BILIRUB SERPL-MCNC: 0.6 MG/DL (ref 0.1–1)
BUN SERPL-MCNC: 20 MG/DL (ref 8–23)
CALCIUM SERPL-MCNC: 8.5 MG/DL (ref 8.7–10.5)
CHLORIDE SERPL-SCNC: 97 MMOL/L (ref 95–110)
CO2 SERPL-SCNC: 24 MMOL/L (ref 23–29)
CREAT SERPL-MCNC: 3.5 MG/DL (ref 0.5–1.4)
DIFFERENTIAL METHOD: ABNORMAL
EOSINOPHIL # BLD AUTO: 0.2 K/UL (ref 0–0.5)
EOSINOPHIL NFR BLD: 1.9 % (ref 0–8)
ERYTHROCYTE [DISTWIDTH] IN BLOOD BY AUTOMATED COUNT: 17.7 % (ref 11.5–14.5)
EST. GFR  (NO RACE VARIABLE): 18.8 ML/MIN/1.73 M^2
GLUCOSE SERPL-MCNC: 84 MG/DL (ref 70–110)
HCT VFR BLD AUTO: 28.3 % (ref 40–54)
HGB BLD-MCNC: 8.6 G/DL (ref 14–18)
IMM GRANULOCYTES # BLD AUTO: 0.08 K/UL (ref 0–0.04)
IMM GRANULOCYTES NFR BLD AUTO: 0.7 % (ref 0–0.5)
LYMPHOCYTES # BLD AUTO: 1.4 K/UL (ref 1–4.8)
LYMPHOCYTES NFR BLD: 12.2 % (ref 18–48)
MAGNESIUM SERPL-MCNC: 1.7 MG/DL (ref 1.6–2.6)
MCH RBC QN AUTO: 30.4 PG (ref 27–31)
MCHC RBC AUTO-ENTMCNC: 30.4 G/DL (ref 32–36)
MCV RBC AUTO: 100 FL (ref 82–98)
MONOCYTES # BLD AUTO: 1.2 K/UL (ref 0.3–1)
MONOCYTES NFR BLD: 10.2 % (ref 4–15)
NEUTROPHILS # BLD AUTO: 8.5 K/UL (ref 1.8–7.7)
NEUTROPHILS NFR BLD: 74.7 % (ref 38–73)
NRBC BLD-RTO: 0 /100 WBC
PHOSPHATE SERPL-MCNC: 3.5 MG/DL (ref 2.7–4.5)
PLATELET # BLD AUTO: 286 K/UL (ref 150–450)
PMV BLD AUTO: 10.3 FL (ref 9.2–12.9)
POTASSIUM SERPL-SCNC: 4.6 MMOL/L (ref 3.5–5.1)
PROT SERPL-MCNC: 5.6 G/DL (ref 6–8.4)
RBC # BLD AUTO: 2.83 M/UL (ref 4.6–6.2)
SODIUM SERPL-SCNC: 128 MMOL/L (ref 136–145)
WBC # BLD AUTO: 11.37 K/UL (ref 3.9–12.7)

## 2022-11-25 PROCEDURE — 85025 COMPLETE CBC W/AUTO DIFF WBC: CPT | Performed by: THORACIC SURGERY (CARDIOTHORACIC VASCULAR SURGERY)

## 2022-11-25 PROCEDURE — 83735 ASSAY OF MAGNESIUM: CPT

## 2022-11-25 PROCEDURE — 63600175 PHARM REV CODE 636 W HCPCS: Performed by: STUDENT IN AN ORGANIZED HEALTH CARE EDUCATION/TRAINING PROGRAM

## 2022-11-25 PROCEDURE — 25000003 PHARM REV CODE 250: Performed by: STUDENT IN AN ORGANIZED HEALTH CARE EDUCATION/TRAINING PROGRAM

## 2022-11-25 PROCEDURE — 63600175 PHARM REV CODE 636 W HCPCS: Performed by: THORACIC SURGERY (CARDIOTHORACIC VASCULAR SURGERY)

## 2022-11-25 PROCEDURE — 99232 SBSQ HOSP IP/OBS MODERATE 35: CPT | Mod: ,,, | Performed by: INTERNAL MEDICINE

## 2022-11-25 PROCEDURE — 25000003 PHARM REV CODE 250

## 2022-11-25 PROCEDURE — 97530 THERAPEUTIC ACTIVITIES: CPT

## 2022-11-25 PROCEDURE — 99900035 HC TECH TIME PER 15 MIN (STAT)

## 2022-11-25 PROCEDURE — 99232 PR SUBSEQUENT HOSPITAL CARE,LEVL II: ICD-10-PCS | Mod: ,,, | Performed by: INTERNAL MEDICINE

## 2022-11-25 PROCEDURE — 84100 ASSAY OF PHOSPHORUS: CPT

## 2022-11-25 PROCEDURE — 97116 GAIT TRAINING THERAPY: CPT

## 2022-11-25 PROCEDURE — 20600001 HC STEP DOWN PRIVATE ROOM

## 2022-11-25 PROCEDURE — 63600175 PHARM REV CODE 636 W HCPCS: Performed by: ANESTHESIOLOGY

## 2022-11-25 PROCEDURE — 94761 N-INVAS EAR/PLS OXIMETRY MLT: CPT

## 2022-11-25 PROCEDURE — 80053 COMPREHEN METABOLIC PANEL: CPT

## 2022-11-25 RX ORDER — LIDOCAINE HYDROCHLORIDE 10 MG/ML
INJECTION INFILTRATION; PERINEURAL
Status: COMPLETED | OUTPATIENT
Start: 2022-11-25 | End: 2022-11-25

## 2022-11-25 RX ORDER — MIDAZOLAM HYDROCHLORIDE 1 MG/ML
INJECTION INTRAMUSCULAR; INTRAVENOUS
Status: COMPLETED | OUTPATIENT
Start: 2022-11-25 | End: 2022-11-25

## 2022-11-25 RX ORDER — FENTANYL CITRATE 50 UG/ML
INJECTION, SOLUTION INTRAMUSCULAR; INTRAVENOUS
Status: COMPLETED | OUTPATIENT
Start: 2022-11-25 | End: 2022-11-25

## 2022-11-25 RX ORDER — SODIUM CHLORIDE 9 MG/ML
INJECTION, SOLUTION INTRAVENOUS ONCE
Status: CANCELLED | OUTPATIENT
Start: 2022-11-25 | End: 2022-11-25

## 2022-11-25 RX ORDER — CEFAZOLIN SODIUM 1 G/50ML
SOLUTION INTRAVENOUS
Status: COMPLETED | OUTPATIENT
Start: 2022-11-25 | End: 2022-11-25

## 2022-11-25 RX ADMIN — OXYCODONE HYDROCHLORIDE 10 MG: 10 TABLET ORAL at 11:11

## 2022-11-25 RX ADMIN — OXYCODONE HYDROCHLORIDE 10 MG: 10 TABLET ORAL at 10:11

## 2022-11-25 RX ADMIN — FENTANYL CITRATE 25 MCG: 0.05 INJECTION, SOLUTION INTRAMUSCULAR; INTRAVENOUS at 09:11

## 2022-11-25 RX ADMIN — METHOCARBAMOL 500 MG: 500 TABLET ORAL at 11:11

## 2022-11-25 RX ADMIN — ASPIRIN 81 MG 81 MG: 81 TABLET ORAL at 11:11

## 2022-11-25 RX ADMIN — SENNOSIDES AND DOCUSATE SODIUM 1 TABLET: 50; 8.6 TABLET ORAL at 09:11

## 2022-11-25 RX ADMIN — CEFEPIME HYDROCHLORIDE 1 G: 1 INJECTION, POWDER, FOR SOLUTION INTRAMUSCULAR; INTRAVENOUS at 09:11

## 2022-11-25 RX ADMIN — METHOCARBAMOL 500 MG: 500 TABLET ORAL at 02:11

## 2022-11-25 RX ADMIN — HEPARIN SODIUM 5000 UNITS: 5000 INJECTION INTRAVENOUS; SUBCUTANEOUS at 09:11

## 2022-11-25 RX ADMIN — CEFAZOLIN SODIUM 1 G: 1 SOLUTION INTRAVENOUS at 09:11

## 2022-11-25 RX ADMIN — LIDOCAINE HYDROCHLORIDE 5 ML: 10 INJECTION, SOLUTION INFILTRATION; PERINEURAL at 09:11

## 2022-11-25 RX ADMIN — ATORVASTATIN CALCIUM 40 MG: 20 TABLET, FILM COATED ORAL at 11:11

## 2022-11-25 RX ADMIN — OXYCODONE HYDROCHLORIDE 10 MG: 10 TABLET ORAL at 04:11

## 2022-11-25 RX ADMIN — HYDROMORPHONE HYDROCHLORIDE 1 MG: 1 INJECTION, SOLUTION INTRAMUSCULAR; INTRAVENOUS; SUBCUTANEOUS at 05:11

## 2022-11-25 RX ADMIN — FENTANYL CITRATE 50 MCG: 0.05 INJECTION, SOLUTION INTRAMUSCULAR; INTRAVENOUS at 08:11

## 2022-11-25 RX ADMIN — HYDROMORPHONE HYDROCHLORIDE 1 MG: 1 INJECTION, SOLUTION INTRAMUSCULAR; INTRAVENOUS; SUBCUTANEOUS at 02:11

## 2022-11-25 RX ADMIN — AMIODARONE HYDROCHLORIDE 200 MG: 200 TABLET ORAL at 11:11

## 2022-11-25 RX ADMIN — AMIODARONE HYDROCHLORIDE 200 MG: 200 TABLET ORAL at 09:11

## 2022-11-25 RX ADMIN — OXYCODONE HYDROCHLORIDE 10 MG: 10 TABLET ORAL at 02:11

## 2022-11-25 RX ADMIN — SENNOSIDES AND DOCUSATE SODIUM 1 TABLET: 50; 8.6 TABLET ORAL at 11:11

## 2022-11-25 RX ADMIN — POLYETHYLENE GLYCOL 3350 17 G: 17 POWDER, FOR SOLUTION ORAL at 11:11

## 2022-11-25 RX ADMIN — MIDAZOLAM HYDROCHLORIDE 1 MG: 1 INJECTION, SOLUTION INTRAMUSCULAR; INTRAVENOUS at 08:11

## 2022-11-25 RX ADMIN — METHOCARBAMOL 500 MG: 500 TABLET ORAL at 09:11

## 2022-11-25 RX ADMIN — HEPARIN SODIUM 5000 UNITS: 5000 INJECTION INTRAVENOUS; SUBCUTANEOUS at 05:11

## 2022-11-25 RX ADMIN — HEPARIN SODIUM 5000 UNITS: 5000 INJECTION INTRAVENOUS; SUBCUTANEOUS at 02:11

## 2022-11-25 RX ADMIN — HEPARIN SODIUM 1800 UNITS: 1000 INJECTION, SOLUTION INTRAVENOUS; SUBCUTANEOUS at 09:11

## 2022-11-25 RX ADMIN — ACETAMINOPHEN 1000 MG: 500 TABLET ORAL at 09:11

## 2022-11-25 RX ADMIN — HYDROMORPHONE HYDROCHLORIDE 1 MG: 1 INJECTION, SOLUTION INTRAMUSCULAR; INTRAVENOUS; SUBCUTANEOUS at 09:11

## 2022-11-25 RX ADMIN — MIDAZOLAM HYDROCHLORIDE 0.5 MG: 1 INJECTION, SOLUTION INTRAMUSCULAR; INTRAVENOUS at 09:11

## 2022-11-25 NOTE — PT/OT/SLP PROGRESS
Physical Therapy  Treatment    Patient Name:  David Barrios   MRN:  [id number]    Recommendations:     Discharge Recommendations:  home health PT   Discharge Equipment Recommendations: shower chair   Barriers to discharge: decreased functional mobility, fall risk, and decreased caregiver support    Assessment:     David Barrios is a 62 y.o. male admitted with a medical diagnosis of Sternal wound infection.  Pt demonstrates the below listed impairments with decreased tolerance to functional mobility, impaired endurance, and gait instability being the most limiting.  Pt demonstrates fair tolerance to out of bed mobility and is willing to ambulate in the hallway.  He recalls 2/3 sternal precautions, 75% compliant this day Pt requires skilled PT due to patient's status as: a fall risk to allow safe d/c home.     Impairments and functional limitations:  weakness, impaired endurance, impaired self care skills, impaired functional mobility, gait instability, impaired balance, decreased upper extremity function, decreased lower extremity function, impaired skin, impaired cardiopulmonary response to activity.  These deficits affect their roles and responsibilities in which they were able to complete prior to admit.  Rehab Prognosis:   Good ; patient would benefit from acute skilled PT services 5 x/week to address these deficits, improve quality of life, focus on recovery of impairments, provide patient/caregiver education, reduce fall risk, and reach maximum level of function.  Pt is highly  motivated to participated in skilled PT.    Recent Surgery:   Procedure(s) (LRB):  CREATION, FLAP, MUSCLE ROTATION (N/A)  IRRIGATION AND DEBRIDEMENT, WOUND, STERNUM (N/A)  CLOSURE, WOUND, STERNUM (N/A)  EXPLORATION, WOUND (N/A) 11 Days Post-Op    Plan:     During this hospitalization, patient to be seen 5 x/week to address the identified rehab impairments via gait training, therapeutic activities, therapeutic exercises, neuromuscular  "re-education and progress toward the following goals:    Plan of Care Expires:  12/16/22    Subjective     Chief Complaint: "You want to walk now?"  Patient/Family Comments/Goals: Return home  Pain/Comfort:  Pain Rating 1: 0/10    Objective:     Communicated with RN prior to session.  Patient found up in room with telemetry, central line, BLANCA drain upon PT entry to room.     General Precautions: Standard, fall, sternal   Orthopedic Precautions:N/A   Braces: N/A  Oxygen Device:      Functional Mobility:  Bed Mobility:   n/a  Head of bed position: n/a    Transfers:  Sit to Stand: Sup with No AD  Performs x4 sit to stands     Gait: Patient ambulated 102', 106', 106', 102,  with No AD and SBA. Patient demonstrates occasional unsteady gait, decreased step length, flexed posture, and decreased goldy. All lines remained intact throughout ambulation trial, mask donned for out of room ambulation.  Pt demonstrates some SOB, no LOB or dizziness present  Requires seated rest break between each trial     Balance:   Position Score Time   Static Sitting GOOD: Takes MODERATE challenges n/a   Dynamic Sitting GOOD: Maintains balance through MODERATE excursions of active trunk motion n/a   Static Standing GOOD-: Takes MODERATE challenges but inconstantly  n/a   Dynamic Standing GOOD-: Maintains balance through MODERATE excursions of active trunk motion, but inconstantly  n/a       AM-PAC 6 CLICK MOBILITY  Turning over in bed (including adjusting bedclothes, sheets and blankets)?: 3  Sitting down on and standing up from a chair with arms (e.g., wheelchair, bedside commode, etc.): 3  Moving from lying on back to sitting on the side of the bed?: 3  Moving to and from a bed to a chair (including a wheelchair)?: 3  Need to walk in hospital room?: 3  Climbing 3-5 steps with a railing?: 3  Basic Mobility Total Score: 18     Therapeutic Activities:  Patient educated on role of acute care PT and PT POC, safety while in hospital including " calling nurse for mobility, call light usage, benefits of out of bed mobility, breathing technique, fall risk, transfers, gait technique, positioning, posture, risks of prolonged bed rest, possible discharge disposition , sternal precautions, and benefits of continued PT by explanation and demonstration.    Patient demonstrates good understanding of education provided this day.   Whiteboard updated    Therapeutic Exercises:  n/a    Patient left up in chair with all lines intact and call button in reach, RN made aware.     GOALS:   Multidisciplinary Problems       Physical Therapy Goals          Problem: Physical Therapy    Goal Priority Disciplines Outcome Goal Variances Interventions   Physical Therapy Goal     PT, PT/OT Ongoing, Progressing     Description: Goals to be met by: 2022     Patient will increase functional independence with mobility by performin. Supine to sit with Modified Tyrrell  2. Sit to supine with Modified Tyrrell  3. Sit to stand transfer with Supervision  4. Bed to chair transfer with Supervision using No Assistive Device  5. Gait  x 200 feet with Supervision using No Assistive Device.   6. Pt will ascend/descend 3 steps with no HR and SBA.                         Time Tracking:     PT Received On: 22  PT Start Time: 733     PT Stop Time: 0757  PT Total Time (min): 24 min     Billable Minutes: Gait Training 24    Treatment Type: Treatment  PT/PTA: PT     PTA Visit Number: 0     2022

## 2022-11-25 NOTE — PT/OT/SLP PROGRESS
Occupational Therapy   Treatment    Name: David Barrios  MRN: [id number]  Admitting Diagnosis:  Sternal wound infection  11 Days Post-Op    Recommendations:     Discharge Recommendations: other (see comments)  Home with   Assessment:     David Barrios is a 62 y.o. male with a medical diagnosis of Sternal wound infection.  Performance deficits affecting function are weakness, impaired endurance, impaired self care skills, impaired functional mobility, gait instability, impaired balance. Pt tolerated session well with good effort and performance. Anticipate pt will continue to progress well and will be ready for d/c home with family support.     Rehab Prognosis:  Good; patient would benefit from acute skilled OT services to address these deficits and reach maximum level of function.       Plan:     Patient to be seen 4 x/week to address the above listed problems via self-care/home management, therapeutic activities, therapeutic exercises  Plan of Care Expires: 12/15/22  Plan of Care Reviewed with: patient    Subjective   Pt agreeable to therapy.   Pain/Comfort:  Pain Rating 1: 0/10    Objective:     Communicated with: nsant prior to session.  Patient found in bed requesting OT to return on one hour as he was eating his lunch.   OT did return in one hour and pt agreeable.  Pt with tele and 3 BLANCA drains. Pt also with PICC line right UE.      General Precautions: Standard, fall, sternal     Occupational Performance:     Bed Mobility:    Supine>sit with supervision.     Functional Mobility/Transfers:  Sit>stand from bed with supervision x 2 trials.  Sit>stand from chair with supervision.     Activities of Daily Living:  Feeding: independent  G/H declined task but simulated with SBA  LE dressing: MIN A   UE dressing; MOD A kendall gown as a robe      [facility] 6 Click ADL: 15    Treatment & Education:  Pt completed functional mobility in room and hallway approx 80 feet x 2 trials with seated rest break between trials due to  reports of both LE weakness and SOB.   Increased time needed with all tasks due to reported weakness.   Pt completed dynamic reaching tasks with SBA  in room as he organized few items on tray and spit secretions into the garbage can.   Education provided re: OT POC and safety with functional mobility/ADl skills.   Education provided re: importance of OOB activity including ambulation with nsg 3-4 x daily, performance of UE/LE AROM exs and OOB as tolerated.       Patient left up in chair with all lines intact, call button in reach, and nsg notified    GOALS:   Multidisciplinary Problems       Occupational Therapy Goals          Problem: Occupational Therapy    Goal Priority Disciplines Outcome Interventions   Occupational Therapy Goal     OT, PT/OT Ongoing, Progressing    Description: Goals to be met by: 11/29/22     Patient will increase functional independence with ADLs by performing:    Feeding with [place].  UE Dressing with [place].  LE Dressing with Stand-by Assistance.  Grooming while standing at sink with Supervision.  Toilet transfer to toilet with Supervision.                         Time Tracking:     OT Date of Treatment: 11/25/22  OT Start Time: 1419  OT Stop Time: 1445  OT Total Time (min): 26 min    Billable Minutes:Therapeutic Activity 26    OT/DUONG: OT          11/25/2022

## 2022-11-25 NOTE — H&P
Inpatient Radiology Pre-procedure Note    History of Present Illness:  David Barrios is a 62 y.o. male who presents for permcath placement.    Admission H&P reviewed.  Past Medical History:   Diagnosis Date    Anemia     Atrial fibrillation     Encounter for blood transfusion     Esophageal ulcer     GI bleed     Hypertension      Past Surgical History:   Procedure Laterality Date    APPLICATION OF WOUND VACUUM-ASSISTED CLOSURE DEVICE N/A 11/8/2022    Procedure: APPLICATION, WOUND VAC;  Surgeon: Mike Hedrick MD;  [facility];  Service: General;  Laterality: N/A;    CARDIOVERSION Left 9/15/2022    Procedure: Cardioversion;  Surgeon: Julio James MD;  Location: [facility] CATH LAB/EP;  Service: Cardiology;  Laterality: Left;  With NORBERT    CATHETERIZATION OF BOTH LEFT AND RIGHT HEART N/A 9/22/2022    Procedure: CATHETERIZATION, HEART, BOTH LEFT AND RIGHT;  Surgeon: Julio James MD;  Location: [facility] CATH LAB/EP;  Service: Cardiology;  Laterality: N/A;    COLONOSCOPY N/A 4/4/2019    Procedure: COLONOSCOPY [place];  Surgeon: Umair Randolph MD;  Location: [facility] ENDO;  Service: Endoscopy;  Laterality: N/A;    CORONARY ANGIOGRAPHY N/A 9/22/2022    Procedure: ANGIOGRAM, CORONARY ARTERY;  Surgeon: Julio James MD;  Location: [facility] CATH LAB/EP;  Service: Cardiology;  Laterality: N/A;    MEYER MAZE PROCEDURE N/A 10/7/2022    Procedure: MEYER MAZE PROCEDURE;  Surgeon: Mike Hedrick MD;  Location: [facility] OR [address] [place] [address];  Service: Cardiovascular;  Laterality: N/A;    CREATION OF MUSCLE ROTATIONAL FLAP N/A 11/14/2022    Procedure: CREATION, FLAP, MUSCLE ROTATION;  Surgeon: Amadeo Carrasquillo MD;  Location: [facility] OR [address] [place] [address];  Service: Plastics;  Laterality: N/A;  sternal wound that need pec flap coverage    DEBRIDEMENT OF STERNUM N/A 11/8/2022    Procedure: DEBRIDEMENT, STERNUM;  Surgeon: Mike Hedrick MD;  [facility];  Service: General;  Laterality: N/A;    ESOPHAGOGASTRODUODENOSCOPY  N/A 10/12/2018    Procedure: EGD (ESOPHAGOGASTRODUODENOSCOPY);  Surgeon: Braden Herring MD;  Location: [facility] ENDO;  Service: Endoscopy;  Laterality: N/A;    ESOPHAGOGASTRODUODENOSCOPY N/A 4/4/2019    Procedure: EGD;  Surgeon: Umair Randolph MD;  Location: [facility] ENDO;  Service: Endoscopy;  Laterality: N/A;    EXCLUSION OF LEFT ATRIAL APPENDAGE N/A 10/7/2022    Procedure: EXCLUSION, LEFT ATRIAL APPENDAGE;  Surgeon: Mike Hedrick MD;  Location: [facility] OR 2ND FLR;  Service: Cardiovascular;  Laterality: N/A;    HERNIA REPAIR      INSERTION OF INTRAVASCULAR MICROAXIAL BLOOD PUMP N/A 10/7/2022    Procedure: INSERTION, IMPELLA;  Surgeon: Mike Hedrick MD;  Location: [facility] OR 2ND FLR;  Service: Cardiovascular;  Laterality: N/A;  direct aortic insertion    IRRIGATION OF MEDIASTINUM N/A 10/7/2022    Procedure: IRRIGATION, MEDIASTINUM;  Surgeon: Mike Hedrick MD;  [facility];  Service: Cardiovascular;  Laterality: N/A;    STERNAL WIRES REMOVAL N/A 11/8/2022    Procedure: REMOVAL, STERNAL WIRE;  Surgeon: Mike Hedrick MD;  Location: [facility] OR [facility] FLR;  Service: General;  Laterality: N/A;  Sternal wire removal with muscle flap creation    STERNAL WOUND CLOSURE N/A 11/14/2022    Procedure: CLOSURE, WOUND, STERNUM;  Surgeon: Amadeo Carrasquillo MD;  Location: [facility] OR [facility] FLR;  Service: Plastics;  Laterality: N/A;    TRICUSPID VALVULOPLASTY N/A 10/7/2022    Procedure: REPAIR, TRICUSPID VALVE;  Surgeon: Mike Hedrick MD;  Location: [facility] OR 2ND FLR;  Service: Cardiovascular;  Laterality: N/A;    WOUND EXPLORATION N/A 11/14/2022    Procedure: EXPLORATION, WOUND;  Surgeon: Amadeo Carrasquillo MD;  Location: [facility] OR 2ND FLR;  Service: Plastics;  Laterality: N/A;       Review of Systems:   As documented in primary team H&P    Home Meds:   Prior to Admission medications    Medication Sig Start Date End Date Taking? Authorizing Provider   amiodarone (PACERONE) 200 MG Tab Take 1 tablet (200 mg  total) by mouth 2 (two) times daily. 9/2/22 9/2/23 Yes Rosalind Crockett MD   carvediloL (COREG) 12.5 MG tablet Take 1 tablet (12.5 mg total) by mouth 2 (two) times daily with meals. 9/7/22 9/7/23 Yes Julio James MD   furosemide (LASIX) 40 MG tablet TAKE 1 TABLET(40 MG) BY MOUTH TWICE DAILY 9/19/22  Yes Julio James MD   hydroCHLOROthiazide (HYDRODIURIL) 25 MG tablet Take 25 mg by mouth once daily. 9/28/22  Yes Historical Provider   albuterol (PROVENTIL/VENTOLIN HFA) 90 mcg/actuation inhaler inhale 1-2 Puff(s) By Mouth Every 4 Hours as needed 9/2/22   Rosalind Crockett MD   ferrous sulfate (FEOSOL) 325 mg (65 mg iron) Tab tablet Take 1 tablet (325 mg total) by mouth daily with breakfast. 10/12/18   Valerie Parks PA-C   rivaroxaban (XARELTO) 20 mg Tab Take 1 tablet (20 mg total) by mouth daily with dinner or evening meal. 8/24/22   Julio James MD     Scheduled Meds:    sodium chloride 0.9%   Intravenous Once    acetaminophen  1,000 mg Oral Q8H    amiodarone  200 mg Oral BID    aspirin  81 mg Oral Daily    atorvastatin  40 mg Oral Daily    ceFEPime (MAXIPIME) IVPB  1 g Intravenous Q24H    heparin (porcine)  5,000 Units Subcutaneous Q8H    methocarbamoL  500 mg Oral TID    polyethylene glycol  17 g Oral Daily    senna-docusate 8.6-50 mg  1 tablet Oral BID     Continuous Infusions:   PRN Meds:dextrose 10%, dextrose 10%, diazePAM, heparin (porcine), HYDROmorphone, hydrOXYzine pamoate, ondansetron, oxyCODONE, oxyCODONE, sodium chloride 0.9%  Anticoagulants/Antiplatelets: no anticoagulation    Allergies: Review of patient's allergies indicates:  No Known Allergies  Sedation Hx: have not been any systemic reactions    Labs:  Recent Labs   Lab 11/19/22  0621   INR 1.2       Recent Labs   Lab 11/24/22  0407   WBC 14.04*   HGB 8.8*   HCT 28.5*   MCV 98         Recent Labs   Lab 11/24/22  0407   GLU 94   *   K 4.7      CO2 23   BUN 24*   CREATININE 4.1*   CALCIUM 8.6*   MG 1.8   ALT 7*   AST  15   ALBUMIN 2.4*   BILITOT 0.5         Vitals:  Temp: 97.8 °F (36.6 °C) (11/25/22 0350)  Pulse: 85 (11/25/22 0350)  Resp: 18 (11/25/22 0531)  BP: 136/70 (11/25/22 0350)  SpO2: (!) 94 % (11/25/22 0350)     Physical Exam:  ASA: 3  Mallampati: II    General: no acute distress  Mental Status: alert and oriented to person, place and time  HEENT: normocephalic, atraumatic  Chest: unlabored breathing  Heart: regular heart rate  Abdomen: nondistended  Extremity: moves all extremities    Plan: patient will undergo tunneled dialysis catheter placement  Sedation Plan: moderate    Poli Vazquez MD PGY-5  Interventional Radiology  [facility]

## 2022-11-25 NOTE — NURSING
Procedure recovery complete. VSS. Patient denies pain. Procedure site dressing is clean, dry, and intact. Patient to return to inpatient room via stretcher accompanied by patient transport.

## 2022-11-25 NOTE — NURSING
Patient received s/p tunneled HD cath placement in MPU bay 5. See VS flowsheet. Procedure site dressing is clean and dry, no evidence of bleeding or hematoma formation. Patient to recover for 1 hour and return to inpatient room. Fall precautions reviewed. Bed in lowest, locked position. Call light within reach.

## 2022-11-25 NOTE — PLAN OF CARE
Plan of care reviewed with patient. Patient is AOX4 and VS stable. Patient remained free of falls and trauma, fall precautions are in place. Patient is ambulating independently. Patient has complaints of pain which is managed with PRN pain medication. Patient received permacath today in RIJ. BLANCA drains emptied. Sternal precautions reinforced. IS use encouraged. Patient has no questions at this time. Wheels are locked and the bed is in lowest position. The call bell is within reach. Telemetry is on. Will continue to monitor.

## 2022-11-25 NOTE — ASSESSMENT & PLAN NOTE
- Baseline Scr 0.9-1.   - 10/31 Scr increased to ~1.5; steadly increased to peak of 5.3 today  - Hyperkalemia noted to 5.3  - Anuric today despite IV diuril 500mg TID; and IV lasix 120mg TID  - Risk factors for KIMANI include Cardiorenal syndrome (less likely given aggressive diuresis in last 72hrs; and normal CVP readings); AIN; IRGN; and prolonged prerenal state from over diuresis, sepsis.    Recommendations/Plan:  - S/p tunneled HD catheter placement today which he tolerated well  - Plan for iHD tomorrow via tunneled HD catheter  - Daily RFP and magnesium level  - Strict I/Os and daily weights  - Renally dose medications to eGFR  - Avoid nephrotoxins when feasible (i.e. intra-arterial contrast, NSAIDs, etc.)  - Renal diet (low potassium) when not NPO

## 2022-11-25 NOTE — PROCEDURES
"  Pre Op Diagnosis: ESRD  Post Op Diagnosis: Same    Procedure: TUnneled HD line placement    Procedure performed by: Thiago    Written Informed Consent Obtained: Yes  Specimen Removed: NO  Estimated Blood Loss: Minimal    Findings:   Successful placement of right sided Tunneled HD line. Ready for immediate use.     Patient tolerated procedure well.    Davon Das MD ([place])  Interventional Radiology  [phone]      "

## 2022-11-25 NOTE — PLAN OF CARE
HD cath placement completed, pt tolerated well. No apparent distress noted. Dressing applied CDI. Patient to be transfer to MPU for 1 hour recovery, per Dr Das. Report to be given at bedside.     Report called to NATE Mccollum.

## 2022-11-25 NOTE — SUBJECTIVE & OBJECTIVE
Interval History: NAEON. AFVSS. Pt had a perm cath placed today. Renal diet placed per nephrology recommendation.      Review of Systems   Constitutional: Negative for malaise/fatigue.   Cardiovascular:  Negative for chest pain, claudication, dyspnea on exertion, irregular heartbeat, leg swelling and palpitations.   Respiratory:  Negative for cough and shortness of breath.    Hematologic/Lymphatic: Negative for bleeding problem.   Gastrointestinal:  Negative for abdominal pain.   Genitourinary:  Negative for dysuria.   Neurological:  Negative for headaches and weakness.   Medications:  Continuous Infusions:  Scheduled Meds:   sodium chloride 0.9%   Intravenous Once    acetaminophen  1,000 mg Oral Q8H    amiodarone  200 mg Oral BID    aspirin  81 mg Oral Daily    atorvastatin  40 mg Oral Daily    ceFEPime (MAXIPIME) IVPB  1 g Intravenous Q24H    heparin (porcine)  5,000 Units Subcutaneous Q8H    methocarbamoL  500 mg Oral TID    polyethylene glycol  17 g Oral Daily    senna-docusate 8.6-50 mg  1 tablet Oral BID     PRN Meds:dextrose 10%, dextrose 10%, diazePAM, heparin (porcine), HYDROmorphone, hydrOXYzine pamoate, ondansetron, oxyCODONE, oxyCODONE, sodium chloride 0.9%     Objective:     Vital Signs (Most Recent):  Temp: 97.5 °F (36.4 °C) (11/25/22 1117)  Pulse: 92 (11/25/22 1117)  Resp: 18 (11/25/22 1117)  BP: (!) 159/75 (11/25/22 1117)  SpO2: 96 % (11/25/22 1117)   Vital Signs (24h Range):  Temp:  [96.8 °F (36 °C)-98.6 °F (37 °C)] 97.5 °F (36.4 °C)  Pulse:  [84-95] 92  Resp:  [14-20] 18  SpO2:  [92 %-97 %] 96 %  BP: (123-159)/(62-75) 159/75     Weight: 74 kg (163 lb 2.3 oz)  Body mass index is 24.08 kg/m².    SpO2: 96 %  O2 Device (Oxygen Therapy): room air    Intake/Output - Last 3 Shifts         11/23 0700 11/24 0659 11/24 0700 11/25 0659 11/25 0700  11/26 0659    P.O. 620 740     Other  200     Total Intake(mL/kg) 620 (8.4) 940 (12.7)     Urine (mL/kg/hr) 150 (0.1) 100 (0.1)     Drains 263 170     Other  2400      Total Output 413 2670     Net +207 -1730            Urine Occurrence 1 x 1 x             Lines/Drains/Airways       Peripherally Inserted Central Catheter Line  Duration             PICC Double Lumen 10/17/22 1709 right brachial 38 days              Central Venous Catheter Line  Duration             Trialysis (Dialysis) Catheter 11/15/22 1654 right internal jugular 9 days         Hemodialysis Catheter 11/25/22 0904 right internal jugular <1 day              Drain  Duration                  Closed/Suction Drain 11/14/22 2010 Inferior;Right Chest Bulb 15 Fr. 10 days         Closed/Suction Drain 11/14/22 2010 Superior;Midline Abdomen Bulb 15 Fr. 10 days         Closed/Suction Drain 11/14/22 2015 Inferior;Left Chest Bulb 15 Fr. 10 days                    Physical Exam  Vitals and nursing note reviewed.   Constitutional:       Appearance: Normal appearance.   HENT:      Head: Normocephalic.   Eyes:      Pupils: Pupils are equal, round, and reactive to light.   Cardiovascular:      Rate and Rhythm: Normal rate and regular rhythm.      Pulses: Normal pulses.   Pulmonary:      Effort: Pulmonary effort is normal.   Musculoskeletal:         General: Normal range of motion.      Cervical back: Normal range of motion.   Skin:     General: Skin is warm and dry.      Comments: MSI CDI  Drainage tubes in place    Neurological:      General: No focal deficit present.      Mental Status: He is alert.       Significant Labs:  CBC:   Recent Labs   Lab 11/25/22  0702   WBC 11.37   RBC 2.83*   HGB 8.6*   HCT 28.3*      *   MCH 30.4   MCHC 30.4*     CMP:   Recent Labs   Lab 11/25/22  0702   GLU 84   CALCIUM 8.5*   ALBUMIN 2.3*   PROT 5.6*   *   K 4.6   CO2 24   CL 97   BUN 20   CREATININE 3.5*   ALKPHOS 118   ALT 5*   AST 15   BILITOT 0.6     Coagulation: No results for input(s): PT, INR, APTT in the last 48 hours.    Significant Diagnostics:  I have reviewed all pertinent imaging results/findings within the past  24 hours.

## 2022-11-25 NOTE — SUBJECTIVE & OBJECTIVE
Interval History: Patient seen and examined this AM following tunneled HD catheter placement. Last iHD was yesterday x3 hours with UF of ~2 liters. Afebrile with pulse ranging from 90-80s bpm. Systolic blood pressures ranging from 150-130s mmHg. He is saturating +92% on room air with only 100 mL documented UOP in the last 24 hours. Will plan for iHD again tomorrow via tunneled HD catheter.    Review of patient's allergies indicates:  No Known Allergies  Current Facility-Administered Medications   Medication Frequency    0.9%  NaCl infusion Once    acetaminophen tablet 1,000 mg Q8H    amiodarone tablet 200 mg BID    aspirin chewable tablet 81 mg Daily    atorvastatin tablet 40 mg Daily    cefepime in dextrose 5 % 1 gram/50 mL IVPB 1 g Q24H    dextrose 10% bolus 125 mL PRN    dextrose 10% bolus 250 mL PRN    diazePAM tablet 5 mg Q6H PRN    heparin (porcine) injection 1,000 Units PRN    heparin (porcine) injection 5,000 Units Q8H    HYDROmorphone injection 1 mg Q4H PRN    hydrOXYzine pamoate capsule 50 mg Q8H PRN    methocarbamoL tablet 500 mg TID    ondansetron injection 4 mg Q8H PRN    oxyCODONE immediate release tablet 5 mg Q4H PRN    oxyCODONE immediate release tablet Tab 10 mg Q4H PRN    polyethylene glycol packet 17 g Daily    senna-docusate 8.6-50 mg per tablet 1 tablet BID    sodium chloride 0.9% bolus 250 mL PRN       Objective:     Vital Signs (Most Recent):  Temp: 97.8 °F (36.6 °C) (11/25/22 0350)  Pulse: 91 (11/25/22 0733)  Resp: 18 (11/25/22 0531)  BP: 136/70 (11/25/22 0350)  SpO2: (!) 94 % (11/25/22 0350)  O2 Device (Oxygen Therapy): room air (11/24/22 1925)   Vital Signs (24h Range):  Temp:  [96.8 °F (36 °C)-98.2 °F (36.8 °C)] 97.8 °F (36.6 °C)  Pulse:  [84-95] 91  Resp:  [16-20] 18  SpO2:  [92 %-95 %] 94 %  BP: (107-159)/(59-78) 136/70     Weight: 74 kg (163 lb 2.3 oz) (11/12/22 1519)  Body mass index is 24.08 kg/m².  Body surface area is 1.9 meters squared.    I/O last 3 completed shifts:  In: 1080  [P.O.:880; Other:200]  Out: 2898 [Urine:250; Drains:248; Other:2400]    Physical Exam  Vitals and nursing note reviewed.   Constitutional:       General: He is not in acute distress.     Appearance: He is normal weight. He is not ill-appearing or diaphoretic.   HENT:      Head: Normocephalic and atraumatic.      Right Ear: External ear normal.      Left Ear: External ear normal.      Nose: Nose normal.   Eyes:      General: No scleral icterus.        Right eye: No discharge.         Left eye: No discharge.      Extraocular Movements: Extraocular movements intact.      Conjunctiva/sclera: Conjunctivae normal.      Comments: Prescription eye glasses.   Neck:      Comments: Trialysis catheter to right internal jugular vein.  Cardiovascular:      Rate and Rhythm: Normal rate.      Heart sounds: Murmur heard.     No friction rub. No gallop.   Pulmonary:      Effort: No respiratory distress.      Breath sounds: No wheezing, rhonchi or rales.   Chest:      Comments: Incision from recent sternotomy.  Abdominal:      General: Bowel sounds are normal. There is no distension.      Palpations: Abdomen is soft.      Tenderness: There is no abdominal tenderness.   Musculoskeletal:      Cervical back: Neck supple.      Right lower le+ Pitting Edema present.      Left lower le+ Pitting Edema present.   Skin:     General: Skin is warm and dry.      Coloration: Skin is not jaundiced.   Neurological:      General: No focal deficit present.      Mental Status: He is alert. Mental status is at baseline.      Cranial Nerves: No cranial nerve deficit.   Psychiatric:         Mood and Affect: Mood normal.         Behavior: Behavior normal.       Significant Labs:  BMP:   Recent Labs   Lab 22  0702   GLU 84   *   K 4.6   CL 97   CO2 24   BUN 20   CREATININE 3.5*   CALCIUM 8.5*   MG 1.7       CBC:   Recent Labs   Lab 22  0702   WBC 11.37   RBC 2.83*   HGB 8.6*   HCT 28.3*      *   MCH 30.4   MCHC 30.4*        CMP:   Recent Labs   Lab 11/25/22  0702   GLU 84   CALCIUM 8.5*   ALBUMIN 2.3*   PROT 5.6*   *   K 4.6   CO2 24   CL 97   BUN 20   CREATININE 3.5*   ALKPHOS 118   ALT 5*   AST 15   BILITOT 0.6       Coagulation:   Recent Labs   Lab 11/19/22  0621   INR 1.2   APTT 32.5*       LFTs:   Recent Labs   Lab 11/25/22  0702   ALT 5*   AST 15   ALKPHOS 118   BILITOT 0.6   PROT 5.6*   ALBUMIN 2.3*       Microbiology Results (last 7 days)       Procedure Component Value Units Date/Time    Blood culture [[id number]] Collected: 11/21/22 1559    Order Status: Completed Specimen: Blood from Peripheral, Lower Arm, Left Updated: 11/24/22 2012     Blood Culture, Routine No Growth to date      No Growth to date      No Growth to date      No Growth to date    Blood culture [[id number]] Collected: 11/21/22 1558    Order Status: Completed Specimen: Blood from Peripheral, Antecubital, Left Updated: 11/24/22 2012     Blood Culture, Routine No Growth to date      No Growth to date      No Growth to date      No Growth to date    Narrative:      Two different sites    Urine culture [[id number]] Collected: 11/21/22 1844    Order Status: Completed Specimen: Urine Updated: 11/22/22 2155     Urine Culture, Routine No growth    Narrative:      Specimen Source->Urine          Significant Imaging:  I have reviewed all imagining in the last 24 hours.

## 2022-11-25 NOTE — PROGRESS NOTES
[facility] - [facility]  Cardiothoracic Surgery  Progress Note    Patient Name: David Barrios  MRN: [id number]  Admission Date: 11/8/2022  Hospital Length of Stay: 17 days  Code Status: Full Code   Attending Physician: Mike Hedrick MD   Referring Provider: Mike Hedrick MD  Principal Problem:Sternal wound infection            Subjective:     Post-Op Info:  Procedure(s) (LRB):  CREATION, FLAP, MUSCLE ROTATION (N/A)  IRRIGATION AND DEBRIDEMENT, WOUND, STERNUM (N/A)  CLOSURE, WOUND, STERNUM (N/A)  EXPLORATION, WOUND (N/A)   11 Days Post-Op     Interval History: NAEON. AFVSS. Pt had a perm cath placed today. Renal diet placed per nephrology recommendation. Consult nutrition to educate patient regarding new diet.      Review of Systems   Constitutional: Negative for malaise/fatigue.   Cardiovascular:  Negative for chest pain, claudication, dyspnea on exertion, irregular heartbeat, leg swelling and palpitations.   Respiratory:  Negative for cough and shortness of breath.    Hematologic/Lymphatic: Negative for bleeding problem.   Gastrointestinal:  Negative for abdominal pain.   Genitourinary:  Negative for dysuria.   Neurological:  Negative for headaches and weakness.   Medications:  Continuous Infusions:  Scheduled Meds:   sodium chloride 0.9%   Intravenous Once    acetaminophen  1,000 mg Oral Q8H    amiodarone  200 mg Oral BID    aspirin  81 mg Oral Daily    atorvastatin  40 mg Oral Daily    ceFEPime (MAXIPIME) IVPB  1 g Intravenous Q24H    heparin (porcine)  5,000 Units Subcutaneous Q8H    methocarbamoL  500 mg Oral TID    polyethylene glycol  17 g Oral Daily    senna-docusate 8.6-50 mg  1 tablet Oral BID     PRN Meds:dextrose 10%, dextrose 10%, diazePAM, heparin (porcine), HYDROmorphone, hydrOXYzine pamoate, ondansetron, oxyCODONE, oxyCODONE, sodium chloride 0.9%     Objective:     Vital Signs (Most Recent):  Temp: 97.5 °F (36.4 °C) (11/25/22 1117)  Pulse: 92 (11/25/22 1117)  Resp: 18 (11/25/22  1117)  BP: (!) 159/75 (11/25/22 1117)  SpO2: 96 % (11/25/22 1117)   Vital Signs (24h Range):  Temp:  [96.8 °F (36 °C)-98.6 °F (37 °C)] 97.5 °F (36.4 °C)  Pulse:  [84-95] 92  Resp:  [14-20] 18  SpO2:  [92 %-97 %] 96 %  BP: (123-159)/(62-75) 159/75     Weight: 74 kg (163 lb 2.3 oz)  Body mass index is 24.08 kg/m².    SpO2: 96 %  O2 Device (Oxygen Therapy): room air    Intake/Output - Last 3 Shifts         11/23 0700  11/24 0659 11/24 0700 11/25 0659 11/25 0700 11/26 0659    P.O. 620 740     Other  200     Total Intake(mL/kg) 620 (8.4) 940 (12.7)     Urine (mL/kg/hr) 150 (0.1) 100 (0.1)     Drains 263 170     Other  2400     Total Output 413 2670     Net +207 -1730            Urine Occurrence 1 x 1 x             Lines/Drains/Airways       Peripherally Inserted Central Catheter Line  Duration             PICC Double Lumen 10/17/22 1709 right brachial 38 days              Central Venous Catheter Line  Duration             Trialysis (Dialysis) Catheter 11/15/22 1654 right internal jugular 9 days         Hemodialysis Catheter 11/25/22 0904 right internal jugular <1 day              Drain  Duration                  Closed/Suction Drain 11/14/22 2010 Inferior;Right Chest Bulb 15 Fr. 10 days         Closed/Suction Drain 11/14/22 2010 Superior;Midline Abdomen Bulb 15 Fr. 10 days         Closed/Suction Drain 11/14/22 2015 Inferior;Left Chest Bulb 15 Fr. 10 days                    Physical Exam  Vitals and nursing note reviewed.   Constitutional:       Appearance: Normal appearance.   HENT:      Head: Normocephalic.   Eyes:      Pupils: Pupils are equal, round, and reactive to light.   Cardiovascular:      Rate and Rhythm: Normal rate and regular rhythm.      Pulses: Normal pulses.   Pulmonary:      Effort: Pulmonary effort is normal.   Musculoskeletal:         General: Normal range of motion.      Cervical back: Normal range of motion.   Skin:     General: Skin is warm and dry.      Comments: MSI CDI  Drainage tubes in place     Neurological:      General: No focal deficit present.      Mental Status: He is alert.       Significant Labs:  CBC:   Recent Labs   Lab 11/25/22  0702   WBC 11.37   RBC 2.83*   HGB 8.6*   HCT 28.3*      *   MCH 30.4   MCHC 30.4*     CMP:   Recent Labs   Lab 11/25/22  0702   GLU 84   CALCIUM 8.5*   ALBUMIN 2.3*   PROT 5.6*   *   K 4.6   CO2 24   CL 97   BUN 20   CREATININE 3.5*   ALKPHOS 118   ALT 5*   AST 15   BILITOT 0.6     Coagulation: No results for input(s): PT, INR, APTT in the last 48 hours.    Significant Diagnostics:  I have reviewed all pertinent imaging results/findings within the past 24 hours.    Assessment/Plan:     * Sternal wound infection  S/P debridement and muscle flap   Renally dosed cefepime ending 12/26/22 per ID  Wound care consulted for buttock, chair cushion ordered     KIMANI (acute kidney injury)  permacath placed 11/25   Renal diet   Next HD Saturday     Sternal osteomyelitis  S/P washout and debridement and muscle flap with plastics     Transient hyperglycemia post procedure  Endocrine following     Persistent atrial fibrillation  Amiodarone       Microcytic anemia  CBC daily     DISPO- csu to [facility] possibly Monday     Jodee French PA-C  Cardiothoracic Surgery  [facility]

## 2022-11-25 NOTE — PROGRESS NOTES
Plastic and Reconstructive Surgery   Progress Note    Subjective:    Patient seen and examined. Pain controlled. Patient to get permcath later this morning, no issues overnight, swelling improving over the flaps       Objective:  Vital signs in last 24 hours:  Temp:  [96.8 °F (36 °C)-98.6 °F (37 °C)] 97.8 °F (36.6 °C)  Pulse:  [84-95] 85  Resp:  [16-20] 18  SpO2:  [92 %-96 %] 94 %  BP: (107-159)/(59-78) 136/70    Intake/Output last 3 shifts:  I/O last 3 completed shifts:  In: 1420 [P.O.:1220; Other:200]  Out: 2903 [Urine:150; Drains:353; Other:2400]    Intake/Output this shift:  I/O this shift:  In: 140 [P.O.:140]  Out: 140 [Urine:100; Drains:40]        Physical Exam:  VITAL SIGNS:   Vitals:    22 0210 22 0303 22 0350 22 0531   BP:   136/70    BP Location:   Left arm    Patient Position:   Sitting    Pulse:  84 85    Resp: 18  18 18   Temp:   97.8 °F (36.6 °C)    TempSrc:   Oral    SpO2:   (!) 94%    Weight:       Height:         TMAX: Temp (24hrs), Av.9 °F (36.6 °C), Min:96.8 °F (36 °C), Max:98.6 °F (37 °C)    General: Alert; No acute distress  Cardiovascular: Regular rate   Respiratory: Normal respiratory effort. Chest rise symmetric.   Abdomen: Soft, nontender, nondistended  Extremity: Moves all extremities equally.  Neurologic: No focal deficit. Speech normal  SKIN: midline sternal and bilateral axillary incisions c/d/I, 3 drains left (right/left chest and mediastinum)    Scheduled Medications sodium chloride 0.9%, , Once  acetaminophen, 1,000 mg, Q8H  amiodarone, 200 mg, BID  aspirin, 81 mg, Daily  atorvastatin, 40 mg, Daily  ceFEPime (MAXIPIME) IVPB, 1 g, Q24H  heparin (porcine), 5,000 Units, Q8H  methocarbamoL, 500 mg, TID  polyethylene glycol, 17 g, Daily  senna-docusate 8.6-50 mg, 1 tablet, BID    PRN Medications dextrose 10%, dextrose 10%, diazePAM, heparin (porcine), HYDROmorphone, hydrOXYzine pamoate, ondansetron, oxyCODONE, oxyCODONE, sodium chloride 0.9%    Recent Labs:    Lab Results   Component Value Date    WBC 14.04 (H) 11/24/2022    HGB 8.8 (L) 11/24/2022    HCT 28.5 (L) 11/24/2022    MCV 98 11/24/2022     11/24/2022     Lab Results   Component Value Date    GLU 94 11/24/2022     (L) 11/24/2022    K 4.7 11/24/2022     11/24/2022    BUN 24 (H) 11/24/2022         Assessment: 62 y.o. y/o male 11 Days Post-Op s/p Procedure(s):  REMOVAL, STERNAL WIRE  DEBRIDEMENT, STERNUM  APPLICATION, WOUND VAC Doing well postoperatively.    Plan  Monitor drain output; BLANCA drains to remain in place  Encourage ambulation/OOB with PT  Pain control PRN  I's & O's  Dvt ppx  Plastic surgery will follow     Dada Griffin MD- Fellow  Department of Plastic and Reconstructive Surgery  [phone] (office)

## 2022-11-25 NOTE — PLAN OF CARE
Patient cooperative anxious with multiple request. Supported with active listening. Vitals stable on room air. Patient ambulated to bedside chair with standby assist. 3 BLANCA drains in place with sanguinous drainage. Patient NPO after midnight except sip of water with pain medication. Comfort and safety maintained, Continue to monitor.    Problem: Adult Inpatient Plan of Care  Goal: Plan of Care Review  Outcome: Ongoing, Progressing  Goal: Patient-Specific Goal (Individualized)  Outcome: Ongoing, Progressing  Goal: Absence of Hospital-Acquired Illness or Injury  Outcome: Ongoing, Progressing  Goal: Optimal Comfort and Wellbeing  Outcome: Ongoing, Progressing  Goal: Readiness for Transition of Care  Outcome: Ongoing, Progressing     Problem: Infection  Goal: Absence of Infection Signs and Symptoms  Outcome: Ongoing, Progressing     Problem: Fall Injury Risk  Goal: Absence of Fall and Fall-Related Injury  Outcome: Ongoing, Progressing     Problem: Skin Injury Risk Increased  Goal: Skin Health and Integrity  Outcome: Ongoing, Progressing     Problem: Fluid Imbalance (Pneumonia)  Goal: Fluid Balance  Outcome: Ongoing, Progressing     Problem: Infection (Pneumonia)  Goal: Resolution of Infection Signs and Symptoms  Outcome: Ongoing, Progressing     Problem: Respiratory Compromise (Pneumonia)  Goal: Effective Oxygenation and Ventilation  Outcome: Ongoing, Progressing

## 2022-11-25 NOTE — PROGRESS NOTES
[facility]  Nephrology  Progress Note    Patient Name: David Barrios  MRN: [id number]  Admission Date: 11/8/2022  Hospital Length of Stay: 17 days  Attending Provider: Mike Hedrick MD   Primary Care Physician: Breann Tavera MD  Principal Problem:Sternal wound infection    Subjective:     HPI: 61 yo male w/ hx of Afib; HFrEF (35%); HTN; HLD. Patient underwent who underwent mitral valve repair, tricuspid valve repair, left atrial maze, left atrial appendage resection, and direct aortic impella 5.5 insertion on 10/07/2022. Hospital course complicated by Serratia bacteremia (on 6wk course of cefepime), sternal wound infection, and cardiogenic shock. On 10/31 patient developed KIMANI. Per chart review no documented course of hypotension during that time. Patient started on diuresis with IV lasix, with good urine output. Patient required increased diuretics (IV Diuril 500mg TID; and Lasix 120mg TID) and continued to have increased Scr, which progressed to oliguria, and now having metabolic derangements.     Patient denies any prior history of kidney disease. Does not have significant family history of kidney disease. Does endorse worsening fatigue and dyspnea. Denies any nausea vomiting, or metallic taste in mouth.     Nephrology was consulted for oliguric KIMANI.       Interval History: Patient seen and examined this AM following tunneled HD catheter placement. Last iHD was yesterday x3 hours with UF of ~2 liters. Afebrile with pulse ranging from 90-80s bpm. Systolic blood pressures ranging from 150-130s mmHg. He is saturating +92% on room air with only 100 mL documented UOP in the last 24 hours. Will plan for iHD again tomorrow via tunneled HD catheter.    Review of patient's allergies indicates:  No Known Allergies  Current Facility-Administered Medications   Medication Frequency    0.9%  NaCl infusion Once    acetaminophen tablet 1,000 mg Q8H    amiodarone tablet 200 mg BID    aspirin  chewable tablet 81 mg Daily    atorvastatin tablet 40 mg Daily    cefepime in dextrose 5 % 1 gram/50 mL IVPB 1 g Q24H    dextrose 10% bolus 125 mL PRN    dextrose 10% bolus 250 mL PRN    diazePAM tablet 5 mg Q6H PRN    heparin (porcine) injection 1,000 Units PRN    heparin (porcine) injection 5,000 Units Q8H    HYDROmorphone injection 1 mg Q4H PRN    hydrOXYzine pamoate capsule 50 mg Q8H PRN    methocarbamoL tablet 500 mg TID    ondansetron injection 4 mg Q8H PRN    oxyCODONE immediate release tablet 5 mg Q4H PRN    oxyCODONE immediate release tablet Tab 10 mg Q4H PRN    polyethylene glycol packet 17 g Daily    senna-docusate 8.6-50 mg per tablet 1 tablet BID    sodium chloride 0.9% bolus 250 mL PRN       Objective:     Vital Signs (Most Recent):  Temp: 97.8 °F (36.6 °C) (11/25/22 0350)  Pulse: 91 (11/25/22 0733)  Resp: 18 (11/25/22 0531)  BP: 136/70 (11/25/22 0350)  SpO2: (!) 94 % (11/25/22 0350)  O2 Device (Oxygen Therapy): room air (11/24/22 1925)   Vital Signs (24h Range):  Temp:  [96.8 °F (36 °C)-98.2 °F (36.8 °C)] 97.8 °F (36.6 °C)  Pulse:  [84-95] 91  Resp:  [16-20] 18  SpO2:  [92 %-95 %] 94 %  BP: (107-159)/(59-78) 136/70     Weight: 74 kg (163 lb 2.3 oz) (11/12/22 1519)  Body mass index is 24.08 kg/m².  Body surface area is 1.9 meters squared.    I/O last 3 completed shifts:  In: 1080 [P.O.:880; Other:200]  Out: 2898 [Urine:250; Drains:248; Other:2400]    Physical Exam  Vitals and nursing note reviewed.   Constitutional:       General: He is not in acute distress.     Appearance: He is normal weight. He is not ill-appearing or diaphoretic.   HENT:      Head: Normocephalic and atraumatic.      Right Ear: External ear normal.      Left Ear: External ear normal.      Nose: Nose normal.   Eyes:      General: No scleral icterus.        Right eye: No discharge.         Left eye: No discharge.      Extraocular Movements: Extraocular movements intact.      Conjunctiva/sclera: Conjunctivae normal.       Comments: Prescription eye glasses.   Neck:      Comments: Trialysis catheter to right internal jugular vein.  Cardiovascular:      Rate and Rhythm: Normal rate.      Heart sounds: Murmur heard.     No friction rub. No gallop.   Pulmonary:      Effort: No respiratory distress.      Breath sounds: No wheezing, rhonchi or rales.   Chest:      Comments: Incision from recent sternotomy.  Abdominal:      General: Bowel sounds are normal. There is no distension.      Palpations: Abdomen is soft.      Tenderness: There is no abdominal tenderness.   Musculoskeletal:      Cervical back: Neck supple.      Right lower le+ Pitting Edema present.      Left lower le+ Pitting Edema present.   Skin:     General: Skin is warm and dry.      Coloration: Skin is not jaundiced.   Neurological:      General: No focal deficit present.      Mental Status: He is alert. Mental status is at baseline.      Cranial Nerves: No cranial nerve deficit.   Psychiatric:         Mood and Affect: Mood normal.         Behavior: Behavior normal.       Significant Labs:  BMP:   Recent Labs   Lab 22  07   GLU 84   *   K 4.6   CL 97   CO2 24   BUN 20   CREATININE 3.5*   CALCIUM 8.5*   MG 1.7       CBC:   Recent Labs   Lab 22   WBC 11.37   RBC 2.83*   HGB 8.6*   HCT 28.3*      *   MCH 30.4   MCHC 30.4*       CMP:   Recent Labs   Lab 22   GLU 84   CALCIUM 8.5*   ALBUMIN 2.3*   PROT 5.6*   *   K 4.6   CO2 24   CL 97   BUN 20   CREATININE 3.5*   ALKPHOS 118   ALT 5*   AST 15   BILITOT 0.6       Coagulation:   Recent Labs   Lab 22  0621   INR 1.2   APTT 32.5*       LFTs:   Recent Labs   Lab 22  07   ALT 5*   AST 15   ALKPHOS 118   BILITOT 0.6   PROT 5.6*   ALBUMIN 2.3*       Microbiology Results (last 7 days)       Procedure Component Value Units Date/Time    Blood culture [[id number]] Collected: 22 4088    Order Status: Completed Specimen: Blood from Peripheral, Lower Arm,  Left Updated: 11/24/22 2012     Blood Culture, Routine No Growth to date      No Growth to date      No Growth to date      No Growth to date    Blood culture [[id number]] Collected: 11/21/22 1558    Order Status: Completed Specimen: Blood from Peripheral, Antecubital, Left Updated: 11/24/22 2012     Blood Culture, Routine No Growth to date      No Growth to date      No Growth to date      No Growth to date    Narrative:      Two different sites    Urine culture [[id number]] Collected: 11/21/22 1844    Order Status: Completed Specimen: Urine Updated: 11/22/22 2155     Urine Culture, Routine No growth    Narrative:      Specimen Source->Urine          Significant Imaging:  I have reviewed all imagining in the last 24 hours.    Assessment/Plan:     * Sternal wound infection  - per primary (Cardiothoracic Surgery) and Infectious Disease    KIMANI (acute kidney injury)  - Baseline Scr 0.9-1.   - 10/31 Scr increased to ~1.5; steadly increased to peak of 5.3 today  - Hyperkalemia noted to 5.3  - Anuric today despite IV diuril 500mg TID; and IV lasix 120mg TID  - Risk factors for KIMANI include Cardiorenal syndrome (less likely given aggressive diuresis in last 72hrs; and normal CVP readings); AIN; IRGN; and prolonged prerenal state from over diuresis, sepsis.    Recommendations/Plan:  - S/p tunneled HD catheter placement today which he tolerated well  - Plan for iHD tomorrow via tunneled HD catheter  - Daily RFP and magnesium level  - Strict I/Os and daily weights  - Renally dose medications to eGFR  - Avoid nephrotoxins when feasible (i.e. intra-arterial contrast, NSAIDs, etc.)  - Renal diet (low potassium) when not NPO      Thank you for your consult. I will follow-up with patient. Please contact us if you have any additional questions.    Golden Allen MD  Nephrology  [facility]

## 2022-11-25 NOTE — PLAN OF CARE
Pt arrived to IR room 189 for HD cath placement. Pt oriented to unit and staff. Plan of care reviewed with patient, patient verbalizes understanding. Comfort measures utilized. Pt safely transferred from stretcher to procedural table. Fall risk reviewed with patient, fall risk interventions maintained. Safety strap applied, positioner pillows utilized to minimize pressure points. Blankets applied. Pt prepped and draped utilizing standard sterile technique. Patient placed on continuous monitoring, as required by sedation policy. Timeouts completed utilizing standard universal time-out, per department and facility policy. RN to remain at bedside, continuous monitoring maintained. Pt resting comfortably. Denies pain/discomfort. Will continue to monitor. See flow sheets for monitoring, medication administration, and updates.

## 2022-11-26 LAB
ALBUMIN SERPL BCP-MCNC: 2.3 G/DL (ref 3.5–5.2)
ALP SERPL-CCNC: 114 U/L (ref 55–135)
ALT SERPL W/O P-5'-P-CCNC: 5 U/L (ref 10–44)
ANION GAP SERPL CALC-SCNC: 8 MMOL/L (ref 8–16)
AST SERPL-CCNC: 15 U/L (ref 10–40)
BACTERIA BLD CULT: NORMAL
BACTERIA BLD CULT: NORMAL
BASOPHILS # BLD AUTO: 0.02 K/UL (ref 0–0.2)
BASOPHILS NFR BLD: 0.2 % (ref 0–1.9)
BILIRUB SERPL-MCNC: 0.4 MG/DL (ref 0.1–1)
BUN SERPL-MCNC: 25 MG/DL (ref 8–23)
CALCIUM SERPL-MCNC: 8.4 MG/DL (ref 8.7–10.5)
CHLORIDE SERPL-SCNC: 99 MMOL/L (ref 95–110)
CO2 SERPL-SCNC: 22 MMOL/L (ref 23–29)
CREAT SERPL-MCNC: 4.6 MG/DL (ref 0.5–1.4)
DIFFERENTIAL METHOD: ABNORMAL
EOSINOPHIL # BLD AUTO: 0.2 K/UL (ref 0–0.5)
EOSINOPHIL NFR BLD: 1.9 % (ref 0–8)
ERYTHROCYTE [DISTWIDTH] IN BLOOD BY AUTOMATED COUNT: 17.7 % (ref 11.5–14.5)
EST. GFR  (NO RACE VARIABLE): 13.5 ML/MIN/1.73 M^2
GLUCOSE SERPL-MCNC: 99 MG/DL (ref 70–110)
HCT VFR BLD AUTO: 27.3 % (ref 40–54)
HGB BLD-MCNC: 8.3 G/DL (ref 14–18)
IMM GRANULOCYTES # BLD AUTO: 0.08 K/UL (ref 0–0.04)
IMM GRANULOCYTES NFR BLD AUTO: 0.8 % (ref 0–0.5)
LYMPHOCYTES # BLD AUTO: 1.1 K/UL (ref 1–4.8)
LYMPHOCYTES NFR BLD: 11.6 % (ref 18–48)
MAGNESIUM SERPL-MCNC: 1.7 MG/DL (ref 1.6–2.6)
MCH RBC QN AUTO: 30.6 PG (ref 27–31)
MCHC RBC AUTO-ENTMCNC: 30.4 G/DL (ref 32–36)
MCV RBC AUTO: 101 FL (ref 82–98)
MONOCYTES # BLD AUTO: 0.9 K/UL (ref 0.3–1)
MONOCYTES NFR BLD: 9.5 % (ref 4–15)
NEUTROPHILS # BLD AUTO: 7.5 K/UL (ref 1.8–7.7)
NEUTROPHILS NFR BLD: 76 % (ref 38–73)
NRBC BLD-RTO: 0 /100 WBC
PHOSPHATE SERPL-MCNC: 4.2 MG/DL (ref 2.7–4.5)
PLATELET # BLD AUTO: 274 K/UL (ref 150–450)
PMV BLD AUTO: 10.1 FL (ref 9.2–12.9)
POTASSIUM SERPL-SCNC: 4.3 MMOL/L (ref 3.5–5.1)
PROT SERPL-MCNC: 5.6 G/DL (ref 6–8.4)
RBC # BLD AUTO: 2.71 M/UL (ref 4.6–6.2)
SODIUM SERPL-SCNC: 129 MMOL/L (ref 136–145)
WBC # BLD AUTO: 9.87 K/UL (ref 3.9–12.7)

## 2022-11-26 PROCEDURE — 25000003 PHARM REV CODE 250

## 2022-11-26 PROCEDURE — 20600001 HC STEP DOWN PRIVATE ROOM

## 2022-11-26 PROCEDURE — 99231 PR SUBSEQUENT HOSPITAL CARE,LEVL I: ICD-10-PCS | Mod: ,,, | Performed by: INTERNAL MEDICINE

## 2022-11-26 PROCEDURE — 63600175 PHARM REV CODE 636 W HCPCS: Performed by: STUDENT IN AN ORGANIZED HEALTH CARE EDUCATION/TRAINING PROGRAM

## 2022-11-26 PROCEDURE — 80053 COMPREHEN METABOLIC PANEL: CPT

## 2022-11-26 PROCEDURE — 90935 HEMODIALYSIS ONE EVALUATION: CPT

## 2022-11-26 PROCEDURE — 83735 ASSAY OF MAGNESIUM: CPT

## 2022-11-26 PROCEDURE — 63600175 PHARM REV CODE 636 W HCPCS: Performed by: THORACIC SURGERY (CARDIOTHORACIC VASCULAR SURGERY)

## 2022-11-26 PROCEDURE — 63600175 PHARM REV CODE 636 W HCPCS: Performed by: ANESTHESIOLOGY

## 2022-11-26 PROCEDURE — 85025 COMPLETE CBC W/AUTO DIFF WBC: CPT | Performed by: THORACIC SURGERY (CARDIOTHORACIC VASCULAR SURGERY)

## 2022-11-26 PROCEDURE — 25000003 PHARM REV CODE 250: Performed by: STUDENT IN AN ORGANIZED HEALTH CARE EDUCATION/TRAINING PROGRAM

## 2022-11-26 PROCEDURE — 84100 ASSAY OF PHOSPHORUS: CPT

## 2022-11-26 PROCEDURE — 94761 N-INVAS EAR/PLS OXIMETRY MLT: CPT

## 2022-11-26 PROCEDURE — 99231 SBSQ HOSP IP/OBS SF/LOW 25: CPT | Mod: ,,, | Performed by: INTERNAL MEDICINE

## 2022-11-26 RX ORDER — HEPARIN SODIUM 1000 [USP'U]/ML
1000 INJECTION, SOLUTION INTRAVENOUS; SUBCUTANEOUS
Status: DISCONTINUED | OUTPATIENT
Start: 2022-11-27 | End: 2022-11-26

## 2022-11-26 RX ORDER — HEPARIN SODIUM 1000 [USP'U]/ML
1000 INJECTION, SOLUTION INTRAVENOUS; SUBCUTANEOUS
Status: DISPENSED | OUTPATIENT
Start: 2022-11-26 | End: 2022-11-27

## 2022-11-26 RX ORDER — BISACODYL 10 MG
10 SUPPOSITORY, RECTAL RECTAL DAILY PRN
Status: DISCONTINUED | OUTPATIENT
Start: 2022-11-26 | End: 2022-12-01 | Stop reason: HOSPADM

## 2022-11-26 RX ADMIN — CEFEPIME HYDROCHLORIDE 1 G: 1 INJECTION, POWDER, FOR SOLUTION INTRAMUSCULAR; INTRAVENOUS at 08:11

## 2022-11-26 RX ADMIN — AMIODARONE HYDROCHLORIDE 200 MG: 200 TABLET ORAL at 08:11

## 2022-11-26 RX ADMIN — HYDROMORPHONE HYDROCHLORIDE 1 MG: 1 INJECTION, SOLUTION INTRAMUSCULAR; INTRAVENOUS; SUBCUTANEOUS at 10:11

## 2022-11-26 RX ADMIN — HEPARIN SODIUM 5000 UNITS: 5000 INJECTION INTRAVENOUS; SUBCUTANEOUS at 09:11

## 2022-11-26 RX ADMIN — ACETAMINOPHEN 1000 MG: 500 TABLET ORAL at 02:11

## 2022-11-26 RX ADMIN — SENNOSIDES AND DOCUSATE SODIUM 1 TABLET: 50; 8.6 TABLET ORAL at 08:11

## 2022-11-26 RX ADMIN — POLYETHYLENE GLYCOL 3350 17 G: 17 POWDER, FOR SOLUTION ORAL at 12:11

## 2022-11-26 RX ADMIN — ACETAMINOPHEN 1000 MG: 500 TABLET ORAL at 09:11

## 2022-11-26 RX ADMIN — OXYCODONE HYDROCHLORIDE 10 MG: 10 TABLET ORAL at 12:11

## 2022-11-26 RX ADMIN — METHOCARBAMOL 500 MG: 500 TABLET ORAL at 08:11

## 2022-11-26 RX ADMIN — HEPARIN SODIUM 1000 UNITS: 1000 INJECTION, SOLUTION INTRAVENOUS; SUBCUTANEOUS at 11:11

## 2022-11-26 RX ADMIN — HEPARIN SODIUM 5000 UNITS: 5000 INJECTION INTRAVENOUS; SUBCUTANEOUS at 02:11

## 2022-11-26 RX ADMIN — OXYCODONE HYDROCHLORIDE 10 MG: 10 TABLET ORAL at 02:11

## 2022-11-26 RX ADMIN — HYDROMORPHONE HYDROCHLORIDE 1 MG: 1 INJECTION, SOLUTION INTRAMUSCULAR; INTRAVENOUS; SUBCUTANEOUS at 08:11

## 2022-11-26 RX ADMIN — OXYCODONE HYDROCHLORIDE 10 MG: 10 TABLET ORAL at 04:11

## 2022-11-26 RX ADMIN — HYDROMORPHONE HYDROCHLORIDE 1 MG: 1 INJECTION, SOLUTION INTRAMUSCULAR; INTRAVENOUS; SUBCUTANEOUS at 02:11

## 2022-11-26 RX ADMIN — SENNOSIDES AND DOCUSATE SODIUM 1 TABLET: 50; 8.6 TABLET ORAL at 12:11

## 2022-11-26 RX ADMIN — OXYCODONE HYDROCHLORIDE 10 MG: 10 TABLET ORAL at 08:11

## 2022-11-26 RX ADMIN — HEPARIN SODIUM 5000 UNITS: 5000 INJECTION INTRAVENOUS; SUBCUTANEOUS at 06:11

## 2022-11-26 RX ADMIN — METHOCARBAMOL 500 MG: 500 TABLET ORAL at 04:11

## 2022-11-26 RX ADMIN — AMIODARONE HYDROCHLORIDE 200 MG: 200 TABLET ORAL at 12:11

## 2022-11-26 RX ADMIN — SODIUM CHLORIDE: 0.9 INJECTION, SOLUTION INTRAVENOUS at 08:11

## 2022-11-26 RX ADMIN — OXYCODONE HYDROCHLORIDE 10 MG: 10 TABLET ORAL at 10:11

## 2022-11-26 RX ADMIN — ATORVASTATIN CALCIUM 40 MG: 20 TABLET, FILM COATED ORAL at 12:11

## 2022-11-26 RX ADMIN — HYDROMORPHONE HYDROCHLORIDE 1 MG: 1 INJECTION, SOLUTION INTRAMUSCULAR; INTRAVENOUS; SUBCUTANEOUS at 03:11

## 2022-11-26 RX ADMIN — ASPIRIN 81 MG 81 MG: 81 TABLET ORAL at 12:11

## 2022-11-26 RX ADMIN — METHOCARBAMOL 500 MG: 500 TABLET ORAL at 12:11

## 2022-11-26 RX ADMIN — ACETAMINOPHEN 1000 MG: 500 TABLET ORAL at 06:11

## 2022-11-26 NOTE — NURSING TRANSFER
Nursing Transfer Note      11/26/2022     Reason patient is being transferred: dialysis    Transfer To: dialysis unit    Transfer via stretcher    Transfer with cardiac monitoring    Transported by transportation team    Medicines sent: none    Any special needs or follow-up needed: sternal precaution    Chart send with patient: Yes    Notified: spouse

## 2022-11-26 NOTE — ASSESSMENT & PLAN NOTE
KIMANI-likely ATN from decreased renal perfusion 2/2 sepsis & cardiogenic shock   Baseline sCr 0.9-1  sCr on admission: 2.7  Lab Results   Component Value Date    CREATININE 4.6 (H) 11/26/2022       Recommendations:   successful iHD today via TDC, likely next iHD on Monday, will continue to monitor labs closely and urine output   -Electrolytes: replace as necessary, renal diet, page nephrology if K >5.5   -Acid/base: bicarb 22, corrected with HD   -Fluid balance: mild fluid overload  -Anemia: Hgb 8.3, send anemia panel labs, transfuse for Hgb <7.0  -Strict I/O's and daily weights  -Renal function panel & Mg level daily

## 2022-11-26 NOTE — ASSESSMENT & PLAN NOTE
S/p debridement and muscle flap, plastic surgery following   Pt on cefepime  -Plan per primary team

## 2022-11-26 NOTE — SUBJECTIVE & OBJECTIVE
Interval History:   Seen in the [facility] tolerating iHD via TDC well, has no complaints  100mls of urine made today and none overnight, BLANCA drains 20mls      Review of patient's allergies indicates:  No Known Allergies  Current Facility-Administered Medications   Medication Frequency    acetaminophen tablet 1,000 mg Q8H    amiodarone tablet 200 mg BID    aspirin chewable tablet 81 mg Daily    atorvastatin tablet 40 mg Daily    bisacodyL suppository 10 mg Daily PRN    cefepime in dextrose 5 % 1 gram/50 mL IVPB 1 g Q24H    dextrose 10% bolus 125 mL PRN    dextrose 10% bolus 250 mL PRN    diazePAM tablet 5 mg Q6H PRN    heparin (porcine) injection 1,000 Units PRN    heparin (porcine) injection 5,000 Units Q8H    HYDROmorphone injection 1 mg Q4H PRN    hydrOXYzine pamoate capsule 50 mg Q8H PRN    methocarbamoL tablet 500 mg TID    ondansetron injection 4 mg Q8H PRN    oxyCODONE immediate release tablet 10 mg Q4H PRN    oxyCODONE immediate release tablet 5 mg Q4H PRN    polyethylene glycol packet 17 g Daily    senna-docusate 8.6-50 mg per tablet 1 tablet BID    sodium chloride 0.9% bolus 250 mL PRN       Objective:     Vital Signs (Most Recent):  Temp: 97.8 °F (36.6 °C) (11/26/22 1210)  Pulse: 92 (11/26/22 1520)  Resp: 18 (11/26/22 1654)  BP: 130/65 (11/26/22 1210)  SpO2: 95 % (11/26/22 1210)  O2 Device (Oxygen Therapy): room air (11/26/22 1210) Vital Signs (24h Range):  Temp:  [97.7 °F (36.5 °C)-98.3 °F (36.8 °C)] 97.8 °F (36.6 °C)  Pulse:  [80-93] 92  Resp:  [15-21] 18  SpO2:  [92 %-98 %] 95 %  BP: (112-148)/(60-72) 130/65     Weight: 74 kg (163 lb 2.3 oz) (11/12/22 1519)  Body mass index is 24.08 kg/m².  Body surface area is 1.9 meters squared.    I/O last 3 completed shifts:  In: 140 [P.O.:140]  Out: 380 [Urine:100; Drains:280]    Physical Exam  Vitals and nursing note reviewed.   Constitutional:       General: He is not in acute distress.     Appearance: Normal appearance. He is not ill-appearing, toxic-appearing or  diaphoretic.   HENT:      Mouth/Throat:      Mouth: Mucous membranes are moist.   Cardiovascular:      Rate and Rhythm: Normal rate and regular rhythm.      Pulses: Normal pulses.      Heart sounds: Murmur heard.      Comments: R IJ TDC  Pulmonary:      Effort: Pulmonary effort is normal. No respiratory distress.      Breath sounds: Normal breath sounds. No stridor. No wheezing, rhonchi or rales.   Musculoskeletal:         General: No swelling, tenderness, deformity or signs of injury.      Right lower leg: No edema.      Left lower leg: No edema.   Skin:     General: Skin is warm.      Capillary Refill: Capillary refill takes 2 to 3 seconds.      Coloration: Skin is not jaundiced or pale.      Findings: Lesion present. No bruising, erythema or rash.      Comments: Surgical scar healing well in chest wall   Neurological:      Mental Status: He is alert and oriented to person, place, and time.   Psychiatric:         Mood and Affect: Mood normal.         Behavior: Behavior normal.         Thought Content: Thought content normal.         Judgment: Judgment normal.       Significant Labs:  CBC:   Recent Labs   Lab 11/26/22  0351   WBC 9.87   RBC 2.71*   HGB 8.3*   HCT 27.3*      *   MCH 30.6   MCHC 30.4*     CMP:   Recent Labs   Lab 11/26/22  0351   GLU 99   CALCIUM 8.4*   ALBUMIN 2.3*   PROT 5.6*   *   K 4.3   CO2 22*   CL 99   BUN 25*   CREATININE 4.6*   ALKPHOS 114   ALT 5*   AST 15   BILITOT 0.4     All labs within the past 24 hours have been reviewed.     Significant Imaging:  Labs: Reviewed

## 2022-11-26 NOTE — NURSING
Notified MD. Benz pt c/o 7/10 chest incision pain and constipation, suppository ordered and PRN pain med given, and  is ok to place dressing around BLANCA drainage site, dressing placed.

## 2022-11-26 NOTE — PROGRESS NOTES
[facility]  Nephrology  Progress Note    Patient Name: David Barrios  MRN: [id number]  Admission Date: 11/8/2022  Hospital Length of Stay: 18 days  Attending Provider: Mike Hedrick MD   Primary Care Physician: Breann Tavera MD  Principal Problem:Sternal wound infection    Subjective:     HPI: 63 yo male w/ hx of Afib; HFrEF (35%); HTN; HLD. Patient underwent who underwent mitral valve repair, tricuspid valve repair, left atrial maze, left atrial appendage resection, and direct aortic impella 5.5 insertion on 10/07/2022. Hospital course complicated by Serratia bacteremia (on 6wk course of cefepime), sternal wound infection, and cardiogenic shock. On 10/31 patient developed KIMANI. Per chart review no documented course of hypotension during that time. Patient started on diuresis with IV lasix, with good urine output. Patient required increased diuretics (IV Diuril 500mg TID; and Lasix 120mg TID) and continued to have increased Scr, which progressed to oliguria, and now having metabolic derangements.     Patient denies any prior history of kidney disease. Does not have significant family history of kidney disease. Does endorse worsening fatigue and dyspnea. Denies any nausea vomiting, or metallic taste in mouth.     Nephrology was consulted for oliguric KIMANI.       Interval History:   Seen in the [facility] tolerating iHD via TDC well, has no complaints  100mls of urine made today and none overnight, BLANCA drains 20mls      Review of patient's allergies indicates:  No Known Allergies  Current Facility-Administered Medications   Medication Frequency    acetaminophen tablet 1,000 mg Q8H    amiodarone tablet 200 mg BID    aspirin chewable tablet 81 mg Daily    atorvastatin tablet 40 mg Daily    bisacodyL suppository 10 mg Daily PRN    cefepime in dextrose 5 % 1 gram/50 mL IVPB 1 g Q24H    dextrose 10% bolus 125 mL PRN    dextrose 10% bolus 250 mL PRN    diazePAM tablet 5 mg Q6H PRN    heparin (porcine)  injection 1,000 Units PRN    heparin (porcine) injection 5,000 Units Q8H    HYDROmorphone injection 1 mg Q4H PRN    hydrOXYzine pamoate capsule 50 mg Q8H PRN    methocarbamoL tablet 500 mg TID    ondansetron injection 4 mg Q8H PRN    oxyCODONE immediate release tablet 10 mg Q4H PRN    oxyCODONE immediate release tablet 5 mg Q4H PRN    polyethylene glycol packet 17 g Daily    senna-docusate 8.6-50 mg per tablet 1 tablet BID    sodium chloride 0.9% bolus 250 mL PRN       Objective:     Vital Signs (Most Recent):  Temp: 97.8 °F (36.6 °C) (11/26/22 1210)  Pulse: 92 (11/26/22 1520)  Resp: 18 (11/26/22 1654)  BP: 130/65 (11/26/22 1210)  SpO2: 95 % (11/26/22 1210)  O2 Device (Oxygen Therapy): room air (11/26/22 1210) Vital Signs (24h Range):  Temp:  [97.7 °F (36.5 °C)-98.3 °F (36.8 °C)] 97.8 °F (36.6 °C)  Pulse:  [80-93] 92  Resp:  [15-21] 18  SpO2:  [92 %-98 %] 95 %  BP: (112-148)/(60-72) 130/65     Weight: 74 kg (163 lb 2.3 oz) (11/12/22 1519)  Body mass index is 24.08 kg/m².  Body surface area is 1.9 meters squared.    I/O last 3 completed shifts:  In: 140 [P.O.:140]  Out: 380 [Urine:100; Drains:280]    Physical Exam  Vitals and nursing note reviewed.   Constitutional:       General: He is not in acute distress.     Appearance: Normal appearance. He is not ill-appearing, toxic-appearing or diaphoretic.   HENT:      Mouth/Throat:      Mouth: Mucous membranes are moist.   Cardiovascular:      Rate and Rhythm: Normal rate and regular rhythm.      Pulses: Normal pulses.      Heart sounds: Murmur heard.      Comments: R IJ TDC  Pulmonary:      Effort: Pulmonary effort is normal. No respiratory distress.      Breath sounds: Normal breath sounds. No stridor. No wheezing, rhonchi or rales.   Musculoskeletal:         General: No swelling, tenderness, deformity or signs of injury.      Right lower leg: No edema.      Left lower leg: No edema.   Skin:     General: Skin is warm.      Capillary Refill: Capillary refill takes 2 to 3  seconds.      Coloration: Skin is not jaundiced or pale.      Findings: Lesion present. No bruising, erythema or rash.      Comments: Surgical scar healing well in chest wall   Neurological:      Mental Status: He is alert and oriented to person, place, and time.   Psychiatric:         Mood and Affect: Mood normal.         Behavior: Behavior normal.         Thought Content: Thought content normal.         Judgment: Judgment normal.       Significant Labs:  CBC:   Recent Labs   Lab 11/26/22  0351   WBC 9.87   RBC 2.71*   HGB 8.3*   HCT 27.3*      *   MCH 30.6   MCHC 30.4*     CMP:   Recent Labs   Lab 11/26/22  0351   GLU 99   CALCIUM 8.4*   ALBUMIN 2.3*   PROT 5.6*   *   K 4.3   CO2 22*   CL 99   BUN 25*   CREATININE 4.6*   ALKPHOS 114   ALT 5*   AST 15   BILITOT 0.4     All labs within the past 24 hours have been reviewed.     Significant Imaging:  Labs: Reviewed    Assessment/Plan:     * Sternal wound infection  On cefepime  -Plan per primary team       KIMANI (acute kidney injury)  KIMANI-likely ATN from decreased renal perfusion 2/2 sepsis & cardiogenic shock   Baseline sCr 0.9-1  sCr on admission: 2.7  Lab Results   Component Value Date    CREATININE 4.6 (H) 11/26/2022       Recommendations:   successful iHD today via TDC, likely next iHD on Monday, will continue to monitor labs closely and urine output   -Electrolytes: replace as necessary, renal diet, page nephrology if K >5.5   -Acid/base: bicarb 22, corrected with HD   -Fluid balance: mild fluid overload  -Anemia: Hgb 8.3, send anemia panel labs, transfuse for Hgb <7.0  -Strict I/O's and daily weights  -Renal function panel & Mg level daily     Sternal osteomyelitis  S/p debridement and muscle flap, plastic surgery following   Pt on cefepime  -Plan per primary team             Thank you for your consult. I will follow-up with patient. Please contact us if you have any additional questions.    Angely Duarte MD  Nephrology  [facility]  Stepdown    ATTENDING PHYSICIAN ATTESTATION  I have personally verified the history and examined the patient. I thoroughly reviewed the demographic, clinical, laboratorial and imaging information available in medical records. I agree with the assessment and recommendations provided by the subspecialty resident who was under my supervision.

## 2022-11-26 NOTE — PLAN OF CARE
Problem: Device-Related Complication Risk (Hemodialysis)  Goal: Safe, Effective Therapy Delivery  Outcome: Ongoing, Progressing     Dialysis tx ended to the right IJ dialysis cath, heparin locked, and capped.    Net fluid removed: 3L    Report given to nurse markus Metcalf transported by stretcher from [facility] to room 309.

## 2022-11-26 NOTE — NURSING TRANSFER
Nursing Transfer Note      11/26/2022     Reason patient is being transferred: dialysis done    Transfer To: room 309    Transfer via stretcher    Transfer with cardiac monitoring    Transported by transportation team    Medicines sent: none    Any special needs or follow-up needed: sternal precaution    Chart send with patient: Yes    Notified: spouse    Patient reassessed at: 1210 11/26/2022   Upon arrival to floor: cardiac monitor applied, patient oriented to room, call bell in reach, and bed in lowest position.      Scheduled 0900 AM meds just given d/t pt just came back from dialysis unit.

## 2022-11-26 NOTE — PROGRESS NOTES
Plastic and Reconstructive Surgery   Progress Note    Subjective:    Patient seen and examined. Pain controlled. Patient s/p permcath leaving for dialysis this morning, no issues overnight, swelling improving over the flaps       Objective:  Vital signs in last 24 hours:  Temp:  [97.5 °F (36.4 °C)-98.6 °F (37 °C)] 97.8 °F (36.6 °C)  Pulse:  [80-94] 88  Resp:  [14-21] 18  SpO2:  [92 %-98 %] 98 %  BP: (119-159)/(62-75) 119/62    Intake/Output last 3 shifts:  I/O last 3 completed shifts:  In: 140 [P.O.:140]  Out: 380 [Urine:100; Drains:280]    Intake/Output this shift:  No intake/output data recorded.        Physical Exam:  VITAL SIGNS:   Vitals:    22 0345 22 0348 22 0500 22 0659   BP:   119/62    BP Location:   Left arm    Patient Position:   Sitting    Pulse:  86 80 88   Resp: 16  18    Temp:   97.8 °F (36.6 °C)    TempSrc:   Oral    SpO2:   98%    Weight:       Height:         TMAX: Temp (24hrs), Av.1 °F (36.7 °C), Min:97.5 °F (36.4 °C), Max:98.6 °F (37 °C)    General: Alert; No acute distress  Cardiovascular: Regular rate   Respiratory: Normal respiratory effort. Chest rise symmetric.   Abdomen: Soft, nontender, nondistended  Extremity: Moves all extremities equally.  Neurologic: No focal deficit. Speech normal  SKIN: midline sternal and bilateral axillary incisions c/d/I, 3 drains left (right/left chest and mediastinum)    Scheduled Medications sodium chloride 0.9%, , Once  acetaminophen, 1,000 mg, Q8H  amiodarone, 200 mg, BID  aspirin, 81 mg, Daily  atorvastatin, 40 mg, Daily  ceFEPime (MAXIPIME) IVPB, 1 g, Q24H  heparin (porcine), 5,000 Units, Q8H  methocarbamoL, 500 mg, TID  polyethylene glycol, 17 g, Daily  senna-docusate 8.6-50 mg, 1 tablet, BID    PRN Medications dextrose 10%, dextrose 10%, diazePAM, HYDROmorphone, hydrOXYzine pamoate, ondansetron, oxyCODONE, oxyCODONE, sodium chloride 0.9%    Recent Labs:   Lab Results   Component Value Date    WBC 9.87 2022    HGB 8.3 (L)  11/26/2022    HCT 27.3 (L) 11/26/2022     (H) 11/26/2022     11/26/2022     Lab Results   Component Value Date    GLU 99 11/26/2022     (L) 11/26/2022    K 4.3 11/26/2022    CL 99 11/26/2022    BUN 25 (H) 11/26/2022         Assessment: 62 y.o. y/o male 12 Days Post-Op s/p Procedure(s):  REMOVAL, STERNAL WIRE  DEBRIDEMENT, STERNUM  APPLICATION, WOUND VAC Doing well postoperatively.    Plan  Monitor drain output; BLANCA drains to remain in place  Encourage ambulation/OOB with PT  Pain control PRN  I's & O's  Dvt ppx  Plastic surgery will follow     Dada Griffin MD- Fellow  Department of Plastic and Reconstructive Surgery  [phone] (office)

## 2022-11-26 NOTE — CONSULTS
[facility]  Wound Care    Patient Name:  David Barrios   MRN:  [id number]  Date: 11/25/2022  Diagnosis: Sternal wound infection    History:     Past Medical History:   Diagnosis Date    Anemia     Atrial fibrillation     Encounter for blood transfusion     Esophageal ulcer     GI bleed     Hypertension        Social History     Socioeconomic History    Marital status: Single   Tobacco Use    Smoking status: Never    Smokeless tobacco: Current     Types: Chew   Substance and Sexual Activity    Alcohol use: Yes     Alcohol/week: 20.0 standard drinks     Types: 20 Cans of beer per week    Drug use: No       Precautions:     Allergies as of 10/26/2022    (No Known Allergies)       [facility] Assessment Details/Treatment     Wound care consult received for assessment of buttock.  Discussed my role as wound care nurse.  Upon assessment, noted irregular jagged excoriated dry wound to right side of coccyx.  Patient states that it is burning.  Difficulty in finding comfortable position on chair cushion. Wound suggestive of shearing/friction injury by appearance and patient also states that he is not able to use is upper arms and chest d/t recent surgery.    Measurements and picture obtained.    Other specialities are caring for sternal wound closure and bl axillary sx incisions.    Recommendations:  Right coccyx- apply triad BID and prn comfort.  Ordered waffle mattress for pressure relief.      Nursing staff or patient/fly to perform wound care per wound care orders.  Pressure reduction supplies ordered: wedge, waffle, w/c. Cushion applied.   Please re consult wound care as needed for any concerns.   Wound care to follow up as needed.     11/25/22 2006   WO Assessment   WOCN Total Time (mins) 30   Visit Date 11/25/22   Visit Time 1900   Consult Type New   [facility] Speciality Wound   Intervention assessed;applied;chart review;coordination of care;orders   Teaching on-going   Skin Interventions   Device Skin  Pressure Protection absorbent pad utilized/changed;adhesive use limited;skin-to-device areas padded;skin-to-skin areas padded   Pressure Reduction Devices pressure-redistributing mattress utilized;chair cushion utilized   Pressure Reduction Techniques frequent weight shift encouraged;positioned off wounds;sit time limited to 1 hour;weight shift assistance provided   Skin Protection adhesive use limited;drying agents applied;incontinence pads utilized   Pressure Injury Prevention    Check Moisture Management Pad Done   Check Medical Devices Done        Altered Skin Integrity 11/23/22 1000 Right Coccyx Shearing   Date First Assessed/Time First Assessed: 11/23/22 1000   Altered Skin Integrity Present on Admission: suspected hospital acquired  Side: Right  Location: Coccyx  Is this injury device related?: No  Primary Wound Type: Shearing    For image refer to media (pending epic cross over)   Dressing Appearance Dry   Drainage Amount None   Appearance Pink;Red;Dry  (flaking)   Red (%), Wound Tissue Color 100 %   Periwound Area Excoriated;Dry   Wound Edges Irregular   Wound Length (cm) 3.5 cm   Wound Width (cm) 3 cm   Wound Depth (cm) 0.1 cm   Wound Volume (cm^3) 1.05 cm^3   Wound Surface Area (cm^2) 10.5 cm^2   Care Removed:;Other (see comments)  (removed mepilex dressing .  applied triad and left DUONG)   Periwound Care Other (see comments)  (triad)   Dressing Change Due 11/25/22 11/25/2022

## 2022-11-26 NOTE — CONSULTS
Nutrition consult received regarding Renal diet education.  Pt EMILIA this AM at HD during visit, left paperwork at bedside w/ RD contact information.    RD scheduled to follow-up Tuesday, 11/28.    Thanks!  MS She, RD, LDN

## 2022-11-27 LAB
ALBUMIN SERPL BCP-MCNC: 2.4 G/DL (ref 3.5–5.2)
ALP SERPL-CCNC: 121 U/L (ref 55–135)
ALT SERPL W/O P-5'-P-CCNC: 5 U/L (ref 10–44)
ANION GAP SERPL CALC-SCNC: 9 MMOL/L (ref 8–16)
AST SERPL-CCNC: 15 U/L (ref 10–40)
BASOPHILS # BLD AUTO: 0.02 K/UL (ref 0–0.2)
BASOPHILS NFR BLD: 0.2 % (ref 0–1.9)
BILIRUB SERPL-MCNC: 0.5 MG/DL (ref 0.1–1)
BUN SERPL-MCNC: 20 MG/DL (ref 8–23)
CALCIUM SERPL-MCNC: 8.3 MG/DL (ref 8.7–10.5)
CHLORIDE SERPL-SCNC: 100 MMOL/L (ref 95–110)
CO2 SERPL-SCNC: 23 MMOL/L (ref 23–29)
CREAT SERPL-MCNC: 3.7 MG/DL (ref 0.5–1.4)
DIFFERENTIAL METHOD: ABNORMAL
EOSINOPHIL # BLD AUTO: 0.2 K/UL (ref 0–0.5)
EOSINOPHIL NFR BLD: 1.6 % (ref 0–8)
ERYTHROCYTE [DISTWIDTH] IN BLOOD BY AUTOMATED COUNT: 17.7 % (ref 11.5–14.5)
EST. GFR  (NO RACE VARIABLE): 17.6 ML/MIN/1.73 M^2
GLUCOSE SERPL-MCNC: 93 MG/DL (ref 70–110)
HCT VFR BLD AUTO: 26.7 % (ref 40–54)
HGB BLD-MCNC: 8.3 G/DL (ref 14–18)
IMM GRANULOCYTES # BLD AUTO: 0.07 K/UL (ref 0–0.04)
IMM GRANULOCYTES NFR BLD AUTO: 0.7 % (ref 0–0.5)
LYMPHOCYTES # BLD AUTO: 1 K/UL (ref 1–4.8)
LYMPHOCYTES NFR BLD: 9.8 % (ref 18–48)
MAGNESIUM SERPL-MCNC: 1.7 MG/DL (ref 1.6–2.6)
MCH RBC QN AUTO: 30.7 PG (ref 27–31)
MCHC RBC AUTO-ENTMCNC: 31.1 G/DL (ref 32–36)
MCV RBC AUTO: 99 FL (ref 82–98)
MONOCYTES # BLD AUTO: 1 K/UL (ref 0.3–1)
MONOCYTES NFR BLD: 10 % (ref 4–15)
NEUTROPHILS # BLD AUTO: 8 K/UL (ref 1.8–7.7)
NEUTROPHILS NFR BLD: 77.7 % (ref 38–73)
NRBC BLD-RTO: 0 /100 WBC
PHOSPHATE SERPL-MCNC: 3.3 MG/DL (ref 2.7–4.5)
PLATELET # BLD AUTO: 266 K/UL (ref 150–450)
PMV BLD AUTO: 10.6 FL (ref 9.2–12.9)
POTASSIUM SERPL-SCNC: 4.3 MMOL/L (ref 3.5–5.1)
PROT SERPL-MCNC: 5.8 G/DL (ref 6–8.4)
RBC # BLD AUTO: 2.7 M/UL (ref 4.6–6.2)
SODIUM SERPL-SCNC: 132 MMOL/L (ref 136–145)
WBC # BLD AUTO: 10.34 K/UL (ref 3.9–12.7)

## 2022-11-27 PROCEDURE — 84100 ASSAY OF PHOSPHORUS: CPT

## 2022-11-27 PROCEDURE — 63600175 PHARM REV CODE 636 W HCPCS: Performed by: ANESTHESIOLOGY

## 2022-11-27 PROCEDURE — 85025 COMPLETE CBC W/AUTO DIFF WBC: CPT | Performed by: THORACIC SURGERY (CARDIOTHORACIC VASCULAR SURGERY)

## 2022-11-27 PROCEDURE — 25000003 PHARM REV CODE 250: Performed by: STUDENT IN AN ORGANIZED HEALTH CARE EDUCATION/TRAINING PROGRAM

## 2022-11-27 PROCEDURE — 63600175 PHARM REV CODE 636 W HCPCS: Performed by: STUDENT IN AN ORGANIZED HEALTH CARE EDUCATION/TRAINING PROGRAM

## 2022-11-27 PROCEDURE — 97116 GAIT TRAINING THERAPY: CPT

## 2022-11-27 PROCEDURE — 83735 ASSAY OF MAGNESIUM: CPT

## 2022-11-27 PROCEDURE — 25000003 PHARM REV CODE 250

## 2022-11-27 PROCEDURE — 99900035 HC TECH TIME PER 15 MIN (STAT)

## 2022-11-27 PROCEDURE — 80053 COMPREHEN METABOLIC PANEL: CPT

## 2022-11-27 PROCEDURE — 63600175 PHARM REV CODE 636 W HCPCS: Performed by: THORACIC SURGERY (CARDIOTHORACIC VASCULAR SURGERY)

## 2022-11-27 PROCEDURE — 63600175 PHARM REV CODE 636 W HCPCS

## 2022-11-27 PROCEDURE — 94761 N-INVAS EAR/PLS OXIMETRY MLT: CPT

## 2022-11-27 PROCEDURE — 20600001 HC STEP DOWN PRIVATE ROOM

## 2022-11-27 RX ORDER — SIMETHICONE 80 MG
1 TABLET,CHEWABLE ORAL 3 TIMES DAILY PRN
Status: DISCONTINUED | OUTPATIENT
Start: 2022-11-28 | End: 2022-12-01 | Stop reason: HOSPADM

## 2022-11-27 RX ADMIN — HYDROMORPHONE HYDROCHLORIDE 1 MG: 1 INJECTION, SOLUTION INTRAMUSCULAR; INTRAVENOUS; SUBCUTANEOUS at 09:11

## 2022-11-27 RX ADMIN — METHOCARBAMOL 500 MG: 500 TABLET ORAL at 03:11

## 2022-11-27 RX ADMIN — ONDANSETRON 4 MG: 2 INJECTION INTRAMUSCULAR; INTRAVENOUS at 01:11

## 2022-11-27 RX ADMIN — HEPARIN SODIUM 5000 UNITS: 5000 INJECTION INTRAVENOUS; SUBCUTANEOUS at 10:11

## 2022-11-27 RX ADMIN — SENNOSIDES AND DOCUSATE SODIUM 1 TABLET: 50; 8.6 TABLET ORAL at 08:11

## 2022-11-27 RX ADMIN — ACETAMINOPHEN 1000 MG: 500 TABLET ORAL at 01:11

## 2022-11-27 RX ADMIN — HEPARIN SODIUM 5000 UNITS: 5000 INJECTION INTRAVENOUS; SUBCUTANEOUS at 01:11

## 2022-11-27 RX ADMIN — ACETAMINOPHEN 1000 MG: 500 TABLET ORAL at 10:11

## 2022-11-27 RX ADMIN — HYDROMORPHONE HYDROCHLORIDE 1 MG: 1 INJECTION, SOLUTION INTRAMUSCULAR; INTRAVENOUS; SUBCUTANEOUS at 12:11

## 2022-11-27 RX ADMIN — ASPIRIN 81 MG 81 MG: 81 TABLET ORAL at 09:11

## 2022-11-27 RX ADMIN — ONDANSETRON 4 MG: 2 INJECTION INTRAMUSCULAR; INTRAVENOUS at 12:11

## 2022-11-27 RX ADMIN — METHOCARBAMOL 500 MG: 500 TABLET ORAL at 09:11

## 2022-11-27 RX ADMIN — OXYCODONE HYDROCHLORIDE 10 MG: 10 TABLET ORAL at 02:11

## 2022-11-27 RX ADMIN — HEPARIN SODIUM 5000 UNITS: 5000 INJECTION INTRAVENOUS; SUBCUTANEOUS at 05:11

## 2022-11-27 RX ADMIN — OXYCODONE HYDROCHLORIDE 10 MG: 10 TABLET ORAL at 12:11

## 2022-11-27 RX ADMIN — SENNOSIDES AND DOCUSATE SODIUM 1 TABLET: 50; 8.6 TABLET ORAL at 09:11

## 2022-11-27 RX ADMIN — AMIODARONE HYDROCHLORIDE 200 MG: 200 TABLET ORAL at 09:11

## 2022-11-27 RX ADMIN — HYDROMORPHONE HYDROCHLORIDE 1 MG: 1 INJECTION, SOLUTION INTRAMUSCULAR; INTRAVENOUS; SUBCUTANEOUS at 10:11

## 2022-11-27 RX ADMIN — METHOCARBAMOL 500 MG: 500 TABLET ORAL at 08:11

## 2022-11-27 RX ADMIN — CEFEPIME HYDROCHLORIDE 1 G: 1 INJECTION, POWDER, FOR SOLUTION INTRAMUSCULAR; INTRAVENOUS at 08:11

## 2022-11-27 RX ADMIN — AMIODARONE HYDROCHLORIDE 200 MG: 200 TABLET ORAL at 08:11

## 2022-11-27 RX ADMIN — ATORVASTATIN CALCIUM 40 MG: 20 TABLET, FILM COATED ORAL at 09:11

## 2022-11-27 RX ADMIN — HYDROMORPHONE HYDROCHLORIDE 1 MG: 1 INJECTION, SOLUTION INTRAMUSCULAR; INTRAVENOUS; SUBCUTANEOUS at 06:11

## 2022-11-27 RX ADMIN — POLYETHYLENE GLYCOL 3350 17 G: 17 POWDER, FOR SOLUTION ORAL at 09:11

## 2022-11-27 RX ADMIN — ACETAMINOPHEN 1000 MG: 500 TABLET ORAL at 05:11

## 2022-11-27 RX ADMIN — ONDANSETRON 4 MG: 2 INJECTION INTRAMUSCULAR; INTRAVENOUS at 08:11

## 2022-11-27 RX ADMIN — HYDROMORPHONE HYDROCHLORIDE 1 MG: 1 INJECTION, SOLUTION INTRAMUSCULAR; INTRAVENOUS; SUBCUTANEOUS at 05:11

## 2022-11-27 NOTE — NURSING
Rt IJ central line removed per order, pt tolerated well, hold pressure for 15 minutes, and pt lay flat for 30 minutes, no active bleeding noted, dressing placed.

## 2022-11-27 NOTE — PROGRESS NOTES
[facility]  Cardiothoracic Surgery  Progress Note    Patient Name: David Barrios  MRN: [id number]  Admission Date: 11/8/2022  Hospital Length of Stay: 19 days  Code Status: Full Code   Attending Physician: Mike Hedrick MD   Referring Provider: Mike Hedrick MD  Principal Problem:Sternal wound infection            Subjective:     Post-Op Info:  Procedure(s) (LRB):  CREATION, FLAP, MUSCLE ROTATION (N/A)  IRRIGATION AND DEBRIDEMENT, WOUND, STERNUM (N/A)  CLOSURE, WOUND, STERNUM (N/A)  EXPLORATION, WOUND (N/A)   13 Days Post-Op     Interval History:   NAEON  Tolerated HD yesterday    Medications:  Continuous Infusions:  Scheduled Meds:   acetaminophen  1,000 mg Oral Q8H    amiodarone  200 mg Oral BID    aspirin  81 mg Oral Daily    atorvastatin  40 mg Oral Daily    ceFEPime (MAXIPIME) IVPB  1 g Intravenous Q24H    heparin (porcine)  5,000 Units Subcutaneous Q8H    methocarbamoL  500 mg Oral TID    polyethylene glycol  17 g Oral Daily    senna-docusate 8.6-50 mg  1 tablet Oral BID     PRN Meds:bisacodyL, dextrose 10%, dextrose 10%, diazePAM, HYDROmorphone, hydrOXYzine pamoate, ondansetron, oxyCODONE, oxyCODONE, sodium chloride 0.9%     Objective:     Vital Signs (Most Recent):  Temp: 98.1 °F (36.7 °C) (11/27/22 0750)  Pulse: 86 (11/27/22 0750)  Resp: 18 (11/27/22 0944)  BP: 115/64 (11/27/22 0750)  SpO2: 96 % (11/27/22 0750) Vital Signs (24h Range):  Temp:  [96.6 °F (35.9 °C)-98.9 °F (37.2 °C)] 98.1 °F (36.7 °C)  Pulse:  [86-95] 86  Resp:  [16-20] 18  SpO2:  [94 %-100 %] 96 %  BP: (115-157)/(62-77) 115/64     Weight: 74 kg (163 lb 2.3 oz)  Body mass index is 24.08 kg/m².    SpO2: 96 %  O2 Device (Oxygen Therapy): room air    Intake/Output - Last 3 Shifts         11/25 0700 11/26 0659 11/26 0700 11/27 0659 11/27 0700 11/28 0659    P.O.  600     Other  500     Total Intake(mL/kg)  1100 (14.9)     Urine (mL/kg/hr) 0 (0) 100 (0.1)     Drains 200 130     Other  3800     Total Output  200 4030     Net -200 -2930            Urine Occurrence 1 x 0 x             Lines/Drains/Airways       Peripherally Inserted Central Catheter Line  Duration             PICC Double Lumen 10/17/22 1709 right brachial 40 days              Central Venous Catheter Line  Duration                  Hemodialysis Catheter 11/25/22 0904 right internal jugular 2 days              Drain  Duration                  Closed/Suction Drain 11/14/22 2010 Inferior;Right Chest Bulb 15 Fr. 12 days         Closed/Suction Drain 11/14/22 2010 Superior;Midline Abdomen Bulb 15 Fr. 12 days         Closed/Suction Drain 11/14/22 2015 Inferior;Left Chest Bulb 15 Fr. 12 days                    Physical Exam  Constitutional:       General: He is not in acute distress.     Appearance: He is well-developed.   Cardiovascular:      Rate and Rhythm: Normal rate and regular rhythm.   Pulmonary:      Effort: Pulmonary effort is normal. No respiratory distress.   Abdominal:      General: There is no distension.      Palpations: Abdomen is soft.      Tenderness: There is no abdominal tenderness.   Skin:     General: Skin is warm and dry.   Neurological:      Mental Status: He is alert and oriented to person, place, and time.   Psychiatric:         Behavior: Behavior normal.       Significant Labs:  CBC:   Recent Labs   Lab 11/27/22  0542   WBC 10.34   RBC 2.70*   HGB 8.3*   HCT 26.7*      MCV 99*   MCH 30.7   MCHC 31.1*     CMP:   Recent Labs   Lab 11/27/22  0542   GLU 93   CALCIUM 8.3*   ALBUMIN 2.4*   PROT 5.8*   *   K 4.3   CO2 23      BUN 20   CREATININE 3.7*   ALKPHOS 121   ALT 5*   AST 15   BILITOT 0.5       Significant Diagnostics:  I have reviewed all pertinent imaging results/findings within the past 24 hours.    Assessment/Plan:     * Sternal wound infection  S/P debridement and muscle flap   Renally dosed cefepime ending 12/26/22 per ID  Wound care consulted for buttock, chair cushion ordered     KIMANI (acute kidney  injury)  permacath placed 11/25   Renal diet     Sternal osteomyelitis  S/P washout and debridement and muscle flap with plastics     Transient hyperglycemia post procedure  Endocrine following     Persistent atrial fibrillation  Amiodarone       Microcytic anemia  CBC daily     Gin Saul MD  Cardiothoracic Surgery  [facility]

## 2022-11-27 NOTE — PLAN OF CARE
Chest incision site cleaned. Pain controled by PRN meds. Unable to get accurate urine output d/t pt urinated x3 in toilet , team aware. Pt educated on fall risk and sternal precaution, remained free from falls/trauma/injury. Denies chest pain, SOB, palpitations, dizziness, pain, or discomfort. Plan of care reviewed with pt, all questions answered. Bed locked in lowest position, call bell within reach, no acute distress noted, will continue to monitor.

## 2022-11-27 NOTE — PROGRESS NOTES
Plastic and Reconstructive Surgery   Progress Note    Subjective:    Patient seen and examined. Pain controlled. Patient s/p permcath leaving for dialysis this morning, no issues overnight, swelling improving over the flaps       Objective:  Vital signs in last 24 hours:  Temp:  [96.6 °F (35.9 °C)-98.9 °F (37.2 °C)] 97.5 °F (36.4 °C)  Pulse:  [82-95] 94  Resp:  [16-20] 16  SpO2:  [94 %-100 %] 100 %  BP: (112-157)/(60-77) 150/77    Intake/Output last 3 shifts:  I/O last 3 completed shifts:  In: 1100 [P.O.:600; Other:500]  Out: 4160 [Urine:100; Drains:260; Other:3800]    Intake/Output this shift:  No intake/output data recorded.        Physical Exam:  VITAL SIGNS:   Vitals:    22 0309 22 0439 22 0542 22 0736   BP:  (!) 150/77     BP Location:  Left arm     Patient Position:  Sitting     Pulse: 86 95  94   Resp:  18 16    Temp:  97.5 °F (36.4 °C)     TempSrc:  Oral     SpO2:  100%     Weight:       Height:         TMAX: Temp (24hrs), Av.7 °F (36.5 °C), Min:96.6 °F (35.9 °C), Max:98.9 °F (37.2 °C)    General: Alert; No acute distress  Cardiovascular: Regular rate   Respiratory: Normal respiratory effort. Chest rise symmetric.   Abdomen: Soft, nontender, nondistended  Extremity: Moves all extremities equally.  Neurologic: No focal deficit. Speech normal  SKIN: midline sternal and bilateral axillary incisions c/d/I, 3 drains left (right/left chest and mediastinum)    Scheduled Medications acetaminophen, 1,000 mg, Q8H  amiodarone, 200 mg, BID  aspirin, 81 mg, Daily  atorvastatin, 40 mg, Daily  ceFEPime (MAXIPIME) IVPB, 1 g, Q24H  heparin (porcine), 5,000 Units, Q8H  methocarbamoL, 500 mg, TID  polyethylene glycol, 17 g, Daily  senna-docusate 8.6-50 mg, 1 tablet, BID    PRN Medications bisacodyL, dextrose 10%, dextrose 10%, diazePAM, heparin (porcine), HYDROmorphone, hydrOXYzine pamoate, ondansetron, oxyCODONE, oxyCODONE, sodium chloride 0.9%    Recent Labs:   Lab Results   Component Value Date     WBC 10.34 11/27/2022    HGB 8.3 (L) 11/27/2022    HCT 26.7 (L) 11/27/2022    MCV 99 (H) 11/27/2022     11/27/2022     Lab Results   Component Value Date    GLU 93 11/27/2022     (L) 11/27/2022    K 4.3 11/27/2022     11/27/2022    BUN 20 11/27/2022         Assessment: 62 y.o. y/o male 13 Days Post-Op s/p Procedure(s):  REMOVAL, STERNAL WIRE  DEBRIDEMENT, STERNUM  APPLICATION, WOUND VAC Doing well postoperatively.    Plan  Monitor drain output; BLANCA drains to remain in place for dc until follow up in clinic with Dr. Neida Chisholm to shower; recommend pinning BLANCA drains to lanyard or necklace to avoid hanging or pulling out  Encourage ambulation/OOB with PT  Pain control PRN  I's & O's  Dvt ppx  Plastic surgery will follow     Dada Griffin MD- Fellow  Department of Plastic and Reconstructive Surgery  [phone] (office)

## 2022-11-27 NOTE — NURSING
"Pt urinated twice in toilet and pt said " it's just a little", educated pt to use urinal, and hemodialysis done yesterday, called [phone] and notified  team, no new order received, WCKELLY.  "

## 2022-11-27 NOTE — PT/OT/SLP PROGRESS
Physical Therapy  Treatment    Patient Name:  David Barrios   MRN:  [id number]    Recommendations:     Discharge Recommendations:  home health PT   Discharge Equipment Recommendations: shower chair   Barriers to discharge: decreased functional mobility and fall risk    Assessment:     David Barrios is a 62 y.o. male admitted with a medical diagnosis of Sternal wound infection.  Pt demonstrates the below listed impairments with decreased tolerance to functional mobility, impaired endurance, and pain being the most limiting.  Pt demonstrates improved tolerance to out of bed mobility and is willing to ambulate in the hallway.  He is 75% compliant with sternal precautions. Pt requires skilled PT due to patient's status as: a fall risk to allow safe d/c home.     Impairments and functional limitations:  weakness, impaired endurance, impaired functional mobility, gait instability, impaired self care skills, decreased upper extremity function, pain, impaired skin, impaired cardiopulmonary response to activity.  These deficits affect their roles and responsibilities in which they were able to complete prior to admit.  Rehab Prognosis:   Good ; patient would benefit from acute skilled PT services 5 x/week to address these deficits, improve quality of life, focus on recovery of impairments, provide patient/caregiver education, reduce fall risk, and reach maximum level of function.  Pt is highly  motivated to participated in skilled PT.    Recent Surgery:   Procedure(s) (LRB):  CREATION, FLAP, MUSCLE ROTATION (N/A)  IRRIGATION AND DEBRIDEMENT, WOUND, STERNUM (N/A)  CLOSURE, WOUND, STERNUM (N/A)  EXPLORATION, WOUND (N/A) 13 Days Post-Op    Plan:     During this hospitalization, patient to be seen 5 x/week to address the identified rehab impairments via gait training, therapeutic activities, therapeutic exercises, neuromuscular re-education and progress toward the following goals:    Plan of Care Expires:  12/16/22    Subjective  "    Chief Complaint: "I just want to get home"  Patient/Family Comments/Goals: Return home  Pain/Comfort:  Pain Rating 1: 7/10  Location - Orientation 1: generalized  Location 1: gluteal  Pain Addressed 1: Reposition, Distraction  Pain Rating Post-Intervention 1:  (not rated)    Objective:     Communicated with RN prior to session.  Patient found up in chair with central line, telemetry, BLANCA drain upon PT entry to room.     General Precautions: Standard, fall, sternal   Orthopedic Precautions:N/A   Braces: N/A  Oxygen Device:      Functional Mobility:  Bed Mobility:  Seated in chair at start of session and returned to chair  Head of bed position: n/a    Transfers:  Sit to Stand: Sup with No AD  Performs x4 sit to stands  Cues for UE use     Gait: Patient ambulated 208', 96', 170' with No AD and SBA. Patient demonstrates occasional unsteady gait, decreased step length, narrow base of support, decreased arm swing, flexed posture, and decreased goldy. All lines remained intact throughout ambulation trial, mask donned for out of room ambulation.  Some increase in RR, denies lightheadedness and SOB  SpO2 90% or higher   Seated rest breaks between trials     Balance:   Position Score Time   Static Sitting GOOD: Takes MODERATE challenges n/a   Dynamic Sitting GOOD-: Maintains balance through MODERATE excursions of active trunk motion, but inconstantly  n/a   Static Standing GOOD-: Takes MODERATE challenges but inconstantly  n/a   Dynamic Standing FAIR+: Maintains balance through MINIMAL excursions of active trunk motion n/a       AM-PAC 6 CLICK MOBILITY  Turning over in bed (including adjusting bedclothes, sheets and blankets)?: 3  Sitting down on and standing up from a chair with arms (e.g., wheelchair, bedside commode, etc.): 3  Moving from lying on back to sitting on the side of the bed?: 3  Moving to and from a bed to a chair (including a wheelchair)?: 3  Need to walk in hospital room?: 3  Climbing 3-5 steps with a " railing?: 3  Basic Mobility Total Score: 18     Therapeutic Activities:  Patient educated on role of acute care PT and PT POC, safety while in hospital including calling nurse for mobility, call light usage, benefits of out of bed mobility, home exercise program , breathing technique, fall risk, transfers, gait technique, positioning, posture, risks of prolonged bed rest, possible discharge disposition , sternal precautions, and benefits of continued PT by explanation and demonstration.    Patient demonstrates good understanding of education provided this day.   Whiteboard updated    Therapeutic Exercises:  n/a    Patient left up in chair with all lines intact, call button in reach, and SO present.    GOALS:   Multidisciplinary Problems       Physical Therapy Goals          Problem: Physical Therapy    Goal Priority Disciplines Outcome Goal Variances Interventions   Physical Therapy Goal     PT, PT/OT Ongoing, Progressing     Description: Goals to be met by: 2022     Patient will increase functional independence with mobility by performin. Supine to sit with Modified [place]  2. Sit to supine with Modified [place]  3. Sit to stand transfer with Supervision  4. Bed to chair transfer with Supervision using No Assistive Device  5. Gait  x 200 feet with Supervision using No Assistive Device.   6. Pt will ascend/descend 3 steps with no HR and SBA.                         Time Tracking:     PT Received On: 22  PT Start Time: 0832     PT Stop Time: 0859  PT Total Time (min): 27 min     Billable Minutes: Gait Training 27    Treatment Type: Treatment  PT/PTA: PT     PTA Visit Number: 0     2022

## 2022-11-27 NOTE — PLAN OF CARE
VSS. Hemodialysis done today. Pain controled by PRN meds. Pt educated on fall risk and sternal precaution, remained free from falls/trauma/injury. Denies chest pain, SOB, palpitations, dizziness, pain, or discomfort. Plan of care reviewed with pt, all questions answered. Bed locked in lowest position, call bell within reach, no acute distress noted, will continue to monitor.

## 2022-11-27 NOTE — SUBJECTIVE & OBJECTIVE
Interval History:   NAEON  Tolerated HD yesterday    Medications:  Continuous Infusions:  Scheduled Meds:   acetaminophen  1,000 mg Oral Q8H    amiodarone  200 mg Oral BID    aspirin  81 mg Oral Daily    atorvastatin  40 mg Oral Daily    ceFEPime (MAXIPIME) IVPB  1 g Intravenous Q24H    heparin (porcine)  5,000 Units Subcutaneous Q8H    methocarbamoL  500 mg Oral TID    polyethylene glycol  17 g Oral Daily    senna-docusate 8.6-50 mg  1 tablet Oral BID     PRN Meds:bisacodyL, dextrose 10%, dextrose 10%, diazePAM, HYDROmorphone, hydrOXYzine pamoate, ondansetron, oxyCODONE, oxyCODONE, sodium chloride 0.9%     Objective:     Vital Signs (Most Recent):  Temp: 98.1 °F (36.7 °C) (11/27/22 0750)  Pulse: 86 (11/27/22 0750)  Resp: 18 (11/27/22 0944)  BP: 115/64 (11/27/22 0750)  SpO2: 96 % (11/27/22 0750) Vital Signs (24h Range):  Temp:  [96.6 °F (35.9 °C)-98.9 °F (37.2 °C)] 98.1 °F (36.7 °C)  Pulse:  [86-95] 86  Resp:  [16-20] 18  SpO2:  [94 %-100 %] 96 %  BP: (115-157)/(62-77) 115/64     Weight: 74 kg (163 lb 2.3 oz)  Body mass index is 24.08 kg/m².    SpO2: 96 %  O2 Device (Oxygen Therapy): room air    Intake/Output - Last 3 Shifts         11/25 0700 11/26 0659 11/26 0700 11/27 0659 11/27 0700 11/28 0659    P.O.  600     Other  500     Total Intake(mL/kg)  1100 (14.9)     Urine (mL/kg/hr) 0 (0) 100 (0.1)     Drains 200 130     Other  3800     Total Output 200 4030     Net -200 -2930            Urine Occurrence 1 x 0 x             Lines/Drains/Airways       Peripherally Inserted Central Catheter Line  Duration             PICC Double Lumen 10/17/22 1709 right brachial 40 days              Central Venous Catheter Line  Duration                  Hemodialysis Catheter 11/25/22 0904 right internal jugular 2 days              Drain  Duration                  Closed/Suction Drain 11/14/22 2010 Inferior;Right Chest Bulb 15 Fr. 12 days         Closed/Suction Drain 11/14/22 2010 Superior;Midline Abdomen Bulb 15 Fr. 12 days          Closed/Suction Drain 11/14/22 2015 Inferior;Left Chest Bulb 15 Fr. 12 days                    Physical Exam  Constitutional:       General: He is not in acute distress.     Appearance: He is well-developed.   Cardiovascular:      Rate and Rhythm: Normal rate and regular rhythm.   Pulmonary:      Effort: Pulmonary effort is normal. No respiratory distress.   Abdominal:      General: There is no distension.      Palpations: Abdomen is soft.      Tenderness: There is no abdominal tenderness.   Skin:     General: Skin is warm and dry.   Neurological:      Mental Status: He is alert and oriented to person, place, and time.   Psychiatric:         Behavior: Behavior normal.       Significant Labs:  CBC:   Recent Labs   Lab 11/27/22  0542   WBC 10.34   RBC 2.70*   HGB 8.3*   HCT 26.7*      MCV 99*   MCH 30.7   MCHC 31.1*     CMP:   Recent Labs   Lab 11/27/22  0542   GLU 93   CALCIUM 8.3*   ALBUMIN 2.4*   PROT 5.8*   *   K 4.3   CO2 23      BUN 20   CREATININE 3.7*   ALKPHOS 121   ALT 5*   AST 15   BILITOT 0.5       Significant Diagnostics:  I have reviewed all pertinent imaging results/findings within the past 24 hours.

## 2022-11-28 LAB
ALBUMIN SERPL BCP-MCNC: 2.4 G/DL (ref 3.5–5.2)
ALP SERPL-CCNC: 125 U/L (ref 55–135)
ALT SERPL W/O P-5'-P-CCNC: <5 U/L (ref 10–44)
ANION GAP SERPL CALC-SCNC: 9 MMOL/L (ref 8–16)
AST SERPL-CCNC: 15 U/L (ref 10–40)
BASOPHILS # BLD AUTO: 0.03 K/UL (ref 0–0.2)
BASOPHILS NFR BLD: 0.3 % (ref 0–1.9)
BILIRUB SERPL-MCNC: 0.5 MG/DL (ref 0.1–1)
BUN SERPL-MCNC: 29 MG/DL (ref 8–23)
CALCIUM SERPL-MCNC: 8.6 MG/DL (ref 8.7–10.5)
CHLORIDE SERPL-SCNC: 99 MMOL/L (ref 95–110)
CO2 SERPL-SCNC: 23 MMOL/L (ref 23–29)
CREAT SERPL-MCNC: 5 MG/DL (ref 0.5–1.4)
DIFFERENTIAL METHOD: ABNORMAL
EOSINOPHIL # BLD AUTO: 0.2 K/UL (ref 0–0.5)
EOSINOPHIL NFR BLD: 1.6 % (ref 0–8)
ERYTHROCYTE [DISTWIDTH] IN BLOOD BY AUTOMATED COUNT: 17.8 % (ref 11.5–14.5)
EST. GFR  (NO RACE VARIABLE): 12.3 ML/MIN/1.73 M^2
GLUCOSE SERPL-MCNC: 85 MG/DL (ref 70–110)
HAV IGM SERPL QL IA: ABNORMAL
HBV CORE AB SERPL QL IA: NORMAL
HBV CORE IGM SERPL QL IA: ABNORMAL
HBV SURFACE AB SER-ACNC: <3 MIU/ML
HBV SURFACE AB SER-ACNC: NORMAL M[IU]/ML
HBV SURFACE AG SERPL QL IA: ABNORMAL
HBV SURFACE AG SERPL QL IA: NORMAL
HCT VFR BLD AUTO: 27.1 % (ref 40–54)
HCV AB SERPL QL IA: REACTIVE
HGB BLD-MCNC: 8.4 G/DL (ref 14–18)
HIV1+2 IGG SERPL QL IA.RAPID: NORMAL
IMM GRANULOCYTES # BLD AUTO: 0.06 K/UL (ref 0–0.04)
IMM GRANULOCYTES NFR BLD AUTO: 0.6 % (ref 0–0.5)
LYMPHOCYTES # BLD AUTO: 1.2 K/UL (ref 1–4.8)
LYMPHOCYTES NFR BLD: 11.2 % (ref 18–48)
MAGNESIUM SERPL-MCNC: 1.7 MG/DL (ref 1.6–2.6)
MCH RBC QN AUTO: 30.9 PG (ref 27–31)
MCHC RBC AUTO-ENTMCNC: 31 G/DL (ref 32–36)
MCV RBC AUTO: 100 FL (ref 82–98)
MONOCYTES # BLD AUTO: 1 K/UL (ref 0.3–1)
MONOCYTES NFR BLD: 9.2 % (ref 4–15)
NEUTROPHILS # BLD AUTO: 8.2 K/UL (ref 1.8–7.7)
NEUTROPHILS NFR BLD: 77.1 % (ref 38–73)
NRBC BLD-RTO: 0 /100 WBC
PHOSPHATE SERPL-MCNC: 3.7 MG/DL (ref 2.7–4.5)
PLATELET # BLD AUTO: 249 K/UL (ref 150–450)
PMV BLD AUTO: 11.4 FL (ref 9.2–12.9)
POTASSIUM SERPL-SCNC: 4.7 MMOL/L (ref 3.5–5.1)
PROT SERPL-MCNC: 5.8 G/DL (ref 6–8.4)
RBC # BLD AUTO: 2.72 M/UL (ref 4.6–6.2)
SODIUM SERPL-SCNC: 131 MMOL/L (ref 136–145)
WBC # BLD AUTO: 10.57 K/UL (ref 3.9–12.7)

## 2022-11-28 PROCEDURE — 97535 SELF CARE MNGMENT TRAINING: CPT

## 2022-11-28 PROCEDURE — 87522 HEPATITIS C REVRS TRNSCRPJ: CPT | Performed by: NURSE PRACTITIONER

## 2022-11-28 PROCEDURE — 86706 HEP B SURFACE ANTIBODY: CPT | Performed by: NURSE PRACTITIONER

## 2022-11-28 PROCEDURE — 25000003 PHARM REV CODE 250: Performed by: STUDENT IN AN ORGANIZED HEALTH CARE EDUCATION/TRAINING PROGRAM

## 2022-11-28 PROCEDURE — 99232 SBSQ HOSP IP/OBS MODERATE 35: CPT | Mod: ,,, | Performed by: INTERNAL MEDICINE

## 2022-11-28 PROCEDURE — 63600175 PHARM REV CODE 636 W HCPCS: Performed by: THORACIC SURGERY (CARDIOTHORACIC VASCULAR SURGERY)

## 2022-11-28 PROCEDURE — 99232 PR SUBSEQUENT HOSPITAL CARE,LEVL II: ICD-10-PCS | Mod: ,,, | Performed by: INTERNAL MEDICINE

## 2022-11-28 PROCEDURE — 86580 TB INTRADERMAL TEST: CPT | Performed by: NURSE PRACTITIONER

## 2022-11-28 PROCEDURE — 63600175 PHARM REV CODE 636 W HCPCS

## 2022-11-28 PROCEDURE — 20600001 HC STEP DOWN PRIVATE ROOM

## 2022-11-28 PROCEDURE — 63600175 PHARM REV CODE 636 W HCPCS: Performed by: ANESTHESIOLOGY

## 2022-11-28 PROCEDURE — 80074 ACUTE HEPATITIS PANEL: CPT | Performed by: NURSE PRACTITIONER

## 2022-11-28 PROCEDURE — 86704 HEP B CORE ANTIBODY TOTAL: CPT | Performed by: NURSE PRACTITIONER

## 2022-11-28 PROCEDURE — 85025 COMPLETE CBC W/AUTO DIFF WBC: CPT | Performed by: THORACIC SURGERY (CARDIOTHORACIC VASCULAR SURGERY)

## 2022-11-28 PROCEDURE — 84100 ASSAY OF PHOSPHORUS: CPT

## 2022-11-28 PROCEDURE — 25000003 PHARM REV CODE 250

## 2022-11-28 PROCEDURE — 63600175 PHARM REV CODE 636 W HCPCS: Performed by: STUDENT IN AN ORGANIZED HEALTH CARE EDUCATION/TRAINING PROGRAM

## 2022-11-28 PROCEDURE — 30200315 PPD INTRADERMAL TEST REV CODE 302: Performed by: NURSE PRACTITIONER

## 2022-11-28 PROCEDURE — 80053 COMPREHEN METABOLIC PANEL: CPT

## 2022-11-28 PROCEDURE — 86703 HIV-1/HIV-2 1 RESULT ANTBDY: CPT | Performed by: NURSE PRACTITIONER

## 2022-11-28 PROCEDURE — 83735 ASSAY OF MAGNESIUM: CPT

## 2022-11-28 RX ORDER — HEPARIN SODIUM 1000 [USP'U]/ML
1000 INJECTION, SOLUTION INTRAVENOUS; SUBCUTANEOUS
Status: CANCELLED | OUTPATIENT
Start: 2022-11-29 | End: 2022-11-29

## 2022-11-28 RX ORDER — SODIUM CHLORIDE 9 MG/ML
INJECTION, SOLUTION INTRAVENOUS ONCE
Status: CANCELLED | OUTPATIENT
Start: 2022-11-28 | End: 2022-11-28

## 2022-11-28 RX ADMIN — HEPARIN SODIUM 5000 UNITS: 5000 INJECTION INTRAVENOUS; SUBCUTANEOUS at 06:11

## 2022-11-28 RX ADMIN — AMIODARONE HYDROCHLORIDE 200 MG: 200 TABLET ORAL at 08:11

## 2022-11-28 RX ADMIN — ACETAMINOPHEN 1000 MG: 500 TABLET ORAL at 01:11

## 2022-11-28 RX ADMIN — SIMETHICONE 80 MG: 80 TABLET, CHEWABLE ORAL at 06:11

## 2022-11-28 RX ADMIN — ACETAMINOPHEN 1000 MG: 500 TABLET ORAL at 06:11

## 2022-11-28 RX ADMIN — HEPARIN SODIUM 5000 UNITS: 5000 INJECTION INTRAVENOUS; SUBCUTANEOUS at 01:11

## 2022-11-28 RX ADMIN — ONDANSETRON 4 MG: 2 INJECTION INTRAMUSCULAR; INTRAVENOUS at 08:11

## 2022-11-28 RX ADMIN — SIMETHICONE 80 MG: 80 TABLET, CHEWABLE ORAL at 12:11

## 2022-11-28 RX ADMIN — SENNOSIDES AND DOCUSATE SODIUM 1 TABLET: 50; 8.6 TABLET ORAL at 08:11

## 2022-11-28 RX ADMIN — OXYCODONE HYDROCHLORIDE 10 MG: 10 TABLET ORAL at 05:11

## 2022-11-28 RX ADMIN — ASPIRIN 81 MG 81 MG: 81 TABLET ORAL at 08:11

## 2022-11-28 RX ADMIN — HYDROMORPHONE HYDROCHLORIDE 1 MG: 1 INJECTION, SOLUTION INTRAMUSCULAR; INTRAVENOUS; SUBCUTANEOUS at 06:11

## 2022-11-28 RX ADMIN — CEFEPIME HYDROCHLORIDE 1 G: 1 INJECTION, POWDER, FOR SOLUTION INTRAMUSCULAR; INTRAVENOUS at 08:11

## 2022-11-28 RX ADMIN — METHOCARBAMOL 500 MG: 500 TABLET ORAL at 08:11

## 2022-11-28 RX ADMIN — ACETAMINOPHEN 1000 MG: 500 TABLET ORAL at 11:11

## 2022-11-28 RX ADMIN — HYDROMORPHONE HYDROCHLORIDE 1 MG: 1 INJECTION, SOLUTION INTRAMUSCULAR; INTRAVENOUS; SUBCUTANEOUS at 11:11

## 2022-11-28 RX ADMIN — HYDROMORPHONE HYDROCHLORIDE 1 MG: 1 INJECTION, SOLUTION INTRAMUSCULAR; INTRAVENOUS; SUBCUTANEOUS at 02:11

## 2022-11-28 RX ADMIN — SIMETHICONE 80 MG: 80 TABLET, CHEWABLE ORAL at 01:11

## 2022-11-28 RX ADMIN — OXYCODONE 5 MG: 5 TABLET ORAL at 10:11

## 2022-11-28 RX ADMIN — SIMETHICONE 80 MG: 80 TABLET, CHEWABLE ORAL at 07:11

## 2022-11-28 RX ADMIN — ONDANSETRON 4 MG: 2 INJECTION INTRAMUSCULAR; INTRAVENOUS at 11:11

## 2022-11-28 RX ADMIN — ONDANSETRON 4 MG: 2 INJECTION INTRAMUSCULAR; INTRAVENOUS at 04:11

## 2022-11-28 RX ADMIN — TUBERCULIN PURIFIED PROTEIN DERIVATIVE 5 UNITS: 5 INJECTION, SOLUTION INTRADERMAL at 06:11

## 2022-11-28 RX ADMIN — HEPARIN SODIUM 5000 UNITS: 5000 INJECTION INTRAVENOUS; SUBCUTANEOUS at 11:11

## 2022-11-28 RX ADMIN — ATORVASTATIN CALCIUM 40 MG: 20 TABLET, FILM COATED ORAL at 08:11

## 2022-11-28 RX ADMIN — METHOCARBAMOL 500 MG: 500 TABLET ORAL at 03:11

## 2022-11-28 NOTE — PROGRESS NOTES
[facility] - Cardiology Stepdown  Cardiothoracic Surgery  Progress Note    Patient Name: David Barrios  MRN: [id number]  Admission Date: 11/8/2022  Hospital Length of Stay: 20 days  Code Status: Full Code   Attending Physician: Mike Hedrick MD   Referring Provider: Mike Hedrick MD  Principal Problem:Sternal wound infection    Subjective:     Post-Op Info:  Procedure(s) (LRB):  CREATION, FLAP, MUSCLE ROTATION (N/A)  IRRIGATION AND DEBRIDEMENT, WOUND, STERNUM (N/A)  CLOSURE, WOUND, STERNUM (N/A)  EXPLORATION, WOUND (N/A)   14 Days Post-Op     Interval History: HD completed and tolerated, continue antibiotics. Plastics following flap.  Attempting to find placement vs discharge home.  Patient would like to go home however attempting to find dialysis placement and infusion company willing to accept the patient.     Review of Systems   Constitutional: Negative for malaise/fatigue.   Cardiovascular:  Negative for chest pain, claudication, dyspnea on exertion, irregular heartbeat, leg swelling and palpitations.   Respiratory:  Negative for cough and shortness of breath.    Hematologic/Lymphatic: Negative for bleeding problem.   Gastrointestinal:  Negative for abdominal pain.   Genitourinary:  Negative for dysuria.   Neurological:  Negative for headaches and weakness.   Medications:  Continuous Infusions:  Scheduled Meds:   acetaminophen  1,000 mg Oral Q8H    amiodarone  200 mg Oral BID    aspirin  81 mg Oral Daily    atorvastatin  40 mg Oral Daily    ceFEPime (MAXIPIME) IVPB  1 g Intravenous Q24H    heparin (porcine)  5,000 Units Subcutaneous Q8H    methocarbamoL  500 mg Oral TID    polyethylene glycol  17 g Oral Daily    senna-docusate 8.6-50 mg  1 tablet Oral BID     PRN Meds:bisacodyL, dextrose 10%, dextrose 10%, diazePAM, HYDROmorphone, hydrOXYzine pamoate, ondansetron, oxyCODONE, oxyCODONE, simethicone, sodium chloride 0.9%     Objective:     Vital Signs (Most Recent):  Temp: 98.9 °F (37.2 °C)  (11/28/22 0726)  Pulse: 88 (11/28/22 1110)  Resp: 18 (11/28/22 1013)  BP: (!) 143/73 (11/28/22 0726)  SpO2: 96 % (11/28/22 0726)   Vital Signs (24h Range):  Temp:  [97.5 °F (36.4 °C)-98.9 °F (37.2 °C)] 98.9 °F (37.2 °C)  Pulse:  [85-95] 88  Resp:  [18-26] 18  SpO2:  [95 %-97 %] 96 %  BP: (133-155)/(67-76) 143/73     Weight: 74 kg (163 lb 2.3 oz)  Body mass index is 24.08 kg/m².    SpO2: 96 %  O2 Device (Oxygen Therapy): room air    Intake/Output - Last 3 Shifts         11/26 0700  11/27 0659 11/27 0700 11/28 0659 11/28 0700 11/29 0659    P.O. 600 480 100    Other 500      Total Intake(mL/kg) 1100 (14.9) 480 (6.5) 100 (1.4)    Urine (mL/kg/hr) 100 (0.1) 200 (0.1)     Drains 130 165     Other 3800      Stool  0     Total Output 4030 365     Net -2930 +115 +100           Urine Occurrence 0 x 3 x     Stool Occurrence  2 x             Lines/Drains/Airways       Peripherally Inserted Central Catheter Line  Duration             PICC Double Lumen 10/17/22 1709 right brachial 41 days              Central Venous Catheter Line  Duration                  Hemodialysis Catheter 11/25/22 0904 right internal jugular 3 days              Drain  Duration                  Closed/Suction Drain 11/14/22 2010 Inferior;Right Chest Bulb 15 Fr. 13 days         Closed/Suction Drain 11/14/22 2010 Superior;Midline Abdomen Bulb 15 Fr. 13 days         Closed/Suction Drain 11/14/22 2015 Inferior;Left Chest Bulb 15 Fr. 13 days                    Physical Exam  Constitutional:       Appearance: Normal appearance.   HENT:      Head: Normocephalic.   Eyes:      Pupils: Pupils are equal, round, and reactive to light.   Cardiovascular:      Rate and Rhythm: Normal rate and regular rhythm.      Pulses: Normal pulses.   Pulmonary:      Effort: Pulmonary effort is normal.   Abdominal:      General: Abdomen is flat. Bowel sounds are normal.      Palpations: Abdomen is soft.   Musculoskeletal:         General: Normal range of motion.   Skin:     General:  Skin is warm and dry.      Comments: MSI CDI   Neurological:      General: No focal deficit present.      Mental Status: He is alert.       Significant Labs:  BMP:   Recent Labs   Lab 11/28/22  0450   GLU 85   *   K 4.7   CL 99   CO2 23   BUN 29*   CREATININE 5.0*   CALCIUM 8.6*   MG 1.7     CBC:   Recent Labs   Lab 11/28/22  0450   WBC 10.57   RBC 2.72*   HGB 8.4*   HCT 27.1*      *   MCH 30.9   MCHC 31.0*     CMP:   Recent Labs   Lab 11/28/22  0450   GLU 85   CALCIUM 8.6*   ALBUMIN 2.4*   PROT 5.8*   *   K 4.7   CO2 23   CL 99   BUN 29*   CREATININE 5.0*   ALKPHOS 125   ALT <5*   AST 15   BILITOT 0.5     Coagulation: No results for input(s): PT, INR, APTT in the last 48 hours.    Significant Diagnostics:  I have reviewed and interpreted all pertinent imaging results/findings within the past 24 hours.    Assessment/Plan:     * Sternal wound infection  S/P debridement and muscle flap   Renally dosed cefepime ending 12/26/22 per ID  Wound care consulted for buttock, chair cushion ordered     KIMANI (acute kidney injury)  permacath placed 11/25   Renal diet   Dialysis per nephrology  Will need dialysis chair at time of discharge    Sternal osteomyelitis  S/P washout and debridement and muscle flap with plastics     Transient hyperglycemia post procedure  Endocrine following     Persistent atrial fibrillation  Amiodarone       Microcytic anemia  CBC daily         Rita Garcia, HAO  Cardiothoracic Surgery  [facility] - Cardiology Stepdown

## 2022-11-28 NOTE — HPI
Mr. Barrios is a 62 year old male who underwent mitral valve repair, tricuspid valve repair, left atrial maze, left atrial appendage resection, and direct aortic impella 5.5 insertion on October 7, 2022, which was complicated by Serratia bacteremia and sternal wound infection.  Mr. Barrios was discharged with a wound vac to LTAC to increase his strength and allow wound healing.  Unfortunately, the sternal wound continued to be infected and the decision was made to return to the operating room for sternal wire removal with mediastinal exploration and removal of the ascending aortic graft.  The risks and benefits were explained and informed consent was obtained.      Gross findings: Extensive purulent pericarditis noted anterior to the cardiac structures and along the aortic graft.  No pockets noted posteriorly on transesophageal echocardiogram as well as on intra operative inspection.

## 2022-11-28 NOTE — PROGRESS NOTES
[facility] - [facility] Stepdown  Nephrology  Progress Note    Patient Name: David Barrios  MRN: [id number]  Admission Date: 11/8/2022  Hospital Length of Stay: 20 days  Attending Provider: Mike Hedrick MD   Primary Care Physician: Breann Tavera MD  Principal Problem:Sternal wound infection    Subjective:     HPI: 61 yo male w/ hx of Afib; HFrEF (35%); HTN; HLD. Patient underwent who underwent mitral valve repair, tricuspid valve repair, left atrial maze, left atrial appendage resection, and direct aortic impella 5.5 insertion on 10/07/2022. Hospital course complicated by Serratia bacteremia (on 6wk course of cefepime), sternal wound infection, and cardiogenic shock. On 10/31 patient developed KIMANI. Per chart review no documented course of hypotension during that time. Patient started on diuresis with IV lasix, with good urine output. Patient required increased diuretics (IV Diuril 500mg TID; and Lasix 120mg TID) and continued to have increased Scr, which progressed to oliguria, and now having metabolic derangements.     Patient denies any prior history of kidney disease. Does not have significant family history of kidney disease. Does endorse worsening fatigue and dyspnea. Denies any nausea vomiting, or metallic taste in mouth.     Nephrology was consulted for oliguric KIMANI.       Interval History: Patient seen and examined. No acute events overnight. Last iHD on 11/26 x3 hours with 3 liters removed. Afebrile with pulse in the 80s bpm. Systolic blood pressures ranging from 150-130s mmHg. He is saturating +95% on room air with only 200 mL documented UOP in the last 24 hours (although there are 3 unmeasured voids documented). Will plan for iHD again tomorrow via tunneled HD catheter.    Review of patient's allergies indicates:  No Known Allergies  Current Facility-Administered Medications   Medication Frequency    acetaminophen tablet 1,000 mg Q8H    amiodarone tablet 200 mg BID    aspirin chewable tablet 81  mg Daily    atorvastatin tablet 40 mg Daily    bisacodyL suppository 10 mg Daily PRN    cefepime in dextrose 5 % 1 gram/50 mL IVPB 1 g Q24H    dextrose 10% bolus 125 mL PRN    dextrose 10% bolus 250 mL PRN    diazePAM tablet 5 mg Q6H PRN    heparin (porcine) injection 5,000 Units Q8H    HYDROmorphone injection 1 mg Q4H PRN    hydrOXYzine pamoate capsule 50 mg Q8H PRN    methocarbamoL tablet 500 mg TID    ondansetron injection 4 mg Q8H PRN    oxyCODONE immediate release tablet 10 mg Q4H PRN    oxyCODONE immediate release tablet 5 mg Q4H PRN    polyethylene glycol packet 17 g Daily    senna-docusate 8.6-50 mg per tablet 1 tablet BID    simethicone chewable tablet 80 mg TID PRN    sodium chloride 0.9% bolus 250 mL PRN       Objective:     Vital Signs (Most Recent):  Temp: 98.9 °F (37.2 °C) (11/28/22 0726)  Pulse: 87 (11/28/22 0726)  Resp: 18 (11/28/22 0726)  BP: (!) 143/73 (11/28/22 0726)  SpO2: 96 % (11/28/22 0726)  O2 Device (Oxygen Therapy): room air (11/27/22 2020)   Vital Signs (24h Range):  Temp:  [97.5 °F (36.4 °C)-98.9 °F (37.2 °C)] 98.9 °F (37.2 °C)  Pulse:  [85-95] 87  Resp:  [18-26] 18  SpO2:  [95 %-97 %] 96 %  BP: (133-155)/(67-76) 143/73     Weight: 74 kg (163 lb 2.3 oz) (11/12/22 1519)  Body mass index is 24.08 kg/m².  Body surface area is 1.9 meters squared.    I/O last 3 completed shifts:  In: 480 [P.O.:480]  Out: 465 [Urine:200; Drains:265]    Physical Exam  Vitals and nursing note reviewed.   Constitutional:       General: He is not in acute distress.     Appearance: He is normal weight. He is not ill-appearing or diaphoretic.   HENT:      Head: Normocephalic and atraumatic.      Right Ear: External ear normal.      Left Ear: External ear normal.      Nose: Nose normal.   Eyes:      General: No scleral icterus.        Right eye: No discharge.         Left eye: No discharge.      Extraocular Movements: Extraocular movements intact.      Conjunctiva/sclera: Conjunctivae normal.       Comments: Prescription eye glasses.   Cardiovascular:      Rate and Rhythm: Normal rate.      Heart sounds: Murmur heard.     No friction rub. No gallop.      Comments: BLANCA drains present.  Pulmonary:      Effort: No respiratory distress.      Breath sounds: No wheezing, rhonchi or rales.   Chest:      Comments: Incision from recent sternotomy.  Abdominal:      General: Bowel sounds are normal. There is no distension.      Palpations: Abdomen is soft.      Tenderness: There is no abdominal tenderness.   Musculoskeletal:      Cervical back: Neck supple.      Right lower le+ Pitting Edema present.      Left lower le+ Pitting Edema present.   Skin:     General: Skin is warm and dry.      Coloration: Skin is not jaundiced.   Neurological:      General: No focal deficit present.      Mental Status: He is alert. Mental status is at baseline.      Cranial Nerves: No cranial nerve deficit.   Psychiatric:         Mood and Affect: Mood normal.         Behavior: Behavior normal.       Significant Labs:  BMP:   Recent Labs   Lab 22   GLU 85   *   K 4.7   CL 99   CO2 23   BUN 29*   CREATININE 5.0*   CALCIUM 8.6*   MG 1.7       CBC:   Recent Labs   Lab 22   WBC 10.57   RBC 2.72*   HGB 8.4*   HCT 27.1*      *   MCH 30.9   MCHC 31.0*       CMP:   Recent Labs   Lab 22   GLU 85   CALCIUM 8.6*   ALBUMIN 2.4*   PROT 5.8*   *   K 4.7   CO2 23   CL 99   BUN 29*   CREATININE 5.0*   ALKPHOS 125   ALT <5*   AST 15   BILITOT 0.5       LFTs:   Recent Labs   Lab 22   ALT <5*   AST 15   ALKPHOS 125   BILITOT 0.5   PROT 5.8*   ALBUMIN 2.4*       Microbiology Results (last 7 days)       Procedure Component Value Units Date/Time    Blood culture [[id number]] Collected: 22 3934    Order Status: Completed Specimen: Blood from Peripheral, Lower Arm, Left Updated: 22     Blood Culture, Routine No growth after 5 days.    Blood culture [[id number]] Collected:  11/21/22 1558    Order Status: Completed Specimen: Blood from Peripheral, Antecubital, Left Updated: 11/26/22 2012     Blood Culture, Routine No growth after 5 days.    Narrative:      Two different sites    Urine culture [[id number]] Collected: 11/21/22 1844    Order Status: Completed Specimen: Urine Updated: 11/22/22 2155     Urine Culture, Routine No growth    Narrative:      Specimen Source->Urine          Significant Imaging:  I have reviewed all imagining in the last 24 hours.    Assessment/Plan:     * Sternal wound infection  - s/p return to OR and now on cefepime  - management per primary team     KIMANI (acute kidney injury)  KIMANI-likely ATN from decreased renal perfusion 2/2 sepsis & cardiogenic shock   Baseline sCr 0.9-1  sCr on admission: 2.7  Lab Results   Component Value Date    CREATININE 5.0 (H) 11/28/2022     Recommendations/Plan:  - Plan for iHD tomorrow via tunneled HD catheter  - Discussed need for accepting outpatient HD unit ideally in [place], [place] with /SW  - Daily RFP and magnesium level  - Strict I/Os and daily weights  - Renally dose medications to eGFR  - Avoid nephrotoxins when feasible (i.e. intra-arterial contrast, NSAIDs, etc.)  - Renal diet (low potassium) when not NPO      Thank you for your consult. I will follow-up with patient. Please contact us if you have any additional questions.    Golden Allen MD  Nephrology  [facility] Step[facility]

## 2022-11-28 NOTE — PROGRESS NOTES
Plastic and Reconstructive Surgery   Progress Note    Subjective:    Patient seen and examined. Pain controlled. No complaints.    Objective:  Vital signs in last 24 hours:  Temp:  [97.2 °F (36.2 °C)-98.9 °F (37.2 °C)] 97.2 °F (36.2 °C)  Pulse:  [70-90] 70  Resp:  [18-22] 20  SpO2:  [93 %-99 %] 95 %  BP: (131-159)/(60-73) 157/71    Intake/Output last 3 shifts:  I/O last 3 completed shifts:  In: 100 [P.O.:100]  Out: 675 [Urine:475; Drains:200]    Intake/Output this shift:  No intake/output data recorded.        Physical Exam:  VITAL SIGNS:   Vitals:    22 2337 22 2351 22 0303 22 0414   BP: (!) 159/72   (!) 157/71   BP Location: Left arm   Left arm   Patient Position: Lying   Lying   Pulse: 77  73 70   Resp: 20 18  20   Temp: 98.5 °F (36.9 °C)   97.2 °F (36.2 °C)   TempSrc: Oral   Oral   SpO2: (!) 94%   95%   Weight:       Height:         TMAX: Temp (24hrs), Av.9 °F (36.6 °C), Min:97.2 °F (36.2 °C), Max:98.9 °F (37.2 °C)    General: Alert; No acute distress  Cardiovascular: Regular rate   Respiratory: Normal respiratory effort. Chest rise symmetric.   Abdomen: Soft, nontender, nondistended  Extremity: Moves all extremities equally.  Neurologic: No focal deficit. Speech normal  SKIN: midline sternal and bilateral axillary incisions c/d/I, 3 drains left (right/left chest and mediastinum)    Scheduled Medications acetaminophen, 1,000 mg, Q8H  amiodarone, 200 mg, BID  aspirin, 81 mg, Daily  atorvastatin, 40 mg, Daily  ceFEPime (MAXIPIME) IVPB, 1 g, Q24H  heparin (porcine), 5,000 Units, Q8H  methocarbamoL, 500 mg, TID  polyethylene glycol, 17 g, Daily  senna-docusate 8.6-50 mg, 1 tablet, BID    PRN Medications bisacodyL, dextrose 10%, dextrose 10%, diazePAM, HYDROmorphone, hydrOXYzine pamoate, ondansetron, oxyCODONE, oxyCODONE, simethicone, sodium chloride 0.9%    Recent Labs:   Lab Results   Component Value Date    WBC 11.60 2022    HGB 8.1 (L) 2022    HCT 26.1 (L) 2022    MCV  98 11/29/2022     11/29/2022     Lab Results   Component Value Date    GLU 84 11/29/2022     (L) 11/29/2022    K 4.7 11/29/2022    CL 98 11/29/2022    BUN 37 (H) 11/29/2022         Assessment: 62 y.o. y/o male 15 Days Post-Op s/p Procedure(s):  REMOVAL, STERNAL WIRE  DEBRIDEMENT, STERNUM  APPLICATION, WOUND VAC Doing well postoperatively.    Plan  Monitor drain output; BLANCA drains to remain in place for dc until follow up in clinic with Dr. Neida Chisholm to shower; recommend pinning BLANCA drains to lanyard or necklace to avoid hanging or pulling out  Encourage ambulation/OOB with PT  Pain control PRN  I's & O's  Dvt ppx  Plastic surgery will follow     Keyshawn Whitlock MD- Fellow  Department of Plastic and Reconstructive Surgery  [phone] (office)

## 2022-11-28 NOTE — SUBJECTIVE & OBJECTIVE
Interval History: HD completed and tolerated, continue antibiotics. Plastics following flap.  Attempting to find placement vs discharge home.  Patient would like to go home however attempting to find dialysis placement and infusion company willing to accept the patient.     Review of Systems   Constitutional: Negative for malaise/fatigue.   Cardiovascular:  Negative for chest pain, claudication, dyspnea on exertion, irregular heartbeat, leg swelling and palpitations.   Respiratory:  Negative for cough and shortness of breath.    Hematologic/Lymphatic: Negative for bleeding problem.   Gastrointestinal:  Negative for abdominal pain.   Genitourinary:  Negative for dysuria.   Neurological:  Negative for headaches and weakness.   Medications:  Continuous Infusions:  Scheduled Meds:   acetaminophen  1,000 mg Oral Q8H    amiodarone  200 mg Oral BID    aspirin  81 mg Oral Daily    atorvastatin  40 mg Oral Daily    ceFEPime (MAXIPIME) IVPB  1 g Intravenous Q24H    heparin (porcine)  5,000 Units Subcutaneous Q8H    methocarbamoL  500 mg Oral TID    polyethylene glycol  17 g Oral Daily    senna-docusate 8.6-50 mg  1 tablet Oral BID     PRN Meds:bisacodyL, dextrose 10%, dextrose 10%, diazePAM, HYDROmorphone, hydrOXYzine pamoate, ondansetron, oxyCODONE, oxyCODONE, simethicone, sodium chloride 0.9%     Objective:     Vital Signs (Most Recent):  Temp: 98.9 °F (37.2 °C) (11/28/22 0726)  Pulse: 88 (11/28/22 1110)  Resp: 18 (11/28/22 1013)  BP: (!) 143/73 (11/28/22 0726)  SpO2: 96 % (11/28/22 0726)   Vital Signs (24h Range):  Temp:  [97.5 °F (36.4 °C)-98.9 °F (37.2 °C)] 98.9 °F (37.2 °C)  Pulse:  [85-95] 88  Resp:  [18-26] 18  SpO2:  [95 %-97 %] 96 %  BP: (133-155)/(67-76) 143/73     Weight: 74 kg (163 lb 2.3 oz)  Body mass index is 24.08 kg/m².    SpO2: 96 %  O2 Device (Oxygen Therapy): room air    Intake/Output - Last 3 Shifts         11/26 0700 11/27 0659 11/27 0700 11/28 0659 11/28 0700 11/29 0659    P.O. 600 480 100    Other  500      Total Intake(mL/kg) 1100 (14.9) 480 (6.5) 100 (1.4)    Urine (mL/kg/hr) 100 (0.1) 200 (0.1)     Drains 130 165     Other 3800      Stool  0     Total Output 4030 365     Net -2930 +115 +100           Urine Occurrence 0 x 3 x     Stool Occurrence  2 x             Lines/Drains/Airways       Peripherally Inserted Central Catheter Line  Duration             PICC Double Lumen 10/17/22 1709 right brachial 41 days              Central Venous Catheter Line  Duration                  Hemodialysis Catheter 11/25/22 0904 right internal jugular 3 days              Drain  Duration                  Closed/Suction Drain 11/14/22 2010 Inferior;Right Chest Bulb 15 Fr. 13 days         Closed/Suction Drain 11/14/22 2010 Superior;Midline Abdomen Bulb 15 Fr. 13 days         Closed/Suction Drain 11/14/22 2015 Inferior;Left Chest Bulb 15 Fr. 13 days                    Physical Exam  Constitutional:       Appearance: Normal appearance.   HENT:      Head: Normocephalic.   Eyes:      Pupils: Pupils are equal, round, and reactive to light.   Cardiovascular:      Rate and Rhythm: Normal rate and regular rhythm.      Pulses: Normal pulses.   Pulmonary:      Effort: Pulmonary effort is normal.   Abdominal:      General: Abdomen is flat. Bowel sounds are normal.      Palpations: Abdomen is soft.   Musculoskeletal:         General: Normal range of motion.   Skin:     General: Skin is warm and dry.      Comments: MSI CDI   Neurological:      General: No focal deficit present.      Mental Status: He is alert.       Significant Labs:  BMP:   Recent Labs   Lab 11/28/22  0450   GLU 85   *   K 4.7   CL 99   CO2 23   BUN 29*   CREATININE 5.0*   CALCIUM 8.6*   MG 1.7     CBC:   Recent Labs   Lab 11/28/22  0450   WBC 10.57   RBC 2.72*   HGB 8.4*   HCT 27.1*      *   MCH 30.9   MCHC 31.0*     CMP:   Recent Labs   Lab 11/28/22  0450   GLU 85   CALCIUM 8.6*   ALBUMIN 2.4*   PROT 5.8*   *   K 4.7   CO2 23   CL 99   BUN 29*    CREATININE 5.0*   ALKPHOS 125   ALT <5*   AST 15   BILITOT 0.5     Coagulation: No results for input(s): PT, INR, APTT in the last 48 hours.    Significant Diagnostics:  I have reviewed and interpreted all pertinent imaging results/findings within the past 24 hours.

## 2022-11-28 NOTE — PLAN OF CARE
11/28/22 1132   Post-Acute Status   Post-Acute Authorization Home Health;IV Infusion   Post-Acute Placement Status Referrals Sent   Home Health Status Referrals Sent     SW faxed referral to [facility] via [facility] for review. SW will follow up as needed.    1:55 PM  [facility], pending education and medication delivery.    SW awaiting HD chair set-up. SW initated referral to [facility], awaiting Dialysis documents   Hep B Core Antibody   Hep B Surface Antibody   Hep B Surface Antigen   Orders or Flowsheets   Tuberculosis Test (TB    3:38 PM  [facility] chair at [facility] on a MWF 3rd shift, pending listed above clinicals.     Joellen Hernandez, LINDA  Case Management   [facility]   Ext. [phone]

## 2022-11-28 NOTE — SUBJECTIVE & OBJECTIVE
Interval History: Patient seen and examined. No acute events overnight. Last iHD on 11/26 x3 hours with 3 liters removed. Afebrile with pulse in the 80s bpm. Systolic blood pressures ranging from 150-130s mmHg. He is saturating +95% on room air with only 200 mL documented UOP in the last 24 hours (although there are 3 unmeasured voids documented). Will plan for iHD again tomorrow via tunneled HD catheter.    Review of patient's allergies indicates:  No Known Allergies  Current Facility-Administered Medications   Medication Frequency    acetaminophen tablet 1,000 mg Q8H    amiodarone tablet 200 mg BID    aspirin chewable tablet 81 mg Daily    atorvastatin tablet 40 mg Daily    bisacodyL suppository 10 mg Daily PRN    cefepime in dextrose 5 % 1 gram/50 mL IVPB 1 g Q24H    dextrose 10% bolus 125 mL PRN    dextrose 10% bolus 250 mL PRN    diazePAM tablet 5 mg Q6H PRN    heparin (porcine) injection 5,000 Units Q8H    HYDROmorphone injection 1 mg Q4H PRN    hydrOXYzine pamoate capsule 50 mg Q8H PRN    methocarbamoL tablet 500 mg TID    ondansetron injection 4 mg Q8H PRN    oxyCODONE immediate release tablet 10 mg Q4H PRN    oxyCODONE immediate release tablet 5 mg Q4H PRN    polyethylene glycol packet 17 g Daily    senna-docusate 8.6-50 mg per tablet 1 tablet BID    simethicone chewable tablet 80 mg TID PRN    sodium chloride 0.9% bolus 250 mL PRN       Objective:     Vital Signs (Most Recent):  Temp: 98.9 °F (37.2 °C) (11/28/22 0726)  Pulse: 87 (11/28/22 0726)  Resp: 18 (11/28/22 0726)  BP: (!) 143/73 (11/28/22 0726)  SpO2: 96 % (11/28/22 0726)  O2 Device (Oxygen Therapy): room air (11/27/22 2020)   Vital Signs (24h Range):  Temp:  [97.5 °F (36.4 °C)-98.9 °F (37.2 °C)] 98.9 °F (37.2 °C)  Pulse:  [85-95] 87  Resp:  [18-26] 18  SpO2:  [95 %-97 %] 96 %  BP: (133-155)/(67-76) 143/73     Weight: 74 kg (163 lb 2.3 oz) (11/12/22 1519)  Body mass index is 24.08 kg/m².  Body surface area is 1.9 meters squared.    I/O last 3  completed shifts:  In: 480 [P.O.:480]  Out: 465 [Urine:200; Drains:265]    Physical Exam  Vitals and nursing note reviewed.   Constitutional:       General: He is not in acute distress.     Appearance: He is normal weight. He is not ill-appearing or diaphoretic.   HENT:      Head: Normocephalic and atraumatic.      Right Ear: External ear normal.      Left Ear: External ear normal.      Nose: Nose normal.   Eyes:      General: No scleral icterus.        Right eye: No discharge.         Left eye: No discharge.      Extraocular Movements: Extraocular movements intact.      Conjunctiva/sclera: Conjunctivae normal.      Comments: Prescription eye glasses.   Cardiovascular:      Rate and Rhythm: Normal rate.      Heart sounds: Murmur heard.     No friction rub. No gallop.      Comments: BLANCA drains present.  Pulmonary:      Effort: No respiratory distress.      Breath sounds: No wheezing, rhonchi or rales.   Chest:      Comments: Incision from recent sternotomy.  Abdominal:      General: Bowel sounds are normal. There is no distension.      Palpations: Abdomen is soft.      Tenderness: There is no abdominal tenderness.   Musculoskeletal:      Cervical back: Neck supple.      Right lower le+ Pitting Edema present.      Left lower le+ Pitting Edema present.   Skin:     General: Skin is warm and dry.      Coloration: Skin is not jaundiced.   Neurological:      General: No focal deficit present.      Mental Status: He is alert. Mental status is at baseline.      Cranial Nerves: No cranial nerve deficit.   Psychiatric:         Mood and Affect: Mood normal.         Behavior: Behavior normal.       Significant Labs:  BMP:   Recent Labs   Lab 22  0450   GLU 85   *   K 4.7   CL 99   CO2 23   BUN 29*   CREATININE 5.0*   CALCIUM 8.6*   MG 1.7       CBC:   Recent Labs   Lab 22  0450   WBC 10.57   RBC 2.72*   HGB 8.4*   HCT 27.1*      *   MCH 30.9   MCHC 31.0*       CMP:   Recent Labs   Lab  11/28/22  0450   GLU 85   CALCIUM 8.6*   ALBUMIN 2.4*   PROT 5.8*   *   K 4.7   CO2 23   CL 99   BUN 29*   CREATININE 5.0*   ALKPHOS 125   ALT <5*   AST 15   BILITOT 0.5       LFTs:   Recent Labs   Lab 11/28/22  0450   ALT <5*   AST 15   ALKPHOS 125   BILITOT 0.5   PROT 5.8*   ALBUMIN 2.4*       Microbiology Results (last 7 days)       Procedure Component Value Units Date/Time    Blood culture [[id number]] Collected: 11/21/22 1559    Order Status: Completed Specimen: Blood from Peripheral, Lower Arm, Left Updated: 11/26/22 2012     Blood Culture, Routine No growth after 5 days.    Blood culture [[id number]] Collected: 11/21/22 1558    Order Status: Completed Specimen: Blood from Peripheral, Antecubital, Left Updated: 11/26/22 2012     Blood Culture, Routine No growth after 5 days.    Narrative:      Two different sites    Urine culture [[id number]] Collected: 11/21/22 1844    Order Status: Completed Specimen: Urine Updated: 11/22/22 2155     Urine Culture, Routine No growth    Narrative:      Specimen Source->Urine          Significant Imaging:  I have reviewed all imagining in the last 24 hours.

## 2022-11-28 NOTE — PT/OT/SLP PROGRESS
Occupational Therapy   Treatment    Name: David Barrios  MRN: [id number]  Admitting Diagnosis:  Sternal wound infection  14 Days Post-Op    Recommendations:     Discharge Recommendations: other (see comments)  HH; home with family support.     Assessment:     David Barrios is a 62 y.o. male with a medical diagnosis of Sternal wound infection.  Performance deficits affecting function are weakness, impaired endurance, impaired self care skills, impaired functional mobility, gait instability, impaired balance. Pt tolerated session well with good effort and functional progress.     Rehab Prognosis:  Good; patient would benefit from acute skilled OT services to address these deficits and reach maximum level of function.       Plan:     Patient to be seen 4 x/week to address the above listed problems via self-care/home management, therapeutic activities, therapeutic exercises  Plan of Care Expires: 12/15/22  Plan of Care Reviewed with: patient    Subjective   Pt agreeable to therapy   Pain/Comfort:  Pain Rating 1: 0/10    Objective:     Communicated with: nsg prior to session.  Patient found seated in chair with tele intact.     General Precautions: Standard, fall , sternal    Occupational Performance:     Functional Mobility/Transfers:  Sit>stand with SBA from chair x 2 trials.  Sit>stand with SBA from commode  Good adherence to sternal precautions.     Activities of Daily Living:  Feeding: independent  G/H: standing with SBA oral care and washing hands. Pt without LOB or SOB and tolerated standing approx 5 min during standing self care.   LE dressing: SBA  UE dressing: MIN A  Education and cues provided re; adaptive technique for dressing skills with sternal precautions.   Toileting: simulated with SBA     [facility] 6 Click ADL: 18    Treatment & Education:  Pt completed functional mobility in room with SBA. Pt completed ADL skills as stated.   Education provided re; OT POC and safety with functional mobility/ADl skills.    Patient left up in chair with all lines intact and call button in reach    GOALS:   Multidisciplinary Problems       Occupational Therapy Goals          Problem: Occupational Therapy    Goal Priority Disciplines Outcome Interventions   Occupational Therapy Goal     OT, PT/OT Ongoing, Progressing    Description: Goals to be met by: 11/29/22     Patient will increase functional independence with ADLs by performing:    Feeding with [facility].  UE Dressing with [facility].  LE Dressing with Stand-by Assistance.  Grooming while standing at sink with Supervision.  Toilet transfer to toilet with Supervision.                         Time Tracking:     OT Date of Treatment: 11/28/22  OT Start Time: 0934  OT Stop Time: 0945  OT Total Time (min): 11 min    Billable Minutes:Self Care/Home Management 11    OT/DUONG: OT          11/28/2022

## 2022-11-28 NOTE — HOSPITAL COURSE
On 11/8/2022 the patient was taken to the Operating Room for the above stated procedure. Please see the previously dictated operative report for complete details. Postoperatively, the patient was taken from the  Operating Room to the ICU where the vital signs were monitored and pain was kept under control. The patient was weaned from the drips and extubated in the ICU per protocol. On 11/14, he underwent wound preparation for flap to right pectoralis major muscle flap to sternal wound ([facility]), left pectoralis major muscle flap to sternal wound ([facility]) and advancement flap closure of skin measuring 20 cm x 8 cm by Dr. Carrasquillo.  Infectious disease consulted and recommended continuing his antibiotic therapy until 12/26/22.  On 11/25, he underwent a permacath placement with IR.  He was able to tolerate dialysis through this. Once hemodynamically stable, the patient was transferred to the Cardiac Step-Down floor for continued strengthening and ambulation. On postoperative day 22, the patient was ready for discharge to home. At the time of discharge, the patient was ambulating unassisted. Pain was well controlled with oral analgesics and the patient was tolerating the diet.  He will be discharge home with home health and infusion therapy.  Medications delivered to bedside prior to discharge. He will be dialyzed at [facility].       MOBILITY AND ACTIVITY: As tolerated. Patient may shower. No heavy lifting of greater than 5 pounds and no driving.     DIET: A Cardiac diet      WOUND CARE INSTRUCTIONS: Check for redness, swelling and drainage around the  incision or wound. Patient is to call for any obvious bleeding, drainage, pus from the wound, unusual problems or difficulties or temperature of greater than 101   degrees.     FOLLOWUP: Follow up with Dr. Hedrick in approximately 3 weeks. Prior to this  appointment, the patient will have an EKG.     Patient not placed on [employer] at the time  of discharge due to potential for hypotension      DISCHARGE CONDITION: At the time of discharge, the patient was in sinus rhythm and afebrile with stable vital signs.

## 2022-11-28 NOTE — PLAN OF CARE
Problem: Adult Inpatient Plan of Care  Goal: Plan of Care Review  Outcome: Ongoing, Progressing  Goal: Patient-Specific Goal (Individualized)  Outcome: Ongoing, Progressing  Goal: Absence of Hospital-Acquired Illness or Injury  Outcome: Ongoing, Progressing  Goal: Optimal Comfort and Wellbeing  Outcome: Ongoing, Progressing  Goal: Readiness for Transition of Care  Outcome: Ongoing, Progressing     Problem: Infection  Goal: Absence of Infection Signs and Symptoms  Outcome: Ongoing, Progressing     Problem: Fall Injury Risk  Goal: Absence of Fall and Fall-Related Injury  Outcome: Ongoing, Progressing     Problem: Skin Injury Risk Increased  Goal: Skin Health and Integrity  Outcome: Ongoing, Progressing     Problem: Fluid Imbalance (Pneumonia)  Goal: Fluid Balance  Outcome: Ongoing, Progressing     Problem: Infection (Pneumonia)  Goal: Resolution of Infection Signs and Symptoms  Outcome: Ongoing, Progressing     Problem: Respiratory Compromise (Pneumonia)  Goal: Effective Oxygenation and Ventilation  Outcome: Ongoing, Progressing     Problem: Device-Related Complication Risk (Hemodialysis)  Goal: Safe, Effective Therapy Delivery  Outcome: Ongoing, Progressing     Problem: Hemodynamic Instability (Hemodialysis)  Goal: Effective Tissue Perfusion  Outcome: Ongoing, Progressing     Problem: Infection (Hemodialysis)  Goal: Absence of Infection Signs and Symptoms  Outcome: Ongoing, Progressing     Problem: Fluid and Electrolyte Imbalance (Acute Kidney Injury/Impairment)  Goal: Fluid and Electrolyte Balance  Outcome: Ongoing, Progressing     Problem: Oral Intake Inadequate (Acute Kidney Injury/Impairment)  Goal: Optimal Nutrition Intake  Outcome: Ongoing, Progressing     Problem: Renal Function Impairment (Acute Kidney Injury/Impairment)  Goal: Effective Renal Function  Outcome: Ongoing, Progressing     Problem: Impaired Wound Healing  Goal: Optimal Wound Healing  Outcome: Ongoing, Progressing

## 2022-11-29 DIAGNOSIS — Z98.890 STATUS POST TRICUSPID VALVE REPAIR: Primary | ICD-10-CM

## 2022-11-29 LAB
ALBUMIN SERPL BCP-MCNC: 2.4 G/DL (ref 3.5–5.2)
ALP SERPL-CCNC: 120 U/L (ref 55–135)
ALT SERPL W/O P-5'-P-CCNC: <5 U/L (ref 10–44)
ANION GAP SERPL CALC-SCNC: 11 MMOL/L (ref 8–16)
AST SERPL-CCNC: 13 U/L (ref 10–40)
BASOPHILS # BLD AUTO: 0.04 K/UL (ref 0–0.2)
BASOPHILS NFR BLD: 0.3 % (ref 0–1.9)
BILIRUB SERPL-MCNC: 0.4 MG/DL (ref 0.1–1)
BUN SERPL-MCNC: 37 MG/DL (ref 8–23)
CALCIUM SERPL-MCNC: 8.8 MG/DL (ref 8.7–10.5)
CHLORIDE SERPL-SCNC: 98 MMOL/L (ref 95–110)
CO2 SERPL-SCNC: 21 MMOL/L (ref 23–29)
CREAT SERPL-MCNC: 5.8 MG/DL (ref 0.5–1.4)
DIFFERENTIAL METHOD: ABNORMAL
EOSINOPHIL # BLD AUTO: 0.2 K/UL (ref 0–0.5)
EOSINOPHIL NFR BLD: 1.8 % (ref 0–8)
ERYTHROCYTE [DISTWIDTH] IN BLOOD BY AUTOMATED COUNT: 18.2 % (ref 11.5–14.5)
EST. GFR  (NO RACE VARIABLE): 10.3 ML/MIN/1.73 M^2
GLUCOSE SERPL-MCNC: 84 MG/DL (ref 70–110)
HCT VFR BLD AUTO: 26.1 % (ref 40–54)
HGB BLD-MCNC: 8.1 G/DL (ref 14–18)
IMM GRANULOCYTES # BLD AUTO: 0.08 K/UL (ref 0–0.04)
IMM GRANULOCYTES NFR BLD AUTO: 0.7 % (ref 0–0.5)
LYMPHOCYTES # BLD AUTO: 1 K/UL (ref 1–4.8)
LYMPHOCYTES NFR BLD: 8.6 % (ref 18–48)
MAGNESIUM SERPL-MCNC: 1.8 MG/DL (ref 1.6–2.6)
MCH RBC QN AUTO: 30.5 PG (ref 27–31)
MCHC RBC AUTO-ENTMCNC: 31 G/DL (ref 32–36)
MCV RBC AUTO: 98 FL (ref 82–98)
MONOCYTES # BLD AUTO: 1 K/UL (ref 0.3–1)
MONOCYTES NFR BLD: 8.6 % (ref 4–15)
NEUTROPHILS # BLD AUTO: 9.3 K/UL (ref 1.8–7.7)
NEUTROPHILS NFR BLD: 80 % (ref 38–73)
NRBC BLD-RTO: 0 /100 WBC
PHOSPHATE SERPL-MCNC: 4 MG/DL (ref 2.7–4.5)
PLATELET # BLD AUTO: 294 K/UL (ref 150–450)
PMV BLD AUTO: 10.5 FL (ref 9.2–12.9)
POTASSIUM SERPL-SCNC: 4.7 MMOL/L (ref 3.5–5.1)
PROT SERPL-MCNC: 5.8 G/DL (ref 6–8.4)
RBC # BLD AUTO: 2.66 M/UL (ref 4.6–6.2)
SODIUM SERPL-SCNC: 130 MMOL/L (ref 136–145)
WBC # BLD AUTO: 11.6 K/UL (ref 3.9–12.7)

## 2022-11-29 PROCEDURE — 25000003 PHARM REV CODE 250: Performed by: STUDENT IN AN ORGANIZED HEALTH CARE EDUCATION/TRAINING PROGRAM

## 2022-11-29 PROCEDURE — 25000003 PHARM REV CODE 250

## 2022-11-29 PROCEDURE — 90935 HEMODIALYSIS ONE EVALUATION: CPT

## 2022-11-29 PROCEDURE — 63600175 PHARM REV CODE 636 W HCPCS: Performed by: STUDENT IN AN ORGANIZED HEALTH CARE EDUCATION/TRAINING PROGRAM

## 2022-11-29 PROCEDURE — 85025 COMPLETE CBC W/AUTO DIFF WBC: CPT | Performed by: THORACIC SURGERY (CARDIOTHORACIC VASCULAR SURGERY)

## 2022-11-29 PROCEDURE — 63600175 PHARM REV CODE 636 W HCPCS: Performed by: THORACIC SURGERY (CARDIOTHORACIC VASCULAR SURGERY)

## 2022-11-29 PROCEDURE — 63600175 PHARM REV CODE 636 W HCPCS: Performed by: ANESTHESIOLOGY

## 2022-11-29 PROCEDURE — 36415 COLL VENOUS BLD VENIPUNCTURE: CPT | Performed by: NURSE PRACTITIONER

## 2022-11-29 PROCEDURE — 20600001 HC STEP DOWN PRIVATE ROOM

## 2022-11-29 PROCEDURE — 63600175 PHARM REV CODE 636 W HCPCS

## 2022-11-29 PROCEDURE — 90935 HEMODIALYSIS ONE EVALUATION: CPT | Mod: ,,, | Performed by: INTERNAL MEDICINE

## 2022-11-29 PROCEDURE — 84100 ASSAY OF PHOSPHORUS: CPT

## 2022-11-29 PROCEDURE — 80053 COMPREHEN METABOLIC PANEL: CPT

## 2022-11-29 PROCEDURE — 83735 ASSAY OF MAGNESIUM: CPT

## 2022-11-29 PROCEDURE — 90935 PR HEMODIALYSIS, ONE EVALUATION: ICD-10-PCS | Mod: ,,, | Performed by: INTERNAL MEDICINE

## 2022-11-29 RX ORDER — HEPARIN SODIUM 1000 [USP'U]/ML
1000 INJECTION, SOLUTION INTRAVENOUS; SUBCUTANEOUS
Status: DISCONTINUED | OUTPATIENT
Start: 2022-11-29 | End: 2022-12-01 | Stop reason: HOSPADM

## 2022-11-29 RX ADMIN — OXYCODONE 5 MG: 5 TABLET ORAL at 02:11

## 2022-11-29 RX ADMIN — AMIODARONE HYDROCHLORIDE 200 MG: 200 TABLET ORAL at 09:11

## 2022-11-29 RX ADMIN — ONDANSETRON 4 MG: 2 INJECTION INTRAMUSCULAR; INTRAVENOUS at 07:11

## 2022-11-29 RX ADMIN — HYDROMORPHONE HYDROCHLORIDE 1 MG: 1 INJECTION, SOLUTION INTRAMUSCULAR; INTRAVENOUS; SUBCUTANEOUS at 07:11

## 2022-11-29 RX ADMIN — SIMETHICONE 80 MG: 80 TABLET, CHEWABLE ORAL at 02:11

## 2022-11-29 RX ADMIN — HYDROMORPHONE HYDROCHLORIDE 1 MG: 1 INJECTION, SOLUTION INTRAMUSCULAR; INTRAVENOUS; SUBCUTANEOUS at 11:11

## 2022-11-29 RX ADMIN — AMIODARONE HYDROCHLORIDE 200 MG: 200 TABLET ORAL at 08:11

## 2022-11-29 RX ADMIN — SENNOSIDES AND DOCUSATE SODIUM 1 TABLET: 50; 8.6 TABLET ORAL at 09:11

## 2022-11-29 RX ADMIN — METHOCARBAMOL 500 MG: 500 TABLET ORAL at 02:11

## 2022-11-29 RX ADMIN — ATORVASTATIN CALCIUM 40 MG: 20 TABLET, FILM COATED ORAL at 08:11

## 2022-11-29 RX ADMIN — HEPARIN SODIUM 1000 UNITS: 1000 INJECTION, SOLUTION INTRAVENOUS; SUBCUTANEOUS at 05:11

## 2022-11-29 RX ADMIN — ONDANSETRON 4 MG: 2 INJECTION INTRAMUSCULAR; INTRAVENOUS at 11:11

## 2022-11-29 RX ADMIN — ASPIRIN 81 MG 81 MG: 81 TABLET ORAL at 08:11

## 2022-11-29 RX ADMIN — CEFEPIME HYDROCHLORIDE 1 G: 1 INJECTION, POWDER, FOR SOLUTION INTRAMUSCULAR; INTRAVENOUS at 09:11

## 2022-11-29 RX ADMIN — ACETAMINOPHEN 1000 MG: 500 TABLET ORAL at 02:11

## 2022-11-29 RX ADMIN — METHOCARBAMOL 500 MG: 500 TABLET ORAL at 09:11

## 2022-11-29 RX ADMIN — HEPARIN SODIUM 5000 UNITS: 5000 INJECTION INTRAVENOUS; SUBCUTANEOUS at 07:11

## 2022-11-29 RX ADMIN — METHOCARBAMOL 500 MG: 500 TABLET ORAL at 08:11

## 2022-11-29 RX ADMIN — ACETAMINOPHEN 1000 MG: 500 TABLET ORAL at 09:11

## 2022-11-29 RX ADMIN — SIMETHICONE 80 MG: 80 TABLET, CHEWABLE ORAL at 04:11

## 2022-11-29 RX ADMIN — HEPARIN SODIUM 5000 UNITS: 5000 INJECTION INTRAVENOUS; SUBCUTANEOUS at 02:11

## 2022-11-29 NOTE — SUBJECTIVE & OBJECTIVE
Interval History: Patient seen and examined. No acute events overnight. Afebrile with pulse in the 70s bpm. Systolic blood pressures ranging from 150-130s mmHg. He is saturating +93% on room air with only 325 mL documented UOP in the last 24 hours. Plan for iHD per schedule.    Review of patient's allergies indicates:  No Known Allergies  Current Facility-Administered Medications   Medication Frequency    acetaminophen tablet 1,000 mg Q8H    amiodarone tablet 200 mg BID    aspirin chewable tablet 81 mg Daily    atorvastatin tablet 40 mg Daily    bisacodyL suppository 10 mg Daily PRN    cefepime in dextrose 5 % 1 gram/50 mL IVPB 1 g Q24H    dextrose 10% bolus 125 mL PRN    dextrose 10% bolus 250 mL PRN    diazePAM tablet 5 mg Q6H PRN    heparin (porcine) injection 5,000 Units Q8H    HYDROmorphone injection 1 mg Q4H PRN    hydrOXYzine pamoate capsule 50 mg Q8H PRN    methocarbamoL tablet 500 mg TID    ondansetron injection 4 mg Q8H PRN    oxyCODONE immediate release tablet 10 mg Q4H PRN    oxyCODONE immediate release tablet 5 mg Q4H PRN    polyethylene glycol packet 17 g Daily    senna-docusate 8.6-50 mg per tablet 1 tablet BID    simethicone chewable tablet 80 mg TID PRN    sodium chloride 0.9% bolus 250 mL PRN       Objective:     Vital Signs (Most Recent):  Temp: 97.2 °F (36.2 °C) (11/29/22 0414)  Pulse: 70 (11/29/22 0709)  Resp: 20 (11/29/22 0414)  BP: (!) 157/71 (11/29/22 0414)  SpO2: 95 % (11/29/22 0414)  O2 Device (Oxygen Therapy): room air (11/28/22 0800)   Vital Signs (24h Range):  Temp:  [97.2 °F (36.2 °C)-98.5 °F (36.9 °C)] 97.2 °F (36.2 °C)  Pulse:  [70-90] 70  Resp:  [18-22] 20  SpO2:  [93 %-99 %] 95 %  BP: (131-159)/(60-72) 157/71     Weight: 74 kg (163 lb 2.3 oz) (11/12/22 9879)  Body mass index is 24.08 kg/m².  Body surface area is 1.9 meters squared.    I/O last 3 completed shifts:  In: 100 [P.O.:100]  Out: 675 [Urine:475; Drains:200]    Physical Exam  Vitals and nursing note reviewed.    Constitutional:       General: He is not in acute distress.     Appearance: He is normal weight. He is not ill-appearing or diaphoretic.   HENT:      Head: Normocephalic and atraumatic.      Right Ear: External ear normal.      Left Ear: External ear normal.      Nose: Nose normal.   Eyes:      General: No scleral icterus.        Right eye: No discharge.         Left eye: No discharge.      Extraocular Movements: Extraocular movements intact.      Conjunctiva/sclera: Conjunctivae normal.      Comments: Prescription eye glasses.   Cardiovascular:      Rate and Rhythm: Normal rate.      Heart sounds: Murmur heard.     No friction rub. No gallop.      Comments: BLANCA drains present.  Pulmonary:      Effort: No respiratory distress.      Breath sounds: No wheezing, rhonchi or rales.   Chest:      Comments: Incision from recent sternotomy.  Abdominal:      General: Bowel sounds are normal. There is no distension.      Palpations: Abdomen is soft.      Tenderness: There is no abdominal tenderness.   Musculoskeletal:      Cervical back: Neck supple.      Right lower le+ Pitting Edema present.      Left lower le+ Pitting Edema present.   Skin:     General: Skin is warm and dry.      Coloration: Skin is not jaundiced.   Neurological:      General: No focal deficit present.      Mental Status: He is alert. Mental status is at baseline.      Cranial Nerves: No cranial nerve deficit.   Psychiatric:         Mood and Affect: Mood normal.         Behavior: Behavior normal.       Significant Labs:  BMP:   Recent Labs   Lab 22   GLU 84   *   K 4.7   CL 98   CO2 21*   BUN 37*   CREATININE 5.8*   CALCIUM 8.8   MG 1.8       CBC:   Recent Labs   Lab 22   WBC 11.60   RBC 2.66*   HGB 8.1*   HCT 26.1*      MCV 98   MCH 30.5   MCHC 31.0*       CMP:   Recent Labs   Lab 22   GLU 84   CALCIUM 8.8   ALBUMIN 2.4*   PROT 5.8*   *   K 4.7   CO2 21*   CL 98   BUN 37*   CREATININE 5.8*    ALKPHOS 120   ALT <5*   AST 13   BILITOT 0.4       LFTs:   Recent Labs   Lab 11/29/22  0438   ALT <5*   AST 13   ALKPHOS 120   BILITOT 0.4   PROT 5.8*   ALBUMIN 2.4*       Microbiology Results (last 7 days)       Procedure Component Value Units Date/Time    Fungus culture [[id number]] Collected: 11/08/22 1106    Order Status: Completed Specimen: Wound from Sternum Updated: 11/28/22 1212     Fungus (Mycology) Culture Culture in progress      No fungus isolated after 2 weeks    Narrative:      Sternal drainage    Fungus culture [[id number]] Collected: 11/08/22 1106    Order Status: Completed Specimen: Wound from Sternum Updated: 11/28/22 1212     Fungus (Mycology) Culture Culture in progress      No fungus isolated after 2 weeks    Narrative:      Pericardial peel    Fungus culture [[id number]] Collected: 11/10/22 0834    Order Status: Completed Specimen: Wound from Sternum Updated: 11/28/22 1212     Fungus (Mycology) Culture Culture in progress      No fungus isolated after 2 weeks    Narrative:      Sternal wound culture    Fungus culture [[id number]] Collected: 11/08/22 1106    Order Status: Completed Specimen: Wound from Sternum Updated: 11/28/22 1212     Fungus (Mycology) Culture Culture in progress      No fungus isolated after 2 weeks    Narrative:      Sternal wound    Fungus culture [[id number]] Collected: 11/08/22 1106    Order Status: Completed Specimen: Wound from Sternum Updated: 11/28/22 1212     Fungus (Mycology) Culture Culture in progress      No fungus isolated after 2 weeks    Narrative:      Aortic graft    Fungus culture [[id number]] Collected: 11/10/22 0836    Order Status: Completed Specimen: Wound from Sternum Updated: 11/28/22 1212     Fungus (Mycology) Culture Culture in progress      No fungus isolated after 2 weeks    Narrative:      Sternal hematoma culture    Fungus culture [[id number]] Collected: 11/08/22 1106    Order Status: Completed Specimen: Wound from Sternum Updated: 11/28/22 1212      Fungus (Mycology) Culture Culture in progress      No fungus isolated after 2 weeks    Narrative:      Sternal wound    Blood culture [[id number]] Collected: 11/21/22 1559    Order Status: Completed Specimen: Blood from Peripheral, Lower Arm, Left Updated: 11/26/22 2012     Blood Culture, Routine No growth after 5 days.    Blood culture [[id number]] Collected: 11/21/22 1558    Order Status: Completed Specimen: Blood from Peripheral, Antecubital, Left Updated: 11/26/22 2012     Blood Culture, Routine No growth after 5 days.    Narrative:      Two different sites    Urine culture [[id number]] Collected: 11/21/22 1844    Order Status: Completed Specimen: Urine Updated: 11/22/22 2155     Urine Culture, Routine No growth    Narrative:      Specimen Source->Urine          Significant Imaging:  I have reviewed all imagining in the last 24 hours.

## 2022-11-29 NOTE — PLAN OF CARE
JIGNESH faxed the clinicals to both the local [facility] facility and the main intake number but did not receive a response. Attempted calls but facility was closed, left messages for admissions.     Delaney Kumar, [facility] [place]  Case Management Department  [email or web address]

## 2022-11-29 NOTE — PLAN OF CARE
Pt educated on fall risk overnight,pt remained free from falls/trauma/injury. Denies chest pain, SOB, palpitations, dizziness, pain, or discomfort. Plan of care reviewed with pt, all questions answered. Bed locked in lowest position, call bell within reach, no acute distress noted, will continue to monitor. Pt will have dialysis today.       Problem: Adult Inpatient Plan of Care  Goal: Plan of Care Review  Outcome: Ongoing, Progressing  Goal: Patient-Specific Goal (Individualized)  Outcome: Ongoing, Progressing  Goal: Absence of Hospital-Acquired Illness or Injury  Outcome: Ongoing, Progressing  Goal: Optimal Comfort and Wellbeing  Outcome: Ongoing, Progressing  Goal: Readiness for Transition of Care  Outcome: Ongoing, Progressing     Problem: Infection  Goal: Absence of Infection Signs and Symptoms  Outcome: Ongoing, Progressing     Problem: Fall Injury Risk  Goal: Absence of Fall and Fall-Related Injury  Outcome: Ongoing, Progressing     Problem: Skin Injury Risk Increased  Goal: Skin Health and Integrity  Outcome: Ongoing, Progressing     Problem: Fluid Imbalance (Pneumonia)  Goal: Fluid Balance  Outcome: Ongoing, Progressing     Problem: Infection (Pneumonia)  Goal: Resolution of Infection Signs and Symptoms  Outcome: Ongoing, Progressing     Problem: Respiratory Compromise (Pneumonia)  Goal: Effective Oxygenation and Ventilation  Outcome: Ongoing, Progressing

## 2022-11-29 NOTE — PLAN OF CARE
Recommendations  1) Continue renal diet- fluid per MD  2) Continue Boost for optimization of calorie intake  3) RD following     Goals: Meet % EEN by RD f/u.  Nutrition Goal Status: progressing towards goal  Communication of RD Recs:  (POC)

## 2022-11-29 NOTE — PROGRESS NOTES
[facility] - Cardiology Stepdown  Cardiothoracic Surgery  Progress Note    Patient Name: David Barrios  MRN: [id number]  Admission Date: 11/8/2022  Hospital Length of Stay: 21 days  Code Status: Full Code   Attending Physician: Mike Hedrick MD   Referring Provider: Mike Hedrick MD  Principal Problem:Sternal wound infection      Subjective:     Post-Op Info:  Procedure(s) (LRB):  CREATION, FLAP, MUSCLE ROTATION (N/A)  IRRIGATION AND DEBRIDEMENT, WOUND, STERNUM (N/A)  CLOSURE, WOUND, STERNUM (N/A)  EXPLORATION, WOUND (N/A)   15 Days Post-Op     Interval History: NAEON. Attempting to find a dialysis unit. Plans to discharge home with home health and antibiotic infusion company once dialysis is set up.  Patient updated on POC and all questions answered.     DISPO: Remain inpatient until post discharge dialysis is set    Review of Systems   Constitutional: Negative for malaise/fatigue.   Cardiovascular:  Negative for chest pain, claudication, dyspnea on exertion, irregular heartbeat, leg swelling and palpitations.   Respiratory:  Negative for cough and shortness of breath.    Hematologic/Lymphatic: Negative for bleeding problem.   Gastrointestinal:  Negative for abdominal pain.   Genitourinary:  Negative for dysuria.   Neurological:  Negative for headaches and weakness.   Medications:  Continuous Infusions:  Scheduled Meds:   acetaminophen  1,000 mg Oral Q8H    amiodarone  200 mg Oral BID    aspirin  81 mg Oral Daily    atorvastatin  40 mg Oral Daily    ceFEPime (MAXIPIME) IVPB  1 g Intravenous Q24H    heparin (porcine)  5,000 Units Subcutaneous Q8H    methocarbamoL  500 mg Oral TID    polyethylene glycol  17 g Oral Daily    senna-docusate 8.6-50 mg  1 tablet Oral BID     PRN Meds:bisacodyL, dextrose 10%, dextrose 10%, diazePAM, HYDROmorphone, hydrOXYzine pamoate, ondansetron, oxyCODONE, oxyCODONE, simethicone, sodium chloride 0.9%     Objective:     Vital Signs (Most Recent):  Temp: 98.1 °F (36.7  °C) (11/29/22 0802)  Pulse: 75 (11/29/22 0802)  Resp: 18 (11/29/22 0802)  BP: (!) 158/73 (11/29/22 0802)  SpO2: 95 % (11/29/22 0802)   Vital Signs (24h Range):  Temp:  [97.2 °F (36.2 °C)-98.5 °F (36.9 °C)] 98.1 °F (36.7 °C)  Pulse:  [70-90] 75  Resp:  [18-22] 18  SpO2:  [93 %-99 %] 95 %  BP: (131-159)/(60-73) 158/73     Weight: 74 kg (163 lb 2.3 oz)  Body mass index is 24.08 kg/m².    SpO2: 95 %  O2 Device (Oxygen Therapy): room air    Intake/Output - Last 3 Shifts         11/27 0700  11/28 0659 11/28 0700  11/29 0659 11/29 0700  11/30 0659    P.O. 480 100 0    Other       Total Intake(mL/kg) 480 (6.5) 100 (1.4) 0 (0)    Urine (mL/kg/hr) 200 (0.1) 325 (0.2)     Drains 165 120     Other       Stool 0      Total Output 365 445     Net +115 -345 0           Urine Occurrence 3 x      Stool Occurrence 2 x              Lines/Drains/Airways       Peripherally Inserted Central Catheter Line  Duration             PICC Double Lumen 10/17/22 1709 right brachial 42 days              Central Venous Catheter Line  Duration                  Hemodialysis Catheter 11/25/22 0904 right internal jugular 4 days              Drain  Duration                  Closed/Suction Drain 11/14/22 2010 Inferior;Right Chest Bulb 15 Fr. 14 days         Closed/Suction Drain 11/14/22 2010 Superior;Midline Abdomen Bulb 15 Fr. 14 days         Closed/Suction Drain 11/14/22 2015 Inferior;Left Chest Bulb 15 Fr. 14 days                    Physical Exam  HENT:      Head: Normocephalic and atraumatic.   Eyes:      Extraocular Movements: Extraocular movements intact.   Cardiovascular:      Rate and Rhythm: Normal rate and regular rhythm.   Pulmonary:      Effort: Pulmonary effort is normal.   Abdominal:      General: Abdomen is flat.      Palpations: Abdomen is soft.   Musculoskeletal:         General: Normal range of motion.      Cervical back: Normal range of motion.   Skin:     General: Skin is warm and dry.      Capillary Refill: Capillary refill takes less  than 2 seconds.   Neurological:      General: No focal deficit present.       Significant Labs:  BMP:   Recent Labs   Lab 11/29/22 0438   GLU 84   *   K 4.7   CL 98   CO2 21*   BUN 37*   CREATININE 5.8*   CALCIUM 8.8   MG 1.8     CBC:   Recent Labs   Lab 11/29/22 0438   WBC 11.60   RBC 2.66*   HGB 8.1*   HCT 26.1*      MCV 98   MCH 30.5   MCHC 31.0*     CMP:   Recent Labs   Lab 11/29/22 0438   GLU 84   CALCIUM 8.8   ALBUMIN 2.4*   PROT 5.8*   *   K 4.7   CO2 21*   CL 98   BUN 37*   CREATININE 5.8*   ALKPHOS 120   ALT <5*   AST 13   BILITOT 0.4     Coagulation: No results for input(s): PT, INR, APTT in the last 48 hours.    Significant Diagnostics:  I have reviewed and interpreted all pertinent imaging results/findings within the past 24 hours.    Assessment/Plan:     * Sternal wound infection  - S/P debridement and muscle flap   - Renally dosed cefepime ending 12/26/22 per ID  - Wound care consulted for buttock, chair cushion ordered   - Pec flaps placed with BLANCA drains in place   - Plastics surgery following    KIMANI (acute kidney injury)  - Permacath placed 11/25   - Renal diet   - HD per nephrology  - Nephrology following   - Attempting to find outpatient dialysis chair     Sternal osteomyelitis  - S/P washout and debridement and muscle flap with plastics   - Plastics following     Transient hyperglycemia post procedure  Endocrine following     Persistent atrial fibrillation  - Amiodarone   - Tele monitor in place   - NSR      Microcytic anemia  CBC daily         Rita Garcia, HAO  Cardiothoracic Surgery  [facility] - Cardiology Stepdown

## 2022-11-29 NOTE — PROGRESS NOTES
[facility] - Cardiology Stepdown  Adult Nutrition  Progress Note    SUMMARY       Recommendations  1) Continue renal diet- fluid per MD  2) Continue Boost for optimization of calorie intake  3) RD following    Goals: Meet % EEN by RD f/u.  Nutrition Goal Status: progressing towards goal  Communication of RD Recs:  (POC)    Assessment and Plan    Nutrition Problem  Severe Protein-Calorie Malnutrition     Related to (etiology):   Inadequate energy intake     Signs and Symptoms (as evidenced by):   Poor PO intake: energy intake <75% of estimated energy requirement  Weight loss of 19.9 kg (43.78 lbs) x 3 months (21% weight loss x 3 months)     Interventions/Recommendations (treatment strategy):  Collaboration of nutrition care with other providers  Adequate meal intake  ONS     Nutrition Diagnosis Status:   Continues      Malnutrition Assessment  Malnutrition Type: chronic illness          Weight Loss (Malnutrition): greater than 7.5% in 3 months  Energy Intake (Malnutrition): less than or equal to 50% for greater than or equal to 5 days   Orbital Region (Subcutaneous Fat Loss): severe depletion  Upper Arm Region (Subcutaneous Fat Loss): severe depletion  Thoracic and Lumbar Region: severe depletion   Yarsani Region (Muscle Loss): severe depletion  Clavicle Bone Region (Muscle Loss): severe depletion  Clavicle and Acromion Bone Region (Muscle Loss): severe depletion       Subcutaneous Fat Loss (Final Summary): severe protein-calorie malnutrition  Muscle Loss Evaluation (Final Summary): severe protein-calorie malnutrition    Severe Weight Loss (Malnutrition): other (see comments) (21% x 3 months)    Reason for Assessment    Reason For Assessment: RD follow-up  Diagnosis: (sternal wound infection)  Relevant Medical History: transient hyperglycemia post procedure, surigcal wound, hypokalemia, microcytic anemia, persistent a fib, acute systolic congestive heart failure  Interdisciplinary Rounds: did not attend  General  "Information Comments: Spoke w/ pt at bedside, reports a good appetite currently. Pt consuming 50-75% meal consumption at this time. Reports having some nausea at this time. Reports consuming Boost provided. RD answered any questions pt had in regards to renal diet education provided in last visit. NFPE completed, 11/29/22, pt continues w/ severe protein calorie malnutrition in the context of chronic illness. LBM noted-11/28/22  Nutrition Discharge Planning: Pending clinical course.    Nutrition Risk Screen    Nutrition Risk Screen: no indicators present    Nutrition/Diet History    Spiritual, Cultural Beliefs, Rastafari Practices, Values that Affect Care: no  Food Allergies: NKFA  Factors Affecting Nutritional Intake: None identified at this time    Anthropometrics    Temp: 98.3 °F (36.8 °C)  Height Method: Stated  Height: 5' 9.02" (175.3 cm)  Height (inches): 69.02 in  Weight Method: Bed Scale  Weight: 74 kg (163 lb 2.3 oz)  Weight (lb): 163.14 lb  Ideal Body Weight (IBW), Male: 160.12 lb  % Ideal Body Weight, Male (lb): 101.89 %  BMI (Calculated): 24.1  BMI Grade: 18.5-24.9 - normal       Lab/Procedures/Meds    Pertinent Labs Reviewed: reviewed  Pertinent Labs Comments: H/H:9.1/28.5, MCHC:31.9, GFR:52, julien:7.9  Pertinent Medications Reviewed: reviewed  Pertinent Medications Comments: atorvastatin, heparin, senna-docusate      Estimated/Assessed Needs    Weight Used For Calorie Calculations: 74 kg (163 lb 2.3 oz)  Energy Calorie Requirements (kcal): 0712-0614 kcal (25-30 kcal/oz)  Energy Need Method: Kcal/kg  Protein Requirements: 59-74 g protein (0.8-1.0 g/kg)  Weight Used For Protein Calculations: 74 kg (163 lb 2.3 oz)  Fluid Requirements (mL): 1 ml/kcal or per MD     RDA Method (mL): 1850         Nutrition Prescription Ordered    Current Diet Order: cardiac, 1500 ml fluid  Oral Nutrition Supplement: Boost     Evaluation of Received Nutrient/Fluid Intake    I/O: -3.7 L since 11/15/22  Energy Calories Required: " meeting needs  Protein Required: meeting needs  Fluid Required: meeting needs  Total Fluid Intake (mL/kg): 1 ml or fluid per MD  Comments: LBM: 11/12  Tolerance: tolerating  % Intake of Estimated Energy Needs: 50 - 75 %  % Meal Intake: 50 - 75 %    Nutrition Risk    Level of Risk/Frequency of Follow-up:  (1x/week)     Monitor and Evaluation    Food and Nutrient Intake: energy intake, food and beverage intake  Food and Nutrient Adminstration: diet order  Knowledge/Beliefs/Attitudes: food and nutrition knowledge/skill, beliefs and attitudes  Anthropometric Measurements: height/length, weight, weight change, body mass index  Biochemical Data, Medical Tests and Procedures: electrolyte and renal panel, gastrointestinal profile, glucose/endocrine profile, inflammatory profile, lipid profile  Nutrition-Focused Physical Findings: overall appearance, extremities, muscles and bones     Nutrition Follow-Up    RD Follow-up?: Yes  By Isa Bernard, Registration Eligible, Provisional LDN

## 2022-11-29 NOTE — NURSING
Report received from SINGH Del Valle RN. Patient arrived to [facility] via stretcher. AAOx4. HD Tx initiated via RIJ catheter.

## 2022-11-29 NOTE — PROGRESS NOTES
[facility] - [facility]  Nephrology  Progress Note    Patient Name: David Barrios  MRN: [id number]  Admission Date: 11/8/2022  Hospital Length of Stay: 21 days  Attending Provider: Mike Hedrick MD   Primary Care Physician: Breann Tavera MD  Principal Problem:Sternal wound infection    Subjective:     HPI: 61 yo male w/ hx of Afib; HFrEF (35%); HTN; HLD. Patient underwent who underwent mitral valve repair, tricuspid valve repair, left atrial maze, left atrial appendage resection, and direct aortic impella 5.5 insertion on 10/07/2022. Hospital course complicated by Serratia bacteremia (on 6wk course of cefepime), sternal wound infection, and cardiogenic shock. On 10/31 patient developed KIMANI. Per chart review no documented course of hypotension during that time. Patient started on diuresis with IV lasix, with good urine output. Patient required increased diuretics (IV Diuril 500mg TID; and Lasix 120mg TID) and continued to have increased Scr, which progressed to oliguria, and now having metabolic derangements.     Patient denies any prior history of kidney disease. Does not have significant family history of kidney disease. Does endorse worsening fatigue and dyspnea. Denies any nausea vomiting, or metallic taste in mouth.     Nephrology was consulted for oliguric KIMANI.       Interval History: Patient seen and examined. No acute events overnight. Afebrile with pulse in the 70s bpm. Systolic blood pressures ranging from 150-130s mmHg. He is saturating +93% on room air with only 325 mL documented UOP in the last 24 hours. Plan for iHD per schedule.    Review of patient's allergies indicates:  No Known Allergies  Current Facility-Administered Medications   Medication Frequency    acetaminophen tablet 1,000 mg Q8H    amiodarone tablet 200 mg BID    aspirin chewable tablet 81 mg Daily    atorvastatin tablet 40 mg Daily    bisacodyL suppository 10 mg Daily PRN    cefepime in dextrose 5 % 1 gram/50 mL IVPB  1 g Q24H    dextrose 10% bolus 125 mL PRN    dextrose 10% bolus 250 mL PRN    diazePAM tablet 5 mg Q6H PRN    heparin (porcine) injection 5,000 Units Q8H    HYDROmorphone injection 1 mg Q4H PRN    hydrOXYzine pamoate capsule 50 mg Q8H PRN    methocarbamoL tablet 500 mg TID    ondansetron injection 4 mg Q8H PRN    oxyCODONE immediate release tablet 10 mg Q4H PRN    oxyCODONE immediate release tablet 5 mg Q4H PRN    polyethylene glycol packet 17 g Daily    senna-docusate 8.6-50 mg per tablet 1 tablet BID    simethicone chewable tablet 80 mg TID PRN    sodium chloride 0.9% bolus 250 mL PRN       Objective:     Vital Signs (Most Recent):  Temp: 97.2 °F (36.2 °C) (11/29/22 0414)  Pulse: 70 (11/29/22 0709)  Resp: 20 (11/29/22 0414)  BP: (!) 157/71 (11/29/22 0414)  SpO2: 95 % (11/29/22 0414)  O2 Device (Oxygen Therapy): room air (11/28/22 0800)   Vital Signs (24h Range):  Temp:  [97.2 °F (36.2 °C)-98.5 °F (36.9 °C)] 97.2 °F (36.2 °C)  Pulse:  [70-90] 70  Resp:  [18-22] 20  SpO2:  [93 %-99 %] 95 %  BP: (131-159)/(60-72) 157/71     Weight: 74 kg (163 lb 2.3 oz) (11/12/22 1519)  Body mass index is 24.08 kg/m².  Body surface area is 1.9 meters squared.    I/O last 3 completed shifts:  In: 100 [P.O.:100]  Out: 675 [Urine:475; Drains:200]    Physical Exam  Vitals and nursing note reviewed.   Constitutional:       General: He is not in acute distress.     Appearance: He is normal weight. He is not ill-appearing or diaphoretic.   HENT:      Head: Normocephalic and atraumatic.      Right Ear: External ear normal.      Left Ear: External ear normal.      Nose: Nose normal.   Eyes:      General: No scleral icterus.        Right eye: No discharge.         Left eye: No discharge.      Extraocular Movements: Extraocular movements intact.      Conjunctiva/sclera: Conjunctivae normal.      Comments: Prescription eye glasses.   Cardiovascular:      Rate and Rhythm: Normal rate.      Heart sounds: Murmur heard.     No friction  rub. No gallop.      Comments: BLANCA drains present.  Pulmonary:      Effort: No respiratory distress.      Breath sounds: No wheezing, rhonchi or rales.   Chest:      Comments: Incision from recent sternotomy.  Abdominal:      General: Bowel sounds are normal. There is no distension.      Palpations: Abdomen is soft.      Tenderness: There is no abdominal tenderness.   Musculoskeletal:      Cervical back: Neck supple.      Right lower le+ Pitting Edema present.      Left lower le+ Pitting Edema present.   Skin:     General: Skin is warm and dry.      Coloration: Skin is not jaundiced.   Neurological:      General: No focal deficit present.      Mental Status: He is alert. Mental status is at baseline.      Cranial Nerves: No cranial nerve deficit.   Psychiatric:         Mood and Affect: Mood normal.         Behavior: Behavior normal.       Significant Labs:  BMP:   Recent Labs   Lab 22   GLU 84   *   K 4.7   CL 98   CO2 21*   BUN 37*   CREATININE 5.8*   CALCIUM 8.8   MG 1.8       CBC:   Recent Labs   Lab 22   WBC 11.60   RBC 2.66*   HGB 8.1*   HCT 26.1*      MCV 98   MCH 30.5   MCHC 31.0*       CMP:   Recent Labs   Lab 22   GLU 84   CALCIUM 8.8   ALBUMIN 2.4*   PROT 5.8*   *   K 4.7   CO2 21*   CL 98   BUN 37*   CREATININE 5.8*   ALKPHOS 120   ALT <5*   AST 13   BILITOT 0.4       LFTs:   Recent Labs   Lab 22   ALT <5*   AST 13   ALKPHOS 120   BILITOT 0.4   PROT 5.8*   ALBUMIN 2.4*       Microbiology Results (last 7 days)       Procedure Component Value Units Date/Time    Fungus culture [[id number]] Collected: 22    Order Status: Completed Specimen: Wound from Sternum Updated: 22 1212     Fungus (Mycology) Culture Culture in progress      No fungus isolated after 2 weeks    Narrative:      Sternal drainage    Fungus culture [[id number]] Collected: 22    Order Status: Completed Specimen: Wound from Sternum Updated:  11/28/22 1212     Fungus (Mycology) Culture Culture in progress      No fungus isolated after 2 weeks    Narrative:      Pericardial peel    Fungus culture [[id number]] Collected: 11/10/22 0834    Order Status: Completed Specimen: Wound from Sternum Updated: 11/28/22 1212     Fungus (Mycology) Culture Culture in progress      No fungus isolated after 2 weeks    Narrative:      Sternal wound culture    Fungus culture [[id number]] Collected: 11/08/22 1106    Order Status: Completed Specimen: Wound from Sternum Updated: 11/28/22 1212     Fungus (Mycology) Culture Culture in progress      No fungus isolated after 2 weeks    Narrative:      Sternal wound    Fungus culture [[id number]] Collected: 11/08/22 1106    Order Status: Completed Specimen: Wound from Sternum Updated: 11/28/22 1212     Fungus (Mycology) Culture Culture in progress      No fungus isolated after 2 weeks    Narrative:      Aortic graft    Fungus culture [[id number]] Collected: 11/10/22 0836    Order Status: Completed Specimen: Wound from Sternum Updated: 11/28/22 1212     Fungus (Mycology) Culture Culture in progress      No fungus isolated after 2 weeks    Narrative:      Sternal hematoma culture    Fungus culture [[id number]] Collected: 11/08/22 1106    Order Status: Completed Specimen: Wound from Sternum Updated: 11/28/22 1212     Fungus (Mycology) Culture Culture in progress      No fungus isolated after 2 weeks    Narrative:      Sternal wound    Blood culture [[id number]] Collected: 11/21/22 1559    Order Status: Completed Specimen: Blood from Peripheral, Lower Arm, Left Updated: 11/26/22 2012     Blood Culture, Routine No growth after 5 days.    Blood culture [[id number]] Collected: 11/21/22 1558    Order Status: Completed Specimen: Blood from Peripheral, Antecubital, Left Updated: 11/26/22 2012     Blood Culture, Routine No growth after 5 days.    Narrative:      Two different sites    Urine culture [[id number]] Collected: 11/21/22 1844    Order  Status: Completed Specimen: Urine Updated: 11/22/22 2155     Urine Culture, Routine No growth    Narrative:      Specimen Source->Urine          Significant Imaging:  I have reviewed all imagining in the last 24 hours.    Assessment/Plan:     * Sternal wound infection  - s/p return to OR and now on cefepime  - management per primary team     KIMANI (acute kidney injury)  KIMANI-likely ATN from decreased renal perfusion 2/2 sepsis & cardiogenic shock   Baseline sCr 0.9-1  sCr on admission: 2.7  Lab Results   Component Value Date    CREATININE 5.8 (H) 11/29/2022     Recommendations/Plan:  - Plan for iHD today via tunneled HD catheter  - Pending PPD rsults, accepting HD unit and home health with infusion therapy arranged   - Daily RFP and magnesium level  - Strict I/Os and daily weights  - Renally dose medications to eGFR  - Avoid nephrotoxins when feasible (i.e. intra-arterial contrast, NSAIDs, etc.)  - Renal diet (low potassium) when not NPO      Thank you for your consult. I will follow-up with patient. Please contact us if you have any additional questions.    Golden Allen MD  Nephrology  [facility] - [facility] Step[facility]

## 2022-11-29 NOTE — PROGRESS NOTES
Plastic and Reconstructive Surgery   Progress Note    Subjective:    Patient seen and examined. Pain controlled. Patient going for HD today    Objective:  Vital signs in last 24 hours:  Temp:  [97.2 °F (36.2 °C)-98.5 °F (36.9 °C)] 97.6 °F (36.4 °C)  Pulse:  [70-87] 73  Resp:  [18-20] 18  SpO2:  [93 %-95 %] 95 %  BP: (118-159)/(58-73) 145/69    Intake/Output last 3 shifts:  I/O last 3 completed shifts:  In: 100 [P.O.:100]  Out: 675 [Urine:475; Drains:200]    Intake/Output this shift:  I/O this shift:  In: 630 [P.O.:630]  Out: 3185 [Drains:85; Other:3100]        Physical Exam:  VITAL SIGNS:   Vitals:    22 1730 22 1745 22 1800 22 1820   BP: 126/60 139/63 135/67 (!) 145/69   BP Location:       Patient Position:       Pulse: 70 71 73 73   Resp:       Temp:   97.6 °F (36.4 °C)    TempSrc:   Oral    SpO2:       Weight:       Height:         TMAX: Temp (24hrs), Av.9 °F (36.6 °C), Min:97.2 °F (36.2 °C), Max:98.5 °F (36.9 °C)    General: Alert; No acute distress  Cardiovascular: Regular rate   Respiratory: Normal respiratory effort. Chest rise symmetric.   Abdomen: Soft, nontender, nondistended  Extremity: Moves all extremities equally.  Neurologic: No focal deficit. Speech normal  SKIN: midline sternal and bilateral axillary incisions c/d/I, 3 drains left (right/left chest and mediastinum)    Scheduled Medications acetaminophen, 1,000 mg, Q8H  amiodarone, 200 mg, BID  aspirin, 81 mg, Daily  atorvastatin, 40 mg, Daily  ceFEPime (MAXIPIME) IVPB, 1 g, Q24H  heparin (porcine), 5,000 Units, Q8H  methocarbamoL, 500 mg, TID  polyethylene glycol, 17 g, Daily  senna-docusate 8.6-50 mg, 1 tablet, BID    PRN Medications bisacodyL, dextrose 10%, dextrose 10%, diazePAM, heparin (porcine), HYDROmorphone, hydrOXYzine pamoate, ondansetron, oxyCODONE, oxyCODONE, simethicone, sodium chloride 0.9%    Recent Labs:   Lab Results   Component Value Date    WBC 11.60 2022    HGB 8.1 (L) 2022    HCT 26.1 (L)  11/29/2022    MCV 98 11/29/2022     11/29/2022     Lab Results   Component Value Date    GLU 84 11/29/2022     (L) 11/29/2022    K 4.7 11/29/2022    CL 98 11/29/2022    BUN 37 (H) 11/29/2022         Assessment: 62 y.o. y/o male 15 Days Post-Op s/p Procedure(s):  REMOVAL, STERNAL WIRE  DEBRIDEMENT, STERNUM  APPLICATION, WOUND VAC Doing well postoperatively.    Plan  Monitor drain output; BLANCA drains to remain in place for dc until follow up in clinic with Dr. Neida Chisholm to shower; recommend pinning BLANCA drains to lanyard or necklace to avoid hanging or pulling out  Encourage ambulation/OOB with PT  Pain control PRN  I's & O's  Dvt ppx  Plastic surgery will follow     Keyshawn Whitlock MD- Fellow  Department of Plastic and Reconstructive Surgery  [phone] (office)

## 2022-11-29 NOTE — ASSESSMENT & PLAN NOTE
- S/P debridement and muscle flap   - Renally dosed cefepime ending 12/26/22 per ID  - Wound care consulted for buttock, chair cushion ordered   - Pec flaps placed with BLANCA drains in place   - Plastics surgery following

## 2022-11-29 NOTE — PLAN OF CARE
[facility]    [facility] educator met with patient's friend and discussed discharge plan for home IVABX. David Barrios will dc home with family support. Patient will infuse medication via IVP. Educated friend on S.A.S.H procedure. S.A.S.H mat provided.  Patient education checklist reviewed and acknowledged by above person(s) above and are agreeable to discharge with home infusion plan of care. IV administration process using aspetic technique was reviewed with successful return demonstration. Patient feels comfortable with infusion. Patient will dc home with Cefepime 1 gm IVP q 24 hours for estimated end of therapy date 12/26/22. Dosing schedule times are 10:00 am. [facility] LOPEZ will follow patient or will come to [facility] for weekly dressing changes and lab draws. Time allotted for questions. Patients nurse and case management team notified teaching has been completed.     Medication delivery will be made to bedside    Patient accepted to care by Alban LOPEZ and report called to Supriya   Phone number [phone]    [facility]  Nadira Colon RN, Clinical Educator  Cell [phone]  Office [phone]  Fax [phone]

## 2022-11-29 NOTE — ASSESSMENT & PLAN NOTE
- Permacath placed 11/25   - Renal diet   - HD per nephrology  - Nephrology following   - Attempting to find outpatient dialysis chair

## 2022-11-29 NOTE — PLAN OF CARE
CHW met with patient/family at bedside. Patient experience rounding completed and reviewed the following.     Do you know your discharge plan? Yes     If yes, what is the plan? Home with Home Health    If you are discharging home, do you have help at home? Yes     Do you think you will need help at home at discharge? Yes     Have you discussed your needs and preferences with your SW/CM? Yes     Assigned SW/CM notified of any patient/family needs or concerns. As of note, patient expressed no qualms about their discharge.                Jennifer Llanes  Community Health Worker(CHW)  Case Management  Ext. [phone]

## 2022-11-29 NOTE — ASSESSMENT & PLAN NOTE
KIMANI-likely ATN from decreased renal perfusion 2/2 sepsis & cardiogenic shock   Baseline sCr 0.9-1  sCr on admission: 2.7  Lab Results   Component Value Date    CREATININE 5.8 (H) 11/29/2022     Recommendations/Plan:  - Plan for iHD today via tunneled HD catheter  - Pending PPD rsults, accepting HD unit and home health with infusion therapy arranged   - Daily RFP and magnesium level  - Strict I/Os and daily weights  - Renally dose medications to eGFR  - Avoid nephrotoxins when feasible (i.e. intra-arterial contrast, NSAIDs, etc.)  - Renal diet (low potassium) when not NPO

## 2022-11-29 NOTE — SUBJECTIVE & OBJECTIVE
Interval History: NAEON. Attempting to find a dialysis unit. Plans to discharge home with home health and antibiotic infusion company once dialysis is set up.  Patient updated on POC and all questions answered.     DISPO: Remain inpatient until post discharge dialysis is set    Review of Systems   Constitutional: Negative for malaise/fatigue.   Cardiovascular:  Negative for chest pain, claudication, dyspnea on exertion, irregular heartbeat, leg swelling and palpitations.   Respiratory:  Negative for cough and shortness of breath.    Hematologic/Lymphatic: Negative for bleeding problem.   Gastrointestinal:  Negative for abdominal pain.   Genitourinary:  Negative for dysuria.   Neurological:  Negative for headaches and weakness.   Medications:  Continuous Infusions:  Scheduled Meds:   acetaminophen  1,000 mg Oral Q8H    amiodarone  200 mg Oral BID    aspirin  81 mg Oral Daily    atorvastatin  40 mg Oral Daily    ceFEPime (MAXIPIME) IVPB  1 g Intravenous Q24H    heparin (porcine)  5,000 Units Subcutaneous Q8H    methocarbamoL  500 mg Oral TID    polyethylene glycol  17 g Oral Daily    senna-docusate 8.6-50 mg  1 tablet Oral BID     PRN Meds:bisacodyL, dextrose 10%, dextrose 10%, diazePAM, HYDROmorphone, hydrOXYzine pamoate, ondansetron, oxyCODONE, oxyCODONE, simethicone, sodium chloride 0.9%     Objective:     Vital Signs (Most Recent):  Temp: 98.1 °F (36.7 °C) (11/29/22 0802)  Pulse: 75 (11/29/22 0802)  Resp: 18 (11/29/22 0802)  BP: (!) 158/73 (11/29/22 0802)  SpO2: 95 % (11/29/22 0802)   Vital Signs (24h Range):  Temp:  [97.2 °F (36.2 °C)-98.5 °F (36.9 °C)] 98.1 °F (36.7 °C)  Pulse:  [70-90] 75  Resp:  [18-22] 18  SpO2:  [93 %-99 %] 95 %  BP: (131-159)/(60-73) 158/73     Weight: 74 kg (163 lb 2.3 oz)  Body mass index is 24.08 kg/m².    SpO2: 95 %  O2 Device (Oxygen Therapy): room air    Intake/Output - Last 3 Shifts         11/27 0700 11/28 0659 11/28 0700 11/29 0659 11/29 0700 11/30 0659    P.O. 480 100 0     Other       Total Intake(mL/kg) 480 (6.5) 100 (1.4) 0 (0)    Urine (mL/kg/hr) 200 (0.1) 325 (0.2)     Drains 165 120     Other       Stool 0      Total Output 365 445     Net +115 -345 0           Urine Occurrence 3 x      Stool Occurrence 2 x              Lines/Drains/Airways       Peripherally Inserted Central Catheter Line  Duration             PICC Double Lumen 10/17/22 1709 right brachial 42 days              Central Venous Catheter Line  Duration                  Hemodialysis Catheter 11/25/22 0904 right internal jugular 4 days              Drain  Duration                  Closed/Suction Drain 11/14/22 2010 Inferior;Right Chest Bulb 15 Fr. 14 days         Closed/Suction Drain 11/14/22 2010 Superior;Midline Abdomen Bulb 15 Fr. 14 days         Closed/Suction Drain 11/14/22 2015 Inferior;Left Chest Bulb 15 Fr. 14 days                    Physical Exam  HENT:      Head: Normocephalic and atraumatic.   Eyes:      Extraocular Movements: Extraocular movements intact.   Cardiovascular:      Rate and Rhythm: Normal rate and regular rhythm.   Pulmonary:      Effort: Pulmonary effort is normal.   Abdominal:      General: Abdomen is flat.      Palpations: Abdomen is soft.   Musculoskeletal:         General: Normal range of motion.      Cervical back: Normal range of motion.   Skin:     General: Skin is warm and dry.      Capillary Refill: Capillary refill takes less than 2 seconds.   Neurological:      General: No focal deficit present.       Significant Labs:  BMP:   Recent Labs   Lab 11/29/22 0438   GLU 84   *   K 4.7   CL 98   CO2 21*   BUN 37*   CREATININE 5.8*   CALCIUM 8.8   MG 1.8     CBC:   Recent Labs   Lab 11/29/22 0438   WBC 11.60   RBC 2.66*   HGB 8.1*   HCT 26.1*      MCV 98   MCH 30.5   MCHC 31.0*     CMP:   Recent Labs   Lab 11/29/22 0438   GLU 84   CALCIUM 8.8   ALBUMIN 2.4*   PROT 5.8*   *   K 4.7   CO2 21*   CL 98   BUN 37*   CREATININE 5.8*   ALKPHOS 120   ALT <5*   AST 13   BILITOT  0.4     Coagulation: No results for input(s): PT, INR, APTT in the last 48 hours.    Significant Diagnostics:  I have reviewed and interpreted all pertinent imaging results/findings within the past 24 hours.

## 2022-11-30 LAB
ALBUMIN SERPL BCP-MCNC: 2.7 G/DL (ref 3.5–5.2)
ANION GAP SERPL CALC-SCNC: 8 MMOL/L (ref 8–16)
BASOPHILS # BLD AUTO: 0.04 K/UL (ref 0–0.2)
BASOPHILS NFR BLD: 0.3 % (ref 0–1.9)
BUN SERPL-MCNC: 22 MG/DL (ref 8–23)
CALCIUM SERPL-MCNC: 8.8 MG/DL (ref 8.7–10.5)
CHLORIDE SERPL-SCNC: 102 MMOL/L (ref 95–110)
CO2 SERPL-SCNC: 21 MMOL/L (ref 23–29)
CREAT SERPL-MCNC: 4.2 MG/DL (ref 0.5–1.4)
DIFFERENTIAL METHOD: ABNORMAL
EOSINOPHIL # BLD AUTO: 0.2 K/UL (ref 0–0.5)
EOSINOPHIL NFR BLD: 1.4 % (ref 0–8)
ERYTHROCYTE [DISTWIDTH] IN BLOOD BY AUTOMATED COUNT: 18.5 % (ref 11.5–14.5)
EST. GFR  (NO RACE VARIABLE): 15.1 ML/MIN/1.73 M^2
GLUCOSE SERPL-MCNC: 91 MG/DL (ref 70–110)
HCT VFR BLD AUTO: 27.4 % (ref 40–54)
HGB BLD-MCNC: 8.4 G/DL (ref 14–18)
IMM GRANULOCYTES # BLD AUTO: 0.06 K/UL (ref 0–0.04)
IMM GRANULOCYTES NFR BLD AUTO: 0.5 % (ref 0–0.5)
LYMPHOCYTES # BLD AUTO: 1.4 K/UL (ref 1–4.8)
LYMPHOCYTES NFR BLD: 12.3 % (ref 18–48)
MCH RBC QN AUTO: 30.8 PG (ref 27–31)
MCHC RBC AUTO-ENTMCNC: 30.7 G/DL (ref 32–36)
MCV RBC AUTO: 100 FL (ref 82–98)
MONOCYTES # BLD AUTO: 1.1 K/UL (ref 0.3–1)
MONOCYTES NFR BLD: 9.7 % (ref 4–15)
NEUTROPHILS # BLD AUTO: 8.7 K/UL (ref 1.8–7.7)
NEUTROPHILS NFR BLD: 75.8 % (ref 38–73)
NRBC BLD-RTO: 0 /100 WBC
PHOSPHATE SERPL-MCNC: 3 MG/DL (ref 2.7–4.5)
PLATELET # BLD AUTO: 329 K/UL (ref 150–450)
PMV BLD AUTO: 10.3 FL (ref 9.2–12.9)
POTASSIUM SERPL-SCNC: 4.4 MMOL/L (ref 3.5–5.1)
RBC # BLD AUTO: 2.73 M/UL (ref 4.6–6.2)
SODIUM SERPL-SCNC: 131 MMOL/L (ref 136–145)
WBC # BLD AUTO: 11.5 K/UL (ref 3.9–12.7)

## 2022-11-30 PROCEDURE — 25000003 PHARM REV CODE 250: Performed by: STUDENT IN AN ORGANIZED HEALTH CARE EDUCATION/TRAINING PROGRAM

## 2022-11-30 PROCEDURE — 63600175 PHARM REV CODE 636 W HCPCS: Performed by: STUDENT IN AN ORGANIZED HEALTH CARE EDUCATION/TRAINING PROGRAM

## 2022-11-30 PROCEDURE — 80069 RENAL FUNCTION PANEL: CPT | Performed by: STUDENT IN AN ORGANIZED HEALTH CARE EDUCATION/TRAINING PROGRAM

## 2022-11-30 PROCEDURE — 63600175 PHARM REV CODE 636 W HCPCS: Performed by: THORACIC SURGERY (CARDIOTHORACIC VASCULAR SURGERY)

## 2022-11-30 PROCEDURE — 99232 PR SUBSEQUENT HOSPITAL CARE,LEVL II: ICD-10-PCS | Mod: ,,, | Performed by: INTERNAL MEDICINE

## 2022-11-30 PROCEDURE — 25000003 PHARM REV CODE 250

## 2022-11-30 PROCEDURE — 99232 SBSQ HOSP IP/OBS MODERATE 35: CPT | Mod: ,,, | Performed by: INTERNAL MEDICINE

## 2022-11-30 PROCEDURE — 36415 COLL VENOUS BLD VENIPUNCTURE: CPT | Performed by: STUDENT IN AN ORGANIZED HEALTH CARE EDUCATION/TRAINING PROGRAM

## 2022-11-30 PROCEDURE — 85025 COMPLETE CBC W/AUTO DIFF WBC: CPT | Performed by: THORACIC SURGERY (CARDIOTHORACIC VASCULAR SURGERY)

## 2022-11-30 PROCEDURE — 25000003 PHARM REV CODE 250: Performed by: THORACIC SURGERY (CARDIOTHORACIC VASCULAR SURGERY)

## 2022-11-30 PROCEDURE — 63600175 PHARM REV CODE 636 W HCPCS: Performed by: ANESTHESIOLOGY

## 2022-11-30 PROCEDURE — 63600175 PHARM REV CODE 636 W HCPCS

## 2022-11-30 PROCEDURE — 20600001 HC STEP DOWN PRIVATE ROOM

## 2022-11-30 RX ORDER — NAPROXEN SODIUM 220 MG/1
81 TABLET, FILM COATED ORAL DAILY
Qty: 360 TABLET | Refills: 0 | Status: SHIPPED | OUTPATIENT
Start: 2022-12-01 | End: 2023-12-01

## 2022-11-30 RX ORDER — OXYCODONE HYDROCHLORIDE 5 MG/1
5 TABLET ORAL EVERY 4 HOURS PRN
Qty: 42 TABLET | Refills: 0 | Status: SHIPPED | OUTPATIENT
Start: 2022-11-30 | End: 2022-12-02

## 2022-11-30 RX ORDER — SODIUM CHLORIDE 9 MG/ML
INJECTION, SOLUTION INTRAVENOUS ONCE
Status: CANCELLED | OUTPATIENT
Start: 2022-11-30 | End: 2022-11-30

## 2022-11-30 RX ORDER — METHOCARBAMOL 500 MG/1
500 TABLET, FILM COATED ORAL 3 TIMES DAILY
Qty: 30 TABLET | Refills: 0 | Status: SHIPPED | OUTPATIENT
Start: 2022-11-30 | End: 2022-12-10

## 2022-11-30 RX ORDER — ATORVASTATIN CALCIUM 40 MG/1
40 TABLET, FILM COATED ORAL DAILY
Qty: 90 TABLET | Refills: 3 | Status: SHIPPED | OUTPATIENT
Start: 2022-12-01 | End: 2023-12-01

## 2022-11-30 RX ORDER — HEPARIN SODIUM 1000 [USP'U]/ML
1000 INJECTION, SOLUTION INTRAVENOUS; SUBCUTANEOUS
Status: CANCELLED | OUTPATIENT
Start: 2022-12-01 | End: 2022-12-01

## 2022-11-30 RX ADMIN — ONDANSETRON 4 MG: 2 INJECTION INTRAMUSCULAR; INTRAVENOUS at 09:11

## 2022-11-30 RX ADMIN — SENNOSIDES AND DOCUSATE SODIUM 1 TABLET: 50; 8.6 TABLET ORAL at 09:11

## 2022-11-30 RX ADMIN — ACETAMINOPHEN 1000 MG: 500 TABLET ORAL at 05:11

## 2022-11-30 RX ADMIN — HEPARIN SODIUM 5000 UNITS: 5000 INJECTION INTRAVENOUS; SUBCUTANEOUS at 03:11

## 2022-11-30 RX ADMIN — HYDROMORPHONE HYDROCHLORIDE 1 MG: 1 INJECTION, SOLUTION INTRAMUSCULAR; INTRAVENOUS; SUBCUTANEOUS at 12:11

## 2022-11-30 RX ADMIN — SIMETHICONE 80 MG: 80 TABLET, CHEWABLE ORAL at 12:11

## 2022-11-30 RX ADMIN — HYDROMORPHONE HYDROCHLORIDE 1 MG: 1 INJECTION, SOLUTION INTRAMUSCULAR; INTRAVENOUS; SUBCUTANEOUS at 05:11

## 2022-11-30 RX ADMIN — HEPARIN SODIUM 5000 UNITS: 5000 INJECTION INTRAVENOUS; SUBCUTANEOUS at 10:11

## 2022-11-30 RX ADMIN — SENNOSIDES AND DOCUSATE SODIUM 1 TABLET: 50; 8.6 TABLET ORAL at 08:11

## 2022-11-30 RX ADMIN — ACETAMINOPHEN 1000 MG: 500 TABLET ORAL at 03:11

## 2022-11-30 RX ADMIN — HEPARIN SODIUM 5000 UNITS: 5000 INJECTION INTRAVENOUS; SUBCUTANEOUS at 06:11

## 2022-11-30 RX ADMIN — METHOCARBAMOL 500 MG: 500 TABLET ORAL at 08:11

## 2022-11-30 RX ADMIN — OXYCODONE 5 MG: 5 TABLET ORAL at 09:11

## 2022-11-30 RX ADMIN — METHOCARBAMOL 500 MG: 500 TABLET ORAL at 03:11

## 2022-11-30 RX ADMIN — POLYETHYLENE GLYCOL 3350 17 G: 17 POWDER, FOR SOLUTION ORAL at 08:11

## 2022-11-30 RX ADMIN — AMIODARONE HYDROCHLORIDE 200 MG: 200 TABLET ORAL at 09:11

## 2022-11-30 RX ADMIN — CEFEPIME HYDROCHLORIDE 1 G: 1 INJECTION, POWDER, FOR SOLUTION INTRAMUSCULAR; INTRAVENOUS at 09:11

## 2022-11-30 RX ADMIN — ASPIRIN 81 MG 81 MG: 81 TABLET ORAL at 08:11

## 2022-11-30 RX ADMIN — HEPARIN SODIUM 5000 UNITS: 5000 INJECTION INTRAVENOUS; SUBCUTANEOUS at 12:11

## 2022-11-30 RX ADMIN — AMIODARONE HYDROCHLORIDE 200 MG: 200 TABLET ORAL at 08:11

## 2022-11-30 RX ADMIN — ACETAMINOPHEN 1000 MG: 500 TABLET ORAL at 10:11

## 2022-11-30 RX ADMIN — ATORVASTATIN CALCIUM 40 MG: 20 TABLET, FILM COATED ORAL at 08:11

## 2022-11-30 RX ADMIN — ONDANSETRON 4 MG: 2 INJECTION INTRAMUSCULAR; INTRAVENOUS at 05:11

## 2022-11-30 RX ADMIN — METHOCARBAMOL 500 MG: 500 TABLET ORAL at 09:11

## 2022-11-30 NOTE — PROGRESS NOTES
[facility] - [facility]  Cardiothoracic Surgery  Progress Note    Patient Name: David Barrios  MRN: [id number]  Admission Date: 11/8/2022  Hospital Length of Stay: 22 days  Code Status: Full Code   Attending Physician: Mike Hedrick MD   Referring Provider: Mike Hedrick MD  Principal Problem:Sternal wound infection    Subjective:     Post-Op Info:  Procedure(s) (LRB):  CREATION, FLAP, MUSCLE ROTATION (N/A)  IRRIGATION AND DEBRIDEMENT, WOUND, STERNUM (N/A)  CLOSURE, WOUND, STERNUM (N/A)  EXPLORATION, WOUND (N/A)   16 Days Post-Op     Interval History: Dialysis yesterday, patient tolerated well.  Awaiting dialysis chair placement prior to discharge home     Review of Systems   Constitutional: Negative for malaise/fatigue.   Cardiovascular:  Negative for chest pain, claudication, dyspnea on exertion, irregular heartbeat, leg swelling and palpitations.   Respiratory:  Negative for cough and shortness of breath.    Hematologic/Lymphatic: Negative for bleeding problem.   Gastrointestinal:  Negative for abdominal pain.   Genitourinary:  Negative for dysuria.   Neurological:  Negative for headaches and weakness.   Medications:  Continuous Infusions:  Scheduled Meds:   acetaminophen  1,000 mg Oral Q8H    amiodarone  200 mg Oral BID    aspirin  81 mg Oral Daily    atorvastatin  40 mg Oral Daily    ceFEPime (MAXIPIME) IVPB  1 g Intravenous Q24H    heparin (porcine)  5,000 Units Subcutaneous Q8H    methocarbamoL  500 mg Oral TID    polyethylene glycol  17 g Oral Daily    senna-docusate 8.6-50 mg  1 tablet Oral BID     PRN Meds:bisacodyL, dextrose 10%, dextrose 10%, diazePAM, heparin (porcine), HYDROmorphone, hydrOXYzine pamoate, ondansetron, oxyCODONE, oxyCODONE, simethicone, sodium chloride 0.9%     Objective:     Vital Signs (Most Recent):  Temp: 98.7 °F (37.1 °C) (11/30/22 0732)  Pulse: 79 (11/30/22 0732)  Resp: 18 (11/30/22 0732)  BP: (!) 157/73 (11/30/22 0732)  SpO2: 96 % (11/30/22 0732)    Vital Signs (24h Range):  Temp:  [97.6 °F (36.4 °C)-98.7 °F (37.1 °C)] 98.7 °F (37.1 °C)  Pulse:  [70-87] 79  Resp:  [18-20] 18  SpO2:  [95 %-96 %] 96 %  BP: (118-167)/(58-77) 157/73     Weight: 74 kg (163 lb 2.3 oz)  Body mass index is 24.08 kg/m².    SpO2: 96 %  O2 Device (Oxygen Therapy): room air    Intake/Output - Last 3 Shifts         11/28 0700  11/29 0659 11/29 0700  11/30 0659 11/30 0700  12/01 0659    P.O. 100 630     Total Intake(mL/kg) 100 (1.4) 630 (8.5)     Urine (mL/kg/hr) 325 (0.2)      Drains 120 135     Other  3100     Stool       Total Output 445 3235     Net -345 -2605                    Lines/Drains/Airways       Peripherally Inserted Central Catheter Line  Duration             PICC Double Lumen 10/17/22 1709 right brachial 43 days              Central Venous Catheter Line  Duration                  Hemodialysis Catheter 11/25/22 0904 right internal jugular 5 days              Drain  Duration                  Closed/Suction Drain 11/14/22 2010 Inferior;Right Chest Bulb 15 Fr. 15 days         Closed/Suction Drain 11/14/22 2010 Superior;Midline Abdomen Bulb 15 Fr. 15 days         Closed/Suction Drain 11/14/22 2015 Inferior;Left Chest Bulb 15 Fr. 15 days                    Physical Exam  HENT:      Head: Normocephalic and atraumatic.   Eyes:      Extraocular Movements: Extraocular movements intact.   Cardiovascular:      Rate and Rhythm: Normal rate and regular rhythm.   Pulmonary:      Effort: Pulmonary effort is normal.   Abdominal:      General: Abdomen is flat.      Palpations: Abdomen is soft.   Musculoskeletal:         General: Normal range of motion.      Cervical back: Normal range of motion.   Skin:     General: Skin is warm and dry.      Capillary Refill: Capillary refill takes less than 2 seconds.   Neurological:      General: No focal deficit present.       Significant Labs:  BMP:   Recent Labs   Lab 11/29/22  0438 11/30/22  0825   GLU 84 91   * 131*   K 4.7 4.4   CL 98 102   CO2  21* 21*   BUN 37* 22   CREATININE 5.8* 4.2*   CALCIUM 8.8 8.8   MG 1.8  --      CBC:   Recent Labs   Lab 11/30/22  0535   WBC 11.50   RBC 2.73*   HGB 8.4*   HCT 27.4*      *   MCH 30.8   MCHC 30.7*     CMP:   Recent Labs   Lab 11/29/22  0438 11/30/22  0825   GLU 84 91   CALCIUM 8.8 8.8   ALBUMIN 2.4* 2.7*   PROT 5.8*  --    * 131*   K 4.7 4.4   CO2 21* 21*   CL 98 102   BUN 37* 22   CREATININE 5.8* 4.2*   ALKPHOS 120  --    ALT <5*  --    AST 13  --    BILITOT 0.4  --      Coagulation: No results for input(s): PT, INR, APTT in the last 48 hours.    Significant Diagnostics:  I have reviewed and interpreted all pertinent imaging results/findings within the past 24 hours.    Assessment/Plan:     * Sternal wound infection  - S/P debridement and muscle flap   - Renally dosed cefepime ending 12/26/22 per ID  - Wound care consulted for buttock, chair cushion ordered   - Pec flaps placed with BLANCA drains in place   - Plastics surgery following  - Continue antibiotics  - PICC line in place    KIMANI (acute kidney injury)  - Permacath placed 11/25   - Renal diet   - HD per nephrology  - Nephrology following   - Attempting to find outpatient dialysis chair     Sternal osteomyelitis  - S/P washout and debridement and muscle flap with plastics   - Plastics following     Transient hyperglycemia post procedure  Endocrine following     Persistent atrial fibrillation  - Amiodarone   - Tele monitor in place   - NSR      Microcytic anemia  CBC daily         Rita Garcia, HAO  Cardiothoracic Surgery  [facility] - [facility] Step[facility]

## 2022-11-30 NOTE — PLAN OF CARE
Problem: Adult Inpatient Plan of Care  Goal: Plan of Care Review  Outcome: Ongoing, Progressing  Goal: Patient-Specific Goal (Individualized)  Outcome: Ongoing, Progressing  Goal: Absence of Hospital-Acquired Illness or Injury  Outcome: Ongoing, Progressing  Goal: Optimal Comfort and Wellbeing  Outcome: Ongoing, Progressing  Goal: Readiness for Transition of Care  Outcome: Ongoing, Progressing     Problem: Infection  Goal: Absence of Infection Signs and Symptoms  Outcome: Ongoing, Progressing     Problem: Fall Injury Risk  Goal: Absence of Fall and Fall-Related Injury  Outcome: Ongoing, Progressing     Problem: Skin Injury Risk Increased  Goal: Skin Health and Integrity  Outcome: Ongoing, Progressing     Problem: Fluid Imbalance (Pneumonia)  Goal: Fluid Balance  Outcome: Ongoing, Progressing     Problem: Infection (Pneumonia)  Goal: Resolution of Infection Signs and Symptoms  Outcome: Ongoing, Progressing     Problem: Respiratory Compromise (Pneumonia)  Goal: Effective Oxygenation and Ventilation  Outcome: Ongoing, Progressing     Problem: Device-Related Complication Risk (Hemodialysis)  Goal: Safe, Effective Therapy Delivery  Outcome: Ongoing, Progressing     Problem: Hemodynamic Instability (Hemodialysis)  Goal: Effective Tissue Perfusion  Outcome: Ongoing, Progressing     Problem: Infection (Hemodialysis)  Goal: Absence of Infection Signs and Symptoms  Outcome: Ongoing, Progressing     Problem: Fluid and Electrolyte Imbalance (Acute Kidney Injury/Impairment)  Goal: Fluid and Electrolyte Balance  Outcome: Ongoing, Progressing     Problem: Oral Intake Inadequate (Acute Kidney Injury/Impairment)  Goal: Optimal Nutrition Intake  Outcome: Ongoing, Progressing     Problem: Renal Function Impairment (Acute Kidney Injury/Impairment)  Goal: Effective Renal Function  Outcome: Ongoing, Progressing     Problem: Impaired Wound Healing  Goal: Optimal Wound Healing  Outcome: Ongoing, Progressing     Problem: Electrolyte  Imbalance (Chronic Kidney Disease)  Goal: Electrolyte Balance  Outcome: Ongoing, Progressing     Problem: Fluid Volume Excess (Chronic Kidney Disease)  Goal: Fluid Balance  Outcome: Ongoing, Progressing

## 2022-11-30 NOTE — PLAN OF CARE
Pt educated on fall risk overnight,pt remained free from falls/trauma/injury. Denies chest pain, SOB, palpitations, dizziness, pain, or discomfort. Plan of care reviewed with pt, all questions answered. Bed locked in lowest position, call bell within reach, no acute distress noted, will continue to monitor.   PRN pain medicine administered, pt sitting up in chair, no visible distress.  Problem: Adult Inpatient Plan of Care  Goal: Plan of Care Review  Outcome: Ongoing, Progressing  Goal: Patient-Specific Goal (Individualized)  Outcome: Ongoing, Progressing  Goal: Absence of Hospital-Acquired Illness or Injury  Outcome: Ongoing, Progressing  Goal: Optimal Comfort and Wellbeing  Outcome: Ongoing, Progressing  Goal: Readiness for Transition of Care  Outcome: Ongoing, Progressing     Problem: Infection  Goal: Absence of Infection Signs and Symptoms  Outcome: Ongoing, Progressing     Problem: Fall Injury Risk  Goal: Absence of Fall and Fall-Related Injury  Outcome: Ongoing, Progressing     Problem: Skin Injury Risk Increased  Goal: Skin Health and Integrity  Outcome: Ongoing, Progressing     Problem: Fluid Imbalance (Pneumonia)  Goal: Fluid Balance  Outcome: Ongoing, Progressing     Problem: Infection (Pneumonia)  Goal: Resolution of Infection Signs and Symptoms  Outcome: Ongoing, Progressing

## 2022-11-30 NOTE — SUBJECTIVE & OBJECTIVE
Interval History: Patient seen and examined. No acute events overnight. Tolerated iHD yesterday with 2.5 liters removed. Afebrile with pulse ranging  80-70s bpm. Systolic blood pressures ranging from 160-120s mmHg. He is saturating +95% on room air with no documented UOP in the last 24 hours although he reports voiding without issue/making urine. Plan for iHD tomorrow schedule.    Review of patient's allergies indicates:  No Known Allergies  Current Facility-Administered Medications   Medication Frequency    acetaminophen tablet 1,000 mg Q8H    amiodarone tablet 200 mg BID    aspirin chewable tablet 81 mg Daily    atorvastatin tablet 40 mg Daily    bisacodyL suppository 10 mg Daily PRN    cefepime in dextrose 5 % 1 gram/50 mL IVPB 1 g Q24H    dextrose 10% bolus 125 mL PRN    dextrose 10% bolus 250 mL PRN    diazePAM tablet 5 mg Q6H PRN    heparin (porcine) injection 1,000 Units PRN    heparin (porcine) injection 5,000 Units Q8H    HYDROmorphone injection 1 mg Q4H PRN    hydrOXYzine pamoate capsule 50 mg Q8H PRN    methocarbamoL tablet 500 mg TID    ondansetron injection 4 mg Q8H PRN    oxyCODONE immediate release tablet 10 mg Q4H PRN    oxyCODONE immediate release tablet 5 mg Q4H PRN    polyethylene glycol packet 17 g Daily    senna-docusate 8.6-50 mg per tablet 1 tablet BID    simethicone chewable tablet 80 mg TID PRN    sodium chloride 0.9% bolus 250 mL PRN       Objective:     Vital Signs (Most Recent):  Temp: 98.7 °F (37.1 °C) (11/30/22 0732)  Pulse: 79 (11/30/22 0732)  Resp: 18 (11/30/22 0732)  BP: (!) 157/73 (11/30/22 0732)  SpO2: 96 % (11/30/22 0732)  O2 Device (Oxygen Therapy): room air (11/29/22 1505)   Vital Signs (24h Range):  Temp:  [97.6 °F (36.4 °C)-98.7 °F (37.1 °C)] 98.7 °F (37.1 °C)  Pulse:  [70-87] 79  Resp:  [18-20] 18  SpO2:  [95 %-96 %] 96 %  BP: (118-167)/(58-77) 157/73     Weight: 74 kg (163 lb 2.3 oz) (11/12/22 1519)  Body mass index is 24.08 kg/m².  Body surface area is 1.9 meters  squared.    I/O last 3 completed shifts:  In: 630 [P.O.:630]  Out: 3620 [Urine:325; Drains:195; Other:3100]    Physical Exam  Vitals and nursing note reviewed.   Constitutional:       General: He is not in acute distress.     Appearance: He is normal weight. He is not ill-appearing or diaphoretic.   HENT:      Head: Normocephalic and atraumatic.      Right Ear: External ear normal.      Left Ear: External ear normal.      Nose: Nose normal.   Eyes:      General: No scleral icterus.        Right eye: No discharge.         Left eye: No discharge.      Extraocular Movements: Extraocular movements intact.      Conjunctiva/sclera: Conjunctivae normal.      Comments: Prescription eye glasses.   Cardiovascular:      Rate and Rhythm: Normal rate.      Heart sounds: Murmur heard.     No friction rub. No gallop.      Comments: BLANCA drains present.  Pulmonary:      Effort: No respiratory distress.      Breath sounds: No wheezing, rhonchi or rales.   Chest:      Comments: Incision from recent sternotomy.  Abdominal:      General: Bowel sounds are normal. There is no distension.      Palpations: Abdomen is soft.      Tenderness: There is no abdominal tenderness.   Musculoskeletal:      Cervical back: Neck supple.      Right lower le+ Pitting Edema present.      Left lower le+ Pitting Edema present.   Skin:     General: Skin is warm and dry.      Coloration: Skin is not jaundiced.   Neurological:      General: No focal deficit present.      Mental Status: He is alert. Mental status is at baseline.      Cranial Nerves: No cranial nerve deficit.   Psychiatric:         Mood and Affect: Mood normal.         Behavior: Behavior normal.       Significant Labs:  BMP:   Recent Labs   Lab 22  0438   GLU 84   *   K 4.7   CL 98   CO2 21*   BUN 37*   CREATININE 5.8*   CALCIUM 8.8   MG 1.8       CBC:   Recent Labs   Lab 22  0535   WBC 11.50   RBC 2.73*   HGB 8.4*   HCT 27.4*      *   MCH 30.8   MCHC 30.7*        CMP:   Recent Labs   Lab 11/29/22 0438   GLU 84   CALCIUM 8.8   ALBUMIN 2.4*   PROT 5.8*   *   K 4.7   CO2 21*   CL 98   BUN 37*   CREATININE 5.8*   ALKPHOS 120   ALT <5*   AST 13   BILITOT 0.4       LFTs:   Recent Labs   Lab 11/29/22 0438   ALT <5*   AST 13   ALKPHOS 120   BILITOT 0.4   PROT 5.8*   ALBUMIN 2.4*       Microbiology Results (last 7 days)       Procedure Component Value Units Date/Time    Fungus culture [[id number]] Collected: 11/08/22 1106    Order Status: Completed Specimen: Wound from Sternum Updated: 11/28/22 1212     Fungus (Mycology) Culture Culture in progress      No fungus isolated after 2 weeks    Narrative:      Sternal drainage    Fungus culture [[id number]] Collected: 11/08/22 1106    Order Status: Completed Specimen: Wound from Sternum Updated: 11/28/22 1212     Fungus (Mycology) Culture Culture in progress      No fungus isolated after 2 weeks    Narrative:      Pericardial peel    Fungus culture [[id number]] Collected: 11/10/22 0834    Order Status: Completed Specimen: Wound from Sternum Updated: 11/28/22 1212     Fungus (Mycology) Culture Culture in progress      No fungus isolated after 2 weeks    Narrative:      Sternal wound culture    Fungus culture [[id number]] Collected: 11/08/22 1106    Order Status: Completed Specimen: Wound from Sternum Updated: 11/28/22 1212     Fungus (Mycology) Culture Culture in progress      No fungus isolated after 2 weeks    Narrative:      Sternal wound    Fungus culture [[id number]] Collected: 11/08/22 1106    Order Status: Completed Specimen: Wound from Sternum Updated: 11/28/22 1212     Fungus (Mycology) Culture Culture in progress      No fungus isolated after 2 weeks    Narrative:      Aortic graft    Fungus culture [[id number]] Collected: 11/10/22 0836    Order Status: Completed Specimen: Wound from Sternum Updated: 11/28/22 1212     Fungus (Mycology) Culture Culture in progress      No fungus isolated after 2 weeks    Narrative:       Sternal hematoma culture    Fungus culture [[id number]] Collected: 11/08/22 1106    Order Status: Completed Specimen: Wound from Sternum Updated: 11/28/22 1212     Fungus (Mycology) Culture Culture in progress      No fungus isolated after 2 weeks    Narrative:      Sternal wound    Blood culture [[id number]] Collected: 11/21/22 1559    Order Status: Completed Specimen: Blood from Peripheral, Lower Arm, Left Updated: 11/26/22 2012     Blood Culture, Routine No growth after 5 days.    Blood culture [[id number]] Collected: 11/21/22 8339    Order Status: Completed Specimen: Blood from Peripheral, Antecubital, Left Updated: 11/26/22 2012     Blood Culture, Routine No growth after 5 days.    Narrative:      Two different sites          Significant Imaging:  I have reviewed all imagining in the last 24 hours.

## 2022-11-30 NOTE — PROGRESS NOTES
[facility] - [facility]  Nephrology  Progress Note    Patient Name: David Barrios  MRN: [id number]  Admission Date: 11/8/2022  Hospital Length of Stay: 22 days  Attending Provider: Mike Hedrick MD   Primary Care Physician: Breann Tavera MD  Principal Problem:Sternal wound infection    Subjective:     HPI: 63 yo male w/ hx of Afib; HFrEF (35%); HTN; HLD. Patient underwent who underwent mitral valve repair, tricuspid valve repair, left atrial maze, left atrial appendage resection, and direct aortic impella 5.5 insertion on 10/07/2022. Hospital course complicated by Serratia bacteremia (on 6wk course of cefepime), sternal wound infection, and cardiogenic shock. On 10/31 patient developed KIMANI. Per chart review no documented course of hypotension during that time. Patient started on diuresis with IV lasix, with good urine output. Patient required increased diuretics (IV Diuril 500mg TID; and Lasix 120mg TID) and continued to have increased Scr, which progressed to oliguria, and now having metabolic derangements.     Patient denies any prior history of kidney disease. Does not have significant family history of kidney disease. Does endorse worsening fatigue and dyspnea. Denies any nausea vomiting, or metallic taste in mouth.     Nephrology was consulted for oliguric KIMANI.       Interval History: Patient seen and examined. No acute events overnight. Tolerated iHD yesterday with 2.5 liters removed. Afebrile with pulse ranging  80-70s bpm. Systolic blood pressures ranging from 160-120s mmHg. He is saturating +95% on room air with no documented UOP in the last 24 hours although he reports voiding without issue/making urine. Plan for iHD tomorrow schedule.    Review of patient's allergies indicates:  No Known Allergies  Current Facility-Administered Medications   Medication Frequency    acetaminophen tablet 1,000 mg Q8H    amiodarone tablet 200 mg BID    aspirin chewable tablet 81 mg Daily    atorvastatin  tablet 40 mg Daily    bisacodyL suppository 10 mg Daily PRN    cefepime in dextrose 5 % 1 gram/50 mL IVPB 1 g Q24H    dextrose 10% bolus 125 mL PRN    dextrose 10% bolus 250 mL PRN    diazePAM tablet 5 mg Q6H PRN    heparin (porcine) injection 1,000 Units PRN    heparin (porcine) injection 5,000 Units Q8H    HYDROmorphone injection 1 mg Q4H PRN    hydrOXYzine pamoate capsule 50 mg Q8H PRN    methocarbamoL tablet 500 mg TID    ondansetron injection 4 mg Q8H PRN    oxyCODONE immediate release tablet 10 mg Q4H PRN    oxyCODONE immediate release tablet 5 mg Q4H PRN    polyethylene glycol packet 17 g Daily    senna-docusate 8.6-50 mg per tablet 1 tablet BID    simethicone chewable tablet 80 mg TID PRN    sodium chloride 0.9% bolus 250 mL PRN       Objective:     Vital Signs (Most Recent):  Temp: 98.7 °F (37.1 °C) (11/30/22 0732)  Pulse: 79 (11/30/22 0732)  Resp: 18 (11/30/22 0732)  BP: (!) 157/73 (11/30/22 0732)  SpO2: 96 % (11/30/22 0732)  O2 Device (Oxygen Therapy): room air (11/29/22 1505)   Vital Signs (24h Range):  Temp:  [97.6 °F (36.4 °C)-98.7 °F (37.1 °C)] 98.7 °F (37.1 °C)  Pulse:  [70-87] 79  Resp:  [18-20] 18  SpO2:  [95 %-96 %] 96 %  BP: (118-167)/(58-77) 157/73     Weight: 74 kg (163 lb 2.3 oz) (11/12/22 1519)  Body mass index is 24.08 kg/m².  Body surface area is 1.9 meters squared.    I/O last 3 completed shifts:  In: 630 [P.O.:630]  Out: 3620 [Urine:325; Drains:195; Other:3100]    Physical Exam  Vitals and nursing note reviewed.   Constitutional:       General: He is not in acute distress.     Appearance: He is normal weight. He is not ill-appearing or diaphoretic.   HENT:      Head: Normocephalic and atraumatic.      Right Ear: External ear normal.      Left Ear: External ear normal.      Nose: Nose normal.   Eyes:      General: No scleral icterus.        Right eye: No discharge.         Left eye: No discharge.      Extraocular Movements: Extraocular movements intact.       Conjunctiva/sclera: Conjunctivae normal.      Comments: Prescription eye glasses.   Cardiovascular:      Rate and Rhythm: Normal rate.      Heart sounds: Murmur heard.     No friction rub. No gallop.      Comments: BLANCA drains present.  Pulmonary:      Effort: No respiratory distress.      Breath sounds: No wheezing, rhonchi or rales.   Chest:      Comments: Incision from recent sternotomy.  Abdominal:      General: Bowel sounds are normal. There is no distension.      Palpations: Abdomen is soft.      Tenderness: There is no abdominal tenderness.   Musculoskeletal:      Cervical back: Neck supple.      Right lower le+ Pitting Edema present.      Left lower le+ Pitting Edema present.   Skin:     General: Skin is warm and dry.      Coloration: Skin is not jaundiced.   Neurological:      General: No focal deficit present.      Mental Status: He is alert. Mental status is at baseline.      Cranial Nerves: No cranial nerve deficit.   Psychiatric:         Mood and Affect: Mood normal.         Behavior: Behavior normal.       Significant Labs:  BMP:   Recent Labs   Lab 22   GLU 84   *   K 4.7   CL 98   CO2 21*   BUN 37*   CREATININE 5.8*   CALCIUM 8.8   MG 1.8       CBC:   Recent Labs   Lab 22  0535   WBC 11.50   RBC 2.73*   HGB 8.4*   HCT 27.4*      *   MCH 30.8   MCHC 30.7*       CMP:   Recent Labs   Lab 22   GLU 84   CALCIUM 8.8   ALBUMIN 2.4*   PROT 5.8*   *   K 4.7   CO2 21*   CL 98   BUN 37*   CREATININE 5.8*   ALKPHOS 120   ALT <5*   AST 13   BILITOT 0.4       LFTs:   Recent Labs   Lab 22   ALT <5*   AST 13   ALKPHOS 120   BILITOT 0.4   PROT 5.8*   ALBUMIN 2.4*       Microbiology Results (last 7 days)       Procedure Component Value Units Date/Time    Fungus culture [[id number]] Collected: 22 1106    Order Status: Completed Specimen: Wound from Sternum Updated: 22 1212     Fungus (Mycology) Culture Culture in progress      No  fungus isolated after 2 weeks    Narrative:      Sternal drainage    Fungus culture [[id number]] Collected: 11/08/22 1106    Order Status: Completed Specimen: Wound from Sternum Updated: 11/28/22 1212     Fungus (Mycology) Culture Culture in progress      No fungus isolated after 2 weeks    Narrative:      Pericardial peel    Fungus culture [[id number]] Collected: 11/10/22 0834    Order Status: Completed Specimen: Wound from Sternum Updated: 11/28/22 1212     Fungus (Mycology) Culture Culture in progress      No fungus isolated after 2 weeks    Narrative:      Sternal wound culture    Fungus culture [[id number]] Collected: 11/08/22 1106    Order Status: Completed Specimen: Wound from Sternum Updated: 11/28/22 1212     Fungus (Mycology) Culture Culture in progress      No fungus isolated after 2 weeks    Narrative:      Sternal wound    Fungus culture [[id number]] Collected: 11/08/22 1106    Order Status: Completed Specimen: Wound from Sternum Updated: 11/28/22 1212     Fungus (Mycology) Culture Culture in progress      No fungus isolated after 2 weeks    Narrative:      Aortic graft    Fungus culture [[id number]] Collected: 11/10/22 0836    Order Status: Completed Specimen: Wound from Sternum Updated: 11/28/22 1212     Fungus (Mycology) Culture Culture in progress      No fungus isolated after 2 weeks    Narrative:      Sternal hematoma culture    Fungus culture [[id number]] Collected: 11/08/22 1106    Order Status: Completed Specimen: Wound from Sternum Updated: 11/28/22 1212     Fungus (Mycology) Culture Culture in progress      No fungus isolated after 2 weeks    Narrative:      Sternal wound    Blood culture [[id number]] Collected: 11/21/22 1559    Order Status: Completed Specimen: Blood from Peripheral, Lower Arm, Left Updated: 11/26/22 2012     Blood Culture, Routine No growth after 5 days.    Blood culture [[id number]] Collected: 11/21/22 1551    Order Status: Completed Specimen: Blood from Peripheral, Antecubital,  Left Updated: 11/26/22 2012     Blood Culture, Routine No growth after 5 days.    Narrative:      Two different sites          Significant Imaging:  I have reviewed all imagining in the last 24 hours.    Assessment/Plan:     * Sternal wound infection  - s/p return to OR and now on cefepime  - management per primary team     KIMANI (acute kidney injury)  KIMANI-likely ATN from decreased renal perfusion 2/2 sepsis & cardiogenic shock   Baseline sCr 0.9-1  sCr on admission: 2.7  Lab Results   Component Value Date    CREATININE 4.2 (H) 11/30/2022     Recommendations/Plan:  - Plan for iHD tomorrow via tunneled HD catheter  - Pending PPD results (should be read today), accepting HD unit and home health with infusion therapy arranged   - Daily RFP and magnesium level  - Strict I/Os and daily weights  - Renally dose medications to eGFR  - Avoid nephrotoxins when feasible (i.e. intra-arterial contrast, NSAIDs, etc.)  - Renal diet (low potassium) when not NPO      Thank you for your consult. I will follow-up with patient. Please contact us if you have any additional questions.    Golden Allen MD  Nephrology  [facility] - [facility]

## 2022-11-30 NOTE — DISCHARGE SUMMARY
[facility]  Discharge Summary      Patient Name: David Barrios  MRN: [id number]  Admission Date: 11/8/2022  Hospital Length of Stay: 23 days  Discharge Date and Time:  12/01/2022 1:59 PM  Attending Physician: Mike Hedrick MD   Discharging Provider: Rita Garcia NP  Primary Care Provider: Breann Tavera MD    HPI:   Mr. Barrios is a 62 year old male who underwent mitral valve repair, tricuspid valve repair, left atrial maze, left atrial appendage resection, and direct aortic impella 5.5 insertion on October 7, 2022, which was complicated by Serratia bacteremia and sternal wound infection.  Mr. Barrios was discharged with a wound vac to LTAC to increase his strength and allow wound healing.  Unfortunately, the sternal wound continued to be infected and the decision was made to return to the operating room for sternal wire removal with mediastinal exploration and removal of the ascending aortic graft.  The risks and benefits were explained and informed consent was obtained.      Gross findings: Extensive purulent pericarditis noted anterior to the cardiac structures and along the aortic graft.  No pockets noted posteriorly on transesophageal echocardiogram as well as on intra operative inspection.      Procedure(s) (LRB):  CREATION, FLAP, MUSCLE ROTATION (N/A)  IRRIGATION AND DEBRIDEMENT, WOUND, STERNUM (N/A)  CLOSURE, WOUND, STERNUM (N/A)  EXPLORATION, WOUND (N/A)      Indwelling Lines/Drains at time of discharge:   Lines/Drains/Airways       Peripherally Inserted Central Catheter Line  Duration             PICC Double Lumen 10/17/22 1709 right brachial 43 days              Central Venous Catheter Line  Duration                  Hemodialysis Catheter 11/25/22 0904 right internal jugular 5 days              Drain  Duration                  Closed/Suction Drain 11/14/22 2010 Inferior;Right Chest Bulb 15 Fr. 15 days         Closed/Suction Drain 11/14/22 2010  Superior;Midline Abdomen Bulb 15 Fr. 15 days         Closed/Suction Drain 11/14/22 2015 Inferior;Left Chest Bulb 15 Fr. 15 days                  Hospital Course: On 11/8/2022 the patient was taken to the Operating Room for the above stated procedure. Please see the previously dictated operative report for complete details. Postoperatively, the patient was taken from the  Operating Room to the ICU where the vital signs were monitored and pain was kept under control. The patient was weaned from the drips and extubated in the ICU per protocol. On 11/14, he underwent wound preparation for flap to right pectoralis major muscle flap to sternal wound ([facility]), left pectoralis major muscle flap to sternal wound ([facility]) and advancement flap closure of skin measuring 20 cm x 8 cm by Dr. Carrasquillo.  Infectious disease consulted and recommended continuing his antibiotic therapy until 12/26/22.  On 11/25, he underwent a permacath placement with IR.  He was able to tolerate dialysis through this. Once hemodynamically stable, the patient was transferred to the Cardiac Step-Down floor for continued strengthening and ambulation. On postoperative day 22, the patient was ready for discharge to home. At the time of discharge, the patient was ambulating unassisted. Pain was well controlled with oral analgesics and the patient was tolerating the diet.  He will be discharge home with home health and infusion therapy.  Medications delivered to bedside prior to discharge. He will be dialyzed at [facility].       MOBILITY AND ACTIVITY: As tolerated. Patient may shower. No heavy lifting of greater than 5 pounds and no driving.     DIET: A Cardiac diet      WOUND CARE INSTRUCTIONS: Check for redness, swelling and drainage around the  incision or wound. Patient is to call for any obvious bleeding, drainage, pus from the wound, unusual problems or difficulties or temperature of greater than 101   degrees.     FOLLOWUP:  Follow up with Dr. Hedrick in approximately 3 weeks. Prior to this  appointment, the patient will have an EKG.     Patient not placed on Ace-Inhibitor at the time of discharge due to potential for hypotension      DISCHARGE CONDITION: At the time of discharge, the patient was in sinus rhythm and afebrile with stable vital signs.    Patient will be sent home with BLANCA drains placed during pec flaps.  He is set up to see Plastic Surgery next week in clinic to have them removed if able to be removed   He is also set to see Infectious Disease to determine need for continued antibiotics  Nephrology following and he will be sent home with MWF dialysis at 1200 at [facility].     He will also be sent home   Goals of Care Treatment Preferences:  Code Status: Full Code      Consults (From admission, onward)          Status Ordering Provider     Inpatient consult to Registered Dietitian/Nutritionist  Once        Provider:  (Not yet assigned)    Completed CHESTER MATTA     Inpatient consult to Interventional Radiology  Once        Provider:  (Not yet assigned)    Completed SUSHILA GALINDO     IP consult to Interventional Nephrology  Once        Provider:  (Not yet assigned)    Acknowledged SUSHILA GALINDO     Inpatient consult to Nephrology  Once        Provider:  (Not yet assigned)    Completed JARED OSMAN     Inpatient consult to Infectious Diseases  Once        Provider:  (Not yet assigned)    Completed KALPANA JARVIS     Inpatient consult to Registered Dietitian/Nutritionist  Once        Provider:  (Not yet assigned)    Completed KALPANA JARVIS     Inpatient consult to Social Work  Once        Provider:  (Not yet assigned)    Acknowledged LISE HEDRICK     Inpatient consult to Endocrinology  Once        Provider:  (Not yet assigned)    Completed SUSHILA GALINDO            Pending Diagnostic Studies:       Procedure Component Value Units Date/Time    Magnesium [[id number]]     Order Status:  Sent Lab Status: No result     Specimen: Blood             No new Assessment & Plan notes have been filed under this hospital service since the last note was generated.  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] 10/26/2022 Yes    Transient hyperglycemia post procedure [R73.9] 10/08/2022 Yes    Surgical wound present [T14.8XXA] 10/08/2022 Yes    Persistent atrial fibrillation [I48.19] 05/23/2019 Yes    Microcytic anemia [D50.9] 10/11/2018 Yes      Problems Resolved During this Admission:      Discharged Condition: stable    Disposition: Home-Health Care [facility] [place]    Follow Up:    Patient Instructions:      Ambulatory referral/consult to Home Health   Standing Status: Future   Referral Priority: Routine Referral Type: Home Health   Referral Reason: Specialty Services Required   Requested Specialty: Home Health Services   Number of Visits Requested: 1     Medications:  Reconciled Home Medications:      Medication List        START taking these medications      aspirin 81 MG Chew  Take 1 tablet (81 mg total) by mouth once daily.     atorvastatin 40 MG tablet  Commonly known as: LIPITOR  Take 1 tablet (40 mg total) by mouth once daily.     methocarbamoL 500 MG Tab  Commonly known as: ROBAXIN  Take 1 tablet (500 mg total) by mouth 3 (three) times daily. for 10 days     oxyCODONE 5 MG immediate release tablet  Commonly known as: ROXICODONE  Take 1 tablet (5 mg total) by mouth every 4 (four) hours as needed for Pain.            CONTINUE taking these medications      albuterol 90 mcg/actuation inhaler  Commonly known as: PROVENTIL/VENTOLIN HFA  inhale 1-2 Puff(s) By Mouth Every 4  Hours as needed     amiodarone 200 MG Tab  Commonly known as: PACERONE  Take 1 tablet (200 mg total) by mouth 2 (two) times daily.     ferrous sulfate 325 mg (65 mg iron) Tab tablet  Commonly known as: FEOSOL  Take 1 tablet (325 mg total) by mouth daily with breakfast.     rivaroxaban 20 mg Tab  Commonly known as: XARELTO  Take 1 tablet (20 mg total) by mouth daily with dinner or evening meal.            STOP taking these medications      carvediloL 12.5 MG tablet  Commonly known as: COREG     furosemide 40 MG tablet  Commonly known as: LASIX     hydroCHLOROthiazide 25 MG tablet  Commonly known as: HYDRODIURIL            Time spent on the discharge of patient: 40 minutes    Rita Garcia NP  Cardiothoracic Surgery  [facility]

## 2022-11-30 NOTE — ASSESSMENT & PLAN NOTE
KIMANI-likely ATN from decreased renal perfusion 2/2 sepsis & cardiogenic shock   Baseline sCr 0.9-1  sCr on admission: 2.7  Lab Results   Component Value Date    CREATININE 4.2 (H) 11/30/2022     Recommendations/Plan:  - Plan for iHD tomorrow via tunneled HD catheter  - Pending PPD results (should be read today), accepting HD unit and home health with infusion therapy arranged   - Daily RFP and magnesium level  - Strict I/Os and daily weights  - Renally dose medications to eGFR  - Avoid nephrotoxins when feasible (i.e. intra-arterial contrast, NSAIDs, etc.)  - Renal diet (low potassium) when not NPO

## 2022-11-30 NOTE — PROGRESS NOTES
Plastic and Reconstructive Surgery   Progress Note    Subjective:    Patient seen and examined. 2 lateral chest drains removed, only midline mediastinal drain remains.       Objective:  Vital signs in last 24 hours:  Temp:  [97.6 °F (36.4 °C)-98.7 °F (37.1 °C)] 98.7 °F (37.1 °C)  Pulse:  [70-87] 79  Resp:  [18-20] 18  SpO2:  [95 %-96 %] 96 %  BP: (118-167)/(58-77) 157/73    Intake/Output last 3 shifts:  I/O last 3 completed shifts:  In: 630 [P.O.:630]  Out: 3620 [Urine:325; Drains:195; Other:3100]    Intake/Output this shift:  No intake/output data recorded.        Physical Exam:  VITAL SIGNS:   Vitals:    22 0515 22 0522 22 0704 22 0732   BP: (!) 167/77   (!) 157/73   BP Location: Left arm      Patient Position: Sitting   Sitting   Pulse: 83  79 79   Resp: 20 18  18   Temp: 97.8 °F (36.6 °C)   98.7 °F (37.1 °C)   TempSrc: Oral   Oral   SpO2: 95%   96%   Weight:       Height:         TMAX: Temp (24hrs), Av.1 °F (36.7 °C), Min:97.6 °F (36.4 °C), Max:98.7 °F (37.1 °C)    General: Alert; No acute distress  Cardiovascular: Regular rate   Respiratory: Normal respiratory effort. Chest rise symmetric.   Abdomen: Soft, nontender, nondistended  Extremity: Moves all extremities equally.  Neurologic: No focal deficit. Speech normal  SKIN: midline sternal and bilateral axillary incisions c/d/I, 1 drain left (mediastinum)    Scheduled Medications acetaminophen, 1,000 mg, Q8H  amiodarone, 200 mg, BID  aspirin, 81 mg, Daily  atorvastatin, 40 mg, Daily  ceFEPime (MAXIPIME) IVPB, 1 g, Q24H  heparin (porcine), 5,000 Units, Q8H  methocarbamoL, 500 mg, TID  polyethylene glycol, 17 g, Daily  senna-docusate 8.6-50 mg, 1 tablet, BID    PRN Medications bisacodyL, dextrose 10%, dextrose 10%, diazePAM, heparin (porcine), HYDROmorphone, hydrOXYzine pamoate, ondansetron, oxyCODONE, oxyCODONE, simethicone, sodium chloride 0.9%    Recent Labs:   Lab Results   Component Value Date    WBC 11.50 2022    HGB 8.4 (L)  11/30/2022    HCT 27.4 (L) 11/30/2022     (H) 11/30/2022     11/30/2022     Lab Results   Component Value Date    GLU 84 11/29/2022     (L) 11/29/2022    K 4.7 11/29/2022    CL 98 11/29/2022    BUN 37 (H) 11/29/2022         Assessment: 62 y.o. y/o male 16 Days Post-Op s/p Procedure(s):  REMOVAL, STERNAL WIRE  DEBRIDEMENT, STERNUM  APPLICATION, WOUND VAC Doing well postoperatively.    Plan  Monitor drain output; BLANCA drain to remain in place for dc until follow up in clinic with Dr. Neida Chisholm to shower; recommend pinning BLANCA drains to lanyard or necklace to avoid hanging or pulling out  Encourage ambulation/OOB with PT  Pain control PRN  I's & O's  Dvt ppx  Plastic surgery will follow     Keyshawn Whitlock MD- Fellow  Department of Plastic and Reconstructive Surgery  [phone] (office)

## 2022-11-30 NOTE — PATIENT CARE CONFERENCE
[facility] educator met with patient discussed discharge plan for home IVABX. David Barrios will dc home with family support. Patient will infuse medication via IVP. Patient re-instructed on infusion procedure as wants to now learn the procedure in the event his caregiver is not available. Instructed on S.A.S.H procedure, S.A.S.H mat provided.  Patient education checklist reviewed and acknowledged by above person(s) above and are agreeable to discharge with home infusion plan of care. IV administration process using aspetic technique was reviewed with successful return demonstration. After many attempts and questions patient reports feeling  comfortable with infusion. Patient will dc home with Cefepime 1 gm IV q 24 hours for estimated end of therapy on 12/26/22. Dosing schedule times are TBD prior to D/ C by nursing. Extension placed to double lumen picc by floor nurse tomorrow prior to D/C Monica made aware and reports she is working tomorrow. Annalisa LOPEZ will follow patient for weekly dressing changes and lab draws. Time allotted for questions. Patients nurse and case management team notified teaching has been completed.     Medication delivery made to bedside, consents signed.  Patient aware to refrigerate medication at home     Patient accepted to care by [facility] and report called to Chuyita

## 2022-11-30 NOTE — SUBJECTIVE & OBJECTIVE
Interval History: Dialysis yesterday, patient tolerated well.  Awaiting dialysis chair placement prior to discharge home     Review of Systems   Constitutional: Negative for malaise/fatigue.   Cardiovascular:  Negative for chest pain, claudication, dyspnea on exertion, irregular heartbeat, leg swelling and palpitations.   Respiratory:  Negative for cough and shortness of breath.    Hematologic/Lymphatic: Negative for bleeding problem.   Gastrointestinal:  Negative for abdominal pain.   Genitourinary:  Negative for dysuria.   Neurological:  Negative for headaches and weakness.   Medications:  Continuous Infusions:  Scheduled Meds:   acetaminophen  1,000 mg Oral Q8H    amiodarone  200 mg Oral BID    aspirin  81 mg Oral Daily    atorvastatin  40 mg Oral Daily    ceFEPime (MAXIPIME) IVPB  1 g Intravenous Q24H    heparin (porcine)  5,000 Units Subcutaneous Q8H    methocarbamoL  500 mg Oral TID    polyethylene glycol  17 g Oral Daily    senna-docusate 8.6-50 mg  1 tablet Oral BID     PRN Meds:bisacodyL, dextrose 10%, dextrose 10%, diazePAM, heparin (porcine), HYDROmorphone, hydrOXYzine pamoate, ondansetron, oxyCODONE, oxyCODONE, simethicone, sodium chloride 0.9%     Objective:     Vital Signs (Most Recent):  Temp: 98.7 °F (37.1 °C) (11/30/22 0732)  Pulse: 79 (11/30/22 0732)  Resp: 18 (11/30/22 0732)  BP: (!) 157/73 (11/30/22 0732)  SpO2: 96 % (11/30/22 0732)   Vital Signs (24h Range):  Temp:  [97.6 °F (36.4 °C)-98.7 °F (37.1 °C)] 98.7 °F (37.1 °C)  Pulse:  [70-87] 79  Resp:  [18-20] 18  SpO2:  [95 %-96 %] 96 %  BP: (118-167)/(58-77) 157/73     Weight: 74 kg (163 lb 2.3 oz)  Body mass index is 24.08 kg/m².    SpO2: 96 %  O2 Device (Oxygen Therapy): room air    Intake/Output - Last 3 Shifts         11/28 0700  11/29 0659 11/29 0700  11/30 0659 11/30 0700  12/01 0659    P.O. 100 630     Total Intake(mL/kg) 100 (1.4) 630 (8.5)     Urine (mL/kg/hr) 325 (0.2)      Drains 120 135     Other  3100     Stool       Total Output 445  3235     Net -749 -7579                    Lines/Drains/Airways       Peripherally Inserted Central Catheter Line  Duration             PICC Double Lumen 10/17/22 1709 right brachial 43 days              Central Venous Catheter Line  Duration                  Hemodialysis Catheter 11/25/22 0904 right internal jugular 5 days              Drain  Duration                  Closed/Suction Drain 11/14/22 2010 Inferior;Right Chest Bulb 15 Fr. 15 days         Closed/Suction Drain 11/14/22 2010 Superior;Midline Abdomen Bulb 15 Fr. 15 days         Closed/Suction Drain 11/14/22 2015 Inferior;Left Chest Bulb 15 Fr. 15 days                    Physical Exam  HENT:      Head: Normocephalic and atraumatic.   Eyes:      Extraocular Movements: Extraocular movements intact.   Cardiovascular:      Rate and Rhythm: Normal rate and regular rhythm.   Pulmonary:      Effort: Pulmonary effort is normal.   Abdominal:      General: Abdomen is flat.      Palpations: Abdomen is soft.   Musculoskeletal:         General: Normal range of motion.      Cervical back: Normal range of motion.   Skin:     General: Skin is warm and dry.      Capillary Refill: Capillary refill takes less than 2 seconds.   Neurological:      General: No focal deficit present.       Significant Labs:  BMP:   Recent Labs   Lab 11/29/22  0438 11/30/22  0825   GLU 84 91   * 131*   K 4.7 4.4   CL 98 102   CO2 21* 21*   BUN 37* 22   CREATININE 5.8* 4.2*   CALCIUM 8.8 8.8   MG 1.8  --      CBC:   Recent Labs   Lab 11/30/22  0535   WBC 11.50   RBC 2.73*   HGB 8.4*   HCT 27.4*      *   MCH 30.8   MCHC 30.7*     CMP:   Recent Labs   Lab 11/29/22  0438 11/30/22  0825   GLU 84 91   CALCIUM 8.8 8.8   ALBUMIN 2.4* 2.7*   PROT 5.8*  --    * 131*   K 4.7 4.4   CO2 21* 21*   CL 98 102   BUN 37* 22   CREATININE 5.8* 4.2*   ALKPHOS 120  --    ALT <5*  --    AST 13  --    BILITOT 0.4  --      Coagulation: No results for input(s): PT, INR, APTT in the last 48  hours.    Significant Diagnostics:  I have reviewed and interpreted all pertinent imaging results/findings within the past 24 hours.

## 2022-11-30 NOTE — NURSING
3 hours HD Tx completed. 2.5 L of fluid removed. Patient tolerated well. Blood returned. Catheter locked with Heparin. Clamped and capped. Report given to SINGH Del Valle RN.

## 2022-12-01 ENCOUNTER — DOCUMENTATION ONLY (OUTPATIENT)
Dept: CARDIOTHORACIC SURGERY | Facility: CLINIC | Age: 62
End: 2022-12-01
Payer: COMMERCIAL

## 2022-12-01 VITALS
DIASTOLIC BLOOD PRESSURE: 60 MMHG | WEIGHT: 163.13 LBS | RESPIRATION RATE: 18 BRPM | HEART RATE: 70 BPM | SYSTOLIC BLOOD PRESSURE: 128 MMHG | OXYGEN SATURATION: 95 % | HEIGHT: 69 IN | TEMPERATURE: 98 F | BODY MASS INDEX: 24.16 KG/M2

## 2022-12-01 LAB
ALBUMIN SERPL BCP-MCNC: 2.4 G/DL (ref 3.5–5.2)
ANION GAP SERPL CALC-SCNC: 7 MMOL/L (ref 8–16)
BASOPHILS # BLD AUTO: 0.06 K/UL (ref 0–0.2)
BASOPHILS NFR BLD: 0.5 % (ref 0–1.9)
BUN SERPL-MCNC: 31 MG/DL (ref 8–23)
CALCIUM SERPL-MCNC: 8.8 MG/DL (ref 8.7–10.5)
CHLORIDE SERPL-SCNC: 102 MMOL/L (ref 95–110)
CO2 SERPL-SCNC: 21 MMOL/L (ref 23–29)
CREAT SERPL-MCNC: 5.3 MG/DL (ref 0.5–1.4)
DIFFERENTIAL METHOD: ABNORMAL
EOSINOPHIL # BLD AUTO: 0.2 K/UL (ref 0–0.5)
EOSINOPHIL NFR BLD: 1.3 % (ref 0–8)
ERYTHROCYTE [DISTWIDTH] IN BLOOD BY AUTOMATED COUNT: 18.6 % (ref 11.5–14.5)
EST. GFR  (NO RACE VARIABLE): 11.4 ML/MIN/1.73 M^2
GLUCOSE SERPL-MCNC: 86 MG/DL (ref 70–110)
HCT VFR BLD AUTO: 27.1 % (ref 40–54)
HGB BLD-MCNC: 8.4 G/DL (ref 14–18)
IMM GRANULOCYTES # BLD AUTO: 0.09 K/UL (ref 0–0.04)
IMM GRANULOCYTES NFR BLD AUTO: 0.8 % (ref 0–0.5)
LYMPHOCYTES # BLD AUTO: 1.3 K/UL (ref 1–4.8)
LYMPHOCYTES NFR BLD: 11.1 % (ref 18–48)
MCH RBC QN AUTO: 31.1 PG (ref 27–31)
MCHC RBC AUTO-ENTMCNC: 31 G/DL (ref 32–36)
MCV RBC AUTO: 100 FL (ref 82–98)
MONOCYTES # BLD AUTO: 1.2 K/UL (ref 0.3–1)
MONOCYTES NFR BLD: 10.6 % (ref 4–15)
NEUTROPHILS # BLD AUTO: 8.9 K/UL (ref 1.8–7.7)
NEUTROPHILS NFR BLD: 75.7 % (ref 38–73)
NRBC BLD-RTO: 0 /100 WBC
PHOSPHATE SERPL-MCNC: 3.4 MG/DL (ref 2.7–4.5)
PLATELET # BLD AUTO: 288 K/UL (ref 150–450)
PMV BLD AUTO: 10.6 FL (ref 9.2–12.9)
POTASSIUM SERPL-SCNC: 5 MMOL/L (ref 3.5–5.1)
RBC # BLD AUTO: 2.7 M/UL (ref 4.6–6.2)
SODIUM SERPL-SCNC: 130 MMOL/L (ref 136–145)
WBC # BLD AUTO: 11.75 K/UL (ref 3.9–12.7)

## 2022-12-01 PROCEDURE — 25000003 PHARM REV CODE 250: Performed by: NURSE PRACTITIONER

## 2022-12-01 PROCEDURE — 25000003 PHARM REV CODE 250: Performed by: STUDENT IN AN ORGANIZED HEALTH CARE EDUCATION/TRAINING PROGRAM

## 2022-12-01 PROCEDURE — 80069 RENAL FUNCTION PANEL: CPT | Performed by: STUDENT IN AN ORGANIZED HEALTH CARE EDUCATION/TRAINING PROGRAM

## 2022-12-01 PROCEDURE — 63600175 PHARM REV CODE 636 W HCPCS: Performed by: STUDENT IN AN ORGANIZED HEALTH CARE EDUCATION/TRAINING PROGRAM

## 2022-12-01 PROCEDURE — 63600175 PHARM REV CODE 636 W HCPCS: Performed by: NURSE PRACTITIONER

## 2022-12-01 PROCEDURE — 63600175 PHARM REV CODE 636 W HCPCS: Performed by: ANESTHESIOLOGY

## 2022-12-01 PROCEDURE — 36415 COLL VENOUS BLD VENIPUNCTURE: CPT | Performed by: STUDENT IN AN ORGANIZED HEALTH CARE EDUCATION/TRAINING PROGRAM

## 2022-12-01 PROCEDURE — 90935 HEMODIALYSIS ONE EVALUATION: CPT

## 2022-12-01 PROCEDURE — 99232 PR SUBSEQUENT HOSPITAL CARE,LEVL II: ICD-10-PCS | Mod: ,,, | Performed by: INTERNAL MEDICINE

## 2022-12-01 PROCEDURE — 99232 SBSQ HOSP IP/OBS MODERATE 35: CPT | Mod: ,,, | Performed by: INTERNAL MEDICINE

## 2022-12-01 PROCEDURE — 85025 COMPLETE CBC W/AUTO DIFF WBC: CPT | Performed by: THORACIC SURGERY (CARDIOTHORACIC VASCULAR SURGERY)

## 2022-12-01 RX ORDER — CEFEPIME HYDROCHLORIDE 1 G/50ML
1 INJECTION, SOLUTION INTRAVENOUS
Status: DISCONTINUED | OUTPATIENT
Start: 2022-12-01 | End: 2022-12-01 | Stop reason: HOSPADM

## 2022-12-01 RX ADMIN — HEPARIN SODIUM 1000 UNITS: 1000 INJECTION, SOLUTION INTRAVENOUS; SUBCUTANEOUS at 10:12

## 2022-12-01 RX ADMIN — HEPARIN SODIUM 5000 UNITS: 5000 INJECTION INTRAVENOUS; SUBCUTANEOUS at 06:12

## 2022-12-01 RX ADMIN — SODIUM CHLORIDE 250 ML: 0.9 INJECTION, SOLUTION INTRAVENOUS at 08:12

## 2022-12-01 RX ADMIN — SIMETHICONE 80 MG: 80 TABLET, CHEWABLE ORAL at 01:12

## 2022-12-01 RX ADMIN — ACETAMINOPHEN 1000 MG: 500 TABLET ORAL at 06:12

## 2022-12-01 RX ADMIN — CEFEPIME HYDROCHLORIDE 1 G: 1 INJECTION, POWDER, FOR SOLUTION INTRAMUSCULAR; INTRAVENOUS at 12:12

## 2022-12-01 NOTE — NURSING
DC instructions given with verbal understanding noted.  PICC Line, BLANCA drain and dialysis cath left intact per MD orders.  Infusion nurse visited to follow up with instructions given yesterday.  No s/s of acute distress noted at this time.  Will transport to family car via .

## 2022-12-01 NOTE — PROGRESS NOTES
12/01/22 1050   Post-Hemodialysis Assessment   Rinseback Volume (mL) 250 mL   Blood Volume Processed (Liters) 59.1 L   Dialyzer Clearance Lightly streaked   Duration of Treatment 180 minutes   Additional Fluid Intake (mL) 300 mL   Total UF (mL) 3100 mL   Net Fluid Removal 2500   Patient Response to Treatment tolarated well   Post-Hemodialysis Comments see note     HD complete. Pt AAO, VSS, NAD. Net removal 2500 ml. Report given to primary nurse.

## 2022-12-01 NOTE — PLAN OF CARE
Pt educated on fall risk overnight,pt remained free from falls/trauma/injury. Denies chest pain, SOB, palpitations, dizziness, pain, or discomfort. Plan of care reviewed with pt, all questions answered. Bed locked in lowest position, call bell within reach, no acute distress noted, will continue to monitor.       Problem: Adult Inpatient Plan of Care  Goal: Plan of Care Review  Outcome: Ongoing, Progressing  Goal: Patient-Specific Goal (Individualized)  Outcome: Ongoing, Progressing  Goal: Absence of Hospital-Acquired Illness or Injury  Outcome: Ongoing, Progressing  Goal: Optimal Comfort and Wellbeing  Outcome: Ongoing, Progressing  Goal: Readiness for Transition of Care  Outcome: Ongoing, Progressing     Problem: Infection  Goal: Absence of Infection Signs and Symptoms  Outcome: Ongoing, Progressing     Problem: Fall Injury Risk  Goal: Absence of Fall and Fall-Related Injury  Outcome: Ongoing, Progressing     Problem: Skin Injury Risk Increased  Goal: Skin Health and Integrity  Outcome: Ongoing, Progressing     Problem: Fluid Imbalance (Pneumonia)  Goal: Fluid Balance  Outcome: Ongoing, Progressing     Problem: Infection (Pneumonia)  Goal: Resolution of Infection Signs and Symptoms  Outcome: Ongoing, Progressing     Problem: Respiratory Compromise (Pneumonia)  Goal: Effective Oxygenation and Ventilation  Outcome: Ongoing, Progressing     Problem: Device-Related Complication Risk (Hemodialysis)  Goal: Safe, Effective Therapy Delivery  Outcome: Ongoing, Progressing     Problem: Hemodynamic Instability (Hemodialysis)  Goal: Effective Tissue Perfusion  Outcome: Ongoing, Progressing     Problem: Hemodynamic Instability (Hemodialysis)  Goal: Effective Tissue Perfusion  Outcome: Ongoing, Progressing     Problem: Infection (Hemodialysis)  Goal: Absence of Infection Signs and Symptoms  Outcome: Ongoing, Progressing

## 2022-12-01 NOTE — SUBJECTIVE & OBJECTIVE
Interval History: Patient seen and examined while on HD. Expresses concern about home antibiotic infusions. Afebrile with pulse in the 70s bpm. Systolic blood pressures ranging from 150-120s mmHg. He is saturating +95% on room air with documented UOP of 200 mL in the last 24 hours. Tentative plan to discharge home today per primary team.    Review of patient's allergies indicates:  No Known Allergies  Current Facility-Administered Medications   Medication Frequency    acetaminophen tablet 1,000 mg Q8H    amiodarone tablet 200 mg BID    aspirin chewable tablet 81 mg Daily    atorvastatin tablet 40 mg Daily    bisacodyL suppository 10 mg Daily PRN    cefepime in dextrose 5 % 1 gram/50 mL IVPB 1 g Q24H    dextrose 10% bolus 125 mL PRN    dextrose 10% bolus 250 mL PRN    diazePAM tablet 5 mg Q6H PRN    heparin (porcine) injection 1,000 Units PRN    heparin (porcine) injection 5,000 Units Q8H    HYDROmorphone injection 1 mg Q4H PRN    hydrOXYzine pamoate capsule 50 mg Q8H PRN    methocarbamoL tablet 500 mg TID    ondansetron injection 4 mg Q8H PRN    oxyCODONE immediate release tablet 10 mg Q4H PRN    oxyCODONE immediate release tablet 5 mg Q4H PRN    polyethylene glycol packet 17 g Daily    senna-docusate 8.6-50 mg per tablet 1 tablet BID    simethicone chewable tablet 80 mg TID PRN    sodium chloride 0.9% bolus 250 mL PRN       Objective:     Vital Signs (Most Recent):  Temp: 97.7 °F (36.5 °C) (12/01/22 0704)  Pulse: 71 (12/01/22 0815)  Resp: 18 (12/01/22 0704)  BP: 128/62 (12/01/22 0815)  SpO2: 95 % (12/01/22 0704)  O2 Device (Oxygen Therapy): room air (11/29/22 1505)   Vital Signs (24h Range):  Temp:  [97.1 °F (36.2 °C)-98.5 °F (36.9 °C)] 97.7 °F (36.5 °C)  Pulse:  [71-81] 71  Resp:  [16-19] 18  SpO2:  [95 %-97 %] 95 %  BP: (128-157)/(62-74) 128/62     Weight: 74 kg (163 lb 2.3 oz) (11/12/22 1519)  Body mass index is 24.08 kg/m².  Body surface area is 1.9 meters squared.    I/O last 3 completed shifts:  In: -   Out:  250 [Urine:200; Drains:50]    Physical Exam  Vitals and nursing note reviewed.   Constitutional:       General: He is not in acute distress.     Appearance: He is normal weight. He is not ill-appearing or diaphoretic.   HENT:      Head: Normocephalic and atraumatic.      Right Ear: External ear normal.      Left Ear: External ear normal.      Nose: Nose normal.   Eyes:      General: No scleral icterus.        Right eye: No discharge.         Left eye: No discharge.      Extraocular Movements: Extraocular movements intact.      Conjunctiva/sclera: Conjunctivae normal.      Comments: Prescription eye glasses.   Cardiovascular:      Rate and Rhythm: Normal rate.      Heart sounds: Murmur heard.     No friction rub. No gallop.      Comments: BLANCA drains present.  Pulmonary:      Effort: No respiratory distress.      Breath sounds: No wheezing, rhonchi or rales.   Chest:      Comments: Incision from recent sternotomy.  Abdominal:      General: Bowel sounds are normal. There is no distension.      Palpations: Abdomen is soft.      Tenderness: There is no abdominal tenderness.   Musculoskeletal:      Cervical back: Neck supple.      Right lower le+ Pitting Edema present.      Left lower le+ Pitting Edema present.   Skin:     General: Skin is warm and dry.      Coloration: Skin is not jaundiced.   Neurological:      General: No focal deficit present.      Mental Status: He is alert. Mental status is at baseline.      Cranial Nerves: No cranial nerve deficit.   Psychiatric:         Mood and Affect: Mood normal.         Behavior: Behavior normal.       Significant Labs:  BMP:   Recent Labs   Lab 22  0438 22  0825 22  0403   GLU 84   < > 86   *   < > 130*   K 4.7   < > 5.0   CL 98   < > 102   CO2 21*   < > 21*   BUN 37*   < > 31*   CREATININE 5.8*   < > 5.3*   CALCIUM 8.8   < > 8.8   MG 1.8  --   --     < > = values in this interval not displayed.       CBC:   Recent Labs   Lab 22  0403    WBC 11.75   RBC 2.70*   HGB 8.4*   HCT 27.1*      *   MCH 31.1*   MCHC 31.0*       CMP:   Recent Labs   Lab 11/29/22 0438 11/30/22 0825 12/01/22 0403   GLU 84   < > 86   CALCIUM 8.8   < > 8.8   ALBUMIN 2.4*   < > 2.4*   PROT 5.8*  --   --    *   < > 130*   K 4.7   < > 5.0   CO2 21*   < > 21*   CL 98   < > 102   BUN 37*   < > 31*   CREATININE 5.8*   < > 5.3*   ALKPHOS 120  --   --    ALT <5*  --   --    AST 13  --   --    BILITOT 0.4  --   --     < > = values in this interval not displayed.       LFTs:   Recent Labs   Lab 11/29/22 0438 11/30/22 0825 12/01/22  0403   ALT <5*  --   --    AST 13  --   --    ALKPHOS 120  --   --    BILITOT 0.4  --   --    PROT 5.8*  --   --    ALBUMIN 2.4*   < > 2.4*    < > = values in this interval not displayed.       Microbiology Results (last 7 days)       Procedure Component Value Units Date/Time    Fungus culture [[id number]] Collected: 11/08/22 1106    Order Status: Completed Specimen: Wound from Sternum Updated: 11/28/22 1212     Fungus (Mycology) Culture Culture in progress      No fungus isolated after 2 weeks    Narrative:      Sternal drainage    Fungus culture [[id number]] Collected: 11/08/22 1106    Order Status: Completed Specimen: Wound from Sternum Updated: 11/28/22 1212     Fungus (Mycology) Culture Culture in progress      No fungus isolated after 2 weeks    Narrative:      Pericardial peel    Fungus culture [[id number]] Collected: 11/10/22 0834    Order Status: Completed Specimen: Wound from Sternum Updated: 11/28/22 1212     Fungus (Mycology) Culture Culture in progress      No fungus isolated after 2 weeks    Narrative:      Sternal wound culture    Fungus culture [[id number]] Collected: 11/08/22 1106    Order Status: Completed Specimen: Wound from Sternum Updated: 11/28/22 1212     Fungus (Mycology) Culture Culture in progress      No fungus isolated after 2 weeks    Narrative:      Sternal wound    Fungus culture [[id number]] Collected:  11/08/22 1106    Order Status: Completed Specimen: Wound from Sternum Updated: 11/28/22 1212     Fungus (Mycology) Culture Culture in progress      No fungus isolated after 2 weeks    Narrative:      Aortic graft    Fungus culture [[id number]] Collected: 11/10/22 0836    Order Status: Completed Specimen: Wound from Sternum Updated: 11/28/22 1212     Fungus (Mycology) Culture Culture in progress      No fungus isolated after 2 weeks    Narrative:      Sternal hematoma culture    Fungus culture [[id number]] Collected: 11/08/22 1106    Order Status: Completed Specimen: Wound from Sternum Updated: 11/28/22 1212     Fungus (Mycology) Culture Culture in progress      No fungus isolated after 2 weeks    Narrative:      Sternal wound    Blood culture [[id number]] Collected: 11/21/22 1559    Order Status: Completed Specimen: Blood from Peripheral, Lower Arm, Left Updated: 11/26/22 2012     Blood Culture, Routine No growth after 5 days.    Blood culture [[id number]] Collected: 11/21/22 1558    Order Status: Completed Specimen: Blood from Peripheral, Antecubital, Left Updated: 11/26/22 2012     Blood Culture, Routine No growth after 5 days.    Narrative:      Two different sites          Significant Imaging:  I have reviewed all imagining in the last 24 hours.

## 2022-12-01 NOTE — PT/OT/SLP PROGRESS
Occupational Therapy      Patient Name:  David Barrios   MRN:  [id number]    Pt off unit for HD throughout the morning and OT unable to return this afternoon. OT to check status as later date.     12/1/2022

## 2022-12-01 NOTE — PROGRESS NOTES
[facility] - [facility]iology Stepdown  Nephrology  Progress Note    Patient Name: David Barrios  MRN: [id number]  Admission Date: 11/8/2022  Hospital Length of Stay: 23 days  Attending Provider: Mike Hedrick MD   Primary Care Physician: Breann Tavera MD  Principal Problem:Sternal wound infection    Subjective:     HPI: 63 yo male w/ hx of Afib; HFrEF (35%); HTN; HLD. Patient underwent who underwent mitral valve repair, tricuspid valve repair, left atrial maze, left atrial appendage resection, and direct aortic impella 5.5 insertion on 10/07/2022. Hospital course complicated by Serratia bacteremia (on 6wk course of cefepime), sternal wound infection, and cardiogenic shock. On 10/31 patient developed KIMANI. Per chart review no documented course of hypotension during that time. Patient started on diuresis with IV lasix, with good urine output. Patient required increased diuretics (IV Diuril 500mg TID; and Lasix 120mg TID) and continued to have increased Scr, which progressed to oliguria, and now having metabolic derangements.     Patient denies any prior history of kidney disease. Does not have significant family history of kidney disease. Does endorse worsening fatigue and dyspnea. Denies any nausea vomiting, or metallic taste in mouth.     Nephrology was consulted for oliguric KIMANI.       Interval History: Patient seen and examined while on HD. Expresses concern about home antibiotic infusions. Afebrile with pulse in the 70s bpm. Systolic blood pressures ranging from 150-120s mmHg. He is saturating +95% on room air with documented UOP of 200 mL in the last 24 hours. Tentative plan to discharge home today per primary team.    Review of patient's allergies indicates:  No Known Allergies  Current Facility-Administered Medications   Medication Frequency    acetaminophen tablet 1,000 mg Q8H    amiodarone tablet 200 mg BID    aspirin chewable tablet 81 mg Daily    atorvastatin tablet 40 mg Daily    bisacodyL suppository  10 mg Daily PRN    cefepime in dextrose 5 % 1 gram/50 mL IVPB 1 g Q24H    dextrose 10% bolus 125 mL PRN    dextrose 10% bolus 250 mL PRN    diazePAM tablet 5 mg Q6H PRN    heparin (porcine) injection 1,000 Units PRN    heparin (porcine) injection 5,000 Units Q8H    HYDROmorphone injection 1 mg Q4H PRN    hydrOXYzine pamoate capsule 50 mg Q8H PRN    methocarbamoL tablet 500 mg TID    ondansetron injection 4 mg Q8H PRN    oxyCODONE immediate release tablet 10 mg Q4H PRN    oxyCODONE immediate release tablet 5 mg Q4H PRN    polyethylene glycol packet 17 g Daily    senna-docusate 8.6-50 mg per tablet 1 tablet BID    simethicone chewable tablet 80 mg TID PRN    sodium chloride 0.9% bolus 250 mL PRN       Objective:     Vital Signs (Most Recent):  Temp: 97.7 °F (36.5 °C) (12/01/22 0704)  Pulse: 71 (12/01/22 0815)  Resp: 18 (12/01/22 0704)  BP: 128/62 (12/01/22 0815)  SpO2: 95 % (12/01/22 0704)  O2 Device (Oxygen Therapy): room air (11/29/22 1505)   Vital Signs (24h Range):  Temp:  [97.1 °F (36.2 °C)-98.5 °F (36.9 °C)] 97.7 °F (36.5 °C)  Pulse:  [71-81] 71  Resp:  [16-19] 18  SpO2:  [95 %-97 %] 95 %  BP: (128-157)/(62-74) 128/62     Weight: 74 kg (163 lb 2.3 oz) (11/12/22 1519)  Body mass index is 24.08 kg/m².  Body surface area is 1.9 meters squared.    I/O last 3 completed shifts:  In: -   Out: 250 [Urine:200; Drains:50]    Physical Exam  Vitals and nursing note reviewed.   Constitutional:       General: He is not in acute distress.     Appearance: He is normal weight. He is not ill-appearing or diaphoretic.   HENT:      Head: Normocephalic and atraumatic.      Right Ear: External ear normal.      Left Ear: External ear normal.      Nose: Nose normal.   Eyes:      General: No scleral icterus.        Right eye: No discharge.         Left eye: No discharge.      Extraocular Movements: Extraocular movements intact.      Conjunctiva/sclera: Conjunctivae normal.      Comments: Prescription eye glasses.    Cardiovascular:      Rate and Rhythm: Normal rate.      Heart sounds: Murmur heard.     No friction rub. No gallop.      Comments: BLANCA drains present.  Pulmonary:      Effort: No respiratory distress.      Breath sounds: No wheezing, rhonchi or rales.   Chest:      Comments: Incision from recent sternotomy.  Abdominal:      General: Bowel sounds are normal. There is no distension.      Palpations: Abdomen is soft.      Tenderness: There is no abdominal tenderness.   Musculoskeletal:      Cervical back: Neck supple.      Right lower le+ Pitting Edema present.      Left lower le+ Pitting Edema present.   Skin:     General: Skin is warm and dry.      Coloration: Skin is not jaundiced.   Neurological:      General: No focal deficit present.      Mental Status: He is alert. Mental status is at baseline.      Cranial Nerves: No cranial nerve deficit.   Psychiatric:         Mood and Affect: Mood normal.         Behavior: Behavior normal.       Significant Labs:  BMP:   Recent Labs   Lab 22  0403   GLU 84   < > 86   *   < > 130*   K 4.7   < > 5.0   CL 98   < > 102   CO2 21*   < > 21*   BUN 37*   < > 31*   CREATININE 5.8*   < > 5.3*   CALCIUM 8.8   < > 8.8   MG 1.8  --   --     < > = values in this interval not displayed.       CBC:   Recent Labs   Lab 22   WBC 11.75   RBC 2.70*   HGB 8.4*   HCT 27.1*      *   MCH 31.1*   MCHC 31.0*       CMP:   Recent Labs   Lab 22  0403   GLU 84   < > 86   CALCIUM 8.8   < > 8.8   ALBUMIN 2.4*   < > 2.4*   PROT 5.8*  --   --    *   < > 130*   K 4.7   < > 5.0   CO2 21*   < > 21*   CL 98   < > 102   BUN 37*   < > 31*   CREATININE 5.8*   < > 5.3*   ALKPHOS 120  --   --    ALT <5*  --   --    AST 13  --   --    BILITOT 0.4  --   --     < > = values in this interval not displayed.       LFTs:   Recent Labs   Lab 22/22  0403   ALT <5*  --   --     AST 13  --   --    ALKPHOS 120  --   --    BILITOT 0.4  --   --    PROT 5.8*  --   --    ALBUMIN 2.4*   < > 2.4*    < > = values in this interval not displayed.       Microbiology Results (last 7 days)       Procedure Component Value Units Date/Time    Fungus culture [[id number]] Collected: 11/08/22 1106    Order Status: Completed Specimen: Wound from Sternum Updated: 11/28/22 1212     Fungus (Mycology) Culture Culture in progress      No fungus isolated after 2 weeks    Narrative:      Sternal drainage    Fungus culture [[id number]] Collected: 11/08/22 1106    Order Status: Completed Specimen: Wound from Sternum Updated: 11/28/22 1212     Fungus (Mycology) Culture Culture in progress      No fungus isolated after 2 weeks    Narrative:      Pericardial peel    Fungus culture [[id number]] Collected: 11/10/22 0834    Order Status: Completed Specimen: Wound from Sternum Updated: 11/28/22 1212     Fungus (Mycology) Culture Culture in progress      No fungus isolated after 2 weeks    Narrative:      Sternal wound culture    Fungus culture [[id number]] Collected: 11/08/22 1106    Order Status: Completed Specimen: Wound from Sternum Updated: 11/28/22 1212     Fungus (Mycology) Culture Culture in progress      No fungus isolated after 2 weeks    Narrative:      Sternal wound    Fungus culture [[id number]] Collected: 11/08/22 1106    Order Status: Completed Specimen: Wound from Sternum Updated: 11/28/22 1212     Fungus (Mycology) Culture Culture in progress      No fungus isolated after 2 weeks    Narrative:      Aortic graft    Fungus culture [[id number]] Collected: 11/10/22 0836    Order Status: Completed Specimen: Wound from Sternum Updated: 11/28/22 1212     Fungus (Mycology) Culture Culture in progress      No fungus isolated after 2 weeks    Narrative:      Sternal hematoma culture    Fungus culture [[id number]] Collected: 11/08/22 1106    Order Status: Completed Specimen: Wound from Sternum Updated: 11/28/22 1212      Fungus (Mycology) Culture Culture in progress      No fungus isolated after 2 weeks    Narrative:      Sternal wound    Blood culture [[id number]] Collected: 11/21/22 1559    Order Status: Completed Specimen: Blood from Peripheral, Lower Arm, Left Updated: 11/26/22 2012     Blood Culture, Routine No growth after 5 days.    Blood culture [[id number]] Collected: 11/21/22 1558    Order Status: Completed Specimen: Blood from Peripheral, Antecubital, Left Updated: 11/26/22 2012     Blood Culture, Routine No growth after 5 days.    Narrative:      Two different sites          Significant Imaging:  I have reviewed all imagining in the last 24 hours.    Assessment/Plan:     * Sternal wound infection  - s/p return to OR and now on cefepime  - management per primary team     KIMANI (acute kidney injury)  KIMANI-likely ATN from decreased renal perfusion 2/2 sepsis & cardiogenic shock   Baseline sCr 0.9-1  sCr on admission: 2.7  Lab Results   Component Value Date    CREATININE 5.3 (H) 12/01/2022     Recommendations/Plan:  - iHD today per schedule, baths adjusted given serum potassium this morning  - Daily RFP and magnesium level  - Strict I/Os and daily weights  - Renally dose medications to eGFR  - Avoid nephrotoxins when feasible (i.e. intra-arterial contrast, NSAIDs, etc.)  - Renal diet (low potassium) when not NPO      Thank you for your consult. I will follow-up with patient. Please contact us if you have any additional questions.    Golden Allen MD  Nephrology  [facility] - [facility]iology Stepdown

## 2022-12-01 NOTE — ASSESSMENT & PLAN NOTE
KIMANI-likely ATN from decreased renal perfusion 2/2 sepsis & cardiogenic shock   Baseline sCr 0.9-1  sCr on admission: 2.7  Lab Results   Component Value Date    CREATININE 5.3 (H) 12/01/2022     Recommendations/Plan:  - iHD today per schedule, baths adjusted given serum potassium this morning  - Daily RFP and magnesium level  - Strict I/Os and daily weights  - Renally dose medications to eGFR  - Avoid nephrotoxins when feasible (i.e. intra-arterial contrast, NSAIDs, etc.)  - Renal diet (low potassium) when not NPO

## 2022-12-01 NOTE — PROGRESS NOTES
Plastic and Reconstructive Surgery   Progress Note    Subjective:    Patient seen and examined. NO acute event overnight. Patient had HD this am. No complaints currently.      Objective:  Vital signs in last 24 hours:  Temp:  [97.1 °F (36.2 °C)-98.5 °F (36.9 °C)] 97.7 °F (36.5 °C)  Pulse:  [69-81] 70  Resp:  [16-19] 18  SpO2:  [95 %-97 %] 95 %  BP: (107-157)/(55-74) 128/60    Intake/Output last 3 shifts:  I/O last 3 completed shifts:  In: -   Out: 250 [Urine:200; Drains:50]    Intake/Output this shift:  I/O this shift:  In: 300 [Other:300]  Out: 3100 [Other:3100]        Physical Exam:  VITAL SIGNS:   Vitals:    22 1015 22 1030 22 1045 22 1050   BP: 134/60 (!) 107/57 123/62 128/60   BP Location:    Left arm   Patient Position:    Lying   Pulse: 71 72 72 70   Resp:    18   Temp:       TempSrc:       SpO2:       Weight:       Height:         TMAX: Temp (24hrs), Av.9 °F (36.6 °C), Min:97.1 °F (36.2 °C), Max:98.5 °F (36.9 °C)    General: Alert; No acute distress  Cardiovascular: Regular rate   Respiratory: Normal respiratory effort. Chest rise symmetric.   Abdomen: Soft, nontender, nondistended  Extremity: Moves all extremities equally.  Neurologic: No focal deficit. Speech normal  SKIN: midline sternal and bilateral axillary incisions c/d/I, 1 drain left (mediastinum)    Scheduled Medications acetaminophen, 1,000 mg, Q8H  amiodarone, 200 mg, BID  aspirin, 81 mg, Daily  atorvastatin, 40 mg, Daily  ceFEPime (MAXIPIME) IVPB, 1 g, Q24H  heparin (porcine), 5,000 Units, Q8H  methocarbamoL, 500 mg, TID  polyethylene glycol, 17 g, Daily  senna-docusate 8.6-50 mg, 1 tablet, BID    PRN Medications bisacodyL, dextrose 10%, dextrose 10%, diazePAM, heparin (porcine), HYDROmorphone, hydrOXYzine pamoate, ondansetron, oxyCODONE, oxyCODONE, simethicone, sodium chloride 0.9%    Recent Labs:   Lab Results   Component Value Date    WBC 11.75 2022    HGB 8.4 (L) 2022    HCT 27.1 (L) 2022    MCV  100 (H) 12/01/2022     12/01/2022     Lab Results   Component Value Date    GLU 86 12/01/2022     (L) 12/01/2022    K 5.0 12/01/2022     12/01/2022    BUN 31 (H) 12/01/2022         Assessment: 62 y.o. y/o male 17 Days Post-Op s/p Procedure(s):  REMOVAL, STERNAL WIRE  DEBRIDEMENT, STERNUM  APPLICATION, WOUND VAC Doing well postoperatively.    Plan  Monitor drain output; BLANCA drain to remain in place for dc until follow up in clinic with Dr. Neida Chisholm to shower; recommend pinning BLANCA drains to lanyard or necklace to avoid hanging or pulling out  Encourage ambulation/OOB with PT  Pain control  I's & O's  Dvt ppx  Clear for DC from plastic standpoint  F/u Nephro recs  F/u ID for abx  Medical management as per primary    Keyshawn Whitlock MD- Fellow  Department of Plastic and Reconstructive Surgery  [phone] (office)

## 2022-12-01 NOTE — PROGRESS NOTES
Met with pt and his friend, Bella, at bedside and reviewed wound care instructions for pt's midsternal and chest tube site incisions. Patient was sleeping, do d/c instructions reviewed with patient's friend.    Reviewed daily inspection and cleaning of surgical incisions and instructed pt to call the clinic with any questions or concerns.  Pt provided with a hand-out (see below) which contains detailed instructions on daily wound care inspection and care.  Pt reminded of his 5 pound lifting, pushing, and pulling restrictions for the first 4 weeks following his surgery and to refrain from driving until released by Dr. Hedrick.  Pt informed of his post-op appts on 1/11 and was provided with a copy of his appts for that day.  Pt's friend verbalized understanding to all instructions.      Future Appointments   Date Time Provider Department Center   12/7/2022 11:15 AM Amadeo Carrasquillo MD [facility] PLASTIC [facility]   12/15/2022 10:30 AM Rigoberto Jerry MD [facility] ID [facility]   12/27/2022  8:30 AM Jeferson Artis MD [facility] ID [facility]   1/11/2023  9:15 AM EKG, APPT [facility] EKG [facility]   1/11/2023  9:30 AM Mike Hedrick MD [facility] CARDVAS [facility]        Julie Haase RN  [facility] Nurse Navigator [phone]       Showering:   If your incisions are healing and there is no drainage - it is ok to take a shower.  Shower with your back to the shower spray.  It is ok for your incision to get wet, but the shower spray should not directly hit your chest.  Do not soak in a tub.  The water temperature should be warm -- not too hot or cold. Extreme water temperatures can cause you to feel faint.  It is expected that you wash your incisions when you shower, however only use soap and water to cleanse the sites.  Use normal bar soap, not perfumed soap or body wash. During your recovery, do not try a new brand of soap.  Place soapy water on your hand or a clean washcloth and gently wash your incisions using an up-and-down motion.  Do not apply  ointments, oils, or salves to your incisions.  Pat the skin gently to dry.    Wound Inspection:   Wash your hands before and after caring for or touching your incisions.   Look in the mirror daily. Inspect your incisions for redness, drainage and warmth. Your incisions should be healing; there should NOT be an increase in opening.   Gently wash your incisions everyday with soap and warm water using a clean washcloth, or your hand and light touch.   Gently pat dry with a clean towel.   You do not need to cover your incisions unless they are draining.    If you are experiencing drainage, it is important to call your doctor.  Monday - Friday, from 8:00am - 5pm, call [phone].  Outside of these hours, please call [phone], which is a 24-hour nurse care advice line.     When to call your doctor:  Increased drainage or oozing from the incision(s)  Increased opening of the incision line(s) - there should not be gaps or pulling apart areas of the incision(s)  Redness along the incision(s) - if the incisions were red or pink when you left the hospital, they should be improving and not becoming more red  Warmth along the incision line(s)  Increased body temperature / Fever - greater than 101 degrees Fahrenheit  If you have diabetes and your blood sugar levels begin to vary

## 2022-12-01 NOTE — PLAN OF CARE
[facility]    Went to the bedside to check on his home infusion medication since it was delivered yesterday. Medications were still cold but instructed the patient to put in his refrigerator at home as soon as he gets there. I also did another home infusion teach with him to refresh.    [facility]  Nadira Colon RN, Clinical Educator  Cell [phone]  Office [phone]  Fax [phone]

## 2022-12-02 LAB — HCV RNA SERPL NAA+PROBE-ACNC: NORMAL IU/ML

## 2022-12-02 PROCEDURE — G0180 MD CERTIFICATION HHA PATIENT: HCPCS | Mod: ,,, | Performed by: NURSE PRACTITIONER

## 2022-12-02 PROCEDURE — G0180 PR HOME HEALTH MD CERTIFICATION: ICD-10-PCS | Mod: ,,, | Performed by: NURSE PRACTITIONER

## 2022-12-02 RX ORDER — OXYCODONE HYDROCHLORIDE 5 MG/1
5 TABLET ORAL EVERY 4 HOURS PRN
Qty: 42 TABLET | Refills: 0 | Status: SHIPPED | OUTPATIENT
Start: 2022-12-02 | End: 2022-12-09

## 2022-12-04 NOTE — PLAN OF CARE
Home Health, IV infusion, and HD chair approved and scheduled. Patient discharged home.    Delaney Kumar [facility]  Case Management Department  [email or web address]       12/04/22 1553   Final Note   Assessment Type Final Discharge Note   Anticipated Discharge Disposition Home-Health   Hospital Resources/Appts/Education Provided Provided patient/caregiver with written discharge plan information;Appointments scheduled and added to AVS;Appointments scheduled by Navigator/Coordinator   Post-Acute Status   Post-Acute Authorization Dialysis;Home Health   Home Health Status Set-up Complete/Auth obtained   Diaylsis Status Set-up Complete/Auth obtained   Discharge Delays None known at this time     Future Appointments   Date Time Provider Department Center   12/7/2022 11:15 AM Amadeo Carrasquillo MD [facility]LASTIC [facility]   12/15/2022 10:30 AM Rigoberto Jerry MD [facility] ID [facility]   12/27/2022  8:30 AM Jeferson Artis MD [facility] ID [facility]   1/11/2023  9:15 AM EKG, APPT [facility] EKG [facility]   1/11/2023  9:30 AM Mike Hedrick MD [facility] CARDVAS [facility]

## 2022-12-05 ENCOUNTER — LAB VISIT (OUTPATIENT)
Dept: LAB | Facility: HOSPITAL | Age: 62
End: 2022-12-05
Attending: THORACIC SURGERY (CARDIOTHORACIC VASCULAR SURGERY)
Payer: COMMERCIAL

## 2022-12-05 ENCOUNTER — DOCUMENTATION ONLY (OUTPATIENT)
Dept: CARDIOTHORACIC SURGERY | Facility: CLINIC | Age: 62
End: 2022-12-05
Payer: COMMERCIAL

## 2022-12-05 DIAGNOSIS — S21.101A UNSPECIFIED OPEN WOUND OF RIGHT FRONT WALL OF THORAX WITHOUT PENETRATION INTO THORACIC CAVITY, INITIAL ENCOUNTER: Primary | ICD-10-CM

## 2022-12-05 LAB
ALBUMIN SERPL BCP-MCNC: 2.9 G/DL (ref 3.5–5.2)
ALP SERPL-CCNC: 110 U/L (ref 38–126)
ALT SERPL W/O P-5'-P-CCNC: 13 U/L (ref 10–44)
ANION GAP SERPL CALC-SCNC: 8 MMOL/L (ref 8–16)
AST SERPL-CCNC: 20 U/L (ref 15–46)
BASOPHILS # BLD AUTO: 0.03 K/UL (ref 0–0.2)
BASOPHILS NFR BLD: 0.3 % (ref 0–1.9)
BILIRUB SERPL-MCNC: 0.5 MG/DL (ref 0.1–1)
CALCIUM SERPL-MCNC: 8.5 MG/DL (ref 8.7–10.5)
CHLORIDE SERPL-SCNC: 96 MMOL/L (ref 95–110)
CO2 SERPL-SCNC: 26 MMOL/L (ref 23–29)
CREAT SERPL-MCNC: 5.2 MG/DL (ref 0.5–1.4)
CRP SERPL-MCNC: 0.49 MG/DL (ref 0–1)
DIFFERENTIAL METHOD: ABNORMAL
EOSINOPHIL # BLD AUTO: 0.2 K/UL (ref 0–0.5)
EOSINOPHIL NFR BLD: 2.1 % (ref 0–8)
ERYTHROCYTE [DISTWIDTH] IN BLOOD BY AUTOMATED COUNT: 17.9 % (ref 11.5–14.5)
EST. GFR  (NO RACE VARIABLE): 11.7 ML/MIN/1.73 M^2
GLUCOSE SERPL-MCNC: 115 MG/DL (ref 70–110)
HCT VFR BLD AUTO: 25.5 % (ref 40–54)
HGB BLD-MCNC: 8.1 G/DL (ref 14–18)
IMM GRANULOCYTES # BLD AUTO: 0.08 K/UL (ref 0–0.04)
IMM GRANULOCYTES NFR BLD AUTO: 0.8 % (ref 0–0.5)
LYMPHOCYTES # BLD AUTO: 1 K/UL (ref 1–4.8)
LYMPHOCYTES NFR BLD: 10.5 % (ref 18–48)
MCH RBC QN AUTO: 31.3 PG (ref 27–31)
MCHC RBC AUTO-ENTMCNC: 31.8 G/DL (ref 32–36)
MCV RBC AUTO: 99 FL (ref 82–98)
MONOCYTES # BLD AUTO: 1 K/UL (ref 0.3–1)
MONOCYTES NFR BLD: 9.8 % (ref 4–15)
NEUTROPHILS # BLD AUTO: 7.5 K/UL (ref 1.8–7.7)
NEUTROPHILS NFR BLD: 76.5 % (ref 38–73)
NRBC BLD-RTO: 0 /100 WBC
PLATELET # BLD AUTO: 248 K/UL (ref 150–450)
PMV BLD AUTO: 10.4 FL (ref 9.2–12.9)
POTASSIUM SERPL-SCNC: 4.3 MMOL/L (ref 3.5–5.1)
PROT SERPL-MCNC: 5.8 G/DL (ref 6–8.4)
RBC # BLD AUTO: 2.59 M/UL (ref 4.6–6.2)
SODIUM SERPL-SCNC: 130 MMOL/L (ref 136–145)
UUN UR-MCNC: 45 MG/DL (ref 2–20)
WBC # BLD AUTO: 9.73 K/UL (ref 3.9–12.7)

## 2022-12-05 PROCEDURE — 36415 COLL VENOUS BLD VENIPUNCTURE: CPT | Mod: PO | Performed by: THORACIC SURGERY (CARDIOTHORACIC VASCULAR SURGERY)

## 2022-12-05 PROCEDURE — 80053 COMPREHEN METABOLIC PANEL: CPT | Mod: PO | Performed by: THORACIC SURGERY (CARDIOTHORACIC VASCULAR SURGERY)

## 2022-12-05 PROCEDURE — 85025 COMPLETE CBC W/AUTO DIFF WBC: CPT | Mod: PO | Performed by: THORACIC SURGERY (CARDIOTHORACIC VASCULAR SURGERY)

## 2022-12-05 PROCEDURE — 86140 C-REACTIVE PROTEIN: CPT | Mod: PO | Performed by: THORACIC SURGERY (CARDIOTHORACIC VASCULAR SURGERY)

## 2022-12-05 NOTE — PROGRESS NOTES
Received message from  that patient has not taken IV meds or any new meds since d/c. He is also not showering. Spoke with [place]   to get more information. She stated patient has very limited benefits and they would like to see him once a week for line care. Pt is using COBRA because he lost insurance.    Pt is currently at HD and was instructed to bring his medication to HD.    Notified ID that patient has not taken any antibiotics since d/c.    Full note to be scanned into media tab.    Julie Haase RN  [facility] Nurse Navigator [phone]

## 2022-12-06 ENCOUNTER — TELEPHONE (OUTPATIENT)
Dept: INFECTIOUS DISEASES | Facility: CLINIC | Age: 62
End: 2022-12-06
Payer: COMMERCIAL

## 2022-12-06 NOTE — TELEPHONE ENCOUNTER
Called [facility] at [phone], spoke to Ananth  Stated pt is not getting his IV antibiotics at [facility],  he needs orders to be fax to [facility].   [facility] will send medication to [facility], pt will get the antibiotics after his HD.    Cefapime 2g(Monday), 2g(Wednesday), 3g(Friday) after HD on dialysis days.    Sent orders to [facility]    Called Efren with [facility] infusion to give verbal orders, and to send medication to [facility].   He verbalized understanding.

## 2022-12-06 NOTE — TELEPHONE ENCOUNTER
----- Message from Hardik Hancock LPN sent at 12/6/2022  9:06 AM CST -----  Regarding: RE: Patient non compliant with antibiotics per HH  I spoke with the patient, he gets Dialysis M,W,F @ [facility] and is getting his IV abx.  Patient advised to call office if has any issues receiving the medication at dialysis.  Pt expressed verbal understanding.   ----- Message -----  From: Alix Jara MD  Sent: 12/5/2022   7:43 PM CST  To: Mahi Campbell MA, Hardik Hancock LPN  Subject: RE: Patient non compliant with antibiotics p#    If the patient is currently receiving hemodialysis his antibiotics should be given with dialysis. Can we verify if he is getting hemodialysis three times per week and where so we can send orders to the HD unit?    ----- Message -----  From: Hardik Hancock LPN  Sent: 12/5/2022   4:53 PM CST  To: Alix Jara MD, Mahi Campbell MA  Subject: FW: Patient non compliant with antibiotics p#    See below mssg  ----- Message -----  From: Julie R Haase, RN  Sent: 12/5/2022  11:33 AM CST  To: Marek Thomson Staff  Subject: Patient non compliant with antibiotics per HH    Good morning,    I just wanted to let you know that I got a message from patient's home health agency. Pt has not administered his home Cefepime since d/c. He is bringing the medication with him to HD today.    I just wanted to let you know in case you wanted to follow up with patient's home health, [facility] [phone].    Want to keep everyone in the loop.    Thanks!  Julie Haase RN  [facility] Nurse Navigator [phone]

## 2022-12-07 ENCOUNTER — OFFICE VISIT (OUTPATIENT)
Dept: PLASTIC SURGERY | Facility: CLINIC | Age: 62
End: 2022-12-07
Payer: COMMERCIAL

## 2022-12-07 VITALS — SYSTOLIC BLOOD PRESSURE: 152 MMHG | OXYGEN SATURATION: 98 % | HEART RATE: 78 BPM | DIASTOLIC BLOOD PRESSURE: 67 MMHG

## 2022-12-07 DIAGNOSIS — L08.9 STERNAL WOUND INFECTION: Primary | ICD-10-CM

## 2022-12-07 DIAGNOSIS — S21.101A STERNAL WOUND INFECTION: Primary | ICD-10-CM

## 2022-12-07 LAB
FUNGUS SPEC CULT: NORMAL

## 2022-12-07 PROCEDURE — 99024 PR POST-OP FOLLOW-UP VISIT: ICD-10-PCS | Mod: S$GLB,,, | Performed by: SURGERY

## 2022-12-07 PROCEDURE — 3066F NEPHROPATHY DOC TX: CPT | Mod: CPTII,S$GLB,, | Performed by: SURGERY

## 2022-12-07 PROCEDURE — 3078F PR MOST RECENT DIASTOLIC BLOOD PRESSURE < 80 MM HG: ICD-10-PCS | Mod: CPTII,S$GLB,, | Performed by: SURGERY

## 2022-12-07 PROCEDURE — 99999 PR PBB SHADOW E&M-EST. PATIENT-LVL III: ICD-10-PCS | Mod: PBBFAC,,, | Performed by: SURGERY

## 2022-12-07 PROCEDURE — 1159F MED LIST DOCD IN RCRD: CPT | Mod: CPTII,S$GLB,, | Performed by: SURGERY

## 2022-12-07 PROCEDURE — 1159F PR MEDICATION LIST DOCUMENTED IN MEDICAL RECORD: ICD-10-PCS | Mod: CPTII,S$GLB,, | Performed by: SURGERY

## 2022-12-07 PROCEDURE — 99999 PR PBB SHADOW E&M-EST. PATIENT-LVL III: CPT | Mod: PBBFAC,,, | Performed by: SURGERY

## 2022-12-07 PROCEDURE — 4010F ACE/ARB THERAPY RXD/TAKEN: CPT | Mod: CPTII,S$GLB,, | Performed by: SURGERY

## 2022-12-07 PROCEDURE — 4010F PR ACE/ARB THEARPY RXD/TAKEN: ICD-10-PCS | Mod: CPTII,S$GLB,, | Performed by: SURGERY

## 2022-12-07 PROCEDURE — 3077F SYST BP >= 140 MM HG: CPT | Mod: CPTII,S$GLB,, | Performed by: SURGERY

## 2022-12-07 PROCEDURE — 1160F PR REVIEW ALL MEDS BY PRESCRIBER/CLIN PHARMACIST DOCUMENTED: ICD-10-PCS | Mod: CPTII,S$GLB,, | Performed by: SURGERY

## 2022-12-07 PROCEDURE — 1160F RVW MEDS BY RX/DR IN RCRD: CPT | Mod: CPTII,S$GLB,, | Performed by: SURGERY

## 2022-12-07 PROCEDURE — 3044F HG A1C LEVEL LT 7.0%: CPT | Mod: CPTII,S$GLB,, | Performed by: SURGERY

## 2022-12-07 PROCEDURE — 3044F PR MOST RECENT HEMOGLOBIN A1C LEVEL <7.0%: ICD-10-PCS | Mod: CPTII,S$GLB,, | Performed by: SURGERY

## 2022-12-07 PROCEDURE — 3077F PR MOST RECENT SYSTOLIC BLOOD PRESSURE >= 140 MM HG: ICD-10-PCS | Mod: CPTII,S$GLB,, | Performed by: SURGERY

## 2022-12-07 PROCEDURE — 99024 POSTOP FOLLOW-UP VISIT: CPT | Mod: S$GLB,,, | Performed by: SURGERY

## 2022-12-07 PROCEDURE — 3066F PR DOCUMENTATION OF TREATMENT FOR NEPHROPATHY: ICD-10-PCS | Mod: CPTII,S$GLB,, | Performed by: SURGERY

## 2022-12-07 PROCEDURE — 3078F DIAST BP <80 MM HG: CPT | Mod: CPTII,S$GLB,, | Performed by: SURGERY

## 2022-12-07 NOTE — PROGRESS NOTES
Plastic Surgery Clinic Postop Visit    Subjective:      David Barrios is a 62 y.o. year old male who presents to the Plastic Surgery Clinic on 12/07/2022 for follow up visit status post bilateral pec flaps for sternal wound coverage on 11/14. Denies fever, chills, nausea, vomiting, or other systemic signs of infection.    Vitals:    12/07/22 0910   BP: (!) 152/67   Pulse: 78        Review of patient's allergies indicates:  No Known Allergies    Current Outpatient Medications on File Prior to Visit   Medication Sig Dispense Refill    albuterol (PROVENTIL/VENTOLIN HFA) 90 mcg/actuation inhaler inhale 1-2 Puff(s) By Mouth Every 4 Hours as needed 8.5 g 0    amiodarone (PACERONE) 200 MG Tab Take 1 tablet (200 mg total) by mouth 2 (two) times daily. 60 tablet 2    aspirin 81 MG Chew Take 1 tablet (81 mg total) by mouth once daily. 360 tablet 0    atorvastatin (LIPITOR) 40 MG tablet Take 1 tablet (40 mg total) by mouth once daily. 90 tablet 3    ferrous sulfate (FEOSOL) 325 mg (65 mg iron) Tab tablet Take 1 tablet (325 mg total) by mouth daily with breakfast.  0    methocarbamoL (ROBAXIN) 500 MG Tab Take 1 tablet (500 mg total) by mouth 3 (three) times daily. for 10 days 30 tablet 0    oxyCODONE (ROXICODONE) 5 MG immediate release tablet Take 1 tablet (5 mg total) by mouth every 4 (four) hours as needed for Pain. 42 tablet 0    rivaroxaban (XARELTO) 20 mg Tab Take 1 tablet (20 mg total) by mouth daily with dinner or evening meal. 90 tablet 3     No current facility-administered medications on file prior to visit.       Patient Active Problem List   Diagnosis    Microcytic anemia    Hypokalemia    Elevated LFTs    Essential hypertension    Screening for malignant neoplasm of colon    Persistent atrial fibrillation    Dyspnea on exertion    Abnormal electrocardiogram    Acute systolic congestive heart failure    Nonrheumatic mitral valve regurgitation    Left ventricular systolic dysfunction    VT (ventricular  tachycardia)    Transient hyperglycemia post procedure    Surgical wound present    Shock    Sternal wound infection    Status post tricuspid valve repair    Status post ligation of left atrial appendage    Status post Maze operation for atrial fibrillation    Sternal osteomyelitis    Vascular inflammation or infection    Pneumonia of both lower lobes due to infectious organism    KIMANI (acute kidney injury)       Past Surgical History:   Procedure Laterality Date    APPLICATION OF WOUND VACUUM-ASSISTED CLOSURE DEVICE N/A 11/8/2022    Procedure: APPLICATION, WOUND VAC;  Surgeon: Mike Hedrick MD;  Location: [address];  Service: General;  Laterality: N/A;    CARDIOVERSION Left 9/15/2022    Procedure: Cardioversion;  Surgeon: Julio James MD;  Location: [facility] CATH LAB/EP;  Service: Cardiology;  Laterality: Left;  With NORBERT    CATHETERIZATION OF BOTH LEFT AND RIGHT HEART N/A 9/22/2022    Procedure: CATHETERIZATION, HEART, BOTH LEFT AND RIGHT;  Surgeon: Julio James MD;  Location: [facility] CATH LAB/EP;  Service: Cardiology;  Laterality: N/A;    COLONOSCOPY N/A 4/4/2019    Procedure: COLONOSCOPY Gol[place]ely;  Surgeon: Umair Randolph MD;  Location: [facility] ENDO;  Service: Endoscopy;  Laterality: N/A;    CORONARY ANGIOGRAPHY N/A 9/22/2022    Procedure: ANGIOGRAM, CORONARY ARTERY;  Surgeon: Julio James MD;  Location: [facility] CATH LAB/EP;  Service: Cardiology;  Laterality: N/A;    MEYER MAZE PROCEDURE N/A 10/7/2022    Procedure: MEYER MAZE PROCEDURE;  Surgeon: Mike Hedrick MD;  Location: [address];  Service: Cardiovascular;  Laterality: N/A;    CREATION OF MUSCLE ROTATIONAL FLAP N/A 11/14/2022    Procedure: CREATION, FLAP, MUSCLE ROTATION;  Surgeon: Amadeo Carrasquillo MD;  Location: [address];  Service: Plastics;  Laterality: N/A;  sternal wound that need pec flap coverage    DEBRIDEMENT OF STERNUM N/A 11/8/2022    Procedure: DEBRIDEMENT, STERNUM;  Surgeon: Mike Hedrick MD;  Location:  [facility] OR 2ND FLR;  Service: General;  Laterality: N/A;    ESOPHAGOGASTRODUODENOSCOPY N/A 10/12/2018    Procedure: EGD (ESOPHAGOGASTRODUODENOSCOPY);  Surgeon: Braden Herring MD;  Location: [facility] ENDO;  Service: Endoscopy;  Laterality: N/A;    ESOPHAGOGASTRODUODENOSCOPY N/A 4/4/2019    Procedure: EGD;  Surgeon: Umair Randolph MD;  Location: [facility] ENDO;  Service: Endoscopy;  Laterality: N/A;    EXCLUSION OF LEFT ATRIAL APPENDAGE N/A 10/7/2022    Procedure: EXCLUSION, LEFT ATRIAL APPENDAGE;  Surgeon: Mike Hedrick MD;  [facility];  Service: Cardiovascular;  Laterality: N/A;    HERNIA REPAIR      INSERTION OF INTRAVASCULAR MICROAXIAL BLOOD PUMP N/A 10/7/2022    Procedure: INSERTION, IMPELLA;  Surgeon: Mike Hedrick MD;  [facility];  Service: Cardiovascular;  Laterality: N/A;  direct aortic insertion    IRRIGATION OF MEDIASTINUM N/A 10/7/2022    Procedure: IRRIGATION, MEDIASTINUM;  Surgeon: Mike Hedrick MD;  Location: [facility] OR 2ND FLR;  Service: Cardiovascular;  Laterality: N/A;    STERNAL WIRES REMOVAL N/A 11/8/2022    Procedure: REMOVAL, STERNAL WIRE;  Surgeon: Mike Hedrick MD;  Location: [facility] OR 2ND FLR;  Service: General;  Laterality: N/A;  Sternal wire removal with muscle flap creation    STERNAL WOUND CLOSURE N/A 11/14/2022    Procedure: CLOSURE, WOUND, STERNUM;  Surgeon: Amadeo Carrasquillo MD;  [facility];  Service: Plastics;  Laterality: N/A;    TRICUSPID VALVULOPLASTY N/A 10/7/2022    Procedure: REPAIR, TRICUSPID VALVE;  Surgeon: Mike Hedrick MD;  [facility];  Service: Cardiovascular;  Laterality: N/A;    WOUND EXPLORATION N/A 11/14/2022    Procedure: EXPLORATION, WOUND;  Surgeon: Amadeo Carrasquillo MD;  Location: [facility] OR 2ND FLR;  Service: Plastics;  Laterality: N/A;       Social History     Socioeconomic History    Marital status: Single   Tobacco Use    Smoking status: Never    Smokeless tobacco: Current      Types: Chew   Substance and Sexual Activity    Alcohol use: Yes     Alcohol/week: 20.0 standard drinks     Types: 20 Cans of beer per week    Drug use: No           Review of Systems: negative    Objective:     Physical Exam:  Vitals:    12/07/22 0910   BP: (!) 152/67   Pulse: 78       WD WN NAD  VSS  Normal resp effort  Midline sternal incision clean dry and intact, bilateral axillary incisions also c/d/I, mediastinal drain with serosanguinous output        Assessment:       No diagnosis found.    Plan:   62 y.o. male status post bilateral pec flaps for sternal wound coverage on 11/14    - Doing well, no issues  - remove drain today  - Return to clinic in 4 weeks.       All questions were answered. The patient was advised to call the clinic with any questions or concerns prior to their next visit.       Keyshawn Whitlock MD- Fellow

## 2022-12-09 NOTE — PHYSICIAN QUERY
PT Name: David Barrios  MR #: [id number]    DOCUMENTATION CLARIFICATION     CDS/: Selena Loredo RN             Contact information: [email or web address]    This form is a permanent document in the medical record.     Query Date: December 8, 2022    By submitting this query, we are merely seeking further clarification of documentation.. Please utilize your independent clinical judgment when addressing the question(s) below.    The medical record contains the following:   Indicators  Supporting Clinical Findings Location in Medical Record   x Energy Intake Poor PO intake: energy intake <75% of estimated energy requirement RD note 11-12   x Weight Loss Weight loss of 19.9 kg (43.78 lbs) x 3 months (21% weight loss x 3 months) RD note 11-12   x Fat Loss Orbital Region (Subcutaneous Fat Loss): severe depletion  Upper Arm Region (Subcutaneous Fat Loss): severe depletion  Thoracic and Lumbar Region: severe depletion  RD note 11-12   x Muscle Loss [place] Region (Muscle Loss): severe depletion  Clavicle Bone Region (Muscle Loss): severe depletion  Clavicle and Acromion Bone Region (Muscle Loss): severe depletion  RD note 11-12    Edema/Fluid Accumulation      Reduced  Strength (by dynamometer)     x Weight, BMI, Usual Body Weight Weight (lb): 163.14 lb    BMI (Calculated): 24.1 RD note 11-12    Delayed Wound Healing     x Registered Dietician Diagnosis Severe Protein-Calorie Malnutrition RD note 11-12    Acute or Chronic Illness      Social or Environmental Circumstances     x Treatment 1) Continue cardiac diet.      2) Continue ONS Boost Plus Chocolate TID. Consider arginaid BID x 2 weeks, vit C, zinc, MVI to promote wound healing.      3) RD to monitor and f/u.    RD note 11-12    Other       Academy of Nutrition and Dietetics (Academy) and the American Society for Parenteral and Enteral Nutrition (A.S.P.E.N.) Clinical Characteristics to support Malnutrition   Malnutrition in the Context of  Acute Illness or Injury Malnutrition in the Context of Chronic Illness or Injury Malnutrition in the Context of Social or Environmental Circumstances   Malnutrition Level Moderate Severe Moderate Severe   Moderate   Severe   Energy Intake <75%                   >7 days <50%                 >5 days <75%           >1 month <75%                      >1 month   <75% for >3 months   <50% for >1 month   Weight Loss   1-2% in 1 week >2% in 1 week 5% in 1 month >5% in 1 month 5% in 1 month >5% in 1 month    5% in 1 month >5% in 1 month 7.5% in 3 months >7.5% in 3 months 7.5% in 3 months >7.5% in 3 months    7.5% in 3 months >7.5% in 3 months 10% in 6 months >10% in 6 months 10% in 6 months >10% in 6 months        20% in 1 year                    >20% in 1 year                                                                  20% in 1 year                            >20% in 1 year                                                  Subcutaneous Fat Loss Mild  Moderate  Mild  Severe    Mild   Severe   Muscle Loss Mild  Moderate  Mild  Severe    Mild   Severe   Edema/Fluid Accumulation Mild Moderate to severe  Mild  Severe   Mild   Severe   Reduced  Strength         (based on standards supplied by  of dynamometer) N/A Measurably reduced N/A Measurably reduced N/A Measurably reduced     Criteria for mild malnutrition is defined as 1 characteristic outlined above within the established moderate or severe parameters.  A minimum of 2 out of the 6 characteristics noted above are recommended for a diagnosis of moderate or severe malnutrition.  Chronic illness/injury is a disease/condition lasting 3 months or longer.    The noted clinical guidelines are only system guidelines and do not replace the providers clinical judgment.    Provider, please specify diagnosis or diagnoses associated with above clinical findings.    [ x ] Severe Malnutrition - a minimum of 2 of the 6 severe malnutrition characteristics noted above     [  ] Other Nutritional Diagnosis (please specify): _______   [  ] Clinically Undetermined     Please document in your progress notes daily for the duration of treatment until resolved and  include in your discharge summary.      References:    TRINY Chandra, & GORDO Sidhu (2022, April). Assessment and management of anorexia and cachexia in palliative care. Retrieved May 23, 2022, from https://www.[employer]/contents/assessment-and-management-of-anorexia-and-cachexia-in-palliative-care?ylducXxm=7239&source=see_link     ERIN Rodríguez, PhD, RD, FILIPE, LAMIN Gonzalez, PhD, RN, DWIGHT Aguilar MD, PhD, Chris AGUIAR A., MS, RD, [facility], MANDIE Paige, MS, RD, The Academy Malnutrition Work Group, The A.S.P.E.N. Board of Directors. (2012). Consensus Statement: Academy of Nutrition and Dietetics and American Society for Parenteral and Enteral Nutrition: Characteristics Recommended for the Identification and Documentation of Adult Malnutrition (Undernutrition). Journal of Parenteral and Enteral Nutrition, 36(3), 275-283. doi:10.1177/3677411867589093     Form No. [id number]

## 2022-12-09 NOTE — PHYSICIAN QUERY
PT Name: David Barrios  MR #: [id number]    DOCUMENTATION CLARIFICATION      CDS/: Selena Loredo RN              Contact information:[email or web address]    This form is a permanent document in the medical record.      Query Date: December 8, 2022    By submitting this query, we are merely seeking further clarification of documentation. Please utilize your independent clinical judgment when addressing the question(s) below.    The Medical Record contains the following:   Indicators  Supporting Clinical Findings Location in Medical Record   x Anemia documented - H/H with post-operative anemia Critical care surgery note 11-16   x H&H Hgb=10.5-9.3-6.8-7.8-8.1  Hct=31.3-28.0-20.2-23.4-25.1 Lab 11-14 to 11-16    BP                    HR      GI bleeding documented      Acute bleeding (Non GI site)     x Transfusion(s) Transfused 2 units PRBC. Critical care surgery note 11-16    Acute/Chronic illness      Treatments     x Other Operative Findings: Sternal washout and debridement, bilateral pectoralis flaps      Estimated Blood Loss: 300 mL Plastic surgery brief op note 11-14     Provider, please specify diagnosis or diagnoses associated with above clinical findings.   [   ] Acute blood loss anemia    [ x  ] Acute blood loss anemia expected post-operatively    [   ] Other Hematological Diagnosis (please specify): _________________   [   ] Clinically Undetermined          Please document in your progress notes daily for the duration of treatment, until resolved, and include in your discharge summary.    Form No. [id number]

## 2022-12-09 NOTE — PHYSICIAN QUERY
PT Name: David Barrios  MR #: [id number]     DOCUMENTATION CLARIFICATION     CDS/: Selena Loredo RN              Contact information:[email or web address]  This form is a permanent document in the medical record.     Query Date: December 8, 2022    By submitting this query, we are merely seeking further clarification of documentation.  Please utilize your independent clinical judgment when addressing the question(s) below.  The Medical Record contains the following:  Indicators Supporting Clinical Findings Location in Medical Record   x Acute Illness (e.g. AMI, Sepsis, etc.) KIMANI-likely ATN from decreased renal perfusion 2/2 sepsis & cardiogenic shock  Nephrology note 12-1   x Vital Signs  Arterial Line BP: ()/(36-77) 104/36      Critical care surgery note 11-17    Acidosis documented      ABGs/Labs     x Hypotension or Low Blood Pressure documented Overnight with some hypotension Critical care surgery note 11-17    Altered Mental Status or Confusion      Diaphoresis, Cold Extremities or Cyanosis      Oliguria     x Medication/Treatment  -Vasopressors  -Inotropic Drugs  -IV Fluids   -IV Antibiotics  -Cardiac Assist Devices  -Hemodynamic Monitoring  -Blood/Blood Products IV Epinephrine    IV Vasopressin Mar 11-8 to 11-15    Mar 11-8 to 11-9  Mar 11-16 to 11-19   x Other Patient underwent who underwent mitral valve repair, tricuspid valve repair, left atrial maze, left atrial appendage resection, and direct aortic impella 5.5 insertion on 10/07/2022. Hospital course complicated by Serratia bacteremia (on 6wk course of cefepime), sternal wound infection, and cardiogenic shock.     CHF H&P                      Surgical intensive care note 11-18     Provider, please clarify the diagnosis or diagnoses associated with above clinical findings.  Julio all that apply.    [  x  ] Septic Shock   [    ] Cardiogenic Shock   [    ] Other Shock (please specify): __________   [    ] Other Condition (please  specify): _________   [  ] Clinically Undetermined       Please document in your progress notes daily for the duration of treatment until resolved and include in your discharge summary.     Form No. [id number]

## 2022-12-12 ENCOUNTER — LAB VISIT (OUTPATIENT)
Dept: LAB | Facility: HOSPITAL | Age: 62
End: 2022-12-12
Attending: THORACIC SURGERY (CARDIOTHORACIC VASCULAR SURGERY)
Payer: COMMERCIAL

## 2022-12-12 ENCOUNTER — TELEPHONE (OUTPATIENT)
Dept: CARDIOTHORACIC SURGERY | Facility: CLINIC | Age: 62
End: 2022-12-12
Payer: COMMERCIAL

## 2022-12-12 DIAGNOSIS — S21.101A UNSPECIFIED OPEN WOUND OF RIGHT FRONT WALL OF THORAX WITHOUT PENETRATION INTO THORACIC CAVITY, INITIAL ENCOUNTER: Primary | ICD-10-CM

## 2022-12-12 LAB
ALBUMIN SERPL BCP-MCNC: 3.5 G/DL (ref 3.5–5.2)
ALP SERPL-CCNC: 109 U/L (ref 38–126)
ALT SERPL W/O P-5'-P-CCNC: 15 U/L (ref 10–44)
ANION GAP SERPL CALC-SCNC: 8 MMOL/L (ref 8–16)
AST SERPL-CCNC: 19 U/L (ref 15–46)
BASOPHILS # BLD AUTO: 0.07 K/UL (ref 0–0.2)
BASOPHILS NFR BLD: 0.6 % (ref 0–1.9)
BILIRUB SERPL-MCNC: 0.4 MG/DL (ref 0.1–1)
CALCIUM SERPL-MCNC: 8.8 MG/DL (ref 8.7–10.5)
CHLORIDE SERPL-SCNC: 95 MMOL/L (ref 95–110)
CO2 SERPL-SCNC: 30 MMOL/L (ref 23–29)
CREAT SERPL-MCNC: 4.51 MG/DL (ref 0.5–1.4)
CRP SERPL-MCNC: 1.07 MG/DL (ref 0–1)
DIFFERENTIAL METHOD: ABNORMAL
EOSINOPHIL # BLD AUTO: 0.3 K/UL (ref 0–0.5)
EOSINOPHIL NFR BLD: 2.3 % (ref 0–8)
ERYTHROCYTE [DISTWIDTH] IN BLOOD BY AUTOMATED COUNT: 18 % (ref 11.5–14.5)
EST. GFR  (NO RACE VARIABLE): 13.9 ML/MIN/1.73 M^2
GLUCOSE SERPL-MCNC: 105 MG/DL (ref 70–110)
HCT VFR BLD AUTO: 25.7 % (ref 40–54)
HGB BLD-MCNC: 8.3 G/DL (ref 14–18)
IMM GRANULOCYTES # BLD AUTO: 0.07 K/UL (ref 0–0.04)
IMM GRANULOCYTES NFR BLD AUTO: 0.6 % (ref 0–0.5)
LYMPHOCYTES # BLD AUTO: 1.1 K/UL (ref 1–4.8)
LYMPHOCYTES NFR BLD: 9.5 % (ref 18–48)
MCH RBC QN AUTO: 31.8 PG (ref 27–31)
MCHC RBC AUTO-ENTMCNC: 32.3 G/DL (ref 32–36)
MCV RBC AUTO: 99 FL (ref 82–98)
MONOCYTES # BLD AUTO: 1.1 K/UL (ref 0.3–1)
MONOCYTES NFR BLD: 9.3 % (ref 4–15)
NEUTROPHILS # BLD AUTO: 8.8 K/UL (ref 1.8–7.7)
NEUTROPHILS NFR BLD: 77.7 % (ref 38–73)
NRBC BLD-RTO: 0 /100 WBC
PLATELET # BLD AUTO: 287 K/UL (ref 150–450)
PMV BLD AUTO: 10 FL (ref 9.2–12.9)
POTASSIUM SERPL-SCNC: 4.2 MMOL/L (ref 3.5–5.1)
PROT SERPL-MCNC: 6.5 G/DL (ref 6–8.4)
RBC # BLD AUTO: 2.61 M/UL (ref 4.6–6.2)
SODIUM SERPL-SCNC: 133 MMOL/L (ref 136–145)
UUN UR-MCNC: 55 MG/DL (ref 2–20)
WBC # BLD AUTO: 11.29 K/UL (ref 3.9–12.7)

## 2022-12-12 PROCEDURE — 86140 C-REACTIVE PROTEIN: CPT | Mod: PO | Performed by: THORACIC SURGERY (CARDIOTHORACIC VASCULAR SURGERY)

## 2022-12-12 PROCEDURE — 80053 COMPREHEN METABOLIC PANEL: CPT | Mod: PO | Performed by: THORACIC SURGERY (CARDIOTHORACIC VASCULAR SURGERY)

## 2022-12-12 PROCEDURE — 85025 COMPLETE CBC W/AUTO DIFF WBC: CPT | Mod: PO | Performed by: THORACIC SURGERY (CARDIOTHORACIC VASCULAR SURGERY)

## 2022-12-12 PROCEDURE — 36415 COLL VENOUS BLD VENIPUNCTURE: CPT | Mod: PO | Performed by: THORACIC SURGERY (CARDIOTHORACIC VASCULAR SURGERY)

## 2022-12-12 NOTE — TELEPHONE ENCOUNTER
----- Message from Ngoc Palumbo sent at 12/12/2022  8:34 AM CST -----  Regarding: Pt Advice  Contact: self [phone]  Pt is calling in reference to questions about his medication.  He's unsure of the medication name but stated he was supposed to take it for 10 days.  He thinks its methocarbamol.   Pt would also like to change his appt sched for 01/11/2023 to a Tues or Thurs due to he goes to dialysis.  Please call.

## 2022-12-12 NOTE — TELEPHONE ENCOUNTER
Spoke with patient and gave him updated appointment times. Explained that I would reach out to Dr Carrasquillo's nurse as I could only change CTS follow up appointments. He is in agreement with this.    Answered question about methocarbamol and told him he can stop taking this if he doesn't need it.    Julie Haase RN  CTS Nurse Navigator [phone]        ----- Message from Ngoc Palumbo sent at 12/12/2022  8:34 AM CST -----  Regarding: Pt Advice  Contact: self [phone]  Pt is calling in reference to questions about his medication.  He's unsure of the medication name but stated he was supposed to take it for 10 days.  He thinks its methocarbamol.   Pt would also like to change his appt sched for 01/11/2023 to a Tues or Thurs due to he goes to dialysis.  Please call.

## 2022-12-15 ENCOUNTER — OFFICE VISIT (OUTPATIENT)
Dept: INFECTIOUS DISEASES | Facility: CLINIC | Age: 62
End: 2022-12-15
Payer: COMMERCIAL

## 2022-12-15 VITALS
HEIGHT: 69 IN | DIASTOLIC BLOOD PRESSURE: 79 MMHG | SYSTOLIC BLOOD PRESSURE: 125 MMHG | HEART RATE: 88 BPM | TEMPERATURE: 99 F | BODY MASS INDEX: 24.16 KG/M2 | WEIGHT: 163.13 LBS

## 2022-12-15 DIAGNOSIS — S21.101A STERNAL WOUND INFECTION: Primary | ICD-10-CM

## 2022-12-15 DIAGNOSIS — M86.9 STERNAL OSTEOMYELITIS: ICD-10-CM

## 2022-12-15 DIAGNOSIS — L08.9 STERNAL WOUND INFECTION: Primary | ICD-10-CM

## 2022-12-15 PROCEDURE — 99215 PR OFFICE/OUTPT VISIT, EST, LEVL V, 40-54 MIN: ICD-10-PCS | Mod: S$GLB,,, | Performed by: STUDENT IN AN ORGANIZED HEALTH CARE EDUCATION/TRAINING PROGRAM

## 2022-12-15 PROCEDURE — 1111F PR DISCHARGE MEDS RECONCILED W/ CURRENT OUTPATIENT MED LIST: ICD-10-PCS | Mod: CPTII,S$GLB,, | Performed by: STUDENT IN AN ORGANIZED HEALTH CARE EDUCATION/TRAINING PROGRAM

## 2022-12-15 PROCEDURE — 4010F PR ACE/ARB THEARPY RXD/TAKEN: ICD-10-PCS | Mod: CPTII,S$GLB,, | Performed by: STUDENT IN AN ORGANIZED HEALTH CARE EDUCATION/TRAINING PROGRAM

## 2022-12-15 PROCEDURE — 99215 OFFICE O/P EST HI 40 MIN: CPT | Mod: S$GLB,,, | Performed by: STUDENT IN AN ORGANIZED HEALTH CARE EDUCATION/TRAINING PROGRAM

## 2022-12-15 PROCEDURE — 3044F HG A1C LEVEL LT 7.0%: CPT | Mod: CPTII,S$GLB,, | Performed by: STUDENT IN AN ORGANIZED HEALTH CARE EDUCATION/TRAINING PROGRAM

## 2022-12-15 PROCEDURE — 99999 PR PBB SHADOW E&M-EST. PATIENT-LVL III: ICD-10-PCS | Mod: PBBFAC,,, | Performed by: STUDENT IN AN ORGANIZED HEALTH CARE EDUCATION/TRAINING PROGRAM

## 2022-12-15 PROCEDURE — 3008F BODY MASS INDEX DOCD: CPT | Mod: CPTII,S$GLB,, | Performed by: STUDENT IN AN ORGANIZED HEALTH CARE EDUCATION/TRAINING PROGRAM

## 2022-12-15 PROCEDURE — 3074F PR MOST RECENT SYSTOLIC BLOOD PRESSURE < 130 MM HG: ICD-10-PCS | Mod: CPTII,S$GLB,, | Performed by: STUDENT IN AN ORGANIZED HEALTH CARE EDUCATION/TRAINING PROGRAM

## 2022-12-15 PROCEDURE — 1111F DSCHRG MED/CURRENT MED MERGE: CPT | Mod: CPTII,S$GLB,, | Performed by: STUDENT IN AN ORGANIZED HEALTH CARE EDUCATION/TRAINING PROGRAM

## 2022-12-15 PROCEDURE — 1159F MED LIST DOCD IN RCRD: CPT | Mod: CPTII,S$GLB,, | Performed by: STUDENT IN AN ORGANIZED HEALTH CARE EDUCATION/TRAINING PROGRAM

## 2022-12-15 PROCEDURE — 3066F NEPHROPATHY DOC TX: CPT | Mod: CPTII,S$GLB,, | Performed by: STUDENT IN AN ORGANIZED HEALTH CARE EDUCATION/TRAINING PROGRAM

## 2022-12-15 PROCEDURE — 3074F SYST BP LT 130 MM HG: CPT | Mod: CPTII,S$GLB,, | Performed by: STUDENT IN AN ORGANIZED HEALTH CARE EDUCATION/TRAINING PROGRAM

## 2022-12-15 PROCEDURE — 3078F DIAST BP <80 MM HG: CPT | Mod: CPTII,S$GLB,, | Performed by: STUDENT IN AN ORGANIZED HEALTH CARE EDUCATION/TRAINING PROGRAM

## 2022-12-15 PROCEDURE — 3044F PR MOST RECENT HEMOGLOBIN A1C LEVEL <7.0%: ICD-10-PCS | Mod: CPTII,S$GLB,, | Performed by: STUDENT IN AN ORGANIZED HEALTH CARE EDUCATION/TRAINING PROGRAM

## 2022-12-15 PROCEDURE — 3066F PR DOCUMENTATION OF TREATMENT FOR NEPHROPATHY: ICD-10-PCS | Mod: CPTII,S$GLB,, | Performed by: STUDENT IN AN ORGANIZED HEALTH CARE EDUCATION/TRAINING PROGRAM

## 2022-12-15 PROCEDURE — 99999 PR PBB SHADOW E&M-EST. PATIENT-LVL III: CPT | Mod: PBBFAC,,, | Performed by: STUDENT IN AN ORGANIZED HEALTH CARE EDUCATION/TRAINING PROGRAM

## 2022-12-15 PROCEDURE — 3078F PR MOST RECENT DIASTOLIC BLOOD PRESSURE < 80 MM HG: ICD-10-PCS | Mod: CPTII,S$GLB,, | Performed by: STUDENT IN AN ORGANIZED HEALTH CARE EDUCATION/TRAINING PROGRAM

## 2022-12-15 PROCEDURE — 1159F PR MEDICATION LIST DOCUMENTED IN MEDICAL RECORD: ICD-10-PCS | Mod: CPTII,S$GLB,, | Performed by: STUDENT IN AN ORGANIZED HEALTH CARE EDUCATION/TRAINING PROGRAM

## 2022-12-15 PROCEDURE — 4010F ACE/ARB THERAPY RXD/TAKEN: CPT | Mod: CPTII,S$GLB,, | Performed by: STUDENT IN AN ORGANIZED HEALTH CARE EDUCATION/TRAINING PROGRAM

## 2022-12-15 PROCEDURE — 3008F PR BODY MASS INDEX (BMI) DOCUMENTED: ICD-10-PCS | Mod: CPTII,S$GLB,, | Performed by: STUDENT IN AN ORGANIZED HEALTH CARE EDUCATION/TRAINING PROGRAM

## 2022-12-15 NOTE — PROGRESS NOTES
INFECTIOUS DISEASE CLINIC  12/15/2022     Subjective:      Chief Complaint: Serratia bacteremia, sternal wound infection    History of Present Illness:    62-year-old male with HTN, afib, heart failure, s/p tricuspid and mitral valve replacement, Maze procedure, left atrial appendage ligation, s/p Impella placement and removal, was on a 6-week course of cefepime followed by chronic antimicrobial suppression was readmitted 11/8/22 for planned sternal wire removal and wound revision.    Underwent mediastinal exploration with evacuation of purulent pericarditis, debridement and decortication of the mediastinal structures, resection of the ascending aortic graft, removal of sternal wires, and partial bilateral sternectomy 11/8/22.  On 11/10/22 he also underwent sternal wound exploration with minimal debridement, washout, and wound vac placement.  11/14/22 also underwent I&D of sternal wound, creation of muscle flap with rotation of sternal wound closure.    He was continued on cefepime throughout hospital stay.  Infectious disease was consulted for any updated recommendations.  Updated plan for 6-week cefepime therapy ending 12/26/22.    He is planned to received his therapy with HD sessions.  Denies fevers, chills, nausea, vomiting, diarrhea, rash, altered mentation.    Bcx 10/11/22 + Serratia marcescens  Bcx 10/13/22 negative  Sternal wound 10/14/22, 10/15/22 + Serratia marcescens  Repeat surgical cultures no growth.    Review of Systems   Constitutional: Negative for chills and fever.   Eyes:  Negative for visual disturbance.   Cardiovascular:  Positive for leg swelling. Negative for chest pain.   Respiratory:  Negative for cough.    Skin:  Negative for rash.   Gastrointestinal:  Negative for diarrhea, nausea and vomiting.   Genitourinary:  Negative for dysuria.   Psychiatric/Behavioral:  Negative for altered mental status.        Past Medical History:   Diagnosis Date    Anemia     Atrial fibrillation     Encounter  Informed consent for PEG tube placement obtained and placed on chart. for blood transfusion     Esophageal ulcer     GI bleed     Hypertension      Past Surgical History:   Procedure Laterality Date    APPLICATION OF WOUND VACUUM-ASSISTED CLOSURE DEVICE N/A 11/8/2022    Procedure: APPLICATION, WOUND VAC;  Surgeon: Mike Hedrick MD;  Location: [facility] OR 2ND FLR;  Service: General;  Laterality: N/A;    CARDIOVERSION Left 9/15/2022    Procedure: Cardioversion;  Surgeon: Julio James MD;  Location: [facility] CATH LAB/EP;  Service: Cardiology;  Laterality: Left;  With NORBERT    CATHETERIZATION OF BOTH LEFT AND RIGHT HEART N/A 9/22/2022    Procedure: CATHETERIZATION, HEART, BOTH LEFT AND RIGHT;  Surgeon: Julio James MD;  Location: [facility] CATH LAB/EP;  Service: Cardiology;  Laterality: N/A;    COLONOSCOPY N/A 4/4/2019    Procedure: COLONOSCOPY Golytely;  Surgeon: Umair Randolph MD;  Location: [facility] ENDO;  Service: Endoscopy;  Laterality: N/A;    CORONARY ANGIOGRAPHY N/A 9/22/2022    Procedure: ANGIOGRAM, CORONARY ARTERY;  Surgeon: Julio James MD;  Location: [facility] CATH LAB/EP;  Service: Cardiology;  Laterality: N/A;    MEYER MAZE PROCEDURE N/A 10/7/2022    Procedure: MEYER MAZE PROCEDURE;  Surgeon: Mike Hedrick MD;  [facility];  Service: Cardiovascular;  Laterality: N/A;    CREATION OF MUSCLE ROTATIONAL FLAP N/A 11/14/2022    Procedure: CREATION, FLAP, MUSCLE ROTATION;  Surgeon: Amadeo Carrasquillo MD;  Location: [facility] OR 2ND FLR;  Service: Plastics;  Laterality: N/A;  sternal wound that need pec flap coverage    DEBRIDEMENT OF STERNUM N/A 11/8/2022    Procedure: DEBRIDEMENT, STERNUM;  Surgeon: Mike Hedrick MD;  Location: [facility] OR 2ND FLR;  Service: General;  Laterality: N/A;    ESOPHAGOGASTRODUODENOSCOPY N/A 10/12/2018    Procedure: EGD (ESOPHAGOGASTRODUODENOSCOPY);  Surgeon: Braden Herring MD;  Location: [facility] ENDO;  Service: Endoscopy;  Laterality: N/A;    ESOPHAGOGASTRODUODENOSCOPY N/A 4/4/2019    Procedure: EGD;  Surgeon: Umair Randolph MD;  Location:  [facility] ENDO;  Service: Endoscopy;  Laterality: N/A;    EXCLUSION OF LEFT ATRIAL APPENDAGE N/A 10/7/2022    Procedure: EXCLUSION, LEFT ATRIAL APPENDAGE;  Surgeon: Mike Hedrick MD;  Location: [facility] OR 2ND FLR;  Service: Cardiovascular;  Laterality: N/A;    HERNIA REPAIR      INSERTION OF INTRAVASCULAR MICROAXIAL BLOOD PUMP N/A 10/7/2022    Procedure: INSERTION, IMPELLA;  Surgeon: Mike Hedrick MD;  Location: [facility] OR 2ND FLR;  Service: Cardiovascular;  Laterality: N/A;  direct aortic insertion    IRRIGATION OF MEDIASTINUM N/A 10/7/2022    Procedure: IRRIGATION, MEDIASTINUM;  Surgeon: Mike Hedrick MD;  Location: [facility] OR 2ND FLR;  Service: Cardiovascular;  Laterality: N/A;    STERNAL WIRES REMOVAL N/A 11/8/2022    Procedure: REMOVAL, STERNAL WIRE;  Surgeon: Mike Hedrick MD;  Location: [facility] OR 2ND FLR;  Service: General;  Laterality: N/A;  Sternal wire removal with muscle flap creation    STERNAL WOUND CLOSURE N/A 11/14/2022    Procedure: CLOSURE, WOUND, STERNUM;  Surgeon: Amadeo Carrasquillo MD;  Location: [facility] OR 2ND FLR;  Service: Plastics;  Laterality: N/A;    TRICUSPID VALVULOPLASTY N/A 10/7/2022    Procedure: REPAIR, TRICUSPID VALVE;  Surgeon: Mike Hedrick MD;  Location: [facility] OR 2ND FLR;  Service: Cardiovascular;  Laterality: N/A;    WOUND EXPLORATION N/A 11/14/2022    Procedure: EXPLORATION, WOUND;  Surgeon: Amadeo Carrasquillo MD;  Location: [facility] OR 2ND FLR;  Service: Plastics;  Laterality: N/A;     Family History   Problem Relation Age of Onset    COPD Mother     Cancer Father      Social History     Tobacco Use    Smoking status: Never    Smokeless tobacco: Current     Types: Chew   Substance Use Topics    Alcohol use: Yes     Alcohol/week: 20.0 standard drinks     Types: 20 Cans of beer per week    Drug use: No       Review of patient's allergies indicates:  No Known Allergies      Objective:   VS (24h):   Vitals:    12/15/22 1042   BP: 125/79   Pulse: 88   Temp: 98.5 °F  (36.9 °C)         Physical Exam  Vitals reviewed.   Constitutional:       General: He is not in acute distress.     Appearance: He is normal weight. He is not ill-appearing, toxic-appearing or diaphoretic.   HENT:      Head: Normocephalic and atraumatic.      Right Ear: External ear normal.      Left Ear: External ear normal.      Nose: Nose normal.      Mouth/Throat:      Mouth: Mucous membranes are moist.      Pharynx: Oropharynx is clear.   Eyes:      General: No scleral icterus.        Right eye: No discharge.         Left eye: No discharge.      Extraocular Movements: Extraocular movements intact.      Conjunctiva/sclera: Conjunctivae normal.   Cardiovascular:      Rate and Rhythm: Normal rate and regular rhythm.   Pulmonary:      Effort: Pulmonary effort is normal.      Breath sounds: Normal breath sounds.   Abdominal:      General: Abdomen is flat. There is no distension.      Palpations: Abdomen is soft.   Musculoskeletal:         General: Normal range of motion.      Cervical back: Normal range of motion.   Skin:     General: Skin is warm and dry.      Comments: -chest, bilateral axillary surgical incision sites are healing well; erythema/swelling.discharge/bleeding/fluctuance.   Neurological:      Mental Status: He is alert and oriented to person, place, and time. Mental status is at baseline.   Psychiatric:         Mood and Affect: Mood normal.         Behavior: Behavior normal.         Thought Content: Thought content normal.         Judgment: Judgment normal.         Labs:  reviewed    Micro:   reviewed    Radiology:   reviewed    Immunization History   Administered Date(s) Administered    Influenza - Quadrivalent - PF *Preferred* (6 months and older) 10/12/2018    PPD Test 11/28/2022    Pneumococcal Conjugate - 13 Valent 10/12/2018         Assessment & Plan:     1. Sternal wound infection    2. Sternal osteomyelitis     62-year-old male with HTN, afib, heart failure, s/p tricuspid and mitral valve  replacement, Maze procedure, left atrial appendage ligation, s/p Impella placement and removal, was on a 6-week course of cefepime.    Readdmitted 11/8/22 and underwent mediastinal exploration with evacuation of purulent pericarditis, debridement and decortication of the mediastinal structures, resection of the ascending aortic graft, removal of sternal wires, and partial bilateral sternectomy 11/8/22.  11/10 and 11/14 further underwent I&D, wound vac placement then removal, creation of muscle flap with oration of sternal wound closure.  From an infectious standpoint appears to be doing well.    -Continue cefepime, to be given by HD center on HD days to complete 6-week course    Follow up end of therapy appointment 12/27/22.      Rigoberto Jerry MD  Infectious Disease Fellow

## 2022-12-19 ENCOUNTER — LAB VISIT (OUTPATIENT)
Dept: LAB | Facility: HOSPITAL | Age: 62
End: 2022-12-19
Attending: THORACIC SURGERY (CARDIOTHORACIC VASCULAR SURGERY)
Payer: COMMERCIAL

## 2022-12-19 DIAGNOSIS — S21.101A UNSPECIFIED OPEN WOUND OF RIGHT FRONT WALL OF THORAX WITHOUT PENETRATION INTO THORACIC CAVITY, INITIAL ENCOUNTER: Primary | ICD-10-CM

## 2022-12-19 LAB
ALBUMIN SERPL BCP-MCNC: 3.8 G/DL (ref 3.5–5.2)
ALP SERPL-CCNC: 115 U/L (ref 38–126)
ALT SERPL W/O P-5'-P-CCNC: 16 U/L (ref 10–44)
ANION GAP SERPL CALC-SCNC: 13 MMOL/L (ref 8–16)
AST SERPL-CCNC: 19 U/L (ref 15–46)
BASOPHILS # BLD AUTO: 0.07 K/UL (ref 0–0.2)
BASOPHILS NFR BLD: 0.6 % (ref 0–1.9)
BILIRUB SERPL-MCNC: 0.5 MG/DL (ref 0.1–1)
CALCIUM SERPL-MCNC: 9.1 MG/DL (ref 8.7–10.5)
CHLORIDE SERPL-SCNC: 97 MMOL/L (ref 95–110)
CO2 SERPL-SCNC: 27 MMOL/L (ref 23–29)
CREAT SERPL-MCNC: 5.63 MG/DL (ref 0.5–1.4)
CRP SERPL-MCNC: 1.12 MG/DL (ref 0–1)
DIFFERENTIAL METHOD: ABNORMAL
EOSINOPHIL # BLD AUTO: 0.1 K/UL (ref 0–0.5)
EOSINOPHIL NFR BLD: 1.1 % (ref 0–8)
ERYTHROCYTE [DISTWIDTH] IN BLOOD BY AUTOMATED COUNT: 18.1 % (ref 11.5–14.5)
EST. GFR  (NO RACE VARIABLE): 10.6 ML/MIN/1.73 M^2
GLUCOSE SERPL-MCNC: 99 MG/DL (ref 70–110)
HCT VFR BLD AUTO: 27 % (ref 40–54)
HGB BLD-MCNC: 8.7 G/DL (ref 14–18)
IMM GRANULOCYTES # BLD AUTO: 0.06 K/UL (ref 0–0.04)
IMM GRANULOCYTES NFR BLD AUTO: 0.5 % (ref 0–0.5)
LYMPHOCYTES # BLD AUTO: 1.3 K/UL (ref 1–4.8)
LYMPHOCYTES NFR BLD: 11.6 % (ref 18–48)
MCH RBC QN AUTO: 32.3 PG (ref 27–31)
MCHC RBC AUTO-ENTMCNC: 32.2 G/DL (ref 32–36)
MCV RBC AUTO: 100 FL (ref 82–98)
MONOCYTES # BLD AUTO: 1.1 K/UL (ref 0.3–1)
MONOCYTES NFR BLD: 9.3 % (ref 4–15)
NEUTROPHILS # BLD AUTO: 8.9 K/UL (ref 1.8–7.7)
NEUTROPHILS NFR BLD: 76.9 % (ref 38–73)
NRBC BLD-RTO: 0 /100 WBC
PLATELET # BLD AUTO: 312 K/UL (ref 150–450)
PMV BLD AUTO: 10.4 FL (ref 9.2–12.9)
POTASSIUM SERPL-SCNC: 4 MMOL/L (ref 3.5–5.1)
PROT SERPL-MCNC: 7 G/DL (ref 6–8.4)
RBC # BLD AUTO: 2.69 M/UL (ref 4.6–6.2)
SODIUM SERPL-SCNC: 137 MMOL/L (ref 136–145)
UUN UR-MCNC: 56 MG/DL (ref 2–20)
WBC # BLD AUTO: 11.55 K/UL (ref 3.9–12.7)

## 2022-12-19 PROCEDURE — 36415 COLL VENOUS BLD VENIPUNCTURE: CPT | Mod: PO | Performed by: THORACIC SURGERY (CARDIOTHORACIC VASCULAR SURGERY)

## 2022-12-19 PROCEDURE — 80053 COMPREHEN METABOLIC PANEL: CPT | Mod: PO | Performed by: THORACIC SURGERY (CARDIOTHORACIC VASCULAR SURGERY)

## 2022-12-19 PROCEDURE — 85025 COMPLETE CBC W/AUTO DIFF WBC: CPT | Mod: PO | Performed by: THORACIC SURGERY (CARDIOTHORACIC VASCULAR SURGERY)

## 2022-12-19 PROCEDURE — 86140 C-REACTIVE PROTEIN: CPT | Mod: PO | Performed by: THORACIC SURGERY (CARDIOTHORACIC VASCULAR SURGERY)

## 2022-12-27 ENCOUNTER — INFUSION (OUTPATIENT)
Dept: INFECTIOUS DISEASES | Facility: HOSPITAL | Age: 62
End: 2022-12-27
Attending: INTERNAL MEDICINE
Payer: COMMERCIAL

## 2022-12-27 ENCOUNTER — OFFICE VISIT (OUTPATIENT)
Dept: INFECTIOUS DISEASES | Facility: CLINIC | Age: 62
End: 2022-12-27
Payer: COMMERCIAL

## 2022-12-27 ENCOUNTER — HOSPITAL ENCOUNTER (OUTPATIENT)
Dept: RADIOLOGY | Facility: HOSPITAL | Age: 62
Discharge: HOME | End: 2022-12-27
Attending: STUDENT IN AN ORGANIZED HEALTH CARE EDUCATION/TRAINING PROGRAM
Payer: COMMERCIAL

## 2022-12-27 VITALS
BODY MASS INDEX: 23.77 KG/M2 | WEIGHT: 160.5 LBS | HEART RATE: 87 BPM | DIASTOLIC BLOOD PRESSURE: 70 MMHG | TEMPERATURE: 98 F | HEIGHT: 69 IN | SYSTOLIC BLOOD PRESSURE: 123 MMHG

## 2022-12-27 DIAGNOSIS — L08.9 STERNAL WOUND INFECTION: ICD-10-CM

## 2022-12-27 DIAGNOSIS — S21.101A STERNAL WOUND INFECTION: Primary | ICD-10-CM

## 2022-12-27 DIAGNOSIS — R06.09 EXERTIONAL DYSPNEA: ICD-10-CM

## 2022-12-27 DIAGNOSIS — S21.101A STERNAL WOUND INFECTION: ICD-10-CM

## 2022-12-27 DIAGNOSIS — L08.9 STERNAL WOUND INFECTION: Primary | ICD-10-CM

## 2022-12-27 PROCEDURE — 3008F BODY MASS INDEX DOCD: CPT | Mod: CPTII,S$GLB,, | Performed by: STUDENT IN AN ORGANIZED HEALTH CARE EDUCATION/TRAINING PROGRAM

## 2022-12-27 PROCEDURE — 71045 X-RAY EXAM CHEST 1 VIEW: CPT | Mod: TC,FY,PO

## 2022-12-27 PROCEDURE — 1111F DSCHRG MED/CURRENT MED MERGE: CPT | Mod: CPTII,S$GLB,, | Performed by: STUDENT IN AN ORGANIZED HEALTH CARE EDUCATION/TRAINING PROGRAM

## 2022-12-27 PROCEDURE — 3078F DIAST BP <80 MM HG: CPT | Mod: CPTII,S$GLB,, | Performed by: STUDENT IN AN ORGANIZED HEALTH CARE EDUCATION/TRAINING PROGRAM

## 2022-12-27 PROCEDURE — 99213 PR OFFICE/OUTPT VISIT, EST, LEVL III, 20-29 MIN: ICD-10-PCS | Mod: S$GLB,,, | Performed by: STUDENT IN AN ORGANIZED HEALTH CARE EDUCATION/TRAINING PROGRAM

## 2022-12-27 PROCEDURE — 3044F HG A1C LEVEL LT 7.0%: CPT | Mod: CPTII,S$GLB,, | Performed by: STUDENT IN AN ORGANIZED HEALTH CARE EDUCATION/TRAINING PROGRAM

## 2022-12-27 PROCEDURE — 3066F NEPHROPATHY DOC TX: CPT | Mod: CPTII,S$GLB,, | Performed by: STUDENT IN AN ORGANIZED HEALTH CARE EDUCATION/TRAINING PROGRAM

## 2022-12-27 PROCEDURE — 1111F PR DISCHARGE MEDS RECONCILED W/ CURRENT OUTPATIENT MED LIST: ICD-10-PCS | Mod: CPTII,S$GLB,, | Performed by: STUDENT IN AN ORGANIZED HEALTH CARE EDUCATION/TRAINING PROGRAM

## 2022-12-27 PROCEDURE — 4010F ACE/ARB THERAPY RXD/TAKEN: CPT | Mod: CPTII,S$GLB,, | Performed by: STUDENT IN AN ORGANIZED HEALTH CARE EDUCATION/TRAINING PROGRAM

## 2022-12-27 PROCEDURE — 3066F PR DOCUMENTATION OF TREATMENT FOR NEPHROPATHY: ICD-10-PCS | Mod: CPTII,S$GLB,, | Performed by: STUDENT IN AN ORGANIZED HEALTH CARE EDUCATION/TRAINING PROGRAM

## 2022-12-27 PROCEDURE — 3074F PR MOST RECENT SYSTOLIC BLOOD PRESSURE < 130 MM HG: ICD-10-PCS | Mod: CPTII,S$GLB,, | Performed by: STUDENT IN AN ORGANIZED HEALTH CARE EDUCATION/TRAINING PROGRAM

## 2022-12-27 PROCEDURE — 3008F PR BODY MASS INDEX (BMI) DOCUMENTED: ICD-10-PCS | Mod: CPTII,S$GLB,, | Performed by: STUDENT IN AN ORGANIZED HEALTH CARE EDUCATION/TRAINING PROGRAM

## 2022-12-27 PROCEDURE — 99213 OFFICE O/P EST LOW 20 MIN: CPT | Mod: S$GLB,,, | Performed by: STUDENT IN AN ORGANIZED HEALTH CARE EDUCATION/TRAINING PROGRAM

## 2022-12-27 PROCEDURE — 3044F PR MOST RECENT HEMOGLOBIN A1C LEVEL <7.0%: ICD-10-PCS | Mod: CPTII,S$GLB,, | Performed by: STUDENT IN AN ORGANIZED HEALTH CARE EDUCATION/TRAINING PROGRAM

## 2022-12-27 PROCEDURE — 3074F SYST BP LT 130 MM HG: CPT | Mod: CPTII,S$GLB,, | Performed by: STUDENT IN AN ORGANIZED HEALTH CARE EDUCATION/TRAINING PROGRAM

## 2022-12-27 PROCEDURE — 3078F PR MOST RECENT DIASTOLIC BLOOD PRESSURE < 80 MM HG: ICD-10-PCS | Mod: CPTII,S$GLB,, | Performed by: STUDENT IN AN ORGANIZED HEALTH CARE EDUCATION/TRAINING PROGRAM

## 2022-12-27 PROCEDURE — 4010F PR ACE/ARB THEARPY RXD/TAKEN: ICD-10-PCS | Mod: CPTII,S$GLB,, | Performed by: STUDENT IN AN ORGANIZED HEALTH CARE EDUCATION/TRAINING PROGRAM

## 2022-12-27 PROCEDURE — 99999 PR PBB SHADOW E&M-EST. PATIENT-LVL III: CPT | Mod: PBBFAC,,, | Performed by: STUDENT IN AN ORGANIZED HEALTH CARE EDUCATION/TRAINING PROGRAM

## 2022-12-27 PROCEDURE — 99999 PR PBB SHADOW E&M-EST. PATIENT-LVL III: ICD-10-PCS | Mod: PBBFAC,,, | Performed by: STUDENT IN AN ORGANIZED HEALTH CARE EDUCATION/TRAINING PROGRAM

## 2022-12-27 NOTE — PROGRESS NOTES
INFECTIOUS DISEASE CLINIC  12/27/2022     Subjective:      Chief Complaint: Sternal wound infection follow up    History of Present Illness:  62-year-old male with HTN, afib, heart failure, s/p tricuspid and mitral valve replacement, Maze procedure, left atrial appendage ligation, s/p Impella placement and removal 10/7/22, complicated by Serratia bacteremia and sternal wound infection, on cefepime, then underwent sternal wire removal with mediastinal exploration and removal of the ascending aortic graft    He is being seen today for sternal wound infection and sternal osteomyelitis. He was seen for a middle of care visit at the midpoint of his 6 week cefepime course by Dr. Claritza young from our department. At that time he reported doing well.     He was initially on ceftriaxone and then later switched to cefepime. He was discharged to [facility] with a wound vac. Sternal wound did not improve and he was readmitted on 11/8, on which date he then underwent mediastinal exploration with evacuation of purulent pericarditis, debridement and decortication of the mediastinal structures, resection of the ascending aortic graft, removal of sternal wires, and partial bilateral sternectomy 11/8/22.  On 11/10/22 he also underwent sternal wound exploration with minimal debridement, washout, and wound vac placement.  11/14/22 also underwent I&D of sternal wound, creation of muscle flap with rotation of sternal wound closure.     He was continued on cefepime throughout hospital stay (originally set to recieve this until 11/15/22).  Infectious disease was consulted for any updated recommendations.  Updated plan for 6-week cefepime therapy ending 12/26/22. He receives his cefepime with HD.       Micro:  Bcx 10/11/22 + Serratia marcescens  Bcx 10/13/22 negative  Sternal wound 10/14/22, 10/15/22 + Serratia marcescens  Repeat surgical cultures with no growth.    Today he denies fevers, chills, nausea, vomiting, diarrhea, rash, altered  mentation. He is a non smoker but has a chronic occasional productive cough with white colored sputum which does not seem to bother him but endorses exertional dyspnea since the surgery, states he cannot walk even 1 block without feeling significant SOB. Has to stop frequently, reports he has been seeing a physical therapist at home who visits him a few times a week for ~ 30 minutes of supervised exercises and walking.       HIV Risks Denies  Pets Denies  Travel  Denies  TB exposure Denies    Review of Systems   Constitutional: Negative for decreased appetite and fever.   HENT:  Negative for ear discharge, hearing loss, odynophagia and stridor.    Cardiovascular:  Positive for dyspnea on exertion. Negative for chest pain.   Respiratory:  Positive for cough. Negative for hemoptysis and wheezing.    Endocrine: Negative for polyuria.   Hematologic/Lymphatic: Negative for adenopathy.   Skin:  Negative for color change, rash and suspicious lesions.   Musculoskeletal:  Negative for joint pain, joint swelling and myalgias.   Gastrointestinal:  Negative for diarrhea, hematochezia and melena.   Genitourinary:  Negative for dysuria and hematuria.   Neurological:  Negative for focal weakness and seizures.   Psychiatric/Behavioral:  Negative for altered mental status and hallucinations.    Allergic/Immunologic: Negative for hives and persistent infections.       Past Medical History:   Diagnosis Date    Anemia     Atrial fibrillation     Encounter for blood transfusion     Esophageal ulcer     GI bleed     Hypertension      Past Surgical History:   Procedure Laterality Date    APPLICATION OF WOUND VACUUM-ASSISTED CLOSURE DEVICE N/A 11/8/2022    Procedure: APPLICATION, WOUND VAC;  Surgeon: Mike Hedrick MD;  Location: [facility] OR [address] [place] [address];  Service: General;  Laterality: N/A;    CARDIOVERSION Left 9/15/2022    Procedure: Cardioversion;  Surgeon: Julio James MD;  Location: [facility] CATH LAB/EP;  Service: Cardiology;   Laterality: Left;  With NORBERT    CATHETERIZATION OF BOTH LEFT AND RIGHT HEART N/A 9/22/2022    Procedure: CATHETERIZATION, HEART, BOTH LEFT AND RIGHT;  Surgeon: Julio James MD;  Location: [facility] CATH LAB/EP;  Service: Cardiology;  Laterality: N/A;    COLONOSCOPY N/A 4/4/2019    Procedure: COLONOSCOPY Golytely;  Surgeon: Umair Randolph MD;  Location: [facility] ENDO;  Service: Endoscopy;  Laterality: N/A;    CORONARY ANGIOGRAPHY N/A 9/22/2022    Procedure: ANGIOGRAM, CORONARY ARTERY;  Surgeon: Julio James MD;  Location: [facility] CATH LAB/EP;  Service: Cardiology;  Laterality: N/A;    MEYER MAZE PROCEDURE N/A 10/7/2022    Procedure: MEYER MAZE PROCEDURE;  Surgeon: Mike Hedrick MD;  Location: [address];  Service: Cardiovascular;  Laterality: N/A;    CREATION OF MUSCLE ROTATIONAL FLAP N/A 11/14/2022    Procedure: CREATION, FLAP, MUSCLE ROTATION;  Surgeon: Amadeo Carrasquillo MD;  Location: [address];  Service: Plastics;  Laterality: N/A;  sternal wound that need pec flap coverage    DEBRIDEMENT OF STERNUM N/A 11/8/2022    Procedure: DEBRIDEMENT, STERNUM;  Surgeon: Mike Hedrick MD;  Location: [address];  Service: General;  Laterality: N/A;    ESOPHAGOGASTRODUODENOSCOPY N/A 10/12/2018    Procedure: EGD (ESOPHAGOGASTRODUODENOSCOPY);  Surgeon: Braden Herring MD;  Location: [facility];  Service: Endoscopy;  Laterality: N/A;    ESOPHAGOGASTRODUODENOSCOPY N/A 4/4/2019    Procedure: EGD;  Surgeon: Umair Randolph MD;  Location: [facility];  Service: Endoscopy;  Laterality: N/A;    EXCLUSION OF LEFT ATRIAL APPENDAGE N/A 10/7/2022    Procedure: EXCLUSION, LEFT ATRIAL APPENDAGE;  Surgeon: Mike Hedrick MD;  Location: [address];  Service: Cardiovascular;  Laterality: N/A;    HERNIA REPAIR      INSERTION OF INTRAVASCULAR MICROAXIAL BLOOD PUMP N/A 10/7/2022    Procedure: INSERTION, IMPELLA;  Surgeon: Mike Hedrick MD;  Location: [facility] OR [address] [place] [address];  Service: Cardiovascular;  Laterality:  N/A;  direct aortic insertion    IRRIGATION OF MEDIASTINUM N/A 10/7/2022    Procedure: IRRIGATION, MEDIASTINUM;  Surgeon: Mike Hedrick MD;  Location: [facility] OR 2ND FLR;  Service: Cardiovascular;  Laterality: N/A;    STERNAL WIRES REMOVAL N/A 11/8/2022    Procedure: REMOVAL, STERNAL WIRE;  Surgeon: Mike Hedrick MD;  Location: [facility] OR 2ND FLR;  Service: General;  Laterality: N/A;  Sternal wire removal with muscle flap creation    STERNAL WOUND CLOSURE N/A 11/14/2022    Procedure: CLOSURE, WOUND, STERNUM;  Surgeon: Amadeo Carrasquillo MD;  Location: [facility] OR 2ND FLR;  Service: Plastics;  Laterality: N/A;    TRICUSPID VALVULOPLASTY N/A 10/7/2022    Procedure: REPAIR, TRICUSPID VALVE;  Surgeon: Mike Hedrick MD;  Location: [facility] OR 2ND FLR;  Service: Cardiovascular;  Laterality: N/A;    WOUND EXPLORATION N/A 11/14/2022    Procedure: EXPLORATION, WOUND;  Surgeon: Amadeo Carrasquillo MD;  Location: [facility] OR 2ND FLR;  Service: Plastics;  Laterality: N/A;     Family History   Problem Relation Age of Onset    COPD Mother     Cancer Father      Social History     Tobacco Use    Smoking status: Never    Smokeless tobacco: Current     Types: Chew   Substance Use Topics    Alcohol use: Yes     Alcohol/week: 20.0 standard drinks     Types: 20 Cans of beer per week    Drug use: No       Review of patient's allergies indicates:  No Known Allergies      Objective:   VS (24h):   Vitals:    12/27/22 0824   BP: 123/70   Pulse: 87   Temp: 98.1 °F (36.7 °C)         Physical Exam  Constitutional:       Appearance: Normal appearance.   HENT:      Head: Normocephalic and atraumatic.      Nose: Nose normal.      Mouth/Throat:      Mouth: Mucous membranes are moist.      Pharynx: Oropharynx is clear.   Eyes:      Extraocular Movements: Extraocular movements intact.      Conjunctiva/sclera: Conjunctivae normal.      Pupils: Pupils are equal, round, and reactive to light.   Cardiovascular:      Rate and Rhythm: Normal rate  and regular rhythm.      Pulses: Normal pulses.      Heart sounds: Normal heart sounds.   Pulmonary:      Effort: Pulmonary effort is normal.      Breath sounds: Normal breath sounds.   Abdominal:      General: Abdomen is flat. Bowel sounds are normal.      Palpations: Abdomen is soft.      Tenderness: There is no guarding or rebound.   Musculoskeletal:         General: No deformity. Normal range of motion.      Cervical back: Normal range of motion and neck supple.   Skin:     General: Skin is warm and dry.      Coloration: Skin is not jaundiced.      Findings: No rash.      Comments: Midline sternotomy scar - clean, dry intact, well healed  Anterolateral left chest wall surgical incision scar- well healed  RUE line c/d/i   Neurological:      Mental Status: He is alert and oriented to person, place, and time. Mental status is at baseline.   Psychiatric:         Behavior: Behavior normal.         Thought Content: Thought content normal.         Labs:  Lab Results   Component Value Date    WBC 11.55 12/19/2022    HGB 8.7 (L) 12/19/2022    HCT 27.0 (L) 12/19/2022     12/19/2022    ALT 16 12/19/2022    AST 19 12/19/2022     12/19/2022    K 4.0 12/19/2022    CL 97 12/19/2022    CREATININE 5.63 (H) 12/19/2022    BUN 56 (H) 12/19/2022    CO2 27 12/19/2022    TSH 2.183 08/29/2022    INR 1.2 11/19/2022    HGBA1C 5.5 09/02/2022        Micro:   See HPI    Radiology:   CXR 11/22    Impression:     1. There is a moderate amount of haziness scattered throughout the inferior half of the right lung.  An infectious process cannot be excluded.  2. There has been interval removal a right internal jugular venous line. There has been interval removal of a right PICC. There has been interval placement of a right subclavian venous line. Its tip is in the superior vena cava.  There is no pneumothorax.  3. The size of the heart is prominent.  This may be secondary to magnification.  4. There are surgical clips projected over  the left hemithorax.  .    Immunization History   Administered Date(s) Administered    Influenza - Quadrivalent - PF *Preferred* (6 months and older) 10/12/2018    PPD Test 11/28/2022    Pneumococcal Conjugate - 13 Valent 10/12/2018         Assessment & Plan:     1. Sternal wound infection  62-year-old male with HTN, afib, heart failure, s/p tricuspid and mitral valve replacement, Maze procedure, left atrial appendage ligation, s/p Impella placement and removal 10/7/22, complicated by Serratia bacteremia and sternal wound infection, on cefepime, then underwent sternal wire removal with mediastinal exploration and removal of the ascending aortic graft on 11/8, cefepime continued and set to End 12/26/22, finished anitbitoics without any issues, presents for EOC follow up.     Plan      - Remove PICC  - Finished > 8 weeks for serratia marcenses bacteremia and sternal woudn infection  - Obtain CXR for exertional dyspnea, may need pulmonlogy vs cardiology workup for subacute post op dyspnea. No infectious symptoms.   - The 10 mm Vascutek Gel-weave graft that was placed at the time of impella insertion was actually resected on 11/8 hence no indication for suppressive therapy needed at this time.   -Educated patient on risk of recurrence and low threshold to go to ER if systemic or local infectious symptoms occur.       Jeferson Artis MD   Infectious Disease Fellow, PGY-4

## 2022-12-27 NOTE — PROGRESS NOTES
Patient presents today for removal of  double lumen PICC line from the right UE.      Patient placed in the supine position with the catheter exit site lower than the heart.  Catheter dressing removed.  Site is free from patent and no redness.  Patient was instructed to take deep breath and bear down.  No resistance was met upon removal.  Catheter gently withdrawn, pulling it parallel to the arm with a constant, steady motion.  Firm, even, direct pressure placed on the exit site with a dry sterile gauze as the catheter exited the site. The patient was then instructed to exhale after the catheter was removed.  Direct pressure was applied until hemostasis was achieved.  Xeroform and a sterile dressing placed.  Catheter intact and length is  39  centimeters which is equivalent to the original insertion length.      Patient maintained in a supine position for 30 minutes after catheter removal.  No sudden onset of dyspnea, continued coughing, breathlessness, chest pain or AMS.     Pt instructed to leave dressing on for 24 hours.  Then assess the catheter exit site every day until the site is epithelialized.  Report any signs of infection (redness, warmth, tenderness, swelling, and drainage) to the practitioner if present.

## 2022-12-29 ENCOUNTER — EXTERNAL HOME HEALTH (OUTPATIENT)
Dept: HOME HEALTH SERVICES | Facility: HOSPITAL | Age: 62
End: 2022-12-29
Payer: COMMERCIAL

## 2022-12-29 LAB

## 2023-01-03 ENCOUNTER — OFFICE VISIT (OUTPATIENT)
Dept: CARDIOLOGY | Facility: CLINIC | Age: 63
End: 2023-01-03
Payer: COMMERCIAL

## 2023-01-03 ENCOUNTER — DOCUMENT SCAN (OUTPATIENT)
Dept: HOME HEALTH SERVICES | Facility: HOSPITAL | Age: 63
End: 2023-01-03
Payer: COMMERCIAL

## 2023-01-03 VITALS
HEART RATE: 80 BPM | OXYGEN SATURATION: 97 % | BODY MASS INDEX: 22.74 KG/M2 | HEIGHT: 69 IN | SYSTOLIC BLOOD PRESSURE: 115 MMHG | DIASTOLIC BLOOD PRESSURE: 69 MMHG | WEIGHT: 153.56 LBS

## 2023-01-03 DIAGNOSIS — Z98.890 STATUS POST TRICUSPID VALVE REPAIR: ICD-10-CM

## 2023-01-03 DIAGNOSIS — I50.20 HFREF (HEART FAILURE WITH REDUCED EJECTION FRACTION): Primary | ICD-10-CM

## 2023-01-03 DIAGNOSIS — I50.21 ACUTE SYSTOLIC CONGESTIVE HEART FAILURE: ICD-10-CM

## 2023-01-03 DIAGNOSIS — L08.9 STERNAL WOUND INFECTION: ICD-10-CM

## 2023-01-03 DIAGNOSIS — I47.20 VT (VENTRICULAR TACHYCARDIA): ICD-10-CM

## 2023-01-03 DIAGNOSIS — Z98.890 S/P MVR (MITRAL VALVE REPAIR): ICD-10-CM

## 2023-01-03 DIAGNOSIS — R57.9 SHOCK: ICD-10-CM

## 2023-01-03 DIAGNOSIS — S21.101A STERNAL WOUND INFECTION: ICD-10-CM

## 2023-01-03 DIAGNOSIS — Z98.890 STATUS POST MAZE OPERATION FOR ATRIAL FIBRILLATION: ICD-10-CM

## 2023-01-03 DIAGNOSIS — Z98.890 STATUS POST LIGATION OF LEFT ATRIAL APPENDAGE: ICD-10-CM

## 2023-01-03 DIAGNOSIS — I10 ESSENTIAL HYPERTENSION: ICD-10-CM

## 2023-01-03 DIAGNOSIS — I34.0 NONRHEUMATIC MITRAL VALVE REGURGITATION: ICD-10-CM

## 2023-01-03 DIAGNOSIS — Z86.79 STATUS POST MAZE OPERATION FOR ATRIAL FIBRILLATION: ICD-10-CM

## 2023-01-03 PROCEDURE — 3078F PR MOST RECENT DIASTOLIC BLOOD PRESSURE < 80 MM HG: ICD-10-PCS | Mod: CPTII,S$GLB,, | Performed by: INTERNAL MEDICINE

## 2023-01-03 PROCEDURE — 3074F PR MOST RECENT SYSTOLIC BLOOD PRESSURE < 130 MM HG: ICD-10-PCS | Mod: CPTII,S$GLB,, | Performed by: INTERNAL MEDICINE

## 2023-01-03 PROCEDURE — 3078F DIAST BP <80 MM HG: CPT | Mod: CPTII,S$GLB,, | Performed by: INTERNAL MEDICINE

## 2023-01-03 PROCEDURE — 99999 PR PBB SHADOW E&M-EST. PATIENT-LVL IV: ICD-10-PCS | Mod: PBBFAC,,, | Performed by: INTERNAL MEDICINE

## 2023-01-03 PROCEDURE — 1159F MED LIST DOCD IN RCRD: CPT | Mod: CPTII,S$GLB,, | Performed by: INTERNAL MEDICINE

## 2023-01-03 PROCEDURE — 3008F PR BODY MASS INDEX (BMI) DOCUMENTED: ICD-10-PCS | Mod: CPTII,S$GLB,, | Performed by: INTERNAL MEDICINE

## 2023-01-03 PROCEDURE — 93000 EKG 12-LEAD: ICD-10-PCS | Mod: S$GLB,,, | Performed by: INTERNAL MEDICINE

## 2023-01-03 PROCEDURE — 3074F SYST BP LT 130 MM HG: CPT | Mod: CPTII,S$GLB,, | Performed by: INTERNAL MEDICINE

## 2023-01-03 PROCEDURE — 93000 ELECTROCARDIOGRAM COMPLETE: CPT | Mod: S$GLB,,, | Performed by: INTERNAL MEDICINE

## 2023-01-03 PROCEDURE — 1159F PR MEDICATION LIST DOCUMENTED IN MEDICAL RECORD: ICD-10-PCS | Mod: CPTII,S$GLB,, | Performed by: INTERNAL MEDICINE

## 2023-01-03 PROCEDURE — 99215 PR OFFICE/OUTPT VISIT, EST, LEVL V, 40-54 MIN: ICD-10-PCS | Mod: 25,S$GLB,, | Performed by: INTERNAL MEDICINE

## 2023-01-03 PROCEDURE — 3008F BODY MASS INDEX DOCD: CPT | Mod: CPTII,S$GLB,, | Performed by: INTERNAL MEDICINE

## 2023-01-03 PROCEDURE — 99215 OFFICE O/P EST HI 40 MIN: CPT | Mod: 25,S$GLB,, | Performed by: INTERNAL MEDICINE

## 2023-01-03 PROCEDURE — 99999 PR PBB SHADOW E&M-EST. PATIENT-LVL IV: CPT | Mod: PBBFAC,,, | Performed by: INTERNAL MEDICINE

## 2023-01-03 RX ORDER — METOPROLOL SUCCINATE 25 MG/1
12.5 TABLET, EXTENDED RELEASE ORAL DAILY
Qty: 45 TABLET | Refills: 3 | Status: ON HOLD | OUTPATIENT
Start: 2023-01-03 | End: 2023-01-15 | Stop reason: HOSPADM

## 2023-01-03 RX ORDER — METOPROLOL SUCCINATE 25 MG/1
12.5 TABLET, EXTENDED RELEASE ORAL DAILY
Qty: 45 TABLET | Refills: 3 | Status: SHIPPED | OUTPATIENT
Start: 2023-01-03 | End: 2023-01-03 | Stop reason: SDUPTHER

## 2023-01-03 NOTE — PROGRESS NOTES
Subjective:    Patient ID:  David Barrios is a 62 y.o. male who presents for follow-up of Valvular Heart Disease      HPI    61 y/o male former pt of Dr James. He has a hx of Afib on chronic AC with DOAC, HTN, HFrEF (NICM per [facility] earlier this year), severe MR s/p MVr and TVr by Dr Hedrick complicated by sternal wound infection. Diagnosed with afib with depressed EF earlier this year, had successful NORBERT/DCCV and noted to have severe MR. Sent to [facility] and had MVr and TVr:   Complex mitral valve repair with A2-P2 [id number] repair (Scotty) and 30mm Medtronic Simulus annuloplasty band  Tricuspid valve repair with 30mm Medtronic Tri-Ad Sullivan annuloplasty band  Left atrial Benz Maze cryoablation  Left atrial appendage resection  Direct open chest insertion of Impella 5.5 via aortic graft (10mm Vascutek Gelweave) into the left ventricle  Had subsequent sternal wound infection with re-intervention/exporation with:  Mediastinal exploration with evacuation of purulent pericarditis              - Debridement and decortication of mediastinal structures              - Resection of 10mm aortic graft on the ascending aorta              - Removal of sternal wires              - Partial bilateral sternectomy              - Wound vac placement by Dr. Carrasquillo' team  Currently on HD for KIMANI post surgery 3 times weekly via tunneled catheter and tolerating well. PICC line recently removed. Last 2DE with:  The left ventricle is normal in size with moderately decreased systolic function. The estimated ejection fraction is 35%.  Mild right ventricular enlargement with moderately reduced right ventricular systolic function.  Indeterminate left ventricular diastolic function.  Severe left atrial enlargement.  Moderate aortic regurgitation.  The mitral and tricuspid valves are s/p repair with no significant residual regurgitation.  Moderate circumferential pericardial effusion with focal stranding. No evidence of tamponade.  The IVC was  not visualized.  Had recent CXR with:  1. There is a moderate amount of haziness scattered throughout the inferior half of the right lung.  An infectious process cannot be excluded.  2. There has been interval removal a right internal jugular venous line. There has been interval removal of a right PICC. There has been interval placement of a right subclavian venous line. Its tip is in the superior vena cava.  There is no pneumothorax.  3. The size of the heart is prominent.  This may be secondary to magnification.  4. There are surgical clips projected over the left hemithorax.  Wife is present. His main complaint is of severe BAI/SOB with minimal activity. Has CP from surgical site which shows no evidence of infection (images in media section). Has palpable, fluid mass in left axilla area where another surgical scar is present. Denies orthopnea, PND, syncope, fevers, chills. Continues to make some urine. BP stable. Not on BB for unknown reason. Not on ACE-I/ARB likely secondary to renal dysfunction. As been trying to stay active.     Review of Systems   Constitutional: Positive for malaise/fatigue.   HENT:  Negative for congestion.    Eyes:  Negative for blurred vision.   Cardiovascular:  Positive for chest pain, dyspnea on exertion and leg swelling. Negative for claudication, cyanosis, irregular heartbeat, near-syncope, orthopnea, palpitations, paroxysmal nocturnal dyspnea and syncope.   Respiratory:  Positive for shortness of breath.    Endocrine: Negative for polyuria.   Hematologic/Lymphatic: Negative for bleeding problem.   Skin:  Positive for color change and poor wound healing. Negative for itching and rash.   Musculoskeletal:  Positive for muscle weakness. Negative for joint swelling and muscle cramps.   Gastrointestinal:  Negative for abdominal pain, hematemesis, hematochezia, melena, nausea and vomiting.   Genitourinary:  Negative for dysuria and hematuria.   Neurological:  Positive for weakness. Negative  for dizziness, focal weakness, headaches, light-headedness and loss of balance.   Psychiatric/Behavioral:  Negative for depression. The patient is not nervous/anxious.       Objective:    Physical Exam  Constitutional:       Appearance: He is well-developed.   HENT:      Head: Normocephalic and atraumatic.   Neck:      Vascular: No JVD.   Cardiovascular:      Rate and Rhythm: Normal rate and regular rhythm.      Pulses:           Carotid pulses are 2+ on the right side and 2+ on the left side.       Radial pulses are 2+ on the right side and 2+ on the left side.        Femoral pulses are 2+ on the right side and 2+ on the left side.       Posterior tibial pulses are 1+ on the right side and 1+ on the left side.      Heart sounds: Normal heart sounds.   Pulmonary:      Effort: Pulmonary effort is normal.      Breath sounds: Normal breath sounds.   Abdominal:      General: Bowel sounds are normal.      Palpations: Abdomen is soft.   Musculoskeletal:      Cervical back: Neck supple.      Comments: Sternotomy scar healing well along with left axilla    Skin:     General: Skin is warm and dry.   Neurological:      Mental Status: He is alert and oriented to person, place, and time.   Psychiatric:         Behavior: Behavior normal.         Thought Content: Thought content normal.         Assessment:       1. HFrEF (heart failure with reduced ejection fraction)    2. Nonrheumatic mitral valve regurgitation    3. Status post Maze operation for atrial fibrillation    4. Status post tricuspid valve repair    5. S/P MVR (mitral valve repair)    6. Shock    7. Status post ligation of left atrial appendage    8. VT (ventricular tachycardia)    9. Essential hypertension    10. Acute systolic congestive heart failure    11. Sternal wound infection      63 y/o pt with hx and presentation as above. Surgical sites healing well. ECG in clinic confirms sinus rhythm with LVH. Will restart BB. No ACE-I/ARB for now given renal dysfunction.  BP stable. Has upcoming appt with surgery. Needs to be more active. Referral placed for cardiac rehab when clear from a surgical standpoint. Repeat 2DE to eval function and pericardial effusion. Discussed the etiology, evaluation, and management of HFrEF, Afib, MVr, TVr, CHF. Discussed the importance of med compliance, heart healthy diet, and regular exercise.      Plan:       -Start Toprol 12.5 mg daily  -2DE  -Cardiac rehab when cleared  -f/u in 1 month

## 2023-01-05 ENCOUNTER — HOSPITAL ENCOUNTER (OUTPATIENT)
Dept: CARDIOLOGY | Facility: HOSPITAL | Age: 63
Discharge: HOME | End: 2023-01-05
Attending: INTERNAL MEDICINE
Payer: COMMERCIAL

## 2023-01-05 VITALS — BODY MASS INDEX: 22.66 KG/M2 | HEIGHT: 69 IN | WEIGHT: 153 LBS

## 2023-01-05 DIAGNOSIS — I50.20 HFREF (HEART FAILURE WITH REDUCED EJECTION FRACTION): ICD-10-CM

## 2023-01-05 PROCEDURE — 93306 ECHO (CUPID ONLY): ICD-10-PCS | Mod: 26,,, | Performed by: INTERNAL MEDICINE

## 2023-01-05 PROCEDURE — 93306 TTE W/DOPPLER COMPLETE: CPT | Mod: 26,,, | Performed by: INTERNAL MEDICINE

## 2023-01-05 PROCEDURE — 93306 TTE W/DOPPLER COMPLETE: CPT | Mod: PO

## 2023-01-06 LAB
AV INDEX (PROSTH): 0.62
AV MEAN GRADIENT: 5 MMHG
AV PEAK GRADIENT: 9 MMHG
AV REGURGITATION PRESSURE HALF TIME: 279.36 MS
AV VALVE AREA: 2.71 CM2
AV VELOCITY RATIO: 0.63
BSA FOR ECHO PROCEDURE: 1.84 M2
CV ECHO LV RWT: 0.25 CM
DOP CALC AO PEAK VEL: 1.53 M/S
DOP CALC AO VTI: 27.3 CM
DOP CALC LVOT AREA: 4.4 CM2
DOP CALC LVOT DIAMETER: 2.36 CM
DOP CALC LVOT PEAK VEL: 0.96 M/S
DOP CALC LVOT STROKE VOLUME: 73.89 CM3
DOP CALC MV VTI: 39.9 CM
DOP CALCLVOT PEAK VEL VTI: 16.9 CM
E WAVE DECELERATION TIME: 0 MSEC
ECHO LV POSTERIOR WALL: 0.79 CM (ref 0.6–1.1)
EJECTION FRACTION: 15 %
FRACTIONAL SHORTENING: 11 % (ref 28–44)
INTERVENTRICULAR SEPTUM: 0.8 CM (ref 0.6–1.1)
IVC DIAMETER: 2.22 CM
LA MAJOR: 7.76 CM
LA MINOR: 6.89 CM
LA WIDTH: 5 CM
LEFT ATRIUM SIZE: 4.99 CM
LEFT ATRIUM VOLUME INDEX: 84.1 ML/M2
LEFT ATRIUM VOLUME: 154.8 CM3
LEFT INTERNAL DIMENSION IN SYSTOLE: 5.55 CM (ref 2.1–4)
LEFT VENTRICLE DIASTOLIC VOLUME INDEX: 106.41 ML/M2
LEFT VENTRICLE DIASTOLIC VOLUME: 195.79 ML
LEFT VENTRICLE MASS INDEX: 107 G/M2
LEFT VENTRICLE SYSTOLIC VOLUME INDEX: 81.9 ML/M2
LEFT VENTRICLE SYSTOLIC VOLUME: 150.73 ML
LEFT VENTRICULAR INTERNAL DIMENSION IN DIASTOLE: 6.23 CM (ref 3.5–6)
LEFT VENTRICULAR MASS: 197.29 G
LVOT MG: 2.02 MMHG
LVOT MV: 0.66 CM/S
MV MEAN GRADIENT: 4 MMHG
MV PEAK A VEL: 0 M/S
MV PEAK E VEL: 0 M/S
MV PEAK GRADIENT: 7 MMHG
MV STENOSIS PRESSURE HALF TIME: 0 MS
MV VALVE AREA BY CONTINUITY EQUATION: 1.85 CM2
PISA AR MAX VEL: 4.06 M/S
PISA TR MAX VEL: 2.44 M/S
RA MAJOR: 5.56 CM
RA PRESSURE: 3 MMHG
RIGHT VENTRICULAR END-DIASTOLIC DIMENSION: 2.88 CM
TR MAX PG: 24 MMHG
TV REST PULMONARY ARTERY PRESSURE: 27 MMHG

## 2023-01-10 ENCOUNTER — TELEPHONE (OUTPATIENT)
Dept: PLASTIC SURGERY | Facility: CLINIC | Age: 63
End: 2023-01-10
Payer: COMMERCIAL

## 2023-01-10 ENCOUNTER — OFFICE VISIT (OUTPATIENT)
Dept: PLASTIC SURGERY | Facility: CLINIC | Age: 63
End: 2023-01-10
Payer: COMMERCIAL

## 2023-01-10 ENCOUNTER — OFFICE VISIT (OUTPATIENT)
Dept: CARDIOTHORACIC SURGERY | Facility: CLINIC | Age: 63
End: 2023-01-10
Payer: COMMERCIAL

## 2023-01-10 ENCOUNTER — HOSPITAL ENCOUNTER (OUTPATIENT)
Dept: CARDIOLOGY | Facility: CLINIC | Age: 63
Discharge: HOME | End: 2023-01-10
Payer: COMMERCIAL

## 2023-01-10 VITALS
HEIGHT: 69 IN | BODY MASS INDEX: 22.63 KG/M2 | SYSTOLIC BLOOD PRESSURE: 123 MMHG | RESPIRATION RATE: 18 BRPM | BODY MASS INDEX: 22.62 KG/M2 | HEART RATE: 72 BPM | HEART RATE: 72 BPM | WEIGHT: 152.69 LBS | DIASTOLIC BLOOD PRESSURE: 61 MMHG | SYSTOLIC BLOOD PRESSURE: 123 MMHG | WEIGHT: 152.75 LBS | HEIGHT: 69 IN | OXYGEN SATURATION: 99 % | DIASTOLIC BLOOD PRESSURE: 61 MMHG

## 2023-01-10 DIAGNOSIS — Z98.890 STATUS POST TRICUSPID VALVE REPAIR: ICD-10-CM

## 2023-01-10 DIAGNOSIS — S21.101A STERNAL WOUND INFECTION: Primary | ICD-10-CM

## 2023-01-10 DIAGNOSIS — L08.9 STERNAL WOUND INFECTION: Primary | ICD-10-CM

## 2023-01-10 DIAGNOSIS — Z98.890 STATUS POST MAZE OPERATION FOR ATRIAL FIBRILLATION: ICD-10-CM

## 2023-01-10 DIAGNOSIS — Z98.890 STATUS POST LIGATION OF LEFT ATRIAL APPENDAGE: ICD-10-CM

## 2023-01-10 DIAGNOSIS — Z86.79 STATUS POST MAZE OPERATION FOR ATRIAL FIBRILLATION: ICD-10-CM

## 2023-01-10 DIAGNOSIS — Z98.890 H/O STERNECTOMY: ICD-10-CM

## 2023-01-10 DIAGNOSIS — Z98.890 S/P MVR (MITRAL VALVE REPAIR): Primary | ICD-10-CM

## 2023-01-10 DIAGNOSIS — R35.0 URINE FREQUENCY: Primary | ICD-10-CM

## 2023-01-10 PROCEDURE — 93010 EKG 12-LEAD: ICD-10-PCS | Mod: S$GLB,,, | Performed by: INTERNAL MEDICINE

## 2023-01-10 PROCEDURE — 99999 PR PBB SHADOW E&M-EST. PATIENT-LVL III: CPT | Mod: PBBFAC,,, | Performed by: PHYSICIAN ASSISTANT

## 2023-01-10 PROCEDURE — 1160F RVW MEDS BY RX/DR IN RCRD: CPT | Mod: CPTII,S$GLB,, | Performed by: SURGERY

## 2023-01-10 PROCEDURE — 3008F PR BODY MASS INDEX (BMI) DOCUMENTED: ICD-10-PCS | Mod: CPTII,S$GLB,, | Performed by: SURGERY

## 2023-01-10 PROCEDURE — 93005 EKG 12-LEAD: ICD-10-PCS | Mod: S$GLB,,, | Performed by: STUDENT IN AN ORGANIZED HEALTH CARE EDUCATION/TRAINING PROGRAM

## 2023-01-10 PROCEDURE — 3008F PR BODY MASS INDEX (BMI) DOCUMENTED: ICD-10-PCS | Mod: CPTII,S$GLB,, | Performed by: PHYSICIAN ASSISTANT

## 2023-01-10 PROCEDURE — 3074F PR MOST RECENT SYSTOLIC BLOOD PRESSURE < 130 MM HG: ICD-10-PCS | Mod: CPTII,S$GLB,, | Performed by: SURGERY

## 2023-01-10 PROCEDURE — 3008F BODY MASS INDEX DOCD: CPT | Mod: CPTII,S$GLB,, | Performed by: SURGERY

## 2023-01-10 PROCEDURE — 1159F MED LIST DOCD IN RCRD: CPT | Mod: CPTII,S$GLB,, | Performed by: SURGERY

## 2023-01-10 PROCEDURE — 99024 PR POST-OP FOLLOW-UP VISIT: ICD-10-PCS | Mod: S$GLB,,, | Performed by: PHYSICIAN ASSISTANT

## 2023-01-10 PROCEDURE — 3078F DIAST BP <80 MM HG: CPT | Mod: CPTII,S$GLB,, | Performed by: SURGERY

## 2023-01-10 PROCEDURE — 3074F SYST BP LT 130 MM HG: CPT | Mod: CPTII,S$GLB,, | Performed by: SURGERY

## 2023-01-10 PROCEDURE — 99024 PR POST-OP FOLLOW-UP VISIT: ICD-10-PCS | Mod: S$GLB,,, | Performed by: SURGERY

## 2023-01-10 PROCEDURE — 3074F PR MOST RECENT SYSTOLIC BLOOD PRESSURE < 130 MM HG: ICD-10-PCS | Mod: CPTII,S$GLB,, | Performed by: PHYSICIAN ASSISTANT

## 2023-01-10 PROCEDURE — 3078F DIAST BP <80 MM HG: CPT | Mod: CPTII,S$GLB,, | Performed by: PHYSICIAN ASSISTANT

## 2023-01-10 PROCEDURE — 99024 POSTOP FOLLOW-UP VISIT: CPT | Mod: S$GLB,,, | Performed by: SURGERY

## 2023-01-10 PROCEDURE — 99024 POSTOP FOLLOW-UP VISIT: CPT | Mod: S$GLB,,, | Performed by: PHYSICIAN ASSISTANT

## 2023-01-10 PROCEDURE — 1160F PR REVIEW ALL MEDS BY PRESCRIBER/CLIN PHARMACIST DOCUMENTED: ICD-10-PCS | Mod: CPTII,S$GLB,, | Performed by: SURGERY

## 2023-01-10 PROCEDURE — 1159F PR MEDICATION LIST DOCUMENTED IN MEDICAL RECORD: ICD-10-PCS | Mod: CPTII,S$GLB,, | Performed by: SURGERY

## 2023-01-10 PROCEDURE — 93010 ELECTROCARDIOGRAM REPORT: CPT | Mod: S$GLB,,, | Performed by: INTERNAL MEDICINE

## 2023-01-10 PROCEDURE — 3008F BODY MASS INDEX DOCD: CPT | Mod: CPTII,S$GLB,, | Performed by: PHYSICIAN ASSISTANT

## 2023-01-10 PROCEDURE — 99999 PR PBB SHADOW E&M-EST. PATIENT-LVL III: ICD-10-PCS | Mod: PBBFAC,,, | Performed by: SURGERY

## 2023-01-10 PROCEDURE — 3078F PR MOST RECENT DIASTOLIC BLOOD PRESSURE < 80 MM HG: ICD-10-PCS | Mod: CPTII,S$GLB,, | Performed by: PHYSICIAN ASSISTANT

## 2023-01-10 PROCEDURE — 3074F SYST BP LT 130 MM HG: CPT | Mod: CPTII,S$GLB,, | Performed by: PHYSICIAN ASSISTANT

## 2023-01-10 PROCEDURE — 3078F PR MOST RECENT DIASTOLIC BLOOD PRESSURE < 80 MM HG: ICD-10-PCS | Mod: CPTII,S$GLB,, | Performed by: SURGERY

## 2023-01-10 PROCEDURE — 99999 PR PBB SHADOW E&M-EST. PATIENT-LVL III: ICD-10-PCS | Mod: PBBFAC,,, | Performed by: PHYSICIAN ASSISTANT

## 2023-01-10 PROCEDURE — 99999 PR PBB SHADOW E&M-EST. PATIENT-LVL III: CPT | Mod: PBBFAC,,, | Performed by: SURGERY

## 2023-01-10 PROCEDURE — 93005 ELECTROCARDIOGRAM TRACING: CPT | Mod: S$GLB,,, | Performed by: STUDENT IN AN ORGANIZED HEALTH CARE EDUCATION/TRAINING PROGRAM

## 2023-01-10 NOTE — PROGRESS NOTES
Patient presents Plastic surgery Clinic after having the large sternal wound infection which was closed using bilateral Da Silva flaps.  He is done very very well everything is well healed.  He does have looks like a seroma very far in the axilla.  I did attempt to aspirate this after prepping him.  It was actually liquid blood.  Patient is on a blood thinner.  I was able to milk the majority that out.  But he will return in 2 weeks.  We will re-evaluate.  Otherwise he is doing very well.

## 2023-01-10 NOTE — TELEPHONE ENCOUNTER
Pt instructed to return for f/u in 2 weeks.  Appt made for 1/18/23 for possible f/u need or issues, instructed pt to call if appt not needed and request to cancel.  Additional routine f/u booked for 1/25/23 as MD instructed.

## 2023-01-10 NOTE — PROGRESS NOTES
Patient seen and examined. Patient is progressively increasing activity. Says he still feels short winded. Saw Dr. Villegas recently who started a new medication. Still on HD.      Sternum: stable, incision CDI  EKG: NSR     Assessment:  S/p Mvr, Tvr, maze, left atrial appendage resection Surgery on 10/7/2022. Patient had subsequent sternal wire removal, partial sternectomy, and muscle flap with plastics.      Plan:  Can begin driving as long as he has power steering  We will refer to cardiology to assume care - Dr. Villegas  Will send referral to urology as patient complains of urgency/frequency. Said he saw a urologist in the past.   Instructed patient to discuss with his cardiologist at next apt in February if still feeling short of breath. May need to tweak medications   Discussed with patient that a pain medication refill could not be prescribed. Offered pain management referral but not interested at this time.   No scheduled appointment, RTC prn

## 2023-01-12 ENCOUNTER — HOSPITAL ENCOUNTER (INPATIENT)
Facility: HOSPITAL | Age: 63
LOS: 3 days | Discharge: HOME | DRG: 291 | End: 2023-01-16
Attending: STUDENT IN AN ORGANIZED HEALTH CARE EDUCATION/TRAINING PROGRAM | Admitting: STUDENT IN AN ORGANIZED HEALTH CARE EDUCATION/TRAINING PROGRAM
Payer: COMMERCIAL

## 2023-01-12 ENCOUNTER — OFFICE VISIT (OUTPATIENT)
Dept: UROLOGY | Facility: CLINIC | Age: 63
End: 2023-01-12
Payer: COMMERCIAL

## 2023-01-12 ENCOUNTER — HOSPITAL ENCOUNTER (OUTPATIENT)
Dept: RADIOLOGY | Facility: HOSPITAL | Age: 63
Discharge: HOME | DRG: 291 | End: 2023-01-12
Attending: STUDENT IN AN ORGANIZED HEALTH CARE EDUCATION/TRAINING PROGRAM
Payer: COMMERCIAL

## 2023-01-12 ENCOUNTER — LAB VISIT (OUTPATIENT)
Dept: LAB | Facility: HOSPITAL | Age: 63
End: 2023-01-12
Attending: NURSE PRACTITIONER
Payer: COMMERCIAL

## 2023-01-12 VITALS
HEART RATE: 71 BPM | WEIGHT: 150.19 LBS | HEIGHT: 69 IN | BODY MASS INDEX: 22.24 KG/M2 | DIASTOLIC BLOOD PRESSURE: 62 MMHG | SYSTOLIC BLOOD PRESSURE: 113 MMHG

## 2023-01-12 DIAGNOSIS — N52.01 ERECTILE DYSFUNCTION DUE TO ARTERIAL INSUFFICIENCY: ICD-10-CM

## 2023-01-12 DIAGNOSIS — N13.8 BPH WITH URINARY OBSTRUCTION: Primary | ICD-10-CM

## 2023-01-12 DIAGNOSIS — R06.02 SOB (SHORTNESS OF BREATH): ICD-10-CM

## 2023-01-12 DIAGNOSIS — N13.8 BPH WITH URINARY OBSTRUCTION: ICD-10-CM

## 2023-01-12 DIAGNOSIS — N18.6 ESRD ON HEMODIALYSIS: ICD-10-CM

## 2023-01-12 DIAGNOSIS — N17.9 AKI (ACUTE KIDNEY INJURY): ICD-10-CM

## 2023-01-12 DIAGNOSIS — R39.9 LOWER URINARY TRACT SYMPTOMS (LUTS): ICD-10-CM

## 2023-01-12 DIAGNOSIS — I50.9 HEART FAILURE: ICD-10-CM

## 2023-01-12 DIAGNOSIS — I50.43 ACUTE ON CHRONIC COMBINED SYSTOLIC AND DIASTOLIC HEART FAILURE: Primary | ICD-10-CM

## 2023-01-12 DIAGNOSIS — I50.21 ACUTE SYSTOLIC CONGESTIVE HEART FAILURE: ICD-10-CM

## 2023-01-12 DIAGNOSIS — J18.9 PNEUMONIA OF BOTH LOWER LOBES DUE TO INFECTIOUS ORGANISM: ICD-10-CM

## 2023-01-12 DIAGNOSIS — R35.0 URINE FREQUENCY: ICD-10-CM

## 2023-01-12 DIAGNOSIS — R06.09 EXERTIONAL DYSPNEA: ICD-10-CM

## 2023-01-12 DIAGNOSIS — N40.1 BPH WITH URINARY OBSTRUCTION: Primary | ICD-10-CM

## 2023-01-12 DIAGNOSIS — R07.9 CHEST PAIN: ICD-10-CM

## 2023-01-12 DIAGNOSIS — D64.9 ANEMIA, UNSPECIFIED TYPE: ICD-10-CM

## 2023-01-12 DIAGNOSIS — Z99.2 ESRD ON HEMODIALYSIS: ICD-10-CM

## 2023-01-12 DIAGNOSIS — N40.1 BPH WITH URINARY OBSTRUCTION: ICD-10-CM

## 2023-01-12 LAB
ABO + RH BLD: NORMAL
ALBUMIN SERPL BCP-MCNC: 3.1 G/DL (ref 3.5–5.2)
ALP SERPL-CCNC: 96 U/L (ref 55–135)
ALT SERPL W/O P-5'-P-CCNC: 23 U/L (ref 10–44)
ANION GAP SERPL CALC-SCNC: 11 MMOL/L (ref 8–16)
AST SERPL-CCNC: 21 U/L (ref 10–40)
BASOPHILS # BLD AUTO: 0.08 K/UL (ref 0–0.2)
BASOPHILS NFR BLD: 0.8 % (ref 0–1.9)
BILIRUB SERPL-MCNC: 0.4 MG/DL (ref 0.1–1)
BILIRUB SERPL-MCNC: NORMAL MG/DL
BLD GP AB SCN CELLS X3 SERPL QL: NORMAL
BLOOD URINE, POC: NORMAL
BUN SERPL-MCNC: 50 MG/DL (ref 8–23)
CALCIUM SERPL-MCNC: 8.7 MG/DL (ref 8.7–10.5)
CHLORIDE SERPL-SCNC: 103 MMOL/L (ref 95–110)
CLARITY, POC UA: CLEAR
CO2 SERPL-SCNC: 25 MMOL/L (ref 23–29)
COLOR, POC UA: YELLOW
COMPLEXED PSA SERPL-MCNC: 3.7 NG/ML (ref 0–4)
CREAT SERPL-MCNC: 2.5 MG/DL (ref 0.5–1.4)
DIFFERENTIAL METHOD: ABNORMAL
EOSINOPHIL # BLD AUTO: 0.2 K/UL (ref 0–0.5)
EOSINOPHIL NFR BLD: 2.3 % (ref 0–8)
ERYTHROCYTE [DISTWIDTH] IN BLOOD BY AUTOMATED COUNT: 17.2 % (ref 11.5–14.5)
EST. GFR  (NO RACE VARIABLE): 28 ML/MIN/1.73 M^2
GLUCOSE SERPL-MCNC: 89 MG/DL (ref 70–110)
GLUCOSE UR QL STRIP: NORMAL
HCT VFR BLD AUTO: 30.7 % (ref 40–54)
HGB BLD-MCNC: 9.7 G/DL (ref 14–18)
IMM GRANULOCYTES # BLD AUTO: 0.06 K/UL (ref 0–0.04)
IMM GRANULOCYTES NFR BLD AUTO: 0.6 % (ref 0–0.5)
KETONES UR QL STRIP: NORMAL
LEUKOCYTE ESTERASE URINE, POC: NORMAL
LYMPHOCYTES # BLD AUTO: 1.3 K/UL (ref 1–4.8)
LYMPHOCYTES NFR BLD: 13.5 % (ref 18–48)
MCH RBC QN AUTO: 33.4 PG (ref 27–31)
MCHC RBC AUTO-ENTMCNC: 31.6 G/DL (ref 32–36)
MCV RBC AUTO: 106 FL (ref 82–98)
MONOCYTES # BLD AUTO: 1.3 K/UL (ref 0.3–1)
MONOCYTES NFR BLD: 13.1 % (ref 4–15)
NEUTROPHILS # BLD AUTO: 6.7 K/UL (ref 1.8–7.7)
NEUTROPHILS NFR BLD: 69.7 % (ref 38–73)
NITRITE, POC UA: NORMAL
NRBC BLD-RTO: 0 /100 WBC
PH, POC UA: 5
PLATELET # BLD AUTO: 255 K/UL (ref 150–450)
PMV BLD AUTO: 10.4 FL (ref 9.2–12.9)
POC RESIDUAL URINE VOLUME: 90 ML (ref 0–100)
POTASSIUM SERPL-SCNC: 4.3 MMOL/L (ref 3.5–5.1)
PROT SERPL-MCNC: 6.5 G/DL (ref 6–8.4)
PROTEIN, POC: NORMAL
RBC # BLD AUTO: 2.9 M/UL (ref 4.6–6.2)
SODIUM SERPL-SCNC: 139 MMOL/L (ref 136–145)
SPECIFIC GRAVITY, POC UA: 1.01
TROPONIN I SERPL DL<=0.01 NG/ML-MCNC: 0.03 NG/ML (ref 0–0.03)
UROBILINOGEN, POC UA: NORMAL
WBC # BLD AUTO: 9.67 K/UL (ref 3.9–12.7)

## 2023-01-12 PROCEDURE — 81002 URINALYSIS NONAUTO W/O SCOPE: CPT | Mod: S$GLB,,, | Performed by: NURSE PRACTITIONER

## 2023-01-12 PROCEDURE — 80053 COMPREHEN METABOLIC PANEL: CPT | Performed by: STUDENT IN AN ORGANIZED HEALTH CARE EDUCATION/TRAINING PROGRAM

## 2023-01-12 PROCEDURE — 99999 PR PBB SHADOW E&M-EST. PATIENT-LVL IV: CPT | Mod: PBBFAC,,, | Performed by: NURSE PRACTITIONER

## 2023-01-12 PROCEDURE — 99285 EMERGENCY DEPT VISIT HI MDM: CPT | Mod: 25

## 2023-01-12 PROCEDURE — 99204 PR OFFICE/OUTPT VISIT, NEW, LEVL IV, 45-59 MIN: ICD-10-PCS | Mod: S$GLB,,, | Performed by: NURSE PRACTITIONER

## 2023-01-12 PROCEDURE — 99204 OFFICE O/P NEW MOD 45 MIN: CPT | Mod: S$GLB,,, | Performed by: NURSE PRACTITIONER

## 2023-01-12 PROCEDURE — 3008F BODY MASS INDEX DOCD: CPT | Mod: CPTII,S$GLB,, | Performed by: NURSE PRACTITIONER

## 2023-01-12 PROCEDURE — 93010 ELECTROCARDIOGRAM REPORT: CPT | Mod: ,,, | Performed by: INTERNAL MEDICINE

## 2023-01-12 PROCEDURE — 81002 POCT URINE DIPSTICK WITHOUT MICROSCOPE: ICD-10-PCS | Mod: S$GLB,,, | Performed by: NURSE PRACTITIONER

## 2023-01-12 PROCEDURE — 3078F PR MOST RECENT DIASTOLIC BLOOD PRESSURE < 80 MM HG: ICD-10-PCS | Mod: CPTII,S$GLB,, | Performed by: NURSE PRACTITIONER

## 2023-01-12 PROCEDURE — G0378 HOSPITAL OBSERVATION PER HR: HCPCS

## 2023-01-12 PROCEDURE — 86900 BLOOD TYPING SEROLOGIC ABO: CPT | Performed by: STUDENT IN AN ORGANIZED HEALTH CARE EDUCATION/TRAINING PROGRAM

## 2023-01-12 PROCEDURE — 63600175 PHARM REV CODE 636 W HCPCS: Performed by: STUDENT IN AN ORGANIZED HEALTH CARE EDUCATION/TRAINING PROGRAM

## 2023-01-12 PROCEDURE — 51798 POCT BLADDER SCAN: ICD-10-PCS | Mod: S$GLB,,, | Performed by: NURSE PRACTITIONER

## 2023-01-12 PROCEDURE — 1159F MED LIST DOCD IN RCRD: CPT | Mod: CPTII,S$GLB,, | Performed by: NURSE PRACTITIONER

## 2023-01-12 PROCEDURE — 84153 ASSAY OF PSA TOTAL: CPT | Performed by: NURSE PRACTITIONER

## 2023-01-12 PROCEDURE — 71046 XR CHEST PA AND LATERAL: ICD-10-PCS | Mod: 26,,, | Performed by: RADIOLOGY

## 2023-01-12 PROCEDURE — 93005 ELECTROCARDIOGRAM TRACING: CPT

## 2023-01-12 PROCEDURE — 85025 COMPLETE CBC W/AUTO DIFF WBC: CPT | Performed by: STUDENT IN AN ORGANIZED HEALTH CARE EDUCATION/TRAINING PROGRAM

## 2023-01-12 PROCEDURE — 1159F PR MEDICATION LIST DOCUMENTED IN MEDICAL RECORD: ICD-10-PCS | Mod: CPTII,S$GLB,, | Performed by: NURSE PRACTITIONER

## 2023-01-12 PROCEDURE — 1160F PR REVIEW ALL MEDS BY PRESCRIBER/CLIN PHARMACIST DOCUMENTED: ICD-10-PCS | Mod: CPTII,S$GLB,, | Performed by: NURSE PRACTITIONER

## 2023-01-12 PROCEDURE — 84484 ASSAY OF TROPONIN QUANT: CPT | Performed by: STUDENT IN AN ORGANIZED HEALTH CARE EDUCATION/TRAINING PROGRAM

## 2023-01-12 PROCEDURE — 51798 US URINE CAPACITY MEASURE: CPT | Mod: S$GLB,,, | Performed by: NURSE PRACTITIONER

## 2023-01-12 PROCEDURE — 36415 COLL VENOUS BLD VENIPUNCTURE: CPT | Performed by: NURSE PRACTITIONER

## 2023-01-12 PROCEDURE — 3074F PR MOST RECENT SYSTOLIC BLOOD PRESSURE < 130 MM HG: ICD-10-PCS | Mod: CPTII,S$GLB,, | Performed by: NURSE PRACTITIONER

## 2023-01-12 PROCEDURE — 99999 PR PBB SHADOW E&M-EST. PATIENT-LVL IV: ICD-10-PCS | Mod: PBBFAC,,, | Performed by: NURSE PRACTITIONER

## 2023-01-12 PROCEDURE — 93010 EKG 12-LEAD: ICD-10-PCS | Mod: ,,, | Performed by: INTERNAL MEDICINE

## 2023-01-12 PROCEDURE — 96375 TX/PRO/DX INJ NEW DRUG ADDON: CPT

## 2023-01-12 PROCEDURE — 3008F PR BODY MASS INDEX (BMI) DOCUMENTED: ICD-10-PCS | Mod: CPTII,S$GLB,, | Performed by: NURSE PRACTITIONER

## 2023-01-12 PROCEDURE — 71046 X-RAY EXAM CHEST 2 VIEWS: CPT | Mod: 26,,, | Performed by: RADIOLOGY

## 2023-01-12 PROCEDURE — 3078F DIAST BP <80 MM HG: CPT | Mod: CPTII,S$GLB,, | Performed by: NURSE PRACTITIONER

## 2023-01-12 PROCEDURE — 71046 X-RAY EXAM CHEST 2 VIEWS: CPT | Mod: TC,FY,PO

## 2023-01-12 PROCEDURE — 3074F SYST BP LT 130 MM HG: CPT | Mod: CPTII,S$GLB,, | Performed by: NURSE PRACTITIONER

## 2023-01-12 PROCEDURE — 1160F RVW MEDS BY RX/DR IN RCRD: CPT | Mod: CPTII,S$GLB,, | Performed by: NURSE PRACTITIONER

## 2023-01-12 RX ORDER — GLUCAGON 1 MG
1 KIT INJECTION
Status: DISCONTINUED | OUTPATIENT
Start: 2023-01-12 | End: 2023-01-16 | Stop reason: HOSPADM

## 2023-01-12 RX ORDER — MORPHINE SULFATE 4 MG/ML
4 INJECTION, SOLUTION INTRAMUSCULAR; INTRAVENOUS
Status: DISCONTINUED | OUTPATIENT
Start: 2023-01-12 | End: 2023-01-12

## 2023-01-12 RX ORDER — MORPHINE SULFATE 4 MG/ML
4 INJECTION, SOLUTION INTRAMUSCULAR; INTRAVENOUS
Status: COMPLETED | OUTPATIENT
Start: 2023-01-12 | End: 2023-01-12

## 2023-01-12 RX ORDER — SODIUM CHLORIDE 0.9 % (FLUSH) 0.9 %
10 SYRINGE (ML) INJECTION EVERY 12 HOURS PRN
Status: DISCONTINUED | OUTPATIENT
Start: 2023-01-12 | End: 2023-01-16 | Stop reason: HOSPADM

## 2023-01-12 RX ORDER — FUROSEMIDE 10 MG/ML
80 INJECTION INTRAMUSCULAR; INTRAVENOUS ONCE
Status: COMPLETED | OUTPATIENT
Start: 2023-01-13 | End: 2023-01-13

## 2023-01-12 RX ORDER — IBUPROFEN 200 MG
24 TABLET ORAL
Status: DISCONTINUED | OUTPATIENT
Start: 2023-01-12 | End: 2023-01-16 | Stop reason: HOSPADM

## 2023-01-12 RX ORDER — TRAMADOL HYDROCHLORIDE 50 MG/1
50 TABLET ORAL EVERY 6 HOURS PRN
Status: DISCONTINUED | OUTPATIENT
Start: 2023-01-13 | End: 2023-01-16 | Stop reason: HOSPADM

## 2023-01-12 RX ORDER — FUROSEMIDE 40 MG/1
40 TABLET ORAL
Status: ON HOLD | COMMUNITY
Start: 2022-12-19 | End: 2023-01-15 | Stop reason: HOSPADM

## 2023-01-12 RX ORDER — TAMSULOSIN HYDROCHLORIDE 0.4 MG/1
0.4 CAPSULE ORAL NIGHTLY
Qty: 90 CAPSULE | Refills: 3 | Status: SHIPPED | OUTPATIENT
Start: 2023-01-12 | End: 2023-10-23 | Stop reason: SDUPTHER

## 2023-01-12 RX ORDER — NALOXONE HCL 0.4 MG/ML
0.02 VIAL (ML) INJECTION
Status: DISCONTINUED | OUTPATIENT
Start: 2023-01-12 | End: 2023-01-16 | Stop reason: HOSPADM

## 2023-01-12 RX ORDER — SODIUM CHLORIDE 0.9 % (FLUSH) 0.9 %
10 SYRINGE (ML) INJECTION
Status: DISCONTINUED | OUTPATIENT
Start: 2023-01-12 | End: 2023-01-16 | Stop reason: HOSPADM

## 2023-01-12 RX ORDER — IBUPROFEN 200 MG
16 TABLET ORAL
Status: DISCONTINUED | OUTPATIENT
Start: 2023-01-12 | End: 2023-01-16 | Stop reason: HOSPADM

## 2023-01-12 RX ORDER — ONDANSETRON 2 MG/ML
4 INJECTION INTRAMUSCULAR; INTRAVENOUS
Status: COMPLETED | OUTPATIENT
Start: 2023-01-12 | End: 2023-01-12

## 2023-01-12 RX ADMIN — MORPHINE SULFATE 4 MG: 4 INJECTION INTRAVENOUS at 07:01

## 2023-01-12 RX ADMIN — ONDANSETRON HYDROCHLORIDE 4 MG: 2 SOLUTION INTRAMUSCULAR; INTRAVENOUS at 06:01

## 2023-01-12 NOTE — PROGRESS NOTES
CHIEF COMPLAINT:    David Barrios is a 62 y.o. male presents today for Urinary Frequency.    HISTORY OF PRESENTING ILLINESS:    David Barrios is a 62 y.o. male with ESRD on HD. This is a new patient to for me. I personally reviewed their recent medical records as well as their outside medical, surgical, family, & social history.     He is here today due to issues with urination. He states that he has been having issues urinary frequency and urgency during the day.  Does not have any issues at night. Does not have to get up at night.   He denies a lot of coffee/teas; he does take Lasix but not on the days he has dialysis.   Denies any abdominal or bone pain          REVIEW OF SYSTEMS:  Review of Systems   Constitutional: Negative.  Negative for chills and fever.   Eyes:  Negative for double vision.   Respiratory:  Negative for cough and shortness of breath.    Cardiovascular:  Negative for chest pain.   Gastrointestinal:  Negative for abdominal pain, constipation, diarrhea, nausea and vomiting.   Genitourinary:  Positive for frequency and urgency. Negative for dysuria, flank pain and hematuria.   Neurological:  Negative for dizziness and seizures.   Endo/Heme/Allergies:  Negative for polydipsia.       PATIENT HISTORY:    Past Medical History:   Diagnosis Date    Anemia     Atrial fibrillation     Encounter for blood transfusion     Esophageal ulcer     GI bleed     Hypertension        Past Surgical History:   Procedure Laterality Date    APPLICATION OF WOUND VACUUM-ASSISTED CLOSURE DEVICE N/A 11/8/2022    Procedure: APPLICATION, WOUND VAC;  Surgeon: Mike Hedrick MD;  Location: [facility] OR [address] [place] [address];  Service: General;  Laterality: N/A;    CARDIOVERSION Left 9/15/2022    Procedure: Cardioversion;  Surgeon: Julio James MD;  Location: [facility] CATH LAB/EP;  Service: Cardiology;  Laterality: Left;  With NORBERT    CATHETERIZATION OF BOTH LEFT AND RIGHT HEART N/A 9/22/2022    Procedure: CATHETERIZATION, HEART, BOTH LEFT  AND RIGHT;  Surgeon: Julio James MD;  Location: [facility] CATH LAB/EP;  Service: Cardiology;  Laterality: N/A;    COLONOSCOPY N/A 4/4/2019    Procedure: COLONOSCOPY Golytely;  Surgeon: Umair Randolph MD;  Location: [facility] ENDO;  Service: Endoscopy;  Laterality: N/A;    CORONARY ANGIOGRAPHY N/A 9/22/2022    Procedure: ANGIOGRAM, CORONARY ARTERY;  Surgeon: Julio James MD;  Location: [facility] CATH LAB/EP;  Service: Cardiology;  Laterality: N/A;    MEYER MAZE PROCEDURE N/A 10/7/2022    Procedure: MEYER MAZE PROCEDURE;  Surgeon: Mike Hedrick MD;  Location: [facility] OR [address] [place] [address];  Service: Cardiovascular;  Laterality: N/A;    CREATION OF MUSCLE ROTATIONAL FLAP N/A 11/14/2022    Procedure: CREATION, FLAP, MUSCLE ROTATION;  Surgeon: Amadeo Carrasquillo MD;  Location: [facility] OR [address] [place] [address];  Service: Plastics;  Laterality: N/A;  sternal wound that need pec flap coverage    DEBRIDEMENT OF STERNUM N/A 11/8/2022    Procedure: DEBRIDEMENT, STERNUM;  Surgeon: Mike Hedrick MD;  Location: [address];  Service: General;  Laterality: N/A;    ESOPHAGOGASTRODUODENOSCOPY N/A 10/12/2018    Procedure: EGD (ESOPHAGOGASTRODUODENOSCOPY);  Surgeon: Braden Herring MD;  Location: [facility];  Service: Endoscopy;  Laterality: N/A;    ESOPHAGOGASTRODUODENOSCOPY N/A 4/4/2019    Procedure: EGD;  Surgeon: Umair Randolph MD;  Location: [facility];  Service: Endoscopy;  Laterality: N/A;    EXCLUSION OF LEFT ATRIAL APPENDAGE N/A 10/7/2022    Procedure: EXCLUSION, LEFT ATRIAL APPENDAGE;  Surgeon: Mike Hedrick MD;  Location: [facility] OR [address] [place] [address];  Service: Cardiovascular;  Laterality: N/A;    HERNIA REPAIR      INSERTION OF INTRAVASCULAR MICROAXIAL BLOOD PUMP N/A 10/7/2022    Procedure: INSERTION, IMPELLA;  Surgeon: Mike Hedrick MD;  [facility];  Service: Cardiovascular;  Laterality: N/A;  direct aortic insertion    IRRIGATION OF MEDIASTINUM N/A 10/7/2022    Procedure: IRRIGATION, MEDIASTINUM;  Surgeon: Mike Hedrick,  MD;  Location: [facility] OR 2ND FLR;  Service: Cardiovascular;  Laterality: N/A;    STERNAL WIRES REMOVAL N/A 11/8/2022    Procedure: REMOVAL, STERNAL WIRE;  Surgeon: Mike Hedrick MD;  Location: [facility] OR 2ND FLR;  Service: General;  Laterality: N/A;  Sternal wire removal with muscle flap creation    STERNAL WOUND CLOSURE N/A 11/14/2022    Procedure: CLOSURE, WOUND, STERNUM;  Surgeon: Amadeo Carrasquillo MD;  Location: [facility] OR 2ND FLR;  Service: Plastics;  Laterality: N/A;    TRICUSPID VALVULOPLASTY N/A 10/7/2022    Procedure: REPAIR, TRICUSPID VALVE;  Surgeon: Mike Hedrick MD;  Location: [facility] OR 2ND FLR;  Service: Cardiovascular;  Laterality: N/A;    WOUND EXPLORATION N/A 11/14/2022    Procedure: EXPLORATION, WOUND;  Surgeon: Amadeo Carrasquillo MD;  [facility]: [facility] OR 2ND FLR;  Service: Plastics;  Laterality: N/A;       Family History   Problem Relation Age of Onset    COPD Mother     Cancer Father        Social History     Socioeconomic History    Marital status: Single   Tobacco Use    Smoking status: Never    Smokeless tobacco: Current     Types: Chew   Substance and Sexual Activity    Alcohol use: Yes     Alcohol/week: 20.0 standard drinks     Types: 20 Cans of beer per week    Drug use: No       Allergies:  Patient has no known allergies.    Medications:    Current Outpatient Medications:     albuterol (PROVENTIL/VENTOLIN HFA) 90 mcg/actuation inhaler, inhale 1-2 Puff(s) By Mouth Every 4 Hours as needed, Disp: 8.5 g, Rfl: 0    amiodarone (PACERONE) 200 MG Tab, Take 1 tablet (200 mg total) by mouth 2 (two) times daily., Disp: 60 tablet, Rfl: 2    aspirin 81 MG Chew, Take 1 tablet (81 mg total) by mouth once daily., Disp: 360 tablet, Rfl: 0    atorvastatin (LIPITOR) 40 MG tablet, Take 1 tablet (40 mg total) by mouth once daily., Disp: 90 tablet, Rfl: 3    furosemide (LASIX) 40 MG tablet, Take 40 mg by mouth., Disp: , Rfl:     metoprolol succinate (TOPROL-XL) 25 MG 24 hr tablet, Take 0.5 tablets  (12.5 mg total) by mouth once daily., Disp: 45 tablet, Rfl: 3    rivaroxaban (XARELTO) 20 mg Tab, Take 1 tablet (20 mg total) by mouth daily with dinner or evening meal., Disp: 90 tablet, Rfl: 3    tamsulosin (FLOMAX) 0.4 mg Cap, Take 1 capsule (0.4 mg total) by mouth every evening. Take 30 minutes after dinner, Disp: 90 capsule, Rfl: 3    PHYSICAL EXAMINATION:  Physical Exam  Vitals and nursing note reviewed.   Constitutional:       General: He is awake.      Appearance: Normal appearance.   HENT:      Head: Normocephalic.      Right Ear: External ear normal.      Left Ear: External ear normal.      Nose: Nose normal.   Cardiovascular:      Rate and Rhythm: Normal rate.   Pulmonary:      Effort: Pulmonary effort is normal. No respiratory distress.   Abdominal:      Tenderness: There is no abdominal tenderness. There is no right CVA tenderness or left CVA tenderness.   Genitourinary:     Penis: Normal and circumcised. No hypospadias.       Testes: Normal.      Prostate: Enlarged. Not tender and no nodules present.      Comments: Prostate ~30gms; smooth    Musculoskeletal:         General: Normal range of motion.      Cervical back: Normal range of motion.   Skin:     General: Skin is warm and dry.   Neurological:      General: No focal deficit present.      Mental Status: He is alert and oriented to person, place, and time.   Psychiatric:         Mood and Affect: Mood normal.         Behavior: Behavior is cooperative.         LABS:      In office UA today was clear of active infection and blood.     The PVR in the office today done immediately after urination by the nurse was 90.        No results found for: PSA, PSADIAG, PSATOTAL          IMPRESSION:    Encounter Diagnoses   Name Primary?    BPH with urinary obstruction Yes    Urine frequency     ESRD on hemodialysis     Lower urinary tract symptoms (LUTS)     Erectile dysfunction due to arterial insufficiency          Assessment:       1. BPH with urinary  obstruction    2. Urine frequency    3. ESRD on hemodialysis    4. Lower urinary tract symptoms (LUTS)    5. Erectile dysfunction due to arterial insufficiency          Plan:         I spent 45 minutes with the patient of which more than half was spent in direct consultation with the patient in regards to our treatment and plan.  We addressed the office findings and recent labs.   Education and recommendations of today's plan of care including home remedies and needed follow up with PCP.   We discussed the chief complaint; reviewed the LUTS and the possible contributory factors.   Rx for Flomax sent to pharmacy  Recommended lifestyle modifications with proper, healthy diet, good hydration if no fluid restrictions; reducing bladder irritants.   Benefits of regular exercise.  Discussed his ED; reviewed options but he ok for now  Check PSA today.  RTC 3 months; sooner if no improvement

## 2023-01-12 NOTE — ED PROVIDER NOTES
Encounter Date: 1/12/2023       History     Chief Complaint   Patient presents with    Shortness of Breath     Reports SOB since cardiac surgery 3 months ago. He denies any significant change but sent here today after CRX today for reading of Pneumothorax. Pt. S/p bi-valve repair with some complication. He appears mildly visible dyspneic at rest.      62-year-old male who presents from clinic with concern for pneumothorax.  The patient says that he underwent  S/p Mvr, Tvr, maze, left atrial appendage resection Surgery on 10/7/2022. Patient had subsequent sternal wire removal, partial sternectomy, and muscle flap with plastics.  He says that he has been feeling short of breath last few days and today underwent an x-ray which showed a pneumothorax.  He denies any shortness a breath while at rest or chest pain.  He denies any history of pneumothorax.    Review of patient's allergies indicates:  No Known Allergies  Past Medical History:   Diagnosis Date    Anemia     Atrial fibrillation     Encounter for blood transfusion     Esophageal ulcer     GI bleed     Hypertension      Past Surgical History:   Procedure Laterality Date    APPLICATION OF WOUND VACUUM-ASSISTED CLOSURE DEVICE N/A 11/8/2022    Procedure: APPLICATION, WOUND VAC;  Surgeon: Mike Hedrick MD;  Location: [facility] OR [address] [place] [address];  Service: General;  Laterality: N/A;    CARDIOVERSION Left 9/15/2022    Procedure: Cardioversion;  Surgeon: Julio James MD;  Location: [facility] CATH LAB/EP;  Service: Cardiology;  Laterality: Left;  With NORBERT    CATHETERIZATION OF BOTH LEFT AND RIGHT HEART N/A 9/22/2022    Procedure: CATHETERIZATION, HEART, BOTH LEFT AND RIGHT;  Surgeon: Julio James MD;  Location: [facility] CATH LAB/EP;  Service: Cardiology;  Laterality: N/A;    COLONOSCOPY N/A 4/4/2019    Procedure: COLONOSCOPY Golytely;  Surgeon: Umair Randolph MD;  Location: [facility] ENDO;  Service: Endoscopy;  Laterality: N/A;    CORONARY ANGIOGRAPHY N/A 9/22/2022    Procedure:  ANGIOGRAM, CORONARY ARTERY;  Surgeon: Julio James MD;  Location: [facility] CATH LAB/EP;  Service: Cardiology;  Laterality: N/A;    MEYER MAZE PROCEDURE N/A 10/7/2022    Procedure: MEYER MAZE PROCEDURE;  Surgeon: Mike Hedrick MD;  [facility];  Service: Cardiovascular;  Laterality: N/A;    CREATION OF MUSCLE ROTATIONAL FLAP N/A 11/14/2022    Procedure: CREATION, FLAP, MUSCLE ROTATION;  Surgeon: Amadeo Carrasquillo MD;  [facility];  Service: Plastics;  Laterality: N/A;  sternal wound that need pec flap coverage    DEBRIDEMENT OF STERNUM N/A 11/8/2022    Procedure: DEBRIDEMENT, STERNUM;  Surgeon: Mike Hedrick MD;  [facility];  Service: General;  Laterality: N/A;    ESOPHAGOGASTRODUODENOSCOPY N/A 10/12/2018    Procedure: EGD (ESOPHAGOGASTRODUODENOSCOPY);  Surgeon: Braden Herring MD;  Location: [facility] ENDO;  Service: Endoscopy;  Laterality: N/A;    ESOPHAGOGASTRODUODENOSCOPY N/A 4/4/2019    Procedure: EGD;  Surgeon: Umair Randolph MD;  Location: [facility] ENDO;  Service: Endoscopy;  Laterality: N/A;    EXCLUSION OF LEFT ATRIAL APPENDAGE N/A 10/7/2022    Procedure: EXCLUSION, LEFT ATRIAL APPENDAGE;  Surgeon: Mike Hedrick MD;  [facility];  Service: Cardiovascular;  Laterality: N/A;    HERNIA REPAIR      INSERTION OF INTRAVASCULAR MICROAXIAL BLOOD PUMP N/A 10/7/2022    Procedure: INSERTION, IMPELLA;  Surgeon: Mike Hedrick MD;  [facility];  Service: Cardiovascular;  Laterality: N/A;  direct aortic insertion    IRRIGATION OF MEDIASTINUM N/A 10/7/2022    Procedure: IRRIGATION, MEDIASTINUM;  Surgeon: Mike Hedrick MD;  [facility];  Service: Cardiovascular;  Laterality: N/A;    STERNAL WIRES REMOVAL N/A 11/8/2022    Procedure: REMOVAL, STERNAL WIRE;  Surgeon: Mike Hedrick MD;  [facility];  Service: General;  Laterality: N/A;  Sternal wire removal with muscle flap creation    STERNAL WOUND  CLOSURE N/A 11/14/2022    Procedure: CLOSURE, WOUND, STERNUM;  Surgeon: Amadeo Carrasquillo MD;  Location: [facility] OR 2ND FLR;  Service: Plastics;  Laterality: N/A;    TRICUSPID VALVULOPLASTY N/A 10/7/2022    Procedure: REPAIR, TRICUSPID VALVE;  Surgeon: Mike Hedrick MD;  Location: [facility] OR 2ND FLR;  Service: Cardiovascular;  Laterality: N/A;    WOUND EXPLORATION N/A 11/14/2022    Procedure: EXPLORATION, WOUND;  Surgeon: Amadeo Carrasquillo MD;  Location: [facility] OR 2ND FLR;  Service: Plastics;  Laterality: N/A;     Family History   Problem Relation Age of Onset    COPD Mother     Cancer Father      Social History     Tobacco Use    Smoking status: Never    Smokeless tobacco: Current     Types: Chew   Substance Use Topics    Alcohol use: Yes     Alcohol/week: 20.0 standard drinks     Types: 20 Cans of beer per week    Drug use: No     Review of Systems   All other systems reviewed and are negative.    Physical Exam     Initial Vitals   BP Pulse Resp Temp SpO2   01/12/23 1730 01/12/23 1730 01/12/23 1730 01/12/23 1839 01/12/23 1730   131/63 79 (!) 24 98.7 °F (37.1 °C) 99 %      MAP       --                Physical Exam    Nursing note and vitals reviewed.  Constitutional: He appears well-developed and well-nourished.   HENT:   Head: Normocephalic and atraumatic.   Eyes: EOM are normal. Pupils are equal, round, and reactive to light.   Neck: Neck supple. No JVD present.   Normal range of motion.  Cardiovascular:  Normal rate and regular rhythm.           Pulmonary/Chest: Breath sounds normal. No stridor. No respiratory distress.   Abdominal: Abdomen is soft. There is no abdominal tenderness.   Musculoskeletal:         General: No edema. Normal range of motion.      Cervical back: Normal range of motion and neck supple.     Neurological: He is alert and oriented to person, place, and time. GCS score is 15. GCS eye subscore is 4. GCS verbal subscore is 5. GCS motor subscore is 6.   Skin: Skin is warm and dry.  Capillary refill takes less than 2 seconds.   Psychiatric: He has a normal mood and affect.       ED Course   Procedures  Labs Reviewed   CBC W/ AUTO DIFFERENTIAL - Abnormal; Notable for the following components:       Result Value    RBC 2.90 (*)     Hemoglobin 9.7 (*)     Hematocrit 30.7 (*)      (*)     MCH 33.4 (*)     MCHC 31.6 (*)     RDW 17.2 (*)     Immature Granulocytes 0.6 (*)     Immature Grans (Abs) 0.06 (*)     Mono # 1.3 (*)     Lymph % 13.5 (*)     All other components within normal limits   COMPREHENSIVE METABOLIC PANEL - Abnormal; Notable for the following components:    BUN 50 (*)     Creatinine 2.5 (*)     Albumin 3.1 (*)     eGFR 28 (*)     All other components within normal limits   TROPONIN I - Abnormal; Notable for the following components:    Troponin I 0.032 (*)     All other components within normal limits   TYPE & SCREEN          Imaging Results              CT Chest Without Contrast (Final result)  Result time 01/12/23 19:53:42      Final result by Olivier Kat DO (01/12/23 19:53:42)                   Impression:      1. Postoperative changes of median sternotomy with sternal wire removal for infection.  Parasternal and retrosternal soft tissue edema may be related to ongoing infection.  2. New, acute appearing compression fracture of the T7 vertebral body, correlate with point tenderness.  3. Thickening and focal hypoattenuation of the left pectoralis muscle may represent intramuscular hematoma or postoperative seroma.  4. Small right pleural effusion with adjacent round atelectasis in the right lower lobe.  Additional scattered areas of bandlike atelectasis and/or scarring as above.  5. Cardiomegaly and a small pericardial effusion.  6. Moderate hiatal hernia.      Electronically signed by: Olivier Kat  Date:    01/12/2023  Time:    19:53               Narrative:    EXAMINATION:  CT CHEST WITHOUT CONTRAST    CLINICAL HISTORY:  Dyspnea, chronic, unclear  etiology;    TECHNIQUE:  Low dose axial images, sagittal and coronal reformations were obtained from the thoracic inlet to the lung bases without intravenous contrast.    COMPARISON:  CTA chest from 08/29/2022.    FINDINGS:  Base of Neck: No significant abnormality.    Thoracic soft tissues: There is focal thickening and hypoattenuation within the left pectoralis muscle proximally, concerning for a intramuscular hematoma or a postoperative seroma.  There is ill-defined soft tissue edema within the anterior chest wall compatible with prior surgery, ongoing infection not excluded.    Aorta: Left-sided aortic arch.  Mild atherosclerosis.  No aneurysm.    Heart: The heart is enlarged.  There is a small pericardial effusion.  There is a right-sided central venous catheter terminating in the right atrium.  Prosthetic cardiac valve noted.  There is dilation of the main pulmonary artery which can be seen with pulmonary hypertension.  There are post CABG changes.    Pulmonary vasculature: Pulmonary arteries distribute normally.    Yessi/Mediastinum: Precarinal node measures 1.5 cm (series 4, image 204).  Prevascular node measures 0.9 cm (series 4, image 189).    Airways: The large airways are patent. No foci of endobronchial filling are seen.    Lungs/Pleura: There is a small right pleural effusion.  There is a round atelectasis within the posterior right lower lobe.  Additional areas of bandlike atelectasis and/or scarring are seen in the right upper, middle, and lower lobes.  There is no pneumothorax.    Esophagus and stomach: There is a moderate hiatal hernia.    Upper Abdomen: Indeterminate left adrenal nodule is stable.    Bones: There is a new compression fracture of the superior endplate T7 with approximately 20-30% height loss.  There are multiple remote left posterior rib fractures with extensive callus formation.  Prior sternotomy changes with removal of the median sternotomy wires.  There is soft tissue edema  within the retrosternal space and anterior to the sternum, on going infection is not excluded.  There is no evidence of a focal fluid collection on this noncontrast CT.                                       X-Ray Chest AP Portable (Final result)  Result time 01/12/23 18:16:03      Final result by Olivier Kat DO (01/12/23 18:16:03)                   Impression:      Small right pleural effusion and right basilar airspace opacities, stable from prior.  No pneumothorax.      Electronically signed by: Olivier Kat  Date:    01/12/2023  Time:    18:16               Narrative:    EXAMINATION:  XR CHEST AP PORTABLE    CLINICAL HISTORY:  Pneumothorax, unspecified    TECHNIQUE:  Single frontal view of the chest was performed.    COMPARISON:  01/12/2023 taken at 11:10.    FINDINGS:  There is a dual lumen right internal jugular central venous catheter, unchanged in position from prior.  There is a prosthetic cardiac valve.  There are surgical clips in the left axillary soft tissues.  There is a small right pleural effusion.  Bandlike opacities and airspace opacities in the right lung are stable.  No new focal consolidation.  There is no pneumothorax.  There is a skin fold overlying the left lung.  The cardiac silhouette is enlarged.  There are calcifications of the aortic arch.  The visualized osseous structures are intact.                                       Medications   sodium chloride 0.9% flush 10 mL (has no administration in time range)   naloxone 0.4 mg/mL injection 0.02 mg (has no administration in time range)   glucose chewable tablet 16 g (has no administration in time range)   glucose chewable tablet 24 g (has no administration in time range)   glucagon (human recombinant) injection 1 mg (has no administration in time range)   dextrose 10% bolus 125 mL 125 mL (has no administration in time range)   dextrose 10% bolus 250 mL 250 mL (has no administration in time range)   ondansetron injection 4 mg (4 mg  Intravenous Given 1/12/23 1845)   morphine injection 4 mg (4 mg Intravenous Given 1/12/23 1902)     Medical Decision Making:   Initial Assessment:   Hemodynamically stable.  Afebrile.  Phonating well and protecting airway spontaneously.  Patient is not in extremis.  Examination as above. Bilateral breath sounds auscultated,no jvd, and not hypotense. Will repeat x-ray here, obtain labs placed non-rebreather and reassess for emergent chest tube versus expectant management with supplemental oxygen.           ED Course as of 01/12/23 2107   Thu Jan 12, 2023 1824 CXR reviewed. Interpreted as without PTX. Message sent to radiologist to confirm/compare from prior films. Clinically, pt is HDS with bilateral breath sounds.  [BG]   1825 Radiologist confirmed no PTX. Suspects typo on prior interpretation.  [BG]   1835 Twelve lead EKG showing sinus rhythm at a rate of 73.  Leftward axis.  Voltage criteria for left ventricular hypertrophy.  No regional ST, there is no acute ischemic change [BG]   1836 Patient reassessed.  Discussed the radiology results regarding the absence of pneumothorax.  All questions were answered.  He worse exertional dyspnea the said it is progressively worsening.  Will have a dedicated CT angiogram for further characterization of his lung parenchyma and arterial vasculature. [BG]   1933 Labs reviewed. CKD similar to before. CTNI Is chronically elevated when compared to previous. No clinical concern for ACS. [BG]      ED Course User Index  [BG] Mynor Bolaños MD          GFR is too low for CT angiogram, therefore noncontrast CT obtained.  There is concern for possible fluid around the syndrome from prior mediastinitis.  Patient is noninfectious appearing and nontoxic.  I do not suspect this is infectious ureteral.  The patient has exertional dyspnea that is pretty severe and limiting for him at home as he does live in a trailer and he is unable to walk even a step or 2 to get to where he needs to be.   Troponin is mildly elevated but this may be due to end-stage renal disease.  Given all of these complaints and clinical findings, I discussed this case earlier in the day with Dr. Villegas.  Plans for admission for further evaluation.         Clinical Impression:   Final diagnoses:  [R06.02] SOB (shortness of breath)  [R06.09] Exertional dyspnea (Primary)  [D64.9] Anemia, unspecified type        ED Disposition Condition    Observation                 Mynor Bolaños MD  01/12/23 5644

## 2023-01-12 NOTE — Clinical Note
Diagnosis: Exertional dyspnea [053023]   Future Attending Provider: CLAIR RING [[id number]]   Admitting Provider:: CALLUM SHIPMAN [[id number]]

## 2023-01-13 ENCOUNTER — PATIENT MESSAGE (OUTPATIENT)
Dept: PRIMARY CARE CLINIC | Facility: CLINIC | Age: 63
End: 2023-01-13
Payer: COMMERCIAL

## 2023-01-13 PROBLEM — R79.89 ELEVATED TROPONIN: Status: ACTIVE | Noted: 2023-01-13

## 2023-01-13 PROBLEM — I51.89 RIGHT ATRIAL MASS: Status: ACTIVE | Noted: 2023-01-13

## 2023-01-13 PROBLEM — N18.6 ESRD (END STAGE RENAL DISEASE): Status: ACTIVE | Noted: 2023-01-13

## 2023-01-13 PROBLEM — I48.0 PAROXYSMAL A-FIB: Status: ACTIVE | Noted: 2018-10-11

## 2023-01-13 PROBLEM — I50.43 ACUTE ON CHRONIC COMBINED SYSTOLIC AND DIASTOLIC HEART FAILURE: Status: ACTIVE | Noted: 2023-01-13

## 2023-01-13 LAB
ALBUMIN SERPL BCP-MCNC: 2.8 G/DL (ref 3.5–5.2)
ALP SERPL-CCNC: 84 U/L (ref 55–135)
ALT SERPL W/O P-5'-P-CCNC: 19 U/L (ref 10–44)
ANION GAP SERPL CALC-SCNC: 10 MMOL/L (ref 8–16)
AST SERPL-CCNC: 17 U/L (ref 10–40)
BASOPHILS # BLD AUTO: 0.09 K/UL (ref 0–0.2)
BASOPHILS NFR BLD: 1 % (ref 0–1.9)
BILIRUB SERPL-MCNC: 0.4 MG/DL (ref 0.1–1)
BNP SERPL-MCNC: 1629 PG/ML (ref 0–99)
BUN SERPL-MCNC: 52 MG/DL (ref 8–23)
CALCIUM SERPL-MCNC: 8.9 MG/DL (ref 8.7–10.5)
CHLORIDE SERPL-SCNC: 103 MMOL/L (ref 95–110)
CO2 SERPL-SCNC: 27 MMOL/L (ref 23–29)
CREAT SERPL-MCNC: 2.7 MG/DL (ref 0.5–1.4)
DIFFERENTIAL METHOD: ABNORMAL
EOSINOPHIL # BLD AUTO: 0.2 K/UL (ref 0–0.5)
EOSINOPHIL NFR BLD: 2.7 % (ref 0–8)
ERYTHROCYTE [DISTWIDTH] IN BLOOD BY AUTOMATED COUNT: 17.2 % (ref 11.5–14.5)
EST. GFR  (NO RACE VARIABLE): 26 ML/MIN/1.73 M^2
ESTIMATED AVG GLUCOSE: 85 MG/DL (ref 68–131)
GLUCOSE SERPL-MCNC: 97 MG/DL (ref 70–110)
HBA1C MFR BLD: 4.6 % (ref 4–5.6)
HCT VFR BLD AUTO: 29.5 % (ref 40–54)
HGB BLD-MCNC: 9 G/DL (ref 14–18)
IMM GRANULOCYTES # BLD AUTO: 0.04 K/UL (ref 0–0.04)
IMM GRANULOCYTES NFR BLD AUTO: 0.4 % (ref 0–0.5)
LYMPHOCYTES # BLD AUTO: 1.3 K/UL (ref 1–4.8)
LYMPHOCYTES NFR BLD: 15 % (ref 18–48)
MAGNESIUM SERPL-MCNC: 1.5 MG/DL (ref 1.6–2.6)
MCH RBC QN AUTO: 33 PG (ref 27–31)
MCHC RBC AUTO-ENTMCNC: 30.5 G/DL (ref 32–36)
MCV RBC AUTO: 108 FL (ref 82–98)
MONOCYTES # BLD AUTO: 1.1 K/UL (ref 0.3–1)
MONOCYTES NFR BLD: 11.7 % (ref 4–15)
NEUTROPHILS # BLD AUTO: 6.2 K/UL (ref 1.8–7.7)
NEUTROPHILS NFR BLD: 69.2 % (ref 38–73)
NRBC BLD-RTO: 0 /100 WBC
PLATELET # BLD AUTO: 228 K/UL (ref 150–450)
PMV BLD AUTO: 10.2 FL (ref 9.2–12.9)
POTASSIUM SERPL-SCNC: 4.3 MMOL/L (ref 3.5–5.1)
PROT SERPL-MCNC: 6.4 G/DL (ref 6–8.4)
RBC # BLD AUTO: 2.73 M/UL (ref 4.6–6.2)
SODIUM SERPL-SCNC: 140 MMOL/L (ref 136–145)
TROPONIN I SERPL DL<=0.01 NG/ML-MCNC: 0.04 NG/ML (ref 0–0.03)
WBC # BLD AUTO: 8.95 K/UL (ref 3.9–12.7)

## 2023-01-13 PROCEDURE — 83036 HEMOGLOBIN GLYCOSYLATED A1C: CPT | Performed by: STUDENT IN AN ORGANIZED HEALTH CARE EDUCATION/TRAINING PROGRAM

## 2023-01-13 PROCEDURE — 85025 COMPLETE CBC W/AUTO DIFF WBC: CPT | Performed by: STUDENT IN AN ORGANIZED HEALTH CARE EDUCATION/TRAINING PROGRAM

## 2023-01-13 PROCEDURE — 25000003 PHARM REV CODE 250: Performed by: NURSE PRACTITIONER

## 2023-01-13 PROCEDURE — 80053 COMPREHEN METABOLIC PANEL: CPT | Performed by: STUDENT IN AN ORGANIZED HEALTH CARE EDUCATION/TRAINING PROGRAM

## 2023-01-13 PROCEDURE — 96375 TX/PRO/DX INJ NEW DRUG ADDON: CPT

## 2023-01-13 PROCEDURE — 94761 N-INVAS EAR/PLS OXIMETRY MLT: CPT

## 2023-01-13 PROCEDURE — 25000003 PHARM REV CODE 250: Performed by: STUDENT IN AN ORGANIZED HEALTH CARE EDUCATION/TRAINING PROGRAM

## 2023-01-13 PROCEDURE — 96365 THER/PROPH/DIAG IV INF INIT: CPT

## 2023-01-13 PROCEDURE — 96366 THER/PROPH/DIAG IV INF ADDON: CPT

## 2023-01-13 PROCEDURE — 83735 ASSAY OF MAGNESIUM: CPT | Performed by: STUDENT IN AN ORGANIZED HEALTH CARE EDUCATION/TRAINING PROGRAM

## 2023-01-13 PROCEDURE — 99223 PR INITIAL HOSPITAL CARE,LEVL III: ICD-10-PCS | Mod: ,,, | Performed by: NURSE PRACTITIONER

## 2023-01-13 PROCEDURE — 84484 ASSAY OF TROPONIN QUANT: CPT | Performed by: STUDENT IN AN ORGANIZED HEALTH CARE EDUCATION/TRAINING PROGRAM

## 2023-01-13 PROCEDURE — 63600175 PHARM REV CODE 636 W HCPCS: Performed by: STUDENT IN AN ORGANIZED HEALTH CARE EDUCATION/TRAINING PROGRAM

## 2023-01-13 PROCEDURE — 11000001 HC ACUTE MED/SURG PRIVATE ROOM

## 2023-01-13 PROCEDURE — 83880 ASSAY OF NATRIURETIC PEPTIDE: CPT | Performed by: STUDENT IN AN ORGANIZED HEALTH CARE EDUCATION/TRAINING PROGRAM

## 2023-01-13 PROCEDURE — 99223 1ST HOSP IP/OBS HIGH 75: CPT | Mod: ,,, | Performed by: NURSE PRACTITIONER

## 2023-01-13 RX ORDER — FUROSEMIDE 40 MG/1
40 TABLET ORAL DAILY
Status: DISCONTINUED | OUTPATIENT
Start: 2023-01-13 | End: 2023-01-13

## 2023-01-13 RX ORDER — LIDOCAINE 50 MG/G
1 PATCH TOPICAL
Status: DISCONTINUED | OUTPATIENT
Start: 2023-01-13 | End: 2023-01-16 | Stop reason: HOSPADM

## 2023-01-13 RX ORDER — MAGNESIUM SULFATE HEPTAHYDRATE 40 MG/ML
2 INJECTION, SOLUTION INTRAVENOUS ONCE
Status: COMPLETED | OUTPATIENT
Start: 2023-01-13 | End: 2023-01-13

## 2023-01-13 RX ORDER — FUROSEMIDE 40 MG/1
40 TABLET ORAL DAILY
Status: DISCONTINUED | OUTPATIENT
Start: 2023-01-14 | End: 2023-01-16 | Stop reason: HOSPADM

## 2023-01-13 RX ORDER — FUROSEMIDE 40 MG/1
40 TABLET ORAL ONCE
Status: COMPLETED | OUTPATIENT
Start: 2023-01-13 | End: 2023-01-13

## 2023-01-13 RX ORDER — NAPROXEN SODIUM 220 MG/1
81 TABLET, FILM COATED ORAL DAILY
Status: DISCONTINUED | OUTPATIENT
Start: 2023-01-13 | End: 2023-01-16 | Stop reason: HOSPADM

## 2023-01-13 RX ORDER — AMIODARONE HYDROCHLORIDE 200 MG/1
200 TABLET ORAL 2 TIMES DAILY
Status: DISCONTINUED | OUTPATIENT
Start: 2023-01-13 | End: 2023-01-16 | Stop reason: HOSPADM

## 2023-01-13 RX ORDER — MAGNESIUM SULFATE HEPTAHYDRATE 40 MG/ML
2 INJECTION, SOLUTION INTRAVENOUS
Status: DISCONTINUED | OUTPATIENT
Start: 2023-01-13 | End: 2023-01-13

## 2023-01-13 RX ORDER — ATORVASTATIN CALCIUM 40 MG/1
40 TABLET, FILM COATED ORAL DAILY
Status: DISCONTINUED | OUTPATIENT
Start: 2023-01-13 | End: 2023-01-16 | Stop reason: HOSPADM

## 2023-01-13 RX ADMIN — FUROSEMIDE 80 MG: 10 INJECTION INTRAMUSCULAR; INTRAVENOUS at 12:01

## 2023-01-13 RX ADMIN — FUROSEMIDE 40 MG: 40 TABLET ORAL at 12:01

## 2023-01-13 RX ADMIN — APIXABAN 2.5 MG: 2.5 TABLET, FILM COATED ORAL at 08:01

## 2023-01-13 RX ADMIN — MAGNESIUM SULFATE HEPTAHYDRATE 2 G: 2 INJECTION, SOLUTION INTRAVENOUS at 04:01

## 2023-01-13 RX ADMIN — METOPROLOL SUCCINATE 12.5 MG: 25 TABLET, EXTENDED RELEASE ORAL at 09:01

## 2023-01-13 RX ADMIN — NEPHROCAP 1 CAPSULE: 1 CAP ORAL at 03:01

## 2023-01-13 RX ADMIN — AMIODARONE HYDROCHLORIDE 200 MG: 200 TABLET ORAL at 08:01

## 2023-01-13 RX ADMIN — ATORVASTATIN CALCIUM 40 MG: 40 TABLET, FILM COATED ORAL at 08:01

## 2023-01-13 RX ADMIN — ASPIRIN 81 MG CHEWABLE TABLET 81 MG: 81 TABLET CHEWABLE at 08:01

## 2023-01-13 NOTE — H&P
[facility] Medicine  History & Physical    Patient Name: David Barrios  MRN: [id number]  Patient Class: OP- Observation  Admission Date: 1/12/2023  Attending Physician: Win Singh MD.   Primary Care Provider: Breann Tavera MD         Patient information was obtained from patient, past medical records and ER records.     Subjective:     Principal Problem:Acute on chronic combined systolic and diastolic heart failure    Chief Complaint:   Chief Complaint   Patient presents with    Shortness of Breath     Reports SOB since cardiac surgery 3 months ago. He denies any significant change but sent here today after CRX today for reading of Pneumothorax. Pt. S/p bi-valve repair with some complication. He appears mildly visible dyspneic at rest.         HPI: Mr. 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 Patient was initially sent to the ED by his PCP after undergoing a CXR that was concerning for pneumothorax, however, was normal on repeat x-ray in the ED. Patient states since his valve surgery on 10/7/22 his dyspnea on exertion is getting worse to the point where patient was unable to clumb the two stairs into his trailer home. Denies any SOB at rest, chestpain, nausea, vomiting, abdominal pain, fever, palpiations and chills. Does endorse orthopnea, PND and bendopnea.    Of note patients valvular surgery was complicated by serratia bacteriemia and sternal wound infection that required subsequent sternal wire removal, partial sternectomy, and muscle flap with plastics.      In the ED, patient underwent CT chest w/o contrast which was concerning for possible fluid around the sternum from prior mediastinitis. Patient was afebrile and WBC count was normal. EKG  showed no acute ischemic changes but troponin was mildly elevated at 0.032.     Patient admitted to observation for evaluation of worsening dyspnea.       Past Medical History:   Diagnosis Date    Anemia     Atrial fibrillation     Encounter for blood transfusion     Esophageal ulcer     GI bleed     Hypertension        Past Surgical History:   Procedure Laterality Date    APPLICATION OF WOUND VACUUM-ASSISTED CLOSURE DEVICE N/A 11/8/2022    Procedure: APPLICATION, WOUND VAC;  Surgeon: Mike Hedrick MD;  [place] [facility] [place] [address] [place] [address];  Service: General;  Laterality: N/A;    CARDIOVERSION Left 9/15/2022    Procedure: Cardioversion;  Surgeon: Julio James MD;  Location: [facility] CATH LAB/EP;  Service: Cardiology;  Laterality: Left;  With NORBERT    CATHETERIZATION OF BOTH LEFT AND RIGHT HEART N/A 9/22/2022    Procedure: CATHETERIZATION, HEART, BOTH LEFT AND RIGHT;  Surgeon: Julio James MD;  Location: [facility] CATH LAB/EP;  Service: Cardiology;  Laterality: N/A;    COLONOSCOPY N/A 4/4/2019    Procedure: COLONOSCOPY Golytely;  Surgeon: Umair Randolph MD;  Location: [facility] ENDO;  Service: Endoscopy;  Laterality: N/A;    CORONARY ANGIOGRAPHY N/A 9/22/2022    Procedure: ANGIOGRAM, CORONARY ARTERY;  Surgeon: Julio James MD;  Location: [facility] CATH LAB/EP;  Service: Cardiology;  Laterality: N/A;    MEYER MAZE PROCEDURE N/A 10/7/2022    Procedure: MEYER MAZE PROCEDURE;  Surgeon: Mike Hedrick MD;  [place] [facility] [place] [address] [place] [address];  Service: Cardiovascular;  Laterality: N/A;    CREATION OF MUSCLE ROTATIONAL FLAP N/A 11/14/2022    Procedure: CREATION, FLAP, MUSCLE ROTATION;  Surgeon: Amadeo Carrasquillo MD;  [place] [facility] [place] [address] [place] [address];  Service: Plastics;  Laterality: N/A;  sternal wound that need pec flap coverage    DEBRIDEMENT OF STERNUM N/A 11/8/2022    Procedure: DEBRIDEMENT, STERNUM;  Surgeon: Mike Hedrick MD;  [place] [facility] [place] [address] [place] [address];  Service: General;  Laterality: N/A;     ESOPHAGOGASTRODUODENOSCOPY N/A 10/12/2018    Procedure: EGD (ESOPHAGOGASTRODUODENOSCOPY);  Surgeon: Braden Herring MD;  Location: [facility] ENDO;  Service: Endoscopy;  Laterality: N/A;    ESOPHAGOGASTRODUODENOSCOPY N/A 4/4/2019    Procedure: EGD;  Surgeon: Umair Randolph MD;  Location: [facility] ENDO;  Service: Endoscopy;  Laterality: N/A;    EXCLUSION OF LEFT ATRIAL APPENDAGE N/A 10/7/2022    Procedure: EXCLUSION, LEFT ATRIAL APPENDAGE;  Surgeon: Mike Hedrick MD;  [facility];  Service: Cardiovascular;  Laterality: N/A;    HERNIA REPAIR      INSERTION OF INTRAVASCULAR MICROAXIAL BLOOD PUMP N/A 10/7/2022    Procedure: INSERTION, IMPELLA;  Surgeon: Mike Hedrick MD;  Location: [facility];  Service: Cardiovascular;  Laterality: N/A;  direct aortic insertion    IRRIGATION OF MEDIASTINUM N/A 10/7/2022    Procedure: IRRIGATION, MEDIASTINUM;  Surgeon: Mike Hedrick MD;  [facility];  Service: Cardiovascular;  Laterality: N/A;    STERNAL WIRES REMOVAL N/A 11/8/2022    Procedure: REMOVAL, STERNAL WIRE;  Surgeon: Mike Hedrick MD;  [facility];  Service: General;  Laterality: N/A;  Sternal wire removal with muscle flap creation    STERNAL WOUND CLOSURE N/A 11/14/2022    Procedure: CLOSURE, WOUND, STERNUM;  Surgeon: Amadeo Carrasquillo MD;  Location: [facility];  Service: Plastics;  Laterality: N/A;    TRICUSPID VALVULOPLASTY N/A 10/7/2022    Procedure: REPAIR, TRICUSPID VALVE;  Surgeon: Mike Hedrick MD;  Location: [facility];  Service: Cardiovascular;  Laterality: N/A;    WOUND EXPLORATION N/A 11/14/2022    Procedure: EXPLORATION, WOUND;  Surgeon: Amadeo Carrasquillo MD;  Location: [facility];  Service: Plastics;  Laterality: N/A;       Review of patient's allergies indicates:  No Known Allergies    No current facility-administered medications on file prior to encounter.     Current Outpatient Medications on File Prior to  Encounter   Medication Sig    albuterol (PROVENTIL/VENTOLIN HFA) 90 mcg/actuation inhaler inhale 1-2 Puff(s) By Mouth Every 4 Hours as needed    amiodarone (PACERONE) 200 MG Tab Take 1 tablet (200 mg total) by mouth 2 (two) times daily.    aspirin 81 MG Chew Take 1 tablet (81 mg total) by mouth once daily.    atorvastatin (LIPITOR) 40 MG tablet Take 1 tablet (40 mg total) by mouth once daily.    furosemide (LASIX) 40 MG tablet Take 40 mg by mouth.    metoprolol succinate (TOPROL-XL) 25 MG 24 hr tablet Take 0.5 tablets (12.5 mg total) by mouth once daily.    rivaroxaban (XARELTO) 20 mg Tab Take 1 tablet (20 mg total) by mouth daily with dinner or evening meal.    tamsulosin (FLOMAX) 0.4 mg Cap Take 1 capsule (0.4 mg total) by mouth every evening. Take 30 minutes after dinner     Family History       Problem Relation (Age of Onset)    COPD Mother    Cancer Father          Tobacco Use    Smoking status: Never    Smokeless tobacco: Current     Types: Chew   Substance and Sexual Activity    Alcohol use: Yes     Alcohol/week: 20.0 standard drinks     Types: 20 Cans of beer per week    Drug use: No    Sexual activity: Not on file     Review of Systems 12 point ROS negative except for HPI  Objective:     Vital Signs (Most Recent):  Temp: 98.7 °F (37.1 °C) (01/12/23 1839)  Pulse: 72 (01/12/23 2302)  Resp: 20 (01/12/23 2302)  BP: 125/62 (01/12/23 2302)  SpO2: (!) 94 % (01/12/23 2302)   Vital Signs (24h Range):  Temp:  [98.7 °F (37.1 °C)] 98.7 °F (37.1 °C)  Pulse:  [70-79] 72  Resp:  [15-24] 20  SpO2:  [94 %-100 %] 94 %  BP: (113-131)/(56-63) 125/62     Weight: 65.8 kg (145 lb)  Body mass index is 21.41 kg/m².    Physical Exam  Constitutional:       Comments: Alert, Oriented, No acute distress   HENT:      Head: Normocephalic and atraumatic.   Eyes:      Conjunctiva/sclera: Conjunctivae normal.   Neck:      Vascular: Hepatojugular reflux and JVD present.   Cardiovascular:      Rate and Rhythm: Normal rate and  regular rhythm.   Pulmonary:      Comments: Normal effort, Clear to auscultation   Abdominal:      Comments: Soft, Non-tender, Non-distended   Musculoskeletal:      Cervical back: Normal range of motion.      Comments: No peripheral edema. Sternal surgical scar noted. Left axilla hematoma noted.    Skin:     Comments: Dry, Intact, Warm, Capillary refill <2 seconds.    Neurological:      Mental Status: He is alert and oriented to person, place, and time.      Comments: Normal speech   Psychiatric:         Mood and Affect: Mood and affect normal.           Significant Labs: All pertinent labs within the past 24 hours have been reviewed.  CBC:   Recent Labs   Lab 01/12/23 1812   WBC 9.67   HGB 9.7*   HCT 30.7*        CMP:   Recent Labs   Lab 01/12/23 1812      K 4.3      CO2 25   GLU 89   BUN 50*   CREATININE 2.5*   CALCIUM 8.7   PROT 6.5   ALBUMIN 3.1*   BILITOT 0.4   ALKPHOS 96   AST 21   ALT 23   ANIONGAP 11     Cardiac Markers: No results for input(s): CKMB, MYOGLOBIN, BNP, TROPISTAT in the last 48 hours.  Troponin:   Recent Labs   Lab 01/12/23 1812   TROPONINI 0.032*       Significant Imaging: I have reviewed all pertinent imaging results/findings within the past 24 hours.    Assessment/Plan:     * Acute on chronic combined systolic and diastolic heart failure  Patients worsening dyspnea most likely secondary to HF exacerbation. Less concerned for mediastinitis in the setting of patient being affebrile and normal WBC count. Well's score 1.5 so less concern for PE.     - Last Echo (1/5/23): LV normal size w/ severely decreased systolic function. EF 15%. Severe LV global hyokinesis. LV diastolic dysfunction. Severely reduced RV systolic function. Mild MR. Mod AR. moderate size protruding right atrial mass present on what appears to be a catheter tip. The mass is mobile. Thrombus vs vegetation suspected.  - Last RHC: none   - Home GDMT: metoprolol succinate 12.5 daily. Continue home GDMT. No  evidence of cardiogenic shock.    - Home diuretic regimen: lasix 40 PO daily  - Hypervolemic on exam today. Will give 1 dose of IV lasix 80mg since patient still urinates. Consulted Nephrology to resume dialysis.   - Maintain K>4 and Mg>2  - Cardiology consulted. Appreciate recommendations.     Elevated troponin  Most likely secondary to HF exacerbation and decreased clearence in the setting of ESRD. Will continue to trend.       ESRD (end stage renal disease)  - Nephrology consulted to resume dialysis.     Right atrial mass  - continue xarelto.     Paroxysmal A-fib  - continue home amiodarone and Xarelto.  - Currently in NSR.       VTE Risk Mitigation (From admission, onward)         Ordered     rivaroxaban tablet 20 mg  With dinner         01/13/23 0005     IP VTE HIGH RISK PATIENT  Once         01/12/23 0689                   Win Singh MD  Department of Hospital Medicine   [facility] - Emergency Dept

## 2023-01-13 NOTE — ASSESSMENT & PLAN NOTE
Most likely secondary to HF exacerbation and decreased clearence in the setting of ESRD. Will continue to trend.

## 2023-01-13 NOTE — CARE UPDATE
Examined patient at bedside, reviewed VS, labs, imaging.   S/p MV repair, TV repair, JENN resection, LA maize 10/7/22 complicated by postop infection.   No signs of infection at present.   He complaints of chronic generalized body pain, worse to sternum. Lidocaine patch applied.   He also c/o BAI.   Lasix x1 given in the ED with 30 ml uop recorded.   Admitted for ADHF.   Awaiting cards eval.   Awaiting nephrology eval. Plan to resume HD.   Wound care to eval altered skin to buttocks  Continue close monitoring.     Jennifer Estes, [facility]   Department of Hospital Medicine

## 2023-01-13 NOTE — PROGRESS NOTES
"[facility]           Pharmacy  Admission Medication Reconciliation     Based on information gathered for medication list, you may go to "Admission" then "Reconcile Home Medications" tabs to review and/or act upon those items.     The home medication list has been updated by the Pharmacy department.   Please read ALL comments highlighted in red.   Please address this information as you see fit.    Feel free to contact us if you have any questions or require assistance.    Home medication list has been compared to current inpatient medications. Please review the following discrepancies noted below:    Patient reports STILL TAKING the following medication(s) which was not ordered upon admit  Tamsulosin 0.4 mg     Feel free to contact us if you have any questions or require assistance.    Tor Mai, PharmD  [phone]    "

## 2023-01-13 NOTE — ED NOTES
Pt was sent from nephrology office for evaluation of SOB. Pt reports SOB since his heart surgery 10/22. Pt reports that its not any worse but NP sent for possible Pneumothorax. Pt appears SOB and breathless. Pt reports that he has been on Hemodialysis since Heart surgery. Pt is not on O2 at home. Dr Bolaños at bedside discussing plan of care

## 2023-01-13 NOTE — SUBJECTIVE & OBJECTIVE
Past Medical History:   Diagnosis Date    Anemia     Atrial fibrillation     Encounter for blood transfusion     Esophageal ulcer     GI bleed     Hypertension        Past Surgical History:   Procedure Laterality Date    APPLICATION OF WOUND VACUUM-ASSISTED CLOSURE DEVICE N/A 11/8/2022    Procedure: APPLICATION, WOUND VAC;  Surgeon: Mike Hedrick MD;  Location: [facility] OR 2ND FLR;  Service: General;  Laterality: N/A;    CARDIOVERSION Left 9/15/2022    Procedure: Cardioversion;  Surgeon: Julio James MD;  Location: [facility] CATH LAB/EP;  Service: Cardiology;  Laterality: Left;  With NORBERT    CATHETERIZATION OF BOTH LEFT AND RIGHT HEART N/A 9/22/2022    Procedure: CATHETERIZATION, HEART, BOTH LEFT AND RIGHT;  Surgeon: Julio James MD;  Location: [facility] CATH LAB/EP;  Service: Cardiology;  Laterality: N/A;    COLONOSCOPY N/A 4/4/2019    Procedure: COLONOSCOPY Golytely;  Surgeon: Umair Randolph MD;  Location: [facility] ENDO;  Service: Endoscopy;  Laterality: N/A;    CORONARY ANGIOGRAPHY N/A 9/22/2022    Procedure: ANGIOGRAM, CORONARY ARTERY;  Surgeon: Julio James MD;  Location: [facility] CATH LAB/EP;  Service: Cardiology;  Laterality: N/A;    MEYER MAZE PROCEDURE N/A 10/7/2022    Procedure: MEYER MAZE PROCEDURE;  Surgeon: Mike Hedrick MD;  [facility];  Service: Cardiovascular;  Laterality: N/A;    CREATION OF MUSCLE ROTATIONAL FLAP N/A 11/14/2022    Procedure: CREATION, FLAP, MUSCLE ROTATION;  Surgeon: Amadeo Carrasquillo MD;  [facility];  Service: Plastics;  Laterality: N/A;  sternal wound that need pec flap coverage    DEBRIDEMENT OF STERNUM N/A 11/8/2022    Procedure: DEBRIDEMENT, STERNUM;  Surgeon: Mike Hedrick MD;  [facility];  Service: General;  Laterality: N/A;    ESOPHAGOGASTRODUODENOSCOPY N/A 10/12/2018    Procedure: EGD (ESOPHAGOGASTRODUODENOSCOPY);  Surgeon: Braden Herring MD;  Location: [facility] ENDO;  Service: Endoscopy;  Laterality: N/A;     ESOPHAGOGASTRODUODENOSCOPY N/A 4/4/2019    Procedure: EGD;  Surgeon: Umair Randolph MD;  Location: [facility] ENDO;  Service: Endoscopy;  Laterality: N/A;    EXCLUSION OF LEFT ATRIAL APPENDAGE N/A 10/7/2022    Procedure: EXCLUSION, LEFT ATRIAL APPENDAGE;  Surgeon: Mike Hedrick MD;  Location: [facility] OR [facility] FLR;  Service: Cardiovascular;  Laterality: N/A;    HERNIA REPAIR      INSERTION OF INTRAVASCULAR MICROAXIAL BLOOD PUMP N/A 10/7/2022    Procedure: INSERTION, IMPELLA;  Surgeon: Mike Hedrick MD;  Location: [facility] OR [facility] FLR;  Service: Cardiovascular;  Laterality: N/A;  direct aortic insertion    IRRIGATION OF MEDIASTINUM N/A 10/7/2022    Procedure: IRRIGATION, MEDIASTINUM;  Surgeon: Mike Hedrick MD;  Location: [facility] OR [facility] FLR;  Service: Cardiovascular;  Laterality: N/A;    STERNAL WIRES REMOVAL N/A 11/8/2022    Procedure: REMOVAL, STERNAL WIRE;  Surgeon: Mike Hedrick MD;  [facility];  Service: General;  Laterality: N/A;  Sternal wire removal with muscle flap creation    STERNAL WOUND CLOSURE N/A 11/14/2022    Procedure: CLOSURE, WOUND, STERNUM;  Surgeon: Amadeo Carrasquillo MD;  [facility];  Service: Plastics;  Laterality: N/A;    TRICUSPID VALVULOPLASTY N/A 10/7/2022    Procedure: REPAIR, TRICUSPID VALVE;  Surgeon: Mike Hedrick MD;  [facility];  Service: Cardiovascular;  Laterality: N/A;    WOUND EXPLORATION N/A 11/14/2022    Procedure: EXPLORATION, WOUND;  Surgeon: Amadeo Carrasquillo MD;  [facility];  Service: Plastics;  Laterality: N/A;       Review of patient's allergies indicates:  No Known Allergies    No current facility-administered medications on file prior to encounter.     Current Outpatient Medications on File Prior to Encounter   Medication Sig    albuterol (PROVENTIL/VENTOLIN HFA) 90 mcg/actuation inhaler inhale 1-2 Puff(s) By Mouth Every 4 Hours as needed    amiodarone (PACERONE) 200 MG Tab Take 1 tablet  (200 mg total) by mouth 2 (two) times daily.    aspirin 81 MG Chew Take 1 tablet (81 mg total) by mouth once daily.    atorvastatin (LIPITOR) 40 MG tablet Take 1 tablet (40 mg total) by mouth once daily.    furosemide (LASIX) 40 MG tablet Take 40 mg by mouth.    metoprolol succinate (TOPROL-XL) 25 MG 24 hr tablet Take 0.5 tablets (12.5 mg total) by mouth once daily.    rivaroxaban (XARELTO) 20 mg Tab Take 1 tablet (20 mg total) by mouth daily with dinner or evening meal.    tamsulosin (FLOMAX) 0.4 mg Cap Take 1 capsule (0.4 mg total) by mouth every evening. Take 30 minutes after dinner     Family History       Problem Relation (Age of Onset)    COPD Mother    Cancer Father          Tobacco Use    Smoking status: Never    Smokeless tobacco: Current     Types: Chew   Substance and Sexual Activity    Alcohol use: Yes     Alcohol/week: 20.0 standard drinks     Types: 20 Cans of beer per week    Drug use: No    Sexual activity: Not on file     Review of Systems   Constitutional: Negative. Negative for diaphoresis and fever.   HENT: Negative.     Eyes: Negative.    Cardiovascular:  Positive for dyspnea on exertion, irregular heartbeat, leg swelling, orthopnea and paroxysmal nocturnal dyspnea. Negative for chest pain, near-syncope, palpitations and syncope.   Respiratory:  Positive for shortness of breath.    Endocrine: Negative.    Hematologic/Lymphatic: Negative.    Skin: Negative.    Musculoskeletal: Negative.    Gastrointestinal:  Negative for bloating, nausea and vomiting.   Genitourinary: Negative.    Neurological: Negative.    Psychiatric/Behavioral: Negative.     Allergic/Immunologic: Negative.    Objective:     Vital Signs (Most Recent):  Temp: 96 °F (35.6 °C) (01/13/23 1107)  Pulse: 73 (01/13/23 1107)  Resp: 16 (01/13/23 1107)  BP: 132/60 (01/13/23 1107)  SpO2: 97 % (01/13/23 1107) Vital Signs (24h Range):  Temp:  [96 °F (35.6 °C)-98.7 °F (37.1 °C)] 96 °F (35.6 °C)  Pulse:  [70-79] 73  Resp:  [15-33] 16  SpO2:   [93 %-100 %] 97 %  BP: (100-132)/(52-66) 132/60     Weight: 65.8 kg (145 lb)  Body mass index is 21.41 kg/m².    SpO2: 97 %         Intake/Output Summary (Last 24 hours) at 1/13/2023 1110  Last data filed at 1/13/2023 1029  Gross per 24 hour   Intake --   Output 30 ml   Net -30 ml       Lines/Drains/Airways       Central Venous Catheter Line  Duration                  Hemodialysis Catheter 11/25/22 0904 right internal jugular 49 days              Peripheral Intravenous Line  Duration                  Peripheral IV - Single Lumen 01/12/23 1904 20 G;1 in Left Antecubital <1 day                    Physical Exam  Constitutional:       General: He is not in acute distress.     Appearance: He is not diaphoretic.   HENT:      Head: Atraumatic.   Eyes:      General:         Right eye: No discharge.         Left eye: No discharge.   Cardiovascular:      Rate and Rhythm: Normal rate and regular rhythm.      Heart sounds: Murmur heard.   Pulmonary:      Effort: Pulmonary effort is normal.      Breath sounds: No rales.   Abdominal:      General: Bowel sounds are normal.      Palpations: Abdomen is soft.   Musculoskeletal:      Right lower leg: Edema present.      Left lower leg: Edema present.   Skin:     General: Skin is warm and dry.   Neurological:      Mental Status: He is alert and oriented to person, place, and time.   Psychiatric:         Mood and Affect: Mood normal.         Behavior: Behavior normal.         Thought Content: Thought content normal.         Judgment: Judgment normal.       Significant Labs: Blood Culture: No results for input(s): LABBLOO in the last 48 hours., BMP:   Recent Labs   Lab 01/12/23 1812 01/13/23  0101 01/13/23  0431   GLU 89  --  97     --  140   K 4.3  --  4.3     --  103   CO2 25  --  27   BUN 50*  --  52*   CREATININE 2.5*  --  2.7*   CALCIUM 8.7  --  8.9   MG  --  1.5*  --    , CMP   Recent Labs   Lab 01/12/23 1812 01/13/23  0431    140   K 4.3 4.3    103   CO2  25 27   GLU 89 97   BUN 50* 52*   CREATININE 2.5* 2.7*   CALCIUM 8.7 8.9   PROT 6.5 6.4   ALBUMIN 3.1* 2.8*   BILITOT 0.4 0.4   ALKPHOS 96 84   AST 21 17   ALT 23 19   ANIONGAP 11 10   , CBC   Recent Labs   Lab 01/12/23  1812 01/13/23  0431   WBC 9.67 8.95   HGB 9.7* 9.0*   HCT 30.7* 29.5*    228   , INR No results for input(s): INR, PROTIME in the last 48 hours., Lipid Panel No results for input(s): CHOL, HDL, LDLCALC, TRIG, CHOLHDL in the last 48 hours., Troponin   Recent Labs   Lab 01/12/23 1812 01/13/23  0101   TROPONINI 0.032* 0.035*   , and All pertinent lab results from the last 24 hours have been reviewed.    Significant Imaging: Echocardiogram: Transthoracic echo (TTE) complete (Cupid Only):   Results for orders placed or performed during the hospital encounter of 01/05/23   Echo   Result Value Ref Range    BSA 1.84 m2    LA WIDTH 5.00 cm    IVC diameter 2.22 cm    Left Ventricular Outflow Tract Mean Velocity 0.66 cm/s    Left Ventricular Outflow Tract Mean Gradient 2.02 mmHg    LVIDd 6.23 (A) 3.5 - 6.0 cm    IVS 0.80 0.6 - 1.1 cm    Posterior Wall 0.79 0.6 - 1.1 cm    LVIDs 5.55 (A) 2.1 - 4.0 cm    FS 11 28 - 44 %    LA volume 154.80 cm3    LV mass 197.29 g    LA size 4.99 cm    RVDD 2.88 cm    Left Ventricle Relative Wall Thickness 0.25 cm    AV regurgitation pressure 1/2 time [id number] ms    AV mean gradient 5 mmHg    AV valve area 2.71 cm2    AV Velocity Ratio 0.63     AV index (prosthetic) 0.62     MV mean gradient 4 mmHg    MV valve area by continuity eq 1.85 cm2    E wave deceleration time 0.00 msec    LVOT diameter 2.36 cm    LVOT area 4.4 cm2    LVOT peak nick 0.96 m/s    LVOT peak VTI 16.90 cm    Ao peak nick 1.53 m/s    Ao VTI 27.3 cm    LVOT stroke volume 73.89 cm3    AV peak gradient 9 mmHg    MV peak gradient 7 mmHg    MV Peak E Nick 0.00 m/s    AR Max Nick 4.06 m/s    TR Max Nick 2.44 m/s    MV VTI 39.9 cm    MV stenosis pressure 1/2 time 0.00 ms    MV Peak A Nick 0.00 m/s    LV  Systolic Volume 150.73 mL    LV Systolic Volume Index 81.9 mL/m2    LV Diastolic Volume 195.79 mL    LV Diastolic Volume Index 106.41 mL/m2    LA Volume Index 84.1 mL/m2    LV Mass Index 107 g/m2    RA Major Axis 5.56 cm    Left Atrium Minor Axis 6.89 cm    Left Atrium Major Axis 7.76 cm    Triscuspid Valve Regurgitation Peak Gradient 24 mmHg    Right Atrial Pressure (from IVC) 3 mmHg    EF 15 %    TV rest pulmonary artery pressure 27 mmHg    Narrative    · The left ventricle is normal in size with severely decreased systolic   function.  · The estimated ejection fraction is 15%.  · There is severe left ventricular global hypokinesis.  · Left ventricular diastolic dysfunction.  · With severely reduced right ventricular systolic function.  · Severe left atrial enlargement.  · Mild mitral regurgitation.  · Moderate aortic regurgitation.  · The estimated PA systolic pressure is 27 mmHg.  · Normal central venous pressure (3 mmHg).  · There is a moderate size protruding right atrial mass present on what   appears to be a catheter tip. The mass is mobile. Thrombus vs vegetation   suspected. Clinical correlation recommended.

## 2023-01-13 NOTE — ASSESSMENT & PLAN NOTE
Patients worsening dyspnea most likely secondary to HF exacerbation. Less concerned for mediastinitis in the setting of patient being affebrile and normal WBC count. Well's score 1.5 so less concern for PE.     - Last Echo (1/5/23): LV normal size w/ severely decreased systolic function. EF 15%. Severe LV global hyokinesis. LV diastolic dysfunction. Severely reduced RV systolic function. Mild MR. Mod AR. moderate size protruding right atrial mass present on what appears to be a catheter tip. The mass is mobile. Thrombus vs vegetation suspected.  - Last RHC: none   - Home GDMT: metoprolol succinate 12.5 daily. Continue home GDMT. No evidence of cardiogenic shock.    - Home diuretic regimen: lasix 40 PO daily  - Hypervolemic on exam today. Will give 1 dose of IV lasix 80mg since patient still urinates. Consulted Nephrology to resume dialysis.   - Maintain K>4 and Mg>2  - Cardiology consulted. Appreciate recommendations.

## 2023-01-13 NOTE — ASSESSMENT & PLAN NOTE
TTE 01/2023  The left ventricle is normal in size with severely decreased systolic   function.  · The estimated ejection fraction is 15%.  · There is severe left ventricular global hypokinesis.  · Left ventricular diastolic dysfunction.  · With severely reduced right ventricular systolic function.  · Severe left atrial enlargement.  · Mild mitral regurgitation.  · Moderate aortic regurgitation.  · The estimated PA systolic pressure is 27 mmHg.  · Normal central venous pressure (3 mmHg).  · There is a moderate size protruding right atrial mass present on what   appears to be a catheter tip. The mass is mobile. Thrombus vs vegetation   suspected. Clinical correlation recommended.    Continue BB, add will add ARB with plans to transition to Entresto in the near future if ARB is cleared by Nephrology   Patient with good UO. Received 80 mg IV lasix in the ED with 2-3L out per patient. I&O not documented  Recently started on Lasix 40 mg p.o. daily at home  Patient with chronic BAI since valvular sx. Mildly overloaded on exam.   Will defer diuretic dose to Nephrology in order to preserve remaining renal function. Unsure if patient is declared permanent HD and reports good UO.

## 2023-01-13 NOTE — ASSESSMENT & PLAN NOTE
Nephrology following   Patient reports adequate UO  If OK with Nephrology, will initiate ARB with plans to transition to Entresto as outpatient for optimal GDMT (LVEF 15%)   Lasix per Nephrology recs

## 2023-01-13 NOTE — HPI
Mr. 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 Patient was initially sent to the ED by his PCP after undergoing a CXR that was concerning for pneumothorax, however, was normal on repeat x-ray in the ED. Patient states since his valve surgery on 10/7/22 his dyspnea on exertion is getting worse to the point where patient was unable to clumb the two stairs into his trailer home. Denies any SOB at rest, chestpain, nausea, vomiting, abdominal pain, fever, palpiations and chills. Does endorse orthopnea, PND and bendopnea.    Of note patients valvular surgery was complicated by serratia bacteriemia and sternal wound infection that required subsequent sternal wire removal, partial sternectomy, and muscle flap with plastics.      In the ED, patient underwent CT chest w/o contrast which was concerning for possible fluid around the sternum from prior mediastinitis. Patient was afebrile and WBC count was normal. EKG showed no acute ischemic changes but troponin was mildly elevated at 0.032.     Patient admitted to observation for evaluation of worsening dyspnea.

## 2023-01-13 NOTE — SUBJECTIVE & OBJECTIVE
Past Medical History:   Diagnosis Date    Anemia     Atrial fibrillation     Encounter for blood transfusion     Esophageal ulcer     GI bleed     Hypertension        Past Surgical History:   Procedure Laterality Date    APPLICATION OF WOUND VACUUM-ASSISTED CLOSURE DEVICE N/A 11/8/2022    Procedure: APPLICATION, WOUND VAC;  Surgeon: Mike Hedrick MD;  Location: [facility] OR 2ND FLR;  Service: General;  Laterality: N/A;    CARDIOVERSION Left 9/15/2022    Procedure: Cardioversion;  Surgeon: Julio James MD;  Location: [facility] CATH LAB/EP;  Service: Cardiology;  Laterality: Left;  With NORBERT    CATHETERIZATION OF BOTH LEFT AND RIGHT HEART N/A 9/22/2022    Procedure: CATHETERIZATION, HEART, BOTH LEFT AND RIGHT;  Surgeon: Julio James MD;  Location: [facility] CATH LAB/EP;  Service: Cardiology;  Laterality: N/A;    COLONOSCOPY N/A 4/4/2019    Procedure: COLONOSCOPY Golytely;  Surgeon: Umair Randolph MD;  Location: [facility] ENDO;  Service: Endoscopy;  Laterality: N/A;    CORONARY ANGIOGRAPHY N/A 9/22/2022    Procedure: ANGIOGRAM, CORONARY ARTERY;  Surgeon: Julio James MD;  Location: [facility] CATH LAB/EP;  Service: Cardiology;  Laterality: N/A;    MEYER MAZE PROCEDURE N/A 10/7/2022    Procedure: MEYER MAZE PROCEDURE;  Surgeon: Mike Hedrick MD;  [facility];  Service: Cardiovascular;  Laterality: N/A;    CREATION OF MUSCLE ROTATIONAL FLAP N/A 11/14/2022    Procedure: CREATION, FLAP, MUSCLE ROTATION;  Surgeon: Amadeo Carrasquillo MD;  [facility];  Service: Plastics;  Laterality: N/A;  sternal wound that need pec flap coverage    DEBRIDEMENT OF STERNUM N/A 11/8/2022    Procedure: DEBRIDEMENT, STERNUM;  Surgeon: Mike Hedrick MD;  [facility];  Service: General;  Laterality: N/A;    ESOPHAGOGASTRODUODENOSCOPY N/A 10/12/2018    Procedure: EGD (ESOPHAGOGASTRODUODENOSCOPY);  Surgeon: Braden Herring MD;  Location: [facility] ENDO;  Service: Endoscopy;  Laterality: N/A;     ESOPHAGOGASTRODUODENOSCOPY N/A 4/4/2019    Procedure: EGD;  Surgeon: Umair Randolph MD;  Location: [facility] ENDO;  Service: Endoscopy;  Laterality: N/A;    EXCLUSION OF LEFT ATRIAL APPENDAGE N/A 10/7/2022    Procedure: EXCLUSION, LEFT ATRIAL APPENDAGE;  Surgeon: Mike Hedrick MD;  Location: [facility] OR [facility] FLR;  Service: Cardiovascular;  Laterality: N/A;    HERNIA REPAIR      INSERTION OF INTRAVASCULAR MICROAXIAL BLOOD PUMP N/A 10/7/2022    Procedure: INSERTION, IMPELLA;  Surgeon: Mike Hedrick MD;  Location: [facility] OR [facility] FLR;  Service: Cardiovascular;  Laterality: N/A;  direct aortic insertion    IRRIGATION OF MEDIASTINUM N/A 10/7/2022    Procedure: IRRIGATION, MEDIASTINUM;  Surgeon: Mike Hedrick MD;  Location: [facility] OR [facility] FLR;  Service: Cardiovascular;  Laterality: N/A;    STERNAL WIRES REMOVAL N/A 11/8/2022    Procedure: REMOVAL, STERNAL WIRE;  Surgeon: Mike Hedrick MD;  [facility]  Service: General;  Laterality: N/A;  Sternal wire removal with muscle flap creation    STERNAL WOUND CLOSURE N/A 11/14/2022    Procedure: CLOSURE, WOUND, STERNUM;  Surgeon: Amadeo Carrasquillo MD;  [facility];  Service: Plastics;  Laterality: N/A;    TRICUSPID VALVULOPLASTY N/A 10/7/2022    Procedure: REPAIR, TRICUSPID VALVE;  Surgeon: Mike Hedrick MD;  [facility];  Service: Cardiovascular;  Laterality: N/A;    WOUND EXPLORATION N/A 11/14/2022    Procedure: EXPLORATION, WOUND;  Surgeon: Amadeo Carrasquillo MD;  [facility];  Service: Plastics;  Laterality: N/A;       Review of patient's allergies indicates:  No Known Allergies    No current facility-administered medications on file prior to encounter.     Current Outpatient Medications on File Prior to Encounter   Medication Sig    albuterol (PROVENTIL/VENTOLIN HFA) 90 mcg/actuation inhaler inhale 1-2 Puff(s) By Mouth Every 4 Hours as needed    amiodarone (PACERONE) 200 MG Tab Take 1 tablet  (200 mg total) by mouth 2 (two) times daily.    aspirin 81 MG Chew Take 1 tablet (81 mg total) by mouth once daily.    atorvastatin (LIPITOR) 40 MG tablet Take 1 tablet (40 mg total) by mouth once daily.    furosemide (LASIX) 40 MG tablet Take 40 mg by mouth.    metoprolol succinate (TOPROL-XL) 25 MG 24 hr tablet Take 0.5 tablets (12.5 mg total) by mouth once daily.    rivaroxaban (XARELTO) 20 mg Tab Take 1 tablet (20 mg total) by mouth daily with dinner or evening meal.    tamsulosin (FLOMAX) 0.4 mg Cap Take 1 capsule (0.4 mg total) by mouth every evening. Take 30 minutes after dinner     Family History       Problem Relation (Age of Onset)    COPD Mother    Cancer Father          Tobacco Use    Smoking status: Never    Smokeless tobacco: Current     Types: Chew   Substance and Sexual Activity    Alcohol use: Yes     Alcohol/week: 20.0 standard drinks     Types: 20 Cans of beer per week    Drug use: No    Sexual activity: Not on file     Review of Systems 12 point ROS negative except for HPI  Objective:     Vital Signs (Most Recent):  Temp: 98.7 °F (37.1 °C) (01/12/23 1839)  Pulse: 72 (01/12/23 2302)  Resp: 20 (01/12/23 2302)  BP: 125/62 (01/12/23 2302)  SpO2: (!) 94 % (01/12/23 2302)   Vital Signs (24h Range):  Temp:  [98.7 °F (37.1 °C)] 98.7 °F (37.1 °C)  Pulse:  [70-79] 72  Resp:  [15-24] 20  SpO2:  [94 %-100 %] 94 %  BP: (113-131)/(56-63) 125/62     Weight: 65.8 kg (145 lb)  Body mass index is 21.41 kg/m².    Physical Exam  Constitutional:       Comments: Alert, Oriented, No acute distress   HENT:      Head: Normocephalic and atraumatic.   Eyes:      Conjunctiva/sclera: Conjunctivae normal.   Neck:      Vascular: Hepatojugular reflux and JVD present.   Cardiovascular:      Rate and Rhythm: Normal rate and regular rhythm.   Pulmonary:      Comments: Normal effort, Clear to auscultation   Abdominal:      Comments: Soft, Non-tender, Non-distended   Musculoskeletal:      Cervical back: Normal range of motion.       Comments: No peripheral edema. Sternal surgical scar noted. Left axilla hematoma noted.    Skin:     Comments: Dry, Intact, Warm, Capillary refill <2 seconds.    Neurological:      Mental Status: He is alert and oriented to person, place, and time.      Comments: Normal speech   Psychiatric:         Mood and Affect: Mood and affect normal.           Significant Labs: All pertinent labs within the past 24 hours have been reviewed.  CBC:   Recent Labs   Lab 01/12/23 1812   WBC 9.67   HGB 9.7*   HCT 30.7*        CMP:   Recent Labs   Lab 01/12/23 1812      K 4.3      CO2 25   GLU 89   BUN 50*   CREATININE 2.5*   CALCIUM 8.7   PROT 6.5   ALBUMIN 3.1*   BILITOT 0.4   ALKPHOS 96   AST 21   ALT 23   ANIONGAP 11     Cardiac Markers: No results for input(s): CKMB, MYOGLOBIN, BNP, TROPISTAT in the last 48 hours.  Troponin:   Recent Labs   Lab 01/12/23 1812   TROPONINI 0.032*       Significant Imaging: I have reviewed all pertinent imaging results/findings within the past 24 hours.

## 2023-01-13 NOTE — PHARMACY MED REC
"  [facility]           Pharmacy  Admission Medication History     The home medication history was taken by Corine Hayes.      Medication history obtained from Medications listed below were obtained from: Patient/family    Based on information gathered for medication list, you may go to "Admission" then "Reconcile Home Medications" tabs to review and/or act upon those items.     The home medication list has been updated by the Pharmacy department.   Please read ALL comments highlighted in yellow.   Please address this information as you see fit.    Feel free to contact us if you have any questions or require assistance.    The medications listed below were removed from the home medication list.  Please reorder if appropriate:    Patient reports NOT TAKING the following medication(s):  Albuterol hfa inhaler    No current facility-administered medications on file prior to encounter.     Current Outpatient Medications on File Prior to Encounter   Medication Sig Dispense Refill    amiodarone (PACERONE) 200 MG Tab Take 1 tablet (200 mg total) by mouth 2 (two) times daily. 60 tablet 2    aspirin 81 MG Chew Take 1 tablet (81 mg total) by mouth once daily. 360 tablet 0    atorvastatin (LIPITOR) 40 MG tablet Take 1 tablet (40 mg total) by mouth once daily. 90 tablet 3    furosemide (LASIX) 40 MG tablet Take 40 mg by mouth every Tuesday, Thursday, Saturday, Sunday.      metoprolol succinate (TOPROL-XL) 25 MG 24 hr tablet Take 0.5 tablets (12.5 mg total) by mouth once daily. 45 tablet 3    rivaroxaban (XARELTO) 20 mg Tab Take 1 tablet (20 mg total) by mouth daily with dinner or evening meal. 90 tablet 3    tamsulosin (FLOMAX) 0.4 mg Cap Take 1 capsule (0.4 mg total) by mouth every evening. Take 30 minutes after dinner 90 capsule 3       Please address this information as you see fit.  Feel free to contact us if you have any questions or require assistance.    Corine" [place]  [phone]                .

## 2023-01-13 NOTE — CONSULTS
Nephrology Consult  H&P      Consult Requested By: Rosalind Crockett MD  Reason for Consult: ESRD      SUBJECTIVE:     History of Present Illness:  David Barrios is a 62 y.o.   male who  has a past medical history of Anemia, Atrial fibrillation, Encounter for blood transfusion, Esophageal ulcer, GI bleed, and Hypertension..ESRD on HD via CVC.  The patient presented to the [facility] on 1/12/2023 with a primary complaint of abnormal CXR findings and ECHO findings. Here in ER Cr 2.7 good UOP after lasix.   ?    Review of Systems   Constitutional:  Negative for chills and fever.   HENT:  Negative for congestion and sore throat.    Eyes:  Negative for blurred vision, double vision and photophobia.   Respiratory:  Positive for shortness of breath. Negative for cough.    Cardiovascular:  Positive for leg swelling. Negative for chest pain and palpitations.   Gastrointestinal:  Negative for abdominal pain, diarrhea, nausea and vomiting.   Genitourinary:  Negative for dysuria and urgency.   Musculoskeletal:  Negative for joint pain and myalgias.   Skin:  Negative for itching and rash.   Neurological:  Negative for dizziness, sensory change, weakness and headaches.   Endo/Heme/Allergies:  Negative for polydipsia. Does not bruise/bleed easily.   Psychiatric/Behavioral:  Negative for depression.      Past Medical History:   Diagnosis Date    Anemia     Atrial fibrillation     Encounter for blood transfusion     Esophageal ulcer     GI bleed     Hypertension      Past Surgical History:   Procedure Laterality Date    APPLICATION OF WOUND VACUUM-ASSISTED CLOSURE DEVICE N/A 11/8/2022    Procedure: APPLICATION, WOUND VAC;  Surgeon: Mike Hedrick MD;  Location: [facility] OR [address] [place] [address];  Service: General;  Laterality: N/A;    CARDIOVERSION Left 9/15/2022    Procedure: Cardioversion;  Surgeon: Julio James MD;  Location: [facility] CATH LAB/EP;  Service: Cardiology;  Laterality: Left;  With NORBERT    CATHETERIZATION OF BOTH LEFT AND RIGHT  HEART N/A 9/22/2022    Procedure: CATHETERIZATION, HEART, BOTH LEFT AND RIGHT;  Surgeon: Julio James MD;  Location: [facility] CATH LAB/EP;  Service: Cardiology;  Laterality: N/A;    COLONOSCOPY N/A 4/4/2019    Procedure: COLONOSCOPY Golytely;  Surgeon: Umair Randolph MD;  Location: [facility] ENDO;  Service: Endoscopy;  Laterality: N/A;    CORONARY ANGIOGRAPHY N/A 9/22/2022    Procedure: ANGIOGRAM, CORONARY ARTERY;  Surgeon: Julio James MD;  Location: [facility] CATH LAB/EP;  Service: Cardiology;  Laterality: N/A;    MEYER MAZE PROCEDURE N/A 10/7/2022    Procedure: MEYER MAZE PROCEDURE;  Surgeon: Mike Hedrick MD;  Location: [facility] OR [address] [place] [address];  Service: Cardiovascular;  Laterality: N/A;    CREATION OF MUSCLE ROTATIONAL FLAP N/A 11/14/2022    Procedure: CREATION, FLAP, MUSCLE ROTATION;  Surgeon: Amadeo Carrasquillo MD;  Location: [facility] OR [address] [place] [address];  Service: Plastics;  Laterality: N/A;  sternal wound that need pec flap coverage    DEBRIDEMENT OF STERNUM N/A 11/8/2022    Procedure: DEBRIDEMENT, STERNUM;  Surgeon: Mike Hedrick MD;  Location: [facility] OR [address] [place] [address];  Service: General;  Laterality: N/A;    ESOPHAGOGASTRODUODENOSCOPY N/A 10/12/2018    Procedure: EGD (ESOPHAGOGASTRODUODENOSCOPY);  Surgeon: Braden Herring MD;  Location: [facility];  Service: Endoscopy;  Laterality: N/A;    ESOPHAGOGASTRODUODENOSCOPY N/A 4/4/2019    Procedure: EGD;  Surgeon: Umair Randolph MD;  Location: [facility] ENDO;  Service: Endoscopy;  Laterality: N/A;    EXCLUSION OF LEFT ATRIAL APPENDAGE N/A 10/7/2022    Procedure: EXCLUSION, LEFT ATRIAL APPENDAGE;  Surgeon: Mike Hedrick MD;  Location: [facility] OR [address] [place] [address];  Service: Cardiovascular;  Laterality: N/A;    HERNIA REPAIR      INSERTION OF INTRAVASCULAR MICROAXIAL BLOOD PUMP N/A 10/7/2022    Procedure: INSERTION, IMPELLA;  Surgeon: Mike Hedrick MD;  [facility];  Service: Cardiovascular;  Laterality: N/A;  direct aortic insertion    IRRIGATION OF MEDIASTINUM N/A 10/7/2022  "   Procedure: IRRIGATION, MEDIASTINUM;  Surgeon: Mike Hedrick MD;  Location: [facility] OR 2ND FLR;  Service: Cardiovascular;  Laterality: N/A;    STERNAL WIRES REMOVAL N/A 11/8/2022    Procedure: REMOVAL, STERNAL WIRE;  Surgeon: Mike Hedrick MD;  Location: [facility] OR 2ND FLR;  Service: General;  Laterality: N/A;  Sternal wire removal with muscle flap creation    STERNAL WOUND CLOSURE N/A 11/14/2022    Procedure: CLOSURE, WOUND, STERNUM;  Surgeon: Amadeo Carrasquillo MD;  Location: [facility] OR 2ND FLR;  Service: Plastics;  Laterality: N/A;    TRICUSPID VALVULOPLASTY N/A 10/7/2022    Procedure: REPAIR, TRICUSPID VALVE;  Surgeon: Mike Hedrick MD;  Location: [facility] OR 2ND FLR;  Service: Cardiovascular;  Laterality: N/A;    WOUND EXPLORATION N/A 11/14/2022    Procedure: EXPLORATION, WOUND;  Surgeon: Amadeo Carrasquillo MD;  Location: [facility] OR 2ND FLR;  Service: Plastics;  Laterality: N/A;     Family History   Problem Relation Age of Onset    COPD Mother     Cancer Father      Social History     Tobacco Use    Smoking status: Never    Smokeless tobacco: Current     Types: Chew   Substance Use Topics    Alcohol use: Yes     Alcohol/week: 20.0 standard drinks     Types: 20 Cans of beer per week    Drug use: No       Review of patient's allergies indicates:  No Known Allergies         OBJECTIVE:     Vital Signs (Most Recent)  Vitals:    01/13/23 1048 01/13/23 1107 01/13/23 1143 01/13/23 1157   BP: 131/66 132/60 117/64    BP Location:  Left arm     Patient Position:  Lying     Pulse: 74 73 69 61   Resp:  16 18    Temp:  96 °F (35.6 °C) 96.8 °F (36 °C)    TempSrc:  Oral     SpO2: 96% 97% 96% 97%   Weight:  65.1 kg (143 lb 8.3 oz)     Height:  5' 9" (1.753 m)           Date 01/13/23 0700 - 01/14/23 0659   Shift 4291-7663 4700-7409 7412-8077 24 Hour Total   INTAKE   Shift Total(mL/kg)       OUTPUT   Urine(mL/kg/hr) 30   30   Shift Total(mL/kg) 30(0.5)   30(0.5)   Weight (kg) 65.1 65.1 65.1 65.1 "           Medications:   amiodarone  200 mg Oral BID    apixaban  2.5 mg Oral BID    aspirin  81 mg Oral Daily    atorvastatin  40 mg Oral Daily    LIDOcaine  1 patch Transdermal Q24H    metoprolol succinate  12.5 mg Oral Daily           Physical Exam  Vitals and nursing note reviewed.   Constitutional:       General: He is not in acute distress.     Appearance: He is not diaphoretic.   HENT:      Head: Normocephalic and atraumatic.      Mouth/Throat:      Pharynx: No oropharyngeal exudate.   Eyes:      General: No scleral icterus.     Conjunctiva/sclera: Conjunctivae normal.      Pupils: Pupils are equal, round, and reactive to light.   Cardiovascular:      Rate and Rhythm: Normal rate and regular rhythm.      Heart sounds: Normal heart sounds. No murmur heard.  Pulmonary:      Effort: Pulmonary effort is normal. No respiratory distress.      Breath sounds: Rales (RLL) present.   Abdominal:      General: Bowel sounds are normal. There is no distension.      Palpations: Abdomen is soft.      Tenderness: There is no abdominal tenderness.   Musculoskeletal:         General: Swelling (trace) present. Normal range of motion.      Cervical back: Normal range of motion and neck supple.      Right lower leg: Edema present.      Left lower leg: Edema present.   Skin:     General: Skin is warm and dry.      Findings: No erythema.   Neurological:      Mental Status: He is alert and oriented to person, place, and time.      Cranial Nerves: No cranial nerve deficit.   Psychiatric:         Mood and Affect: Affect normal.         Cognition and Memory: Memory normal.         Judgment: Judgment normal.       Laboratory:  Recent Labs   Lab 01/12/23  1812 01/13/23  0431   WBC 9.67 8.95   HGB 9.7* 9.0*   HCT 30.7* 29.5*    228   MONO 13.1  1.3* 11.7  1.1*     Recent Labs   Lab 01/12/23  1812 01/13/23  0431    140   K 4.3 4.3    103   CO2 25 27   BUN 50* 52*   CREATININE 2.5* 2.7*   CALCIUM 8.7 8.9       Diagnostic  Results:  X-Ray: Reviewed  US: Reviewed  Echo: Reviewed  ASSESSMENT/PLAN:     1. ESRD  -  due to ATN from Cardiogenic shock Sepsis low EF was on HD Cr slightly better, was oliguric now good UOP on lasix >2L   -- As of now no Indication for HD last HD Wednesday   -- Resume Lasix daily still overloaded.   ? recovered Kidney function get 24 hr Urine    -- However if cardiology planning to start Entresto  may worsened kidney function and he need to stay on HD   -- Please avoid nephrotoxins, including NSAIDs, aminoglycosides, IV contrast (unless absolutely necessary), gadolinium, fleets and other phosphorous-based laxatives. Caution with antibiotics.  -- Daily Renal Function Panel  -- Avoid Hypotension.  -- Renally dose all meds        2. HTN (I10) - Controlled with Toprol 12.5    3. Anemia of chronic kidney disease treated with SABRA (N18.9 D63.1) -    EPogen      Recent Labs   Lab 01/12/23 1812 01/13/23  0431   HGB 9.7* 9.0*   HCT 30.7* 29.5*    228       Iron   Lab Results   Component Value Date    IRON 14 (L) 10/11/2018    TIBC 657 (H) 10/11/2018    FERRITIN 5 (L) 10/11/2018       4. MBD (E88.9 M90.80) -  Recent Labs   Lab 01/13/23  0431   CALCIUM 8.9     Recent Labs   Lab 01/13/23  0101   MG 1.5*     Replace   Lab Results   Component Value Date    CALCIUM 8.9 01/13/2023    CAION 1.17 11/09/2022    PHOS 3.4 12/01/2022     No results found for: [id number]    Lab Results   Component Value Date    CO2 27 01/13/2023       5. Nutrition/Hypoalbuminemia (E88.09) -    Recent Labs   Lab 01/12/23 1812 01/13/23  0431   ALBUMIN 3.1* 2.8*     Nepro with meals TID. Renal vitamins daily      Thank you for the consult, will follow  With any question please call [phone]  Sri Ramsey MD    [facility]  PABLO Weaver MD, FACJUDITH,   MANDIE Saul MD,   MD DILLON Lara MD E. V. Harmon, NP    [address]  [place], [address]  [phone]

## 2023-01-13 NOTE — ASSESSMENT & PLAN NOTE
Troponin flat at 0.03  EKG without acute ischemic changes  No chest pain   NICM- valvular   Troponin elevation felt to be demand in setting of CKD and CHF

## 2023-01-13 NOTE — CONSULTS
[facility] - Telemetry  Cardiology  Consult Note    Patient Name: David Barrios  MRN: [id number]  Admission Date: 1/12/2023  Hospital Length of Stay: 0 days  Code Status: Full Code   Attending Provider: Rosalind Crockett MD   Consulting Provider: Tarik Treadwell NP  Primary Care Physician: Breann Tavera MD  Principal Problem:Acute on chronic combined systolic and diastolic heart failure    Patient information was obtained from patient, past medical records and ER records.     [facility]  Consult performed by: Tarik Treadwell NP  Consult ordered by: Mynor Bolaños MD        Subjective:     Chief Complaint:  BAI     HPI:   David Barrios is a 63 y/o male with HFrEF secondary to valvular cardiomyopathy s/p mitral valve repair, tricuspid valve repair, left atrial maze, left atrial appendage resection, VT,  CKD on HD MWF, paroxysmal AF, and HTN. Patient had a complicated hospital course following valve repair 3 months ago. Patient presented to the ED due to chronic BAI following surgery. Patient was initially sent to the ED by his PCP after undergoing a CXR that was concerning for pneumothorax, however, was normal on repeat x-ray in the ED. Patient denies any SOB at rest, chest pain, nausea, vomiting, abdominal pain, fever, palpiations and chills. + orthopnea, PND. Patient now an HD patient, unsure if permanent at this time. CT chest - possible fluid around the sternum from prior mediastinitis. TTE from a week ago noted mobile mass on HD catheter tip. Patient is on Xarelto for OAC. Patient received 80 mg IV Lasix in the ED and reports good response (2-3L).       Past Medical History:   Diagnosis Date    Anemia     Atrial fibrillation     Encounter for blood transfusion     Esophageal ulcer     GI bleed     Hypertension        Past Surgical History:   Procedure Laterality Date    APPLICATION OF WOUND VACUUM-ASSISTED CLOSURE DEVICE N/A 11/8/2022    Procedure: APPLICATION, WOUND VAC;   Surgeon: Mike Hedrick MD;  Location: [facility] OR 2ND FLR;  Service: General;  Laterality: N/A;    CARDIOVERSION Left 9/15/2022    Procedure: Cardioversion;  Surgeon: Julio James MD;  Location: [facility] CATH LAB/EP;  Service: Cardiology;  Laterality: Left;  With NORBERT    CATHETERIZATION OF BOTH LEFT AND RIGHT HEART N/A 9/22/2022    Procedure: CATHETERIZATION, HEART, BOTH LEFT AND RIGHT;  Surgeon: Julio James MD;  Location: [facility] CATH LAB/EP;  Service: Cardiology;  Laterality: N/A;    COLONOSCOPY N/A 4/4/2019    Procedure: COLONOSCOPY Golytely;  Surgeon: Umair Randolph MD;  Location: [facility] ENDO;  Service: Endoscopy;  Laterality: N/A;    CORONARY ANGIOGRAPHY N/A 9/22/2022    Procedure: ANGIOGRAM, CORONARY ARTERY;  Surgeon: Julio James MD;  Location: [facility] CATH LAB/EP;  Service: Cardiology;  Laterality: N/A;    MEYER MAZE PROCEDURE N/A 10/7/2022    Procedure: MEYER MAZE PROCEDURE;  Surgeon: Mike Hedrick MD;  Location: [facility] OR 2ND FLR;  Service: Cardiovascular;  Laterality: N/A;    CREATION OF MUSCLE ROTATIONAL FLAP N/A 11/14/2022    Procedure: CREATION, FLAP, MUSCLE ROTATION;  Surgeon: Amadeo Carrasquillo MD;  Location: [facility] OR 2ND FLR;  Service: Plastics;  Laterality: N/A;  sternal wound that need pec flap coverage    DEBRIDEMENT OF STERNUM N/A 11/8/2022    Procedure: DEBRIDEMENT, STERNUM;  Surgeon: Mike Hedrick MD;  Location: [facility] OR [facility] FLR;  Service: General;  Laterality: N/A;    ESOPHAGOGASTRODUODENOSCOPY N/A 10/12/2018    Procedure: EGD (ESOPHAGOGASTRODUODENOSCOPY);  Surgeon: Braden Herring MD;  Location: [facility] ENDO;  Service: Endoscopy;  Laterality: N/A;    ESOPHAGOGASTRODUODENOSCOPY N/A 4/4/2019    Procedure: EGD;  Surgeon: Umair Randolph MD;  Location: [facility] ENDO;  Service: Endoscopy;  Laterality: N/A;    EXCLUSION OF LEFT ATRIAL APPENDAGE N/A 10/7/2022    Procedure: EXCLUSION, LEFT ATRIAL APPENDAGE;  Surgeon: Mike Hedrick MD;  Location: [facility] OR [address] [place] [address];  Service:  Cardiovascular;  Laterality: N/A;    HERNIA REPAIR      INSERTION OF INTRAVASCULAR MICROAXIAL BLOOD PUMP N/A 10/7/2022    Procedure: INSERTION, IMPELLA;  Surgeon: Mike Hedrick MD;  Location: [facility] OR 2ND FLR;  Service: Cardiovascular;  Laterality: N/A;  direct aortic insertion    IRRIGATION OF MEDIASTINUM N/A 10/7/2022    Procedure: IRRIGATION, MEDIASTINUM;  Surgeon: Mike Hedrick MD;  Location: [facility] OR 2ND FLR;  Service: Cardiovascular;  Laterality: N/A;    STERNAL WIRES REMOVAL N/A 11/8/2022    Procedure: REMOVAL, STERNAL WIRE;  Surgeon: Mike Hedrick MD;  Location: [facility] OR 2ND FLR;  Service: General;  Laterality: N/A;  Sternal wire removal with muscle flap creation    STERNAL WOUND CLOSURE N/A 11/14/2022    Procedure: CLOSURE, WOUND, STERNUM;  Surgeon: Amadeo Carrasquillo MD;  Location: [facility] OR [facility] FLR;  Service: Plastics;  Laterality: N/A;    TRICUSPID VALVULOPLASTY N/A 10/7/2022    Procedure: REPAIR, TRICUSPID VALVE;  Surgeon: Mike Hedrick MD;  Location: [facility] OR 2ND FLR;  Service: Cardiovascular;  Laterality: N/A;    WOUND EXPLORATION N/A 11/14/2022    Procedure: EXPLORATION, WOUND;  Surgeon: Amadeo Carrasquillo MD;  Location: [facility] OR 2ND FLR;  Service: Plastics;  Laterality: N/A;       Review of patient's allergies indicates:  No Known Allergies    No current facility-administered medications on file prior to encounter.     Current Outpatient Medications on File Prior to Encounter   Medication Sig    albuterol (PROVENTIL/VENTOLIN HFA) 90 mcg/actuation inhaler inhale 1-2 Puff(s) By Mouth Every 4 Hours as needed    amiodarone (PACERONE) 200 MG Tab Take 1 tablet (200 mg total) by mouth 2 (two) times daily.    aspirin 81 MG Chew Take 1 tablet (81 mg total) by mouth once daily.    atorvastatin (LIPITOR) 40 MG tablet Take 1 tablet (40 mg total) by mouth once daily.    furosemide (LASIX) 40 MG tablet Take 40 mg by mouth.    metoprolol succinate (TOPROL-XL) 25 MG 24 hr  tablet Take 0.5 tablets (12.5 mg total) by mouth once daily.    rivaroxaban (XARELTO) 20 mg Tab Take 1 tablet (20 mg total) by mouth daily with dinner or evening meal.    tamsulosin (FLOMAX) 0.4 mg Cap Take 1 capsule (0.4 mg total) by mouth every evening. Take 30 minutes after dinner     Family History       Problem Relation (Age of Onset)    COPD Mother    Cancer Father          Tobacco Use    Smoking status: Never    Smokeless tobacco: Current     Types: Chew   Substance and Sexual Activity    Alcohol use: Yes     Alcohol/week: 20.0 standard drinks     Types: 20 Cans of beer per week    Drug use: No    Sexual activity: Not on file     Review of Systems   Constitutional: Negative. Negative for diaphoresis and fever.   HENT: Negative.     Eyes: Negative.    Cardiovascular:  Positive for dyspnea on exertion, irregular heartbeat, leg swelling, orthopnea and paroxysmal nocturnal dyspnea. Negative for chest pain, near-syncope, palpitations and syncope.   Respiratory:  Positive for shortness of breath.    Endocrine: Negative.    Hematologic/Lymphatic: Negative.    Skin: Negative.    Musculoskeletal: Negative.    Gastrointestinal:  Negative for bloating, nausea and vomiting.   Genitourinary: Negative.    Neurological: Negative.    Psychiatric/Behavioral: Negative.     Allergic/Immunologic: Negative.    Objective:     Vital Signs (Most Recent):  Temp: 96 °F (35.6 °C) (01/13/23 1107)  Pulse: 73 (01/13/23 1107)  Resp: 16 (01/13/23 1107)  BP: 132/60 (01/13/23 1107)  SpO2: 97 % (01/13/23 1107) Vital Signs (24h Range):  Temp:  [96 °F (35.6 °C)-98.7 °F (37.1 °C)] 96 °F (35.6 °C)  Pulse:  [70-79] 73  Resp:  [15-33] 16  SpO2:  [93 %-100 %] 97 %  BP: (100-132)/(52-66) 132/60     Weight: 65.8 kg (145 lb)  Body mass index is 21.41 kg/m².    SpO2: 97 %         Intake/Output Summary (Last 24 hours) at 1/13/2023 1110  Last data filed at 1/13/2023 1029  Gross per 24 hour   Intake --   Output 30 ml   Net -30 ml        Lines/Drains/Airways       Central Venous Catheter Line  Duration                  Hemodialysis Catheter 11/25/22 0904 right internal jugular 49 days              Peripheral Intravenous Line  Duration                  Peripheral IV - Single Lumen 01/12/23 1904 20 G;1 in Left Antecubital <1 day                    Physical Exam  Constitutional:       General: He is not in acute distress.     Appearance: He is not diaphoretic.   HENT:      Head: Atraumatic.   Eyes:      General:         Right eye: No discharge.         Left eye: No discharge.   Cardiovascular:      Rate and Rhythm: Normal rate and regular rhythm.      Heart sounds: Murmur heard.   Pulmonary:      Effort: Pulmonary effort is normal.      Breath sounds: No rales.   Abdominal:      General: Bowel sounds are normal.      Palpations: Abdomen is soft.   Musculoskeletal:      Right lower leg: Edema present.      Left lower leg: Edema present.   Skin:     General: Skin is warm and dry.   Neurological:      Mental Status: He is alert and oriented to person, place, and time.   Psychiatric:         Mood and Affect: Mood normal.         Behavior: Behavior normal.         Thought Content: Thought content normal.         Judgment: Judgment normal.       Significant Labs: Blood Culture: No results for input(s): LABBLOO in the last 48 hours., BMP:   Recent Labs   Lab 01/12/23  1812 01/13/23  0101 01/13/23  0431   GLU 89  --  97     --  140   K 4.3  --  4.3     --  103   CO2 25  --  27   BUN 50*  --  52*   CREATININE 2.5*  --  2.7*   CALCIUM 8.7  --  8.9   MG  --  1.5*  --    , CMP   Recent Labs   Lab 01/12/23  1812 01/13/23  0431    140   K 4.3 4.3    103   CO2 25 27   GLU 89 97   BUN 50* 52*   CREATININE 2.5* 2.7*   CALCIUM 8.7 8.9   PROT 6.5 6.4   ALBUMIN 3.1* 2.8*   BILITOT 0.4 0.4   ALKPHOS 96 84   AST 21 17   ALT 23 19   ANIONGAP 11 10   , CBC   Recent Labs   Lab 01/12/23  1812 01/13/23  0431   WBC 9.67 8.95   HGB 9.7* 9.0*   HCT  30.7* 29.5*    228   , INR No results for input(s): INR, PROTIME in the last 48 hours., Lipid Panel No results for input(s): CHOL, HDL, LDLCALC, TRIG, CHOLHDL in the last 48 hours., Troponin   Recent Labs   Lab 01/12/23  1812 01/13/23  0101   TROPONINI 0.032* 0.035*   , and All pertinent lab results from the last 24 hours have been reviewed.    Significant Imaging: Echocardiogram: Transthoracic echo (TTE) complete ([facility] Only):   Results for orders placed or performed during the hospital encounter of 01/05/23   Echo   Result Value Ref Range    BSA 1.84 m2    LA WIDTH 5.00 cm    IVC diameter 2.22 cm    Left Ventricular Outflow Tract Mean Velocity 0.66 cm/s    Left Ventricular Outflow Tract Mean Gradient 2.02 mmHg    LVIDd 6.23 (A) 3.5 - 6.0 cm    IVS 0.80 0.6 - 1.1 cm    Posterior Wall 0.79 0.6 - 1.1 cm    LVIDs 5.55 (A) 2.1 - 4.0 cm    FS 11 28 - 44 %    LA volume 154.80 cm3    LV mass 197.29 g    LA size 4.99 cm    RVDD 2.88 cm    Left Ventricle Relative Wall Thickness 0.25 cm    AV regurgitation pressure 1/2 time [id number] ms    AV mean gradient 5 mmHg    AV valve area 2.71 cm2    AV Velocity Ratio 0.63     AV index (prosthetic) 0.62     MV mean gradient 4 mmHg    MV valve area by continuity eq 1.85 cm2    E wave deceleration time 0.00 msec    LVOT diameter 2.36 cm    LVOT area 4.4 cm2    LVOT peak nick 0.96 m/s    LVOT peak VTI 16.90 cm    Ao peak nick 1.53 m/s    Ao VTI 27.3 cm    LVOT stroke volume 73.89 cm3    AV peak gradient 9 mmHg    MV peak gradient 7 mmHg    MV Peak E Nick 0.00 m/s    AR Max Nick 4.06 m/s    TR Max Nick 2.44 m/s    MV VTI 39.9 cm    MV stenosis pressure 1/2 time 0.00 ms    MV Peak A Nick 0.00 m/s    LV Systolic Volume 150.73 mL    LV Systolic Volume Index 81.9 mL/m2    LV Diastolic Volume 195.79 mL    LV Diastolic Volume Index 106.41 mL/m2    LA Volume Index 84.1 mL/m2    LV Mass Index 107 g/m2    RA Major Axis 5.56 cm    Left Atrium Minor Axis 6.89 cm    Left Atrium Major Axis  7.76 cm    Triscuspid Valve Regurgitation Peak Gradient 24 mmHg    Right Atrial Pressure (from IVC) 3 mmHg    EF 15 %    TV rest pulmonary artery pressure 27 mmHg    Narrative    · The left ventricle is normal in size with severely decreased systolic   function.  · The estimated ejection fraction is 15%.  · There is severe left ventricular global hypokinesis.  · Left ventricular diastolic dysfunction.  · With severely reduced right ventricular systolic function.  · Severe left atrial enlargement.  · Mild mitral regurgitation.  · Moderate aortic regurgitation.  · The estimated PA systolic pressure is 27 mmHg.  · Normal central venous pressure (3 mmHg).  · There is a moderate size protruding right atrial mass present on what   appears to be a catheter tip. The mass is mobile. Thrombus vs vegetation   suspected. Clinical correlation recommended.        Assessment and Plan:     * Acute on chronic combined systolic and diastolic heart failure  TTE 01/2023  The left ventricle is normal in size with severely decreased systolic   function.  · The estimated ejection fraction is 15%.  · There is severe left ventricular global hypokinesis.  · Left ventricular diastolic dysfunction.  · With severely reduced right ventricular systolic function.  · Severe left atrial enlargement.  · Mild mitral regurgitation.  · Moderate aortic regurgitation.  · The estimated PA systolic pressure is 27 mmHg.  · Normal central venous pressure (3 mmHg).  · There is a moderate size protruding right atrial mass present on what   appears to be a catheter tip. The mass is mobile. Thrombus vs vegetation   suspected. Clinical correlation recommended.    Continue BB, add will add ARB with plans to transition to Entresto in the near future if ARB is cleared by Nephrology   Patient with good UO. Received 80 mg IV lasix in the ED with 2-3L out per patient. I&O not documented  Recently started on Lasix 40 mg p.o. daily at home  Patient with chronic BAI since  valvular sx. Mildly overloaded on exam.   Will defer diuretic dose to Nephrology in order to preserve remaining renal function. Unsure if patient is declared permanent HD and reports good UO.     ESRD (end stage renal disease)  Nephrology following   Patient reports adequate UO  If OK with Nephrology, will initiate ARB with plans to transition to Entresto as outpatient for optimal GDMT (LVEF 15%)   Lasix per Nephrology recs     Right atrial mass  Mobile mass on HD catheter tip noted on recent TTE- discussed with team, recommend leaving catheter in place and continue Xarelto. Patient afebrile with normal WBC ct.      Elevated troponin  Troponin flat at 0.03  EKG without acute ischemic changes  No chest pain   NICM- valvular   Troponin elevation felt to be demand in setting of CKD and CHF    VT (ventricular tachycardia)  No VT on tele   Continue Amio and BB     Paroxysmal A-fib  Continue Amio, BB, and Xarelto   Currently in SR        VTE Risk Mitigation (From admission, onward)         Ordered     apixaban tablet 2.5 mg  2 times daily         01/13/23 1206     IP VTE HIGH RISK PATIENT  Once         01/12/23 7774                Thank you for your consult. I will follow-up with patient. Please contact us if you have any additional questions.    Tarik Treadwell NP  Cardiology   [place] - Telemetry

## 2023-01-13 NOTE — PLAN OF CARE
VN cued into room to complete admit assessment. VIP model introduced; VN working alongside bedside treatment team.  Plan of care reviewed with patient. Patient informed of fall risk, fall precautions, call light within reach, side rails x2 elevated. Patient notified to ask staff for assistance. Patient verbalized complete understanding. Time allowed for questions. Will continue to monitor and intervene as needed.   Problem: Adult Inpatient Plan of Care  Goal: Plan of Care Review  Outcome: Ongoing, Progressing

## 2023-01-13 NOTE — ASSESSMENT & PLAN NOTE
Mobile mass on HD catheter tip noted on recent TTE- discussed with team, recommend leaving catheter in place and continue Xarelto. Patient afebrile with normal WBC ct.

## 2023-01-13 NOTE — PLAN OF CARE
Problem: Fluid and Electrolyte Imbalance (Acute Kidney Injury/Impairment)  Goal: Fluid and Electrolyte Balance  Outcome: Ongoing, Progressing     Problem: Adult Inpatient Plan of Care  Goal: Plan of Care Review  Outcome: Ongoing, Progressing  Goal: Patient-Specific Goal (Individualized)  Outcome: Ongoing, Progressing  Goal: Absence of Hospital-Acquired Illness or Injury  Outcome: Ongoing, Progressing  Goal: Optimal Comfort and Wellbeing  Outcome: Ongoing, Progressing  Goal: Readiness for Transition of Care  Outcome: Ongoing, Progressing     Problem: Infection  Goal: Absence of Infection Signs and Symptoms  Outcome: Ongoing, Progressing     Problem: Impaired Wound Healing  Goal: Optimal Wound Healing  Outcome: Ongoing, Progressing

## 2023-01-13 NOTE — HPI
David Barrios is a 61 y/o male with HFrEF secondary to valvular cardiomyopathy s/p mitral valve repair, tricuspid valve repair, left atrial maze, left atrial appendage resection, VT,  CKD on HD MWF, paroxysmal AF, and HTN. Patient had a complicated hospital course following valve repair 3 months ago. Patient presented to the ED due to chronic BAI following surgery. Patient was initially sent to the ED by his PCP after undergoing a CXR that was concerning for pneumothorax, however, was normal on repeat x-ray in the ED. Patient denies any SOB at rest, chest pain, nausea, vomiting, abdominal pain, fever, palpiations and chills. + orthopnea, PND. Patient now an HD patient, unsure if permanent at this time. CT chest - possible fluid around the sternum from prior mediastinitis. TTE from a week ago noted mobile mass on HD catheter tip. Patient is on Xarelto for OAC. Patient received 80 mg IV Lasix in the ED and reports good response (2-3L).

## 2023-01-14 LAB
ALBUMIN SERPL BCP-MCNC: 2.9 G/DL (ref 3.5–5.2)
ALP SERPL-CCNC: 88 U/L (ref 55–135)
ALT SERPL W/O P-5'-P-CCNC: 25 U/L (ref 10–44)
ANION GAP SERPL CALC-SCNC: 10 MMOL/L (ref 8–16)
AST SERPL-CCNC: 23 U/L (ref 10–40)
BASOPHILS # BLD AUTO: 0.13 K/UL (ref 0–0.2)
BASOPHILS NFR BLD: 1.3 % (ref 0–1.9)
BILIRUB SERPL-MCNC: 0.5 MG/DL (ref 0.1–1)
BUN SERPL-MCNC: 56 MG/DL (ref 8–23)
CALCIUM SERPL-MCNC: 9.1 MG/DL (ref 8.7–10.5)
CHLORIDE SERPL-SCNC: 104 MMOL/L (ref 95–110)
CO2 SERPL-SCNC: 27 MMOL/L (ref 23–29)
CREAT SERPL-MCNC: 2.6 MG/DL (ref 0.5–1.4)
DIFFERENTIAL METHOD: ABNORMAL
EOSINOPHIL # BLD AUTO: 0.3 K/UL (ref 0–0.5)
EOSINOPHIL NFR BLD: 3.4 % (ref 0–8)
ERYTHROCYTE [DISTWIDTH] IN BLOOD BY AUTOMATED COUNT: 16.6 % (ref 11.5–14.5)
EST. GFR  (NO RACE VARIABLE): 27 ML/MIN/1.73 M^2
GLUCOSE SERPL-MCNC: 91 MG/DL (ref 70–110)
HCT VFR BLD AUTO: 31.5 % (ref 40–54)
HGB BLD-MCNC: 9.8 G/DL (ref 14–18)
IMM GRANULOCYTES # BLD AUTO: 0.05 K/UL (ref 0–0.04)
IMM GRANULOCYTES NFR BLD AUTO: 0.5 % (ref 0–0.5)
LYMPHOCYTES # BLD AUTO: 1.9 K/UL (ref 1–4.8)
LYMPHOCYTES NFR BLD: 18.7 % (ref 18–48)
MCH RBC QN AUTO: 33.2 PG (ref 27–31)
MCHC RBC AUTO-ENTMCNC: 31.1 G/DL (ref 32–36)
MCV RBC AUTO: 107 FL (ref 82–98)
MONOCYTES # BLD AUTO: 1 K/UL (ref 0.3–1)
MONOCYTES NFR BLD: 10.5 % (ref 4–15)
NEUTROPHILS # BLD AUTO: 6.5 K/UL (ref 1.8–7.7)
NEUTROPHILS NFR BLD: 65.6 % (ref 38–73)
NRBC BLD-RTO: 0 /100 WBC
PLATELET # BLD AUTO: 242 K/UL (ref 150–450)
PMV BLD AUTO: 10.1 FL (ref 9.2–12.9)
POTASSIUM SERPL-SCNC: 4.6 MMOL/L (ref 3.5–5.1)
PROT SERPL-MCNC: 6.6 G/DL (ref 6–8.4)
RBC # BLD AUTO: 2.95 M/UL (ref 4.6–6.2)
SODIUM SERPL-SCNC: 141 MMOL/L (ref 136–145)
WBC # BLD AUTO: 9.88 K/UL (ref 3.9–12.7)

## 2023-01-14 PROCEDURE — 85025 COMPLETE CBC W/AUTO DIFF WBC: CPT | Performed by: STUDENT IN AN ORGANIZED HEALTH CARE EDUCATION/TRAINING PROGRAM

## 2023-01-14 PROCEDURE — 25000003 PHARM REV CODE 250: Performed by: INTERNAL MEDICINE

## 2023-01-14 PROCEDURE — 25000003 PHARM REV CODE 250: Performed by: STUDENT IN AN ORGANIZED HEALTH CARE EDUCATION/TRAINING PROGRAM

## 2023-01-14 PROCEDURE — 99233 PR SUBSEQUENT HOSPITAL CARE,LEVL III: ICD-10-PCS | Mod: ,,, | Performed by: INTERNAL MEDICINE

## 2023-01-14 PROCEDURE — 99233 SBSQ HOSP IP/OBS HIGH 50: CPT | Mod: ,,, | Performed by: INTERNAL MEDICINE

## 2023-01-14 PROCEDURE — 80053 COMPREHEN METABOLIC PANEL: CPT | Performed by: STUDENT IN AN ORGANIZED HEALTH CARE EDUCATION/TRAINING PROGRAM

## 2023-01-14 PROCEDURE — 99900035 HC TECH TIME PER 15 MIN (STAT)

## 2023-01-14 PROCEDURE — 36415 COLL VENOUS BLD VENIPUNCTURE: CPT | Performed by: STUDENT IN AN ORGANIZED HEALTH CARE EDUCATION/TRAINING PROGRAM

## 2023-01-14 PROCEDURE — 11000001 HC ACUTE MED/SURG PRIVATE ROOM

## 2023-01-14 PROCEDURE — 25000003 PHARM REV CODE 250: Performed by: NURSE PRACTITIONER

## 2023-01-14 PROCEDURE — 94761 N-INVAS EAR/PLS OXIMETRY MLT: CPT

## 2023-01-14 RX ORDER — LOSARTAN POTASSIUM 25 MG/1
25 TABLET ORAL DAILY
Status: DISCONTINUED | OUTPATIENT
Start: 2023-01-14 | End: 2023-01-16 | Stop reason: HOSPADM

## 2023-01-14 RX ORDER — LANOLIN ALCOHOL/MO/W.PET/CERES
400 CREAM (GRAM) TOPICAL 2 TIMES DAILY
Status: DISCONTINUED | OUTPATIENT
Start: 2023-01-14 | End: 2023-01-16 | Stop reason: HOSPADM

## 2023-01-14 RX ORDER — TAMSULOSIN HYDROCHLORIDE 0.4 MG/1
0.4 CAPSULE ORAL DAILY
Status: DISCONTINUED | OUTPATIENT
Start: 2023-01-14 | End: 2023-01-16 | Stop reason: HOSPADM

## 2023-01-14 RX ADMIN — AMIODARONE HYDROCHLORIDE 200 MG: 200 TABLET ORAL at 09:01

## 2023-01-14 RX ADMIN — LIDOCAINE 1 PATCH: 50 PATCH CUTANEOUS at 09:01

## 2023-01-14 RX ADMIN — TAMSULOSIN HYDROCHLORIDE 0.4 MG: 0.4 CAPSULE ORAL at 09:01

## 2023-01-14 RX ADMIN — MAGNESIUM OXIDE TAB 400 MG (241.3 MG ELEMENTAL MG) 400 MG: 400 (241.3 MG) TAB at 02:01

## 2023-01-14 RX ADMIN — APIXABAN 2.5 MG: 2.5 TABLET, FILM COATED ORAL at 09:01

## 2023-01-14 RX ADMIN — NEPHROCAP 1 CAPSULE: 1 CAP ORAL at 09:01

## 2023-01-14 RX ADMIN — LOSARTAN POTASSIUM 25 MG: 25 TABLET, FILM COATED ORAL at 02:01

## 2023-01-14 RX ADMIN — METOPROLOL SUCCINATE 12.5 MG: 25 TABLET, EXTENDED RELEASE ORAL at 09:01

## 2023-01-14 RX ADMIN — MAGNESIUM OXIDE TAB 400 MG (241.3 MG ELEMENTAL MG) 400 MG: 400 (241.3 MG) TAB at 09:01

## 2023-01-14 RX ADMIN — ATORVASTATIN CALCIUM 40 MG: 40 TABLET, FILM COATED ORAL at 09:01

## 2023-01-14 RX ADMIN — ASPIRIN 81 MG CHEWABLE TABLET 81 MG: 81 TABLET CHEWABLE at 09:01

## 2023-01-14 RX ADMIN — FUROSEMIDE 40 MG: 40 TABLET ORAL at 09:01

## 2023-01-14 NOTE — PROGRESS NOTES
[facility] Medicine  Progress Note    Patient Name: David Barrios  MRN: [id number]  Patient Class: IP- Inpatient   Admission Date: 1/12/2023  Length of Stay: 1 days  Attending Physician: Rosalind Crockett MD  Primary Care Provider: Breann Tavera MD        Subjective:     Principal Problem:Acute on chronic combined systolic and diastolic heart failure        HPI:  Mr. David Barrios is a 63 y/o M w/ PMH of HFrEF secondary to valvular cardiomyopathy s/p mitral valve repair, tricuspid valve repair, left atrial maze, left atrial appendage resection, ESRD on HD MWF, paroxysmal AF, and HTN who comes to the hospital endorsing chronic dyspnea on exertion. Patient was initially sent to the ED by his PCP after undergoing a CXR that was concerning for pneumothorax, however, was normal on repeat x-ray in the ED. Patient states since his valve surgery on 10/7/22 his dyspnea on exertion is getting worse to the point where patient was unable to clumb the two stairs into his trailer home. Denies any SOB at rest, chestpain, nausea, vomiting, abdominal pain, fever, palpiations and chills. Does endorse orthopnea, PND and bendopnea.    Of note patients valvular surgery was complicated by serratia bacteriemia and sternal wound infection that required subsequent sternal wire removal, partial sternectomy, and muscle flap with plastics.      In the ED, patient underwent CT chest w/o contrast which was concerning for possible fluid around the sternum from prior mediastinitis. Patient was afebrile and WBC count was normal. EKG showed no acute ischemic changes but troponin was mildly elevated at 0.032.     Patient admitted to observation for evaluation of worsening dyspnea.       Overview/Hospital Course:  No notes on file    Interval hx: This am patient states he is breathing better and is without significant chest pain.   He is urinating/diuresing, though some unmeasured urine.   Continue gentle PO Lasix with caution to not  worsen renal function.   Cardiology and nephrology considering entresto for HF.   Consult PT today.   LE 1-2+  Perhaps discharge 1-2 days.       Review of Systems   Respiratory:  Positive for shortness of breath. Negative for cough.    Cardiovascular:  Positive for leg swelling. Negative for chest pain.   Gastrointestinal:  Negative for abdominal pain, diarrhea, nausea and vomiting.   Genitourinary:  Negative for decreased urine volume, difficulty urinating and urgency.   Musculoskeletal:  Positive for arthralgias and back pain.        Chronic    Neurological:  Negative for dizziness, light-headedness and headaches.  12 point ROS negative except for HPI  Objective:     Vital Signs (Most Recent):  Temp: 97.7 °F (36.5 °C) (01/14/23 0732)  Pulse: 69 (01/14/23 0732)  Resp: 20 (01/14/23 0732)  BP: (!) 126/58 (01/14/23 0732)  SpO2: 97 % (01/14/23 0820)   Vital Signs (24h Range):  Temp:  [96 °F (35.6 °C)-97.7 °F (36.5 °C)] 97.7 °F (36.5 °C)  Pulse:  [61-76] 69  Resp:  [16-23] 20  SpO2:  [93 %-98 %] 97 %  BP: (108-132)/(53-66) 126/58     Weight: 66.2 kg (145 lb 15.1 oz)  Body mass index is 21.55 kg/m².    Physical Exam  Constitutional:       Appearance: He is not ill-appearing.      Comments: Alert, Oriented, No acute distress   HENT:      Head: Normocephalic and atraumatic.      Mouth/Throat:      Pharynx: Oropharynx is clear.   Eyes:      Extraocular Movements: Extraocular movements intact.      Conjunctiva/sclera: Conjunctivae normal.   Neck:      Vascular: Hepatojugular reflux and JVD present.   Cardiovascular:      Rate and Rhythm: Normal rate and regular rhythm.      Pulses: Normal pulses.      Heart sounds: Normal heart sounds.   Pulmonary:      Effort: Pulmonary effort is normal.      Breath sounds: Normal breath sounds.      Comments: Normal effort, Clear to auscultation   Abdominal:      General: Bowel sounds are normal.      Palpations: Abdomen is soft.      Comments: Soft, Non-tender, Non-distended    Musculoskeletal:         General: Swelling present.      Cervical back: Normal range of motion and neck supple.      Right lower leg: Edema present.      Left lower leg: Edema present.      Comments: . Sternal surgical scar noted. Left axilla hematoma noted.    Skin:     General: Skin is warm and dry.      Comments: Dry, Intact, Warm, Capillary refill <2 seconds.    Neurological:      Mental Status: He is alert and oriented to person, place, and time.      Comments: Normal speech   Psychiatric:         Mood and Affect: Mood and affect normal.           Significant Labs: All pertinent labs within the past 24 hours have been reviewed.      Significant Imaging: I have reviewed all pertinent imaging results/findings within the past 24 hours.      Assessment/Plan:      * Acute on chronic combined systolic and diastolic heart failure  Patients worsening dyspnea most likely secondary to HF exacerbation. Less concerned for mediastinitis in the setting of patient being affebrile and normal WBC count. Well's score 1.5 so less concern for PE.     - Last Echo (1/5/23): LV normal size w/ severely decreased systolic function. EF 15%. Severe LV global hyokinesis. LV diastolic dysfunction. Severely reduced RV systolic function. Mild MR. Mod AR. moderate size protruding right atrial mass present on what appears to be a catheter tip. The mass is mobile. Thrombus vs vegetation suspected.  - Last RHC: none   - Home GDMT: metoprolol succinate 12.5 daily. Continue home GDMT. No evidence of cardiogenic shock.    - Home diuretic regimen: lasix 40 PO daily  - Hypervolemic on exam today.  1 dose of IV lasix 80mg upon admission since patient still urinates. Consulted Nephrology to resume dialysis - no acute indication for HD.   - Maintain K>4 and Mg>2  - Cardiology consulted. Appreciate recommendations.     -continue PO lasix   -strict I&O  -clinically improving, will consult PT to assess mobility and BAI   -closely monitor Cr    -cards/renal considering ARB with later transition to entresto with caution considering renal function   -no acute signs of infection  -still noted BLE edema   -may DC in 1-2 days pending clinical improvement     ESRD (end stage renal disease)  Due to hx of cardiogenic shock   - Nephrology consulted to resume dialysis. - no acute indication at this time. Last HD Wednesday    Right atrial mass  - continue xarelto.     Elevated troponin  Most likely secondary to HF exacerbation and decreased clearence in the setting of ESRD. Will continue to trend.       Paroxysmal A-fib  - continue home amiodarone and Xarelto.  - Currently in NSR.       VTE Risk Mitigation (From admission, onward)         Ordered     apixaban tablet 2.5 mg  2 times daily         01/13/23 1206     IP VTE HIGH RISK PATIENT  Once         01/12/23 2259                Discharge Planning   AMARILYS:      Code Status: Full Code   Is the patient medically ready for discharge?:     Reason for patient still in hospital (select all that apply): Patient trending condition                     Jennifer Estes NP  Department of Hospital Medicine   [place] - Telemetry

## 2023-01-14 NOTE — PROGRESS NOTES
Nephrology  Progress Note       Consult Requested By: Rosalind Crockett MD  Reason for Consult: ESRD      SUBJECTIVE:        ?    Review of Systems   Constitutional:  Negative for chills and fever.   HENT:  Negative for congestion and sore throat.    Eyes:  Negative for blurred vision, double vision and photophobia.   Respiratory:  Positive for shortness of breath. Negative for cough.    Cardiovascular:  Positive for leg swelling. Negative for chest pain and palpitations.   Gastrointestinal:  Negative for abdominal pain, diarrhea, nausea and vomiting.   Genitourinary:  Negative for dysuria and urgency.   Musculoskeletal:  Negative for joint pain and myalgias.   Skin:  Negative for itching and rash.   Neurological:  Negative for dizziness, sensory change, weakness and headaches.   Endo/Heme/Allergies:  Negative for polydipsia. Does not bruise/bleed easily.   Psychiatric/Behavioral:  Negative for depression.      Past Medical History:   Diagnosis Date    Anemia     Atrial fibrillation     Encounter for blood transfusion     Esophageal ulcer     GI bleed     Hypertension      Past Surgical History:   Procedure Laterality Date    APPLICATION OF WOUND VACUUM-ASSISTED CLOSURE DEVICE N/A 11/8/2022    Procedure: APPLICATION, WOUND VAC;  Surgeon: Mike Hedrick MD;  Location: [facility] OR [address] [place] [address];  Service: General;  Laterality: N/A;    CARDIOVERSION Left 9/15/2022    Procedure: Cardioversion;  Surgeon: Julio James MD;  Location: [facility] CATH LAB/EP;  Service: Cardiology;  Laterality: Left;  With NORBERT    CATHETERIZATION OF BOTH LEFT AND RIGHT HEART N/A 9/22/2022    Procedure: CATHETERIZATION, HEART, BOTH LEFT AND RIGHT;  Surgeon: Julio James MD;  Location: [facility] CATH LAB/EP;  Service: Cardiology;  Laterality: N/A;    COLONOSCOPY N/A 4/4/2019    Procedure: COLONOSCOPY Golytely;  Surgeon: Umair Randolph MD;  Location: [facility] ENDO;  Service: Endoscopy;  Laterality: N/A;    CORONARY ANGIOGRAPHY N/A 9/22/2022    Procedure:  ANGIOGRAM, CORONARY ARTERY;  Surgeon: Julio James MD;  Location: [facility] CATH LAB/EP;  Service: Cardiology;  Laterality: N/A;    MEYER MAZE PROCEDURE N/A 10/7/2022    Procedure: MEYER MAZE PROCEDURE;  Surgeon: Mike Hedrick MD;  [facility];  Service: Cardiovascular;  Laterality: N/A;    CREATION OF MUSCLE ROTATIONAL FLAP N/A 11/14/2022    Procedure: CREATION, FLAP, MUSCLE ROTATION;  Surgeon: Amadeo Carrasquillo MD;  [facility];  Service: Plastics;  Laterality: N/A;  sternal wound that need pec flap coverage    DEBRIDEMENT OF STERNUM N/A 11/8/2022    Procedure: DEBRIDEMENT, STERNUM;  Surgeon: Mike Hedrick MD;  [facility];  Service: General;  Laterality: N/A;    ESOPHAGOGASTRODUODENOSCOPY N/A 10/12/2018    Procedure: EGD (ESOPHAGOGASTRODUODENOSCOPY);  Surgeon: Braden Herring MD;  Location: [facility] ENDO;  Service: Endoscopy;  Laterality: N/A;    ESOPHAGOGASTRODUODENOSCOPY N/A 4/4/2019    Procedure: EGD;  Surgeon: Umair Randolph MD;  Location: [facility] ENDO;  Service: Endoscopy;  Laterality: N/A;    EXCLUSION OF LEFT ATRIAL APPENDAGE N/A 10/7/2022    Procedure: EXCLUSION, LEFT ATRIAL APPENDAGE;  Surgeon: Mike Hedrick MD;  [facility];  Service: Cardiovascular;  Laterality: N/A;    HERNIA REPAIR      INSERTION OF INTRAVASCULAR MICROAXIAL BLOOD PUMP N/A 10/7/2022    Procedure: INSERTION, IMPELLA;  Surgeon: Mike Hedrick MD;  [facility];  Service: Cardiovascular;  Laterality: N/A;  direct aortic insertion    IRRIGATION OF MEDIASTINUM N/A 10/7/2022    Procedure: IRRIGATION, MEDIASTINUM;  Surgeon: Mike Hedrick MD;  [facility];  Service: Cardiovascular;  Laterality: N/A;    STERNAL WIRES REMOVAL N/A 11/8/2022    Procedure: REMOVAL, STERNAL WIRE;  Surgeon: Mike Hedrick MD;  [facility];  Service: General;  Laterality: N/A;  Sternal wire removal with muscle flap creation    STERNAL WOUND  CLOSURE N/A 11/14/2022    Procedure: CLOSURE, WOUND, STERNUM;  Surgeon: Amadeo Carrasquillo MD;  Location: [facility] OR 2ND FLR;  Service: Plastics;  Laterality: N/A;    TRICUSPID VALVULOPLASTY N/A 10/7/2022    Procedure: REPAIR, TRICUSPID VALVE;  Surgeon: Mike Hedrick MD;  Location: [facility] OR 2ND FLR;  Service: Cardiovascular;  Laterality: N/A;    WOUND EXPLORATION N/A 11/14/2022    Procedure: EXPLORATION, WOUND;  Surgeon: Amadeo Carrasquillo MD;  Location: [facility] OR 2ND FLR;  Service: Plastics;  Laterality: N/A;     Family History   Problem Relation Age of Onset    COPD Mother     Cancer Father      Social History     Tobacco Use    Smoking status: Never    Smokeless tobacco: Current     Types: Chew   Substance Use Topics    Alcohol use: Yes     Alcohol/week: 20.0 standard drinks     Types: 20 Cans of beer per week    Drug use: No       Review of patient's allergies indicates:  No Known Allergies         OBJECTIVE:     Vital Signs (Most Recent)  Vitals:    01/14/23 0820 01/14/23 1115 01/14/23 1144 01/14/23 1149   BP:  117/61     Patient Position:       Pulse:  74 79    Resp:  17     Temp:  96.7 °F (35.9 °C)     TempSrc:       SpO2: 97% 98%  98%   Weight:       Height:                       Medications:   amiodarone  200 mg Oral BID    apixaban  2.5 mg Oral BID    aspirin  81 mg Oral Daily    atorvastatin  40 mg Oral Daily    furosemide  40 mg Oral Daily    LIDOcaine  1 patch Transdermal Q24H    magnesium oxide  400 mg Oral BID    metoprolol succinate  12.5 mg Oral Daily    tamsulosin  0.4 mg Oral Daily    vitamin renal formula (B-complex-vitamin c-folic acid)  1 capsule Oral Daily           Physical Exam  Vitals and nursing note reviewed.   Constitutional:       General: He is not in acute distress.     Appearance: He is not diaphoretic.   HENT:      Head: Normocephalic and atraumatic.      Mouth/Throat:      Pharynx: No oropharyngeal exudate.   Eyes:      General: No scleral icterus.     Conjunctiva/sclera:  Conjunctivae normal.      Pupils: Pupils are equal, round, and reactive to light.   Cardiovascular:      Rate and Rhythm: Normal rate and regular rhythm.      Heart sounds: Normal heart sounds. No murmur heard.  Pulmonary:      Effort: Pulmonary effort is normal. No respiratory distress.      Breath sounds: Rales (RLL) present.   Abdominal:      General: Bowel sounds are normal. There is no distension.      Palpations: Abdomen is soft.      Tenderness: There is no abdominal tenderness.   Musculoskeletal:         General: Swelling (trace) present. Normal range of motion.      Cervical back: Normal range of motion and neck supple.      Right lower leg: Edema present.      Left lower leg: Edema present.   Skin:     General: Skin is warm and dry.      Findings: No erythema.   Neurological:      Mental Status: He is alert and oriented to person, place, and time.      Cranial Nerves: No cranial nerve deficit.   Psychiatric:         Mood and Affect: Affect normal.         Cognition and Memory: Memory normal.         Judgment: Judgment normal.       Laboratory:  Recent Labs   Lab 01/12/23 1812 01/13/23 0431 01/14/23  0436   WBC 9.67 8.95 9.88   HGB 9.7* 9.0* 9.8*   HCT 30.7* 29.5* 31.5*    228 242   MONO 13.1  1.3* 11.7  1.1* 10.5  1.0       Recent Labs   Lab 01/12/23 1812 01/13/23 0431 01/14/23  0436    140 141   K 4.3 4.3 4.6    103 104   CO2 25 27 27   BUN 50* 52* 56*   CREATININE 2.5* 2.7* 2.6*   CALCIUM 8.7 8.9 9.1         Diagnostic Results:  X-Ray: Reviewed  US: Reviewed  Echo: Reviewed  ASSESSMENT/PLAN:     1. ESRD  -  due to ATN from Cardiogenic shock Sepsis low EF was on HD Cr slightly better, was oliguric now good UOP on lasix >1.8L  Cr stable   -- As of now no Indication for HD last HD Wednesday   -- Resume Lasix daily still overloaded.   ? recovered Kidney function get 24 hr Urine     -- However if cardiology planning to start Entresto  may worsened kidney function and he need to stay  on HD   -- Please avoid nephrotoxins, including NSAIDs, aminoglycosides, IV contrast (unless absolutely necessary), gadolinium, fleets and other phosphorous-based laxatives. Caution with antibiotics.  -- Daily Renal Function Panel  -- Avoid Hypotension.  -- Renally dose all meds        2. HTN (I10) - Controlled with Toprol 12.5    3. Anemia of chronic kidney disease treated with SABRA (N18.9 D63.1) -    EPogen      Recent Labs   Lab 01/12/23  1812 01/13/23  0431 01/14/23  0436   HGB 9.7* 9.0* 9.8*   HCT 30.7* 29.5* 31.5*    228 242         Iron   Lab Results   Component Value Date    IRON 14 (L) 10/11/2018    TIBC 657 (H) 10/11/2018    FERRITIN 5 (L) 10/11/2018       4. MBD (E88.9 M90.80) -  Recent Labs   Lab 01/14/23  0436   CALCIUM 9.1       Recent Labs   Lab 01/13/23  0101   MG 1.5*       Replace   Lab Results   Component Value Date    CALCIUM 9.1 01/14/2023    CAION 1.17 11/09/2022    PHOS 3.4 12/01/2022     No results found for: [id number]    Lab Results   Component Value Date    CO2 27 01/14/2023       5. Nutrition/Hypoalbuminemia (E88.09) -    Recent Labs   Lab 01/13/23  0431 01/14/23  0436   ALBUMIN 2.8* 2.9*       Nepro with meals TID. Renal vitamins daily      Thank you for the consult, will follow  With any question please call [phone]  Sri Ramsey MD    [facility]  PABLO Weaver MD, FACP,   MANDIE Saul MD,   MD DILLON Lara MD  EBud Chaney, NP    [address]  [place], [address] [phone]

## 2023-01-14 NOTE — PROGRESS NOTES
[facility] - Telemetry  Cardiology  Progress Note    Patient Name: David Barrios  MRN: [id number]  Admission Date: 1/12/2023  Hospital Length of Stay: 1 days  Code Status: Full Code   Attending Physician: Rosalind Crockett MD   Primary Care Physician: Breann Tavera MD  Expected Discharge Date:   Principal Problem:Acute on chronic combined systolic and diastolic heart failure    Subjective:     Hospital Course: 1/14/2023   Seen and examined. Doing ok. Feeling better. He was able to walk down the hallway with no issues.         Review of Systems   Constitutional: Positive for malaise/fatigue. Negative for chills and fever.   Cardiovascular:  Positive for dyspnea on exertion. Negative for leg swelling.   Respiratory:  Negative for shortness of breath.    Gastrointestinal:  Negative for nausea and vomiting.   Objective:     Vital Signs (Most Recent):  Temp: 96.7 °F (35.9 °C) (01/14/23 1115)  Pulse: 79 (01/14/23 1144)  Resp: 17 (01/14/23 1115)  BP: 117/61 (01/14/23 1115)  SpO2: 98 % (01/14/23 1149) Vital Signs (24h Range):  Temp:  [96.1 °F (35.6 °C)-97.7 °F (36.5 °C)] 96.7 °F (35.9 °C)  Pulse:  [68-79] 79  Resp:  [16-20] 17  SpO2:  [93 %-98 %] 98 %  BP: (108-130)/(53-63) 117/61     Weight: 66.2 kg (145 lb 15.1 oz)  Body mass index is 21.55 kg/m².    SpO2: 98 %         Intake/Output Summary (Last 24 hours) at 1/14/2023 1317  Last data filed at 1/14/2023 1304  Gross per 24 hour   Intake 880 ml   Output 2102 ml   Net -1222 ml       Lines/Drains/Airways       Central Venous Catheter Line  Duration                  Hemodialysis Catheter 11/25/22 0904 right internal jugular 50 days              Peripheral Intravenous Line  Duration                  Peripheral IV - Single Lumen 01/12/23 1904 20 G;1 in Left Antecubital 1 day                    Physical Exam  Constitutional:       General: He is not in acute distress.     Appearance: Normal appearance.   Cardiovascular:      Rate and Rhythm: Normal rate and regular rhythm.      Heart  sounds: Murmur (grade II diastolic) heard.   Pulmonary:      Breath sounds: No rales.   Abdominal:      General: Abdomen is flat.      Palpations: Abdomen is soft.   Musculoskeletal:         General: No swelling.   Skin:     General: Skin is warm and dry.   Neurological:      Mental Status: He is alert and oriented to person, place, and time.   Psychiatric:         Mood and Affect: Mood normal.         Behavior: Behavior normal.       Significant Labs: BMP:   Recent Labs   Lab 01/12/23 1812 01/13/23 0101 01/13/23 0431 01/14/23 0436   GLU 89  --  97 91     --  140 141   K 4.3  --  4.3 4.6     --  103 104   CO2 25  --  27 27   BUN 50*  --  52* 56*   CREATININE 2.5*  --  2.7* 2.6*   CALCIUM 8.7  --  8.9 9.1   MG  --  1.5*  --   --    , CMP   Recent Labs   Lab 01/12/23 1812 01/13/23 0431 01/14/23 0436    140 141   K 4.3 4.3 4.6    103 104   CO2 25 27 27   GLU 89 97 91   BUN 50* 52* 56*   CREATININE 2.5* 2.7* 2.6*   CALCIUM 8.7 8.9 9.1   PROT 6.5 6.4 6.6   ALBUMIN 3.1* 2.8* 2.9*   BILITOT 0.4 0.4 0.5   ALKPHOS 96 84 88   AST 21 17 23   ALT 23 19 25   ANIONGAP 11 10 10   , CBC   Recent Labs   Lab 01/12/23 1812 01/13/23 0431 01/14/23 0436   WBC 9.67 8.95 9.88   HGB 9.7* 9.0* 9.8*   HCT 30.7* 29.5* 31.5*    228 242   , Lipid Panel No results for input(s): CHOL, HDL, LDLCALC, TRIG, CHOLHDL in the last 48 hours., Troponin   Recent Labs   Lab 01/12/23 1812 01/13/23 0101   TROPONINI 0.032* 0.035*   , and All pertinent lab results from the last 24 hours have been reviewed.    Significant Imaging: Echocardiogram: 2D echo with color flow doppler:   Results for orders placed or performed during the hospital encounter of 10/11/18   2D echo with color flow doppler   Result Value Ref Range    EF + QEF 55 55 - 65    Diastolic Dysfunction No     Est. PA Systolic Pressure 16.65     Tricuspid Valve Regurgitation TRIVIAL     Narrative    Date of Procedure: 10/11/2018        TEST DESCRIPTION    Technical Quality: This is a technically adequate study.     Aorta: The aortic root is normal in size, measuring 3.4 cm at sinotubular junction.     Left Atrium: The left atrial volume index is mildly enlarged, measuring 61.18 cc/m2.     Left Ventricle: The left ventricle is normal in size, with an end-diastolic diameter of 5.6 cm, and an end-systolic diameter of 4.2 cm. LV wall thickness is normal, with the septum measuring 1.2 cm and the posterior wall measuring 1.0 cm across. Relative   wall thickness was normal at 0.36, and the LV mass index was increased at 142.9 g/m2 consistent with eccentric left ventricular hypertrophy. There are no regional wall motion abnormalities. Left ventricular systolic function appears normal. Visually   estimated ejection fraction is 55-60%. The LV Doppler derived stroke volume equals 62.0 ccs.     Diastolic indices: E wave velocity 0.8 m/s, E/A ratio 1.4,  msec., Diastolic function is normal.     Right Atrium: The right atrium is normal in size, measuring 5.3 cm in length in the apical view.     Right Ventricle: The right ventricle is normal in size measuring 2.2 cm at the base in the apical right ventricle-focused view. Global right ventricular systolic function appears normal. The estimated PA systolic pressure is greater than 17 mmHg.     Aortic Valve:  Aortic valve is normal in structure with normal leaflet mobility.     Mitral Valve:  Mitral valve is normal in structure with normal leaflet mobility. The pressure half time is 68 msec. The calculated mitral valve area is 3.24 cm2.     Tricuspid Valve:  Tricuspid valve is normal in structure with normal leaflet mobility. There is trivial tricuspid regurgitation.     Pulmonary Valve:  Pulmonary valve is normal in structure with normal leaflet mobility.     IVC: Right atrial pressure as assessed by IVC dynamics is normal at not visualized mmHg.     Intracavitary: There is no evidence of pericardial effusion, intracavity  mass, thrombi, or vegetation.         CONCLUSIONS     1 - Mild left atrial enlargement.     2 - Eccentric hypertrophy.     3 - No wall motion abnormalities.     4 - Normal left ventricular systolic function (EF 55-60%).     5 - Normal left ventricular diastolic function.     6 - Normal right ventricular systolic function .     7 - The estimated PA systolic pressure is greater than 17 mmHg.     8 - Trivial tricuspid regurgitation.             This document has been electronically    SIGNED BY: Anabelle Colon MD On: 10/12/2018 07:27    and Transthoracic echo (TTE) complete (Cupid Only):   Results for orders placed or performed during the hospital encounter of 01/05/23   Echo   Result Value Ref Range    BSA 1.84 m2    LA WIDTH 5.00 cm    IVC diameter 2.22 cm    Left Ventricular Outflow Tract Mean Velocity 0.66 cm/s    Left Ventricular Outflow Tract Mean Gradient 2.02 mmHg    LVIDd 6.23 (A) 3.5 - 6.0 cm    IVS 0.80 0.6 - 1.1 cm    Posterior Wall 0.79 0.6 - 1.1 cm    LVIDs 5.55 (A) 2.1 - 4.0 cm    FS 11 28 - 44 %    LA volume 154.80 cm3    LV mass 197.29 g    LA size 4.99 cm    RVDD 2.88 cm    Left Ventricle Relative Wall Thickness 0.25 cm    AV regurgitation pressure 1/2 time 279.713030960560575 ms    AV mean gradient 5 mmHg    AV valve area 2.71 cm2    AV Velocity Ratio 0.63     AV index (prosthetic) 0.62     MV mean gradient 4 mmHg    MV valve area by continuity eq 1.85 cm2    E wave deceleration time 0.00 msec    LVOT diameter 2.36 cm    LVOT area 4.4 cm2    LVOT peak eliezer 0.96 m/s    LVOT peak VTI 16.90 cm    Ao peak eliezer 1.53 m/s    Ao VTI 27.3 cm    LVOT stroke volume 73.89 cm3    AV peak gradient 9 mmHg    MV peak gradient 7 mmHg    MV Peak E Eliezer 0.00 m/s    AR Max Eliezer 4.06 m/s    TR Max Eliezer 2.44 m/s    MV VTI 39.9 cm    MV stenosis pressure 1/2 time 0.00 ms    MV Peak A Eliezer 0.00 m/s    LV Systolic Volume 150.73 mL    LV Systolic Volume Index 81.9 mL/m2    LV Diastolic Volume 195.79 mL    LV Diastolic Volume  Index 106.41 mL/m2    LA Volume Index 84.1 mL/m2    LV Mass Index 107 g/m2    RA Major Axis 5.56 cm    Left Atrium Minor Axis 6.89 cm    Left Atrium Major Axis 7.76 cm    Triscuspid Valve Regurgitation Peak Gradient 24 mmHg    Right Atrial Pressure (from IVC) 3 mmHg    EF 15 %    TV rest pulmonary artery pressure 27 mmHg    Narrative    · The left ventricle is normal in size with severely decreased systolic   function.  · The estimated ejection fraction is 15%.  · There is severe left ventricular global hypokinesis.  · Left ventricular diastolic dysfunction.  · With severely reduced right ventricular systolic function.  · Severe left atrial enlargement.  · Mild mitral regurgitation.  · Moderate aortic regurgitation.  · The estimated PA systolic pressure is 27 mmHg.  · Normal central venous pressure (3 mmHg).  · There is a moderate size protruding right atrial mass present on what   appears to be a catheter tip. The mass is mobile. Thrombus vs vegetation   suspected. Clinical correlation recommended.        Assessment and Plan:     Brief HPI:   HFrEF decompensation   Hx of MV and RV repair  Moderate AI   PAF  Rt axillary line thrombus        Plan:  - Responded to lasix. Now on po lasix 40 mg daily. Feels better  - Discussed with Nephrology team. Will start low dose losartan for severe cardiomyopathy, moderate AI. Continue Toprol 12.5 mg daily and increase as bp permits.   - Advance GDMT as BP and renal function tolerates.   - Continue amio. Remains in SR. Continue DOAC.   - Will need cardiac rehab as outpatient    Active Diagnoses:    Diagnosis Date Noted POA    PRINCIPAL PROBLEM:  Acute on chronic combined systolic and diastolic heart failure [I50.43] 01/13/2023 Unknown    Elevated troponin [R77.8] 01/13/2023 Yes    Right atrial mass [I51.89] 01/13/2023 Yes    ESRD (end stage renal disease) [N18.6] 01/13/2023 Yes    Paroxysmal A-fib [I48.0] 10/11/2018 Yes      Problems Resolved During this Admission:       VTE Risk  Mitigation (From admission, onward)           Ordered     apixaban tablet 2.5 mg  2 times daily         01/13/23 1206     IP VTE HIGH RISK PATIENT  Once         01/12/23 3661                    Adalberto Garcia MD  Cardiology  [facility] - Telemetry

## 2023-01-14 NOTE — ASSESSMENT & PLAN NOTE
Patients worsening dyspnea most likely secondary to HF exacerbation. Less concerned for mediastinitis in the setting of patient being affebrile and normal WBC count. Well's score 1.5 so less concern for PE.     - Last Echo (1/5/23): LV normal size w/ severely decreased systolic function. EF 15%. Severe LV global hyokinesis. LV diastolic dysfunction. Severely reduced RV systolic function. Mild MR. Mod AR. moderate size protruding right atrial mass present on what appears to be a catheter tip. The mass is mobile. Thrombus vs vegetation suspected.  - Last RHC: none   - Home GDMT: metoprolol succinate 12.5 daily. Continue home GDMT. No evidence of cardiogenic shock.    - Home diuretic regimen: lasix 40 PO daily  - Hypervolemic on exam today.  1 dose of IV lasix 80mg upon admission since patient still urinates. Consulted Nephrology to resume dialysis - no acute indication for HD.   - Maintain K>4 and Mg>2  - Cardiology consulted. Appreciate recommendations.     -continue PO lasix   -strict I&O  -clinically improving, will consult PT to assess mobility and BAI   -closely monitor Cr   -cards/renal considering ARB with later transition to entresto with caution considering renal function   -no acute signs of infection  -still noted BLE edema   -may DC in 1-2 days pending clinical improvement

## 2023-01-14 NOTE — ASSESSMENT & PLAN NOTE
Due to hx of cardiogenic shock   - Nephrology consulted to resume dialysis. - no acute indication at this time. Last HD Wednesday

## 2023-01-14 NOTE — PLAN OF CARE
Problem: Adult Inpatient Plan of Care  Goal: Plan of Care Review  Outcome: Ongoing, Progressing  Pt not in distress. NSR on telemetry. Safety maintained at all times. Call bell within reach. Bed on low position. Will continue to monitor.

## 2023-01-14 NOTE — SUBJECTIVE & OBJECTIVE
Interval hx: This am patient states he is breathing better and is without significant chest pain.   He is urinating/diuresing, though some unmeasured urine.   Continue gentle PO Lasix with caution to not worsen renal function.   Cardiology and nephrology considering entresto for HF.   Consult PT today.   LE 1-2+  Perhaps discharge 1-2 days.       Review of Systems   Respiratory:  Positive for shortness of breath. Negative for cough.    Cardiovascular:  Positive for leg swelling. Negative for chest pain.   Gastrointestinal:  Negative for abdominal pain, diarrhea, nausea and vomiting.   Genitourinary:  Negative for decreased urine volume, difficulty urinating and urgency.   Musculoskeletal:  Positive for arthralgias and back pain.        Chronic    Neurological:  Negative for dizziness, light-headedness and headaches.  12 point ROS negative except for HPI  Objective:     Vital Signs (Most Recent):  Temp: 97.7 °F (36.5 °C) (01/14/23 0732)  Pulse: 69 (01/14/23 0732)  Resp: 20 (01/14/23 0732)  BP: (!) 126/58 (01/14/23 0732)  SpO2: 97 % (01/14/23 0820)   Vital Signs (24h Range):  Temp:  [96 °F (35.6 °C)-97.7 °F (36.5 °C)] 97.7 °F (36.5 °C)  Pulse:  [61-76] 69  Resp:  [16-23] 20  SpO2:  [93 %-98 %] 97 %  BP: (108-132)/(53-66) 126/58     Weight: 66.2 kg (145 lb 15.1 oz)  Body mass index is 21.55 kg/m².    Physical Exam  Constitutional:       Appearance: He is not ill-appearing.      Comments: Alert, Oriented, No acute distress   HENT:      Head: Normocephalic and atraumatic.      Mouth/Throat:      Pharynx: Oropharynx is clear.   Eyes:      Extraocular Movements: Extraocular movements intact.      Conjunctiva/sclera: Conjunctivae normal.   Neck:      Vascular: Hepatojugular reflux and JVD present.   Cardiovascular:      Rate and Rhythm: Normal rate and regular rhythm.      Pulses: Normal pulses.      Heart sounds: Normal heart sounds.   Pulmonary:      Effort: Pulmonary effort is normal.      Breath sounds: Normal breath  sounds.      Comments: Normal effort, Clear to auscultation   Abdominal:      General: Bowel sounds are normal.      Palpations: Abdomen is soft.      Comments: Soft, Non-tender, Non-distended   Musculoskeletal:         General: Swelling present.      Cervical back: Normal range of motion and neck supple.      Right lower leg: Edema present.      Left lower leg: Edema present.      Comments: No peripheral edema. Sternal surgical scar noted. Left axilla hematoma noted.    Skin:     General: Skin is warm and dry.      Comments: Dry, Intact, Warm, Capillary refill <2 seconds.    Neurological:      Mental Status: He is alert and oriented to person, place, and time.      Comments: Normal speech   Psychiatric:         Mood and Affect: Mood and affect normal.           Significant Labs: All pertinent labs within the past 24 hours have been reviewed.      Significant Imaging: I have reviewed all pertinent imaging results/findings within the past 24 hours.

## 2023-01-15 LAB
ALBUMIN SERPL BCP-MCNC: 2.7 G/DL (ref 3.5–5.2)
ALP SERPL-CCNC: 78 U/L (ref 55–135)
ALT SERPL W/O P-5'-P-CCNC: 24 U/L (ref 10–44)
ANION GAP SERPL CALC-SCNC: 10 MMOL/L (ref 8–16)
ANION GAP SERPL CALC-SCNC: 12 MMOL/L (ref 8–16)
AST SERPL-CCNC: 30 U/L (ref 10–40)
BASOPHILS # BLD AUTO: 0.09 K/UL (ref 0–0.2)
BASOPHILS NFR BLD: 0.9 % (ref 0–1.9)
BILIRUB SERPL-MCNC: 0.4 MG/DL (ref 0.1–1)
BUN SERPL-MCNC: 60 MG/DL (ref 8–23)
BUN SERPL-MCNC: 61 MG/DL (ref 8–23)
CALCIUM SERPL-MCNC: 8.6 MG/DL (ref 8.7–10.5)
CALCIUM SERPL-MCNC: 9 MG/DL (ref 8.7–10.5)
CHLORIDE SERPL-SCNC: 102 MMOL/L (ref 95–110)
CHLORIDE SERPL-SCNC: 103 MMOL/L (ref 95–110)
CO2 SERPL-SCNC: 24 MMOL/L (ref 23–29)
CO2 SERPL-SCNC: 27 MMOL/L (ref 23–29)
CREAT CL/1.73 SQ M 12H UR+SERPL-ARVRAT: 23 ML/MIN (ref 70–110)
CREAT SERPL-MCNC: 2.5 MG/DL (ref 0.5–1.4)
CREAT UR-MCNC: 55.7 MG/DL (ref 23–375)
CREATININE, URINE (MG/SPEC): 835.5 MG/SPEC
DIFFERENTIAL METHOD: ABNORMAL
EOSINOPHIL # BLD AUTO: 0.3 K/UL (ref 0–0.5)
EOSINOPHIL NFR BLD: 3 % (ref 0–8)
ERYTHROCYTE [DISTWIDTH] IN BLOOD BY AUTOMATED COUNT: 16.2 % (ref 11.5–14.5)
EST. GFR  (NO RACE VARIABLE): 28 ML/MIN/1.73 M^2
EST. GFR  (NO RACE VARIABLE): 28 ML/MIN/1.73 M^2
GLUCOSE SERPL-MCNC: 136 MG/DL (ref 70–110)
GLUCOSE SERPL-MCNC: 83 MG/DL (ref 70–110)
HCT VFR BLD AUTO: 28.6 % (ref 40–54)
HGB BLD-MCNC: 9.1 G/DL (ref 14–18)
IMM GRANULOCYTES # BLD AUTO: 0.03 K/UL (ref 0–0.04)
IMM GRANULOCYTES NFR BLD AUTO: 0.3 % (ref 0–0.5)
LYMPHOCYTES # BLD AUTO: 1.4 K/UL (ref 1–4.8)
LYMPHOCYTES NFR BLD: 14.7 % (ref 18–48)
MAGNESIUM SERPL-MCNC: 1.6 MG/DL (ref 1.6–2.6)
MCH RBC QN AUTO: 33.1 PG (ref 27–31)
MCHC RBC AUTO-ENTMCNC: 31.8 G/DL (ref 32–36)
MCV RBC AUTO: 104 FL (ref 82–98)
MONOCYTES # BLD AUTO: 1 K/UL (ref 0.3–1)
MONOCYTES NFR BLD: 10.2 % (ref 4–15)
NEUTROPHILS # BLD AUTO: 6.7 K/UL (ref 1.8–7.7)
NEUTROPHILS NFR BLD: 70.9 % (ref 38–73)
NRBC BLD-RTO: 0 /100 WBC
PLATELET # BLD AUTO: 241 K/UL (ref 150–450)
PMV BLD AUTO: 10.2 FL (ref 9.2–12.9)
POTASSIUM SERPL-SCNC: 3.9 MMOL/L (ref 3.5–5.1)
POTASSIUM SERPL-SCNC: 5.3 MMOL/L (ref 3.5–5.1)
PROT SERPL-MCNC: 6.5 G/DL (ref 6–8.4)
RBC # BLD AUTO: 2.75 M/UL (ref 4.6–6.2)
SODIUM SERPL-SCNC: 139 MMOL/L (ref 136–145)
SODIUM SERPL-SCNC: 139 MMOL/L (ref 136–145)
TROPONIN I SERPL DL<=0.01 NG/ML-MCNC: 0.01 NG/ML (ref 0–0.03)
URINE COLLECTION DURATION: 24 HR
URINE VOLUME: 1500 ML
WBC # BLD AUTO: 9.52 K/UL (ref 3.9–12.7)

## 2023-01-15 PROCEDURE — 11000001 HC ACUTE MED/SURG PRIVATE ROOM

## 2023-01-15 PROCEDURE — 99900035 HC TECH TIME PER 15 MIN (STAT)

## 2023-01-15 PROCEDURE — 93010 ELECTROCARDIOGRAM REPORT: CPT | Mod: ,,, | Performed by: INTERNAL MEDICINE

## 2023-01-15 PROCEDURE — 82575 CREATININE CLEARANCE TEST: CPT | Performed by: STUDENT IN AN ORGANIZED HEALTH CARE EDUCATION/TRAINING PROGRAM

## 2023-01-15 PROCEDURE — 94761 N-INVAS EAR/PLS OXIMETRY MLT: CPT

## 2023-01-15 PROCEDURE — 36415 COLL VENOUS BLD VENIPUNCTURE: CPT | Performed by: INTERNAL MEDICINE

## 2023-01-15 PROCEDURE — 99233 SBSQ HOSP IP/OBS HIGH 50: CPT | Mod: ,,, | Performed by: INTERNAL MEDICINE

## 2023-01-15 PROCEDURE — 25000003 PHARM REV CODE 250: Performed by: STUDENT IN AN ORGANIZED HEALTH CARE EDUCATION/TRAINING PROGRAM

## 2023-01-15 PROCEDURE — 80053 COMPREHEN METABOLIC PANEL: CPT | Performed by: STUDENT IN AN ORGANIZED HEALTH CARE EDUCATION/TRAINING PROGRAM

## 2023-01-15 PROCEDURE — 99233 PR SUBSEQUENT HOSPITAL CARE,LEVL III: ICD-10-PCS | Mod: ,,, | Performed by: INTERNAL MEDICINE

## 2023-01-15 PROCEDURE — 93005 ELECTROCARDIOGRAM TRACING: CPT

## 2023-01-15 PROCEDURE — 36415 COLL VENOUS BLD VENIPUNCTURE: CPT | Performed by: STUDENT IN AN ORGANIZED HEALTH CARE EDUCATION/TRAINING PROGRAM

## 2023-01-15 PROCEDURE — 80048 BASIC METABOLIC PNL TOTAL CA: CPT | Mod: XB | Performed by: INTERNAL MEDICINE

## 2023-01-15 PROCEDURE — 97162 PT EVAL MOD COMPLEX 30 MIN: CPT

## 2023-01-15 PROCEDURE — 84484 ASSAY OF TROPONIN QUANT: CPT | Performed by: NURSE PRACTITIONER

## 2023-01-15 PROCEDURE — 97116 GAIT TRAINING THERAPY: CPT

## 2023-01-15 PROCEDURE — 25000003 PHARM REV CODE 250: Performed by: NURSE PRACTITIONER

## 2023-01-15 PROCEDURE — 85025 COMPLETE CBC W/AUTO DIFF WBC: CPT | Performed by: STUDENT IN AN ORGANIZED HEALTH CARE EDUCATION/TRAINING PROGRAM

## 2023-01-15 PROCEDURE — 36415 COLL VENOUS BLD VENIPUNCTURE: CPT | Performed by: NURSE PRACTITIONER

## 2023-01-15 PROCEDURE — 25000003 PHARM REV CODE 250: Performed by: INTERNAL MEDICINE

## 2023-01-15 PROCEDURE — 93010 EKG 12-LEAD: ICD-10-PCS | Mod: ,,, | Performed by: INTERNAL MEDICINE

## 2023-01-15 PROCEDURE — 83735 ASSAY OF MAGNESIUM: CPT | Performed by: STUDENT IN AN ORGANIZED HEALTH CARE EDUCATION/TRAINING PROGRAM

## 2023-01-15 RX ORDER — LANOLIN ALCOHOL/MO/W.PET/CERES
400 CREAM (GRAM) TOPICAL 2 TIMES DAILY
Qty: 60 TABLET | Refills: 2 | Status: SHIPPED | OUTPATIENT
Start: 2023-01-15 | End: 2024-03-20

## 2023-01-15 RX ORDER — LOSARTAN POTASSIUM 25 MG/1
25 TABLET ORAL DAILY
Qty: 90 TABLET | Refills: 3 | Status: SHIPPED | OUTPATIENT
Start: 2023-01-16 | End: 2023-02-15

## 2023-01-15 RX ORDER — FUROSEMIDE 40 MG/1
40 TABLET ORAL DAILY
Qty: 30 TABLET | Refills: 11 | Status: SHIPPED | OUTPATIENT
Start: 2023-01-16 | End: 2023-01-25 | Stop reason: SDUPTHER

## 2023-01-15 RX ORDER — FUROSEMIDE 40 MG/1
40 TABLET ORAL DAILY
Qty: 30 TABLET | Refills: 11 | Status: SHIPPED | OUTPATIENT
Start: 2023-01-16 | End: 2023-01-15 | Stop reason: SDUPTHER

## 2023-01-15 RX ORDER — LOSARTAN POTASSIUM 25 MG/1
25 TABLET ORAL DAILY
Qty: 90 TABLET | Refills: 3 | Status: SHIPPED | OUTPATIENT
Start: 2023-01-16 | End: 2023-01-15 | Stop reason: SDUPTHER

## 2023-01-15 RX ORDER — METOPROLOL SUCCINATE 25 MG/1
25 TABLET, EXTENDED RELEASE ORAL DAILY
Status: DISCONTINUED | OUTPATIENT
Start: 2023-01-15 | End: 2023-01-16 | Stop reason: HOSPADM

## 2023-01-15 RX ORDER — METOPROLOL SUCCINATE 25 MG/1
25 TABLET, EXTENDED RELEASE ORAL DAILY
Qty: 30 TABLET | Refills: 11 | Status: SHIPPED | OUTPATIENT
Start: 2023-01-16 | End: 2023-01-15 | Stop reason: SDUPTHER

## 2023-01-15 RX ORDER — METOPROLOL SUCCINATE 25 MG/1
25 TABLET, EXTENDED RELEASE ORAL DAILY
Status: DISCONTINUED | OUTPATIENT
Start: 2023-01-16 | End: 2023-01-15

## 2023-01-15 RX ORDER — LANOLIN ALCOHOL/MO/W.PET/CERES
400 CREAM (GRAM) TOPICAL 2 TIMES DAILY
Qty: 60 TABLET | Refills: 2 | Status: SHIPPED | OUTPATIENT
Start: 2023-01-15 | End: 2023-01-15 | Stop reason: SDUPTHER

## 2023-01-15 RX ORDER — METOPROLOL SUCCINATE 25 MG/1
25 TABLET, EXTENDED RELEASE ORAL DAILY
Qty: 30 TABLET | Refills: 11 | Status: SHIPPED | OUTPATIENT
Start: 2023-01-16 | End: 2024-01-16

## 2023-01-15 RX ADMIN — TAMSULOSIN HYDROCHLORIDE 0.4 MG: 0.4 CAPSULE ORAL at 10:01

## 2023-01-15 RX ADMIN — MAGNESIUM OXIDE TAB 400 MG (241.3 MG ELEMENTAL MG) 400 MG: 400 (241.3 MG) TAB at 09:01

## 2023-01-15 RX ADMIN — LIDOCAINE 1 PATCH: 50 PATCH CUTANEOUS at 09:01

## 2023-01-15 RX ADMIN — ASPIRIN 81 MG CHEWABLE TABLET 81 MG: 81 TABLET CHEWABLE at 10:01

## 2023-01-15 RX ADMIN — NEPHROCAP 1 CAPSULE: 1 CAP ORAL at 10:01

## 2023-01-15 RX ADMIN — ATORVASTATIN CALCIUM 40 MG: 40 TABLET, FILM COATED ORAL at 10:01

## 2023-01-15 RX ADMIN — SODIUM ZIRCONIUM CYCLOSILICATE 10 G: 5 POWDER, FOR SUSPENSION ORAL at 08:01

## 2023-01-15 RX ADMIN — FUROSEMIDE 40 MG: 40 TABLET ORAL at 10:01

## 2023-01-15 RX ADMIN — METOPROLOL SUCCINATE 25 MG: 25 TABLET, EXTENDED RELEASE ORAL at 10:01

## 2023-01-15 RX ADMIN — LOSARTAN POTASSIUM 25 MG: 25 TABLET, FILM COATED ORAL at 10:01

## 2023-01-15 RX ADMIN — APIXABAN 2.5 MG: 2.5 TABLET, FILM COATED ORAL at 10:01

## 2023-01-15 RX ADMIN — AMIODARONE HYDROCHLORIDE 200 MG: 200 TABLET ORAL at 09:01

## 2023-01-15 RX ADMIN — TRAMADOL HYDROCHLORIDE 50 MG: 50 TABLET, COATED ORAL at 09:01

## 2023-01-15 RX ADMIN — APIXABAN 2.5 MG: 2.5 TABLET, FILM COATED ORAL at 09:01

## 2023-01-15 RX ADMIN — AMIODARONE HYDROCHLORIDE 200 MG: 200 TABLET ORAL at 10:01

## 2023-01-15 NOTE — PLAN OF CARE
Patient seen for physical therapy evaluation.  Per nursing, patient ambulating in hallways independently.  Patient agreeable to participate, sitting up in chair.  Patient performed all functional mobility at independent level with gait x 200' with no AD, mild SOB, implementing pacing.  Patient able to verbalize understanding of progressive walking program.  Patient will not require inpatient physical therapy at this time.  However, patient will benefit from outpatient cardiac rehab.  Also discussed taking several walks in hallways with nursing aware to maintain endurance.        Problem: Physical Therapy  Goal: Physical Therapy Goal  Description: Goals to be met by: 2023     Patient will increase functional independence with mobility by performin. Gait  x 200 feet with [place] using No Assistive Device and implementing Pacing and Energy Conservation Techniques:  MET  2. Verbalize understanding of progressive walking program: MET    Outcome: Adequate for Care Transition

## 2023-01-15 NOTE — ASSESSMENT & PLAN NOTE
Most likely secondary to HF exacerbation and decreased clearence in the setting of ESRD. Will continue to trend.   Chest pain today likely muscular skeletal.   Troponin WNL  EKG with no significant ST changes

## 2023-01-15 NOTE — PLAN OF CARE
Problem: Adult Inpatient Plan of Care  Goal: Plan of Care Review  Outcome: Ongoing, Progressing  Pt not in distress. NSR on telemetry. Pt using urinal for 24 hr urine collection due at 8am. Safety maintained at all times.  Call bell within reach. Bed on low position. Will continue to monitor.

## 2023-01-15 NOTE — PROGRESS NOTES
[facility]  Progress Note    Patient Name: David Barrios  MRN: [id number]  Patient Class: IP- Inpatient   Admission Date: 1/12/2023  Length of Stay: 2 days  Attending Physician: Rosalind Crockett MD  Primary Care Provider: Braenn Tavera MD        Subjective:     Principal Problem:Acute on chronic combined systolic and diastolic heart failure        HPI:  Mr. 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 Patient was initially sent to the ED by his PCP after undergoing a CXR that was concerning for pneumothorax, however, was normal on repeat x-ray in the ED. Patient states since his valve surgery on 10/7/22 his dyspnea on exertion is getting worse to the point where patient was unable to clumb the two stairs into his trailer home. Denies any SOB at rest, chestpain, nausea, vomiting, abdominal pain, fever, palpiations and chills. Does endorse orthopnea, PND and bendopnea.    Of note patients valvular surgery was complicated by serratia bacteriemia and sternal wound infection that required subsequent sternal wire removal, partial sternectomy, and muscle flap with plastics.      In the ED, patient underwent CT chest w/o contrast which was concerning for possible fluid around the sternum from prior mediastinitis. Patient was afebrile and WBC count was normal. EKG showed no acute ischemic changes but troponin was mildly elevated at 0.032.     Patient admitted to observation for evaluation of worsening dyspnea.       Overview/Hospital Course:  No notes on file    Interval hx: This am his breathing is better at rest and with exertion.   He notes some positional chest pain to mid/L chest rated 2/10.   Troponin, EKG ordered   Cards cleared to IN home   Per nephrology  monitor until tomorrow to assure no hyperkalemia on current medications     Review of Systems   Respiratory:  Negative for cough and shortness of breath.    Cardiovascular:  Positive for chest pain and leg swelling.   Gastrointestinal:  Negative for abdominal pain, diarrhea, nausea and vomiting.   Genitourinary:  Negative for decreased urine volume, difficulty urinating and urgency.   Musculoskeletal:  Negative for arthralgias and back pain.        Chronic    Neurological:  Negative for dizziness, light-headedness and headaches.  12 point ROS negative except for HPI  Objective:     Vital Signs (Most Recent):  Temp: 97.3 °F (36.3 °C) (01/15/23 1203)  Pulse: 72 (01/15/23 1203)  Resp: 20 (01/15/23 1203)  BP: (!) 113/58 (01/15/23 1203)  SpO2: 96 % (01/15/23 1203)   Vital Signs (24h Range):  Temp:  [96.8 °F (36 °C)-97.5 °F (36.4 °C)] 97.3 °F (36.3 °C)  Pulse:  [70-82] 72  Resp:  [17-20] 20  SpO2:  [95 %-98 %] 96 %  BP: (110-141)/(53-65) 113/58     Weight: 66.2 kg (145 lb 15.1 oz)  Body mass index is 21.55 kg/m².    Physical Exam  Constitutional:       Appearance: He is not ill-appearing.      Comments: Alert, Oriented, No acute distress   HENT:      Head: Normocephalic and atraumatic.      Mouth/Throat:      Pharynx: Oropharynx is clear.   Eyes:      Extraocular Movements: Extraocular movements intact.      Conjunctiva/sclera: Conjunctivae normal.   Neck:      Vascular: Hepatojugular reflux and JVD present.   Cardiovascular:      Rate and Rhythm: Normal rate and regular rhythm.      Pulses: Normal pulses.      Heart sounds: Normal heart sounds.   Pulmonary:      Effort: Pulmonary effort is normal.      Breath sounds: Normal breath sounds.      Comments: Normal effort, Clear to auscultation   Abdominal:      General: Bowel sounds are normal.      Palpations: Abdomen is soft.      Comments: Soft, Non-tender, Non-distended   Musculoskeletal:         General: Swelling present.      Cervical back: Normal range of motion and  neck supple.      Right lower leg: Edema present.      Left lower leg: Edema present.      Comments: Sternal surgical scar noted.    Skin:     General: Skin is warm and dry.      Comments: Dry, Intact, Warm, Capillary refill <2 seconds.    Neurological:      Mental Status: He is alert and oriented to person, place, and time.      Comments: Normal speech   Psychiatric:         Mood and Affect: Mood and affect normal.           Significant Labs: All pertinent labs within the past 24 hours have been reviewed.      Significant Imaging: I have reviewed all pertinent imaging results/findings within the past 24 hours.      Assessment/Plan:      * Acute on chronic combined systolic and diastolic heart failure  Patients worsening dyspnea most likely secondary to HF exacerbation. Less concerned for mediastinitis in the setting of patient being affebrile and normal WBC count. Well's score 1.5 so less concern for PE.     - Last Echo (1/5/23): LV normal size w/ severely decreased systolic function. EF 15%. Severe LV global hyokinesis. LV diastolic dysfunction. Severely reduced RV systolic function. Mild MR. Mod AR. moderate size protruding right atrial mass present on what appears to be a catheter tip. The mass is mobile. Thrombus vs vegetation suspected.  - Last RHC: none   - Home GDMT: metoprolol succinate 12.5 daily- increased to 25 mg . Continue home GDMT. No evidence of cardiogenic shock.    - Home diuretic regimen: lasix 40 PO daily  - Hypervolemic on exam today.  1 dose of IV lasix 80mg upon admission since patient still urinates. Consulted Nephrology to resume dialysis - no acute indication for HD.   - Maintain K>4 and Mg>2  - Cardiology consulted. Appreciate recommendations.     -continue PO lasix   -strict I&O  -clinically improving, will consult PT to assess mobility and BAI   -closely monitor Cr   -cards/renal considering ARB - started on lostarted 25 mg daily with later transition to entresto with caution  considering renal function   -no acute signs of infection  -still noted BLE edema   -may DC in 1-2 days pending clinical improvement   -per nephrology monitor until tomorrow to assure no hyperkalemia on currently medical regime     ESRD (end stage renal disease)  Due to hx of cardiogenic shock   - Nephrology consulted to resume dialysis. - no acute indication at this time. Last HD Wednesday    Right atrial mass  - continue eliquis     Elevated troponin  Most likely secondary to HF exacerbation and decreased clearence in the setting of ESRD. Will continue to trend.   Chest pain today likely muscular skeletal.   Troponin WNL  EKG with no significant ST changes       Paroxysmal A-fib  - continue home amiodarone and Xarelto, switched to eliquis for better renal clearance.  - Currently in NSR.       VTE Risk Mitigation (From admission, onward)         Ordered     apixaban tablet 2.5 mg  2 times daily         01/13/23 1206     IP VTE HIGH RISK PATIENT  Once         01/12/23 2253                Discharge Planning   AMARILYS:      Code Status: Full Code   Is the patient medically ready for discharge?:     Reason for patient still in hospital (select all that apply): Patient trending condition                     Jennifer sEtes NP  Department of Hospital Medicine   [facility]

## 2023-01-15 NOTE — PT/OT/SLP EVAL
"Physical Therapy Evaluation & Discharge    Patient Name:  David Barrios   MRN:  [id number]    Recommendations:     Discharge Recommendations: cardiac rehab, outpatient PT   Discharge Equipment Recommendations: none   Barriers to discharge: Decreased caregiver support    Assessment:     David Barrios is a 62 y.o. male admitted with a medical diagnosis of Acute on chronic combined systolic and diastolic heart failure.  He presents with the following impairments/functional limitations: impaired endurance   Patient seen for physical therapy evaluation.  Per nursing, patient ambulating in hallways independently.  Patient agreeable to participate, sitting up in chair.  Patient performed all functional mobility at independent level with gait x 200' with no AD, mild SOB, implementing pacing.  Patient able to verbalize understanding of progressive walking program.  Patient will not require inpatient physical therapy at this time.  However, patient will benefit from outpatient cardiac rehab.  Also discussed taking several walks in hallways with nursing aware to maintain endurance.  .    Rehab Prognosis: Good;     Recent Surgery: * No surgery found *      Plan:   Discharge Physical Therapy - Goals MET  Plan of Care Expires:  01/22/23    Subjective     Chief Complaint: "I know I need to go to cardiac rehab"  Patient/Family Comments/goals: Return to PLOF, Return to working  Pain/Comfort:  Pain Rating 1: 0/10  Pain Rating Post-Intervention 1: 0/10    Patients cultural, spiritual, Caodaism conflicts given the current situation: no    Living Environment:  Patient lives alone in a travel camper (here for work), 3 MARIVEL with HR, T/S in bathroom.    Prior to admission, patients level of function was independent.  Patient has not been able to work as a  since his valve surgery in October.  Patient has orders for Cardiac Rehab, but had not gone yet due to HD and frequency of medical appointments  Equipment used at home: " none.  DME owned (not currently used): none.  Upon discharge, patient will have assistance from lady friend available to help on a limited basis.    Objective:     Communicated with nurse Boogie prior to session.  Patient found up in chair with telemetry, peripheral IV (HD Catheter)  upon PT entry to room.    General Precautions: Standard, fall  Orthopedic Precautions:N/A   Braces: N/A  Respiratory Status: Room air    Exams:  Cognitive Exam:  Patient is oriented to Person, Place, Time, and Situation  Gross Motor Coordination:  WFL  Postural Exam:  Patient presented with the following abnormalities:    -       No postural abnormalities identified  Sensation:    -       Intact  Skin Integrity/Edema:      -       Skin integrity: Visible skin intact  -       Edema: None noted B LEs  RUE ROM: WFL  RUE Strength: WFL  LUE ROM: WFL  LUE Strength: WFL  RLE ROM: WFL  RLE Strength: WFL  LLE ROM: WFL  LLE Strength: WFL    Functional Mobility:  Transfers:     Sit to Stand:  independence with no AD  Gait: x 200' with no AD, implementing pacing techniques, mild SOB  Balance: Seated:  no LOB, independent  Standing:  independent, no LOB      AM-PAC 6 CLICK MOBILITY  Total Score:24       Treatment & Education:  Patient educated on role of physical therapy and POC.  Patient is at baseline and is independent with functional mobility.  Patient with mild dyspnea with gait, however implements appropriate pacing techniques.  Patient verbalizes understanding of progressive walking program.  Patient asking if he can ride his bike at home.  Therapist told patient to address with cardiac MD.  However, also discussed obtaining a base to allow road bike to become a static bike for home use.  Encouraged patient to make appointment to start Cardiac rehab.  Educated on importance of walking program, OOB, and exercises to increase cardiac endurance.      Patient left up in chair with all lines intact, call button in reach, and nurse notified.    GOALS:    Multidisciplinary Problems       Physical Therapy Goals          Problem: Physical Therapy    Goal Priority Disciplines Outcome Goal Variances Interventions   Physical Therapy Goal     PT, PT/OT Adequate for Care Transition     Description: Goals to be met by: 2023     Patient will increase functional independence with mobility by performin. Gait  x 200 feet with [place] using No Assistive Device and implementing Pacing and Energy Conservation Techniques:  MET  2. Verbalize understanding of progressive walking program:  MET                         History:     Past Medical History:   Diagnosis Date    Anemia     Atrial fibrillation     Encounter for blood transfusion     Esophageal ulcer     GI bleed     Hypertension        Past Surgical History:   Procedure Laterality Date    APPLICATION OF WOUND VACUUM-ASSISTED CLOSURE DEVICE N/A 2022    Procedure: APPLICATION, WOUND VAC;  Surgeon: Mike Hedrick MD;  Location: [address];  Service: General;  Laterality: N/A;    CARDIOVERSION Left 9/15/2022    Procedure: Cardioversion;  Surgeon: Julio James MD;  Location: [facility] CATH LAB/EP;  Service: Cardiology;  Laterality: Left;  With NORBERT    CATHETERIZATION OF BOTH LEFT AND RIGHT HEART N/A 2022    Procedure: CATHETERIZATION, HEART, BOTH LEFT AND RIGHT;  Surgeon: Julio Jmaes MD;  Location: [facility] CATH LAB/EP;  Service: Cardiology;  Laterality: N/A;    COLONOSCOPY N/A 2019    Procedure: COLONOSCOPY [place];  Surgeon: Umair Randolph MD;  Location: [facility] ENDO;  Service: Endoscopy;  Laterality: N/A;    CORONARY ANGIOGRAPHY N/A 2022    Procedure: ANGIOGRAM, CORONARY ARTERY;  Surgeon: Julio James MD;  Location: [facility] CATH LAB/EP;  Service: Cardiology;  Laterality: N/A;    MEYER MAZE PROCEDURE N/A 10/7/2022    Procedure: MEYER MAZE PROCEDURE;  Surgeon: Mike Hedrick MD;  Location: [address];  Service: Cardiovascular;  Laterality: N/A;    CREATION OF MUSCLE ROTATIONAL  FLAP N/A 11/14/2022    Procedure: CREATION, FLAP, MUSCLE ROTATION;  Surgeon: Amadeo Carrasquillo MD;  Location: [facility] OR 2ND FLR;  Service: Plastics;  Laterality: N/A;  sternal wound that need pec flap coverage    DEBRIDEMENT OF STERNUM N/A 11/8/2022    Procedure: DEBRIDEMENT, STERNUM;  Surgeon: Mike Hedrick MD;  Location: [facility] OR 2ND FLR;  Service: General;  Laterality: N/A;    ESOPHAGOGASTRODUODENOSCOPY N/A 10/12/2018    Procedure: EGD (ESOPHAGOGASTRODUODENOSCOPY);  Surgeon: Braden Herring MD;  Location: [facility] ENDO;  Service: Endoscopy;  Laterality: N/A;    ESOPHAGOGASTRODUODENOSCOPY N/A 4/4/2019    Procedure: EGD;  Surgeon: Umair Randolph MD;  Location: [facility] ENDO;  Service: Endoscopy;  Laterality: N/A;    EXCLUSION OF LEFT ATRIAL APPENDAGE N/A 10/7/2022    Procedure: EXCLUSION, LEFT ATRIAL APPENDAGE;  Surgeon: Mike Hedrick MD;  Location: [facility] OR 2ND FLR;  Service: Cardiovascular;  Laterality: N/A;    HERNIA REPAIR      INSERTION OF INTRAVASCULAR MICROAXIAL BLOOD PUMP N/A 10/7/2022    Procedure: INSERTION, IMPELLA;  Surgeon: Mike Hedrick MD;  Location: [facility] OR 2ND FLR;  Service: Cardiovascular;  Laterality: N/A;  direct aortic insertion    IRRIGATION OF MEDIASTINUM N/A 10/7/2022    Procedure: IRRIGATION, MEDIASTINUM;  Surgeon: Mike Hedrick MD;  Location: [facility] OR 2ND FLR;  Service: Cardiovascular;  Laterality: N/A;    STERNAL WIRES REMOVAL N/A 11/8/2022    Procedure: REMOVAL, STERNAL WIRE;  Surgeon: Mike Hedrick MD;  Location: [facility] OR 2ND FLR;  Service: General;  Laterality: N/A;  Sternal wire removal with muscle flap creation    STERNAL WOUND CLOSURE N/A 11/14/2022    Procedure: CLOSURE, WOUND, STERNUM;  Surgeon: Amadeo Carrasquillo MD;  Location: [facility] OR 2ND FLR;  Service: Plastics;  Laterality: N/A;    TRICUSPID VALVULOPLASTY N/A 10/7/2022    Procedure: REPAIR, TRICUSPID VALVE;  Surgeon: Mike Hedrick MD;  Location: [facility] OR [address] [place] [address];  Service: Cardiovascular;   Laterality: N/A;    WOUND EXPLORATION N/A 11/14/2022    Procedure: EXPLORATION, WOUND;  Surgeon: Amadeo Carrasquillo MD;  Location: [facility] OR [address] [place] [address];  Service: Plastics;  Laterality: N/A;       Time Tracking:     PT Received On: 01/15/23  PT Start Time: 1120     PT Stop Time: 1141  PT Total Time (min): 21 min     Billable Minutes: Evaluation 10 and Gait Training 11      01/15/2023

## 2023-01-15 NOTE — ASSESSMENT & PLAN NOTE
Patients worsening dyspnea most likely secondary to HF exacerbation. Less concerned for mediastinitis in the setting of patient being affebrile and normal WBC count. Well's score 1.5 so less concern for PE.     - Last Echo (1/5/23): LV normal size w/ severely decreased systolic function. EF 15%. Severe LV global hyokinesis. LV diastolic dysfunction. Severely reduced RV systolic function. Mild MR. Mod AR. moderate size protruding right atrial mass present on what appears to be a catheter tip. The mass is mobile. Thrombus vs vegetation suspected.  - Last RHC: none   - Home GDMT: metoprolol succinate 12.5 daily- increased to 25 mg . Continue home GDMT. No evidence of cardiogenic shock.    - Home diuretic regimen: lasix 40 PO daily  - Hypervolemic on exam today.  1 dose of IV lasix 80mg upon admission since patient still urinates. Consulted Nephrology to resume dialysis - no acute indication for HD.   - Maintain K>4 and Mg>2  - Cardiology consulted. Appreciate recommendations.     -continue PO lasix   -strict I&O  -clinically improving, will consult PT to assess mobility and BAI   -closely monitor Cr   -cards/renal considering ARB - started on lostarted 25 mg daily with later transition to entresto with caution considering renal function   -no acute signs of infection  -still noted BLE edema   -may DC in 1-2 days pending clinical improvement   -per nephrology monitor until tomorrow to assure no hyperkalemia on currently medical regime

## 2023-01-15 NOTE — ASSESSMENT & PLAN NOTE
- continue home amiodarone and Xarelto, switched to eliquis for better renal clearance.  - Currently in NSR.

## 2023-01-15 NOTE — SUBJECTIVE & OBJECTIVE
Interval hx: This am his breathing is better at rest and with exertion.   He notes some positional chest pain to mid/L chest rated 2/10.   Troponin, EKG ordered   Cards cleared to DC home   Per nephrology monitor until tomorrow to assure no hyperkalemia on current medications     Review of Systems   Respiratory:  Negative for cough and shortness of breath.    Cardiovascular:  Positive for chest pain and leg swelling.   Gastrointestinal:  Negative for abdominal pain, diarrhea, nausea and vomiting.   Genitourinary:  Negative for decreased urine volume, difficulty urinating and urgency.   Musculoskeletal:  Negative for arthralgias and back pain.        Chronic    Neurological:  Negative for dizziness, light-headedness and headaches.  12 point ROS negative except for HPI  Objective:     Vital Signs (Most Recent):  Temp: 97.3 °F (36.3 °C) (01/15/23 1203)  Pulse: 72 (01/15/23 1203)  Resp: 20 (01/15/23 1203)  BP: (!) 113/58 (01/15/23 1203)  SpO2: 96 % (01/15/23 1203)   Vital Signs (24h Range):  Temp:  [96.8 °F (36 °C)-97.5 °F (36.4 °C)] 97.3 °F (36.3 °C)  Pulse:  [70-82] 72  Resp:  [17-20] 20  SpO2:  [95 %-98 %] 96 %  BP: (110-141)/(53-65) 113/58     Weight: 66.2 kg (145 lb 15.1 oz)  Body mass index is 21.55 kg/m².    Physical Exam  Constitutional:       Appearance: He is not ill-appearing.      Comments: Alert, Oriented, No acute distress   HENT:      Head: Normocephalic and atraumatic.      Mouth/Throat:      Pharynx: Oropharynx is clear.   Eyes:      Extraocular Movements: Extraocular movements intact.      Conjunctiva/sclera: Conjunctivae normal.   Neck:      Vascular: Hepatojugular reflux and JVD present.   Cardiovascular:      Rate and Rhythm: Normal rate and regular rhythm.      Pulses: Normal pulses.      Heart sounds: Normal heart sounds.   Pulmonary:      Effort: Pulmonary effort is normal.      Breath sounds: Normal breath sounds.      Comments: Normal effort, Clear to auscultation   Abdominal:      General:  Bowel sounds are normal.      Palpations: Abdomen is soft.      Comments: Soft, Non-tender, Non-distended   Musculoskeletal:         General: Swelling present.      Cervical back: Normal range of motion and neck supple.      Right lower leg: Edema present.      Left lower leg: Edema present.      Comments: Sternal surgical scar noted.    Skin:     General: Skin is warm and dry.      Comments: Dry, Intact, Warm, Capillary refill <2 seconds.    Neurological:      Mental Status: He is alert and oriented to person, place, and time.      Comments: Normal speech   Psychiatric:         Mood and Affect: Mood and affect normal.           Significant Labs: All pertinent labs within the past 24 hours have been reviewed.      Significant Imaging: I have reviewed all pertinent imaging results/findings within the past 24 hours.

## 2023-01-15 NOTE — PROGRESS NOTES
[facility] - Telemetry  Cardiology  Progress Note    Patient Name: David Barrios  MRN: [id number]  Admission Date: 1/12/2023  Hospital Length of Stay: 2 days  Code Status: Full Code   Attending Physician: Rosalind Crockett MD   Primary Care Physician: Breann Tavera MD  Expected Discharge Date:   Principal Problem:Acute on chronic combined systolic and diastolic heart failure    Subjective:     Hospital Course:     1/14/2023   Seen and examined. Doing ok. Feeling better. He was able to walk down the hallway with no issues.   1/15/2023 seen and examined. He is doing berrt. BP is stable. Labs stable. Losartan added yesterday.       Review of Systems   Constitutional: Positive for malaise/fatigue. Negative for chills and fever.   Cardiovascular:  Positive for dyspnea on exertion. Negative for leg swelling.   Respiratory:  Negative for shortness of breath.    Gastrointestinal:  Negative for nausea and vomiting.   Objective:     Vital Signs (Most Recent):  Temp: 97.3 °F (36.3 °C) (01/15/23 1203)  Pulse: 72 (01/15/23 1203)  Resp: 20 (01/15/23 1203)  BP: (!) 113/58 (01/15/23 1203)  SpO2: 96 % (01/15/23 1203) Vital Signs (24h Range):  Temp:  [96.8 °F (36 °C)-97.5 °F (36.4 °C)] 97.3 °F (36.3 °C)  Pulse:  [70-82] 72  Resp:  [17-20] 20  SpO2:  [95 %-98 %] 96 %  BP: (110-141)/(53-65) 113/58     Weight: 66.2 kg (145 lb 15.1 oz)  Body mass index is 21.55 kg/m².    SpO2: 96 %         Intake/Output Summary (Last 24 hours) at 1/15/2023 1244  Last data filed at 1/14/2023 1304  Gross per 24 hour   Intake 360 ml   Output 801 ml   Net -441 ml         Lines/Drains/Airways       Central Venous Catheter Line  Duration                  Hemodialysis Catheter 11/25/22 0904 right internal jugular 51 days              Peripheral Intravenous Line  Duration                  Peripheral IV - Single Lumen 01/12/23 1904 20 G;1 in Left Antecubital 2 days                    Physical Exam  Constitutional:       General: He is not in acute distress.      Appearance: Normal appearance.   Cardiovascular:      Rate and Rhythm: Normal rate and regular rhythm.      Heart sounds: Murmur (grade II diastolic) heard.   Pulmonary:      Breath sounds: No rales.   Abdominal:      General: Abdomen is flat.      Palpations: Abdomen is soft.   Musculoskeletal:         General: No swelling.   Skin:     General: Skin is warm and dry.   Neurological:      Mental Status: He is alert and oriented to person, place, and time.   Psychiatric:         Mood and Affect: Mood normal.         Behavior: Behavior normal.       Significant Labs: BMP:   Recent Labs   Lab 01/14/23  0436 01/15/23  0240 01/15/23  0800 01/15/23  0921   GLU 91 83  --  136*    139  --  139   K 4.6 5.3*  --  3.9    103  --  102   CO2 27 24  --  27   BUN 56* 60*  --  61*   CREATININE 2.6* 2.5* 2.5* 2.5*   CALCIUM 9.1 8.6*  --  9.0   MG  --  1.6  --   --      , CMP   Recent Labs   Lab 01/14/23  0436 01/15/23  0240 01/15/23  0800 01/15/23  0921    139  --  139   K 4.6 5.3*  --  3.9    103  --  102   CO2 27 24  --  27   GLU 91 83  --  136*   BUN 56* 60*  --  61*   CREATININE 2.6* 2.5* 2.5* 2.5*   CALCIUM 9.1 8.6*  --  9.0   PROT 6.6 6.5  --   --    ALBUMIN 2.9* 2.7*  --   --    BILITOT 0.5 0.4  --   --    ALKPHOS 88 78  --   --    AST 23 30  --   --    ALT 25 24  --   --    ANIONGAP 10 12  --  10     , CBC   Recent Labs   Lab 01/14/23  0436 01/15/23  0240   WBC 9.88 9.52   HGB 9.8* 9.1*   HCT 31.5* 28.6*    241     , Lipid Panel No results for input(s): CHOL, HDL, LDLCALC, TRIG, CHOLHDL in the last 48 hours., Troponin   Recent Labs   Lab 01/15/23  0736   TROPONINI 0.014     , and All pertinent lab results from the last 24 hours have been reviewed.    Significant Imaging: Echocardiogram: 2D echo with color flow doppler:   Results for orders placed or performed during the hospital encounter of 10/11/18   2D echo with color flow doppler   Result Value Ref Range    EF + QEF 55 55 - 65    Diastolic  Dysfunction No     Est. PA Systolic Pressure 16.65     Tricuspid Valve Regurgitation TRIVIAL     Narrative    Date of Procedure: 10/11/2018        TEST DESCRIPTION   Technical Quality: This is a technically adequate study.     Aorta: The aortic root is normal in size, measuring 3.4 cm at sinotubular junction.     Left Atrium: The left atrial volume index is mildly enlarged, measuring 61.18 cc/m2.     Left Ventricle: The left ventricle is normal in size, with an end-diastolic diameter of 5.6 cm, and an end-systolic diameter of 4.2 cm. LV wall thickness is normal, with the septum measuring 1.2 cm and the posterior wall measuring 1.0 cm across. Relative   wall thickness was normal at 0.36, and the LV mass index was increased at 142.9 g/m2 consistent with eccentric left ventricular hypertrophy. There are no regional wall motion abnormalities. Left ventricular systolic function appears normal. Visually   estimated ejection fraction is 55-60%. The LV Doppler derived stroke volume equals 62.0 ccs.     Diastolic indices: E wave velocity 0.8 m/s, E/A ratio 1.4,  msec., Diastolic function is normal.     Right Atrium: The right atrium is normal in size, measuring 5.3 cm in length in the apical view.     Right Ventricle: The right ventricle is normal in size measuring 2.2 cm at the base in the apical right ventricle-focused view. Global right ventricular systolic function appears normal. The estimated PA systolic pressure is greater than 17 mmHg.     Aortic Valve:  Aortic valve is normal in structure with normal leaflet mobility.     Mitral Valve:  Mitral valve is normal in structure with normal leaflet mobility. The pressure half time is 68 msec. The calculated mitral valve area is 3.24 cm2.     Tricuspid Valve:  Tricuspid valve is normal in structure with normal leaflet mobility. There is trivial tricuspid regurgitation.     Pulmonary Valve:  Pulmonary valve is normal in structure with normal leaflet mobility.     IVC:  Right atrial pressure as assessed by IVC dynamics is normal at not visualized mmHg.     Intracavitary: There is no evidence of pericardial effusion, intracavity mass, thrombi, or vegetation.         CONCLUSIONS     1 - Mild left atrial enlargement.     2 - Eccentric hypertrophy.     3 - No wall motion abnormalities.     4 - Normal left ventricular systolic function (EF 55-60%).     5 - Normal left ventricular diastolic function.     6 - Normal right ventricular systolic function .     7 - The estimated PA systolic pressure is greater than 17 mmHg.     8 - Trivial tricuspid regurgitation.             This document has been electronically    SIGNED BY: Anabelle Colon MD On: 10/12/2018 07:27    and Transthoracic echo (TTE) complete (Cupid Only):   Results for orders placed or performed during the hospital encounter of 01/05/23   Echo   Result Value Ref Range    BSA 1.84 m2    LA WIDTH 5.00 cm    IVC diameter 2.22 cm    Left Ventricular Outflow Tract Mean Velocity 0.66 cm/s    Left Ventricular Outflow Tract Mean Gradient 2.02 mmHg    LVIDd 6.23 (A) 3.5 - 6.0 cm    IVS 0.80 0.6 - 1.1 cm    Posterior Wall 0.79 0.6 - 1.1 cm    LVIDs 5.55 (A) 2.1 - 4.0 cm    FS 11 28 - 44 %    LA volume 154.80 cm3    LV mass 197.29 g    LA size 4.99 cm    RVDD 2.88 cm    Left Ventricle Relative Wall Thickness 0.25 cm    AV regurgitation pressure 1/2 time [id number] ms    AV mean gradient 5 mmHg    AV valve area 2.71 cm2    AV Velocity Ratio 0.63     AV index (prosthetic) 0.62     MV mean gradient 4 mmHg    MV valve area by continuity eq 1.85 cm2    E wave deceleration time 0.00 msec    LVOT diameter 2.36 cm    LVOT area 4.4 cm2    LVOT peak eliezer 0.96 m/s    LVOT peak VTI 16.90 cm    Ao peak eliezer 1.53 m/s    Ao VTI 27.3 cm    LVOT stroke volume 73.89 cm3    AV peak gradient 9 mmHg    MV peak gradient 7 mmHg    MV Peak E Eliezer 0.00 m/s    AR Max Eliezer 4.06 m/s    TR Max Eliezer 2.44 m/s    MV VTI 39.9 cm    MV stenosis pressure 1/2 time  0.00 ms    MV Peak A Nick 0.00 m/s    LV Systolic Volume 150.73 mL    LV Systolic Volume Index 81.9 mL/m2    LV Diastolic Volume 195.79 mL    LV Diastolic Volume Index 106.41 mL/m2    LA Volume Index 84.1 mL/m2    LV Mass Index 107 g/m2    RA Major Axis 5.56 cm    Left Atrium Minor Axis 6.89 cm    Left Atrium Major Axis 7.76 cm    Triscuspid Valve Regurgitation Peak Gradient 24 mmHg    Right Atrial Pressure (from IVC) 3 mmHg    EF 15 %    TV rest pulmonary artery pressure 27 mmHg    Narrative    · The left ventricle is normal in size with severely decreased systolic   function.  · The estimated ejection fraction is 15%.  · There is severe left ventricular global hypokinesis.  · Left ventricular diastolic dysfunction.  · With severely reduced right ventricular systolic function.  · Severe left atrial enlargement.  · Mild mitral regurgitation.  · Moderate aortic regurgitation.  · The estimated PA systolic pressure is 27 mmHg.  · Normal central venous pressure (3 mmHg).  · There is a moderate size protruding right atrial mass present on what   appears to be a catheter tip. The mass is mobile. Thrombus vs vegetation   suspected. Clinical correlation recommended.        Assessment and Plan:     Brief HPI:   HFrEF decompensation   Hx of MV and RV repair  Moderate AI   PAF  Rt axillary line thrombus        Plan:  - Continue po lasix 40 mg daily. Feels better  - Discussed with Nephrology team.   - Continue Losartan 25 mg daily  - Increase Toprol to 25 mg daily   - Up titrate GDMT as outpatient as  BP and renal function tolerates.   - Continue amio. Remains in SR. Continue DOAC.   - Will need cardiac rehab as outpatient  - Ok to discharge from cardiac standpoint and f/u with Dr. Villegas as outpatient.     Active Diagnoses:    Diagnosis Date Noted POA    PRINCIPAL PROBLEM:  Acute on chronic combined systolic and diastolic heart failure [I50.43] 01/13/2023 Unknown    Elevated troponin [R77.8] 01/13/2023 Yes    Right atrial mass  [I51.89] 01/13/2023 Yes    ESRD (end stage renal disease) [N18.6] 01/13/2023 Yes    Paroxysmal A-fib [I48.0] 10/11/2018 Yes      Problems Resolved During this Admission:       VTE Risk Mitigation (From admission, onward)           Ordered     apixaban tablet 2.5 mg  2 times daily         01/13/23 1206     IP VTE HIGH RISK PATIENT  Once         01/12/23 4233                    Adalbreto Garcia MD  Cardiology  [facility]

## 2023-01-15 NOTE — PROGRESS NOTES
Nephrology  Progress Note       Consult Requested By: Rosalind Crockett MD  Reason for Consult: ESRD      SUBJECTIVE:        ?    Review of Systems   Constitutional:  Negative for chills and fever.   HENT:  Negative for congestion and sore throat.    Eyes:  Negative for blurred vision, double vision and photophobia.   Respiratory:  Negative for cough.    Cardiovascular:  Negative for chest pain, palpitations and leg swelling.   Gastrointestinal:  Negative for abdominal pain, diarrhea, nausea and vomiting.   Genitourinary:  Negative for dysuria and urgency.   Musculoskeletal:  Negative for joint pain and myalgias.   Skin:  Negative for itching and rash.   Neurological:  Negative for dizziness, sensory change, weakness and headaches.   Endo/Heme/Allergies:  Negative for polydipsia. Does not bruise/bleed easily.   Psychiatric/Behavioral:  Negative for depression.      Past Medical History:   Diagnosis Date    Anemia     Atrial fibrillation     Encounter for blood transfusion     Esophageal ulcer     GI bleed     Hypertension      Past Surgical History:   Procedure Laterality Date    APPLICATION OF WOUND VACUUM-ASSISTED CLOSURE DEVICE N/A 11/8/2022    Procedure: APPLICATION, WOUND VAC;  Surgeon: Mike Hedrick MD;  Location: [facility] OR [address] [place] [address];  Service: General;  Laterality: N/A;    CARDIOVERSION Left 9/15/2022    Procedure: Cardioversion;  Surgeon: Julio James MD;  Location: [facility] CATH LAB/EP;  Service: Cardiology;  Laterality: Left;  With NORBERT    CATHETERIZATION OF BOTH LEFT AND RIGHT HEART N/A 9/22/2022    Procedure: CATHETERIZATION, HEART, BOTH LEFT AND RIGHT;  Surgeon: Julio James MD;  Location: [facility] CATH LAB/EP;  Service: Cardiology;  Laterality: N/A;    COLONOSCOPY N/A 4/4/2019    Procedure: COLONOSCOPY Golytely;  Surgeon: Umair Randolph MD;  Location: [facility] ENDO;  Service: Endoscopy;  Laterality: N/A;    CORONARY ANGIOGRAPHY N/A 9/22/2022    Procedure: ANGIOGRAM, CORONARY ARTERY;  Surgeon: Julio WHELAN  MD Jacob;  Location: [facility] CATH LAB/EP;  Service: Cardiology;  Laterality: N/A;    MEYER MAZE PROCEDURE N/A 10/7/2022    Procedure: MEYER MAZE PROCEDURE;  Surgeon: Mike Hedrick MD;  [facility];  Service: Cardiovascular;  Laterality: N/A;    CREATION OF MUSCLE ROTATIONAL FLAP N/A 11/14/2022    Procedure: CREATION, FLAP, MUSCLE ROTATION;  Surgeon: Amadeo Carrasquillo MD;  [address]  Service: Plastics;  Laterality: N/A;  sternal wound that need pec flap coverage    DEBRIDEMENT OF STERNUM N/A 11/8/2022    Procedure: DEBRIDEMENT, STERNUM;  Surgeon: Mike Hedrick MD;  Location: [address];  Service: General;  Laterality: N/A;    ESOPHAGOGASTRODUODENOSCOPY N/A 10/12/2018    Procedure: EGD (ESOPHAGOGASTRODUODENOSCOPY);  Surgeon: Braden Herring MD;  Location: [facility] ENDO;  Service: Endoscopy;  Laterality: N/A;    ESOPHAGOGASTRODUODENOSCOPY N/A 4/4/2019    Procedure: EGD;  Surgeon: Umair Randolph MD;  Location: [facility] ENDO;  Service: Endoscopy;  Laterality: N/A;    EXCLUSION OF LEFT ATRIAL APPENDAGE N/A 10/7/2022    Procedure: EXCLUSION, LEFT ATRIAL APPENDAGE;  Surgeon: Mike Hedrick MD;  Location: [address];  Service: Cardiovascular;  Laterality: N/A;    HERNIA REPAIR      INSERTION OF INTRAVASCULAR MICROAXIAL BLOOD PUMP N/A 10/7/2022    Procedure: INSERTION, IMPELLA;  Surgeon: Mike Hedrick MD;  Location: [address];  Service: Cardiovascular;  Laterality: N/A;  direct aortic insertion    IRRIGATION OF MEDIASTINUM N/A 10/7/2022    Procedure: IRRIGATION, MEDIASTINUM;  Surgeon: Mike Hedrick MD;  Location: [address];  Service: Cardiovascular;  Laterality: N/A;    STERNAL WIRES REMOVAL N/A 11/8/2022    Procedure: REMOVAL, STERNAL WIRE;  Surgeon: Mike Hedrick MD;  Location: [address];  Service: General;  Laterality: N/A;  Sternal wire removal with muscle flap creation    STERNAL WOUND CLOSURE N/A 11/14/2022    Procedure: CLOSURE,  WOUND, STERNUM;  Surgeon: Amadeo Carrasquillo MD;  [facility];  Service: Plastics;  Laterality: N/A;    TRICUSPID VALVULOPLASTY N/A 10/7/2022    Procedure: REPAIR, TRICUSPID VALVE;  Surgeon: Mike Hedrick MD;  Location: [facility] OR 2ND FLR;  Service: Cardiovascular;  Laterality: N/A;    WOUND EXPLORATION N/A 11/14/2022    Procedure: EXPLORATION, WOUND;  Surgeon: Amadeo Carrasquillo MD;  [facility];  Service: Plastics;  Laterality: N/A;     Family History   Problem Relation Age of Onset    COPD Mother     Cancer Father      Social History     Tobacco Use    Smoking status: Never    Smokeless tobacco: Current     Types: Chew   Substance Use Topics    Alcohol use: Yes     Alcohol/week: 20.0 standard drinks     Types: 20 Cans of beer per week    Drug use: No       Review of patient's allergies indicates:  No Known Allergies         OBJECTIVE:     Vital Signs (Most Recent)  Vitals:    01/15/23 0417 01/15/23 0825 01/15/23 1140 01/15/23 1203   BP: (!) 110/53 (!) 141/65  (!) 113/58   BP Location: Left arm Right arm  Right arm   Patient Position: Lying Sitting  Sitting   Pulse: 74 72 72 72   Resp: 18 20  20   Temp: 97.1 °F (36.2 °C) 97 °F (36.1 °C)  97.3 °F (36.3 °C)   TempSrc: Oral Oral  Oral   SpO2: 95% 96%  96%   Weight:       Height:                       Medications:   amiodarone  200 mg Oral BID    apixaban  2.5 mg Oral BID    aspirin  81 mg Oral Daily    atorvastatin  40 mg Oral Daily    furosemide  40 mg Oral Daily    LIDOcaine  1 patch Transdermal Q24H    losartan  25 mg Oral Daily    magnesium oxide  400 mg Oral BID    metoprolol succinate  25 mg Oral Daily    tamsulosin  0.4 mg Oral Daily    vitamin renal formula (B-complex-vitamin c-folic acid)  1 capsule Oral Daily           Physical Exam  Vitals and nursing note reviewed.   Constitutional:       General: He is not in acute distress.     Appearance: He is not diaphoretic.   HENT:      Head: Normocephalic and atraumatic.       Mouth/Throat:      Pharynx: No oropharyngeal exudate.   Eyes:      General: No scleral icterus.     Conjunctiva/sclera: Conjunctivae normal.      Pupils: Pupils are equal, round, and reactive to light.   Cardiovascular:      Rate and Rhythm: Normal rate and regular rhythm.      Heart sounds: Normal heart sounds. No murmur heard.  Pulmonary:      Effort: Pulmonary effort is normal. No respiratory distress.      Breath sounds: Rales (RLL) present.   Abdominal:      General: Bowel sounds are normal. There is no distension.      Palpations: Abdomen is soft.      Tenderness: There is no abdominal tenderness.   Musculoskeletal:         General: Swelling (trace) present. Normal range of motion.      Cervical back: Normal range of motion and neck supple.      Right lower leg: No edema.      Left lower leg: No edema.   Skin:     General: Skin is warm and dry.      Findings: No erythema.   Neurological:      Mental Status: He is alert and oriented to person, place, and time.      Cranial Nerves: No cranial nerve deficit.   Psychiatric:         Mood and Affect: Affect normal.         Cognition and Memory: Memory normal.         Judgment: Judgment normal.       Laboratory:  Recent Labs   Lab 01/13/23  0431 01/14/23  0436 01/15/23  0240   WBC 8.95 9.88 9.52   HGB 9.0* 9.8* 9.1*   HCT 29.5* 31.5* 28.6*    242 241   MONO 11.7  1.1* 10.5  1.0 10.2  1.0       Recent Labs   Lab 01/14/23  0436 01/15/23  0240 01/15/23  0800 01/15/23  0921    139  --  139   K 4.6 5.3*  --  3.9    103  --  102   CO2 27 24  --  27   BUN 56* 60*  --  61*   CREATININE 2.6* 2.5* 2.5* 2.5*   CALCIUM 9.1 8.6*  --  9.0         Diagnostic Results:  X-Ray: Reviewed  US: Reviewed  Echo: Reviewed  ASSESSMENT/PLAN:     1. ESRD  -  due to ATN from Cardiogenic shock Sepsis low EF was on HD Cr slightly better, was oliguric now good UOP on lasix >1.8L  Cr stable   -- As of now no Indication for HD last HD Wednesday crCl 23   -- Lasix daily still  overloaded.   -- Discussed with cardiology    Entresto vs Losartan try losartan for now and monitor   -- Please avoid nephrotoxins, including NSAIDs, aminoglycosides, IV contrast (unless absolutely necessary), gadolinium, fleets and other phosphorous-based laxatives. Caution with antibiotics.  -- Daily Renal Function Panel  -- Avoid Hypotension.  -- Renally dose all meds    Hyperkalemia 5.3  repeat 3.9   -- received Lokelma but less likely effect of loklema   -- While on  losartan needed for HF may need  Lokelma 10 daily as well   Monitor K+ and Cr     2. HTN (I10) - Controlled with Toprol 12.5  Losartan 25   3. Anemia of chronic kidney disease treated with SABRA (N18.9 D63.1) -    EPogen      Recent Labs   Lab 01/13/23  0431 01/14/23  0436 01/15/23  0240   HGB 9.0* 9.8* 9.1*   HCT 29.5* 31.5* 28.6*    242 241         Iron   Lab Results   Component Value Date    IRON 14 (L) 10/11/2018    TIBC 657 (H) 10/11/2018    FERRITIN 5 (L) 10/11/2018       4. MBD (E88.9 M90.80) -  Recent Labs   Lab 01/15/23  0921   CALCIUM 9.0       Recent Labs   Lab 01/13/23  0101 01/15/23  0240   MG 1.5* 1.6       Replace   Lab Results   Component Value Date    CALCIUM 9.0 01/15/2023    CAION 1.17 11/09/2022    PHOS 3.4 12/01/2022     No results found for: [id number]    Lab Results   Component Value Date    CO2 27 01/15/2023       5. Nutrition/Hypoalbuminemia (E88.09) -    Recent Labs   Lab 01/14/23  0436 01/15/23  0240   ALBUMIN 2.9* 2.7*       Nepro with meals TID. Renal vitamins daily      Thank you for the consult, will follow  With any question please call [phone]  Sri Ramsey MD    [facility]  PABLO Weaver MD, FACP,   MANDIE Saul MD,   MD DILLON Lara MD E. V. Harmon, NP    [address]  [place] [address]  [phone]

## 2023-01-16 VITALS
DIASTOLIC BLOOD PRESSURE: 97 MMHG | OXYGEN SATURATION: 96 % | RESPIRATION RATE: 18 BRPM | TEMPERATURE: 96 F | SYSTOLIC BLOOD PRESSURE: 127 MMHG | HEIGHT: 69 IN | BODY MASS INDEX: 21.68 KG/M2 | WEIGHT: 146.38 LBS | HEART RATE: 72 BPM

## 2023-01-16 LAB
ALBUMIN SERPL BCP-MCNC: 2.8 G/DL (ref 3.5–5.2)
ALP SERPL-CCNC: 81 U/L (ref 55–135)
ALT SERPL W/O P-5'-P-CCNC: 21 U/L (ref 10–44)
ANION GAP SERPL CALC-SCNC: 7 MMOL/L (ref 8–16)
AST SERPL-CCNC: 22 U/L (ref 10–40)
BASOPHILS # BLD AUTO: 0.08 K/UL (ref 0–0.2)
BASOPHILS NFR BLD: 0.9 % (ref 0–1.9)
BILIRUB SERPL-MCNC: 0.4 MG/DL (ref 0.1–1)
BUN SERPL-MCNC: 59 MG/DL (ref 8–23)
CALCIUM SERPL-MCNC: 8.9 MG/DL (ref 8.7–10.5)
CHLORIDE SERPL-SCNC: 104 MMOL/L (ref 95–110)
CO2 SERPL-SCNC: 30 MMOL/L (ref 23–29)
CREAT SERPL-MCNC: 2.4 MG/DL (ref 0.5–1.4)
DIFFERENTIAL METHOD: ABNORMAL
EOSINOPHIL # BLD AUTO: 0.3 K/UL (ref 0–0.5)
EOSINOPHIL NFR BLD: 3.5 % (ref 0–8)
ERYTHROCYTE [DISTWIDTH] IN BLOOD BY AUTOMATED COUNT: 15.9 % (ref 11.5–14.5)
EST. GFR  (NO RACE VARIABLE): 30 ML/MIN/1.73 M^2
GLUCOSE SERPL-MCNC: 89 MG/DL (ref 70–110)
HCT VFR BLD AUTO: 29.8 % (ref 40–54)
HGB BLD-MCNC: 9.4 G/DL (ref 14–18)
IMM GRANULOCYTES # BLD AUTO: 0.07 K/UL (ref 0–0.04)
IMM GRANULOCYTES NFR BLD AUTO: 0.8 % (ref 0–0.5)
LYMPHOCYTES # BLD AUTO: 1.3 K/UL (ref 1–4.8)
LYMPHOCYTES NFR BLD: 14.5 % (ref 18–48)
MCH RBC QN AUTO: 33 PG (ref 27–31)
MCHC RBC AUTO-ENTMCNC: 31.5 G/DL (ref 32–36)
MCV RBC AUTO: 105 FL (ref 82–98)
MONOCYTES # BLD AUTO: 1 K/UL (ref 0.3–1)
MONOCYTES NFR BLD: 10.9 % (ref 4–15)
NEUTROPHILS # BLD AUTO: 6.2 K/UL (ref 1.8–7.7)
NEUTROPHILS NFR BLD: 69.4 % (ref 38–73)
NRBC BLD-RTO: 0 /100 WBC
PLATELET # BLD AUTO: 237 K/UL (ref 150–450)
PMV BLD AUTO: 10.3 FL (ref 9.2–12.9)
POTASSIUM SERPL-SCNC: 4.1 MMOL/L (ref 3.5–5.1)
PROT SERPL-MCNC: 6.3 G/DL (ref 6–8.4)
RBC # BLD AUTO: 2.85 M/UL (ref 4.6–6.2)
SODIUM SERPL-SCNC: 141 MMOL/L (ref 136–145)
WBC # BLD AUTO: 8.92 K/UL (ref 3.9–12.7)

## 2023-01-16 PROCEDURE — 25000003 PHARM REV CODE 250: Performed by: STUDENT IN AN ORGANIZED HEALTH CARE EDUCATION/TRAINING PROGRAM

## 2023-01-16 PROCEDURE — 99900035 HC TECH TIME PER 15 MIN (STAT)

## 2023-01-16 PROCEDURE — 36415 COLL VENOUS BLD VENIPUNCTURE: CPT | Performed by: STUDENT IN AN ORGANIZED HEALTH CARE EDUCATION/TRAINING PROGRAM

## 2023-01-16 PROCEDURE — 25000003 PHARM REV CODE 250: Performed by: INTERNAL MEDICINE

## 2023-01-16 PROCEDURE — 80053 COMPREHEN METABOLIC PANEL: CPT | Performed by: STUDENT IN AN ORGANIZED HEALTH CARE EDUCATION/TRAINING PROGRAM

## 2023-01-16 PROCEDURE — 25000003 PHARM REV CODE 250: Performed by: NURSE PRACTITIONER

## 2023-01-16 PROCEDURE — 94761 N-INVAS EAR/PLS OXIMETRY MLT: CPT

## 2023-01-16 PROCEDURE — 85025 COMPLETE CBC W/AUTO DIFF WBC: CPT | Performed by: STUDENT IN AN ORGANIZED HEALTH CARE EDUCATION/TRAINING PROGRAM

## 2023-01-16 RX ADMIN — LOSARTAN POTASSIUM 25 MG: 25 TABLET, FILM COATED ORAL at 08:01

## 2023-01-16 RX ADMIN — NEPHROCAP 1 CAPSULE: 1 CAP ORAL at 08:01

## 2023-01-16 RX ADMIN — METOPROLOL SUCCINATE 25 MG: 25 TABLET, EXTENDED RELEASE ORAL at 08:01

## 2023-01-16 RX ADMIN — AMIODARONE HYDROCHLORIDE 200 MG: 200 TABLET ORAL at 08:01

## 2023-01-16 RX ADMIN — TAMSULOSIN HYDROCHLORIDE 0.4 MG: 0.4 CAPSULE ORAL at 08:01

## 2023-01-16 RX ADMIN — FUROSEMIDE 40 MG: 40 TABLET ORAL at 08:01

## 2023-01-16 RX ADMIN — APIXABAN 2.5 MG: 2.5 TABLET, FILM COATED ORAL at 08:01

## 2023-01-16 RX ADMIN — LIDOCAINE 1 PATCH: 50 PATCH CUTANEOUS at 08:01

## 2023-01-16 RX ADMIN — ATORVASTATIN CALCIUM 40 MG: 40 TABLET, FILM COATED ORAL at 08:01

## 2023-01-16 RX ADMIN — ASPIRIN 81 MG CHEWABLE TABLET 81 MG: 81 TABLET CHEWABLE at 08:01

## 2023-01-16 RX ADMIN — MAGNESIUM OXIDE TAB 400 MG (241.3 MG ELEMENTAL MG) 400 MG: 400 (241.3 MG) TAB at 08:01

## 2023-01-16 NOTE — SUBJECTIVE & OBJECTIVE
Interval hx: This am his breathing is better at rest and with exertion. K is WNL on current medical regime and without lokelma. Cr stable.   Stable for DC home.        Review of Systems   Respiratory:  Negative for cough and shortness of breath.    Cardiovascular:  Positive for leg swelling. Negative for chest pain.   Gastrointestinal:  Negative for abdominal pain, diarrhea, nausea and vomiting.   Genitourinary:  Negative for decreased urine volume, difficulty urinating and urgency.   Musculoskeletal:  Negative for arthralgias and back pain.        Chronic    Neurological:  Negative for dizziness, light-headedness and headaches.  12 point ROS negative except for HPI  Objective:     Vital Signs (Most Recent):  Temp: 96.4 °F (35.8 °C) (01/16/23 0801)  Pulse: 72 (01/16/23 0805)  Resp: 18 (01/16/23 0801)  BP: (!) 127/97 (01/16/23 0801)  SpO2: 96 % (01/16/23 0805)   Vital Signs (24h Range):  Temp:  [96.4 °F (35.8 °C)-98.5 °F (36.9 °C)] 96.4 °F (35.8 °C)  Pulse:  [67-77] 72  Resp:  [16-20] 18  SpO2:  [93 %-99 %] 96 %  BP: (113-135)/(57-97) 127/97     Weight: 66.4 kg (146 lb 6.2 oz)  Body mass index is 21.62 kg/m².    Physical Exam  Constitutional:       Appearance: He is not ill-appearing.      Comments: Alert, Oriented, No acute distress   HENT:      Head: Normocephalic and atraumatic.      Mouth/Throat:      Pharynx: Oropharynx is clear.   Eyes:      Extraocular Movements: Extraocular movements intact.      Conjunctiva/sclera: Conjunctivae normal.   Neck:      Vascular: JVD present. No hepatojugular reflux.   Cardiovascular:      Rate and Rhythm: Normal rate and regular rhythm.      Pulses: Normal pulses.      Heart sounds: Normal heart sounds.   Pulmonary:      Effort: Pulmonary effort is normal.      Breath sounds: Normal breath sounds.      Comments: Normal effort, Clear to auscultation   Abdominal:      General: Bowel sounds are normal.      Palpations: Abdomen is soft.   Musculoskeletal:         General: Swelling  present.      Cervical back: Normal range of motion and neck supple.      Right lower leg: Edema present.      Left lower leg: Edema present.   Skin:     General: Skin is warm and dry.      Comments: Dry, Intact, Warm, Capillary refill <2 seconds.    Neurological:      Mental Status: He is alert and oriented to person, place, and time.      Comments: Normal speech   Psychiatric:         Mood and Affect: Mood and affect normal.           Significant Labs: All pertinent labs within the past 24 hours have been reviewed.      Significant Imaging: I have reviewed all pertinent imaging results/findings within the past 24 hours.

## 2023-01-16 NOTE — PLAN OF CARE
Problem: Adult Inpatient Plan of Care  Goal: Plan of Care Review  Outcome: Ongoing, Progressing  Goal: Absence of Hospital-Acquired Illness or Injury  Outcome: Ongoing, Progressing  Goal: Optimal Comfort and Wellbeing  Outcome: Ongoing, Progressing  Goal: Readiness for Transition of Care  Outcome: Ongoing, Progressing     Problem: Impaired Wound Healing  Goal: Optimal Wound Healing  Outcome: Ongoing, Progressing     Problem: Fall Injury Risk  Goal: Absence of Fall and Fall-Related Injury  Outcome: Ongoing, Progressing     POC reviewed with patient. All questions and concerns reviewed. Vital signs stable throughout shift. For full assessment please refer to flowsheet. Fall/ safety precautions implemented & maintained. Bed locked in lowest position, bed alarm activated & audible, & call light in reach. Will continue to monitor.

## 2023-01-16 NOTE — ASSESSMENT & PLAN NOTE
Most likely secondary to HF exacerbation and decreased clearence in the setting of ESRD. Will continue to trend.   Chest pain likely muscular skeletal.   Troponin WNL   EKG with no significant ST changes

## 2023-01-16 NOTE — DISCHARGE SUMMARY
[facility] Medicine  Discharge Summary      Patient Name: David Barrios  MRN: [id number]  CRISTIAN: [id number]  Patient Class: IP- Inpatient  Admission Date: 1/12/2023  Hospital Length of Stay: 3 days  Discharge Date and Time:  01/16/2023 9:47 AM  Attending Physician: Rosalind Crockett MD   Discharging Provider: Jennifer Estes NP  Primary Care Provider: Breann Tavera MD    Primary Care Team: Networked reference to record PCT     HPI:   Mr. Davdi Barrios is a 63 y/o M w/ PMH of HFrEF secondary to valvular cardiomyopathy s/p mitral valve repair, tricuspid valve repair, left atrial maze, left atrial appendage resection, ESRD on HD MWF, paroxysmal AF, and HTN who comes to the hospital endorsing chronic dyspnea on exertion. Patient was initially sent to the ED by his PCP after undergoing a CXR that was concerning for pneumothorax, however, was normal on repeat x-ray in the ED. Patient states since his valve surgery on 10/7/22 his dyspnea on exertion is getting worse to the point where patient was unable to clumb the two stairs into his trailer home. Denies any SOB at rest, chestpain, nausea, vomiting, abdominal pain, fever, palpiations and chills. Does endorse orthopnea, PND and bendopnea.    Of note patients valvular surgery was complicated by serratia bacteriemia and sternal wound infection that required subsequent sternal wire removal, partial sternectomy, and muscle flap with plastics.      In the ED, patient underwent CT chest w/o contrast which was concerning for possible fluid around the sternum from prior mediastinitis. Patient was afebrile and WBC count was normal. EKG showed no acute ischemic changes but troponin was mildly elevated at 0.032.     Patient admitted to observation for evaluation of worsening dyspnea.       * No surgery found *      Hospital Course:   No notes on file     Goals of Care Treatment Preferences:  Code Status: Full Code      Consults:   Consults (From admission, onward)         Status Ordering Provider     Inpatient consult to [facility]rology-[facility] (Dorys Weaver, [place])  Once        Provider:  (Not yet assigned)    Completed LISA JORDAN     Inpatient consult to Social Work/Case Management  Once        Provider:  (Not yet assigned)    Acknowledged LISA JORDAN     Inpatient consult to Cardiology-[facility]  Once        Provider:  (Not yet assigned)    Completed CALLUM SHIPMAN          * Acute on chronic combined systolic and diastolic heart failure  Patients worsening dyspnea most likely secondary to HF exacerbation. Less concerned for mediastinitis in the setting of patient being afebrile and normal WBC count. Well's score 1.5 so less concern for PE.      - Last Echo (1/5/23): LV normal size w/ severely decreased systolic function. EF 15%. Severe LV global hyokinesis. LV diastolic dysfunction. Severely reduced RV systolic function. Mild MR. Mod AR. moderate size protruding right atrial mass present on what appears to be a catheter tip. The mass is mobile. Thrombus vs vegetation suspected.  - Last RHC: none   - Home GDMT: metoprolol succinate 12.5 daily- increased to 25 mg . Continue home GDMT. No evidence of cardiogenic shock.    - Home diuretic regimen: lasix 40 PO daily  - Hypervolemic on exam today.  1 dose of IV lasix 80mg upon admission since patient still urinates. Consulted Nephrology to resume dialysis - no acute indication for HD.   - Maintain K>4 and Mg>2  - Cardiology consulted. Appreciate recommendations.     -continue PO lasix   -strict I&O  -clinically improving, will consult PT to assess mobility and BAI   -closely monitor Cr   -cards/renal considering ARB - started on losartan 25 mg daily with later transition to entresto with caution considering renal function   -no acute signs of infection  -still noted BLE edema   -DC today. K WNL on current medical regime, without lokelma   -f/u cards, nephrology, PCP, cardiac rehab        ESRD (end stage  renal disease)  Due to hx of cardiogenic shock   - Nephrology consulted to resume dialysis. - no acute indication at this time. Last HD Wednesday    Right atrial mass  - continue eliquis     Elevated troponin  Most likely secondary to HF exacerbation and decreased clearence in the setting of ESRD. Will continue to trend.   Chest pain likely muscular skeletal.   Troponin WNL   EKG with no significant ST changes       Paroxysmal A-fib  - continue home amiodarone and Xarelto, switched to eliquis for better renal clearance.  - Currently in NSR.       Final Active Diagnoses:    Diagnosis Date Noted POA    PRINCIPAL PROBLEM:  Acute on chronic combined systolic and diastolic heart failure [I50.43] 01/13/2023 Unknown    Elevated troponin [R77.8] 01/13/2023 Yes    Right atrial mass [I51.89] 01/13/2023 Yes    ESRD (end stage renal disease) [N18.6] 01/13/2023 Yes    Paroxysmal A-fib [I48.0] 10/11/2018 Yes      Problems Resolved During this Admission:       Discharged Condition: stable    Disposition: Home or Self Care    Follow Up:   Follow-up Information     Breann Tavera MD Follow up in 2 day(s).    Specialty: Internal Medicine  Contact information:  [address] [place], [place] [address]  [phone]             Shyam Villegas MD Follow up in 2 day(s).    Specialties: Interventional Cardiology, Cardiology  Contact information:  [address]  [address]  [facility] [address]  [phone]             Sri Ramsey MD Follow up in 2 day(s).    Specialty: Nephrology  Contact information:  [address]  [place] [id number]  [phone]                       Patient Instructions:      BASIC METABOLIC PANEL   Standing Status: Future Standing Exp. Date: 03/15/24     Ambulatory referral[facility]   Standing Status: Future   Referral Priority: Routine Referral Type: Consultation   Referral Reason: Specialty Services Required   Number of Visits Requested: 1     Diet Cardiac     Diet renal     Call MD  for:  temperature >100.4     Call MD for:  persistent nausea and vomiting or diarrhea     Call MD for:  severe uncontrolled pain     Call MD for:  redness, tenderness, or signs of infection (pain, swelling, redness, odor or green/yellow discharge around incision site)     Call MD for:  difficulty breathing or increased cough     Call MD for:  severe persistent headache     Call MD for:  worsening rash     Call MD for:  persistent dizziness, light-headedness, or visual disturbances     Call MD for:  increased confusion or weakness     Activity as tolerated       Significant Diagnostic Studies: Labs:   CMP   Recent Labs   Lab 01/15/23  0240 01/15/23  0800 01/15/23  0921 01/16/23  0509     --  139 141   K 5.3*  --  3.9 4.1     --  102 104   CO2 24  --  27 30*   GLU 83  --  136* 89   BUN 60*  --  61* 59*   CREATININE 2.5* 2.5* 2.5* 2.4*   CALCIUM 8.6*  --  9.0 8.9   PROT 6.5  --   --  6.3   ALBUMIN 2.7*  --   --  2.8*   BILITOT 0.4  --   --  0.4   ALKPHOS 78  --   --  81   AST 30  --   --  22   ALT 24  --   --  21   ANIONGAP 12  --  10 7*   , CBC   Recent Labs   Lab 01/15/23  0240 01/16/23  0509   WBC 9.52 8.92   HGB 9.1* 9.4*   HCT 28.6* 29.8*    237    and Troponin   Recent Labs   Lab 01/12/23  1812 01/13/23  0101 01/15/23  0736   TROPONINI 0.032* 0.035* 0.014       Pending Diagnostic Studies:     None         Medications:  Reconciled Home Medications:      Medication List      START taking these medications    apixaban 2.5 mg Tab  Commonly known as: ELIQUIS  Take 1 tablet (2.5 mg total) by mouth 2 (two) times daily.     losartan 25 MG tablet  Commonly known as: COZAAR  Take 1 tablet (25 mg total) by mouth once daily.     magnesium oxide 400 mg (241.3 mg magnesium) tablet  Commonly known as: MAG-OX  Take 1 tablet (400 mg total) by mouth 2 (two) times daily.     vitamin renal formula (B-complex-vitamin c-folic acid) 1 mg Cap  Commonly known as: NEPHROCAP  Take 1 capsule by mouth once daily.         CHANGE how you take these medications    furosemide 40 MG tablet  Commonly known as: LASIX  Take 1 tablet (40 mg total) by mouth once daily.  What changed: when to take this     metoprolol succinate 25 MG 24 hr tablet  Commonly known as: TOPROL-XL  Take 1 tablet (25 mg total) by mouth once daily.  What changed: how much to take        CONTINUE taking these medications    amiodarone 200 MG Tab  Commonly known as: PACERONE  Take 1 tablet (200 mg total) by mouth 2 (two) times daily.     aspirin 81 MG Chew  Take 1 tablet (81 mg total) by mouth once daily.     atorvastatin 40 MG tablet  Commonly known as: LIPITOR  Take 1 tablet (40 mg total) by mouth once daily.     tamsulosin 0.4 mg Cap  Commonly known as: FLOMAX  Take 1 capsule (0.4 mg total) by mouth every evening. Take 30 minutes after dinner        STOP taking these medications    rivaroxaban 20 mg Tab  Commonly known as: XARELTO            Indwelling Lines/Drains at time of discharge:   Lines/Drains/Airways     Central Venous Catheter Line  Duration                Hemodialysis Catheter 11/25/22 0904 right internal jugular 52 days                Time spent on the discharge of patient: 60 minutes         Jennifer Estes NP  Department of Hospital Medicine  [facility]

## 2023-01-16 NOTE — PROGRESS NOTES
[facility] Medicine  Progress Note    Patient Name: David Barrios  MRN: [id number]  Patient Class: IP- Inpatient   Admission Date: 1/12/2023  Length of Stay: 3 days  Attending Physician: Rosalind Crockett MD  Primary Care Provider: Breann Tavera MD        Subjective:     Principal Problem:Acute on chronic combined systolic and diastolic heart failure        HPI:  Mr. 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 Patient was initially sent to the ED by his PCP after undergoing a CXR that was concerning for pneumothorax, however, was normal on repeat x-ray in the ED. Patient states since his valve surgery on 10/7/22 his dyspnea on exertion is getting worse to the point where patient was unable to clumb the two stairs into his trailer home. Denies any SOB at rest, chestpain, nausea, vomiting, abdominal pain, fever, palpiations and chills. Does endorse orthopnea, PND and bendopnea.    Of note patients valvular surgery was complicated by serratia bacteriemia and sternal wound infection that required subsequent sternal wire removal, partial sternectomy, and muscle flap with plastics.      In the ED, patient underwent CT chest w/o contrast which was concerning for possible fluid around the sternum from prior mediastinitis. Patient was afebrile and WBC count was normal. EKG showed no acute ischemic changes but troponin was mildly elevated at 0.032.     Patient admitted to observation for evaluation of worsening dyspnea.       Overview/Hospital Course:  No notes on file    Interval hx: This am his breathing is better at rest and with exertion. K is WNL on current medical regime and without lokelma. Cr stable.   Stable for DC home.        Review of Systems   Respiratory:   Negative for cough and shortness of breath.    Cardiovascular:  Positive for leg swelling. Negative for chest pain.   Gastrointestinal:  Negative for abdominal pain, diarrhea, nausea and vomiting.   Genitourinary:  Negative for decreased urine volume, difficulty urinating and urgency.   Musculoskeletal:  Negative for arthralgias and back pain.        Chronic    Neurological:  Negative for dizziness, light-headedness and headaches.  12 point ROS negative except for HPI  Objective:     Vital Signs (Most Recent):  Temp: 96.4 °F (35.8 °C) (01/16/23 0801)  Pulse: 72 (01/16/23 0805)  Resp: 18 (01/16/23 0801)  BP: (!) 127/97 (01/16/23 0801)  SpO2: 96 % (01/16/23 0805)   Vital Signs (24h Range):  Temp:  [96.4 °F (35.8 °C)-98.5 °F (36.9 °C)] 96.4 °F (35.8 °C)  Pulse:  [67-77] 72  Resp:  [16-20] 18  SpO2:  [93 %-99 %] 96 %  BP: (113-135)/(57-97) 127/97     Weight: 66.4 kg (146 lb 6.2 oz)  Body mass index is 21.62 kg/m².    Physical Exam  Constitutional:       Appearance: He is not ill-appearing.      Comments: Alert, Oriented, No acute distress   HENT:      Head: Normocephalic and atraumatic.      Mouth/Throat:      Pharynx: Oropharynx is clear.   Eyes:      Extraocular Movements: Extraocular movements intact.      Conjunctiva/sclera: Conjunctivae normal.   Neck:      Vascular: JVD present. No hepatojugular reflux.   Cardiovascular:      Rate and Rhythm: Normal rate and regular rhythm.      Pulses: Normal pulses.      Heart sounds: Normal heart sounds.   Pulmonary:      Effort: Pulmonary effort is normal.      Breath sounds: Normal breath sounds.      Comments: Normal effort, Clear to auscultation   Abdominal:      General: Bowel sounds are normal.      Palpations: Abdomen is soft.   Musculoskeletal:         General: Swelling present.      Cervical back: Normal range of motion and neck supple.      Right lower leg: Edema present. 1+     Left lower leg: Edema present. 1+  Skin:     General: Skin is warm and dry.      Comments:  Dry, Intact, Warm, Capillary refill <2 seconds.    Neurological:      Mental Status: He is alert and oriented to person, place, and time.      Comments: Normal speech   Psychiatric:         Mood and Affect: Mood and affect normal.           Significant Labs: All pertinent labs within the past 24 hours have been reviewed.      Significant Imaging: I have reviewed all pertinent imaging results/findings within the past 24 hours.      Assessment/Plan:      * Acute on chronic combined systolic and diastolic heart failure  Patients worsening dyspnea most likely secondary to HF exacerbation. Less concerned for mediastinitis in the setting of patient being afebrile and normal WBC count. Well's score 1.5 so less concern for PE.      - Last Echo (1/5/23): LV normal size w/ severely decreased systolic function. EF 15%. Severe LV global hyokinesis. LV diastolic dysfunction. Severely reduced RV systolic function. Mild MR. Mod AR. moderate size protruding right atrial mass present on what appears to be a catheter tip. The mass is mobile. Thrombus vs vegetation suspected.  - Last RHC: none   - Home GDMT: metoprolol succinate 12.5 daily- increased to 25 mg . Continue home GDMT. No evidence of cardiogenic shock.    - Home diuretic regimen: lasix 40 PO daily  - Hypervolemic on exam today.  1 dose of IV lasix 80mg upon admission since patient still urinates. Consulted Nephrology to resume dialysis - no acute indication for HD.   - Maintain K>4 and Mg>2  - Cardiology consulted. Appreciate recommendations.     -continue PO lasix   -strict I&O  -clinically improving, will consult PT to assess mobility and BAI   -closely monitor Cr   -cards/renal considering ARB - started on losartan 25 mg daily with later transition to entresto with caution considering renal function   -no acute signs of infection  -still noted BLE edema   -DC today. K WNL on current medical regime, without lokelma   -f/u cards, nephrology       ESRD (end stage renal  disease)  Due to hx of cardiogenic shock   - Nephrology consulted to resume dialysis. - no acute indication at this time. Last HD Wednesday    Right atrial mass  - continue eliquis     Elevated troponin  Most likely secondary to HF exacerbation and decreased clearence in the setting of ESRD. Will continue to trend.   Chest pain likely muscular skeletal.   Troponin WNL   EKG with no significant ST changes       Paroxysmal A-fib  - continue home amiodarone and Xarelto, switched to eliquis for better renal clearance.  - Currently in NSR.       VTE Risk Mitigation (From admission, onward)         Ordered     apixaban tablet 2.5 mg  2 times daily         01/13/23 1206     IP VTE HIGH RISK PATIENT  Once         01/12/23 2698                Discharge Planning   AMARILYS: 1/16/2023     Code Status: Full Code   Is the patient medically ready for discharge?:     Reason for patient still in hospital (select all that apply): Patient trending condition  Discharge Plan A: Home                  Jennifer Estes NP  Department of Hospital Medicine   [facility]

## 2023-01-16 NOTE — ASSESSMENT & PLAN NOTE
Patients worsening dyspnea most likely secondary to HF exacerbation. Less concerned for mediastinitis in the setting of patient being afebrile and normal WBC count. Well's score 1.5 so less concern for PE.      - Last Echo (1/5/23): LV normal size w/ severely decreased systolic function. EF 15%. Severe LV global hyokinesis. LV diastolic dysfunction. Severely reduced RV systolic function. Mild MR. Mod AR. moderate size protruding right atrial mass present on what appears to be a catheter tip. The mass is mobile. Thrombus vs vegetation suspected.  - Last RHC: none   - Home GDMT: metoprolol succinate 12.5 daily- increased to 25 mg . Continue home GDMT. No evidence of cardiogenic shock.    - Home diuretic regimen: lasix 40 PO daily  - Hypervolemic on exam today.  1 dose of IV lasix 80mg upon admission since patient still urinates. Consulted Nephrology to resume dialysis - no acute indication for HD.   - Maintain K>4 and Mg>2  - Cardiology consulted. Appreciate recommendations.     -continue PO lasix   -strict I&O  -clinically improving, will consult PT to assess mobility and BAI   -closely monitor Cr   -cards/renal considering ARB - started on losartan 25 mg daily with later transition to entresto with caution considering renal function   -no acute signs of infection  -still noted BLE edema   -DC today. K WNL on current medical regime, without lokelma   -f/u cards, nephrology, PCP, cardiac rehab

## 2023-01-16 NOTE — ASSESSMENT & PLAN NOTE
Patients worsening dyspnea most likely secondary to HF exacerbation. Less concerned for mediastinitis in the setting of patient being afebrile and normal WBC count. Well's score 1.5 so less concern for PE.      - Last Echo (1/5/23): LV normal size w/ severely decreased systolic function. EF 15%. Severe LV global hyokinesis. LV diastolic dysfunction. Severely reduced RV systolic function. Mild MR. Mod AR. moderate size protruding right atrial mass present on what appears to be a catheter tip. The mass is mobile. Thrombus vs vegetation suspected.  - Last RHC: none   - Home GDMT: metoprolol succinate 12.5 daily- increased to 25 mg . Continue home GDMT. No evidence of cardiogenic shock.    - Home diuretic regimen: lasix 40 PO daily  - Hypervolemic on exam today.  1 dose of IV lasix 80mg upon admission since patient still urinates. Consulted Nephrology to resume dialysis - no acute indication for HD.   - Maintain K>4 and Mg>2  - Cardiology consulted. Appreciate recommendations.     -continue PO lasix   -strict I&O  -clinically improving, will consult PT to assess mobility and BAI   -closely monitor Cr   -cards/renal considering ARB - started on losartan 25 mg daily with later transition to entresto with caution considering renal function   -no acute signs of infection  -still noted BLE edema   -DC today. K WNL on current medical regime, without lokelma   -f/u cards, nephrology

## 2023-01-16 NOTE — PLAN OF CARE
[facility] - Telemetry  Initial Discharge Assessment       Primary Care Provider: Breann Tavera MD    Admission Diagnosis: SOB (shortness of breath) [R06.02]  Exertional dyspnea [R06.09]  Heart failure [I50.9]  Anemia, unspecified type [D64.9]    Admission Date: 1/12/2023  Expected Discharge Date: 1/16/2023    Consult: card, neph, wound, & PT/OT    Payor: BLUE CROSS BLUE SHIELD / Plan: BCBS ALL OUT OF STATE / Product Type: PPO /     Extended Emergency Contact Information  Primary Emergency Contact: Lulu Steel  Mobile Phone: [phone]  Relation: Sister  Secondary Emergency Contact: Bettina Padron  Mobile Phone: [phone]  Relation: Significant other    Discharge Plan A: (P) Home  Discharge Plan B: (P) Home Health      [id number] - [place], [place] - [address] [facility] [facility]  [place] [address]  Phone: [phone] Fax: [phone]    [id number] - [place], [place] - [address] [facility] [address]  [address]  [place] [address]  Phone: [phone] Fax: [phone]    [facility] - [place], [place] - [address]  [address]  [place] [address]  Phone: [phone] Fax: [phone]      Initial Assessment (most recent)       Adult Discharge Assessment - 01/16/23 0835          Discharge Assessment    Assessment Type Discharge Planning Assessment (P)      Confirmed/corrected address, phone number and insurance Yes (P)      Confirmed Demographics Correct on Facesheet (P)      Source of Information patient (P)      Communicated AMARILYS with patient/caregiver Yes (P)      People in Home alone (P)      Do you expect to return to your current living situation? Yes (P)      Do you have help at home or someone to help you manage your care at home? No (P)      Prior to hospitilization cognitive status: Alert/Oriented (P)      Current cognitive status: Alert/Oriented (P)      Equipment  Currently Used at Home other (see comments) (P)    BP machine    Readmission within 30 days? No (P)      Patient currently being followed by outpatient case management? No (P)      Do you currently have service(s) that help you manage your care at home? No (P)      Do you take prescription medications? Yes (P)      Do you have prescription coverage? Yes (P)      Do you have any problems affording any of your prescribed medications? No (P)      Is the patient taking medications as prescribed? yes (P)      How do you get to doctors appointments? car, drives self (P)      Are you on dialysis? Yes (P)      Dialysis Name and Scheduled days [facility] (P)      Do you take coumadin? No (P)      Discharge Plan A Home (P)      Discharge Plan B Home Health (P)      DME Needed Upon Discharge  none (P)      Discharge Plan discussed with: Patient (P)                    0835  Patient awake & alert in bed when CM rounded via [employer] No family present. Patient was admitted with CHF & is being followed by cards, neph, wound care, & PT/OT.    Patient lives alone, is independent of all ADLs, receives HD treatments at [facility], & denied the need for assistance with transportation at time of discharge. Patient in agreement with plan to discharge home today & stated he received a letter from [facility] about enrolling in out-pt rehab. Pt stated that he does not need HD treatments any longer. Message sent to HAO Estes questioning above.     CM informed the pt of previously schedulers appointments with Dr Carrasquillo (plastics), Dr Mccormick (PCC) , & Dr Villegas (cards). Pt verbalized understanding.    0845  CM received confirmation from HAO Estes that the pt does not need HD any longer.     0855  Message sent to nurse Pierce & virtual nurse Radha informing that the pt is cleared to discharge.

## 2023-01-16 NOTE — PLAN OF CARE
[facility] - Telemetry  Discharge Final Note    Primary Care Provider: Breann Tavera MD    Expected Discharge Date: 1/16/2023    Final Discharge Note (most recent)       Final Note - 01/16/23 1601          Final Note    Assessment Type Final Discharge Note (P)      Anticipated Discharge Disposition Home or Self Care (P)      Hospital Resources/Appts/Education Provided Appointments scheduled and added to AVS (P)                      Contact Info       Breann Tavera MD   Specialty: Internal Medicine   Relationship: PCP - General    [address] [place] [address]   Phone: [phone]       Next Steps: Follow up in 2 day(s)    Shyam Villegas MD   Specialty: Interventional Cardiology, Cardiology    [address]  [address]  [facility] [address]   Phone: [phone]       Next Steps: Follow up on 2/2/2023    Instructions: at 10:40 AM; cardiology hospital follow up appointment    Sri Ramsey MD   Specialty: Nephrology    [address]  [facility] [address]   Phone: [phone]       Next Steps: Follow up in 2 day(s)    Lenora Mccormick MD   Specialty: Internal Medicine    [address]  [address]  [facility] [address]   Phone: [phone]       Next Steps: Follow up on 1/31/2023    Instructions: at 11:00 AM; hospital follow up appointment in the [facility]          Discharge summary faxed to Dr Breann Tavera (PCP).

## 2023-01-16 NOTE — NURSING
Patient discharging home. All discharge instructions reviewed with VN. IV and telemetry removed, patient tolerated well.

## 2023-01-16 NOTE — PLAN OF CARE
Discharge orders noted. Additional clinical references attached. Patient's discharge instructions given by bedside RN and reviewed by this VN with patient. Education provided on home care instructions, new and previous medications, diagnosis, when to seek medical attention, and follow-up appointments. New medications sent to patient's pharmacy. Patient verbalized understanding. Patient's friend to provide ride/transportation home. All questions answered. Waiting on ride home. Floor nurse notified.

## 2023-01-17 ENCOUNTER — PATIENT OUTREACH (OUTPATIENT)
Dept: ADMINISTRATIVE | Facility: CLINIC | Age: 63
End: 2023-01-17
Payer: COMMERCIAL

## 2023-01-17 ENCOUNTER — TELEPHONE (OUTPATIENT)
Dept: CARDIOLOGY | Facility: CLINIC | Age: 63
End: 2023-01-17
Payer: COMMERCIAL

## 2023-01-17 ENCOUNTER — TELEPHONE (OUTPATIENT)
Dept: CARDIAC REHAB | Facility: CLINIC | Age: 63
End: 2023-01-17
Payer: COMMERCIAL

## 2023-01-17 NOTE — PROGRESS NOTES
2nd attempt to make TCC Call. Patient requested call back in 1 hour.  No answer.  Unable to leave a voicemail.

## 2023-01-17 NOTE — TELEPHONE ENCOUNTER
----- Message from Kiersten Jorgensen sent at 1/17/2023 12:24 PM CST -----  Needs advice from nurse:      Who Called:pt  Regarding:Eloquis is too expensive, needs some help   Would the patient rather a call back or VIA [facility]?  Best Call Back number:[phone]  Additional Info:

## 2023-01-17 NOTE — PROGRESS NOTES
C3 nurse attempted to contact David Barrios for a TCC post hospital discharge follow up call. The patient is unable to conduct the call @ this time. The patient requested a callback.    The patient has a scheduled HOSFU appointment with Lenora Mccormick on 01/31/23 @ 1100.       Requested HOSFU appointment  to be within 5-7 days post hospital discharge date 01/16/23. Please schedule HOSPFU appt. No later than 01/24/23.  Message sent to Physician staff.

## 2023-01-18 ENCOUNTER — OFFICE VISIT (OUTPATIENT)
Dept: PLASTIC SURGERY | Facility: CLINIC | Age: 63
End: 2023-01-18
Payer: COMMERCIAL

## 2023-01-18 ENCOUNTER — TELEPHONE (OUTPATIENT)
Dept: CARDIOLOGY | Facility: CLINIC | Age: 63
End: 2023-01-18
Payer: COMMERCIAL

## 2023-01-18 VITALS — HEART RATE: 84 BPM | SYSTOLIC BLOOD PRESSURE: 136 MMHG | DIASTOLIC BLOOD PRESSURE: 63 MMHG

## 2023-01-18 DIAGNOSIS — S21.101A STERNAL WOUND INFECTION: Primary | ICD-10-CM

## 2023-01-18 DIAGNOSIS — L08.9 STERNAL WOUND INFECTION: Primary | ICD-10-CM

## 2023-01-18 PROCEDURE — 4010F ACE/ARB THERAPY RXD/TAKEN: CPT | Mod: CPTII,S$GLB,, | Performed by: SURGERY

## 2023-01-18 PROCEDURE — 99024 POSTOP FOLLOW-UP VISIT: CPT | Mod: S$GLB,,, | Performed by: SURGERY

## 2023-01-18 PROCEDURE — 99999 PR PBB SHADOW E&M-EST. PATIENT-LVL III: ICD-10-PCS | Mod: PBBFAC,,, | Performed by: SURGERY

## 2023-01-18 PROCEDURE — 3075F SYST BP GE 130 - 139MM HG: CPT | Mod: CPTII,S$GLB,, | Performed by: SURGERY

## 2023-01-18 PROCEDURE — 99024 PR POST-OP FOLLOW-UP VISIT: ICD-10-PCS | Mod: S$GLB,,, | Performed by: SURGERY

## 2023-01-18 PROCEDURE — 3075F PR MOST RECENT SYSTOLIC BLOOD PRESS GE 130-139MM HG: ICD-10-PCS | Mod: CPTII,S$GLB,, | Performed by: SURGERY

## 2023-01-18 PROCEDURE — 99999 PR PBB SHADOW E&M-EST. PATIENT-LVL III: CPT | Mod: PBBFAC,,, | Performed by: SURGERY

## 2023-01-18 PROCEDURE — 1159F PR MEDICATION LIST DOCUMENTED IN MEDICAL RECORD: ICD-10-PCS | Mod: CPTII,S$GLB,, | Performed by: SURGERY

## 2023-01-18 PROCEDURE — 4010F PR ACE/ARB THEARPY RXD/TAKEN: ICD-10-PCS | Mod: CPTII,S$GLB,, | Performed by: SURGERY

## 2023-01-18 PROCEDURE — 3078F PR MOST RECENT DIASTOLIC BLOOD PRESSURE < 80 MM HG: ICD-10-PCS | Mod: CPTII,S$GLB,, | Performed by: SURGERY

## 2023-01-18 PROCEDURE — 1159F MED LIST DOCD IN RCRD: CPT | Mod: CPTII,S$GLB,, | Performed by: SURGERY

## 2023-01-18 PROCEDURE — 3044F HG A1C LEVEL LT 7.0%: CPT | Mod: CPTII,S$GLB,, | Performed by: SURGERY

## 2023-01-18 PROCEDURE — 3078F DIAST BP <80 MM HG: CPT | Mod: CPTII,S$GLB,, | Performed by: SURGERY

## 2023-01-18 PROCEDURE — 3044F PR MOST RECENT HEMOGLOBIN A1C LEVEL <7.0%: ICD-10-PCS | Mod: CPTII,S$GLB,, | Performed by: SURGERY

## 2023-01-18 NOTE — PROGRESS NOTES
Patient presents Plastic surgery Clinic after having the large sternal wound infection which was closed using bilateral Da Silva flaps.  He is done very very well everything is well healed.    Last week, He did have looks like a seroma/hematoma in the axilla which was aspirated. Because he is on blood thinner, it was liquid blood (without clotting).   On, re-evaluation, The collection is much smaller and did not re-acccumulate. There is little utility to aspirate this small marble size collection at this time while on blood thinners.   He reports also being off dialysis now. Which is great news. He is on the right trajectory towards recovering.   We will see him again in 3 months.

## 2023-01-18 NOTE — TELEPHONE ENCOUNTER
----- Message from Kadi Bazzi sent at 1/18/2023 11:28 AM CST -----  Regarding: call back  Contact: [phone]  Type:  Patient Returning Call    Who Called: PT   Who Left Message for Patient: Nurse   Does the patient know what this is regarding?: Yes   Would the patient rather a call back or a response via [facility]? Call back   Best Call Back Number: [phone]  Additional Information:

## 2023-01-18 NOTE — TELEPHONE ENCOUNTER
Pt showed up in the clinic and I provided him w his physical $10 co pay card for his Eliquis            ja

## 2023-01-18 NOTE — TELEPHONE ENCOUNTER
Called Eliquis 360 support and activated a $10 co-pay card for pt or assistance w Eliquis      Called Rx to [facility] spoke janeth smallwood and ordered 30 day supply w 2 refills            ja

## 2023-01-19 ENCOUNTER — TELEPHONE (OUTPATIENT)
Dept: CARDIOLOGY | Facility: CLINIC | Age: 63
End: 2023-01-19
Payer: COMMERCIAL

## 2023-01-19 NOTE — TELEPHONE ENCOUNTER
Called pt on 1/17/23 and pt stated when he went to  his Eliquis from the pharmacy it was going to be $500+ out of pocket       Advised pt to come in and  a coupon card for assistance       Ag beck

## 2023-01-19 NOTE — TELEPHONE ENCOUNTER
----- Message from Kiersten Torres MA sent at 1/17/2023  4:59 PM CST -----    ----- Message -----  From: Wesley Padron  Sent: 1/17/2023   1:13 PM CST  To: Jennifer Estes NP, Roxy Fierro V Staff, #    Type:  RX Refill Request    Who Called: pt   Refill or New Rx: refill  RX Name and Strength: alternative for apixaban (ELIQUIS) 2.5 mg Tab 60 tablet     Preferred Pharmacy: [facility] [address] [facility]    Ordering Provider: pt says he's not sure, mentioned Dr. Villegas and Dr. Ramsey; chart says prescribed by HAO Rodriguez (as I tried to clarify who prescribed as to who to send the message to pt became irritated)  Reach pt via:  Best Call Back Number: [phone]  Additional Information: pt is saying that his Eliquis is too expensive so he wants to know what alternative he can take or is there any coupons Dr. Villegas can give him? Pt was irritated and upset

## 2023-01-23 ENCOUNTER — LAB VISIT (OUTPATIENT)
Dept: LAB | Facility: HOSPITAL | Age: 63
End: 2023-01-23
Attending: INTERNAL MEDICINE
Payer: COMMERCIAL

## 2023-01-23 DIAGNOSIS — N18.6 ESRD (END STAGE RENAL DISEASE): ICD-10-CM

## 2023-01-23 DIAGNOSIS — R80.9 PROTEINURIA, UNSPECIFIED TYPE: ICD-10-CM

## 2023-01-23 LAB
BILIRUB UR QL STRIP: NEGATIVE
CLARITY UR REFRACT.AUTO: CLEAR
COLOR UR AUTO: YELLOW
CREAT UR-MCNC: 30.1 MG/DL (ref 23–375)
GLUCOSE UR QL STRIP: NEGATIVE
HGB UR QL STRIP: NEGATIVE
KETONES UR QL STRIP: NEGATIVE
LEUKOCYTE ESTERASE UR QL STRIP: NEGATIVE
NITRITE UR QL STRIP: NEGATIVE
PH UR STRIP: 8 [PH] (ref 5–8)
PROT UR QL STRIP: NEGATIVE
PROT UR-MCNC: 11 MG/DL (ref 0–15)
PROT/CREAT UR: 0.37 MG/G{CREAT} (ref 0–0.2)
SP GR UR STRIP: 1.01 (ref 1–1.03)
URN SPEC COLLECT METH UR: NORMAL
UROBILINOGEN UR STRIP-ACNC: NEGATIVE EU/DL

## 2023-01-23 PROCEDURE — 81003 URINALYSIS AUTO W/O SCOPE: CPT | Mod: PO | Performed by: INTERNAL MEDICINE

## 2023-01-23 PROCEDURE — 82570 ASSAY OF URINE CREATININE: CPT | Performed by: INTERNAL MEDICINE

## 2023-01-24 NOTE — PROGRESS NOTES
Switched from eliquis to xarelto (renally dosed) due to cost. (Patient was previously on xarelto).

## 2023-01-25 ENCOUNTER — HOSPITAL ENCOUNTER (OUTPATIENT)
Dept: RADIOLOGY | Facility: HOSPITAL | Age: 63
Discharge: HOME | End: 2023-01-25
Payer: COMMERCIAL

## 2023-01-25 ENCOUNTER — OFFICE VISIT (OUTPATIENT)
Dept: PLASTIC SURGERY | Facility: CLINIC | Age: 63
End: 2023-01-25
Payer: COMMERCIAL

## 2023-01-25 VITALS — SYSTOLIC BLOOD PRESSURE: 143 MMHG | DIASTOLIC BLOOD PRESSURE: 64 MMHG | HEART RATE: 83 BPM

## 2023-01-25 DIAGNOSIS — R06.02 SHORTNESS OF BREATH: ICD-10-CM

## 2023-01-25 DIAGNOSIS — L08.9 STERNAL WOUND INFECTION: Primary | ICD-10-CM

## 2023-01-25 DIAGNOSIS — S21.101A STERNAL WOUND INFECTION: Primary | ICD-10-CM

## 2023-01-25 DIAGNOSIS — R06.02 SHORTNESS OF BREATH: Primary | ICD-10-CM

## 2023-01-25 PROCEDURE — 99024 PR POST-OP FOLLOW-UP VISIT: ICD-10-PCS | Mod: S$GLB,,, | Performed by: SURGERY

## 2023-01-25 PROCEDURE — 3044F HG A1C LEVEL LT 7.0%: CPT | Mod: CPTII,S$GLB,, | Performed by: SURGERY

## 2023-01-25 PROCEDURE — 71046 X-RAY EXAM CHEST 2 VIEWS: CPT | Mod: TC

## 2023-01-25 PROCEDURE — 1160F PR REVIEW ALL MEDS BY PRESCRIBER/CLIN PHARMACIST DOCUMENTED: ICD-10-PCS | Mod: CPTII,S$GLB,, | Performed by: SURGERY

## 2023-01-25 PROCEDURE — 1159F PR MEDICATION LIST DOCUMENTED IN MEDICAL RECORD: ICD-10-PCS | Mod: CPTII,S$GLB,, | Performed by: SURGERY

## 2023-01-25 PROCEDURE — 3078F PR MOST RECENT DIASTOLIC BLOOD PRESSURE < 80 MM HG: ICD-10-PCS | Mod: CPTII,S$GLB,, | Performed by: SURGERY

## 2023-01-25 PROCEDURE — 71046 X-RAY EXAM CHEST 2 VIEWS: CPT | Mod: 26,,, | Performed by: RADIOLOGY

## 2023-01-25 PROCEDURE — 3044F PR MOST RECENT HEMOGLOBIN A1C LEVEL <7.0%: ICD-10-PCS | Mod: CPTII,S$GLB,, | Performed by: SURGERY

## 2023-01-25 PROCEDURE — 4010F PR ACE/ARB THEARPY RXD/TAKEN: ICD-10-PCS | Mod: CPTII,S$GLB,, | Performed by: SURGERY

## 2023-01-25 PROCEDURE — 71046 XR CHEST PA AND LATERAL: ICD-10-PCS | Mod: 26,,, | Performed by: RADIOLOGY

## 2023-01-25 PROCEDURE — 99024 POSTOP FOLLOW-UP VISIT: CPT | Mod: S$GLB,,, | Performed by: SURGERY

## 2023-01-25 PROCEDURE — 99999 PR PBB SHADOW E&M-EST. PATIENT-LVL III: CPT | Mod: PBBFAC,,, | Performed by: SURGERY

## 2023-01-25 PROCEDURE — 4010F ACE/ARB THERAPY RXD/TAKEN: CPT | Mod: CPTII,S$GLB,, | Performed by: SURGERY

## 2023-01-25 PROCEDURE — 3077F SYST BP >= 140 MM HG: CPT | Mod: CPTII,S$GLB,, | Performed by: SURGERY

## 2023-01-25 PROCEDURE — 1160F RVW MEDS BY RX/DR IN RCRD: CPT | Mod: CPTII,S$GLB,, | Performed by: SURGERY

## 2023-01-25 PROCEDURE — 3077F PR MOST RECENT SYSTOLIC BLOOD PRESSURE >= 140 MM HG: ICD-10-PCS | Mod: CPTII,S$GLB,, | Performed by: SURGERY

## 2023-01-25 PROCEDURE — 3078F DIAST BP <80 MM HG: CPT | Mod: CPTII,S$GLB,, | Performed by: SURGERY

## 2023-01-25 PROCEDURE — 99999 PR PBB SHADOW E&M-EST. PATIENT-LVL III: ICD-10-PCS | Mod: PBBFAC,,, | Performed by: SURGERY

## 2023-01-25 PROCEDURE — 1159F MED LIST DOCD IN RCRD: CPT | Mod: CPTII,S$GLB,, | Performed by: SURGERY

## 2023-01-25 RX ORDER — HYDROCODONE BITARTRATE AND ACETAMINOPHEN 7.5; 325 MG/1; MG/1
1 TABLET ORAL EVERY 6 HOURS PRN
COMMUNITY

## 2023-01-25 NOTE — PROGRESS NOTES
Patient presents Plastic surgery Clinic after undergoing a sternal wound closure using bilateral pectoralis major muscle flaps.  Postoperatively he developed some renal failure and did require dialysis.  He is done very well since the time.  His drains are out he does have a liquid encapsulated small hematoma in the left axillary region.  This was aspirated 2 weeks ago.  It is a little bit larger today.  I am very hesitant to aspirated again since he is on a blood thinner and it was liquid blood that came out.  The patient 2 weeks ago had a history of some shortness of breath they thought he had a pneumothorax but on further x-rays it was not so.  It was given Lasix felt better.  Today he presents states he is more short of breath today I will order chest x-ray.  He will see his nephrologist today I told him to ask the nephrologists also look at the chest x-ray his appointment I believe that 145.  I will see him in 2 weeks.  I stated I would not do anything to the encapsulated small hematoma until his renal failure was totally resolved and he was in very good medical on physical shape.  That maybe 3-6 months down the road unless there is a change.

## 2023-01-28 NOTE — ANESTHESIA PROCEDURE NOTES
Intubation    Date/Time: 11/8/2022 7:12 AM  Performed by: Carlos Enrique Nicole MD  Authorized by: Jesus Pop MD     Intubation:     Induction:  Intravenous    Intubated:  Postinduction    Mask Ventilation:  Easy mask    Attempts:  1    Attempted By:  Student    Method of Intubation:  Video laryngoscopy    Blade:  William 3    Laryngeal View Grade: Grade I - full view of cords      Difficult Airway Encountered?: No      Complications:  None    Airway Device:  Oral endotracheal tube    Airway Device Size:  7.5    Tube secured:  22    Secured at:  The teeth    Placement Verified By:  Capnometry    Complicating Factors:  None    Findings Post-Intubation:  BS equal bilateral and atraumatic/condition of teeth unchanged     normal for race

## 2023-01-31 ENCOUNTER — TELEPHONE (OUTPATIENT)
Dept: CARDIOLOGY | Facility: CLINIC | Age: 63
End: 2023-01-31
Payer: COMMERCIAL

## 2023-01-31 ENCOUNTER — HOSPITAL ENCOUNTER (OUTPATIENT)
Dept: RADIOLOGY | Facility: HOSPITAL | Age: 63
Discharge: HOME | End: 2023-01-31
Attending: INTERNAL MEDICINE
Payer: COMMERCIAL

## 2023-01-31 ENCOUNTER — OFFICE VISIT (OUTPATIENT)
Dept: PRIMARY CARE CLINIC | Facility: CLINIC | Age: 63
End: 2023-01-31
Payer: COMMERCIAL

## 2023-01-31 VITALS
BODY MASS INDEX: 23.6 KG/M2 | WEIGHT: 159.81 LBS | TEMPERATURE: 98 F | DIASTOLIC BLOOD PRESSURE: 81 MMHG | SYSTOLIC BLOOD PRESSURE: 141 MMHG | HEART RATE: 81 BPM | OXYGEN SATURATION: 95 %

## 2023-01-31 DIAGNOSIS — R06.09 EXERTIONAL DYSPNEA: ICD-10-CM

## 2023-01-31 DIAGNOSIS — I50.43 ACUTE ON CHRONIC COMBINED SYSTOLIC AND DIASTOLIC HEART FAILURE: Primary | ICD-10-CM

## 2023-01-31 DIAGNOSIS — J90 PLEURAL EFFUSION ON RIGHT: ICD-10-CM

## 2023-01-31 DIAGNOSIS — N17.9 ACUTE RENAL FAILURE, UNSPECIFIED ACUTE RENAL FAILURE TYPE: ICD-10-CM

## 2023-01-31 DIAGNOSIS — I48.19 PERSISTENT ATRIAL FIBRILLATION: ICD-10-CM

## 2023-01-31 PROBLEM — I50.20 HFREF (HEART FAILURE WITH REDUCED EJECTION FRACTION): Status: RESOLVED | Noted: 2023-01-03 | Resolved: 2023-01-31

## 2023-01-31 PROBLEM — I50.21 ACUTE SYSTOLIC CONGESTIVE HEART FAILURE: Status: RESOLVED | Noted: 2022-08-29 | Resolved: 2023-01-31

## 2023-01-31 PROBLEM — N18.6 ESRD (END STAGE RENAL DISEASE): Status: RESOLVED | Noted: 2023-01-13 | Resolved: 2023-01-31

## 2023-01-31 PROBLEM — R79.89 ELEVATED TROPONIN: Status: RESOLVED | Noted: 2023-01-13 | Resolved: 2023-01-31

## 2023-01-31 PROBLEM — J18.9 PNEUMONIA OF BOTH LOWER LOBES DUE TO INFECTIOUS ORGANISM: Status: RESOLVED | Noted: 2022-11-15 | Resolved: 2023-01-31

## 2023-01-31 PROBLEM — I51.9 LEFT VENTRICULAR SYSTOLIC DYSFUNCTION: Status: RESOLVED | Noted: 2022-09-09 | Resolved: 2023-01-31

## 2023-01-31 PROBLEM — R57.9 SHOCK: Status: RESOLVED | Noted: 2022-10-11 | Resolved: 2023-01-31

## 2023-01-31 PROBLEM — I48.0 PAROXYSMAL A-FIB: Status: RESOLVED | Noted: 2018-10-11 | Resolved: 2023-01-31

## 2023-01-31 PROBLEM — I51.89 LEFT VENTRICULAR SYSTOLIC DYSFUNCTION: Status: RESOLVED | Noted: 2022-09-09 | Resolved: 2023-01-31

## 2023-01-31 PROBLEM — E87.6 HYPOKALEMIA: Status: RESOLVED | Noted: 2018-10-11 | Resolved: 2023-01-31

## 2023-01-31 PROCEDURE — 71046 X-RAY EXAM CHEST 2 VIEWS: CPT | Mod: TC,FY

## 2023-01-31 PROCEDURE — 4010F ACE/ARB THERAPY RXD/TAKEN: CPT | Mod: CPTII,S$GLB,, | Performed by: INTERNAL MEDICINE

## 2023-01-31 PROCEDURE — 99999 PR PBB SHADOW E&M-EST. PATIENT-LVL V: CPT | Mod: PBBFAC,,, | Performed by: INTERNAL MEDICINE

## 2023-01-31 PROCEDURE — 3044F PR MOST RECENT HEMOGLOBIN A1C LEVEL <7.0%: ICD-10-PCS | Mod: CPTII,S$GLB,, | Performed by: INTERNAL MEDICINE

## 2023-01-31 PROCEDURE — 3077F PR MOST RECENT SYSTOLIC BLOOD PRESSURE >= 140 MM HG: ICD-10-PCS | Mod: CPTII,S$GLB,, | Performed by: INTERNAL MEDICINE

## 2023-01-31 PROCEDURE — 3066F PR DOCUMENTATION OF TREATMENT FOR NEPHROPATHY: ICD-10-PCS | Mod: CPTII,S$GLB,, | Performed by: INTERNAL MEDICINE

## 2023-01-31 PROCEDURE — 1159F PR MEDICATION LIST DOCUMENTED IN MEDICAL RECORD: ICD-10-PCS | Mod: CPTII,S$GLB,, | Performed by: INTERNAL MEDICINE

## 2023-01-31 PROCEDURE — 1111F DSCHRG MED/CURRENT MED MERGE: CPT | Mod: CPTII,S$GLB,, | Performed by: INTERNAL MEDICINE

## 2023-01-31 PROCEDURE — 71046 X-RAY EXAM CHEST 2 VIEWS: CPT | Mod: 26,,, | Performed by: RADIOLOGY

## 2023-01-31 PROCEDURE — 3077F SYST BP >= 140 MM HG: CPT | Mod: CPTII,S$GLB,, | Performed by: INTERNAL MEDICINE

## 2023-01-31 PROCEDURE — 3079F DIAST BP 80-89 MM HG: CPT | Mod: CPTII,S$GLB,, | Performed by: INTERNAL MEDICINE

## 2023-01-31 PROCEDURE — 99205 OFFICE O/P NEW HI 60 MIN: CPT | Mod: S$GLB,,, | Performed by: INTERNAL MEDICINE

## 2023-01-31 PROCEDURE — 99999 PR PBB SHADOW E&M-EST. PATIENT-LVL V: ICD-10-PCS | Mod: PBBFAC,,, | Performed by: INTERNAL MEDICINE

## 2023-01-31 PROCEDURE — 3008F PR BODY MASS INDEX (BMI) DOCUMENTED: ICD-10-PCS | Mod: CPTII,S$GLB,, | Performed by: INTERNAL MEDICINE

## 2023-01-31 PROCEDURE — 3066F NEPHROPATHY DOC TX: CPT | Mod: CPTII,S$GLB,, | Performed by: INTERNAL MEDICINE

## 2023-01-31 PROCEDURE — 71046 XR CHEST PA AND LATERAL: ICD-10-PCS | Mod: 26,,, | Performed by: RADIOLOGY

## 2023-01-31 PROCEDURE — 99205 PR OFFICE/OUTPT VISIT, NEW, LEVL V, 60-74 MIN: ICD-10-PCS | Mod: S$GLB,,, | Performed by: INTERNAL MEDICINE

## 2023-01-31 PROCEDURE — 1159F MED LIST DOCD IN RCRD: CPT | Mod: CPTII,S$GLB,, | Performed by: INTERNAL MEDICINE

## 2023-01-31 PROCEDURE — 3008F BODY MASS INDEX DOCD: CPT | Mod: CPTII,S$GLB,, | Performed by: INTERNAL MEDICINE

## 2023-01-31 PROCEDURE — 3079F PR MOST RECENT DIASTOLIC BLOOD PRESSURE 80-89 MM HG: ICD-10-PCS | Mod: CPTII,S$GLB,, | Performed by: INTERNAL MEDICINE

## 2023-01-31 PROCEDURE — 1160F PR REVIEW ALL MEDS BY PRESCRIBER/CLIN PHARMACIST DOCUMENTED: ICD-10-PCS | Mod: CPTII,S$GLB,, | Performed by: INTERNAL MEDICINE

## 2023-01-31 PROCEDURE — 3044F HG A1C LEVEL LT 7.0%: CPT | Mod: CPTII,S$GLB,, | Performed by: INTERNAL MEDICINE

## 2023-01-31 PROCEDURE — 4010F PR ACE/ARB THEARPY RXD/TAKEN: ICD-10-PCS | Mod: CPTII,S$GLB,, | Performed by: INTERNAL MEDICINE

## 2023-01-31 PROCEDURE — 1111F PR DISCHARGE MEDS RECONCILED W/ CURRENT OUTPATIENT MED LIST: ICD-10-PCS | Mod: CPTII,S$GLB,, | Performed by: INTERNAL MEDICINE

## 2023-01-31 PROCEDURE — 1160F RVW MEDS BY RX/DR IN RCRD: CPT | Mod: CPTII,S$GLB,, | Performed by: INTERNAL MEDICINE

## 2023-01-31 RX ORDER — AMIODARONE HYDROCHLORIDE 200 MG/1
200 TABLET ORAL 2 TIMES DAILY
Qty: 60 TABLET | Refills: 2 | Status: SHIPPED | OUTPATIENT
Start: 2023-01-31 | End: 2023-03-23

## 2023-01-31 NOTE — PROGRESS NOTES
[facility]   New Visit Progress Note   Recent Hospital Discharge     PRESENTING HISTORY     Chief Complaint/Reason for Admission:  Follow up Hospital Discharge   PCP: Breann Tavera MD    History of Present Illness:  Mr. David Barrios is a 62 y.o. male who was recently admitted to the hospital.    [facility] Medicine  Discharge Summary        Patient Name: David Barrios  MRN: [id number]  CRISTIAN: [id number]  Patient Class: IP- Inpatient  Admission Date: 1/12/2023  Hospital Length of Stay: 3 days  Discharge Date and Time:  01/16/2023 9:47 AM  Attending Physician: Rosalind Crockett MD   Discharging Provider: Jennifer Estes NP  Primary Care Provider: Breann Tavera MD  ___________________________________________________________________    Today:  [facility] to [facility] for initial hospital follow up.  Recently hospitalized for decompensated heart failure.  Admitted to [facility] Medicine service.  Diuresed > 1 L after administration of IV Lasix.   Recent medical history significant for cardiac valve surgery complicated by Serratia bacteremia and sternal wound infection.  He required temporary hemodialysis as well as sternal wire removal/partial sternectomy/muscle flap with plastic surgery team.   Patient now with recovered renal function and off dialysis.  Seen in consultation with Nephrology team during heart failure admission-> no indication to resume hemodialysis.   Seen in consultation with Cardiology team-> home medication regimen adjusted.  Patient responded well to above interventions and supportive care.  Discharged to home.     Had Nephrology follow up 1/25/23- notes reviewed.  Chest X ray 1/25/23 with worsening R pleural effusion and new development airspace opacity R hemithorax.   Lasix increased to 40 mg BID.   He has since self increased dose to 80 mg Q AM, 40 mg Q PM due to fluid weight gain.     Patient accompanied today by friend.  He is ambulatory and independent  with ADL's.  Reports compliance with all medication.  Did not bring bottles for review.  Still with significant dyspnea on exertion and LE swelling.  Since increase in Lasix dose, swelling has improved slightly but dyspnea has remained.   He is monitoring daily weight.  Attempting to comply with sodium restricted diet but some barriers due to limited access to fresh food.     Review of Systems  General ROS: negative for chills, fever or weight loss  Psychological ROS: negative for hallucination, depression or suicidal ideation  Ophthalmic ROS: negative for blurry vision, photophobia or eye pain  ENT ROS: negative for epistaxis, sore throat or rhinorrhea  Respiratory ROS: no cough, + shortness of breath, no wheezing  Cardiovascular ROS: no chest pain + dyspnea with minimal exertion  + bilateral LE swelling   Gastrointestinal ROS: no abdominal pain, change in bowel habits, or black/ bloody stools  Genito-Urinary ROS: no dysuria, trouble voiding, or hematuria  Musculoskeletal ROS: negative for gait disturbance or muscular weakness  Neurological ROS: no syncope or seizures; no ataxia  Dermatological ROS: negative for pruritis, rash and jaundice      PAST HISTORY:     Past Medical History:   Diagnosis Date    Anemia     Atrial fibrillation     Encounter for blood transfusion     Esophageal ulcer     GI bleed     Hypertension        Past Surgical History:   Procedure Laterality Date    APPLICATION OF WOUND VACUUM-ASSISTED CLOSURE DEVICE N/A 11/8/2022    Procedure: APPLICATION, WOUND VAC;  Surgeon: Mike Hedrick MD;  Location: [facility] OR [address] [place] [address];  Service: General;  Laterality: N/A;    CARDIOVERSION Left 9/15/2022    Procedure: Cardioversion;  Surgeon: Julio James MD;  Location: [facility] CATH LAB/EP;  Service: Cardiology;  Laterality: Left;  With NORBERT    CATHETERIZATION OF BOTH LEFT AND RIGHT HEART N/A 9/22/2022    Procedure: CATHETERIZATION, HEART, BOTH LEFT AND RIGHT;  Surgeon: Julio James MD;  Location: [facility]  CATH LAB/EP;  Service: Cardiology;  Laterality: N/A;    COLONOSCOPY N/A 4/4/2019    Procedure: COLONOSCOPY Golytely;  Surgeon: Umair Randolph MD;  Location: [facility];  Service: Endoscopy;  Laterality: N/A;    CORONARY ANGIOGRAPHY N/A 9/22/2022    Procedure: ANGIOGRAM, CORONARY ARTERY;  Surgeon: Julio James MD;  Location: [facility] CATH LAB/EP;  Service: Cardiology;  Laterality: N/A;    MEYER MAZE PROCEDURE N/A 10/7/2022    Procedure: MEYER MAZE PROCEDURE;  Surgeon: Mike Hedrick MD;  [facility];  Service: Cardiovascular;  Laterality: N/A;    CREATION OF MUSCLE ROTATIONAL FLAP N/A 11/14/2022    Procedure: CREATION, FLAP, MUSCLE ROTATION;  Surgeon: Amadeo Carrasquillo MD;  [facility];  Service: Plastics;  Laterality: N/A;  sternal wound that need pec flap coverage    DEBRIDEMENT OF STERNUM N/A 11/8/2022    Procedure: DEBRIDEMENT, STERNUM;  Surgeon: Mike Hedrick MD;  [address];  Service: General;  Laterality: N/A;    ESOPHAGOGASTRODUODENOSCOPY N/A 10/12/2018    Procedure: EGD (ESOPHAGOGASTRODUODENOSCOPY);  Surgeon: Braden Herring MD;  Location: [facility];  Service: Endoscopy;  Laterality: N/A;    ESOPHAGOGASTRODUODENOSCOPY N/A 4/4/2019    Procedure: EGD;  Surgeon: Umair Randolph MD;  Location: [facility];  Service: Endoscopy;  Laterality: N/A;    EXCLUSION OF LEFT ATRIAL APPENDAGE N/A 10/7/2022    Procedure: EXCLUSION, LEFT ATRIAL APPENDAGE;  Surgeon: Mike Hedrick MD;  [facility];  Service: Cardiovascular;  Laterality: N/A;    HERNIA REPAIR      INSERTION OF INTRAVASCULAR MICROAXIAL BLOOD PUMP N/A 10/7/2022    Procedure: INSERTION, IMPELLA;  Surgeon: Mike Hedrick MD;  [facility];  Service: Cardiovascular;  Laterality: N/A;  direct aortic insertion    IRRIGATION OF MEDIASTINUM N/A 10/7/2022    Procedure: IRRIGATION, MEDIASTINUM;  Surgeon: Mike Hedrick MD;  Location: [facility] OR [address] [place] [address];  Service: Cardiovascular;   Laterality: N/A;    STERNAL WIRES REMOVAL N/A 11/8/2022    Procedure: REMOVAL, STERNAL WIRE;  Surgeon: Mike Hedrick MD;  Location: [facility] OR 2ND FLR;  Service: General;  Laterality: N/A;  Sternal wire removal with muscle flap creation    STERNAL WOUND CLOSURE N/A 11/14/2022    Procedure: CLOSURE, WOUND, STERNUM;  Surgeon: Amadeo Carrasquillo MD;  Location: [facility] OR 2ND FLR;  Service: Plastics;  Laterality: N/A;    TRICUSPID VALVULOPLASTY N/A 10/7/2022    Procedure: REPAIR, TRICUSPID VALVE;  Surgeon: Mike Hedrick MD;  Location: [facility] OR 2ND FLR;  Service: Cardiovascular;  Laterality: N/A;    WOUND EXPLORATION N/A 11/14/2022    Procedure: EXPLORATION, WOUND;  Surgeon: Amadeo Carrasquillo MD;  Location: [facility] OR 2ND FLR;  Service: Plastics;  Laterality: N/A;       Family History   Problem Relation Age of Onset    COPD Mother     Cancer Father          MEDICATIONS & ALLERGIES:     Current Outpatient Medications on File Prior to Visit   Medication Sig Dispense Refill    amiodarone (PACERONE) 200 MG Tab Take 1 tablet (200 mg total) by mouth 2 (two) times daily. 60 tablet 2    aspirin 81 MG Chew Take 1 tablet (81 mg total) by mouth once daily. 360 tablet 0    atorvastatin (LIPITOR) 40 MG tablet Take 1 tablet (40 mg total) by mouth once daily. 90 tablet 3    furosemide (LASIX) 40 MG tablet Take 1 tablet (40 mg total) by mouth 2 (two) times a day. 60 tablet 11    HYDROcodone-acetaminophen (NORCO) 7.5-325 mg per tablet Take 1 tablet by mouth every 6 (six) hours as needed for Pain.      losartan (COZAAR) 25 MG tablet Take 1 tablet (25 mg total) by mouth once daily. 90 tablet 3    magnesium oxide (MAG-OX) 400 mg (241.3 mg magnesium) tablet Take 1 tablet (400 mg total) by mouth 2 (two) times daily. 60 tablet 2    metoprolol succinate (TOPROL-XL) 25 MG 24 hr tablet Take 1 tablet (25 mg total) by mouth once daily. 30 tablet 11    rivaroxaban (XARELTO) 15 mg Tab Take 1 tablet (15 mg total) by mouth daily with  dinner or evening meal. 30 tablet 2    tamsulosin (FLOMAX) 0.4 mg Cap Take 1 capsule (0.4 mg total) by mouth every evening. Take 30 minutes after dinner 90 capsule 3    vitamin renal formula, B-complex-vitamin c-folic acid, (NEPHROCAP) 1 mg Cap Take 1 capsule by mouth once daily. 30 capsule 2     No current facility-administered medications on file prior to visit.        Review of patient's allergies indicates:  No Known Allergies    OBJECTIVE:     Vital Signs:  BP (!) 141/81 (BP Location: Left arm, Patient Position: Sitting, BP Method: Small (Automatic))   Pulse 81   Temp 98.2 °F (36.8 °C) (Oral)   Wt 72.5 kg (159 lb 13.3 oz)   SpO2 95%   BMI 23.60 kg/m²   Wt Readings from Last 3 Encounters:   01/25/23 1357 73.5 kg (162 lb)   01/16/23 0433 66.4 kg (146 lb 6.2 oz)   01/14/23 0012 66.2 kg (145 lb 15.1 oz)   01/13/23 1107 65.1 kg (143 lb 8.3 oz)   01/12/23 1832 65.8 kg (145 lb)   01/12/23 1312 68.1 kg (150 lb 3.2 oz)     Body mass index is 23.6 kg/m².        Physical Exam:  BP (!) 141/81 (BP Location: Left arm, Patient Position: Sitting, BP Method: Small (Automatic))   Pulse 81   Temp 98.2 °F (36.8 °C) (Oral)   Wt 72.5 kg (159 lb 13.3 oz)   SpO2 95%   BMI 23.60 kg/m²   General appearance: alert, cooperative, no distress  Constitutional:Oriented to person, place, and time  + appears well-developed and well-nourished.   HEENT: Normocephalic, atraumatic, neck symmetrical, no nasal discharge   Eyes: conjunctivae/corneas clear, PERRL, EOM's intact  Lungs: + diminished breath sounds RLL   Heart: regular rate and rhythm without rub; no displacement of the PMI   Abdomen: soft, non-tender; bowel sounds normoactive; no organomegaly  Extremities: extremities symmetric; no clubbing, cyanosis, + 3 LE pitting edema, bilateral   Integument: Skin color, texture, turgor normal; no rashes; hair distrubution normal  Neurologic: Alert and oriented X 3, normal strength, normal coordination and gait  Psychiatric: no pressured  speech; normal affect; no evidence of impaired cognition     Laboratory  Lab Results   Component Value Date    WBC 10.17 01/23/2023    HGB 8.9 (L) 01/23/2023    HCT 28.6 (L) 01/23/2023     (H) 01/23/2023     01/23/2023     BMP  Lab Results   Component Value Date     01/23/2023    K 3.7 01/23/2023     01/23/2023    CO2 32 (H) 01/23/2023    BUN 35 (H) 01/23/2023    CREATININE 1.53 (H) 01/23/2023    CALCIUM 8.9 01/23/2023    CALCIUM 8.9 01/23/2023    ANIONGAP 7 (L) 01/23/2023    EGFRNORACEVR 50.8 (A) 01/23/2023     Lab Results   Component Value Date    ALT 21 01/16/2023    AST 22 01/16/2023     (H) 06/03/2019    ALKPHOS 81 01/16/2023    BILITOT 0.4 01/16/2023     Lab Results   Component Value Date    INR 1.2 11/19/2022    INR 1.1 11/18/2022    INR 1.1 11/17/2022     Lab Results   Component Value Date    HGBA1C 4.6 01/13/2023       Diagnostic Results:    2 D echo 1/25/23 :  The left ventricle is normal in size with severely decreased systolic function.  The estimated ejection fraction is 15%.  There is severe left ventricular global hypokinesis.  Left ventricular diastolic dysfunction.  With severely reduced right ventricular systolic function.  Severe left atrial enlargement.  Mild mitral regurgitation.  Moderate aortic regurgitation.  The estimated PA systolic pressure is 27 mmHg.  Normal central venous pressure (3 mmHg).  There is a moderate size protruding right atrial mass present on what appears to be a catheter tip. The mass is mobile. Thrombus vs vegetation suspected. Clinical correlation recommended.    Chest X Ray 1/25/23:  1. Worsening of the right pleural effusion since the previous study.  2. Development of a hilar and infrahilar patchy airspace opacity in the right hemithorax since the previous study.    ASSESSMENT & PLAN:       Acute on chronic combined systolic and diastolic heart failure  - recent hospitalization as above  - continue current dose lasix-> will consider  increasing after review of chest X ray today -  monitor weight , follow sodium restriction   - not optimized presently   - see Cardiology 2/2/23     Pleural effusion on right  Exertional dyspnea  - chest X ray today   -     X-Ray Chest PA And Lateral; Future; Expected date: 01/31/2023    Persistent atrial fibrillation  Anticoagulation   - continue apixaban -> has  coupon to assist with price   - has upcoming dental procedure which will require holding Eliquis-> he will discuss with Cardiology team   -     apixaban (ELIQUIS) 2.5 mg Tab; Take 1 tablet (2.5 mg total) by mouth 2 (two) times daily.  Dispense: 60 tablet; Refill: 2    Acute renal failure, unspecified acute renal failure type  - required temporary dialysis but now has improved renal function and is off dialysis  - GFR 50.3 on 1/23/23   - still has tunneled catheter in place    Addendum 2/1/23-     Chest X Ray 1/31/23-   Similar prominence of the cardiac silhouette including postop changes from heart valve surgery.  Right chest central venous catheter positioning unchanged.  Redemonstration of right hilar and infrahilar patchy opacity and atelectasis which persists with minimal clearing in the interval.  Right pleural effusion which appears similar.  Left chest and axillary surgical clips.      Discussed chest X ray and volume overload, minimal response to diuretic with renal team.  Renal team arranged for outpatient ultrafiltration at dialysis center.   Patient has instructions from renal team on up titration of Lasix.   Discussed above plan with patient via telephone     [facility].  He will see his PCP, Dr Tavera, as directed.   Records will be shared for coordination of care.       Instructions for the patient:      Scheduled Follow-up :  Future Appointments   Date Time Provider Department Center   2/8/2023  8:45 AM Amadeo Carrasquillo MD [facility] PLASTIC [facility]   2/15/2023 12:45 PM Sruthi Avila MD  [facility]   3/2/2023 10:20 AM Shyam Villegas MD [facility] CARDIO [place]   4/12/2023  2:40 PM Roberta Lee NP [facility] UROLOG [facility]       Post Visit Medication List:     Medication List            Accurate as of January 31, 2023 11:59 PM. If you have any questions, ask your nurse or doctor.                START taking these medications      apixaban 2.5 mg Tab  Commonly known as: ELIQUIS  Take 1 tablet (2.5 mg total) by mouth 2 (two) times daily.  Started by: Lenora Mccormick MD            CONTINUE taking these medications      amiodarone 200 MG Tab  Commonly known as: PACERONE  Take 1 tablet (200 mg total) by mouth 2 (two) times daily.     aspirin 81 MG Chew  Take 1 tablet (81 mg total) by mouth once daily.     atorvastatin 40 MG tablet  Commonly known as: LIPITOR  Take 1 tablet (40 mg total) by mouth once daily.     furosemide 40 MG tablet  Commonly known as: LASIX  Take 1 tablet (40 mg total) by mouth 2 (two) times a day.     HYDROcodone-acetaminophen 7.5-325 mg per tablet  Commonly known as: NORCO     losartan 25 MG tablet  Commonly known as: COZAAR  Take 1 tablet (25 mg total) by mouth once daily.     magnesium oxide 400 mg (241.3 mg magnesium) tablet  Commonly known as: MAG-OX  Take 1 tablet (400 mg total) by mouth 2 (two) times daily.     metoprolol succinate 25 MG 24 hr tablet  Commonly known as: TOPROL-XL  Take 1 tablet (25 mg total) by mouth once daily.     tamsulosin 0.4 mg Cap  Commonly known as: FLOMAX  Take 1 capsule (0.4 mg total) by mouth every evening. Take 30 minutes after dinner     vitamin renal formula (B-complex-vitamin c-folic acid) 1 mg Cap  Commonly known as: NEPHROCAP  Take 1 capsule by mouth once daily.               Where to Get Your Medications        These medications were sent to [facility] [address] [id number] [id number]      Hours: Mon-Fri, 8a-5:30p Phone: [phone]   apixaban 2.5 mg Tab         Signing Physician:  Lenora Mccormick MD

## 2023-01-31 NOTE — TELEPHONE ENCOUNTER
Returned call    Stated that he is scheduled for a Root Canal and needs clearance to hold blood thinners.     Reviewed current med list.  Xarelto is on med list pt reports he is taking Eliquis    Note reviewed.   1/24 NP Franklyn discontinued Eliquis and started Xarelto due to cost and sent Rx to preferred pharmacy   Jennifer Estes NP   Nurse Practitioner  Specialty:  Intensive Care  Progress Notes      Signed  Encounter Date:  1/24/2023             Switched from eliquis to xarelto (renally dosed) due to cost. (Patient was previously on xarelto).       Electronically signed by Jennifer Estes NP at 1/24/2023  7:24 AM          Instructed him to continue taking his current medication of Eliquis and to discuss with Dr Villegas on 2/2 during visit.   Verb Understanding.     Reached out to [facility] at [phone] and requested for a clearance form to be faxed to our office for Dr Villegas to completed in regards to holding blood thinners for upcoming root canal. Root Canal is scheduled on Monday 2/6/2023.  She will fax to [place] office with attn: Kiersten Poole of fax will be sent

## 2023-01-31 NOTE — TELEPHONE ENCOUNTER
----- Message from Yamilka Ceballos sent at 1/31/2023  9:53 AM CST -----  Type:  Needs Medical Advice    Who Called: patient  Would the patient rather a call back or a response via [facility]? call  Best Call Back Number: [phone]  Additional Information: He's been on the blood thinners since the end of October and he scheduled for root canal this afternoon and he just want to make sure it's ok for him to have his procedure being that he has been on this medication.

## 2023-02-02 ENCOUNTER — OFFICE VISIT (OUTPATIENT)
Dept: CARDIOLOGY | Facility: CLINIC | Age: 63
End: 2023-02-02
Payer: COMMERCIAL

## 2023-02-02 VITALS
DIASTOLIC BLOOD PRESSURE: 72 MMHG | OXYGEN SATURATION: 94 % | HEIGHT: 69 IN | HEART RATE: 79 BPM | SYSTOLIC BLOOD PRESSURE: 126 MMHG | BODY MASS INDEX: 22.64 KG/M2 | WEIGHT: 152.88 LBS

## 2023-02-02 DIAGNOSIS — Z98.890 HX OF MITRAL VALVE REPAIR: Primary | ICD-10-CM

## 2023-02-02 DIAGNOSIS — I48.0 PAROXYSMAL ATRIAL FIBRILLATION: ICD-10-CM

## 2023-02-02 PROCEDURE — 3008F PR BODY MASS INDEX (BMI) DOCUMENTED: ICD-10-PCS | Mod: CPTII,S$GLB,, | Performed by: INTERNAL MEDICINE

## 2023-02-02 PROCEDURE — 4010F ACE/ARB THERAPY RXD/TAKEN: CPT | Mod: CPTII,S$GLB,, | Performed by: INTERNAL MEDICINE

## 2023-02-02 PROCEDURE — 3078F PR MOST RECENT DIASTOLIC BLOOD PRESSURE < 80 MM HG: ICD-10-PCS | Mod: CPTII,S$GLB,, | Performed by: INTERNAL MEDICINE

## 2023-02-02 PROCEDURE — 3078F DIAST BP <80 MM HG: CPT | Mod: CPTII,S$GLB,, | Performed by: INTERNAL MEDICINE

## 2023-02-02 PROCEDURE — 1160F RVW MEDS BY RX/DR IN RCRD: CPT | Mod: CPTII,S$GLB,, | Performed by: INTERNAL MEDICINE

## 2023-02-02 PROCEDURE — 3074F SYST BP LT 130 MM HG: CPT | Mod: CPTII,S$GLB,, | Performed by: INTERNAL MEDICINE

## 2023-02-02 PROCEDURE — 3044F PR MOST RECENT HEMOGLOBIN A1C LEVEL <7.0%: ICD-10-PCS | Mod: CPTII,S$GLB,, | Performed by: INTERNAL MEDICINE

## 2023-02-02 PROCEDURE — 3066F NEPHROPATHY DOC TX: CPT | Mod: CPTII,S$GLB,, | Performed by: INTERNAL MEDICINE

## 2023-02-02 PROCEDURE — 1160F PR REVIEW ALL MEDS BY PRESCRIBER/CLIN PHARMACIST DOCUMENTED: ICD-10-PCS | Mod: CPTII,S$GLB,, | Performed by: INTERNAL MEDICINE

## 2023-02-02 PROCEDURE — 1159F PR MEDICATION LIST DOCUMENTED IN MEDICAL RECORD: ICD-10-PCS | Mod: CPTII,S$GLB,, | Performed by: INTERNAL MEDICINE

## 2023-02-02 PROCEDURE — 99214 PR OFFICE/OUTPT VISIT, EST, LEVL IV, 30-39 MIN: ICD-10-PCS | Mod: S$GLB,,, | Performed by: INTERNAL MEDICINE

## 2023-02-02 PROCEDURE — 3044F HG A1C LEVEL LT 7.0%: CPT | Mod: CPTII,S$GLB,, | Performed by: INTERNAL MEDICINE

## 2023-02-02 PROCEDURE — 1159F MED LIST DOCD IN RCRD: CPT | Mod: CPTII,S$GLB,, | Performed by: INTERNAL MEDICINE

## 2023-02-02 PROCEDURE — 99999 PR PBB SHADOW E&M-EST. PATIENT-LVL IV: CPT | Mod: PBBFAC,,, | Performed by: INTERNAL MEDICINE

## 2023-02-02 PROCEDURE — 1111F PR DISCHARGE MEDS RECONCILED W/ CURRENT OUTPATIENT MED LIST: ICD-10-PCS | Mod: CPTII,S$GLB,, | Performed by: INTERNAL MEDICINE

## 2023-02-02 PROCEDURE — 4010F PR ACE/ARB THEARPY RXD/TAKEN: ICD-10-PCS | Mod: CPTII,S$GLB,, | Performed by: INTERNAL MEDICINE

## 2023-02-02 PROCEDURE — 99214 OFFICE O/P EST MOD 30 MIN: CPT | Mod: S$GLB,,, | Performed by: INTERNAL MEDICINE

## 2023-02-02 PROCEDURE — 1111F DSCHRG MED/CURRENT MED MERGE: CPT | Mod: CPTII,S$GLB,, | Performed by: INTERNAL MEDICINE

## 2023-02-02 PROCEDURE — 3008F BODY MASS INDEX DOCD: CPT | Mod: CPTII,S$GLB,, | Performed by: INTERNAL MEDICINE

## 2023-02-02 PROCEDURE — 99999 PR PBB SHADOW E&M-EST. PATIENT-LVL IV: ICD-10-PCS | Mod: PBBFAC,,, | Performed by: INTERNAL MEDICINE

## 2023-02-02 PROCEDURE — 3074F PR MOST RECENT SYSTOLIC BLOOD PRESSURE < 130 MM HG: ICD-10-PCS | Mod: CPTII,S$GLB,, | Performed by: INTERNAL MEDICINE

## 2023-02-02 PROCEDURE — 3066F PR DOCUMENTATION OF TREATMENT FOR NEPHROPATHY: ICD-10-PCS | Mod: CPTII,S$GLB,, | Performed by: INTERNAL MEDICINE

## 2023-02-02 NOTE — PROGRESS NOTES
Subjective:    Patient ID:  David Barrios is a 62 y.o. male who presents for follow-up of Congestive Heart Failure and Valvular Heart Disease      HPI    61 y/o male former pt of Dr James. He has a hx of Afib on chronic AC with DOAC, HTN, HFrEF (NICM per [facility] earlier this year), severe MR s/p MVr and TVr by Dr Hedrick complicated by sternal wound infection. Diagnosed with afib with depressed EF earlier this year, had successful NORBERT/DCCV and noted to have severe MR. Sent to [facility] and had MVr and TVr:   Complex mitral valve repair with A2-P2 [id number]-sadl repair (Scotty) and 30mm Medtronic Simulus annuloplasty band  Tricuspid valve repair with 30mm Medtronic Tri-Ad Sullivan annuloplasty band  Left atrial Benz Maze cryoablation  Left atrial appendage resection  Direct open chest insertion of Impella 5.5 via aortic graft (10mm Vascutek Gelweave) into the left ventricle  Had subsequent sternal wound infection with re-intervention/exporation with:  Mediastinal exploration with evacuation of purulent pericarditis              - Debridement and decortication of mediastinal structures              - Resection of 10mm aortic graft on the ascending aorta              - Removal of sternal wires              - Partial bilateral sternectomy              - Wound vac placement by Dr. Carrasquillo' team  Currently on HD for KIMANI post surgery 3 times weekly via tunneled catheter and tolerating well. PICC line recently removed. Last 2DE with:  The left ventricle is normal in size with moderately decreased systolic function. The estimated ejection fraction is 35%.  Mild right ventricular enlargement with moderately reduced right ventricular systolic function.  Indeterminate left ventricular diastolic function.  Severe left atrial enlargement.  Moderate aortic regurgitation.  The mitral and tricuspid valves are s/p repair with no significant residual regurgitation.  Moderate circumferential pericardial effusion with focal stranding. No evidence  of tamponade.  The IVC was not visualized.  Had recent CXR with:  1. There is a moderate amount of haziness scattered throughout the inferior half of the right lung.  An infectious process cannot be excluded.  2. There has been interval removal a right internal jugular venous line. There has been interval removal of a right PICC. There has been interval placement of a right subclavian venous line. Its tip is in the superior vena cava.  There is no pneumothorax.  3. The size of the heart is prominent.  This may be secondary to magnification.  4. There are surgical clips projected over the left hemithorax.  Wife is present. His main complaint is of severe BAI/SOB with minimal activity. Has CP from surgical site which shows no evidence of infection (images in media section). Has palpable, fluid mass in left axilla area where another surgical scar is present. Denies orthopnea, PND, syncope, fevers, chills. Continues to make some urine. BP stable. Not on BB for unknown reason. Not on ACE-I/ARB likely secondary to renal dysfunction. As been trying to stay active.     2/2/2023:  Since last visit was hospitalized for findings on CXR thought to be pneumo, was not. Currently clinically stable. Looks much better than when I last saw him. Had 2DE with:  The left ventricle is normal in size with severely decreased systolic function.  The estimated ejection fraction is 15%.  There is severe left ventricular global hypokinesis.  Left ventricular diastolic dysfunction.  With severely reduced right ventricular systolic function.  Severe left atrial enlargement.  Mild mitral regurgitation.  Moderate aortic regurgitation.  The estimated PA systolic pressure is 27 mmHg.  Normal central venous pressure (3 mmHg).  There is a moderate size protruding right atrial mass present on what appears to be a catheter tip. The mass is mobile. Thrombus vs vegetation suspected. Clinical correlation recommended.  Discussed with Dr Ramsey and   Spindel and consensus was to continue monitoring and no removal of HD cath. Had HD eysterday for volume removal, but prior to that has been a couple weeks. Making good urine. Continues to have BAI and currently with NYHA FC III symptoms. Denies orthopnea, PND, syncope, fevers, chills. Confusion with DOAC.     Review of Systems   Constitutional: Positive for malaise/fatigue.   HENT:  Negative for congestion.    Eyes:  Negative for blurred vision.   Cardiovascular:  Positive for chest pain, dyspnea on exertion and leg swelling. Negative for claudication, cyanosis, irregular heartbeat, near-syncope, orthopnea, palpitations, paroxysmal nocturnal dyspnea and syncope.   Respiratory:  Positive for shortness of breath.    Endocrine: Negative for polyuria.   Hematologic/Lymphatic: Negative for bleeding problem.   Skin:  Positive for color change and poor wound healing. Negative for itching and rash.   Musculoskeletal:  Positive for muscle weakness. Negative for joint swelling and muscle cramps.   Gastrointestinal:  Negative for abdominal pain, hematemesis, hematochezia, melena, nausea and vomiting.   Genitourinary:  Negative for dysuria and hematuria.   Neurological:  Positive for weakness. Negative for dizziness, focal weakness, headaches, light-headedness and loss of balance.   Psychiatric/Behavioral:  Negative for depression. The patient is not nervous/anxious.       Objective:    Physical Exam  Constitutional:       Appearance: He is well-developed.   HENT:      Head: Normocephalic and atraumatic.   Neck:      Vascular: No JVD.   Cardiovascular:      Rate and Rhythm: Normal rate and regular rhythm.      Pulses:           Carotid pulses are 2+ on the right side and 2+ on the left side.       Radial pulses are 2+ on the right side and 2+ on the left side.        Femoral pulses are 2+ on the right side and 2+ on the left side.       Posterior tibial pulses are 1+ on the right side and 1+ on the left side.      Heart sounds:  Normal heart sounds.   Pulmonary:      Effort: Pulmonary effort is normal.      Breath sounds: Normal breath sounds.   Abdominal:      General: Bowel sounds are normal.      Palpations: Abdomen is soft.   Musculoskeletal:      Cervical back: Neck supple.      Comments: Sternotomy scar healing well along with left axilla    Skin:     General: Skin is warm and dry.   Neurological:      Mental Status: He is alert and oriented to person, place, and time.   Psychiatric:         Behavior: Behavior normal.         Thought Content: Thought content normal.         Assessment:       1. Hx of mitral valve repair    2. Paroxysmal atrial fibrillation      63 y/o pt with hx and presentation as above. Surgical sites healing well. Will finish current script of Eliquis and given improvement in renal function will resume Xarelto after. BP stable. Needs to be more active. Referral placed for cardiac rehab when clear from a surgical standpoint. Repeat 2DE to eval function and pericardial effusion. Discussed the etiology, evaluation, and management of HFrEF, Afib, MVr, TVr, CHF. Discussed the importance of med compliance, heart healthy diet, and regular exercise.      Plan:       -Cardiac rehab  -f/u in 1 month

## 2023-02-02 NOTE — PATIENT INSTRUCTIONS
Continue taking Eliquis 2.5 twice daily until current prescription is done, then restart Xarelto 20 mg once daily after that.

## 2023-02-03 ENCOUNTER — PATIENT MESSAGE (OUTPATIENT)
Dept: CARDIOLOGY | Facility: CLINIC | Age: 63
End: 2023-02-03
Payer: COMMERCIAL

## 2023-02-03 ENCOUNTER — TELEPHONE (OUTPATIENT)
Dept: CARDIOLOGY | Facility: CLINIC | Age: 63
End: 2023-02-03
Payer: COMMERCIAL

## 2023-02-03 NOTE — TELEPHONE ENCOUNTER
----- Message from Hanpriyanka Tabor sent at 2/3/2023 11:00 AM CST -----  Contact: pt  Type: Requesting to speak with nurse        Who Called: PT  Regarding: does he need to pre medicate for his root canal Monday ? Would like a call back as soon as possible   Would the patient rather a call back or a response via [facility]? Call back  Best Call Back Number: [phone]  Additional Information:

## 2023-02-06 ENCOUNTER — LAB VISIT (OUTPATIENT)
Dept: LAB | Facility: HOSPITAL | Age: 63
End: 2023-02-06
Attending: INTERNAL MEDICINE
Payer: COMMERCIAL

## 2023-02-06 DIAGNOSIS — N17.8 ACUTE RENAL FAILURE WITH OTHER SPECIFIED PATHOLOGICAL LESION IN KIDNEY: ICD-10-CM

## 2023-02-06 LAB
ALBUMIN SERPL BCP-MCNC: 4.1 G/DL (ref 3.5–5.2)
ANION GAP SERPL CALC-SCNC: 9 MMOL/L (ref 8–16)
CALCIUM SERPL-MCNC: 9.3 MG/DL (ref 8.7–10.5)
CHLORIDE SERPL-SCNC: 101 MMOL/L (ref 95–110)
CO2 SERPL-SCNC: 32 MMOL/L (ref 23–29)
CREAT SERPL-MCNC: 1.57 MG/DL (ref 0.5–1.4)
EST. GFR  (NO RACE VARIABLE): 49.2 ML/MIN/1.73 M^2
GLUCOSE SERPL-MCNC: 101 MG/DL (ref 70–110)
MAGNESIUM SERPL-MCNC: 1.9 MG/DL (ref 1.6–2.6)
PHOSPHATE SERPL-MCNC: 4.3 MG/DL (ref 2.7–4.5)
POTASSIUM SERPL-SCNC: 4.2 MMOL/L (ref 3.5–5.1)
SODIUM SERPL-SCNC: 142 MMOL/L (ref 136–145)
URATE SERPL-MCNC: 10.8 MG/DL (ref 3.4–7)
UUN UR-MCNC: 28 MG/DL (ref 2–20)

## 2023-02-06 PROCEDURE — 36415 COLL VENOUS BLD VENIPUNCTURE: CPT | Mod: PO | Performed by: INTERNAL MEDICINE

## 2023-02-06 PROCEDURE — 84550 ASSAY OF BLOOD/URIC ACID: CPT | Performed by: INTERNAL MEDICINE

## 2023-02-06 PROCEDURE — 80069 RENAL FUNCTION PANEL: CPT | Mod: PO | Performed by: INTERNAL MEDICINE

## 2023-02-06 PROCEDURE — 83735 ASSAY OF MAGNESIUM: CPT | Mod: PO | Performed by: INTERNAL MEDICINE

## 2023-02-08 ENCOUNTER — OFFICE VISIT (OUTPATIENT)
Dept: PLASTIC SURGERY | Facility: CLINIC | Age: 63
End: 2023-02-08
Payer: COMMERCIAL

## 2023-02-08 VITALS — BODY MASS INDEX: 22.66 KG/M2 | WEIGHT: 153 LBS | HEIGHT: 69 IN

## 2023-02-08 DIAGNOSIS — L08.9 STERNAL WOUND INFECTION: Primary | ICD-10-CM

## 2023-02-08 DIAGNOSIS — S21.101A STERNAL WOUND INFECTION: Primary | ICD-10-CM

## 2023-02-08 PROCEDURE — 99999 PR PBB SHADOW E&M-EST. PATIENT-LVL III: ICD-10-PCS | Mod: PBBFAC,,, | Performed by: SURGERY

## 2023-02-08 PROCEDURE — 1159F PR MEDICATION LIST DOCUMENTED IN MEDICAL RECORD: ICD-10-PCS | Mod: CPTII,S$GLB,, | Performed by: SURGERY

## 2023-02-08 PROCEDURE — 1160F RVW MEDS BY RX/DR IN RCRD: CPT | Mod: CPTII,S$GLB,, | Performed by: SURGERY

## 2023-02-08 PROCEDURE — 4010F PR ACE/ARB THEARPY RXD/TAKEN: ICD-10-PCS | Mod: CPTII,S$GLB,, | Performed by: SURGERY

## 2023-02-08 PROCEDURE — 3044F PR MOST RECENT HEMOGLOBIN A1C LEVEL <7.0%: ICD-10-PCS | Mod: CPTII,S$GLB,, | Performed by: SURGERY

## 2023-02-08 PROCEDURE — 3008F BODY MASS INDEX DOCD: CPT | Mod: CPTII,S$GLB,, | Performed by: SURGERY

## 2023-02-08 PROCEDURE — 3008F PR BODY MASS INDEX (BMI) DOCUMENTED: ICD-10-PCS | Mod: CPTII,S$GLB,, | Performed by: SURGERY

## 2023-02-08 PROCEDURE — 99024 PR POST-OP FOLLOW-UP VISIT: ICD-10-PCS | Mod: S$GLB,,, | Performed by: SURGERY

## 2023-02-08 PROCEDURE — 1159F MED LIST DOCD IN RCRD: CPT | Mod: CPTII,S$GLB,, | Performed by: SURGERY

## 2023-02-08 PROCEDURE — 99024 POSTOP FOLLOW-UP VISIT: CPT | Mod: S$GLB,,, | Performed by: SURGERY

## 2023-02-08 PROCEDURE — 3066F NEPHROPATHY DOC TX: CPT | Mod: CPTII,S$GLB,, | Performed by: SURGERY

## 2023-02-08 PROCEDURE — 3044F HG A1C LEVEL LT 7.0%: CPT | Mod: CPTII,S$GLB,, | Performed by: SURGERY

## 2023-02-08 PROCEDURE — 99999 PR PBB SHADOW E&M-EST. PATIENT-LVL III: CPT | Mod: PBBFAC,,, | Performed by: SURGERY

## 2023-02-08 PROCEDURE — 3066F PR DOCUMENTATION OF TREATMENT FOR NEPHROPATHY: ICD-10-PCS | Mod: CPTII,S$GLB,, | Performed by: SURGERY

## 2023-02-08 PROCEDURE — 1160F PR REVIEW ALL MEDS BY PRESCRIBER/CLIN PHARMACIST DOCUMENTED: ICD-10-PCS | Mod: CPTII,S$GLB,, | Performed by: SURGERY

## 2023-02-08 PROCEDURE — 4010F ACE/ARB THERAPY RXD/TAKEN: CPT | Mod: CPTII,S$GLB,, | Performed by: SURGERY

## 2023-02-08 NOTE — PROGRESS NOTES
Patient presents [facility] after undergoing a sternal wound closure using bilateral pectoralis major muscle flaps.  Postoperatively he developed some renal failure and did require dialysis.  He is done very well since the time.  His drains are out he does have a liquid encapsulated small hematoma in the left axillary region.  This was aspirated once in the office several weeks ago. We had aspirated liquid blood. We have been very hesitant to aspirated again since he is on a blood thinner. However, today the patient reports that the mass is bigger and gives him a lot discomfort. He would like something to be done about it. He understands that his eliquis puts him at higher risk for bleeding, but still would like something to be done.     The patient 1 month ago had some shortness of breath. A cxr revealed he still had pleural effusiion. Although his kidneys are doing a good job with filtration, he has been getting HD to help to the removal of excess fluid.     We again attempted aspiration today. The Mass is much firmer today. After prepping the skin with alcohol, We attempted to aspirate with 18G without much success. The hematoma is now solidified and is developing into a chocolate cyst.       I stated We would not do anything to the encapsulated small hematoma until his renal failure was totally resolved and he was in very good medical and physical shape.  That maybe 3 months down the road. He will need to be off HD and have no medical issues for 3 months, then we will schedule him for surgery.     Follow up first week of May.

## 2023-02-09 ENCOUNTER — TELEPHONE (OUTPATIENT)
Dept: CARDIOLOGY | Facility: CLINIC | Age: 63
End: 2023-02-09
Payer: COMMERCIAL

## 2023-02-09 NOTE — TELEPHONE ENCOUNTER
Pt stated to call [facility] office and get w her to see what is needed for his upcoming dental work         Called [facility] cardiac clearance form has been received and on Dr. Villegas's desk for him to fill out             ja

## 2023-02-09 NOTE — TELEPHONE ENCOUNTER
----- Message from Abbi Serrano sent at 2/9/2023  3:31 PM CST -----  Contact: pt  Type:  Patient Returning Call    Who Called:pt   Who Left Message for Patient:office  Does the patient know what this is regarding?:fax from dentist office (Dr. [facility] office)  Would the patient rather a call back or a response via [facility]? Call   Best Call Back Number:[phone]  Additional Information:     Pt has a procedure on next week and he needs the paperwork that was sent over to office   Pt would like a call confirming msg was sent to office

## 2023-02-13 ENCOUNTER — TELEPHONE (OUTPATIENT)
Dept: CARDIOLOGY | Facility: CLINIC | Age: 63
End: 2023-02-13
Payer: COMMERCIAL

## 2023-02-13 ENCOUNTER — HOSPITAL ENCOUNTER (OUTPATIENT)
Dept: RADIOLOGY | Facility: HOSPITAL | Age: 63
Discharge: HOME | End: 2023-02-13
Attending: INTERNAL MEDICINE
Payer: COMMERCIAL

## 2023-02-13 DIAGNOSIS — J90 PLEURAL EFFUSION: ICD-10-CM

## 2023-02-13 PROCEDURE — 71046 XR CHEST PA AND LATERAL: ICD-10-PCS | Mod: 26,,, | Performed by: RADIOLOGY

## 2023-02-13 PROCEDURE — 71046 X-RAY EXAM CHEST 2 VIEWS: CPT | Mod: TC,FY,PO

## 2023-02-13 PROCEDURE — 71046 X-RAY EXAM CHEST 2 VIEWS: CPT | Mod: 26,,, | Performed by: RADIOLOGY

## 2023-02-13 NOTE — TELEPHONE ENCOUNTER
----- Message from Myra Padron sent at 2/13/2023 10:42 AM CST -----  Type:  Patient Returning Call    Who Called:Pt   Who Left Message for Patient:Kiersten   Does the patient know what this is regarding?:yes dental clearance documents  Would the patient rather a call back or a response via [facility]? Yes   Best Call Back Number:[phone]  Additional Information: Dental paperwork needs to fax paperwork over to his dental office.  Or please call the pt He needs this done today his procedure is on tomorrow.

## 2023-02-13 NOTE — TELEPHONE ENCOUNTER
----- Message from Claudia Watkins sent at 2/13/2023  8:01 AM CST -----  Contact: [facility]  Type:  Needs Medical Advice    Who Called:  Jewell  Symptoms (please be specific): calling to follow up on medical clearance , never received     Would the patient rather a call back or a response via [facility]?  Call   Best Call Back Number:  [phone]  Additional Information:  fax [phone]

## 2023-02-13 NOTE — TELEPHONE ENCOUNTER
2/13/23 patient came to clinic in order to retrieve dental clearance, writer informed patient that Dr. Villegas is in the Cath lab at the moment, patient notably upset because this was to happen on Friday however Dr. Villegas was out of the office.  Writer assured patient that as soon as provider signs clearance that I would fax to dentist office, writer also called Jewell at [facility] and explained the situation and she verbalized understanding.  Mr. Barrios left the office and thanked writer for the assistance.  SRM

## 2023-02-13 NOTE — TELEPHONE ENCOUNTER
2/13/23 spoke to patient he stated he needs his clearance for dental work faxed so that he can have work performed on Wednesday.  SRM

## 2023-02-13 NOTE — TELEPHONE ENCOUNTER
2/13/23 patient came to clinic in order to retrieve dental clearance, writer informed patient that Dr. Villegas is in the Cath lab at the moment, patient notably upset because this was to happen on Friday however Dr. Villegas was out of the office.  Writer assured patient that as soon as provider signs clearance that I would fax to dentist office, writer also called Jewell at [facility] and explained the situation and she verbalized understanding.  Mr. Barrios left the office and thanked writer for the assistance.  SRM

## 2023-02-13 NOTE — TELEPHONE ENCOUNTER
2/13/23 patient came to clinic in order to retrieve dental clearance, writer informed patient that Dr. Villegas is in the Cath lab at the moment, patient notably upset because this was to happen on Friday however Dr. Villegas was out of the office.  Writer assured patient that as soon as provider signs clearance that I would fax to dentist office, writer also called Jewell at [facility] and explained the situation and she verbalized understanding.  Mr. Barrios left the office and thanked writer for the assistance.  SRM

## 2023-02-13 NOTE — TELEPHONE ENCOUNTER
----- Message from Abbi Serrano sent at 2/13/2023  9:56 AM CST -----  Type:  Needs Medical Advice    Who Called: pt  Symptoms (please be specific): personal regarding heart procedure   Would the patient rather a call back or a response via [facility]? Call   Best Call Back Number:  [phone]  Additional Information:     Pt was told someone from the office would call him and this was last Friday   Pt stated he needs to talk to someone asap

## 2023-02-13 NOTE — TELEPHONE ENCOUNTER
----- Message from Celeste Griffin sent at 2/13/2023  8:47 AM CST -----  Type:  Needs Medical Advice    Who Called: pt  Symptoms (please be specific): pt is requesting to get a return call pt is waiting to get some paperwork sent to his Dentist      Would the patient rather a call back or a response via [facility]? call  Best Call Back Number: [phone]  Additional Information:

## 2023-02-15 DIAGNOSIS — J90 PLEURAL EFFUSION: ICD-10-CM

## 2023-02-15 DIAGNOSIS — R79.81 ELEVATED CARBON DIOXIDE LEVEL: Primary | ICD-10-CM

## 2023-02-15 DIAGNOSIS — I48.0 PAROXYSMAL ATRIAL FIBRILLATION: ICD-10-CM

## 2023-02-15 NOTE — PROGRESS NOTES
Case discussed with Nephrology, Dr Avila.  Persistently elevated serum CO2 which cannot be attributed to diuresis. Will proceed with outpatient ABG to further evaluate-> 2/16/23 @ 10 AM.   Persist ant symptomatic pleural effusion despite aggressive diuresis and outpatient ultrafiltration. Will proceed with diagnostic and therapeutic thoracentesis with IR team- to be scheduled. Patient on Eliquis and Aspirin, with plan to change Eliquis -> Xarelto on 3/1/23. Will coordinate with IR team regarding temporary hold on anticoagulants.

## 2023-02-16 ENCOUNTER — HOSPITAL ENCOUNTER (OUTPATIENT)
Dept: PULMONOLOGY | Facility: CLINIC | Age: 63
Discharge: HOME | End: 2023-02-16
Payer: COMMERCIAL

## 2023-02-16 LAB
ALLENS TEST: ABNORMAL
DELSYS: ABNORMAL
FIO2: 0.21
HCO3 UR-SCNC: 30.8 MMOL/L (ref 24–28)
MODE: ABNORMAL
PCO2 BLDA: 43.2 MMHG (ref 35–45)
PH SMN: 7.46 [PH] (ref 7.35–7.45)
PO2 BLDA: 90 MMHG (ref 80–100)
POC BE: 7 MMOL/L
POC SATURATED O2: 97 % (ref 95–100)
POC TCO2: 32 MMOL/L (ref 23–27)
SAMPLE: ABNORMAL
SITE: ABNORMAL

## 2023-02-16 PROCEDURE — 82803 BLOOD GASES ANY COMBINATION: CPT | Mod: S$GLB,,, | Performed by: INTERNAL MEDICINE

## 2023-02-16 PROCEDURE — 36600 WITHDRAWAL OF ARTERIAL BLOOD: CPT | Mod: S$GLB,,, | Performed by: INTERNAL MEDICINE

## 2023-02-16 PROCEDURE — 82803 PR  BLOOD GASES: PH, PO2 & PCO2: ICD-10-PCS | Mod: S$GLB,,, | Performed by: INTERNAL MEDICINE

## 2023-02-16 PROCEDURE — 36600 PR WITHDRAWAL OF ARTERIAL BLOOD: ICD-10-PCS | Mod: S$GLB,,, | Performed by: INTERNAL MEDICINE

## 2023-03-01 ENCOUNTER — LAB VISIT (OUTPATIENT)
Dept: LAB | Facility: HOSPITAL | Age: 63
End: 2023-03-01
Attending: INTERNAL MEDICINE
Payer: COMMERCIAL

## 2023-03-01 ENCOUNTER — TELEPHONE (OUTPATIENT)
Dept: PRIMARY CARE CLINIC | Facility: CLINIC | Age: 63
End: 2023-03-01
Payer: COMMERCIAL

## 2023-03-01 DIAGNOSIS — J90 PLEURAL EFFUSION: ICD-10-CM

## 2023-03-01 DIAGNOSIS — D50.8 OTHER IRON DEFICIENCY ANEMIA: ICD-10-CM

## 2023-03-01 DIAGNOSIS — N17.9 AKI (ACUTE KIDNEY INJURY): ICD-10-CM

## 2023-03-01 LAB
ALBUMIN SERPL BCP-MCNC: 4.6 G/DL (ref 3.5–5.2)
ANION GAP SERPL CALC-SCNC: 8 MMOL/L (ref 8–16)
BASOPHILS # BLD AUTO: 0.04 K/UL (ref 0–0.2)
BASOPHILS # BLD AUTO: 0.04 K/UL (ref 0–0.2)
BASOPHILS NFR BLD: 0.4 % (ref 0–1.9)
BASOPHILS NFR BLD: 0.4 % (ref 0–1.9)
CALCIUM SERPL-MCNC: 9 MG/DL (ref 8.7–10.5)
CHLORIDE SERPL-SCNC: 100 MMOL/L (ref 95–110)
CO2 SERPL-SCNC: 32 MMOL/L (ref 23–29)
CREAT SERPL-MCNC: 1.71 MG/DL (ref 0.5–1.4)
DIFFERENTIAL METHOD: ABNORMAL
DIFFERENTIAL METHOD: ABNORMAL
EOSINOPHIL # BLD AUTO: 0.2 K/UL (ref 0–0.5)
EOSINOPHIL # BLD AUTO: 0.2 K/UL (ref 0–0.5)
EOSINOPHIL NFR BLD: 1.9 % (ref 0–8)
EOSINOPHIL NFR BLD: 1.9 % (ref 0–8)
ERYTHROCYTE [DISTWIDTH] IN BLOOD BY AUTOMATED COUNT: 14.6 % (ref 11.5–14.5)
ERYTHROCYTE [DISTWIDTH] IN BLOOD BY AUTOMATED COUNT: 14.6 % (ref 11.5–14.5)
EST. GFR  (NO RACE VARIABLE): 44.4 ML/MIN/1.73 M^2
FERRITIN SERPL-MCNC: 521 NG/ML (ref 20–300)
GLUCOSE SERPL-MCNC: 106 MG/DL (ref 70–110)
HCT VFR BLD AUTO: 35.2 % (ref 40–54)
HCT VFR BLD AUTO: 35.2 % (ref 40–54)
HGB BLD-MCNC: 11.1 G/DL (ref 14–18)
HGB BLD-MCNC: 11.1 G/DL (ref 14–18)
IMM GRANULOCYTES # BLD AUTO: 0.03 K/UL (ref 0–0.04)
IMM GRANULOCYTES # BLD AUTO: 0.03 K/UL (ref 0–0.04)
IMM GRANULOCYTES NFR BLD AUTO: 0.3 % (ref 0–0.5)
IMM GRANULOCYTES NFR BLD AUTO: 0.3 % (ref 0–0.5)
IRON SERPL-MCNC: 78 UG/DL (ref 45–160)
LYMPHOCYTES # BLD AUTO: 1.8 K/UL (ref 1–4.8)
LYMPHOCYTES # BLD AUTO: 1.8 K/UL (ref 1–4.8)
LYMPHOCYTES NFR BLD: 17.6 % (ref 18–48)
LYMPHOCYTES NFR BLD: 17.6 % (ref 18–48)
MCH RBC QN AUTO: 31.4 PG (ref 27–31)
MCH RBC QN AUTO: 31.4 PG (ref 27–31)
MCHC RBC AUTO-ENTMCNC: 31.5 G/DL (ref 32–36)
MCHC RBC AUTO-ENTMCNC: 31.5 G/DL (ref 32–36)
MCV RBC AUTO: 100 FL (ref 82–98)
MCV RBC AUTO: 100 FL (ref 82–98)
MONOCYTES # BLD AUTO: 0.9 K/UL (ref 0.3–1)
MONOCYTES # BLD AUTO: 0.9 K/UL (ref 0.3–1)
MONOCYTES NFR BLD: 8.6 % (ref 4–15)
MONOCYTES NFR BLD: 8.6 % (ref 4–15)
NEUTROPHILS # BLD AUTO: 7.4 K/UL (ref 1.8–7.7)
NEUTROPHILS # BLD AUTO: 7.4 K/UL (ref 1.8–7.7)
NEUTROPHILS NFR BLD: 71.2 % (ref 38–73)
NEUTROPHILS NFR BLD: 71.2 % (ref 38–73)
NRBC BLD-RTO: 0 /100 WBC
NRBC BLD-RTO: 0 /100 WBC
PHOSPHATE SERPL-MCNC: 4.3 MG/DL (ref 2.7–4.5)
PLATELET # BLD AUTO: 239 K/UL (ref 150–450)
PLATELET # BLD AUTO: 239 K/UL (ref 150–450)
PMV BLD AUTO: 11.2 FL (ref 9.2–12.9)
PMV BLD AUTO: 11.2 FL (ref 9.2–12.9)
POTASSIUM SERPL-SCNC: 4.1 MMOL/L (ref 3.5–5.1)
RBC # BLD AUTO: 3.53 M/UL (ref 4.6–6.2)
RBC # BLD AUTO: 3.53 M/UL (ref 4.6–6.2)
SATURATED IRON: 21 % (ref 20–50)
SODIUM SERPL-SCNC: 140 MMOL/L (ref 136–145)
TOTAL IRON BINDING CAPACITY: 366 UG/DL (ref 250–450)
TRANSFERRIN SERPL-MCNC: 247 MG/DL (ref 200–375)
URATE SERPL-MCNC: 9.4 MG/DL (ref 3.4–7)
UUN UR-MCNC: 51 MG/DL (ref 2–20)
WBC # BLD AUTO: 10.45 K/UL (ref 3.9–12.7)
WBC # BLD AUTO: 10.45 K/UL (ref 3.9–12.7)

## 2023-03-01 PROCEDURE — 84550 ASSAY OF BLOOD/URIC ACID: CPT | Performed by: INTERNAL MEDICINE

## 2023-03-01 PROCEDURE — 83970 ASSAY OF PARATHORMONE: CPT | Mod: PO | Performed by: INTERNAL MEDICINE

## 2023-03-01 PROCEDURE — 80069 RENAL FUNCTION PANEL: CPT | Mod: PO | Performed by: INTERNAL MEDICINE

## 2023-03-01 PROCEDURE — 85025 COMPLETE CBC W/AUTO DIFF WBC: CPT | Mod: PO | Performed by: INTERNAL MEDICINE

## 2023-03-01 PROCEDURE — 82610 CYSTATIN C: CPT | Mod: PO | Performed by: INTERNAL MEDICINE

## 2023-03-01 PROCEDURE — 82728 ASSAY OF FERRITIN: CPT | Performed by: INTERNAL MEDICINE

## 2023-03-01 PROCEDURE — 84466 ASSAY OF TRANSFERRIN: CPT | Mod: PO | Performed by: INTERNAL MEDICINE

## 2023-03-01 NOTE — TELEPHONE ENCOUNTER
Patient with questions regarding pre labs and blood thinners about upcoming IR procedure on Friday 3/3. Per , pt does not need any pre labs prior to IR Procedure on Friday and does not need to hold blood thinners prior to procedure also. Pt verbalized understanding.

## 2023-03-02 ENCOUNTER — TELEPHONE (OUTPATIENT)
Dept: INTERVENTIONAL RADIOLOGY/VASCULAR | Facility: HOSPITAL | Age: 63
End: 2023-03-02
Payer: COMMERCIAL

## 2023-03-02 ENCOUNTER — OFFICE VISIT (OUTPATIENT)
Dept: CARDIOLOGY | Facility: CLINIC | Age: 63
End: 2023-03-02
Payer: COMMERCIAL

## 2023-03-02 VITALS
SYSTOLIC BLOOD PRESSURE: 83 MMHG | HEIGHT: 69 IN | DIASTOLIC BLOOD PRESSURE: 45 MMHG | WEIGHT: 150.69 LBS | OXYGEN SATURATION: 95 % | BODY MASS INDEX: 22.32 KG/M2 | HEART RATE: 70 BPM

## 2023-03-02 DIAGNOSIS — Z86.79 STATUS POST MAZE OPERATION FOR ATRIAL FIBRILLATION: ICD-10-CM

## 2023-03-02 DIAGNOSIS — R06.09 DYSPNEA ON EXERTION: ICD-10-CM

## 2023-03-02 DIAGNOSIS — I10 ESSENTIAL HYPERTENSION: ICD-10-CM

## 2023-03-02 DIAGNOSIS — I51.89 RIGHT ATRIAL MASS: ICD-10-CM

## 2023-03-02 DIAGNOSIS — L08.9 STERNAL WOUND INFECTION: ICD-10-CM

## 2023-03-02 DIAGNOSIS — R94.31 ABNORMAL ELECTROCARDIOGRAM: ICD-10-CM

## 2023-03-02 DIAGNOSIS — Z98.890 STATUS POST TRICUSPID VALVE REPAIR: ICD-10-CM

## 2023-03-02 DIAGNOSIS — I34.0 NONRHEUMATIC MITRAL VALVE REGURGITATION: ICD-10-CM

## 2023-03-02 DIAGNOSIS — S21.101A STERNAL WOUND INFECTION: ICD-10-CM

## 2023-03-02 DIAGNOSIS — I50.43 ACUTE ON CHRONIC COMBINED SYSTOLIC AND DIASTOLIC HEART FAILURE: Primary | ICD-10-CM

## 2023-03-02 DIAGNOSIS — Z98.890 STATUS POST LIGATION OF LEFT ATRIAL APPENDAGE: ICD-10-CM

## 2023-03-02 DIAGNOSIS — Z98.890 S/P MVR (MITRAL VALVE REPAIR): ICD-10-CM

## 2023-03-02 DIAGNOSIS — I48.19 PERSISTENT ATRIAL FIBRILLATION: ICD-10-CM

## 2023-03-02 DIAGNOSIS — I47.20 VT (VENTRICULAR TACHYCARDIA): ICD-10-CM

## 2023-03-02 DIAGNOSIS — Z98.890 H/O STERNECTOMY: ICD-10-CM

## 2023-03-02 DIAGNOSIS — Z98.890 STATUS POST MAZE OPERATION FOR ATRIAL FIBRILLATION: ICD-10-CM

## 2023-03-02 LAB — PTH-INTACT SERPL-MCNC: 127.4 PG/ML (ref 9–77)

## 2023-03-02 PROCEDURE — 3074F PR MOST RECENT SYSTOLIC BLOOD PRESSURE < 130 MM HG: ICD-10-PCS | Mod: CPTII,S$GLB,, | Performed by: INTERNAL MEDICINE

## 2023-03-02 PROCEDURE — 4010F PR ACE/ARB THEARPY RXD/TAKEN: ICD-10-PCS | Mod: CPTII,S$GLB,, | Performed by: INTERNAL MEDICINE

## 2023-03-02 PROCEDURE — 99999 PR PBB SHADOW E&M-EST. PATIENT-LVL IV: ICD-10-PCS | Mod: PBBFAC,,, | Performed by: INTERNAL MEDICINE

## 2023-03-02 PROCEDURE — 3074F SYST BP LT 130 MM HG: CPT | Mod: CPTII,S$GLB,, | Performed by: INTERNAL MEDICINE

## 2023-03-02 PROCEDURE — 3078F DIAST BP <80 MM HG: CPT | Mod: CPTII,S$GLB,, | Performed by: INTERNAL MEDICINE

## 2023-03-02 PROCEDURE — 3008F BODY MASS INDEX DOCD: CPT | Mod: CPTII,S$GLB,, | Performed by: INTERNAL MEDICINE

## 2023-03-02 PROCEDURE — 3044F HG A1C LEVEL LT 7.0%: CPT | Mod: CPTII,S$GLB,, | Performed by: INTERNAL MEDICINE

## 2023-03-02 PROCEDURE — 3078F PR MOST RECENT DIASTOLIC BLOOD PRESSURE < 80 MM HG: ICD-10-PCS | Mod: CPTII,S$GLB,, | Performed by: INTERNAL MEDICINE

## 2023-03-02 PROCEDURE — 99215 PR OFFICE/OUTPT VISIT, EST, LEVL V, 40-54 MIN: ICD-10-PCS | Mod: S$GLB,,, | Performed by: INTERNAL MEDICINE

## 2023-03-02 PROCEDURE — 3008F PR BODY MASS INDEX (BMI) DOCUMENTED: ICD-10-PCS | Mod: CPTII,S$GLB,, | Performed by: INTERNAL MEDICINE

## 2023-03-02 PROCEDURE — 3066F PR DOCUMENTATION OF TREATMENT FOR NEPHROPATHY: ICD-10-PCS | Mod: CPTII,S$GLB,, | Performed by: INTERNAL MEDICINE

## 2023-03-02 PROCEDURE — 3066F NEPHROPATHY DOC TX: CPT | Mod: CPTII,S$GLB,, | Performed by: INTERNAL MEDICINE

## 2023-03-02 PROCEDURE — 99215 OFFICE O/P EST HI 40 MIN: CPT | Mod: S$GLB,,, | Performed by: INTERNAL MEDICINE

## 2023-03-02 PROCEDURE — 3044F PR MOST RECENT HEMOGLOBIN A1C LEVEL <7.0%: ICD-10-PCS | Mod: CPTII,S$GLB,, | Performed by: INTERNAL MEDICINE

## 2023-03-02 PROCEDURE — 99999 PR PBB SHADOW E&M-EST. PATIENT-LVL IV: CPT | Mod: PBBFAC,,, | Performed by: INTERNAL MEDICINE

## 2023-03-02 PROCEDURE — 4010F ACE/ARB THERAPY RXD/TAKEN: CPT | Mod: CPTII,S$GLB,, | Performed by: INTERNAL MEDICINE

## 2023-03-02 NOTE — NURSING
Voicemail left on sister Lulu's phone: arrive at 7:30, advised where to check in, no need to fast, patient may drive self, ok to take a.m. meds with small sip of water, and bring list of current home meds. Unable to confirm thinners and allergies.

## 2023-03-02 NOTE — NURSING
Attempted to call pt to review preop instructions for 3/3, no answer and voicemail not setup, attempted to call sister number and left message, reviewed arrival time of 0715, bring list of home meds, provided call back number [phone]

## 2023-03-02 NOTE — PROGRESS NOTES
Subjective:    Patient ID:  David Barrios is a 62 y.o. male who presents for follow-up of Valvular Heart Disease      HPI    61 y/o male former pt of Dr James. He has a hx of Afib on chronic AC with DOAC, HTN, HFrEF (NICM per [facility] earlier this year), severe MR s/p MVr and TVr by Dr Hedirck complicated by sternal wound infection. Diagnosed with afib with depressed EF earlier this year, had successful NORBERT/DCCV and noted to have severe MR. Sent to [facility] and had MVr and TVr:   Complex mitral valve repair with A2-P2 jpma-sy-wryn repair (Scotty) and 30mm Medtronic Simulus annuloplasty band  Tricuspid valve repair with 30mm Medtronic Tri-Ad Sullivan annuloplasty band  Left atrial Benz Maze cryoablation  Left atrial appendage resection  Direct open chest insertion of Impella 5.5 via aortic graft (10mm Vascutek Gelweave) into the left ventricle  Had subsequent sternal wound infection with re-intervention/exporation with:  Mediastinal exploration with evacuation of purulent pericarditis              - Debridement and decortication of mediastinal structures              - Resection of 10mm aortic graft on the ascending aorta              - Removal of sternal wires              - Partial bilateral sternectomy              - Wound vac placement by Dr. Carrasquillo' team  Currently on HD for KIMANI post surgery 3 times weekly via tunneled catheter and tolerating well. PICC line recently removed. Last 2DE with:  The left ventricle is normal in size with moderately decreased systolic function. The estimated ejection fraction is 35%.  Mild right ventricular enlargement with moderately reduced right ventricular systolic function.  Indeterminate left ventricular diastolic function.  Severe left atrial enlargement.  Moderate aortic regurgitation.  The mitral and tricuspid valves are s/p repair with no significant residual regurgitation.  Moderate circumferential pericardial effusion with focal stranding. No evidence of tamponade.  The IVC was  not visualized.  Had recent CXR with:  1. There is a moderate amount of haziness scattered throughout the inferior half of the right lung.  An infectious process cannot be excluded.  2. There has been interval removal a right internal jugular venous line. There has been interval removal of a right PICC. There has been interval placement of a right subclavian venous line. Its tip is in the superior vena cava.  There is no pneumothorax.  3. The size of the heart is prominent.  This may be secondary to magnification.  4. There are surgical clips projected over the left hemithorax.  Wife is present. His main complaint is of severe BAI/SOB with minimal activity. Has CP from surgical site which shows no evidence of infection (images in media section). Has palpable, fluid mass in left axilla area where another surgical scar is present. Denies orthopnea, PND, syncope, fevers, chills. Continues to make some urine. BP stable. Not on BB for unknown reason. Not on ACE-I/ARB likely secondary to renal dysfunction. As been trying to stay active.     2/2/2023:  Since last visit was hospitalized for findings on CXR thought to be pneumo, was not. Currently clinically stable. Looks much better than when I last saw him. Had 2DE with:  The left ventricle is normal in size with severely decreased systolic function.  The estimated ejection fraction is 15%.  There is severe left ventricular global hypokinesis.  Left ventricular diastolic dysfunction.  With severely reduced right ventricular systolic function.  Severe left atrial enlargement.  Mild mitral regurgitation.  Moderate aortic regurgitation.  The estimated PA systolic pressure is 27 mmHg.  Normal central venous pressure (3 mmHg).  There is a moderate size protruding right atrial mass present on what appears to be a catheter tip. The mass is mobile. Thrombus vs vegetation suspected. Clinical correlation recommended.  Discussed with Dr Ramsey and Dr Hedrick and consensus was to  "continue monitoring and no removal of HD cath. Had HD eysterday for volume removal, but prior to that has been a couple weeks. Making good urine. Continues to have BAI and currently with NYHA FC III symptoms. Denies orthopnea, PND, syncope, fevers, chills. Confusion with DOAC.     3/2/2023:  Looks clinically improved again since last visit. Had uneventful dental procedure and upcoming thoracentesis. Per note in chart from Dr Benoit's office "does not need to hold blood thinners prior to procedure." Has BAI, stable.    Review of Systems   Constitutional: Positive for malaise/fatigue.   HENT:  Negative for congestion.    Eyes:  Negative for blurred vision.   Cardiovascular:  Positive for chest pain, dyspnea on exertion and leg swelling. Negative for claudication, cyanosis, irregular heartbeat, near-syncope, orthopnea, palpitations, paroxysmal nocturnal dyspnea and syncope.   Respiratory:  Positive for shortness of breath.    Endocrine: Negative for polyuria.   Hematologic/Lymphatic: Negative for bleeding problem.   Skin:  Positive for color change and poor wound healing. Negative for itching and rash.   Musculoskeletal:  Positive for muscle weakness. Negative for joint swelling and muscle cramps.   Gastrointestinal:  Negative for abdominal pain, hematemesis, hematochezia, melena, nausea and vomiting.   Genitourinary:  Negative for dysuria and hematuria.   Neurological:  Positive for weakness. Negative for dizziness, focal weakness, headaches, light-headedness and loss of balance.   Psychiatric/Behavioral:  Negative for depression. The patient is not nervous/anxious.       Objective:    Physical Exam  Constitutional:       Appearance: He is well-developed.   HENT:      Head: Normocephalic and atraumatic.   Neck:      Vascular: No JVD.   Cardiovascular:      Rate and Rhythm: Normal rate and regular rhythm.      Pulses:           Carotid pulses are 2+ on the right side and 2+ on the left side.       Radial pulses are 2+ " on the right side and 2+ on the left side.        Femoral pulses are 2+ on the right side and 2+ on the left side.       Posterior tibial pulses are 1+ on the right side and 1+ on the left side.      Heart sounds: Normal heart sounds.   Pulmonary:      Effort: Pulmonary effort is normal.      Breath sounds: Normal breath sounds.   Abdominal:      General: Bowel sounds are normal.      Palpations: Abdomen is soft.   Musculoskeletal:      Cervical back: Neck supple.      Comments: Sternotomy scar healing well along with left axilla    Skin:     General: Skin is warm and dry.   Neurological:      Mental Status: He is alert and oriented to person, place, and time.   Psychiatric:         Behavior: Behavior normal.         Thought Content: Thought content normal.         Assessment:       1. Acute on chronic combined systolic and diastolic heart failure    2. Nonrheumatic mitral valve regurgitation    3. VT (ventricular tachycardia)    4. Status post tricuspid valve repair    5. Status post Maze operation for atrial fibrillation    6. Status post ligation of left atrial appendage    7. S/P MVR (mitral valve repair)    8. Right atrial mass    9. Persistent atrial fibrillation    10. Essential hypertension    11. Dyspnea on exertion    12. Abnormal electrocardiogram    13. Sternal wound infection    14. H/O sternectomy      61 y/o pt with hx and presentation as above. Surgical sites healing well. Tolerating Xarelto. BP stable. Needs to be more active.  Discussed the etiology, evaluation, and management of HFrEF, Afib, MVr, TVr, CHF. Discussed the importance of med compliance, heart healthy diet, and regular exercise.      Plan:       -Continue current medical management  -f/u in 3 months

## 2023-03-03 ENCOUNTER — HOSPITAL ENCOUNTER (OUTPATIENT)
Dept: INTERVENTIONAL RADIOLOGY/VASCULAR | Facility: HOSPITAL | Age: 63
Discharge: HOME | End: 2023-03-03
Attending: INTERNAL MEDICINE
Payer: COMMERCIAL

## 2023-03-03 VITALS
HEIGHT: 69 IN | OXYGEN SATURATION: 94 % | HEART RATE: 63 BPM | RESPIRATION RATE: 18 BRPM | WEIGHT: 150 LBS | DIASTOLIC BLOOD PRESSURE: 51 MMHG | BODY MASS INDEX: 22.22 KG/M2 | SYSTOLIC BLOOD PRESSURE: 100 MMHG

## 2023-03-03 DIAGNOSIS — J90 PLEURAL EFFUSION: ICD-10-CM

## 2023-03-03 LAB
CYSTATIN C SERPL-MCNC: 1.73 MG/L (ref 0.67–1.21)
GFR/BSA.PRED SERPLBLD CYS-BASED-ARV: 37 ML/MIN/BSA

## 2023-03-03 PROCEDURE — 76604 IR ULTRASOUND MEDIASTINUM CHEST: ICD-10-PCS | Mod: 26,,, | Performed by: RADIOLOGY

## 2023-03-03 PROCEDURE — 76604 US EXAM CHEST: CPT | Mod: TC

## 2023-03-03 PROCEDURE — 76604 US EXAM CHEST: CPT | Mod: 26,,, | Performed by: RADIOLOGY

## 2023-03-03 NOTE — H&P
Interventional Radiology Pre-Procedure History & Physical      Chief Complaint/Reason for Referral: Pleural effusion    History of Present Illness:  David Barrios is a 62 y.o. male who presents with a small right pleural effusion on recent CXR. Endorses SOB. Referred for thoracentesis.    Past Medical History:   Diagnosis Date    Anemia     Atrial fibrillation     CKD (chronic kidney disease) stage 3, GFR 30-59 ml/min     Encounter for blood transfusion     Esophageal ulcer     GI bleed     Hypertension     Hyperuricemia      Past Surgical History:   Procedure Laterality Date    APPLICATION OF WOUND VACUUM-ASSISTED CLOSURE DEVICE N/A 11/8/2022    Procedure: APPLICATION, WOUND VAC;  Surgeon: Mike Herdick MD;  Location: [facility] OR [address] [place] [address];  Service: General;  Laterality: N/A;    CARDIOVERSION Left 9/15/2022    Procedure: Cardioversion;  Surgeon: Julio James MD;  Location: [facility] CATH LAB/EP;  Service: Cardiology;  Laterality: Left;  With NORBERT    CATHETERIZATION OF BOTH LEFT AND RIGHT HEART N/A 9/22/2022    Procedure: CATHETERIZATION, HEART, BOTH LEFT AND RIGHT;  Surgeon: Julio James MD;  Location: [facility] CATH LAB/EP;  Service: Cardiology;  Laterality: N/A;    COLONOSCOPY N/A 4/4/2019    Procedure: COLONOSCOPY Golytely;  Surgeon: Umair Randolph MD;  Location: [facility] ENDO;  Service: Endoscopy;  Laterality: N/A;    CORONARY ANGIOGRAPHY N/A 9/22/2022    Procedure: ANGIOGRAM, CORONARY ARTERY;  Surgeon: Julio James MD;  Location: [facility] CATH LAB/EP;  Service: Cardiology;  Laterality: N/A;    MEYER MAZE PROCEDURE N/A 10/7/2022    Procedure: MEYER MAZE PROCEDURE;  Surgeon: Mike Hedrick MD;  [facility];  Service: Cardiovascular;  Laterality: N/A;    CREATION OF MUSCLE ROTATIONAL FLAP N/A 11/14/2022    Procedure: CREATION, FLAP, MUSCLE ROTATION;  Surgeon: Amadeo Carrasquillo MD;  Location: [facility] OR [address] [place] [address];  Service: Plastics;  Laterality: N/A;  sternal wound that need pec flap coverage     DEBRIDEMENT OF STERNUM N/A 11/8/2022    Procedure: DEBRIDEMENT, STERNUM;  Surgeon: Mike Hedrick MD;  Location: [facility] OR 2ND FLR;  Service: General;  Laterality: N/A;    ESOPHAGOGASTRODUODENOSCOPY N/A 10/12/2018    Procedure: EGD (ESOPHAGOGASTRODUODENOSCOPY);  Surgeon: Braden Herring MD;  Location: [facility] ENDO;  Service: Endoscopy;  Laterality: N/A;    ESOPHAGOGASTRODUODENOSCOPY N/A 4/4/2019    Procedure: EGD;  Surgeon: Umair Randolph MD;  Location: [facility] ENDO;  Service: Endoscopy;  Laterality: N/A;    EXCLUSION OF LEFT ATRIAL APPENDAGE N/A 10/7/2022    Procedure: EXCLUSION, LEFT ATRIAL APPENDAGE;  Surgeon: Mike Hedrick MD;  Location: [facility] OR 2ND FLR;  Service: Cardiovascular;  Laterality: N/A;    HERNIA REPAIR      INSERTION OF INTRAVASCULAR MICROAXIAL BLOOD PUMP N/A 10/7/2022    Procedure: INSERTION, IMPELLA;  Surgeon: Mike Hedrick MD;  Location: [facility] OR 2ND FLR;  Service: Cardiovascular;  Laterality: N/A;  direct aortic insertion    IRRIGATION OF MEDIASTINUM N/A 10/7/2022    Procedure: IRRIGATION, MEDIASTINUM;  Surgeon: Mike Hedrick MD;  Location: [facility] OR 2ND FLR;  Service: Cardiovascular;  Laterality: N/A;    STERNAL WIRES REMOVAL N/A 11/8/2022    Procedure: REMOVAL, STERNAL WIRE;  Surgeon: Mike Hedrick MD;  Location: [facility] OR 2ND FLR;  Service: General;  Laterality: N/A;  Sternal wire removal with muscle flap creation    STERNAL WOUND CLOSURE N/A 11/14/2022    Procedure: CLOSURE, WOUND, STERNUM;  Surgeon: Amadeo Carrasquillo MD;  Location: [facility] OR 2ND FLR;  Service: Plastics;  Laterality: N/A;    TRICUSPID VALVULOPLASTY N/A 10/7/2022    Procedure: REPAIR, TRICUSPID VALVE;  Surgeon: Mike Hedrick MD;  Location: [facility] OR 2ND FLR;  Service: Cardiovascular;  Laterality: N/A;    WOUND EXPLORATION N/A 11/14/2022    Procedure: EXPLORATION, WOUND;  Surgeon: Amadeo Carrasquillo MD;  Location: [facility] OR [address] [place] [address];  Service: Plastics;  Laterality: N/A;       Allergies:   Review of  patient's allergies indicates:  No Known Allergies     Home Meds:   Prior to Admission medications    Medication Sig Start Date End Date Taking? Authorizing Provider   amiodarone (PACERONE) 200 MG Tab Take 1 tablet (200 mg total) by mouth 2 (two) times daily. 1/31/23 1/31/24 Yes Shyam Villegas MD   aspirin 81 MG Chew Take 1 tablet (81 mg total) by mouth once daily. 12/1/22 12/1/23 Yes Rita Garcia NP   atorvastatin (LIPITOR) 40 MG tablet Take 1 tablet (40 mg total) by mouth once daily. 12/1/22 12/1/23 Yes Rita Garcia NP   furosemide (LASIX) 40 MG tablet Take 1 tablet (40 mg total) by mouth 2 (two) times a day.  Patient taking differently: Take 40 mg by mouth 2 (two) times a day. 2 IN THE A.M & 2 IN THE P.M 1/25/23 1/25/24 Yes Sruthi Avila MD   HYDROcodone-acetaminophen (NORCO) 7.5-325 mg per tablet Take 1 tablet by mouth every 6 (six) hours as needed for Pain.   Yes Historical Provider   magnesium oxide (MAG-OX) 400 mg (241.3 mg magnesium) tablet Take 1 tablet (400 mg total) by mouth 2 (two) times daily.  Patient taking differently: Take 400 mg by mouth once daily. 1/15/23  Yes Jennifer Estes NP   metoprolol succinate (TOPROL-XL) 25 MG 24 hr tablet Take 1 tablet (25 mg total) by mouth once daily. 1/16/23 1/16/24 Yes Jennifer Estes NP   rivaroxaban (XARELTO) 20 mg Tab Take 1 tablet (20 mg total) by mouth daily with dinner or evening meal. 3/1/23  Yes Lenora Mccormick MD   sacubitriL-valsartan (ENTRESTO) 24-26 mg per tablet Take 1 tablet by mouth 2 (two) times daily. 2/15/23  Yes Sruthi Avila MD   tamsulosin (FLOMAX) 0.4 mg Cap Take 1 capsule (0.4 mg total) by mouth every evening. Take 30 minutes after dinner 1/12/23 1/12/24 Yes Roberta Lee, NP   vitamin renal formula, B-complex-vitamin c-folic acid, (NEPHROCAP) 1 mg Cap Take 1 capsule by mouth once daily. 1/16/23  Yes Jennifer Estes, NP   amoxicillin (AMOXIL) 500 MG Tab Take 500 mg by mouth every 12 (twelve) hours.     Historical Provider       Anticoagulation/Antiplatelet Meds: aspirin, Xarelto    Review of Systems:   Hematological: no known coagulopathies  Respiratory: no shortness of breath  Cardiovascular: no chest pain  Gastrointestinal: no abdominal pain  Genitourinary: no dysuria  Musculoskeletal: negative  Neurological: no TIA or stroke symptoms     Physical Exam:  Pulse: 63 (03/03/23 0742)  Resp: 18 (03/03/23 0742)  BP: (!) 100/51 (03/03/23 0742)  SpO2: (!) 94 % (03/03/23 0742)    General: NAD  HEENT: Normocephalic, sclera anicteric, oropharynx clear  Heart: RRR  Lungs: Symmetric excursions, breathing unlabored  Abd: NTND, soft  Extremities: PADILLA  Neuro: Gross nonfocal    Laboratory:  Lab Results   Component Value Date    INR 1.2 11/19/2022       Lab Results   Component Value Date    WBC 10.45 03/01/2023    WBC 10.45 03/01/2023    HGB 11.1 (L) 03/01/2023    HGB 11.1 (L) 03/01/2023    HCT 35.2 (L) 03/01/2023    HCT 35.2 (L) 03/01/2023     (H) 03/01/2023     (H) 03/01/2023     03/01/2023     03/01/2023      Lab Results   Component Value Date     03/01/2023     03/01/2023    K 4.1 03/01/2023     03/01/2023    CO2 32 (H) 03/01/2023    BUN 51 (H) 03/01/2023    CREATININE 1.71 (H) 03/01/2023    CALCIUM 9.0 03/01/2023    MG 1.9 02/06/2023    ALT 21 01/16/2023    AST 22 01/16/2023    ALBUMIN 4.6 03/01/2023    BILITOT 0.4 01/16/2023    BILIDIR 0.3 11/07/2022       Imaging:  CXR 2/13/23 reviewed.    Assessment/Plan:  62 y.o. male with a small right pleural effusion. Will undergo thoracentesis today.    Sedation: None    Risks (including, but not limited to, pain, bleeding, infection, damage to nearby structures, treatment failure/recurrence, and the need for additional procedures), potential benefits, and alternatives were discussed with the patient. All questions were answered to the best of my abilities. The patient wishes to proceed. Written informed consent was  obtained.      Rigoberto Hutchison MD  [facility]  Pager [phone]

## 2023-03-03 NOTE — NURSING
During the pre-procedure  scan, Dr. Hutchison determined that there was no fluid to be drained. He advised that he would put the appropriate notation in the patient's chart for the ordering provider. Patient is discharged from IR. No procedure performed.

## 2023-03-04 NOTE — PROCEDURES
Limited grayscale US performed by me over the right and left chest. No significant pleural effusion on either the right or the left. No thoracentesis was performed.      Rigoberto Hutchison MD  [facility]  Pager [phone]

## 2023-03-04 NOTE — DISCHARGE SUMMARY
Interventional Radiology Short Stay Discharge Summary      Admit Date: 3/3/2023  Discharge Date: 03/03/2023     Hospital Course: Uneventful    Discharge Diagnosis: KIMANI    Discharge Condition: Stable    Discharge Disposition: Home    Diet: Resume prior diet    Activity: Activity as tolerated    Follow-up: With referring provider      Rigoberto Hutchison MD  [facility]  Pager [phone]

## 2023-03-08 ENCOUNTER — HOSPITAL ENCOUNTER (OUTPATIENT)
Dept: PULMONOLOGY | Facility: HOSPITAL | Age: 63
Discharge: HOME | End: 2023-03-08
Attending: INTERNAL MEDICINE
Payer: COMMERCIAL

## 2023-03-08 DIAGNOSIS — R79.81 ELEVATED CARBON DIOXIDE LEVEL: ICD-10-CM

## 2023-03-08 LAB
ALLENS TEST: ABNORMAL
HCO3 UR-SCNC: 29.8 MMOL/L (ref 24–28)
PCO2 BLDA: 44.2 MMHG (ref 35–45)
PH SMN: 7.44 [PH] (ref 7.35–7.45)
PO2 BLDA: 83 MMHG (ref 80–100)
POC BE: 6 MMOL/L
POC SATURATED O2: 96 % (ref 95–100)
POC TCO2: 31 MMOL/L (ref 23–27)
SAMPLE: ABNORMAL
SITE: ABNORMAL

## 2023-03-08 PROCEDURE — 82803 BLOOD GASES ANY COMBINATION: CPT

## 2023-03-08 PROCEDURE — 99900035 HC TECH TIME PER 15 MIN (STAT)

## 2023-03-08 PROCEDURE — 36600 WITHDRAWAL OF ARTERIAL BLOOD: CPT

## 2023-03-23 RX ORDER — CEFAZOLIN SODIUM 1 G/50ML
1 SOLUTION INTRAVENOUS
Status: CANCELLED | OUTPATIENT
Start: 2023-03-23 | End: 2023-03-23

## 2023-03-23 NOTE — PT/OT/SLP PROGRESS
Occupational Therapy   Co-Treatment    Name: David Barrios  MRN: [id number]  Admitting Diagnosis:  Sternal wound infection  4 Days Post-Op    Recommendations:     Discharge Recommendations: home health OT  Discharge Equipment Recommendations:   (TBD)  Barriers to discharge:  None    Assessment:     David Barrios is a 62 y.o. male with a medical diagnosis of Sternal wound infection.  He presents with mild confusion. Pt tolerated session fair, demonstrating improvement in activity tolerance. He continues to require frequent cuing to adhere to sternal precautions. Performance deficits affecting function are weakness, impaired endurance, impaired self care skills, impaired functional mobility, gait instability, impaired balance, decreased upper extremity function, decreased lower extremity function, decreased safety awareness, pain, impaired cardiopulmonary response to activity. Pt would benefit from skilled OT services in order to maximize independence with ADLs and facilitate safe discharge. Pt would benefit from home health OT once medically stable for discharge.     Rehab Prognosis:  Good; patient would benefit from acute skilled OT services to address these deficits and reach maximum level of function.       Plan:     Patient to be seen 4 x/week to address the above listed problems via self-care/home management, therapeutic activities, therapeutic exercises  Plan of Care Expires: 12/15/22  Plan of Care Reviewed with: patient, friend    Subjective     Pain/Comfort:  Pain Rating 1: 7/10  Location - Orientation 1: generalized  Location 1: sternal  Pain Addressed 1: Pre-medicate for activity, Reposition, Cessation of Activity  Pain Rating Post-Intervention 1:  (unrated)    Objective:     Communicated with: RN and PT prior to session.  Patient found HOB elevated with telemetry, pulse ox (continuous), blood pressure cuff, lambert catheter, Trialysis, BLANCA drain upon OT entry to room.    General Precautions: Standard, fall,  sternal   Orthopedic Precautions:N/A   Braces: N/A  Respiratory Status: Room air     Occupational Performance:     Bed Mobility:    Patient completed Supine to Sit with minimum assistance     Functional Mobility/Transfers:  Patient completed Sit <> Stand Transfer with contact guard assistance  with  hand-held assist   1x from EOB  2x from bedside chair  Frequent cuing to adhere to sternal precautions  Functional Mobility: Pt ambulated 112 ft x2 with CGA-min A and no AD/IV pole in order to maximize functional activity tolerance and standing balance required for engagement in occupations of choice.    Scissoring on second gait trial requiring cuing to widen RADHA  Chair follow for safety  Seated rest break between trials  Pt insisted on pushing IV pole second trial despite education regarding sternal precautions  RN present and pt on portable monitor    Activities of Daily Living:  Lower Body Dressing: maximal assistance to don B  socks  Feeding: set-up A with lunch seated in bedside chair  Pt completed grooming prior to OT arrival and declined performing toileting in bathroom during session      AMPA 6 Click ADL: 18    Treatment & Education:  -Therapist provided facilitation and instruction of proper body mechanics, energy conservation, and fall prevention strategies during tasks listed above.  -Pt educated on role of OT, POC and goals for therapy  -Pt educated on importance of OOB activities with staff member assistance and sitting OOB majority of the day.   -Pt verbalized understanding. Pt expressed no further concerns/questions  -Whiteboard updated   -Co-tx with PT performed due to need for education and assistance from two skilled therapy disciplines at pt's current functional level in the ICU    Patient left up in chair with all lines intact, call button in reach, and RN and friend present    GOALS:   Multidisciplinary Problems       Occupational Therapy Goals          Problem: Occupational Therapy    Goal  Priority Disciplines Outcome Interventions   Occupational Therapy Goal     OT, PT/OT Ongoing, Progressing    Description: Goals to be met by: 11/29/22     Patient will increase functional independence with ADLs by performing:    Feeding with Ayden.  UE Dressing with Ayden.  LE Dressing with Stand-by Assistance.  Grooming while standing at sink with Supervision.  Toilet transfer to toilet with Supervision.                         Time Tracking:     OT Date of Treatment: 11/18/22  OT Start Time: 1256  OT Stop Time: 1337  OT Total Time (min): 41 min    Billable Minutes:Self Care/Home Management 11  Therapeutic Activity 30    OT/DUONG: OT          11/18/2022   Universal Safety Interventions

## 2023-03-24 ENCOUNTER — TELEPHONE (OUTPATIENT)
Dept: INTERVENTIONAL RADIOLOGY/VASCULAR | Facility: HOSPITAL | Age: 63
End: 2023-03-24
Payer: COMMERCIAL

## 2023-03-24 NOTE — NURSING
Called and spoke to pt regarding preop instructions for 3/27, reviewed arrival time of 0930, NPO is not necessary for procedure

## 2023-03-27 ENCOUNTER — HOSPITAL ENCOUNTER (OUTPATIENT)
Dept: INTERVENTIONAL RADIOLOGY/VASCULAR | Facility: HOSPITAL | Age: 63
Discharge: HOME | End: 2023-03-27
Attending: INTERNAL MEDICINE
Payer: COMMERCIAL

## 2023-03-27 VITALS
BODY MASS INDEX: 21.92 KG/M2 | WEIGHT: 148 LBS | RESPIRATION RATE: 14 BRPM | SYSTOLIC BLOOD PRESSURE: 132 MMHG | OXYGEN SATURATION: 96 % | HEART RATE: 57 BPM | DIASTOLIC BLOOD PRESSURE: 60 MMHG | HEIGHT: 69 IN

## 2023-03-27 DIAGNOSIS — N18.30 CKD (CHRONIC KIDNEY DISEASE), STAGE III: ICD-10-CM

## 2023-03-27 DIAGNOSIS — N18.32 STAGE 3B CHRONIC KIDNEY DISEASE: ICD-10-CM

## 2023-03-27 PROCEDURE — 36589 REMOVAL TUNNELED CV CATH: CPT | Performed by: RADIOLOGY

## 2023-03-27 PROCEDURE — 36589 IR TUNNELED CATH REMOVAL W/O PORT: ICD-10-PCS | Mod: RT,,, | Performed by: RADIOLOGY

## 2023-03-27 PROCEDURE — A4550 SURGICAL TRAYS: HCPCS

## 2023-03-27 NOTE — DISCHARGE INSTRUCTIONS
*****************************************************************************************************  PLEASE READ CAREFULLY!!    Your removal site is dressed with gauze and Tega-Derm (looks like Saran Wrap).   Do NOT remove the dressing today. Do NOT shower/bathe tonight.     You can shower/bathe tomorrow night. Leave the dressing on during your shower/bath. Turn the site away from the shower's spray; if you bathe, do NOT allow the site underwater. After your shower/bath, you may remove the dressing. Gently pat the port site dry.    Until the site heals, usually one to two weeks, DO NOT submerge in a bath tub, swimming pool, or jacuzzi.

## 2023-03-27 NOTE — PLAN OF CARE
Dr Hutchison at bedside removing tunneled cath from right upper chest. Patient with no pain and occlusive dressing in place, no swelling, no drainage. Patient to be monitored for 20 minutes after cath removal .     Patient monitored for 30 minutes after cath removal, Site has no swelling or drainage. Educated and provided education pamphlet on site care. Patient verbalized understanding.

## 2023-03-27 NOTE — PLAN OF CARE
Pt denies pain or discomfort @ this time. Pt states he is ready to be discharged home @ this time. Discharge instructions reviewed with patient, with time allotted for questions. Pt verbalizes understanding of instructions and states they have no further questions @ this time. Right upper chest site is clean, dry and intact, no swelling noted. Occlusive dressing in place with gauze and transparent dressing. Vital signs are stable. No acute distress noted. Staff is at bedside to assist patient.

## 2023-03-29 NOTE — PROCEDURES
Interventional Radiology Immediate Post-Procedure Note    Pre-Op Diagnosis: KIMANI  Post-Op Diagnosis: Same    Procedure: TDC removal    Procedure performed by: Rigoberto Hutchison MD  Assistants: None    Estimated Blood Loss: Nil  Specimen Removed: None sent    Findings/description of procedure:  Indwelling right IJ vein TDC removed by gentle traction. Pressure held until hemostasis. Sterile dressing placed.    No immediate complications. Patient tolerated procedure well. Please see full dictated procedure report for additional details and recommendations.      Rigoberto Hutchison MD  [facility]  Pager [phone]

## 2023-03-29 NOTE — H&P
Interventional Radiology Pre-Procedure History & Physical      Chief Complaint/Reason for Referral: TDC removal    History of Present Illness:  David Barrios is a 62 y.o. male recent history of KIMANI for which a tunneled hemodialysis catheter was placed on hemodialysis was initiated. Patient no longer needs hemodialysis and catheter is no longer needed. Referred for catheter removal.     Past Medical History:   Diagnosis Date    Anemia     Atrial fibrillation     CKD (chronic kidney disease) stage 3, GFR 30-59 ml/min     Encounter for blood transfusion     Esophageal ulcer     GI bleed     Hypertension     Hyperuricemia      Past Surgical History:   Procedure Laterality Date    APPLICATION OF WOUND VACUUM-ASSISTED CLOSURE DEVICE N/A 11/8/2022    Procedure: APPLICATION, WOUND VAC;  Surgeon: Mike Hedrick MD;  Location: [facility] OR [address] [place] [address];  Service: General;  Laterality: N/A;    CARDIOVERSION Left 9/15/2022    Procedure: Cardioversion;  Surgeon: Julio James MD;  Location: [facility] CATH LAB/EP;  Service: Cardiology;  Laterality: Left;  With NORBERT    CATHETERIZATION OF BOTH LEFT AND RIGHT HEART N/A 9/22/2022    Procedure: CATHETERIZATION, HEART, BOTH LEFT AND RIGHT;  Surgeon: Julio James MD;  Location: [facility] CATH LAB/EP;  Service: Cardiology;  Laterality: N/A;    COLONOSCOPY N/A 4/4/2019    Procedure: COLONOSCOPY Golytely;  Surgeon: Umair Randolph MD;  Location: [facility] ENDO;  Service: Endoscopy;  Laterality: N/A;    CORONARY ANGIOGRAPHY N/A 9/22/2022    Procedure: ANGIOGRAM, CORONARY ARTERY;  Surgeon: Julio James MD;  Location: [facility] CATH LAB/EP;  Service: Cardiology;  Laterality: N/A;    MEYER MAZE PROCEDURE N/A 10/7/2022    Procedure: MEYER MAZE PROCEDURE;  Surgeon: Mike Hedrick MD;  Location: [address];  Service: Cardiovascular;  Laterality: N/A;    CREATION OF MUSCLE ROTATIONAL FLAP N/A 11/14/2022    Procedure: CREATION, FLAP, MUSCLE ROTATION;  Surgeon: Amadeo Carrasquillo MD;  Location: [facility]  OR 2ND FLR;  Service: Plastics;  Laterality: N/A;  sternal wound that need pec flap coverage    DEBRIDEMENT OF STERNUM N/A 11/8/2022    Procedure: DEBRIDEMENT, STERNUM;  Surgeon: Mike Hedrick MD;  Location: [facility] OR 2ND FLR;  Service: General;  Laterality: N/A;    ESOPHAGOGASTRODUODENOSCOPY N/A 10/12/2018    Procedure: EGD (ESOPHAGOGASTRODUODENOSCOPY);  Surgeon: Braden Herring MD;  Location: [facility] ENDO;  Service: Endoscopy;  Laterality: N/A;    ESOPHAGOGASTRODUODENOSCOPY N/A 4/4/2019    Procedure: EGD;  Surgeon: Umair Randolph MD;  Location: [facility] ENDO;  Service: Endoscopy;  Laterality: N/A;    EXCLUSION OF LEFT ATRIAL APPENDAGE N/A 10/7/2022    Procedure: EXCLUSION, LEFT ATRIAL APPENDAGE;  Surgeon: Mike Hedrick MD;  Location: [facility] OR 2ND FLR;  Service: Cardiovascular;  Laterality: N/A;    HERNIA REPAIR      INSERTION OF INTRAVASCULAR MICROAXIAL BLOOD PUMP N/A 10/7/2022    Procedure: INSERTION, IMPELLA;  Surgeon: Mike Hedrick MD;  Location: [facility] OR 2ND FLR;  Service: Cardiovascular;  Laterality: N/A;  direct aortic insertion    IRRIGATION OF MEDIASTINUM N/A 10/7/2022    Procedure: IRRIGATION, MEDIASTINUM;  Surgeon: Mike Hedrick MD;  Location: [facility] OR 2ND FLR;  Service: Cardiovascular;  Laterality: N/A;    STERNAL WIRES REMOVAL N/A 11/8/2022    Procedure: REMOVAL, STERNAL WIRE;  Surgeon: Mike Hedrick MD;  Location: [facility] OR 2ND FLR;  Service: General;  Laterality: N/A;  Sternal wire removal with muscle flap creation    STERNAL WOUND CLOSURE N/A 11/14/2022    Procedure: CLOSURE, WOUND, STERNUM;  Surgeon: Amadeo Carrasquillo MD;  Location: [facility] OR 2ND FLR;  Service: Plastics;  Laterality: N/A;    TRICUSPID VALVULOPLASTY N/A 10/7/2022    Procedure: REPAIR, TRICUSPID VALVE;  Surgeon: Mike Hedrick MD;  Location: [facility] OR 2ND FLR;  Service: Cardiovascular;  Laterality: N/A;    WOUND EXPLORATION N/A 11/14/2022    Procedure: EXPLORATION, WOUND;  Surgeon: Amadeo Carrasquillo MD;   Location: [facility] OR [address] [place] [address];  Service: Plastics;  Laterality: N/A;       Allergies:   Review of patient's allergies indicates:  No Known Allergies    Home Meds:   Prior to Admission medications    Medication Sig Start Date End Date Taking? Authorizing Provider   amiodarone (PACERONE) 200 MG Tab TAKE 1 TABLET(200 MG) BY MOUTH TWICE DAILY 3/23/23  Yes Shyam Villegas MD   aspirin 81 MG Chew Take 1 tablet (81 mg total) by mouth once daily. 12/1/22 12/1/23 Yes Rita Garcia NP   atorvastatin (LIPITOR) 40 MG tablet Take 1 tablet (40 mg total) by mouth once daily. 12/1/22 12/1/23 Yes Rita Garcia NP   furosemide (LASIX) 80 MG tablet Take 1 tablet (80 mg total) by mouth 2 (two) times a day. 3/8/23 3/7/24 Yes Sruthi Avila MD   magnesium oxide (MAG-OX) 400 mg (241.3 mg magnesium) tablet Take 1 tablet (400 mg total) by mouth 2 (two) times daily.  Patient taking differently: Take 400 mg by mouth once daily. 1/15/23  Yes Jennifer Estes NP   metoprolol succinate (TOPROL-XL) 25 MG 24 hr tablet Take 1 tablet (25 mg total) by mouth once daily. 1/16/23 1/16/24 Yes Jennifer Estes NP   rivaroxaban (XARELTO) 20 mg Tab Take 1 tablet (20 mg total) by mouth daily with dinner or evening meal. 3/1/23  Yes Lenora Mccormick MD   sacubitriL-valsartan (ENTRESTO) 49-51 mg per tablet Take 1 tablet by mouth 2 (two) times daily. 3/8/23  Yes Sruthi Avila MD   tamsulosin (FLOMAX) 0.4 mg Cap Take 1 capsule (0.4 mg total) by mouth every evening. Take 30 minutes after dinner 1/12/23 1/12/24 Yes Roberta Lee NP   vitamin renal formula, B-complex-vitamin c-folic acid, (NEPHROCAP) 1 mg Cap Take 1 capsule by mouth once daily. 1/16/23  Yes Jennifer Estes, HAO   HYDROcodone-acetaminophen (NORCO) 7.5-325 mg per tablet Take 1 tablet by mouth every 6 (six) hours as needed for Pain.    Historical Provider   metOLazone (ZAROXOLYN) 10 MG tablet Take 1 tablet (10 mg total) by mouth daily as needed (Swelling - rescue  pill). 3/8/23 3/7/24  Sruthi Avila MD       Anticoagulation/Antiplatelet Meds: aspirin    Review of Systems:   Hematological: no known coagulopathies  Respiratory: no shortness of breath  Cardiovascular: no chest pain  Gastrointestinal: no abdominal pain  Genitourinary: no dysuria  Musculoskeletal: negative  Neurological: no TIA or stroke symptoms     Physical Exam:  Pulse: (!) 57 (03/27/23 1110)  Resp: 14 (03/27/23 1110)  BP: 132/60 (03/27/23 1110)  SpO2: 96 % (03/27/23 1110)    General: NAD  HEENT: Normocephalic, sclera anicteric, oropharynx clear  Neck/chest: Supple, no palpable lymphadenopathy, right IJ vein TDC in place, no redness, swelling or discharge to suggest infection  Heart: RRR  Lungs: Symmetric excursions, breathing unlabored  Abd: NTND, soft  Extremities: PADILLA  Neuro: Gross nonfocal    Laboratory:  Lab Results   Component Value Date    INR 1.2 11/19/2022       Lab Results   Component Value Date    WBC 10.45 03/01/2023    WBC 10.45 03/01/2023    HGB 11.1 (L) 03/01/2023    HGB 11.1 (L) 03/01/2023    HCT 35.2 (L) 03/01/2023    HCT 35.2 (L) 03/01/2023     (H) 03/01/2023     (H) 03/01/2023     03/01/2023     03/01/2023      Lab Results   Component Value Date     03/01/2023     03/01/2023    K 4.1 03/01/2023     03/01/2023    CO2 32 (H) 03/01/2023    BUN 51 (H) 03/01/2023    CREATININE 1.71 (H) 03/01/2023    CALCIUM 9.0 03/01/2023    MG 1.9 02/06/2023    ALT 21 01/16/2023    AST 22 01/16/2023    ALBUMIN 4.6 03/01/2023    BILITOT 0.4 01/16/2023    BILIDIR 0.3 11/07/2022       Imaging:  TDC placement 11/25/22 reviewed.    Assessment/Plan:  62 y.o. male with an indwelling TDC that is no longer needed. Will undergo removal today.    Sedation plan: None    Risks (including, but not limited to, pain, bleeding, infection, damage to nearby structures, treatment failure/recurrence, and the need for additional procedures), potential benefits, and alternatives were  discussed with the patient. All questions were answered to the best of my abilities. The patient wishes to proceed. Written informed consent was obtained.      Rigoberto Hutchison MD  [facility]  Pager [phone]

## 2023-03-29 NOTE — DISCHARGE SUMMARY
Interventional Radiology Short Stay Discharge Summary      Admit Date: 3/27/2023  Discharge Date: 03/27/2023    Hospital Course: Uneventful    Discharge Diagnosis: KIMANI s/p TDC removal    Discharge Condition: Stable    Discharge Disposition: Home    Diet: Resume prior diet    Activity: Activity as tolerated    Follow-up: With referring provider      Rigoberto Hutchsion MD  [facility]  Pager [phone]

## 2023-04-11 ENCOUNTER — TELEPHONE (OUTPATIENT)
Dept: CARDIOLOGY | Facility: CLINIC | Age: 63
End: 2023-04-11
Payer: COMMERCIAL

## 2023-04-11 NOTE — TELEPHONE ENCOUNTER
-I returned the call and  spoke to      Daniel MCKINNON .    I let her know that we will get  sign     Cardiac Clearance form today.      Nw                                      ---- Message from Selwyn Cannon sent at 4/11/2023 11:28 AM CDT -----  Contact: Daniel  Type:  Needs Medical Advice    Who Called: Daniel fowler/ duyen MCKINNON   Would the patient rather a call back or a response via [facility]? call  Best Call Back Number:   Additional Information:   Caller wants to know the status of surgery documents   Caller also wants to know if office received the release of information paperwork  Pt about to have surgery but if paperwork not faxed over to their office , pt can not   Caller requesting a call

## 2023-04-12 ENCOUNTER — TELEPHONE (OUTPATIENT)
Dept: CARDIOLOGY | Facility: CLINIC | Age: 63
End: 2023-04-12
Payer: COMMERCIAL

## 2023-04-18 NOTE — EICU
[facility]  Occupational Therapy  Daily Note  Time:   Time In: 5127  Time Out: 1438  Timed Code Treatment Minutes: 53 Minutes  Minutes: 53          Date: 2023  Patient Name: Radha Payne,   Gender: female      Room: [facility]/023-A  MRN: [id number]  : 1941  (79 y.o.)  Referring Practitioner: Renate Blake PA-C  Diagnosis: Tibial Plateau Fracture  Additional Pertinent Hx: Pt presents with L knee pain after a mechanical fall earlier today. Patient was at her sink, her walker spun away from her and she was unable to sit down, causing her to slide down onto her L knee. Imaging revealed a tibia plateau fracture. Denies any other msk complaints, did not hit head, no loc, mechanical. Lives in assisted living. Pain to the L knee, worsened with attempted weight bearing, relieved by immobilization, no associated paresthesias. HX bilateral greater trochanteric bursitis, undergoing therapy and stim units for treatment and relief, finding improvement. Pt has a closed treatment fracture in LLE, no surgical intervention recommended. NWB to be followed when up with help. Restrictions/Precautions:  Restrictions/Precautions: General Precautions, Fall Risk, Weight Bearing  Left Lower Extremity Weight Bearing: Non Weight Bearing  Required Braces or Orthoses  Left Lower Extremity Brace: Knee Immobilizer  Position Activity Restriction  Other position/activity restrictions: hx of dementia and PD, slightly [place], Maintain knee immobilizer, okay to remove for hygiene     SUBJECTIVE: Nurse approved OT treatment. Pt in bed on OT arrival, pleasant and cooperative. Pt is agreeable to OT treatment. Slightly [place]. Daughter enters room shortly after and is supportive throughout session.      PAIN: 5/10 with movement     Vitals: Vitals not assessed per clinical judgement, see nursing flowsheet    COGNITION: Slow Processing, Decreased Recall, Decreased Insight, Decreased Problem Solving, [facility]  INPATIENT PHYSICAL THERAPY  DAILY NOTE  [facility] ORTHOPEDICS 7K - 7K-23/023-A    Time In: 4110  Time Out: 8417  Timed Code Treatment Minutes: 38 Minutes  Minutes: 38          Date: 2023  Patient Name: David Iqbal,  Gender:  female        MRN: [id number]  : 1941  (79 y.o.)     Referring Practitioner: Ene Maxwell. MERCED Navarrete  Diagnosis: Tibial plateau fracture, left, closed, initial encounter  Additional Pertinent Hx: per EMR \"79 y.o. female who presents with L knee pain after a mechanical fall earlier today. Patient was at her sink, her walker spun away from her and she was unable to sit down, causing her to slide down onto her L knee. Imaging revealed a tibia plateau fracture. Denies any other msk complaints, did not hit head, no loc, mechanical. Lives in assisted living. Pain to the L knee, worsened with attempted weight bearing, relieved by immobilization, no associated paresthesias. HX bilateral greater trochanteric bursitis, undergoing therapy and stim units for treatment and relief, finding improvement.  \" closed treatment fracture, no surgical intervention recommended     Prior Level of Function:  Lives With: Alone  Type of Home: Assisted living  Home Layout: One level  Home Access: Level entry  Home Equipment: Rollator   Bathroom Shower/Tub: Walk-in shower  Bathroom Toilet: Handicap height  Bathroom Equipment: Built-in shower seat, Grab bars in shower, Grab bars around toilet    Receives Help From: Other (comment) (staff helps with medication and with meals)  ADL Assistance: Independent  Homemaking Assistance: Needs assistance  Homemaking Responsibilities: No  Ambulation Assistance: Independent  Transfer Assistance: Independent  Active : No  Additional Comments: amb with 4ww, requires assist only for wrapping her legs during the day    Restrictions/Precautions:  Restrictions/Precautions: General Precautions, Fall Risk, Weight Bearing  Left Lower Extremity Called into room, pt on vent post valve surgery.  Per nurse pt in and out of Vtach, Amio 150mg loading dose given IV followed by  Mag 2g IV and Bimal 1g IV.  1506 Pt HR NSR 85.   [place]  SPEECH THERAPY  [facility] ORTHOPEDICS 7K  Speech - Language - Cognitive Evaluation + Cognitive Treatment    SLP Individual Minutes  Time In: 2477  Time Out: 0186  Minutes: 29  Timed Code Treatment Minutes: 8 Minutes     Ijerdq-Mqxgbyhi-Gwdyrknfo eval: 21 minutes  Cognitive tx: 8 minutes    Date: 4/10/2023  Patient Name: Helena Etienne      CSN: [id number]   : 1941  (79 y.o.)  Gender: female   Referring Physician:  Tommy Garzon PA-C  Diagnosis: Tibial plateau fracture, left, closed, initial encounter  Precautions: NWB on L LE  History of Present Illness/Injury: Patient admitted with above medical diagnosis. Per medical chart review, eLni Herrera y.o. female who we are asked to see/evaluate by Svetlana Storm MD for medical management of parkinson's disease, dementia, COPD, HTN. Artemio Esparza underwent a mechanical fall earlier today onto her left knee. No loss of consciousness or head trauma. Patient is from assisted living. Patient has been admitted for further monitoring and management of pain. No surgical intervention recommended at this time. PT saw resting in bed this evening with no acute complaints. Pt states pain is well managed at this time, expresses concern have getting her usual home meds, but has no questions. She denies chest pain, fever, chills, abdominal pain, shortness of breath. \"     ST received order for cognitive evaluation, therefore ST to evaluate and develop plan of care as appropriate.       Past Medical History:   Diagnosis Date    Abnormal nuclear stress test 2018    Cataract     Chronic deep vein thrombosis (DVT) of femoral vein of left lower extremity (HCC) 3/9/2021    Colon polyps     COPD (chronic obstructive pulmonary disease) (HCC)     Diverticulosis large intestine w/o perforation or abscess w/bleeding     DVT, recurrent, lower extremity, acute, left (HCC)     Fibroid, uterine     Hearing loss     Hx of blood clots     Hyperlipidemia Hospitalist Progress Note    Patient:  Yoselyn Garrido    Unit/Bed:7K-23/023-A  YOB: 1941  MRN: [id number]   Acct: [de-identified]   PCP: PETEY Kraft CNP  Code Status: Full Code  Date of Admission: 4/7/2023    Dispo: Pt is stable for discharge from Hospitalist standpoint. Will sign off. Please contact with any further questions or concerns. Assessment/Plan:    Left tibia plateau fracture: Management per primary, ortho. Acute cystitis: Pt completed 5 day course of levofloxacin on 4/4-4/9 per PCP. Repeat UA was negative for bacteria but yeast noted. Received diflucan 4/8-4/10. Ucx showed growth of contaminants, diflucan dc'ed. Acute encephalopathy vs progressive dementia: worsening confusion for last month per daughter. Electrolytes, TSH, Vit B12 checked by PCP and unremarkable. Advised that pain medication may be contributing. Treated for UTI as above. - Resolved. Daughter at bedside states pt is at baseline 4/10. Hip pain: Pt currently Prednisone taper as prescribed by PCP for hip pain, continued inpatient. Thought steroids may be contributing to worsening AMS, daughter states she was on steroids for a while before the confusion started. Management per primary. Essential HTN: resume home metoprolol, lisinopril. BP elevated, increased metoprolol to 50mg BID. Hydralazine is ordered PRN. COPD: Continue dulera, PRN inhalers    Parkinson's disease: continue sinemet. Dementia: cont aricept. Chief Complaint:  L. Tibia plateau fracture  Reason for consult  medical managment    HPI / Hospital Course: Per Initial consult note: Vilma elizabeth female who we are asked to see/evaluate by Scooby Bishop MD for medical management of parkinson's disease, dementia, COPD, HTN. Stephen Mobley underwent a mechanical fall earlier today onto her left knee. No loss of consciousness or head trauma. Patient is from assisted living.   Patient has been admitted for further Orthopaedic Progress Note      SUBJECTIVE   Ms. Lundy Labs is hospital day 4    Seen at bedside this morning  no adverse events overnight  Pain control improved  KI intact  Worked with therapy   Denies any numbness/paresthesia to LLE  Family bedside      OBJECTIVE      Physical    VITALS:  BP (!) 183/79   Pulse 83   Temp 98.1 °F (36.7 °C) (Oral)   Resp 17   Ht 5' 5\" (1.651 m)   Wt 133 lb (60.3 kg)   SpO2 93%   BMI 22.13 kg/m²   I/O last 3 completed shifts: In: 4039.3 [P.O.:860; I.V.:3179.3]  Out: 200 [Urine:200]    GENERAL APPEARANCE: Awake and oriented x self, location, some confusion to reason for being brought into the hospital. this is to her baseline No acute distress, except appropriate to injury. MOOD AND AFFECT: Calm appropriate to situation  HEAD: normocephalic, atraumatic   EYES: equal and reactive to light, Extraocular movements are normal. Pupils are equal in size. Hematologic/Lymphatic: no lymphadenopathy to upper or lower extremity. MSK  LLE:- atraumatic hip, knee, ankle, no deformity at site of fracture. Some ecchymosis and swelling at proximal tibia, otherwise neutral alignment. Some scattered abrasions to tibia shaft, dry. No drainage.  Nontender to palpation asis iliac crest, ttp at greater troch to baseline, nontender at femoral shaft, modest tenderness at medial lateral joint line, nontender tibia shaft, medial lateral mal, midfoot, calc, calf supple nontender,  Able to perform SLR, gastroc ta ehl intact, painless IR ER through hip. sensation to light touch intact, distal pulses palpable      Data  CBC:   Lab Results   Component Value Date/Time    WBC 10.9 04/10/2023 05:52 AM    HGB 12.6 04/10/2023 05:52 AM     04/10/2023 05:52 AM     BMP:    Lab Results   Component Value Date/Time     04/10/2023 05:52 AM    K 4.2 04/10/2023 05:52 AM     04/10/2023 05:52 AM    CO2 24 04/10/2023 05:52 AM    BUN 16 04/10/2023 05:52 AM    CREATININE 0.7 04/10/2023 05:52 AM    CALCIUM 8.5 Patient discharged in stable condition as per order of attending physician to [address] per staff at Time: [id number] and Via Cart to LACP. AVS provided by RN at time of discharge, which includes all necessary medical information pertaining to the patients current course of illness, treatment, medications, post-discharge goals of care, and treatment preferences. Patient/ family verbalize understanding of discharge plan and are in agreement with goal/plan/treatment preferences. Belongings including Glasses, Hearing Aides bilateral, Dentures upper and lower sent with patient. Home medications sent home with patient N/A    Availability of \"My Chart\" offered to patient as a tool for updated health record.   Steps for activation discussed with patient as mentioned on AVS. Physician Progress Note      PATIENT:               Bibi Feliciano  CSN #:                  [id number]  :                       1941  ADMIT DATE:       2023 9:19 AM  DISCH DATE:        2023 5:09 PM  RESPONDING  PROVIDER #:        Blanca Hoskins PA-C          QUERY TEXT:    Patient with?osteoporosis on Fosamax? presented with a left tibia plateau   fracture after sliding to the ground on her left knee. ? Imaging showed bones   are markedly demineralized with mildly displaced transverse fracture of the   proximal tibial metadiaphysis with an intra-articular lateral tibial plateau   fracture. ? ? After further review, can the?predominant? cause of the?fracture be   specified?as:    The medical record reflects the following:  Risk Factors: 78 yo F  Clinical Indicators: Patient with?osteoporosis on Fosamax? presented with a   left tibia plateau fracture after sliding to the ground on her left knee. ?   Imaging showed bones are markedly demineralized with mildly displaced   transverse fracture of the proximal tibial metadiaphysis with an   intra-articular lateral tibial plateau fracture. ?  Treatment: Hospital Course: Patient presented to the ED after sustaining a   mechanical fall resulting barbara  left tibia fracture, closed treatment was   recommended, patient admitted for PT OT pain control and SNF placement. Patient tolerated PT OT well, stable for discharge, hospitalist consulted, all   vitals stable on day of discharge. Thank you.   Maulik Fontanez RN, Clinical Documentation Integrity, [employer], [address], [facility]  Options provided:  -- Pathological left tibia plateau fracture  -- Pathological left tibia plateau fracture due to osteopenia  -- Pathological left tibia plateau fracture due to osteopenia following fall   which would not usually break a normal, healthy bone  -- Osteoporotic left tibia plateau fracture  -- Osteoporotic left tibia plateau fracture following fall which would Physician Progress Note      PATIENT:               Evgeny Leyva  CSN #:                  [id number]  :                       1941  ADMIT DATE:       2023 9:19 AM  DISCH DATE:        2023 5:09 PM  RESPONDING  PROVIDER #:        Neeraj Frias PA-C          QUERY TEXT:    Pt admitted with tibia fracture. Pt noted to have confusion with possible   encephalopathy. If possible, please document in the progress notes and   discharge summary if you are evaluating and / or treating any of the   following: The medical record reflects the following:  Risk Factors: tibia fracture with pain meds given  Clinical Indicators: AMS, confusion with disorientation to situation, time,   place on flowsheet, resolved and back to baseline per daughter 4/10 per the   progress note  Treatment: Labs, imaging, monitoring, ATB for UTI, IVF  Options provided:  -- Metabolic encephalopathy  -- Toxic encephalopathy  -- Toxic metabolic encephalopathy  -- Other - I will add my own diagnosis  -- Disagree - Not applicable / Not valid  -- Disagree - Clinically unable to determine / Unknown  -- Refer to Clinical Documentation Reviewer    PROVIDER RESPONSE TEXT:    This patient has metabolic encephalopathy.     Query created by: Shanae Martins on 2023 2:31 PM      Electronically signed by:  Neeraj Frias PA-C 2023 2:34 PM PRN  albuterol sulfate HFA (PROVENTIL;VENTOLIN;PROAIR) 108 (90 Base) MCG/ACT inhaler 2 puff, 2 puff, Inhalation, Q6H PRN  carbidopa-levodopa (SINEMET)  MG per tablet 2 tablet, 2 tablet, Oral, TID  donepezil (ARICEPT) tablet 10 mg, 10 mg, Oral, Nightly  lisinopril (PRINIVIL;ZESTRIL) tablet 20 mg, 20 mg, Oral, Daily  metoprolol tartrate (LOPRESSOR) tablet 25 mg, 25 mg, Oral, BID  sertraline (ZOLOFT) tablet 25 mg, 25 mg, Oral, Daily  atorvastatin (LIPITOR) tablet 20 mg, 20 mg, Oral, Nightly  mometasone-formoterol (DULERA) 200-5 MCG/ACT inhaler 2 puff, 2 puff, Inhalation, BID  tiotropium (SPIRIVA RESPIMAT) 2.5 MCG/ACT inhaler 2 puff, 2 puff, Inhalation, Daily        PLAN    Ms. Leyla Torres is hospital day 3    Doing well  NWB LLE  Maintain KI as able, okay to remove for wound care  Continue PT/OT  Dvt ppx- Lovenox  Multimodal pain control  Dispo- SNF transfers. Goals  Short Term Goals  Time Frame for Short Term Goals: By discharge  Short Term Goal 1: Pt will demonstrate functional transfers to/from a bedside commode or a chair with no > than CYNDEE x 1 to increase her independence with her toileting routine. Short Term Goal 2: Pt will complete lower body ADLs while using any LHAE needed with MIN A and verbal cues as needed to increase her independence with self care. Short Term Goal 3: Pt will complete BUE ROM/moderate resistance exercises while following a handout with SBA and cues for stready breathing to increase her strength and endurance for ease of doing functional transfers and her ADLs. .  Short Term Goal 4: Pt will stand for 50 seconds while holding onto the sink or bedrail for stability with MIN A for maintaining her WB precaution to increase her balance and safety for ease of dressing or bathing. Additional Goals?: No    Following session, patient left in safe position with all fall risk precautions in place. Term Goals: by discharge  Short Term Goal 1: Pt will demo supine to and from sit transfers with min A x1 to progress with mobility. Short Term Goal 2: Pt will demo min A for scooting on surface with good technique to progress with transfers and strength. Short Term Goal 3: Pt will demo sit to/from stand transfers with mod A x1 to progress with mobility. Short Term Goal 4: Pt will demo mod A x1 for stand pivot transfers with LRAD and good demo of WB status to increase overall mobility. Short Term Goal 5: Pt will tolerate 10-20 reps of ther ex to increase overall mobility. Additional Goals?: Yes  Short Term Goal 6: PT to assess gait when appropriate. Long Term Goals  Time Frame for Long Term Goals : NA due to short ELOS    Following session, patient left in safe position with all fall risk precautions in place.

## 2023-04-19 ENCOUNTER — LAB VISIT (OUTPATIENT)
Dept: LAB | Facility: HOSPITAL | Age: 63
End: 2023-04-19
Attending: INTERNAL MEDICINE
Payer: COMMERCIAL

## 2023-04-19 DIAGNOSIS — N25.81 SECONDARY HYPERPARATHYROIDISM OF RENAL ORIGIN: ICD-10-CM

## 2023-04-19 DIAGNOSIS — D50.8 OTHER IRON DEFICIENCY ANEMIA: ICD-10-CM

## 2023-04-19 DIAGNOSIS — N18.32 STAGE 3B CHRONIC KIDNEY DISEASE: ICD-10-CM

## 2023-04-19 LAB
ALBUMIN SERPL BCP-MCNC: 4.1 G/DL (ref 3.5–5.2)
ANION GAP SERPL CALC-SCNC: 6 MMOL/L (ref 8–16)
BASOPHILS # BLD AUTO: 0.05 K/UL (ref 0–0.2)
BASOPHILS NFR BLD: 0.7 % (ref 0–1.9)
CALCIUM SERPL-MCNC: 8.7 MG/DL (ref 8.7–10.5)
CHLORIDE SERPL-SCNC: 104 MMOL/L (ref 95–110)
CO2 SERPL-SCNC: 29 MMOL/L (ref 23–29)
CREAT SERPL-MCNC: 1.32 MG/DL (ref 0.5–1.4)
DIFFERENTIAL METHOD: ABNORMAL
EOSINOPHIL # BLD AUTO: 0.2 K/UL (ref 0–0.5)
EOSINOPHIL NFR BLD: 2 % (ref 0–8)
ERYTHROCYTE [DISTWIDTH] IN BLOOD BY AUTOMATED COUNT: 15.6 % (ref 11.5–14.5)
EST. GFR  (NO RACE VARIABLE): >60 ML/MIN/1.73 M^2
FERRITIN SERPL-MCNC: 248 NG/ML (ref 20–300)
GLUCOSE SERPL-MCNC: 79 MG/DL (ref 70–110)
HCT VFR BLD AUTO: 33.3 % (ref 40–54)
HGB BLD-MCNC: 10.6 G/DL (ref 14–18)
IMM GRANULOCYTES # BLD AUTO: 0.02 K/UL (ref 0–0.04)
IMM GRANULOCYTES NFR BLD AUTO: 0.3 % (ref 0–0.5)
IRON SERPL-MCNC: 71 UG/DL (ref 45–160)
LYMPHOCYTES # BLD AUTO: 1.4 K/UL (ref 1–4.8)
LYMPHOCYTES NFR BLD: 18.4 % (ref 18–48)
MCH RBC QN AUTO: 31.7 PG (ref 27–31)
MCHC RBC AUTO-ENTMCNC: 31.8 G/DL (ref 32–36)
MCV RBC AUTO: 100 FL (ref 82–98)
MONOCYTES # BLD AUTO: 0.7 K/UL (ref 0.3–1)
MONOCYTES NFR BLD: 9.4 % (ref 4–15)
NEUTROPHILS # BLD AUTO: 5.2 K/UL (ref 1.8–7.7)
NEUTROPHILS NFR BLD: 69.2 % (ref 38–73)
NRBC BLD-RTO: 0 /100 WBC
PHOSPHATE SERPL-MCNC: 4.5 MG/DL (ref 2.7–4.5)
PLATELET # BLD AUTO: 224 K/UL (ref 150–450)
PMV BLD AUTO: 11.9 FL (ref 9.2–12.9)
POTASSIUM SERPL-SCNC: 4.7 MMOL/L (ref 3.5–5.1)
PTH-INTACT SERPL-MCNC: 93.9 PG/ML (ref 9–77)
RBC # BLD AUTO: 3.34 M/UL (ref 4.6–6.2)
SATURATED IRON: 22 % (ref 20–50)
SODIUM SERPL-SCNC: 139 MMOL/L (ref 136–145)
TOTAL IRON BINDING CAPACITY: 320 UG/DL (ref 250–450)
TRANSFERRIN SERPL-MCNC: 216 MG/DL (ref 200–375)
UUN UR-MCNC: 20 MG/DL (ref 2–20)
WBC # BLD AUTO: 7.44 K/UL (ref 3.9–12.7)

## 2023-04-19 PROCEDURE — 83970 ASSAY OF PARATHORMONE: CPT | Mod: PO | Performed by: INTERNAL MEDICINE

## 2023-04-19 PROCEDURE — 80069 RENAL FUNCTION PANEL: CPT | Mod: PO | Performed by: INTERNAL MEDICINE

## 2023-04-19 PROCEDURE — 85025 COMPLETE CBC W/AUTO DIFF WBC: CPT | Mod: PO | Performed by: INTERNAL MEDICINE

## 2023-04-19 PROCEDURE — 36415 COLL VENOUS BLD VENIPUNCTURE: CPT | Mod: PO | Performed by: INTERNAL MEDICINE

## 2023-04-19 PROCEDURE — 84466 ASSAY OF TRANSFERRIN: CPT | Mod: PO | Performed by: INTERNAL MEDICINE

## 2023-04-19 PROCEDURE — 82728 ASSAY OF FERRITIN: CPT | Performed by: INTERNAL MEDICINE

## 2023-04-25 ENCOUNTER — TELEPHONE (OUTPATIENT)
Dept: CARDIOLOGY | Facility: CLINIC | Age: 63
End: 2023-04-25
Payer: COMMERCIAL

## 2023-04-25 ENCOUNTER — OFFICE VISIT (OUTPATIENT)
Dept: UROLOGY | Facility: CLINIC | Age: 63
End: 2023-04-25
Payer: COMMERCIAL

## 2023-04-25 VITALS
DIASTOLIC BLOOD PRESSURE: 63 MMHG | SYSTOLIC BLOOD PRESSURE: 127 MMHG | BODY MASS INDEX: 23.25 KG/M2 | HEIGHT: 69 IN | WEIGHT: 157 LBS | HEART RATE: 65 BPM

## 2023-04-25 DIAGNOSIS — R39.9 LOWER URINARY TRACT SYMPTOMS (LUTS): ICD-10-CM

## 2023-04-25 DIAGNOSIS — N13.8 BPH WITH URINARY OBSTRUCTION: Primary | ICD-10-CM

## 2023-04-25 DIAGNOSIS — N40.1 BPH WITH URINARY OBSTRUCTION: Primary | ICD-10-CM

## 2023-04-25 DIAGNOSIS — Z92.89 HISTORY OF RENAL DIALYSIS: ICD-10-CM

## 2023-04-25 DIAGNOSIS — N52.01 ERECTILE DYSFUNCTION DUE TO ARTERIAL INSUFFICIENCY: ICD-10-CM

## 2023-04-25 LAB
BILIRUB SERPL-MCNC: NORMAL MG/DL
BLOOD URINE, POC: NORMAL
CLARITY, POC UA: CLEAR
COLOR, POC UA: YELLOW
GLUCOSE UR QL STRIP: NORMAL
KETONES UR QL STRIP: NORMAL
LEUKOCYTE ESTERASE URINE, POC: NORMAL
NITRITE, POC UA: NORMAL
PH, POC UA: 7
POC RESIDUAL URINE VOLUME: 0 ML (ref 0–100)
PROTEIN, POC: NORMAL
SPECIFIC GRAVITY, POC UA: 1.01
UROBILINOGEN, POC UA: NORMAL

## 2023-04-25 PROCEDURE — 1159F PR MEDICATION LIST DOCUMENTED IN MEDICAL RECORD: ICD-10-PCS | Mod: CPTII,S$GLB,, | Performed by: NURSE PRACTITIONER

## 2023-04-25 PROCEDURE — 99999 PR PBB SHADOW E&M-EST. PATIENT-LVL III: CPT | Mod: PBBFAC,,, | Performed by: NURSE PRACTITIONER

## 2023-04-25 PROCEDURE — 3074F PR MOST RECENT SYSTOLIC BLOOD PRESSURE < 130 MM HG: ICD-10-PCS | Mod: CPTII,S$GLB,, | Performed by: NURSE PRACTITIONER

## 2023-04-25 PROCEDURE — 99215 PR OFFICE/OUTPT VISIT, EST, LEVL V, 40-54 MIN: ICD-10-PCS | Mod: S$GLB,,, | Performed by: NURSE PRACTITIONER

## 2023-04-25 PROCEDURE — 3044F HG A1C LEVEL LT 7.0%: CPT | Mod: CPTII,S$GLB,, | Performed by: NURSE PRACTITIONER

## 2023-04-25 PROCEDURE — 3066F PR DOCUMENTATION OF TREATMENT FOR NEPHROPATHY: ICD-10-PCS | Mod: CPTII,S$GLB,, | Performed by: NURSE PRACTITIONER

## 2023-04-25 PROCEDURE — 3074F SYST BP LT 130 MM HG: CPT | Mod: CPTII,S$GLB,, | Performed by: NURSE PRACTITIONER

## 2023-04-25 PROCEDURE — 1160F PR REVIEW ALL MEDS BY PRESCRIBER/CLIN PHARMACIST DOCUMENTED: ICD-10-PCS | Mod: CPTII,S$GLB,, | Performed by: NURSE PRACTITIONER

## 2023-04-25 PROCEDURE — 3078F PR MOST RECENT DIASTOLIC BLOOD PRESSURE < 80 MM HG: ICD-10-PCS | Mod: CPTII,S$GLB,, | Performed by: NURSE PRACTITIONER

## 2023-04-25 PROCEDURE — 1160F RVW MEDS BY RX/DR IN RCRD: CPT | Mod: CPTII,S$GLB,, | Performed by: NURSE PRACTITIONER

## 2023-04-25 PROCEDURE — 3008F BODY MASS INDEX DOCD: CPT | Mod: CPTII,S$GLB,, | Performed by: NURSE PRACTITIONER

## 2023-04-25 PROCEDURE — 1159F MED LIST DOCD IN RCRD: CPT | Mod: CPTII,S$GLB,, | Performed by: NURSE PRACTITIONER

## 2023-04-25 PROCEDURE — 3044F PR MOST RECENT HEMOGLOBIN A1C LEVEL <7.0%: ICD-10-PCS | Mod: CPTII,S$GLB,, | Performed by: NURSE PRACTITIONER

## 2023-04-25 PROCEDURE — 4010F PR ACE/ARB THEARPY RXD/TAKEN: ICD-10-PCS | Mod: CPTII,S$GLB,, | Performed by: NURSE PRACTITIONER

## 2023-04-25 PROCEDURE — 3066F NEPHROPATHY DOC TX: CPT | Mod: CPTII,S$GLB,, | Performed by: NURSE PRACTITIONER

## 2023-04-25 PROCEDURE — 99215 OFFICE O/P EST HI 40 MIN: CPT | Mod: S$GLB,,, | Performed by: NURSE PRACTITIONER

## 2023-04-25 PROCEDURE — 81002 POCT URINE DIPSTICK WITHOUT MICROSCOPE: ICD-10-PCS | Mod: S$GLB,,, | Performed by: NURSE PRACTITIONER

## 2023-04-25 PROCEDURE — 51798 POCT BLADDER SCAN: ICD-10-PCS | Mod: S$GLB,,, | Performed by: NURSE PRACTITIONER

## 2023-04-25 PROCEDURE — 51798 US URINE CAPACITY MEASURE: CPT | Mod: S$GLB,,, | Performed by: NURSE PRACTITIONER

## 2023-04-25 PROCEDURE — 4010F ACE/ARB THERAPY RXD/TAKEN: CPT | Mod: CPTII,S$GLB,, | Performed by: NURSE PRACTITIONER

## 2023-04-25 PROCEDURE — 99999 PR PBB SHADOW E&M-EST. PATIENT-LVL III: ICD-10-PCS | Mod: PBBFAC,,, | Performed by: NURSE PRACTITIONER

## 2023-04-25 PROCEDURE — 3078F DIAST BP <80 MM HG: CPT | Mod: CPTII,S$GLB,, | Performed by: NURSE PRACTITIONER

## 2023-04-25 PROCEDURE — 81002 URINALYSIS NONAUTO W/O SCOPE: CPT | Mod: S$GLB,,, | Performed by: NURSE PRACTITIONER

## 2023-04-25 PROCEDURE — 3008F PR BODY MASS INDEX (BMI) DOCUMENTED: ICD-10-PCS | Mod: CPTII,S$GLB,, | Performed by: NURSE PRACTITIONER

## 2023-04-25 RX ORDER — AMLODIPINE AND BENAZEPRIL HYDROCHLORIDE 10; 20 MG/1; MG/1
1 CAPSULE ORAL
COMMUNITY
Start: 2023-04-05 | End: 2023-06-08

## 2023-04-25 RX ORDER — LOSARTAN POTASSIUM 25 MG/1
TABLET ORAL
COMMUNITY
Start: 2023-04-05 | End: 2023-06-08

## 2023-04-25 RX ORDER — APIXABAN 2.5 MG/1
TABLET, FILM COATED ORAL
COMMUNITY
Start: 2023-04-05 | End: 2023-06-08

## 2023-04-25 NOTE — TELEPHONE ENCOUNTER
----- Message from Kiersten Morin sent at 4/24/2023  2:38 PM CDT -----  Contact: josh  Type:  Needs Medical Advice    Who Called: [facility] [place]  Symptoms (please be specific): pt most recent EKG needed now for surgery on Wednesday  HWould the patient rather a call back or a response via [facility]? call  Best Call Back Number: [phone]  Additional Information: it don't receive today will prolong surgery

## 2023-04-25 NOTE — PROGRESS NOTES
CHIEF COMPLAINT:    David Barrios is a 62 y.o. male presents today for Follow Up Visit    HISTORY OF PRESENTING ILLINESS:    David Barrios is a 62 y.o. male initially seen on 01/12/2023 for LUTS; these worse during the day. Not having issues at night.  At the time he was on HD.    He denies a lot of coffee/teas; he does take Lasix but not on the days he has dialysis. Denied any abdominal or bone pain  We started him on Flomax.     Here today for 3 month f/u visit.   He is currently off HD.   LUTS somewhat improved.   FOS can vary.   No dysuria or hematuria; good control.     ED > 1 year. Interested in improving once he is a little healthier.   He is currently in cardiac rehab.   Had tried Viagra in the past with mixed results.             REVIEW OF SYSTEMS:  Review of Systems   Constitutional: Negative.  Negative for chills and fever.   Eyes:  Negative for double vision.   Respiratory:  Negative for cough and shortness of breath.    Cardiovascular:  Negative for chest pain.   Gastrointestinal:  Negative for abdominal pain, constipation, diarrhea, nausea and vomiting.   Genitourinary:  Positive for frequency. Negative for dysuria, flank pain and hematuria.        FOS can vary;      Skin:         Raised area under left arm.   Had been there before. Resolved on it's on; recently returned  Seeing Dr. Carrasquillo next week   Neurological:  Negative for dizziness and seizures.   Endo/Heme/Allergies:  Negative for polydipsia.       PATIENT HISTORY:    Past Medical History:   Diagnosis Date    Anemia     Atrial fibrillation     CKD (chronic kidney disease) stage 3, GFR 30-59 ml/min     Encounter for blood transfusion     Esophageal ulcer     GI bleed     Hypertension     Hyperuricemia        Past Surgical History:   Procedure Laterality Date    APPLICATION OF WOUND VACUUM-ASSISTED CLOSURE DEVICE N/A 11/8/2022    Procedure: APPLICATION, WOUND VAC;  Surgeon: Mike Hedrick MD;  Location: [facility] OR [address] [place] [address];  Service: General;   Laterality: N/A;    CARDIOVERSION Left 9/15/2022    Procedure: Cardioversion;  Surgeon: Julio James MD;  Location: [facility] CATH LAB/EP;  Service: Cardiology;  Laterality: Left;  With NORBERT    CATHETERIZATION OF BOTH LEFT AND RIGHT HEART N/A 9/22/2022    Procedure: CATHETERIZATION, HEART, BOTH LEFT AND RIGHT;  Surgeon: Julio James MD;  Location: [facility] CATH LAB/EP;  Service: Cardiology;  Laterality: N/A;    COLONOSCOPY N/A 4/4/2019    Procedure: COLONOSCOPY Golytely;  Surgeon: Umair Randolph MD;  Location: [facility] ENDO;  Service: Endoscopy;  Laterality: N/A;    CORONARY ANGIOGRAPHY N/A 9/22/2022    Procedure: ANGIOGRAM, CORONARY ARTERY;  Surgeon: Julio James MD;  Location: [facility] CATH LAB/EP;  Service: Cardiology;  Laterality: N/A;    MEYER MAZE PROCEDURE N/A 10/7/2022    Procedure: MEYER MAZE PROCEDURE;  Surgeon: Mike Hedrick MD;  [facility];  Service: Cardiovascular;  Laterality: N/A;    CREATION OF MUSCLE ROTATIONAL FLAP N/A 11/14/2022    Procedure: CREATION, FLAP, MUSCLE ROTATION;  Surgeon: Amadeo Carrasquillo MD;  [facility];  Service: Plastics;  Laterality: N/A;  sternal wound that need pec flap coverage    DEBRIDEMENT OF STERNUM N/A 11/8/2022    Procedure: DEBRIDEMENT, STERNUM;  Surgeon: Mike Hedrick MD;  [facility];  Service: General;  Laterality: N/A;    ESOPHAGOGASTRODUODENOSCOPY N/A 10/12/2018    Procedure: EGD (ESOPHAGOGASTRODUODENOSCOPY);  Surgeon: Braden Herring MD;  Location: [facility];  Service: Endoscopy;  Laterality: N/A;    ESOPHAGOGASTRODUODENOSCOPY N/A 4/4/2019    Procedure: EGD;  Surgeon: Umair Randolph MD;  Location: [facility];  Service: Endoscopy;  Laterality: N/A;    EXCLUSION OF LEFT ATRIAL APPENDAGE N/A 10/7/2022    Procedure: EXCLUSION, LEFT ATRIAL APPENDAGE;  Surgeon: Mike Hedrick MD;  [facility];  Service: Cardiovascular;  Laterality: N/A;    HERNIA REPAIR      INSERTION OF INTRAVASCULAR MICROAXIAL  BLOOD PUMP N/A 10/7/2022    Procedure: INSERTION, IMPELLA;  Surgeon: Mike Hedrick MD;  Location: [facility] OR 2ND FLR;  Service: Cardiovascular;  Laterality: N/A;  direct aortic insertion    IRRIGATION OF MEDIASTINUM N/A 10/7/2022    Procedure: IRRIGATION, MEDIASTINUM;  Surgeon: Mike Hedrick MD;  Location: [facility] OR 2ND FLR;  Service: Cardiovascular;  Laterality: N/A;    STERNAL WIRES REMOVAL N/A 11/8/2022    Procedure: REMOVAL, STERNAL WIRE;  Surgeon: Mike Hedrick MD;  Location: [facility] OR 2ND FLR;  Service: General;  Laterality: N/A;  Sternal wire removal with muscle flap creation    STERNAL WOUND CLOSURE N/A 11/14/2022    Procedure: CLOSURE, WOUND, STERNUM;  Surgeon: Amadeo Carrasquillo MD;  Location: [facility] OR 2ND FLR;  Service: Plastics;  Laterality: N/A;    TRICUSPID VALVULOPLASTY N/A 10/7/2022    Procedure: REPAIR, TRICUSPID VALVE;  Surgeon: Mike Hedrick MD;  Location: [facility] OR 2ND FLR;  Service: Cardiovascular;  Laterality: N/A;    WOUND EXPLORATION N/A 11/14/2022    Procedure: EXPLORATION, WOUND;  Surgeon: Amadeo Carrasquillo MD;  Location: [facility] OR 2ND FLR;  Service: Plastics;  Laterality: N/A;       Family History   Problem Relation Age of Onset    COPD Mother     Cancer Father        Social History     Socioeconomic History    Marital status: Single   Tobacco Use    Smoking status: Never    Smokeless tobacco: Current     Types: Chew   Substance and Sexual Activity    Alcohol use: Yes     Alcohol/week: 20.0 standard drinks     Types: 20 Cans of beer per week    Drug use: No       Allergies:  Patient has no known allergies.    Medications:    Current Outpatient Medications:     amiodarone (PACERONE) 200 MG Tab, TAKE 1 TABLET(200 MG) BY MOUTH TWICE DAILY, Disp: 60 tablet, Rfl: 2    amlodipine-benazepril 10-20mg (LOTREL) 10-20 mg per capsule, Take 1 capsule by mouth., Disp: , Rfl:     aspirin 81 MG Chew, Take 1 tablet (81 mg total) by mouth once daily., Disp: 360 tablet, Rfl: 0     atorvastatin (LIPITOR) 40 MG tablet, Take 1 tablet (40 mg total) by mouth once daily., Disp: 90 tablet, Rfl: 3    ELIQUIS 2.5 mg Tab, , Disp: , Rfl:     furosemide (LASIX) 80 MG tablet, Take 1 tablet (80 mg total) by mouth 2 (two) times a day., Disp: 180 tablet, Rfl: 11    HYDROcodone-acetaminophen (NORCO) 7.5-325 mg per tablet, Take 1 tablet by mouth every 6 (six) hours as needed for Pain., Disp: , Rfl:     losartan (COZAAR) 25 MG tablet, , Disp: , Rfl:     magnesium oxide (MAG-OX) 400 mg (241.3 mg magnesium) tablet, Take 1 tablet (400 mg total) by mouth 2 (two) times daily. (Patient taking differently: Take 400 mg by mouth once daily.), Disp: 60 tablet, Rfl: 2    metoprolol succinate (TOPROL-XL) 25 MG 24 hr tablet, Take 1 tablet (25 mg total) by mouth once daily., Disp: 30 tablet, Rfl: 11    rivaroxaban (XARELTO) 20 mg Tab, Take 1 tablet (20 mg total) by mouth daily with dinner or evening meal., Disp: 90 tablet, Rfl: 3    sacubitriL-valsartan (ENTRESTO) 49-51 mg per tablet, Take 1 tablet by mouth 2 (two) times daily., Disp: 180 tablet, Rfl: 3    tamsulosin (FLOMAX) 0.4 mg Cap, Take 1 capsule (0.4 mg total) by mouth every evening. Take 30 minutes after dinner, Disp: 90 capsule, Rfl: 3    vitamin renal formula, B-complex-vitamin c-folic acid, (NEPHROCAP) 1 mg Cap, Take 1 capsule by mouth once daily., Disp: 30 capsule, Rfl: 2    PHYSICAL EXAMINATION:  Physical Exam  Vitals and nursing note reviewed.   Constitutional:       General: He is awake.      Appearance: Normal appearance.   HENT:      Head: Normocephalic.      Right Ear: External ear normal.      Left Ear: External ear normal.      Nose: Nose normal.   Cardiovascular:      Rate and Rhythm: Normal rate.   Pulmonary:      Effort: Pulmonary effort is normal. No respiratory distress.   Abdominal:      Tenderness: There is no abdominal tenderness. There is no right CVA tenderness or left CVA tenderness.   Genitourinary:     Penis: Normal.       Testes: Normal.    Musculoskeletal:         General: Normal range of motion.      Cervical back: Normal range of motion.   Skin:     General: Skin is warm and dry.   Neurological:      General: No focal deficit present.      Mental Status: He is alert and oriented to person, place, and time.   Psychiatric:         Mood and Affect: Mood normal.         Behavior: Behavior is cooperative.         LABS:      In office UA today was clear of active infection and blood.     The PVR in the office today done immediately after urination by the nurse was 0. (Last visit it was 90).         Lab Results   Component Value Date    PSADIAG 3.7 01/12/2023       Lab Results   Component Value Date    CREATININE 1.32 04/19/2023    EGFRNORACEVR >60.0 04/19/2023             IMPRESSION:    Encounter Diagnoses   Name Primary?    BPH with urinary obstruction Yes    Lower urinary tract symptoms (LUTS)     History of renal dialysis          Assessment:       1. BPH with urinary obstruction    2. Lower urinary tract symptoms (LUTS)    3. History of renal dialysis        Plan:         I spent 40 minutes with the patient of which more than half was spent in direct consultation with the patient in regards to our treatment and plan.  We addressed the office findings and recent labs.   Education and recommendations of today's plan of care including home remedies and needed follow up with PCP.   We discussed the chief complaint; reviewed the LUTS and the possible contributory factors.   Reassurance no infection and he is emptying bladder much better.   Recommended lifestyle modifications with a proper, healthy diet, good hydration but during the day. Reducing bladder irritants.   Benefits of regular exercise.  Continue flomax  We discussed his ED and the contributory factors.  Follow up with PCP for underlying medical conditions causing ED.   We discussed the physiological as well as his psychological aspects that contribute to ED.  Reviewed the 1st, 2nd, & 3rd line  therapies for ED.  The benefits, expectations risks, se of the different therapies.  Encouraged to take on an empty stomach 30-60 minutes prior to interaction.   He was informed that for the ETTA we have rep that comes to clinic for him discuss.  If interested in ICI therapy, medication comes from a compound pharmacy and the 1st dose has to be done under medical supervision.  This is done due to high risk for priapism.  He would like to try Viagra again once cleared.   RTC 6 months with new PSA

## 2023-05-03 ENCOUNTER — OFFICE VISIT (OUTPATIENT)
Dept: PLASTIC SURGERY | Facility: CLINIC | Age: 63
End: 2023-05-03
Payer: COMMERCIAL

## 2023-05-03 VITALS
WEIGHT: 157 LBS | SYSTOLIC BLOOD PRESSURE: 130 MMHG | HEART RATE: 62 BPM | HEIGHT: 69 IN | DIASTOLIC BLOOD PRESSURE: 62 MMHG | BODY MASS INDEX: 23.25 KG/M2

## 2023-05-03 DIAGNOSIS — L08.9 STERNAL WOUND INFECTION: Primary | ICD-10-CM

## 2023-05-03 DIAGNOSIS — S21.101A STERNAL WOUND INFECTION: Primary | ICD-10-CM

## 2023-05-03 PROCEDURE — 4010F ACE/ARB THERAPY RXD/TAKEN: CPT | Mod: CPTII,S$GLB,, | Performed by: SURGERY

## 2023-05-03 PROCEDURE — 3044F PR MOST RECENT HEMOGLOBIN A1C LEVEL <7.0%: ICD-10-PCS | Mod: CPTII,S$GLB,, | Performed by: SURGERY

## 2023-05-03 PROCEDURE — 1160F RVW MEDS BY RX/DR IN RCRD: CPT | Mod: CPTII,S$GLB,, | Performed by: SURGERY

## 2023-05-03 PROCEDURE — 1160F PR REVIEW ALL MEDS BY PRESCRIBER/CLIN PHARMACIST DOCUMENTED: ICD-10-PCS | Mod: CPTII,S$GLB,, | Performed by: SURGERY

## 2023-05-03 PROCEDURE — 1159F PR MEDICATION LIST DOCUMENTED IN MEDICAL RECORD: ICD-10-PCS | Mod: CPTII,S$GLB,, | Performed by: SURGERY

## 2023-05-03 PROCEDURE — 3078F PR MOST RECENT DIASTOLIC BLOOD PRESSURE < 80 MM HG: ICD-10-PCS | Mod: CPTII,S$GLB,, | Performed by: SURGERY

## 2023-05-03 PROCEDURE — 3066F NEPHROPATHY DOC TX: CPT | Mod: CPTII,S$GLB,, | Performed by: SURGERY

## 2023-05-03 PROCEDURE — 99024 PR POST-OP FOLLOW-UP VISIT: ICD-10-PCS | Mod: S$GLB,,, | Performed by: SURGERY

## 2023-05-03 PROCEDURE — 99999 PR PBB SHADOW E&M-EST. PATIENT-LVL III: ICD-10-PCS | Mod: PBBFAC,,, | Performed by: SURGERY

## 2023-05-03 PROCEDURE — 1159F MED LIST DOCD IN RCRD: CPT | Mod: CPTII,S$GLB,, | Performed by: SURGERY

## 2023-05-03 PROCEDURE — 3008F BODY MASS INDEX DOCD: CPT | Mod: CPTII,S$GLB,, | Performed by: SURGERY

## 2023-05-03 PROCEDURE — 4010F PR ACE/ARB THEARPY RXD/TAKEN: ICD-10-PCS | Mod: CPTII,S$GLB,, | Performed by: SURGERY

## 2023-05-03 PROCEDURE — 99999 PR PBB SHADOW E&M-EST. PATIENT-LVL III: CPT | Mod: PBBFAC,,, | Performed by: SURGERY

## 2023-05-03 PROCEDURE — 3075F SYST BP GE 130 - 139MM HG: CPT | Mod: CPTII,S$GLB,, | Performed by: SURGERY

## 2023-05-03 PROCEDURE — 3078F DIAST BP <80 MM HG: CPT | Mod: CPTII,S$GLB,, | Performed by: SURGERY

## 2023-05-03 PROCEDURE — 3008F PR BODY MASS INDEX (BMI) DOCUMENTED: ICD-10-PCS | Mod: CPTII,S$GLB,, | Performed by: SURGERY

## 2023-05-03 PROCEDURE — 3066F PR DOCUMENTATION OF TREATMENT FOR NEPHROPATHY: ICD-10-PCS | Mod: CPTII,S$GLB,, | Performed by: SURGERY

## 2023-05-03 PROCEDURE — 3075F PR MOST RECENT SYSTOLIC BLOOD PRESS GE 130-139MM HG: ICD-10-PCS | Mod: CPTII,S$GLB,, | Performed by: SURGERY

## 2023-05-03 PROCEDURE — 99024 POSTOP FOLLOW-UP VISIT: CPT | Mod: S$GLB,,, | Performed by: SURGERY

## 2023-05-03 PROCEDURE — 3044F HG A1C LEVEL LT 7.0%: CPT | Mod: CPTII,S$GLB,, | Performed by: SURGERY

## 2023-05-03 NOTE — PROGRESS NOTES
History & Physical  Pl[facility]    SUBJECTIVE:     Chief complaint: 6 month follow up     History of Present Illness:  Patient is a 62 y.o. male presents for 6 month follow up after bilateral pectoralis island flaps following sternal debridement for sternal infection. He is doing well, He denies any fever, chills, nausea or vomiting. He does report about a week ago he felt a pop in his left axilla which subsequently became a hematoma and now is aged ecchymosis with a small nodule. He reports getting a lipoma removed from the [facility] forehead a week ago and now has a large swelling in the area with bilateral periorbital ecchymosis.      Past Medical History:  Past Medical History:   Diagnosis Date    Anemia     Atrial fibrillation     CKD (chronic kidney disease) stage 3, GFR 30-59 ml/min     Encounter for blood transfusion     Esophageal ulcer     GI bleed     Hypertension     Hyperuricemia        Past Surgical History:  Past Surgical History:   Procedure Laterality Date    APPLICATION OF WOUND VACUUM-ASSISTED CLOSURE DEVICE N/A 11/8/2022    Procedure: APPLICATION, WOUND VAC;  Surgeon: Mike Hedrick MD;  Location: [facility] OR [address] [place] [address];  Service: General;  Laterality: N/A;    CARDIOVERSION Left 9/15/2022    Procedure: Cardioversion;  Surgeon: Julio James MD;  Location: [facility] CATH LAB/EP;  Service: Cardiology;  Laterality: Left;  With NORBERT    CATHETERIZATION OF BOTH LEFT AND RIGHT HEART N/A 9/22/2022    Procedure: CATHETERIZATION, HEART, BOTH LEFT AND RIGHT;  Surgeon: Julio James MD;  Location: [facility] CATH LAB/EP;  Service: Cardiology;  Laterality: N/A;    COLONOSCOPY N/A 4/4/2019    Procedure: COLONOSCOPY Golytely;  Surgeon: Umair Randolph MD;  Location: [facility] ENDO;  Service: Endoscopy;  Laterality: N/A;    CORONARY ANGIOGRAPHY N/A 9/22/2022    Procedure: ANGIOGRAM, CORONARY ARTERY;  Surgeon: Julio James MD;  Location: [facility] CATH LAB/EP;  Service: Cardiology;  Laterality: N/A;    MEYER MAZE  PROCEDURE N/A 10/7/2022    Procedure: MEYER MAZE PROCEDURE;  Surgeon: Mike Hedrick MD;  [facility];  Service: Cardiovascular;  Laterality: N/A;    CREATION OF MUSCLE ROTATIONAL FLAP N/A 11/14/2022    Procedure: CREATION, FLAP, MUSCLE ROTATION;  Surgeon: Amadeo Carrasquillo MD;  [facility];  Service: Plastics;  Laterality: N/A;  sternal wound that need pec flap coverage    DEBRIDEMENT OF STERNUM N/A 11/8/2022    Procedure: DEBRIDEMENT, STERNUM;  Surgeon: Mike Hedrick MD;  [facility];  Service: General;  Laterality: N/A;    ESOPHAGOGASTRODUODENOSCOPY N/A 10/12/2018    Procedure: EGD (ESOPHAGOGASTRODUODENOSCOPY);  Surgeon: Braden Herring MD;  Location: [facility];  Service: Endoscopy;  Laterality: N/A;    ESOPHAGOGASTRODUODENOSCOPY N/A 4/4/2019    Procedure: EGD;  Surgeon: Umair Randolph MD;  Location: [facility];  Service: Endoscopy;  Laterality: N/A;    EXCLUSION OF LEFT ATRIAL APPENDAGE N/A 10/7/2022    Procedure: EXCLUSION, LEFT ATRIAL APPENDAGE;  Surgeon: Mike Hedrick MD;  [facility];  Service: Cardiovascular;  Laterality: N/A;    HERNIA REPAIR      INSERTION OF INTRAVASCULAR MICROAXIAL BLOOD PUMP N/A 10/7/2022    Procedure: INSERTION, IMPELLA;  Surgeon: Mike Hedrick MD;  [facility];  Service: Cardiovascular;  Laterality: N/A;  direct aortic insertion    IRRIGATION OF MEDIASTINUM N/A 10/7/2022    Procedure: IRRIGATION, MEDIASTINUM;  Surgeon: Mike Hedrick MD;  [facility];  Service: Cardiovascular;  Laterality: N/A;    STERNAL WIRES REMOVAL N/A 11/8/2022    Procedure: REMOVAL, STERNAL WIRE;  Surgeon: Mike Hedrick MD;  [facility];  Service: General;  Laterality: N/A;  Sternal wire removal with muscle flap creation    STERNAL WOUND CLOSURE N/A 11/14/2022    Procedure: CLOSURE, WOUND, STERNUM;  Surgeon: Amadeo Carrasquillo MD;  Location: [facility] OR [address] [place] [address];  Service:  Plastics;  Laterality: N/A;    TRICUSPID VALVULOPLASTY N/A 10/7/2022    Procedure: REPAIR, TRICUSPID VALVE;  Surgeon: Mike Hedrick MD;  Location: [facility] OR [address] [place] [address];  Service: Cardiovascular;  Laterality: N/A;    WOUND EXPLORATION N/A 11/14/2022    Procedure: EXPLORATION, WOUND;  Surgeon: Amadeo Carrasquillo MD;  [facility];  Service: Plastics;  Laterality: N/A;       Family History:  Family History   Problem Relation Age of Onset    COPD Mother     Cancer Father        Social History:  Social History     Socioeconomic History    Marital status: Single   Tobacco Use    Smoking status: Never    Smokeless tobacco: Current     Types: Chew   Substance and Sexual Activity    Alcohol use: Yes     Alcohol/week: 20.0 standard drinks     Types: 20 Cans of beer per week    Drug use: No       Medications:  Current Outpatient Medications   Medication Sig Dispense Refill    amiodarone (PACERONE) 200 MG Tab TAKE 1 TABLET(200 MG) BY MOUTH TWICE DAILY 60 tablet 2    amlodipine-benazepril 10-20mg (LOTREL) 10-20 mg per capsule Take 1 capsule by mouth.      aspirin 81 MG Chew Take 1 tablet (81 mg total) by mouth once daily. 360 tablet 0    atorvastatin (LIPITOR) 40 MG tablet Take 1 tablet (40 mg total) by mouth once daily. 90 tablet 3    ELIQUIS 2.5 mg Tab       furosemide (LASIX) 80 MG tablet Take 1 tablet (80 mg total) by mouth 2 (two) times a day. 180 tablet 11    losartan (COZAAR) 25 MG tablet       metoprolol succinate (TOPROL-XL) 25 MG 24 hr tablet Take 1 tablet (25 mg total) by mouth once daily. 30 tablet 11    rivaroxaban (XARELTO) 20 mg Tab Take 1 tablet (20 mg total) by mouth daily with dinner or evening meal. 90 tablet 3    sacubitriL-valsartan (ENTRESTO) 49-51 mg per tablet Take 1 tablet by mouth 2 (two) times daily. 180 tablet 3    tamsulosin (FLOMAX) 0.4 mg Cap Take 1 capsule (0.4 mg total) by mouth every evening. Take 30 minutes after dinner 90 capsule 3    vitamin renal formula, B-complex-vitamin  "c-folic acid, (NEPHROCAP) 1 mg Cap Take 1 capsule by mouth once daily. 30 capsule 2    HYDROcodone-acetaminophen (NORCO) 7.5-325 mg per tablet Take 1 tablet by mouth every 6 (six) hours as needed for Pain.      magnesium oxide (MAG-OX) 400 mg (241.3 mg magnesium) tablet Take 1 tablet (400 mg total) by mouth 2 (two) times daily. (Patient taking differently: Take 400 mg by mouth once daily.) 60 tablet 2     No current facility-administered medications for this visit.       Allergies:  Review of patient's allergies indicates:  No Known Allergies    Review of Systems:  Negative except for HPI.      OBJECTIVE:     /62   Pulse 62   Ht 5' 9" (1.753 m)   Wt 71.2 kg (157 lb)   BMI 23.18 kg/m²     Physical Exam:  Constitutional: Oriented to person, place, and time. Appears well-developed and well-nourished.   HEENT: large right eyebrow hematoma and periorbital ecchymosis bilaterally,   Chest: sternotomy incision scar well healed inferior aspect with small pimple, bilateral axillary hallowing expected post islanded pec flaps, left sided 2cm nodule with aged ecchymosis         ASSESSMENT/PLAN:     62 y.o. male 6 months post op pec flaps doing well  - Return to work  - F/u PRN       "

## 2023-05-08 PROBLEM — N17.9 ACUTE RENAL FAILURE: Status: RESOLVED | Noted: 2022-11-16 | Resolved: 2023-05-08

## 2023-05-29 ENCOUNTER — LAB VISIT (OUTPATIENT)
Dept: LAB | Facility: HOSPITAL | Age: 63
End: 2023-05-29
Attending: INTERNAL MEDICINE
Payer: COMMERCIAL

## 2023-05-29 DIAGNOSIS — N18.32 STAGE 3B CHRONIC KIDNEY DISEASE: ICD-10-CM

## 2023-05-29 LAB
CREAT UR-MCNC: 128.4 MG/DL (ref 23–375)
PROT UR-MCNC: 12 MG/DL (ref 0–15)
PROT/CREAT UR: 0.09 MG/G{CREAT} (ref 0–0.2)

## 2023-05-29 PROCEDURE — 84156 ASSAY OF PROTEIN URINE: CPT | Performed by: INTERNAL MEDICINE

## 2023-05-30 ENCOUNTER — HOSPITAL ENCOUNTER (OUTPATIENT)
Dept: RADIOLOGY | Facility: HOSPITAL | Age: 63
Discharge: HOME | End: 2023-05-30
Attending: INTERNAL MEDICINE
Payer: COMMERCIAL

## 2023-05-30 DIAGNOSIS — N18.32 STAGE 3B CHRONIC KIDNEY DISEASE: ICD-10-CM

## 2023-05-30 PROCEDURE — 76770 US EXAM ABDO BACK WALL COMP: CPT | Mod: TC,PO

## 2023-05-30 PROCEDURE — 76770 US KIDNEY: ICD-10-PCS | Mod: 26,,, | Performed by: RADIOLOGY

## 2023-05-30 PROCEDURE — 76770 US EXAM ABDO BACK WALL COMP: CPT | Mod: 26,,, | Performed by: RADIOLOGY

## 2023-06-02 ENCOUNTER — HOSPITAL ENCOUNTER (OUTPATIENT)
Dept: CARDIOLOGY | Facility: HOSPITAL | Age: 63
Discharge: HOME | End: 2023-06-02
Attending: INTERNAL MEDICINE
Payer: COMMERCIAL

## 2023-06-02 VITALS — WEIGHT: 158 LBS | HEIGHT: 69 IN | BODY MASS INDEX: 23.4 KG/M2

## 2023-06-02 DIAGNOSIS — I50.42 CHRONIC COMBINED SYSTOLIC AND DIASTOLIC HEART FAILURE: ICD-10-CM

## 2023-06-02 LAB
AV INDEX (PROSTH): 0.93
AV MEAN GRADIENT: 9 MMHG
AV PEAK GRADIENT: 17 MMHG
AV REGURGITATION PRESSURE HALF TIME: 368.19 MS
AV VALVE AREA: 3.68 CM2
AV VELOCITY RATIO: 0.66
BSA FOR ECHO PROCEDURE: 1.87 M2
CV ECHO LV RWT: 0.21 CM
DOP CALC AO PEAK VEL: 2.09 M/S
DOP CALC AO VTI: 36.2 CM
DOP CALC LVOT AREA: 3.9 CM2
DOP CALC LVOT DIAMETER: 2.24 CM
DOP CALC LVOT PEAK VEL: 1.38 M/S
DOP CALC LVOT STROKE VOLUME: 133.13 CM3
DOP CALC MV VTI: 47.1 CM
DOP CALCLVOT PEAK VEL VTI: 33.8 CM
E WAVE DECELERATION TIME: 326.77 MSEC
E/A RATIO: 3.02
E/E' RATIO: 16.71 M/S
ECHO LV POSTERIOR WALL: 0.7 CM (ref 0.6–1.1)
EJECTION FRACTION: 45 %
FRACTIONAL SHORTENING: 33 % (ref 28–44)
INTERVENTRICULAR SEPTUM: 0.87 CM (ref 0.6–1.1)
IVC DIAMETER: 1.08 CM
LA MAJOR: 6.72 CM
LA MINOR: 6.03 CM
LA WIDTH: 6.7 CM
LEFT ATRIUM SIZE: 5.25 CM
LEFT ATRIUM VOLUME INDEX MOD: 37.7 ML/M2
LEFT ATRIUM VOLUME INDEX: 101.6 ML/M2
LEFT ATRIUM VOLUME MOD: 70.47 CM3
LEFT ATRIUM VOLUME: 190.05 CM3
LEFT INTERNAL DIMENSION IN SYSTOLE: 4.55 CM (ref 2.1–4)
LEFT VENTRICLE DIASTOLIC VOLUME INDEX: 127.69 ML/M2
LEFT VENTRICLE DIASTOLIC VOLUME: 238.78 ML
LEFT VENTRICLE MASS INDEX: 121 G/M2
LEFT VENTRICLE SYSTOLIC VOLUME INDEX: 50.7 ML/M2
LEFT VENTRICLE SYSTOLIC VOLUME: 94.72 ML
LEFT VENTRICULAR INTERNAL DIMENSION IN DIASTOLE: 6.79 CM (ref 3.5–6)
LEFT VENTRICULAR MASS: 226.26 G
LV LATERAL E/E' RATIO: 12.91 M/S
LV SEPTAL E/E' RATIO: 23.67 M/S
LVOT MG: 4.4 MMHG
LVOT MV: 1.02 CM/S
MV MEAN GRADIENT: 4 MMHG
MV PEAK A VEL: 0.47 M/S
MV PEAK E VEL: 1.42 M/S
MV PEAK GRADIENT: 7 MMHG
MV STENOSIS PRESSURE HALF TIME: 94.76 MS
MV VALVE AREA BY CONTINUITY EQUATION: 2.83 CM2
MV VALVE AREA P 1/2 METHOD: 2.32 CM2
OHS LV EJECTION FRACTION SIMPSONS BIPLANE MOD: 6 %
PISA AR MAX VEL: 5.15 M/S
PISA MRMAX VEL: 4.99 M/S
PISA TR MAX VEL: 2.51 M/S
PV PEAK S VEL: 0.49 M/S
RA MAJOR: 4.72 CM
RA PRESSURE: 3 MMHG
RA WIDTH: 4.7 CM
RV TISSUE DOPPLER FREE WALL SYSTOLIC VELOCITY 1 (APICAL 4 CHAMBER VIEW): 0.01 CM/S
TDI LATERAL: 0.11 M/S
TDI SEPTAL: 0.06 M/S
TDI: 0.09 M/S
TR MAX PG: 25 MMHG
TV REST PULMONARY ARTERY PRESSURE: 28 MMHG

## 2023-06-02 PROCEDURE — 93306 TTE W/DOPPLER COMPLETE: CPT | Mod: 26,,, | Performed by: INTERNAL MEDICINE

## 2023-06-02 PROCEDURE — 93306 ECHO (CUPID ONLY): ICD-10-PCS | Mod: 26,,, | Performed by: INTERNAL MEDICINE

## 2023-06-02 PROCEDURE — 93306 TTE W/DOPPLER COMPLETE: CPT | Mod: PO

## 2023-06-08 ENCOUNTER — OFFICE VISIT (OUTPATIENT)
Dept: CARDIOLOGY | Facility: CLINIC | Age: 63
End: 2023-06-08
Payer: COMMERCIAL

## 2023-06-08 VITALS
HEIGHT: 69 IN | WEIGHT: 158.38 LBS | HEART RATE: 60 BPM | DIASTOLIC BLOOD PRESSURE: 68 MMHG | SYSTOLIC BLOOD PRESSURE: 115 MMHG | BODY MASS INDEX: 23.46 KG/M2 | OXYGEN SATURATION: 95 %

## 2023-06-08 DIAGNOSIS — Z98.890 STATUS POST LIGATION OF LEFT ATRIAL APPENDAGE: ICD-10-CM

## 2023-06-08 DIAGNOSIS — Z98.890 STATUS POST MAZE OPERATION FOR ATRIAL FIBRILLATION: ICD-10-CM

## 2023-06-08 DIAGNOSIS — I51.89 RIGHT ATRIAL MASS: ICD-10-CM

## 2023-06-08 DIAGNOSIS — I50.42 CHRONIC COMBINED SYSTOLIC AND DIASTOLIC HEART FAILURE: Primary | ICD-10-CM

## 2023-06-08 DIAGNOSIS — I10 ESSENTIAL HYPERTENSION: ICD-10-CM

## 2023-06-08 DIAGNOSIS — Z98.890 STATUS POST TRICUSPID VALVE REPAIR: ICD-10-CM

## 2023-06-08 DIAGNOSIS — I48.19 PERSISTENT ATRIAL FIBRILLATION: ICD-10-CM

## 2023-06-08 DIAGNOSIS — I34.0 NONRHEUMATIC MITRAL VALVE REGURGITATION: ICD-10-CM

## 2023-06-08 DIAGNOSIS — R06.09 DYSPNEA ON EXERTION: ICD-10-CM

## 2023-06-08 DIAGNOSIS — Z86.79 STATUS POST MAZE OPERATION FOR ATRIAL FIBRILLATION: ICD-10-CM

## 2023-06-08 DIAGNOSIS — I47.20 VT (VENTRICULAR TACHYCARDIA): ICD-10-CM

## 2023-06-08 DIAGNOSIS — Z98.890 S/P MVR (MITRAL VALVE REPAIR): ICD-10-CM

## 2023-06-08 DIAGNOSIS — Z98.890 H/O STERNECTOMY: ICD-10-CM

## 2023-06-08 PROCEDURE — 99999 PR PBB SHADOW E&M-EST. PATIENT-LVL IV: ICD-10-PCS | Mod: PBBFAC,,, | Performed by: INTERNAL MEDICINE

## 2023-06-08 PROCEDURE — 99214 PR OFFICE/OUTPT VISIT, EST, LEVL IV, 30-39 MIN: ICD-10-PCS | Mod: S$GLB,,, | Performed by: INTERNAL MEDICINE

## 2023-06-08 PROCEDURE — 1160F PR REVIEW ALL MEDS BY PRESCRIBER/CLIN PHARMACIST DOCUMENTED: ICD-10-PCS | Mod: CPTII,S$GLB,, | Performed by: INTERNAL MEDICINE

## 2023-06-08 PROCEDURE — 3074F SYST BP LT 130 MM HG: CPT | Mod: CPTII,S$GLB,, | Performed by: INTERNAL MEDICINE

## 2023-06-08 PROCEDURE — 3066F PR DOCUMENTATION OF TREATMENT FOR NEPHROPATHY: ICD-10-PCS | Mod: CPTII,S$GLB,, | Performed by: INTERNAL MEDICINE

## 2023-06-08 PROCEDURE — 3078F DIAST BP <80 MM HG: CPT | Mod: CPTII,S$GLB,, | Performed by: INTERNAL MEDICINE

## 2023-06-08 PROCEDURE — 99999 PR PBB SHADOW E&M-EST. PATIENT-LVL IV: CPT | Mod: PBBFAC,,, | Performed by: INTERNAL MEDICINE

## 2023-06-08 PROCEDURE — 1159F MED LIST DOCD IN RCRD: CPT | Mod: CPTII,S$GLB,, | Performed by: INTERNAL MEDICINE

## 2023-06-08 PROCEDURE — 4010F PR ACE/ARB THEARPY RXD/TAKEN: ICD-10-PCS | Mod: CPTII,S$GLB,, | Performed by: INTERNAL MEDICINE

## 2023-06-08 PROCEDURE — 3044F HG A1C LEVEL LT 7.0%: CPT | Mod: CPTII,S$GLB,, | Performed by: INTERNAL MEDICINE

## 2023-06-08 PROCEDURE — 99214 OFFICE O/P EST MOD 30 MIN: CPT | Mod: S$GLB,,, | Performed by: INTERNAL MEDICINE

## 2023-06-08 PROCEDURE — 3008F PR BODY MASS INDEX (BMI) DOCUMENTED: ICD-10-PCS | Mod: CPTII,S$GLB,, | Performed by: INTERNAL MEDICINE

## 2023-06-08 PROCEDURE — 1159F PR MEDICATION LIST DOCUMENTED IN MEDICAL RECORD: ICD-10-PCS | Mod: CPTII,S$GLB,, | Performed by: INTERNAL MEDICINE

## 2023-06-08 PROCEDURE — 4010F ACE/ARB THERAPY RXD/TAKEN: CPT | Mod: CPTII,S$GLB,, | Performed by: INTERNAL MEDICINE

## 2023-06-08 PROCEDURE — 3078F PR MOST RECENT DIASTOLIC BLOOD PRESSURE < 80 MM HG: ICD-10-PCS | Mod: CPTII,S$GLB,, | Performed by: INTERNAL MEDICINE

## 2023-06-08 PROCEDURE — 3074F PR MOST RECENT SYSTOLIC BLOOD PRESSURE < 130 MM HG: ICD-10-PCS | Mod: CPTII,S$GLB,, | Performed by: INTERNAL MEDICINE

## 2023-06-08 PROCEDURE — 3066F NEPHROPATHY DOC TX: CPT | Mod: CPTII,S$GLB,, | Performed by: INTERNAL MEDICINE

## 2023-06-08 PROCEDURE — 3044F PR MOST RECENT HEMOGLOBIN A1C LEVEL <7.0%: ICD-10-PCS | Mod: CPTII,S$GLB,, | Performed by: INTERNAL MEDICINE

## 2023-06-08 PROCEDURE — 3008F BODY MASS INDEX DOCD: CPT | Mod: CPTII,S$GLB,, | Performed by: INTERNAL MEDICINE

## 2023-06-08 PROCEDURE — 1160F RVW MEDS BY RX/DR IN RCRD: CPT | Mod: CPTII,S$GLB,, | Performed by: INTERNAL MEDICINE

## 2023-06-08 NOTE — PROGRESS NOTES
Subjective:    Patient ID:  David Barrios is a 62 y.o. male who presents for follow-up of Congestive Heart Failure and Valvular Heart Disease      HPI    61 y/o male former pt of Dr James. He has a hx of Afib on chronic AC with DOAC, HTN, HFrEF (NICM per [facility] earlier this year), severe MR s/p MVr and TVr by Dr Hedrick complicated by sternal wound infection. Diagnosed with afib with depressed EF earlier this year, had successful NORBERT/DCCV and noted to have severe MR. Sent to [facility] and had MVr and TVr:   Complex mitral valve repair with A2-P2 [id number]-wi-wirf repair (Scotty) and 30mm Medtronic Simulus annuloplasty band  Tricuspid valve repair with 30mm Medtronic Tri-Ad Sullivan annuloplasty band  Left atrial Benz Maze cryoablation  Left atrial appendage resection  Direct open chest insertion of Impella 5.5 via aortic graft (10mm Vascutek Gelweave) into the left ventricle  Had subsequent sternal wound infection with re-intervention/exporation with:  Mediastinal exploration with evacuation of purulent pericarditis              - Debridement and decortication of mediastinal structures              - Resection of 10mm aortic graft on the ascending aorta              - Removal of sternal wires              - Partial bilateral sternectomy              - Wound vac placement by Dr. Carrasquillo' team  Currently on HD for KIMANI post surgery 3 times weekly via tunneled catheter and tolerating well. PICC line recently removed. Last 2DE with:  The left ventricle is normal in size with moderately decreased systolic function. The estimated ejection fraction is 35%.  Mild right ventricular enlargement with moderately reduced right ventricular systolic function.  Indeterminate left ventricular diastolic function.  Severe left atrial enlargement.  Moderate aortic regurgitation.  The mitral and tricuspid valves are s/p repair with no significant residual regurgitation.  Moderate circumferential pericardial effusion with focal stranding. No evidence  of tamponade.  The IVC was not visualized.  Had recent CXR with:  1. There is a moderate amount of haziness scattered throughout the inferior half of the right lung.  An infectious process cannot be excluded.  2. There has been interval removal a right internal jugular venous line. There has been interval removal of a right PICC. There has been interval placement of a right subclavian venous line. Its tip is in the superior vena cava.  There is no pneumothorax.  3. The size of the heart is prominent.  This may be secondary to magnification.  4. There are surgical clips projected over the left hemithorax.  Wife is present. His main complaint is of severe BAI/SOB with minimal activity. Has CP from surgical site which shows no evidence of infection (images in media section). Has palpable, fluid mass in left axilla area where another surgical scar is present. Denies orthopnea, PND, syncope, fevers, chills. Continues to make some urine. BP stable. Not on BB for unknown reason. Not on ACE-I/ARB likely secondary to renal dysfunction. As been trying to stay active.     2/2/2023:  Since last visit was hospitalized for findings on CXR thought to be pneumo, was not. Currently clinically stable. Looks much better than when I last saw him. Had 2DE with:  The left ventricle is normal in size with severely decreased systolic function.  The estimated ejection fraction is 15%.  There is severe left ventricular global hypokinesis.  Left ventricular diastolic dysfunction.  With severely reduced right ventricular systolic function.  Severe left atrial enlargement.  Mild mitral regurgitation.  Moderate aortic regurgitation.  The estimated PA systolic pressure is 27 mmHg.  Normal central venous pressure (3 mmHg).  There is a moderate size protruding right atrial mass present on what appears to be a catheter tip. The mass is mobile. Thrombus vs vegetation suspected. Clinical correlation recommended.  Discussed with Dr Ramsey and   "Spindel and consensus was to continue monitoring and no removal of HD cath. Had HD yesterday for volume removal, but prior to that has been a couple weeks. Making good urine. Continues to have BAI and currently with NYHA FC III symptoms. Denies orthopnea, PND, syncope, fevers, chills. Confusion with DOAC.     3/2/2023:  Looks clinically improved again since last visit. Had uneventful dental procedure and upcoming thoracentesis. Per note in chart from Dr Benoit's office "does not need to hold blood thinners prior to procedure." Has BAI, stable.    6/8/2023:  Doing better since last visit. Has some BAI, improved and attending cardiac rehab. Compliant with meds. Repeat 2DE with improvement in EF from 15%-45%:  The left ventricle is moderately enlarged with eccentric hypertrophy and mildly decreased systolic function.  The estimated ejection fraction is 45%.  Mild right ventricular enlargement with normal right ventricular systolic function.  Severe left atrial enlargement.  Moderate aortic regurgitation.  Mild tricuspid regurgitation.  Normal central venous pressure (3 mmHg).  The estimated PA systolic pressure is 28 mmHg.  Indeterminate left ventricular diastolic function.  Mild mitral regurgitation.  The mitral valve is s/p repair. A2-P2 [id number] repair (Scotty) and 30mm Medtronic Simulus annuloplasty band.  The tricuspid valve is s/p repair 30mm Medtronic Tri-Ad Sullivan annuloplasty band     Complex mitral valve repair with A2-P2 [id number] repair (Scotty) and 30mm Medtronic Simulus annuloplasty band  Tricuspid valve repair with 30mm Medtronic Tri-Ad Sullivan annuloplasty band    Review of Systems   Constitutional: Positive for malaise/fatigue.   HENT:  Negative for congestion.    Eyes:  Negative for blurred vision.   Cardiovascular:  Positive for chest pain, dyspnea on exertion and leg swelling. Negative for claudication, cyanosis, irregular heartbeat, near-syncope, orthopnea, palpitations, paroxysmal nocturnal " dyspnea and syncope.   Respiratory:  Positive for shortness of breath.    Endocrine: Negative for polyuria.   Hematologic/Lymphatic: Negative for bleeding problem.   Skin:  Positive for color change and poor wound healing. Negative for itching and rash.   Musculoskeletal:  Positive for muscle weakness. Negative for joint swelling and muscle cramps.   Gastrointestinal:  Negative for abdominal pain, hematemesis, hematochezia, melena, nausea and vomiting.   Genitourinary:  Negative for dysuria and hematuria.   Neurological:  Positive for weakness. Negative for dizziness, focal weakness, headaches, light-headedness and loss of balance.   Psychiatric/Behavioral:  Negative for depression. The patient is not nervous/anxious.       Objective:    Physical Exam  Constitutional:       Appearance: He is well-developed.   HENT:      Head: Normocephalic and atraumatic.   Neck:      Vascular: No JVD.   Cardiovascular:      Rate and Rhythm: Normal rate and regular rhythm.      Pulses:           Carotid pulses are 2+ on the right side and 2+ on the left side.       Radial pulses are 2+ on the right side and 2+ on the left side.        Femoral pulses are 2+ on the right side and 2+ on the left side.       Posterior tibial pulses are 1+ on the right side and 1+ on the left side.      Heart sounds: Normal heart sounds.   Pulmonary:      Effort: Pulmonary effort is normal.      Breath sounds: Normal breath sounds.   Abdominal:      General: Bowel sounds are normal.      Palpations: Abdomen is soft.   Musculoskeletal:      Cervical back: Neck supple.      Comments: Sternotomy scar healing well along with left axilla    Skin:     General: Skin is warm and dry.   Neurological:      Mental Status: He is alert and oriented to person, place, and time.   Psychiatric:         Behavior: Behavior normal.         Thought Content: Thought content normal.         Assessment:       1. Chronic combined systolic and diastolic heart failure    2.  Dyspnea on exertion    3. Essential hypertension    4. Nonrheumatic mitral valve regurgitation    5. Persistent atrial fibrillation    6. Right atrial mass    7. S/P MVR (mitral valve repair)    8. Status post ligation of left atrial appendage    9. Status post Maze operation for atrial fibrillation    10. Status post tricuspid valve repair    11. VT (ventricular tachycardia)    12. H/O sternectomy      63 y/o pt with hx and presentation as above. Surgical sites healing well. Tolerating Xarelto. BP stable. Needs to be more active. Cont cardiac rehab. Discussed the etiology, evaluation, and management of HFrEF, Afib, MVr, TVr, CHF. Discussed the importance of med compliance, heart healthy diet, and regular exercise.      Plan:       -Continue current medical management  -f/u in 6 months

## 2023-06-21 ENCOUNTER — OFFICE VISIT (OUTPATIENT)
Dept: PLASTIC SURGERY | Facility: CLINIC | Age: 63
End: 2023-06-21
Payer: COMMERCIAL

## 2023-06-21 VITALS — SYSTOLIC BLOOD PRESSURE: 130 MMHG | DIASTOLIC BLOOD PRESSURE: 60 MMHG | TEMPERATURE: 63 F

## 2023-06-21 DIAGNOSIS — D17.9 LIPOMA, UNSPECIFIED SITE: Primary | ICD-10-CM

## 2023-06-21 PROCEDURE — 1159F MED LIST DOCD IN RCRD: CPT | Mod: CPTII,S$GLB,, | Performed by: SURGERY

## 2023-06-21 PROCEDURE — 3044F PR MOST RECENT HEMOGLOBIN A1C LEVEL <7.0%: ICD-10-PCS | Mod: CPTII,S$GLB,, | Performed by: SURGERY

## 2023-06-21 PROCEDURE — 3066F NEPHROPATHY DOC TX: CPT | Mod: CPTII,S$GLB,, | Performed by: SURGERY

## 2023-06-21 PROCEDURE — 99213 OFFICE O/P EST LOW 20 MIN: CPT | Mod: S$GLB,,, | Performed by: SURGERY

## 2023-06-21 PROCEDURE — 3078F PR MOST RECENT DIASTOLIC BLOOD PRESSURE < 80 MM HG: ICD-10-PCS | Mod: CPTII,S$GLB,, | Performed by: SURGERY

## 2023-06-21 PROCEDURE — 3078F DIAST BP <80 MM HG: CPT | Mod: CPTII,S$GLB,, | Performed by: SURGERY

## 2023-06-21 PROCEDURE — 4010F PR ACE/ARB THEARPY RXD/TAKEN: ICD-10-PCS | Mod: CPTII,S$GLB,, | Performed by: SURGERY

## 2023-06-21 PROCEDURE — 3044F HG A1C LEVEL LT 7.0%: CPT | Mod: CPTII,S$GLB,, | Performed by: SURGERY

## 2023-06-21 PROCEDURE — 4010F ACE/ARB THERAPY RXD/TAKEN: CPT | Mod: CPTII,S$GLB,, | Performed by: SURGERY

## 2023-06-21 PROCEDURE — 99999 PR PBB SHADOW E&M-EST. PATIENT-LVL III: CPT | Mod: PBBFAC,,, | Performed by: SURGERY

## 2023-06-21 PROCEDURE — 99213 PR OFFICE/OUTPT VISIT, EST, LEVL III, 20-29 MIN: ICD-10-PCS | Mod: S$GLB,,, | Performed by: SURGERY

## 2023-06-21 PROCEDURE — 99999 PR PBB SHADOW E&M-EST. PATIENT-LVL III: ICD-10-PCS | Mod: PBBFAC,,, | Performed by: SURGERY

## 2023-06-21 PROCEDURE — 3075F SYST BP GE 130 - 139MM HG: CPT | Mod: CPTII,S$GLB,, | Performed by: SURGERY

## 2023-06-21 PROCEDURE — 1159F PR MEDICATION LIST DOCUMENTED IN MEDICAL RECORD: ICD-10-PCS | Mod: CPTII,S$GLB,, | Performed by: SURGERY

## 2023-06-21 PROCEDURE — 3066F PR DOCUMENTATION OF TREATMENT FOR NEPHROPATHY: ICD-10-PCS | Mod: CPTII,S$GLB,, | Performed by: SURGERY

## 2023-06-21 PROCEDURE — 3075F PR MOST RECENT SYSTOLIC BLOOD PRESS GE 130-139MM HG: ICD-10-PCS | Mod: CPTII,S$GLB,, | Performed by: SURGERY

## 2023-06-21 NOTE — PROGRESS NOTES
Subjective:      David Barrios is a 62 y.o. year old male who presents to the [facility] on 06/21/2023 for consultation regarding a forehead lipoma. Patient reports prior attempt at excision by another physician without improvement. He remains on xarelto at this time from prior cardiac surgery and understands it will need to be held. Denies fever, chills, nausea, vomiting, or other systemic signs of infection.    Vitals:    06/21/23 0923   BP: 130/60   Temp: (!) 63 °F (17.2 °C)        Review of patient's allergies indicates:  No Known Allergies    Current Outpatient Medications on File Prior to Visit   Medication Sig Dispense Refill    rivaroxaban (XARELTO) 20 mg Tab Take 1 tablet (20 mg total) by mouth daily with dinner or evening meal. 90 tablet 3    amiodarone (PACERONE) 200 MG Tab TAKE 1 TABLET(200 MG) BY MOUTH TWICE DAILY 60 tablet 2    aspirin 81 MG Chew Take 1 tablet (81 mg total) by mouth once daily. 360 tablet 0    atorvastatin (LIPITOR) 40 MG tablet Take 1 tablet (40 mg total) by mouth once daily. 90 tablet 3    furosemide (LASIX) 80 MG tablet Take 1 tablet (80 mg total) by mouth 2 (two) times a day. (Patient taking differently: Take 80 mg by mouth 2 (two) times a day. Takes 40mg once daily) 180 tablet 11    HYDROcodone-acetaminophen (NORCO) 7.5-325 mg per tablet Take 1 tablet by mouth every 6 (six) hours as needed for Pain.      magnesium oxide (MAG-OX) 400 mg (241.3 mg magnesium) tablet Take 1 tablet (400 mg total) by mouth 2 (two) times daily. (Patient taking differently: Take 400 mg by mouth once daily.) 60 tablet 2    metoprolol succinate (TOPROL-XL) 25 MG 24 hr tablet Take 1 tablet (25 mg total) by mouth once daily. 30 tablet 11    sacubitriL-valsartan (ENTRESTO) 49-51 mg per tablet Take 1 tablet by mouth 2 (two) times daily. 180 tablet 3    tamsulosin (FLOMAX) 0.4 mg Cap Take 1 capsule (0.4 mg total) by mouth every evening. Take 30 minutes after dinner 90 capsule 3    vitamin renal  formula, B-complex-vitamin c-folic acid, (NEPHROCAP) 1 mg Cap Take 1 capsule by mouth once daily. 30 capsule 2     No current facility-administered medications on file prior to visit.       Patient Active Problem List   Diagnosis    Microcytic anemia    Elevated LFTs    Essential hypertension    Screening for malignant neoplasm of colon    Persistent atrial fibrillation    Dyspnea on exertion    Abnormal electrocardiogram    Nonrheumatic mitral valve regurgitation    VT (ventricular tachycardia)    Transient hyperglycemia post procedure    Surgical wound present    Sternal wound infection    Status post tricuspid valve repair    Status post ligation of left atrial appendage    Status post Maze operation for atrial fibrillation    Sternal osteomyelitis    Vascular inflammation or infection    S/P MVR (mitral valve repair)    H/O sternectomy    Chronic combined systolic and diastolic heart failure    Right atrial mass       Past Surgical History:   Procedure Laterality Date    APPLICATION OF WOUND VACUUM-ASSISTED CLOSURE DEVICE N/A 11/8/2022    Procedure: APPLICATION, WOUND VAC;  Surgeon: Mike Hedrick MD;  Location: [facility] OR [address] [place] [address];  Service: General;  Laterality: N/A;    CARDIOVERSION Left 9/15/2022    Procedure: Cardioversion;  Surgeon: Julio James MD;  Location: [facility] CATH LAB/EP;  Service: Cardiology;  Laterality: Left;  With NORBERT    CATHETERIZATION OF BOTH LEFT AND RIGHT HEART N/A 9/22/2022    Procedure: CATHETERIZATION, HEART, BOTH LEFT AND RIGHT;  Surgeon: Julio James MD;  Location: [facility] CATH LAB/EP;  Service: Cardiology;  Laterality: N/A;    COLONOSCOPY N/A 4/4/2019    Procedure: COLONOSCOPY Golytely;  Surgeon: Umair Randolph MD;  Location: [facility] ENDO;  Service: Endoscopy;  Laterality: N/A;    CORONARY ANGIOGRAPHY N/A 9/22/2022    Procedure: ANGIOGRAM, CORONARY ARTERY;  Surgeon: Julio James MD;  Location: [facility] CATH LAB/EP;  Service: Cardiology;  Laterality: N/A;    MEYER MAZE PROCEDURE N/A  10/7/2022    Procedure: MEYER MAZE PROCEDURE;  Surgeon: Mike Hedrick MD;  [facility];  Service: Cardiovascular;  Laterality: N/A;    CREATION OF MUSCLE ROTATIONAL FLAP N/A 11/14/2022    Procedure: CREATION, FLAP, MUSCLE ROTATION;  Surgeon: Amadeo Carrasquillo MD;  [facility];  Service: Plastics;  Laterality: N/A;  sternal wound that need pec flap coverage    DEBRIDEMENT OF STERNUM N/A 11/8/2022    Procedure: DEBRIDEMENT, STERNUM;  Surgeon: Mike Hedrick MD;  [facility];  Service: General;  Laterality: N/A;    ESOPHAGOGASTRODUODENOSCOPY N/A 10/12/2018    Procedure: EGD (ESOPHAGOGASTRODUODENOSCOPY);  Surgeon: Braden Herring MD;  Location: [facility];  Service: Endoscopy;  Laterality: N/A;    ESOPHAGOGASTRODUODENOSCOPY N/A 4/4/2019    Procedure: EGD;  Surgeon: Umair Randolph MD;  Location: [facility];  Service: Endoscopy;  Laterality: N/A;    EXCLUSION OF LEFT ATRIAL APPENDAGE N/A 10/7/2022    Procedure: EXCLUSION, LEFT ATRIAL APPENDAGE;  Surgeon: Mike Hedrick MD;  [facility];  Service: Cardiovascular;  Laterality: N/A;    HERNIA REPAIR      INSERTION OF INTRAVASCULAR MICROAXIAL BLOOD PUMP N/A 10/7/2022    Procedure: INSERTION, IMPELLA;  Surgeon: Mike Hedrick MD;  [facility];  Service: Cardiovascular;  Laterality: N/A;  direct aortic insertion    IRRIGATION OF MEDIASTINUM N/A 10/7/2022    Procedure: IRRIGATION, MEDIASTINUM;  Surgeon: Mike Hedrick MD;  [facility];  Service: Cardiovascular;  Laterality: N/A;    STERNAL WIRES REMOVAL N/A 11/8/2022    Procedure: REMOVAL, STERNAL WIRE;  Surgeon: Mike Hedrick MD;  [facility];  Service: General;  Laterality: N/A;  Sternal wire removal with muscle flap creation    STERNAL WOUND CLOSURE N/A 11/14/2022    Procedure: CLOSURE, WOUND, STERNUM;  Surgeon: Amadeo Carrasquillo MD;  Location: [facility] OR [address] [place] [address];  Service: Plastics;   Laterality: N/A;    TRICUSPID VALVULOPLASTY N/A 10/7/2022    Procedure: REPAIR, TRICUSPID VALVE;  Surgeon: Mike Hedrick MD;  Location: [facility] OR [address] [place] [address];  Service: Cardiovascular;  Laterality: N/A;    WOUND EXPLORATION N/A 11/14/2022    Procedure: EXPLORATION, WOUND;  Surgeon: Amadeo Carrasquillo MD;  Location: [facility] OR [address] [place] [address];  Service: Plastics;  Laterality: N/A;       Social History     Socioeconomic History    Marital status: Single   Tobacco Use    Smoking status: Never    Smokeless tobacco: Current     Types: Chew   Substance and Sexual Activity    Alcohol use: Yes     Alcohol/week: 20.0 standard drinks     Types: 20 Cans of beer per week    Drug use: No           Review of Systems: negative except HPI    Objective:     Physical Exam:  Vitals:    06/21/23 0923   BP: 130/60   Temp: (!) 63 °F (17.2 °C)       WD WN NAD  VSS  Normal resp effort  Right forehead lipoma, prior scar from previous excision attempt medial  Sternal incision clean and well healed        Assessment:       No diagnosis found.    Plan:   62 y.o. male with a R forehead lipoma for excision  - plan for excision of forehead lipoma in office, will schedule for procedure day. Patient was seen and evaluated by myself and Dr. Amadeo Carrasquillo    - Risks, benefits and alternatives to surgery were discussed. Will submit for insurance authorization.  - Office staff to coordinate date once insurance authorization obtained      All questions were answered. The patient was advised to call the clinic with any questions or concerns prior to their next visit.           Dada Griffin MD- Fellow  Plastic and Reconstruction Surgery Department  [phone] office

## 2023-07-31 ENCOUNTER — TELEPHONE (OUTPATIENT)
Dept: CARDIOLOGY | Facility: CLINIC | Age: 63
End: 2023-07-31
Payer: COMMERCIAL

## 2023-07-31 DIAGNOSIS — I47.20 VT (VENTRICULAR TACHYCARDIA): Primary | ICD-10-CM

## 2023-07-31 NOTE — TELEPHONE ENCOUNTER
----- Message from Johnathan Conway sent at 7/31/2023  9:31 AM CDT -----  Type:  RX Refill Request    Who Called: pt  Refill or New Rx:refill  RX Name and Strength:amiodarone (PACERONE) 200 MG Tab  Preferred Pharmacy with phone number:[id number] [address] [facility]  Local or Mail Order:local  Ordering Provider:argentina  Would the patient rather a call back or a response via [facility]? call  Best Call Back Number:[phone]  Additional Information: pt states he one pill left

## 2023-08-01 RX ORDER — AMIODARONE HYDROCHLORIDE 200 MG/1
TABLET ORAL
Qty: 180 TABLET | Refills: 3 | Status: SHIPPED | OUTPATIENT
Start: 2023-08-01 | End: 2024-04-02

## 2023-08-25 ENCOUNTER — HOSPITAL ENCOUNTER (OUTPATIENT)
Dept: RADIOLOGY | Facility: HOSPITAL | Age: 63
Discharge: HOME | End: 2023-08-25
Attending: INTERNAL MEDICINE
Payer: COMMERCIAL

## 2023-08-25 DIAGNOSIS — N28.1 RENAL CYST: ICD-10-CM

## 2023-08-25 DIAGNOSIS — R07.81 RIB PAIN: ICD-10-CM

## 2023-08-25 PROCEDURE — 71046 X-RAY EXAM CHEST 2 VIEWS: CPT | Mod: 26,,, | Performed by: RADIOLOGY

## 2023-08-25 PROCEDURE — 74181 MRI ABDOMEN WITHOUT CONTRAST: ICD-10-PCS | Mod: 26,,, | Performed by: RADIOLOGY

## 2023-08-25 PROCEDURE — 71046 XR CHEST PA AND LATERAL: ICD-10-PCS | Mod: 26,,, | Performed by: RADIOLOGY

## 2023-08-25 PROCEDURE — 74181 MRI ABDOMEN W/O CONTRAST: CPT | Mod: 26,,, | Performed by: RADIOLOGY

## 2023-08-25 PROCEDURE — 74181 MRI ABDOMEN W/O CONTRAST: CPT | Mod: TC,PO

## 2023-08-25 PROCEDURE — 71046 X-RAY EXAM CHEST 2 VIEWS: CPT | Mod: TC,FY,PO

## 2023-09-05 ENCOUNTER — TELEPHONE (OUTPATIENT)
Dept: PLASTIC SURGERY | Facility: CLINIC | Age: 63
End: 2023-09-05
Payer: COMMERCIAL

## 2023-09-05 NOTE — TELEPHONE ENCOUNTER
Follow up contact with patient.  Patient requesting Minor room surgery procedure for lipoma forehead removal.  Discussed with patient follow up needed to set a date with our staff whom books.  Pt ok with follow up tomorrow to discuss further.  Forwarded message to My and sent request for patient call back.  
No

## 2023-09-27 ENCOUNTER — TELEPHONE (OUTPATIENT)
Dept: CARDIOLOGY | Facility: CLINIC | Age: 63
End: 2023-09-27
Payer: COMMERCIAL

## 2023-09-27 NOTE — TELEPHONE ENCOUNTER
----- Message from Celeste Griffin sent at 9/27/2023  2:28 PM CDT -----  Type:  Needs Medical Advice    Who Called: pt   Symptoms (please be specific):  pt  would like to get a return call in regards to a pain he is having within his chest pt was working in his garden and took a hit to the chest  and the pt is requesting to maybe get a chest xray to make sure everything is OK   How long has patient had these symptoms:  1 week      Would the patient rather a call back or a response via [facility]? call  Best Call Back Number: [phone]  Additional Information:

## 2023-09-27 NOTE — TELEPHONE ENCOUNTER
Patient called asking for an appointment with Dr Villegas, because he was cutting some branches at home and one of these branches hit his chest a week ago, he is feeling pain in certain movements, his concern about his las heart surgery.   As I explained the patient , Dr Villegas will no longer be in [facility].  Patient was advised to get to Urgent Care or contact PCP.  Patient refuse to get to ED.  Patient was scheduled with Dr Limon in [place] 09/29/23 @ 1020 am.  Patient would like to know if he can do a chest xray prior to this visit.   Please advice.

## 2023-09-28 ENCOUNTER — TELEPHONE (OUTPATIENT)
Dept: CARDIOLOGY | Facility: CLINIC | Age: 63
End: 2023-09-28
Payer: COMMERCIAL

## 2023-09-28 ENCOUNTER — OFFICE VISIT (OUTPATIENT)
Dept: CARDIOLOGY | Facility: CLINIC | Age: 63
End: 2023-09-28
Payer: COMMERCIAL

## 2023-09-28 ENCOUNTER — HOSPITAL ENCOUNTER (OUTPATIENT)
Dept: RADIOLOGY | Facility: HOSPITAL | Age: 63
Discharge: HOME | End: 2023-09-28
Attending: INTERNAL MEDICINE
Payer: COMMERCIAL

## 2023-09-28 VITALS
SYSTOLIC BLOOD PRESSURE: 111 MMHG | WEIGHT: 156.63 LBS | HEART RATE: 60 BPM | HEIGHT: 69 IN | DIASTOLIC BLOOD PRESSURE: 56 MMHG | BODY MASS INDEX: 23.2 KG/M2 | OXYGEN SATURATION: 97 %

## 2023-09-28 DIAGNOSIS — Z98.890 STATUS POST TRICUSPID VALVE REPAIR: ICD-10-CM

## 2023-09-28 DIAGNOSIS — I51.89 RIGHT ATRIAL MASS: ICD-10-CM

## 2023-09-28 DIAGNOSIS — I50.42 CHRONIC COMBINED SYSTOLIC AND DIASTOLIC HEART FAILURE: ICD-10-CM

## 2023-09-28 DIAGNOSIS — Z86.79 STATUS POST MAZE OPERATION FOR ATRIAL FIBRILLATION: ICD-10-CM

## 2023-09-28 DIAGNOSIS — Z98.890 STATUS POST MAZE OPERATION FOR ATRIAL FIBRILLATION: ICD-10-CM

## 2023-09-28 DIAGNOSIS — Z98.890 STATUS POST LIGATION OF LEFT ATRIAL APPENDAGE: ICD-10-CM

## 2023-09-28 DIAGNOSIS — I47.20 VT (VENTRICULAR TACHYCARDIA): ICD-10-CM

## 2023-09-28 DIAGNOSIS — S21.101A STERNAL WOUND INFECTION: ICD-10-CM

## 2023-09-28 DIAGNOSIS — L08.9 STERNAL WOUND INFECTION: ICD-10-CM

## 2023-09-28 DIAGNOSIS — R07.9 CHEST PAIN, UNSPECIFIED TYPE: Primary | ICD-10-CM

## 2023-09-28 DIAGNOSIS — I48.19 PERSISTENT ATRIAL FIBRILLATION: ICD-10-CM

## 2023-09-28 DIAGNOSIS — Z98.890 H/O STERNECTOMY: ICD-10-CM

## 2023-09-28 DIAGNOSIS — R07.9 CHEST PAIN, UNSPECIFIED TYPE: ICD-10-CM

## 2023-09-28 DIAGNOSIS — I34.0 NONRHEUMATIC MITRAL VALVE REGURGITATION: ICD-10-CM

## 2023-09-28 PROCEDURE — 4010F ACE/ARB THERAPY RXD/TAKEN: CPT | Mod: CPTII,S$GLB,, | Performed by: INTERNAL MEDICINE

## 2023-09-28 PROCEDURE — 3066F PR DOCUMENTATION OF TREATMENT FOR NEPHROPATHY: ICD-10-PCS | Mod: CPTII,S$GLB,, | Performed by: INTERNAL MEDICINE

## 2023-09-28 PROCEDURE — 3074F SYST BP LT 130 MM HG: CPT | Mod: CPTII,S$GLB,, | Performed by: INTERNAL MEDICINE

## 2023-09-28 PROCEDURE — 71046 X-RAY EXAM CHEST 2 VIEWS: CPT | Mod: TC,FY,PN

## 2023-09-28 PROCEDURE — 3044F HG A1C LEVEL LT 7.0%: CPT | Mod: CPTII,S$GLB,, | Performed by: INTERNAL MEDICINE

## 2023-09-28 PROCEDURE — 3078F PR MOST RECENT DIASTOLIC BLOOD PRESSURE < 80 MM HG: ICD-10-PCS | Mod: CPTII,S$GLB,, | Performed by: INTERNAL MEDICINE

## 2023-09-28 PROCEDURE — 3078F DIAST BP <80 MM HG: CPT | Mod: CPTII,S$GLB,, | Performed by: INTERNAL MEDICINE

## 2023-09-28 PROCEDURE — 1159F MED LIST DOCD IN RCRD: CPT | Mod: CPTII,S$GLB,, | Performed by: INTERNAL MEDICINE

## 2023-09-28 PROCEDURE — 3074F PR MOST RECENT SYSTOLIC BLOOD PRESSURE < 130 MM HG: ICD-10-PCS | Mod: CPTII,S$GLB,, | Performed by: INTERNAL MEDICINE

## 2023-09-28 PROCEDURE — 99214 PR OFFICE/OUTPT VISIT, EST, LEVL IV, 30-39 MIN: ICD-10-PCS | Mod: S$GLB,,, | Performed by: INTERNAL MEDICINE

## 2023-09-28 PROCEDURE — 99214 OFFICE O/P EST MOD 30 MIN: CPT | Mod: S$GLB,,, | Performed by: INTERNAL MEDICINE

## 2023-09-28 PROCEDURE — 3008F BODY MASS INDEX DOCD: CPT | Mod: CPTII,S$GLB,, | Performed by: INTERNAL MEDICINE

## 2023-09-28 PROCEDURE — 71046 XR CHEST PA AND LATERAL: ICD-10-PCS | Mod: 26,,, | Performed by: STUDENT IN AN ORGANIZED HEALTH CARE EDUCATION/TRAINING PROGRAM

## 2023-09-28 PROCEDURE — 3066F NEPHROPATHY DOC TX: CPT | Mod: CPTII,S$GLB,, | Performed by: INTERNAL MEDICINE

## 2023-09-28 PROCEDURE — 99999 PR PBB SHADOW E&M-EST. PATIENT-LVL IV: ICD-10-PCS | Mod: PBBFAC,,, | Performed by: INTERNAL MEDICINE

## 2023-09-28 PROCEDURE — 1160F PR REVIEW ALL MEDS BY PRESCRIBER/CLIN PHARMACIST DOCUMENTED: ICD-10-PCS | Mod: CPTII,S$GLB,, | Performed by: INTERNAL MEDICINE

## 2023-09-28 PROCEDURE — 3044F PR MOST RECENT HEMOGLOBIN A1C LEVEL <7.0%: ICD-10-PCS | Mod: CPTII,S$GLB,, | Performed by: INTERNAL MEDICINE

## 2023-09-28 PROCEDURE — 3008F PR BODY MASS INDEX (BMI) DOCUMENTED: ICD-10-PCS | Mod: CPTII,S$GLB,, | Performed by: INTERNAL MEDICINE

## 2023-09-28 PROCEDURE — 4010F PR ACE/ARB THEARPY RXD/TAKEN: ICD-10-PCS | Mod: CPTII,S$GLB,, | Performed by: INTERNAL MEDICINE

## 2023-09-28 PROCEDURE — 1159F PR MEDICATION LIST DOCUMENTED IN MEDICAL RECORD: ICD-10-PCS | Mod: CPTII,S$GLB,, | Performed by: INTERNAL MEDICINE

## 2023-09-28 PROCEDURE — 99999 PR PBB SHADOW E&M-EST. PATIENT-LVL IV: CPT | Mod: PBBFAC,,, | Performed by: INTERNAL MEDICINE

## 2023-09-28 PROCEDURE — 71046 X-RAY EXAM CHEST 2 VIEWS: CPT | Mod: 26,,, | Performed by: STUDENT IN AN ORGANIZED HEALTH CARE EDUCATION/TRAINING PROGRAM

## 2023-09-28 PROCEDURE — 1160F RVW MEDS BY RX/DR IN RCRD: CPT | Mod: CPTII,S$GLB,, | Performed by: INTERNAL MEDICINE

## 2023-09-28 NOTE — PROGRESS NOTES
Subjective:    Patient ID:  David Barrios is a 63 y.o. male who presents for follow-up of Chest Pain      HPI    62 y/o male former pt of Dr James. He has a hx of Afib on chronic AC with DOAC, HTN, HFrEF (NICM per [facility] earlier this year), severe MR s/p MVr and TVr by Dr Hedrick complicated by sternal wound infection. Diagnosed with afib with depressed EF earlier this year, had successful NORBERT/DCCV and noted to have severe MR. Sent to [facility] and had MVr and TVr:   Complex mitral valve repair with A2-P2 [id number] repair (Scotty) and 30mm Medtronic Simulus annuloplasty band  Tricuspid valve repair with 30mm Medtronic Tri-Ad Sullivan annuloplasty band  Left atrial Benz Maze cryoablation  Left atrial appendage resection  Direct open chest insertion of Impella 5.5 via aortic graft (10mm Vascutek Gelweave) into the left ventricle  Had subsequent sternal wound infection with re-intervention/exporation with:  Mediastinal exploration with evacuation of purulent pericarditis              - Debridement and decortication of mediastinal structures              - Resection of 10mm aortic graft on the ascending aorta              - Removal of sternal wires              - Partial bilateral sternectomy              - Wound vac placement by Dr. Carrasquillo' team  Currently on HD for KIMAIN post surgery 3 times weekly via tunneled catheter and tolerating well. PICC line recently removed. Last 2DE with:  The left ventricle is normal in size with moderately decreased systolic function. The estimated ejection fraction is 35%.  Mild right ventricular enlargement with moderately reduced right ventricular systolic function.  Indeterminate left ventricular diastolic function.  Severe left atrial enlargement.  Moderate aortic regurgitation.  The mitral and tricuspid valves are s/p repair with no significant residual regurgitation.  Moderate circumferential pericardial effusion with focal stranding. No evidence of tamponade.  The IVC was not  visualized.  Had recent CXR with:  1. There is a moderate amount of haziness scattered throughout the inferior half of the right lung.  An infectious process cannot be excluded.  2. There has been interval removal a right internal jugular venous line. There has been interval removal of a right PICC. There has been interval placement of a right subclavian venous line. Its tip is in the superior vena cava.  There is no pneumothorax.  3. The size of the heart is prominent.  This may be secondary to magnification.  4. There are surgical clips projected over the left hemithorax.  Wife is present. His main complaint is of severe BAI/SOB with minimal activity. Has CP from surgical site which shows no evidence of infection (images in media section). Has palpable, fluid mass in left axilla area where another surgical scar is present. Denies orthopnea, PND, syncope, fevers, chills. Continues to make some urine. BP stable. Not on BB for unknown reason. Not on ACE-I/ARB likely secondary to renal dysfunction. As been trying to stay active.     2/2/2023:  Since last visit was hospitalized for findings on CXR thought to be pneumo, was not. Currently clinically stable. Looks much better than when I last saw him. Had 2DE with:  The left ventricle is normal in size with severely decreased systolic function.  The estimated ejection fraction is 15%.  There is severe left ventricular global hypokinesis.  Left ventricular diastolic dysfunction.  With severely reduced right ventricular systolic function.  Severe left atrial enlargement.  Mild mitral regurgitation.  Moderate aortic regurgitation.  The estimated PA systolic pressure is 27 mmHg.  Normal central venous pressure (3 mmHg).  There is a moderate size protruding right atrial mass present on what appears to be a catheter tip. The mass is mobile. Thrombus vs vegetation suspected. Clinical correlation recommended.  Discussed with Dr Ramsey and Dr Hedrick and consensus was to continue  "monitoring and no removal of HD cath. Had HD yesterday for volume removal, but prior to that has been a couple weeks. Making good urine. Continues to have BAI and currently with NYHA FC III symptoms. Denies orthopnea, PND, syncope, fevers, chills. Confusion with DOAC.     3/2/2023:  Looks clinically improved again since last visit. Had uneventful dental procedure and upcoming thoracentesis. Per note in chart from Dr Benoit's office "does not need to hold blood thinners prior to procedure." Has BAI, stable.    6/8/2023:  Doing better since last visit. Has some BAI, improved and attending cardiac rehab. Compliant with meds. Repeat 2DE with improvement in EF from 15%-45%:  The left ventricle is moderately enlarged with eccentric hypertrophy and mildly decreased systolic function.  The estimated ejection fraction is 45%.  Mild right ventricular enlargement with normal right ventricular systolic function.  Severe left atrial enlargement.  Moderate aortic regurgitation.  Mild tricuspid regurgitation.  Normal central venous pressure (3 mmHg).  The estimated PA systolic pressure is 28 mmHg.  Indeterminate left ventricular diastolic function.  Mild mitral regurgitation.  The mitral valve is s/p repair. A2-P2 ekuj-hi-taay repair (Scotty) and 30mm Medtronic Simulus annuloplasty band.  The tricuspid valve is s/p repair 30mm Medtronic Tri-Ad Sullivan annuloplasty band     Complex mitral valve repair with A2-P2 [id number] repair (Scotty) and 30mm Medtronic Simulus annuloplasty band  Tricuspid valve repair with 30mm Medtronic Tri-Ad Sullivan annuloplasty band    9/28/2023:  Seen today bc of CP. He was working outside and hit his chest and has had reproducible CP since. No exertional CP/angina. Tender to touch and happened about 1 week ago. No SOB/BAI/syncope. Was doing very well until the incident. No obvious bruising on eval, although very tender to touch.     Review of Systems   Constitutional: Negative for malaise/fatigue.   HENT: "  Negative for congestion.    Eyes:  Negative for blurred vision.   Cardiovascular:  Positive for chest pain and leg swelling. Negative for claudication, cyanosis, dyspnea on exertion, irregular heartbeat, near-syncope, orthopnea, palpitations, paroxysmal nocturnal dyspnea and syncope.   Respiratory:  Negative for shortness of breath.    Endocrine: Negative for polyuria.   Hematologic/Lymphatic: Negative for bleeding problem.   Skin:  Negative for color change, itching, poor wound healing and rash.   Musculoskeletal:  Negative for joint swelling, muscle cramps and muscle weakness.   Gastrointestinal:  Negative for abdominal pain, hematemesis, hematochezia, melena, nausea and vomiting.   Genitourinary:  Negative for dysuria and hematuria.   Neurological:  Negative for dizziness, focal weakness, headaches, light-headedness, loss of balance and weakness.   Psychiatric/Behavioral:  Negative for depression. The patient is not nervous/anxious.         Objective:    Physical Exam  Constitutional:       Appearance: He is well-developed.   HENT:      Head: Normocephalic and atraumatic.   Neck:      Vascular: No JVD.   Cardiovascular:      Rate and Rhythm: Normal rate and regular rhythm.      Pulses:           Carotid pulses are 2+ on the right side and 2+ on the left side.       Radial pulses are 2+ on the right side and 2+ on the left side.        Femoral pulses are 2+ on the right side and 2+ on the left side.       Posterior tibial pulses are 1+ on the right side and 1+ on the left side.      Heart sounds: Normal heart sounds.   Pulmonary:      Effort: Pulmonary effort is normal.      Breath sounds: Normal breath sounds.   Abdominal:      General: Bowel sounds are normal.      Palpations: Abdomen is soft.   Musculoskeletal:         General: Tenderness present.      Cervical back: Neck supple.      Comments: Sternotomy scar healing well along with left axilla    Skin:     General: Skin is warm and dry.   Neurological:       Mental Status: He is alert and oriented to person, place, and time.   Psychiatric:         Behavior: Behavior normal.         Thought Content: Thought content normal.           Assessment:       1. Chest pain, unspecified type    2. Chronic combined systolic and diastolic heart failure    3. Nonrheumatic mitral valve regurgitation    4. Persistent atrial fibrillation    5. Right atrial mass    6. Status post ligation of left atrial appendage    7. Status post Maze operation for atrial fibrillation    8. Status post tricuspid valve repair    9. VT (ventricular tachycardia)    10. Sternal wound infection    11. H/O sternectomy      62 y/o pt with hx and presentation as above. Surgical sites healed well. CP likely mechanical from injury and will obtain CXR. Told to rest for a few weeks from moderate to heavy activity. Pain meds PRN and ice packs PRN. Tolerating Xarelto. BP stable. Needs to be more active. Discussed the etiology, evaluation, and management of HFrEF, Afib, MVr, TVr, CHF. Discussed the importance of med compliance, heart healthy diet, and regular exercise.      Plan:       -Continue current medical management  -CXR  -f/u in 6 months

## 2023-09-28 NOTE — TELEPHONE ENCOUNTER
Spoke with patient about Chest Xray result.  Patient verbalized understanding on instruction given.

## 2023-10-04 ENCOUNTER — PATIENT MESSAGE (OUTPATIENT)
Dept: PLASTIC SURGERY | Facility: CLINIC | Age: 63
End: 2023-10-04
Payer: COMMERCIAL

## 2023-10-12 ENCOUNTER — PATIENT MESSAGE (OUTPATIENT)
Dept: RESPIRATORY THERAPY | Facility: HOSPITAL | Age: 63
End: 2023-10-12
Payer: COMMERCIAL

## 2023-10-13 ENCOUNTER — TELEPHONE (OUTPATIENT)
Dept: CARDIOLOGY | Facility: CLINIC | Age: 63
End: 2023-10-13
Payer: COMMERCIAL

## 2023-10-13 NOTE — TELEPHONE ENCOUNTER
Patient will be dropping his disability form at [employer] to have Dr Villegas fill out.  Patient will be notify when form is ready to pick at [employer].  Patient verbalized understanding on instruction given.

## 2023-10-13 NOTE — TELEPHONE ENCOUNTER
----- Message from Reece Phipps sent at 10/13/2023  1:12 PM CDT -----  Contact: Pt  .Type:  Sooner Apoointment Request    Caller is requesting a sooner appointment.  Caller declined first available appointment listed below.  Caller will not accept being placed on the waitlist and is requesting a message be sent to doctor.  Name of Caller:pt  When is the first available appointment?  Symptoms: check up and paperwork needs to be filled out before provider leaves.  Would the patient rather a call back or a response via [facility]?  Call back  Best Call Back Number:[phone]  Additional Information:

## 2023-10-23 DIAGNOSIS — N13.8 BPH WITH URINARY OBSTRUCTION: ICD-10-CM

## 2023-10-23 DIAGNOSIS — N40.1 BPH WITH URINARY OBSTRUCTION: ICD-10-CM

## 2023-10-23 DIAGNOSIS — N18.6 ESRD ON HEMODIALYSIS: ICD-10-CM

## 2023-10-23 DIAGNOSIS — Z99.2 ESRD ON HEMODIALYSIS: ICD-10-CM

## 2023-10-23 DIAGNOSIS — R35.0 URINE FREQUENCY: ICD-10-CM

## 2023-10-23 RX ORDER — TAMSULOSIN HYDROCHLORIDE 0.4 MG/1
0.4 CAPSULE ORAL NIGHTLY
Qty: 90 CAPSULE | Refills: 3 | Status: SHIPPED | OUTPATIENT
Start: 2023-10-23 | End: 2024-10-22

## 2023-11-14 ENCOUNTER — OFFICE VISIT (OUTPATIENT)
Dept: CARDIOLOGY | Facility: CLINIC | Age: 63
End: 2023-11-14
Payer: COMMERCIAL

## 2023-11-14 VITALS
WEIGHT: 160.5 LBS | HEART RATE: 57 BPM | HEIGHT: 69 IN | DIASTOLIC BLOOD PRESSURE: 51 MMHG | OXYGEN SATURATION: 97 % | BODY MASS INDEX: 23.77 KG/M2 | SYSTOLIC BLOOD PRESSURE: 114 MMHG

## 2023-11-14 DIAGNOSIS — I48.0 PAROXYSMAL A-FIB: ICD-10-CM

## 2023-11-14 DIAGNOSIS — I10 ESSENTIAL HYPERTENSION: ICD-10-CM

## 2023-11-14 DIAGNOSIS — Z98.890 S/P MVR (MITRAL VALVE REPAIR): ICD-10-CM

## 2023-11-14 DIAGNOSIS — R06.02 SOB (SHORTNESS OF BREATH): Primary | ICD-10-CM

## 2023-11-14 DIAGNOSIS — I34.0 NONRHEUMATIC MITRAL VALVE REGURGITATION: ICD-10-CM

## 2023-11-14 DIAGNOSIS — I50.42 CHRONIC COMBINED SYSTOLIC AND DIASTOLIC HEART FAILURE: ICD-10-CM

## 2023-11-14 DIAGNOSIS — N18.31 STAGE 3A CHRONIC KIDNEY DISEASE: ICD-10-CM

## 2023-11-14 PROCEDURE — 3078F PR MOST RECENT DIASTOLIC BLOOD PRESSURE < 80 MM HG: ICD-10-PCS | Mod: CPTII,S$GLB,, | Performed by: INTERNAL MEDICINE

## 2023-11-14 PROCEDURE — 3008F PR BODY MASS INDEX (BMI) DOCUMENTED: ICD-10-PCS | Mod: CPTII,S$GLB,, | Performed by: INTERNAL MEDICINE

## 2023-11-14 PROCEDURE — 99214 OFFICE O/P EST MOD 30 MIN: CPT | Mod: S$GLB,,, | Performed by: INTERNAL MEDICINE

## 2023-11-14 PROCEDURE — 99214 PR OFFICE/OUTPT VISIT, EST, LEVL IV, 30-39 MIN: ICD-10-PCS | Mod: S$GLB,,, | Performed by: INTERNAL MEDICINE

## 2023-11-14 PROCEDURE — 3074F SYST BP LT 130 MM HG: CPT | Mod: CPTII,S$GLB,, | Performed by: INTERNAL MEDICINE

## 2023-11-14 PROCEDURE — 3008F BODY MASS INDEX DOCD: CPT | Mod: CPTII,S$GLB,, | Performed by: INTERNAL MEDICINE

## 2023-11-14 PROCEDURE — 3066F PR DOCUMENTATION OF TREATMENT FOR NEPHROPATHY: ICD-10-PCS | Mod: CPTII,S$GLB,, | Performed by: INTERNAL MEDICINE

## 2023-11-14 PROCEDURE — 3074F PR MOST RECENT SYSTOLIC BLOOD PRESSURE < 130 MM HG: ICD-10-PCS | Mod: CPTII,S$GLB,, | Performed by: INTERNAL MEDICINE

## 2023-11-14 PROCEDURE — 4010F ACE/ARB THERAPY RXD/TAKEN: CPT | Mod: CPTII,S$GLB,, | Performed by: INTERNAL MEDICINE

## 2023-11-14 PROCEDURE — 3078F DIAST BP <80 MM HG: CPT | Mod: CPTII,S$GLB,, | Performed by: INTERNAL MEDICINE

## 2023-11-14 PROCEDURE — 3044F HG A1C LEVEL LT 7.0%: CPT | Mod: CPTII,S$GLB,, | Performed by: INTERNAL MEDICINE

## 2023-11-14 PROCEDURE — 3044F PR MOST RECENT HEMOGLOBIN A1C LEVEL <7.0%: ICD-10-PCS | Mod: CPTII,S$GLB,, | Performed by: INTERNAL MEDICINE

## 2023-11-14 PROCEDURE — 99999 PR PBB SHADOW E&M-EST. PATIENT-LVL III: CPT | Mod: PBBFAC,,, | Performed by: INTERNAL MEDICINE

## 2023-11-14 PROCEDURE — 4010F PR ACE/ARB THEARPY RXD/TAKEN: ICD-10-PCS | Mod: CPTII,S$GLB,, | Performed by: INTERNAL MEDICINE

## 2023-11-14 PROCEDURE — 99999 PR PBB SHADOW E&M-EST. PATIENT-LVL III: ICD-10-PCS | Mod: PBBFAC,,, | Performed by: INTERNAL MEDICINE

## 2023-11-14 PROCEDURE — 3066F NEPHROPATHY DOC TX: CPT | Mod: CPTII,S$GLB,, | Performed by: INTERNAL MEDICINE

## 2023-11-14 RX ORDER — FUROSEMIDE 40 MG/1
60 TABLET ORAL 3 TIMES DAILY
Qty: 180 TABLET | Refills: 11
Start: 2023-11-14 | End: 2024-03-20 | Stop reason: SDUPTHER

## 2023-11-14 NOTE — PROGRESS NOTES
[facility]     Subjective:    Patient ID:  David Barrios is a 63 y.o. male who presents for follow-up of Valvular Heart Disease, Atrial Fibrillation, Chronic Renal Failure, Hyperlipidemia, and Coronary Artery Disease    Review of patient's allergies indicates:  No Known Allergies   I saw the patient a year ago prior to his mitral valve surgery.  The surgery was complicated by sternal wound infection.  He had mitral valve repair with tricuspid annuloplasty.  Maze procedure and left atrial appendage atrial resection carried out.  There was a very stormy perioperative course including Impella.  At 1 time his EF was 15%.  His Preoperative EF was 30% in setting of severe mitral regurgitation with underlying pulmonary hypertension His most recent echo showed EF 45%.  He is having some shortness of breath.  His pulmonary hypertension resolved.  His cardiac catheterization showed mild single-vessel 30% plaquing.    He is on high-dose diuretics.  He states that his dosing was increased because of leg swelling.  He follows with Nephrology.  He is on aspirin and Xarelto.  His last Electrocardiogram showed sinus rhythm, he has been on amiodarone chronically.  For blood pressure he is on Toprol, Entresto.  His last serum creatinine was 1.5 , GFR 49 range.  His heart catheterization before surgery showed 30% lad stenosis otherwise without disease.  He is reporting arm weakness intermittently.  Things fall out of his hand.  Both arms or affected equally.  He is not sure he will be able to go back to work.        Review of Systems   Constitutional: Positive for malaise/fatigue. Negative for chills, decreased appetite, diaphoresis, fever, night sweats, weight gain and weight loss.   HENT:  Negative for congestion, ear discharge, ear pain, hearing loss, hoarse voice, nosebleeds, odynophagia, sore throat, stridor and tinnitus.    Eyes:  Negative for  blurred vision, discharge, double vision, pain, photophobia, redness, vision loss in left eye, vision loss in right eye, visual disturbance and visual halos.   Cardiovascular:  Positive for dyspnea on exertion. Negative for chest pain, claudication, cyanosis, irregular heartbeat, leg swelling, near-syncope, orthopnea, palpitations, paroxysmal nocturnal dyspnea and syncope.   Respiratory:  Positive for shortness of breath. Negative for cough, hemoptysis, sleep disturbances due to breathing, snoring, sputum production and wheezing.    Endocrine: Negative for cold intolerance, heat intolerance, polydipsia, polyphagia and polyuria.   Hematologic/Lymphatic: Negative for adenopathy and bleeding problem. Does not bruise/bleed easily.   Skin:  Negative for color change, dry skin, flushing, itching, nail changes, poor wound healing, rash, skin cancer, suspicious lesions and unusual hair distribution.   Musculoskeletal:  Negative for arthritis, back pain, falls, gout, joint pain, joint swelling, muscle cramps, muscle weakness, myalgias, neck pain and stiffness.   Gastrointestinal:  Negative for bloating, abdominal pain, anorexia, change in bowel habit, bowel incontinence, constipation, diarrhea, dysphagia, excessive appetite, flatus, heartburn, hematemesis, hematochezia, hemorrhoids, jaundice, melena, nausea and vomiting.   Genitourinary:  Negative for bladder incontinence, decreased libido, dysuria, flank pain, frequency, genital sores, hematuria, hesitancy, incomplete emptying, nocturia and urgency.   Neurological:  Negative for aphonia, brief paralysis, difficulty with concentration, disturbances in coordination, excessive daytime sleepiness, dizziness, focal weakness, headaches, light-headedness, loss of balance, numbness, paresthesias, seizures, sensory change, tremors, vertigo and weakness.   Psychiatric/Behavioral:  Negative for altered mental status, depression, hallucinations, memory loss, substance abuse, suicidal  "ideas and thoughts of violence. The patient does not have insomnia and is not nervous/anxious.    Allergic/Immunologic: Negative for hives and persistent infections.        Objective:       Vitals:    11/14/23 0838   BP: (!) 114/51   Pulse: (!) 57   SpO2: 97%   Weight: 72.8 kg (160 lb 7.9 oz)   Height: 5' 9" (1.753 m)    Physical Exam  Constitutional:       General: He is not in acute distress.     Appearance: He is well-developed. He is not diaphoretic.   HENT:      Head: Normocephalic and atraumatic.      Nose: Nose normal.   Eyes:      General: No scleral icterus.        Right eye: No discharge.      Conjunctiva/sclera: Conjunctivae normal.      Pupils: Pupils are equal, round, and reactive to light.   Neck:      Thyroid: No thyromegaly.      Vascular: No JVD.      Trachea: No tracheal deviation.   Cardiovascular:      Rate and Rhythm: Normal rate and regular rhythm.      Pulses:           Carotid pulses are 2+ on the right side and 2+ on the left side.       Radial pulses are 2+ on the right side and 2+ on the left side.        Dorsalis pedis pulses are 2+ on the right side and 2+ on the left side.        Posterior tibial pulses are 2+ on the right side and 2+ on the left side.      Heart sounds: Normal heart sounds. No murmur heard.     No friction rub. No gallop.   Pulmonary:      Effort: Pulmonary effort is normal. No respiratory distress.      Breath sounds: Normal breath sounds. No stridor. No wheezing or rales.   Chest:      Chest wall: No tenderness.   Abdominal:      General: Bowel sounds are normal. There is no distension.      Palpations: Abdomen is soft. There is no mass.      Tenderness: There is no abdominal tenderness. There is no guarding or rebound.   Musculoskeletal:         General: No tenderness. Normal range of motion.      Cervical back: Normal range of motion and neck supple.   Lymphadenopathy:      Cervical: No cervical adenopathy.   Skin:     General: Skin is warm and dry.      " Coloration: Skin is not pale.      Findings: No erythema or rash.   Neurological:      Mental Status: He is alert and oriented to person, place, and time.      Cranial Nerves: No cranial nerve deficit.      Coordination: Coordination normal.   Psychiatric:         Behavior: Behavior normal.         Thought Content: Thought content normal.         Judgment: Judgment normal.           Assessment:       1. SOB (shortness of breath)    2. Nonrheumatic mitral valve regurgitation    3. S/P MVR (mitral valve repair)    4. Paroxysmal A-fib    5. Essential hypertension    6. Chronic combined systolic and diastolic heart failure    7. Stage 3a chronic kidney disease      Results for orders placed or performed in visit on 08/25/23   Urinalysis   Result Value Ref Range    Specimen UA Urine, Clean Catch     Color, UA Yellow Yellow, Straw, [place]orsche    Appearance, UA Clear Clear    pH, UA 7.0 5.0 - 8.0    Specific Gravity, UA 1.010 1.005 - 1.030    Protein, UA Negative Negative    Glucose, UA Negative Negative    Ketones, UA Negative Negative    Bilirubin (UA) Negative Negative    Occult Blood UA Negative Negative    Nitrite, UA Negative Negative    Urobilinogen, UA Negative <2.0 EU/dL    Leukocytes, UA Negative Negative   Protein / creatinine ratio, urine   Result Value Ref Range    Protein, Urine Random <7 0 - 15 mg/dL    Creatinine, Urine 27.4 23.0 - 375.0 mg/dL    Prot/Creat Ratio, Urine Unable to calculate 0.00 - 0.20         Current Outpatient Medications:     amiodarone (PACERONE) 200 MG Tab, TAKE 1 TABLET(200 MG) BY MOUTH TWICE DAILY, Disp: 180 tablet, Rfl: 3    aspirin 81 MG Chew, Take 1 tablet (81 mg total) by mouth once daily., Disp: 360 tablet, Rfl: 0    atorvastatin (LIPITOR) 40 MG tablet, Take 1 tablet (40 mg total) by mouth once daily., Disp: 90 tablet, Rfl: 3    HYDROcodone-acetaminophen (NORCO) 7.5-325 mg per tablet, Take 1 tablet by mouth every 6 (six) hours as needed for Pain., Disp: , Rfl:     magnesium oxide  (MAG-OX) 400 mg (241.3 mg magnesium) tablet, Take 1 tablet (400 mg total) by mouth 2 (two) times daily. (Patient taking differently: Take 400 mg by mouth once daily.), Disp: 60 tablet, Rfl: 2    metoprolol succinate (TOPROL-XL) 25 MG 24 hr tablet, Take 1 tablet (25 mg total) by mouth once daily., Disp: 30 tablet, Rfl: 11    rivaroxaban (XARELTO) 20 mg Tab, Take 1 tablet (20 mg total) by mouth daily with dinner or evening meal., Disp: 90 tablet, Rfl: 3    sacubitriL-valsartan (ENTRESTO) 49-51 mg per tablet, Take 1 tablet by mouth 2 (two) times daily., Disp: 180 tablet, Rfl: 3    tamsulosin (FLOMAX) 0.4 mg Cap, Take 1 capsule (0.4 mg total) by mouth every evening. Take 30 minutes after dinner, Disp: 90 capsule, Rfl: 3    vitamin renal formula, B-complex-vitamin c-folic acid, (NEPHROCAP) 1 mg Cap, Take 1 capsule by mouth once daily., Disp: 30 capsule, Rfl: 2    furosemide (LASIX) 40 MG tablet, Take 1.5 tablets (60 mg total) by mouth 3 (three) times daily., Disp: 180 tablet, Rfl: 11     Lab Results   Component Value Date    WBC 11.32 08/25/2023    RBC 3.71 (L) 08/25/2023    HGB 12.3 (L) 08/25/2023    HCT 37.5 (L) 08/25/2023     (H) 08/25/2023    MCH 33.2 (H) 08/25/2023    MCHC 32.8 08/25/2023    RDW 13.2 08/25/2023     08/25/2023    MPV 11.9 08/25/2023    GRAN 8.2 (H) 08/25/2023    GRAN 72.7 08/25/2023    LYMPH 1.6 08/25/2023    LYMPH 14.0 (L) 08/25/2023    MONO 1.2 (H) 08/25/2023    MONO 10.8 08/25/2023    EOS 0.2 08/25/2023    BASO 0.05 08/25/2023    EOSINOPHIL 1.7 08/25/2023    BASOPHIL 0.4 08/25/2023    MG 2.3 08/25/2023        CMP  Lab Results   Component Value Date     08/25/2023    K 4.8 08/25/2023    CL 98 08/25/2023    CO2 32 (H) 08/25/2023    GLU 94 08/25/2023    BUN 27 (H) 08/25/2023    CREATININE 1.55 (H) 08/25/2023    CALCIUM 8.9 08/25/2023    PROT 6.3 01/16/2023    ALBUMIN 4.4 08/25/2023    BILITOT 0.4 01/16/2023    ALKPHOS 81 01/16/2023    AST 22 01/16/2023    ALT 21 01/16/2023     ANIONGAP 9 08/25/2023    ESTGFRAFRICA >60 05/23/2019    EGFRNONAA >60 05/23/2019        Lab Results   Component Value Date    LABBLOO No growth after 5 days. 11/21/2022    LABURIN No growth 11/21/2022    RESPIRATORYC Normal respiratory janna 10/07/2022    GSRESP No WBC's 10/11/2022    GSRESP No organisms seen 10/11/2022            Results for orders placed or performed during the hospital encounter of 01/12/23   EKG 12-lead    Collection Time: 01/15/23  8:12 AM    Narrative    Test Reason : R07.9,    Vent. Rate : 070 BPM     Atrial Rate : 070 BPM     P-R Int : 174 ms          QRS Dur : 132 ms      QT Int : 466 ms       P-R-T Axes : 069 -20 197 degrees     QTc Int : 503 ms    Normal sinus rhythm  LVH with QRS widening  T wave abnormality, consider inferolateral ischemia  Abnormal ECG  When compared with ECG of 12-JAN-2023 18:33,  The axis Shifted right  Inverted T waves have replaced nonspecific T wave abnormality in Inferior  leads  Confirmed by Radha RODRIGUEZ, Adalberto LOCKWOOD ([id number]) on 1/17/2023 7:47:43 AM    Referred By: [id number]   SELF           Confirmed By:Adalberto Garcia MD                  Plan:       Problem List Items Addressed This Visit          Cardiac/Vascular    Paroxysmal A-fib     He is in sinus rhythm.  He is on amiodarone 200 mg per day.  I told him that we would reassess dependency of amiodarone in the future.  He no longer takes anticoagulation.         Essential hypertension     His blood pressure is normal today with Entresto, Toprol dosing.  Current levels of treatment to continue.         Nonrheumatic mitral valve regurgitation     He had severe mitral regurgitation which has improved with surgical repair October 2022.  Tricuspid annuloplasty also completed.  Most recent EF 45%.         S/P MVR (mitral valve repair)     The patient also had Maze procedure and left atrial appendage atrial ligation.  Surgical date October 2022.  There was sternal wound infection after surgery.  He has had prolonged  rehab and management.  Plastics has signed off his sternal wound infection has resolved.         Chronic combined systolic and diastolic heart failure     Most recent EF by echo 45% from June 2023.  Mild mitral regurgitation without pulmonary hypertension confirmed.            Renal/    CKD (chronic kidney disease) stage 3, GFR 30-59 ml/min     Current GFR 50 range with serum creatinine 1.55.  Given his complicated medical course for the past year condition improved.  He remains on Entresto.    He is currently not on loop diuretic therapy.          Other Visit Diagnoses       SOB (shortness of breath)    -  Primary    Relevant Medications    furosemide (LASIX) 40 MG tablet               He looks incredibly good.  I advised a four-month follow-up.  Amiodarone therapy to be withdrawn likely on next visit with EP follow-up to be carried out at that time.    He has stabilized with a very difficult postoperative course.             Julio James MD  11/15/2023   8:57 AM

## 2023-11-15 PROBLEM — N18.30 CKD (CHRONIC KIDNEY DISEASE) STAGE 3, GFR 30-59 ML/MIN: Status: ACTIVE | Noted: 2023-11-15

## 2023-11-15 NOTE — ASSESSMENT & PLAN NOTE
He had severe mitral regurgitation which has improved with surgical repair October 2022.  Tricuspid annuloplasty also completed.  Most recent EF 45%.

## 2023-11-15 NOTE — ASSESSMENT & PLAN NOTE
The patient also had Maze procedure and left atrial appendage atrial ligation.  Surgical date October 2022.  There was sternal wound infection after surgery.  He has had prolonged rehab and management.  Plastics has signed off his sternal wound infection has resolved.

## 2023-11-15 NOTE — ASSESSMENT & PLAN NOTE
Current GFR 50 range with serum creatinine 1.55.  Given his complicated medical course for the past year condition improved.  He remains on Entresto.    He is currently not on loop diuretic therapy.

## 2023-11-15 NOTE — ASSESSMENT & PLAN NOTE
Most recent EF by echo 45% from June 2023.  Mild mitral regurgitation without pulmonary hypertension confirmed.

## 2023-11-15 NOTE — ASSESSMENT & PLAN NOTE
His blood pressure is normal today with Entresto, Toprol dosing.  Current levels of treatment to continue.

## 2023-11-28 ENCOUNTER — TELEPHONE (OUTPATIENT)
Dept: PLASTIC SURGERY | Facility: CLINIC | Age: 63
End: 2023-11-28
Payer: COMMERCIAL

## 2023-11-28 NOTE — TELEPHONE ENCOUNTER
Contact follow up with patient, RE: Minor room procedure.  Discussed with patient availability for Minor procedure is 1.3.24.  pt voiced he would like to proceed with this procedure date. Pt notification that other contact will  happen closure to procedure date and instructed with his arrival time.  Pt voiced understanding.  In basket message sent to department staff for confirmation to [facility]

## 2023-12-06 ENCOUNTER — LAB VISIT (OUTPATIENT)
Dept: LAB | Facility: HOSPITAL | Age: 63
End: 2023-12-06
Attending: INTERNAL MEDICINE
Payer: COMMERCIAL

## 2023-12-06 DIAGNOSIS — N18.32 STAGE 3B CHRONIC KIDNEY DISEASE: ICD-10-CM

## 2023-12-06 LAB
ALBUMIN/CREAT UR: 9 UG/MG (ref 0–30)
BILIRUB UR QL STRIP: NEGATIVE
CLARITY UR REFRACT.AUTO: CLEAR
COLOR UR AUTO: YELLOW
CREAT UR-MCNC: 189.1 MG/DL (ref 23–375)
CREAT UR-MCNC: 189.1 MG/DL (ref 23–375)
GLUCOSE UR QL STRIP: NEGATIVE
HGB UR QL STRIP: NEGATIVE
KETONES UR QL STRIP: ABNORMAL
LEUKOCYTE ESTERASE UR QL STRIP: NEGATIVE
MICROALBUMIN UR DL<=1MG/L-MCNC: 17 UG/ML
NITRITE UR QL STRIP: NEGATIVE
PH UR STRIP: 6 [PH] (ref 5–8)
PROT UR QL STRIP: NEGATIVE
PROT UR-MCNC: 17 MG/DL (ref 0–15)
PROT/CREAT UR: 0.09 MG/G{CREAT} (ref 0–0.2)
SP GR UR STRIP: 1.01 (ref 1–1.03)
URN SPEC COLLECT METH UR: ABNORMAL
UROBILINOGEN UR STRIP-ACNC: NEGATIVE EU/DL

## 2023-12-06 PROCEDURE — 82043 UR ALBUMIN QUANTITATIVE: CPT | Mod: PN | Performed by: INTERNAL MEDICINE

## 2023-12-06 PROCEDURE — 81003 URINALYSIS AUTO W/O SCOPE: CPT | Mod: PN | Performed by: INTERNAL MEDICINE

## 2023-12-06 PROCEDURE — 84156 ASSAY OF PROTEIN URINE: CPT | Performed by: INTERNAL MEDICINE

## 2023-12-11 ENCOUNTER — HOSPITAL ENCOUNTER (OUTPATIENT)
Dept: RADIOLOGY | Facility: HOSPITAL | Age: 63
Discharge: HOME | End: 2023-12-11
Attending: INTERNAL MEDICINE
Payer: COMMERCIAL

## 2023-12-11 DIAGNOSIS — N17.9 AKI (ACUTE KIDNEY INJURY): ICD-10-CM

## 2023-12-11 PROCEDURE — 76770 US RETROPERITONEAL COMPLETE: ICD-10-PCS | Mod: 26,,, | Performed by: RADIOLOGY

## 2023-12-11 PROCEDURE — 76770 US EXAM ABDO BACK WALL COMP: CPT | Mod: 26,,, | Performed by: RADIOLOGY

## 2023-12-11 PROCEDURE — 76770 US EXAM ABDO BACK WALL COMP: CPT | Mod: TC,PN

## 2023-12-14 ENCOUNTER — TELEPHONE (OUTPATIENT)
Dept: CARDIOTHORACIC SURGERY | Facility: CLINIC | Age: 63
End: 2023-12-14
Payer: COMMERCIAL

## 2023-12-14 NOTE — TELEPHONE ENCOUNTER
"Called pt regarding message below.    He stated that he has pain when he takes in deep breaths. It feels like his "ribs are rubbing together" and he can only breath without pain in specific situations.     SOB is increased with activity and somewhat relieved with rest.    He has had no accident or blows to the chest since an incident with a tree limb in September.CXR was taken at that time.    He reported that he googled these symptoms and now believes he may have something wrong with his chest wires. Explained to patient that chest wires were taken out on 11/8/22  because on an infection so he does not have any chest wires in his sternum at this time.    Will discuss with NP and call patient back.    Julie Haase RN  CTS Nurse Navigator [phone]      ----- Message from Karena Stein sent at 12/14/2023  9:33 AM CST -----  Regarding: Urgent, post procedure chest rattling, difficuly breathing, please advise  Contact: @[phone]  Regarding:Urgent, post procedure chest rattling short winded in some positions with that accompanying sound , there is grinding in chest and pt     Contact: David    Type: Call back with options    Who Called: aDvid    Would the patient rather a call back or a response via [facility]? Phone call    Best Call Back Number: @[phone]          "

## 2023-12-15 ENCOUNTER — TELEPHONE (OUTPATIENT)
Dept: CARDIOTHORACIC SURGERY | Facility: CLINIC | Age: 63
End: 2023-12-15
Payer: COMMERCIAL

## 2023-12-15 NOTE — TELEPHONE ENCOUNTER
"Discussed symptoms with NP Radha   of chest "grinding" pain, and SOB. Confirmed with chart and NP that patient has no sternal wires.  Explained this to the patient. I told patient that he has no sternal wires, so there are no wires loose in his chest. The pain is not related to his cardiac surgery and he should continue to follow up with his current cardiologist for SOB. The SOB is an ongoing problem. This does not reassure him.    He is still concerned because when he looked his symptoms up on google, the search engine stated these problems are related to his surgery.     Pt's initial surgery was on 10/7/22 and sternal wire removal was on 11/8. Explained he has had multiple chest xrays and scans that do not report findings that would indicate complications for his cardiac surgery.    Pt asked if there was another surgeon he could see to evaluate his sternum. Explained there are CT surgeons at [facility] the [facility] if he wanted to get a second opinion. He asked if they were part of the [facility] system. I told him they were not, but they would have access to his records and images.    No other questions at this time.    Julie Haase RN  CTS Nurse Navigator [phone]    "

## 2023-12-18 ENCOUNTER — TELEPHONE (OUTPATIENT)
Dept: CARDIOLOGY | Facility: CLINIC | Age: 63
End: 2023-12-18
Payer: COMMERCIAL

## 2023-12-18 NOTE — TELEPHONE ENCOUNTER
----- Message from Reece Phipps sent at 12/18/2023 10:33 AM CST -----  Contact: Pt  .Type:  Needs Medical Advice    Who Called: pt    Would the patient rather a call back or a response via [facility]?  Call back  Best Call Back Number: [phone]  Additional Information: Pt. Is calling to get some names of heart Surgeron from the provider

## 2023-12-21 ENCOUNTER — LAB VISIT (OUTPATIENT)
Dept: LAB | Facility: HOSPITAL | Age: 63
End: 2023-12-21
Attending: INTERNAL MEDICINE
Payer: COMMERCIAL

## 2023-12-21 DIAGNOSIS — N18.32 STAGE 3B CHRONIC KIDNEY DISEASE: ICD-10-CM

## 2023-12-21 LAB
ALBUMIN/CREAT UR: 39.8 UG/MG (ref 0–30)
BILIRUB UR QL STRIP: NEGATIVE
CLARITY UR REFRACT.AUTO: CLEAR
COLOR UR AUTO: YELLOW
CREAT UR-MCNC: 55.3 MG/DL (ref 23–375)
CREAT UR-MCNC: 55.3 MG/DL (ref 23–375)
GLUCOSE UR QL STRIP: NEGATIVE
HGB UR QL STRIP: NEGATIVE
KETONES UR QL STRIP: NEGATIVE
LEUKOCYTE ESTERASE UR QL STRIP: NEGATIVE
MICROALBUMIN UR DL<=1MG/L-MCNC: 22 UG/ML
NITRITE UR QL STRIP: NEGATIVE
PH UR STRIP: 6 [PH] (ref 5–8)
PROT UR QL STRIP: NEGATIVE
PROT UR-MCNC: 7 MG/DL (ref 0–15)
PROT/CREAT UR: 0.13 MG/G{CREAT} (ref 0–0.2)
SP GR UR STRIP: 1.01 (ref 1–1.03)
URN SPEC COLLECT METH UR: NORMAL
UROBILINOGEN UR STRIP-ACNC: NEGATIVE EU/DL

## 2023-12-21 PROCEDURE — 82043 UR ALBUMIN QUANTITATIVE: CPT | Mod: PN | Performed by: INTERNAL MEDICINE

## 2023-12-21 PROCEDURE — 84156 ASSAY OF PROTEIN URINE: CPT | Performed by: INTERNAL MEDICINE

## 2023-12-21 PROCEDURE — 81003 URINALYSIS AUTO W/O SCOPE: CPT | Mod: PN | Performed by: INTERNAL MEDICINE

## 2024-03-18 ENCOUNTER — LAB VISIT (OUTPATIENT)
Dept: LAB | Facility: HOSPITAL | Age: 64
End: 2024-03-18
Attending: INTERNAL MEDICINE
Payer: COMMERCIAL

## 2024-03-18 DIAGNOSIS — N18.32 STAGE 3B CHRONIC KIDNEY DISEASE: ICD-10-CM

## 2024-03-18 LAB
ALBUMIN/CREAT UR: NORMAL UG/MG (ref 0–30)
BILIRUB UR QL STRIP: NEGATIVE
CLARITY UR REFRACT.AUTO: CLEAR
COLOR UR AUTO: YELLOW
CREAT UR-MCNC: 35.4 MG/DL (ref 23–375)
CREAT UR-MCNC: 35.4 MG/DL (ref 23–375)
GLUCOSE UR QL STRIP: NEGATIVE
HGB UR QL STRIP: NEGATIVE
KETONES UR QL STRIP: NEGATIVE
LEUKOCYTE ESTERASE UR QL STRIP: NEGATIVE
MICROALBUMIN UR DL<=1MG/L-MCNC: <5 UG/ML
NITRITE UR QL STRIP: NEGATIVE
PH UR STRIP: 6 [PH] (ref 5–8)
PROT UR QL STRIP: NEGATIVE
PROT UR-MCNC: <7 MG/DL (ref 0–15)
PROT/CREAT UR: NORMAL MG/G{CREAT} (ref 0–0.2)
SP GR UR STRIP: 1.01 (ref 1–1.03)
URN SPEC COLLECT METH UR: NORMAL
UROBILINOGEN UR STRIP-ACNC: NEGATIVE EU/DL

## 2024-03-18 PROCEDURE — 84156 ASSAY OF PROTEIN URINE: CPT | Performed by: INTERNAL MEDICINE

## 2024-03-18 PROCEDURE — 82043 UR ALBUMIN QUANTITATIVE: CPT | Mod: PN | Performed by: INTERNAL MEDICINE

## 2024-03-18 PROCEDURE — 81003 URINALYSIS AUTO W/O SCOPE: CPT | Mod: PN | Performed by: INTERNAL MEDICINE

## 2024-03-25 DIAGNOSIS — M79.631 PAIN IN RIGHT FOREARM: ICD-10-CM

## 2024-03-25 DIAGNOSIS — M79.632 PAIN IN LEFT FOREARM: Primary | ICD-10-CM

## 2024-04-02 ENCOUNTER — OFFICE VISIT (OUTPATIENT)
Dept: CARDIOLOGY | Facility: CLINIC | Age: 64
End: 2024-04-02
Payer: COMMERCIAL

## 2024-04-02 VITALS
DIASTOLIC BLOOD PRESSURE: 56 MMHG | BODY MASS INDEX: 24.37 KG/M2 | OXYGEN SATURATION: 96 % | HEIGHT: 69 IN | SYSTOLIC BLOOD PRESSURE: 123 MMHG | HEART RATE: 59 BPM | WEIGHT: 164.56 LBS

## 2024-04-02 DIAGNOSIS — Z98.890 S/P MVR (MITRAL VALVE REPAIR): ICD-10-CM

## 2024-04-02 DIAGNOSIS — R06.09 DYSPNEA ON EXERTION: ICD-10-CM

## 2024-04-02 DIAGNOSIS — I10 ESSENTIAL HYPERTENSION: ICD-10-CM

## 2024-04-02 DIAGNOSIS — I50.42 CHRONIC COMBINED SYSTOLIC AND DIASTOLIC HEART FAILURE: Primary | ICD-10-CM

## 2024-04-02 DIAGNOSIS — I34.0 NONRHEUMATIC MITRAL VALVE REGURGITATION: ICD-10-CM

## 2024-04-02 DIAGNOSIS — I48.0 PAROXYSMAL A-FIB: ICD-10-CM

## 2024-04-02 DIAGNOSIS — R06.02 SOB (SHORTNESS OF BREATH): ICD-10-CM

## 2024-04-02 PROCEDURE — 99214 OFFICE O/P EST MOD 30 MIN: CPT | Mod: S$GLB,,, | Performed by: INTERNAL MEDICINE

## 2024-04-02 PROCEDURE — 3008F BODY MASS INDEX DOCD: CPT | Mod: CPTII,S$GLB,, | Performed by: INTERNAL MEDICINE

## 2024-04-02 PROCEDURE — 1159F MED LIST DOCD IN RCRD: CPT | Mod: CPTII,S$GLB,, | Performed by: INTERNAL MEDICINE

## 2024-04-02 PROCEDURE — 3061F NEG MICROALBUMINURIA REV: CPT | Mod: CPTII,S$GLB,, | Performed by: INTERNAL MEDICINE

## 2024-04-02 PROCEDURE — 3078F DIAST BP <80 MM HG: CPT | Mod: CPTII,S$GLB,, | Performed by: INTERNAL MEDICINE

## 2024-04-02 PROCEDURE — 99999 PR PBB SHADOW E&M-EST. PATIENT-LVL III: CPT | Mod: PBBFAC,,, | Performed by: INTERNAL MEDICINE

## 2024-04-02 PROCEDURE — 3066F NEPHROPATHY DOC TX: CPT | Mod: CPTII,S$GLB,, | Performed by: INTERNAL MEDICINE

## 2024-04-02 PROCEDURE — 4010F ACE/ARB THERAPY RXD/TAKEN: CPT | Mod: CPTII,S$GLB,, | Performed by: INTERNAL MEDICINE

## 2024-04-02 PROCEDURE — 3074F SYST BP LT 130 MM HG: CPT | Mod: CPTII,S$GLB,, | Performed by: INTERNAL MEDICINE

## 2024-04-02 RX ORDER — FUROSEMIDE 40 MG/1
40 TABLET ORAL DAILY
Qty: 90 TABLET | Refills: 3
Start: 2024-04-02 | End: 2025-04-02

## 2024-04-02 NOTE — ASSESSMENT & PLAN NOTE
Surgical date October 2022.  There was severe mitral regurgitation.  He had decreased EF preoperatively.  His ejection fraction has recovered.

## 2024-04-02 NOTE — ASSESSMENT & PLAN NOTE
He did cardiac rehab.  He still has limitations.  He is considered [place] heart Association functional capacity 3.  Furosemide to continue.

## 2024-04-02 NOTE — ASSESSMENT & PLAN NOTE
Amiodarone withdrawn.  Xarelto to continue.  I withdrew amiodarone given Maze procedure and appendage atrial ligation.  He has had AFib in the past.  He will monitor for change in heart rate.

## 2024-04-02 NOTE — PROGRESS NOTES
[facility] Cardiology     Subjective:    Patient ID:  David Barrios is a 63 y.o. male who presents for follow-up of Atrial Fibrillation, Hypertension, Valvular Heart Disease, and Congestive Heart Failure    Review of patient's allergies indicates:   Allergen Reactions    Farxiga [dapagliflozin] Diarrhea      His last echo showed ejection fraction 45% completed June 2023 with LV enlargement, mild mitral regurgitation without pulmonary hypertension.  He is still with [place] heart Association functional capacity 3 status.  He has trouble doing yd work.  His rhythm is stable.  He remains on amiodarone.  Jardiance was initiated.  He is on diuretics.  He tried to stop his furosemide but he had weight gain and some leg swelling with fluid retention.  His last GFR was nearly normal March 2024.  He remains on antihypertensive therapy-Entresto and Toprol.    He remains on Xarelto.  He is also on aspirin.  He had appendage atrial ligation and Maze procedure at time of mitral valve surgery with Dr. Hedrick.  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  He has residual sternal wound pain which bothers him at night.  He feels his chest clicking.        Review of Systems   Constitutional: Negative for chills, decreased appetite, diaphoresis, fever, malaise/fatigue, night sweats, weight gain and weight loss.   HENT:  Negative for congestion, ear discharge, ear pain, hearing loss, hoarse voice, nosebleeds, odynophagia, sore throat, stridor and tinnitus.    Eyes:  Negative for blurred vision, discharge, double vision, pain, photophobia, redness, vision loss in left eye, vision loss in right eye, visual disturbance and visual halos.   Cardiovascular:  Positive for chest pain, dyspnea on exertion and leg swelling. Negative for claudication, cyanosis, irregular heartbeat, near-syncope, orthopnea,  "palpitations, paroxysmal nocturnal dyspnea and syncope.   Respiratory:  Positive for shortness of breath. Negative for cough, hemoptysis, sleep disturbances due to breathing, snoring, sputum production and wheezing.    Endocrine: Negative for cold intolerance, heat intolerance, polydipsia, polyphagia and polyuria.   Hematologic/Lymphatic: Negative for adenopathy and bleeding problem. Does not bruise/bleed easily.   Skin:  Negative for color change, dry skin, flushing, itching, nail changes, poor wound healing, rash, skin cancer, suspicious lesions and unusual hair distribution.   Musculoskeletal:  Negative for arthritis, back pain, falls, gout, joint pain, joint swelling, muscle cramps, muscle weakness, myalgias, neck pain and stiffness.   Gastrointestinal:  Negative for bloating, abdominal pain, anorexia, change in bowel habit, bowel incontinence, constipation, diarrhea, dysphagia, excessive appetite, flatus, heartburn, hematemesis, hematochezia, hemorrhoids, jaundice, melena, nausea and vomiting.   Genitourinary:  Negative for bladder incontinence, decreased libido, dysuria, flank pain, frequency, genital sores, hematuria, hesitancy, incomplete emptying, nocturia and urgency.   Neurological:  Negative for aphonia, brief paralysis, difficulty with concentration, disturbances in coordination, excessive daytime sleepiness, dizziness, focal weakness, headaches, light-headedness, loss of balance, numbness, paresthesias, seizures, sensory change, tremors, vertigo and weakness.   Psychiatric/Behavioral:  Negative for altered mental status, depression, hallucinations, memory loss, substance abuse, suicidal ideas and thoughts of violence. The patient does not have insomnia and is not nervous/anxious.    Allergic/Immunologic: Negative for hives and persistent infections.        Objective:       Vitals:    04/02/24 1113   BP: (!) 123/56   Pulse: (!) 59   SpO2: 96%   Weight: 74.6 kg (164 lb 9.2 oz)   Height: 5' 9" (1.753 m) "    Physical Exam  Constitutional:       General: He is not in acute distress.     Appearance: He is well-developed. He is not diaphoretic.   HENT:      Head: Normocephalic and atraumatic.      Nose: Nose normal.   Eyes:      General: No scleral icterus.        Right eye: No discharge.      Conjunctiva/sclera: Conjunctivae normal.      Pupils: Pupils are equal, round, and reactive to light.   Neck:      Thyroid: No thyromegaly.      Vascular: No JVD.      Trachea: No tracheal deviation.   Cardiovascular:      Rate and Rhythm: Regular rhythm. Bradycardia present.      Pulses:           Carotid pulses are 2+ on the right side and 2+ on the left side.       Radial pulses are 2+ on the right side and 2+ on the left side.        Dorsalis pedis pulses are 2+ on the right side and 2+ on the left side.        Posterior tibial pulses are 2+ on the right side and 2+ on the left side.      Heart sounds: Normal heart sounds. No murmur heard.     No friction rub. No gallop.   Pulmonary:      Effort: Pulmonary effort is normal. No respiratory distress.      Breath sounds: Normal breath sounds. No stridor. No wheezing or rales.   Chest:      Chest wall: No tenderness.   Abdominal:      General: Bowel sounds are normal. There is no distension.      Palpations: Abdomen is soft. There is no mass.      Tenderness: There is no abdominal tenderness. There is no guarding or rebound.   Musculoskeletal:         General: No tenderness. Normal range of motion.      Cervical back: Normal range of motion and neck supple.   Lymphadenopathy:      Cervical: No cervical adenopathy.   Skin:     General: Skin is warm and dry.      Coloration: Skin is not pale.      Findings: No erythema or rash.   Neurological:      Mental Status: He is alert and oriented to person, place, and time.      Cranial Nerves: No cranial nerve deficit.      Coordination: Coordination normal.   Psychiatric:         Behavior: Behavior normal.         Thought Content: Thought  content normal.         Judgment: Judgment normal.           Assessment:       1. Chronic combined systolic and diastolic heart failure    2. SOB (shortness of breath)    3. Dyspnea on exertion    4. Essential hypertension    5. Paroxysmal A-fib    6. Nonrheumatic mitral valve regurgitation    7. S/P MVR (mitral valve repair)      Results for orders placed or performed in visit on 03/18/24   Magnesium   Result Value Ref Range    Magnesium 2.5 1.6 - 2.6 mg/dL   Renal Function Panel   Result Value Ref Range    Glucose 64 (L) 70 - 110 mg/dL    Sodium 139 136 - 145 mmol/L    Potassium 4.7 3.5 - 5.1 mmol/L    Chloride 101 95 - 110 mmol/L    CO2 30 (H) 23 - 29 mmol/L    BUN 28 (H) 2 - 20 mg/dL    Calcium 8.8 8.7 - 10.5 mg/dL    Creatinine 1.33 0.50 - 1.40 mg/dL    Albumin 3.9 3.5 - 5.2 g/dL    Phosphorus 3.3 2.7 - 4.5 mg/dL    eGFR 59.7 (A) >60 mL/min/1.73 m^2    Anion Gap 8 8 - 16 mmol/L   PTH, intact   Result Value Ref Range    PTH, Intact 96.6 (H) 9.0 - 77.0 pg/mL   Uric Acid   Result Value Ref Range    Uric Acid 6.1 3.4 - 7.0 mg/dL   CBC Auto Differential   Result Value Ref Range    WBC 9.54 3.90 - 12.70 K/uL    RBC 3.37 (L) 4.60 - 6.20 M/uL    Hemoglobin 10.5 (L) 14.0 - 18.0 g/dL    Hematocrit 33.3 (L) 40.0 - 54.0 %    MCV 99 (H) 82 - 98 fL    MCH 31.2 (H) 27.0 - 31.0 pg    MCHC 31.5 (L) 32.0 - 36.0 g/dL    RDW 13.2 11.5 - 14.5 %    Platelets 265 150 - 450 K/uL    MPV 11.6 9.2 - 12.9 fL    Immature Granulocytes 0.3 0.0 - 0.5 %    Gran # (ANC) 6.9 1.8 - 7.7 K/uL    Immature Grans (Abs) 0.03 0.00 - 0.04 K/uL    Lymph # 1.4 1.0 - 4.8 K/uL    Mono # 1.1 (H) 0.3 - 1.0 K/uL    Eos # 0.2 0.0 - 0.5 K/uL    Baso # 0.04 0.00 - 0.20 K/uL    nRBC 0 0 /100 WBC    Gran % 71.8 38.0 - 73.0 %    Lymph % 14.4 (L) 18.0 - 48.0 %    Mono % 11.1 4.0 - 15.0 %    Eosinophil % 2.0 0.0 - 8.0 %    Basophil % 0.4 0.0 - 1.9 %    Differential Method Automated    Iron and TIBC   Result Value Ref Range    Iron 44 (L) 45 - 160 ug/dL    Transferrin  324 200 - 375 mg/dL    TIBC 480 (H) 250 - 450 ug/dL    Saturated Iron 9 (L) 20 - 50 %   Vitamin D   Result Value Ref Range    Vit D, 25-Hydroxy 23 (L) 30 - 96 ng/mL         Current Outpatient Medications:     empagliflozin (JARDIANCE) 10 mg tablet, Take 1 tablet (10 mg total) by mouth once daily., Disp: 90 tablet, Rfl: 3    ergocalciferol (ERGOCALCIFEROL) 50,000 unit Cap, Take 1 capsule (50,000 Units total) by mouth every 7 days., Disp: 8 capsule, Rfl: 3    furosemide (LASIX) 40 MG tablet, Take 1 tablet (40 mg total) by mouth once daily., Disp: 90 tablet, Rfl: 3    HYDROcodone-acetaminophen (NORCO) 7.5-325 mg per tablet, Take 1 tablet by mouth every 6 (six) hours as needed for Pain., Disp: , Rfl:     rivaroxaban (XARELTO) 20 mg Tab, Take 1 tablet (20 mg total) by mouth daily with dinner or evening meal., Disp: 90 tablet, Rfl: 3    sacubitriL-valsartan (ENTRESTO) 49-51 mg per tablet, Take 1 tablet by mouth 2 (two) times daily., Disp: 180 tablet, Rfl: 3    tamsulosin (FLOMAX) 0.4 mg Cap, Take 1 capsule (0.4 mg total) by mouth every evening. Take 30 minutes after dinner, Disp: 90 capsule, Rfl: 3    aspirin 81 MG Chew, Take 1 tablet (81 mg total) by mouth once daily., Disp: 360 tablet, Rfl: 0    atorvastatin (LIPITOR) 40 MG tablet, Take 1 tablet (40 mg total) by mouth once daily., Disp: 90 tablet, Rfl: 3    metoprolol succinate (TOPROL-XL) 25 MG 24 hr tablet, Take 1 tablet (25 mg total) by mouth once daily., Disp: 30 tablet, Rfl: 11     Lab Results   Component Value Date    WBC 9.54 03/18/2024    RBC 3.37 (L) 03/18/2024    HGB 10.5 (L) 03/18/2024    HCT 33.3 (L) 03/18/2024    MCV 99 (H) 03/18/2024    MCH 31.2 (H) 03/18/2024    MCHC 31.5 (L) 03/18/2024    RDW 13.2 03/18/2024     03/18/2024    MPV 11.6 03/18/2024    GRAN 6.9 03/18/2024    GRAN 71.8 03/18/2024    LYMPH 1.4 03/18/2024    LYMPH 14.4 (L) 03/18/2024    MONO 1.1 (H) 03/18/2024    MONO 11.1 03/18/2024    EOS 0.2 03/18/2024    BASO 0.04 03/18/2024     EOSINOPHIL 2.0 03/18/2024    BASOPHIL 0.4 03/18/2024    MG 2.5 03/18/2024        CMP  Lab Results   Component Value Date     03/18/2024    K 4.7 03/18/2024     03/18/2024    CO2 30 (H) 03/18/2024    GLU 64 (L) 03/18/2024    BUN 28 (H) 03/18/2024    CREATININE 1.33 03/18/2024    CALCIUM 8.8 03/18/2024    PROT 6.3 01/16/2023    ALBUMIN 3.9 03/18/2024    BILITOT 0.4 01/16/2023    ALKPHOS 81 01/16/2023    AST 22 01/16/2023    ALT 21 01/16/2023    ANIONGAP 8 03/18/2024    ESTGFRAFRICA >60 05/23/2019    EGFRNONAA >60 05/23/2019        Lab Results   Component Value Date    LABBLOO No growth after 5 days. 11/21/2022    LABURIN No growth 11/21/2022    RESPIRATORYC Normal respiratory janna 10/07/2022    GSRESP No WBC's 10/11/2022    GSRESP No organisms seen 10/11/2022            Results for orders placed or performed during the hospital encounter of 01/12/23   EKG 12-lead    Collection Time: 01/15/23  8:12 AM    Narrative    Test Reason : R07.9,    Vent. Rate : 070 BPM     Atrial Rate : 070 BPM     P-R Int : 174 ms          QRS Dur : 132 ms      QT Int : 466 ms       P-R-T Axes : 069 -20 197 degrees     QTc Int : 503 ms    Normal sinus rhythm  LVH with QRS widening  T wave abnormality, consider inferolateral ischemia  Abnormal ECG  When compared with ECG of 12-JAN-2023 18:33,  The axis Shifted right  Inverted T waves have replaced nonspecific T wave abnormality in Inferior  leads  Confirmed by Radha RODRIGUEZ, Adalberto LOCKWOOD ([id number]) on 1/17/2023 7:47:43 AM    Referred By: ERIKA   SELF           Confirmed By:Adalberto Garcia MD                  Plan:       Problem List Items Addressed This Visit          Cardiac/Vascular    Paroxysmal A-fib     Amiodarone withdrawn.  Xarelto to continue.  I withdrew amiodarone given Maze procedure and appendage atrial ligation.  He has had AFib in the past.  He will monitor for change in heart rate.         Essential hypertension     Blood pressures are stable.  Current therapy to  continue.         Dyspnea on exertion     He did cardiac rehab.  He still has limitations.  He is considered [place] heart Association functional capacity 3.  Furosemide to continue.         Nonrheumatic mitral valve regurgitation     Leading to cardiac surgery.  Repeat echo ordered.  Mild mitral regurgitation noted on last echo.         S/P MVR (mitral valve repair)     Surgical date October 2022.  There was severe mitral regurgitation.  He had decreased EF preoperatively.  His ejection fraction has recovered.         Chronic combined systolic and diastolic heart failure - Primary     Most recent echo showed EF 45%.  Follow-up echo ordered at this time.  It has been 9 months since his previous echo.  There was mild mitral regurgitation only without pulmonary hypertension on that echo.  He is still reporting reduced exercise tolerance and shortness of breath.          Other Visit Diagnoses       SOB (shortness of breath)        Relevant Medications    furosemide (LASIX) 40 MG tablet             Increased activity encouraged.  Today I stopped amiodarone.  All other medications to continue.  He does have Lasix 40 mg which I advise to continue.    A six-month follow-up was advised               Julio Jamse MD  04/02/2024   11:38 AM

## 2024-04-02 NOTE — ASSESSMENT & PLAN NOTE
Most recent echo showed EF 45%.  Follow-up echo ordered at this time.  It has been 9 months since his previous echo.  There was mild mitral regurgitation only without pulmonary hypertension on that echo.  He is still reporting reduced exercise tolerance and shortness of breath.

## 2024-04-10 ENCOUNTER — HOSPITAL ENCOUNTER (OUTPATIENT)
Dept: RADIOLOGY | Facility: HOSPITAL | Age: 64
Discharge: HOME | End: 2024-04-10
Attending: INTERNAL MEDICINE
Payer: COMMERCIAL

## 2024-04-10 DIAGNOSIS — M79.89 LEFT LEG SWELLING: ICD-10-CM

## 2024-04-10 PROCEDURE — 93971 EXTREMITY STUDY: CPT | Mod: 26,LT,, | Performed by: RADIOLOGY

## 2024-04-10 PROCEDURE — 93971 EXTREMITY STUDY: CPT | Mod: TC,PN,LT

## 2024-04-26 NOTE — ASSESSMENT & PLAN NOTE
- echo with severe MR and EF 35%  - MR contributing to afib with RVR and volume overload  - aggressive diuresis as detailed under CHF; continue with rate control per afib management  - will need further workup for MR and low EF but will likely be deferred to outpatient setting    EXAM NOTE    HISTORY  Dave Espinoza is a 73 year old male who presents with complaints of a cough, ongoing for approximately 2 days, did experience some sore throat previously which has been resolved.  Does admit to headache, nasal congestion, nausea, no vomiting, denies any myalgias, diarrhea, denies any hearing, he does admit to some sinus tenderness or pressure.  Has been trying over-the-counter medications with minimal relief.  Symptoms do seem to be worsening, does admit to some shortness a breath with coughing.      I have reviewed the past medical, family and social history sections including the medications and allergies listed in the above medical record as well as the nursing notes.    MEDICAL HISTORY  Past Medical History:   Diagnosis Date    CAD (coronary artery disease)     Dyslipidemia     Essential (primary) hypertension     Foot pain     Gastroesophageal reflux disease     Gout     Head ache     High cholesterol     Myocardial infarction (CMD) 1985    Neck pain     Neuropathy     PTSD (post-traumatic stress disorder)     Thyroid nodule        MEDICATIONS  Current Outpatient Medications   Medication Sig    prazosin (MINIPRESS) 2 MG capsule Take 2 mg by mouth nightly.    buPROPion XL (WELLBUTRIN XL) 300 MG 24 hr tablet Take 300 mg by mouth daily.    hydrOXYzine (ATARAX) 25 MG tablet Take 25 mg by mouth at bedtime.    famotidine (PEPCID) 40 MG tablet Take 1 tablet by mouth nightly.    losartan (COZAAR) 100 MG tablet TAKE ONE TABLET BY MOUTH DAILY FOR BLOOD PRESSURE    mirtazapine (REMERON) 30 MG tablet TAKE ONE-HALF TABLET BY MOUTH NIGHTLY AT BEDTIME FOR MOOD    pregabalin (LYRICA) 100 MG capsule Take 1 capsule by mouth 2 times daily.    colchicine 0.6 MG capsule As needed for a gout flare up    allopurinol (ZYLOPRIM) 100 MG tablet Take 2 tablets by mouth daily.    atorvastatin (LIPITOR) 40 MG tablet Take 40 mg by mouth daily (with dinner).    cholecalciferol 1000 units tablet Take 1,000 Units by mouth  daily.    docusate calcium (SURFAK) 240 MG capsule Take 240 mg by mouth daily.    polyethylene glycol (GLYCOLAX, MIRALAX) packet Take 17 g by mouth daily.    aspirin 81 MG tablet Take 81 mg by mouth daily.    Multiple Vitamins-Minerals (MULTIVITAMIN PO) Take  by mouth daily.     No current facility-administered medications for this visit.       REVIEW OF SYSTEMS  Constitutional:  Patient denies fever, chills, fatigue or malaise.  HENT:  +sinus problems, no ear infections, nasal congestion or sore throat.  Respiratory:  +cough, shortness of breath, no wheezing, chest tightness.  Cardiovascular:  Denies chest pain, edema, palpitations.  Gastrointestinal:  Denies abdominal pain, nausea, vomiting, bloody stools or diarrhea.  Musculoskeletal:  Denies back pain, neck pain, joint pain or myalgias.  Integument:  Denies rash, itching.  Neurologic:  Denies headache, focal weakness, dizziness, syncope.  Endocrine:  Denies polyuria, polydipsia or temperature intolerance.      PHYSICAL EXAM  Vital Signs:    Vitals:    04/26/24 1302   BP: 134/70   [place] [facility] [place]   Patient Position: Sitting   Cuff Size: Regular   Pulse: 82   Resp: 18   SpO2: 97%   Weight: 111.1 kg (245 lb)   Height: 5' 9\" (1.753 m)     Constitutional:  Well-developed, well-nourished. no acute distress.  Integument:  Warm. Dry. No erythema. No rash.  HENT:  Normocephalic. Atraumatic. Bilateral external ears normal. Oropharynx moist, positive erythema, cobblestoning along with irritation. No oral exudates. Nose normal.  Positive sinus tenderness to palpation  Neck:  Supple. No cervical lymphadenopathy. no thyromegaly. No carotid bruits.  Cardiovascular:  Normal heart rate. Normal rhythm. No murmurs. No rubs. No gallops. 2+ radial pulses  Respiratory:  Normal breath sounds. No respiratory distress. No wheezing, rales or rhonchi.  Gastrointestinal:  Bowel sounds normal. Soft. No tenderness. No distention. No masses. No  hepatosplenomegaly.  Neurologic:  Alert and oriented x 3. Normal motor function. Normal sensory function. DTRs (deep tendon reflexes) normal, cranial nerves II through XII grossly intact.  Psychologic: Normal mood and normal behavior.    ASSESSMENT AND PLAN  1) acute maxillary sinusitis-will send a prescription for Z-Og, albuterol in addition Tessalon Perles at this time.  Should symptoms fail to improve status post a Z-Og, will consider prednisone burst.    Patient verbalized understanding and agreed to plan.  Return to clinic as scheduled or sooner p.r.n..

## 2024-05-31 DIAGNOSIS — I48.0 PAROXYSMAL ATRIAL FIBRILLATION: Primary | ICD-10-CM

## 2024-05-31 RX ORDER — DABIGATRAN ETEXILATE 150 MG/1
150 CAPSULE ORAL 2 TIMES DAILY
Qty: 60 CAPSULE | Refills: 3 | Status: SHIPPED | OUTPATIENT
Start: 2024-05-31

## 2024-07-30 ENCOUNTER — LAB VISIT (OUTPATIENT)
Dept: LAB | Facility: HOSPITAL | Age: 64
End: 2024-07-30
Attending: INTERNAL MEDICINE
Payer: COMMERCIAL

## 2024-07-30 DIAGNOSIS — N18.31 STAGE 3A CHRONIC KIDNEY DISEASE: ICD-10-CM

## 2024-07-30 LAB
ALBUMIN/CREAT UR: NORMAL UG/MG (ref 0–30)
BILIRUB UR QL STRIP: NEGATIVE
CLARITY UR REFRACT.AUTO: CLEAR
COLOR UR AUTO: YELLOW
CREAT UR-MCNC: 29.2 MG/DL (ref 23–375)
CREAT UR-MCNC: 29.2 MG/DL (ref 23–375)
GLUCOSE UR QL STRIP: NEGATIVE
HGB UR QL STRIP: NEGATIVE
KETONES UR QL STRIP: NEGATIVE
LEUKOCYTE ESTERASE UR QL STRIP: NEGATIVE
MICROALBUMIN UR DL<=1MG/L-MCNC: <5 UG/ML
NITRITE UR QL STRIP: NEGATIVE
PH UR STRIP: 7 [PH] (ref 5–8)
PROT UR QL STRIP: NEGATIVE
PROT UR-MCNC: <7 MG/DL (ref 0–15)
PROT/CREAT UR: NORMAL MG/G{CREAT} (ref 0–0.2)
SP GR UR STRIP: 1.01 (ref 1–1.03)
URN SPEC COLLECT METH UR: NORMAL
UROBILINOGEN UR STRIP-ACNC: NEGATIVE EU/DL

## 2024-07-30 PROCEDURE — 82043 UR ALBUMIN QUANTITATIVE: CPT | Mod: PN | Performed by: INTERNAL MEDICINE

## 2024-07-30 PROCEDURE — 82570 ASSAY OF URINE CREATININE: CPT | Performed by: INTERNAL MEDICINE

## 2024-07-30 PROCEDURE — 84156 ASSAY OF PROTEIN URINE: CPT | Performed by: INTERNAL MEDICINE

## 2024-07-30 PROCEDURE — 81003 URINALYSIS AUTO W/O SCOPE: CPT | Mod: PN | Performed by: INTERNAL MEDICINE

## 2024-08-05 ENCOUNTER — TELEPHONE (OUTPATIENT)
Dept: CARDIOLOGY | Facility: CLINIC | Age: 64
End: 2024-08-05
Payer: COMMERCIAL

## 2024-08-19 NOTE — ASSESSMENT & PLAN NOTE
He is in sinus rhythm.  He is on amiodarone 200 mg per day.  I told him that we would reassess dependency of amiodarone in the future.  He no longer takes anticoagulation.   Spoke with patient who is questioning why her spironolactone was stopped. No kidney issues or hyperkalemia during hospitalization.  Will discuss further with Dr. Ingram.     She also reports she is now taking metoprolol 25mg, BP is well controlled and HR ~80s per pt report.  She was taking 12.5mg daily, and now is feeling well with taking 25mg daily.    All questions answered.

## 2024-09-13 ENCOUNTER — TELEPHONE (OUTPATIENT)
Dept: CARDIOLOGY | Facility: CLINIC | Age: 64
End: 2024-09-13
Payer: MEDICARE

## 2024-09-13 ENCOUNTER — HOSPITAL ENCOUNTER (OUTPATIENT)
Dept: CARDIOLOGY | Facility: HOSPITAL | Age: 64
Discharge: HOME | End: 2024-09-13
Attending: INTERNAL MEDICINE
Payer: MEDICARE

## 2024-09-13 ENCOUNTER — HOSPITAL ENCOUNTER (OUTPATIENT)
Dept: RADIOLOGY | Facility: HOSPITAL | Age: 64
Discharge: HOME | End: 2024-09-13
Attending: INTERNAL MEDICINE
Payer: MEDICARE

## 2024-09-13 VITALS — WEIGHT: 163 LBS | BODY MASS INDEX: 24.14 KG/M2 | HEIGHT: 69 IN

## 2024-09-13 DIAGNOSIS — N28.1 RENAL CYST: ICD-10-CM

## 2024-09-13 DIAGNOSIS — I50.42 CHRONIC COMBINED SYSTOLIC AND DIASTOLIC HEART FAILURE: ICD-10-CM

## 2024-09-13 LAB
APICAL FOUR CHAMBER EJECTION FRACTION: 56 %
APICAL TWO CHAMBER EJECTION FRACTION: 67 %
ASCENDING AORTA: 3.25 CM
AV INDEX (PROSTH): 0.77
AV MEAN GRADIENT: 8 MMHG
AV PEAK GRADIENT: 15 MMHG
AV REGURGITATION PRESSURE HALF TIME: 479.11 MS
AV VALVE AREA BY VELOCITY RATIO: 4.07 CM²
AV VALVE AREA: 3.79 CM²
AV VELOCITY RATIO: 0.82
BSA FOR ECHO PROCEDURE: 1.9 M2
CV ECHO LV RWT: 0.3 CM
DOP CALC AO PEAK VEL: 1.91 M/S
DOP CALC AO VTI: 41 CM
DOP CALC LVOT AREA: 4.9 CM2
DOP CALC LVOT DIAMETER: 2.51 CM
DOP CALC LVOT PEAK VEL: 1.57 M/S
DOP CALC LVOT STROKE VOLUME: 155.29 CM3
DOP CALC MV VTI: 29 CM
DOP CALCLVOT PEAK VEL VTI: 31.4 CM
E WAVE DECELERATION TIME: 221.95 MSEC
E/E' RATIO: 23.23 M/S
ECHO LV POSTERIOR WALL: 0.94 CM (ref 0.6–1.1)
FRACTIONAL SHORTENING: 26 % (ref 28–44)
HR MV ECHO: 65 BPM
INTERVENTRICULAR SEPTUM: 1.09 CM (ref 0.6–1.1)
IVC DIAMETER: 1.53 CM
LEFT ATRIUM AREA SYSTOLIC (APICAL 2 CHAMBER): 26.67 CM2
LEFT ATRIUM AREA SYSTOLIC (APICAL 4 CHAMBER): 30.54 CM2
LEFT ATRIUM SIZE: 5.65 CM
LEFT ATRIUM VOLUME INDEX MOD: 55 ML/M2
LEFT ATRIUM VOLUME MOD: 103.92 CM3
LEFT INTERNAL DIMENSION IN SYSTOLE: 4.67 CM (ref 2.1–4)
LEFT VENTRICLE DIASTOLIC VOLUME INDEX: 107.29 ML/M2
LEFT VENTRICLE DIASTOLIC VOLUME: 202.77 ML
LEFT VENTRICLE END DIASTOLIC VOLUME APICAL 2 CHAMBER: 106.82 ML
LEFT VENTRICLE END DIASTOLIC VOLUME APICAL 4 CHAMBER: 109.11 ML
LEFT VENTRICLE END SYSTOLIC VOLUME APICAL 2 CHAMBER: 91.95 ML
LEFT VENTRICLE END SYSTOLIC VOLUME APICAL 4 CHAMBER: 108.53 ML
LEFT VENTRICLE MASS INDEX: 145 G/M2
LEFT VENTRICLE SYSTOLIC VOLUME INDEX: 53.4 ML/M2
LEFT VENTRICLE SYSTOLIC VOLUME: 100.92 ML
LEFT VENTRICULAR INTERNAL DIMENSION IN DIASTOLE: 6.32 CM (ref 3.5–6)
LEFT VENTRICULAR MASS: 274.95 G
LV LATERAL E/E' RATIO: 18.88 M/S
LV SEPTAL E/E' RATIO: 30.2 M/S
LVED V (TEICH): 202.77 ML
LVES V (TEICH): 100.92 ML
LVOT MG: 5.23 MMHG
LVOT MV: 1.08 CM/S
MV MEAN GRADIENT: 2 MMHG
MV PEAK E VEL: 1.51 M/S
MV PEAK GRADIENT: 7 MMHG
MV STENOSIS PRESSURE HALF TIME: 64.37 MS
MV VALVE AREA BY CONTINUITY EQUATION: 5.35 CM2
MV VALVE AREA P 1/2 METHOD: 3.42 CM2
OHS CV RV/LV RATIO: 0.45 CM
OHS LV EJECTION FRACTION SIMPSONS BIPLANE MOD: 62 %
PISA AR MAX VEL: 4.7 M/S
PISA MRMAX VEL: 3.74 M/S
PULM VEIN S/D RATIO: 0.82
PV MV: 0.84 M/S
PV PEAK D VEL: 0.74 M/S
PV PEAK GRADIENT: 4 MMHG
PV PEAK S VEL: 0.61 M/S
PV PEAK VELOCITY: 1.06 M/S
RA PRESSURE ESTIMATED: 3 MMHG
RA VOL SYS: 48.26 ML
RIGHT ATRIAL AREA: 17.2 CM2
RIGHT ATRIUM VOLUME AREA LENGTH APICAL 4 CHAMBER: 47.4 ML
RIGHT VENTRICLE DIASTOLIC BASEL DIMENSION: 4.4 CM
RIGHT VENTRICULAR END-DIASTOLIC DIMENSION: 2.85 CM
RV TISSUE DOPPLER FREE WALL SYSTOLIC VELOCITY 1 (APICAL 4 CHAMBER VIEW): 9.86 CM/S
SINUS: 3.01 CM
STJ: 3.03 CM
TDI LATERAL: 0.08 M/S
TDI SEPTAL: 0.05 M/S
TDI: 0.07 M/S
Z-SCORE OF LEFT VENTRICULAR DIMENSION IN END DIASTOLE: 1.78
Z-SCORE OF LEFT VENTRICULAR DIMENSION IN END SYSTOLE: 2.84

## 2024-09-13 PROCEDURE — 93306 TTE W/DOPPLER COMPLETE: CPT | Mod: PN

## 2024-09-13 PROCEDURE — 76770 US EXAM ABDO BACK WALL COMP: CPT | Mod: 26,,, | Performed by: RADIOLOGY

## 2024-09-13 PROCEDURE — 93306 TTE W/DOPPLER COMPLETE: CPT | Mod: 26,,, | Performed by: INTERNAL MEDICINE

## 2024-09-13 PROCEDURE — 76770 US EXAM ABDO BACK WALL COMP: CPT | Mod: TC,PN

## 2024-09-13 NOTE — TELEPHONE ENCOUNTER
----- Message from Julio James MD sent at 9/13/2024 11:22 AM CDT -----  He is requesting a follow-up visit.  I guess he should be scheduled in [place].  Alternatively we could get him in in the [facility] if you could find him a slot with 1 of the [place] guys.  I used to see him when I was in [place] but I did see him once in [place].  He lives in [place].
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
## 2024-09-24 ENCOUNTER — LAB VISIT (OUTPATIENT)
Dept: LAB | Facility: HOSPITAL | Age: 64
End: 2024-09-24
Attending: INTERNAL MEDICINE
Payer: MEDICARE

## 2024-09-24 DIAGNOSIS — N18.31 STAGE 3A CHRONIC KIDNEY DISEASE: ICD-10-CM

## 2024-09-24 LAB
ALBUMIN/CREAT UR: ABNORMAL UG/MG (ref 0–30)
BILIRUB UR QL STRIP: NEGATIVE
CLARITY UR REFRACT.AUTO: CLEAR
COLOR UR AUTO: YELLOW
CREAT UR-MCNC: 15.2 MG/DL (ref 23–375)
CREAT UR-MCNC: 15.2 MG/DL (ref 23–375)
GLUCOSE UR QL STRIP: NEGATIVE
HGB UR QL STRIP: NEGATIVE
KETONES UR QL STRIP: NEGATIVE
LEUKOCYTE ESTERASE UR QL STRIP: NEGATIVE
MICROALBUMIN UR DL<=1MG/L-MCNC: <5 UG/ML
NITRITE UR QL STRIP: NEGATIVE
PH UR STRIP: 6 [PH] (ref 5–8)
PROT UR QL STRIP: NEGATIVE
PROT UR-MCNC: <7 MG/DL (ref 0–15)
PROT/CREAT UR: ABNORMAL MG/G{CREAT} (ref 0–0.2)
SP GR UR STRIP: 1.01 (ref 1–1.03)
URN SPEC COLLECT METH UR: NORMAL
UROBILINOGEN UR STRIP-ACNC: NEGATIVE EU/DL

## 2024-09-24 PROCEDURE — 84156 ASSAY OF PROTEIN URINE: CPT | Performed by: INTERNAL MEDICINE

## 2024-09-24 PROCEDURE — 82043 UR ALBUMIN QUANTITATIVE: CPT | Mod: PN | Performed by: INTERNAL MEDICINE

## 2024-09-24 PROCEDURE — 81003 URINALYSIS AUTO W/O SCOPE: CPT | Mod: PN | Performed by: INTERNAL MEDICINE

## 2024-10-28 ENCOUNTER — OFFICE VISIT (OUTPATIENT)
Dept: CARDIOLOGY | Facility: CLINIC | Age: 64
End: 2024-10-28
Payer: MEDICARE

## 2024-10-28 VITALS
OXYGEN SATURATION: 96 % | HEIGHT: 69 IN | BODY MASS INDEX: 24.59 KG/M2 | DIASTOLIC BLOOD PRESSURE: 60 MMHG | WEIGHT: 166 LBS | HEART RATE: 73 BPM | SYSTOLIC BLOOD PRESSURE: 122 MMHG

## 2024-10-28 DIAGNOSIS — S21.101A STERNAL WOUND INFECTION: ICD-10-CM

## 2024-10-28 DIAGNOSIS — L08.9 STERNAL WOUND INFECTION: ICD-10-CM

## 2024-10-28 DIAGNOSIS — N18.31 STAGE 3A CHRONIC KIDNEY DISEASE: ICD-10-CM

## 2024-10-28 DIAGNOSIS — R06.09 DYSPNEA ON EXERTION: Primary | ICD-10-CM

## 2024-10-28 DIAGNOSIS — I34.0 NONRHEUMATIC MITRAL VALVE REGURGITATION: ICD-10-CM

## 2024-10-28 DIAGNOSIS — I50.42 CHRONIC COMBINED SYSTOLIC AND DIASTOLIC HEART FAILURE: ICD-10-CM

## 2024-10-28 DIAGNOSIS — I48.0 PAROXYSMAL A-FIB: ICD-10-CM

## 2024-10-28 DIAGNOSIS — R06.02 SOB (SHORTNESS OF BREATH): ICD-10-CM

## 2024-10-28 DIAGNOSIS — I10 ESSENTIAL HYPERTENSION: ICD-10-CM

## 2024-10-28 PROCEDURE — 3008F BODY MASS INDEX DOCD: CPT | Mod: CPTII,S$GLB,, | Performed by: INTERNAL MEDICINE

## 2024-10-28 PROCEDURE — 3061F NEG MICROALBUMINURIA REV: CPT | Mod: CPTII,S$GLB,, | Performed by: INTERNAL MEDICINE

## 2024-10-28 PROCEDURE — 3066F NEPHROPATHY DOC TX: CPT | Mod: CPTII,S$GLB,, | Performed by: INTERNAL MEDICINE

## 2024-10-28 PROCEDURE — 3078F DIAST BP <80 MM HG: CPT | Mod: CPTII,S$GLB,, | Performed by: INTERNAL MEDICINE

## 2024-10-28 PROCEDURE — 99999 PR PBB SHADOW E&M-EST. PATIENT-LVL III: CPT | Mod: PBBFAC,,, | Performed by: INTERNAL MEDICINE

## 2024-10-28 PROCEDURE — 4010F ACE/ARB THERAPY RXD/TAKEN: CPT | Mod: CPTII,S$GLB,, | Performed by: INTERNAL MEDICINE

## 2024-10-28 PROCEDURE — 3074F SYST BP LT 130 MM HG: CPT | Mod: CPTII,S$GLB,, | Performed by: INTERNAL MEDICINE

## 2024-10-28 PROCEDURE — 99214 OFFICE O/P EST MOD 30 MIN: CPT | Mod: S$GLB,,, | Performed by: INTERNAL MEDICINE

## 2024-10-28 RX ORDER — FUROSEMIDE 40 MG/1
40 TABLET ORAL DAILY
Start: 2024-10-28 | End: 2025-10-28

## 2024-10-29 ENCOUNTER — PATIENT MESSAGE (OUTPATIENT)
Dept: CARDIOLOGY | Facility: CLINIC | Age: 64
End: 2024-10-29
Payer: MEDICARE

## 2024-10-29 PROBLEM — I77.6 VASCULAR INFLAMMATION OR INFECTION: Status: RESOLVED | Noted: 2022-11-15 | Resolved: 2024-10-29

## 2024-10-29 RX ORDER — SPIRONOLACTONE 25 MG/1
25 TABLET ORAL DAILY
Qty: 90 TABLET | Refills: 4 | Status: SHIPPED | OUTPATIENT
Start: 2024-10-29

## 2025-02-04 ENCOUNTER — LAB VISIT (OUTPATIENT)
Dept: LAB | Facility: HOSPITAL | Age: 65
End: 2025-02-04
Attending: INTERNAL MEDICINE
Payer: MEDICARE

## 2025-02-04 DIAGNOSIS — N18.31 STAGE 3A CHRONIC KIDNEY DISEASE: ICD-10-CM

## 2025-02-04 DIAGNOSIS — D63.1 ANEMIA IN STAGE 3A CHRONIC KIDNEY DISEASE: ICD-10-CM

## 2025-02-04 DIAGNOSIS — N18.31 ANEMIA IN STAGE 3A CHRONIC KIDNEY DISEASE: ICD-10-CM

## 2025-02-04 DIAGNOSIS — N25.81 SECONDARY HYPERPARATHYROIDISM OF RENAL ORIGIN: ICD-10-CM

## 2025-02-04 DIAGNOSIS — E55.9 VITAMIN D DEFICIENCY: ICD-10-CM

## 2025-02-04 LAB
25(OH)D3+25(OH)D2 SERPL-MCNC: 31 NG/ML (ref 30–96)
ALBUMIN SERPL BCP-MCNC: 4.5 G/DL (ref 3.5–5.2)
ANION GAP SERPL CALC-SCNC: 9 MMOL/L (ref 8–16)
BASOPHILS # BLD AUTO: 0.05 K/UL (ref 0–0.2)
BASOPHILS NFR BLD: 0.7 % (ref 0–1.9)
CALCIUM SERPL-MCNC: 10.1 MG/DL (ref 8.7–10.5)
CHLORIDE SERPL-SCNC: 101 MMOL/L (ref 95–110)
CO2 SERPL-SCNC: 30 MMOL/L (ref 23–29)
CREAT SERPL-MCNC: 1.1 MG/DL (ref 0.5–1.4)
DIFFERENTIAL METHOD BLD: ABNORMAL
EOSINOPHIL # BLD AUTO: 0.2 K/UL (ref 0–0.5)
EOSINOPHIL NFR BLD: 2.4 % (ref 0–8)
ERYTHROCYTE [DISTWIDTH] IN BLOOD BY AUTOMATED COUNT: 12.8 % (ref 11.5–14.5)
EST. GFR  (NO RACE VARIABLE): >60 ML/MIN/1.73 M^2
GLUCOSE SERPL-MCNC: 105 MG/DL (ref 70–110)
HCT VFR BLD AUTO: 40.4 % (ref 40–54)
HGB BLD-MCNC: 13.1 G/DL (ref 14–18)
IMM GRANULOCYTES # BLD AUTO: 0.01 K/UL (ref 0–0.04)
IMM GRANULOCYTES NFR BLD AUTO: 0.1 % (ref 0–0.5)
LYMPHOCYTES # BLD AUTO: 1.4 K/UL (ref 1–4.8)
LYMPHOCYTES NFR BLD: 17.8 % (ref 18–48)
MAGNESIUM SERPL-MCNC: 2.4 MG/DL (ref 1.6–2.6)
MCH RBC QN AUTO: 34.6 PG (ref 27–31)
MCHC RBC AUTO-ENTMCNC: 32.4 G/DL (ref 32–36)
MCV RBC AUTO: 107 FL (ref 82–98)
MONOCYTES # BLD AUTO: 0.8 K/UL (ref 0.3–1)
MONOCYTES NFR BLD: 10.2 % (ref 4–15)
NEUTROPHILS # BLD AUTO: 5.3 K/UL (ref 1.8–7.7)
NEUTROPHILS NFR BLD: 68.8 % (ref 38–73)
NRBC BLD-RTO: 0 /100 WBC
PHOSPHATE SERPL-MCNC: 2.7 MG/DL (ref 2.7–4.5)
PLATELET # BLD AUTO: 225 K/UL (ref 150–450)
PMV BLD AUTO: 11.8 FL (ref 9.2–12.9)
POTASSIUM SERPL-SCNC: 4.1 MMOL/L (ref 3.5–5.1)
PTH-INTACT SERPL-MCNC: 59.1 PG/ML (ref 9–77)
RBC # BLD AUTO: 3.79 M/UL (ref 4.6–6.2)
SODIUM SERPL-SCNC: 140 MMOL/L (ref 136–145)
URATE SERPL-MCNC: 6.2 MG/DL (ref 3.4–7)
UUN UR-MCNC: 27 MG/DL (ref 2–20)
WBC # BLD AUTO: 7.65 K/UL (ref 3.9–12.7)

## 2025-02-04 PROCEDURE — 83735 ASSAY OF MAGNESIUM: CPT | Mod: PN | Performed by: INTERNAL MEDICINE

## 2025-02-04 PROCEDURE — 83970 ASSAY OF PARATHORMONE: CPT | Mod: PN | Performed by: INTERNAL MEDICINE

## 2025-02-04 PROCEDURE — 82306 VITAMIN D 25 HYDROXY: CPT | Mod: PN | Performed by: INTERNAL MEDICINE

## 2025-02-04 PROCEDURE — 84550 ASSAY OF BLOOD/URIC ACID: CPT | Performed by: INTERNAL MEDICINE

## 2025-02-04 PROCEDURE — 85025 COMPLETE CBC W/AUTO DIFF WBC: CPT | Mod: PN | Performed by: INTERNAL MEDICINE

## 2025-02-04 PROCEDURE — 80069 RENAL FUNCTION PANEL: CPT | Mod: PN | Performed by: INTERNAL MEDICINE

## 2025-02-04 PROCEDURE — 36415 COLL VENOUS BLD VENIPUNCTURE: CPT | Mod: PN | Performed by: INTERNAL MEDICINE

## 2025-02-05 NOTE — ASSESSMENT & PLAN NOTE
KIMANI-likely ATN from decreased renal perfusion 2/2 sepsis & cardiogenic shock   Baseline sCr 0.9-1  sCr on admission: 2.7  Lab Results   Component Value Date    CREATININE 5.0 (H) 11/28/2022     Recommendations/Plan:  - Plan for iHD tomorrow via tunneled HD catheter  - Discussed need for accepting outpatient HD unit ideally in [place], [place] with CM/SW  - Daily RFP and magnesium level  - Strict I/Os and daily weights  - Renally dose medications to eGFR  - Avoid nephrotoxins when feasible (i.e. intra-arterial contrast, NSAIDs, etc.)  - Renal diet (low potassium) when not NPO   Patent

## 2025-02-28 ENCOUNTER — OFFICE VISIT (OUTPATIENT)
Dept: CARDIOLOGY | Facility: CLINIC | Age: 65
End: 2025-02-28
Payer: MEDICARE

## 2025-02-28 VITALS
DIASTOLIC BLOOD PRESSURE: 56 MMHG | RESPIRATION RATE: 14 BRPM | SYSTOLIC BLOOD PRESSURE: 112 MMHG | HEART RATE: 76 BPM | WEIGHT: 168 LBS | BODY MASS INDEX: 24.88 KG/M2 | OXYGEN SATURATION: 98 % | HEIGHT: 69 IN

## 2025-02-28 DIAGNOSIS — I48.0 PAROXYSMAL A-FIB: ICD-10-CM

## 2025-02-28 DIAGNOSIS — Z98.890 S/P MVR (MITRAL VALVE REPAIR): ICD-10-CM

## 2025-02-28 DIAGNOSIS — E78.5 HYPERLIPIDEMIA, UNSPECIFIED HYPERLIPIDEMIA TYPE: ICD-10-CM

## 2025-02-28 DIAGNOSIS — I50.42 CHRONIC COMBINED SYSTOLIC AND DIASTOLIC HEART FAILURE: ICD-10-CM

## 2025-02-28 DIAGNOSIS — R06.09 DYSPNEA ON EXERTION: ICD-10-CM

## 2025-02-28 DIAGNOSIS — I10 ESSENTIAL HYPERTENSION: Primary | ICD-10-CM

## 2025-02-28 PROCEDURE — 99999 PR PBB SHADOW E&M-EST. PATIENT-LVL IV: CPT | Mod: PBBFAC,,, | Performed by: INTERNAL MEDICINE

## 2025-02-28 NOTE — ASSESSMENT & PLAN NOTE
2022 October surgical date.  Most recent EF 45-50%.  He had pulmonary hypertension Preoperative which has resolved.  There was a very good mitral valve repair surgical outcome.

## 2025-02-28 NOTE — PROGRESS NOTES
[facility] Cardiology     Subjective:    Patient ID:  David Barrios is a 64 y.o. male who presents for follow-up of Congestive Heart Failure (4 month follow up), Status post mitral valve surgery 2022, Hypertension, Hyperlipidemia, and Atrial Fibrillation    Review of patient's allergies indicates:   Allergen Reactions    Farxiga [dapagliflozin] Diarrhea     He continues to remain stable without new cardiopulmonary issues.  In 2022 he had a complicated mitral valve repair with pre surgical EF 30%.  There were severe mitral regurgitation and pulmonary hypertension.  He has had paroxysmal AFib with 2 cardioversions in the past.  His original AFib developed years before his mitral regurgitation in 2018.  He no longer takes amiodarone.  He had Maze procedure and appendage atrial resection at time of surgery 2022 October.  His Preoperative catheterization showed 30% LAD plaquing.    Surprisingly there was no particular anatomic abnormality related to his severe mitral regurgitation.  A NORBERT was done in 2018 showing mild mitral regurgitation only.  There was no subvalvular disruption to explain severe mitral regurgitation.  After surgery it appears he did develop mild-to-moderate aortic regurgitation.  He does have an audible murmur.  LV size 63 mm.    He has some limitations to exercise.  He is on furosemide.  His last GFR was 60 range.  He follows with Nephrology.  He gets lightheaded when he bends over.  He is requiring multiple antihypertensive meds.  His blood pressures are controlled.  He lives alone.  He has maintained low weight.  There was no significant coronary disease at time of mitral valve surgery.    He did require flap for sternal wound infection with Serratia.  He had a prolonged recovery course.  He had osteomyelitis.  His last EF was 45-50% by echo 2024 September study.         Review of Systems   Constitutional: Negative for chills,  decreased appetite, diaphoresis, fever, malaise/fatigue, night sweats, weight gain and weight loss.   HENT:  Negative for congestion, ear discharge, ear pain, hearing loss, hoarse voice, nosebleeds, odynophagia, sore throat, stridor and tinnitus.    Eyes:  Negative for blurred vision, discharge, double vision, pain, photophobia, redness, vision loss in left eye, vision loss in right eye, visual disturbance and visual halos.   Cardiovascular:  Positive for dyspnea on exertion. Negative for chest pain, claudication, cyanosis, irregular heartbeat, leg swelling, near-syncope, orthopnea, palpitations, paroxysmal nocturnal dyspnea and syncope.   Respiratory:  Negative for cough, hemoptysis, shortness of breath, sleep disturbances due to breathing, snoring, sputum production and wheezing.    Endocrine: Negative for cold intolerance, heat intolerance, polydipsia, polyphagia and polyuria.   Hematologic/Lymphatic: Negative for adenopathy and bleeding problem. Does not bruise/bleed easily.   Skin:  Negative for color change, dry skin, flushing, itching, nail changes, poor wound healing, rash, skin cancer, suspicious lesions and unusual hair distribution.   Musculoskeletal:  Negative for arthritis, back pain, falls, gout, joint pain, joint swelling, muscle cramps, muscle weakness, myalgias, neck pain and stiffness.   Gastrointestinal:  Negative for bloating, abdominal pain, anorexia, change in bowel habit, bowel incontinence, constipation, diarrhea, dysphagia, excessive appetite, flatus, heartburn, hematemesis, hematochezia, hemorrhoids, jaundice, melena, nausea and vomiting.   Genitourinary:  Negative for bladder incontinence, decreased libido, dysuria, flank pain, frequency, genital sores, hematuria, hesitancy, incomplete emptying, nocturia and urgency.   Neurological:  Positive for light-headedness. Negative for aphonia, brief paralysis, difficulty with concentration, disturbances in coordination, excessive daytime  "sleepiness, dizziness, focal weakness, headaches, loss of balance, numbness, paresthesias, seizures, sensory change, tremors, vertigo and weakness.        Positional   Psychiatric/Behavioral:  Negative for altered mental status, depression, hallucinations, memory loss, substance abuse, suicidal ideas and thoughts of violence. The patient does not have insomnia and is not nervous/anxious.    Allergic/Immunologic: Negative for hives and persistent infections.        Objective:       Vitals:    02/28/25 1315   BP: (!) 112/56   BP Location: Right arm   Patient Position: Sitting   Pulse: 76   Resp: 14   SpO2: 98%   Weight: 76.2 kg (167 lb 15.9 oz)   Height: 5' 9" (1.753 m)    Physical Exam  Constitutional:       General: He is not in acute distress.     Appearance: He is well-developed. He is not diaphoretic.   HENT:      Head: Normocephalic and atraumatic.      Nose: Nose normal.   Eyes:      General: No scleral icterus.        Right eye: No discharge.      Conjunctiva/sclera: Conjunctivae normal.      Pupils: Pupils are equal, round, and reactive to light.   Neck:      Thyroid: No thyromegaly.      Vascular: No JVD.      Trachea: No tracheal deviation.   Cardiovascular:      Rate and Rhythm: Normal rate and regular rhythm.      Pulses:           Carotid pulses are 2+ on the right side and 2+ on the left side.       Radial pulses are 2+ on the right side and 2+ on the left side.        Dorsalis pedis pulses are 2+ on the right side and 2+ on the left side.        Posterior tibial pulses are 2+ on the right side and 2+ on the left side.      Heart sounds: Murmur heard.      Systolic murmur is present.      High-pitched blowing decrescendo early diastolic murmur is present with a grade of 2/4 at the upper right sternal border radiating to the apex.      No friction rub. No gallop.   Pulmonary:      Effort: Pulmonary effort is normal. No respiratory distress.      Breath sounds: Normal breath sounds. No stridor. No " wheezing or rales.   Chest:      Chest wall: No tenderness.   Abdominal:      General: Bowel sounds are normal. There is no distension.      Palpations: Abdomen is soft. There is no mass.      Tenderness: There is no abdominal tenderness. There is no guarding or rebound.   Musculoskeletal:         General: No tenderness. Normal range of motion.      Cervical back: Normal range of motion and neck supple.   Lymphadenopathy:      Cervical: No cervical adenopathy.   Skin:     General: Skin is warm and dry.      Coloration: Skin is not pale.      Findings: No erythema or rash.   Neurological:      Mental Status: He is alert and oriented to person, place, and time.      Cranial Nerves: No cranial nerve deficit.      Coordination: Coordination normal.   Psychiatric:         Behavior: Behavior normal.         Thought Content: Thought content normal.         Judgment: Judgment normal.           Assessment:       1. Essential hypertension    2. Paroxysmal A-fib    3. S/P MVR (mitral valve repair)    4. Chronic combined systolic and diastolic heart failure    5. Dyspnea on exertion    6. Hyperlipidemia, unspecified hyperlipidemia type      Results for orders placed or performed in visit on 02/04/25   Urinalysis    Collection Time: 02/04/25  9:27 AM   Result Value Ref Range    Specimen UA Urine, Clean Catch     Color, UA Yellow Yellow, Straw, [place]    Appearance, UA Clear Clear    pH, UA 6.0 5.0 - 8.0    Specific Gravity, UA 1.010 1.005 - 1.030    Protein, UA Negative Negative    Glucose, UA 3+ (A) Negative    Ketones, UA Negative Negative    Bilirubin (UA) Negative Negative    Occult Blood UA Negative Negative    Nitrite, UA Negative Negative    Urobilinogen, UA Negative <2.0 EU/dL    Leukocytes, UA Negative Negative   Protein / creatinine ratio, urine    Collection Time: 02/04/25  9:27 AM   Result Value Ref Range    Protein, Urine Random <7 0 - 15 mg/dL    Creatinine, Urine 15.1 (L) 23.0 - 375.0 mg/dL    Prot/Creat Ratio,  Urine Unable to calculate 0.00 - 0.20   Microalbumin/creatinine urine ratio    Collection Time: 02/04/25  9:27 AM   Result Value Ref Range    Microalbumin, Urine <5.0 ug/mL    Creatinine, Urine 15.1 (L) 23.0 - 375.0 mg/dL    Microalb/Creat Ratio Unable to calculate 0.0 - 30.0 ug/mg   Urinalysis Microscopic    Collection Time: 02/04/25  9:27 AM   Result Value Ref Range    Bacteria Rare None-Occ /hpf    Yeast, UA None None    Microscopic Comment SEE COMMENT        Current Medications[1]     Lab Results   Component Value Date    WBC 7.65 02/04/2025    RBC 3.79 (L) 02/04/2025    HGB 13.1 (L) 02/04/2025    HCT 40.4 02/04/2025     (H) 02/04/2025    MCH 34.6 (H) 02/04/2025    MCHC 32.4 02/04/2025    RDW 12.8 02/04/2025     02/04/2025    MPV 11.8 02/04/2025    GRAN 5.3 02/04/2025    GRAN 68.8 02/04/2025    LYMPH 1.4 02/04/2025    LYMPH 17.8 (L) 02/04/2025    MONO 0.8 02/04/2025    MONO 10.2 02/04/2025    EOS 0.2 02/04/2025    BASO 0.05 02/04/2025    EOSINOPHIL 2.4 02/04/2025    BASOPHIL 0.7 02/04/2025    MG 2.4 02/04/2025        CMP  Lab Results   Component Value Date     02/04/2025    K 4.1 02/04/2025     02/04/2025    CO2 30 (H) 02/04/2025     02/04/2025    BUN 27 (H) 02/04/2025    CREATININE 1.10 02/04/2025    CALCIUM 10.1 02/04/2025    PROT 6.3 01/16/2023    ALBUMIN 4.5 02/04/2025    BILITOT 0.4 01/16/2023    ALKPHOS 81 01/16/2023    AST 22 01/16/2023    ALT 21 01/16/2023    ANIONGAP 9 02/04/2025    ESTGFRAFRICA >60 05/23/2019    EGFRNONAA >60 05/23/2019        Lab Results   Component Value Date    LABBLOO No growth after 5 days. 11/21/2022    LABURIN No growth 11/21/2022    RESPIRATORYC Normal respiratory janna 10/07/2022    GSRESP No WBC's 10/11/2022    GSRESP No organisms seen 10/11/2022            Results for orders placed or performed during the hospital encounter of 01/12/23   EKG 12-lead    Collection Time: 01/15/23  8:12 AM    Narrative    Test Reason : R07.9,    Vent. Rate : 070  BPM     Atrial Rate : 070 BPM     P-R Int : 174 ms          QRS Dur : 132 ms      QT Int : 466 ms       P-R-T Axes : 069 -20 197 degrees     QTc Int : 503 ms    Normal sinus rhythm  LVH with QRS widening  T wave abnormality, consider inferolateral ischemia  Abnormal ECG  When compared with ECG of 12-JAN-2023 18:33,  The axis Shifted right  Inverted T waves have replaced nonspecific T wave abnormality in Inferior  leads  Confirmed by Adalberto Garcia MD ([id number]) on 1/17/2023 7:47:43 AM    Referred By: ERIKA   SELF           Confirmed By:Adalberto Garcia MD                   Plan:       Problem List Items Addressed This Visit          Cardiac/Vascular    Paroxysmal A-fib    He remains in sinus rhythm.  Xarelto to continue.  Maze procedure and left atrial appendage resection performed at time of cardiac surgery.         Essential hypertension - Primary    Current therapy to continue.  Aldactone low-dose Toprol moderate dose Entresto will be continued.         Relevant Orders    Hypertension Digital Medicine (HDMP) Enrollment Order (Completed)    Dyspnea on exertion    Class 2 symptomatology.  Current therapy to continue.  There is mixed systolic and diastolic heart failure component to his shortness of breath.         S/P MVR (mitral valve repair)    2022 October surgical date.  Most recent EF 45-50%.  He had pulmonary hypertension Preoperative which has resolved.  There was a very good mitral valve repair surgical outcome.         Chronic combined systolic and diastolic heart failure    Most recent EF 45-50%.  Condition improved.         Hyperlipidemia    Atorvastatin to continue.  Labs will be needed in the future.  It has been a while since they were drawn.  There was 30% lad plaquing noted at heart catheterization 2022.               We discussed his aortic regurgitation.  It is nonsurgical severity.  There is no significant mitral regurgitation by Doppler.    Current therapy to continue.  He remains on  Jardiance and he has heart failure symptoms.  Xarelto well tolerated without bleeding problems.    I made a four-month follow-up.             Julio James MD  02/28/2025   2:04 PM               [1]   Current Outpatient Medications:     aspirin 81 MG Chew, Take 1 tablet (81 mg total) by mouth once daily., Disp: 360 tablet, Rfl: 0    atorvastatin (LIPITOR) 40 MG tablet, Take 1 tablet (40 mg total) by mouth once daily., Disp: 90 tablet, Rfl: 3    empagliflozin (JARDIANCE) 25 mg tablet, Take 1 tablet (25 mg total) by mouth once daily., Disp: 90 tablet, Rfl: 3    ergocalciferol (ERGOCALCIFEROL) 50,000 unit Cap, Take 1 capsule (50,000 Units total) by mouth every 30 days., Disp: 12 capsule, Rfl: 3    FEROSUL 325 mg (65 mg iron) Tab tablet, Take 325 mg by mouth 2 (two) times daily., Disp: , Rfl:     finasteride (PROSCAR) 5 mg tablet, Take 1 tablet (5 mg total) by mouth once daily., Disp: 90 tablet, Rfl: 3    furosemide (LASIX) 40 MG tablet, Take 1 tablet (40 mg total) by mouth 2 (two) times daily as needed (swelling legs)., Disp: 180 tablet, Rfl: 3    HYDROcodone-acetaminophen (NORCO) 7.5-325 mg per tablet, Take 1 tablet by mouth every 6 (six) hours as needed for Pain., Disp: , Rfl:     metOLazone (ZAROXOLYN) 5 MG tablet, Take 1 tablet (5 mg total) by mouth daily as needed (swelling, refractory to LAsix)., Disp: 30 tablet, Rfl: 11    metoprolol succinate (TOPROL-XL) 25 MG 24 hr tablet, Take 25 mg by mouth., Disp: , Rfl:     sacubitriL-valsartan (ENTRESTO) 49-51 mg per tablet, Take 1 tablet by mouth 2 (two) times daily., Disp: 180 tablet, Rfl: 3    spironolactone (ALDACTONE) 25 MG tablet, Take 1 tablet (25 mg total) by mouth once daily., Disp: 90 tablet, Rfl: 4    tamsulosin (FLOMAX) 0.4 mg Cap, Take 1 capsule (0.4 mg total) by mouth every evening. Take 30 minutes after dinner, Disp: 90 capsule, Rfl: 3    XARELTO 20 mg Tab, Take 20 mg by mouth every evening., Disp: , Rfl:

## 2025-02-28 NOTE — ASSESSMENT & PLAN NOTE
Atorvastatin to continue.  Labs will be needed in the future.  It has been a while since they were drawn.  There was 30% lad plaquing noted at heart catheterization 2022.

## 2025-02-28 NOTE — ASSESSMENT & PLAN NOTE
He remains in sinus rhythm.  Xarelto to continue.  Maze procedure and left atrial appendage resection performed at time of cardiac surgery.

## 2025-02-28 NOTE — ASSESSMENT & PLAN NOTE
Class 2 symptomatology.  Current therapy to continue.  There is mixed systolic and diastolic heart failure component to his shortness of breath.

## 2025-05-22 ENCOUNTER — PATIENT OUTREACH (OUTPATIENT)
Dept: CARDIOLOGY | Facility: CLINIC | Age: 65
End: 2025-05-22
Payer: MEDICARE

## 2025-05-22 ENCOUNTER — TELEPHONE (OUTPATIENT)
Dept: CARDIOLOGY | Facility: CLINIC | Age: 65
End: 2025-05-22
Payer: MEDICARE

## 2025-05-22 NOTE — TELEPHONE ENCOUNTER
Received staff message to assist pt with digital medicine.  Pt would like to speak with Dr. James to see if he could submit written readings as he is not familiar with smart phone.  Offered to come in office, but he lives in [facility] and would need to go to a closer location.  He will try to reach out to provider's office.

## 2025-06-03 ENCOUNTER — HOSPITAL ENCOUNTER (OUTPATIENT)
Dept: RADIOLOGY | Facility: HOSPITAL | Age: 65
Discharge: HOME | End: 2025-06-03
Payer: MEDICARE

## 2025-06-03 DIAGNOSIS — R09.89 OTHER SPECIFIED SYMPTOMS AND SIGNS INVOLVING THE CIRCULATORY AND RESPIRATORY SYSTEMS: ICD-10-CM

## 2025-06-03 DIAGNOSIS — R09.89 OTHER SPECIFIED SYMPTOMS AND SIGNS INVOLVING THE CIRCULATORY AND RESPIRATORY SYSTEMS: Primary | ICD-10-CM

## 2025-06-03 PROCEDURE — 71046 X-RAY EXAM CHEST 2 VIEWS: CPT | Mod: TC,FY,PN

## 2025-06-03 PROCEDURE — 71046 X-RAY EXAM CHEST 2 VIEWS: CPT | Mod: 26,,, | Performed by: RADIOLOGY

## 2025-06-25 ENCOUNTER — LAB VISIT (OUTPATIENT)
Dept: LAB | Facility: HOSPITAL | Age: 65
End: 2025-06-25
Attending: INTERNAL MEDICINE
Payer: MEDICARE

## 2025-06-25 DIAGNOSIS — I10 ESSENTIAL (PRIMARY) HYPERTENSION: Primary | ICD-10-CM

## 2025-06-25 LAB
ABSOLUTE EOSINOPHIL (OHS): 0.06 K/UL
ABSOLUTE MONOCYTE (OHS): 0.71 K/UL (ref 0.3–1)
ABSOLUTE NEUTROPHIL COUNT (OHS): 3.91 K/UL (ref 1.8–7.7)
ALBUMIN SERPL BCP-MCNC: 4.4 G/DL (ref 3.5–5.2)
ALP SERPL-CCNC: 55 UNIT/L (ref 38–126)
ALT SERPL W/O P-5'-P-CCNC: 16 UNIT/L (ref 10–44)
ANION GAP (OHS): 8 MMOL/L (ref 8–16)
AST SERPL-CCNC: 19 UNIT/L (ref 15–46)
BASOPHILS # BLD AUTO: 0.02 K/UL
BASOPHILS NFR BLD AUTO: 0.4 %
BILIRUB SERPL-MCNC: 0.2 MG/DL (ref 0.1–1)
BUN SERPL-MCNC: 30 MG/DL (ref 2–20)
CALCIUM SERPL-MCNC: 9.6 MG/DL (ref 8.7–10.5)
CHLORIDE SERPL-SCNC: 102 MMOL/L (ref 95–110)
CHOLEST SERPL-MCNC: 123 MG/DL (ref 120–199)
CHOLEST/HDLC SERPL: 2 {RATIO} (ref 2–5)
CO2 SERPL-SCNC: 30 MMOL/L (ref 23–29)
CREAT SERPL-MCNC: 1 MG/DL (ref 0.5–1.4)
ERYTHROCYTE [DISTWIDTH] IN BLOOD BY AUTOMATED COUNT: 12.2 % (ref 11.5–14.5)
GFR SERPLBLD CREATININE-BSD FMLA CKD-EPI: >60 ML/MIN/1.73/M2
GLUCOSE SERPL-MCNC: 97 MG/DL (ref 70–110)
HCT VFR BLD AUTO: 30.6 % (ref 40–54)
HDLC SERPL-MCNC: 62 MG/DL (ref 40–75)
HDLC SERPL: 50.4 % (ref 20–50)
HGB BLD-MCNC: 9.9 GM/DL (ref 14–18)
IMM GRANULOCYTES # BLD AUTO: 0.01 K/UL (ref 0–0.04)
IMM GRANULOCYTES NFR BLD AUTO: 0.2 % (ref 0–0.5)
LDLC SERPL CALC-MCNC: 51.4 MG/DL (ref 63–159)
LYMPHOCYTES # BLD AUTO: 0.77 K/UL (ref 1–4.8)
MCH RBC QN AUTO: 34.6 PG (ref 27–31)
MCHC RBC AUTO-ENTMCNC: 32.4 G/DL (ref 32–36)
MCV RBC AUTO: 107 FL (ref 82–98)
NONHDLC SERPL-MCNC: 61 MG/DL
NUCLEATED RBC (/100WBC) (OHS): 0 /100 WBC
PLATELET # BLD AUTO: 215 K/UL (ref 150–450)
PMV BLD AUTO: 11.7 FL (ref 9.2–12.9)
POTASSIUM SERPL-SCNC: 4.7 MMOL/L (ref 3.5–5.1)
PROT SERPL-MCNC: 7.1 GM/DL (ref 6–8.4)
RBC # BLD AUTO: 2.86 M/UL (ref 4.6–6.2)
RELATIVE EOSINOPHIL (OHS): 1.1 %
RELATIVE LYMPHOCYTE (OHS): 14.1 % (ref 18–48)
RELATIVE MONOCYTE (OHS): 13 % (ref 4–15)
RELATIVE NEUTROPHIL (OHS): 71.2 % (ref 38–73)
SODIUM SERPL-SCNC: 140 MMOL/L (ref 136–145)
T4 FREE SERPL-MCNC: 1.22 NG/DL (ref 0.71–1.51)
TRIGL SERPL-MCNC: 48 MG/DL (ref 30–150)
TSH SERPL-ACNC: <0.026 UIU/ML (ref 0.4–4)
WBC # BLD AUTO: 5.48 K/UL (ref 3.9–12.7)

## 2025-06-25 PROCEDURE — 84443 ASSAY THYROID STIM HORMONE: CPT | Mod: PN

## 2025-06-25 PROCEDURE — 82040 ASSAY OF SERUM ALBUMIN: CPT | Mod: PN

## 2025-06-25 PROCEDURE — 82465 ASSAY BLD/SERUM CHOLESTEROL: CPT | Mod: PN

## 2025-06-25 PROCEDURE — 84439 ASSAY OF FREE THYROXINE: CPT | Mod: PN

## 2025-06-25 PROCEDURE — 36415 COLL VENOUS BLD VENIPUNCTURE: CPT | Mod: PN

## 2025-06-25 PROCEDURE — 85025 COMPLETE CBC W/AUTO DIFF WBC: CPT | Mod: PN

## 2025-07-10 ENCOUNTER — TELEPHONE (OUTPATIENT)
Dept: SURGERY | Facility: CLINIC | Age: 65
End: 2025-07-10
Payer: MEDICARE

## 2025-07-10 NOTE — TELEPHONE ENCOUNTER
Spoke with pt regarding referral to Gastro for screening, I informed pt that [facility] does not need to see pts in clinic to get colonoscopies scheduled, Gastro does prefer to see their pts before. Pt stated that he was unsure exactly what his doctor wanted him to have done that the appointment was made for him. I explained to him that typically an order for a screening means he just needs a colonoscopy and I could have our provider place his orders and someone would call him to have it scheduled. Pt would like to call his doctor to confirm exactly what he wants him to  have done. I informed the pt to give me a call once he spoke with doctor.

## 2025-07-31 ENCOUNTER — HOSPITAL ENCOUNTER (OUTPATIENT)
Dept: RADIOLOGY | Facility: HOSPITAL | Age: 65
Discharge: HOME | End: 2025-07-31
Attending: INTERNAL MEDICINE
Payer: MEDICARE

## 2025-07-31 DIAGNOSIS — N28.1 RENAL CYST: ICD-10-CM

## 2025-07-31 PROCEDURE — 76770 US EXAM ABDO BACK WALL COMP: CPT | Mod: 26,,, | Performed by: RADIOLOGY

## 2025-07-31 PROCEDURE — 76770 US EXAM ABDO BACK WALL COMP: CPT | Mod: TC,PN

## 2025-08-04 ENCOUNTER — OFFICE VISIT (OUTPATIENT)
Dept: CARDIOLOGY | Facility: CLINIC | Age: 65
End: 2025-08-04
Payer: MEDICARE

## 2025-08-04 VITALS
HEART RATE: 84 BPM | DIASTOLIC BLOOD PRESSURE: 56 MMHG | SYSTOLIC BLOOD PRESSURE: 102 MMHG | RESPIRATION RATE: 14 BRPM | WEIGHT: 161.63 LBS | BODY MASS INDEX: 23.94 KG/M2 | HEIGHT: 69 IN | OXYGEN SATURATION: 98 %

## 2025-08-04 DIAGNOSIS — N18.31 STAGE 3A CHRONIC KIDNEY DISEASE: ICD-10-CM

## 2025-08-04 DIAGNOSIS — I48.0 PAROXYSMAL A-FIB: ICD-10-CM

## 2025-08-04 DIAGNOSIS — I50.42 CHRONIC COMBINED SYSTOLIC AND DIASTOLIC HEART FAILURE: Primary | ICD-10-CM

## 2025-08-04 DIAGNOSIS — E78.5 HYPERLIPIDEMIA, UNSPECIFIED HYPERLIPIDEMIA TYPE: ICD-10-CM

## 2025-08-04 DIAGNOSIS — I10 ESSENTIAL HYPERTENSION: ICD-10-CM

## 2025-08-04 DIAGNOSIS — Z98.890 S/P MVR (MITRAL VALVE REPAIR): ICD-10-CM

## 2025-08-04 PROCEDURE — 3008F BODY MASS INDEX DOCD: CPT | Mod: CPTII,S$GLB,, | Performed by: INTERNAL MEDICINE

## 2025-08-04 PROCEDURE — 3074F SYST BP LT 130 MM HG: CPT | Mod: CPTII,S$GLB,, | Performed by: INTERNAL MEDICINE

## 2025-08-04 PROCEDURE — 1159F MED LIST DOCD IN RCRD: CPT | Mod: CPTII,S$GLB,, | Performed by: INTERNAL MEDICINE

## 2025-08-04 PROCEDURE — 3061F NEG MICROALBUMINURIA REV: CPT | Mod: CPTII,S$GLB,, | Performed by: INTERNAL MEDICINE

## 2025-08-04 PROCEDURE — 3066F NEPHROPATHY DOC TX: CPT | Mod: CPTII,S$GLB,, | Performed by: INTERNAL MEDICINE

## 2025-08-04 PROCEDURE — 4010F ACE/ARB THERAPY RXD/TAKEN: CPT | Mod: CPTII,S$GLB,, | Performed by: INTERNAL MEDICINE

## 2025-08-04 PROCEDURE — 1125F AMNT PAIN NOTED PAIN PRSNT: CPT | Mod: CPTII,S$GLB,, | Performed by: INTERNAL MEDICINE

## 2025-08-04 PROCEDURE — 99214 OFFICE O/P EST MOD 30 MIN: CPT | Mod: S$GLB,,, | Performed by: INTERNAL MEDICINE

## 2025-08-04 PROCEDURE — 1160F RVW MEDS BY RX/DR IN RCRD: CPT | Mod: CPTII,S$GLB,, | Performed by: INTERNAL MEDICINE

## 2025-08-04 PROCEDURE — 99999 PR PBB SHADOW E&M-EST. PATIENT-LVL IV: CPT | Mod: PBBFAC,,, | Performed by: INTERNAL MEDICINE

## 2025-08-04 PROCEDURE — 3078F DIAST BP <80 MM HG: CPT | Mod: CPTII,S$GLB,, | Performed by: INTERNAL MEDICINE

## 2025-08-04 NOTE — ASSESSMENT & PLAN NOTE
2022 October surgery.  Current EF 45-50%.      [employer] functional capacity 2 status.  He is unable to work.      Jardiance to continue.  Entresto to continue.

## 2025-08-04 NOTE — ASSESSMENT & PLAN NOTE
Xarelto is utilized.  He had previous left atrial appendage resection and Maze procedure at time of cardiac surgery 2022.   Amiodarone withdrawn in the past.  Prior to his mitral valve surgery he had difficult to control Afib.

## 2025-08-04 NOTE — ASSESSMENT & PLAN NOTE
I told the patient he could decrease his Lasix to 40 mg daily.  Provided he does not gain weight or develops significant leg swelling he will keep his dose at 40 mg.      Recent nephrology notes and labs reviewed.

## 2025-08-04 NOTE — PROGRESS NOTES
[facility]     Subjective:    Patient ID:  David Barrios is a 64 y.o. male who presents for follow-up of Congestive Heart Failure,   Mitral valve surgery 2022, Coronary Artery Disease, Hypertension, Hyperlipidemia, and Atrial Fibrillation    Review of patient's allergies indicates:   Allergen Reactions    Farxiga [dapagliflozin] Diarrhea       The patient has echo EF 45-50%.  Most recent GFR greater than 60.  He still has dyspnea with activity.  He weighs himself every morning.  He is still on 80 mg Lasix daily.  He has Zaroxolyn to use as needed but he has not needed it.  He is supposed to use it if he has refractory leg swelling.  He is on Jardiance.      His mitral valve surgery was in 2022.  It was successful.  He had a slow postop course including sternal debridement for sternal infection Serratia.  He still has some complaints with sternal wound pain.  He remains on aspirin and Xarelto.  No atrial fibrillation documented, he has had previous ablation surgery.  He had Afib before his mitral valve surgery.   Left atrial appendage atrial ligation carried out at time of mitral valve surgery.  He does not use antiarrhythmic therapy. Blood pressures are stable.  Today it is on the low side.  He does report dizziness when he bends over and stands up.  He has not had leg swelling recently.         Review of Systems   Constitutional: Negative for chills, decreased appetite, diaphoresis, fever, malaise/fatigue, night sweats, weight gain and weight loss.   HENT:  Negative for congestion, ear discharge, ear pain, hearing loss, hoarse voice, nosebleeds, odynophagia, sore throat, stridor and tinnitus.    Eyes:  Negative for blurred vision, discharge, double vision, pain, photophobia, redness, vision loss in left eye, vision loss in right eye, visual disturbance and visual halos.   Cardiovascular:  Positive for chest pain, dyspnea on exertion  and leg swelling. Negative for claudication, cyanosis, irregular heartbeat, near-syncope, orthopnea, palpitations, paroxysmal nocturnal dyspnea and syncope.   Respiratory:  Positive for shortness of breath. Negative for cough, hemoptysis, sleep disturbances due to breathing, snoring, sputum production and wheezing.    Endocrine: Negative for cold intolerance, heat intolerance, polydipsia, polyphagia and polyuria.   Hematologic/Lymphatic: Negative for adenopathy and bleeding problem. Does not bruise/bleed easily.   Skin:  Negative for color change, dry skin, flushing, itching, nail changes, poor wound healing, rash, skin cancer, suspicious lesions and unusual hair distribution.   Musculoskeletal:  Negative for arthritis, back pain, falls, gout, joint pain, joint swelling, muscle cramps, muscle weakness, myalgias, neck pain and stiffness.   Gastrointestinal:  Negative for bloating, abdominal pain, anorexia, change in bowel habit, bowel incontinence, constipation, diarrhea, dysphagia, excessive appetite, flatus, heartburn, hematemesis, hematochezia, hemorrhoids, jaundice, melena, nausea and vomiting.   Genitourinary:  Negative for bladder incontinence, decreased libido, dysuria, flank pain, frequency, genital sores, hematuria, hesitancy, incomplete emptying, nocturia and urgency.   Neurological:  Negative for aphonia, brief paralysis, difficulty with concentration, disturbances in coordination, excessive daytime sleepiness, dizziness, focal weakness, headaches, light-headedness, loss of balance, numbness, paresthesias, seizures, sensory change, tremors, vertigo and weakness.   Psychiatric/Behavioral:  Negative for altered mental status, depression, hallucinations, memory loss, substance abuse, suicidal ideas and thoughts of violence. The patient does not have insomnia and is not nervous/anxious.    Allergic/Immunologic: Negative for hives and persistent infections.        Objective:       Vitals:    08/04/25 1047   BP:  "(!) 102/56   BP Location: Left arm   Patient Position: Sitting   Pulse: 84   Resp: 14   SpO2: 98%   Weight: 73.3 kg (161 lb 9.6 oz)   Height: 5' 9" (1.753 m)    Physical Exam  Constitutional:       General: He is not in acute distress.     Appearance: He is well-developed. He is not diaphoretic.   HENT:      Head: Normocephalic and atraumatic.      Nose: Nose normal.   Eyes:      General: No scleral icterus.        Right eye: No discharge.      Conjunctiva/sclera: Conjunctivae normal.      Pupils: Pupils are equal, round, and reactive to light.   Neck:      Thyroid: No thyromegaly.      Vascular: No JVD.      Trachea: No tracheal deviation.   Cardiovascular:      Rate and Rhythm: Normal rate and regular rhythm.      Pulses:           Carotid pulses are 2+ on the right side and 2+ on the left side.       Radial pulses are 2+ on the right side and 2+ on the left side.        Dorsalis pedis pulses are 2+ on the right side and 2+ on the left side.        Posterior tibial pulses are 2+ on the right side and 2+ on the left side.      Heart sounds: Murmur heard.      Systolic murmur is present.      High-pitched blowing decrescendo early diastolic murmur is present with a grade of 2/4 at the upper right sternal border radiating to the apex.      No friction rub. No gallop.   Pulmonary:      Effort: Pulmonary effort is normal. No respiratory distress.      Breath sounds: Normal breath sounds. No stridor. No wheezing or rales.   Chest:      Chest wall: No tenderness.   Abdominal:      General: Bowel sounds are normal. There is no distension.      Palpations: Abdomen is soft. There is no mass.      Tenderness: There is no abdominal tenderness. There is no guarding or rebound.   Musculoskeletal:         General: No tenderness. Normal range of motion.      Cervical back: Normal range of motion and neck supple.   Lymphadenopathy:      Cervical: No cervical adenopathy.   Skin:     General: Skin is warm and dry.      Coloration: " Skin is not pale.      Findings: No erythema or rash.   Neurological:      Mental Status: He is alert and oriented to person, place, and time.      Cranial Nerves: No cranial nerve deficit.      Coordination: Coordination normal.   Psychiatric:         Behavior: Behavior normal.         Thought Content: Thought content normal.         Judgment: Judgment normal.           Assessment:       1. Chronic combined systolic and diastolic heart failure    2. S/P MVR (mitral valve repair)    3. Paroxysmal A-fib    4. Hyperlipidemia, unspecified hyperlipidemia type    5. Essential hypertension    6. Stage 3a chronic kidney disease      Results for orders placed or performed in visit on 07/31/25   PTH, intact    Collection Time: 07/31/25  9:42 AM   Result Value Ref Range    PTH Intact 73.6 9.0 - 77.0 pg/mL   Renal Function Panel    Collection Time: 07/31/25  9:42 AM   Result Value Ref Range    Sodium 136 136 - 145 mmol/L    Potassium 4.7 3.5 - 5.1 mmol/L    Chloride 101 95 - 110 mmol/L    CO2 28 23 - 29 mmol/L    Glucose 94 70 - 110 mg/dL    BUN 28 (H) 2 - 20 mg/dL    Creatinine 1.0 0.5 - 1.4 mg/dL    Calcium 9.5 8.7 - 10.5 mg/dL    Albumin 4.4 3.5 - 5.2 g/dL    Phosphorus Level 3.4 2.7 - 4.5 mg/dL    Anion Gap 7 (L) 8 - 16 mmol/L    eGFR >60 >60 mL/min/1.73/m2   Uric Acid    Collection Time: 07/31/25  9:42 AM   Result Value Ref Range    Uric Acid 5.2 3.4 - 7.0 mg/dL   Magnesium    Collection Time: 07/31/25  9:42 AM   Result Value Ref Range    Magnesium  2.3 1.6 - 2.6 mg/dL   Urinalysis    Collection Time: 07/31/25  9:42 AM   Result Value Ref Range    Color, UA Yellow Straw, Porsche, Yellow, Light-Orange    Appearance, UA Clear Clear    pH, UA 6.0 5.0 - 8.0    Spec Grav UA <=1.005 (A) 1.005 - 1.030    Protein, UA Negative Negative    Glucose, UA Trace (A) Negative    Ketones, UA Negative Negative    Bilirubin, UA Negative Negative    Blood, UA Negative Negative    Nitrites, UA Negative Negative    Urobilinogen, UA Negative <2.0  EU/dL    Leukocyte Esterase, UA Negative Negative   Protein / creatinine ratio, urine    Collection Time: 07/31/25  9:42 AM   Result Value Ref Range    Urine Creatinine 15.0 (L) 23.0 - 375.0 mg/dL    Urine Protein <7 <=15 mg/dL    Urine Protein/Creatinine Ratio     Microalbumin/creatinine urine ratio    Collection Time: 07/31/25  9:42 AM   Result Value Ref Range    Urine Microalbumin <5.0   ug/mL    Urine Creatinine 15.0 (L) 23.0 - 375.0 mg/dL    Microalbumin/Creatinine Ratio Urine     Vitamin D    Collection Time: 07/31/25  9:42 AM   Result Value Ref Range    Vitamin D 34 30 - 96 ng/mL   Ferritin    Collection Time: 07/31/25  9:42 AM   Result Value Ref Range    Ferritin 50.5 20.0 - 300.0 ng/mL   Iron and TIBC    Collection Time: 07/31/25  9:42 AM   Result Value Ref Range    Iron Level 153 45 - 160 ug/dL    Transferrin 305 200 - 375 mg/dL    Iron Binding Capacity Total 451 (H) 250 - 450 ug/dL    Iron Saturation 34 20 - 50 %   CBC with Differential    Collection Time: 07/31/25  9:42 AM   Result Value Ref Range    WBC 9.62 3.90 - 12.70 K/uL    RBC 3.44 (L) 4.60 - 6.20 M/uL    HGB 11.7 (L) 14.0 - 18.0 gm/dL    HCT 36.6 (L) 40.0 - 54.0 %     (H) 82 - 98 fL    MCH 34.0 (H) 27.0 - 31.0 pg    MCHC 32.0 32.0 - 36.0 g/dL    RDW 12.3 11.5 - 14.5 %    Platelet Count 282 150 - 450 K/uL    MPV 11.2 9.2 - 12.9 fL    Nucleated RBC 0 <=0 /100 WBC    Neut % 73.4 (H) 38 - 73 %    Lymph % 14.3 (L) 18 - 48 %    Mono % 10.0 4 - 15 %    Eos % 1.6 <=8 %    Basophil % 0.3 <=1.9 %    Imm Grans % 0.4 0.0 - 0.5 %    Neut # 7.06 1.8 - 7.7 K/uL    Lymph # 1.38 1 - 4.8 K/uL    Mono # 0.96 0.3 - 1 K/uL    Eos # 0.15 <=0.5 K/uL    Baso # 0.03 <=0.2 K/uL    Imm Grans # 0.04 0.00 - 0.04 K/uL       Current Medications[1]     Lab Results   Component Value Date    WBC 9.62 07/31/2025    RBC 3.44 (L) 07/31/2025    HGB 11.7 (L) 07/31/2025    HCT 36.6 (L) 07/31/2025     (H) 07/31/2025    MCH 34.0 (H) 07/31/2025    MCHC 32.0 07/31/2025    RDW  12.3 07/31/2025     07/31/2025    MPV 11.2 07/31/2025    GRAN 5.3 02/04/2025    GRAN 68.8 02/04/2025    LYMPH 14.3 (L) 07/31/2025    LYMPH 1.38 07/31/2025    MONO 10.0 07/31/2025    MONO 0.96 07/31/2025    EOS 1.6 07/31/2025    EOS 0.15 07/31/2025    BASO 0.05 02/04/2025    EOSINOPHIL 2.4 02/04/2025    BASOPHIL 0.3 07/31/2025    BASOPHIL 0.03 07/31/2025    MG 2.3 07/31/2025        CMP  Lab Results   Component Value Date     07/31/2025    K 4.7 07/31/2025     07/31/2025    CO2 28 07/31/2025    GLU 94 07/31/2025    BUN 28 (H) 07/31/2025    CREATININE 1.0 07/31/2025    CALCIUM 9.5 07/31/2025    PROT 7.1 06/25/2025    ALBUMIN 4.4 07/31/2025    BILITOT 0.2 06/25/2025    ALKPHOS 55 06/25/2025    AST 19 06/25/2025    ALT 16 06/25/2025    ANIONGAP 7 (L) 07/31/2025    ESTGFRAFRICA >60 05/23/2019    EGFRNONAA >60 05/23/2019        Lab Results   Component Value Date    LABBLOO No growth after 5 days. 11/21/2022    LABURIN No growth 11/21/2022    RESPIRATORYC Normal respiratory janna 10/07/2022    GSRESP No WBC's 10/11/2022    GSRESP No organisms seen 10/11/2022            Results for orders placed or performed during the hospital encounter of 01/12/23   EKG 12-lead    Collection Time: 01/15/23  8:12 AM    Narrative    Test Reason : R07.9,    Vent. Rate : 070 BPM     Atrial Rate : 070 BPM     P-R Int : 174 ms          QRS Dur : 132 ms      QT Int : 466 ms       P-R-T Axes : 069 -20 197 degrees     QTc Int : 503 ms    Normal sinus rhythm  LVH with QRS widening  T wave abnormality, consider inferolateral ischemia  Abnormal ECG  When compared with ECG of 12-STEPHANIE-2023 18:33,  The axis Shifted right  Inverted T waves have replaced nonspecific T wave abnormality in Inferior  leads  Confirmed by Radha RODRIGUEZ, Adalberto LOCKWOOD ([id number]) on 1/17/2023 7:47:43 AM    Referred By: ERIKA   SELF           Confirmed By:Adalberto Garcia MD                   Plan:       Problem List Items Addressed This Visit          Cardiac/Vascular     Paroxysmal A-fib      Xarelto is utilized.  He had previous left atrial appendage resection and Maze procedure at time of cardiac surgery 2022.   Amiodarone withdrawn in the past.  Prior to his mitral valve surgery he had difficult to control Afib.         Essential hypertension      Condition controlled.  A longstanding condition.         S/P MVR (mitral valve repair)     2022 October surgery.  Current EF 45-50%.      [employer] [place] [employer] functional capacity 2 status.  He is unable to work.      Jardiance to continue.  Entresto to continue.         Chronic combined systolic and diastolic heart failure - Primary      I told the patient he could decrease his Lasix to 40 mg daily.  Provided he does not gain weight or develops significant leg swelling he will keep his dose at 40 mg.      Recent nephrology notes and labs reviewed.         Hyperlipidemia      Atorvastatin 40 mg to continue.  Current LDL 51 mg%.            Renal/    CKD (chronic kidney disease) stage 3, GFR 30-59 ml/min      Condition stable.               I suggested he cut his Lasix to 40 mg daily.      Four-month follow-up made.      He will continue to weigh himself every day and keep track of his fluid status.      Current blood pressure therapy to continue.               Julio James MD  08/04/2025   11:11 AM               [1]   Current Outpatient Medications:     aspirin 81 MG Chew, Take 1 tablet (81 mg total) by mouth once daily., Disp: 360 tablet, Rfl: 0    atorvastatin (LIPITOR) 40 MG tablet, Take 1 tablet (40 mg total) by mouth once daily., Disp: 90 tablet, Rfl: 3    empagliflozin (JARDIANCE) 25 mg tablet, Take 1 tablet (25 mg total) by mouth once daily., Disp: 90 tablet, Rfl: 3    ergocalciferol (ERGOCALCIFEROL) 50,000 unit Cap, Take 1 capsule (50,000 Units total) by mouth every 30 days., Disp: 12 capsule, Rfl: 3    FEROSUL 325 mg (65 mg iron) Tab tablet, Take 325 mg by mouth 2 (two) times daily., Disp: , Rfl:     finasteride  (PROSCAR) 5 mg tablet, Take 1 tablet (5 mg total) by mouth once daily., Disp: 90 tablet, Rfl: 3    furosemide (LASIX) 40 MG tablet, Take 1 tablet (40 mg total) by mouth 2 (two) times daily as needed (swelling legs). (Patient taking differently: Take 80 mg by mouth 2 (two) times daily as needed (swelling legs).), Disp: 180 tablet, Rfl: 3    HYDROcodone-acetaminophen (NORCO) 7.5-325 mg per tablet, Take 1 tablet by mouth every 6 (six) hours as needed for Pain., Disp: , Rfl:     metOLazone (ZAROXOLYN) 5 MG tablet, Take 1 tablet (5 mg total) by mouth daily as needed (swelling, refractory to LAsix)., Disp: 30 tablet, Rfl: 11    metoprolol succinate (TOPROL-XL) 25 MG 24 hr tablet, Take 25 mg by mouth once daily., Disp: , Rfl:     sacubitriL-valsartan (ENTRESTO) 49-51 mg per tablet, Take 1 tablet by mouth 2 (two) times daily., Disp: 180 tablet, Rfl: 3    spironolactone (ALDACTONE) 25 MG tablet, Take 1 tablet (25 mg total) by mouth once daily., Disp: 90 tablet, Rfl: 4    tamsulosin (FLOMAX) 0.4 mg Cap, Take 1 capsule (0.4 mg total) by mouth every evening. Take 30 minutes after dinner, Disp: 90 capsule, Rfl: 3    XARELTO 20 mg Tab, Take 20 mg by mouth every evening., Disp: , Rfl:

## 2025-08-18 ENCOUNTER — OFFICE VISIT (OUTPATIENT)
Dept: GASTROENTEROLOGY | Facility: CLINIC | Age: 65
End: 2025-08-18
Payer: MEDICARE

## 2025-08-18 VITALS — BODY MASS INDEX: 23.9 KG/M2 | WEIGHT: 161.38 LBS | HEIGHT: 69 IN

## 2025-08-18 DIAGNOSIS — Z86.0100 HISTORY OF COLONIC POLYPS: Primary | ICD-10-CM

## 2025-08-18 DIAGNOSIS — K22.70 BARRETT'S ESOPHAGUS WITHOUT DYSPLASIA: ICD-10-CM

## 2025-08-18 PROCEDURE — 99215 OFFICE O/P EST HI 40 MIN: CPT | Mod: S$GLB,,,

## 2025-08-18 PROCEDURE — 99999 PR PBB SHADOW E&M-EST. PATIENT-LVL IV: CPT | Mod: PBBFAC,,,

## 2025-08-18 PROCEDURE — 1101F PT FALLS ASSESS-DOCD LE1/YR: CPT | Mod: CPTII,S$GLB,,

## 2025-08-18 PROCEDURE — 4010F ACE/ARB THERAPY RXD/TAKEN: CPT | Mod: CPTII,S$GLB,,

## 2025-08-18 PROCEDURE — 1126F AMNT PAIN NOTED NONE PRSNT: CPT | Mod: CPTII,S$GLB,,

## 2025-08-18 PROCEDURE — 3066F NEPHROPATHY DOC TX: CPT | Mod: CPTII,S$GLB,,

## 2025-08-18 PROCEDURE — 3008F BODY MASS INDEX DOCD: CPT | Mod: CPTII,S$GLB,,

## 2025-08-18 PROCEDURE — 3288F FALL RISK ASSESSMENT DOCD: CPT | Mod: CPTII,S$GLB,,

## 2025-08-18 PROCEDURE — 3061F NEG MICROALBUMINURIA REV: CPT | Mod: CPTII,S$GLB,,

## 2025-08-18 PROCEDURE — 1159F MED LIST DOCD IN RCRD: CPT | Mod: CPTII,S$GLB,,

## 2025-08-18 RX ORDER — SODIUM, POTASSIUM,MAG SULFATES 17.5-3.13G
1 SOLUTION, RECONSTITUTED, ORAL ORAL DAILY
Qty: 1 KIT | Refills: 0 | Status: SHIPPED | OUTPATIENT
Start: 2025-08-18

## 2025-08-19 ENCOUNTER — TELEPHONE (OUTPATIENT)
Dept: GASTROENTEROLOGY | Facility: CLINIC | Age: 65
End: 2025-08-19
Payer: MEDICARE

## 2025-09-02 ENCOUNTER — TELEPHONE (OUTPATIENT)
Dept: ENDOSCOPY | Facility: HOSPITAL | Age: 65
End: 2025-09-02
Payer: MEDICARE

## (undated) DEVICE — CATH IMPULSE 5FR PIGTAIL 125CM

## (undated) DEVICE — TRAY MINOR GEN SURG OMC

## (undated) DEVICE — BLADE SURG #15 CARBON STEEL

## (undated) DEVICE — CATH EXPO FR4 6F

## (undated) DEVICE — BLADE SURG CARBON STEEL SZ11

## (undated) DEVICE — PROBE CRYO ABLATION 10CM

## (undated) DEVICE — BLADE SAW STERN .076MM 6.1MM L

## (undated) DEVICE — DRAIN CHEST DRY SUCTION

## (undated) DEVICE — KIT GLIDESHEATH SLEND 6FR 10CM

## (undated) DEVICE — DEFIBRILLATOR STAT PADZ II

## (undated) DEVICE — ELECTRODE REM PLYHSV RETURN 9

## (undated) DEVICE — GAUZE SPONGE 4X4 12PLY

## (undated) DEVICE — STAPLER ECHELON FLEX GST 60MM

## (undated) DEVICE — Device

## (undated) DEVICE — SUT 2-0 12-18IN SILK

## (undated) DEVICE — DRESSING ADH ISLAND 3.6 X 14

## (undated) DEVICE — PACK DRAPE UNIVERSAL CONVERTOR

## (undated) DEVICE — STAPLER SKIN ROTATING HEAD

## (undated) DEVICE — DRESSING WHITEFOAM LG VAC

## (undated) DEVICE — SUT MONOCRYL 4-0 PS-1 UND

## (undated) DEVICE — TIP YANKAUERS BULB NO VENT

## (undated) DEVICE — DRESSING TRANS 4X10 TEGADERM

## (undated) DEVICE — ELECTRODE PAD DEFIB STERILE

## (undated) DEVICE — CATH IMPELLA SMARTASSIST 5.5

## (undated) DEVICE — SUT CTD VICRYL 0 UND CR/CTX

## (undated) DEVICE — SUT MCRYL PLUS 4-0 PS2 27IN

## (undated) DEVICE — DRESSING VERSA-FOAM W/O TUBE

## (undated) DEVICE — RETRACTOR RADIALUX LIGHTED

## (undated) DEVICE — DRAPE INCISE IOBAN 2 23X33IN

## (undated) DEVICE — BLADE ELECTRO EDGE INSULATED

## (undated) DEVICE — SEE MEDLINE ITEM 146345

## (undated) DEVICE — TAPE SURG MEDIPORE 6X72IN

## (undated) DEVICE — SUT SILK 2-0 PS 18IN BLACK

## (undated) DEVICE — HEMOSTAT VASC BAND LONG 27CM

## (undated) DEVICE — PAD K-THERMIA 24IN X 60IN

## (undated) DEVICE — CANNULA VESSEL FREE FLOW

## (undated) DEVICE — BLADE 4IN EDGE INSULATED

## (undated) DEVICE — EVACUATOR WOUND BULB 100CC

## (undated) DEVICE — SUT ETHIBOND XTRA 1 CTX

## (undated) DEVICE — PAD DEFIB CADENCE ADULT R2

## (undated) DEVICE — DRAPE CVMAX SPLIT ANES SCRN

## (undated) DEVICE — SUT PROLENE 4-0 SH BLU 36IN

## (undated) DEVICE — DRAPE SLUSH WARMER WITH DISC

## (undated) DEVICE — KIT LEFT HEART MANIFOLD CUSTOM

## (undated) DEVICE — GLOVE BIOGEL PI MICRO SZ 7.5

## (undated) DEVICE — TUBING HF INSUFFLATION W/ FLTR

## (undated) DEVICE — ADHESIVE DERMABOND ADVANCED

## (undated) DEVICE — GUIDEWIRE INQWIRE SE 3MM JTIP

## (undated) DEVICE — SUT PROLENE 4-0 RB-1 BL MO

## (undated) DEVICE — SUT 6 18IN STEEL MONO CCS

## (undated) DEVICE — PACK UNIVERSAL SPLIT II

## (undated) DEVICE — CLIP MED TICALL

## (undated) DEVICE — VAC WOUND ULTRA THERAPY

## (undated) DEVICE — APPLICATOR CHLORAPREP ORN 26ML

## (undated) DEVICE — BLADE SURG CARBON STEEL #10

## (undated) DEVICE — SUT LIGACLIP SMALL XTRA

## (undated) DEVICE — SUT PROLENE 3-0 30 RB-1 BL

## (undated) DEVICE — INSERTS STEALTH FIBRA SIZE 5

## (undated) DEVICE — SPONGE LAP 18X18 PREWASHED

## (undated) DEVICE — SKINMARKER & RULER REGULAR X-F

## (undated) DEVICE — SUT 2/0 36IN COATED VICRYL

## (undated) DEVICE — DRAPE STERI INSTRUMENT 1018

## (undated) DEVICE — CANISTER INFOV.A.C WOUND 500ML

## (undated) DEVICE — RETRACTOR OCTOBASE INSERT HOLD

## (undated) DEVICE — CATH JACKY RADIAL 3.5 110CM

## (undated) DEVICE — DRESSING INFOVAC LARGE BLK

## (undated) DEVICE — TRAY SKIN SCRUB WET PREMIUM

## (undated) DEVICE — TOWEL OR DISP STRL BLUE 4/PK

## (undated) DEVICE — DRESSING TRANS 8X12 TEGADERM

## (undated) DEVICE — DRAPE C-ARM ELAS CLIP 42X120IN

## (undated) DEVICE — SUT 3-0 12-18IN SILK

## (undated) DEVICE — HEMOSTAT SURGICEL NU-KNIT 6X9

## (undated) DEVICE — KIT SAHARA DRAPE DRAW/LIFT

## (undated) DEVICE — DRAIN CHANNEL ROUND 19FR

## (undated) DEVICE — SUT SILK 2-0 SH 18IN BLACK

## (undated) DEVICE — DRAPE ANGIO BRACH 38X44IN

## (undated) DEVICE — CLOSURE SKIN STERI STRIP 1/2X4

## (undated) DEVICE — DRESSING TELFA STRL 4X3 LF

## (undated) DEVICE — SYR 3CC LUER LOC

## (undated) DEVICE — SUT VICRYL BR 1 GEN 27 CT-1

## (undated) DEVICE — MANIFOLD 4 PORT

## (undated) DEVICE — DRESSING TRIACT CNTCT SILV 4X5

## (undated) DEVICE — BOOT SUTURE AID

## (undated) DEVICE — LOOP VESSEL BLUE MAXI

## (undated) DEVICE — DRESSING TRANS 4X4 TEGADERM

## (undated) DEVICE — WIRE INTRAMYOCARDIAL TEMP

## (undated) DEVICE — TUBING HPCIL ROT M/F ADPT 48IN

## (undated) DEVICE — SOL VASHE INSTILLATION WND 16

## (undated) DEVICE — SUT PROLENE 5/0 RB-1 36 IN

## (undated) DEVICE — CATH SWAN GANZ STND 7FR

## (undated) DEVICE — GAUZE SPONGE BULKEE 6X6.75IN

## (undated) DEVICE — DRAPE INCISE IOBAN 2 23X17IN

## (undated) DEVICE — SYS CLSR DERMABOND PRINEO 22CM

## (undated) DEVICE — BOVIE SUCTION

## (undated) DEVICE — APPLIER CLIP LIAGCLIP 9.375IN

## (undated) DEVICE — COVERS PROBE NR-48 STERILE

## (undated) DEVICE — TOWEL OR XRAY WHITE 17X26IN

## (undated) DEVICE — KIT SURGIFLO HEMOSTATIC MATRIX

## (undated) DEVICE — GOWN AERO CHROME W/ TOWEL XL

## (undated) DEVICE — TRAY HEART OMC

## (undated) DEVICE — COVER PROBE US 5.5X58L NON LTX

## (undated) DEVICE — SPONGE GAUZE 16PLY 4X4

## (undated) DEVICE — REMOVER STAPLE SKIN STERILE

## (undated) DEVICE — PAD ABD 8X10 STERILE

## (undated) DEVICE — DRESSING TRANS 2X2 TEGADERM

## (undated) DEVICE — SUT 2/0 30IN SILK BLK BRAI

## (undated) DEVICE — TUBING NEPTUNE 2 SMOKE 10IN

## (undated) DEVICE — SUT SILK BLK BR. 2 2-60

## (undated) DEVICE — DRESSING AQUACEL SACRAL 9 X 9

## (undated) DEVICE — BLADE 4 INCH EDGE UN-INS

## (undated) DEVICE — KIT URINARY CATH URINE METER

## (undated) DEVICE — DRAPE CORETEMP FLD WRM 56X62IN

## (undated) DEVICE — NDL 18GA X1 1/2 REG BEVEL

## (undated) DEVICE — DRAPE HALF SURGICAL 40X58IN

## (undated) DEVICE — GLOVE SURG BIOGEL LATEX SZ 7.5

## (undated) DEVICE — DRAIN CHANNEL ROUND 15FR

## (undated) DEVICE — GOWN POLY REINF BRTH SLV XL

## (undated) DEVICE — SET DECANTER MEDICHOICE

## (undated) DEVICE — VISIPAQUE 320 200ML +PK

## (undated) DEVICE — SUT 2-0 VICRYL / CT-1

## (undated) DEVICE — SUT MONOCRYL 3-0 PS-2 UND

## (undated) DEVICE — GOWN SURGICAL X-LARGE

## (undated) DEVICE — KIT GLIDESHEATH SLEND 7FR 10CM